# Patient Record
Sex: MALE | Race: WHITE | NOT HISPANIC OR LATINO | Employment: FULL TIME | URBAN - METROPOLITAN AREA
[De-identification: names, ages, dates, MRNs, and addresses within clinical notes are randomized per-mention and may not be internally consistent; named-entity substitution may affect disease eponyms.]

---

## 2021-06-16 PROBLEM — I35.0 NONRHEUMATIC AORTIC VALVE STENOSIS: Status: ACTIVE | Noted: 2019-05-24

## 2023-03-27 ENCOUNTER — APPOINTMENT (OUTPATIENT)
Dept: CARDIOLOGY | Facility: HOSPITAL | Age: 68
End: 2023-03-27
Attending: INTERNAL MEDICINE
Payer: COMMERCIAL

## 2023-03-27 ENCOUNTER — HOSPITAL ENCOUNTER (OUTPATIENT)
Facility: HOSPITAL | Age: 68
Setting detail: OBSERVATION
Discharge: HOME | End: 2023-03-28
Attending: EMERGENCY MEDICINE | Admitting: INTERNAL MEDICINE
Payer: COMMERCIAL

## 2023-03-27 ENCOUNTER — APPOINTMENT (OUTPATIENT)
Dept: RADIOLOGY | Facility: HOSPITAL | Age: 68
End: 2023-03-27
Attending: EMERGENCY MEDICINE
Payer: COMMERCIAL

## 2023-03-27 DIAGNOSIS — I20.0 UNSTABLE ANGINA (H): ICD-10-CM

## 2023-03-27 LAB
ABO/RH(D): NORMAL
ACT BLD: 246 SECONDS (ref 74–150)
ACT BLD: 309 SECONDS (ref 74–150)
ACT BLD: 331 SECONDS (ref 74–150)
ALBUMIN SERPL BCG-MCNC: 4.1 G/DL (ref 3.5–5.2)
ALP SERPL-CCNC: 70 U/L (ref 40–129)
ALT SERPL W P-5'-P-CCNC: 18 U/L (ref 10–50)
ANION GAP SERPL CALCULATED.3IONS-SCNC: 8 MMOL/L (ref 7–15)
ANTIBODY SCREEN: NEGATIVE
AST SERPL W P-5'-P-CCNC: 26 U/L (ref 10–50)
ATRIAL RATE - MUSE: 59 BPM
ATRIAL RATE - MUSE: 74 BPM
BASOPHILS # BLD AUTO: 0 10E3/UL (ref 0–0.2)
BASOPHILS NFR BLD AUTO: 0 %
BILIRUB SERPL-MCNC: 0.9 MG/DL
BUN SERPL-MCNC: 11.1 MG/DL (ref 8–23)
CALCIUM SERPL-MCNC: 9 MG/DL (ref 8.8–10.2)
CHLORIDE SERPL-SCNC: 104 MMOL/L (ref 98–107)
CHOLEST SERPL-MCNC: 177 MG/DL
CREAT SERPL-MCNC: 0.83 MG/DL (ref 0.67–1.17)
DEPRECATED HCO3 PLAS-SCNC: 26 MMOL/L (ref 22–29)
DIASTOLIC BLOOD PRESSURE - MUSE: NORMAL MMHG
DIASTOLIC BLOOD PRESSURE - MUSE: NORMAL MMHG
EOSINOPHIL # BLD AUTO: 0 10E3/UL (ref 0–0.7)
EOSINOPHIL NFR BLD AUTO: 0 %
ERYTHROCYTE [DISTWIDTH] IN BLOOD BY AUTOMATED COUNT: 13.2 % (ref 10–15)
GFR SERPL CREATININE-BSD FRML MDRD: >90 ML/MIN/1.73M2
GLUCOSE SERPL-MCNC: 105 MG/DL (ref 70–99)
HBA1C MFR BLD: 5.2 %
HCT VFR BLD AUTO: 39.1 % (ref 40–53)
HDLC SERPL-MCNC: 47 MG/DL
HGB BLD-MCNC: 13.6 G/DL (ref 13.3–17.7)
HOLD SPECIMEN: NORMAL
IMM GRANULOCYTES # BLD: 0.1 10E3/UL
IMM GRANULOCYTES NFR BLD: 1 %
INTERPRETATION ECG - MUSE: NORMAL
INTERPRETATION ECG - MUSE: NORMAL
LDLC SERPL CALC-MCNC: 114 MG/DL
LVEF ECHO: NORMAL
LYMPHOCYTES # BLD AUTO: 0.9 10E3/UL (ref 0.8–5.3)
LYMPHOCYTES NFR BLD AUTO: 9 %
MCH RBC QN AUTO: 31 PG (ref 26.5–33)
MCHC RBC AUTO-ENTMCNC: 34.8 G/DL (ref 31.5–36.5)
MCV RBC AUTO: 89 FL (ref 78–100)
MONOCYTES # BLD AUTO: 0.5 10E3/UL (ref 0–1.3)
MONOCYTES NFR BLD AUTO: 6 %
NEUTROPHILS # BLD AUTO: 8.4 10E3/UL (ref 1.6–8.3)
NEUTROPHILS NFR BLD AUTO: 84 %
NONHDLC SERPL-MCNC: 130 MG/DL
NRBC # BLD AUTO: 0 10E3/UL
NRBC BLD AUTO-RTO: 0 /100
P AXIS - MUSE: 55 DEGREES
P AXIS - MUSE: 68 DEGREES
PLATELET # BLD AUTO: 203 10E3/UL (ref 150–450)
POTASSIUM SERPL-SCNC: 4.1 MMOL/L (ref 3.4–5.3)
PR INTERVAL - MUSE: 182 MS
PR INTERVAL - MUSE: 198 MS
PROT SERPL-MCNC: 7.1 G/DL (ref 6.4–8.3)
QRS DURATION - MUSE: 94 MS
QRS DURATION - MUSE: 98 MS
QT - MUSE: 372 MS
QT - MUSE: 394 MS
QTC - MUSE: 390 MS
QTC - MUSE: 412 MS
R AXIS - MUSE: 40 DEGREES
R AXIS - MUSE: 47 DEGREES
RBC # BLD AUTO: 4.39 10E6/UL (ref 4.4–5.9)
SODIUM SERPL-SCNC: 138 MMOL/L (ref 136–145)
SPECIMEN EXPIRATION DATE: NORMAL
SYSTOLIC BLOOD PRESSURE - MUSE: NORMAL MMHG
SYSTOLIC BLOOD PRESSURE - MUSE: NORMAL MMHG
T AXIS - MUSE: 53 DEGREES
T AXIS - MUSE: 67 DEGREES
TRIGL SERPL-MCNC: 82 MG/DL
TROPONIN T SERPL HS-MCNC: 20 NG/L
UFH PPP CHRO-ACNC: <0.1 IU/ML
VENTRICULAR RATE- MUSE: 59 BPM
VENTRICULAR RATE- MUSE: 74 BPM
WBC # BLD AUTO: 9.9 10E3/UL (ref 4–11)

## 2023-03-27 PROCEDURE — 258N000003 HC RX IP 258 OP 636: Performed by: INTERNAL MEDICINE

## 2023-03-27 PROCEDURE — 250N000009 HC RX 250: Performed by: INTERNAL MEDICINE

## 2023-03-27 PROCEDURE — 250N000011 HC RX IP 250 OP 636: Performed by: INTERNAL MEDICINE

## 2023-03-27 PROCEDURE — 93010 ELECTROCARDIOGRAM REPORT: CPT | Performed by: INTERNAL MEDICINE

## 2023-03-27 PROCEDURE — 92921 HC PRQ TRLUML CORONARY ANGIOPLASTY ADDL BRANCH: CPT | Mod: LC | Performed by: INTERNAL MEDICINE

## 2023-03-27 PROCEDURE — 36415 COLL VENOUS BLD VENIPUNCTURE: CPT | Performed by: EMERGENCY MEDICINE

## 2023-03-27 PROCEDURE — 250N000013 HC RX MED GY IP 250 OP 250 PS 637: Performed by: INTERNAL MEDICINE

## 2023-03-27 PROCEDURE — 255N000002 HC RX 255 OP 636: Performed by: FAMILY MEDICINE

## 2023-03-27 PROCEDURE — 84484 ASSAY OF TROPONIN QUANT: CPT | Performed by: EMERGENCY MEDICINE

## 2023-03-27 PROCEDURE — 85520 HEPARIN ASSAY: CPT | Performed by: EMERGENCY MEDICINE

## 2023-03-27 PROCEDURE — 250N000013 HC RX MED GY IP 250 OP 250 PS 637: Performed by: EMERGENCY MEDICINE

## 2023-03-27 PROCEDURE — 0715T HC PRQ TRLUML CORONARY LITHOTRIPSY: CPT | Performed by: INTERNAL MEDICINE

## 2023-03-27 PROCEDURE — 92920 PRQ TRLUML C ANGIOP 1ART&/BR: CPT | Performed by: INTERNAL MEDICINE

## 2023-03-27 PROCEDURE — 92928 PRQ TCAT PLMT NTRAC ST 1 LES: CPT | Mod: LC | Performed by: INTERNAL MEDICINE

## 2023-03-27 PROCEDURE — 85025 COMPLETE CBC W/AUTO DIFF WBC: CPT | Performed by: EMERGENCY MEDICINE

## 2023-03-27 PROCEDURE — 86850 RBC ANTIBODY SCREEN: CPT | Performed by: INTERNAL MEDICINE

## 2023-03-27 PROCEDURE — 99152 MOD SED SAME PHYS/QHP 5/>YRS: CPT | Performed by: INTERNAL MEDICINE

## 2023-03-27 PROCEDURE — 71046 X-RAY EXAM CHEST 2 VIEWS: CPT

## 2023-03-27 PROCEDURE — C1874 STENT, COATED/COV W/DEL SYS: HCPCS | Performed by: INTERNAL MEDICINE

## 2023-03-27 PROCEDURE — 83036 HEMOGLOBIN GLYCOSYLATED A1C: CPT | Performed by: FAMILY MEDICINE

## 2023-03-27 PROCEDURE — 999N000054 HC STATISTIC EKG NON-CHARGEABLE

## 2023-03-27 PROCEDURE — 99153 MOD SED SAME PHYS/QHP EA: CPT | Performed by: INTERNAL MEDICINE

## 2023-03-27 PROCEDURE — 96366 THER/PROPH/DIAG IV INF ADDON: CPT | Mod: 59

## 2023-03-27 PROCEDURE — 99291 CRITICAL CARE FIRST HOUR: CPT | Mod: 25

## 2023-03-27 PROCEDURE — 96365 THER/PROPH/DIAG IV INF INIT: CPT

## 2023-03-27 PROCEDURE — 0715T PR PERCUTANEOUS TRANSLUMINAL CORONARY LITHOTRIPSY: CPT | Performed by: INTERNAL MEDICINE

## 2023-03-27 PROCEDURE — C1894 INTRO/SHEATH, NON-LASER: HCPCS | Performed by: INTERNAL MEDICINE

## 2023-03-27 PROCEDURE — 272N000001 HC OR GENERAL SUPPLY STERILE: Performed by: INTERNAL MEDICINE

## 2023-03-27 PROCEDURE — 93005 ELECTROCARDIOGRAM TRACING: CPT

## 2023-03-27 PROCEDURE — 250N000013 HC RX MED GY IP 250 OP 250 PS 637: Performed by: FAMILY MEDICINE

## 2023-03-27 PROCEDURE — C1887 CATHETER, GUIDING: HCPCS | Performed by: INTERNAL MEDICINE

## 2023-03-27 PROCEDURE — G0378 HOSPITAL OBSERVATION PER HR: HCPCS

## 2023-03-27 PROCEDURE — 85347 COAGULATION TIME ACTIVATED: CPT

## 2023-03-27 PROCEDURE — C1761 HC OR CATH, TRANS INTRA LITHO/CORONARY: HCPCS | Performed by: INTERNAL MEDICINE

## 2023-03-27 PROCEDURE — 93460 R&L HRT ART/VENTRICLE ANGIO: CPT | Performed by: INTERNAL MEDICINE

## 2023-03-27 PROCEDURE — 99222 1ST HOSP IP/OBS MODERATE 55: CPT | Performed by: FAMILY MEDICINE

## 2023-03-27 PROCEDURE — 96376 TX/PRO/DX INJ SAME DRUG ADON: CPT

## 2023-03-27 PROCEDURE — 99223 1ST HOSP IP/OBS HIGH 75: CPT | Mod: 25 | Performed by: INTERNAL MEDICINE

## 2023-03-27 PROCEDURE — 250N000011 HC RX IP 250 OP 636: Performed by: EMERGENCY MEDICINE

## 2023-03-27 PROCEDURE — 255N000002 HC RX 255 OP 636: Performed by: INTERNAL MEDICINE

## 2023-03-27 PROCEDURE — 93306 TTE W/DOPPLER COMPLETE: CPT | Mod: 26 | Performed by: INTERNAL MEDICINE

## 2023-03-27 PROCEDURE — 92921 PR PRQ TRLUML CORONARY ANGIOPLASTY ADDL BRANCH: CPT | Mod: LC | Performed by: INTERNAL MEDICINE

## 2023-03-27 PROCEDURE — 80053 COMPREHEN METABOLIC PANEL: CPT | Performed by: EMERGENCY MEDICINE

## 2023-03-27 PROCEDURE — 80061 LIPID PANEL: CPT | Performed by: FAMILY MEDICINE

## 2023-03-27 PROCEDURE — C9600 PERC DRUG-EL COR STENT SING: HCPCS | Performed by: INTERNAL MEDICINE

## 2023-03-27 PROCEDURE — C1769 GUIDE WIRE: HCPCS | Performed by: INTERNAL MEDICINE

## 2023-03-27 PROCEDURE — C1725 CATH, TRANSLUMIN NON-LASER: HCPCS | Performed by: INTERNAL MEDICINE

## 2023-03-27 PROCEDURE — 93458 L HRT ARTERY/VENTRICLE ANGIO: CPT | Mod: 26 | Performed by: INTERNAL MEDICINE

## 2023-03-27 PROCEDURE — 999N000054 ECG 12-LEAD WITH MUSE (LHE): Performed by: STUDENT IN AN ORGANIZED HEALTH CARE EDUCATION/TRAINING PROGRAM

## 2023-03-27 DEVICE — STENT CORONARY DES SYNERGY XD MR US 2.50X32MM H7493941832250: Type: IMPLANTABLE DEVICE | Status: FUNCTIONAL

## 2023-03-27 DEVICE — STENT CORONARY DES SYNERGY XD MR US 2.50X38MM H7493941838250: Type: IMPLANTABLE DEVICE | Status: FUNCTIONAL

## 2023-03-27 RX ORDER — SODIUM CHLORIDE 9 MG/ML
INJECTION, SOLUTION INTRAVENOUS CONTINUOUS
Status: DISCONTINUED | OUTPATIENT
Start: 2023-03-27 | End: 2023-03-27 | Stop reason: HOSPADM

## 2023-03-27 RX ORDER — FENTANYL CITRATE 50 UG/ML
INJECTION, SOLUTION INTRAMUSCULAR; INTRAVENOUS
Status: DISCONTINUED | OUTPATIENT
Start: 2023-03-27 | End: 2023-03-27 | Stop reason: HOSPADM

## 2023-03-27 RX ORDER — HYDRALAZINE HYDROCHLORIDE 20 MG/ML
10 INJECTION INTRAMUSCULAR; INTRAVENOUS EVERY 4 HOURS PRN
Status: DISCONTINUED | OUTPATIENT
Start: 2023-03-27 | End: 2023-03-28 | Stop reason: HOSPADM

## 2023-03-27 RX ORDER — NITROGLYCERIN 0.4 MG/1
0.4 TABLET SUBLINGUAL EVERY 5 MIN PRN
Status: DISCONTINUED | OUTPATIENT
Start: 2023-03-27 | End: 2023-03-28 | Stop reason: HOSPADM

## 2023-03-27 RX ORDER — OXYCODONE HYDROCHLORIDE 5 MG/1
10 TABLET ORAL EVERY 4 HOURS PRN
Status: DISCONTINUED | OUTPATIENT
Start: 2023-03-27 | End: 2023-03-28 | Stop reason: HOSPADM

## 2023-03-27 RX ORDER — HEPARIN SODIUM 10000 [USP'U]/100ML
0-5000 INJECTION, SOLUTION INTRAVENOUS CONTINUOUS
Status: DISCONTINUED | OUTPATIENT
Start: 2023-03-27 | End: 2023-03-27

## 2023-03-27 RX ORDER — ACETAMINOPHEN 325 MG/1
650 TABLET ORAL EVERY 4 HOURS PRN
Status: DISCONTINUED | OUTPATIENT
Start: 2023-03-27 | End: 2023-03-28 | Stop reason: HOSPADM

## 2023-03-27 RX ORDER — ACETAMINOPHEN 325 MG/1
650 TABLET ORAL EVERY 6 HOURS PRN
Status: DISCONTINUED | OUTPATIENT
Start: 2023-03-27 | End: 2023-03-27

## 2023-03-27 RX ORDER — ACETAMINOPHEN 650 MG/1
650 SUPPOSITORY RECTAL EVERY 6 HOURS PRN
Status: DISCONTINUED | OUTPATIENT
Start: 2023-03-27 | End: 2023-03-27

## 2023-03-27 RX ORDER — FENTANYL CITRATE 50 UG/ML
25 INJECTION, SOLUTION INTRAMUSCULAR; INTRAVENOUS
Status: DISCONTINUED | OUTPATIENT
Start: 2023-03-27 | End: 2023-03-27

## 2023-03-27 RX ORDER — FENTANYL CITRATE 50 UG/ML
25 INJECTION, SOLUTION INTRAMUSCULAR; INTRAVENOUS
Status: CANCELLED | OUTPATIENT
Start: 2023-03-27

## 2023-03-27 RX ORDER — IBUPROFEN 200 MG
200 TABLET ORAL EVERY 8 HOURS PRN
Status: ON HOLD | COMMUNITY
End: 2023-03-28

## 2023-03-27 RX ORDER — CLOPIDOGREL BISULFATE 75 MG/1
TABLET ORAL
Status: DISCONTINUED | OUTPATIENT
Start: 2023-03-27 | End: 2023-03-27 | Stop reason: HOSPADM

## 2023-03-27 RX ORDER — CLOPIDOGREL BISULFATE 75 MG/1
75 TABLET ORAL DAILY
Status: DISCONTINUED | OUTPATIENT
Start: 2023-03-28 | End: 2023-03-28 | Stop reason: HOSPADM

## 2023-03-27 RX ORDER — ASPIRIN 325 MG
325 TABLET ORAL ONCE
Status: COMPLETED | OUTPATIENT
Start: 2023-03-27 | End: 2023-03-27

## 2023-03-27 RX ORDER — NALOXONE HYDROCHLORIDE 0.4 MG/ML
0.4 INJECTION, SOLUTION INTRAMUSCULAR; INTRAVENOUS; SUBCUTANEOUS
Status: ACTIVE | OUTPATIENT
Start: 2023-03-27 | End: 2023-03-27

## 2023-03-27 RX ORDER — ATROPINE SULFATE 0.1 MG/ML
0.5 INJECTION INTRAVENOUS
Status: ACTIVE | OUTPATIENT
Start: 2023-03-27 | End: 2023-03-27

## 2023-03-27 RX ORDER — ONDANSETRON 4 MG/1
4 TABLET, ORALLY DISINTEGRATING ORAL EVERY 6 HOURS PRN
Status: DISCONTINUED | OUTPATIENT
Start: 2023-03-27 | End: 2023-03-27

## 2023-03-27 RX ORDER — ONDANSETRON 2 MG/ML
4 INJECTION INTRAMUSCULAR; INTRAVENOUS EVERY 6 HOURS PRN
Status: DISCONTINUED | OUTPATIENT
Start: 2023-03-27 | End: 2023-03-28 | Stop reason: HOSPADM

## 2023-03-27 RX ORDER — SIMETHICONE 80 MG
80 TABLET,CHEWABLE ORAL EVERY 6 HOURS PRN
Status: DISCONTINUED | OUTPATIENT
Start: 2023-03-27 | End: 2023-03-28 | Stop reason: HOSPADM

## 2023-03-27 RX ORDER — ONDANSETRON 2 MG/ML
4 INJECTION INTRAMUSCULAR; INTRAVENOUS EVERY 6 HOURS PRN
Status: DISCONTINUED | OUTPATIENT
Start: 2023-03-27 | End: 2023-03-27

## 2023-03-27 RX ORDER — METOPROLOL TARTRATE 25 MG/1
50 TABLET, FILM COATED ORAL 2 TIMES DAILY
Status: DISCONTINUED | OUTPATIENT
Start: 2023-03-27 | End: 2023-03-28 | Stop reason: HOSPADM

## 2023-03-27 RX ORDER — ASPIRIN 81 MG/1
81 TABLET ORAL DAILY
Status: DISCONTINUED | OUTPATIENT
Start: 2023-03-28 | End: 2023-03-28 | Stop reason: HOSPADM

## 2023-03-27 RX ORDER — NALOXONE HYDROCHLORIDE 0.4 MG/ML
0.2 INJECTION, SOLUTION INTRAMUSCULAR; INTRAVENOUS; SUBCUTANEOUS
Status: ACTIVE | OUTPATIENT
Start: 2023-03-27 | End: 2023-03-27

## 2023-03-27 RX ORDER — ONDANSETRON 4 MG/1
4 TABLET, ORALLY DISINTEGRATING ORAL EVERY 6 HOURS PRN
Status: DISCONTINUED | OUTPATIENT
Start: 2023-03-27 | End: 2023-03-28 | Stop reason: HOSPADM

## 2023-03-27 RX ORDER — OXYCODONE HYDROCHLORIDE 5 MG/1
5 TABLET ORAL EVERY 4 HOURS PRN
Status: DISCONTINUED | OUTPATIENT
Start: 2023-03-27 | End: 2023-03-28 | Stop reason: HOSPADM

## 2023-03-27 RX ORDER — NITROGLYCERIN 5 MG/ML
VIAL (ML) INTRAVENOUS
Status: DISCONTINUED | OUTPATIENT
Start: 2023-03-27 | End: 2023-03-27 | Stop reason: HOSPADM

## 2023-03-27 RX ORDER — SODIUM CHLORIDE 9 MG/ML
INJECTION, SOLUTION INTRAVENOUS CONTINUOUS
Status: ACTIVE | OUTPATIENT
Start: 2023-03-27 | End: 2023-03-27

## 2023-03-27 RX ORDER — HEPARIN SODIUM 1000 [USP'U]/ML
INJECTION, SOLUTION INTRAVENOUS; SUBCUTANEOUS
Status: DISCONTINUED | OUTPATIENT
Start: 2023-03-27 | End: 2023-03-27 | Stop reason: HOSPADM

## 2023-03-27 RX ORDER — NITROGLYCERIN 0.4 MG/1
0.4 TABLET SUBLINGUAL EVERY 5 MIN PRN
Status: DISCONTINUED | OUTPATIENT
Start: 2023-03-27 | End: 2023-03-27

## 2023-03-27 RX ORDER — ONDANSETRON 2 MG/ML
4 INJECTION INTRAMUSCULAR; INTRAVENOUS ONCE
Status: COMPLETED | OUTPATIENT
Start: 2023-03-27 | End: 2023-03-27

## 2023-03-27 RX ORDER — IODIXANOL 320 MG/ML
INJECTION, SOLUTION INTRAVASCULAR
Status: DISCONTINUED | OUTPATIENT
Start: 2023-03-27 | End: 2023-03-27 | Stop reason: HOSPADM

## 2023-03-27 RX ORDER — DOCUSATE SODIUM 100 MG/1
100 CAPSULE, LIQUID FILLED ORAL 2 TIMES DAILY
Status: DISCONTINUED | OUTPATIENT
Start: 2023-03-27 | End: 2023-03-28 | Stop reason: HOSPADM

## 2023-03-27 RX ORDER — DIAZEPAM 10 MG
10 TABLET ORAL
Status: COMPLETED | OUTPATIENT
Start: 2023-03-27 | End: 2023-03-27

## 2023-03-27 RX ORDER — CLOPIDOGREL BISULFATE 75 MG/1
75 TABLET ORAL DAILY
Qty: 90 TABLET | Refills: 3 | Status: SHIPPED | OUTPATIENT
Start: 2023-03-28 | End: 2023-08-18

## 2023-03-27 RX ORDER — FLUMAZENIL 0.1 MG/ML
0.2 INJECTION, SOLUTION INTRAVENOUS
Status: DISCONTINUED | OUTPATIENT
Start: 2023-03-27 | End: 2023-03-27

## 2023-03-27 RX ORDER — ASPIRIN 81 MG/1
81 TABLET, CHEWABLE ORAL ONCE
Status: DISCONTINUED | OUTPATIENT
Start: 2023-03-27 | End: 2023-03-27

## 2023-03-27 RX ORDER — HYDRALAZINE HYDROCHLORIDE 20 MG/ML
5 INJECTION INTRAMUSCULAR; INTRAVENOUS EVERY 6 HOURS PRN
Status: DISCONTINUED | OUTPATIENT
Start: 2023-03-27 | End: 2023-03-28 | Stop reason: HOSPADM

## 2023-03-27 RX ORDER — METOPROLOL TARTRATE 1 MG/ML
5 INJECTION, SOLUTION INTRAVENOUS
Status: DISCONTINUED | OUTPATIENT
Start: 2023-03-27 | End: 2023-03-28 | Stop reason: HOSPADM

## 2023-03-27 RX ORDER — ASPIRIN 81 MG/1
243 TABLET, CHEWABLE ORAL ONCE
Status: COMPLETED | OUTPATIENT
Start: 2023-03-27 | End: 2023-03-27

## 2023-03-27 RX ADMIN — DIAZEPAM 10 MG: 10 TABLET ORAL at 10:46

## 2023-03-27 RX ADMIN — DOCUSATE SODIUM 100 MG: 100 CAPSULE, LIQUID FILLED ORAL at 19:27

## 2023-03-27 RX ADMIN — ACETAMINOPHEN 650 MG: 325 TABLET ORAL at 19:26

## 2023-03-27 RX ADMIN — PERFLUTREN 2 ML: 6.52 INJECTION, SUSPENSION INTRAVENOUS at 16:01

## 2023-03-27 RX ADMIN — SIMETHICONE 80 MG: 80 TABLET, CHEWABLE ORAL at 19:45

## 2023-03-27 RX ADMIN — SODIUM CHLORIDE: 9 INJECTION, SOLUTION INTRAVENOUS at 09:47

## 2023-03-27 RX ADMIN — ASPIRIN 325 MG ORAL TABLET 325 MG: 325 PILL ORAL at 07:28

## 2023-03-27 RX ADMIN — SODIUM CHLORIDE: 9 INJECTION, SOLUTION INTRAVENOUS at 14:04

## 2023-03-27 RX ADMIN — METOPROLOL TARTRATE 50 MG: 25 TABLET, FILM COATED ORAL at 19:27

## 2023-03-27 RX ADMIN — HEPARIN SODIUM AND DEXTROSE 1050 UNITS/HR: 10000; 5 INJECTION INTRAVENOUS at 07:41

## 2023-03-27 ASSESSMENT — ACTIVITIES OF DAILY LIVING (ADL)
ADLS_ACUITY_SCORE: 35
ADLS_ACUITY_SCORE: 36
ADLS_ACUITY_SCORE: 35
ADLS_ACUITY_SCORE: 35
ADLS_ACUITY_SCORE: 36

## 2023-03-27 ASSESSMENT — HEART SCORE
AGE: 65+
HEART SCORE: 7
RISK FACTORS: 1-2 RISK FACTORS
ECG: SIGNIFICANT ST-DEPRESSION
TROPONIN: LESS THAN OR EQUAL TO NORMAL LIMIT
HISTORY: HIGHLY SUSPICIOUS

## 2023-03-27 ASSESSMENT — EJECTION FRACTION: EF_VALUE: .27

## 2023-03-27 NOTE — Clinical Note
Prepped with: ChloraPrep. The patient was draped. .Pre-procedure site marking:Insertion site not predetermined

## 2023-03-27 NOTE — Clinical Note
The first balloon was inserted into the circumflex and marginal circumflex.Max pressure = 10 lorena. Total duration = 27 seconds.

## 2023-03-27 NOTE — Clinical Note
Max pressure = 16 lorena. Total duration = 6 seconds.     Max pressure = 8 lorena. Total duration = 7 seconds.    Balloon reinflated a second time: Max pressure = 8 lorena. Total duration = 7 seconds.

## 2023-03-27 NOTE — PRE-PROCEDURE
GENERAL PRE-PROCEDURE:   Procedure:  Neal, possible PCI  Date/Time:  3/27/2023 11:02 AM    Written consent obtained?: Yes    Risks and benefits: Risks, benefits and alternatives were discussed    Consent given by:  Patient  Patient states understanding of procedure being performed: Yes    Patient's understanding of procedure matches consent: Yes    Procedure consent matches procedure scheduled: Yes    Expected level of sedation:  Moderate  Appropriately NPO:  Yes  ASA Class:  3  Mallampati  :  Grade 3- soft palate visible, posterior pharyngeal wall not visible  Lungs:  Lungs clear with good breath sounds bilaterally  Heart:  Normal heart sounds and rate  History & Physical reviewed:  History and physical reviewed and no updates needed  Statement of review:  I have reviewed the lab findings, diagnostic data, medications, and the plan for sedation

## 2023-03-27 NOTE — Clinical Note
The first balloon was inserted into the circumflex.Max pressure = 6 lorena. Total duration = 24 seconds.     Max pressure = 10 lorena. Total duration = 24 seconds.    Balloon reinflated a second time: Max pressure = 10 lorena. Total duration = 24 seconds.  Balloon reinflated a third time: Max pressure = 10 lorena. Total duration = 20 seconds.  Balloon reinflated a fourth time: Max pressure = 11 lorena. Total duration = 24 seconds.  Balloon reinflated  a fourth time: Max pressure = 12 lorena. Total duration = 27 seconds.

## 2023-03-27 NOTE — H&P
"[facility]    History and Physical - Hospitalist Service       Date of Admission:  3/27/2023    Assessment & Plan      Lopez Hogue is a 67 year old male     Chest Pain/USA  ---EKG with Twave inversion V3-V5 in the setting of exertional chest pain  ---troponin normal  ---given asa  --- Admit, keep n.p.o.  --- Continue heparin drip  --- Nitroglycerin as needed  --- Cardiology consulted and recommending angiogram today  --- Check fasting lipid panel, A1c    Aortic stenosis  --- Echo 2017 showing mild calcific aortic stenosis  --- Repeat echo today    Hypertension/elevated blood pressures  --- No diagnosis essential hypertension  --- cards started on metoprolol  --- Hydralazine as needed    Hidradenocarcinoma  --- 2015, left thigh    Squamous cell carcinoma  --- 2021, left buttock     Diet:  NPO  DVT Prophylaxis: Heparin gtt  Smith Catheter: Not present  Lines: None     Cardiac Monitoring: None  Code Status:   full code    Clinically Significant Risk Factors Present on Admission                       # Overweight: Estimated body mass index is 28.89 kg/m  as calculated from the following:    Height as of this encounter: 1.727 m (5' 8\").    Weight as of this encounter: 86.2 kg (190 lb).           Disposition Plan      Expected Discharge Date: 03/28/2023                  Caity Mcwilliams MD  Hospitalist Service  [facility]  Securely message with [employer] (more info)  Text page via [employer] Paging/Directory     ______________________________________________________________________    Chief Complaint   Chest pain    History is obtained from the patient    History of Present Illness   Lopez Hogue is a 67 year old male with no known coronary artery disease who came into the emergency room department for further work-up and evaluation of several minute history of chest pain.  History is taken from the patient in the presence of his wife.  Patient was at the gym and was on the " treadmill.  He began developing substernal chest pain.  It lasted at least 30 to 45 minutes.  It waxed and waned and was accompanied by a small amount of nausea.  Denies diaphoresis or shortness of breath.  Due to persistence of chest pain he came to the emergency room department.  The emergency room department he was found to have EKG changes although pain subsided spontaneously and quickly and he is being admitted    Admits that he was told he had aortic stenosis years ago.  No recent follow-up    Denies family history of early heart disease.  Quit smoking about 10 years ago.  No history of hypertension diabetes or hyperlipidemia.    Denies recent fever cough or infection.  No vomiting or diarrhea      Past Medical History    Past Medical History:   Diagnosis Date     EtOH dependence (H)     Quit drinking 10 years     Hypertension      Nonrheumatic aortic (valve) stenosis      Sweat gland carcinoma        Past Surgical History   Past Surgical History:   Procedure Laterality Date     OTHER SURGICAL HISTORY  2015    WIDE EXCISION OF LEFT GLUTEAL MASSTNM: [id number], stage: II hidradenocarcinoma Grade II, 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, sentinel lymph node biopsy negative        Prior to Admission Medications   Prior to Admission Medications   Prescriptions Last Dose Informant Patient Reported? Taking?   diazePAM (VALIUM) 5 MG tablet   No No   Sig: [DIAZEPAM (VALIUM) 5 MG TABLET] Take 1 tablet (5 mg total) by mouth every 12 (twelve) hours as needed for muscle spasms (pain).   traMADoL (ULTRAM) 50 mg tablet   No No   Sig: [TRAMADOL (ULTRAM) 50 MG TABLET] Take 1 tablet (50 mg total) by mouth every 6 (six) hours as needed for pain.      Facility-Administered Medications: None        Review of Systems    The 5 point Review of Systems is negative other than noted in the HPI      Physical Exam   Vital Signs: Temp: 98.1  F (36.7  C) Temp src: Oral BP: (!) 170/79 Pulse: 71   Resp: 16 SpO2: 99 % O2 Device: None (Room air)    Weight: 190 lbs 0  oz    General Appearance: Pleasant male in no apparent distress  Respiratory: Clear to auscultation bilaterally no presacral edema  Cardiovascular: Regular rate and rhythm with a 2 out of 6 systolic murmur radiating into the neck  GI: Soft and nontender without hepatosplenomegaly  Skin: No significant lower extremity edema  Other: Neurologically grossly intact without focal deficits appreciated.    Medical Decision Making             Data      Normal sinus rhythm.  ST depression V3 through V5.  QTc 412.    Recent Results (from the past 24 hour(s))   Heparin Unfractionated Anti Xa Level    Collection Time: 03/27/23  7:20 AM   Result Value Ref Range    Anti Xa Unfractionated Heparin <0.10 For Reference Range, See Comment IU/mL   Comprehensive metabolic panel    Collection Time: 03/27/23  7:27 AM   Result Value Ref Range    Sodium 138 136 - 145 mmol/L    Potassium 4.1 3.4 - 5.3 mmol/L    Chloride 104 98 - 107 mmol/L    Carbon Dioxide (CO2) 26 22 - 29 mmol/L    Anion Gap 8 7 - 15 mmol/L    Urea Nitrogen 11.1 8.0 - 23.0 mg/dL    Creatinine 0.83 0.67 - 1.17 mg/dL    Calcium 9.0 8.8 - 10.2 mg/dL    Glucose 105 (H) 70 - 99 mg/dL    Alkaline Phosphatase 70 40 - 129 U/L    AST 26 10 - 50 U/L    ALT 18 10 - 50 U/L    Protein Total 7.1 6.4 - 8.3 g/dL    Albumin 4.1 3.5 - 5.2 g/dL    Bilirubin Total 0.9 <=1.2 mg/dL    GFR Estimate >90 >60 mL/min/1.73m2   Troponin T, High Sensitivity    Collection Time: 03/27/23  7:27 AM   Result Value Ref Range    Troponin T, High Sensitivity 20 <=22 ng/L   CBC with platelets and differential    Collection Time: 03/27/23  7:27 AM   Result Value Ref Range    WBC Count 9.9 4.0 - 11.0 10e3/uL    RBC Count 4.39 (L) 4.40 - 5.90 10e6/uL    Hemoglobin 13.6 13.3 - 17.7 g/dL    Hematocrit 39.1 (L) 40.0 - 53.0 %    MCV 89 78 - 100 fL    MCH 31.0 26.5 - 33.0 pg    MCHC 34.8 31.5 - 36.5 g/dL    RDW 13.2 10.0 - 15.0 %    Platelet Count 203 150 - 450 10e3/uL    % Neutrophils 84 %    % Lymphocytes 9 %    %  Monocytes 6 %    % Eosinophils 0 %    % Basophils 0 %    % Immature Granulocytes 1 %    NRBCs per 100 WBC 0 <1 /100    Absolute Neutrophils 8.4 (H) 1.6 - 8.3 10e3/uL    Absolute Lymphocytes 0.9 0.8 - 5.3 10e3/uL    Absolute Monocytes 0.5 0.0 - 1.3 10e3/uL    Absolute Eosinophils 0.0 0.0 - 0.7 10e3/uL    Absolute Basophils 0.0 0.0 - 0.2 10e3/uL    Absolute Immature Granulocytes 0.1 <=0.4 10e3/uL    Absolute NRBCs 0.0 10e3/uL   Extra Red Top Tube    Collection Time: 03/27/23  7:27 AM   Result Value Ref Range    Hold Specimen JIC        Imaging results reviewed over the past 24 hrs:   Recent Results (from the past 24 hour(s))   XR Chest 2 Views    Narrative    EXAM: XR CHEST 2 VIEWS  LOCATION: [facility]  DATE/TIME: 3/27/2023 8:17 AM    INDICATION: chest pain  COMPARISON: CT chest 07/05/2021 multiple prior studies.      Impression    IMPRESSION: Lungs are clear. Heart and pulmonary vascularity are normal. No signs of acute disease.

## 2023-03-27 NOTE — ED NOTES
Pt is alert and oriented x 4. States he has no chest pain at this time. No shortness of breath or nausea. VSS.

## 2023-03-27 NOTE — DISCHARGE INSTRUCTIONS

## 2023-03-27 NOTE — PHARMACY-ADMISSION MEDICATION HISTORY
Pharmacy Note - Admission Medication History    Pertinent Provider Information:      ______________________________________________________________________    Prior To Admission (PTA) med list completed and updated in EMR.       PTA Med List   Medication Sig Last Dose     ibuprofen (ADVIL/MOTRIN) 200 MG tablet Take 200 mg by mouth every 8 hours as needed for pain (occasional use) More than a month       Information source(s): Patient  Method of interview communication: in-person    Summary of Changes to PTA Med List  New: ibuprofen  Discontinued: diazepam, tramadol  Changed: none    Patient was asked about OTC/herbal products specifically.  PTA med list reflects this.    In the past week, patient estimated taking medication this percent of the time:  greater than 90%.    Medication Affordability:       Allergies were reviewed, assessed, and updated with the patient.      Patient does not use any multi-dose medications prior to admission.    The information provided in this note is only as accurate as the sources available at the time of the update(s).    Thank you for the opportunity to participate in the care of this patient.    Harish Whitehead, PharmD  3/27/2023 10:05 AM

## 2023-03-27 NOTE — Clinical Note
Max pressure = 6 lorena. Total duration = 15 seconds.     Max pressure = 6 lorena. Total duration = 7 seconds.    Balloon reinflated a second time: Max pressure = 6 lorena. Total duration = 7 seconds.  Balloon reinflated a third time: Max pressure = 6 lorena. Total duration = 8 seconds.

## 2023-03-27 NOTE — PROGRESS NOTES
TR Band removed at 1810. No bleeding, no hematoma noted. Vitals stable. Pt c/o epigastric gas pain after eating dinner which he rated 2/10. Denied any chest pain and other symptoms. MD ordered simethicone for gas pain. Waiting for medication at this time.

## 2023-03-27 NOTE — ED TRIAGE NOTES
"midsternal cp started 30 min ago while on the treadmill .  Pt states he kept going, finished and continues.  Rates between 2-6.  No nausea or diaphoresis,no  sob     Triage Assessment     Row Name 03/27/23 0704       Triage Assessment (Adult)    Airway WDL WDL       Respiratory WDL    Respiratory WDL WDL       Skin Circulation/Temperature WDL    Skin Circulation/Temperature WDL WDL       Cardiac WDL    Cardiac WDL X;chest pain       Chest Pain Assessment    Chest Pain Location midsternal    Character pressure;squeezing;tightness    Precipitating Factors activity   on the treadmill \" and I kept going\"       Peripheral/Neurovascular WDL    Peripheral Neurovascular WDL WDL       Cognitive/Neuro/Behavioral WDL    Cognitive/Neuro/Behavioral WDL WDL              "

## 2023-03-27 NOTE — PROGRESS NOTES
Pt received into [facility], A/O, At baseline neuro status. PIV patent NS, Rt. Radial site intact w/o bleeding, bruising or swelling. VSS. Denies pain. Family at bedside. Dr. Etienne spoke with pt. and family re: procedure results. Report to Mary SANCHES on P3. Pt transferred with all belongings.

## 2023-03-27 NOTE — CONSULTS
"  Thank you, DrMariano No ref. provider found, for asking the [facility] Care team to see Lopez Hogue to evaluate chest pain.      Assessment/Recommendations   Assessment:    Chest pain with ischemic EKG changes, prolonged, typical for myocardial ischemia- consistent with unstable angina / non-ST segment elevation myocardial infarction  Aortic stenosis, probably moderate or severe  Elevated blood pressure without history of hypertension    Plan:  Aspirin heparin  Metoprolol p.o.  Echo stat to assess the severity of aortic valve disease and look for wall motion abnormalities  Coronary angiogram and possible intervention  Risks and benefits of procedure were discussed with the patient.  He agrees to proceed           History of Present Illness/Subjective    Mr. Lopez Hogue is a 67 year old male who came to the emergency with complaints of retrosternal chest discomfort.  Pain developed when he was exercising on a treadmill in the morning.  He felt mildly nauseous but there was no other associated symptoms.  Pain lasted about an hour before it subsided with IV heparin and nitro.  He reports that he has never had similar chest discomfort before.  He has no history of known coronary artery disease.  Number of years ago he was told to have a heart murmur.  He was recommended to have a follow-up echo but he never carried out.  He denies exertional dyspnea.  He has no weight gain, PND, orthopnea.  He denies allergies to IV contrast.  He has not had any GI bleeding or recent stomach issues.  He denies heart palpitations or syncope.    ECG: Personally reviewed.  Normal sinus rhythm with 2 to 3 mm downsloping ST segment depression in leads V2 through V4    ECHO (personnaly Reviewed): Pending    Chest X-Ray: Negative     Physical Examination Review of Systems   BP (!) 162/77   Pulse 64   Temp 98.1  F (36.7  C) (Oral)   Resp 16   Ht 1.727 m (5' 8\")   Wt 86.2 kg (190 lb)   SpO2 96%   BMI 28.89 kg/m    Body mass " index is 28.89 kg/m .  Wt Readings from Last 3 Encounters:   03/27/23 86.2 kg (190 lb)       Intake/Output Summary (Last 24 hours) at 3/27/2023 0844  Last data filed at 3/27/2023 0813  Gross per 24 hour   Intake --   Output 200 ml   Net -200 ml     General Appearance:   Alert, cooperative, no distress, appears stated age   Head/ENT: Normocephalic, without obvious abnormality. Membranes moist.      EYES:  No scleral icterus   Neck: Supple, symmetrical, trachea midline, no adenopathy, thyroid: not enlarged, symmetric, no carotid bruit or JVD   Chest/Lungs:   lungs are clear to auscultation, respirations unlabored. No tenderness or deformity   Cardiovascular:   Regular rhythm, S1, S2 normal, norub or gallop.  3/6 harsh systolic murmur at the aortic area   Abdomen:  Soft, non-tender, bowel sounds active all four quadrants,  no masses, no organomegaly   Extremities: no cyanosis or clubbing. No edema   Skin: Skin color, texture, turgor normal, no rashes or lesions.    Neurologic: Alert and oriented x 3, moving all four extremities.    Psychiatric: Normal affect.      10 system review of systems completed see history of present illness and inpatient H&P (reviewed) for further details.          Lab Results    Chemistry/lipid CBC Cardiac Enzymes/BNP/TSH/INR   No results for input(s): CHOL, HDL, LDL, TRIG, CHOLHDLRATIO in the last 87548 hours.  No results for input(s): LDL in the last 32448 hours.  Recent Labs   Lab Test 03/27/23  0727      POTASSIUM 4.1   CHLORIDE 104   CO2 26   *   BUN 11.1   CR 0.83   GFRESTIMATED >90   RAFAELA 9.0     Recent Labs   Lab Test 03/27/23  0727   CR 0.83     No results for input(s): A1C in the last 45249 hours.       Recent Labs   Lab Test 03/27/23  0727   WBC 9.9   HGB 13.6   HCT 39.1*   MCV 89        Recent Labs   Lab Test 03/27/23  0727   HGB 13.6    No results for input(s): TROPONINI in the last 04780 hours.  No results for input(s): BNP, NTBNPI, NTBNP in the last 83473  hours.  No results for input(s): TSH in the last 85690 hours.  No results for input(s): INR in the last 74145 hours.     Medical History  Surgical History Family History Social History   Past Medical History:   Diagnosis Date     EtOH dependence (H)      Hypertension      Nonrheumatic aortic (valve) stenosis      Sweat gland carcinoma      Past Surgical History:   Procedure Laterality Date     OTHER SURGICAL HISTORY  2015    WIDE EXCISION OF LEFT GLUTEAL MASSTNM: [id number], stage: II hidradenocarcinoma Grade II, 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, sentinel lymph node biopsy negative      No premature CAD, SCD,cardiomyopathy   Social History     Socioeconomic History     Marital status:      Spouse name: Not on file     Number of children: Not on file     Years of education: Not on file     Highest education level: Not on file   Occupational History     Not on file   Tobacco Use     Smoking status: Not on file     Smokeless tobacco: Not on file   Substance and Sexual Activity     Alcohol use: Not on file     Drug use: Not on file     Sexual activity: Not on file   Other Topics Concern     Not on file   Social History Narrative     Not on file     Social Determinants of Health     Financial Resource Strain: Not on file   Food Insecurity: Not on file   Transportation Needs: Not on file   Physical Activity: Not on file   Stress: Not on file   Social Connections: Not on file   Intimate Partner Violence: Not on file   Housing Stability: Not on file         Medications  Allergies   Current Facility-Administered Medications Ordered in Epic   Medication Dose Route Frequency Provider Last Rate Last Admin     diazepam (VALIUM) tablet 10 mg  10 mg Oral Once PRN Christopher Walters MD         heparin infusion 25,000 units in D5W 250 mL ANTICOAGULANT  0-5,000 Units/hr Intravenous Continuous Triny Miller MD 10.5 mL/hr at 03/27/23 0741 1,050 Units/hr at 03/27/23 0741     nitroGLYcerin (NITROSTAT) sublingual tablet 0.4 mg  0.4 mg Sublingual Q5 Min  Triny Storm MD         Current Outpatient Medications Ordered in Epic   Medication Sig Dispense Refill     diazePAM (VALIUM) 5 MG tablet [DIAZEPAM (VALIUM) 5 MG TABLET] Take 1 tablet (5 mg total) by mouth every 12 (twelve) hours as needed for muscle spasms (pain). 10 tablet 0     traMADoL (ULTRAM) 50 mg tablet [TRAMADOL (ULTRAM) 50 MG TABLET] Take 1 tablet (50 mg total) by mouth every 6 (six) hours as needed for pain. 8 tablet 0       Allergies   Allergen Reactions     Coconut Oil Beauty [Albolene] Anaphylaxis

## 2023-03-27 NOTE — Clinical Note
Max pressure = 16 lorena. Total duration = 9 seconds.     Max pressure = 16 lorena. Total duration = 7 seconds.

## 2023-03-27 NOTE — ED NOTES
Ambulated to bathroom. A/O x 4. Ecchymosis on right lower arm prior to the heparin infusion. Has remained pain-free while in ED. Last oral intake was 0530 today - protein shake.  Wallet sent with family member. Clothes and phone remain with patient.

## 2023-03-27 NOTE — Clinical Note
The first balloon was inserted into the circumflex and LPL.Max pressure = 10 lorena. Total duration = 30 seconds.     Max pressure = 10 lorena. Total duration = 50 seconds.    Balloon reinflated a second time: Max pressure = 10 lorena. Total duration = 50 seconds.

## 2023-03-27 NOTE — PLAN OF CARE
Problem: Plan of Care - These are the overarching goals to be used throughout the patient stay.    Goal: Plan of Care Review  Description: The Plan of Care Review/Shift note should be completed every shift.  The Outcome Evaluation is a brief statement about your assessment that the patient is improving, declining, or no change.  This information will be displayed automatically on your shift note.  3/27/2023 1454 by Mary Rosaels RN  Outcome: Progressing  3/27/2023 1453 by Mary Rosales RN  Outcome: Progressing     Problem: Chest Pain  Goal: Resolution of Chest Pain Symptoms  Outcome: Progressing     Problem: Cardiac Catheterization (Diagnostic/Interventional)  Goal: Stable Heart Rate and Rhythm  Outcome: Progressing     Problem: Plan of Care - These are the overarching goals to be used throughout the patient stay.    Goal: Absence of Hospital-Acquired Illness or Injury  Intervention: Identify and Manage Fall Risk  Recent Flowsheet Documentation  Taken 3/27/2023 1400 by Mary Rosales RN  Safety Promotion/Fall Prevention: activity supervised     Problem: Pain Acute  Goal: Optimal Pain Control and Function  Intervention: Prevent or Manage Pain  Recent Flowsheet Documentation  Taken 3/27/2023 1400 by Mary Rosales RN  Medication Review/Management: medications reviewed     Problem: Cardiac Catheterization (Diagnostic/Interventional)  Goal: Anesthesia/Sedation Recovery  Intervention: Optimize Anesthesia Recovery  Recent Flowsheet Documentation  Taken 3/27/2023 1400 by Mary Rosales RN  Safety Promotion/Fall Prevention: activity supervised  Goal: Absence of Vascular Access Complication  Intervention: Prevent Access Site Complications  Recent Flowsheet Documentation  Taken 3/27/2023 1400 by Mary Rosales RN  Activity Management: activity adjusted per tolerance   Goal Outcome Evaluation:         Patient is alert and able to let his needs be known. Arrived to the unit at 1400 from  [facility]. Angiogram in Right radial approach- band applied 1230, 12ml in it. Bedrest ended at 1430. VSS and SB on the monitor. CMS intact. No hematoma/swelling  noted. Ordered lunch upon arrival. Family at bedside and eating now. After finished eating, will start air removal on TR band. Will report to RICK SANCHES.

## 2023-03-27 NOTE — Clinical Note
The first balloon was inserted into the circumflex and marginal circumflex.Max pressure = 10 lorena. Total duration = 7 seconds.     Max pressure = 14 lorena. Total duration = 8 seconds.    Balloon reinflated a second time: Max pressure = 14 lorena. Total duration = 8 seconds. Max pressure = 16 lorena. Total duration = 8 seconds.

## 2023-03-27 NOTE — Clinical Note
The first balloon was inserted into the circumflex and LPL.Max pressure = 6 lorena. Total duration = 30 seconds.

## 2023-03-27 NOTE — ED PROVIDER NOTES
EMERGENCY DEPARTMENT ENCOUNTER      NAME: Lopez Hogue  AGE: 67 year old male  YOB: 1955  MRN: [id number]  EVALUATION DATE & TIME: No admission date for patient encounter.    PCP: Madi Ramos    ED PROVIDER: Triny Miller M.D.      Chief Complaint   Patient presents with     Chest Pain         FINAL IMPRESSION:  1. Unstable angina (H)          ED COURSE & MEDICAL DECISION MAKING:    ED Course as of 03/27/23 0836   Mon Mar 27, 2023   0720 Pt with concerning HEART score 7 chest pain with working out today with marked ST depression V2-v5 new compared to prior EKG 2013 with concern for unstable angina with lingering mild pain, given ASA/NTG, will begin heparin gtt with unstable angina present, cardiology paged stat, felt nausea but now resolved and given zofran, CXR and troponin ordered   0749 Patient reassuringly now pain free, not given NTG, heparin gtt underway. I spoke with cardiologist Dr Walters who reviewed EKG also and agrees re: diagnosis of unstable angina and will see patient this morning personally and does agree likely to need this morning cardiac catherization. Admitting physician paged to schedule admission.   0811 Pt endorsed to hospitalist Dr Mcwilliams who recommends admit cardiac tele obs   8:34 AM Dr Walters from cardiology at bedside, assessing patient in person now. He requests inpatient cardiology consultation, consultation placed.    Pertinent Labs & Imaging studies reviewed. (See chart for details)    N95 worn  A face shield was worn also  COVID PPE    Medical Decision Making    History:    Supplemental history from: Documented in chart, if applicable    External Record(s) reviewed: Documented in chart, if applicable.    Work Up:    Chart documentation includes differential considered and any EKGs or imaging independently interpreted by provider, where specified.    In additional to work up documented, I considered the following work up: Documented in chart, if  applicable.    External consultation:    Discussion of management with another provider: Documented in chart, if applicable    Complicating factors:    Care impacted by chronic illness: Hypertension    Care affected by social determinants of health: N/A    Disposition considerations: Admit.    7:09 AM I met with patient for initial encounter.  8:09 AM I spoke to the hospitalist Dr Mcwilliams regarding patient for admission.    At the conclusion of the encounter I discussed the results of all of the tests and the disposition. The questions were answered. The patient or family acknowledged understanding and was agreeable with the care plan.     Critical Care time was 45 minutes for this patient excluding procedures.      MEDICATIONS GIVEN IN THE EMERGENCY:  Medications   nitroGLYcerin (NITROSTAT) sublingual tablet 0.4 mg (0.4 mg Sublingual Not Given 3/27/23 0732)   heparin infusion 25,000 units in D5W 250 mL ANTICOAGULANT (1,050 Units/hr Intravenous $New Bag 3/27/23 0741)   aspirin (ASA) tablet 325 mg (325 mg Oral $Given 3/27/23 0728)   heparin loading dose for LOW INTENSITY TREATMENT * Give BEFORE starting heparin infusion (5,150 Units Intravenous $Given 3/27/23 0740)   ondansetron (ZOFRAN) injection 4 mg (4 mg Intravenous Not Given 3/27/23 0747)       NEW PRESCRIPTIONS STARTED AT TODAY'S ER VISIT  New Prescriptions    No medications on file          =================================================================    HPI      Lopez Hogue is a 67 year old male with PMHx of HTN, alcohol dependence, who presents to the ED today via walk-in with chest pain.    Patient endorses intermittent chest pain that began at 6:30 AM while he was working out at the gym. He states that the pain feels similar to past acid reflux but has not resolved with 1 episode of nausea, and states that the pain is currently 1/10 and non radiating. He has not taken any pain medications.    Patient denies any family history of heart disease. He is  "not currently taking any medications for HTN or HLD. He reports that he used to smoke tobacco, but has not smoked in 10 years.    Patient denies light headedness, tingling, fever, cough, and all other complaints at this time.      REVIEW OF SYSTEMS   All other systems reviewed and are negative except as noted above in HPI.    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 No pertinent family history.    SOCIAL HISTORY:   Social History     Socioeconomic History     Marital status:        VITALS:  Patient Vitals for the past 24 hrs:   BP Temp Temp src Pulse Resp SpO2 Height Weight   03/27/23 0805 (!) 162/77 -- -- 64 16 96 % -- --   03/27/23 0745 (!) 165/83 -- -- 71 16 96 % -- --   03/27/23 0730 (!) 164/88 98.1  F (36.7  C) Oral 74 16 98 % -- --   03/27/23 0702 (!) 193/85 -- -- 70 20 98 % 1.727 m (5' 8\") 86.2 kg (190 lb)       PHYSICAL EXAM    GENERAL: Awake, alert.  In no acute distress.   HEENT: Normocephalic, atraumatic.  Pupils equal, round and reactive.  Conjunctiva normal.  EOMI.  NECK: No stridor or apparent deformity.  PULMONARY: Symmetrical breath sounds without distress.  Lungs clear to auscultation bilaterally without wheezes, rhonchi or rales.  CARDIO: Regular rate and rhythm.  No significant murmur, rub or gallop.  Radial pulses strong and symmetrical.  ABDOMINAL: Abdomen soft, non-distended and " non-tender to palpation.  No CVAT, no palpable hepatosplenomegaly.  EXTREMITIES: No lower extremity swelling or edema.    NEURO: Alert and oriented to person, place and time.  Cranial nerves grossly intact.  No focal motor deficit.  PSYCH: Normal mood and affect  SKIN: No rashes      LAB:  All pertinent labs reviewed and interpreted.  Results for orders placed or performed during the hospital encounter of 03/27/23   XR Chest 2 Views    Impression    IMPRESSION: Lungs are clear. Heart and pulmonary vascularity are normal. No signs of acute disease.   Comprehensive metabolic panel   Result Value Ref Range    Sodium 138 136 - 145 mmol/L    Potassium 4.1 3.4 - 5.3 mmol/L    Chloride 104 98 - 107 mmol/L    Carbon Dioxide (CO2) 26 22 - 29 mmol/L    Anion Gap 8 7 - 15 mmol/L    Urea Nitrogen 11.1 8.0 - 23.0 mg/dL    Creatinine 0.83 0.67 - 1.17 mg/dL    Calcium 9.0 8.8 - 10.2 mg/dL    Glucose 105 (H) 70 - 99 mg/dL    Alkaline Phosphatase 70 40 - 129 U/L    AST 26 10 - 50 U/L    ALT 18 10 - 50 U/L    Protein Total 7.1 6.4 - 8.3 g/dL    Albumin 4.1 3.5 - 5.2 g/dL    Bilirubin Total 0.9 <=1.2 mg/dL    GFR Estimate >90 >60 mL/min/1.73m2   Result Value Ref Range    Troponin T, High Sensitivity 20 <=22 ng/L   CBC with platelets and differential   Result Value Ref Range    WBC Count 9.9 4.0 - 11.0 10e3/uL    RBC Count 4.39 (L) 4.40 - 5.90 10e6/uL    Hemoglobin 13.6 13.3 - 17.7 g/dL    Hematocrit 39.1 (L) 40.0 - 53.0 %    MCV 89 78 - 100 fL    MCH 31.0 26.5 - 33.0 pg    MCHC 34.8 31.5 - 36.5 g/dL    RDW 13.2 10.0 - 15.0 %    Platelet Count 203 150 - 450 10e3/uL    % Neutrophils 84 %    % Lymphocytes 9 %    % Monocytes 6 %    % Eosinophils 0 %    % Basophils 0 %    % Immature Granulocytes 1 %    NRBCs per 100 WBC 0 <1 /100    Absolute Neutrophils 8.4 (H) 1.6 - 8.3 10e3/uL    Absolute Lymphocytes 0.9 0.8 - 5.3 10e3/uL    Absolute Monocytes 0.5 0.0 - 1.3 10e3/uL    Absolute Eosinophils 0.0 0.0 - 0.7 10e3/uL    Absolute Basophils 0.0  0.0 - 0.2 10e3/uL    Absolute Immature Granulocytes 0.1 <=0.4 10e3/uL    Absolute NRBCs 0.0 10e3/uL       RADIOLOGY:  Reviewed all pertinent imaging. Please see official radiology report.  XR Chest 2 Views   Final Result   IMPRESSION: Lungs are clear. Heart and pulmonary vascularity are normal. No signs of acute disease.            EKG:    Reviewed and interpreted as:   Performed at 3/27/23 0656  Normal sinus rhythm, ST depression.  Rate: 74 bpm  V2-V5 without reciprocal ST elevation, new compared to prior from 6/6/2013.      I have independently reviewed and interpreted the EKG(s) documented above.        I, Davie Webb, am serving as a scribe to document services personally performed by Dr. Triny Miller based on my observation and the provider's statements to me. I, Triny Miller MD attest that Davie Webb is acting in a scribe capacity, has observed my performance of the services and has documented them in accordance with my direction.      Triny Miller MD  03/27/23 0811       Triny Miller MD  03/27/23 0836

## 2023-03-28 ENCOUNTER — APPOINTMENT (OUTPATIENT)
Dept: OCCUPATIONAL THERAPY | Facility: HOSPITAL | Age: 68
End: 2023-03-28
Attending: INTERNAL MEDICINE
Payer: COMMERCIAL

## 2023-03-28 VITALS
HEART RATE: 72 BPM | WEIGHT: 188.4 LBS | HEIGHT: 68 IN | OXYGEN SATURATION: 93 % | BODY MASS INDEX: 28.55 KG/M2 | TEMPERATURE: 97.8 F | SYSTOLIC BLOOD PRESSURE: 136 MMHG | RESPIRATION RATE: 20 BRPM | DIASTOLIC BLOOD PRESSURE: 72 MMHG

## 2023-03-28 LAB
ANION GAP SERPL CALCULATED.3IONS-SCNC: 8 MMOL/L (ref 7–15)
ATRIAL RATE - MUSE: 64 BPM
BUN SERPL-MCNC: 8 MG/DL (ref 8–23)
CALCIUM SERPL-MCNC: 9.2 MG/DL (ref 8.8–10.2)
CHLORIDE SERPL-SCNC: 106 MMOL/L (ref 98–107)
CREAT SERPL-MCNC: 0.74 MG/DL (ref 0.67–1.17)
DEPRECATED HCO3 PLAS-SCNC: 26 MMOL/L (ref 22–29)
DIASTOLIC BLOOD PRESSURE - MUSE: NORMAL MMHG
GFR SERPL CREATININE-BSD FRML MDRD: >90 ML/MIN/1.73M2
GLUCOSE SERPL-MCNC: 107 MG/DL (ref 70–99)
INTERPRETATION ECG - MUSE: NORMAL
P AXIS - MUSE: 32 DEGREES
POTASSIUM SERPL-SCNC: 4 MMOL/L (ref 3.4–5.3)
PR INTERVAL - MUSE: 192 MS
QRS DURATION - MUSE: 92 MS
QT - MUSE: 388 MS
QTC - MUSE: 400 MS
R AXIS - MUSE: 31 DEGREES
SODIUM SERPL-SCNC: 140 MMOL/L (ref 136–145)
SYSTOLIC BLOOD PRESSURE - MUSE: NORMAL MMHG
T AXIS - MUSE: 40 DEGREES
TROPONIN T SERPL HS-MCNC: 434 NG/L
VENTRICULAR RATE- MUSE: 64 BPM

## 2023-03-28 PROCEDURE — 250N000013 HC RX MED GY IP 250 OP 250 PS 637: Performed by: INTERNAL MEDICINE

## 2023-03-28 PROCEDURE — 82310 ASSAY OF CALCIUM: CPT | Performed by: INTERNAL MEDICINE

## 2023-03-28 PROCEDURE — G0378 HOSPITAL OBSERVATION PER HR: HCPCS

## 2023-03-28 PROCEDURE — 99233 SBSQ HOSP IP/OBS HIGH 50: CPT | Mod: 25 | Performed by: INTERNAL MEDICINE

## 2023-03-28 PROCEDURE — 97165 OT EVAL LOW COMPLEX 30 MIN: CPT | Mod: GO

## 2023-03-28 PROCEDURE — 93005 ELECTROCARDIOGRAM TRACING: CPT

## 2023-03-28 PROCEDURE — 93010 ELECTROCARDIOGRAM REPORT: CPT | Performed by: INTERNAL MEDICINE

## 2023-03-28 PROCEDURE — 97110 THERAPEUTIC EXERCISES: CPT | Mod: GO

## 2023-03-28 PROCEDURE — 99239 HOSP IP/OBS DSCHRG MGMT >30: CPT | Performed by: FAMILY MEDICINE

## 2023-03-28 PROCEDURE — 36415 COLL VENOUS BLD VENIPUNCTURE: CPT | Performed by: INTERNAL MEDICINE

## 2023-03-28 PROCEDURE — 84484 ASSAY OF TROPONIN QUANT: CPT | Performed by: INTERNAL MEDICINE

## 2023-03-28 RX ORDER — METOPROLOL TARTRATE 50 MG
50 TABLET ORAL 2 TIMES DAILY
Qty: 60 TABLET | Refills: 0 | Status: SHIPPED | OUTPATIENT
Start: 2023-03-28 | End: 2023-04-11

## 2023-03-28 RX ORDER — ROSUVASTATIN CALCIUM 40 MG/1
40 TABLET, COATED ORAL DAILY
Status: DISCONTINUED | OUTPATIENT
Start: 2023-03-28 | End: 2023-03-28 | Stop reason: HOSPADM

## 2023-03-28 RX ORDER — ROSUVASTATIN CALCIUM 40 MG/1
40 TABLET, COATED ORAL DAILY
Qty: 30 TABLET | Refills: 0 | Status: SHIPPED | OUTPATIENT
Start: 2023-03-29 | End: 2023-04-11

## 2023-03-28 RX ADMIN — ROSUVASTATIN CALCIUM 40 MG: 40 TABLET, COATED ORAL at 11:27

## 2023-03-28 RX ADMIN — ASPIRIN 81 MG: 81 TABLET, COATED ORAL at 08:31

## 2023-03-28 RX ADMIN — CLOPIDOGREL BISULFATE 75 MG: 75 TABLET ORAL at 08:31

## 2023-03-28 RX ADMIN — METOPROLOL TARTRATE 50 MG: 25 TABLET, FILM COATED ORAL at 08:30

## 2023-03-28 RX ADMIN — DOCUSATE SODIUM 100 MG: 100 CAPSULE, LIQUID FILLED ORAL at 08:31

## 2023-03-28 ASSESSMENT — ACTIVITIES OF DAILY LIVING (ADL)
ADLS_ACUITY_SCORE: 32
ADLS_ACUITY_SCORE: 32
ADLS_ACUITY_SCORE: 36
ADLS_ACUITY_SCORE: 32
ADLS_ACUITY_SCORE: 36

## 2023-03-28 ASSESSMENT — COLUMBIA-SUICIDE SEVERITY RATING SCALE - C-SSRS
6. HAVE YOU EVER DONE ANYTHING, STARTED TO DO ANYTHING, OR PREPARED TO DO ANYTHING TO END YOUR LIFE?: NO
1. IN THE PAST MONTH, HAVE YOU WISHED YOU WERE DEAD OR WISHED YOU COULD GO TO SLEEP AND NOT WAKE UP?: NO
5. HAVE YOU STARTED TO WORK OUT OR WORKED OUT THE DETAILS OF HOW TO KILL YOURSELF? DO YOU INTEND TO CARRY OUT THIS PLAN?: NO
2. HAVE YOU ACTUALLY HAD ANY THOUGHTS OF KILLING YOURSELF IN THE PAST MONTH?: NO
3. HAVE YOU BEEN THINKING ABOUT HOW YOU MIGHT KILL YOURSELF?: NO
4. HAVE YOU HAD THESE THOUGHTS AND HAD SOME INTENTION OF ACTING ON THEM?: NO

## 2023-03-28 NOTE — PLAN OF CARE
Cardiac Rehab Discharge Summary    Reason for therapy discharge:    Goals met; no further inpt CR needed.    Progress towards therapy goal(s). See goals on Care Plan in Epic electronic health record for goal details.  Goals met.    Therapy recommendation(s):    Recommend outpt CR (@ time of eval/tx no order-requested order)

## 2023-03-28 NOTE — DISCHARGE SUMMARY
[facility]  Hospitalist Discharge Summary      Date of Admission:  3/27/2023  Date of Discharge:  3/28/2023  Discharging Provider: Caity Mcwilliams MD  Discharge Service: Hospitalist Service    Discharge Diagnoses       Unstable angina    Non-ST elevation MI due to subtotal occlusion of a large obtuse marginal branch, status post PCI    Moderate aortic stenosis    Nonsustained VT    Hyperlipidemia    Follow-ups Needed After Discharge   Follow-up Appointments     Follow-up and recommended labs and tests       Follow up with primary care provider, Madi Ramos, within 7 days for   hospital follow- up.  You will need cholesterol and liver function   rechecked in 6 weeks on cholesterol medication  Follow up with cardiology (Dr. Walters) in 4-6 weeks  Follow up for cardiac rehab               Discharge Disposition   Discharged to home  Condition at discharge: Stable      Hospital Course     67-year-old male with no known coronary artery disease came into the emergency room department after experiencing several minutes of substernal exertional chest pain.  Found to have EKG changes including ST depression in the anterior leads.  Previously known aortic stenosis.  Was brought to the Cath Lab where found to have subtotal occlusion of large obtuse marginal branch.  Patient was status postplacement of 2 KAYDEN.  Remained stable after procedure.  Echocardiogram checked with normal EF and aortic stenosis found to be moderate.  Started on dual antiplatelet therapy as well as the beta-blocker and statin due to LDL of 114.  Started on cardiac rehab and stable for discharge to home.    Consultations This Hospital Stay   PHARMACY IP CONSULT  CARDIOLOGY IP CONSULT  CARDIOLOGY IP CONSULT  NUTRITION SERVICES ADULT IP CONSULT  CARDIAC REHAB IP CONSULT  PHARMACY IP CONSULT  SMOKING CESSATION PROGRAM IP CONSULT    Code Status   Full Code    Time Spent on this Encounter   I, Caity Mcwilliams MD, personally saw the patient today  and spent greater than 30 minutes discharging this patient.       Caity Mcwilliams MD  [facility]  [address] [place] [id number]  Phone: [phone]  Fax: [phone]  ______________________________________________________________________    Physical Exam   Vital Signs: Temp: 97.8  F (36.6  C) Temp src: Oral BP: 136/72 Pulse: 72   Resp: 20 SpO2: 93 % O2 Device: None (Room air)    Weight: 188 lbs 6.4 oz  General Appearance: Pleasant male no apparent distress  Respiratory: Clear to auscultation bilaterally  Cardiovascular: Regular rate and rhythm without murmurs rubs or gallop  GI: Soft and nontender without hepatosplenomegaly  Skin: No significant lower extremity edema  Other: Neurologically grossly intact without focal deficits appreciated       Primary Care Physician   Madi Ramos    Discharge Orders      Cardiac Rehab Referral      Reason Lipid Lowering Medications not prescribed from this order set     Reason for your hospital stay    Unstable angina     Follow-up and recommended labs and tests     Follow up with primary care provider, Madi Ramos, within 7 days for hospital follow- up.  You will need cholesterol and liver function rechecked in 6 weeks on cholesterol medication  Follow up with cardiology (Dr. Walters) in 4-6 weeks  Follow up for cardiac rehab     Activity    Your activity upon discharge: activity as tolerated     Diet    Follow this diet upon discharge: Orders Placed This Encounter      Low Saturated Fat Na <2400 mg       Significant Results and Procedures   Most Recent 3 CBC's:Recent Labs   Lab Test 03/27/23 0727   WBC 9.9   HGB 13.6   MCV 89        Most Recent 3 BMP's:Recent Labs   Lab Test 03/28/23  0502 03/27/23 0727    138   POTASSIUM 4.0 4.1   CHLORIDE 106 104   CO2 26 26   BUN 8.0 11.1   CR 0.74 0.83   ANIONGAP 8 8   RAFAELA 9.2 9.0   * 105*     Most Recent 2 LFT's:Recent Labs   Lab Test 03/27/23 0727   AST 26   ALT 18    ALKPHOS 70   BILITOTAL 0.9     Most Recent Cholesterol Panel:Recent Labs   Lab Test 23   CHOL 177   *   HDL 47   TRIG 82     Most Recent Hemoglobin A1c:Recent Labs   Lab Test 23   A1C 5.2   ,   Results for orders placed or performed during the hospital encounter of 23   XR Chest 2 Views    Narrative    EXAM: XR CHEST 2 VIEWS  LOCATION: [facility]  DATE/TIME: 3/27/2023 8:17 AM    INDICATION: chest pain  COMPARISON: CT chest 2021 multiple prior studies.      Impression    IMPRESSION: Lungs are clear. Heart and pulmonary vascularity are normal. No signs of acute disease.   Echocardiogram Complete     Value    LVEF  60-65%    Narrative    [id number]  [id number]  [id number]  [id number]^[email or web address]^TIFFANIE^[place] [address]     Name: RAMIRO ZIMMER  MRN: [id number]  : 1955  Study Date: 2023 03:22 PM  Age: 67 yrs  Gender: Male  Patient Location: [facility]  Reason For Study: Acute Myocardial Infarction, Aortic Valve Disorder  Ordering Physician: TIFFANIE MCGEE  Referring Physician: ZACK CASTRO  Performed By: MB     BSA: 2.0 m2  Height: 68 in  Weight: 190 lb  HR: 63  BP: 149/77 mmHg  ______________________________________________________________________________  Procedure  Complete Echo Adult. Definity (NDC #96060-221) given intravenously.  ______________________________________________________________________________  Interpretation Summary     Normal left-ventricular size. Mild to moderate left ventricular hypertrophy.  Left ventricular systolic function is estimated at 60 to 65%. No regional wall  motion abnormality noted.Diastolic Doppler findings (E/E' ratio and/or other  parameters) suggest left ventricular filling pressures are normal.  Normal right ventricular size and systolic function.  Moderate calcific aortic stenosis. Peak velocity of 3.4 m/s. Mean gradient 25  mmHg.  Borderline enlargement of  the proximal ascending aorta.  ______________________________________________________________________________  I      WMSI = 1.00     % Normal = 100     X - Cannot   0 -                      (2) - Mildly 2 -          Segments  Size  Interpret    Hyperkinetic 1 - Normal  Hypokinetic  Hypokinetic  1-2     small                                                     7 -          3-5      moderate  3 - Akinetic 4 -          5 -         6 - Akinetic Dyskinetic   6-14    large               Dyskinetic   Aneurysmal  w/scar       w/scar       15-16   diffuse     Left Ventricle  The left ventricle is normal in size. There is mild to moderate concentric  left ventricular hypertrophy. Diastolic Doppler findings (E/E' ratio and/or  other parameters) suggest left ventricular filling pressures are normal. The  visual ejection fraction is 60-65%. No regional wall motion abnormalities  noted.     Right Ventricle  Normal right ventricle size and systolic function. TAPSE is normal, which is  consistent with normal right ventricular systolic function.     Atria  The left atrium is mildly dilated. Right atrial size is normal.     Mitral Valve  There is moderate mitral annular calcification. The mitral valve leaflets  appear thickened, but open well. There is trace to mild mitral regurgitation.     Tricuspid Valve  The tricuspid valve is not well visualized, but is grossly normal. There is  trace tricuspid regurgitation.     Aortic Valve  Moderate aortic valve calcification is present. Moderate valvular aortic  stenosis. Peak velocity of 3.4m/s. Mean gradient 25 mmHg.     Pulmonic Valve  The pulmonic valve is not well visualized. There is trace pulmonic valvular  regurgitation.     Vessels  The aorta root is normal. The proximal ascending aorta measures borderline  enlarged at 3.7 cm. IVC diameter <2.1 cm collapsing >50% with sniff suggests a  normal RA pressure of 3 mmHg.     Pericardium  There is no pericardial effusion.      ______________________________________________________________________________  MMode/2D Measurements & Calculations  IVSd: 1.4 cm  LVIDd: 4.2 cm  LVIDs: 2.3 cm  LVPWd: 1.3 cm  FS: 45.4 %  LV mass(C)d: 208.6 grams  LV mass(C)dI: 104.3 grams/m2  Ao root diam: 3.1 cm  asc Aorta Diam: 3.7 cm  LVOT diam: 2.2 cm  LVOT area: 3.8 cm2     LA Volume Indexed (AL/bp): 33.3 ml/m2  RV Base: 2.6 cm  RWT: 0.60  TAPSE: 2.3 cm     Time Measurements  MM HR: 63.0 BPM     Doppler Measurements & Calculations  MV E max aman: 88.0 cm/sec  MV A max aman: 84.9 cm/sec  MV E/A: 1.0     MV max P.0 mmHg  MV mean P.0 mmHg  MV V2 VTI: 32.4 cm  MVA(VTI): 3.5 cm2  MV dec slope: 404.0 cm/sec2  MV dec time: 0.22 sec  Ao V2 max: 307.0 cm/sec  Ao max P.0 mmHg  Ao V2 mean: 232.0 cm/sec  Ao mean P.0 mmHg  Ao V2 VTI: 74.1 cm  DEVAN(I,D): 1.5 cm2  DEVAN(V,D): 1.4 cm2  LV V1 max P.5 mmHg  LV V1 max: 117.0 cm/sec  LV V1 VTI: 29.6 cm  SV(LVOT): 112.5 ml  SI(LVOT): 56.3 ml/m2  PA acc time: 0.14 sec  AV Aman Ratio (DI): 0.38  DEVAN Index (cm2/m2): 0.76  E/E': 10.4  E/E' av.7  Lateral E/e': 7.1  Medial E/e': 10.4  Peak E' Aman: 8.5 cm/sec  RV S Aman: 8.6 cm/sec     ______________________________________________________________________________  Report approved by: Bree Siddiqi 2023 04:15 PM         Cardiac Catheterization    Narrative    1.  Diffuse multivessel coronary disease with mild to moderate   irregularity of the LAD, severe circumflex OM1 disease which is a large   vessel, and severe stenoses leading into the third left posterolateral   branch which is very small branch, and diffuse mild irregularity of the   right coronary artery and right posterior descending.  2.  Successful PCI of OM1 vessel requiring Cutting Balloon PTCA, shockwave   lithotripsy, and drug-eluting stent implant x2  3.  Successful conventional PTCA of small posterolateral 3 branch of left   circumflex.       Discharge Medications   Current Discharge  Medication List      START taking these medications    Details   aspirin (ASA) 81 MG EC tablet Take 1 tablet (81 mg) by mouth daily Start tomorrow.  Qty: 30 tablet, Refills: 3    Associated Diagnoses: Unstable angina (H)      clopidogrel (PLAVIX) 75 MG tablet Take 1 tablet (75 mg) by mouth daily Dose to start tomorrow.  Qty: 90 tablet, Refills: 3    Associated Diagnoses: Unstable angina (H)      metoprolol tartrate (LOPRESSOR) 50 MG tablet Take 1 tablet (50 mg) by mouth 2 times daily for 30 days  Qty: 60 tablet, Refills: 0    Associated Diagnoses: Unstable angina (H)      rosuvastatin (CRESTOR) 40 MG tablet Take 1 tablet (40 mg) by mouth daily for 30 days  Qty: 30 tablet, Refills: 0    Associated Diagnoses: Unstable angina (H)         STOP taking these medications       ibuprofen (ADVIL/MOTRIN) 200 MG tablet Comments:   Reason for Stopping:             Allergies   Allergies   Allergen Reactions     Coconut Oil Beauty [Albolene] Anaphylaxis

## 2023-03-28 NOTE — PLAN OF CARE
Goal Outcome Evaluation:       Pt is alert and oriented x 4, denies any chest pain, nausea, or any discomfort. VSS, Angio sites is clean dry, and intact. No hematoma, no bruising. Pt is independent in the room. Sins Lokesh with BBB on tele. ADLs are back to baseline and pt is ready for discharge at this point. Will continue to monitor pt.

## 2023-03-28 NOTE — PLAN OF CARE
Problem: Chest Pain  Goal: Resolution of Chest Pain Symptoms  Outcome: Progressing     Problem: Cardiac Catheterization (Diagnostic/Interventional)  Goal: Absence of Bleeding  Outcome: Progressing     Problem: Cardiac Catheterization (Diagnostic/Interventional)  Goal: Stable Heart Rate and Rhythm  Outcome: Progressing   Goal Outcome Evaluation:      Plan of Care Reviewed With: patient, spouse      Pt alert and oriented times 4.  TR band was removed at 1810. Arm board was removed at 1910. Vitals done per protocol. Vitals stable. Pt denied any chest pain through out the shift. He did c/o some mid epigastric gas pain after eating dinner with onion. That epigastric gas pain was better after tylenol, simethicone and ambulation. Pt passed flatus but did not have any bowel movement yet. Last BM yesterday per pt. Pt denies pain at this time. Pt voided without any problem. Tele- NSR to sinus jeremiah at times.

## 2023-03-28 NOTE — PROGRESS NOTES
"PRIMARY DIAGNOSIS: \"GENERIC\" NURSING  OUTPATIENT/OBSERVATION GOALS TO BE MET BEFORE DISCHARGE:  1. ADLs back to baseline: Yes    2. Activity and level of assistance: Ambulating independently.    3. Pain status: Improved-controlled with oral pain medications.    4. Return to near baseline physical activity: Yes     Discharge Planner Nurse   Safe discharge environment identified: No  Barriers to discharge: Yes       Entered by: Jordyn Gore RN 03/27/2023 7:35 PM     Please review provider order for any additional goals.   Nurse to notify provider when observation goals have been met and patient is ready for discharge.  "

## 2023-03-28 NOTE — PLAN OF CARE
Goal Outcome Evaluation:               Pt discharging to home in stable condition.  Right radial wrist maintained hemostasis.  Pt denied chest pain.  Discussed home instruction in the presence of his spouse using AVS.  Pt reports understanding.  Pt to make follow-up with Primary Care Physician.

## 2023-03-28 NOTE — PROGRESS NOTES
"Occupational Therapy/Cardiac Rehab     03/28/23 1045   Appointment Info   Signing Clinician's Name / Credentials (OT) Santa Bong OTR/L   Quick Adds   Quick Adds Certification   Living Environment   People in Home spouse   Current Living Arrangements house   Home Accessibility stairs within home   Number of Stairs, Within Home, Primary greater than 10 stairs   Stair Railings, Within Home, Primary railing on right side (ascending)   Self-Care   Usual Activity Tolerance excellent   Current Activity Tolerance good   Regular Exercise Yes   Activity/Exercise Type walking  (treadmill; does cardio & wts.)   Equipment Currently Used at Home none   Activity/Exercise/Self-Care Comment @ baseline, pt independent with ADLs/IADLs; employed-driving & computer work.   General Information   Onset of Illness/Injury or Date of Surgery 03/27/23   Referring Physician Miki Etienne   Patient/Family Therapy Goal Statement (OT) none stated   Existing Precautions/Restrictions cardiac  (s/p angio)   General Observations and Info Per chart:  \"67 year old male with no known coronary artery disease who came into the emergency room department for further work-up and evaluation of several minute history of chest pain.  History is taken from the patient in the presence of his wife.  Patient was at the gym and was on the treadmill.  He began developing substernal chest pain.  It lasted at least 30 to 45 minutes.\" s/p angio & stents   Cognitive Status Examination   Orientation Status   (WNL)   Follows Commands WNL   Pain Assessment   Patient Currently in Pain No   Posture   Posture not impaired   Bed Mobility   Comment (Bed Mobility) mod I   Transfers   Transfer Comments independent   Balance   Balance Assessment no deficits were identified   Clinical Impression   Criteria for Skilled Therapeutic Interventions Met (OT) Yes, treatment indicated   OT Diagnosis decreased activity tolerance   OT Problem List-Impairments impacting ADL activity " tolerance impaired;post-surgical precautions   Assessment of Occupational Performance 1-3 Performance Deficits   Identified Performance Deficits decreased activity tolerance   Planned Therapy Interventions (OT) progressive activity/exercise;home program guidelines;transfer training;strengthening   Clinical Decision Making Complexity (OT) low complexity   Risk & Benefits of therapy have been explained evaluation/treatment results reviewed;care plan/treatment goals reviewed;risks/benefits reviewed;current/potential barriers reviewed;participants voiced agreement with care plan;patient;spouse/significant other   OT Total Evaluation Time   OT Eval, Low Complexity Minutes (11247) 10   Therapy Certification   Medical Diagnosis unstable angina   Start of Care Date 03/28/23   Certification date from 03/28/23   Certification date to 03/28/23   OT Goals   Therapy Frequency (OT) One time eval and treatment   OT Predicted Duration/Target Date for Goal Attainment 03/28/23   OT Goals Cardiac Phase 1   OT: Understanding of cardiac education to maximize quality of life, condition management, and health outcomes Patient;Caregiver;Verbalize;Demonstrate   OT: Perform aerobic activity with stable cardiovascular response continuous;10 minutes   OT: Functional/aerobic ambulation tolerance with stable cardiovascular response in order to return to home and community environment Supervision/SBA;Greater than 300 feet   OT: Navigation of stairs simulating home set up with stable cardiovascular response in order to return to home and community environment Supervision/SBA;Greater than 10 stairs   Interventions   Interventions Quick Adds Therapeutic Procedures/Exercise;Cardiac Rehab   Self-Care/Home Management   Self-Care/Home Mgmt/ADL, Compensatory, Meal Prep Minutes (40066) 8   Symptoms Noted During/After Treatment (Meal Preparation/Planning Training) none   Therapeutic Procedures/Exercise   Therapeutic Procedure: strength, endurance, ROM,  flexibillity minutes (04852) 12   Symptoms Noted During/After Treatment shortness of breath   Treatment Detail/Skilled Intervention see amb & stairs   Treatment Time Includes (CR Only) Monitoring of vital signs (see vital signs flowsheet for details)   Ambulation   Workload 540 ft   Symptoms Dyspnea   Exercise Details SBA/I amb-no unsteadiness; tolerated well.   Vital Signs Details HR 64, /72   Stairs   Workload 24   Symptoms Dyspnea   Exercise Details SBA-tolerated well.   Vital Signs Details HR 72, /74   OT Discharge Planning   OT Plan no further inpt CR needed   OT Discharge Recommendation (DC Rec) home with outpatient cardiac rehab   OT Rationale for DC Rec pt will need initial assist for heavier IADLs d/t post-angio prec; independent w/basic ADLs, trfs, mob & stairs.   OT Brief overview of current status mod I/I trfs & bedmob. mod I/SBA amb & stairs   Total Session Time   Timed Code Treatment Minutes 20   Total Session Time (sum of timed and untimed services) 30        [facility]  OUTPATIENT OCCUPATIONAL THERAPY  EVALUATION  PLAN OF TREATMENT FOR OUTPATIENT REHABILITATION  (COMPLETE FOR INITIAL CLAIMS ONLY)  Patient's Last Name, First Name, M.I.  YOB: 1955  Lopez Hogue                          Provider's Name  [facility] Medical Record No.  [id number]                             Onset Date:  03/27/23   Start of Care Date:  03/28/23   Type:     ___PT   _X_OT   ___SLP Medical Diagnosis:  unstable angina                    OT Diagnosis:  decreased activity tolerance Visits from SOC:  1     See note for plan of treatment, functional goals and certification details    I CERTIFY THE NEED FOR THESE SERVICES FURNISHED UNDER        THIS PLAN OF TREATMENT AND WHILE UNDER MY CARE     (Physician co-signature of this document indicates review and certification of the therapy plan).

## 2023-03-28 NOTE — PROGRESS NOTES
"    Cardiology Progress Note    Assessment:  Coronary artery disease/acute coronary syndrome- probably non-ST segment elevation myocardial infarction due to subtotal occlusion of large obtuse marginal branch-status post PCI, stable, no angina  Aortic stenosis, moderate  Nonsustained VT low risk of sustained arrhythmias with preserved LV systolic function      Plan:  Check troponin  Cardiac rehab  Continue aspirin, Plavix, metoprolol  Start rosuvastatin  Discharge home later today if stable    Subjective:   Underwent coronary angiogram yesterday.  Denies recurrence chest pain or shortness of breath    Objective:   /72 (BP Location: Left arm)   Pulse 72   Temp 97.8  F (36.6  C) (Oral)   Resp 20   Ht 1.727 m (5' 8\")   Wt 85.5 kg (188 lb 6.4 oz)   SpO2 93%   BMI 28.65 kg/m      Intake/Output Summary (Last 24 hours) at 3/28/2023 1032  Last data filed at 3/28/2023 0854  Gross per 24 hour   Intake 800 ml   Output 1800 ml   Net -1000 ml         Physical Exam:  GENERAL: no distress  NECK: No JVD  LUNGS: Clear to auscultation.  CARDIAC: regular  rhythm, S1 & S2 normal.  No heaves, thrills, gallops or murmurs.  ABDOMEN: flat, negative hepatosplenomegaly, soft and non-tender.  EXTREMITIES: No evidence of cyanosis, clubbing or edema.    Current Facility-Administered Medications Ordered in Epic   Medication Dose Route Frequency Provider Last Rate Last Admin     acetaminophen (TYLENOL) tablet 650 mg  650 mg Oral Q4H PRN Miki Etienne MD   650 mg at 03/27/23 1926     aspirin EC tablet 81 mg  81 mg Oral Daily Miki Etienne MD   81 mg at 03/28/23 0831     clopidogrel (PLAVIX) tablet 75 mg  75 mg Oral Daily Miki Etienne MD   75 mg at 03/28/23 0831     Continuing beta blocker from home medication list OR beta blocker order already placed during this visit   Does not apply DOES NOT GO TO Miki Wilkins MD         Continuing statin from home medication list OR statin order already placed during this visit "   Does not apply DOES NOT GO TO Miki Wilkins MD         docusate sodium (COLACE) capsule 100 mg  100 mg Oral BID Miki Etienne MD   100 mg at 03/28/23 0831     HOLD: Metformin and metformin containing medications on day of procedure and 48 hours after IV contrast given for patients with acute kidney injury or severe chronic kidney disease (stage IV or stage V; i.e., eGFR less than 30)   Does not apply HOLD Miki Etienne MD         hydrALAZINE (APRESOLINE) injection 10 mg  10 mg Intravenous Q4H PRN Miki Etienne MD         hydrALAZINE (APRESOLINE) injection 5 mg  5 mg Intravenous Q6H PRN Miki Etienne MD         melatonin tablet 1 mg  1 mg Oral At Bedtime PRN Miki Etienne MD         metoprolol (LOPRESSOR) injection 5 mg  5 mg Intravenous Q15 Min PRN Miki Etienne MD         metoprolol tartrate (LOPRESSOR) tablet 50 mg  50 mg Oral BID Miki Etienne MD   50 mg at 03/28/23 0830     nitroGLYcerin (NITROSTAT) sublingual tablet 0.4 mg  0.4 mg Sublingual Q5 Min PRN Miki Etienne MD         ondansetron (ZOFRAN ODT) ODT tab 4 mg  4 mg Oral Q6H PRN Miki Etienne MD        Or     ondansetron (ZOFRAN) injection 4 mg  4 mg Intravenous Q6H PRN Miki Etienne MD         oxyCODONE (ROXICODONE) tablet 5 mg  5 mg Oral Q4H PRN Miki Etienne MD        Or     oxyCODONE (ROXICODONE) tablet 10 mg  10 mg Oral Q4H PRN Miki Etienne MD         Percutaneous Coronary Intervention orders placed (this is information for BPA alerting)   Does not apply DOES NOT GO TO Miki Wilkins MD         rosuvastatin (CRESTOR) tablet 40 mg  40 mg Oral Daily Christopher Walters MD         simethicone (MYLICON) chewable tablet 80 mg  80 mg Oral Q6H PRN Caity Mcwilliams MD   80 mg at 03/27/23 1945     Current Outpatient Medications Ordered in Epic   Medication Sig Dispense Refill     aspirin (ASA) 81 MG EC tablet Take 1 tablet (81 mg) by mouth daily Start tomorrow. 30 tablet 3     clopidogrel (PLAVIX) 75 MG  tablet Take 1 tablet (75 mg) by mouth daily Dose to start tomorrow. 90 tablet 3       Cardiographics:    Telemetry: Normal sinus rhythm 1 run of 4 beats of ventricular tachycardia yesterday  ECG: Normal sinus rhythm ST segment depression in anterolateral leads  Echocardiogram: Moderate AS preserved LV function no segmental wall motion abnormalities    Coronary angio:  1.  Diffuse multivessel coronary disease with mild to moderate irregularity of the LAD, severe circumflex OM1 disease which is a large vessel, and severe stenoses leading into the third left posterolateral branch which is very small branch, and diffuse mild irregularity of the right coronary artery and right posterior descending.  2.  Successful PCI of OM1 vessel requiring Cutting Balloon PTCA, shockwave lithotripsy, and drug-eluting stent implant x2  3.  Successful conventional PTCA of small posterolateral 3 branch of left circumflex.     Lab Results    Chemistry/lipid CBC Cardiac Enzymes/BNP/TSH/INR   Recent Labs   Lab Test 03/27/23 0727   CHOL 177   HDL 47   *   TRIG 82     Recent Labs   Lab Test 03/27/23 0727   *     Recent Labs   Lab Test 03/28/23  0502      POTASSIUM 4.0   CHLORIDE 106   CO2 26   *   BUN 8.0   CR 0.74   GFRESTIMATED >90   RAFAELA 9.2     Recent Labs   Lab Test 03/28/23  0502 03/27/23 0727   CR 0.74 0.83     Recent Labs   Lab Test 03/27/23 0727   A1C 5.2          Recent Labs   Lab Test 03/27/23 0727   WBC 9.9   HGB 13.6   HCT 39.1*   MCV 89        Recent Labs   Lab Test 03/27/23 0727   HGB 13.6    No results for input(s): TROPONINI in the last 19690 hours.  No results for input(s): BNP, NTBNPI, NTBNP in the last 55099 hours.  No results for input(s): TSH in the last 30763 hours.  No results for input(s): INR in the last 00736 hours.

## 2023-04-03 ENCOUNTER — HOSPITAL ENCOUNTER (OUTPATIENT)
Dept: CARDIAC REHAB | Facility: HOSPITAL | Age: 68
Discharge: HOME | End: 2023-04-03
Attending: FAMILY MEDICINE
Payer: COMMERCIAL

## 2023-04-03 DIAGNOSIS — I20.0 UNSTABLE ANGINA (H): ICD-10-CM

## 2023-04-03 PROCEDURE — 93797 PHYS/QHP OP CAR RHAB WO ECG: CPT | Mod: 59

## 2023-04-03 PROCEDURE — 93798 PHYS/QHP OP CAR RHAB W/ECG: CPT

## 2023-04-05 ENCOUNTER — HOSPITAL ENCOUNTER (OUTPATIENT)
Dept: CARDIAC REHAB | Facility: HOSPITAL | Age: 68
Discharge: HOME | End: 2023-04-05
Attending: INTERNAL MEDICINE
Payer: COMMERCIAL

## 2023-04-05 PROCEDURE — 93798 PHYS/QHP OP CAR RHAB W/ECG: CPT

## 2023-04-07 ENCOUNTER — HOSPITAL ENCOUNTER (OUTPATIENT)
Dept: CARDIAC REHAB | Facility: HOSPITAL | Age: 68
Discharge: HOME | End: 2023-04-07
Attending: INTERNAL MEDICINE
Payer: COMMERCIAL

## 2023-04-07 PROCEDURE — 93798 PHYS/QHP OP CAR RHAB W/ECG: CPT

## 2023-04-10 ENCOUNTER — HOSPITAL ENCOUNTER (OUTPATIENT)
Dept: CARDIAC REHAB | Facility: HOSPITAL | Age: 68
Discharge: HOME | End: 2023-04-10
Attending: INTERNAL MEDICINE
Payer: COMMERCIAL

## 2023-04-10 PROBLEM — I25.110: Status: ACTIVE | Noted: 2023-04-10

## 2023-04-10 PROBLEM — E78.5 DYSLIPIDEMIA, GOAL LDL BELOW 70: Status: ACTIVE | Noted: 2023-04-10

## 2023-04-10 PROCEDURE — 93798 PHYS/QHP OP CAR RHAB W/ECG: CPT

## 2023-04-11 ENCOUNTER — OFFICE VISIT (OUTPATIENT)
Dept: CARDIOLOGY | Facility: CLINIC | Age: 68
End: 2023-04-11
Payer: COMMERCIAL

## 2023-04-11 VITALS
HEIGHT: 68 IN | RESPIRATION RATE: 16 BRPM | BODY MASS INDEX: 28.79 KG/M2 | WEIGHT: 190 LBS | HEART RATE: 64 BPM | SYSTOLIC BLOOD PRESSURE: 122 MMHG | DIASTOLIC BLOOD PRESSURE: 52 MMHG

## 2023-04-11 DIAGNOSIS — I20.0 UNSTABLE ANGINA (H): Primary | ICD-10-CM

## 2023-04-11 DIAGNOSIS — I35.0 NONRHEUMATIC AORTIC VALVE STENOSIS: ICD-10-CM

## 2023-04-11 DIAGNOSIS — I25.110 CORONARY ARTERY DISEASE INVOLVING NATIVE CORONARY ARTERY OF NATIVE HEART WITH UNSTABLE ANGINA PECTORIS (H): ICD-10-CM

## 2023-04-11 DIAGNOSIS — I10 PRIMARY HYPERTENSION: ICD-10-CM

## 2023-04-11 DIAGNOSIS — F10.20 UNCOMPLICATED ALCOHOL DEPENDENCE (H): ICD-10-CM

## 2023-04-11 DIAGNOSIS — E78.5 DYSLIPIDEMIA, GOAL LDL BELOW 70: ICD-10-CM

## 2023-04-11 PROCEDURE — 99214 OFFICE O/P EST MOD 30 MIN: CPT | Performed by: NURSE PRACTITIONER

## 2023-04-11 RX ORDER — ACETAMINOPHEN 500 MG
500-1000 TABLET ORAL EVERY 8 HOURS PRN
COMMUNITY

## 2023-04-11 RX ORDER — ROSUVASTATIN CALCIUM 40 MG/1
40 TABLET, COATED ORAL DAILY
Qty: 90 TABLET | Refills: 3 | Status: SHIPPED | OUTPATIENT
Start: 2023-04-11 | End: 2024-04-11

## 2023-04-11 RX ORDER — METOPROLOL TARTRATE 50 MG
50 TABLET ORAL 2 TIMES DAILY
Qty: 180 TABLET | Refills: 3 | Status: ON HOLD | OUTPATIENT
Start: 2023-04-11 | End: 2023-05-09

## 2023-04-11 NOTE — PATIENT INSTRUCTIONS
Lopez Hogue,    It was a pleasure to see you today at the [facility].     My recommendations after this visit include:    - No medications changes made today    - Please seek medical attention if you develop recurrent chest pain or shortness of breath or similar symptoms you experienced prior to recent cardiac event    - Please get your lipid level test and liver enzymes (AST/ALT) in 4 weeks (labs available for draw starting May 11th). Make sure to fast for 8-12 hours prior to lab work. Okay to have plain water, black coffee or tea without creamer and sugar    - Cardiac rehab as scheduled    - Follow up with Dr. Walters as scheduled in June     - Please call [phone], if you have any questions or concerns    Guerda Vu CNP    Medication     Take all your medications as prescribed  Do not stop any medications without talking with a healthcare provider    Exercise      Physical activity is important for overall health  Set a goal of 150 minutes of exercise each week  For example, 30 minutes of exercise 5 days each week.    These 30 minutes can be broken into shorter periods of 15 minutes twice daily or 10 minutes three times daily  Start any exercise program slowly and work towards the goal of 150 minutes each week  For example, you may start with 10 minutes and plan to add a few minutes each week as you get stronger   Examples of exercise include walking, swimming, or biking  Remember to stretch and stay hydrated with exercise    Diet     A heart healthy diet includes:  A variety of fruits and vegetables  Whole grains  Low-fat dairy (fat-free, 1% fat, and low-fat)  Lean meats and poultry without skin   Fish (eat fish 2 times each week)  Nuts  Limit saturated fat to about 13 grams each day (based on a 2000 calorie diet)  Limit red meat  Limit sugars (sweets and sugary beverages)  Limit your portion sizes  Do not add salt to your food when cooking or at the table  Limit alcohol  intake (no more than 1 drink each day for women or 2 drinks each day for men)    Weight Loss     Work on losing weight with diet and exercise  You BMI (body mass index) should be between 18.5-24.9  This is a calculation of your weight and height  Please ask your healthcare provider for your BMI    Manage Other Chronic Health Conditions     Control cholesterol  Eat a diet low in saturated fat  Exercise   Take a statin medication as prescribed  Manage blood pressure  Eat a diet low in sodium  Exercise  Reduce stress  Lose weight   Take blood pressure medications as prescribed  Control blood sugars if diabetic  Monitor sugars and carbohydrates in your diet  Lose weight   Take diabetes medications as prescribed  Follow-up with your primary care provider to make sure your blood sugars are well controlled    Stress Reduction     Find time each day to relax  Reading, listening to music, yoga, meditation, exercise, spending time with friends and family, volunteering   Get 6-8 hours of sleep each night    Smoking Cessation     Smoking causes numerous health problems including coronary artery disease  It is never too late to quit  Set realistic goals for quitting  Decrease the number of cigarettes used each week  Use nicotine gum or patches to help you quit    Information from the [employer].  Please visit their website at www.heart.org

## 2023-04-11 NOTE — LETTER
4/11/2023    Madi Ramos  [address]  [facility] [address]    RE: Lopez Stovallel       Dear Colleague,     I had the pleasure of seeing Lopez Hogue in the [facility].          Assessment/Recommendations   Assessment:    1.  Coronary artery disease: Patient was hospitalized from March 27 from March 28 with unstable angina.  Current angiogram showed 90% calcified focal stenosis in OM1 and 90% stenosis in first LPL branch.    Patient underwent successful PCI of OM1 with Cutting Balloon PYCA, shockwave lithotripsy, and drug-eluting stent x2.  Patient underwent successful conventional PTCA of small LPL branch.    On dual antiplatelet therapy with ASA 81 mg indefinitely and  Clopidogrel (Plavix) 75 mg daily for 12 months.  Significant improvement in chest pain since recent stent placement.  Reports occasional chest twinges.  In cardiac rehab doing well.    2.  Dyslipidemia with LDL goal <70/Obesity with a BMI of: Lopez Hogue is on high intensity rosuvastatin 40 mg daily statin therapy with . Most recent LDL is 114.     3.  Hypertension: His blood pressure is stable.  On Metroprolol tartrate.    4.  None sustained ventricular tachycardia: On Metroprolol tartrate.    5.  Moderate aortic stenosis per echo on 3/27/2023: Asymptomatic.    Plan:  - We discussed the importance of antiplatelet therapy and talking with his cardiologist prior to stopping these medications for any reason.  We discussed about utilization of as needed nitroglycerin.     - Encouraged to seek medical attention if recurrent chest pain or shortness of breath.    - Risk factor modification and lifestyle management topics were discussed including managing comorbidities, weight loss, heart healthy diet, exercise and stress reduction.      - Fasting lipid profile and AST and ALT check in 4 weeks. Order placed.  Patient will have it done at [facility] main lab.    - Cardiac rehab as scheduled    -Educated to avoid NSAIDs in  the setting of cardiovascular disease and bleeding risk being on dual antiplatelet therapy.    - We discussed a diet low in saturated fat, weight loss, and exercise along with medication for better control of cholesterol.  Highly encouraged to participate in nutrition class in cardiac rehab.    - Continue current hypertension regimen    Follow up with Dr. Walters as scheduled in June     History of Present Illness/Subjective    Mr. Lopez Hogue is a 67 year old male with a past medical history of hypertension, nonrheumatic aortic valve stenosis, EtOH dependence, dyslipidemia, unstable angina, and coronary disease with status post PCI/KAYDEN to OM1 and  PTCA small posterolateral 3 branch of left circumflex of on 3/27/2023 who is seen at [facility] for post coronary intervention follow up.     Most recent ECHO showed preserved LVEF of 60 to 65% without regional wall motion abnormalities.    Today, Lopez reports feeling significant improvement in chest pain since recent stent placement.  Occasionally he feels twinges in his chest which lasts very briefly and resolves without intervention.  He denies fatigue, shortness of breath, dyspnea on exertion, orthopnea, PND, palpitations, abdominal fullness/bloating and lower extremity edema.  Notes occasional lightheadedness with position change but that lasts very briefly.  His blood pressure and heart rates being monitored in cardiac rehab.    Coronary Angiogram from 3/27/23- reviewed:  Conclusion    1.  Diffuse multivessel coronary disease with mild to moderate irregularity of the LAD, severe circumflex OM1 disease which is a large vessel, and severe stenoses leading into the third left posterolateral branch which is very small branch, and diffuse mild irregularity of the right coronary artery and right posterior descending.  2.  Successful PCI of OM1 vessel requiring Cutting Balloon PTCA, shockwave lithotripsy, and drug-eluting  "stent implant x2  3.  Successful conventional PTCA of small posterolateral 3 branch of left circumflex.      Recommendations    General Recommendations:  - Patient given specific instructions regarding care of arteriotomy site, activity restrictions, signs and symptoms of cardiac or vascular complications and to seek immediate medical evaluation should they occur.       ECHO from 3/27/23-Reviewed:   Interpretation Summary     Normal left-ventricular size. Mild to moderate left ventricular hypertrophy.  Left ventricular systolic function is estimated at 60 to 65%. No regional wall  motion abnormality noted.Diastolic Doppler findings (E/E' ratio and/or other  parameters) suggest left ventricular filling pressures are normal.  Normal right ventricular size and systolic function.  Moderate calcific aortic stenosis. Peak velocity of 3.4 m/s. Mean gradient 25  mmHg.  Borderline enlargement of the proximal ascending aorta.  ____________________________________________________     Physical Examination Review of Systems   /52 (BP Location: Left arm, Patient Position: Sitting, Cuff Size: Adult Regular)   Pulse 64   Resp 16   Ht 1.727 m (5' 8\")   Wt 86.2 kg (190 lb)   BMI 28.89 kg/m    Body mass index is 28.89 kg/m .  Wt Readings from Last 3 Encounters:   04/11/23 86.2 kg (190 lb)   03/28/23 85.5 kg (188 lb 6.4 oz)     General Appearance:   no distress, normal body habitus   ENT/Mouth: membranes moist, no oral lesions or bleeding gums.      EYES:  no scleral icterus, normal conjunctivae   Neck: no carotid bruits or thyromegaly   Chest/Lungs:   lungs are clear to auscultation, no rales or wheezing, equal chest wall expansion    Cardiovascular:   Heart rate regular. Normal first and second heart sounds with no murmurs, rubs, or gallops; the carotid, radial and posterior tibial pulses are intact, no edema bilaterally    Abdomen:  no organomegaly, masses, bruits, or tenderness; bowel sounds are present "   Extremities  Puncture Site: no cyanosis or clubbing  Right radial site is soft with no bruising.  Radial pulses and Pedal pulses intact and symmetrical.CMS intact.   Skin: no xanthelasma, warm.    Neurologic: normal  bilateral, no tremors     Psychiatric: alert and oriented x3, calm                                                    Negative unless noted in HPI     Medical History  Surgical History Family History Social History   Past Medical History:   Diagnosis Date    EtOH dependence (H)     Quit drinking 10 years    Hypertension     Nonrheumatic aortic (valve) stenosis     Sweat gland carcinoma     Past Surgical History:   Procedure Laterality Date    CV CORONARY ANGIOGRAM N/A 3/27/2023    Procedure: Coronary Angiogram;  Surgeon: Miki Etienne MD;  Location: [facility] CATH [facility] CV    CV LEFT HEART CATH N/A 3/27/2023    Procedure: Left Heart Catheterization;  Surgeon: Miki Etienne MD;  Location: [facility] CV    CV PCI N/A 3/27/2023    Procedure: Percutaneous Coronary Intervention;  Surgeon: Miki Etienne MD;  Location: [facility] CATH LAB CV    OTHER SURGICAL HISTORY  2015    WIDE EXCISION OF LEFT GLUTEAL MASSTNM: [id number], stage: II hidradenocarcinoma Grade II, 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 Social History     Socioeconomic History    Marital status:      Spouse name: Not on file    Number of children: Not on file    Years of education: Not on file    Highest education level: Not on file   Occupational History    Not on file   Tobacco Use    Smoking status: Former     Types: Cigarettes    Smokeless tobacco: Not on file   Vaping Use    Vaping status: Not on file   Substance and Sexual Activity    Alcohol use: Not Currently    Drug use: Not on file    Sexual activity: Not on file   Other Topics Concern    Not on file   Social History Narrative    Patient works.  Lives with his wife.     Social Determinants of Health     Financial  Resource Strain: Not on file   Food Insecurity: Not on file   Transportation Needs: Not on file   Physical Activity: Not on file   Stress: Not on file   Social Connections: Not on file   Intimate Partner Violence: Not on file   Housing Stability: Not on file          Medications  Allergies   Current Outpatient Medications   Medication Sig Dispense Refill    acetaminophen (TYLENOL) 500 MG tablet Take 1,000 mg by mouth      aspirin (ASA) 81 MG EC tablet Take 1 tablet (81 mg) by mouth daily Start tomorrow. 30 tablet 3    clopidogrel (PLAVIX) 75 MG tablet Take 1 tablet (75 mg) by mouth daily Dose to start tomorrow. 90 tablet 3    metoprolol tartrate (LOPRESSOR) 50 MG tablet Take 1 tablet (50 mg) by mouth 2 times daily 180 tablet 3    rosuvastatin (CRESTOR) 40 MG tablet Take 1 tablet (40 mg) by mouth daily 90 tablet 3    Allergies   Allergen Reactions    Coconut Oil Beauty [Albolene] Anaphylaxis     Raw coconut         Lab Results    Chemistry/lipid CBC Cardiac Enzymes/BNP/TSH/INR   Lab Results   Component Value Date    CHOL 177 03/27/2023    HDL 47 03/27/2023    TRIG 82 03/27/2023    BUN 8.0 03/28/2023     03/28/2023    CO2 26 03/28/2023    Lab Results   Component Value Date    WBC 9.9 03/27/2023    HGB 13.6 03/27/2023    HCT 39.1 (L) 03/27/2023    MCV 89 03/27/2023     03/27/2023    No results found for: CKTOTAL, CKMB, TROPONINI, BNP, TSH, INR     34 minutes spent on the date of encounter doing chart review, review of test results, interpretation with above tests, patient visit and documentation.        This note has been dictated using voice recognition software. Any grammatical, typographical, or context distortions are unintentional and inherent to the software        Thank you for allowing me to participate in the care of your patient.      Sincerely,     YADIRA Pina [facility]  cc:   No referring provider defined for this  encounter.

## 2023-04-11 NOTE — PROGRESS NOTES
Assessment/Recommendations   Assessment:    1.  Coronary artery disease: Patient was hospitalized from March 27 from March 28 with unstable angina.  Current angiogram showed 90% calcified focal stenosis in OM1 and 90% stenosis in first LPL branch.    Patient underwent successful PCI of OM1 with Cutting Balloon PYCA, shockwave lithotripsy, and drug-eluting stent x2.  Patient underwent successful conventional PTCA of small LPL branch.    On dual antiplatelet therapy with ASA 81 mg indefinitely and  Clopidogrel (Plavix) 75 mg daily for 12 months.  Significant improvement in chest pain since recent stent placement.  Reports occasional chest twinges.  In cardiac rehab doing well.    2.  Dyslipidemia with LDL goal <70/Obesity with a BMI of: Lopez Hogue is on high intensity rosuvastatin 40 mg daily statin therapy with . Most recent LDL is 114.     3.  Hypertension: His blood pressure is stable.  On Metroprolol tartrate.    4.  None sustained ventricular tachycardia: On Metroprolol tartrate.    5.  Moderate aortic stenosis per echo on 3/27/2023: Asymptomatic.    Plan:  - We discussed the importance of antiplatelet therapy and talking with his cardiologist prior to stopping these medications for any reason.  We discussed about utilization of as needed nitroglycerin.     - Encouraged to seek medical attention if recurrent chest pain or shortness of breath.    - Risk factor modification and lifestyle management topics were discussed including managing comorbidities, weight loss, heart healthy diet, exercise and stress reduction.      - Fasting lipid profile and AST and ALT check in 4 weeks. Order placed.  Patient will have it done at [facility] main lab.    - Cardiac rehab as scheduled    -Educated to avoid NSAIDs in the setting of cardiovascular disease and bleeding risk being on dual antiplatelet therapy.    - We discussed a diet low in saturated fat, weight loss, and exercise along with medication for better  control of cholesterol.  Highly encouraged to participate in nutrition class in cardiac rehab.    - Continue current hypertension regimen    Follow up with Dr. Walters as scheduled in June     History of Present Illness/Subjective    Mr. Lopez Hogue is a 67 year old male with a past medical history of hypertension, nonrheumatic aortic valve stenosis, EtOH dependence, dyslipidemia, unstable angina, and coronary disease with status post PCI/KAYDEN to OM1 and  PTCA small posterolateral 3 branch of left circumflex of on 3/27/2023 who is seen at [facility] for post coronary intervention follow up.     Most recent ECHO showed preserved LVEF of 60 to 65% without regional wall motion abnormalities.    Today, Lopez reports feeling significant improvement in chest pain since recent stent placement.  Occasionally he feels twinges in his chest which lasts very briefly and resolves without intervention.  He denies fatigue, shortness of breath, dyspnea on exertion, orthopnea, PND, palpitations, abdominal fullness/bloating and lower extremity edema.  Notes occasional lightheadedness with position change but that lasts very briefly.  His blood pressure and heart rates being monitored in cardiac rehab.    Coronary Angiogram from 3/27/23- reviewed:  Conclusion    1.  Diffuse multivessel coronary disease with mild to moderate irregularity of the LAD, severe circumflex OM1 disease which is a large vessel, and severe stenoses leading into the third left posterolateral branch which is very small branch, and diffuse mild irregularity of the right coronary artery and right posterior descending.  2.  Successful PCI of OM1 vessel requiring Cutting Balloon PTCA, shockwave lithotripsy, and drug-eluting stent implant x2  3.  Successful conventional PTCA of small posterolateral 3 branch of left circumflex.      Recommendations    General Recommendations:  - Patient given specific instructions regarding  "care of arteriotomy site, activity restrictions, signs and symptoms of cardiac or vascular complications and to seek immediate medical evaluation should they occur.       ECHO from 3/27/23-Reviewed:   Interpretation Summary     Normal left-ventricular size. Mild to moderate left ventricular hypertrophy.  Left ventricular systolic function is estimated at 60 to 65%. No regional wall  motion abnormality noted.Diastolic Doppler findings (E/E' ratio and/or other  parameters) suggest left ventricular filling pressures are normal.  Normal right ventricular size and systolic function.  Moderate calcific aortic stenosis. Peak velocity of 3.4 m/s. Mean gradient 25  mmHg.  Borderline enlargement of the proximal ascending aorta.  ____________________________________________________     Physical Examination Review of Systems   /52 (BP Location: Left arm, Patient Position: Sitting, Cuff Size: Adult Regular)   Pulse 64   Resp 16   Ht 1.727 m (5' 8\")   Wt 86.2 kg (190 lb)   BMI 28.89 kg/m    Body mass index is 28.89 kg/m .  Wt Readings from Last 3 Encounters:   04/11/23 86.2 kg (190 lb)   03/28/23 85.5 kg (188 lb 6.4 oz)     General Appearance:   no distress, normal body habitus   ENT/Mouth: membranes moist, no oral lesions or bleeding gums.      EYES:  no scleral icterus, normal conjunctivae   Neck: no carotid bruits or thyromegaly   Chest/Lungs:   lungs are clear to auscultation, no rales or wheezing, equal chest wall expansion    Cardiovascular:   Heart rate regular. Normal first and second heart sounds with no murmurs, rubs, or gallops; the carotid, radial and posterior tibial pulses are intact, no edema bilaterally    Abdomen:  no organomegaly, masses, bruits, or tenderness; bowel sounds are present   Extremities  Puncture Site: no cyanosis or clubbing  Right radial site is soft with no bruising.  Radial pulses and Pedal pulses intact and symmetrical.CMS intact.   Skin: no xanthelasma, warm.    Neurologic: normal "  bilateral, no tremors     Psychiatric: alert and oriented x3, calm                                                    Negative unless noted in HPI     Medical History  Surgical History Family History Social History   Past Medical History:   Diagnosis Date     EtOH dependence (H)     Quit drinking 10 years     Hypertension      Nonrheumatic aortic (valve) stenosis      Sweat gland carcinoma     Past Surgical History:   Procedure Laterality Date     CV CORONARY ANGIOGRAM N/A 3/27/2023    Procedure: Coronary Angiogram;  Surgeon: Miki Etienne MD;  Location: [facility] LAB CV     CV LEFT HEART CATH N/A 3/27/2023    Procedure: Left Heart Catheterization;  Surgeon: Miki Etienne MD;  Location: [facility] LAB CV     CV PCI N/A 3/27/2023    Procedure: Percutaneous Coronary Intervention;  Surgeon: Miki Etienne MD;  Location: [facility] CV     OTHER SURGICAL HISTORY  2015    WIDE EXCISION OF LEFT GLUTEAL MASSTNM: [id number], stage: II hidradenocarcinoma Grade II, 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 Social History     Socioeconomic History     Marital status:      Spouse name: Not on file     Number of children: Not on file     Years of education: Not on file     Highest education level: Not on file   Occupational History     Not on file   Tobacco Use     Smoking status: Former     Types: Cigarettes     Smokeless tobacco: Not on file   Vaping Use     Vaping status: Not on file   Substance and Sexual Activity     Alcohol use: Not Currently     Drug use: Not on file     Sexual activity: Not on file   Other Topics Concern     Not on file   Social History Narrative    Patient works.  Lives with his wife.     Social Determinants of Health     Financial Resource Strain: Not on file   Food Insecurity: Not on file   Transportation Needs: Not on file   Physical Activity: Not on file   Stress: Not on file   Social Connections: Not on file   Intimate Partner  Violence: Not on file   Housing Stability: Not on file          Medications  Allergies   Current Outpatient Medications   Medication Sig Dispense Refill     acetaminophen (TYLENOL) 500 MG tablet Take 1,000 mg by mouth       aspirin (ASA) 81 MG EC tablet Take 1 tablet (81 mg) by mouth daily Start tomorrow. 30 tablet 3     clopidogrel (PLAVIX) 75 MG tablet Take 1 tablet (75 mg) by mouth daily Dose to start tomorrow. 90 tablet 3     metoprolol tartrate (LOPRESSOR) 50 MG tablet Take 1 tablet (50 mg) by mouth 2 times daily 180 tablet 3     rosuvastatin (CRESTOR) 40 MG tablet Take 1 tablet (40 mg) by mouth daily 90 tablet 3    Allergies   Allergen Reactions     Coconut Oil Beauty [Albolene] Anaphylaxis     Raw coconut         Lab Results    Chemistry/lipid CBC Cardiac Enzymes/BNP/TSH/INR   Lab Results   Component Value Date    CHOL 177 03/27/2023    HDL 47 03/27/2023    TRIG 82 03/27/2023    BUN 8.0 03/28/2023     03/28/2023    CO2 26 03/28/2023    Lab Results   Component Value Date    WBC 9.9 03/27/2023    HGB 13.6 03/27/2023    HCT 39.1 (L) 03/27/2023    MCV 89 03/27/2023     03/27/2023    No results found for: CKTOTAL, CKMB, TROPONINI, BNP, TSH, INR     34 minutes spent on the date of encounter doing chart review, review of test results, interpretation with above tests, patient visit and documentation.        This note has been dictated using voice recognition software. Any grammatical, typographical, or context distortions are unintentional and inherent to the software

## 2023-04-12 ENCOUNTER — HOSPITAL ENCOUNTER (OUTPATIENT)
Dept: CARDIAC REHAB | Facility: HOSPITAL | Age: 68
Discharge: HOME | End: 2023-04-12
Attending: INTERNAL MEDICINE
Payer: COMMERCIAL

## 2023-04-12 PROCEDURE — 93798 PHYS/QHP OP CAR RHAB W/ECG: CPT

## 2023-04-14 ENCOUNTER — HOSPITAL ENCOUNTER (OUTPATIENT)
Dept: CARDIAC REHAB | Facility: HOSPITAL | Age: 68
Discharge: HOME | End: 2023-04-14
Attending: INTERNAL MEDICINE
Payer: COMMERCIAL

## 2023-04-14 PROCEDURE — 93798 PHYS/QHP OP CAR RHAB W/ECG: CPT

## 2023-04-17 ENCOUNTER — HOSPITAL ENCOUNTER (OUTPATIENT)
Dept: CARDIAC REHAB | Facility: HOSPITAL | Age: 68
Discharge: HOME | End: 2023-04-17
Attending: INTERNAL MEDICINE
Payer: COMMERCIAL

## 2023-04-17 PROCEDURE — 93798 PHYS/QHP OP CAR RHAB W/ECG: CPT

## 2023-04-21 ENCOUNTER — HOSPITAL ENCOUNTER (OUTPATIENT)
Dept: CARDIAC REHAB | Facility: HOSPITAL | Age: 68
Discharge: HOME | End: 2023-04-21
Attending: INTERNAL MEDICINE
Payer: COMMERCIAL

## 2023-04-21 PROCEDURE — 93798 PHYS/QHP OP CAR RHAB W/ECG: CPT

## 2023-04-24 ENCOUNTER — HOSPITAL ENCOUNTER (OUTPATIENT)
Dept: CARDIAC REHAB | Facility: HOSPITAL | Age: 68
Discharge: HOME | End: 2023-04-24
Attending: INTERNAL MEDICINE
Payer: COMMERCIAL

## 2023-04-24 PROCEDURE — 93798 PHYS/QHP OP CAR RHAB W/ECG: CPT

## 2023-04-26 ENCOUNTER — HOSPITAL ENCOUNTER (OUTPATIENT)
Dept: CARDIAC REHAB | Facility: HOSPITAL | Age: 68
Discharge: HOME | End: 2023-04-26
Attending: INTERNAL MEDICINE
Payer: COMMERCIAL

## 2023-04-26 PROCEDURE — 93798 PHYS/QHP OP CAR RHAB W/ECG: CPT

## 2023-04-28 ENCOUNTER — HOSPITAL ENCOUNTER (OUTPATIENT)
Dept: CARDIAC REHAB | Facility: HOSPITAL | Age: 68
Discharge: HOME | End: 2023-04-28
Attending: INTERNAL MEDICINE
Payer: COMMERCIAL

## 2023-04-28 PROCEDURE — 93798 PHYS/QHP OP CAR RHAB W/ECG: CPT

## 2023-05-01 ENCOUNTER — HOSPITAL ENCOUNTER (OUTPATIENT)
Dept: CARDIAC REHAB | Facility: HOSPITAL | Age: 68
Discharge: HOME | End: 2023-05-01
Attending: INTERNAL MEDICINE
Payer: COMMERCIAL

## 2023-05-01 PROCEDURE — 93798 PHYS/QHP OP CAR RHAB W/ECG: CPT

## 2023-05-03 ENCOUNTER — HOSPITAL ENCOUNTER (OUTPATIENT)
Dept: CARDIAC REHAB | Facility: HOSPITAL | Age: 68
Discharge: HOME | End: 2023-05-03
Attending: INTERNAL MEDICINE
Payer: COMMERCIAL

## 2023-05-03 PROCEDURE — 93798 PHYS/QHP OP CAR RHAB W/ECG: CPT

## 2023-05-05 ENCOUNTER — HOSPITAL ENCOUNTER (OUTPATIENT)
Dept: CARDIAC REHAB | Facility: HOSPITAL | Age: 68
Discharge: HOME | End: 2023-05-05
Attending: INTERNAL MEDICINE
Payer: COMMERCIAL

## 2023-05-05 PROCEDURE — 93798 PHYS/QHP OP CAR RHAB W/ECG: CPT

## 2023-05-08 ENCOUNTER — APPOINTMENT (OUTPATIENT)
Dept: RADIOLOGY | Facility: HOSPITAL | Age: 68
End: 2023-05-08
Attending: EMERGENCY MEDICINE
Payer: COMMERCIAL

## 2023-05-08 ENCOUNTER — HOSPITAL ENCOUNTER (OUTPATIENT)
Facility: HOSPITAL | Age: 68
Setting detail: OBSERVATION
Discharge: HOME | End: 2023-05-09
Attending: EMERGENCY MEDICINE | Admitting: INTERNAL MEDICINE
Payer: COMMERCIAL

## 2023-05-08 ENCOUNTER — APPOINTMENT (OUTPATIENT)
Dept: CT IMAGING | Facility: HOSPITAL | Age: 68
End: 2023-05-08
Attending: INTERNAL MEDICINE
Payer: COMMERCIAL

## 2023-05-08 DIAGNOSIS — I10 PRIMARY HYPERTENSION: Primary | ICD-10-CM

## 2023-05-08 DIAGNOSIS — I25.110 CORONARY ARTERY DISEASE INVOLVING NATIVE CORONARY ARTERY OF NATIVE HEART WITH UNSTABLE ANGINA PECTORIS (H): ICD-10-CM

## 2023-05-08 DIAGNOSIS — I35.0 NONRHEUMATIC AORTIC VALVE STENOSIS: ICD-10-CM

## 2023-05-08 DIAGNOSIS — I25.119 CORONARY ARTERY DISEASE INVOLVING NATIVE HEART WITH ANGINA PECTORIS, UNSPECIFIED VESSEL OR LESION TYPE (H): ICD-10-CM

## 2023-05-08 DIAGNOSIS — R07.9 ACUTE CHEST PAIN: ICD-10-CM

## 2023-05-08 LAB
ANION GAP SERPL CALCULATED.3IONS-SCNC: 9 MMOL/L (ref 7–15)
ATRIAL RATE - MUSE: 54 BPM
BASOPHILS # BLD AUTO: 0 10E3/UL (ref 0–0.2)
BASOPHILS NFR BLD AUTO: 0 %
BSA FOR ECHO PROCEDURE: 0 M2
BUN SERPL-MCNC: 13.7 MG/DL (ref 8–23)
CALCIUM SERPL-MCNC: 9.4 MG/DL (ref 8.8–10.2)
CHLORIDE SERPL-SCNC: 107 MMOL/L (ref 98–107)
CREAT SERPL-MCNC: 0.87 MG/DL (ref 0.67–1.17)
DEPRECATED HCO3 PLAS-SCNC: 25 MMOL/L (ref 22–29)
DIASTOLIC BLOOD PRESSURE - MUSE: NORMAL MMHG
EOSINOPHIL # BLD AUTO: 0 10E3/UL (ref 0–0.7)
EOSINOPHIL NFR BLD AUTO: 0 %
ERYTHROCYTE [DISTWIDTH] IN BLOOD BY AUTOMATED COUNT: 13.4 % (ref 10–15)
GFR SERPL CREATININE-BSD FRML MDRD: >90 ML/MIN/1.73M2
GLUCOSE SERPL-MCNC: 105 MG/DL (ref 70–99)
HCT VFR BLD AUTO: 36.9 % (ref 40–53)
HGB BLD-MCNC: 12.2 G/DL (ref 13.3–17.7)
HOLD SPECIMEN: NORMAL
IMM GRANULOCYTES # BLD: 0 10E3/UL
IMM GRANULOCYTES NFR BLD: 0 %
INTERPRETATION ECG - MUSE: NORMAL
LYMPHOCYTES # BLD AUTO: 1.3 10E3/UL (ref 0.8–5.3)
LYMPHOCYTES NFR BLD AUTO: 21 %
MCH RBC QN AUTO: 30.4 PG (ref 26.5–33)
MCHC RBC AUTO-ENTMCNC: 33.1 G/DL (ref 31.5–36.5)
MCV RBC AUTO: 92 FL (ref 78–100)
MONOCYTES # BLD AUTO: 0.5 10E3/UL (ref 0–1.3)
MONOCYTES NFR BLD AUTO: 7 %
NEUTROPHILS # BLD AUTO: 4.7 10E3/UL (ref 1.6–8.3)
NEUTROPHILS NFR BLD AUTO: 72 %
NRBC # BLD AUTO: 0 10E3/UL
NRBC BLD AUTO-RTO: 0 /100
P AXIS - MUSE: 68 DEGREES
PLATELET # BLD AUTO: 175 10E3/UL (ref 150–450)
POTASSIUM SERPL-SCNC: 4.1 MMOL/L (ref 3.4–5.3)
PR INTERVAL - MUSE: 196 MS
QRS DURATION - MUSE: 96 MS
QT - MUSE: 420 MS
QTC - MUSE: 398 MS
R AXIS - MUSE: 52 DEGREES
RBC # BLD AUTO: 4.01 10E6/UL (ref 4.4–5.9)
SODIUM SERPL-SCNC: 141 MMOL/L (ref 136–145)
SYSTOLIC BLOOD PRESSURE - MUSE: NORMAL MMHG
T AXIS - MUSE: 59 DEGREES
TROPONIN T SERPL HS-MCNC: 6 NG/L
TROPONIN T SERPL HS-MCNC: 6 NG/L
TROPONIN T SERPL HS-MCNC: 7 NG/L
TROPONIN T SERPL HS-MCNC: 7 NG/L
VENTRICULAR RATE- MUSE: 54 BPM
WBC # BLD AUTO: 6.5 10E3/UL (ref 4–11)

## 2023-05-08 PROCEDURE — 85025 COMPLETE CBC W/AUTO DIFF WBC: CPT | Performed by: STUDENT IN AN ORGANIZED HEALTH CARE EDUCATION/TRAINING PROGRAM

## 2023-05-08 PROCEDURE — 84484 ASSAY OF TROPONIN QUANT: CPT | Performed by: EMERGENCY MEDICINE

## 2023-05-08 PROCEDURE — 80048 BASIC METABOLIC PNL TOTAL CA: CPT | Performed by: EMERGENCY MEDICINE

## 2023-05-08 PROCEDURE — 36415 COLL VENOUS BLD VENIPUNCTURE: CPT | Performed by: INTERNAL MEDICINE

## 2023-05-08 PROCEDURE — 250N000013 HC RX MED GY IP 250 OP 250 PS 637: Performed by: EMERGENCY MEDICINE

## 2023-05-08 PROCEDURE — 84484 ASSAY OF TROPONIN QUANT: CPT | Performed by: INTERNAL MEDICINE

## 2023-05-08 PROCEDURE — 36415 COLL VENOUS BLD VENIPUNCTURE: CPT | Performed by: EMERGENCY MEDICINE

## 2023-05-08 PROCEDURE — 71045 X-RAY EXAM CHEST 1 VIEW: CPT

## 2023-05-08 PROCEDURE — 36415 COLL VENOUS BLD VENIPUNCTURE: CPT | Performed by: STUDENT IN AN ORGANIZED HEALTH CARE EDUCATION/TRAINING PROGRAM

## 2023-05-08 PROCEDURE — 93005 ELECTROCARDIOGRAM TRACING: CPT | Performed by: EMERGENCY MEDICINE

## 2023-05-08 PROCEDURE — G0378 HOSPITAL OBSERVATION PER HR: HCPCS

## 2023-05-08 PROCEDURE — 84484 ASSAY OF TROPONIN QUANT: CPT | Performed by: STUDENT IN AN ORGANIZED HEALTH CARE EDUCATION/TRAINING PROGRAM

## 2023-05-08 PROCEDURE — 82310 ASSAY OF CALCIUM: CPT | Performed by: STUDENT IN AN ORGANIZED HEALTH CARE EDUCATION/TRAINING PROGRAM

## 2023-05-08 PROCEDURE — 99254 IP/OBS CNSLTJ NEW/EST MOD 60: CPT | Mod: 25 | Performed by: INTERNAL MEDICINE

## 2023-05-08 PROCEDURE — 75574 CT ANGIO HRT W/3D IMAGE: CPT | Mod: 26 | Performed by: INTERNAL MEDICINE

## 2023-05-08 PROCEDURE — 250N000011 HC RX IP 250 OP 636: Performed by: INTERNAL MEDICINE

## 2023-05-08 PROCEDURE — 99285 EMERGENCY DEPT VISIT HI MDM: CPT | Mod: 25

## 2023-05-08 PROCEDURE — 93005 ELECTROCARDIOGRAM TRACING: CPT | Performed by: STUDENT IN AN ORGANIZED HEALTH CARE EDUCATION/TRAINING PROGRAM

## 2023-05-08 PROCEDURE — 99222 1ST HOSP IP/OBS MODERATE 55: CPT | Performed by: INTERNAL MEDICINE

## 2023-05-08 PROCEDURE — 86850 RBC ANTIBODY SCREEN: CPT | Performed by: INTERNAL MEDICINE

## 2023-05-08 PROCEDURE — 75574 CT ANGIO HRT W/3D IMAGE: CPT

## 2023-05-08 PROCEDURE — 85025 COMPLETE CBC W/AUTO DIFF WBC: CPT | Performed by: EMERGENCY MEDICINE

## 2023-05-08 PROCEDURE — 250N000013 HC RX MED GY IP 250 OP 250 PS 637: Performed by: INTERNAL MEDICINE

## 2023-05-08 RX ORDER — ACETAMINOPHEN 500 MG
500-1000 TABLET ORAL EVERY 8 HOURS PRN
Status: DISCONTINUED | OUTPATIENT
Start: 2023-05-08 | End: 2023-05-09 | Stop reason: HOSPADM

## 2023-05-08 RX ORDER — ASPIRIN 81 MG/1
324 TABLET, CHEWABLE ORAL ONCE
Status: DISCONTINUED | OUTPATIENT
Start: 2023-05-08 | End: 2023-05-08

## 2023-05-08 RX ORDER — METOPROLOL TARTRATE 1 MG/ML
5 INJECTION, SOLUTION INTRAVENOUS
Status: DISCONTINUED | OUTPATIENT
Start: 2023-05-08 | End: 2023-05-09 | Stop reason: HOSPADM

## 2023-05-08 RX ORDER — CLOPIDOGREL BISULFATE 75 MG/1
75 TABLET ORAL DAILY
Status: CANCELLED | OUTPATIENT
Start: 2023-05-08

## 2023-05-08 RX ORDER — NITROGLYCERIN 0.4 MG/1
0.4 TABLET SUBLINGUAL EVERY 5 MIN PRN
Status: DISCONTINUED | OUTPATIENT
Start: 2023-05-08 | End: 2023-05-09 | Stop reason: HOSPADM

## 2023-05-08 RX ORDER — ROSUVASTATIN CALCIUM 40 MG/1
40 TABLET, COATED ORAL DAILY
Status: DISCONTINUED | OUTPATIENT
Start: 2023-05-09 | End: 2023-05-09 | Stop reason: HOSPADM

## 2023-05-08 RX ORDER — ASPIRIN 81 MG/1
324 TABLET, CHEWABLE ORAL ONCE
Status: COMPLETED | OUTPATIENT
Start: 2023-05-08 | End: 2023-05-08

## 2023-05-08 RX ORDER — DIAZEPAM 5 MG
5 TABLET ORAL
Status: DISCONTINUED | OUTPATIENT
Start: 2023-05-08 | End: 2023-05-09 | Stop reason: HOSPADM

## 2023-05-08 RX ORDER — CLOPIDOGREL BISULFATE 75 MG/1
75 TABLET ORAL DAILY
Status: DISCONTINUED | OUTPATIENT
Start: 2023-05-09 | End: 2023-05-09 | Stop reason: HOSPADM

## 2023-05-08 RX ORDER — ASPIRIN 81 MG/1
81 TABLET ORAL DAILY
Status: DISCONTINUED | OUTPATIENT
Start: 2023-05-09 | End: 2023-05-09 | Stop reason: HOSPADM

## 2023-05-08 RX ORDER — NITROGLYCERIN 0.4 MG/1
0.4 TABLET SUBLINGUAL ONCE
Status: COMPLETED | OUTPATIENT
Start: 2023-05-08 | End: 2023-05-08

## 2023-05-08 RX ORDER — MAGNESIUM HYDROXIDE/ALUMINUM HYDROXICE/SIMETHICONE 120; 1200; 1200 MG/30ML; MG/30ML; MG/30ML
30 SUSPENSION ORAL EVERY 4 HOURS PRN
Status: DISCONTINUED | OUTPATIENT
Start: 2023-05-08 | End: 2023-05-09 | Stop reason: HOSPADM

## 2023-05-08 RX ORDER — NITROGLYCERIN 0.4 MG/1
0.4 TABLET SUBLINGUAL EVERY 5 MIN PRN
Status: DISCONTINUED | OUTPATIENT
Start: 2023-05-08 | End: 2023-05-08

## 2023-05-08 RX ORDER — IOPAMIDOL 755 MG/ML
90 INJECTION, SOLUTION INTRAVASCULAR ONCE
Status: COMPLETED | OUTPATIENT
Start: 2023-05-08 | End: 2023-05-08

## 2023-05-08 RX ORDER — ASPIRIN 81 MG/1
162 TABLET, CHEWABLE ORAL ONCE
Status: COMPLETED | OUTPATIENT
Start: 2023-05-08 | End: 2023-05-08

## 2023-05-08 RX ORDER — ASPIRIN 81 MG/1
81 TABLET ORAL DAILY
Status: DISCONTINUED | OUTPATIENT
Start: 2023-05-09 | End: 2023-05-08

## 2023-05-08 RX ADMIN — NITROGLYCERIN 0.4 MG: 0.4 TABLET SUBLINGUAL at 14:15

## 2023-05-08 RX ADMIN — IOPAMIDOL 90 ML: 755 INJECTION, SOLUTION INTRAVENOUS at 14:30

## 2023-05-08 RX ADMIN — NITROGLYCERIN 0.4 MG: 0.4 TABLET, ORALLY DISINTEGRATING SUBLINGUAL at 09:10

## 2023-05-08 RX ADMIN — ASPIRIN 81 MG 243 MG: 81 TABLET ORAL at 06:58

## 2023-05-08 ASSESSMENT — ACTIVITIES OF DAILY LIVING (ADL)
ADLS_ACUITY_SCORE: 35
DEPENDENT_IADLS:: INDEPENDENT
ADLS_ACUITY_SCORE: 35

## 2023-05-08 NOTE — PROGRESS NOTES
Cardiology addendum: CT coronary angiogram suggest total occlusion of the stented site in the first obtuse marginal branch with no flow seen distally.  Patient with continued chest discomfort.  Discussed findings with patient and his wife.  We will repeat troponin level but recommended pursuing direct angiography tomorrow to see if his stent site is occluded as he has been compliant with his dual antiplatelet therapy with no chest discomfort prior to this morning.  They are both in agreement to proceed.

## 2023-05-08 NOTE — CONSULTS
Care Management Initial Consult    General Information  Assessment completed with: Patient, pt  Type of CM/SW Visit: Initial Assessment    Primary Care Provider verified and updated as needed: Yes   Readmission within the last 30 days:           Advance Care Planning:            Communication Assessment  Patient's communication style: spoken language (English or Bilingual)             Cognitive  Cognitive/Neuro/Behavioral: WDL                      Living Environment:   People in home: spouse     Current living Arrangements: house      Able to return to prior arrangements: yes       Family/Social Support:  Care provided by: self  Provides care for: no one  Marital Status:   Wife          Description of Support System: Supportive    Support Assessment: Adequate family and caregiver support    Current Resources:   Patient receiving home care services: No     Community Resources: None  Equipment currently used at home:    Supplies currently used at home: None    Employment/Financial:  Employment Status: employed full-time        Financial Concerns:             Does the patient's insurance plan have a 3 day qualifying hospital stay waiver?  No    Lifestyle & Psychosocial Needs:  Social Determinants of Health     Tobacco Use: Medium Risk (4/11/2023)    Patient History      Smoking Tobacco Use: Former      Smokeless Tobacco Use: Unknown      Passive Exposure: Not on file   Alcohol Use: Not on file   Financial Resource Strain: Not on file   Food Insecurity: Not on file   Transportation Needs: Not on file   Physical Activity: Not on file   Stress: Not on file   Social Connections: Not on file   Intimate Partner Violence: Not on file   Depression: Not on file   Housing Stability: Not on file       Functional Status:  Prior to admission patient needed assistance:   Dependent ADLs:: Independent  Dependent IADLs:: Independent       Mental Health Status:  Mental Health Status: No Current Concerns       Chemical Dependency  Status:  Chemical Dependency Status: No Current Concerns             Values/Beliefs:  Spiritual, Cultural Beliefs, Christianity Practices, Values that affect care:                 Additional Information:  Chart reviewed.  completed initial assessment with patient in the emergency room. Patient lives with his wife, he is independent at baseline. Works full time, [employer].   CM will follow for needs, cardiology consulted.    Shanna Rebolldeo LGSW

## 2023-05-08 NOTE — ED PROVIDER NOTES
EMERGENCY DEPARTMENT ENCOUNTER      NAME: Lopez Hogue  AGE: 67 year old male  YOB: 1955  MRN: [id number]  EVALUATION DATE & TIME: 2023  6:10 AM    PCP: Madi Ramos    ED PROVIDER: Guero Hall M.D.      Chief Complaint   Patient presents with     Chest Pain         FINAL IMPRESSION:  1.  Acute chest pain.  2.  Chronic CAD.    ED COURSE & MEDICAL DECISION MAKIN AM.  I met with the patient to gather history and to perform my initial exam. We discussed plans for the ED course, including diagnostic testing and treatment. PPE worn: cloth mask.  Patient admitted on  for unstable angina ended up with angioplasty and stent placement.  Patient notes since midnight similar pain developing.  He notes he rates it 2 or 3 out of 10.  No other associated symptoms.  He feels this is similar to the pain that brought him in 5 weeks ago.  9:31 AM I reevaluated and updated the patient.  Patient mostly resolved pain after nitroglycerin.  EKG with bradycardia probably secondary to Lopressor use.  CBC unremarkable and borderline hemoglobin 12.2.  Chemistries negative.  Initial troponin was 7 and repeat troponin 6.  Chest x-ray without acute findings.  Patient pain-free after aspirin and nitroglycerin.  Plan to admit patient to cardiac telemetry observation.  They are in agreement with the plan.  We will discuss this with the admitting service.  9:40 AM.  Hospitalist in agreement to cardiac telemetry observation admission.    Pertinent Labs & Imaging studies reviewed. (See chart for details)  67 year old male presents to the Emergency Department for evaluation of chest pain.    At the conclusion of the encounter I discussed the results of all of the tests and the disposition. The questions were answered. The patient or family acknowledged understanding and was agreeable with the care plan.       Medical Decision Making    History:    Supplemental history from: Documented in chart, if  applicable    External Record(s) reviewed: Both inpatient and outpatient computer records reviewed.    Work Up:    Chart documentation includes differential considered and any EKGs or imaging independently interpreted by provider, where specified.  Differential diagnosis includes angina, Dressler's pericarditis, etc.    In additional to work up documented, I considered the following work up: Documented in chart, if applicable.    External consultation:    Discussion of management with another provider: Documented in chart, if applicable    Complicating factors:    Care impacted by chronic illness: Heart Disease    Care affected by social determinants of health: N/A    Disposition considerations: Patient may well require admission given very similar presenting complaints from recent coronary artery disease admission.          MEDICATIONS GIVEN IN THE EMERGENCY:  Medications   nitroGLYcerin (NITROSTAT) sublingual tablet 0.4 mg (0.4 mg Sublingual $Given 5/8/23 0991)   aspirin (ASA) chewable tablet 324 mg (243 mg Oral $Given 5/8/23 0662)       NEW PRESCRIPTIONS STARTED AT TODAY'S ER VISIT  New Prescriptions    No medications on file          =================================================================    HPI    Patient information was obtained from: The patient.    Use of : N/A         Lopez Hogue is a 67 year old male with a pertinent history of coronary artery disease with recent stent placement who presents to this ED today for evaluation of chest pain.  Similar to his recent admission, chest pain developed at midnight.  He describes a 2 or 3 out of 10 pain in the central chest without radiation or any associated symptoms.  He has taken 1 baby aspirin.  We will give him 3 more.    He does not identify any waxing or waning symptoms otherwise, exacerbating or alleviating features,associated symptoms except as mentioned.  He otherwise denies any pain related complaints.    REVIEW OF SYSTEMS    Review of Systems patient notes chest pain, but no shortness of breath, abdominal pain, lightheadedness, etc.    PAST MEDICAL HISTORY:  Past Medical History:   Diagnosis Date     EtOH dependence (H)     Quit drinking 10 years     Hypertension      Nonrheumatic aortic (valve) stenosis      Sweat gland carcinoma        PAST SURGICAL HISTORY:  Past Surgical History:   Procedure Laterality Date     CV CORONARY ANGIOGRAM N/A 3/27/2023    Procedure: Coronary Angiogram;  Surgeon: Miki Etienne MD;  Location: 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    SOCIAL HISTORY:   Social History     Socioeconomic History     Marital status:    Tobacco Use     Smoking status: Former     Types: Cigarettes   Substance and Sexual Activity     Alcohol use: Not Currently   Social History Narrative    Patient works.  Lives with his wife.   Former smoker.,  Former alcohol use.  No current drugs, alcohol, or tobacco.    VITALS:  BP 91/54   Pulse 52   Temp 97.7  F (36.5  C) (Temporal)   Resp 17   SpO2 96%     PHYSICAL EXAM    Vital Signs:  BP 91/54   Pulse 52   Temp 97.7  F (36.5  C) (Temporal)   Resp 17   SpO2 96%    General:  On entering the room he is in no apparent distress.    Neck:  Neck supple with full range of motion and nontender.    Back:  Back and spine are nontender.  No costovertebral angle tenderness.    HEENT:  Oropharynx clear with moist mucous membranes.  HEENT unremarkable.    Pulmonary:  Chest clear to auscultation without rhonchi rales or wheezing.  No pain with palpation, inspiration, or movement.  Cardiovascular:  Cardiac regular rate and rhythm without murmurs rubs or gallops.    Abdomen:  Abdomen soft nontender.  There is no rebound or guarding.    Muskuloskeletal:  he moves all 4 without any difficulty and has normal neurovascular exams.  Extremities without clubbing, cyanosis, or edema.  Legs and calves are nontender.    Neuro:  he is alert and oriented ×3 and moves all extremities symmetrically.    Psych:  Normal affect.    Skin:  Unremarkable and warm and dry.       LAB:  All pertinent labs reviewed and interpreted.  Labs Ordered and Resulted from Time of ED Arrival to Time of ED Departure   BASIC METABOLIC PANEL - Abnormal       Result Value    Sodium 141      Potassium 4.1      Chloride 107      Carbon Dioxide (CO2) 25      Anion Gap 9      Urea Nitrogen 13.7      Creatinine 0.87      Calcium 9.4      Glucose 105 (*)     GFR Estimate >90     CBC WITH PLATELETS AND DIFFERENTIAL - Abnormal    WBC Count 6.5      RBC Count 4.01 (*)     Hemoglobin 12.2 (*)     Hematocrit 36.9 (*)     MCV 92      MCH 30.4      MCHC 33.1      RDW 13.4      Platelet Count 175      % Neutrophils 72      % Lymphocytes 21      % Monocytes 7      % Eosinophils 0      % Basophils 0      % Immature Granulocytes 0      NRBCs per 100 WBC 0      Absolute Neutrophils 4.7      Absolute Lymphocytes 1.3      Absolute Monocytes 0.5      Absolute Eosinophils 0.0      Absolute Basophils 0.0      Absolute Immature Granulocytes 0.0      Absolute NRBCs 0.0     TROPONIN T, HIGH SENSITIVITY - Normal    Troponin T, High Sensitivity 7      TROPONIN T, HIGH SENSITIVITY - Normal    Troponin T, High Sensitivity 6         RADIOLOGY:  Reviewed all pertinent imaging. Please see official radiology report.  XR Chest Port 1 View   Final Result   IMPRESSION: Normal heart size and pulmonary vascularity. Lungs clear. Aortic calcification. No pneumothorax. Minimal degenerative hypertrophic changes in the spine.                 EKG:    Sinus bradycardia 54, possible LVH pattern.  Otherwise negative EKG.  Very similar to previous EKG of March 28, 2023.    I have independently reviewed and interpreted the EKG(s) documented above.    PROCEDURES:         I, Juvenal Reyes, am serving as a scribe to document services personally performed by Dr. Hall based on my observation and the provider's statements to me. I, Guero Hall MD attest that Juvenal Reyes is acting in a scribe capacity, has observed my performance of the services and has documented them in accordance with my direction.    Guero Hall M.D.  Emergency Medicine  [facility] EMERGENCY DEPARTMENT  [address] [place] [id number]  [phone]  Dept: [phone]     Guero Hall MD  05/08/23 0934       Guero Hall MD  05/08/23 3707

## 2023-05-08 NOTE — PROGRESS NOTES
Coronary CTA:  Pt tolerated procedure well.  Received with HR:  55 BP:  143/64.  VSS Post procedure HR:  56  BP:  116/61.  Will notify CT reader, Dr. Grey of test to read.  Tx to ED 4 now.

## 2023-05-08 NOTE — H&P
"[facility]    History and Physical - Hospitalist Service       Date of Admission:  5/8/2023    Assessment & Plan      Lopez Hogue is a 67 year old male admitted on 5/8/2023. He presents with cp. H/o CAD, unstable angina, recent stents.    CP  CAD  -recent stents  -cp similar to the unstable angina 5 w ago  -check trops  -monitor on tele  -cardiology consultation  -NTG     Sinus bradycardia  -hold metoprolol  -defer to card    HTN  -PTA meds except metoprolol due to sinus bradycardia     Diet:  NPO for now, await card   DVT Prophylaxis: Pneumatic Compression Devices  Smith Catheter: Not present  Lines: None     Cardiac Monitoring: ACTIVE order. Indication: Chest pain/ ACS rule out (24 hours)  Code Status: Full Code      Clinically Significant Risk Factors Present on Admission                # Drug Induced Platelet Defect: home medication list includes an antiplatelet medication        # Overweight: Estimated body mass index is 28.89 kg/m  as calculated from the following:    Height as of 4/11/23: 1.727 m (5' 8\").    Weight as of 4/11/23: 86.2 kg (190 lb).           Disposition Plan      Expected Discharge Date: 05/09/2023                  Mil Mccollum MD  Hospitalist Service  [facility]  Securely message with [employer] (more info)  Text page via [employer] Paging/Directory     ______________________________________________________________________    Chief Complaint   Chest pain    History is obtained from the patient    History of Present Illness   Lopez Hogue is a 67 year old male with a pertinent history of coronary artery disease with recent stent placement who presents to this ED today for evaluation of chest pain.  Similar to his recent admission, chest pain developed at midnight.  He describes a 2 or 3 out of 10 pain in the central chest without radiation or any associated symptoms.  He has taken 1 baby aspirin.  We will give him 3 more.     He does not " identify any waxing or waning symptoms otherwise, exacerbating or alleviating features,associated symptoms except as mentioned.  He otherwise denies any pain related complaints.      Past Medical History    Past Medical History:   Diagnosis Date     EtOH dependence (H)     Quit drinking 10 years     Hypertension      Nonrheumatic aortic (valve) stenosis      Sweat gland carcinoma        Past Surgical History   Past Surgical History:   Procedure Laterality Date     CV CORONARY ANGIOGRAM N/A 3/27/2023    Procedure: Coronary Angiogram;  Surgeon: Miki Etienne MD;  Location: [facility]     CV LEFT HEART CATH N/A 3/27/2023    Procedure: Left Heart Catheterization;  Surgeon: Miki Etienne MD;  Location: [facility]     CV PCI N/A 3/27/2023    Procedure: Percutaneous Coronary Intervention;  Surgeon: Miki Etienne MD;  Location: [facility]     OTHER SURGICAL HISTORY  2015    WIDE EXCISION OF LEFT GLUTEAL MASSTNM: [id number], stage: II hidradenocarcinoma Grade II, 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, sentinel lymph node biopsy negative        Prior to Admission Medications   Prior to Admission Medications   Prescriptions Last Dose Informant Patient Reported? Taking?   acetaminophen (TYLENOL) 500 MG tablet Unknown at prn  Yes Yes   Sig: Take 500-1,000 mg by mouth every 8 hours as needed for fever or pain   aspirin (ASA) 81 MG EC tablet 5/8/2023 at am  No Yes   Sig: Take 1 tablet (81 mg) by mouth daily Start tomorrow.   clopidogrel (PLAVIX) 75 MG tablet 5/8/2023 at am  No Yes   Sig: Take 1 tablet (75 mg) by mouth daily Dose to start tomorrow.   metoprolol tartrate (LOPRESSOR) 50 MG tablet 5/8/2023 at am  No Yes   Sig: Take 1 tablet (50 mg) by mouth 2 times daily   rosuvastatin (CRESTOR) 40 MG tablet 5/8/2023 at am  No Yes   Sig: Take 1 tablet (40 mg) by mouth daily   sertraline (ZOLOFT) 50 MG tablet 5/8/2023 at am  Yes Yes   Sig: Take 50 mg by mouth daily      Facility-Administered Medications: None         Review of Systems    The 10 point Review of Systems is negative other than noted in the HPI or here.     Social History   I have reviewed this patient's social history and updated it with pertinent information if needed.  Social History     Tobacco Use     Smoking status: Former     Types: Cigarettes   Substance Use Topics     Alcohol use: Not Currently       Family History     Parents     Allergies   Allergies   Allergen Reactions     Coconut Oil Beauty [Albolene] Anaphylaxis     Raw coconut        Physical Exam   Vital Signs: Temp: 97.7  F (36.5  C) Temp src: Temporal BP: 123/71 Pulse: 51   Resp: 18 SpO2: 96 % O2 Device: None (Room air)    Weight: 0 lbs 0 oz    General.  Awake alert oriented not in acute distress.  HEENT.  Pupils equal round react to light, anicteric, EOM intact.  Neck supple no JVD.  CVS regular rhythm no murmur gallops.  Lungs.  Clear to auscultation bilateral no wheezing or rales.  Abdomen.  Soft nontender bowel sounds present.  Extremities.  No edema no calf tenderness.  Neurological.  Awake and alert. No focal deficit.  Skin no rash. No pallor.  Psych. Normal mood.     Medical Decision Making       55 MINUTES SPENT BY ME on the date of service doing chart review, history, exam, documentation & further activities per the note.      Data     I have personally reviewed the following data over the past 24 hrs:    6.5  \   12.2 (L)   / 175     141 107 13.7 /  105 (H)   4.1 25 0.87 \       Trop: 6 BNP: N/A       Imaging results reviewed over the past 24 hrs:   Recent Results (from the past 24 hour(s))   XR Chest Port 1 View    Narrative    EXAM: XR CHEST PORT 1 VIEW  LOCATION: [facility]  DATE/TIME: 2023 7:14 AM CDT    INDICATION: Chest pain.  COMPARISON: 2023      Impression    IMPRESSION: Normal heart size and pulmonary vascularity. Lungs clear. Aortic calcification. No pneumothorax. Minimal degenerative hypertrophic changes in the spine.

## 2023-05-08 NOTE — LETTER
[facility] HEART CARE  [address] [place] [id number]  Phone: [phone]  Fax: [phone]    May 9, 2023        Lopez Hogue  [address] [address]          To whom it may concern:    RE: Lopez Stovallel    Patient was seen and treated on 5/8/23 and 5/9/23 at our hospital.  Patient may return to work on 05/15/23.    Please contact me for questions or concerns.      Sincerely,        Mil Mccollum MD

## 2023-05-08 NOTE — ED NOTES
Bed: [facility]-20  Expected date:   Expected time:   Means of arrival:   Comments:  Room 4

## 2023-05-08 NOTE — CONSULTS
Cardiology Consult Note    Thank you, Dr. Mccollum, for asking the [facility] Heart Care team to see Lopez Hogue in consultation at [facility] ED to evaluate chest discomfort.      Assessment:   1.  Chest discomfort, etiology unclear as ECG without acute ST changes and troponin levels within normal limits x2.  He reports discomfort somewhat similar to what he experienced several weeks ago when he underwent stenting of the first obtuse marginal branch and PTCA of posterolateral branch, but not as severe.  Discomfort does not appear to be affected by breathing or movement.  At this point recommended further ischemic evaluation.  Unfortunately he took his metoprolol before he came into the ED this morning and thus we will pursue CT coronary angiography to get a reassessment of his anatomy.  2.  Coronary artery disease, status post stenting of the first obtuse marginal branch and PTCA of a small posterolateral branch in March 2023.  No recent symptoms of exertional chest discomfort.  Has been compliant with medications.  3.  Essential hypertension, controlled  4.  Bradycardia, likely secondary to beta-blocker therapy.  Will decrease dose and switch to once daily dosing with metoprolol succinate either 25 or 50 mg daily.  5.  Moderate aortic valve stenosis, documented on echocardiogram in March.  This would not be the cause of his current symptoms of chest discomfort but will need to be followed going forward.     Plan:   1.  Pursue CT coronary angiography today with further recommendations to follow  2.  Switch from metoprolol to tartrate to metoprolol succinate either 25 or 50 mg daily given bradycardia  3.  Continue all other medications as is    Primary cardiologist: Dr. Christopher Walters     Current History:   Mr. Lopez Hogue is a 67 year old male with history of coronary artery disease, status post stenting of the first obtuse marginal branch and PTCA of a distal posterior lateral branch in  March 2023, essential hypertension, hypercholesterolemia, moderate aortic valve stenosis who presented to the ED this morning following recurrent chest discomfort.  Patient states he awakened and noted discomfort again across the chest, reminiscent of the discomfort he had 6 weeks ago although much less severe.  States the discomfort was not affected by breathing or movement although he felt less discomfort when standing versus lying down.  No diaphoresis, nausea, shortness of breath.  Came to the ED for evaluation where ECG showed no acute ischemic changes and initial troponin was negative.  Because of persistent discomfort, was given sublingual nitroglycerin with brief relief only to have the discomfort recur.  Second troponin now normal and cardiac consult requested.    Past Medical History:     Past Medical History:   Diagnosis Date     EtOH dependence (H)     Quit drinking 10 years     Hypertension      Nonrheumatic aortic (valve) stenosis      Sweat gland carcinoma        Past Surgical History:     Past Surgical History:   Procedure Laterality Date     CV CORONARY ANGIOGRAM N/A 3/27/2023    Procedure: Coronary Angiogram;  Surgeon: Miki Etienne MD;  Location: [facility] CATH [facility] CV     CV LEFT HEART CATH N/A 3/27/2023    Procedure: Left Heart Catheterization;  Surgeon: Miki Etienne MD;  Location: [facility]     CV PCI N/A 3/27/2023    Procedure: Percutaneous Coronary Intervention;  Surgeon: Miki Etienne MD;  Location: [facility] CV     OTHER SURGICAL HISTORY  2015    WIDE EXCISION OF LEFT GLUTEAL MASSTNM: [id number], stage: II hidradenocarcinoma Grade II, 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, sentinel lymph node biopsy negative        Family History:   No family history of    Social History:    reports that he has quit smoking. His smoking use included cigarettes. He does not have any smokeless tobacco history on file. He reports that he does not currently use alcohol.    Meds:     Current  Facility-Administered Medications:      acetaminophen (TYLENOL) tablet 500-1,000 mg, 500-1,000 mg, Oral, Q8H PRN, Mil Mccollum MD     alum & mag hydroxide-simethicone (MAALOX) suspension 30 mL, 30 mL, Oral, Q4H PRN, Mil Mccollum MD     [START ON 5/9/2023] aspirin EC tablet 81 mg, 81 mg, Oral, Daily, Mil Mccollum MD     [START ON 5/9/2023] clopidogrel (PLAVIX) tablet 75 mg, 75 mg, Oral, Daily, Mil Mccollum MD     nitroGLYcerin (NITROSTAT) sublingual tablet 0.4 mg, 0.4 mg, Sublingual, Q5 Min PRN, Mil Mccollum MD     [START ON 5/9/2023] rosuvastatin (CRESTOR) tablet 40 mg, 40 mg, Oral, Daily, Mil Mccollum MD     [START ON 5/9/2023] sertraline (ZOLOFT) tablet 50 mg, 50 mg, Oral, Daily, Mil Mccollum MD    Current Outpatient Medications:      acetaminophen (TYLENOL) 500 MG tablet, Take 500-1,000 mg by mouth every 8 hours as needed for fever or pain, Disp: , Rfl:      aspirin (ASA) 81 MG EC tablet, Take 1 tablet (81 mg) by mouth daily Start tomorrow., Disp: 30 tablet, Rfl: 3     clopidogrel (PLAVIX) 75 MG tablet, Take 1 tablet (75 mg) by mouth daily Dose to start tomorrow., Disp: 90 tablet, Rfl: 3     metoprolol tartrate (LOPRESSOR) 50 MG tablet, Take 1 tablet (50 mg) by mouth 2 times daily, Disp: 180 tablet, Rfl: 3     rosuvastatin (CRESTOR) 40 MG tablet, Take 1 tablet (40 mg) by mouth daily, Disp: 90 tablet, Rfl: 3     sertraline (ZOLOFT) 50 MG tablet, Take 50 mg by mouth daily, Disp: , Rfl:     [START ON 5/9/2023] aspirin  81 mg Oral Daily     [START ON 5/9/2023] clopidogrel  75 mg Oral Daily     [START ON 5/9/2023] rosuvastatin  40 mg Oral Daily     [START ON 5/9/2023] sertraline  50 mg Oral Daily       Allergies:   Coconut oil beauty [albolene]    Review of Systems:   A 12 point comprehensive review of systems was negative except as noted in the current history.    Objective:      Physical Exam  There is no height or weight on file to calculate BMI.  /71   Pulse 51   Temp 97.7  F (36.5  C)  (Temporal)   Resp 18   SpO2 96%     General Appearance:    Well-developed, well-nourished middle-age male in no acute distress   HEENT:   Normocephalic, atraumatic.  Sclera nonicteric.  Mucous membranes moist   Neck:  No jugular venous distention or carotid bruits   Chest:  Symmetric with normal AP diameter   Lungs:    Clear to auscultation percussion bilaterally   Cardiovascular:    Regular rate and rhythm.  S1, S2 normal.  2/6 mid peaking crescendo decrescendo systolic murmur heard at the left sternal border radiating to the apex.  No diastolic murmur.   Abdomen:   Soft, nondistended, nontender.  No organomegaly or mass.   Extremities:  No peripheral edema, clubbing or cyanosis.  Distal pulses symmetric   Skin:  No rash or lesions    Neurologic:  Alert and oriented x3.  No gross focal deficits             Cardiographics (personally reviewed)  ECG demonstrates normal sinus bradycardia without acute ST elevation or depression.    Imaging (personally reviewed)  Chest x-ray demonstrates aortic calcification.  No acute pulmonary process.    Lab Review (personally reviewed all results)  Recent Labs   Lab Test 03/27/23 0727   CHOL 177   HDL 47   *   TRIG 82     Recent Labs   Lab Test 03/27/23 0727   *     Recent Labs   Lab Test 05/08/23  0556      POTASSIUM 4.1   CHLORIDE 107   CO2 25   *   BUN 13.7   CR 0.87   GFRESTIMATED >90   RAFAELA 9.4     Recent Labs   Lab Test 05/08/23  0556 03/28/23  0502 03/27/23 0727   CR 0.87 0.74 0.83     Recent Labs   Lab Test 03/27/23 0727   A1C 5.2          Recent Labs   Lab Test 05/08/23 0556   WBC 6.5   HGB 12.2*   HCT 36.9*   MCV 92        Recent Labs   Lab Test 05/08/23  0556 03/27/23  0727   HGB 12.2* 13.6    No results for input(s): TROPONINI in the last 85342 hours.  No results for input(s): BNP, NTBNPI, NTBNP in the last 22540 hours.  No results for input(s): TSH in the last 84493 hours.  No results for input(s): INR in the last 15239 hours.

## 2023-05-08 NOTE — ED TRIAGE NOTES
Cp started at 0000.  Pt had same pain 5 weeks ago, came in and had stents placed.  EKG being done at this time

## 2023-05-08 NOTE — PHARMACY-ADMISSION MEDICATION HISTORY
Pharmacist Admission Medication History    Admission medication history is complete. The information provided in this note is only as accurate as the sources available at the time of the update.    Medication reconciliation/reorder completed by provider prior to medication history? No    Information Source(s): Patient and CareEverywhere/SureScripts via in-person    Pertinent Information: none    Changes made to PTA medication list:    Added: Zoloft    Deleted: None    Changed: None    Medication Affordability:  Not including over the counter (OTC) medications, was there a time in the past 12 months when you did not take your medications as prescribed because of cost?: No    Allergies reviewed with patient and updates made in EHR: yes    Medication History Completed By: Maribel Allred [facility] 5/8/2023 10:21 AM    Prior to Admission medications    Medication Sig Last Dose Taking? Auth Provider Long Term End Date   acetaminophen (TYLENOL) 500 MG tablet Take 500-1,000 mg by mouth every 8 hours as needed for fever or pain Unknown at prn Yes Reported, Patient     aspirin (ASA) 81 MG EC tablet Take 1 tablet (81 mg) by mouth daily Start tomorrow. 5/8/2023 at am Yes Miki Etienne MD     clopidogrel (PLAVIX) 75 MG tablet Take 1 tablet (75 mg) by mouth daily Dose to start tomorrow. 5/8/2023 at am Yes Miki Etienne MD Yes    metoprolol tartrate (LOPRESSOR) 50 MG tablet Take 1 tablet (50 mg) by mouth 2 times daily 5/8/2023 at am Yes Guerda Vu APRN CNP Yes    rosuvastatin (CRESTOR) 40 MG tablet Take 1 tablet (40 mg) by mouth daily 5/8/2023 at am Yes Guerda Vu APRN CNP Yes    sertraline (ZOLOFT) 50 MG tablet Take 50 mg by mouth daily 5/8/2023 at am Yes Unknown, Entered By History Yes

## 2023-05-09 VITALS
HEIGHT: 68 IN | HEART RATE: 61 BPM | BODY MASS INDEX: 27.28 KG/M2 | RESPIRATION RATE: 19 BRPM | TEMPERATURE: 97.9 F | SYSTOLIC BLOOD PRESSURE: 131 MMHG | DIASTOLIC BLOOD PRESSURE: 66 MMHG | OXYGEN SATURATION: 96 % | WEIGHT: 180 LBS

## 2023-05-09 LAB
ABO/RH(D): NORMAL
ANTIBODY SCREEN: NEGATIVE
SPECIMEN EXPIRATION DATE: NORMAL

## 2023-05-09 PROCEDURE — C1769 GUIDE WIRE: HCPCS | Performed by: INTERNAL MEDICINE

## 2023-05-09 PROCEDURE — 250N000013 HC RX MED GY IP 250 OP 250 PS 637: Performed by: INTERNAL MEDICINE

## 2023-05-09 PROCEDURE — 250N000009 HC RX 250: Performed by: INTERNAL MEDICINE

## 2023-05-09 PROCEDURE — 93458 L HRT ARTERY/VENTRICLE ANGIO: CPT | Mod: 26 | Performed by: INTERNAL MEDICINE

## 2023-05-09 PROCEDURE — 272N000001 HC OR GENERAL SUPPLY STERILE: Performed by: INTERNAL MEDICINE

## 2023-05-09 PROCEDURE — 255N000002 HC RX 255 OP 636: Performed by: INTERNAL MEDICINE

## 2023-05-09 PROCEDURE — G0378 HOSPITAL OBSERVATION PER HR: HCPCS

## 2023-05-09 PROCEDURE — 99239 HOSP IP/OBS DSCHRG MGMT >30: CPT | Performed by: INTERNAL MEDICINE

## 2023-05-09 PROCEDURE — 99152 MOD SED SAME PHYS/QHP 5/>YRS: CPT | Mod: 59 | Performed by: INTERNAL MEDICINE

## 2023-05-09 PROCEDURE — 99152 MOD SED SAME PHYS/QHP 5/>YRS: CPT | Performed by: INTERNAL MEDICINE

## 2023-05-09 PROCEDURE — 93458 L HRT ARTERY/VENTRICLE ANGIO: CPT | Performed by: INTERNAL MEDICINE

## 2023-05-09 PROCEDURE — C1887 CATHETER, GUIDING: HCPCS | Performed by: INTERNAL MEDICINE

## 2023-05-09 PROCEDURE — 258N000002 HC RX IP 258 OP 250: Performed by: INTERNAL MEDICINE

## 2023-05-09 PROCEDURE — C1894 INTRO/SHEATH, NON-LASER: HCPCS | Performed by: INTERNAL MEDICINE

## 2023-05-09 PROCEDURE — 250N000011 HC RX IP 250 OP 636: Performed by: INTERNAL MEDICINE

## 2023-05-09 RX ORDER — NALOXONE HYDROCHLORIDE 0.4 MG/ML
0.2 INJECTION, SOLUTION INTRAMUSCULAR; INTRAVENOUS; SUBCUTANEOUS
Status: DISCONTINUED | OUTPATIENT
Start: 2023-05-09 | End: 2023-05-09 | Stop reason: HOSPADM

## 2023-05-09 RX ORDER — SODIUM CHLORIDE 9 MG/ML
INJECTION, SOLUTION INTRAVENOUS CONTINUOUS
Status: DISCONTINUED | OUTPATIENT
Start: 2023-05-09 | End: 2023-05-09 | Stop reason: HOSPADM

## 2023-05-09 RX ORDER — OXYCODONE HYDROCHLORIDE 5 MG/1
10 TABLET ORAL EVERY 4 HOURS PRN
Status: DISCONTINUED | OUTPATIENT
Start: 2023-05-09 | End: 2023-05-09 | Stop reason: HOSPADM

## 2023-05-09 RX ORDER — ASPIRIN 81 MG/1
243 TABLET, CHEWABLE ORAL ONCE
Status: COMPLETED | OUTPATIENT
Start: 2023-05-09 | End: 2023-05-09

## 2023-05-09 RX ORDER — SODIUM CHLORIDE 450 MG/100ML
INJECTION, SOLUTION INTRAVENOUS CONTINUOUS
Status: ACTIVE | OUTPATIENT
Start: 2023-05-09 | End: 2023-05-09

## 2023-05-09 RX ORDER — ASPIRIN 325 MG
325 TABLET ORAL ONCE
Status: COMPLETED | OUTPATIENT
Start: 2023-05-09 | End: 2023-05-09

## 2023-05-09 RX ORDER — ACETAMINOPHEN 325 MG/1
650 TABLET ORAL EVERY 4 HOURS PRN
Status: DISCONTINUED | OUTPATIENT
Start: 2023-05-09 | End: 2023-05-09 | Stop reason: HOSPADM

## 2023-05-09 RX ORDER — FENTANYL CITRATE 50 UG/ML
25 INJECTION, SOLUTION INTRAMUSCULAR; INTRAVENOUS
Status: DISCONTINUED | OUTPATIENT
Start: 2023-05-09 | End: 2023-05-09 | Stop reason: HOSPADM

## 2023-05-09 RX ORDER — FENTANYL CITRATE 50 UG/ML
INJECTION, SOLUTION INTRAMUSCULAR; INTRAVENOUS
Status: DISCONTINUED | OUTPATIENT
Start: 2023-05-09 | End: 2023-05-09 | Stop reason: HOSPADM

## 2023-05-09 RX ORDER — NITROGLYCERIN 0.4 MG/1
TABLET SUBLINGUAL
Qty: 15 TABLET | Refills: 0 | Status: SHIPPED | OUTPATIENT
Start: 2023-05-09 | End: 2023-06-21

## 2023-05-09 RX ORDER — NALOXONE HYDROCHLORIDE 0.4 MG/ML
0.4 INJECTION, SOLUTION INTRAMUSCULAR; INTRAVENOUS; SUBCUTANEOUS
Status: DISCONTINUED | OUTPATIENT
Start: 2023-05-09 | End: 2023-05-09 | Stop reason: HOSPADM

## 2023-05-09 RX ORDER — FLUMAZENIL 0.1 MG/ML
0.2 INJECTION, SOLUTION INTRAVENOUS
Status: DISCONTINUED | OUTPATIENT
Start: 2023-05-09 | End: 2023-05-09 | Stop reason: HOSPADM

## 2023-05-09 RX ORDER — METOPROLOL SUCCINATE 25 MG/1
25 TABLET, EXTENDED RELEASE ORAL DAILY
Qty: 30 TABLET | Refills: 0 | Status: SHIPPED | OUTPATIENT
Start: 2023-05-09 | End: 2023-06-21

## 2023-05-09 RX ORDER — HYDRALAZINE HYDROCHLORIDE 20 MG/ML
10 INJECTION INTRAMUSCULAR; INTRAVENOUS
Status: DISCONTINUED | OUTPATIENT
Start: 2023-05-09 | End: 2023-05-09 | Stop reason: HOSPADM

## 2023-05-09 RX ORDER — SODIUM CHLORIDE 9 MG/ML
75 INJECTION, SOLUTION INTRAVENOUS CONTINUOUS
Status: ACTIVE | OUTPATIENT
Start: 2023-05-09 | End: 2023-05-09

## 2023-05-09 RX ORDER — OXYCODONE HYDROCHLORIDE 5 MG/1
5 TABLET ORAL EVERY 4 HOURS PRN
Status: DISCONTINUED | OUTPATIENT
Start: 2023-05-09 | End: 2023-05-09 | Stop reason: HOSPADM

## 2023-05-09 RX ORDER — ATROPINE SULFATE 0.1 MG/ML
0.5 INJECTION INTRAVENOUS
Status: DISCONTINUED | OUTPATIENT
Start: 2023-05-09 | End: 2023-05-09 | Stop reason: HOSPADM

## 2023-05-09 RX ADMIN — SODIUM CHLORIDE: 4.5 INJECTION, SOLUTION INTRAVENOUS at 08:43

## 2023-05-09 RX ADMIN — CLOPIDOGREL BISULFATE 75 MG: 75 TABLET ORAL at 10:24

## 2023-05-09 RX ADMIN — SERTRALINE HYDROCHLORIDE 50 MG: 50 TABLET ORAL at 10:20

## 2023-05-09 RX ADMIN — ROSUVASTATIN CALCIUM 40 MG: 40 TABLET, COATED ORAL at 10:20

## 2023-05-09 RX ADMIN — ASPIRIN 325 MG: 325 TABLET ORAL at 10:21

## 2023-05-09 ASSESSMENT — ACTIVITIES OF DAILY LIVING (ADL)
ADLS_ACUITY_SCORE: 35

## 2023-05-09 ASSESSMENT — EJECTION FRACTION: EF_VALUE: .33

## 2023-05-09 NOTE — PRE-PROCEDURE
GENERAL PRE-PROCEDURE:   Procedure:  Coronary angiogram with possible PCI  Date/Time:  5/9/2023 10:03 AM    Written consent obtained?: Yes    Risks and benefits: Risks, benefits and alternatives were discussed    Consent given by:  Patient  Patient states understanding of procedure being performed: Yes    Patient's understanding of procedure matches consent: Yes    Procedure consent matches procedure scheduled: Yes    Expected level of sedation:  Moderate  Appropriately NPO:  Yes  ASA Class:  3 (CAD; s/p recent PCI to OM with KAYDEN and PTCA of PLB in 3/2023, bradycardia, moderate AS)  Mallampati  :  Grade 2- soft palate, base of uvula, tonsillar pillars, and portion of posterior pharyngeal wall visible  Lungs:  Lungs clear with good breath sounds bilaterally  Heart:  Systolic murmur  History & Physical reviewed:  History and physical reviewed and no updates needed  Statement of review:  I have reviewed the lab findings, diagnostic data, medications, and the plan for sedation

## 2023-05-09 NOTE — DISCHARGE INSTRUCTIONS
Procedural Sedation  Procedural sedation is medicine to ease discomfort, pain, and anxiety during a procedure. The medicine is often given through an IV (intravenous) line in your arm or hand. In some cases, the medicine may be taken by mouth or inhaled. While you are under sedation, you will likely be awake. But you may not remember anything afterward.   Why procedural sedation is used  Sedation is used for many types of procedures. The goal is to reduce pain, anxiety, and stressful memories of a procedure. It can help your healthcare provider treat you. For example, having a broken bone fixed may be easier if you feel relaxed.   This type of sedation is used only for short, basic procedures. It's not used for complex surgery. Some procedures that use this type of sedation include:   Dental surgery  Breast biopsy, to take a sample of breast tissue  Endoscopy, to look at gastrointestinal problems  Bronchoscopy, to check for lung problems  Bone or joint realignment, to fix a broken bone or dislocated joint  Minor foot or skin surgery  Electrical cardioversion, to restore a normal heart rhythm  Lumbar puncture, to assess neurological disease  Risks of procedural sedation  Risks and possible side effects include:   Headache  Nausea and vomiting  Unpleasant memory of the procedure  Lowered rate of breathing  Changes in heart rate and blood pressure (rare)  Inhalation of stomach contents into your lungs (rare)  Side effects will likely go away shortly after the procedure. Your healthcare team will watch your heart rate and breathing during and after your sedation. This is to help prevent problems.   Your own risks may vary. They can be based on your age and your overall health. They also depend on the type of sedation you are given. Talk with your healthcare provider about the risks that apply most to you.   Getting ready for procedural sedation   Talk with your healthcare provider about how to get ready for your  procedure. Tell them about all the medicines you take. This includes over-the-counter medicines, such as ibuprofen. It also includes vitamins, herbs, and other supplements. You may need to stop taking some medicines before the procedure, such as blood thinners and aspirin. If you smoke, you should stop. This is to lessen the chance of a lung problem. Talk with your healthcare provider if you need help to stop smoking.   Tell your healthcare provider if you:   Have had any problems in the past with sedation or anesthesia  Have had any recent changes in your health, such as an infection or fever  Are pregnant or think you could be  Also:  Ask a family member or friend to take you home after the procedure. You can t drive on the day you have sedation.  Follow any directions you are given for not eating or drinking before procedure.  Don't make any important decisions, such as financial or legal, on the day after you have sedation.  Follow all other instructions from your healthcare provider.  During your procedural sedation  You may have your procedure in a hospital or a medical clinic. Sedation is done by a trained healthcare provider. In general, you can expect the following:   You will be given medicine through an IV line in your arm or hand. Or you may receive a shot. The medicine may also be given by mouth. Or you may inhale it through a mask.  If you have medicine through an IV, you may feel the effects very quickly. You will start to feel relaxed and drowsy.  During the procedure, your heart rate, breathing, and blood pressure will be closely watched. Your breathing and blood pressure may decrease a little. But you will likely not need help with your breathing. You may receive a little extra oxygen. This is done through a mask or some soft plastic prongs under your nose.  You will probably be awake the entire time. If you do fall asleep, you should be easy to wake up, if needed. You should feel little or no  pain.  When your procedure is over, the sedative medicine will be stopped.  After your procedural sedation  You will begin to feel more awake and aware. But you will likely be drowsy for a while afterward. You will be closely watched as you become more alert. You may have a faint memory of the procedure. Or you may not remember it at all.   You should be able to return home within 1 to 2 hours after your procedure. Plan to have someone stay with you for a few hours. Side effects, such as headache and nausea, may go away quickly. Tell your healthcare provider if they continue.   Don t drive or make any important decisions for at least 24 hours. Be sure to follow all after-care directions.   When to call your healthcare provider  Have someone call your healthcare provider right away if any of the following occur:   Drowsiness that gets worse  Weakness or dizziness that gets worse  Repeated vomiting  Severe or ongoing pain from the procedure, not relieved by the pain medicine  Fever of 100.4  F (38 C) or higher, or as directed by your healthcare provider  New rash  Call 911  Have someone call 911 if any of the following occur:   Shortness of breath  Chest pain  Loss of consciousness or you can't be awakened    Alejandra last reviewed this educational content on 7/1/2022 2000-2022 [employer]. All rights reserved. This information is not intended as a substitute for professional medical care. Always follow your healthcare professional's instructions.      Interventional Cardiology  Coronary Angiogram/Angioplasty/Stent/Atherectomy Discharge  Instructions -   Radial (wrist) Approach     The instructions below are to help you understand how to take care of yourself. There is also information about when to call the doctor or emergency services.    **Do not stop your aspirin or platelet inhibitor unless directed by your Cardiologist.  These medications help to prevent platelets in your blood from sticking  together and forming a clot.   Examples of these medications are: Ticagrelor (Brilinta), Clopidogrel (Plavix), Prasugrel (Effient)    For 24 hours after procedure:  Do not subject hand/arm to any forceful movements for 24 hours, such as supporting weight when rising from a chair or bed.  Do not drive a car for 24 hours.  The dressing on the puncture site may be removed after 24 hours and left open to air. If minor oozing, you may apply a Band-Aid and remove after 12 hours.   You may shower on the day after your procedure. Do not take a tub bath or wash dishes (no soaking wrist) with the puncture site in water for 3 days after the procedure.    For 48 hours following the procedure:  Do not operate a lawnmower, motorcycle, chainsaw or all-terrain vehicle.  Do not lift anything heavier than 5-10 pounds with affected arm for 5 days.  Avoid excessive bending (flexion/extension) wrist movement.  Do not engage in vigorous exercise (i.e. tennis, golf) using the affected arm for 5 days after discharge.  You may return to work in 72 hours if no complications and no heavy lifting.    If bleeding should occur following discharge:  Sit down and apply firm pressure with your thumb against the puncture site and fingers against back of wrist for 10 minutes.  If the bleeding stops, continue to rest, keeping your wrist still for 2 hours. Notify your doctor as soon as possible.  If bleeding does not stop after 10 minutes or if there is a large amount of bleeding or spurting, call 911 immediately. Do not drive yourself to the hospital.           Contact the Heart Clinic at [phone] if you develop:  Fever over 100.4, that lasts more than one day  Redness, heat, or pus at the puncture site  Change in color or temperature of the hand or arm    Expect mild tingling of hand and tenderness at the puncture site for up to 3 days. You may take Tylenol or a pain medicine recommended by your doctor.                       Our Cardiac Rehab  staff may visit briefly with you while your in the hospital.   If they miss you, someone will contact you after you are home.  You are encouraged to enroll in an Outpatient Cardiac Rehab Program     No elective dental work for 6 weeks after having a stent    Your Procedural Physician was: Lowell  the phone number is: [phone]    [facility]:  [phone]  If you are calling after hours, please listen to the entire voicemail, a live  will answer at the end of the message

## 2023-05-09 NOTE — DISCHARGE SUMMARY
[facility]    Discharge Summary  Hospitalist    Date of Admission:  5/8/2023  Date of Discharge:  5/9/2023  Discharging Provider: Mil Mccollum MD  Date of Service (when I saw the patient): 05/09/23    Discharge Diagnoses   Chest pain  CAD  Sinus bradycardia    History of Present Illness   Lopez Hogue is an 67 year old male who presented with chest pain.    Hospital Course   Lopez Hogue is a 67 year old male admitted on 5/8/2023. He presents with cp. H/o CAD, unstable angina, recent stents.     CP  CAD  -recent stents  -cp similar to the unstable angina 5 w ago  -check trops  -monitor on tele  -cardiology consultation  -NTG   -Coronary CTA: Coronary atherosclerotic disease. First obtuse marginal branch of the circumflex, previously treated with long stented segment, appears totally occluded with no distal flow visualized.  -Angiogram on 5/9: see report.  -as per cardiologist: ok to discharge pt home. On ASA and Plavix.   -no cp now, vss, hr 55     Sinus bradycardia  -hold metoprolol  -defer to card: change metoprolol tartrate to metoprolol succinate either 25 or 50 mg daily given bradycardia.  -will discharge on metoprolol succinate 25mg daily     HTN  -PTA meds and change metoprolol as above       Mil Mccollum MD    Significant Results and Procedures   See below    Pending Results     Unresulted Labs Ordered in the Past 30 Days of this Admission     No orders found for last 31 day(s).          Code Status   Full Code       Primary Care Physician   Madi Leal.  Awake alert oriented not in acute distress.  HEENT.  Pupils equal round react to light, anicteric, EOM intact.  Neck supple no JVD.  CVS regular rhythm no murmur gallops.  Lungs.  Clear to auscultation bilateral no wheezing or rales.  Abdomen.  Soft nontender bowel sounds present.  Extremities.  No edema no calf tenderness.  Neurological.  Awake and alert. No focal deficit.  Skin no rash. No  pallor.  Psych. Normal mood.     Discharge Disposition   Discharged to home  Condition at discharge: Good    Consultations This Hospital Stay   CARDIOLOGY IP CONSULT  CARE MANAGEMENT / SOCIAL WORK IP CONSULT  PHARMACY IP CONSULT  PHARMACY IP CONSULT  SMOKING CESSATION PROGRAM IP CONSULT    Time Spent on this Encounter   I, Mil Mccollum MD, personally saw the patient today and spent greater than 30 minutes discharging this patient.    Discharge Orders      Reason for your hospital stay    Chest pain   CAD     Follow-up and recommended labs and tests     Follow up with primary care provider, Madi Ramos, within 7 days to evaluate medication change and for hospital follow- up.  No follow up labs or test are needed.    Follow up with cardiologist as instructed     Activity    Your activity upon discharge: activity as tolerated     When to contact your care team    Call your primary doctor if you have any of the following:  increased shortness of breath, increased pain, or any concerns.     Diet    Follow this diet upon discharge: Orders Placed This Encounter      Low salt low fat     Discharge Medications   Current Discharge Medication List      START taking these medications    Details   metoprolol succinate ER (TOPROL XL) 25 MG 24 hr tablet Take 1 tablet (25 mg) by mouth daily  Qty: 30 tablet, Refills: 0    Associated Diagnoses: Primary hypertension      nitroGLYcerin (NITROSTAT) 0.4 MG sublingual tablet For chest pain place 1 tablet under the tongue every 5 minutes for 3 doses. If symptoms persist 5 minutes after 1st dose call 911.  Qty: 15 tablet, Refills: 0    Associated Diagnoses: Coronary artery disease involving native coronary artery of native heart with unstable angina pectoris (H)         CONTINUE these medications which have NOT CHANGED    Details   acetaminophen (TYLENOL) 500 MG tablet Take 500-1,000 mg by mouth every 8 hours as needed for fever or pain      aspirin (ASA) 81 MG EC tablet Take 1 tablet (81  mg) by mouth daily Start tomorrow.  Qty: 30 tablet, Refills: 3    Associated Diagnoses: Unstable angina (H)      clopidogrel (PLAVIX) 75 MG tablet Take 1 tablet (75 mg) by mouth daily Dose to start tomorrow.  Qty: 90 tablet, Refills: 3    Associated Diagnoses: Unstable angina (H)      rosuvastatin (CRESTOR) 40 MG tablet Take 1 tablet (40 mg) by mouth daily  Qty: 90 tablet, Refills: 3    Associated Diagnoses: Unstable angina (H)      sertraline (ZOLOFT) 50 MG tablet Take 50 mg by mouth daily         STOP taking these medications       metoprolol tartrate (LOPRESSOR) 50 MG tablet Comments:   Reason for Stopping:             Allergies   Allergies   Allergen Reactions     Coconut Flavor Anaphylaxis     Raw coconut     Data   Most Recent 3 CBC's:Recent Labs   Lab Test 05/08/23  0556 03/27/23  0727   WBC 6.5 9.9   HGB 12.2* 13.6   MCV 92 89    203      Most Recent 3 BMP's:  Recent Labs   Lab Test 05/08/23  0556 03/28/23  0502 03/27/23  0727    140 138   POTASSIUM 4.1 4.0 4.1   CHLORIDE 107 106 104   CO2 25 26 26   BUN 13.7 8.0 11.1   CR 0.87 0.74 0.83   ANIONGAP 9 8 8   RAFAELA 9.4 9.2 9.0   * 107* 105*     Most Recent 2 LFT's:  Recent Labs   Lab Test 03/27/23  0727   AST 26   ALT 18   ALKPHOS 70   BILITOTAL 0.9     Most Recent INR's and Anticoagulation Dosing History:  Anticoagulation Dose History          View : No data to display.                    Most Recent 3 Troponin's:No lab results found.  Most Recent Cholesterol Panel:  Recent Labs   Lab Test 03/27/23  0727   CHOL 177   *   HDL 47   TRIG 82     Most Recent 6 Bacteria Isolates From Any Culture (See EPIC Reports for Culture Details):No lab results found.  Most Recent TSH, T4 and A1c Labs:  Recent Labs   Lab Test 03/27/23  0727   A1C 5.2     Results for orders placed or performed during the hospital encounter of 05/08/23   XR Chest Port 1 View    Narrative    EXAM: XR CHEST PORT 1 VIEW  LOCATION: [facility]  HOSPITAL  DATE/TIME: 5/8/2023 7:14 AM CDT    INDICATION: Chest pain.  COMPARISON: 03/27/2023      Impression    IMPRESSION: Normal heart size and pulmonary vascularity. Lungs clear. Aortic calcification. No pneumothorax. Minimal degenerative hypertrophic changes in the spine.   Radiologist Consult For Cardiology    Narrative    OVERREAD: DETAILED [facility] EXTRACARDIAC OVERREAD OF CARDIAC CT   LOCATION: [facility]  DATE/TIME: 5/8/2023 2:42 PM CDT    INDICATION: Chest pain.  TECHNIQUE: Dose reduction techniques were used.  COMPARISON: 05/08/2023 CXR.    FINDINGS:    LIMITED CHEST: Negative.  LIMITED MEDIASTINUM: Negative.  LIMITED UPPER ABDOMEN: Negative.      Impression    IMPRESSION:    1.  No significant incidental extracardiac findings.  2.  Please refer to cardiologist's dictation for the cardiac CT report.   Cardiac Catheterization    Narrative    1.  Previously placed stents in OM left circumflex are widely patent with   normal VENTURA-3 flow.  2.  PTCA sites in distal circumflex branch have  healed nicely with less   than 30% residual narrowing.  3.  LAD is moderately calcified with diffuse mild luminal irregularity but   no severe stenoses, unchanged from prior study.  4. RCA has mild calcification and luminal irregularity but no severe   stenoses.  There is a focal 70% narrowing in a small posterolateral branch   and diffuse 50 to 70% narrowing in the posterior descending branch.  Both   appear unchanged from prior study..     CTA Angiogram coronary artery     Value    BSA 0.00    Narrative    Coronary atherosclerotic disease  First obtuse marginal branch of the circumflex, previously treated with   long stented segment, appears totally occluded with no distal flow   visualized.  Mild diffuse plaque demonstrated in other vessels, with no severe stenosis   identified.

## 2023-05-09 NOTE — PLAN OF CARE
Problem: Plan of Care - These are the overarching goals to be used throughout the patient stay.    Goal: Optimal Comfort and Wellbeing  Outcome: Progressing   Goal Outcome Evaluation:       Vital signs stable. Pt denies pain. Pt had hair buzzed of on bilateral radial sites and groin in preparation for scheduled angiogram on 5/9.   Ravinder Du RN  5/8/2023  10:28 PM

## 2023-05-09 NOTE — PROGRESS NOTES
Patient was kept comfortable during post-procedure stay.VSS. Denies pain. Right radial access site remains dry & free from signs of bleeding. Appointments made & included in AVS. Dr. Etienne  was able to speak with patient post procedure. Post-op instructions given to patient & spouse. Able to ask questions. Verbalized no concerns. Belongings returned. Discharged in stable condition.

## 2023-05-10 ENCOUNTER — HOSPITAL ENCOUNTER (OUTPATIENT)
Dept: CARDIAC REHAB | Facility: HOSPITAL | Age: 68
Discharge: HOME | End: 2023-05-10
Attending: INTERNAL MEDICINE
Payer: COMMERCIAL

## 2023-05-10 ENCOUNTER — PATIENT OUTREACH (OUTPATIENT)
Dept: CARE COORDINATION | Facility: CLINIC | Age: 68
End: 2023-05-10
Payer: COMMERCIAL

## 2023-05-10 PROCEDURE — 93797 PHYS/QHP OP CAR RHAB WO ECG: CPT

## 2023-05-10 PROCEDURE — 93798 PHYS/QHP OP CAR RHAB W/ECG: CPT

## 2023-05-10 RX ORDER — IODIXANOL 320 MG/ML
INJECTION, SOLUTION INTRAVASCULAR
Status: DISCONTINUED | OUTPATIENT
Start: 2023-05-09 | End: 2024-04-21

## 2023-05-10 NOTE — PROGRESS NOTES
[facility]    Background: Transitional Care Management program identified per system criteria and reviewed by [facility] team for possible outreach.    Assessment: Upon chart review, [facility] Team member will not proceed with patient outreach related to this episode of Transitional Care Management program due to reason below:    Per review of CareEverywhere, patient has active communication with a nurse, provider or care team for reason of post-hospital follow up plan.  Outreach call by [facility] team not indicated to minimize duplicative efforts.     Plan: Transitional Care Management episode addressed appropriately per reason noted above.      Patito Cosme RN  [facility] Resource [place], [facility]    *Connected Care Resource Team does NOT follow patient ongoing. Referrals are identified based on internal discharge reports and the outreach is to ensure patient has an understanding of their discharge instructions.

## 2023-05-11 ENCOUNTER — LAB (OUTPATIENT)
Dept: LAB | Facility: HOSPITAL | Age: 68
End: 2023-05-11
Payer: COMMERCIAL

## 2023-05-11 DIAGNOSIS — E78.5 DYSLIPIDEMIA, GOAL LDL BELOW 70: ICD-10-CM

## 2023-05-11 LAB
ALT SERPL W P-5'-P-CCNC: 27 U/L (ref 10–50)
AST SERPL W P-5'-P-CCNC: 33 U/L (ref 10–50)
CHOLEST SERPL-MCNC: 95 MG/DL
HDLC SERPL-MCNC: 48 MG/DL
LDLC SERPL CALC-MCNC: 34 MG/DL
NONHDLC SERPL-MCNC: 47 MG/DL
TRIGL SERPL-MCNC: 67 MG/DL

## 2023-05-11 PROCEDURE — 36415 COLL VENOUS BLD VENIPUNCTURE: CPT

## 2023-05-11 PROCEDURE — 84460 ALANINE AMINO (ALT) (SGPT): CPT

## 2023-05-11 PROCEDURE — 84450 TRANSFERASE (AST) (SGOT): CPT

## 2023-05-11 PROCEDURE — 80061 LIPID PANEL: CPT

## 2023-05-12 ENCOUNTER — HOSPITAL ENCOUNTER (OUTPATIENT)
Dept: CARDIAC REHAB | Facility: HOSPITAL | Age: 68
Discharge: HOME | End: 2023-05-12
Attending: INTERNAL MEDICINE
Payer: COMMERCIAL

## 2023-05-12 PROCEDURE — 93798 PHYS/QHP OP CAR RHAB W/ECG: CPT

## 2023-05-14 ENCOUNTER — HEALTH MAINTENANCE LETTER (OUTPATIENT)
Age: 68
End: 2023-05-14

## 2023-05-17 ENCOUNTER — HOSPITAL ENCOUNTER (OUTPATIENT)
Dept: CARDIAC REHAB | Facility: HOSPITAL | Age: 68
Discharge: HOME | End: 2023-05-17
Attending: INTERNAL MEDICINE
Payer: COMMERCIAL

## 2023-05-17 PROCEDURE — 93798 PHYS/QHP OP CAR RHAB W/ECG: CPT

## 2023-05-19 ENCOUNTER — HOSPITAL ENCOUNTER (OUTPATIENT)
Dept: CARDIAC REHAB | Facility: HOSPITAL | Age: 68
Discharge: HOME | End: 2023-05-19
Attending: INTERNAL MEDICINE
Payer: COMMERCIAL

## 2023-05-19 PROCEDURE — 93798 PHYS/QHP OP CAR RHAB W/ECG: CPT

## 2023-05-22 ENCOUNTER — HOSPITAL ENCOUNTER (OUTPATIENT)
Dept: CARDIAC REHAB | Facility: HOSPITAL | Age: 68
Discharge: HOME | End: 2023-05-22
Attending: INTERNAL MEDICINE
Payer: COMMERCIAL

## 2023-05-22 PROCEDURE — 93798 PHYS/QHP OP CAR RHAB W/ECG: CPT

## 2023-05-24 ENCOUNTER — HOSPITAL ENCOUNTER (OUTPATIENT)
Dept: CARDIAC REHAB | Facility: HOSPITAL | Age: 68
Discharge: HOME | End: 2023-05-24
Attending: INTERNAL MEDICINE
Payer: COMMERCIAL

## 2023-05-24 PROCEDURE — 93797 PHYS/QHP OP CAR RHAB WO ECG: CPT | Mod: 59

## 2023-05-24 PROCEDURE — 93798 PHYS/QHP OP CAR RHAB W/ECG: CPT

## 2023-05-26 ENCOUNTER — HOSPITAL ENCOUNTER (OUTPATIENT)
Dept: CARDIAC REHAB | Facility: HOSPITAL | Age: 68
Discharge: HOME | End: 2023-05-26
Attending: INTERNAL MEDICINE
Payer: COMMERCIAL

## 2023-05-26 PROCEDURE — 93798 PHYS/QHP OP CAR RHAB W/ECG: CPT

## 2023-05-31 ENCOUNTER — HOSPITAL ENCOUNTER (OUTPATIENT)
Dept: CARDIAC REHAB | Facility: HOSPITAL | Age: 68
Discharge: HOME | End: 2023-05-31
Attending: INTERNAL MEDICINE
Payer: COMMERCIAL

## 2023-05-31 PROCEDURE — 93797 PHYS/QHP OP CAR RHAB WO ECG: CPT | Mod: 59

## 2023-05-31 PROCEDURE — 93798 PHYS/QHP OP CAR RHAB W/ECG: CPT

## 2023-06-02 ENCOUNTER — HOSPITAL ENCOUNTER (OUTPATIENT)
Dept: CARDIAC REHAB | Facility: HOSPITAL | Age: 68
Discharge: HOME | End: 2023-06-02
Attending: INTERNAL MEDICINE
Payer: COMMERCIAL

## 2023-06-02 PROCEDURE — 93798 PHYS/QHP OP CAR RHAB W/ECG: CPT

## 2023-06-05 ENCOUNTER — HOSPITAL ENCOUNTER (OUTPATIENT)
Dept: CARDIAC REHAB | Facility: HOSPITAL | Age: 68
Discharge: HOME | End: 2023-06-05
Attending: INTERNAL MEDICINE
Payer: COMMERCIAL

## 2023-06-05 PROCEDURE — 93798 PHYS/QHP OP CAR RHAB W/ECG: CPT

## 2023-06-07 ENCOUNTER — HOSPITAL ENCOUNTER (OUTPATIENT)
Dept: CARDIAC REHAB | Facility: HOSPITAL | Age: 68
Discharge: HOME | End: 2023-06-07
Attending: INTERNAL MEDICINE
Payer: COMMERCIAL

## 2023-06-07 PROCEDURE — 93798 PHYS/QHP OP CAR RHAB W/ECG: CPT

## 2023-06-09 ENCOUNTER — HOSPITAL ENCOUNTER (OUTPATIENT)
Dept: CARDIAC REHAB | Facility: HOSPITAL | Age: 68
Discharge: HOME | End: 2023-06-09
Attending: INTERNAL MEDICINE
Payer: COMMERCIAL

## 2023-06-09 PROCEDURE — 93798 PHYS/QHP OP CAR RHAB W/ECG: CPT

## 2023-06-14 ENCOUNTER — HOSPITAL ENCOUNTER (OUTPATIENT)
Dept: CARDIAC REHAB | Facility: HOSPITAL | Age: 68
Discharge: HOME | End: 2023-06-14
Attending: INTERNAL MEDICINE
Payer: COMMERCIAL

## 2023-06-14 PROCEDURE — 93798 PHYS/QHP OP CAR RHAB W/ECG: CPT

## 2023-06-14 PROCEDURE — 93797 PHYS/QHP OP CAR RHAB WO ECG: CPT

## 2023-06-16 ENCOUNTER — HOSPITAL ENCOUNTER (OUTPATIENT)
Dept: CARDIAC REHAB | Facility: HOSPITAL | Age: 68
Discharge: HOME | End: 2023-06-16
Attending: INTERNAL MEDICINE
Payer: COMMERCIAL

## 2023-06-16 PROCEDURE — 93798 PHYS/QHP OP CAR RHAB W/ECG: CPT

## 2023-06-19 ENCOUNTER — HOSPITAL ENCOUNTER (OUTPATIENT)
Dept: CARDIAC REHAB | Facility: HOSPITAL | Age: 68
Discharge: HOME | End: 2023-06-19
Attending: INTERNAL MEDICINE
Payer: COMMERCIAL

## 2023-06-19 PROCEDURE — 93798 PHYS/QHP OP CAR RHAB W/ECG: CPT

## 2023-06-21 ENCOUNTER — OFFICE VISIT (OUTPATIENT)
Dept: CARDIOLOGY | Facility: CLINIC | Age: 68
End: 2023-06-21
Payer: COMMERCIAL

## 2023-06-21 VITALS
WEIGHT: 174.2 LBS | HEIGHT: 68 IN | DIASTOLIC BLOOD PRESSURE: 62 MMHG | SYSTOLIC BLOOD PRESSURE: 122 MMHG | RESPIRATION RATE: 16 BRPM | BODY MASS INDEX: 26.4 KG/M2 | HEART RATE: 56 BPM

## 2023-06-21 DIAGNOSIS — I25.110 CORONARY ARTERY DISEASE INVOLVING NATIVE CORONARY ARTERY OF NATIVE HEART WITH UNSTABLE ANGINA PECTORIS (H): Primary | ICD-10-CM

## 2023-06-21 PROCEDURE — 99214 OFFICE O/P EST MOD 30 MIN: CPT | Performed by: INTERNAL MEDICINE

## 2023-06-21 RX ORDER — NITROGLYCERIN 0.4 MG/1
TABLET SUBLINGUAL
Qty: 15 TABLET | Refills: 0 | Status: SHIPPED | OUTPATIENT
Start: 2023-06-21 | End: 2023-07-18

## 2023-06-21 NOTE — PROGRESS NOTES
"    Cardiology Progress Note     Assessment:  Coronary artery disease, status post non-ST segment elevation myocardial infarction due to occlusion of obtuse marginal branch, status post stenting in March 2023,  no angina  Hypercholesterolemia, excellent control  Aortic stenosis, moderate   Sinus bradycardia, mild        Plan:  There is very little benefit of beta-blocker considering preserved LV systolic function.  He is concerned about bradycardia affecting his ability to exercise.  We will stop beta-blocker at this point.  We will get GXT.    We will follow-up on aortic stenosis with echo in about 3 months from now.  I will see him in follow-up after echo    Subjective:   This is 67 year old male who comes in today for follow-up visit from the hospital.  He I saw him when he came into [facility] in March of this year.  Hearing chest pains and elevated troponin.  He underwent stenting of the totally occluded obtuse marginal branch.  His hospital course was unremarkable.  He did redevelop chest discomfort and return to the hospital in May.  He underwent coronary angiogram and was found to have patent stents and no significant disease elsewhere.  He has been attending cardiac rehab program.  He is progressing well.  He has not had heart racing or syncope.  He denies weight gain, PND, orthopnea.    Review of Systems:   Negative other than history of present illness    Objective:   /62 (BP Location: Right arm, Patient Position: Sitting, Cuff Size: Adult Regular)   Pulse 56   Resp 16   Ht 1.727 m (5' 8\")   Wt 79 kg (174 lb 3.2 oz)   BMI 26.49 kg/m    Physical Exam:  GENERAL: no distress  NECK: No JVD  LUNGS: Clear to auscultation.  CARDIAC: regular rhythm, S1 & S2 normal.  No heaves, thrills, gallops 3/6 systolic ejection murmur at the aortic area.  ABDOMEN: flat, negative hepatosplenomegaly, soft and non-tender.  EXTREMITIES: No evidence of cyanosis, clubbing or edema.    Current Outpatient Medications "   Medication Sig Dispense Refill     acetaminophen (TYLENOL) 500 MG tablet Take 500-1,000 mg by mouth every 8 hours as needed for fever or pain       aspirin (ASA) 81 MG EC tablet Take 1 tablet (81 mg) by mouth daily Start tomorrow. 30 tablet 3     clopidogrel (PLAVIX) 75 MG tablet Take 1 tablet (75 mg) by mouth daily Dose to start tomorrow. 90 tablet 3     nitroGLYcerin (NITROSTAT) 0.4 MG sublingual tablet For chest pain place 1 tablet under the tongue every 5 minutes for 3 doses. If symptoms persist 5 minutes after 1st dose call 911. 15 tablet 0     rosuvastatin (CRESTOR) 40 MG tablet Take 1 tablet (40 mg) by mouth daily 90 tablet 3     sertraline (ZOLOFT) 50 MG tablet Take 50 mg by mouth daily         Cardiographics:      Echocardiogram: March 2023  Normal left-ventricular size. Mild to moderate left ventricular hypertrophy.  Left ventricular systolic function is estimated at 60 to 65%. No regional wall  motion abnormality noted.Diastolic Doppler findings (E/E' ratio and/or other  parameters) suggest left ventricular filling pressures are normal.  Normal right ventricular size and systolic function.  Moderate calcific aortic stenosis. Peak velocity of 3.4 m/s. Mean gradient 25  mmHg.  Borderline enlargement of the proximal ascending aorta.    Coronary angio: March 2023  1.  Diffuse multivessel coronary disease with mild to moderate irregularity of the LAD, severe circumflex OM1 disease which is a large vessel, and severe stenoses leading into the third left posterolateral branch which is very small branch, and diffuse mild irregularity of the right coronary artery and right posterior descending.  2.  Successful PCI of OM1 vessel requiring Cutting Balloon PTCA, shockwave lithotripsy, and drug-eluting stent implant x2  3.  Successful conventional PTCA of small posterolateral 3 branch of left circumflex.    May 2023  1.  Previously placed stents in OM left circumflex are widely patent with normal VENTURA-3 flow.  2.   PTCA sites in distal circumflex branch have  healed nicely with less than 30% residual narrowing.  3.  LAD is moderately calcified with diffuse mild luminal irregularity but no severe stenoses, unchanged from prior study.  4. RCA has mild calcification and luminal irregularity but no severe stenoses.  There is a focal 70% narrowing in a small posterolateral branch and diffuse 50 to 70% narrowing in the posterior descending branch.  Both appear unchanged from prior study..     Lab Results    Chemistry/lipid CBC Cardiac Enzymes/BNP/TSH/INR   Recent Labs   Lab Test 05/11/23  0838   CHOL 95   HDL 48   LDL 34   TRIG 67     Recent Labs   Lab Test 05/11/23  0838 03/27/23  0727   LDL 34 114*     Recent Labs   Lab Test 05/08/23  0556      POTASSIUM 4.1   CHLORIDE 107   CO2 25   *   BUN 13.7   CR 0.87   GFRESTIMATED >90   RAFAELA 9.4     Recent Labs   Lab Test 05/08/23  0556 03/28/23  0502 03/27/23  0727   CR 0.87 0.74 0.83     Recent Labs   Lab Test 03/27/23  0727   A1C 5.2          Recent Labs   Lab Test 05/08/23  0556   WBC 6.5   HGB 12.2*   HCT 36.9*   MCV 92        Recent Labs   Lab Test 05/08/23  0556 03/27/23  0727   HGB 12.2* 13.6    No results for input(s): TROPONINI in the last 73857 hours.  No results for input(s): BNP, NTBNPI, NTBNP in the last 71998 hours.  No results for input(s): TSH in the last 43220 hours.  No results for input(s): INR in the last 52791 hours.

## 2023-06-21 NOTE — LETTER
"6/21/2023    Madi Ramos  [address]  [facility] [address]    RE: Lopez Hogue       Dear Colleague,     I had the pleasure of seeing Lopez RONALD Hogue in the [facility].      Cardiology Progress Note     Assessment:  Coronary artery disease, status post non-ST segment elevation myocardial infarction due to occlusion of obtuse marginal branch, status post stenting in March 2023,  no angina  Hypercholesterolemia, excellent control  Aortic stenosis, moderate   Sinus bradycardia, mild        Plan:  There is very little benefit of beta-blocker considering preserved LV systolic function.  He is concerned about bradycardia affecting his ability to exercise.  We will stop beta-blocker at this point.  We will get GXT.    We will follow-up on aortic stenosis with echo in about 3 months from now.  I will see him in follow-up after echo    Subjective:   This is 67 year old male who comes in today for follow-up visit from the hospital.  He I saw him when he came into [facility] in March of this year.  Hearing chest pains and elevated troponin.  He underwent stenting of the totally occluded obtuse marginal branch.  His hospital course was unremarkable.  He did redevelop chest discomfort and return to the hospital in May.  He underwent coronary angiogram and was found to have patent stents and no significant disease elsewhere.  He has been attending cardiac rehab program.  He is progressing well.  He has not had heart racing or syncope.  He denies weight gain, PND, orthopnea.    Review of Systems:   Negative other than history of present illness    Objective:   /62 (BP Location: Right arm, Patient Position: Sitting, Cuff Size: Adult Regular)   Pulse 56   Resp 16   Ht 1.727 m (5' 8\")   Wt 79 kg (174 lb 3.2 oz)   BMI 26.49 kg/m    Physical Exam:  GENERAL: no distress  NECK: No JVD  LUNGS: Clear to auscultation.  CARDIAC: regular rhythm, S1 & S2 normal.  No heaves, thrills, gallops 3/6 systolic " ejection murmur at the aortic area.  ABDOMEN: flat, negative hepatosplenomegaly, soft and non-tender.  EXTREMITIES: No evidence of cyanosis, clubbing or edema.    Current Outpatient Medications   Medication Sig Dispense Refill    acetaminophen (TYLENOL) 500 MG tablet Take 500-1,000 mg by mouth every 8 hours as needed for fever or pain      aspirin (ASA) 81 MG EC tablet Take 1 tablet (81 mg) by mouth daily Start tomorrow. 30 tablet 3    clopidogrel (PLAVIX) 75 MG tablet Take 1 tablet (75 mg) by mouth daily Dose to start tomorrow. 90 tablet 3    nitroGLYcerin (NITROSTAT) 0.4 MG sublingual tablet For chest pain place 1 tablet under the tongue every 5 minutes for 3 doses. If symptoms persist 5 minutes after 1st dose call 911. 15 tablet 0    rosuvastatin (CRESTOR) 40 MG tablet Take 1 tablet (40 mg) by mouth daily 90 tablet 3    sertraline (ZOLOFT) 50 MG tablet Take 50 mg by mouth daily         Cardiographics:      Echocardiogram: March 2023  Normal left-ventricular size. Mild to moderate left ventricular hypertrophy.  Left ventricular systolic function is estimated at 60 to 65%. No regional wall  motion abnormality noted.Diastolic Doppler findings (E/E' ratio and/or other  parameters) suggest left ventricular filling pressures are normal.  Normal right ventricular size and systolic function.  Moderate calcific aortic stenosis. Peak velocity of 3.4 m/s. Mean gradient 25  mmHg.  Borderline enlargement of the proximal ascending aorta.    Coronary angio: March 2023  1.  Diffuse multivessel coronary disease with mild to moderate irregularity of the LAD, severe circumflex OM1 disease which is a large vessel, and severe stenoses leading into the third left posterolateral branch which is very small branch, and diffuse mild irregularity of the right coronary artery and right posterior descending.  2.  Successful PCI of OM1 vessel requiring Cutting Balloon PTCA, shockwave lithotripsy, and drug-eluting stent implant x2  3.   Successful conventional PTCA of small posterolateral 3 branch of left circumflex.    May 2023  1.  Previously placed stents in OM left circumflex are widely patent with normal VENTURA-3 flow.  2.  PTCA sites in distal circumflex branch have  healed nicely with less than 30% residual narrowing.  3.  LAD is moderately calcified with diffuse mild luminal irregularity but no severe stenoses, unchanged from prior study.  4. RCA has mild calcification and luminal irregularity but no severe stenoses.  There is a focal 70% narrowing in a small posterolateral branch and diffuse 50 to 70% narrowing in the posterior descending branch.  Both appear unchanged from prior study..     Lab Results    Chemistry/lipid CBC Cardiac Enzymes/BNP/TSH/INR   Recent Labs   Lab Test 05/11/23  0838   CHOL 95   HDL 48   LDL 34   TRIG 67     Recent Labs   Lab Test 05/11/23  0838 03/27/23  0727   LDL 34 114*     Recent Labs   Lab Test 05/08/23  0556      POTASSIUM 4.1   CHLORIDE 107   CO2 25   *   BUN 13.7   CR 0.87   GFRESTIMATED >90   RAFAELA 9.4     Recent Labs   Lab Test 05/08/23  0556 03/28/23  0502 03/27/23  0727   CR 0.87 0.74 0.83     Recent Labs   Lab Test 03/27/23  0727   A1C 5.2          Recent Labs   Lab Test 05/08/23  0556   WBC 6.5   HGB 12.2*   HCT 36.9*   MCV 92        Recent Labs   Lab Test 05/08/23  0556 03/27/23  0727   HGB 12.2* 13.6    No results for input(s): TROPONINI in the last 75828 hours.  No results for input(s): BNP, NTBNPI, NTBNP in the last 60326 hours.  No results for input(s): TSH in the last 57233 hours.  No results for input(s): INR in the last 41837 hours.                     Thank you for allowing me to participate in the care of your patient.      Sincerely,     Christopher Walters MD     [facility]  cc:   No referring provider defined for this encounter.

## 2023-06-21 NOTE — PATIENT INSTRUCTIONS
Lopez Hogue,    It was a pleasure to see you today at the [facility].     My recommendations after this visit include:    Stop metoprolol  Stress test  Echo in 3 months    MYESHA Walters MD, FACC, KALI

## 2023-06-28 ENCOUNTER — HOSPITAL ENCOUNTER (OUTPATIENT)
Dept: CARDIAC REHAB | Facility: HOSPITAL | Age: 68
Discharge: HOME | End: 2023-06-28
Attending: INTERNAL MEDICINE
Payer: COMMERCIAL

## 2023-06-28 PROCEDURE — 93798 PHYS/QHP OP CAR RHAB W/ECG: CPT

## 2023-06-28 PROCEDURE — 93797 PHYS/QHP OP CAR RHAB WO ECG: CPT | Mod: XU

## 2023-07-17 DIAGNOSIS — I25.110 CORONARY ARTERY DISEASE INVOLVING NATIVE CORONARY ARTERY OF NATIVE HEART WITH UNSTABLE ANGINA PECTORIS (H): ICD-10-CM

## 2023-07-18 ENCOUNTER — HOSPITAL ENCOUNTER (OUTPATIENT)
Dept: CARDIOLOGY | Facility: HOSPITAL | Age: 68
Discharge: HOME | End: 2023-07-18
Attending: INTERNAL MEDICINE | Admitting: INTERNAL MEDICINE
Payer: COMMERCIAL

## 2023-07-18 DIAGNOSIS — I25.110 CORONARY ARTERY DISEASE INVOLVING NATIVE CORONARY ARTERY OF NATIVE HEART WITH UNSTABLE ANGINA PECTORIS (H): ICD-10-CM

## 2023-07-18 LAB
CV STRESS CURRENT BP HE: NORMAL
CV STRESS CURRENT HR HE: 105
CV STRESS CURRENT HR HE: 110
CV STRESS CURRENT HR HE: 112
CV STRESS CURRENT HR HE: 112
CV STRESS CURRENT HR HE: 114
CV STRESS CURRENT HR HE: 124
CV STRESS CURRENT HR HE: 130
CV STRESS CURRENT HR HE: 130
CV STRESS CURRENT HR HE: 135
CV STRESS CURRENT HR HE: 137
CV STRESS CURRENT HR HE: 138
CV STRESS CURRENT HR HE: 140
CV STRESS CURRENT HR HE: 140
CV STRESS CURRENT HR HE: 64
CV STRESS CURRENT HR HE: 64
CV STRESS CURRENT HR HE: 65
CV STRESS CURRENT HR HE: 65
CV STRESS CURRENT HR HE: 67
CV STRESS CURRENT HR HE: 70
CV STRESS CURRENT HR HE: 72
CV STRESS CURRENT HR HE: 74
CV STRESS CURRENT HR HE: 74
CV STRESS CURRENT HR HE: 79
CV STRESS CURRENT HR HE: 86
CV STRESS CURRENT HR HE: 87
CV STRESS CURRENT HR HE: 90
CV STRESS CURRENT HR HE: 90
CV STRESS CURRENT HR HE: 91
CV STRESS DEVIATION TIME HE: NORMAL
CV STRESS ECHO PERCENT HR HE: NORMAL
CV STRESS EXERCISE STAGE HE: NORMAL
CV STRESS EXERCISE STAGE REACHED HE: NORMAL
CV STRESS FINAL RESTING BP HE: NORMAL
CV STRESS FINAL RESTING HR HE: 74
CV STRESS MAX HR HE: 141
CV STRESS MAX TREADMILL GRADE HE: 16
CV STRESS MAX TREADMILL SPEED HE: 4.2
CV STRESS PEAK DIA BP HE: NORMAL
CV STRESS PEAK SYS BP HE: NORMAL
CV STRESS PHASE HE: NORMAL
CV STRESS PROTOCOL HE: NORMAL
CV STRESS REASON STOPPED HE: NORMAL
CV STRESS RESTING PT POSITION HE: NORMAL
CV STRESS RESTING PT POSITION HE: NORMAL
CV STRESS ST DEVIATION AMOUNT HE: NORMAL
CV STRESS ST DEVIATION ELEVATION HE: NORMAL
CV STRESS ST EVELATION AMOUNT HE: NORMAL
CV STRESS SYMPTOMS HE: NORMAL
CV STRESS TEST TYPE HE: NORMAL
CV STRESS TOTAL STAGE TIME MIN 1 HE: NORMAL
STRESS ECHO BASELINE DIASTOLIC HE: 76
STRESS ECHO BASELINE HR: 68
STRESS ECHO BASELINE SYSTOLIC BP: 141
STRESS ECHO LAST STRESS DIASTOLIC BP: 52
STRESS ECHO LAST STRESS HR: 140
STRESS ECHO LAST STRESS SYSTOLIC BP: 164
STRESS ECHO POST ESTIMATED WORKLOAD: 10.5
STRESS ECHO POST EXERCISE DUR MIN: 9
STRESS ECHO POST EXERCISE DUR SEC: 0
STRESS ECHO TARGET HR: 129

## 2023-07-18 PROCEDURE — 93016 CV STRESS TEST SUPVJ ONLY: CPT | Performed by: INTERNAL MEDICINE

## 2023-07-18 PROCEDURE — 93017 CV STRESS TEST TRACING ONLY: CPT

## 2023-07-18 PROCEDURE — 93018 CV STRESS TEST I&R ONLY: CPT | Performed by: INTERNAL MEDICINE

## 2023-07-18 RX ORDER — NITROGLYCERIN 0.4 MG/1
TABLET SUBLINGUAL
Qty: 25 TABLET | Refills: 1 | Status: SHIPPED | OUTPATIENT
Start: 2023-07-18 | End: 2023-09-11

## 2023-08-18 ENCOUNTER — APPOINTMENT (OUTPATIENT)
Dept: CT IMAGING | Facility: HOSPITAL | Age: 68
End: 2023-08-18
Attending: FAMILY MEDICINE
Payer: COMMERCIAL

## 2023-08-18 ENCOUNTER — HOSPITAL ENCOUNTER (OUTPATIENT)
Facility: HOSPITAL | Age: 68
Setting detail: OBSERVATION
Discharge: HOME | End: 2023-08-18
Attending: FAMILY MEDICINE | Admitting: FAMILY MEDICINE
Payer: COMMERCIAL

## 2023-08-18 VITALS
DIASTOLIC BLOOD PRESSURE: 68 MMHG | HEART RATE: 66 BPM | WEIGHT: 168.5 LBS | HEIGHT: 68 IN | OXYGEN SATURATION: 97 % | TEMPERATURE: 97.8 F | BODY MASS INDEX: 25.54 KG/M2 | RESPIRATION RATE: 16 BRPM | SYSTOLIC BLOOD PRESSURE: 128 MMHG

## 2023-08-18 DIAGNOSIS — J39.2: ICD-10-CM

## 2023-08-18 DIAGNOSIS — I20.0 UNSTABLE ANGINA (H): ICD-10-CM

## 2023-08-18 DIAGNOSIS — J38.7 VALLECULAR MASS: ICD-10-CM

## 2023-08-18 LAB
ANION GAP SERPL CALCULATED.3IONS-SCNC: 10 MMOL/L (ref 7–15)
BASOPHILS # BLD AUTO: 0 10E3/UL (ref 0–0.2)
BASOPHILS NFR BLD AUTO: 1 %
BUN SERPL-MCNC: 19.6 MG/DL (ref 8–23)
CALCIUM SERPL-MCNC: 9.4 MG/DL (ref 8.8–10.2)
CHLORIDE SERPL-SCNC: 106 MMOL/L (ref 98–107)
CREAT SERPL-MCNC: 0.73 MG/DL (ref 0.67–1.17)
DEPRECATED HCO3 PLAS-SCNC: 25 MMOL/L (ref 22–29)
EOSINOPHIL # BLD AUTO: 0.1 10E3/UL (ref 0–0.7)
EOSINOPHIL NFR BLD AUTO: 1 %
ERYTHROCYTE [DISTWIDTH] IN BLOOD BY AUTOMATED COUNT: 13.6 % (ref 10–15)
GFR SERPL CREATININE-BSD FRML MDRD: >90 ML/MIN/1.73M2
GLUCOSE SERPL-MCNC: 94 MG/DL (ref 70–99)
HCT VFR BLD AUTO: 37.1 % (ref 40–53)
HGB BLD-MCNC: 11.8 G/DL (ref 13.3–17.7)
HGB BLD-MCNC: 12.4 G/DL (ref 13.3–17.7)
IMM GRANULOCYTES # BLD: 0 10E3/UL
IMM GRANULOCYTES NFR BLD: 0 %
LYMPHOCYTES # BLD AUTO: 1.3 10E3/UL (ref 0.8–5.3)
LYMPHOCYTES NFR BLD AUTO: 22 %
MAGNESIUM SERPL-MCNC: 1.8 MG/DL (ref 1.7–2.3)
MCH RBC QN AUTO: 30.3 PG (ref 26.5–33)
MCHC RBC AUTO-ENTMCNC: 33.4 G/DL (ref 31.5–36.5)
MCV RBC AUTO: 91 FL (ref 78–100)
MONOCYTES # BLD AUTO: 0.6 10E3/UL (ref 0–1.3)
MONOCYTES NFR BLD AUTO: 9 %
NEUTROPHILS # BLD AUTO: 4.1 10E3/UL (ref 1.6–8.3)
NEUTROPHILS NFR BLD AUTO: 67 %
NRBC # BLD AUTO: 0 10E3/UL
NRBC BLD AUTO-RTO: 0 /100
NT-PROBNP SERPL-MCNC: 72 PG/ML (ref 0–900)
PLATELET # BLD AUTO: 187 10E3/UL (ref 150–450)
POTASSIUM SERPL-SCNC: 4.2 MMOL/L (ref 3.4–5.3)
RBC # BLD AUTO: 4.09 10E6/UL (ref 4.4–5.9)
SODIUM SERPL-SCNC: 141 MMOL/L (ref 136–145)
TROPONIN T SERPL HS-MCNC: 7 NG/L
WBC # BLD AUTO: 6.1 10E3/UL (ref 4–11)

## 2023-08-18 PROCEDURE — 250N000011 HC RX IP 250 OP 636: Mod: JZ | Performed by: FAMILY MEDICINE

## 2023-08-18 PROCEDURE — 83880 ASSAY OF NATRIURETIC PEPTIDE: CPT | Performed by: FAMILY MEDICINE

## 2023-08-18 PROCEDURE — 99236 HOSP IP/OBS SAME DATE HI 85: CPT | Performed by: INTERNAL MEDICINE

## 2023-08-18 PROCEDURE — 85018 HEMOGLOBIN: CPT | Mod: 91 | Performed by: FAMILY MEDICINE

## 2023-08-18 PROCEDURE — 80048 BASIC METABOLIC PNL TOTAL CA: CPT | Performed by: FAMILY MEDICINE

## 2023-08-18 PROCEDURE — 36415 COLL VENOUS BLD VENIPUNCTURE: CPT | Performed by: FAMILY MEDICINE

## 2023-08-18 PROCEDURE — G0378 HOSPITAL OBSERVATION PER HR: HCPCS

## 2023-08-18 PROCEDURE — 85004 AUTOMATED DIFF WBC COUNT: CPT | Performed by: FAMILY MEDICINE

## 2023-08-18 PROCEDURE — 70491 CT SOFT TISSUE NECK W/DYE: CPT

## 2023-08-18 PROCEDURE — 250N000009 HC RX 250: Performed by: FAMILY MEDICINE

## 2023-08-18 PROCEDURE — 31525 DX LARYNGOSCOPY EXCL NB: CPT

## 2023-08-18 PROCEDURE — 83735 ASSAY OF MAGNESIUM: CPT | Performed by: FAMILY MEDICINE

## 2023-08-18 PROCEDURE — 99285 EMERGENCY DEPT VISIT HI MDM: CPT | Mod: 25

## 2023-08-18 PROCEDURE — 84484 ASSAY OF TROPONIN QUANT: CPT | Performed by: FAMILY MEDICINE

## 2023-08-18 PROCEDURE — 99207 PR NO BILLABLE SERVICE THIS VISIT: CPT | Performed by: FAMILY MEDICINE

## 2023-08-18 RX ORDER — AMOXICILLIN 500 MG
1200 CAPSULE ORAL EVERY EVENING
COMMUNITY
End: 2024-01-15

## 2023-08-18 RX ORDER — SERTRALINE HYDROCHLORIDE 100 MG/1
100 TABLET, FILM COATED ORAL EVERY MORNING
COMMUNITY
Start: 2023-08-17 | End: 2024-08-23

## 2023-08-18 RX ORDER — LANOLIN ALCOHOL/MO/W.PET/CERES
1000 CREAM (GRAM) TOPICAL EVERY EVENING
COMMUNITY
End: 2024-01-15

## 2023-08-18 RX ORDER — ONDANSETRON 2 MG/ML
4 INJECTION INTRAMUSCULAR; INTRAVENOUS EVERY 6 HOURS PRN
Status: CANCELLED | OUTPATIENT
Start: 2023-08-18

## 2023-08-18 RX ORDER — ACETAMINOPHEN 325 MG/1
650 TABLET ORAL EVERY 6 HOURS PRN
Status: CANCELLED | OUTPATIENT
Start: 2023-08-18

## 2023-08-18 RX ORDER — CLOPIDOGREL BISULFATE 75 MG/1
75 TABLET ORAL DAILY
Qty: 90 TABLET | Refills: 3 | Status: ON HOLD | OUTPATIENT
Start: 2023-08-18 | End: 2023-10-12

## 2023-08-18 RX ORDER — IOPAMIDOL 755 MG/ML
90 INJECTION, SOLUTION INTRAVASCULAR ONCE
Status: COMPLETED | OUTPATIENT
Start: 2023-08-18 | End: 2023-08-18

## 2023-08-18 RX ORDER — SODIUM CHLORIDE 9 MG/ML
INJECTION, SOLUTION INTRAVENOUS CONTINUOUS
Status: CANCELLED | OUTPATIENT
Start: 2023-08-18 | End: 2023-08-30

## 2023-08-18 RX ORDER — ONDANSETRON 4 MG/1
4 TABLET, ORALLY DISINTEGRATING ORAL EVERY 6 HOURS PRN
Status: CANCELLED | OUTPATIENT
Start: 2023-08-18

## 2023-08-18 RX ORDER — AMOXICILLIN 250 MG
2 CAPSULE ORAL 2 TIMES DAILY PRN
Status: CANCELLED | OUTPATIENT
Start: 2023-08-18

## 2023-08-18 RX ORDER — LIDOCAINE HYDROCHLORIDE 20 MG/ML
10 JELLY TOPICAL ONCE
Status: COMPLETED | OUTPATIENT
Start: 2023-08-18 | End: 2023-08-18

## 2023-08-18 RX ORDER — AMOXICILLIN 250 MG
1 CAPSULE ORAL 2 TIMES DAILY PRN
Status: CANCELLED | OUTPATIENT
Start: 2023-08-18

## 2023-08-18 RX ADMIN — IOPAMIDOL 90 ML: 755 INJECTION, SOLUTION INTRAVENOUS at 03:51

## 2023-08-18 RX ADMIN — LIDOCAINE HYDROCHLORIDE 10 ML: 20 JELLY TOPICAL at 03:16

## 2023-08-18 RX ADMIN — TOPICAL ANESTHETIC 1 ML: 200 SPRAY DENTAL; PERIODONTAL at 03:15

## 2023-08-18 ASSESSMENT — ACTIVITIES OF DAILY LIVING (ADL)
ADLS_ACUITY_SCORE: 33
ADLS_ACUITY_SCORE: 35

## 2023-08-18 ASSESSMENT — ENCOUNTER SYMPTOMS
COUGH: 1
VOMITING: 0

## 2023-08-18 NOTE — ED PROVIDER NOTES
EMERGENCY DEPARTMENT ENCOUNTER      NAME: Lopez Hogue  AGE: 67 year old male  YOB: 1955  MRN: [id number]  EVALUATION DATE & TIME: 8/18/2023  3:00 AM    PCP: Madi Ramos    ED PROVIDER: Shen Resendez M.D.    Chief Complaint   Patient presents with    Hemoptysis       FINAL IMPRESSION:  1. Vallecular mass    2. Hemorrhage of oropharynx        ED COURSE & MEDICAL DECISION MAKING:    Pertinent Labs & Imaging studies independently interpreted by me. (See chart for details)  3:06 AM  Patient seen and examined, reviewed prior cardiology visit from June 2023 with stable coronary artery disease, stenting in 2023 and on Plavix.  Patient presents today with coughing up blood.  No chest pain or shortness of breath and says he is more clearing his throat and getting blood up.  Feels like there is drainage down the back of the throat but not from the sinuses, feels like it is lower down.  On exam here, vitally stable, no hoarse voice, not actively coughing up blood at this time.  3:37 AM rhinolaryngoscopy performed.  Prominent vessels of the posterior nasopharynx but no active site of bleeding seen.  3:47 AM patient feels like he is having difficulty swallowing but is able to swallow secretions, explained that this is likely due to lidocaine and benzocaine sprays.  In order to get better views, fiberoptic laryngoscopy was performed with an oral reproach.  Patient had clear secretions with no blood and no source of bleeding or fresh bleed of the epiglottis or vocal folds.  4:35 AM CT scan of the neck independently interpreted by me demonstrates questionable soft tissue mass of the epiglottis.  Will discuss with ENT and plan to admit for further evaluation and treatment.  Patient rechecked, no further sensation of posterior drainage.  4:45 AM  Care discussed with Dr. Willett, call back after discussion with ENT.  5:09 AM  Care discussed with Dr. Cuadra, ENT.  He is agreeable to evaluate patient  in the morning and laryngoscopy if needed.    At the conclusion of the encounter I discussed the results of all of the tests and the disposition. The questions were answered. The patient or family acknowledged understanding and was agreeable with the care plan.     Medical Decision Making    History:  Supplemental history from: Documented in chart, if applicable  External Record(s) reviewed: Documented in chart, if applicable.    Work Up:  Chart documentation includes differential considered and any EKGs or imaging independently interpreted by provider, where specified.  In additional to work up documented, I considered the following work up: Documented in chart, if applicable.    External consultation:  Discussion of management with another provider: Documented in chart, if applicable    Complicating factors:  Care impacted by chronic illness: Heart Disease, Hypertension, and Other: s/p coronary angiogram  Care affected by social determinants of health: N/A    Disposition considerations: Admit.    PROCEDURES:   PROCEDURE: Fiberoptic rhinolaryngoscopy    INDICATIONS: Hemoptysis   PROCEDURE PROVIDER: Dr Shen Resendez   MEDICATIONS: Benzocaine 20% spray plus lidocaine 4% gel    FINDINGS: No active bleeding   COMPLICATIONS: Patient tolerated procedure well, without complication     PROCEDURE: Fiberoptic laryngoscopy-oral approach   INDICATIONS: Hemoptysis   PROCEDURE PROVIDER: Dr Shen Resendez   MEDICATIONS: Benzocaine 20% spray and lidocaine 4% gel   FINDINGS: Clear oral secretions, no bleeding, no fresh clot, anterior structures poorly visualized due to tongue base, vocal cords normal in appearance   COMPLICATIONS: Patient tolerated procedure well, without complication     MEDICATIONS GIVEN IN THE EMERGENCY:  Medications   lidocaine (XYLOCAINE) 2 % external gel 10 mL (10 mLs Topical $Given 8/18/23 0316)   benzocaine 20% (HURRICAINE/TOPEX) 20 % spray 1 mL (1 mL Mouth/Throat $Given 8/18/23 0315)   iopamidol  (ISOVUE-370) solution 90 mL (90 mLs Intravenous $Given 8/18/23 8022)       NEW PRESCRIPTIONS STARTED AT TODAY'S ER VISIT  New Prescriptions    No medications on file       =================================================================    HPI    Patient information was obtained from: patient      Lopez Hogue is a 67 year old male with a pertinent history of hypertension, s/p coronary stent placement, CAD who presents to this ED via personal vehicle with wife for evaluation of hemoptysis.     Patient reports he was laying in bed and felt a nasal drip in the back of his thought ~1 AM. He then got up ~2:15 AM to go to the bathroom and coughed up blood. He does not feel like it is coming from his lungs, rather somewhere in his throat. When he swallows, he states he can feel something in his throat. Patient is on blood thinners. Denies vomiting or additional symptoms or complaints at this time.       REVIEW OF SYSTEMS   Review of Systems   Respiratory:  Positive for cough (presence of blood).    Gastrointestinal:  Negative for vomiting.      All other systems reviewed and negative    PAST MEDICAL HISTORY:  Past Medical History:   Diagnosis Date    EtOH dependence (H)     Quit drinking 10 years    Hypertension     Nonrheumatic aortic (valve) stenosis     Sweat gland carcinoma        PAST SURGICAL HISTORY:  Past Surgical History:   Procedure Laterality Date    CV CORONARY ANGIOGRAM N/A 3/27/2023    Procedure: Coronary Angiogram;  Surgeon: Miki Etienne MD;  Location: [facility] CV    CV CORONARY ANGIOGRAM N/A 5/9/2023    Procedure: Coronary Angiogram;  Surgeon: Miki Etienne MD;  Location: [facility] CATH LAB CV    CV LEFT HEART CATH N/A 3/27/2023    Procedure: Left Heart Catheterization;  Surgeon: Miki Etienne MD;  Location: 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     Current Outpatient Medications   Medication    acetaminophen (TYLENOL) 500 MG tablet    aspirin (ASA) 81 MG EC tablet    clopidogrel (PLAVIX) 75 MG tablet    nitroGLYcerin (NITROSTAT) 0.4 MG sublingual tablet    rosuvastatin (CRESTOR) 40 MG tablet    sertraline (ZOLOFT) 50 MG tablet     Facility-Administered Medications Ordered in Other Encounters   Medication    iodixanol (VISIPAQUE 320) injection       ALLERGIES:  Allergies   Allergen Reactions    Coconut Flavor Anaphylaxis     Raw coconut       FAMILY HISTORY:  No family history on file.    SOCIAL HISTORY:   Social History     Socioeconomic History    Marital status:    Tobacco Use    Smoking status: Former     Types: Cigarettes   Vaping Use    Vaping Use: Never used   Substance and Sexual Activity    Alcohol use: Not Currently   Social History Narrative    Patient works.  Lives with his wife.       VITALS:  /70   Pulse 63   Temp 97.8  F (36.6  C) (Oral)   Resp 20   Ht 1.727 m (5' 8\")   Wt 76.4 kg (168 lb 8 oz)   SpO2 94%   BMI 25.62 kg/m      PHYSICAL EXAM:  Physical Exam  Vitals and nursing note reviewed.   Constitutional:       Appearance: Normal appearance.   HENT:      Head: Normocephalic and atraumatic.      Right Ear: External ear normal.      Left Ear: External ear normal.      Nose: Nose normal.      Mouth/Throat:      Mouth: Mucous membranes are moist.      Comments: Small amount of blood on right tonsillar pillar and uvula  Eyes:      Extraocular Movements: Extraocular movements intact.      Conjunctiva/sclera: Conjunctivae normal.      " Pupils: Pupils are equal, round, and reactive to light.   Cardiovascular:      Rate and Rhythm: Normal rate and regular rhythm.      Heart sounds: Murmur (3/6) heard.   Pulmonary:      Effort: Pulmonary effort is normal.      Breath sounds: Normal breath sounds. No wheezing or rales.   Abdominal:      General: Abdomen is flat. There is no distension.      Palpations: Abdomen is soft.      Tenderness: There is no abdominal tenderness. There is no guarding.   Musculoskeletal:         General: Normal range of motion.      Cervical back: Normal range of motion and neck supple.      Right lower leg: No edema.      Left lower leg: No edema.   Lymphadenopathy:      Cervical: No cervical adenopathy.   Skin:     General: Skin is warm and dry.   Neurological:      General: No focal deficit present.      Mental Status: He is alert and oriented to person, place, and time. Mental status is at baseline.      Comments: No gross focal neurologic deficits   Psychiatric:         Mood and Affect: Mood normal.         Behavior: Behavior normal.         Thought Content: Thought content normal.       LAB:  All pertinent labs reviewed and interpreted.  Results for orders placed or performed during the hospital encounter of 08/18/23   Soft tissue neck CT w contrast    Impression    IMPRESSION:   1.  Question soft tissue lesion within the right vallecula/ventral epiglottis. Direct inspection recommended.  2.  Emphysema at the lung apices.     Basic metabolic panel   Result Value Ref Range    Sodium 141 136 - 145 mmol/L    Potassium 4.2 3.4 - 5.3 mmol/L    Chloride 106 98 - 107 mmol/L    Carbon Dioxide (CO2) 25 22 - 29 mmol/L    Anion Gap 10 7 - 15 mmol/L    Urea Nitrogen 19.6 8.0 - 23.0 mg/dL    Creatinine 0.73 0.67 - 1.17 mg/dL    Calcium 9.4 8.8 - 10.2 mg/dL    Glucose 94 70 - 99 mg/dL    GFR Estimate >90 >60 mL/min/1.73m2   Result Value Ref Range    Magnesium 1.8 1.7 - 2.3 mg/dL   Result Value Ref Range    Troponin T, High Sensitivity 7  <=22 ng/L   Nt probnp inpatient (BNP)   Result Value Ref Range    N terminal Pro BNP Inpatient 72 0 - 900 pg/mL   CBC with platelets and differential   Result Value Ref Range    WBC Count 6.1 4.0 - 11.0 10e3/uL    RBC Count 4.09 (L) 4.40 - 5.90 10e6/uL    Hemoglobin 12.4 (L) 13.3 - 17.7 g/dL    Hematocrit 37.1 (L) 40.0 - 53.0 %    MCV 91 78 - 100 fL    MCH 30.3 26.5 - 33.0 pg    MCHC 33.4 31.5 - 36.5 g/dL    RDW 13.6 10.0 - 15.0 %    Platelet Count 187 150 - 450 10e3/uL    % Neutrophils 67 %    % Lymphocytes 22 %    % Monocytes 9 %    % Eosinophils 1 %    % Basophils 1 %    % Immature Granulocytes 0 %    NRBCs per 100 WBC 0 <1 /100    Absolute Neutrophils 4.1 1.6 - 8.3 10e3/uL    Absolute Lymphocytes 1.3 0.8 - 5.3 10e3/uL    Absolute Monocytes 0.6 0.0 - 1.3 10e3/uL    Absolute Eosinophils 0.1 0.0 - 0.7 10e3/uL    Absolute Basophils 0.0 0.0 - 0.2 10e3/uL    Absolute Immature Granulocytes 0.0 <=0.4 10e3/uL    Absolute NRBCs 0.0 10e3/uL       RADIOLOGY:  Reviewed all pertinent imaging. Please see official radiology report.  Soft tissue neck CT w contrast   Final Result   IMPRESSION:    1.  Question soft tissue lesion within the right vallecula/ventral epiglottis. Direct inspection recommended.   2.  Emphysema at the lung apices.             Shen Resendez M.D.  Emergency Medicine  [facility] EMERGENCY DEPARTMENT  [facility] [facility] [id number] [phone]  Dept: [phone]       Shen Resendez MD  08/18/23 0570

## 2023-08-18 NOTE — ED TRIAGE NOTES
Patient arrives to ER with spouse for coughing up blood that started this morning around 0200.  Denies shortness of breath, chest pain. Is on blood thinners since March.     Had 2 stents placed March 2023.   Works out every day. Denies trauma, any falls.      Triage Assessment       Row Name 08/18/23 0252       Triage Assessment (Adult)    Airway WDL WDL       Respiratory WDL    Respiratory WDL cough    Cough Type --  hemoptysis       Skin Circulation/Temperature WDL    Skin Circulation/Temperature WDL WDL       Peripheral/Neurovascular WDL    Peripheral Neurovascular WDL WDL       Cognitive/Neuro/Behavioral WDL    Cognitive/Neuro/Behavioral WDL WDL

## 2023-08-18 NOTE — H&P
"[facility]    History and Physical - Hospitalist Service       Date of Admission:  8/18/2023    Assessment & Plan      Lopez Hogue is a 67 year old male admitted on 8/18/2023. He is admitted secondary to coughing up blood/hemoptysis in the setting of left tissue mass within the vallecula      Assessment  Soft tissue mass within the vallecula  --Keep n.p.o.  --ENT service  was called from the ER Dr. uCadra and was okay with the patient being admitted here  --IV fluids    Hemoptysis/vallecula mass hemorrhage  --Dynamically stable at the time of interview.  -- Monitor blood pressure  -- Trend H&H  --Anticipating some black stool as he may have swallowed some of the blood    History hypertension  --Holding blood pressure meds for now  --Monitor blood pressure and resume home BP meds as needed    History CAD  --Holding Plavix and aspirin for now      History hidradenitis     Diet: NPO per Anesthesia Guidelines for Procedure/Surgery Except for: Meds    DVT Prophylaxis: Holding any DVT prophylaxis until evaluated by ENT.  Defer to daytime physician to initiate DVT prophylaxis  Smith Catheter: Not present  Lines: None     Cardiac Monitoring: None  Code Status:  Full code    Clinically Significant Risk Factors Present on Admission                # Drug Induced Platelet Defect: home medication list includes an antiplatelet medication   # Hypertension: Noted on problem list      # Overweight: Estimated body mass index is 25.62 kg/m  as calculated from the following:    Height as of this encounter: 1.727 m (5' 8\").    Weight as of this encounter: 76.4 kg (168 lb 8 oz).              Disposition Plan      Expected Discharge Date: 08/19/2023                  David Willett MD  Hospitalist Service  [facility]  Securely message with Virtual DBS (more info)  Text page via [employer] Paging/Directory     ______________________________________________________________________    Chief " Complaint   Coughing up blood    History is obtained from the patient    History of Present Illness   Lopez Hogue is a 67 year old male with comorbid medical conditions as listed below who presents to the emergency room with complaints of coughing up blood.  Patient endorses started at around 1 AM today hence presentation to the ER.  He denies any shortness of breath any difficulty swallowing any pain or swelling in his throat.  In the ER included a CT scan of his neck soft tissue that revealed a soft tissue mass within the ventral epiglottis/vallecula.  ENT service was consulted and they are in agreement with admitting the patient to this facility      Past Medical History    Past Medical History:   Diagnosis Date    EtOH dependence (H)     Quit drinking 10 years    Hypertension     Nonrheumatic aortic (valve) stenosis     Sweat gland carcinoma        Past Surgical History   Past Surgical History:   Procedure Laterality Date    CV CORONARY ANGIOGRAM N/A 3/27/2023    Procedure: Coronary Angiogram;  Surgeon: Miki Etienne MD;  Location: [facility] CV    CV CORONARY ANGIOGRAM N/A 5/9/2023    Procedure: Coronary Angiogram;  Surgeon: Miki Etienne MD;  Location: [facility] CATH LAB CV    CV LEFT HEART CATH N/A 3/27/2023    Procedure: Left Heart Catheterization;  Surgeon: iMki Etienne MD;  Location: [facility] CATH LAB CV    CV LEFT HEART CATH N/A 5/9/2023    Procedure: Left Heart Catheterization;  Surgeon: Miki Etienne MD;  Location: [facility] LAB CV    CV PCI N/A 3/27/2023    Procedure: Percutaneous Coronary Intervention;  Surgeon: Miki Etienne MD;  Location: [facility] CATH LAB CV    OTHER SURGICAL HISTORY  2015    WIDE EXCISION OF LEFT GLUTEAL MASSTNM: [id number], stage: II hidradenocarcinoma Grade II, 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, sentinel lymph node biopsy negative        Prior to Admission Medications   Prior to Admission Medications   Prescriptions Last Dose Informant Patient Reported? Taking?    acetaminophen (TYLENOL) 500 MG tablet   Yes No   Sig: Take 500-1,000 mg by mouth every 8 hours as needed for fever or pain   aspirin (ASA) 81 MG EC tablet   No No   Sig: Take 1 tablet (81 mg) by mouth daily Start tomorrow.   clopidogrel (PLAVIX) 75 MG tablet   No No   Sig: Take 1 tablet (75 mg) by mouth daily Dose to start tomorrow.   nitroGLYcerin (NITROSTAT) 0.4 MG sublingual tablet   No No   Sig: PLACE 1 TABLET UNDER THE TONGUE EVERY 5 MINUTES FOR CHEST PAIN FOR 3 DOSES. IF SYMPTOMS PERSIST 5 MINUTES AFTER 1ST DOSE CALL 911   rosuvastatin (CRESTOR) 40 MG tablet   No No   Sig: Take 1 tablet (40 mg) by mouth daily   sertraline (ZOLOFT) 100 MG tablet   Yes Yes   Sig: Take 100 mg by mouth daily      Facility-Administered Medications: None        Social History   I have reviewed this patient's social history and updated it with pertinent information if needed.  Social History     Tobacco Use    Smoking status: Former     Types: Cigarettes   Vaping Use    Vaping Use: Never used   Substance Use Topics    Alcohol use: Not Currently         Family History     No significant family history, including no history of: cancer      Allergies   Allergies   Allergen Reactions    Coconut Flavor Anaphylaxis     Raw coconut        Physical Exam   Vital Signs: Temp: 97.8  F (36.6  C) Temp src: Oral BP: 136/70 Pulse: 63   Resp: 20 SpO2: 97 % O2 Device: None (Room air)    Weight: 168 lbs 8 oz      General: Awake and alert,   Eyes: Pupils reactive to light  HENT: Atraumatic, oral mucosa moist  Neck: No masses, or swelling  Pulmonary: Good air entry, clear to auscultation  CVS: Heart sounds 1 and 2 present, regular rhythm, rate, no murmur  GI/ Abdomen: Soft , not tender , not distended, bowel sounds ++  : No reyes   JORGE A: Normal inspection, no muscle spasm  Skin: No rash, skin intact  Extremities: Edema none  Neuro: Alert and oriented, follows command, speech normal, no focal weakness  Psych: Normal mood and affect      Medical  Decision Making       75 MINUTES SPENT BY ME on the date of service doing chart review, history, exam, documentation & further activities per the note.      Data     I have personally reviewed the following data over the past 24 hrs:    6.1  \   12.4 (L)   / 187     141 106 19.6 /  94   4.2 25 0.73 \     Trop: 7 BNP: 72       Imaging results reviewed over the past 24 hrs:   Recent Results (from the past 24 hour(s))   Soft tissue neck CT w contrast    Narrative    EXAM: CT SOFT TISSUE NECK W CONTRAST  LOCATION: [facility]  DATE: 8/18/2023    INDICATION: hemoptysis  COMPARISON: 06/06/2013  CONTRAST: ISOVUE 370 90 ML  TECHNIQUE: Routine CT Soft Tissue Neck with IV contrast. Multiplanar reformats. Dose reduction techniques were used.    FINDINGS:     MUCOSAL SPACES/SOFT TISSUES: Question soft tissue lesion along the ventral margin of the epiglottis. Otherwise normal mucosal spaces of the oropharynx, hypopharynx, nasopharynx and subglottic region. Normal vocal cords and infraglottic trachea. Normal   parapharyngeal space and subcutaneous soft tissues. Normal oral cavity,  spaces, and floor of mouth structures.    LYMPH NODES: No pathologic lymph nodes by size or morphology criteria.     SALIVARY GLANDS: Normal parotid and submandibular glands.    THYROID: Normal.     VESSELS: Mild atherosclerotic plaque at the carotid bifurcations.    VISUALIZED INTRACRANIAL/ORBITS/SINUSES: No abnormality of the visualized intracranial compartment or orbits. Visualized paranasal sinuses and mastoid air cells are clear.    OTHER: No destructive osseous lesion. Emphysema at the lung apices.      Impression    IMPRESSION:   1.  Question soft tissue lesion within the right vallecula/ventral epiglottis. Direct inspection recommended.  2.  Emphysema at the lung apices.

## 2023-08-18 NOTE — CONSULTS
Chief Complaint   Patient presents with    Hemoptysis     History of Present Illness  Lopez Hogue is a 67 year old male admitted for hemoptysis. No prior history of hemoptysis. Per ED physician he coughed up 1/2 cup of fresh blood.   No further episodes since admission.      ED NOTE:    Expand All Collapse All    EMERGENCY DEPARTMENT ENCOUNTER       NAME: Lopez Hogue  AGE: 67 year old male  YOB: 1955  MRN: [id number]  EVALUATION DATE & TIME: 8/18/2023  3:00 AM     PCP: Madi Ramos     ED PROVIDER: Shen Resendez M.D.         Chief Complaint   Patient presents with    Hemoptysis         FINAL IMPRESSION:  1. Vallecular mass    2. Hemorrhage of oropharynx          ED COURSE & MEDICAL DECISION MAKING:    Pertinent Labs & Imaging studies independently interpreted by me. (See chart for details)  3:06 AM  Patient seen and examined, reviewed prior cardiology visit from June 2023 with stable coronary artery disease, stenting in 2023 and on Plavix.  Patient presents today with coughing up blood.  No chest pain or shortness of breath and says he is more clearing his throat and getting blood up.  Feels like there is drainage down the back of the throat but not from the sinuses, feels like it is lower down.  On exam here, vitally stable, no hoarse voice, not actively coughing up blood at this time.  3:37 AM rhinolaryngoscopy performed.  Prominent vessels of the posterior nasopharynx but no active site of bleeding seen.  3:47 AM patient feels like he is having difficulty swallowing but is able to swallow secretions, explained that this is likely due to lidocaine and benzocaine sprays.  In order to get better views, fiberoptic laryngoscopy was performed with an oral reproach.  Patient had clear secretions with no blood and no source of bleeding or fresh bleed of the epiglottis or vocal folds.  4:35 AM CT scan of the neck independently interpreted by me demonstrates questionable soft  tissue mass of the epiglottis.  Will discuss with ENT and plan to admit for further evaluation and treatment.  Patient rechecked, no further sensation of posterior drainage.  4:45 AM  Care discussed with Dr. Willett, call back after discussion with ENT.  5:09 AM  Care discussed with Dr. Cuadra, ENT.  He is agreeable to evaluate patient in the morning and laryngoscopy if needed.         Past Medical History  Patient Active Problem List   Diagnosis    HTN (hypertension)    EtOH dependence (H)    Nonrheumatic aortic valve stenosis    Sweat gland carcinoma    Unstable angina (H)    Coronary artery disease involving native heart with unstable angina pectoris (H)    Dyslipidemia, goal LDL below 70    Acute chest pain    Coronary 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, unspecified vessel or lesion type (H)    Hemorrhage of oropharynx    Vallecular mass     Current Medications  No current facility-administered medications for this encounter.    Current Outpatient Medications:     acetaminophen (TYLENOL) 500 MG tablet, Take 500-1,000 mg by mouth every 8 hours as needed for fever or pain, Disp: , Rfl:     aspirin (ASA) 81 MG EC tablet, Take 1 tablet (81 mg) by mouth daily Start tomorrow., Disp: 30 tablet, Rfl: 3    clopidogrel (PLAVIX) 75 MG tablet, Take 1 tablet (75 mg) by mouth daily Dose to start tomorrow., Disp: 90 tablet, Rfl: 3    cyanocobalamin (VITAMIN B-12) 1000 MCG tablet, Take 1,000 mcg by mouth daily, Disp: , Rfl:     nitroGLYcerin (NITROSTAT) 0.4 MG sublingual tablet, PLACE 1 TABLET UNDER THE TONGUE EVERY 5 MINUTES FOR CHEST PAIN FOR 3 DOSES. IF SYMPTOMS PERSIST 5 MINUTES AFTER 1ST DOSE CALL 911 (Patient taking differently: 0.4 mg every 5 minutes as needed for chest pain PLACE 1 TABLET UNDER THE TONGUE EVERY 5 MINUTES FOR CHEST PAIN FOR 3 DOSES. IF SYMPTOMS PERSIST 5 MINUTES AFTER 1ST DOSE CALL 911), Disp: 25 tablet, Rfl: 1    Omega-3 Fatty Acids (FISH OIL) 1200 MG capsule, Take 1,200 mg by  "mouth daily, Disp: , Rfl:     rosuvastatin (CRESTOR) 40 MG tablet, Take 1 tablet (40 mg) by mouth daily, Disp: 90 tablet, Rfl: 3    sertraline (ZOLOFT) 100 MG tablet, Take 100 mg by mouth daily, Disp: , Rfl:     Facility-Administered Medications Ordered in Other Encounters:     iodixanol (VISIPAQUE 320) injection, , , Once PRN, Miki Etienne MD, 73 mL at 05/09/23 1212    Allergies  Allergies   Allergen Reactions    Coconut Flavor Anaphylaxis     Raw coconut       Social History  Social History     Socioeconomic History    Marital status:      Spouse name: None    Number of children: None    Years of education: None    Highest education level: None   Tobacco Use    Smoking status: Former     Types: Cigarettes   Vaping Use    Vaping Use: Never used   Substance and Sexual Activity    Alcohol use: Not Currently   Social History Narrative    Patient works.  Lives with his wife.       Family History  History reviewed. No pertinent family history.    Review of Systems  As per HPI and PMHx, otherwise 10 system review including the head and neck, constitutional, eyes, respiratory, GI, skin, neurologic, lymphatic, endocrine, and allergy systems is negative.    Physical Exam  /70   Pulse 61   Temp 97.8  F (36.6  C) (Oral)   Resp 20   Ht 1.727 m (5' 8\")   Wt 76.4 kg (168 lb 8 oz)   SpO2 95%   BMI 25.62 kg/m    GENERAL: Patient is a pleasant, cooperative 67 year old male in no acute distress.  HEAD: Normocephalic, atraumatic.  Hair and scalp are normal.  EYES: Pupils are equal, round, reactive to light and accommodation.  Extraocular movements are intact.  The sclera nonicteric without injection.  The extraocular structures are normal.  EARS: Normal shape and symmetry.  No tenderness when palpating the mastoid or tragal areas bilaterally.   The bilateral tympanic membranes are round, intact without evidence of effusion, good landmarks.  NOSE: Nares are patent.  Nasal mucosa is intact  ORAL CAVITY: Mucous " membranes are moist Tongue is mobile, protrudes to the midline.  Palate elevates symmetrically. No erythema or exudate.  No oral cavity or oropharyngeal masses, lesions, ulcerations, leukoplakia.  NECK: Supple, trachea is midline.  There no palpable cervical lymphadenopathy or masses bilaterally.  Palpation of the bilateral parotid and submandibular areas reveal no masses.  No thyromegaly.    NEUROLOGIC: Cranial nerves II through XII are grossly intact.  Voice is strong.  Patient is House-Brackmann I/VI bilaterally.  CARDIOVASCULAR: Extremities are warm and well-perfused.  No significant peripheral edema.  RESPIRATORY: Patient has nonlabored breathing without cough, wheeze, stridor.  PSYCHIATRIC: Patient is alert and oriented.  Mood and affect appear normal.  SKIN: Warm and dry.  No scalp, face, or neck lesions noted.          FLEX LARYNGOSCOPY:    After obtaining consent, a flexible laryngoscope is used to examine both nasal cavities, the nasopharynx, pharnx, and larynx.      Nasal cavity: wnl  NP: wnl  Pharnx: wnl  Larynx: violaceous lesion right vallecula with small area of clot; exam otherwise normal      Assessment and Plan     ICD-10-CM    1. Vallecular mass  J38.7       2. Hemorrhage of oropharynx  J39.2           Discussed this case with Mercy Moreno MD,  Laryngologist at [facility].  She will see the patient as an outpatient.   I will message her directly.   Patient can advance diet and be discharged to home.   Would hold plavix if possible.    Tommie Cuadra MD  Department of Otolaryngology-Head and Neck Surgery  [facility]

## 2023-08-18 NOTE — DISCHARGE SUMMARY
"[facility]  Hospitalist Discharge Summary      Date of Admission:  8/18/2023  Date of Discharge:  8/18/2023  Discharging Provider: Caity Mcwilliams MD  Discharge Service: Hospitalist Service    Discharge Diagnoses     Bleeding vallecular mass, stable.  Needs outpatient follow-up  Coronary artery disease, stenting 3/2023  Moderate aortic stenosis  Hyperlipidemia    Clinically Significant Risk Factors     # Overweight: Estimated body mass index is 25.62 kg/m  as calculated from the following:    Height as of this encounter: 1.727 m (5' 8\").    Weight as of this encounter: 76.4 kg (168 lb 8 oz).       Follow-ups Needed After Discharge   Follow-up Appointments     Follow-up and recommended labs and tests       Follow up with primary care provider, Madi Ramos, within 14 days for   hospital follow- up.    Follow up with ENT(Dr. Moreno) as an outpatient.  Her office should be   calling you to arrange  Follow up with cardiology as previously scheduled        }   Follow up with Dr. Mercy Moreno MD, Laryngologist at [facility]         Discharge Disposition   Discharged to home  Condition at discharge: Stable    Hospital Course     67-year-old male with history of coronary artery disease with stenting 3/2023 on Plavix and aspirin came into the emergency room department due to a several history of bleeding/hemoptysis.  Described feelings of postnasal drip for an hour in bed.  Realized it was bleeding.  Came into the emergency room department where CT scan showed soft tissue lesion in the right vallecula/ventral epiglottis.  Patient was admitted to the hospital where serial hemoglobin were stable.  No further significant bleeding noted.  Was seen by ENT with flexible laryngoscopy done showing violaceous lesion in the right vallecula with small area of clot with exam otherwise normal.  ENT felt clot likely etiology of bleeding.  Felt patient stable to resume diet and had no further bleeding.  I discussed case with ENT " and discussed recent stenting.  Will hold Plavix for 2 days and then resume.  Planning outpatient coordination with ENT for follow-up on lesion will be done.  Discharged in good condition.        Consultations This Hospital Stay   None    Code Status   Prior    Time Spent on this Encounter   I, Caity Mcwilliams MD, personally saw the patient today and spent greater than 30 minutes discharging this patient.       Caity Mcwilliams MD  [facility] EMERGENCY DEPARTMENT  [address] [place] [id number]  Phone: [phone]  ______________________________________________________________________    Physical Exam   Vital Signs: Temp: 97.8  F (36.6  C) Temp src: Oral BP: 131/63 Pulse: 62   Resp: 20 SpO2: 95 % O2 Device: None (Room air)    Weight: 168 lbs 8 oz  General Appearance: Pleasant male in no apparent distress  Respiratory: Clear to auscultation  Cardiovascular: Regular rate and rhythm  GI: Soft nontender without hepatosplenomegaly  Skin: No significant lower extremity edema  Other: Neurologically grossly intact without focal deficits appreciated       Primary Care Physician   Madi Ramos    Discharge Orders      Reason for your hospital stay    bleeding     Follow-up and recommended labs and tests     Follow up with primary care provider, Madi Ramos, within 14 days for hospital follow- up.    Follow up with ENT(Dr. Moreno) as an outpatient.  Her office should be calling you to arrange  Follow up with cardiology as previously scheduled     Activity    Your activity upon discharge: activity as tolerated     Diet    Follow this diet upon discharge: Orders Placed This Encounter      Regular Diet Adult       Significant Results and Procedures   Most Recent 3 CBC's:  Recent Labs   Lab Test 08/18/23  1340 08/18/23  0309 05/08/23  0556 03/27/23  0727   WBC  --  6.1 6.5 9.9   HGB 11.8* 12.4* 12.2* 13.6   MCV  --  91 92 89   PLT  --  187 175 203   ,   Results for orders placed or performed during the  hospital encounter of 08/18/23   Soft tissue neck CT w contrast    Narrative    EXAM: CT SOFT TISSUE NECK W CONTRAST  LOCATION: [facility]  DATE: 8/18/2023    INDICATION: hemoptysis  COMPARISON: 06/06/2013  CONTRAST: ISOVUE 370 90 ML  TECHNIQUE: Routine CT Soft Tissue Neck with IV contrast. Multiplanar reformats. Dose reduction techniques were used.    FINDINGS:     MUCOSAL SPACES/SOFT TISSUES: Question soft tissue lesion along the ventral margin of the epiglottis. Otherwise normal mucosal spaces of the oropharynx, hypopharynx, nasopharynx and subglottic region. Normal vocal cords and infraglottic trachea. Normal   parapharyngeal space and subcutaneous soft tissues. Normal oral cavity,  spaces, and floor of mouth structures.    LYMPH NODES: No pathologic lymph nodes by size or morphology criteria.     SALIVARY GLANDS: Normal parotid and submandibular glands.    THYROID: Normal.     VESSELS: Mild atherosclerotic plaque at the carotid bifurcations.    VISUALIZED INTRACRANIAL/ORBITS/SINUSES: No abnormality of the visualized intracranial compartment or orbits. Visualized paranasal sinuses and mastoid air cells are clear.    OTHER: No destructive osseous lesion. Emphysema at the lung apices.      Impression    IMPRESSION:   1.  Question soft tissue lesion within the right vallecula/ventral epiglottis. Direct inspection recommended.  2.  Emphysema at the lung apices.         Discharge Medications   Current Discharge Medication List        CONTINUE these medications which have CHANGED    Details   clopidogrel (PLAVIX) 75 MG tablet Take 1 tablet (75 mg) by mouth daily Hold over weekend,.  Resume 8/21/2023  Qty: 90 tablet, Refills: 3    Associated Diagnoses: Unstable angina (H)           CONTINUE these medications which have NOT CHANGED    Details   acetaminophen (TYLENOL) 500 MG tablet Take 500-1,000 mg by mouth every 8 hours as needed for fever or pain      aspirin (ASA) 81 MG EC tablet  Take 1 tablet (81 mg) by mouth daily Start tomorrow.  Qty: 30 tablet, Refills: 3    Associated Diagnoses: Unstable angina (H)      cyanocobalamin (VITAMIN B-12) 1000 MCG tablet Take 1,000 mcg by mouth daily      nitroGLYcerin (NITROSTAT) 0.4 MG sublingual tablet PLACE 1 TABLET UNDER THE TONGUE EVERY 5 MINUTES FOR CHEST PAIN FOR 3 DOSES. IF SYMPTOMS PERSIST 5 MINUTES AFTER 1ST DOSE CALL 911  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)      Omega-3 Fatty Acids (FISH OIL) 1200 MG capsule Take 1,200 mg by mouth daily      rosuvastatin (CRESTOR) 40 MG tablet Take 1 tablet (40 mg) by mouth daily  Qty: 90 tablet, Refills: 3    Associated Diagnoses: Unstable angina (H)      sertraline (ZOLOFT) 100 MG tablet Take 100 mg by mouth daily           Allergies   Allergies   Allergen Reactions    Coconut Flavor Anaphylaxis     Raw coconut

## 2023-08-18 NOTE — MEDICATION SCRIBE - ADMISSION MEDICATION HISTORY
Medication Scribe Admission Medication History    Admission medication history is complete. The information provided in this note is only as accurate as the sources available at the time of the update.    Medication reconciliation/reorder completed by provider prior to medication history? No    Information Source(s): Patient via in-person    Pertinent Information:     Changes made to PTA medication list:  Added: Fish oil, Vitamin B 12 (per patient)  Deleted: None  Changed: Sertraline 50 mg to 100 mg tablet (per patient and Rx fill history)    Medication Affordability:       Allergies reviewed with patient and updates made in EHR: yes    Medication History Completed By: Lindsay Yan 8/18/2023 5:30 AM    Prior to Admission medications    Medication Sig Last Dose Taking? Auth Provider Long Term End Date   acetaminophen (TYLENOL) 500 MG tablet Take 500-1,000 mg by mouth every 8 hours as needed for fever or pain 8/16/2023 at am Yes Reported, Patient     aspirin (ASA) 81 MG EC tablet Take 1 tablet (81 mg) by mouth daily Start tomorrow. 8/17/2023 at am Yes Miki Etienne MD     clopidogrel (PLAVIX) 75 MG tablet Take 1 tablet (75 mg) by mouth daily Dose to start tomorrow. 8/17/2023 at am Yes Miki Etienne MD Yes    cyanocobalamin (VITAMIN B-12) 1000 MCG tablet Take 1,000 mcg by mouth daily 8/17/2023 at hs Yes Reported, Patient     nitroGLYcerin (NITROSTAT) 0.4 MG sublingual tablet PLACE 1 TABLET UNDER THE TONGUE EVERY 5 MINUTES FOR CHEST PAIN FOR 3 DOSES. IF SYMPTOMS PERSIST 5 MINUTES AFTER 1ST DOSE CALL 911  Patient taking differently: 0.4 mg every 5 minutes as needed for chest pain PLACE 1 TABLET UNDER THE TONGUE EVERY 5 MINUTES FOR CHEST PAIN FOR 3 DOSES. IF SYMPTOMS PERSIST 5 MINUTES AFTER 1ST DOSE CALL 911 8/16/2023 at unknown Yes Christopher Walters MD Yes    Omega-3 Fatty Acids (FISH OIL) 1200 MG capsule Take 1,200 mg by mouth daily 8/17/2023 at hs Yes Reported, Patient     rosuvastatin (CRESTOR) 40 MG  tablet Take 1 tablet (40 mg) by mouth daily 8/17/2023 at am Yes Guerda Vu, APRN CNP Yes    sertraline (ZOLOFT) 100 MG tablet Take 100 mg by mouth daily 8/17/2023 at am Yes Reported, Patient Yes

## 2023-08-18 NOTE — ED NOTES
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  Comment: ENT scoped  Plan: ENT to follow    (J39.2) Hemorrhage of oropharynx  Comment: Vascular cyst  Plan: Manage and possible surgery       PMH:    Past Medical History:   Diagnosis Date    EtOH dependence (H)     Quit drinking 10 years    Hypertension     Nonrheumatic aortic (valve) stenosis     Sweat gland carcinoma         Code Status:  Prior     Falls Risk: No Band: Not applicable    Current Living Situation/Residence: lives with a significant other     Elimination Status: Continent: Yes     Activity Level: Independent    Patients Preferred Language:  English      Needed: No    Vital Signs:  /63   Pulse 62   Temp 97.8  F (36.6  C) (Oral)   Resp 20   Ht 1.727 m (5' 8\")   Wt 76.4 kg (168 lb 8 oz)   SpO2 95%   BMI 25.62 kg/m       Cardiac Rhythm: NSR    Pain Score: 0/10    Is the Patient Confused:  No    Last Food or Drink: 08/18/23 at 1400     Focused Assessment:  Denies pain, N/V, no bleeding or struggling to breath, swallow or talk. No aphasia. Denies pain. Independent AO x4, lungs clear to auscultation, NSR, PERRLA intact, GCS: 15  ENT scoped with vascular mass noted. To follow, unsure of out/in patient surgical intervention/treatment    Tests Performed: Done: Labs and Imaging    Treatments Provided:      Family Dynamics/Concerns: No    Family Updated On Visitor Policy: Yes    Plan of Care Communicated to Family: Yes    Who Was Updated about Plan of Care: Sidra Jones Checklist Done and Signed by Patient: Yes    Medications sent with patient:     Covid: asymptomatic , not tested     Additional Information:     RN: Brent Li RN   8/18/2023 2:09 PM       "

## 2023-09-01 NOTE — TELEPHONE ENCOUNTER
FUTURE VISIT INFORMATION      FUTURE VISIT INFORMATION:  Date: 9/5/23  Time: 10  Location: [facility]  REFERRAL INFORMATION:  Referring provider:    Referring providers clinic:    Reason for visit/diagnosis  Follow up from the ED     RECORDS REQUESTED FROM:       Clinic name Comments Records Status Imaging Status   Pending sale to [facility] 8/18/23- ed Shen Cooper MD   Psychiatric     Fv imaging 8/18/23- ct neck  epic pacs

## 2023-09-04 NOTE — PROGRESS NOTES
HISTORY OF PRESENT ILLNESS: Lopez Hogue is a 67 year old male with a history of hemoptysis.  He has a history of stable coronary artery disease with placement of two drug eluding stents on 3/27/2023 following a non-ST segment elevation myocardial infarction.  He is on Plavix.    He coughed up blood was estimated to be 1/2 cup of blood early in the morning on 8/18/2023.  When seen in the ER he was not actively coughing up blood.  Prominent vessels in the posterior nasopharynx were seen on examination but no active bleeding present.  A CT scan of the neck demonstrated a questionable soft tissue mass of the epiglottis.  Flexible laryngoscopy was performed which showed a violaceous lesion in the right vallecula with a small clot of blood.  No other suspicious lesions were seen.  The case was discussed with Dr. Moreno and she recommended seeing him as an outpatient.    He comes in today with no further bleeding from the throat since his ER visit 2 weeks ago.  He notes that he does bleed when he brushes his teeth.  This is in part because of poor dentition but also because of his medication.  He also notes increased bruising in his hands and arms.  He has no swallowing difficulties ,no airway difficulties and no speaking difficulties.  He has no throat pain.  He also has no sensation of a lump in the throat.  He smoked for 30 years and quit 10 years ago.  He also has a history of alcohol use but also quit this approximately 10 years ago.      PAST MEDICAL HISTORY:   Past Medical History:   Diagnosis Date    EtOH dependence (H)     Quit drinking 10 years    Hypertension     Nonrheumatic aortic (valve) stenosis     Sweat gland carcinoma        PAST SURGICAL HISTORY:   Past Surgical History:   Procedure Laterality Date    CV CORONARY ANGIOGRAM N/A 3/27/2023    Procedure: Coronary Angiogram;  Surgeon: Miki Etienne MD;  Location: [facility] CATH LAB CV    CV CORONARY ANGIOGRAM N/A 5/9/2023    Procedure: Coronary  Angiogram;  Surgeon: Miki Etienne MD;  Location: [facility] LAB CV    CV LEFT HEART CATH N/A 3/27/2023    Procedure: Left Heart Catheterization;  Surgeon: Miki Etienne MD;  Location: [facility] CATH LAB CV    CV LEFT HEART CATH N/A 5/9/2023    Procedure: Left Heart Catheterization;  Surgeon: Miki Etienne MD;  Location: [facility] CV    CV PCI N/A 3/27/2023    Procedure: Percutaneous Coronary Intervention;  Surgeon: Miki Etienne MD;  Location: [facility] CV    OTHER SURGICAL HISTORY  2015    WIDE EXCISION OF LEFT GLUTEAL MASSTNM: [id number], stage: II hidradenocarcinoma Grade II, 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, sentinel lymph node biopsy negative        FAMILY HISTORY: No family history on file.    SOCIAL HISTORY:   Social History     Tobacco Use    Smoking status: Former     Types: Cigarettes    Smokeless tobacco: Not on file   Substance Use Topics    Alcohol use: Not Currently       REVIEW OF SYSTEMS: Ten point review of systems was performed and is negative except for:       9/2/2023     4:31 AM   [facility] ROS   Cardiopulmonary Cough   Hematologic Easy bruising   Endocrine Frequent urination        ALLERGIES: Coconut flavor    MEDICATIONS:   Current Outpatient Medications   Medication Sig Dispense Refill    acetaminophen (TYLENOL) 500 MG tablet Take 500-1,000 mg by mouth every 8 hours as needed for fever or pain      aspirin (ASA) 81 MG EC tablet Take 1 tablet (81 mg) by mouth daily Start tomorrow. 30 tablet 3    clopidogrel (PLAVIX) 75 MG tablet Take 1 tablet (75 mg) by mouth daily Hold over weekend,.  Resume 8/21/2023 90 tablet 3    cyanocobalamin (VITAMIN B-12) 1000 MCG tablet Take 1,000 mcg by mouth daily      nitroGLYcerin (NITROSTAT) 0.4 MG sublingual tablet PLACE 1 TABLET UNDER THE TONGUE EVERY 5 MINUTES FOR CHEST PAIN FOR 3 DOSES. IF SYMPTOMS PERSIST 5 MINUTES AFTER 1ST DOSE CALL 911 (Patient taking differently: 0.4 mg every 5 minutes as needed for chest pain PLACE 1 TABLET UNDER THE TONGUE  EVERY 5 MINUTES FOR CHEST PAIN FOR 3 DOSES. IF SYMPTOMS PERSIST 5 MINUTES AFTER 1ST DOSE CALL 911) 25 tablet 1    Omega-3 Fatty Acids (FISH OIL) 1200 MG capsule Take 1,200 mg by mouth daily      rosuvastatin (CRESTOR) 40 MG tablet Take 1 tablet (40 mg) by mouth daily 90 tablet 3    sertraline (ZOLOFT) 100 MG tablet Take 100 mg by mouth daily           PHYSICAL EXAMINATION:  He  is awake, alert and in no apparent distress.    His tympanic membranes are clear and intact bilaterally. External auditory canals are clear.  Nasal exam shows a mild septal deviation without obstruction.  Examination of the oral cavity shows no suspicious lesions.  There is symmetric movement of the tongue and soft palate.    The oropharynx is clear.  His neck is supple without significant adenopathy.  Pulse is regular.  Upper airway is clear.  Cranial nerves II-XII are grossly intact.       Following the endoscopy palpation of the pedicle lesion in the right vallecula was performed which was firm but not hard.  The lesion appeared to have a smooth surface.      PROCEDURE: A flexible laryngoscopy  was performed.  Informed consent was obtained and a time out was performed. 2% lidocaine and 0.025% oxymetazoline was sprayed into the nasal cavity and allowed 3 to 5 minutes for effect. The scope was passed through the both nostrils.  No abnormal vascular lesions or masses are seen.  There is a posterior right septal spur which is partially obstructive.  Examination showed the vocal folds to be mobile and meet in the midline.  No nodules, polyps or ulcerations are seen.  In the vallecula he has tissue an extension that appears to be tonsillar tissue on the base of the epiglottis.  Mucus collection is present.  This is cleared with a few sips of water.  On the right side of the vallecula there is a pedicled lesion with a smooth, intact mucosal covering.  moderate inflammation at the posterior commissure.   The vocal folds show mild inflammation.  With phonation there is moderate contraction of the supraglottic larynx.  The hypopharynx is otherwise clear as is the subglottis.       9/5/2023 Exam    Vallecula with extension of lingual tonsillar tissue on the base of the epiglottis           Hypopharynx       Vallecular lesion after clearing with sips of water              IMPRESSION/PLAN:  A smooth appearing vallecular lesion that may  be an extension of tonsillar tissue but a neoplasm cannot be ruled out.  Given his smoking history it is reasonable to do a direct laryngoscopy and biopsy.  I do want him to check with his cardiac cardiologist (Dr. Walters)  regarding a brief anesthetic and being temporarily off Plavix as he has had an MI  and placement of a drug eluding stent less than 6 months ago. This is unlikely to be the source of his bleeding with the more medial lesion  seen on 8/18/2023 being largely healed or obscured by residual saliva.    I suggested he follow up in one month where his exam could be reviewed for any changes as well as evaluating his cardiologist recommendations.  The patient is in agreement with this recommendation.  All questions were answered.  He is to contact us for any significant changes in is symptoms ( persistent throat pain, bleeding or persistent dysphagia).

## 2023-09-05 ENCOUNTER — PRE VISIT (OUTPATIENT)
Dept: OTOLARYNGOLOGY | Facility: CLINIC | Age: 68
End: 2023-09-05

## 2023-09-05 ENCOUNTER — OFFICE VISIT (OUTPATIENT)
Dept: OTOLARYNGOLOGY | Facility: CLINIC | Age: 68
End: 2023-09-05
Payer: COMMERCIAL

## 2023-09-05 VITALS
SYSTOLIC BLOOD PRESSURE: 121 MMHG | HEIGHT: 68 IN | BODY MASS INDEX: 25.16 KG/M2 | WEIGHT: 166 LBS | HEART RATE: 55 BPM | DIASTOLIC BLOOD PRESSURE: 66 MMHG | OXYGEN SATURATION: 97 %

## 2023-09-05 DIAGNOSIS — J39.2: Primary | ICD-10-CM

## 2023-09-05 DIAGNOSIS — J38.7 VALLECULAR MASS: ICD-10-CM

## 2023-09-05 PROCEDURE — 99204 OFFICE O/P NEW MOD 45 MIN: CPT | Mod: 25 | Performed by: OTOLARYNGOLOGY

## 2023-09-05 PROCEDURE — 31575 DIAGNOSTIC LARYNGOSCOPY: CPT | Performed by: OTOLARYNGOLOGY

## 2023-09-05 ASSESSMENT — PAIN SCALES - GENERAL: PAINLEVEL: NO PAIN (0)

## 2023-09-05 NOTE — PATIENT INSTRUCTIONS
1.  You were seen in the ENT Clinic today by . If you have any questions or concerns after your appointment, please call [phone]. Press option #1 for scheduling related needs. Press option #3 for Nurse advice.     2. Plan is to return to clinic in 1 month.        Kasey German LPN  [phone]  [facility] - Otolaryngology

## 2023-09-05 NOTE — LETTER
9/5/2023       RE: Lopez Hogue  [address] [facility] [address]     Dear Colleague,    Thank you for referring your patient, Lopez Hogue, to the [facility] EAR NOSE [facility]AT [facility]INIC [place] at [facility]. Please see a copy of my visit note below.    HISTORY OF PRESENT ILLNESS: Lopez Hogue is a 67 year old male with a history of hemoptysis.  He has a history of stable coronary artery disease with placement of two drug eluding stents on 3/27/2023 following a non-ST segment elevation myocardial infarction.  He is on Plavix.    He coughed up blood was estimated to be 1/2 cup of blood early in the morning on 8/18/2023.  When seen in the ER he was not actively coughing up blood.  Prominent vessels in the posterior nasopharynx were seen on examination but no active bleeding present.  A CT scan of the neck demonstrated a questionable soft tissue mass of the epiglottis.  Flexible laryngoscopy was performed which showed a violaceous lesion in the right vallecula with a small clot of blood.  No other suspicious lesions were seen.  The case was discussed with Dr. Moreno and she recommended seeing him as an outpatient.    He comes in today with no further bleeding from the throat since his ER visit 2 weeks ago.  He notes that he does bleed when he brushes his teeth.  This is in part because of poor dentition but also because of his medication.  He also notes increased bruising in his hands and arms.  He has no swallowing difficulties ,no airway difficulties and no speaking difficulties.  He has no throat pain.  He also has no sensation of a lump in the throat.  He smoked for 30 years and quit 10 years ago.  He also has a history of alcohol use but also quit this approximately 10 years ago.      PAST MEDICAL HISTORY:   Past Medical History:   Diagnosis Date    EtOH dependence (H)     Quit drinking 10 years    Hypertension     Nonrheumatic aortic (valve)  stenosis     Sweat gland carcinoma        PAST SURGICAL HISTORY:   Past Surgical History:   Procedure Laterality Date    CV CORONARY ANGIOGRAM N/A 3/27/2023    Procedure: Coronary Angiogram;  Surgeon: Miki Etienne MD;  Location: [facility] CV    CV CORONARY ANGIOGRAM N/A 5/9/2023    Procedure: Coronary Angiogram;  Surgeon: Miki Etienne MD;  Location: [facility] CATH LAB CV    CV LEFT HEART CATH N/A 3/27/2023    Procedure: Left Heart Catheterization;  Surgeon: Miki Etienne MD;  Location: [facility] LAB CV    CV LEFT HEART CATH N/A 5/9/2023    Procedure: Left Heart Catheterization;  Surgeon: Miki Etienne MD;  Location: [facility]    CV PCI N/A 3/27/2023    Procedure: Percutaneous Coronary Intervention;  Surgeon: Miki Etienne MD;  Location: [facility] CV    OTHER SURGICAL HISTORY  2015    WIDE EXCISION OF LEFT GLUTEAL MASSTNM: [id number], stage: II hidradenocarcinoma Grade II, 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, sentinel lymph node biopsy negative        FAMILY HISTORY: No family history on file.    SOCIAL HISTORY:   Social History     Tobacco Use    Smoking status: Former     Types: Cigarettes    Smokeless tobacco: Not on file   Substance Use Topics    Alcohol use: Not Currently       REVIEW OF SYSTEMS: Ten point review of systems was performed and is negative except for:       9/2/2023     4:31 AM   [facility] ROS   Cardiopulmonary Cough   Hematologic Easy bruising   Endocrine Frequent urination        ALLERGIES: Coconut flavor    MEDICATIONS:   Current Outpatient Medications   Medication Sig Dispense Refill    acetaminophen (TYLENOL) 500 MG tablet Take 500-1,000 mg by mouth every 8 hours as needed for fever or pain      aspirin (ASA) 81 MG EC tablet Take 1 tablet (81 mg) by mouth daily Start tomorrow. 30 tablet 3    clopidogrel (PLAVIX) 75 MG tablet Take 1 tablet (75 mg) by mouth daily Hold over weekend,.  Resume 8/21/2023 90 tablet 3    cyanocobalamin (VITAMIN B-12) 1000 MCG tablet Take 1,000  mcg by mouth daily      nitroGLYcerin (NITROSTAT) 0.4 MG sublingual tablet PLACE 1 TABLET UNDER THE TONGUE EVERY 5 MINUTES FOR CHEST PAIN FOR 3 DOSES. IF SYMPTOMS PERSIST 5 MINUTES AFTER 1ST DOSE CALL 911 (Patient taking differently: 0.4 mg every 5 minutes as needed for chest pain PLACE 1 TABLET UNDER THE TONGUE EVERY 5 MINUTES FOR CHEST PAIN FOR 3 DOSES. IF SYMPTOMS PERSIST 5 MINUTES AFTER 1ST DOSE CALL 911) 25 tablet 1    Omega-3 Fatty Acids (FISH OIL) 1200 MG capsule Take 1,200 mg by mouth daily      rosuvastatin (CRESTOR) 40 MG tablet Take 1 tablet (40 mg) by mouth daily 90 tablet 3    sertraline (ZOLOFT) 100 MG tablet Take 100 mg by mouth daily           PHYSICAL EXAMINATION:  He  is awake, alert and in no apparent distress.    His tympanic membranes are clear and intact bilaterally. External auditory canals are clear.  Nasal exam shows a mild septal deviation without obstruction.  Examination of the oral cavity shows no suspicious lesions.  There is symmetric movement of the tongue and soft palate.    The oropharynx is clear.  His neck is supple without significant adenopathy.  Pulse is regular.  Upper airway is clear.  Cranial nerves II-XII are grossly intact.       Following the endoscopy palpation of the pedicle lesion in the right vallecula was performed which was firm but not hard.  The lesion appeared to have a smooth surface.      PROCEDURE: A flexible laryngoscopy  was performed.  Informed consent was obtained and a time out was performed. 2% lidocaine and 0.025% oxymetazoline was sprayed into the nasal cavity and allowed 3 to 5 minutes for effect. The scope was passed through the both nostrils.  No abnormal vascular lesions or masses are seen.  There is a posterior right septal spur which is partially obstructive.  Examination showed the vocal folds to be mobile and meet in the midline.  No nodules, polyps or ulcerations are seen.  In the vallecula he has tissue an extension that appears to be  tonsillar tissue on the base of the epiglottis.  Mucus collection is present.  This is cleared with a few sips of water.  On the right side of the vallecula there is a pedicled lesion with a smooth, intact mucosal covering.  moderate inflammation at the posterior commissure.   The vocal folds show mild inflammation. With phonation there is moderate contraction of the supraglottic larynx.  The hypopharynx is otherwise clear as is the subglottis.       9/5/2023 Exam    Vallecula with extension of lingual tonsillar tissue on the base of the epiglottis           Hypopharynx       Vallecular lesion after clearing with sips of water              IMPRESSION/PLAN:  A smooth appearing vallecular lesion that may  be an extension of tonsillar tissue but a neoplasm cannot be ruled out.  Given his smoking history it is reasonable to do a direct laryngoscopy and biopsy.  I do want him to check with his cardiac cardiologist (Dr. Walters)  regarding a brief anesthetic and being temporarily off Plavix as he has had an MI  and placement of a drug eluding stent less than 6 months ago. This is unlikely to be the source of his bleeding with the more medial lesion  seen on 8/18/2023 being largely healed or obscured by residual saliva.    I suggested he follow up in one month where his exam could be reviewed for any changes as well as evaluating his cardiologist recommendations.  The patient is in agreement with this recommendation.  All questions were answered.  He is to contact us for any significant changes in is symptoms ( persistent throat pain, bleeding or persistent dysphagia).           Again, thank you for allowing me to participate in the care of your patient.      Sincerely,    Jose Maldonado MD

## 2023-09-08 ENCOUNTER — TELEPHONE (OUTPATIENT)
Dept: OTOLARYNGOLOGY | Facility: CLINIC | Age: 68
End: 2023-09-08
Payer: COMMERCIAL

## 2023-09-08 NOTE — TELEPHONE ENCOUNTER
Left vm message for patient in regards to question about a medication needing to be held. Information left on vm, but writers call back number also provided on vm in case of any further questions.

## 2023-09-11 DIAGNOSIS — I25.110 CORONARY ARTERY DISEASE INVOLVING NATIVE CORONARY ARTERY OF NATIVE HEART WITH UNSTABLE ANGINA PECTORIS (H): ICD-10-CM

## 2023-09-11 RX ORDER — NITROGLYCERIN 0.4 MG/1
TABLET SUBLINGUAL
Qty: 25 TABLET | Refills: 1 | Status: SHIPPED | OUTPATIENT
Start: 2023-09-11 | End: 2024-08-13

## 2023-09-20 ENCOUNTER — HOSPITAL ENCOUNTER (OUTPATIENT)
Dept: CARDIOLOGY | Facility: HOSPITAL | Age: 68
Discharge: HOME | End: 2023-09-20
Attending: INTERNAL MEDICINE | Admitting: INTERNAL MEDICINE
Payer: COMMERCIAL

## 2023-09-20 DIAGNOSIS — I25.110 CORONARY ARTERY DISEASE INVOLVING NATIVE CORONARY ARTERY OF NATIVE HEART WITH UNSTABLE ANGINA PECTORIS (H): ICD-10-CM

## 2023-09-20 PROCEDURE — 93306 TTE W/DOPPLER COMPLETE: CPT

## 2023-09-20 PROCEDURE — 93306 TTE W/DOPPLER COMPLETE: CPT | Mod: 26 | Performed by: INTERNAL MEDICINE

## 2023-09-22 NOTE — Clinical Note
Stent inserted over the wire.  Olumiant Pregnancy And Lactation Text: Based on animal studies, Olumiant may cause embryo-fetal harm when administered to pregnant women.  The medication should not be used in pregnancy.  Breastfeeding is not recommended during treatment.

## 2023-09-26 ENCOUNTER — PREP FOR PROCEDURE (OUTPATIENT)
Dept: OTOLARYNGOLOGY | Facility: CLINIC | Age: 68
End: 2023-09-26
Payer: COMMERCIAL

## 2023-09-26 ENCOUNTER — PATIENT OUTREACH (OUTPATIENT)
Dept: OTOLARYNGOLOGY | Facility: CLINIC | Age: 68
End: 2023-09-26
Payer: COMMERCIAL

## 2023-09-26 DIAGNOSIS — J38.7 VALLECULAR MASS: Primary | ICD-10-CM

## 2023-09-26 NOTE — PROGRESS NOTES
Called patient and let him know that Dr. Felix's surgery scheduling tem would be reaching to schedule patient. Patient allowed to hold plavix per cardiology MyChart on 9/25. Patient declined wanting to meet with Dr. Felix in clinic prior to procedure and will just see patient morning of surgery. Patient is agreeable of plan and will reach out if there are any questions or concerns. Nancy Snyder RN on 9/26/2023 at 10:59 AM

## 2023-09-27 ENCOUNTER — TELEPHONE (OUTPATIENT)
Dept: OTOLARYNGOLOGY | Facility: CLINIC | Age: 68
End: 2023-09-27

## 2023-09-27 ENCOUNTER — OFFICE VISIT (OUTPATIENT)
Dept: CARDIOLOGY | Facility: CLINIC | Age: 68
End: 2023-09-27
Payer: COMMERCIAL

## 2023-09-27 VITALS
SYSTOLIC BLOOD PRESSURE: 114 MMHG | WEIGHT: 168 LBS | BODY MASS INDEX: 25.54 KG/M2 | HEART RATE: 64 BPM | DIASTOLIC BLOOD PRESSURE: 54 MMHG | RESPIRATION RATE: 14 BRPM

## 2023-09-27 DIAGNOSIS — I25.110 CORONARY ARTERY DISEASE INVOLVING NATIVE CORONARY ARTERY OF NATIVE HEART WITH UNSTABLE ANGINA PECTORIS (H): ICD-10-CM

## 2023-09-27 PROCEDURE — 99214 OFFICE O/P EST MOD 30 MIN: CPT | Performed by: INTERNAL MEDICINE

## 2023-09-27 NOTE — PATIENT INSTRUCTIONS
Lopez Hogue,    It was a pleasure to see you today at the [facility].     My recommendations after this visit include:    You can stop aspirin and plavix 7 days before biopsy  Echo and follow up in 1 year    MYESHA Walters MD, FACC, KALI

## 2023-09-27 NOTE — PROGRESS NOTES
Cardiology Progress Note     Assessment:    Coronary artery disease, status post non-ST segment elevation myocardial infarction due to occlusion of obtuse marginal branch, status post stenting in March 2023,  no angina  Hypercholesterolemia, excellent control  Aortic stenosis, moderate, stable, asymptomatic  Sinus bradycardia, mild, resolved after discontinuation of metoprolol    Plan:  We discussed the results of recent echo and stress test.  He had abnormal stress EKG but normal exercise tolerance and no angina.  I do not think we need to pursue another coronary angiogram.    We discussed secondary prevention of cardiac events with close attention to proper diet exercise and medication compliance.    He he may need to undergo the biopsy of laryngeal mass.  He is 6 months past stenting.  Plavix and aspirin can be interrupted for a week before the biopsy and resumed afterward.  He is stable from cardiac standpoint to undergo anesthesia and biopsy.    Plavix should be continued for total 12 months after PCI.    I will plan to repeat echocardiogram in 1 year and have him come back for follow-up visit    Subjective:   This is 67 year old male who comes in today for visit.  He has done well over the summer months he denies chest pain or shortness of breath.  He exercises regularly for at least 30 minutes every day.  He is weight has been stable.  He has no PND and orthopnea.  He developed bleeding and was seen in the emergency room and then by ENT.  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  Biopsy was not performed because of dual antiplatelet therapy.      Review of Systems:   Negative other than history of present illness    Objective:   /54 (BP Location: Left arm, Patient Position: Sitting, Cuff Size: Adult Regular)   Pulse 64   Resp 14   Wt 76.2 kg (168 lb)   BMI 25.54 kg/m    Physical Exam:  GENERAL: no distress  NECK: No JVD  LUNGS: Clear to auscultation.  CARDIAC: regular  rhythm, S1 & S2 normal.  No heaves, thrills, gallops 3/6 systolic ejection murmur at the aortic area  ABDOMEN: flat, negative hepatosplenomegaly, soft and non-tender.  EXTREMITIES: No evidence of cyanosis, clubbing or edema.    Current Outpatient Medications   Medication Sig Dispense Refill    acetaminophen (TYLENOL) 500 MG tablet Take 500-1,000 mg by mouth every 8 hours as needed for fever or pain      aspirin (ASA) 81 MG EC tablet Take 1 tablet (81 mg) by mouth daily Start tomorrow. 30 tablet 3    clopidogrel (PLAVIX) 75 MG tablet Take 1 tablet (75 mg) by mouth daily Hold over weekend,.  Resume 8/21/2023 90 tablet 3    cyanocobalamin (VITAMIN B-12) 1000 MCG tablet Take 1,000 mcg by mouth daily      nitroGLYcerin (NITROSTAT) 0.4 MG sublingual tablet PLACE 1 TABLET UNDER THE TONGUE EVERY 5 MINUTES FOR CHEST PAIN FOR 3 DOSES. IF SYMPTOMS PERSIST 5 MINUTES AFTER 1ST DOSE CALL 911. 25 tablet 1    Omega-3 Fatty Acids (FISH OIL) 1200 MG capsule Take 1,200 mg by mouth daily      rosuvastatin (CRESTOR) 40 MG tablet Take 1 tablet (40 mg) by mouth daily 90 tablet 3    sertraline (ZOLOFT) 100 MG tablet Take 100 mg by mouth daily         Cardiographics:      Echocardiogram: March 2023  Normal left-ventricular size. Mild to moderate left ventricular hypertrophy.  Left ventricular systolic function is estimated at 60 to 65%. No regional wall  motion abnormality noted.Diastolic Doppler findings (E/E' ratio and/or other  parameters) suggest left ventricular filling pressures are normal.  Normal right ventricular size and systolic function.  Moderate calcific aortic stenosis. Peak velocity of 3.4 m/s. Mean gradient 25  mmHg.  Borderline enlargement of the proximal ascending aorta.     September 2023  1. Normal left ventricular size and systolic performance with a visually  estimated ejection fraction of 60-65%.  2. There is moderate aortic stenosis.  3. There is trace aortic insufficiency.  4. Normal right ventricular size and  systolic performance.  5. There is mild left atrial enlargement.    Coronary angio: March 2023  1.  Diffuse multivessel coronary disease with mild to moderate irregularity of the LAD, severe circumflex OM1 disease which is a large vessel, and severe stenoses leading into the third left posterolateral branch which is very small branch, and diffuse mild irregularity of the right coronary artery and right posterior descending.  2.  Successful PCI of OM1 vessel requiring Cutting Balloon PTCA, shockwave lithotripsy, and drug-eluting stent implant x2  3.  Successful conventional PTCA of small posterolateral 3 branch of left circumflex.     May 2023  1.  Previously placed stents in OM left circumflex are widely patent with normal VENTURA-3 flow.  2.  PTCA sites in distal circumflex branch have  healed nicely with less than 30% residual narrowing.  3.  LAD is moderately calcified with diffuse mild luminal irregularity but no severe stenoses, unchanged from prior study.  4. RCA has mild calcification and luminal irregularity but no severe stenoses.  There is a focal 70% narrowing in a small posterolateral branch and diffuse 50 to 70% narrowing in the posterior descending branch.  Both appear unchanged from prior study      GXT: July 2023  1. The patient achieved good exercise workload without inducible angina.  2. Exercise stress ECG is positive for inducible myocardial ischemia in  inferior and lateral leads. ST segment depression 3 mm at the peak of  exercise.  3. No exercise induced cardiac arrhytmia.     Lab Results    Chemistry/lipid CBC Cardiac Enzymes/BNP/TSH/INR   Recent Labs   Lab Test 05/11/23  0838   CHOL 95   HDL 48   LDL 34   TRIG 67     Recent Labs   Lab Test 05/11/23  0838 03/27/23  0727   LDL 34 114*     Recent Labs   Lab Test 08/18/23  0309      POTASSIUM 4.2   CHLORIDE 106   CO2 25   GLC 94   BUN 19.6   CR 0.73   GFRESTIMATED >90   RAFAELA 9.4     Recent Labs   Lab Test 08/18/23  0309 05/08/23  0556  03/28/23  0502   CR 0.73 0.87 0.74     Recent Labs   Lab Test 03/27/23  0727   A1C 5.2          Recent Labs   Lab Test 08/18/23  1340 08/18/23  0309   WBC  --  6.1   HGB 11.8* 12.4*   HCT  --  37.1*   MCV  --  91   PLT  --  187     Recent Labs   Lab Test 08/18/23  1340 08/18/23  0309 05/08/23  0556   HGB 11.8* 12.4* 12.2*    No results for input(s): TROPONINI in the last 94849 hours.  Recent Labs   Lab Test 08/18/23  0309   NTBNPI 72     No results for input(s): TSH in the last 73668 hours.  No results for input(s): INR in the last 50526 hours.

## 2023-09-27 NOTE — TELEPHONE ENCOUNTER
Called patient to schedule surgery with Dr. Felix. No answer, callback number [phone] left on  for patient.     Eden Garcias on 9/27/2023 at 4:22 PM

## 2023-09-27 NOTE — LETTER
9/27/2023    Madi Ramos  [address]  [facility] [address]    RE: Lopez Hogue       Dear Colleague,     I had the pleasure of seeing Lopez Hogue in the [facility].      Cardiology Progress Note     Assessment:    Coronary artery disease, status post non-ST segment elevation myocardial infarction due to occlusion of obtuse marginal branch, status post stenting in March 2023,  no angina  Hypercholesterolemia, excellent control  Aortic stenosis, moderate, stable, asymptomatic  Sinus bradycardia, mild, resolved after discontinuation of metoprolol    Plan:  We discussed the results of recent echo and stress test.  He had abnormal stress EKG but normal exercise tolerance and no angina.  I do not think we need to pursue another coronary angiogram.    We discussed secondary prevention of cardiac events with close attention to proper diet exercise and medication compliance.    He he may need to undergo the biopsy of laryngeal mass.  He is 6 months past stenting.  Plavix and aspirin can be interrupted for a week before the biopsy and resumed afterward.  He is stable from cardiac standpoint to undergo anesthesia and biopsy.    Plavix should be continued for total 12 months after PCI.    I will plan to repeat echocardiogram in 1 year and have him come back for follow-up visit    Subjective:   This is 67 year old male who comes in today for visit.  He has done well over the summer months he denies chest pain or shortness of breath.  He exercises regularly for at least 30 minutes every day.  He is weight has been stable.  He has no PND and orthopnea.  He developed bleeding and was seen in the emergency room and then by ENT.  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  Biopsy was not performed because of dual antiplatelet therapy.      Review of Systems:   Negative other than history of present illness    Objective:   /54 (BP Location: Left arm, Patient Position: Sitting,  Cuff Size: Adult Regular)   Pulse 64   Resp 14   Wt 76.2 kg (168 lb)   BMI 25.54 kg/m    Physical Exam:  GENERAL: no distress  NECK: No JVD  LUNGS: Clear to auscultation.  CARDIAC: regular rhythm, S1 & S2 normal.  No heaves, thrills, gallops 3/6 systolic ejection murmur at the aortic area  ABDOMEN: flat, negative hepatosplenomegaly, soft and non-tender.  EXTREMITIES: No evidence of cyanosis, clubbing or edema.    Current Outpatient Medications   Medication Sig Dispense Refill    acetaminophen (TYLENOL) 500 MG tablet Take 500-1,000 mg by mouth every 8 hours as needed for fever or pain      aspirin (ASA) 81 MG EC tablet Take 1 tablet (81 mg) by mouth daily Start tomorrow. 30 tablet 3    clopidogrel (PLAVIX) 75 MG tablet Take 1 tablet (75 mg) by mouth daily Hold over weekend,.  Resume 8/21/2023 90 tablet 3    cyanocobalamin (VITAMIN B-12) 1000 MCG tablet Take 1,000 mcg by mouth daily      nitroGLYcerin (NITROSTAT) 0.4 MG sublingual tablet PLACE 1 TABLET UNDER THE TONGUE EVERY 5 MINUTES FOR CHEST PAIN FOR 3 DOSES. IF SYMPTOMS PERSIST 5 MINUTES AFTER 1ST DOSE CALL 911. 25 tablet 1    Omega-3 Fatty Acids (FISH OIL) 1200 MG capsule Take 1,200 mg by mouth daily      rosuvastatin (CRESTOR) 40 MG tablet Take 1 tablet (40 mg) by mouth daily 90 tablet 3    sertraline (ZOLOFT) 100 MG tablet Take 100 mg by mouth daily         Cardiographics:      Echocardiogram: March 2023  Normal left-ventricular size. Mild to moderate left ventricular hypertrophy.  Left ventricular systolic function is estimated at 60 to 65%. No regional wall  motion abnormality noted.Diastolic Doppler findings (E/E' ratio and/or other  parameters) suggest left ventricular filling pressures are normal.  Normal right ventricular size and systolic function.  Moderate calcific aortic stenosis. Peak velocity of 3.4 m/s. Mean gradient 25  mmHg.  Borderline enlargement of the proximal ascending aorta.     September 2023  1. Normal left ventricular size and  systolic performance with a visually  estimated ejection fraction of 60-65%.  2. There is moderate aortic stenosis.  3. There is trace aortic insufficiency.  4. Normal right ventricular size and systolic performance.  5. There is mild left atrial enlargement.    Coronary angio: March 2023  1.  Diffuse multivessel coronary disease with mild to moderate irregularity of the LAD, severe circumflex OM1 disease which is a large vessel, and severe stenoses leading into the third left posterolateral branch which is very small branch, and diffuse mild irregularity of the right coronary artery and right posterior descending.  2.  Successful PCI of OM1 vessel requiring Cutting Balloon PTCA, shockwave lithotripsy, and drug-eluting stent implant x2  3.  Successful conventional PTCA of small posterolateral 3 branch of left circumflex.     May 2023  1.  Previously placed stents in OM left circumflex are widely patent with normal VENTURA-3 flow.  2.  PTCA sites in distal circumflex branch have  healed nicely with less than 30% residual narrowing.  3.  LAD is moderately calcified with diffuse mild luminal irregularity but no severe stenoses, unchanged from prior study.  4. RCA has mild calcification and luminal irregularity but no severe stenoses.  There is a focal 70% narrowing in a small posterolateral branch and diffuse 50 to 70% narrowing in the posterior descending branch.  Both appear unchanged from prior study      GXT: July 2023  1. The patient achieved good exercise workload without inducible angina.  2. Exercise stress ECG is positive for inducible myocardial ischemia in  inferior and lateral leads. ST segment depression 3 mm at the peak of  exercise.  3. No exercise induced cardiac arrhytmia.     Lab Results    Chemistry/lipid CBC Cardiac Enzymes/BNP/TSH/INR   Recent Labs   Lab Test 05/11/23  0838   CHOL 95   HDL 48   LDL 34   TRIG 67     Recent Labs   Lab Test 05/11/23  0838 03/27/23  0727   LDL 34 114*     Recent Labs    Lab Test 08/18/23  0309      POTASSIUM 4.2   CHLORIDE 106   CO2 25   GLC 94   BUN 19.6   CR 0.73   GFRESTIMATED >90   RAFAELA 9.4     Recent Labs   Lab Test 08/18/23  0309 05/08/23  0556 03/28/23  0502   CR 0.73 0.87 0.74     Recent Labs   Lab Test 03/27/23  0727   A1C 5.2          Recent Labs   Lab Test 08/18/23  1340 08/18/23  0309   WBC  --  6.1   HGB 11.8* 12.4*   HCT  --  37.1*   MCV  --  91   PLT  --  187     Recent Labs   Lab Test 08/18/23  1340 08/18/23  0309 05/08/23  0556   HGB 11.8* 12.4* 12.2*    No results for input(s): TROPONINI in the last 62080 hours.  Recent Labs   Lab Test 08/18/23  0309   NTBNPI 72     No results for input(s): TSH in the last 48077 hours.  No results for input(s): INR in the last 07142 hours.                      Thank you for allowing me to participate in the care of your patient.      Sincerely,     Christopher Walters MD     [facility]  cc:   Christopher Walters MD  [address] [facility]  [address]  [place] [address]

## 2023-09-28 NOTE — TELEPHONE ENCOUNTER
FUTURE VISIT INFORMATION      SURGERY INFORMATION:  Date: 10/12/23  Location: [facility] or  Surgeon:  Edi Felix MD   Anesthesia Type:  general  Procedure: LARYNGOSCOPY, WITH BIOPSY     RECORDS REQUESTED FROM:       Primary Care Provider: Madi Ramos MBBS - [place]    Pertinent Medical History: hypertension    Most recent EKG+ Tracin23    Most recent ECHO: 23    Most recent Cardiac Stress Test: 23    Most recent Coronary Angiogram: 23

## 2023-09-28 NOTE — TELEPHONE ENCOUNTER
Patient is scheduled for surgery with Dr. Felix.     Spoke with: Patient     Date of Surgery: 10/12/23    Location: [facility] OR     Pre op with Provider: IVANA     H&P: Per patients preference, scheduled for a virtual visit with PAC on 10/03/23 at 11:00.     Additional imaging/appointments: Patient is scheduled for a 1 week post op with Dr. Felix on 10/23 at 2.     Surgery packet: Will mail packet, confirmed with patient address in chart works best.      Additional comments: Writer informed patient he should be given arrival time for surgery at PAC appointment and if not, a pre op nurse will reach out a few days prior to surgery to go over further details/give arrival time.     Patient asked that I relay he is currently on plavix and daily aspirin but per his cardiologist, ok to stop taking both medication 7 days prior to surgery. Patient would also like a call to further discuss recovery/restrictions following surgery.         Eden Garcias on 9/28/2023 at 8:17 AM

## 2023-10-03 ENCOUNTER — VIRTUAL VISIT (OUTPATIENT)
Dept: SURGERY | Facility: CLINIC | Age: 68
End: 2023-10-03
Payer: COMMERCIAL

## 2023-10-03 ENCOUNTER — PRE VISIT (OUTPATIENT)
Dept: SURGERY | Facility: CLINIC | Age: 68
End: 2023-10-03

## 2023-10-03 ENCOUNTER — ANESTHESIA EVENT (OUTPATIENT)
Dept: SURGERY | Facility: CLINIC | Age: 68
End: 2023-10-03
Payer: COMMERCIAL

## 2023-10-03 DIAGNOSIS — J38.7 VALLECULAR MASS: ICD-10-CM

## 2023-10-03 DIAGNOSIS — Z01.818 PREOP EXAMINATION: Primary | ICD-10-CM

## 2023-10-03 PROCEDURE — 99203 OFFICE O/P NEW LOW 30 MIN: CPT | Mod: VID | Performed by: NURSE PRACTITIONER

## 2023-10-03 ASSESSMENT — ENCOUNTER SYMPTOMS: ORTHOPNEA: 0

## 2023-10-03 ASSESSMENT — LIFESTYLE VARIABLES: TOBACCO_USE: 1

## 2023-10-03 ASSESSMENT — PAIN SCALES - GENERAL: PAINLEVEL: NO PAIN (0)

## 2023-10-03 NOTE — PROGRESS NOTES
Kriss is a 67 year old who is being evaluated via a billable video visit.      How would you like to obtain your AVS? MyChart  If the video visit is dropped, the invitation should be resent by: Send to e-mail at: [email or web address]        HPI               Review of Systems         Physical Exam

## 2023-10-03 NOTE — H&P
Pre-Operative H & P     CC:  Preoperative exam to assess for increased cardiopulmonary risk while undergoing surgery and anesthesia.    Date of Encounter: 10/3/2023  Primary Care Physician:  Madi Ramos     Reason for visit:   Encounter Diagnoses   Name Primary?    Preop examination Yes    Vallecular mass        HPI  Lopez Hogue is a 67 year old male who presents for pre-operative H & P in preparation for  Procedure Information       Case: [id number] Date/Time: 10/12/23 0730    Procedure: LARYNGOSCOPY, WITH BIOPSY (Throat)    Anesthesia type: General    Diagnosis: Vallecular mass [J38.7]    Pre-op diagnosis: Vallecular mass [J38.7]    Location:  OR 09 /  OR    Providers: Edi Felix MD            Lopez Hogue is a 67 year old male with hyperlipidemia, hypertension, old MI, CAD, aortic valve stenosis, history of smoking, anemia, history of sweat gland carcinoma, alcohol dependence in remission and anxiety that has a vallecular mass.  He presented to the emergency room on 8/18/23 for hemoptysis. A thorough evaluation was done and resulted in the finding of a right vallecular lesion.  He has since consulted with Dr. Maldonado in outpatient ENT.  The above listed procedure has now been recommended for further evaluation.     History is obtained from the patient and chart review    Hx of abnormal bleeding or anti-platelet use: plavix and aspirin      Past Medical History  Past Medical History:   Diagnosis Date    EtOH dependence (H)     Quit drinking 10 years    Hypertension     Nonrheumatic aortic (valve) stenosis     Sweat gland carcinoma        Past Surgical History  Past Surgical History:   Procedure Laterality Date    CV CORONARY ANGIOGRAM N/A 3/27/2023    Procedure: Coronary Angiogram;  Surgeon: Miki Etienne MD;  Location: [facility] CV    CV CORONARY ANGIOGRAM N/A 5/9/2023    Procedure: Coronary Angiogram;  Surgeon: Miki Etienne MD;  Location: 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 (Patient taking differently: Take 81 mg by mouth every morning) 30 tablet 3    clopidogrel (PLAVIX) 75 MG tablet Take 1 tablet (75 mg) by mouth daily Hold over weekend,.  Resume 8/21/2023 (Patient taking differently: Take 75 mg by mouth every morning) 90 tablet 3    cyanocobalamin (VITAMIN B-12) 1000 MCG tablet Take 1,000 mcg by mouth every evening      nitroGLYcerin (NITROSTAT) 0.4 MG sublingual tablet PLACE 1 TABLET UNDER THE TONGUE EVERY 5 MINUTES FOR CHEST PAIN FOR 3 DOSES. IF SYMPTOMS PERSIST 5 MINUTES AFTER 1ST DOSE CALL 911. 25 tablet 1    Omega-3 Fatty Acids (FISH OIL) 1200 MG capsule Take 1,200 mg by mouth every evening      rosuvastatin (CRESTOR) 40 MG tablet Take 1 tablet (40 mg) by mouth daily (Patient taking differently: Take 40 mg by mouth every evening) 90 tablet 3    sertraline (ZOLOFT) 100 MG tablet Take 100 mg by mouth every morning         Allergies  Allergies   Allergen Reactions    Coconut Flavor Anaphylaxis     Raw coconut       Social History  Social History     Socioeconomic History    Marital status:       Spouse name: Not on file    Number of children: 2    Years of education: Not on file    Highest education level: Not on file   Occupational History    Occupation: non-profit manager   Tobacco Use    Smoking status: Former     Packs/day: 2.00     Years: 30.00     Pack years: 60.00     Types: Cigarettes     Quit date: 2013     Years since quitting: 10.7     Passive exposure: Never    Smokeless tobacco: Never    Tobacco comments:     Quit 10 years ago   Vaping Use    Vaping Use: Never used   Substance and Sexual Activity    Alcohol use: Not Currently     Comment: quit in 2013    Drug use: Never    Sexual activity: Not on file   Other Topics Concern    Not on file   Social History Narrative    Patient works.  Lives with his wife.     Social Determinants of Health     Financial Resource Strain: Not on file   Food Insecurity: Not on file   Transportation Needs: Not on file   Physical Activity: Not on file   Stress: Not on file   Social Connections: Not on file   Interpersonal Safety: Not on file   Housing Stability: Not on file       Family History  Family History   Problem Relation Age of Onset    Dementia Mother     Anesthesia Reaction No family hx of     Thrombocytopenia No family hx of        Review of Systems  The complete review of systems is negative other than noted in the HPI or here.   Anesthesia Evaluation   Pt has had prior anesthetic.     No history of anesthetic complications       ROS/MED HX  ENT/Pulmonary: Comment: Vallecular mass    (+)     JOSE risk factors,  hypertension,   observed stopped breathing,      tobacco use, Past use,                      Neurologic:  - neg neurologic ROS     Cardiovascular:     (+) Dyslipidemia hypertension- -  CAD - past MI - stent-3/2023. 2  Taking blood thinners Pt has received instructions:                       valvular problems/murmurs type: AS     Previous cardiac testing   Echo: Date: 9/2023 Results:  Interpretation Summary     1. Normal left ventricular size and  systolic performance with a visually  estimated ejection fraction of 60-65%.  2. There is moderate aortic stenosis.  3. There is trace aortic insufficiency.  4. Normal right ventricular size and systolic performance.  5. There is mild left atrial enlargement.     When compared to the prior real-time echocardiogram dated 27 March 2023, the  findings are felt to be fairly similar on both examinations.    Stress Test:  Date: 7/2023 Results:  Interpretation Summary  1. The patient achieved good exercise workload without inducible angina.  2. Exercise stress ECG is positive for inducible myocardial ischemia in  inferior and lateral leads. ST segment depression 3 mm at the peak of  exercise.  3. No exercise induced cardiac arrhytmia.    ECG Reviewed:  Date: 5/2023 Results:  Sinus bradycardia  Cath:  Date: 5/2023 Results:  Conclusion    1.  Previously placed stents in OM left circumflex are widely patent with normal VENTURA-3 flow.  2.  PTCA sites in distal circumflex branch have  healed nicely with less than 30% residual narrowing.  3.  LAD is moderately calcified with diffuse mild luminal irregularity but no severe stenoses, unchanged from prior study.  4. RCA has mild calcification and luminal irregularity but no severe stenoses.  There is a focal 70% narrowing in a small posterolateral branch and diffuse 50 to 70% narrowing in the posterior descending branch.  Both appear unchanged from prior study..   (-) SPENCER and orthopnea/PND   METS/Exercise Tolerance: >4 METS Comment: Is doing 30-60 minutes of cardio 6 days per week for exercise.    Denies any exertional dyspnea or angina.     Hematologic:     (+)      anemia,          Musculoskeletal:  - neg musculoskeletal ROS     GI/Hepatic:  - neg GI/hepatic ROS     Renal/Genitourinary:  - neg Renal ROS     Endo:  - neg endo ROS     Psychiatric/Substance Use:     (+) psychiatric history anxiety alcohol abuse      Infectious Disease:  - neg infectious disease ROS     Malignancy:    (+) Malignancy, History of Other and Skin.Skin CA Remission status post Surgery.  Other CA sweat gland carcinoma, left buttock Remission status post Surgery.    Other:  - neg other ROS          Virtual visit -  No vitals were obtained    Physical Exam  Constitutional: Awake, alert, cooperative, no apparent distress, and appears stated age.  Eyes: Pupils equal  HENT: Normocephalic  Respiratory: non labored breathing   Neurologic: Awake, alert, oriented to name, place and time.   Neuropsychiatric: Calm, cooperative. Normal affect.      Prior Labs/Diagnostic Studies   All labs and imaging personally reviewed     EKG/ stress test - if available please see in ROS above       Component      Latest Ref Rng 8/18/2023  3:09 AM 8/18/2023  1:40 PM   WBC      4.0 - 11.0 10e3/uL 6.1     RBC Count      4.40 - 5.90 10e6/uL 4.09 (L)     Hemoglobin      13.3 - 17.7 g/dL 12.4 (L)  11.8 (L)    Hematocrit      40.0 - 53.0 % 37.1 (L)     MCV      78 - 100 fL 91     MCH      26.5 - 33.0 pg 30.3     MCHC      31.5 - 36.5 g/dL 33.4     RDW      10.0 - 15.0 % 13.6     Platelet Count      150 - 450 10e3/uL 187     % Neutrophils      % 67     % Lymphocytes      % 22     % Monocytes      % 9     % Eosinophils      % 1     % Basophils      % 1     % Immature Granulocytes      % 0     NRBCs per 100 WBC      <1 /100 0     Absolute Neutrophils      1.6 - 8.3 10e3/uL 4.1     Absolute Lymphocytes      0.8 - 5.3 10e3/uL 1.3     Absolute Monocytes      0.0 - 1.3 10e3/uL 0.6     Absolute Eosinophils      0.0 - 0.7 10e3/uL 0.1     Absolute Basophils      0.0 - 0.2 10e3/uL 0.0     Absolute Immature Granulocytes      <=0.4 10e3/uL 0.0     Absolute NRBCs      10e3/uL 0.0     Sodium      136 - 145 mmol/L 141     Potassium      3.4 - 5.3 mmol/L 4.2     Chloride      98 - 107 mmol/L 106     Carbon Dioxide (CO2)      22 - 29 mmol/L 25     Anion Gap      7 - 15 mmol/L 10     Urea Nitrogen      8.0 - 23.0 mg/dL 19.6     Creatinine      0.67 - 1.17 mg/dL 0.73      Calcium      8.8 - 10.2 mg/dL 9.4     Glucose      70 - 99 mg/dL 94     GFR Estimate      >60 mL/min/1.73m2 >90        Legend:  (L) Low            The patient's records and results personally reviewed by this provider.     Outside records reviewed from: Care Everywhere      Assessment    Lopez Hogue is a 67 year old male seen as a PAC referral for risk assessment and optimization for anesthesia.    Plan/Recommendations  Pt will be optimized for the proposed procedure.  See below for details on the assessment, risk, and preoperative recommendations    NEUROLOGY  - No history of TIA, CVA or seizure    -Post Op delirium risk factors:  High co-morbid index    ENT  - No current airway concerns.  Will need to be reassessed day of surgery.  Mallampati: Unable to assess  TM: Unable to assess    -vallecular lesion, right.  Procedure planned as above.    Airway eval by Dr. Maldonado on 9/5/23:  PROCEDURE: A flexible laryngoscopy  was performed.  Informed consent was obtained and a time out was performed. 2% lidocaine and 0.025% oxymetazoline was sprayed into the nasal cavity and allowed 3 to 5 minutes for effect. The scope was passed through the both nostrils.  No abnormal vascular lesions or masses are seen.  There is a posterior right septal spur which is partially obstructive.  Examination showed the vocal folds to be mobile and meet in the midline.  No nodules, polyps or ulcerations are seen.  In the vallecula he has tissue an extension that appears to be tonsillar tissue on the base of the epiglottis.  Mucus collection is present.  This is cleared with a few sips of water.  On the right side of the vallecula there is a pedicled lesion with a smooth, intact mucosal covering.  moderate inflammation at the posterior commissure.   The vocal folds show mild inflammation. With phonation there is moderate contraction of the supraglottic larynx.  The hypopharynx is otherwise clear as is the subglottis.       CARDIAC  - No  "history of Afib  - following with Dr. Walters in cardiology s/p MI and coronary stenting x 2 on 3/21/23.  Approval given to hold aspirin and plavix 7 days prior to procedure and approval given to proceed with procedure as planned.  See OV note from 9/27/23 for full details.  - most recent cardiology testing as above.  - known moderate aortic stenosis.  Cardiology following.    - METS (Metabolic Equivalents)  Patient performs 4 or more METS exercise without symptoms            Total Score: 0      RCRI-Low risk: Class 2 0.9% complication rate            Total Score: 1    RCRI: CAD        PULMONARY    JOSE Medium Risk            Total Score: 4    JOSE: Observed stopped breathing    JOSE: Hypertension    JOSE: Over 50 ys old    JOSE: Male      - Denies asthma or inhaler use  - Tobacco History    History   Smoking Status    Former    Packs/day: 2.00    Years: 30.00    Types: Cigarettes    Quit date: 2013   Smokeless Tobacco    Never       GI  - denies GERD  PONV Low Risk  Total Score: 1           1 AN PONV: Patient is not a current smoker          ENDOCRINE    - BMI: Estimated body mass index is 25.54 kg/m  as calculated from the following:    Height as of 9/5/23: 1.727 m (5' 8\").    Weight as of 9/27/23: 76.2 kg (168 lb).  Overweight (BMI 25.0-29.9)  - No history of Diabetes Mellitus    HEME  VTE Low Risk 0.5%            Total Score: 3    VTE: Greater than 59 yrs old    VTE: Male      - aspirin and plavix as noted above.   - Chronic anemia  Recommend perioperative use of blood conservation techniques intraoperatively and close monitoring for postoperative bleeding.    PSYCH  - continue zoloft without interruption.  - alcohol dependence in remission since 2013.       Different anesthesia methods/types have been discussed with the patient, but they are aware that the final plan will be decided by the assigned anesthesia provider on the date of service.      The patient is optimized for their procedure. AVS with information on " surgery time/arrival time, meds and NPO status given by nursing staff. No further diagnostic testing indicated.    Please refer to the physical examination documented by the anesthesiologist in the anesthesia record on the day of surgery.    Video-Visit Details    Type of service:  Video Visit    Provider received verbal consent for a Video Visit from the patient? Yes     Originating Location (pt. Location): Home    Distant Location (provider location):  Off-site  Mode of Communication:  Video Conference via [employer]    On the day of service:     Prep time: 10 minutes  Visit time: 16 minutes  Documentation time: 17 minutes  ------------------------------------------  Total time: 43 minutes      YADIRA Wolf CNP  Preoperative Assessment Center  [facility]  Clinic and Surgery Center  Phone: [phone]  Fax: [phone]

## 2023-10-03 NOTE — PATIENT INSTRUCTIONS
Preparing for Your Surgery      Name:  Lopez Hogue   MRN:  [id number]   :  1955   Today's Date:  10/3/2023       Arriving for surgery:  Surgery date:  10/12/23  Arrival time:  05:30 am      Please come to:      [facility] [address]  [address] [place]  [address]      The [facility] Patient /Visitor Ramp is located at [address]. Patients and visitors who self-park will receive the reduced hospital parking rate. If the Patient /Visitor Ramp is full, please follow the signs to the [employer] parking located at the main hospital entrance.     parking is available ( 24 hours/ 7 days a week)    Discounted parking pass options are available for patients and visitors. They can be purchased at the [employer] desk at the [facility] hospital entrance.    -    Stop at the security desk and they will direct surgery patients to the 3rd floor Surgery Waiting Room. [phone]     -  If you are in need of directions, wheelchair or escort please stop at the Information/security desk in the lobby.       What can I eat or drink?  -  You may eat and drink normally up to 8 hours prior to arrival time.   -  You may have clear liquids until 2 hours prior to arrival time.     Examples of clear liquids:  Water  Clear broth  Juices (apple, white grape, white cranberry  and cider) without pulp  Noncarbonated, powder based beverages  (lemonade and Matthew-Aid)  Sodas (Sprite, 7-Up, ginger ale and seltzer)  Coffee or tea (without milk or cream)  Gatorade    -  No Alcohol or cannabis products for at least 24 hours before surgery.     Which medicines can I take?    Hold Aspirin for 7 days before surgery.   Hold Multivitamins for 7 days before surgery.  Hold Supplements (B 12 + fish oil) for 7 days before surgery.  Hold Ibuprofen (Advil, Motrin) for 1 day(s) before surgery--unless otherwise directed by surgeon.  Hold Naproxen (Aleve) for 4 days  before surgery.    HOLD PLAVIX X 7 DAYS BEFORE SURGERY.    -  PLEASE TAKE these medications the day of surgery:  Tylenol if needed; take other scheduled morning medications.    How do I prepare myself?  - Please take 2 showers (one the night prior to surgery and one the morning of surgery) using Scrubcare or Hibiclens soap.    Use this soap only from the neck to your toes.     Leave the soap on your skin for one minute--then rinse thoroughly.      You may use your own shampoo and conditioner. No other hair products.   - Please remove all jewelry and body piercings.  - No lotions, deodorants or fragrance.  - No makeup or fingernail polish.   - Bring your ID and insurance card.    -If you have a Deep Brain Stimulator, Spinal Cord Stimulator, or any Neuro Stimulator device---you must bring the remote control to the hospital.      ALL PATIENTS GOING HOME THE SAME DAY OF SURGERY ARE REQUIRED TO HAVE A RESPONSIBLE ADULT TO DRIVE AND BE IN ATTENDANCE WITH THEM FOR 24 HOURS FOLLOWING SURGERY.    Covid testing policy as of 12/06/2022  Your surgeon will notify and schedule you for a COVID test if one is needed before surgery--please direct any questions or COVID symptoms to your surgeon      Questions or Concerns:    - For any questions regarding the day of surgery or your hospital stay, please contact the Pre Admission Nursing Office at [phone].       - If you have health changes between today and your surgery, please call your surgeon.       - For questions after surgery, please call your surgeons office.           Current Visitor Guidelines    You may have 2 visitors in the pre op area.    Visiting hours: 8 a.m. to 8:30 p.m.    You may have four visitors during your inpatient hospital stay.    Patients confirmed or suspected to have symptoms of COVID 19 or flu:     No visitors allowed for adult patients.   Children (under age 18) can have 1 named visitor.     People who are sick or showing symptoms of COVID 19 or  flu:    Are not allowed to visit patients--we can only make exceptions in special situations.       Please follow these guidelines for your visit:          Please maintain social distance          Masking is optional--however at times you may be asked to wear a mask for the safety of yourself and others     Clean your hands with alcohol hand . Do this when you arrive at and leave the building and patient room,    And again after you touch your mask or anything in the room.     Go directly to and from the room you are visiting.     Stay in the patient s room during your visit. Limit going to other places in the hospital as much as possible     Leave bags and jackets at home or in the car.     For everyone s health, please don t come and go during your visit. That includes for smoking   during your visit.

## 2023-10-11 ASSESSMENT — LIFESTYLE VARIABLES: TOBACCO_USE: 1

## 2023-10-11 ASSESSMENT — ENCOUNTER SYMPTOMS: ORTHOPNEA: 0

## 2023-10-11 NOTE — ANESTHESIA PREPROCEDURE EVALUATION
Anesthesia Pre-Procedure Evaluation    Patient: Lopez Hogue   MRN: [id number] : 1955        Procedure : Procedure(s):  LARYNGOSCOPY, WITH BIOPSY          Past Medical History:   Diagnosis Date    EtOH dependence (H)     Quit drinking 10 years    Hypertension     Nonrheumatic aortic (valve) stenosis     Sweat gland carcinoma       Past Surgical History:   Procedure Laterality Date    CV CORONARY ANGIOGRAM N/A 3/27/2023    Procedure: Coronary Angiogram;  Surgeon: Miki Etienne MD;  Location: [facility] CATH LAB CV    CV CORONARY ANGIOGRAM N/A 2023    Procedure: Coronary Angiogram;  Surgeon: Miki Etienne MD;  Location: [facility] CATH LAB CV    CV LEFT HEART CATH N/A 3/27/2023    Procedure: Left Heart Catheterization;  Surgeon: Miki Etienne MD;  Location: [facility] CATH LAB CV    CV LEFT HEART CATH N/A 2023    Procedure: Left Heart Catheterization;  Surgeon: Miki Etienne MD;  Location: [facility] CATH LAB CV    CV PCI N/A 3/27/2023    Procedure: Percutaneous Coronary Intervention;  Surgeon: Miki Etienne MD;  Location: [facility] CATH LAB CV    OTHER SURGICAL HISTORY      WIDE EXCISION OF LEFT GLUTEAL MASSTNM: [id number], stage: II hidradenocarcinoma Grade II, 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, sentinel lymph node biopsy negative       Allergies   Allergen Reactions    Coconut Flavor Anaphylaxis     Raw coconut      Social History     Tobacco Use    Smoking status: Former     Packs/day: 2.00     Years: 30.00     Additional pack years: 0.00     Total pack years: 60.00     Types: Cigarettes     Quit date:      Years since quitting: 10.7     Passive exposure: Never    Smokeless tobacco: Never    Tobacco comments:     Quit 10 years ago   Substance Use Topics    Alcohol use: Not Currently     Comment: quit in       Wt Readings from Last 1 Encounters:   23 76.2 kg (168 lb)        Anesthesia Evaluation   Pt has had prior anesthetic.     No history of anesthetic complications       ROS/MED  HX  ENT/Pulmonary: Comment: Vallecular mass    (+)     JOSE risk factors,  hypertension,   observed stopped breathing,      tobacco use, Past use,                      Neurologic:  - neg neurologic ROS     Cardiovascular:     (+) Dyslipidemia hypertension- -  CAD - past MI - stent-3/2023. 2  Taking blood thinners Pt has received instructions:                       valvular problems/murmurs type: AS     Previous cardiac testing   Echo: Date: 9/2023 Results:  Interpretation Summary     1. Normal left ventricular size and systolic performance with a visually  estimated ejection fraction of 60-65%.  2. There is moderate aortic stenosis.  3. There is trace aortic insufficiency.  4. Normal right ventricular size and systolic performance.  5. There is mild left atrial enlargement.     When compared to the prior real-time echocardiogram dated 27 March 2023, the  findings are felt to be fairly similar on both examinations.    Stress Test:  Date: 7/2023 Results:  Interpretation Summary  1. The patient achieved good exercise workload without inducible angina.  2. Exercise stress ECG is positive for inducible myocardial ischemia in  inferior and lateral leads. ST segment depression 3 mm at the peak of  exercise.  3. No exercise induced cardiac arrhytmia.    ECG Reviewed:  Date: 5/2023 Results:  Sinus bradycardia  Cath:  Date: 5/2023 Results:  Conclusion    1.  Previously placed stents in OM left circumflex are widely patent with normal VENTURA-3 flow.  2.  PTCA sites in distal circumflex branch have  healed nicely with less than 30% residual narrowing.  3.  LAD is moderately calcified with diffuse mild luminal irregularity but no severe stenoses, unchanged from prior study.  4. RCA has mild calcification and luminal irregularity but no severe stenoses.  There is a focal 70% narrowing in a small posterolateral branch and diffuse 50 to 70% narrowing in the posterior descending branch.  Both appear unchanged from prior study..   (-)  "SPENCER and orthopnea/PND   METS/Exercise Tolerance: >4 METS Comment: Is doing 30-60 minutes of cardio 6 days per week for exercise.    Denies any exertional dyspnea or angina.     Hematologic:     (+)      anemia,          Musculoskeletal:  - neg musculoskeletal ROS     GI/Hepatic:  - neg GI/hepatic ROS     Renal/Genitourinary:  - neg Renal ROS     Endo:  - neg endo ROS     Psychiatric/Substance Use:     (+) psychiatric history anxiety alcohol abuse      Infectious Disease:  - neg infectious disease ROS     Malignancy:   (+) Malignancy, History of Other and Skin.Skin CA Remission status post Surgery.  Other CA sweat gland carcinoma, left buttock Remission status post Surgery.    Other:  - neg other ROS          Physical Exam    Airway        Mallampati: II   TM distance: > 3 FB   Neck ROM: full   Mouth opening: > 3 cm    Respiratory Devices and Support         Dental     Comment: On the top with minor-modest abnormalities on bottom    (+) Edentulous      Cardiovascular          Rhythm and rate: regular and bradycardia     Pulmonary           breath sounds clear to auscultation           OUTSIDE LABS:  CBC:   Lab Results   Component Value Date    WBC 6.1 08/18/2023    WBC 6.5 05/08/2023    HGB 11.8 (L) 08/18/2023    HGB 12.4 (L) 08/18/2023    HCT 37.1 (L) 08/18/2023    HCT 36.9 (L) 05/08/2023     08/18/2023     05/08/2023     BMP:   Lab Results   Component Value Date     08/18/2023     05/08/2023    POTASSIUM 4.2 08/18/2023    POTASSIUM 4.1 05/08/2023    CHLORIDE 106 08/18/2023    CHLORIDE 107 05/08/2023    CO2 25 08/18/2023    CO2 25 05/08/2023    BUN 19.6 08/18/2023    BUN 13.7 05/08/2023    CR 0.73 08/18/2023    CR 0.87 05/08/2023    GLC 94 08/18/2023     (H) 05/08/2023     COAGS: No results found for: \"PTT\", \"INR\", \"FIBR\"  POC: No results found for: \"BGM\", \"HCG\", \"HCGS\"  HEPATIC:   Lab Results   Component Value Date    ALBUMIN 4.1 03/27/2023    PROTTOTAL 7.1 03/27/2023    ALT 27 " 05/11/2023    AST 33 05/11/2023    ALKPHOS 70 03/27/2023    BILITOTAL 0.9 03/27/2023     OTHER:   Lab Results   Component Value Date    A1C 5.2 03/27/2023    RAFAELA 9.4 08/18/2023    MAG 1.8 08/18/2023       Anesthesia Plan    ASA Status:  3       Anesthesia Type: General.     - Airway: ETT   Induction: Intravenous, Propofol.   Maintenance: Balanced.   Techniques and Equipment:     - Airway: Video-Laryngoscope     - Lines/Monitors: BIS     Consents            Postoperative Care    Pain management: IV analgesics, Oral pain medications, Multi-modal analgesia.   PONV prophylaxis: Ondansetron (or other 5HT-3), Dexamethasone or Solumedrol     Comments:                Elvi Pryor MD

## 2023-10-12 ENCOUNTER — HOSPITAL ENCOUNTER (OUTPATIENT)
Facility: CLINIC | Age: 68
Discharge: HOME | End: 2023-10-12
Attending: STUDENT IN AN ORGANIZED HEALTH CARE EDUCATION/TRAINING PROGRAM | Admitting: STUDENT IN AN ORGANIZED HEALTH CARE EDUCATION/TRAINING PROGRAM
Payer: COMMERCIAL

## 2023-10-12 ENCOUNTER — ANESTHESIA (OUTPATIENT)
Dept: SURGERY | Facility: CLINIC | Age: 68
End: 2023-10-12
Payer: COMMERCIAL

## 2023-10-12 VITALS
OXYGEN SATURATION: 97 % | RESPIRATION RATE: 18 BRPM | TEMPERATURE: 97.8 F | WEIGHT: 168.43 LBS | BODY MASS INDEX: 25.53 KG/M2 | SYSTOLIC BLOOD PRESSURE: 147 MMHG | DIASTOLIC BLOOD PRESSURE: 72 MMHG | HEART RATE: 51 BPM | HEIGHT: 68 IN

## 2023-10-12 DIAGNOSIS — I20.0 UNSTABLE ANGINA (H): ICD-10-CM

## 2023-10-12 DIAGNOSIS — J38.7 VALLECULAR MASS: Primary | ICD-10-CM

## 2023-10-12 PROCEDURE — 88341 IMHCHEM/IMCYTCHM EA ADD ANTB: CPT | Mod: 26 | Performed by: STUDENT IN AN ORGANIZED HEALTH CARE EDUCATION/TRAINING PROGRAM

## 2023-10-12 PROCEDURE — 88305 TISSUE EXAM BY PATHOLOGIST: CPT | Mod: TC | Performed by: STUDENT IN AN ORGANIZED HEALTH CARE EDUCATION/TRAINING PROGRAM

## 2023-10-12 PROCEDURE — 258N000003 HC RX IP 258 OP 636

## 2023-10-12 PROCEDURE — 250N000009 HC RX 250

## 2023-10-12 PROCEDURE — 88365 INSITU HYBRIDIZATION (FISH): CPT | Mod: 26 | Performed by: STUDENT IN AN ORGANIZED HEALTH CARE EDUCATION/TRAINING PROGRAM

## 2023-10-12 PROCEDURE — 250N000011 HC RX IP 250 OP 636: Mod: JZ

## 2023-10-12 PROCEDURE — 250N000025 HC SEVOFLURANE, PER MIN: Performed by: STUDENT IN AN ORGANIZED HEALTH CARE EDUCATION/TRAINING PROGRAM

## 2023-10-12 PROCEDURE — 250N000013 HC RX MED GY IP 250 OP 250 PS 637

## 2023-10-12 PROCEDURE — 272N000001 HC OR GENERAL SUPPLY STERILE: Performed by: STUDENT IN AN ORGANIZED HEALTH CARE EDUCATION/TRAINING PROGRAM

## 2023-10-12 PROCEDURE — 88331 PATH CONSLTJ SURG 1 BLK 1SPC: CPT | Mod: 26 | Performed by: PATHOLOGY

## 2023-10-12 PROCEDURE — 31536 LARYNGOSCOPY W/BX & OP SCOPE: CPT | Mod: GC | Performed by: STUDENT IN AN ORGANIZED HEALTH CARE EDUCATION/TRAINING PROGRAM

## 2023-10-12 PROCEDURE — 88342 IMHCHEM/IMCYTCHM 1ST ANTB: CPT | Mod: 26 | Performed by: STUDENT IN AN ORGANIZED HEALTH CARE EDUCATION/TRAINING PROGRAM

## 2023-10-12 PROCEDURE — 88331 PATH CONSLTJ SURG 1 BLK 1SPC: CPT | Mod: TC | Performed by: STUDENT IN AN ORGANIZED HEALTH CARE EDUCATION/TRAINING PROGRAM

## 2023-10-12 PROCEDURE — 250N000009 HC RX 250: Performed by: STUDENT IN AN ORGANIZED HEALTH CARE EDUCATION/TRAINING PROGRAM

## 2023-10-12 PROCEDURE — 360N000076 HC SURGERY LEVEL 3, PER MIN: Performed by: STUDENT IN AN ORGANIZED HEALTH CARE EDUCATION/TRAINING PROGRAM

## 2023-10-12 PROCEDURE — 710N000012 HC RECOVERY PHASE 2, PER MINUTE: Performed by: STUDENT IN AN ORGANIZED HEALTH CARE EDUCATION/TRAINING PROGRAM

## 2023-10-12 PROCEDURE — 250N000011 HC RX IP 250 OP 636

## 2023-10-12 PROCEDURE — 710N000010 HC RECOVERY PHASE 1, LEVEL 2, PER MIN: Performed by: STUDENT IN AN ORGANIZED HEALTH CARE EDUCATION/TRAINING PROGRAM

## 2023-10-12 PROCEDURE — 999N000141 HC STATISTIC PRE-PROCEDURE NURSING ASSESSMENT: Performed by: STUDENT IN AN ORGANIZED HEALTH CARE EDUCATION/TRAINING PROGRAM

## 2023-10-12 PROCEDURE — 88305 TISSUE EXAM BY PATHOLOGIST: CPT | Mod: 26 | Performed by: STUDENT IN AN ORGANIZED HEALTH CARE EDUCATION/TRAINING PROGRAM

## 2023-10-12 PROCEDURE — 370N000017 HC ANESTHESIA TECHNICAL FEE, PER MIN: Performed by: STUDENT IN AN ORGANIZED HEALTH CARE EDUCATION/TRAINING PROGRAM

## 2023-10-12 PROCEDURE — 250N000013 HC RX MED GY IP 250 OP 250 PS 637: Performed by: ANESTHESIOLOGY

## 2023-10-12 PROCEDURE — 250N000011 HC RX IP 250 OP 636: Performed by: STUDENT IN AN ORGANIZED HEALTH CARE EDUCATION/TRAINING PROGRAM

## 2023-10-12 RX ORDER — ONDANSETRON 2 MG/ML
4 INJECTION INTRAMUSCULAR; INTRAVENOUS EVERY 30 MIN PRN
Status: DISCONTINUED | OUTPATIENT
Start: 2023-10-12 | End: 2023-10-12 | Stop reason: HOSPADM

## 2023-10-12 RX ORDER — SODIUM CHLORIDE, SODIUM LACTATE, POTASSIUM CHLORIDE, CALCIUM CHLORIDE 600; 310; 30; 20 MG/100ML; MG/100ML; MG/100ML; MG/100ML
INJECTION, SOLUTION INTRAVENOUS CONTINUOUS
Status: DISCONTINUED | OUTPATIENT
Start: 2023-10-12 | End: 2023-10-12 | Stop reason: HOSPADM

## 2023-10-12 RX ORDER — FENTANYL CITRATE 50 UG/ML
INJECTION, SOLUTION INTRAMUSCULAR; INTRAVENOUS PRN
Status: DISCONTINUED | OUTPATIENT
Start: 2023-10-12 | End: 2023-10-12

## 2023-10-12 RX ORDER — LIDOCAINE HYDROCHLORIDE 20 MG/ML
INJECTION, SOLUTION INFILTRATION; PERINEURAL PRN
Status: DISCONTINUED | OUTPATIENT
Start: 2023-10-12 | End: 2023-10-12

## 2023-10-12 RX ORDER — HYDROMORPHONE HCL IN WATER/PF 6 MG/30 ML
0.4 PATIENT CONTROLLED ANALGESIA SYRINGE INTRAVENOUS EVERY 5 MIN PRN
Status: DISCONTINUED | OUTPATIENT
Start: 2023-10-12 | End: 2023-10-12 | Stop reason: HOSPADM

## 2023-10-12 RX ORDER — HYDROMORPHONE HCL IN WATER/PF 6 MG/30 ML
0.2 PATIENT CONTROLLED ANALGESIA SYRINGE INTRAVENOUS EVERY 5 MIN PRN
Status: DISCONTINUED | OUTPATIENT
Start: 2023-10-12 | End: 2023-10-12 | Stop reason: HOSPADM

## 2023-10-12 RX ORDER — LIDOCAINE 40 MG/G
CREAM TOPICAL
Status: DISCONTINUED | OUTPATIENT
Start: 2023-10-12 | End: 2023-10-12 | Stop reason: HOSPADM

## 2023-10-12 RX ORDER — LABETALOL HYDROCHLORIDE 5 MG/ML
10 INJECTION, SOLUTION INTRAVENOUS
Status: DISCONTINUED | OUTPATIENT
Start: 2023-10-12 | End: 2023-10-12 | Stop reason: HOSPADM

## 2023-10-12 RX ORDER — ACETAMINOPHEN 325 MG/1
975 TABLET ORAL ONCE
Status: DISCONTINUED | OUTPATIENT
Start: 2023-10-12 | End: 2023-10-12 | Stop reason: HOSPADM

## 2023-10-12 RX ORDER — HALOPERIDOL 5 MG/ML
1 INJECTION INTRAMUSCULAR
Status: DISCONTINUED | OUTPATIENT
Start: 2023-10-12 | End: 2023-10-12 | Stop reason: HOSPADM

## 2023-10-12 RX ORDER — FENTANYL CITRATE 50 UG/ML
25 INJECTION, SOLUTION INTRAMUSCULAR; INTRAVENOUS EVERY 5 MIN PRN
Status: DISCONTINUED | OUTPATIENT
Start: 2023-10-12 | End: 2023-10-12 | Stop reason: HOSPADM

## 2023-10-12 RX ORDER — ONDANSETRON 4 MG/1
4 TABLET, ORALLY DISINTEGRATING ORAL EVERY 30 MIN PRN
Status: DISCONTINUED | OUTPATIENT
Start: 2023-10-12 | End: 2023-10-12 | Stop reason: HOSPADM

## 2023-10-12 RX ORDER — FENTANYL CITRATE 50 UG/ML
50 INJECTION, SOLUTION INTRAMUSCULAR; INTRAVENOUS EVERY 5 MIN PRN
Status: DISCONTINUED | OUTPATIENT
Start: 2023-10-12 | End: 2023-10-12 | Stop reason: HOSPADM

## 2023-10-12 RX ORDER — PROPOFOL 10 MG/ML
INJECTION, EMULSION INTRAVENOUS PRN
Status: DISCONTINUED | OUTPATIENT
Start: 2023-10-12 | End: 2023-10-12

## 2023-10-12 RX ORDER — LIDOCAINE HYDROCHLORIDE 40 MG/ML
SOLUTION TOPICAL PRN
Status: DISCONTINUED | OUTPATIENT
Start: 2023-10-12 | End: 2023-10-12 | Stop reason: HOSPADM

## 2023-10-12 RX ORDER — ACETAMINOPHEN 325 MG/1
975 TABLET ORAL ONCE
Status: COMPLETED | OUTPATIENT
Start: 2023-10-12 | End: 2023-10-12

## 2023-10-12 RX ORDER — SODIUM CHLORIDE, SODIUM LACTATE, POTASSIUM CHLORIDE, CALCIUM CHLORIDE 600; 310; 30; 20 MG/100ML; MG/100ML; MG/100ML; MG/100ML
INJECTION, SOLUTION INTRAVENOUS CONTINUOUS PRN
Status: DISCONTINUED | OUTPATIENT
Start: 2023-10-12 | End: 2023-10-12

## 2023-10-12 RX ORDER — CLOPIDOGREL BISULFATE 75 MG/1
75 TABLET ORAL DAILY
Start: 2023-10-14 | End: 2024-01-19

## 2023-10-12 RX ORDER — OXYCODONE HYDROCHLORIDE 5 MG/1
5 TABLET ORAL
Status: COMPLETED | OUTPATIENT
Start: 2023-10-12 | End: 2023-10-12

## 2023-10-12 RX ORDER — DEXAMETHASONE SODIUM PHOSPHATE 10 MG/ML
10 INJECTION, SOLUTION INTRAMUSCULAR; INTRAVENOUS ONCE
Status: DISCONTINUED | OUTPATIENT
Start: 2023-10-12 | End: 2023-10-12 | Stop reason: HOSPADM

## 2023-10-12 RX ORDER — ONDANSETRON 2 MG/ML
INJECTION INTRAMUSCULAR; INTRAVENOUS PRN
Status: DISCONTINUED | OUTPATIENT
Start: 2023-10-12 | End: 2023-10-12

## 2023-10-12 RX ORDER — OXYMETAZOLINE HYDROCHLORIDE 0.05 G/100ML
SPRAY NASAL PRN
Status: DISCONTINUED | OUTPATIENT
Start: 2023-10-12 | End: 2023-10-12 | Stop reason: HOSPADM

## 2023-10-12 RX ORDER — HYDROXYZINE HYDROCHLORIDE 10 MG/1
10 TABLET, FILM COATED ORAL EVERY 6 HOURS PRN
Status: DISCONTINUED | OUTPATIENT
Start: 2023-10-12 | End: 2023-10-12 | Stop reason: HOSPADM

## 2023-10-12 RX ORDER — DEXAMETHASONE SODIUM PHOSPHATE 4 MG/ML
INJECTION, SOLUTION INTRA-ARTICULAR; INTRALESIONAL; INTRAMUSCULAR; INTRAVENOUS; SOFT TISSUE PRN
Status: DISCONTINUED | OUTPATIENT
Start: 2023-10-12 | End: 2023-10-12

## 2023-10-12 RX ADMIN — FENTANYL CITRATE 25 MCG: 50 INJECTION INTRAMUSCULAR; INTRAVENOUS at 08:16

## 2023-10-12 RX ADMIN — PROPOFOL 50 MG: 10 INJECTION, EMULSION INTRAVENOUS at 07:34

## 2023-10-12 RX ADMIN — PHENYLEPHRINE HYDROCHLORIDE 100 MCG: 10 INJECTION INTRAVENOUS at 07:45

## 2023-10-12 RX ADMIN — SUGAMMADEX 200 MG: 100 INJECTION, SOLUTION INTRAVENOUS at 08:19

## 2023-10-12 RX ADMIN — FENTANYL CITRATE 25 MCG: 50 INJECTION INTRAMUSCULAR; INTRAVENOUS at 07:54

## 2023-10-12 RX ADMIN — FENTANYL CITRATE 25 MCG: 50 INJECTION INTRAMUSCULAR; INTRAVENOUS at 07:52

## 2023-10-12 RX ADMIN — FENTANYL CITRATE 75 MCG: 50 INJECTION INTRAMUSCULAR; INTRAVENOUS at 07:31

## 2023-10-12 RX ADMIN — DEXAMETHASONE SODIUM PHOSPHATE 10 MG: 4 INJECTION, SOLUTION INTRA-ARTICULAR; INTRALESIONAL; INTRAMUSCULAR; INTRAVENOUS; SOFT TISSUE at 07:45

## 2023-10-12 RX ADMIN — Medication 1 LOZENGE: at 09:05

## 2023-10-12 RX ADMIN — FENTANYL CITRATE 50 MCG: 50 INJECTION, SOLUTION INTRAMUSCULAR; INTRAVENOUS at 08:52

## 2023-10-12 RX ADMIN — LIDOCAINE HYDROCHLORIDE 100 MG: 20 INJECTION, SOLUTION INFILTRATION; PERINEURAL at 07:32

## 2023-10-12 RX ADMIN — OXYCODONE HYDROCHLORIDE 5 MG: 5 TABLET ORAL at 10:02

## 2023-10-12 RX ADMIN — Medication 35 MG: at 07:33

## 2023-10-12 RX ADMIN — ACETAMINOPHEN 975 MG: 325 TABLET, FILM COATED ORAL at 05:57

## 2023-10-12 RX ADMIN — PROPOFOL 120 MG: 10 INJECTION, EMULSION INTRAVENOUS at 07:32

## 2023-10-12 RX ADMIN — ONDANSETRON 4 MG: 2 INJECTION INTRAMUSCULAR; INTRAVENOUS at 07:55

## 2023-10-12 RX ADMIN — SODIUM CHLORIDE, POTASSIUM CHLORIDE, SODIUM LACTATE AND CALCIUM CHLORIDE: 600; 310; 30; 20 INJECTION, SOLUTION INTRAVENOUS at 07:28

## 2023-10-12 ASSESSMENT — ACTIVITIES OF DAILY LIVING (ADL)
ADLS_ACUITY_SCORE: 35
ADLS_ACUITY_SCORE: 35

## 2023-10-12 NOTE — ANESTHESIA POSTPROCEDURE EVALUATION
Patient: Lopez Hogue    Procedure: Procedure(s):  LARYNGOSCOPY, WITH BIOPSY       Anesthesia Type:  General    Note:  Disposition: Outpatient   Postop Pain Control: Uneventful            Sign Out: Well controlled pain   PONV: No   Neuro/Psych: Uneventful            Sign Out: Acceptable/Baseline neuro status   Airway/Respiratory: Uneventful            Sign Out: Acceptable/Baseline resp. status   CV/Hemodynamics: Uneventful            Sign Out: Acceptable CV status; No obvious hypovolemia; No obvious fluid overload   Other NRE: NONE   DID A NON-ROUTINE EVENT OCCUR? No    Event details/Postop Comments:  Uneventful PACU stay. HDS. Pain controlled. No N/V. At baseline. Has family to stay with them.            Last vitals:  Vitals Value Taken Time   /69 10/12/23 0838   Temp 36.4    Pulse 54 10/12/23 0906   Resp 15 10/12/23 0906   SpO2 99 % 10/12/23 0906   Vitals shown include unfiled device data.    Electronically Signed By: Ervin Winters MD  October 12, 2023  9:07 AM

## 2023-10-12 NOTE — ANESTHESIA CARE TRANSFER NOTE
Patient: Lopez Hogue    Procedure: Procedure(s):  LARYNGOSCOPY, WITH BIOPSY       Diagnosis: Vallecular mass [J38.7]  Diagnosis Additional Information: No value filed.    Anesthesia Type:   General     Note:    Oropharynx: oropharynx clear of all foreign objects and spontaneously breathing  Level of Consciousness: awake  Oxygen Supplementation: face mask  Level of Supplemental Oxygen (L/min / FiO2): 6  Independent Airway: airway patency satisfactory and stable  Dentition: dentition unchanged  Vital Signs Stable: post-procedure vital signs reviewed and stable  Report to RN Given: handoff report given  Patient transferred to: PACU    Handoff Report: Identifed the Patient, Identified the Reponsible Provider, Reviewed the pertinent medical history, Discussed the surgical course, Reviewed Intra-OP anesthesia mangement and issues during anesthesia, Set expectations for post-procedure period and Allowed opportunity for questions and acknowledgement of understanding      Vitals:  Vitals Value Taken Time   /69 10/12/23 0838   Temp     Pulse 53 10/12/23 0846   Resp 31 10/12/23 0846   SpO2 100 % 10/12/23 0846   Vitals shown include unfiled device data.    Electronically Signed By: Elvi Pryor MD  October 12, 2023  8:47 AM

## 2023-10-12 NOTE — OP NOTE
NAME: Lopez Hogue    MEDICAL RECORD NUMBER: [id number]    YOB: 1955    DATE OF SURGERY: October 12, 2023    SURGEON: Edi Felix MD    ASSISTANT SURGEON: Young Rosenberg MD    PREOPERATIVE DIAGNOSIS: Vallecular mass [J38.7]     POSTOPERATIVE DIAGNOSIS: Vallecular mass [J38.7]     PROCEDURE: Microdirect laryngoscopy with biopsy    INDICATIONS: Lopez Hogue is a 67 year old male with a history of hemoptysis.  He has a history of stable coronary artery disease with placement of two drug eluting stents on 3/27/2023 following a non-ST segment elevation myocardial infarction.  He is on Plavix.    He coughed up blood was estimated to be 1/2 cup of blood early in the morning on 8/18/2023.  When seen in the ER he was not actively coughing up blood.  Prominent vessels in the posterior nasopharynx were seen on examination but no active bleeding present.  A CT scan of the neck demonstrated a questionable soft tissue mass of the epiglottis.  Flexible laryngoscopy showed a violaceous lesion in the right vallecula with a small clot of blood.  No other suspicious lesions were seen.  The case was discussed with the laryngology team. He was then seen as an outpatient. He was otherwise asymptomatic. He smoked for 30 years and quit 10 years ago.  He also has a history of alcohol use but also quit this approximately 10 years ago. Flexible laryngoscopy demonstrated a concerning exophytic lesion of the right vallecula. After detailed discussion of the risks, benefits, and alternatives to surgery, patient elected to proceed with the aforementioned procedures. Plavix and ASA was held in advance of procedure.    ANESTHESIA: General; endotracheal intubation     FINDINGS:    Exophytic mass of right vallecula, friable  Frozen section path c/w SCC.    EBL: less than 5 cc    SPECIMENS:   ID Type Source Tests Collected by Time Destination   1 : Right Vallecula lesion Tissue Other SURGICAL PATHOLOGY EXAM Edi Felix  MD Josef 10/12/2023  7:51 AM     2 : Right Vallecula Lesion Tissue Other SURGICAL PATHOLOGY EXAM Edi Felix MD 10/12/2023  7:52 AM         COMPLICATIONS: None    DESCRIPTION OF PROCEDURE: The patient was taken the operating room and positioned supine on the operating table.  The patient was induced after general anesthesia and orotracheally intubated.  The bed was turned 90 degrees away from the anesthesia team.  The lower dentition was affected with a marker in the upper gingiva was protected with Ray-Silvia sponge.  The patient was draped in usual sterile fashion.  A full timeout is performed.  The Dedo laryngoscope was passed atraumatically and a full direct laryngoscopy was completed.  Consistent with preoperative examination and imaging, there was a lesion of the right vallecula.  The zero degree telescope was advanced through the laryngoscope to examine the oropharynx. This was about 2 cm in size abutted the oropharyngeal surface of the epiglottis.  The laryngoscope was secured in place with a Lewy suspension system.  Biopsies of the lesion were taken with cup forceps.  Specimen was sent for frozen section and permanent section pathology.  The frozen section pathology was consistent with invasive squamous cell carcinoma.  Hemostasis was ensured with an Afrin pledget was then removed.  The patient was taken out of suspension.  Prior to extubation the thoracoscope was reintroduced and bloody secretions were suctioned.  The area was hemostatic.  A laryngotracheal anesthesia was topically applied to the true vocal folds.  The scope was removed and the patient turned back towards the anesthesia team.  The drapes were taken down and the patient was awoken extubated without issue.  He was taken to PACU in good condition.    Attending surgeon was present for entire case and completed all critical portions.    DISPOSITION: Home    Young Rosenberg MD  ENT resident

## 2023-10-12 NOTE — ANESTHESIA PROCEDURE NOTES
Airway       Patient location during procedure: OR       Procedure Start/Stop Times: 10/12/2023 7:35 AM  Staff -        Anesthesiologist:  Suzan Arreola MD       Resident/Fellow: Elvi Pryor MD       Performed By: resident and anesthesiologist  Consent for Airway        Urgency: elective  Indications and Patient Condition       Indications for airway management: geneva-procedural       Induction type:intravenous       Mask difficulty assessment: 1 - vent by mask    Final Airway Details       Final airway type: endotracheal airway       Successful airway: ETT - single and Oral  Endotracheal Airway Details        ETT size (mm): 6.0       Cuffed: yes       Successful intubation technique: video laryngoscopy       VL Blade Size: MAC 4       Grade View of Cords: 1       Adjucts: stylet       Position: Left       Measured from: gums/teeth       Secured at (cm): 21       Bite block used: None    Post intubation assessment        Placement verified by: capnometry, equal breath sounds and chest rise        Number of attempts at approach: 1       Number of other approaches attempted: 0       Secured with: pink tape       Ease of procedure: easy       Dentition: Intact and Unchanged    Medication(s) Administered   Medication Administration Time: 10/12/2023 7:35 AM

## 2023-10-12 NOTE — BRIEF OP NOTE
[facility]    Brief Operative Note    Pre-operative diagnosis: Vallecular mass [J38.7]  Post-operative diagnosis Same as pre-operative diagnosis    Procedure: LARYNGOSCOPY, WITH BIOPSY, N/A - Throat    Surgeon: Surgeon(s) and Role:     * Edi Felix MD - Primary     * Young Rosenberg MD - Resident - Assisting  Anesthesia: General   Estimated Blood Loss: Minimal    Drains: None  Specimens:   ID Type Source Tests Collected by Time Destination   1 : Right Vallecula lesion Tissue Other SURGICAL PATHOLOGY EXAM Edi Felix MD 10/12/2023  7:51 AM    2 : Right Vallecula Lesion Tissue Other SURGICAL PATHOLOGY EXAM Edi Felix MD 10/12/2023  7:52 AM      Findings:   Exophytic mass of right vallecula, friable  Frozen section path c/w SCC.  Complications: None.  Implants: * No implants in log *

## 2023-10-12 NOTE — DISCHARGE INSTRUCTIONS
[facility]  Same-Day Surgery   Adult Discharge Orders & Instructions     For 24 hours after surgery    Get plenty of rest.  A responsible adult must stay with you for at least 24 hours after you leave the hospital.   Do not drive or use heavy equipment.  If you have weakness or tingling, don't drive or use heavy equipment until this feeling goes away.  Do not drink alcohol.  Avoid strenuous or risky activities.  Ask for help when climbing stairs.   You may feel lightheaded.  IF so, sit for a few minutes before standing.  Have someone help you get up.   If you have nausea (feel sick to your stomach): Drink only clear liquids such as apple juice, ginger ale, broth or 7-Up.  Rest may also help.  Be sure to drink enough fluids.  Move to a regular diet as you feel able.  You may have a slight fever. Call the doctor if your fever is over 100 F (37.7 C) (taken under the tongue) or lasts longer than 24 hours.  You may have a dry mouth, a sore throat, muscle aches or trouble sleeping.  These should go away after 24 hours.  Do not make important or legal decisions.   Call your doctor for any of the followin.  Signs of infection (fever, growing tenderness at the surgery site, a large amount of drainage or bleeding, severe pain, foul-smelling drainage, redness, swelling).    2. It has been over 8 to 10 hours since surgery and you are still not able to urinate (pass water).    3.  Headache for over 24 hours.    4.  Numbness, tingling or weakness the day after surgery (if you had spinal anesthesia).  To contact a doctor, call [facility] at [phone] or:    '   [phone] and ask for the resident on call for   Otolaryngology (ENT) (answered 24 hours a day)  '   Emergency Department:    [facility]: [phone]       (TTY for hearing impaired: [phone])    [facility]: [phone]       (TTY for hearing impaired: [phone])

## 2023-10-13 ENCOUNTER — HOSPITAL ENCOUNTER (OUTPATIENT)
Dept: PET IMAGING | Facility: HOSPITAL | Age: 68
Discharge: HOME | End: 2023-10-13
Attending: STUDENT IN AN ORGANIZED HEALTH CARE EDUCATION/TRAINING PROGRAM
Payer: COMMERCIAL

## 2023-10-13 DIAGNOSIS — J38.7 VALLECULAR MASS: ICD-10-CM

## 2023-10-13 LAB — GLUCOSE BLDC GLUCOMTR-MCNC: 85 MG/DL (ref 70–99)

## 2023-10-13 PROCEDURE — 250N000011 HC RX IP 250 OP 636: Performed by: STUDENT IN AN ORGANIZED HEALTH CARE EDUCATION/TRAINING PROGRAM

## 2023-10-13 PROCEDURE — 343N000001 HC RX 343: Performed by: STUDENT IN AN ORGANIZED HEALTH CARE EDUCATION/TRAINING PROGRAM

## 2023-10-13 PROCEDURE — 78816 PET IMAGE W/CT FULL BODY: CPT | Mod: PI

## 2023-10-13 PROCEDURE — A9552 F18 FDG: HCPCS | Performed by: STUDENT IN AN ORGANIZED HEALTH CARE EDUCATION/TRAINING PROGRAM

## 2023-10-13 PROCEDURE — 74177 CT ABD & PELVIS W/CONTRAST: CPT

## 2023-10-13 PROCEDURE — 70491 CT SOFT TISSUE NECK W/DYE: CPT

## 2023-10-13 PROCEDURE — 82962 GLUCOSE BLOOD TEST: CPT

## 2023-10-13 RX ORDER — IOPAMIDOL 755 MG/ML
50-100 INJECTION, SOLUTION INTRAVASCULAR ONCE
Status: COMPLETED | OUTPATIENT
Start: 2023-10-13 | End: 2023-10-13

## 2023-10-13 RX ADMIN — IOPAMIDOL 82 ML: 755 INJECTION, SOLUTION INTRAVENOUS at 09:41

## 2023-10-13 RX ADMIN — FLUDEOXYGLUCOSE F-18 10.22 MILLICURIE: 500 INJECTION, SOLUTION INTRAVENOUS at 08:38

## 2023-10-17 ENCOUNTER — PATIENT OUTREACH (OUTPATIENT)
Dept: OTOLARYNGOLOGY | Facility: CLINIC | Age: 68
End: 2023-10-17
Payer: COMMERCIAL

## 2023-10-17 DIAGNOSIS — J38.7 VALLECULAR MASS: Primary | ICD-10-CM

## 2023-10-17 NOTE — PROGRESS NOTES
Called patient in regards to the following CT Chest/Abd/Pelvis:    IMPRESSION:  1. 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  2. FDG avid thickening of the mid to distal esophagus, suspicious for a synchronous primary esophageal neoplasm. Recommend correlation with endoscopy.    GI referral has been placed for follow up and potential biopsy of this esophagus finding. Patient is aware that he will be getting call in the next few days to schedule with GI.     Patient is on tumor board for this Friday (10/20/23) and I let him know we would give him a call Friday afternoon to discuss the rest of his treatment plan and any other referrals that will be placed. He had no further questions or concerns at this time.     Hollie Biswas, RN, BSN  RN Care Coordinator, ENT Clinic  [facility]  Direct: [phone]

## 2023-10-20 ENCOUNTER — PATIENT OUTREACH (OUTPATIENT)
Dept: OTOLARYNGOLOGY | Facility: CLINIC | Age: 68
End: 2023-10-20

## 2023-10-20 ENCOUNTER — TUMOR CONFERENCE (OUTPATIENT)
Dept: ONCOLOGY | Facility: CLINIC | Age: 68
End: 2023-10-20
Payer: COMMERCIAL

## 2023-10-20 DIAGNOSIS — C10.0 SQUAMOUS CELL CARCINOMA OF VALLECULA (H): Primary | ICD-10-CM

## 2023-10-20 NOTE — TUMOR CONFERENCE
Head & Neck Tumor Conference Note   Status: New   Staff: Dr. 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 He presented with a history of hemoptysis.  He has a history of stable coronary artery disease with placement of two drug eluting stents on 3/27/2023 following a non-ST segment elevation myocardial infarction.  He is on Plavix.    He coughed up blood was estimated to be 1/2 cup of blood early in the morning on 8/18/2023.  When seen in the ER he was not actively coughing up blood.  Prominent vessels in the posterior nasopharynx were seen on examination but no active bleeding present.  A CT scan of the neck demonstrated a questionable soft tissue mass of the epiglottis.  Flexible laryngoscopy showed a violaceous lesion in the right vallecula with a small clot of blood.  No other suspicious lesions were seen.  The case was discussed with the laryngology team. He was then seen as an outpatient. He was otherwise asymptomatic. He smoked for 30 years and quit 10 years ago.  He also has a history of alcohol use but also quit this approximately 10 years ago. Flexible laryngoscopy demonstrated a concerning exophytic lesion of the right vallecula. Thus he underwent a DL with biopsy of 10/12/23. He has also gotten a PET-CT. We will discuss path, imaging, and plan of care.   Pertinent PMH:   Past Medical History:   Diagnosis Date     EtOH dependence (H)     Quit drinking 10 years     Hypertension      Nonrheumatic aortic (valve) stenosis      Sweat gland carcinoma       Smoking Hx:   Social History     Tobacco Use     Smoking status: Former     Packs/day: 2.00     Years: 30.00     Additional pack years: 0.00     Total pack years: 60.00     Types: Cigarettes     Quit  date: 2013     Years since quitting: 10.8     Passive exposure: Never     Smokeless tobacco: Never     Tobacco comments:     Quit 10 years ago   Vaping Use     Vaping Use: Never used   Substance Use Topics     Alcohol use: Not Currently     Comment: quit in 2013     Drug use: Never       Imaging:   PET-CT 10/13/23 -     CT FINDINGS: Mild mucosal thickening in the paranasal sinuses. Mild carotid artery bifurcation calcification. Severe coronary artery calcification. Aortic valve calcification. Right middle lobe calcified granuloma. Few tiny pulmonary vessels are too small to characterize by PET/CT. Scattered colonic diverticuli. Pelvic phleboliths. Multilevel degenerative changes of the spine. Lower extremities are remarkable.                                                                      IMPRESSION:     1. 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    IMPRESSION:  1. 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  2. FDG avid thickening of the mid to distal esophagus, suspicious for a synchronous primary esophageal neoplasm. Recommend correlation with endoscopy.  Pathology:   A&B. RIGHT VALLECULA LESION, BIOPSY:  - NON-KERATINIZING POORLY DIFFERENTIATED SQUAMOUS CELL CARCINOMA  - p16 is negative     Tumor Board Recommendation:   Discussion: On review of PET-CT there appears to be a vallecular tumor and right-sided lymphadenopathy at levels 2 and 4. Additionally there is circumferential uptake in the esophagus, and thus endoscopy should be performed. We will put in a GI referral. We will plan for chemoradiation for head and neck disease.   Plan:   - Chemoradiation   - Consult rad onc, medical onc, SLP, nutrition, dental    Trino Orozco MD  Otolaryngology Head and Neck Surgery Resident, PGY-3    Documentation / Disclaimer Cancer Tumor Board Note:  Cancer tumor board recommendations do not override what is determined to be reasonable care and treatment, which is dependent on the circumstances of a patient's case; the patient's medical, social, and personal concerns; and the clinical judgment of the oncologist [physician].

## 2023-10-20 NOTE — PROGRESS NOTES
Called and spoke to patient regarding Radiation and Medical Oncology referrals that have been placed today. A dental referral with [facility] for chemoradiation clearance and SLP video swallow for baseline prior to radiation has also been ordered. Follow up appointment with Dr. Felix on Monday 10/23/23 is being canceled as it is not needed at this time. Patient spoke to Dr. Felix earlier today about tumor board recommendation and is aware of these plans. He had no further questions or concerns at this time and has direct call back number if needed.     Hollie Biswas, RN, BSN  RN Care Coordinator, ENT Clinic  [facility]  Direct: [phone]

## 2023-10-23 ENCOUNTER — PATIENT OUTREACH (OUTPATIENT)
Dept: ONCOLOGY | Facility: CLINIC | Age: 68
End: 2023-10-23

## 2023-10-23 NOTE — PROGRESS NOTES
I called and spoke to Fabricio a couple of times.  I explained my role and purpose of my call.  He is requesting to be scheduled at [facility].  We worked out those appointments.    He also tells me he has a dental appointment this afternoon.  He has scheduled the SLP appointment on 11/1.  NO word yet from [facility] and I have sent an IB message to ENT regarding this.    He has my direct contact information for any further questions/concerns.

## 2023-10-23 NOTE — PROGRESS NOTES
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 These results have been appended to a previously preliminary verified report.     Electronically signed by Annalisa Nieto MD on 10/19/2023 at  9:02 AM    Clinical Assessment / Barriers to Care (Per Nurse):  mid to distal esophagus thickening seen on PET suspicious for a synchronous primary esophageal neoplasm (GI referral placed)     Records Location (Care Everywhere, Media, etc.): [facility]     Records Needed: none     Additional testing needed prior to consult: none

## 2023-10-24 ENCOUNTER — TELEPHONE (OUTPATIENT)
Dept: GASTROENTEROLOGY | Facility: CLINIC | Age: 68
End: 2023-10-24
Payer: COMMERCIAL

## 2023-10-24 NOTE — TELEPHONE ENCOUNTER
"Advanced Endoscopy     Referring provider: Edi Felix MD      Referred to: Advanced Endoscopy Provider Group     Provider Requested: none specified     Referral Received:  10/17/23     Records received: EPIC    Laryngoscopy with biopsy 10/12/23  Findings:                     Exophytic mass of right vallecula, friable  Frozen section path c/w SCC  Final Diagnosis   A&B. 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  2. FDG avid thickening of the mid to distal esophagus, suspicious for a synchronous primary esophageal neoplasm. Recommend correlation with endoscopy.     Images received: PACs    Insurance Coverage: [employer]    Evaluation for: Vallecular mass  \"Newly diagnosed laryngeal cancer, on staging PET was found to have FDG avid thickening of the mid to distal esophagus, suspicious for a synchronous primary esophageal neoplasm      Clinical History (per RN review):     Notes from tumor conference 10/20/23    Tumor Site: Oropharynx (vallecula)  Tumor Pathology: p16 negative squamous cell carcinoma  Tumor Stage: [id number]  Tumor Treatment:   - DLB 10/12/23  Reason for Review: Review imaging, path, and POC  Brief History:  Lopez Hogue is a 67 year old male with biopsy-proven oropharyngeal cancer. He presented with a history of hemoptysis.  He has a history of stable coronary artery disease with placement of two drug eluting stents on 3/27/2023 following a non-ST segment elevation myocardial infarction.  He is on Plavix.    He coughed up blood was estimated to be 1/2 cup of blood early in the morning on 8/18/2023.  When seen in the ER he was not actively coughing up blood.  Prominent vessels in the posterior nasopharynx were seen on examination but no active bleeding " present.  A CT scan of the neck demonstrated a questionable soft tissue mass of the epiglottis.  Flexible laryngoscopy showed a violaceous lesion in the right vallecula with a small clot of blood.  No other suspicious lesions were seen.  The case was discussed with the laryngology team. He was then seen as an outpatient. He was otherwise asymptomatic. He smoked for 30 years and quit 10 years ago.  He also has a history of alcohol use but also quit this approximately 10 years ago. Flexible laryngoscopy demonstrated a concerning exophytic lesion of the right vallecula. Thus he underwent a DL with biopsy of 10/12/23. He has also gotten a PET-CT. We will discuss path, imaging, and plan of care    Radiation (10/27/23)  and Medical Oncology (11/6/23) referrals that have been placed today. A dental referral with [facility] for chemoradiation clearance and SLP video swallow for baseline prior to radiation has also been ordered     MD review date: 10/24/23  MD Decision for clinic consultation/Orders:       Reviewed with Dr Giraldo, expeditious EGD with luminal GI should be arranged.      Referral updates/Patient contacted:  Message routed to Gen GI referral's RNCC to arrange EGD.

## 2023-10-24 NOTE — TELEPHONE ENCOUNTER
October 24, 2023 1:34 PM JOSE   Internal Referral, Per call to PT, confirmed no previous radiation or outside records needed

## 2023-10-26 ENCOUNTER — TELEPHONE (OUTPATIENT)
Dept: GASTROENTEROLOGY | Facility: CLINIC | Age: 68
End: 2023-10-26

## 2023-10-26 ENCOUNTER — OFFICE VISIT (OUTPATIENT)
Dept: GASTROENTEROLOGY | Facility: CLINIC | Age: 68
End: 2023-10-26
Attending: STUDENT IN AN ORGANIZED HEALTH CARE EDUCATION/TRAINING PROGRAM
Payer: COMMERCIAL

## 2023-10-26 VITALS
HEART RATE: 71 BPM | SYSTOLIC BLOOD PRESSURE: 135 MMHG | DIASTOLIC BLOOD PRESSURE: 69 MMHG | WEIGHT: 166.8 LBS | BODY MASS INDEX: 25.36 KG/M2 | OXYGEN SATURATION: 97 %

## 2023-10-26 DIAGNOSIS — L98.9 SKIN LESION: ICD-10-CM

## 2023-10-26 DIAGNOSIS — D12.5 ADENOMATOUS POLYP OF SIGMOID COLON: ICD-10-CM

## 2023-10-26 DIAGNOSIS — R94.8 ABNORMAL GASTROINTESTINAL PET SCAN: Primary | ICD-10-CM

## 2023-10-26 DIAGNOSIS — J38.7 VALLECULAR MASS: ICD-10-CM

## 2023-10-26 LAB
PATH REPORT.ADDENDUM SPEC: ABNORMAL
PATH REPORT.COMMENTS IMP SPEC: ABNORMAL
PATH REPORT.COMMENTS IMP SPEC: YES
PATH REPORT.FINAL DX SPEC: ABNORMAL
PATH REPORT.GROSS SPEC: ABNORMAL
PATH REPORT.INTRAOP OBS SPEC DOC: ABNORMAL
PATH REPORT.MICROSCOPIC SPEC OTHER STN: ABNORMAL
PATH REPORT.RELEVANT HX SPEC: ABNORMAL
PHOTO IMAGE: ABNORMAL

## 2023-10-26 PROCEDURE — 99204 OFFICE O/P NEW MOD 45 MIN: CPT | Performed by: PHYSICIAN ASSISTANT

## 2023-10-26 NOTE — PATIENT INSTRUCTIONS
It was a pleasure taking care of you today.  I've included a brief summary of our discussion and care plan from today's visit below.  Please review this information with your primary care provider.  _______________________________________________________________________     My recommendations are summarized as follows:     - our first priority is to schedule an upper endoscopy  - you will also need a colonoscopy at some point given history of advanced adenomatous (pre-cancerous) polyp.   - referral to dermatology for new skin lesion    ______________________________________________________________________     How do I schedule labs, imaging studies, or procedures that were ordered in clinic today?      Labs: To schedule lab appointment you can contact your local [facility] or call [phone] to schedule at any convenient [facility] location.     Procedures: If a colonoscopy, upper endoscopy, breath test, esophageal manometry, or pH impedence was ordered today, our endoscopy team will call you to schedule this. If you have not heard from our endoscopy team within a week, please call [phone] to schedule.      Imaging Studies: If you were scheduled for a CT scan, X-ray, MRI, ultrasound, HIDA scan or other imaging study, please call [phone] to have this scheduled.      Referral: If a referral to another specialty was ordered, expect a phone call or follow instructions above. If you have not heard from anyone regarding your referral in a week, please call our clinic to check the status.      Who do I call with any questions after my visit?  Please be in touch if there are any further questions that arise following today's visit.  There are multiple ways to contact your gastroenterology care team.       During business hours, you may reach a Gastroenterology nurse at [phone]     To schedule or reschedule an appointment, please call [phone].      You can always send a secure message  through [employer].  [employer] messages are answered by your nurse or doctor typically within 24 hours.  Please allow extra time on weekends and holidays.       For urgent/emergent questions after business hours, you may reach the on-call GI Fellow by contacting the [facility]  at [phone].     How will I get the results of any tests ordered?    You will receive all of your results.  If you have signed up for [employer], any tests ordered at your visit will be available to you after your physician reviews them.  Typically this takes 1-2 weeks.  If there are urgent results that require a change in your care plan, your physician or nurse will call you to discuss the next steps.       What is [employer]?  [employer] is a secure way for you to access all of your healthcare records from the [facility].  It is a web based computer program, so you can sign on to it from any location.  It also allows you to send secure messages to your care team.  I recommend signing up for [employer] access if you have not already done so and are comfortable with using a computer.       How to I schedule a follow-up visit?  If you did not schedule a follow-up visit today, please call [phone] to schedule a follow-up office visit.      David Hidalgo PA-C  Division of Gastroenterology, Hepatology and Nutrition   [facility]

## 2023-10-26 NOTE — LETTER
10/26/2023         RE: Lopez Hogue  [address] [facility] [address]        Dear Colleague,    Thank you for referring your patient, Lopez Hogue, to the [facility]. Please see a copy of my visit note below.        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  PET scan by oncology noted FDG avid thickening of the mid to distal esophagus suspicious for primary esophageal cancer.  He denies having any esophageal dysphagia.  May have some oropharyngeal dysphagia which she relates to developing after biopsies were taken of lymph nodes.  He is scheduled for video swallow study for further evaluation.  No nausea or vomiting.  He has a very good appetite.  He did have some desired and intentional weight loss earlier in the year as he was focusing on being healthier after his heart attack in March 2023. His weight has remained stable since then.  Bowel movements are fairly regular.  He occasionally has constipation with straining but not very often.  There is no blood in the stool.  He denies NSAID use.    He is overdue for colorectal cancer screening.  His last colonoscopy was completed in 2015.  He was found to have an advanced adenomatous polyp in the sigmoid colon.  He was advised to have a repeat in 3 years, due in 2018.    He does have a new skin lesion to left upper back.     He does have hx of smoking and hx of heavy alcohol use. He may occ have reflux, but never had a hx of uncontrolled or  prolonged reflux symptoms.     There is no pertinent family history.      Lopez  has a past medical history of EtOH dependence (H), Hypertension, Nonrheumatic aortic (valve) stenosis, and Sweat gland carcinoma.    He  has a past surgical history that includes other surgical history (2015); Coronary Angiogram (N/A, 3/27/2023); Percutaneous Coronary Intervention (N/A, 3/27/2023); Left Heart Catheterization (N/A, 3/27/2023); Coronary Angiogram (N/A, 5/9/2023); Left Heart Catheterization (N/A, 5/9/2023); and Laryngoscopy with biopsy(ies) (N/A, 10/12/2023).    He  reports that he quit smoking about 10 years ago. His smoking use included cigarettes. He has a 60.00 pack-year smoking history. He has never been exposed to tobacco smoke. He has never used smokeless tobacco. He reports that he does not currently use alcohol. He reports that he does not use drugs.    His family history includes Dementia in his mother.    ALLERGIES:     Allergies   Allergen Reactions     Coconut Flavor Anaphylaxis     Raw coconut       PERTINENT MEDICATIONS:    Current Outpatient Medications:      acetaminophen (TYLENOL) 500 MG tablet, Take 500-1,000 mg by mouth every 8 hours as needed for fever or pain, Disp: , Rfl:      aspirin (ASA) 81 MG EC tablet, Take 1 tablet (81 mg) by mouth daily Start tomorrow., Disp: 30 tablet, Rfl: 3     clopidogrel (PLAVIX) 75 MG tablet, Take 1 tablet (75 mg) by mouth daily Resume home plavix 10/14, Disp: , Rfl:      cyanocobalamin (VITAMIN B-12) 1000 MCG tablet, Take 1,000 mcg by mouth every evening, Disp: , Rfl:      nitroGLYcerin (NITROSTAT) 0.4 MG sublingual tablet, PLACE 1 TABLET UNDER THE TONGUE EVERY 5 MINUTES FOR CHEST PAIN FOR 3 DOSES. IF SYMPTOMS PERSIST 5 MINUTES AFTER 1ST DOSE CALL 911., Disp: 25 tablet, Rfl: 1     Omega-3 Fatty Acids (FISH OIL) 1200 MG capsule, Take 1,200 mg by mouth every evening, Disp: , Rfl:      rosuvastatin (CRESTOR) 40 MG tablet, Take 1 tablet (40 mg) by mouth daily (Patient  taking differently: Take 40 mg by mouth every evening), Disp: 90 tablet, Rfl: 3     sertraline (ZOLOFT) 100 MG tablet, Take 100 mg by mouth every morning, Disp: , Rfl:   No current facility-administered medications for this visit.    Facility-Administered Medications Ordered in Other Visits:      iodixanol (VISIPAQUE 320) injection, , , Once PRN, Miki Etienne MD, 73 mL at 05/09/23 1212      PHYSICAL EXAMINATION:  Constitutional: aaox3, cooperative, pleasant, not dyspneic/diaphoretic, no acute distress  Vitals reviewed: /69 (BP Location: Left arm, Patient Position: Sitting, Cuff Size: Adult Regular)   Pulse 71   Wt 75.7 kg (166 lb 12.8 oz)   SpO2 97%   BMI 25.36 kg/m     Wt:   Wt Readings from Last 2 Encounters:   10/26/23 75.7 kg (166 lb 12.8 oz)   10/12/23 76.4 kg (168 lb 6.9 oz)          Eyes: Sclera anicteric/injected  CV: systolic murmur, RRR   Respiratory: Unlabored breathing, CTAB  Abd: Non-distended, no masses, +bs, no hepatosplenomegaly, nontender, no peritoneal signs  Skin: warm, perfused, no jaundice, new skin lesion to left upper back, appears slightly larger than 1 cm, has irregular borders, scaly, pink appearance.     Psych: Normal affect  MSK: Normal gait      PERTINENT STUDIES: (I personally reviewed these laboratory studies today)  Most recent CBC:   Recent Labs   Lab Test 08/18/23  1340 08/18/23  0309 05/08/23  0556   WBC  --  6.1 6.5   HGB 11.8* 12.4* 12.2*   HCT  --  37.1* 36.9*   PLT  --  187 175     Most recent hepatic panel:  Recent Labs   Lab Test 05/11/23  0838 03/27/23  0727   ALT 27 18   AST 33 26     Most recent creatinine:  Recent Labs   Lab Test 08/18/23  0309 05/08/23  0556   CR 0.73 0.87       RADIOLOGY:   EXAM: CT CHEST/ABDOMEN/PELVIS W CONTRAST, PET ONCOLOGY WHOLE BODY, CT SOFT TISSUE NECK W CONTRAST  LOCATION: [facility]  DATE/TIME: 10/13/2023 11:05 AM CDT     INDICATION: Initial treatment strategy and staging for squamous cell carcinoma of  the right vallecula. Also history of hidradenocarcinoma.  COMPARISON: CT neck 08/18/2023. CT chest abdomen pelvis 08/08/2023. FDG PET/CT 08/20/2015.  CONTRAST: 82 mL Isovue-370, IV.  TECHNIQUE: Serum glucose level 85 mg/dL. One hour post intravenous administration of 10.22 mCi F-18 FDG, PET imaging was performed from the skull vertex to feet, utilizing attenuation correction with concurrent axial CT and PET/CT image fusion. Separate   diagnostic CT of the neck, chest, abdomen, and pelvis was performed. Dose reduction techniques were used.     FINDINGS:     PET/CT FINDINGS: FDG avid lesion in the right vallecula measuring 1.4 x 1.4 cm (SUV max 13.4), suspicious for biopsy-proven squamous cell carcinoma. Associated FDG avid lymph nodes including right level IIa measuring 1.2 x 1.2 cm (SUV max 7.1) and right   level IV (SUV max 8.7), suspicious for metastases.     FDG avid thickening of the mid to distal esophagus spanning a length of 4.7 cm (SUV max 13.2), suspicious for synchronous primary esophageal neoplasm.     CT FINDINGS: Mild mucosal thickening in the paranasal sinuses. Mild carotid artery bifurcation calcification. Severe coronary artery calcification. Aortic valve calcification. Right middle lobe calcified granuloma. Few tiny pulmonary vessels are too   small to characterize by PET/CT. Scattered colonic diverticuli. Pelvic phleboliths. Multilevel degenerative changes of the spine. Lower extremities are remarkable.                                                                      IMPRESSION:     1. 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     2. FDG avid thickening of the mid to distal esophagus, suspicious for a synchronous primary esophageal neoplasm. Recommend correlation with endoscopy.         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    Found to have concerns with esophageal cancer on PET scan.  We will further evaluate this area of concern with an upper endoscopy.  Ordered as high-priority.     Of note he does have a history of an advanced adenomatous polyp found on colonoscopy in 2015 (reviewed records through Care Everywhere).  He was to have a repeat colonoscopy in 3 years, in 2018, however he did not.  We reviewed concerns with advanced adenomas.  Recommended that he undergo a repeat colonoscopy in the near future, orders placed.  As above EGD will take priority at this time.    He did mention a new skin lesion to his left upper back.  I took a look at this today for him. It appears slightly larger than 1 cm, has irregular borders, scaly, pink appearance. Will refer to dermatology to address, especially given hx of squamous cell carcinoma of skin.         Thank you for this consultation.  It was a pleasure to participate in the care of this patient; please contact us with any further questions.        I spent a total of 50 minutes on the day of the visit.   Time spent by me doing chart review, history and exam, documentation and further activities per the note    This note was created with voice recognition software, and while reviewed for accuracy, typos may remain.     David Hidalgo PA-C  Division of Gastroenterology, Hepatology and Nutrition   [facility]       Again, thank you for allowing me to participate in the care of your patient.        Sincerely,        David Hidalgo PA-C

## 2023-10-26 NOTE — TELEPHONE ENCOUNTER
"Endoscopy Scheduling Screen    Have you had a positive Covid test in the last 14 days?  No    Are you active on MyChart?   Yes    What insurance is in the chart?  Other:  [employer]    Ordering/Referring Provider:     David Hidalgo PA-C in  GI      (If ordering provider performs procedure, schedule with ordering provider unless otherwise instructed. )    BMI: Estimated body mass index is 25.36 kg/m  as calculated from the following:    Height as of 10/12/23: 1.727 m (5' 8\").    Weight as of an earlier encounter on 10/26/23: 75.7 kg (166 lb 12.8 oz).     Sedation Ordered  moderate sedation.   If patient BMI > 50 do not schedule in [facility].    If patient BMI > 45 do not schedule at [facility].    Are you taking methadone or Suboxone?  No    Are you taking any prescription medications for pain 3 or more times per week?   No    Do you have a history of malignant hyperthermia or adverse reaction to anesthesia?  No    (Females) Are you currently pregnant?   No     Have you been diagnosed or told you have pulmonary hypertension?   No    Do you have an LVAD?  No    Have you been told you have moderate to severe sleep apnea?  No    Have you been told you have COPD, asthma, or any other lung disease?  No    Do you have any heart conditions?  Yes     In the past 6 months, have you had any hospitalizations for heart related issues including cardiomyopathy, heart attack, or stent placement?  Yes (RN Review required for scheduling.) -2 stents placed on March of this past year     Do you have any implantable devices in your body (pacemaker, ICD)?  No    Do you take nitroglycerine?  No-     Have you ever had an organ transplant?   No    Have you ever had or are you awaiting a heart or lung transplant?   No    Have you had a stroke or transient ischemic attack (TIA aka \"mini stroke\" in the last 6 months?   No    Have you been diagnosed with or been told you have cirrhosis of the liver?   No    Are you currently on dialysis?   No    Do you need " "assistance transferring?   No    BMI: Estimated body mass index is 25.36 kg/m  as calculated from the following:    Height as of 10/12/23: 1.727 m (5' 8\").    Weight as of an earlier encounter on 10/26/23: 75.7 kg (166 lb 12.8 oz).     Is patients BMI > 40 and scheduling location [facility]?  No    Do you take an injectable medication for weight loss or diabetes (excluding insulin)?  No    Do you take the medication Naltrexone?  No    Do you take blood thinners?  Yes     Are you taking Effient/Prasugrel?  No, you must contact your prescribing provider for direction on holding or bridging with a different medication.       Prep   Are you currently on dialysis or do you have chronic kidney disease?  No    Do you have a diagnosis of diabetes?  No    Do you have a diagnosis of cystic fibrosis (CF)?  No    On a regular basis do you go 3 -5 days between bowel movements?  No    BMI > 40?  No    Preferred Pharmacy:    [id number] [address]  [address]  [place] [address]  Phone: [phone] Fax: [phone]      Final Scheduling Details   Colonoscopy prep sent?      Procedure scheduled  Upper endoscopy (EGD)    Surgeon:   Kristal    Date of procedure:   11/08/2023    Pre-OP / PAC:   No - Not required for this site.    Location  [facility] - Per RN assessment.    Sedation   Moderate Sedation - Per order.      Patient Reminders:   You will receive a call from a Nurse to review instructions and health history.  This assessment must be completed prior to your procedure.  Failure to complete the Nurse assessment may result in the procedure being cancelled.      On the day of your procedure, please designate an adult(s) who can drive you home stay with you for the next 24 hours. The medicines used in the exam will make you sleepy. You will not be able to drive.      You cannot take public transportation, ride share services, or non-medical taxi service " without a responsible caregiver.  Medical transport services are allowed with the requirement that a responsible caregiver will receive you at your destination.  We require that drivers and caregivers are confirmed prior to your procedure.

## 2023-10-26 NOTE — TELEPHONE ENCOUNTER
Pre Assessment RN Review    Focused Assessments    Cardiac stents placed March 2023. On dual therapy antiplatelets (aspirin & Plavix).  Priority referral for the EGD.     Scheduling Status & Recommendations    Sedation: Moderate Sedation - Per order.  Location Type: Hospital - Per exclusion criteria. [facility] d/t cardiac stents within past year  Prep: Standard MiraLAX - Per exclusion criteria.    Patient will need to consult with prescribing provider/cardiology to see if he can hold the Plavix.

## 2023-10-26 NOTE — PROGRESS NOTES
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  PET scan by oncology noted FDG avid thickening of the mid to distal esophagus suspicious for primary esophageal cancer.  He denies having any esophageal dysphagia.  May have some oropharyngeal dysphagia which she relates to developing after biopsies were taken of lymph nodes.  He is scheduled for video swallow study for further evaluation.  No nausea or vomiting.  He has a very good appetite.  He did have some desired and intentional weight loss earlier in the year as he was focusing on being healthier after his heart attack in March 2023. His weight has remained stable since then.  Bowel movements are fairly regular.  He occasionally has constipation with straining but not very often.  There is no blood in the stool.  He denies NSAID use.    He is overdue for colorectal cancer screening.  His last colonoscopy was completed in 2015.  He was found to have an advanced adenomatous polyp in the sigmoid colon.  He was advised to have a repeat in 3 years, due in 2018.    He does have a new skin lesion to left upper back.     He does have hx of smoking and hx of heavy alcohol use. He may occ have reflux, but never had a hx of uncontrolled or prolonged reflux symptoms.     There is no pertinent family history.      Lopez  has a past medical history of EtOH dependence (H), Hypertension, Nonrheumatic aortic (valve) stenosis, and Sweat gland carcinoma.    He  has a past surgical history that  includes other surgical history (2015); Coronary Angiogram (N/A, 3/27/2023); Percutaneous Coronary Intervention (N/A, 3/27/2023); Left Heart Catheterization (N/A, 3/27/2023); Coronary Angiogram (N/A, 5/9/2023); Left Heart Catheterization (N/A, 5/9/2023); and Laryngoscopy with biopsy(ies) (N/A, 10/12/2023).    He  reports that he quit smoking about 10 years ago. His smoking use included cigarettes. He has a 60.00 pack-year smoking history. He has never been exposed to tobacco smoke. He has never used smokeless tobacco. He reports that he does not currently use alcohol. He reports that he does not use drugs.    His family history includes Dementia in his mother.    ALLERGIES:     Allergies   Allergen Reactions    Coconut Flavor Anaphylaxis     Raw coconut       PERTINENT MEDICATIONS:    Current Outpatient Medications:     acetaminophen (TYLENOL) 500 MG tablet, Take 500-1,000 mg by mouth every 8 hours as needed for fever or pain, Disp: , Rfl:     aspirin (ASA) 81 MG EC tablet, Take 1 tablet (81 mg) by mouth daily Start tomorrow., Disp: 30 tablet, Rfl: 3    clopidogrel (PLAVIX) 75 MG tablet, Take 1 tablet (75 mg) by mouth daily Resume home plavix 10/14, Disp: , Rfl:     cyanocobalamin (VITAMIN B-12) 1000 MCG tablet, Take 1,000 mcg by mouth every evening, Disp: , Rfl:     nitroGLYcerin (NITROSTAT) 0.4 MG sublingual tablet, PLACE 1 TABLET UNDER THE TONGUE EVERY 5 MINUTES FOR CHEST PAIN FOR 3 DOSES. IF SYMPTOMS PERSIST 5 MINUTES AFTER 1ST DOSE CALL 911., Disp: 25 tablet, Rfl: 1    Omega-3 Fatty Acids (FISH OIL) 1200 MG capsule, Take 1,200 mg by mouth every evening, Disp: , Rfl:     rosuvastatin (CRESTOR) 40 MG tablet, Take 1 tablet (40 mg) by mouth daily (Patient taking differently: Take 40 mg by mouth every evening), Disp: 90 tablet, Rfl: 3    sertraline (ZOLOFT) 100 MG tablet, Take 100 mg by mouth every morning, Disp: , Rfl:   No current facility-administered medications for this visit.    Facility-Administered  Medications Ordered in Other Visits:     iodixanol (VISIPAQUE 320) injection, , , Once PRN, Miki Etienne MD, 73 mL at 05/09/23 1212      PHYSICAL EXAMINATION:  Constitutional: aaox3, cooperative, pleasant, not dyspneic/diaphoretic, no acute distress  Vitals reviewed: /69 (BP Location: Left arm, Patient Position: Sitting, Cuff Size: Adult Regular)   Pulse 71   Wt 75.7 kg (166 lb 12.8 oz)   SpO2 97%   BMI 25.36 kg/m     Wt:   Wt Readings from Last 2 Encounters:   10/26/23 75.7 kg (166 lb 12.8 oz)   10/12/23 76.4 kg (168 lb 6.9 oz)          Eyes: Sclera anicteric/injected  CV: systolic murmur, RRR   Respiratory: Unlabored breathing, CTAB  Abd: Non-distended, no masses, +bs, no hepatosplenomegaly, nontender, no peritoneal signs  Skin: warm, perfused, no jaundice, new skin lesion to left upper back, appears slightly larger than 1 cm, has irregular borders, scaly, pink appearance.     Psych: Normal affect  MSK: Normal gait      PERTINENT STUDIES: (I personally reviewed these laboratory studies today)  Most recent CBC:   Recent Labs   Lab Test 08/18/23  1340 08/18/23  0309 05/08/23  0556   WBC  --  6.1 6.5   HGB 11.8* 12.4* 12.2*   HCT  --  37.1* 36.9*   PLT  --  187 175     Most recent hepatic panel:  Recent Labs   Lab Test 05/11/23  0838 03/27/23  0727   ALT 27 18   AST 33 26     Most recent creatinine:  Recent Labs   Lab Test 08/18/23  0309 05/08/23  0556   CR 0.73 0.87       RADIOLOGY:   EXAM: CT CHEST/ABDOMEN/PELVIS W CONTRAST, PET ONCOLOGY WHOLE BODY, CT SOFT TISSUE NECK W CONTRAST  LOCATION: [facility]  DATE/TIME: 10/13/2023 11:05 AM CDT     INDICATION: Initial treatment strategy and staging for squamous cell carcinoma of the right vallecula. Also history of hidradenocarcinoma.  COMPARISON: CT neck 08/18/2023. CT chest abdomen pelvis 08/08/2023. FDG PET/CT 08/20/2015.  CONTRAST: 82 mL Isovue-370, IV.  TECHNIQUE: Serum glucose level 85 mg/dL. One hour post intravenous  administration of 10.22 mCi F-18 FDG, PET imaging was performed from the skull vertex to feet, utilizing attenuation correction with concurrent axial CT and PET/CT image fusion. Separate   diagnostic CT of the neck, chest, abdomen, and pelvis was performed. Dose reduction techniques were used.     FINDINGS:     PET/CT FINDINGS: FDG avid lesion in the right vallecula measuring 1.4 x 1.4 cm (SUV max 13.4), suspicious for biopsy-proven squamous cell carcinoma. Associated FDG avid lymph nodes including right level IIa measuring 1.2 x 1.2 cm (SUV max 7.1) and right   level IV (SUV max 8.7), suspicious for metastases.     FDG avid thickening of the mid to distal esophagus spanning a length of 4.7 cm (SUV max 13.2), suspicious for synchronous primary esophageal neoplasm.     CT FINDINGS: Mild mucosal thickening in the paranasal sinuses. Mild carotid artery bifurcation calcification. Severe coronary artery calcification. Aortic valve calcification. Right middle lobe calcified granuloma. Few tiny pulmonary vessels are too   small to characterize by PET/CT. Scattered colonic diverticuli. Pelvic phleboliths. Multilevel degenerative changes of the spine. Lower extremities are remarkable.                                                                      IMPRESSION:     1. 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     2. FDG avid thickening of the mid to distal esophagus, suspicious for a synchronous primary esophageal neoplasm. Recommend correlation with endoscopy.         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    Found to have concerns with  esophageal cancer on PET scan.  We will further evaluate this area of concern with an upper endoscopy.  Ordered as high-priority.     Of note he does have a history of an advanced adenomatous polyp found on colonoscopy in 2015 (reviewed records through [facility]).  He was to have a repeat colonoscopy in 3 years, in 2018, however he did not.  We reviewed concerns with advanced adenomas.  Recommended that he undergo a repeat colonoscopy in the near future, orders placed.  As above EGD will take priority at this time.    He did mention a new skin lesion to his left upper back.  I took a look at this today for him. It appears slightly larger than 1 cm, has irregular borders, scaly, pink appearance. Will refer to dermatology to address, especially given hx of squamous cell carcinoma of skin.         Thank you for this consultation.  It was a pleasure to participate in the care of this patient; please contact us with any further questions.        I spent a total of 50 minutes on the day of the visit.   Time spent by me doing chart review, history and exam, documentation and further activities per the note    This note was created with voice recognition software, and while reviewed for accuracy, typos may remain.     David Hidalgo PA-C  Division of Gastroenterology, Hepatology and Nutrition   [facility]

## 2023-10-27 ENCOUNTER — TELEPHONE (OUTPATIENT)
Dept: GASTROENTEROLOGY | Facility: CLINIC | Age: 68
End: 2023-10-27
Payer: COMMERCIAL

## 2023-10-27 ENCOUNTER — PRE VISIT (OUTPATIENT)
Dept: RADIATION ONCOLOGY | Facility: HOSPITAL | Age: 68
End: 2023-10-27
Payer: COMMERCIAL

## 2023-10-27 ENCOUNTER — MYC MEDICAL ADVICE (OUTPATIENT)
Dept: CARDIOLOGY | Facility: CLINIC | Age: 68
End: 2023-10-27

## 2023-10-27 ENCOUNTER — OFFICE VISIT (OUTPATIENT)
Dept: RADIATION ONCOLOGY | Facility: HOSPITAL | Age: 68
End: 2023-10-27
Attending: STUDENT IN AN ORGANIZED HEALTH CARE EDUCATION/TRAINING PROGRAM
Payer: COMMERCIAL

## 2023-10-27 VITALS
WEIGHT: 166.8 LBS | SYSTOLIC BLOOD PRESSURE: 160 MMHG | TEMPERATURE: 97.7 F | DIASTOLIC BLOOD PRESSURE: 70 MMHG | OXYGEN SATURATION: 98 % | BODY MASS INDEX: 25.36 KG/M2 | RESPIRATION RATE: 16 BRPM | HEART RATE: 63 BPM

## 2023-10-27 DIAGNOSIS — C10.0 SQUAMOUS CELL CARCINOMA OF VALLECULA (H): ICD-10-CM

## 2023-10-27 PROCEDURE — 99205 OFFICE O/P NEW HI 60 MIN: CPT | Performed by: RADIOLOGY

## 2023-10-27 PROCEDURE — 99214 OFFICE O/P EST MOD 30 MIN: CPT | Performed by: RADIOLOGY

## 2023-10-27 ASSESSMENT — PAIN SCALES - GENERAL: PAINLEVEL: NO PAIN (0)

## 2023-10-27 NOTE — TELEPHONE ENCOUNTER
Attempted to contact patient in order to complete pre assessment questions.     No answer. Left message to return call to [phone] option 4      Procedure details:    Patient scheduled for Upper endoscopy (EGD) on 11.8.23.     Arrival time: 0830. Procedure time 0930    Pre op exam needed? N/A    Facility location: [facility]; [address], [place] [address]    Sedation type: Conscious sedation     Indication for procedure:   Abnormal gastrointestinal PET scan            Chart review:     Electronic implanted devices? No    Recent diagnosis of diverticulitis within the last 6 weeks? N/A    Diabetic? No      Medication review:    Anticoagulants? Yes Clopidogrel (Plavix): Recommended HOLD 5 days before procedure.  Consult with your managing provider. ASA 81 mg    NSAIDS? No    Other medication HOLDING recommendations:  N/A      Prep for procedure:     Prep instructions sent via [employer].     Silvia Aguiar RN  Endoscopy Procedure Pre Assessment RN

## 2023-10-27 NOTE — PROGRESS NOTES
[facility] Radiation Oncology Consult Note     Patient: Lopez Hogue  MRN: [id number]  Date of Service: 10/27/2023          Edi Felix MD  [address] [place] [id number]       Dear Dr. Felix:    Thank you very much for referring this patient for consideration of radiotherapy. As you know Mr. 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  There is a FDG avid abnormalities in the distal esophagus suspicious for primary esophageal cancer pending on upper GI endoscopy/biopsy.  The patient is referred to radiation oncology for evaluation and consideration of possible concurrent chemoradiation therapy.    HISTORY OF PRESENT ILLNESS:   Mr. Hogue is a 67 year old male who was a former smoker for over 30 years and quit smoking since 2013. He presented with a history of hemoptysis.  He has a history of stable coronary artery disease with placement of two drug eluting stents on 3/27/2023 following a non-ST segment elevation myocardial infarction.  He is on Plavix. He coughed up blood was estimated to be 1/2 cup of blood early in the morning on 8/18/2023.  When seen in the ER he was not actively coughing up blood.  Prominent vessels in the posterior nasopharynx were seen on examination but no active bleeding present.  A CT scan of the neck demonstrated a questionable soft tissue mass of the epiglottis.  Flexible laryngoscopy showed a violaceous lesion in the right vallecula with a small clot of blood.  No other suspicious lesions were seen. Flexible laryngoscopy on 10/12/2023 demonstrated a 2 cm in size lesion abutted the oropharyngeal surface of the epiglottis.  Biopsy was taken and pathology confirmed none keratinizing poorly differentiated squamous cell carcinoma, p16 negative.  Staging PET scan on  10/13/2023 showed FDG avid lesion in the right vallecula squamous cell carcinoma with right level IIa and right level IV lymph node metastases.  There is also a FDG avid thickening of the mid to distal esophagus, suspicious for a synchronous primary esophageal neoplasm.  Patient is scheduled to have upper GI endoscopy/biopsy 11/8/2023.  His case has been reviewed at ENT tumor conference of [facility] and the consensus recommendation is to consider concurrent chemoradiation therapy for his oropharyngeal cancer.  The patient is here for evaluation and discussion of radiation therapy option.  He is also scheduled to see Dr. Edwards, medical oncology on 11/6/2023 to discuss chemotherapy options.  Patient is currently able to eat and swallowing with no new symptoms.  Hemoptysis has been stopped.    CHEMOTHERAPY HISTORY: Concurrent Chemotherapy: Yes    RADIATION THERAPY HISTORY: Prior Radiation: No    IMPLANTED CARDIAC DEVICE: none     Current Outpatient Medications   Medication Sig Dispense Refill    acetaminophen (TYLENOL) 500 MG tablet Take 500-1,000 mg by mouth every 8 hours as needed for fever or pain      aspirin (ASA) 81 MG EC tablet Take 1 tablet (81 mg) by mouth daily Start tomorrow. 30 tablet 3    clopidogrel (PLAVIX) 75 MG tablet Take 1 tablet (75 mg) by mouth daily Resume home plavix 10/14      cyanocobalamin (VITAMIN B-12) 1000 MCG tablet Take 1,000 mcg by mouth every evening      nitroGLYcerin (NITROSTAT) 0.4 MG sublingual tablet PLACE 1 TABLET UNDER THE TONGUE EVERY 5 MINUTES FOR CHEST PAIN FOR 3 DOSES. IF SYMPTOMS PERSIST 5 MINUTES AFTER 1ST DOSE CALL 911. 25 tablet 1    Omega-3 Fatty Acids (FISH OIL) 1200 MG capsule Take 1,200 mg by mouth every evening      rosuvastatin (CRESTOR) 40 MG tablet Take 1 tablet (40 mg) by mouth daily (Patient taking differently: Take 40 mg by mouth every evening) 90 tablet 3    sertraline (ZOLOFT) 100 MG tablet Take 100 mg by mouth every morning       Past Medical  History:   Diagnosis Date    EtOH dependence (H)     Quit drinking 10 years    Hypertension     Nonrheumatic aortic (valve) stenosis     Sweat gland carcinoma      Past Surgical History:   Procedure Laterality Date    CV CORONARY ANGIOGRAM N/A 3/27/2023    Procedure: Coronary Angiogram;  Surgeon: Miki Etienne MD;  Location: [facility] CATH [facility] CV    CV CORONARY ANGIOGRAM N/A 5/9/2023    Procedure: Coronary Angiogram;  Surgeon: Miki Etienne MD;  Location: [facility] CATH LAB CV    CV LEFT HEART CATH N/A 3/27/2023    Procedure: Left Heart Catheterization;  Surgeon: Miki Etienne MD;  Location: [facility] CATH LAB CV    CV LEFT HEART CATH N/A 5/9/2023    Procedure: Left Heart Catheterization;  Surgeon: Miki Etienne MD;  Location: [facility] CATH LAB CV    CV PCI N/A 3/27/2023    Procedure: Percutaneous Coronary Intervention;  Surgeon: Miki Etienne MD;  Location: [facility] CV    LARYNGOSCOPY WITH BIOPSY(IES) N/A 10/12/2023    Procedure: LARYNGOSCOPY, WITH BIOPSY;  Surgeon: Edi Felix MD;  Location: [facility] OR    OTHER SURGICAL HISTORY  2015    WIDE EXCISION OF LEFT GLUTEAL MASSTNM: [id number], stage: II hidradenocarcinoma Grade II, 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, sentinel lymph node biopsy negative      Allergies   Allergen Reactions    Coconut Flavor Anaphylaxis     Raw coconut     Family History   Problem Relation Age of Onset    Dementia Mother     Anesthesia Reaction No family hx of     Thrombocytopenia No family hx of      Social History     Socioeconomic History    Marital status:      Spouse name: Not on file    Number of children: 2    Years of education: Not on file    Highest education level: Not on file   Occupational History    Occupation: non-profit manager   Tobacco Use    Smoking status: Former     Packs/day: 2.00     Years: 30.00     Additional pack years: 0.00     Total pack years: 60.00     Types: Cigarettes     Quit date: 2013     Years since quitting: 10.8     Passive exposure:  Never    Smokeless tobacco: Never    Tobacco comments:     Quit 10 years ago   Vaping Use    Vaping Use: Never used   Substance and Sexual Activity    Alcohol use: Not Currently     Comment: quit in 2013    Drug use: Never    Sexual activity: Not on file   Other Topics Concern    Not on file   Social History Narrative    Patient works.  Lives with his wife.     Social Determinants of Health     Financial Resource Strain: Not on file   Food Insecurity: Not on file   Transportation Needs: Not on file   Physical Activity: Not on file   Stress: Not on file   Social Connections: Not on file   Interpersonal Safety: Not on file   Housing Stability: Not on file        REVIEW OF SYMPTOMS:  A full 14-point review of systems was performed. Pertinent findings are noted in the HPI.    General  Constitutional  Constitutional (WDL): All constitutional elements are within defined limits  EENT  Eye Disorders  Eye Disorder (WDL): All eye disorder elements are within defined limits  Ear Disorders  Ear Disorder (WDL): All ear disorder elements are within defined limits  Respiratory  Respiratory  Respiratory (WDL): All respiratory elements are within defined limits  Cardiovascular  Cardiovascular  Cardiovascular (WDL): All cardiovascular elements are within defined limits (has two stents in place, but no pacemaker)  Gastrointestinal  Gastrointestinal  Gastrointestinal (WDL): All gastrointestinal elements are within defined limits  Musculoskeletal  Musculoskeletal and Connective Tissue Disorders  Musculoskeletal & Connective (WDL): All musculoskeletal & connective elements are within defined limits  Integumentary  Integumentary  Integumentary (WDL): All integumentary elements are within defined limits  Neurological  Neurosensory  Neurosensory (WDL): All neurosensory elements are within defined limits  Genitourinary/Reproductive  Genitourinary  Genitourinary (WDL): All genitourinary elements are within defined limits (13 teeth to be  extracted 11/2/2023)  Lymphatic  Lymph System Disorders  Lymph (WDL): All lymph elements are within defined limits  Pain  Pain Score: No Pain (0)  AUA Assessment                                                              Accompanied by  Accompanied By: spouse    ECOG Status: 0 - Independent    Imaging: Reviewed    Pathology: Reviewed    Objective:      PHYSICAL EXAMINATION:    BP (!) 160/70   Pulse 63   Temp 97.7  F (36.5  C)   Resp 16   Wt 75.7 kg (166 lb 12.8 oz)   SpO2 98%   BMI 25.36 kg/m      Gen: Alert, in NAD  Eyes: PERRL, EOMI, sclera anicteric  HENT     Head: NC/AT     Ears: No external auricular lesions     Nose/sinus: No rhinorrhea or epistaxis     Oropharynx: MMM, no visible oral lesions  Neck: Supple, full ROM, no LAD  Pulm: No wheezing, stridor or respiratory distress  CV: Well-perfused, no cyanosis, no pedal edema  Abdominal: BS+, soft, nontender, nondistended, no hepatomegaly  Back: No step-offs or pain to palpation along the thoracolumbar spine  Rectal: Deferred  : Deferred  Musculoskeletal: Normal muscle bulk and tone  Skin: Normal color and turgor  Neurologic: A/Ox3, CN II-XII intact, normal gait and station  Psychiatric: Appropriate mood and affect    Intent of Therapy: Curative  Side effects that may occur during or within weeks after Radiation Therapy    Fatigue and general weakness  Loss of hair on the face and neck  Pain in the irradiated limb  Darkening, irritation, itchiness, redness, dryness,and peeling, scabbing and ulceration of the skin on the neck and face  Mouth and throat dryness, irritation and swallowing difficulties and infection  Thickening of the saliva  Change in or loss of taste sensation  Decrease in appetite  Hoarseness and change in voice    Side effects that may occur months or years after Radiation Therapy    Development of another tumor or cancer  Thickening, telangiectasias (development of spider like blood vessels in the skin) and ulceration of the skin of  the face and neck  Fibrosis (scar tissue), decreased flexibility and swelling of the neck  Brain inflammation or necrosis that may cause various neurologic symptoms  Decrease in memory and thinking abilities  Poor healing after a trauma or surgery in the irradiated area  Facial numbness, pain and weakness  Nerve damage resulting in loss of strength and sensation  Tooth and jaw decay and poor healing after dental procedures    The risks, benefits and alternatives to radiation therapy were outlined with the patient. All questions were answered and a consent was signed.     Impression     Poorly differentiated squama cell carcinoma of the oropharynx, p16 negative, clinical stage [id number], staging work-up including PET CT scan showed no evidence of systemic metastasis. There is a FDG avid abnormalities in the distal esophagus suspicious for primary esophageal cancer pending on upper GI endoscopy/biopsy.    Assessment & Plan:     I have personally reviewed his upcoming medical record today.  I have also reviewed his recent radiology study including CT scan and PET scan.  This is a 67-year-old gentleman with a recent diagnosis of squamous cell carcinoma of the right oropharynx with right level IIa and IV lymph node metastasis.  The patient otherwise has been healthy without any ongoing major health issues.  The possible treatment options for the head and neck cancer have been discussed with the patient including surgery, chemotherapy, and radiation therapy.  The possible risks and the side effects of radiation therapy have also been explained to the patient in detail and at a great length. I agree, the patient is a good candidate for definitive chemotherapy and radiation therapy for his head and neck cancer.  I think the patient can be potentially benefited by the combined chemoradiation therapy for local control and possibly a better survival.  I have explained to the patient that combined chemoradiation therapy is a  quite intensive protocol for the head and neck cancer.  The patient often will require extensive support during and after the planned therapy.  The patient may also require short-term hospital stay toward the end of her therapy.  Majority of our patients will have a difficulty of swallowing during the course of the radiation therapy.  I have recommended the patient to consider to have a PEG tube placement for the nutritional support during and after the planned therapy.      The radiation simulation, treatment planning, image guidance, and treatment delivery processes from my handout were reviewed. The acute effects of radiation were reviewed including fatigue, skin burn, soreness of the throat that may be severe enough to require narcotics for pain control, hair loss that may be permanent, loss of taste, dry mouth that may be permanent, dehydration or weight loss that may require intravenous fluids, hospitalization, or feeding tube, plugging of the ear due to fluid buildup in the middle ear that may require tympanostomy tube placement. Possible late effects of radiation were discussed including long-term difficulty swallowing, swelling or scarring of the neck tissues that may cause stiffness of the neck or jaw, long-term dry mouth that may accelerate tooth decay, osteoradionecrosis if he had a dental extraction in a radiated site, low thyroid function that may require thyroid replacement therapy. Rare complications of radiation were reviewed including damage to spinal cord, damage to nerves in the neck that could result in pain, weakness, or numbness of the arm, increased risk of stroke, damage to the brain, decreased hearing permanently due to fluid buildup or nerve damage, and inducing other cancers from radiation several years down the road. Prior to radiation I would recommend a dental evaluation and formation of dental carriers.  He saw his dentist earlier and plans to have dental procedure on 11/2/2023.  We  discussed options of a pre radiation audiology evaluation, swallowing study, and physical medicine and rehabilitation consultation. He was instructed on some gentle stretching exercises.     The pros and the cons of different treatment options have been discussed with the patient in detail and at a great length.  Questions are answered to patient's satisfaction.  After a long discussion, the patient elected to proceed with concurrent chemo/radiation therapy as his treatment of choice being aware of potential risks and the side effects involved. Patient is scheduled to have upper GI endoscopy/biopsy 11/8/2023. He is also scheduled to see Dr. Edwards, medical oncology on 11/6/2023 to discuss chemotherapy options.        His simulation is tentatively scheduled on 11/13/2023. I plan to give radiation therapy to a total dose of 7000/6300/5400 cGy targeted to the primary tumor/lymph nodes. I will consider to use IMRT/IGRT technique to help us to better locate the target and to protect normal tissues.  I have also pointed out to the patient that if the combined chemoradiation therapy is not successful, the patient will require a surgery as a salvage at the end.  He understands it well and wished to proceed.    Again, thank you very much for the referral and allowing me to participate in the care of this patient.  If you have any questions or concerns about this consultation, please do not hesitate to call.  I spent approximately 60 minutes today with the patient and 80% time was used for counseling.      Sincerely,      Lluvia Park MD  Department of Radiation Oncology   [facility] Radiation Oncology  Tel: [phone]  Page: [phone]    [facility]  [address] [place] [address]     [place] [id number] [place] [address]    CC:  Patient Care Team:  Madi Ramos as PCP - General (House Physician)  Christopher Walters MD as Assigned Heart and Vascular Provider  Jose Maldonado  MD Mell as Assigned Surgical Provider  Shine Edwards MD as MD (Hematology)  Lluvia Park MD as MD (Radiation Oncology)  David Hidalgo PA-C as Physician Assistant (Gastroenterology)  Edi Felix MD as MD (Otolaryngology)

## 2023-10-27 NOTE — PROGRESS NOTES
"Oncology Rooming Note    October 27, 2023 11:32 AM   Lopez Hogue is a 67 year old male who presents for:    Chief Complaint   Patient presents with    Oncology Clinic Visit    Head And Neck Cancer     Initial Vitals: BP (!) 160/70   Pulse 63   Temp 97.7  F (36.5  C)   Resp 16   Wt 75.7 kg (166 lb 12.8 oz)   SpO2 98%   BMI 25.36 kg/m   Estimated body mass index is 25.36 kg/m  as calculated from the following:    Height as of 10/12/23: 1.727 m (5' 8\").    Weight as of this encounter: 75.7 kg (166 lb 12.8 oz). Body surface area is 1.91 meters squared.  No Pain (0) Comment: Data Unavailable   No LMP for male patient.  Allergies reviewed: Yes  Medications reviewed: Yes    Medications: Medication refills not needed today.  Pharmacy name entered into [facility]: [id number] [address]  AT [facility] [address]    Clinical concerns: Here for consult for H&N cancer, possible 2nd primary of esophageal cancer still being worked up.   Dr. Park was notified.    Radiation Therapy Patient Education    Person involved with teaching: Patient and Wife    Patient educational needs for self management of treatment-related side effects assessment completed.  [facility] Patient Ed tab contains Patient Learning Assessment    Education Materials Given  Radiation Treatment For Cancer, Radiation Therapy to the Head & Neck Guidelines, Nutrition for the Person with Cancer During Treatment, Oncology Supportive Care Services , Welcome Letter, Insurance PA Information, Coping with Mouth and Throat Sores, and Coping with Dry Mouth and Thick Saliva    Educational Topics Discussed  Side effects expected, Skin care, Activity, and Nutrition and weight loss    Response To Teaching  Verbalizes understanding    GYN Only  Vaginal Dilator-given and educated: N/A    Referrals sent: Nutrition    Chemotherapy?  Yes: MO consult not until 11/6    No ICD  No prior RT  Prefers tx at [facility]    10/23 Dental " consult  10/26 GI consult for esoph thickening on PET  10/27 Rad Onc consult  11/1 Swallow study  11/2 Dental extractions (13 teeth)  11/6 Med Onc consult  11/8 esoph biopsy      Carolee Batista RN

## 2023-10-27 NOTE — LETTER
10/27/2023         RE: Lopez Hogue  [address] [facility] [address]        Dear Colleague,    Thank you for referring your patient, Lopez Hogue, to the [facility] RADIATION ONCOLOGY [place]. Please see a copy of my visit note below.    [facility] Radiation Oncology Consult Note     Patient: Lopez Hogue  MRN: [id number]  Date of Service: 10/27/2023          Edi Felix MD  [address] [place] [id number]       Dear Dr. Felix:    Thank you very much for referring this patient for consideration of radiotherapy. As you know Mr. 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  There is a FDG avid abnormalities in the distal esophagus suspicious for primary esophageal cancer pending on upper GI endoscopy/biopsy.  The patient is referred to radiation oncology for evaluation and consideration of possible concurrent chemoradiation therapy.    HISTORY OF PRESENT ILLNESS:   Mr. Hogue is a 67 year old male who was a former smoker for over 30 years and quit smoking since 2013. He presented with a history of hemoptysis.  He has a history of stable coronary artery disease with placement of two drug eluting stents on 3/27/2023 following a non-ST segment elevation myocardial infarction.  He is on Plavix. He coughed up blood was estimated to be 1/2 cup of blood early in the morning on 8/18/2023.  When seen in the ER he was not actively coughing up blood.  Prominent vessels in the posterior nasopharynx were seen on examination but no active bleeding present.  A CT scan of the neck demonstrated a questionable soft tissue mass of the epiglottis.  Flexible laryngoscopy showed a violaceous lesion in the right vallecula with a small clot of blood.  No other suspicious lesions were seen. Flexible  laryngoscopy on 10/12/2023 demonstrated a 2 cm in size lesion abutted the oropharyngeal surface of the epiglottis.  Biopsy was taken and pathology confirmed none keratinizing poorly differentiated squamous cell carcinoma, p16 negative.  Staging PET scan on 10/13/2023 showed FDG avid lesion in the right vallecula squamous cell carcinoma with right level IIa and right level IV lymph node metastases.  There is also a FDG avid thickening of the mid to distal esophagus, suspicious for a synchronous primary esophageal neoplasm.  Patient is scheduled to have upper GI endoscopy/biopsy 11/8/2023.  His case has been reviewed at ENT tumor conference of [facility] and the consensus recommendation is to consider concurrent chemoradiation therapy for his oropharyngeal cancer.  The patient is here for evaluation and discussion of radiation therapy option.  He is also scheduled to see Dr. Edwards, medical oncology on 11/6/2023 to discuss chemotherapy options.  Patient is currently able to eat and swallowing with no new symptoms.  Hemoptysis has been stopped.    CHEMOTHERAPY HISTORY: Concurrent Chemotherapy: Yes    RADIATION THERAPY HISTORY: Prior Radiation: No    IMPLANTED CARDIAC DEVICE: none     Current Outpatient Medications   Medication Sig Dispense Refill     acetaminophen (TYLENOL) 500 MG tablet Take 500-1,000 mg by mouth every 8 hours as needed for fever or pain       aspirin (ASA) 81 MG EC tablet Take 1 tablet (81 mg) by mouth daily Start tomorrow. 30 tablet 3     clopidogrel (PLAVIX) 75 MG tablet Take 1 tablet (75 mg) by mouth daily Resume home plavix 10/14       cyanocobalamin (VITAMIN B-12) 1000 MCG tablet Take 1,000 mcg by mouth every evening       nitroGLYcerin (NITROSTAT) 0.4 MG sublingual tablet PLACE 1 TABLET UNDER THE TONGUE EVERY 5 MINUTES FOR CHEST PAIN FOR 3 DOSES. IF SYMPTOMS PERSIST 5 MINUTES AFTER 1ST DOSE CALL 911. 25 tablet 1     Omega-3 Fatty Acids (FISH OIL) 1200 MG capsule Take 1,200 mg by  mouth every evening       rosuvastatin (CRESTOR) 40 MG tablet Take 1 tablet (40 mg) by mouth daily (Patient taking differently: Take 40 mg by mouth every evening) 90 tablet 3     sertraline (ZOLOFT) 100 MG tablet Take 100 mg by mouth every morning       Past Medical History:   Diagnosis Date     EtOH dependence (H)     Quit drinking 10 years     Hypertension      Nonrheumatic aortic (valve) stenosis      Sweat gland carcinoma      Past Surgical History:   Procedure Laterality Date     CV CORONARY ANGIOGRAM N/A 3/27/2023    Procedure: Coronary Angiogram;  Surgeon: Miki Etienne MD;  Location: [facility] LAB CV     CV CORONARY ANGIOGRAM N/A 5/9/2023    Procedure: Coronary Angiogram;  Surgeon: Miki Etienne MD;  Location: [facility] CATH LAB CV     CV LEFT HEART CATH N/A 3/27/2023    Procedure: Left Heart Catheterization;  Surgeon: Miki Etienne MD;  Location: [facility] CATH LAB CV     CV LEFT HEART CATH N/A 5/9/2023    Procedure: Left Heart Catheterization;  Surgeon: Miki Etienne MD;  Location: [facility] LAB CV     CV PCI N/A 3/27/2023    Procedure: Percutaneous Coronary Intervention;  Surgeon: Miki Etienne MD;  Location: [facility] LAB CV     LARYNGOSCOPY WITH BIOPSY(IES) N/A 10/12/2023    Procedure: LARYNGOSCOPY, WITH BIOPSY;  Surgeon: Edi Felix MD;  Location:  OR     OTHER SURGICAL HISTORY  2015    WIDE EXCISION OF LEFT GLUTEAL MASSTNM: [id number], stage: II hidradenocarcinoma Grade II, 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, sentinel lymph node biopsy negative      Allergies   Allergen Reactions     Coconut Flavor Anaphylaxis     Raw coconut     Family History   Problem Relation Age of Onset     Dementia Mother      Anesthesia Reaction No family hx of      Thrombocytopenia No family hx of      Social History     Socioeconomic History     Marital status:      Spouse name: Not on file     Number of children: 2     Years of education: Not on file     Highest education level: Not on file    Occupational History     Occupation: non-profit manager   Tobacco Use     Smoking status: Former     Packs/day: 2.00     Years: 30.00     Additional pack years: 0.00     Total pack years: 60.00     Types: Cigarettes     Quit date: 2013     Years since quitting: 10.8     Passive exposure: Never     Smokeless tobacco: Never     Tobacco comments:     Quit 10 years ago   Vaping Use     Vaping Use: Never used   Substance and Sexual Activity     Alcohol use: Not Currently     Comment: quit in 2013     Drug use: Never     Sexual activity: Not on file   Other Topics Concern     Not on file   Social History Narrative    Patient works.  Lives with his wife.     Social Determinants of Health     Financial Resource Strain: Not on file   Food Insecurity: Not on file   Transportation Needs: Not on file   Physical Activity: Not on file   Stress: Not on file   Social Connections: Not on file   Interpersonal Safety: Not on file   Housing Stability: Not on file        REVIEW OF SYMPTOMS:  A full 14-point review of systems was performed. Pertinent findings are noted in the HPI.    General  Constitutional  Constitutional (WDL): All constitutional elements are within defined limits  EENT  Eye Disorders  Eye Disorder (WDL): All eye disorder elements are within defined limits  Ear Disorders  Ear Disorder (WDL): All ear disorder elements are within defined limits  Respiratory  Respiratory  Respiratory (WDL): All respiratory elements are within defined limits  Cardiovascular  Cardiovascular  Cardiovascular (WDL): All cardiovascular elements are within defined limits (has two stents in place, but no pacemaker)  Gastrointestinal  Gastrointestinal  Gastrointestinal (WDL): All gastrointestinal elements are within defined limits  Musculoskeletal  Musculoskeletal and Connective Tissue Disorders  Musculoskeletal & Connective (WDL): All musculoskeletal & connective elements are within defined limits  Integumentary  Integumentary  Integumentary  (WDL): All integumentary elements are within defined limits  Neurological  Neurosensory  Neurosensory (WDL): All neurosensory elements are within defined limits  Genitourinary/Reproductive  Genitourinary  Genitourinary (WDL): All genitourinary elements are within defined limits (13 teeth to be extracted 11/2/2023)  Lymphatic  Lymph System Disorders  Lymph (WDL): All lymph elements are within defined limits  Pain  Pain Score: No Pain (0)  AUA Assessment                                                              Accompanied by  Accompanied By: spouse    ECOG Status: 0 - Independent    Imaging: Reviewed    Pathology: Reviewed    Objective:      PHYSICAL EXAMINATION:    BP (!) 160/70   Pulse 63   Temp 97.7  F (36.5  C)   Resp 16   Wt 75.7 kg (166 lb 12.8 oz)   SpO2 98%   BMI 25.36 kg/m      Gen: Alert, in NAD  Eyes: PERRL, EOMI, sclera anicteric  HENT     Head: NC/AT     Ears: No external auricular lesions     Nose/sinus: No rhinorrhea or epistaxis     Oropharynx: MMM, no visible oral lesions  Neck: Supple, full ROM, no LAD  Pulm: No wheezing, stridor or respiratory distress  CV: Well-perfused, no cyanosis, no pedal edema  Abdominal: BS+, soft, nontender, nondistended, no hepatomegaly  Back: No step-offs or pain to palpation along the thoracolumbar spine  Rectal: Deferred  : Deferred  Musculoskeletal: Normal muscle bulk and tone  Skin: Normal color and turgor  Neurologic: A/Ox3, CN II-XII intact, normal gait and station  Psychiatric: Appropriate mood and affect    Intent of Therapy: Curative  Side effects that may occur during or within weeks after Radiation Therapy    Fatigue and general weakness  Loss of hair on the face and neck  Pain in the irradiated limb  Darkening, irritation, itchiness, redness, dryness,and peeling, scabbing and ulceration of the skin on the neck and face  Mouth and throat dryness, irritation and swallowing difficulties and infection  Thickening of the saliva  Change in or loss of  taste sensation  Decrease in appetite  Hoarseness and change in voice    Side effects that may occur months or years after Radiation Therapy    Development of another tumor or cancer  Thickening, telangiectasias (development of spider like blood vessels in the skin) and ulceration of the skin of the face and neck  Fibrosis (scar tissue), decreased flexibility and swelling of the neck  Brain inflammation or necrosis that may cause various neurologic symptoms  Decrease in memory and thinking abilities  Poor healing after a trauma or surgery in the irradiated area  Facial numbness, pain and weakness  Nerve damage resulting in loss of strength and sensation  Tooth and jaw decay and poor healing after dental procedures    The risks, benefits and alternatives to radiation therapy were outlined with the patient. All questions were answered and a consent was signed.     Impression     Poorly differentiated squama cell carcinoma of the oropharynx, p16 negative, clinical stage [id number], staging work-up including PET CT scan showed no evidence of systemic metastasis. There is a FDG avid abnormalities in the distal esophagus suspicious for primary esophageal cancer pending on upper GI endoscopy/biopsy.    Assessment & Plan:     I have personally reviewed his upcoming medical record today.  I have also reviewed his recent radiology study including CT scan and PET scan.  This is a 67-year-old gentleman with a recent diagnosis of squamous cell carcinoma of the right oropharynx with right level IIa and IV lymph node metastasis.  The patient otherwise has been healthy without any ongoing major health issues.  The possible treatment options for the head and neck cancer have been discussed with the patient including surgery, chemotherapy, and radiation therapy.  The possible risks and the side effects of radiation therapy have also been explained to the patient in detail and at a great length. I agree, the patient is a good candidate  for definitive chemotherapy and radiation therapy for his head and neck cancer.  I think the patient can be potentially benefited by the combined chemoradiation therapy for local control and possibly a better survival.  I have explained to the patient that combined chemoradiation therapy is a quite intensive protocol for the head and neck cancer.  The patient often will require extensive support during and after the planned therapy.  The patient may also require short-term hospital stay toward the end of her therapy.  Majority of our patients will have a difficulty of swallowing during the course of the radiation therapy.  I have recommended the patient to consider to have a PEG tube placement for the nutritional support during and after the planned therapy.      The radiation simulation, treatment planning, image guidance, and treatment delivery processes from my handout were reviewed. The acute effects of radiation were reviewed including fatigue, skin burn, soreness of the throat that may be severe enough to require narcotics for pain control, hair loss that may be permanent, loss of taste, dry mouth that may be permanent, dehydration or weight loss that may require intravenous fluids, hospitalization, or feeding tube, plugging of the ear due to fluid buildup in the middle ear that may require tympanostomy tube placement. Possible late effects of radiation were discussed including long-term difficulty swallowing, swelling or scarring of the neck tissues that may cause stiffness of the neck or jaw, long-term dry mouth that may accelerate tooth decay, osteoradionecrosis if he had a dental extraction in a radiated site, low thyroid function that may require thyroid replacement therapy. Rare complications of radiation were reviewed including damage to spinal cord, damage to nerves in the neck that could result in pain, weakness, or numbness of the arm, increased risk of stroke, damage to the brain, decreased hearing  permanently due to fluid buildup or nerve damage, and inducing other cancers from radiation several years down the road. Prior to radiation I would recommend a dental evaluation and formation of dental carriers.  He saw his dentist earlier and plans to have dental procedure on 11/2/2023.  We discussed options of a pre radiation audiology evaluation, swallowing study, and physical medicine and rehabilitation consultation. He was instructed on some gentle stretching exercises.     The pros and the cons of different treatment options have been discussed with the patient in detail and at a great length.  Questions are answered to patient's satisfaction.  After a long discussion, the patient elected to proceed with concurrent chemo/radiation therapy as his treatment of choice being aware of potential risks and the side effects involved. Patient is scheduled to have upper GI endoscopy/biopsy 11/8/2023. He is also scheduled to see Dr. Edwards, medical oncology on 11/6/2023 to discuss chemotherapy options.        His simulation is tentatively scheduled on 11/13/2023. I plan to give radiation therapy to a total dose of 7000/6300/5400 cGy targeted to the primary tumor/lymph nodes. I will consider to use IMRT/IGRT technique to help us to better locate the target and to protect normal tissues.  I have also pointed out to the patient that if the combined chemoradiation therapy is not successful, the patient will require a surgery as a salvage at the end.  He understands it well and wished to proceed.    Again, thank you very much for the referral and allowing me to participate in the care of this patient.  If you have any questions or concerns about this consultation, please do not hesitate to call.  I spent approximately 60 minutes today with the patient and 80% time was used for counseling.      Sincerely,      Lluvia Park MD  Department of Radiation Oncology   [facility] Radiation Oncology  Tel: [phone]  Page:  "[phone]    [facility]  [address]   [place] [address]     [facility]   [address] [facility] [place] [address]    CC:  Patient Care Team:  Madi Ramos as PCP - General (House Physician)  Christopher Walters MD as Assigned Heart and Vascular Provider  Jose Maldonado MD as Assigned Surgical Provider  Shine Edwards MD as MD (Hematology)  Lluvia Park MD as MD (Radiation Oncology)  aDvid Hidalgo PA-C as Physician Assistant (Gastroenterology)  Edi Felix MD as MD (Otolaryngology)          Oncology Rooming Note    October 27, 2023 11:32 AM   Lopez Hogue is a 67 year old male who presents for:    Chief Complaint   Patient presents with     Oncology Clinic Visit     Head And Neck Cancer     Initial Vitals: BP (!) 160/70   Pulse 63   Temp 97.7  F (36.5  C)   Resp 16   Wt 75.7 kg (166 lb 12.8 oz)   SpO2 98%   BMI 25.36 kg/m   Estimated body mass index is 25.36 kg/m  as calculated from the following:    Height as of 10/12/23: 1.727 m (5' 8\").    Weight as of this encounter: 75.7 kg (166 lb 12.8 oz). Body surface area is 1.91 meters squared.  No Pain (0) Comment: Data Unavailable   No LMP for male patient.  Allergies reviewed: Yes  Medications reviewed: Yes    Medications: Medication refills not needed today.  Pharmacy name entered into [facility]: [id number] - [address] AT [facility] [address]    Clinical concerns: Here for consult for H&N cancer, possible 2nd primary of esophageal cancer still being worked up.   Dr. Park was notified.    Radiation Therapy Patient Education    Person involved with teaching: Patient and Wife    Patient educational needs for self management of treatment-related side effects assessment completed.  [facility] Patient Ed tab contains Patient Learning Assessment    Education Materials Given  Radiation Treatment For Cancer, Radiation Therapy to the Head & Neck Guidelines, Nutrition for the " Person with Cancer During Treatment, Oncology Supportive Care Services , Welcome Letter, Insurance PA Information, Coping with Mouth and Throat Sores, and Coping with Dry Mouth and Thick Saliva    Educational Topics Discussed  Side effects expected, Skin care, Activity, and Nutrition and weight loss    Response To Teaching  Verbalizes understanding    GYN Only  Vaginal Dilator-given and educated: N/A    Referrals sent: Nutrition    Chemotherapy?  Yes: MO consult not until 11/6    No ICD  No prior RT  Prefers tx at [facility]    10/23 Dental consult  10/26 GI consult for esoph thickening on PET  10/27 Rad Onc consult  11/1 Swallow study  11/2 Dental extractions (13 teeth)  11/6 Med Onc consult  11/8 esoph biopsy      Carolee Batista, RN                Again, thank you for allowing me to participate in the care of your patient.        Sincerely,        Lluvia Park MD

## 2023-10-27 NOTE — TELEPHONE ENCOUNTER
Pre assessment completed for upcoming procedure.   (Please see previous telephone encounter notes for complete details)    Patient  returned call.       Procedure details:    Arrival time and facility location reviewed.    Pre op exam needed? N/A    Designated  policy reviewed. Instructed to have someone stay 6 hours post procedure.     COVID policy reviewed.      Medication review:    Blood thinner/Anti-platelet medication(s):  Clopidogrel (Plavix): Recommended HOLD 5 days before procedure.  Consult with your managing provider.      Prep for procedure:     Procedure prep instructions reviewed.        Additional information needed?  N/A      Patient  verbalized understanding and had no questions or concerns at this time.      Dee Massey RN  Endoscopy Procedure Pre Assessment RN  [phone] option 4

## 2023-10-30 ENCOUNTER — PATIENT OUTREACH (OUTPATIENT)
Dept: OTOLARYNGOLOGY | Facility: CLINIC | Age: 68
End: 2023-10-30
Payer: COMMERCIAL

## 2023-10-30 DIAGNOSIS — C10.0 SQUAMOUS CELL CARCINOMA OF VALLECULA (H): Primary | ICD-10-CM

## 2023-10-30 NOTE — PROGRESS NOTES
Called and spoke to patient about referral to thoracic surgery with Dr. Hill. This appointment will be scheduled following the EGD procedure that is currently being scheduled. Patient verbalized understanding that he will be called to schedule with Dr. Hill. Patient also instructed that when he gets a feeding tube placed this will need to be a J-tube instead a G-tube due to the possible esophageal neoplasm being worked up. Patient verbalized understanding and had no further questions or concerns at this time.     Hollie Biswas, RN, BSN  RN Care Coordinator, ENT Clinic  [facility]  Direct: [phone]

## 2023-10-31 ENCOUNTER — TELEPHONE (OUTPATIENT)
Dept: CARDIOLOGY | Facility: CLINIC | Age: 68
End: 2023-10-31
Payer: COMMERCIAL

## 2023-10-31 ENCOUNTER — PATIENT OUTREACH (OUTPATIENT)
Dept: SURGERY | Facility: CLINIC | Age: 68
End: 2023-10-31

## 2023-10-31 DIAGNOSIS — C10.0: ICD-10-CM

## 2023-10-31 DIAGNOSIS — K22.89 ESOPHAGEAL MASS: Primary | ICD-10-CM

## 2023-10-31 DIAGNOSIS — C10.0 SQUAMOUS CELL CARCINOMA OF VALLECULA (H): Primary | ICD-10-CM

## 2023-10-31 DIAGNOSIS — J38.7 VALLECULAR MASS: ICD-10-CM

## 2023-10-31 NOTE — TELEPHONE ENCOUNTER
Patient called back and is wondering if he could get an answer on the questions below today by chance?. Please mychart the message back or contact the patient back with this answer.

## 2023-10-31 NOTE — TELEPHONE ENCOUNTER
Called Fabricio and updated on message below. He verbalized understanding and will call back with any questions or concerns. OK to hold Plavix/ASA as recommended by oncology team for biopsy. -[facility]      -----------------------------------------------------------  Christopher Walters MD Caswell, Mallory J, RN  Caller: Unspecified (Today, 10:21 AM)  yes

## 2023-10-31 NOTE — TELEPHONE ENCOUNTER
Per 9/27/23 OV with wtz:    Plan:  We discussed the results of recent echo and stress test.  He had abnormal stress EKG but normal exercise tolerance and no angina.  I do not think we need to pursue another coronary angiogram.     We discussed secondary prevention of cardiac events with close attention to proper diet exercise and medication compliance.     He he may need to undergo the biopsy of laryngeal mass.  He is 6 months past stenting.  Plavix and aspirin can be interrupted for a week before the biopsy and resumed afterward.  He is stable from cardiac standpoint to undergo anesthesia and biopsy.     Plavix should be continued for total 12 months after PCI.     I will plan to repeat echocardiogram in 1 year and have him come back for follow-up visit               See telephone encounter dated 10/31/23. -[facility]

## 2023-10-31 NOTE — TELEPHONE ENCOUNTER
See [employer] message dated 10/27/23 + Office visit note 9/27.     Pt called in because he had his biopsy on his laryngeal mass and had permission to hold Plavix at that time. Now, unfortunately, he has a mass further down his esophagus and just needs permission again to hold Plavix 7 days prior. Will route and clarify this. -[facility]        Dr. Walters,  You had previously granted permission for 7 day hold of plavix & asa for biopsy of Laryngeal mass. He needs another biopsy of a mass further down his esophagus. OK to repeat hold?  Thanks!  Mal

## 2023-10-31 NOTE — PROGRESS NOTES
New Patient Oncology Nurse Navigator Note     Referring provider: Dr Felix, ENT    Referring Clinic/Organization: [facility]     Referred to: Thoracic Surgery    Requested provider (if applicable): Dr. Hill    Referral Received: 10/31/23       Evaluation for : PET concerning for synchronous esophageal malignancy with known history of primary vallecular squamous cell carcinoma     Clinical History (per Nurse review of records provided):      * 10/12/2023 laryngoscopy by Dr Felix ([facility])  Addendum   Immunohistochemical stain performed with adequate control for NUT is negative. EBV by in situ hybridization is negative.   Addendum electronically signed by Annalisa Nieto MD on 10/26/2023 at  3:37 PM   Final Diagnosis   A&B. RIGHT VALLECULA LESION, BIOPSY:  - NON-KERATINIZING POORLY DIFFERENTIATED SQUAMOUS CELL CARCINOMA  - p16 is negative            Comments:   This is an appended report. These results have been appended to a previously preliminary verified report.      Electronically signed by Annalisa Nieto MD on 10/19/2023 at  9:02 AM         * 10/13/2023 PET ([facility])  IMPRESSION:     1. 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     2. FDG avid thickening of the mid to distal esophagus, suspicious for a synchronous primary esophageal neoplasm. Recommend correlation with endoscopy.       * 10/27/2023 Pt met w/ Rad Onc Dr Park to discuss concurrent RT for vallecular SCC, but will hold actual tx until pt meets w/ Med Onc Dr Edwards 11/6/23 and completion of esophageal work up.    * 11/6 Pt to meet with Med Onc Dr Edwards for the head/neck cancer _____      * 11/8 EGD planned by Dr Giraldo _____ for esophageal finding; concerning for synchronous cancer.      Clinical Assessment / Barriers to Care (Per Nurse):    ENT would like pt to meet w/ Thoracic Surgery Dr Hill after 11/8 EGD. Dr Hill's  first available opening is 11/14/23 [facility] 5pm. Will offer appt to pt with pre consult PFTs per protocol. Or schedule per pt preference.    If esophageal cancer is confirmed on EGD, pt will already have Med Onc Dr Edwards & Rad Onc Dr Park to help manage both cancers.         Records Location: [facility]     Records Needed:     N/A    Additional testing needed prior to consult:     PFT        Barry Sherman, RN, BSN, OCN  Oncology New Patient Nurse Navigator   [facility]  [phone]

## 2023-11-01 ENCOUNTER — HOSPITAL ENCOUNTER (OUTPATIENT)
Dept: RADIOLOGY | Facility: HOSPITAL | Age: 68
Discharge: HOME | End: 2023-11-01
Attending: STUDENT IN AN ORGANIZED HEALTH CARE EDUCATION/TRAINING PROGRAM
Payer: COMMERCIAL

## 2023-11-01 ENCOUNTER — HOSPITAL ENCOUNTER (OUTPATIENT)
Dept: SPEECH THERAPY | Facility: HOSPITAL | Age: 68
Discharge: HOME | End: 2023-11-01
Attending: STUDENT IN AN ORGANIZED HEALTH CARE EDUCATION/TRAINING PROGRAM
Payer: COMMERCIAL

## 2023-11-01 DIAGNOSIS — C10.0 SQUAMOUS CELL CARCINOMA OF VALLECULA (H): ICD-10-CM

## 2023-11-01 PROCEDURE — 74230 X-RAY XM SWLNG FUNCJ C+: CPT

## 2023-11-01 PROCEDURE — 92610 EVALUATE SWALLOWING FUNCTION: CPT | Mod: GN

## 2023-11-01 PROCEDURE — 92611 MOTION FLUOROSCOPY/SWALLOW: CPT | Mod: GN

## 2023-11-01 RX ORDER — BARIUM SULFATE 400 MG/ML
SUSPENSION ORAL ONCE
Status: COMPLETED | OUTPATIENT
Start: 2023-11-01 | End: 2023-11-01

## 2023-11-01 RX ADMIN — BARIUM SULFATE 60 ML: 400 SUSPENSION ORAL at 09:40

## 2023-11-01 NOTE — PROGRESS NOTES
SPEECH LANGUAGE PATHOLOGY EVALUATION    See electronic medical record for Abuse and Falls Screening details.    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 Patient denies any overt s/s aspiration at this time. He does report occasional cough with intake but not consistent and occasional sensation of needing increased effort with larger pills.  He reports medical plan includes: EGD, Jtube placement and radiation. He has not undergone any radiation or surgeries yet at this time. EGD is reported to be next week. No esophagram has been completed per patient and records. He denies history of pneumonia.    Objective     SWALLOW EVALUATION  Current Diet/Method of Nutritional Intake: thin liquids (level 0), regular diet      CLINICAL SWALLOW EVALUATION  Clinical swallow evaluation completed prior to VFSS via records review, clinical interview and oral motor examination.   Oral Motor Function: generally intact  Dentition: natural dentition, adequate dentition  Oral labial function: WFL  Lingual function: WFL  Buccal function: intact     Level of assist required for feeding: no assistance needed     VIDEOFLUOROSCOPIC SWALLOW STUDY  Radiologist: Dr. Lundberg  Views Taken: left lateral, A/P   Physical location of procedure: [employer] [facility]  Patient sitting upright on chair/stool     VFSS textures trialed:   VFSS Eval: Thin Liquids  Mode of Presentation: cup, straw, self-fed   Preparatory Phase: WFL  Oral Phase: WFL, residue in oral cavity  Bolus Location When Swallow Initiated: valleculae, posterior laryngeal surface of epiglottis  Pharyngeal Phase: WFL, impaired hyolaryngel excursion, residue in valleculae, residue in pyriform sinus, residue is trace to mild and clears  Rosenbeck's  Penetration Aspiration Scale: 2 - contrast enters airway, remains above the vocal cords, no residue remains (penetration)    VFSS Eval: Purees  Mode of Presentation: spoon, self-fed   Preparatory Phase: WFL  Oral Phase: WFL, residue in oral cavity  Bolus Location When Swallow Initiated: valleculae  Pharyngeal Phase: WFL, impaired hyolaryngel excursion, impaired tongue base retraction, residue in valleculae, mild to moderate valleculae stasis, clears with double swallow or liquid wash  Rosenbeck s Penetration Aspiration Scale: 1 - no aspiration, contrast does not enter airway    VFSS Eval: Solids  Mode of Presentation: self-fed   Preparatory Phase: WFL  Oral Phase: WFL, residue in oral cavity  Bolus Location When Swallow Initiated: valleculae  Pharyngeal Phase: WFL, impaired hyolaryngel excursion, impaired tongue base retraction, residue in valleculae   Rosenbeck s Penetration Aspiration Scale: 1 - no aspiration, contrast does not enter airway    ESOPHAGEAL PHASE OF SWALLOW  No obvious obstruction concerns with very brief esophageal sweep ; an esophagram would be necessary to assess    SWALLOW ASSESSMENT CLINICAL IMPRESSIONS AND RATIONALE  Diet Consistency Recommendations: thin liquids (level 0), regular diet    Recommended Feeding/Eating Techniques: small bolus size, alternate food and liquid intake, double swallow, effortful (hard) swallow, maintain upright sitting position for eating   Medication Administration Recommendations: as tolerated, consider crushed as needed    Assessment & Plan   CLINICAL IMPRESSIONS   Medical Diagnosis: Dysphagia    Treatment Diagnosis: Dysphagia   Impression/Assessment:   Videofluoroscopic Swallow Study completed. Patient had no aspiration with any trials.  Transient, shallow penetration with thin liquids. Oral phase is WFL. Tongue base retraction was reduced. Swallow response was timely to mildly delayed. Epiglottic inversion was complete.  Moderate stasis occurred with puree and  solids in the valleculae, and mild diffuse stasis with other trials, cleared with subsequent swallows. Hyolaryngeal elevation was WFL and hyolaryngeal excursion was mildly reduced.  Mastication was safe and complete. Cricopharyngeus was mildly prominent with luminal narrowing noted but contrast material readily passed PES and cervical esophagus without stasis. AP view: Minimal asymmetry noted but largely functional despite known right vallecular lesion. Patient would benefit from strategies of double swallow and liquid wash with solids and small bites and sips. Recommend close monitoring for any further dysphagia concerns given valleculae involvement and anticipate treatment course and potential impact on swallow function.     PLAN OF CARE    Recommended Referrals to Other Professionals: Consider esophagram       Education Assessment:   Learner/Method: Patient;Family;Listening;No Barriers to Learning;Demonstration  Education Comments: VFSS protocol, results and recommendations    Patient was given CTAR exercise and information on tongue base strengthening to consider given stasis. Full OP ST not warranted at this time given swallow is safe and functional, and potential for change given disease process and treatment course.     Risks and benefits of evaluation/treatment have been explained.   Patient/Family/caregiver agrees with Plan of Care.     Evaluation Time:    SLP Eval: oral/pharyngeal swallow function, clinical minutes (49969): 8  SLP Eval: VideoFluoroscopic Swallow function Minutes (06479): 15      Signing Clinician: BEULAH [facility]      Initial Assessment  See Epic Evaluation-

## 2023-11-03 ENCOUNTER — TELEPHONE (OUTPATIENT)
Dept: SPEECH THERAPY | Facility: CLINIC | Age: 68
End: 2023-11-03
Payer: COMMERCIAL

## 2023-11-03 NOTE — TELEPHONE ENCOUNTER
Called and spoke with patient. Discussed SLP role and POC during chemoXRT. Pt is completing treatment in the [facility], but is willing to have video visits. Writer informed patient they will call to set up weekly video visits once his radiation and chemo appointments are scheduled as not to conflict with them. Pt agreeable and appreciative.    WAGNER Ybarra MA (music), CCC-SLP   Speech Language Pathologist  [facility] Trained Vocologist   [facility]  Dept. of Otolaryngology  Department of Rehabilitation Services  [address] [place] [id number]  Email: [email or web address]   Phone: [phone]  Pronouns: she/her/hers

## 2023-11-06 ENCOUNTER — PRE VISIT (OUTPATIENT)
Dept: ONCOLOGY | Facility: HOSPITAL | Age: 68
End: 2023-11-06
Payer: COMMERCIAL

## 2023-11-06 ENCOUNTER — ONCOLOGY VISIT (OUTPATIENT)
Dept: ONCOLOGY | Facility: HOSPITAL | Age: 68
End: 2023-11-06
Attending: STUDENT IN AN ORGANIZED HEALTH CARE EDUCATION/TRAINING PROGRAM
Payer: COMMERCIAL

## 2023-11-06 VITALS
HEART RATE: 62 BPM | WEIGHT: 170.3 LBS | SYSTOLIC BLOOD PRESSURE: 131 MMHG | TEMPERATURE: 97.8 F | OXYGEN SATURATION: 98 % | HEIGHT: 67 IN | BODY MASS INDEX: 26.73 KG/M2 | RESPIRATION RATE: 20 BRPM | DIASTOLIC BLOOD PRESSURE: 63 MMHG

## 2023-11-06 DIAGNOSIS — C10.0 SQUAMOUS CELL CARCINOMA OF VALLECULA (H): Primary | ICD-10-CM

## 2023-11-06 PROCEDURE — 99205 OFFICE O/P NEW HI 60 MIN: CPT | Performed by: INTERNAL MEDICINE

## 2023-11-06 PROCEDURE — 99213 OFFICE O/P EST LOW 20 MIN: CPT | Performed by: INTERNAL MEDICINE

## 2023-11-06 ASSESSMENT — PAIN SCALES - GENERAL: PAINLEVEL: NO PAIN (0)

## 2023-11-06 NOTE — PROGRESS NOTES
[facility] Hematology and Oncology Consult Note    Patient: Lopez Hogue  MRN: [id number]  Date of Service: 11/06/2023      Reason for Visit    Chief Complaint   Patient presents with    Oncology Clinic Visit     New Patient -  Squamous cell carcinoma of vallecula        Assessment/Plan    [id number], stage Beckie squamous cell carcinoma of the right vallecula diagnosed October 2023  Mid to distal esophageal thickening suspicious for synchronous primary esophageal neoplasm  Previous history of smoking and alcohol use    Patient presenting with hemoptysis and further work-up with laryngoscopy revealed a mass on the right vallecula.  Biopsy seems poorly differentiated squamous cell cancer.  PET scan shows uptake in the mass and in 2 separate lymph node areas on the right neck and also in the esophagus.    Patient has met with radiation oncology.    Discussed that he has locally advanced head and neck cancer.  Typical standard treatment would be weekly cisplatin concurrent with radiation therapy.  Discussed how this is typically administered and reviewed potential side effects including but not limited to alopecia, nausea vomiting, fatigue, myelosuppression with risk for fever and infection and need for transfusions, ototoxicity, nephrotoxicity, electrolyte imbalance, neuropathy with cisplatin.  Patients could also develop severe pharyngitis requiring IV hydration and use of the feeding tube to support nutritional status.    Will need to wait for upper endoscopy results and biopsy results to determine the final plan for treatment.    Questions answered and gave him information about cisplatin.    Plan: Await results of upper endoscopy and biopsy  We will need to see patient back after that to make final plan for treatment        ECOG Performance    0 - Independent    Problem List    Patient Active Problem List   Diagnosis    HTN (hypertension)    EtOH dependence (H)    Nonrheumatic aortic valve stenosis    Sweat  gland carcinoma    Unstable angina (H)    Coronary artery disease involving native heart with unstable angina pectoris (H)    Dyslipidemia, goal LDL below 70    Acute chest pain    Coronary 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, unspecified vessel or lesion type (H24)    Hemorrhage of oropharynx    Vallecular mass    Squamous cell carcinoma of vallecula (H)     ______________________________________________________________________________    Staging History    Cancer Staging   No matching staging information was found for the patient.      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    He presented with hemoptysis and then had fiberoptic laryngoscopy showing a right vallecular mass with biopsy showing poorly differentiated squamous cell cancer.  He had PET scan showing 2 separate lymph nodes in the right neck.  Met with radiation oncology and was recommended concurrent chemotherapy and radiation and is now here to discuss chemotherapy with us.    Able to eat and drink fine.  No headaches or dizziness.  No shortness of breath or cough.  No new bone or abdominal pain.    Past medical and surgical history as above.  No other hospitalizations.    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  Long smoking and alcohol history but quit several years ago.    No family history of cancer.    Past History    Past Medical History:   Diagnosis Date    EtOH dependence (H)     Quit drinking 10 years    Hypertension     Nonrheumatic aortic (valve) stenosis     Sweat gland carcinoma     Family History   Problem Relation Age of Onset    Dementia Mother     Anesthesia Reaction No family hx of      Thrombocytopenia No family hx of       Past Surgical History:   Procedure Laterality Date    CV CORONARY ANGIOGRAM N/A 3/27/2023    Procedure: Coronary Angiogram;  Surgeon: Miki Etienne MD;  Location: [facility] CATH LAB CV    CV CORONARY ANGIOGRAM N/A 5/9/2023    Procedure: Coronary Angiogram;  Surgeon: Miki Etienne MD;  Location: [facility] CATH LAB CV    CV LEFT HEART CATH N/A 3/27/2023    Procedure: Left Heart Catheterization;  Surgeon: Miki Etienne MD;  Location: [facility] CATH LAB CV    CV LEFT HEART CATH N/A 5/9/2023    Procedure: Left Heart Catheterization;  Surgeon: Miki Etienne MD;  Location: [facility] LAB CV    CV PCI N/A 3/27/2023    Procedure: Percutaneous Coronary Intervention;  Surgeon: Miki Etienne MD;  Location: [facility] LAB CV    LARYNGOSCOPY WITH BIOPSY(IES) N/A 10/12/2023    Procedure: LARYNGOSCOPY, WITH BIOPSY;  Surgeon: Edi Felix MD;  Location: [facility] OR    OTHER SURGICAL HISTORY  2015    WIDE EXCISION OF LEFT GLUTEAL MASSTNM: [id number], stage: II hidradenocarcinoma Grade II, 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, sentinel lymph node biopsy negative     Social History     Socioeconomic History    Marital status:      Spouse name: Not on file    Number of children: 2    Years of education: Not on file    Highest education level: Not on file   Occupational History    Occupation: non-profit manager   Tobacco Use    Smoking status: Former     Packs/day: 2.00     Years: 30.00     Additional pack years: 0.00     Total pack years: 60.00     Types: Cigarettes     Quit date: 2013     Years since quitting: 10.8     Passive exposure: Never    Smokeless tobacco: Never    Tobacco comments:     Quit 10 years ago   Vaping Use    Vaping Use: Never used   Substance and Sexual Activity    Alcohol use: Not Currently     Comment: quit in 2013    Drug use: Never    Sexual activity: Not on file   Other Topics Concern    Not on file   Social History Narrative    Patient works.  Lives with his  wife.     Social Determinants of Health     Financial Resource Strain: Not on file   Food Insecurity: Not on file   Transportation Needs: Not on file   Physical Activity: Not on file   Stress: Not on file   Social Connections: Not on file   Interpersonal Safety: Not on file   Housing Stability: Not on file        Allergies    Allergies   Allergen Reactions    Coconut Flavor Anaphylaxis     Raw coconut       Review of Systems      Physical Exam        11/6/2023     2:58 PM   Oncology Vitals   Height 170 cm   Weight 77.248 kg   BSA (m2) 1.91 m2   /63   Pulse 62   Temp 97.8  F (36.6  C)   Temp src Oral   SpO2 98 %   Pain Score 0 (None)         HEENT:   Lymphatics: No abnormally enlarged lymph nodes.  Chest:   Abdomen: abdomen is soft without significant tenderness, masses, organomegaly or guarding  Extremities:   Skin: normal  CNS:     Lab Results    No results found for this or any previous visit (from the past 168 hour(s)).    Imaging Results    XR Video Swallow with SLP or OT - Order with Speech Therapy Referral    Result Date: 11/1/2023  EXAM: XR VIDEO SWALLOW WITH SLP OR OT LOCATION: [facility] DATE: 11/1/2023 INDICATION: Difficulty swallowing, recently diagnosed right vallecular squamous cell carcinoma and possible synchronous distal esophageal lesion. COMPARISON: PET CT 10/13/2023 TECHNIQUE: Routine swallow study with speech pathology using multiple barium thicknesses. FINDINGS: FLUOROSCOPIC TIME: 1.5 Swallow study with Speech Pathology using multiple barium thicknesses. There is penetration with thin liquids, however no aspiration is visualized. Piriform sinus and vallecular stasis is noted. Relative symmetry in the AP view (within liquids) without correlate to the known right vallecular lesion.     IMPRESSION: Penetration with thin liquids, however no aspiration visualized. Please see speech pathology report for further details.    PET Oncology Whole Body    Result Date:  10/13/2023  EXAM: CT CHEST/ABDOMEN/PELVIS W CONTRAST, PET ONCOLOGY WHOLE BODY, CT SOFT TISSUE NECK W CONTRAST LOCATION: [facility] DATE/TIME: 10/13/2023 11:05 AM CDT INDICATION: Initial treatment strategy and staging for squamous cell carcinoma of the right vallecula. Also history of hidradenocarcinoma. COMPARISON: CT neck 08/18/2023. CT chest abdomen pelvis 08/08/2023. FDG PET/CT 08/20/2015. CONTRAST: 82 mL Isovue-370, IV. TECHNIQUE: Serum glucose level 85 mg/dL. One hour post intravenous administration of 10.22 mCi F-18 FDG, PET imaging was performed from the skull vertex to feet, utilizing attenuation correction with concurrent axial CT and PET/CT image fusion. Separate diagnostic CT of the neck, chest, abdomen, and pelvis was performed. Dose reduction techniques were used. FINDINGS: PET/CT FINDINGS: FDG avid lesion in the right vallecula measuring 1.4 x 1.4 cm (SUV max 13.4), suspicious for biopsy-proven squamous cell carcinoma. Associated FDG avid lymph nodes including right level IIa measuring 1.2 x 1.2 cm (SUV max 7.1) and right level IV (SUV max 8.7), suspicious for metastases. FDG avid thickening of the mid to distal esophagus spanning a length of 4.7 cm (SUV max 13.2), suspicious for synchronous primary esophageal neoplasm. CT FINDINGS: Mild mucosal thickening in the paranasal sinuses. Mild carotid artery bifurcation calcification. Severe coronary artery calcification. Aortic valve calcification. Right middle lobe calcified granuloma. Few tiny pulmonary vessels are too small to characterize by PET/CT. Scattered colonic diverticuli. Pelvic phleboliths. Multilevel degenerative changes of the spine. Lower extremities are remarkable.     IMPRESSION: 1. 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 2. FDG avid thickening of the mid to distal esophagus, suspicious for a synchronous primary esophageal  neoplasm. Recommend correlation with endoscopy.     CT Soft Tissue Neck w Contrast    Result Date: 10/13/2023  EXAM: CT CHEST/ABDOMEN/PELVIS W CONTRAST, PET ONCOLOGY WHOLE BODY, CT SOFT TISSUE NECK W CONTRAST LOCATION: [facility] DATE/TIME: 10/13/2023 11:05 AM CDT INDICATION: Initial treatment strategy and staging for squamous cell carcinoma of the right vallecula. Also history of hidradenocarcinoma. COMPARISON: CT neck 08/18/2023. CT chest abdomen pelvis 08/08/2023. FDG PET/CT 08/20/2015. CONTRAST: 82 mL Isovue-370, IV. TECHNIQUE: Serum glucose level 85 mg/dL. One hour post intravenous administration of 10.22 mCi F-18 FDG, PET imaging was performed from the skull vertex to feet, utilizing attenuation correction with concurrent axial CT and PET/CT image fusion. Separate diagnostic CT of the neck, chest, abdomen, and pelvis was performed. Dose reduction techniques were used. FINDINGS: PET/CT FINDINGS: FDG avid lesion in the right vallecula measuring 1.4 x 1.4 cm (SUV max 13.4), suspicious for biopsy-proven squamous cell carcinoma. Associated FDG avid lymph nodes including right level IIa measuring 1.2 x 1.2 cm (SUV max 7.1) and right level IV (SUV max 8.7), suspicious for metastases. FDG avid thickening of the mid to distal esophagus spanning a length of 4.7 cm (SUV max 13.2), suspicious for synchronous primary esophageal neoplasm. CT FINDINGS: Mild mucosal thickening in the paranasal sinuses. Mild carotid artery bifurcation calcification. Severe coronary artery calcification. Aortic valve calcification. Right middle lobe calcified granuloma. Few tiny pulmonary vessels are too small to characterize by PET/CT. Scattered colonic diverticuli. Pelvic phleboliths. Multilevel degenerative changes of the spine. Lower extremities are remarkable.     IMPRESSION: 1. 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 2. FDG avid  thickening of the mid to distal esophagus, suspicious for a synchronous primary esophageal neoplasm. Recommend correlation with endoscopy.     CT Chest/Abdomen/Pelvis w Contrast    Result Date: 10/13/2023  EXAM: CT CHEST/ABDOMEN/PELVIS W CONTRAST, PET ONCOLOGY WHOLE BODY, CT SOFT TISSUE NECK W CONTRAST LOCATION: [facility] DATE/TIME: 10/13/2023 11:05 AM CDT INDICATION: Initial treatment strategy and staging for squamous cell carcinoma of the right vallecula. Also history of hidradenocarcinoma. COMPARISON: CT neck 08/18/2023. CT chest abdomen pelvis 08/08/2023. FDG PET/CT 08/20/2015. CONTRAST: 82 mL Isovue-370, IV. TECHNIQUE: Serum glucose level 85 mg/dL. One hour post intravenous administration of 10.22 mCi F-18 FDG, PET imaging was performed from the skull vertex to feet, utilizing attenuation correction with concurrent axial CT and PET/CT image fusion. Separate diagnostic CT of the neck, chest, abdomen, and pelvis was performed. Dose reduction techniques were used. FINDINGS: PET/CT FINDINGS: FDG avid lesion in the right vallecula measuring 1.4 x 1.4 cm (SUV max 13.4), suspicious for biopsy-proven squamous cell carcinoma. Associated FDG avid lymph nodes including right level IIa measuring 1.2 x 1.2 cm (SUV max 7.1) and right level IV (SUV max 8.7), suspicious for metastases. FDG avid thickening of the mid to distal esophagus spanning a length of 4.7 cm (SUV max 13.2), suspicious for synchronous primary esophageal neoplasm. CT FINDINGS: Mild mucosal thickening in the paranasal sinuses. Mild carotid artery bifurcation calcification. Severe coronary artery calcification. Aortic valve calcification. Right middle lobe calcified granuloma. Few tiny pulmonary vessels are too small to characterize by PET/CT. Scattered colonic diverticuli. Pelvic phleboliths. Multilevel degenerative changes of the spine. Lower extremities are remarkable.     IMPRESSION: 1. Findings suspicious for right vallecula  squamous cell carcinoma with right level IIa and right level IV lymph node metastases. 2. FDG avid thickening of the mid to distal esophagus, suspicious for a synchronous primary esophageal neoplasm. Recommend correlation with endoscopy.        Signed by: Shine Edwards MD

## 2023-11-06 NOTE — TELEPHONE ENCOUNTER
Incoming call from patient.     Patient could not remember if they completed pre assessment for upcoming procedure. Informed patient that they have. Questioned if they have been holding Clopidogrel (Plavix). Per patient they were cleared by cardiology to hold 5 days.     Patient had no further questions at this time.     Shu Rodriguez RN  Endoscopy Procedure Pre Assessment RN  [phone] option 4

## 2023-11-06 NOTE — LETTER
"    11/6/2023         RE: Lopez Houge  [address] [place] [address]        Dear Colleague,    Thank you for referring your patient, Lopez Hogue, to the [facility] [place]. Please see a copy of my visit note below.    Oncology Rooming Note    November 6, 2023 3:03 PM   Lopez Hogue is a 68 year old male who presents for:    Chief Complaint   Patient presents with     Oncology Clinic Visit     New Patient -  Squamous cell carcinoma of vallecula      Initial Vitals: /63 (BP Location: Left arm, Patient Position: Sitting, Cuff Size: Adult Regular)   Pulse 62   Temp 97.8  F (36.6  C) (Oral)   Resp 20   Ht 1.702 m (5' 7\")   Wt 77.2 kg (170 lb 4.8 oz)   SpO2 98%   BMI 26.67 kg/m   Estimated body mass index is 26.67 kg/m  as calculated from the following:    Height as of this encounter: 1.702 m (5' 7\").    Weight as of this encounter: 77.2 kg (170 lb 4.8 oz). Body surface area is 1.91 meters squared.  No Pain (0) Comment: Data Unavailable   No LMP for male patient.  Allergies reviewed: Yes  Medications reviewed: Yes    Medications: Medication refills not needed today.  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  Biopsy seems poorly differentiated squamous cell cancer.  PET scan shows uptake in the mass and in 2 separate lymph node areas on the right neck and also in the esophagus.    Patient has met with radiation oncology.    Discussed that he has locally advanced head and neck cancer.  Typical standard treatment would be weekly cisplatin concurrent with radiation therapy.  Discussed how this is typically administered and reviewed potential side effects including but not limited to alopecia, nausea vomiting, fatigue, myelosuppression with risk for fever and infection and need for transfusions, ototoxicity, nephrotoxicity, electrolyte imbalance, neuropathy with cisplatin.  Patients could also develop severe pharyngitis requiring IV hydration and use of the feeding tube to support nutritional status.    Will need to wait for upper endoscopy results and biopsy results to determine the final plan for treatment.    Questions answered and gave him information about cisplatin.    Plan: Await results of upper endoscopy and biopsy  We will need to see patient back after that to make final plan for treatment        ECOG Performance    0 - Independent    Problem List    Patient Active Problem List   Diagnosis     HTN (hypertension)     EtOH dependence (H)     Nonrheumatic aortic valve stenosis     Sweat gland carcinoma     Unstable angina (H)     Coronary artery disease involving native heart with unstable angina pectoris (H)     Dyslipidemia, goal LDL below 70     Acute chest pain     Coronary 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, unspecified vessel or lesion type (H24)     Hemorrhage of oropharynx     Vallecular mass     Squamous cell carcinoma of vallecula (H)     ______________________________________________________________________________    Staging History    Cancer Staging   No matching staging information was found for the  patient.      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    He presented with hemoptysis and then had fiberoptic laryngoscopy showing a right vallecular mass with biopsy showing poorly differentiated squamous cell cancer.  He had PET scan showing 2 separate lymph nodes in the right neck.  Met with radiation oncology and was recommended concurrent chemotherapy and radiation and is now here to discuss chemotherapy with us.    Able to eat and drink fine.  No headaches or dizziness.  No shortness of breath or cough.  No new bone or abdominal pain.    Past medical and surgical history as above.  No other hospitalizations.    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  Long smoking and alcohol history but quit several years ago.    No family history of cancer.    Past History    Past Medical History:   Diagnosis Date     EtOH dependence (H)     Quit drinking 10 years     Hypertension      Nonrheumatic aortic (valve) stenosis      Sweat gland carcinoma     Family History   Problem Relation Age of Onset     Dementia Mother      Anesthesia Reaction No family hx of      Thrombocytopenia No family hx of       Past Surgical History:   Procedure Laterality Date     CV CORONARY ANGIOGRAM N/A 3/27/2023    Procedure: Coronary Angiogram;  Surgeon: Miki Etienne MD;  Location: [facility] CV     CV CORONARY ANGIOGRAM N/A 5/9/2023    Procedure: Coronary Angiogram;  Surgeon: Miki Etienne MD;  Location: [facility] CV     CV LEFT HEART CATH N/A 3/27/2023    Procedure: Left Heart Catheterization;  Surgeon: Miki Etienne MD;  Location: [facility] CV     CV LEFT HEART CATH N/A  5/9/2023    Procedure: Left Heart Catheterization;  Surgeon: Miki Etienne MD;  Location: [facility] CV     CV PCI N/A 3/27/2023    Procedure: Percutaneous Coronary Intervention;  Surgeon: Miki Etienne MD;  Location: [facility] CV     LARYNGOSCOPY WITH BIOPSY(IES) N/A 10/12/2023    Procedure: LARYNGOSCOPY, WITH BIOPSY;  Surgeon: Edi Felix MD;  [facility]     OTHER SURGICAL HISTORY  2015    WIDE EXCISION OF LEFT GLUTEAL MASSTNM: [id number], stage: II hidradenocarcinoma Grade II, 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, sentinel lymph node biopsy negative     Social History     Socioeconomic History     Marital status:      Spouse name: Not on file     Number of children: 2     Years of education: Not on file     Highest education level: Not on file   Occupational History     Occupation: non-profit manager   Tobacco Use     Smoking status: Former     Packs/day: 2.00     Years: 30.00     Additional pack years: 0.00     Total pack years: 60.00     Types: Cigarettes     Quit date: 2013     Years since quitting: 10.8     Passive exposure: Never     Smokeless tobacco: Never     Tobacco comments:     Quit 10 years ago   Vaping Use     Vaping Use: Never used   Substance and Sexual Activity     Alcohol use: Not Currently     Comment: quit in 2013     Drug use: Never     Sexual activity: Not on file   Other Topics Concern     Not on file   Social History Narrative    Patient works.  Lives with his wife.     Social Determinants of Health     Financial Resource Strain: Not on file   Food Insecurity: Not on file   Transportation Needs: Not on file   Physical Activity: Not on file   Stress: Not on file   Social Connections: Not on file   Interpersonal Safety: Not on file   Housing Stability: Not on file        Allergies    Allergies   Allergen Reactions     Coconut Flavor Anaphylaxis     Raw coconut       Review of Systems      Physical Exam        11/6/2023     2:58 PM   Oncology Vitals   Height 170 cm   Weight  77.248 kg   BSA (m2) 1.91 m2   /63   Pulse 62   Temp 97.8  F (36.6  C)   Temp src Oral   SpO2 98 %   Pain Score 0 (None)         HEENT:   Lymphatics: No abnormally enlarged lymph nodes.  Chest:   Abdomen: abdomen is soft without significant tenderness, masses, organomegaly or guarding  Extremities:   Skin: normal  CNS:     Lab Results    No results found for this or any previous visit (from the past 168 hour(s)).    Imaging Results    XR Video Swallow with SLP or OT - Order with Speech Therapy Referral    Result Date: 11/1/2023  EXAM: XR VIDEO SWALLOW WITH SLP OR OT LOCATION: [facility] DATE: 11/1/2023 INDICATION: Difficulty swallowing, recently diagnosed right vallecular squamous cell carcinoma and possible synchronous distal esophageal lesion. COMPARISON: PET CT 10/13/2023 TECHNIQUE: Routine swallow study with speech pathology using multiple barium thicknesses. FINDINGS: FLUOROSCOPIC TIME: 1.5 Swallow study with Speech Pathology using multiple barium thicknesses. There is penetration with thin liquids, however no aspiration is visualized. Piriform sinus and vallecular stasis is noted. Relative symmetry in the AP view (within liquids) without correlate to the known right vallecular lesion.     IMPRESSION: Penetration with thin liquids, however no aspiration visualized. Please see speech pathology report for further details.    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 Also history of hidradenocarcinoma. COMPARISON: CT neck 08/18/2023. CT chest abdomen pelvis 08/08/2023. FDG PET/CT 08/20/2015. CONTRAST: 82 mL Isovue-370, IV. TECHNIQUE: Serum glucose level 85 mg/dL. One hour post intravenous  administration of 10.22 mCi F-18 FDG, PET imaging was performed from the skull vertex to feet, utilizing attenuation correction with concurrent axial CT and PET/CT image fusion. Separate diagnostic CT of the neck, chest, abdomen, and pelvis was performed. Dose reduction techniques were used. FINDINGS: PET/CT FINDINGS: FDG avid lesion in the right vallecula measuring 1.4 x 1.4 cm (SUV max 13.4), suspicious for biopsy-proven squamous cell carcinoma. Associated FDG avid lymph nodes including right level IIa measuring 1.2 x 1.2 cm (SUV max 7.1) and right level IV (SUV max 8.7), suspicious for metastases. FDG avid thickening of the mid to distal esophagus spanning a length of 4.7 cm (SUV max 13.2), suspicious for synchronous primary esophageal neoplasm. CT FINDINGS: Mild mucosal thickening in the paranasal sinuses. Mild carotid artery bifurcation calcification. Severe coronary artery calcification. Aortic valve calcification. Right middle lobe calcified granuloma. Few tiny pulmonary vessels are too small to characterize by PET/CT. Scattered colonic diverticuli. Pelvic phleboliths. Multilevel degenerative changes of the spine. Lower extremities are remarkable.     IMPRESSION: 1. 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 2. FDG avid thickening of the mid to distal esophagus, suspicious for a synchronous primary esophageal neoplasm. Recommend correlation with endoscopy.     CT Soft Tissue Neck w Contrast    Result Date: 10/13/2023  EXAM: CT CHEST/ABDOMEN/PELVIS W CONTRAST, PET ONCOLOGY WHOLE BODY, CT SOFT TISSUE NECK W CONTRAST LOCATION: 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 Also history of hidradenocarcinoma. COMPARISON: CT neck 08/18/2023. CT chest abdomen pelvis 08/08/2023. FDG PET/CT 08/20/2015.  CONTRAST: 82 mL Isovue-370, IV. TECHNIQUE: Serum glucose level 85 mg/dL. One hour post intravenous administration of 10.22 mCi F-18 FDG, PET imaging was performed from the skull vertex to feet, utilizing attenuation correction with concurrent axial CT and PET/CT image fusion. Separate diagnostic CT of the neck, chest, abdomen, and pelvis was performed. Dose reduction techniques were used. FINDINGS: PET/CT FINDINGS: FDG avid lesion in the right vallecula measuring 1.4 x 1.4 cm (SUV max 13.4), suspicious for biopsy-proven squamous cell carcinoma. Associated FDG avid lymph nodes including right level IIa measuring 1.2 x 1.2 cm (SUV max 7.1) and right level IV (SUV max 8.7), suspicious for metastases. FDG avid thickening of the mid to distal esophagus spanning a length of 4.7 cm (SUV max 13.2), suspicious for synchronous primary esophageal neoplasm. CT FINDINGS: Mild mucosal thickening in the paranasal sinuses. Mild carotid artery bifurcation calcification. Severe coronary artery calcification. Aortic valve calcification. Right middle lobe calcified granuloma. Few tiny pulmonary vessels are too small to characterize by PET/CT. Scattered colonic diverticuli. Pelvic phleboliths. Multilevel degenerative changes of the spine. Lower extremities are remarkable.     IMPRESSION: 1. 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 2. FDG avid thickening of the mid to distal esophagus, suspicious for a synchronous primary esophageal neoplasm. Recommend correlation with endoscopy.     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 Also history of  hidradenocarcinoma. COMPARISON: CT neck 08/18/2023. CT chest abdomen pelvis 08/08/2023. FDG PET/CT 08/20/2015. CONTRAST: 82 mL Isovue-370, IV. TECHNIQUE: Serum glucose level 85 mg/dL. One hour post intravenous administration of 10.22 mCi F-18 FDG, PET imaging was performed from the skull vertex to feet, utilizing attenuation correction with concurrent axial CT and PET/CT image fusion. Separate diagnostic CT of the neck, chest, abdomen, and pelvis was performed. Dose reduction techniques were used. FINDINGS: PET/CT FINDINGS: FDG avid lesion in the right vallecula measuring 1.4 x 1.4 cm (SUV max 13.4), suspicious for biopsy-proven squamous cell carcinoma. Associated FDG avid lymph nodes including right level IIa measuring 1.2 x 1.2 cm (SUV max 7.1) and right level IV (SUV max 8.7), suspicious for metastases. FDG avid thickening of the mid to distal esophagus spanning a length of 4.7 cm (SUV max 13.2), suspicious for synchronous primary esophageal neoplasm. CT FINDINGS: Mild mucosal thickening in the paranasal sinuses. Mild carotid artery bifurcation calcification. Severe coronary artery calcification. Aortic valve calcification. Right middle lobe calcified granuloma. Few tiny pulmonary vessels are too small to characterize by PET/CT. Scattered colonic diverticuli. Pelvic phleboliths. Multilevel degenerative changes of the spine. Lower extremities are remarkable.     IMPRESSION: 1. 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 2. FDG avid thickening of the mid to distal esophagus, suspicious for a synchronous primary esophageal neoplasm. Recommend correlation with endoscopy.        Signed by: Shine Edwards MD      Again, thank you for allowing me to participate in the care of your patient.        Sincerely,        Shine Edwards MD

## 2023-11-06 NOTE — PROGRESS NOTES
"Oncology Rooming Note    November 6, 2023 3:03 PM   Lopez Hogue is a 68 year old male who presents for:    Chief Complaint   Patient presents with    Oncology Clinic Visit     New Patient -  Squamous cell carcinoma of vallecula      Initial Vitals: /63 (BP Location: Left arm, Patient Position: Sitting, Cuff Size: Adult Regular)   Pulse 62   Temp 97.8  F (36.6  C) (Oral)   Resp 20   Ht 1.702 m (5' 7\")   Wt 77.2 kg (170 lb 4.8 oz)   SpO2 98%   BMI 26.67 kg/m   Estimated body mass index is 26.67 kg/m  as calculated from the following:    Height as of this encounter: 1.702 m (5' 7\").    Weight as of this encounter: 77.2 kg (170 lb 4.8 oz). Body surface area is 1.91 meters squared.  No Pain (0) Comment: Data Unavailable   No LMP for male patient.  Allergies reviewed: Yes  Medications reviewed: Yes    Medications: Medication refills not needed today.  Pharmacy name entered into [employer]: [facility] #[id number] - [address]  AT [facility] [address]    Clinical concerns:  New Patient -  Squamous cell carcinoma of vallecula       Tessy Goldberg CMA            "

## 2023-11-07 ENCOUNTER — TELEPHONE (OUTPATIENT)
Dept: ONCOLOGY | Facility: HOSPITAL | Age: 68
End: 2023-11-07
Payer: COMMERCIAL

## 2023-11-07 NOTE — TELEPHONE ENCOUNTER
Oncology Distress Screen    Called Lopez in regards to his positive oncology nutrition distress screen:    1. How concerned are you about your ability to eat? :  8  And  9. If you want to be contacted by one of our professionals, I can send a message to them right now. : ONCOLOGY DIETITIAN    Called and left voicemail explaining role and reason for call. Call back information provided.      Joanna Zazueta, MS, RD, LD

## 2023-11-08 ENCOUNTER — HOSPITAL ENCOUNTER (OUTPATIENT)
Facility: CLINIC | Age: 68
Discharge: HOME | End: 2023-11-08
Attending: INTERNAL MEDICINE | Admitting: INTERNAL MEDICINE
Payer: COMMERCIAL

## 2023-11-08 ENCOUNTER — TELEPHONE (OUTPATIENT)
Dept: CARDIOLOGY | Facility: CLINIC | Age: 68
End: 2023-11-08
Payer: COMMERCIAL

## 2023-11-08 VITALS
SYSTOLIC BLOOD PRESSURE: 121 MMHG | RESPIRATION RATE: 16 BRPM | OXYGEN SATURATION: 94 % | DIASTOLIC BLOOD PRESSURE: 67 MMHG | HEART RATE: 62 BPM

## 2023-11-08 LAB — UPPER GI ENDOSCOPY: NORMAL

## 2023-11-08 PROCEDURE — 88305 TISSUE EXAM BY PATHOLOGIST: CPT | Mod: TC | Performed by: INTERNAL MEDICINE

## 2023-11-08 PROCEDURE — G0500 MOD SEDAT ENDO SERVICE >5YRS: HCPCS | Performed by: INTERNAL MEDICINE

## 2023-11-08 PROCEDURE — 250N000011 HC RX IP 250 OP 636: Performed by: INTERNAL MEDICINE

## 2023-11-08 PROCEDURE — 88305 TISSUE EXAM BY PATHOLOGIST: CPT | Mod: 26 | Performed by: PATHOLOGY

## 2023-11-08 PROCEDURE — 43239 EGD BIOPSY SINGLE/MULTIPLE: CPT | Performed by: INTERNAL MEDICINE

## 2023-11-08 PROCEDURE — 88360 TUMOR IMMUNOHISTOCHEM/MANUAL: CPT | Mod: 26 | Performed by: PATHOLOGY

## 2023-11-08 PROCEDURE — 88342 IMHCHEM/IMCYTCHM 1ST ANTB: CPT | Mod: 26 | Performed by: PATHOLOGY

## 2023-11-08 RX ORDER — LIDOCAINE 40 MG/G
CREAM TOPICAL
Status: DISCONTINUED | OUTPATIENT
Start: 2023-11-08 | End: 2023-11-08 | Stop reason: HOSPADM

## 2023-11-08 RX ORDER — ONDANSETRON 2 MG/ML
4 INJECTION INTRAMUSCULAR; INTRAVENOUS
Status: DISCONTINUED | OUTPATIENT
Start: 2023-11-08 | End: 2023-11-08 | Stop reason: HOSPADM

## 2023-11-08 RX ORDER — NALOXONE HYDROCHLORIDE 0.4 MG/ML
0.2 INJECTION, SOLUTION INTRAMUSCULAR; INTRAVENOUS; SUBCUTANEOUS
Status: CANCELLED | OUTPATIENT
Start: 2023-11-08

## 2023-11-08 RX ORDER — NALOXONE HYDROCHLORIDE 0.4 MG/ML
0.4 INJECTION, SOLUTION INTRAMUSCULAR; INTRAVENOUS; SUBCUTANEOUS
Status: CANCELLED | OUTPATIENT
Start: 2023-11-08

## 2023-11-08 RX ORDER — PROCHLORPERAZINE MALEATE 5 MG
5 TABLET ORAL EVERY 6 HOURS PRN
Status: CANCELLED | OUTPATIENT
Start: 2023-11-08

## 2023-11-08 RX ORDER — FLUMAZENIL 0.1 MG/ML
0.2 INJECTION, SOLUTION INTRAVENOUS
Status: CANCELLED | OUTPATIENT
Start: 2023-11-08 | End: 2023-11-08

## 2023-11-08 RX ORDER — ONDANSETRON 4 MG/1
4 TABLET, ORALLY DISINTEGRATING ORAL EVERY 6 HOURS PRN
Status: CANCELLED | OUTPATIENT
Start: 2023-11-08

## 2023-11-08 RX ORDER — ONDANSETRON 2 MG/ML
4 INJECTION INTRAMUSCULAR; INTRAVENOUS EVERY 6 HOURS PRN
Status: CANCELLED | OUTPATIENT
Start: 2023-11-08

## 2023-11-08 RX ORDER — FENTANYL CITRATE 50 UG/ML
INJECTION, SOLUTION INTRAMUSCULAR; INTRAVENOUS PRN
Status: DISCONTINUED | OUTPATIENT
Start: 2023-11-08 | End: 2023-11-08 | Stop reason: HOSPADM

## 2023-11-08 ASSESSMENT — ACTIVITIES OF DAILY LIVING (ADL): ADLS_ACUITY_SCORE: 35

## 2023-11-08 NOTE — H&P
Gastroenterology Pre-op History and Physical    Lopez Hogue MRN# [id number]   Age: 68 year old YOB: 1955      Date of Surgery: 11/08/23  [facility]      Date of Exam 11/8/2023 Facility Same Day       Primary care provider: Madi Ramos         Chief Complaint and/or Reason for Procedure:   69 yo male with recent diagnosis of SCCa of vellucula.    PET scan with activity in mid esophagus.  No esophageal symptoms.    MI with stents March 2023. No symptoms since. Now holding plavix per protocol.           Past Medical and Surgical History:     Past Medical History:   Diagnosis Date    EtOH dependence (H)     Quit drinking 10 years    Heart attack (H)     Hypertension     Nonrheumatic aortic (valve) stenosis     Sweat gland carcinoma      Past Surgical History:   Procedure Laterality Date    CV CORONARY ANGIOGRAM N/A 3/27/2023    Procedure: Coronary Angiogram;  Surgeon: Miki Etienne MD;  Location: [facility]    CV CORONARY ANGIOGRAM N/A 5/9/2023    Procedure: Coronary Angiogram;  Surgeon: Miki Etienne MD;  Location: [facility] CV    CV LEFT HEART CATH N/A 3/27/2023    Procedure: Left Heart Catheterization;  Surgeon: Miki Etienne MD;  Location: [facility] CV    CV LEFT HEART CATH N/A 5/9/2023    Procedure: Left Heart Catheterization;  Surgeon: Miki Etienne MD;  Location: [facility]    CV PCI N/A 3/27/2023    Procedure: Percutaneous Coronary Intervention;  Surgeon: Miki Etienne MD;  Location: [facility] CV    LARYNGOSCOPY WITH BIOPSY(IES) N/A 10/12/2023    Procedure: LARYNGOSCOPY, WITH BIOPSY;  Surgeon: Edi Felix MD;  Location:  OR    OTHER SURGICAL HISTORY  2015    WIDE EXCISION OF LEFT GLUTEAL MASSTNM: [id number], stage: II hidradenocarcinoma Grade II, 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, sentinel lymph node biopsy negative             Medications (include herbals and vitamins):        Medications  Prior to Admission   Medication Sig Dispense Refill Last Dose    acetaminophen (TYLENOL) 500 MG tablet Take 500-1,000 mg by mouth every 8 hours as needed for fever or pain   11/8/2023    rosuvastatin (CRESTOR) 40 MG tablet Take 1 tablet (40 mg) by mouth daily (Patient taking differently: Take 40 mg by mouth every evening) 90 tablet 3 11/8/2023    sertraline (ZOLOFT) 100 MG tablet Take 100 mg by mouth every morning   11/8/2023    aspirin (ASA) 81 MG EC tablet Take 1 tablet (81 mg) by mouth daily Start tomorrow. (Patient not taking: Reported on 11/6/2023) 30 tablet 3     clopidogrel (PLAVIX) 75 MG tablet Take 1 tablet (75 mg) by mouth daily Resume home plavix 10/14 (Patient not taking: Reported on 11/6/2023)       cyanocobalamin (VITAMIN B-12) 1000 MCG tablet Take 1,000 mcg by mouth every evening       nitroGLYcerin (NITROSTAT) 0.4 MG sublingual tablet PLACE 1 TABLET UNDER THE TONGUE EVERY 5 MINUTES FOR CHEST PAIN FOR 3 DOSES. IF SYMPTOMS PERSIST 5 MINUTES AFTER 1ST DOSE CALL 911. 25 tablet 1     Omega-3 Fatty Acids (FISH OIL) 1200 MG capsule Take 1,200 mg by mouth every evening                Allergies:      Allergies   Allergen Reactions    Coconut Flavor Anaphylaxis     Raw coconut               Physical Exam:   All vitals have been reviewed  Patient Vitals for the past 8 hrs:   BP Pulse Resp SpO2   11/08/23 0825 (!) 136/101 58 16 97 %     No intake/output data recorded.  Airway assessment:   Patient is able to open mouth wide  Patient is able to stick out tongue  Mallampatti classification: Class I (visualization of the soft palate, fauces, uvula, anterior and posterior pillars)}      Lungs:   No increased work of breathing, good air exchange, clear to auscultation bilaterally, no crackles or wheezing     Cardiovascular:   regular rate and rhythm and normal S1 and S2                 Anesthetic risk and/or ASA classification:   ASA 3    Shahriar Giraldo MD

## 2023-11-10 ENCOUNTER — TUMOR CONFERENCE (OUTPATIENT)
Dept: ONCOLOGY | Facility: CLINIC | Age: 68
End: 2023-11-10
Payer: COMMERCIAL

## 2023-11-10 DIAGNOSIS — C10.0 SQUAMOUS CELL CARCINOMA OF VALLECULA (H): Primary | ICD-10-CM

## 2023-11-10 NOTE — TUMOR CONFERENCE
Dr. Felix spoke to patient and confirmed he would like to have radiation and chemotherapy treatment at [facility]. Referrals have already been placed for medical oncology and radiation oncology and both teams are aware.     Hollie Biswas, RN, BSN  RN Care Coordinator, ENT Clinic  [facility]  Direct: [phone]

## 2023-11-10 NOTE — TUMOR CONFERENCE
Head & Neck Tumor Conference Note   Status: New   Staff: Dr. Felix      Tumor Site: Oropharynx (vallecula)  Tumor Pathology: p16 negative squamous cell carcinoma  Tumor Stage: [id number]  Tumor Treatment:   - DLB 10/12/23  Reason for Review: Review imaging, path, and POC  Brief History:  Lopez Hogue is a 67 year old male with biopsy-proven oropharyngeal cancer. He presented with a history of hemoptysis.  He has a history of stable coronary artery disease with placement of two drug eluting stents on 3/27/2023 following a non-ST segment elevation myocardial infarction.  He is on Plavix.    He coughed up blood was estimated to be 1/2 cup of blood early in the morning on 8/18/2023.  When seen in the ER he was not actively coughing up blood.  Prominent vessels in the posterior nasopharynx were seen on examination but no active bleeding present.  A CT scan of the neck demonstrated a questionable soft tissue mass of the epiglottis.  Flexible laryngoscopy showed a violaceous lesion in the right vallecula with a small clot of blood.  No other suspicious lesions were seen.  The case was discussed with the laryngology team. He was then seen as an outpatient. He was otherwise asymptomatic. He smoked for 30 years and quit 10 years ago.  He also has a history of alcohol use but also quit this approximately 10 years ago. Flexible laryngoscopy demonstrated a concerning exophytic lesion of the right vallecula. Thus he underwent a DL with biopsy of 10/12/23. We last discussed him on 10/20/23, where we determined the patient should be managed with chemoradiation as well as a GI referral for esophageal uptake on PET. The patient had some question as to where he will receive his care, thus we will discuss his case again today.   Pertinent PMH:   Past Medical History:   Diagnosis Date     EtOH dependence (H)     Quit drinking 10 years     Heart attack (H)      Hypertension      Nonrheumatic aortic (valve) stenosis      Sweat gland  carcinoma       Smoking Hx:   Social History     Tobacco Use     Smoking status: Former     Packs/day: 2.00     Years: 30.00     Additional pack years: 0.00     Total pack years: 60.00     Types: Cigarettes     Quit date: 2013     Years since quitting: 10.8     Passive exposure: Never     Smokeless tobacco: Never     Tobacco comments:     Quit 10 years ago   Vaping Use     Vaping Use: Never used   Substance Use Topics     Alcohol use: Not Currently     Comment: quit in 2013     Drug use: Never     Tumor Board Recommendation:   Discussion:   It would be best to have the patient receive his chemoradiation therapy at the [facility] where we can follow his care.   Plan:   - Dr. Felix to discuss patient receiving chemoradiation therapy at [facility].   Trino Orozco MD  Otolaryngology Head and Neck Surgery Resident, PGY-3    Documentation / Disclaimer Cancer Tumor Board Note: Cancer tumor board recommendations do not override what is determined to be reasonable care and treatment, which is dependent on the circumstances of a patient's case; the patient's medical, social, and personal concerns; and the clinical judgment of the oncologist [physician].

## 2023-11-13 ENCOUNTER — PATIENT OUTREACH (OUTPATIENT)
Dept: GASTROENTEROLOGY | Facility: CLINIC | Age: 68
End: 2023-11-13
Payer: COMMERCIAL

## 2023-11-13 ENCOUNTER — TELEPHONE (OUTPATIENT)
Dept: GASTROENTEROLOGY | Facility: CLINIC | Age: 68
End: 2023-11-13
Payer: COMMERCIAL

## 2023-11-13 DIAGNOSIS — C15.9 SQUAMOUS CELL ESOPHAGEAL CANCER (H): Primary | ICD-10-CM

## 2023-11-13 DIAGNOSIS — C15.4 MALIGNANT NEOPLASM OF MIDDLE THIRD OF ESOPHAGUS (H): ICD-10-CM

## 2023-11-13 DIAGNOSIS — C10.0 SQUAMOUS CELL CARCINOMA OF VALLECULA (H): Primary | ICD-10-CM

## 2023-11-13 LAB
PATH REPORT.ADDENDUM SPEC: ABNORMAL
PATH REPORT.COMMENTS IMP SPEC: ABNORMAL
PATH REPORT.COMMENTS IMP SPEC: YES
PATH REPORT.FINAL DX SPEC: ABNORMAL
PATH REPORT.GROSS SPEC: ABNORMAL
PATH REPORT.MICROSCOPIC SPEC OTHER STN: ABNORMAL
PATH REPORT.RELEVANT HX SPEC: ABNORMAL
PHOTO IMAGE: ABNORMAL

## 2023-11-13 NOTE — TELEPHONE ENCOUNTER
"Screening questions completed on 10/26. No changes per pt.     Endoscopy Scheduling Screen    Have you had a positive Covid test in the last 14 days?  No    Are you active on [employer]?   Yes    What insurance is in the chart?  Other:  [employer]    Ordering/Referring Provider: KEVIN STRONG    (If ordering provider performs procedure, schedule with ordering provider unless otherwise instructed. )    BMI: Estimated body mass index is 26.67 kg/m  as calculated from the following:    Height as of 11/6/23: 1.702 m (5' 7\").    Weight as of 11/6/23: 77.2 kg (170 lb 4.8 oz).     Sedation Ordered  MAC/deep sedation.   BMI<= 45 45 < BMI <= 48 48 < BMI < = 50  BMI > 50   No Restrictions No [facility]  No [facility]  [facility] with exceptions Hospital Only OR Only       Final Scheduling Details   Colonoscopy prep sent?  N/A    Procedure scheduled  Endoscopic ultrasound (EUS)    Surgeon:  LINA     Date of procedure:  11/28/2023     Pre-OP / PAC:   No - Not required for this site.    Location  [facility] - Per order.    Sedation   MAC/Deep Sedation - Per order.      Patient Reminders:   You will receive a call from a Nurse to review instructions and health history.  This assessment must be completed prior to your procedure.  Failure to complete the Nurse assessment may result in the procedure being cancelled.      On the day of your procedure, please designate an adult(s) who can drive you home stay with you for the next 24 hours. The medicines used in the exam will make you sleepy. You will not be able to drive.      You cannot take public transportation, ride share services, or non-medical taxi service without a responsible caregiver.  Medical transport services are allowed with the requirement that a responsible caregiver will receive you at your destination.  We require that drivers and caregivers are confirmed prior to your procedure.  "

## 2023-11-13 NOTE — TELEPHONE ENCOUNTER
"  Called to discuss request from Dr Felix's team for pt to have EUS for CRT planning of known esophageal CA. Per Dr Giraldo's review \"EUS ASAP with any provider. Can be with MAC. Could be at [facility], or also [facility] with [facility]\"     Pt in agreement, though did want Dr Sun and Dr Alva included in the communication since he said he has moved all of his care [facility].     Next available date of 11/28 with Dr Giraldo discussed, this date is the earliest among the [facility] providers. Will send message to endo schedulers to see if Dr Tatum has earlier availability.     Explained they can expect a call from  for date of procedure, OR will call 1-2 days prior to procedure date with arrival time, will need a , someone to stay with them for 24 hours and should stay in town for 24 hours (within 45 min of Hospital) post procedure    Does patient have any history of gastric bypass/gastric surgery/altered panc/bili anatomy? none    Does patient have [employer] insurance?: [employer]    Med Review    Blood thinner -  Plavix, needs to be held for 5 days prior to procedure - discussed hold start date of Thurs 11/23  ASA - 81mg  Diabetic - none  Any meds by injection or mouth for weight loss or diabetes- none    Patient Education r/t procedure: mychart    A pre-op nurse will call 1-2 days prior to the procedure.    Verbalized understanding of all instructions. All questions answered.     Procedure order placed, message routed to Endo     Bronwyn Colby, RN, BSN,   [facility]  Care coordinator             "

## 2023-11-14 ENCOUNTER — TELEPHONE (OUTPATIENT)
Dept: GASTROENTEROLOGY | Facility: CLINIC | Age: 68
End: 2023-11-14
Payer: COMMERCIAL

## 2023-11-14 NOTE — TELEPHONE ENCOUNTER
Pre assessment completed for upcoming procedure.      Procedure details:    Patient scheduled for Endoscopic ultrasound (EUS) on 11.28.23.     Arrival time: 0930. Procedure time 1100    Pre op exam needed? N/A    Facility location: [facility]; [address], [place] [address]    Sedation type: MAC    Indication for procedure: esophageal lesion biopsy     COVID policy reviewed.    Designated  policy reviewed. Instructed to have someone stay 24 hours post procedure.       Chart review:     Electronic implanted devices? No    Recent diagnosis of diverticulitis within the last 6 weeks?  N/A    Diabetic? No    Medication review:    Anticoagulants? Yes Clopidogrel (Plavix): Recommended HOLD 5 days before procedure.  Consult with your managing provider.    NSAIDS? No    Other medication HOLDING recommendations:  N/A      Prep for procedure:     Prep instructions sent via [employer]     Reviewed procedure prep instructions.     Patient verbalized understanding and had no questions or concerns at this time.        Silvia Aguiar RN  Endoscopy Procedure Pre Assessment RN  [phone] option 4

## 2023-11-15 ENCOUNTER — OFFICE VISIT (OUTPATIENT)
Dept: RADIATION ONCOLOGY | Facility: CLINIC | Age: 68
End: 2023-11-15
Attending: RADIOLOGY
Payer: COMMERCIAL

## 2023-11-15 ENCOUNTER — OFFICE VISIT (OUTPATIENT)
Dept: PULMONOLOGY | Facility: CLINIC | Age: 68
End: 2023-11-15
Attending: CLINICAL NURSE SPECIALIST
Payer: COMMERCIAL

## 2023-11-15 ENCOUNTER — PRE VISIT (OUTPATIENT)
Dept: RADIATION ONCOLOGY | Facility: CLINIC | Age: 68
End: 2023-11-15

## 2023-11-15 VITALS
OXYGEN SATURATION: 97 % | SYSTOLIC BLOOD PRESSURE: 132 MMHG | RESPIRATION RATE: 16 BRPM | DIASTOLIC BLOOD PRESSURE: 68 MMHG | HEART RATE: 65 BPM | WEIGHT: 178 LBS | BODY MASS INDEX: 27.88 KG/M2

## 2023-11-15 DIAGNOSIS — C10.0 SQUAMOUS CELL CARCINOMA OF VALLECULA (H): Primary | ICD-10-CM

## 2023-11-15 DIAGNOSIS — C10.0: ICD-10-CM

## 2023-11-15 DIAGNOSIS — K22.89 ESOPHAGEAL MASS: ICD-10-CM

## 2023-11-15 DIAGNOSIS — C15.4 MALIGNANT NEOPLASM OF MIDDLE THIRD OF ESOPHAGUS (H): ICD-10-CM

## 2023-11-15 LAB
DLCOCOR-%PRED-PRE: 122 %
DLCOCOR-PRE: 29.67 ML/MIN/MMHG
DLCOUNC-%PRED-PRE: 111 %
DLCOUNC-PRE: 26.93 ML/MIN/MMHG
DLCOUNC-PRED: 24.2 ML/MIN/MMHG
ERV-%PRED-PRE: 169 %
ERV-PRE: 2.27 L
ERV-PRED: 1.34 L
EXPTIME-PRE: 13.67 SEC
FEF2575-%PRED-POST: 60 %
FEF2575-%PRED-PRE: 46 %
FEF2575-POST: 1.34 L/SEC
FEF2575-PRE: 1.03 L/SEC
FEF2575-PRED: 2.23 L/SEC
FEFMAX-%PRED-PRE: 88 %
FEFMAX-PRE: 7 L/SEC
FEFMAX-PRED: 7.9 L/SEC
FEV1-%PRED-PRE: 86 %
FEV1-PRE: 2.41 L
FEV1FEV6-PRE: 56 %
FEV1FEV6-PRED: 78 %
FEV1FVC-PRE: 50 %
FEV1FVC-PRED: 77 %
FEV1SVC-PRE: 49 %
FEV1SVC-PRED: 71 %
FIFMAX-PRE: 7.43 L/SEC
FRCPLETH-%PRED-PRE: 151 %
FRCPLETH-PRE: 5.38 L
FRCPLETH-PRED: 3.54 L
FVC-%PRED-PRE: 133 %
FVC-PRE: 4.82 L
FVC-PRED: 3.62 L
HGB BLD-MCNC: 11.7 G/DL
IC-%PRED-PRE: 97 %
IC-PRE: 2.6 L
IC-PRED: 2.67 L
RVPLETH-%PRED-PRE: 122 %
RVPLETH-PRE: 3.11 L
RVPLETH-PRED: 2.54 L
TLCPLETH-%PRED-PRE: 120 %
TLCPLETH-PRE: 7.98 L
TLCPLETH-PRED: 6.62 L
VA-%PRED-PRE: 106 %
VA-PRE: 6.3 L
VC-%PRED-PRE: 123 %
VC-PRE: 4.88 L
VC-PRED: 3.96 L

## 2023-11-15 PROCEDURE — 94060 EVALUATION OF WHEEZING: CPT | Performed by: INTERNAL MEDICINE

## 2023-11-15 PROCEDURE — 94729 DIFFUSING CAPACITY: CPT | Performed by: INTERNAL MEDICINE

## 2023-11-15 PROCEDURE — 94726 PLETHYSMOGRAPHY LUNG VOLUMES: CPT | Performed by: INTERNAL MEDICINE

## 2023-11-15 PROCEDURE — 99417 PROLNG OP E/M EACH 15 MIN: CPT | Performed by: RADIOLOGY

## 2023-11-15 PROCEDURE — 99213 OFFICE O/P EST LOW 20 MIN: CPT | Performed by: RADIOLOGY

## 2023-11-15 PROCEDURE — 85018 HEMOGLOBIN: CPT | Mod: QW | Performed by: INTERNAL MEDICINE

## 2023-11-15 PROCEDURE — 99205 OFFICE O/P NEW HI 60 MIN: CPT | Mod: GC | Performed by: RADIOLOGY

## 2023-11-15 RX ORDER — GABAPENTIN 300 MG/1
900 CAPSULE ORAL 3 TIMES DAILY
Qty: 270 CAPSULE | Refills: 4 | Status: SHIPPED | OUTPATIENT
Start: 2023-11-15 | End: 2024-02-11

## 2023-11-15 ASSESSMENT — PAIN SCALES - GENERAL: PAINLEVEL: NO PAIN (0)

## 2023-11-15 NOTE — PROGRESS NOTES
INITIAL PATIENT ASSESSMENT    Diagnosis: SCC    Prior radiation therapy: None    Prior chemotherapy: None    Prior hormonal therapy:No    Pain Eval:  Denies    Psychosocial  Living arrangements: lives at home with wife hospice nurse   Fall Risk: independent   referral needs: Not needed    Advanced Directive: No  Implantable Cardiac Device? No    LMP: No LMP for male patient.    ROS  Dysphagia-no changes to diet   Nausea in AM, resolves on own

## 2023-11-15 NOTE — LETTER
11/15/2023         RE: Lopez Hogue  [address] [facility] [address]        Dear Colleague,    Thank you for referring your patient, Lopez Hogue, to the [facility] RADIATION ONCOLOGY. Please see a copy of my visit note below.    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 Lluvia Park of Radiation Oncology ([facility]).    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     He initially presented with hemopytsis, coughing up a half cup of blood on 23, and presented to the Emergency Department for evaluation. CT scan of the neck demonstrated a possible soft tissue mass in the epiglottis. Flexible laryngoscopy on 23 by Dr. Tommie Cuadra while in the ED demonstrated a violaceous lesion in the right vallecula with a small area of clot; the remainder of the exam was otherwise  normal.     He was referred to Dr. Felix of ENT for laryngoscopy with biopsy. Flexible laryngoscopy on 10/12/2023 demonstrated a 2 cm in size lesion abutted the oropharyngeal surface of the epiglottis.    Staging PET scan on 10/13/2023 showed FDG avid lesion in the right vallecula squamous cell carcinoma with right level IIa and right level IV lymph node metastases.  There is also a FDG avid thickening of the mid to distal esophagus, suspicious for a synchronous primary esophageal neoplasm.  Patient is scheduled to have upper GI endoscopy/biopsy 11/8/2023.  His case has been reviewed at ENT tumor conference of [facility] and the consensus recommendation is to consider concurrent chemoradiation therapy for his oropharyngeal cancer.    Pathology on 10/18/23 [id number] demonstrated [id number], non-keratinizing poorly differentiated squamous cell carcinoma. PD-L1 (low expressor) 5%, combined positive score 15.     He underwent upper endoscopy with Shahriar Giraldo. Pathology on 11/8/23 [id number] demonstrated nonkeratinizing poorly differentiated squamous cell carcinoma.    He has been scheduled for endoscopic ultrasound to occur later this month.     The patient is here for evaluation and discussion of radiation therapy options. On interview today, he denies significant pain, notes some mild dysphagia but it is not impacting what he eats. He does not have hoarseness, ear pain, or difficulty with breathing. He denies loss of appetite or weight loss.     PMH:   Past Medical History:   Diagnosis Date    EtOH dependence (H)     Quit drinking 10 years    Heart attack (H)     Hypertension     Nonrheumatic aortic (valve) stenosis     Sweat gland carcinoma        PSH:  Past Surgical History:   Procedure Laterality Date    CV CORONARY ANGIOGRAM N/A 3/27/2023    Procedure: Coronary Angiogram;  Surgeon: iMki Etienen MD;  Location: [facility] CATH LAB CV    CV CORONARY ANGIOGRAM N/A 5/9/2023    Procedure: Coronary  Angiogram;  Surgeon: Miki Etienne MD;  Location: [facility] CV    CV LEFT HEART CATH N/A 3/27/2023    Procedure: Left Heart Catheterization;  Surgeon: Miki Etienne MD;  Location: [facility] LAB CV    CV LEFT HEART CATH N/A 5/9/2023    Procedure: Left Heart Catheterization;  Surgeon: Miki Etienne MD;  Location: [facility] CV    CV PCI N/A 3/27/2023    Procedure: Percutaneous Coronary Intervention;  Surgeon: Miki Etienne MD;  Location: [facility] CV    ESOPHAGOSCOPY, GASTROSCOPY, DUODENOSCOPY (EGD), COMBINED N/A 11/8/2023    Procedure: ESOPHAGOGASTRODUODENOSCOPY, WITH BIOPSY;  Surgeon: Shahriar Giraldo MD;  Location:  GI    LARYNGOSCOPY WITH BIOPSY(IES) N/A 10/12/2023    Procedure: LARYNGOSCOPY, WITH BIOPSY;  Surgeon: Edi Felix MD;  Location:  OR    OTHER SURGICAL HISTORY  2015    WIDE EXCISION OF LEFT GLUTEAL MASSTNM: [id number], stage: II hidradenocarcinoma Grade II, 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, sentinel lymph node biopsy negative        MEDICATIONS:  Current Outpatient Medications   Medication Sig Dispense Refill    acetaminophen (TYLENOL) 500 MG tablet Take 500-1,000 mg by mouth every 8 hours as needed for fever or pain      aspirin (ASA) 81 MG EC tablet Take 1 tablet (81 mg) by mouth daily Start tomorrow. (Patient not taking: Reported on 11/6/2023) 30 tablet 3    clopidogrel (PLAVIX) 75 MG tablet Take 1 tablet (75 mg) by mouth daily Resume home plavix 10/14      cyanocobalamin (VITAMIN B-12) 1000 MCG tablet Take 1,000 mcg by mouth every evening      nitroGLYcerin (NITROSTAT) 0.4 MG sublingual tablet PLACE 1 TABLET UNDER THE TONGUE EVERY 5 MINUTES FOR CHEST PAIN FOR 3 DOSES. IF SYMPTOMS PERSIST 5 MINUTES AFTER 1ST DOSE CALL 911. 25 tablet 1    Omega-3 Fatty Acids (FISH OIL) 1200 MG capsule Take 1,200 mg by mouth every evening      rosuvastatin (CRESTOR) 40 MG tablet Take 1 tablet (40 mg) by mouth daily (Patient taking differently: Take 40 mg by mouth every evening)  90 tablet 3    sertraline (ZOLOFT) 100 MG tablet Take 100 mg by mouth every morning         ALLERGY:  Allergies   Allergen Reactions    Coconut Flavor Anaphylaxis     Raw coconut       FAMILY HISTORY:  Family History   Problem Relation Age of Onset    Dementia Mother     Anesthesia Reaction No family hx of     Thrombocytopenia No family hx of        SOCIAL HISTORY  Social History     Tobacco Use    Smoking status: Former     Packs/day: 2.00     Years: 30.00     Additional pack years: 0.00     Total pack years: 60.00     Types: Cigarettes     Quit date: 2013     Years since quitting: 10.8     Passive exposure: Never    Smokeless tobacco: Never    Tobacco comments:     Quit 10 years ago   Substance Use Topics    Alcohol use: Not Currently     Comment: quit in 2013       ECOG PERFORMANCE STATUS: 0    HISTORY OF RADIATION: No  IMPLANTED CARDIAC DEVICE: No  PREGNANCY RISK: Not applicable    REVIEW OF SYMPTOMS:  A full 10-point review of systems was performed as per nursing note.  Pertinent negatives and positives reviewed.    PHYSICAL EXAMINATION:    Gen: Alert, in NAD  Eyes: EOMI, sclera anicteric  HENT      Head: NC/AT     Ears: No external auricular lesions     Nose/sinus: No rhinorrhea or epistaxis     Oral Cavity/Oropharynx: MMM, no thrush noted  Pulm: Breathing comfortably on room air, no audible wheezes or ronchi  CV: Well-perfused, no cyanosis  Abdominal: Soft, nondistended  Skin: Normal color and turgor  Neurologic/MSK: motor grossly intact, normal gait  Lymph: Palpable ~2 cm right level II lymph node that is mobile and non-tender. Bilateral enlarged submandibular glands.   Psych: Appropriate mood and affect      LABS:    PATHOLOGY;  Case: [id number]                                   Authorizing Provider:  Shahriar Giraldo MD   Collected:           11/08/2023 09:48 AM           Ordering Location:     [facility]          Received:            11/08/2023 10:06 AM                                   Endoscopy                                                                     Pathologist:           Jas Purcell MD                                                                 Specimen:    Esophagus, Mid, mass biopsies                                                              Addendum   RESULT FOR IMMUNOHISTOCHEMICAL [place] CLONE  PD-L1 ASSAY     - TUMOR PROPORTION SCORE (TPS): 5%  - INTERPRETATION: LOW EXPRESSOR PD-L1 EXPRESSION (TPS 1%-49%)  - COMBINED POSITIVE SCORE (CPS): 15     COMMENT:  This [place]  PD-L1 immunohistochemistry antibody assay is a laboratory developed test to be used for patients with non-small cell lung carcinoma (NSCLC) who are being considered for treatment with Keytruda (Pembrolizumab), an anti-PD-1 immune checkpoint inhibitor.  The [place]  PD-L1 assay has been validated by the [facility] against the FDA approved clinical trial-validated PharmDx 22C3 PD-L1 assay.  Evidence suggests that the level of PD-L1 expression in the tumor cell population by immunohistochemistry is a major predictor of response to checkpoint inhibitor therapy.   Previous studies demonstrate a high correlation between PD-L1 immunohistochemistry expression data obtained with PD-L1 clones Dako 22C3 and [place]  in NSCLC.   (References:  Lancet 387:1540-50, 2016; [facility] 375:1823-33, 2016; J Clin Oncol 34:4102-9. 2016; J Thorac Oncol 12:1654-63, 2017; [facility] PD-L1 2018 assessment)  Scoring system:   The tumor proportion score(TPS) is determined by enumeration of the percentage of PD-L1 tumor cells with any amount of membrane positivity expressed as a whole number relative to all viable tumor cells in the specimen.  The scoring system for PD-L1 expression is divided into three groups:  a) High expressor, for tumors with TPS >/= 50%; b) Low  "expressor, for tumors with TPS of >/=1%-49%; and c) Negative , for tumors with TPS <1%.  Assay conditions:  - Fixation and processing:  10% neutral buffered formalin, paraffin embedded.  - Staining method:  [place] predilute monoclonal PD-L1 antibody clone , standard heat induced epitope retrieval in cell conditioning 1 (CC1 -  EDTA, alkaline pH) , primary antibody incubation 16 minutes, [place] Optiview detection kit, and [place] BenchMark Ultra automated instrument.  - Minimum tumor cell requirement:   >/= 100 viable tumor cells present in the specimen.  - Positive and negative controls react appropriately.     Addendum electronically signed by Jas Purcell MD on 11/13/2023 at  8:51 AM   Final Diagnosis   A.  MID ESOPHAGEAL MASS, BIOPSIES:  -Nonkeratinizing poorly differentiated squamous cell carcinoma  -Negative for intestinal metaplasia/Alfredo's type mucosa   Electronically signed by Jas Purcell MD on 11/9/2023 at  3:57 PM   Comment  [facility]   Squamous differentiation is confirmed with p40 immunohistochemical stain (appropriate control obtained).  Intradepartmental consultation obtained.  PD-L1 analysis has been ordered and will be reported separately.   Clinical Information  [facility]   Abnormal gastrointestinal PET scan      Gross Description  UULAB   A(1). Esophagus, Mid, mass biopsies:  The specimen is received in formalin with proper patient identification, labeled \"mid esophagus mass biopsies\".  The specimen consists of a 1.0 x 1.0 x 0.1 cm aggregate of red-brown soft tissue, which is filtered, wrapped, and entirely submitted in cassette A1.       RADIOLOGY/IMAGING:  PET/CT 10/13/23  Narrative & Impression   EXAM: CT CHEST/ABDOMEN/PELVIS W CONTRAST, PET ONCOLOGY WHOLE BODY, CT SOFT TISSUE NECK W CONTRAST  LOCATION: [facility]  DATE/TIME: 10/13/2023 11:05 AM CDT     INDICATION: Initial treatment strategy and staging for squamous cell carcinoma of the " right vallecula. Also history of hidradenocarcinoma.  COMPARISON: CT neck 08/18/2023. CT chest abdomen pelvis 08/08/2023. FDG PET/CT 08/20/2015.  CONTRAST: 82 mL Isovue-370, IV.  TECHNIQUE: Serum glucose level 85 mg/dL. One hour post intravenous administration of 10.22 mCi F-18 FDG, PET imaging was performed from the skull vertex to feet, utilizing attenuation correction with concurrent axial CT and PET/CT image fusion. Separate   diagnostic CT of the neck, chest, abdomen, and pelvis was performed. Dose reduction techniques were used.     FINDINGS:     PET/CT FINDINGS: FDG avid lesion in the right vallecula measuring 1.4 x 1.4 cm (SUV max 13.4), suspicious for biopsy-proven squamous cell carcinoma. Associated FDG avid lymph nodes including right level IIa measuring 1.2 x 1.2 cm (SUV max 7.1) and right   level IV (SUV max 8.7), suspicious for metastases.     FDG avid thickening of the mid to distal esophagus spanning a length of 4.7 cm (SUV max 13.2), suspicious for synchronous primary esophageal neoplasm.     CT FINDINGS: Mild mucosal thickening in the paranasal sinuses. Mild carotid artery bifurcation calcification. Severe coronary artery calcification. Aortic valve calcification. Right middle lobe calcified granuloma. Few tiny pulmonary vessels are too   small to characterize by PET/CT. Scattered colonic diverticuli. Pelvic phleboliths. Multilevel degenerative changes of the spine. Lower extremities are remarkable.                                                                      IMPRESSION:     1. 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     2. FDG avid thickening of the mid to distal esophagus, suspicious for a synchronous primary esophageal neoplasm. Recommend correlation with endoscopy.        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 Also history of hidradenocarcinoma.  COMPARISON: CT neck 08/18/2023. CT chest abdomen pelvis 08/08/2023. FDG PET/CT 08/20/2015.  CONTRAST: 82 mL Isovue-370, IV.  TECHNIQUE: Serum glucose level 85 mg/dL. One hour post intravenous administration of 10.22 mCi F-18 FDG, PET imaging was performed from the skull vertex to feet, utilizing attenuation correction with concurrent axial CT and PET/CT image fusion. Separate   diagnostic CT of the neck, chest, abdomen, and pelvis was performed. Dose reduction techniques were used.     FINDINGS:     PET/CT FINDINGS: FDG avid lesion in the right vallecula measuring 1.4 x 1.4 cm (SUV max 13.4), suspicious for biopsy-proven squamous cell carcinoma. Associated FDG avid lymph nodes including right level IIa measuring 1.2 x 1.2 cm (SUV max 7.1) and right   level IV (SUV max 8.7), suspicious for metastases.     FDG avid thickening of the mid to distal esophagus spanning a length of 4.7 cm (SUV max 13.2), suspicious for synchronous primary esophageal neoplasm.     CT FINDINGS: Mild mucosal thickening in the paranasal sinuses. Mild carotid artery bifurcation calcification. Severe coronary artery calcification. Aortic valve calcification. Right middle lobe calcified granuloma. Few tiny pulmonary vessels are too   small to characterize by PET/CT. Scattered colonic diverticuli. Pelvic phleboliths. Multilevel degenerative changes of the spine. Lower extremities are remarkable.                                                                      IMPRESSION:     1. 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     2. FDG avid thickening of the mid to distal esophagus, suspicious for a synchronous primary esophageal  neoplasm. Recommend correlation with endoscopy.              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 ECOG 0.     We recommend concurrent chemoradiation for both malignancies. We discussed that we will plan on 70 Gy in 35 fractions to the head and neck to take place synchronously with treatment of the esophagus, which will comprise 5 weeks of therapy. We discussed logistics of CT simulation.     The patient has already seen the dentist. We discussed the possibility of feeding tube given pain with swallowing associated from both treatments.     The patient has consented to proceed with treatment.     Thank you for allowing us to participate in this patient's care.  Please feel free to call with any questions or concerns.    The patient was seen and assessed with staff, Dr. Sun, who agrees with the above assessment and plan.       Estelle Crisostomo MD PGY5  Department of Radiation Oncology  [phone] Clinic  [phone] Pager    Mr. Hogue was seen and examined by me. Note above by Dr. Crisostomo was reviewed and edited by me and reflects our mutual findings and plan of care.   Patient has a squamous cell carcinoma of the vallecula p16 negative, clinical stage [id number] (stage JUAN MIGUEL) N/A poorly differentiated squamous cell carcinoma of the middle third of the esophagus with final staging pending esophageal ultrasound.    Concurrent chemoradiation with weekly CarboTaxol can be considered definitive therapy for the head and neck carcinoma.  Is also considered neoadjuvant therapy for the mid esophageal carcinoma although a fair percentage of patients may achieve a complete clinical response with chemoradiation alone.    We discussed the rationale for recommending  definitive chemoradiation for the head and neck cancer and neoadjuvant chemoradiation for the esophageal cancer with patient and his wife.  We also discussed the anticipated course of therapy, expected acute toxicities, potential long-term risks and expected outcome from treatment.  Informed consent was obtained.    We appreciate the opportunity to participate in Mr. Hogue's care.  Please call with questions.    105 minutes spent by me on the date of the encounter doing chart review, history and exam, documentation and further activities per the note.    Akiko Sun MD  Department of Radiation Oncology  [facility]    CC  Patient Care Team:  Madi Ramos as PCP - General (House Physician)    This note was dictated with voice recognition software and then edited. Please excuse any unintentional errors.

## 2023-11-15 NOTE — PROGRESS NOTES
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 Lluvia Park of Radiation Oncology ([facility]).    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     He initially presented with hemopytsis, coughing up a half cup of blood on 23, and presented to the Emergency Department for evaluation. CT scan of the neck demonstrated a possible soft tissue mass in the epiglottis. Flexible laryngoscopy on 23 by Dr. Tommie Cuadra while in the ED demonstrated a violaceous lesion in the right vallecula with a small area of clot; the remainder of the exam was otherwise normal.     He was referred to Dr. Felix of ENT for laryngoscopy with biopsy. Flexible laryngoscopy on 10/12/2023 demonstrated a 2 cm in size lesion abutted the oropharyngeal surface of the epiglottis.    Staging PET scan on 10/13/2023 showed FDG avid lesion  in the right vallecula squamous cell carcinoma with right level IIa and right level IV lymph node metastases.  There is also a FDG avid thickening of the mid to distal esophagus, suspicious for a synchronous primary esophageal neoplasm.  Patient is scheduled to have upper GI endoscopy/biopsy 11/8/2023.  His case has been reviewed at ENT tumor conference of [facility] and the consensus recommendation is to consider concurrent chemoradiation therapy for his oropharyngeal cancer.    Pathology on 10/18/23 [id number] demonstrated i80-dhyoldop, non-keratinizing poorly differentiated squamous cell carcinoma. PD-L1 (low expressor) 5%, combined positive score 15.     He underwent upper endoscopy with Shahriar Giraldo. Pathology on 11/8/23 [id number] demonstrated nonkeratinizing poorly differentiated squamous cell carcinoma.    He has been scheduled for endoscopic ultrasound to occur later this month.     The patient is here for evaluation and discussion of radiation therapy options. On interview today, he denies significant pain, notes some mild dysphagia but it is not impacting what he eats. He does not have hoarseness, ear pain, or difficulty with breathing. He denies loss of appetite or weight loss.     PMH:   Past Medical History:   Diagnosis Date    EtOH dependence (H)     Quit drinking 10 years    Heart attack (H)     Hypertension     Nonrheumatic aortic (valve) stenosis     Sweat gland carcinoma        PSH:  Past Surgical History:   Procedure Laterality Date    CV CORONARY ANGIOGRAM N/A 3/27/2023    Procedure: Coronary Angiogram;  Surgeon: Miki Etienne MD;  Location: [facility] CV    CV CORONARY ANGIOGRAM N/A 5/9/2023    Procedure: Coronary Angiogram;  Surgeon: Miki Etienne MD;  Location: [facility] CV    CV LEFT HEART CATH N/A 3/27/2023    Procedure: Left Heart Catheterization;  Surgeon: Miki Etienne MD;  Location: [facility] CATH [facility] CV    CV LEFT HEART CATH N/A 5/9/2023     Procedure: Left Heart Catheterization;  Surgeon: Miki Etienne MD;  Location: [facility] CV    CV PCI N/A 3/27/2023    Procedure: Percutaneous Coronary Intervention;  Surgeon: Miki Etienne MD;  Location: [facility] CV    ESOPHAGOSCOPY, GASTROSCOPY, DUODENOSCOPY (EGD), COMBINED N/A 11/8/2023    Procedure: ESOPHAGOGASTRODUODENOSCOPY, WITH BIOPSY;  Surgeon: Shahriar Giraldo MD;  Location:  GI    LARYNGOSCOPY WITH BIOPSY(IES) N/A 10/12/2023    Procedure: LARYNGOSCOPY, WITH BIOPSY;  Surgeon: Edi Felix MD;  Location:  OR    OTHER SURGICAL HISTORY  2015    WIDE EXCISION OF LEFT GLUTEAL MASSTNM: [id number], stage: II hidradenocarcinoma Grade II, 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, sentinel lymph node biopsy negative        MEDICATIONS:  Current Outpatient Medications   Medication Sig Dispense Refill    acetaminophen (TYLENOL) 500 MG tablet Take 500-1,000 mg by mouth every 8 hours as needed for fever or pain      aspirin (ASA) 81 MG EC tablet Take 1 tablet (81 mg) by mouth daily Start tomorrow. (Patient not taking: Reported on 11/6/2023) 30 tablet 3    clopidogrel (PLAVIX) 75 MG tablet Take 1 tablet (75 mg) by mouth daily Resume home plavix 10/14      cyanocobalamin (VITAMIN B-12) 1000 MCG tablet Take 1,000 mcg by mouth every evening      nitroGLYcerin (NITROSTAT) 0.4 MG sublingual tablet PLACE 1 TABLET UNDER THE TONGUE EVERY 5 MINUTES FOR CHEST PAIN FOR 3 DOSES. IF SYMPTOMS PERSIST 5 MINUTES AFTER 1ST DOSE CALL 911. 25 tablet 1    Omega-3 Fatty Acids (FISH OIL) 1200 MG capsule Take 1,200 mg by mouth every evening      rosuvastatin (CRESTOR) 40 MG tablet Take 1 tablet (40 mg) by mouth daily (Patient taking differently: Take 40 mg by mouth every evening) 90 tablet 3    sertraline (ZOLOFT) 100 MG tablet Take 100 mg by mouth every morning         ALLERGY:  Allergies   Allergen Reactions    Coconut Flavor Anaphylaxis     Raw coconut       FAMILY HISTORY:  Family History   Problem Relation Age of Onset     Dementia Mother     Anesthesia Reaction No family hx of     Thrombocytopenia No family hx of        SOCIAL HISTORY  Social History     Tobacco Use    Smoking status: Former     Packs/day: 2.00     Years: 30.00     Additional pack years: 0.00     Total pack years: 60.00     Types: Cigarettes     Quit date: 2013     Years since quitting: 10.8     Passive exposure: Never    Smokeless tobacco: Never    Tobacco comments:     Quit 10 years ago   Substance Use Topics    Alcohol use: Not Currently     Comment: quit in 2013       ECOG PERFORMANCE STATUS: 0    HISTORY OF RADIATION: No  IMPLANTED CARDIAC DEVICE: No  PREGNANCY RISK: Not applicable    REVIEW OF SYMPTOMS:  A full 10-point review of systems was performed as per nursing note.  Pertinent negatives and positives reviewed.    PHYSICAL EXAMINATION:    Gen: Alert, in NAD  Eyes: EOMI, sclera anicteric  HENT      Head: NC/AT     Ears: No external auricular lesions     Nose/sinus: No rhinorrhea or epistaxis     Oral Cavity/Oropharynx: MMM, no thrush noted  Pulm: Breathing comfortably on room air, no audible wheezes or ronchi  CV: Well-perfused, no cyanosis  Abdominal: Soft, nondistended  Skin: Normal color and turgor  Neurologic/MSK: motor grossly intact, normal gait  Lymph: Palpable ~2 cm right level II lymph node that is mobile and non-tender. Bilateral enlarged submandibular glands.   Psych: Appropriate mood and affect      LABS:    PATHOLOGY;  Case: [id number]                                   Authorizing Provider:  Shahriar Giraldo MD   Collected:           11/08/2023 09:48 AM           Ordering Location:     [facility]          Received:            11/08/2023 10:06 AM                                  Endoscopy                                                                     Pathologist:           Jas Purcell MD                                                                  Specimen:    Esophagus, Mid, mass biopsies                                                              Addendum   RESULT FOR IMMUNOHISTOCHEMICAL [place] CLONE  PD-L1 ASSAY     - TUMOR PROPORTION SCORE (TPS): 5%  - INTERPRETATION: LOW EXPRESSOR PD-L1 EXPRESSION (TPS 1%-49%)  - COMBINED POSITIVE SCORE (CPS): 15     COMMENT:  This [place]  PD-L1 immunohistochemistry antibody assay is a laboratory developed test to be used for patients with non-small cell lung carcinoma (NSCLC) who are being considered for treatment with Keytruda (Pembrolizumab), an anti-PD-1 immune checkpoint inhibitor.  The [place]  PD-L1 assay has been validated by the [facility] against the FDA approved clinical trial-validated PharmDx 22C3 PD-L1 assay.  Evidence suggests that the level of PD-L1 expression in the tumor cell population by immunohistochemistry is a major predictor of response to checkpoint inhibitor therapy.   Previous studies demonstrate a high correlation between PD-L1 immunohistochemistry expression data obtained with PD-L1 clones Dako 22C3 and [place]  in NSCLC.   (References:  Lancet 387:1540-50, 2016; :1823-33, 2016; J Clin Oncol 34:4102-9. 2016; J Thorac Oncol 12:1654-63, 2017; [facility] PD-L1 2018 assessment)  Scoring system:   The tumor proportion score(TPS) is determined by enumeration of the percentage of PD-L1 tumor cells with any amount of membrane positivity expressed as a whole number relative to all viable tumor cells in the specimen.  The scoring system for PD-L1 expression is divided into three groups:  a) High expressor, for tumors with TPS >/= 50%; b) Low expressor, for tumors with TPS of >/=1%-49%; and c) Negative , for tumors with TPS <1%.  Assay conditions:  - Fixation and processing:  10% neutral buffered formalin, paraffin embedded.  - Staining method:  [place] predilute monoclonal PD-L1 antibody clone  ", standard heat induced epitope retrieval in cell conditioning 1 (CC1 -  EDTA, alkaline pH) , primary antibody incubation 16 minutes, [place] Optiview detection kit, and [place] BenchMark Ultra automated instrument.  - Minimum tumor cell requirement:   >/= 100 viable tumor cells present in the specimen.  - Positive and negative controls react appropriately.     Addendum electronically signed by Jas Purcell MD on 11/13/2023 at  8:51 AM   Final Diagnosis   A.  MID ESOPHAGEAL MASS, BIOPSIES:  -Nonkeratinizing poorly differentiated squamous cell carcinoma  -Negative for intestinal metaplasia/Alfredo's type mucosa   Electronically signed by Jas Purcell MD on 11/9/2023 at  3:57 PM   Comment  [facility]   Squamous differentiation is confirmed with p40 immunohistochemical stain (appropriate control obtained).  Intradepartmental consultation obtained.  PD-L1 analysis has been ordered and will be reported separately.   Clinical Information  [facility]   Abnormal gastrointestinal PET scan      Gross Description  UJOSE CARLOSB   HUMBERTO(1). Esophagus, Mid, mass biopsies:  The specimen is received in formalin with proper patient identification, labeled \"mid esophagus mass biopsies\".  The specimen consists of a 1.0 x 1.0 x 0.1 cm aggregate of red-brown soft tissue, which is filtered, wrapped, and entirely submitted in cassette A1.       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 Also history of hidradenocarcinoma.  COMPARISON: CT neck 08/18/2023. CT chest abdomen pelvis 08/08/2023. FDG PET/CT 08/20/2015.  CONTRAST: 82 mL Isovue-370, IV.  TECHNIQUE: Serum glucose level 85 mg/dL. One hour post intravenous " administration of 10.22 mCi F-18 FDG, PET imaging was performed from the skull vertex to feet, utilizing attenuation correction with concurrent axial CT and PET/CT image fusion. Separate   diagnostic CT of the neck, chest, abdomen, and pelvis was performed. Dose reduction techniques were used.     FINDINGS:     PET/CT FINDINGS: FDG avid lesion in the right vallecula measuring 1.4 x 1.4 cm (SUV max 13.4), suspicious for biopsy-proven squamous cell carcinoma. Associated FDG avid lymph nodes including right level IIa measuring 1.2 x 1.2 cm (SUV max 7.1) and right   level IV (SUV max 8.7), suspicious for metastases.     FDG avid thickening of the mid to distal esophagus spanning a length of 4.7 cm (SUV max 13.2), suspicious for synchronous primary esophageal neoplasm.     CT FINDINGS: Mild mucosal thickening in the paranasal sinuses. Mild carotid artery bifurcation calcification. Severe coronary artery calcification. Aortic valve calcification. Right middle lobe calcified granuloma. Few tiny pulmonary vessels are too   small to characterize by PET/CT. Scattered colonic diverticuli. Pelvic phleboliths. Multilevel degenerative changes of the spine. Lower extremities are remarkable.                                                                      IMPRESSION:     1. 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     2. FDG avid thickening of the mid to distal esophagus, suspicious for a synchronous primary esophageal neoplasm. Recommend correlation with endoscopy.        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 Also history of hidradenocarcinoma.  COMPARISON: CT neck  08/18/2023. CT chest abdomen pelvis 08/08/2023. FDG PET/CT 08/20/2015.  CONTRAST: 82 mL Isovue-370, IV.  TECHNIQUE: Serum glucose level 85 mg/dL. One hour post intravenous administration of 10.22 mCi F-18 FDG, PET imaging was performed from the skull vertex to feet, utilizing attenuation correction with concurrent axial CT and PET/CT image fusion. Separate   diagnostic CT of the neck, chest, abdomen, and pelvis was performed. Dose reduction techniques were used.     FINDINGS:     PET/CT FINDINGS: FDG avid lesion in the right vallecula measuring 1.4 x 1.4 cm (SUV max 13.4), suspicious for biopsy-proven squamous cell carcinoma. Associated FDG avid lymph nodes including right level IIa measuring 1.2 x 1.2 cm (SUV max 7.1) and right   level IV (SUV max 8.7), suspicious for metastases.     FDG avid thickening of the mid to distal esophagus spanning a length of 4.7 cm (SUV max 13.2), suspicious for synchronous primary esophageal neoplasm.     CT FINDINGS: Mild mucosal thickening in the paranasal sinuses. Mild carotid artery bifurcation calcification. Severe coronary artery calcification. Aortic valve calcification. Right middle lobe calcified granuloma. Few tiny pulmonary vessels are too   small to characterize by PET/CT. Scattered colonic diverticuli. Pelvic phleboliths. Multilevel degenerative changes of the spine. Lower extremities are remarkable.                                                                      IMPRESSION:     1. 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     2. FDG avid thickening of the mid to distal esophagus, suspicious for a synchronous primary esophageal neoplasm. Recommend correlation with endoscopy.              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 ECOG 0.     We recommend concurrent chemoradiation for both malignancies. We discussed that we will plan on 70 Gy in 35 fractions to the head and neck to take place synchronously with treatment of the esophagus, which will comprise 5 weeks of therapy. We discussed logistics of CT simulation.     The patient has already seen the dentist. We discussed the possibility of feeding tube given pain with swallowing associated from both treatments.     The patient has consented to proceed with treatment.     Thank you for allowing us to participate in this patient's care.  Please feel free to call with any questions or concerns.    The patient was seen and assessed with staff, Dr. Sun, who agrees with the above assessment and plan.       Estelle Crisostomo MD PGY5  Department of Radiation Oncology  [phone] Clinic  [phone] Pager    Mr. Hogue was seen and examined by me. Note above by Dr. Crisostomo was reviewed and edited by me and reflects our mutual findings and plan of care.   Patient has a squamous cell carcinoma of the vallecula p16 negative, clinical stage [id number] (stage JUAN MIGUEL) N/A poorly differentiated squamous cell carcinoma of the middle third of the esophagus with final staging pending esophageal ultrasound.    Concurrent chemoradiation with weekly CarboTaxol can be considered definitive therapy for the head and neck carcinoma.  Is also considered neoadjuvant therapy for the mid esophageal carcinoma although a fair percentage of patients may achieve a complete clinical response with chemoradiation alone.    We discussed the rationale for recommending definitive chemoradiation for the head and neck cancer and neoadjuvant chemoradiation for the esophageal cancer with patient and his wife.  We also discussed the anticipated course of therapy, expected acute toxicities, potential long-term risks and expected outcome  from treatment.  Informed consent was obtained.    We appreciate the opportunity to participate in Mr. Hogue's care.  Please call with questions.    105 minutes spent by me on the date of the encounter doing chart review, history and exam, documentation and further activities per the note.    Akiko Sun MD  Department of Radiation Oncology  [facility]    CC  Patient Care Team:  Madi Ramos as PCP - General (House Physician)  Jason Walters MD as Assigned Heart and Vascular Provider  Jose Maldonado MD as Assigned Surgical Provider  Shine Edwards MD as MD (Hematology)  Lluvia Park MD as MD (Radiation Oncology)  David Hidalgo PA-C as Physician Assistant (Gastroenterology)  Edi Felix MD as MD (Otolaryngology)  Nisha Pride APRN CNP as Nurse Practitioner (Dermatology)  Devin Hill MD as MD (Cardiovascular & Thoracic Surgery)  Shahriar Giraldo MD as MD (Gastroenterology)  Caryn Olvera, VERÓNICA as Specialty Care Coordinator (Hematology & Oncology)  Akiko Sun MD as Assigned Cancer Care Provider     This note was dictated with voice recognition software and then edited. Please excuse any unintentional errors.

## 2023-11-17 ENCOUNTER — ONCOLOGY VISIT (OUTPATIENT)
Dept: ONCOLOGY | Facility: CLINIC | Age: 68
End: 2023-11-17
Attending: STUDENT IN AN ORGANIZED HEALTH CARE EDUCATION/TRAINING PROGRAM
Payer: COMMERCIAL

## 2023-11-17 ENCOUNTER — OFFICE VISIT (OUTPATIENT)
Dept: RADIATION ONCOLOGY | Facility: CLINIC | Age: 68
End: 2023-11-17
Attending: RADIOLOGY
Payer: COMMERCIAL

## 2023-11-17 ENCOUNTER — APPOINTMENT (OUTPATIENT)
Dept: LAB | Facility: CLINIC | Age: 68
End: 2023-11-17
Attending: STUDENT IN AN ORGANIZED HEALTH CARE EDUCATION/TRAINING PROGRAM
Payer: COMMERCIAL

## 2023-11-17 ENCOUNTER — PATIENT OUTREACH (OUTPATIENT)
Dept: ONCOLOGY | Facility: CLINIC | Age: 68
End: 2023-11-17

## 2023-11-17 ENCOUNTER — TELEPHONE (OUTPATIENT)
Dept: RADIATION ONCOLOGY | Facility: CLINIC | Age: 68
End: 2023-11-17

## 2023-11-17 ENCOUNTER — HOSPITAL ENCOUNTER (OUTPATIENT)
Dept: CT IMAGING | Facility: CLINIC | Age: 68
Discharge: HOME | End: 2023-11-17
Attending: RADIOLOGY
Payer: COMMERCIAL

## 2023-11-17 VITALS
TEMPERATURE: 98.1 F | DIASTOLIC BLOOD PRESSURE: 74 MMHG | SYSTOLIC BLOOD PRESSURE: 149 MMHG | WEIGHT: 176.7 LBS | HEART RATE: 58 BPM | OXYGEN SATURATION: 97 % | RESPIRATION RATE: 16 BRPM | BODY MASS INDEX: 27.68 KG/M2

## 2023-11-17 DIAGNOSIS — C10.0 SQUAMOUS CELL CARCINOMA OF VALLECULA (H): ICD-10-CM

## 2023-11-17 DIAGNOSIS — C15.9 PRIMARY ESOPHAGEAL SQUAMOUS CELL CARCINOMA (H): Primary | ICD-10-CM

## 2023-11-17 DIAGNOSIS — R13.10 DYSPHAGIA: Primary | ICD-10-CM

## 2023-11-17 DIAGNOSIS — C15.4 MALIGNANT NEOPLASM OF MIDDLE THIRD OF ESOPHAGUS (H): ICD-10-CM

## 2023-11-17 LAB
ALBUMIN SERPL BCG-MCNC: 4.2 G/DL (ref 3.5–5.2)
ALP SERPL-CCNC: 81 U/L (ref 40–150)
ALT SERPL W P-5'-P-CCNC: 17 U/L (ref 0–70)
ANION GAP SERPL CALCULATED.3IONS-SCNC: 7 MMOL/L (ref 7–15)
AST SERPL W P-5'-P-CCNC: 27 U/L (ref 0–45)
BASOPHILS # BLD AUTO: 0 10E3/UL (ref 0–0.2)
BASOPHILS NFR BLD AUTO: 1 %
BILIRUB SERPL-MCNC: 0.9 MG/DL
BUN SERPL-MCNC: 13.8 MG/DL (ref 8–23)
CALCIUM SERPL-MCNC: 9.4 MG/DL (ref 8.8–10.2)
CHLORIDE SERPL-SCNC: 103 MMOL/L (ref 98–107)
CREAT SERPL-MCNC: 0.77 MG/DL (ref 0.67–1.17)
DEPRECATED HCO3 PLAS-SCNC: 28 MMOL/L (ref 22–29)
EGFRCR SERPLBLD CKD-EPI 2021: >90 ML/MIN/1.73M2
EOSINOPHIL # BLD AUTO: 0 10E3/UL (ref 0–0.7)
EOSINOPHIL NFR BLD AUTO: 0 %
ERYTHROCYTE [DISTWIDTH] IN BLOOD BY AUTOMATED COUNT: 13.5 % (ref 10–15)
GLUCOSE SERPL-MCNC: 90 MG/DL (ref 70–99)
HCT VFR BLD AUTO: 35.4 % (ref 40–53)
HGB BLD-MCNC: 12.1 G/DL (ref 13.3–17.7)
IMM GRANULOCYTES # BLD: 0 10E3/UL
IMM GRANULOCYTES NFR BLD: 0 %
LYMPHOCYTES # BLD AUTO: 1.7 10E3/UL (ref 0.8–5.3)
LYMPHOCYTES NFR BLD AUTO: 25 %
MCH RBC QN AUTO: 31.3 PG (ref 26.5–33)
MCHC RBC AUTO-ENTMCNC: 34.2 G/DL (ref 31.5–36.5)
MCV RBC AUTO: 92 FL (ref 78–100)
MONOCYTES # BLD AUTO: 0.6 10E3/UL (ref 0–1.3)
MONOCYTES NFR BLD AUTO: 9 %
NEUTROPHILS # BLD AUTO: 4.3 10E3/UL (ref 1.6–8.3)
NEUTROPHILS NFR BLD AUTO: 65 %
NRBC # BLD AUTO: 0 10E3/UL
NRBC BLD AUTO-RTO: 0 /100
PLATELET # BLD AUTO: 172 10E3/UL (ref 150–450)
POTASSIUM SERPL-SCNC: 4.2 MMOL/L (ref 3.4–5.3)
PROT SERPL-MCNC: 6.9 G/DL (ref 6.4–8.3)
RBC # BLD AUTO: 3.87 10E6/UL (ref 4.4–5.9)
SODIUM SERPL-SCNC: 138 MMOL/L (ref 135–145)
WBC # BLD AUTO: 6.7 10E3/UL (ref 4–11)

## 2023-11-17 PROCEDURE — 77470 SPECIAL RADIATION TREATMENT: CPT | Mod: 26 | Performed by: RADIOLOGY

## 2023-11-17 PROCEDURE — 36415 COLL VENOUS BLD VENIPUNCTURE: CPT | Performed by: STUDENT IN AN ORGANIZED HEALTH CARE EDUCATION/TRAINING PROGRAM

## 2023-11-17 PROCEDURE — 77014 CT THERAPY CORRELATE: CPT | Mod: TC

## 2023-11-17 PROCEDURE — 80053 COMPREHEN METABOLIC PANEL: CPT | Performed by: STUDENT IN AN ORGANIZED HEALTH CARE EDUCATION/TRAINING PROGRAM

## 2023-11-17 PROCEDURE — 99417 PROLNG OP E/M EACH 15 MIN: CPT | Performed by: STUDENT IN AN ORGANIZED HEALTH CARE EDUCATION/TRAINING PROGRAM

## 2023-11-17 PROCEDURE — 77470 SPECIAL RADIATION TREATMENT: CPT | Performed by: RADIOLOGY

## 2023-11-17 PROCEDURE — 77334 RADIATION TREATMENT AID(S): CPT | Mod: 26 | Performed by: RADIOLOGY

## 2023-11-17 PROCEDURE — 99213 OFFICE O/P EST LOW 20 MIN: CPT | Performed by: STUDENT IN AN ORGANIZED HEALTH CARE EDUCATION/TRAINING PROGRAM

## 2023-11-17 PROCEDURE — 85025 COMPLETE CBC W/AUTO DIFF WBC: CPT | Performed by: STUDENT IN AN ORGANIZED HEALTH CARE EDUCATION/TRAINING PROGRAM

## 2023-11-17 PROCEDURE — 99205 OFFICE O/P NEW HI 60 MIN: CPT | Performed by: STUDENT IN AN ORGANIZED HEALTH CARE EDUCATION/TRAINING PROGRAM

## 2023-11-17 PROCEDURE — 250N000011 HC RX IP 250 OP 636: Performed by: RADIOLOGY

## 2023-11-17 PROCEDURE — 77334 RADIATION TREATMENT AID(S): CPT | Performed by: RADIOLOGY

## 2023-11-17 RX ORDER — IOPAMIDOL 755 MG/ML
90 INJECTION, SOLUTION INTRAVASCULAR ONCE
Status: COMPLETED | OUTPATIENT
Start: 2023-11-17 | End: 2023-11-17

## 2023-11-17 RX ADMIN — IOPAMIDOL 90 ML: 755 INJECTION, SOLUTION INTRAVENOUS at 11:26

## 2023-11-17 ASSESSMENT — PAIN SCALES - GENERAL: PAINLEVEL: NO PAIN (0)

## 2023-11-17 NOTE — LETTER
11/17/2023         RE: Lopez Hogue  [address] [facility] [address]        Dear Colleague,    Thank you for referring your patient, Lopez Hogue, to the [facility]. Please see a copy of my visit note below.    [facility] [place]   New Patient Visit    Name: Lopez Hogue  MRN: [id number]  Date of visit: Nov 17, 2023    Diagnosis: Oropharyngeal cancer, esophageal SCC  Stage:    Cancer Staging   No matching staging information was found for the patient.    Molecular: p16 negative  Performance status: ECOG 0      Oncology History   Squamous cell carcinoma of vallecula (H)   8/18/2023 Imaging    CT neck:  Question soft tissue lesion within the right vallecula/ventral epiglottis.      10/12/2023 Pathology    R vallecula biopsy:  - NON-KERATINIZING POORLY DIFFERENTIATED SQUAMOUS CELL CARCINOMA  - p16 is negative     10/13/2023 Imaging    PET/CT:  1. 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     2. FDG avid thickening of the mid to distal esophagus, suspicious for a synchronous primary esophageal neoplasm. Recommend correlation with endoscopy.     11/6/2023 Initial Diagnosis    Squamous cell carcinoma of vallecula (H)     11/8/2023 Pathology    EGD with mid esophageal mass biopsy:  A.  MID ESOPHAGEAL MASS, BIOPSIES:  -Nonkeratinizing poorly differentiated squamous cell carcinoma  -Negative for intestinal metaplasia/Alfredo's type mucosa    PD-L1 CPS 15%             12/4/2023 -  Chemotherapy    OP ONC Esophageal Cancer - PACLitaxel / CARBOplatin WEEKLY + Radiation  Plan Provider: Yannick Alva MD  Treatment goal: Curative  Line of treatment: First Line     Primary esophageal squamous cell carcinoma (H)   11/17/2023 Initial Diagnosis    Primary esophageal squamous cell carcinoma (H)     12/4/2023 -  Chemotherapy    OP ONC Esophageal Cancer - PACLitaxel /  CARBOplatin WEEKLY + Radiation  Plan Provider: Yannick Alva MD  Treatment goal: Curative  Line of treatment: First Line           HPI:   Lopez Hogue is a 68 year old male with a past medical history that includes HTN, CAD, MI (March of 2023 s/p PCI x2, on DAPT), and prior sweat gland cancer s/p surgical resection, now with recently diagnosed oropharyngeal cancer found to have a 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     Recent diagnosis was made after he coughed up some blood on 8/18/23. He presented to an ED where ENT evaluated and was felt to have a lesion of the vallecula. Full workup of this mass is detailed in the oncology history above. Biopsy on 10/12 confirmed an invasive SCC. PET/CT revealed a hypermetabolic lesion within the mid to distal esophagus. EGD was performed on 11/8 and also showed a squamous cell carcinoma. Given the EGD findings, this was felt to be a second primary. Complete staging with endoscopic ultrasound has not yet been performed.     Overall, patient feels well. Continues to work. 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 Former heavy smoker (2 ppd) and drinker himself. Quit both in 2013. Other than his recent MI, he has been in generally good health. He has no history of autoimmune disease. He follows regularly with dermatology given his skin cancer history.       Exam:   BP (!) 149/74   Pulse 58   Temp 98.1  F (36.7  C) (Oral)   Resp 16   Wt 80.2 kg (176 lb 11.2 oz)   SpO2 97%   BMI 27.68 kg/m      Gen: Comfortable, NAD  HEENT: Pupils equal, non-icteric  Neck/Lymph: Firm and mobile ~1-2 cm, non-tender R cervical LN just posterior and inferior to the angle of the mandible  Resp: Comfortable on room air  CV: Systolic murmur present, regular rate  Abd: Soft  Ext: No swelling  Neuro: Normal gait      Labs:   Labs reviewed, normal metabolic panel, mild normocytic anemia but otherwise unremarkable    Imaging:   All pertinent  imaging studies personally reviewed. Key findings summarized in oncology history above    Pathology:     10/12/23 R vallecular lesion biopsy:  Final Diagnosis   A&B. RIGHT VALLECULA LESION, BIOPSY:  - NON-KERATINIZING POORLY DIFFERENTIATED SQUAMOUS CELL CARCINOMA  - p16 is negative     11/8/23 Esophageal biopsy:  Final Diagnosis   A.  MID ESOPHAGEAL MASS, BIOPSIES:  -Nonkeratinizing poorly differentiated squamous cell carcinoma  -Negative for intestinal metaplasia/Alfredo's type mucosa     Addendum   RESULT FOR IMMUNOHISTOCHEMICAL VENTANA CLONE  PD-L1 ASSAY     - TUMOR PROPORTION SCORE (TPS): 5%  - INTERPRETATION: LOW EXPRESSOR PD-L1 EXPRESSION (TPS 1%-49%)  - COMBINED POSITIVE SCORE (CPS): 15         Assessment and Plan:   Lopez Hogue is a 68 year old male with HTN, CAD, prior sweat gland cancer s/p resection, and recently diagnosed head and neck squamous cell carcinoma of the vallecula, with staging revealing a synchronous esophageal squamous cell carcinoma. Presenting today to establish care with medical oncology.       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 as well  -seen by Rad Onc 11/17  -scheduled for EGD/EUS with GI on 11/28 for formal staging of the esophageal primary  -to meet with thoracic surgery 11/21  -discussed diagnosis and stage for both his HNSCC as well as his esophageal cancer at length today and the general management approach for both  -discussed recommendation for both definitive management of the HNSCC as well as pre-perative management of his esophageal cancer using concurrent chemothereapy + radiation    Plan:  --plan for concurrent chemoRT with weekly carboplatin (AUC 2) + paclitaxel (50 mg/m2)  --will touch base with Thoracic Surgery and GI following respective appointments and upcoming formal endoscopic staging  --Defer  J tube discussion to thoracic surgery  --will confirm this plan with Radiation Oncology and formally consent prior to start of treatment      Yannick Alva MD PhD   of Medicine  Division of Hematology, Oncology and Transplantation    ---  90 minutes were spent on the date of the encounter performing chart review, history and exam, documentation, and further activities as noted above.

## 2023-11-17 NOTE — NURSING NOTE
"Oncology Rooming Note    November 17, 2023 1:37 PM   Lopez Hogue is a 68 year old male who presents for:    Chief Complaint   Patient presents with    Oncology Clinic Visit     Squamous cell carcinoma of vallecula      Initial Vitals: BP (!) 149/74   Pulse 58   Temp 98.1  F (36.7  C) (Oral)   Resp 16   Wt 80.2 kg (176 lb 11.2 oz)   SpO2 97%   BMI 27.68 kg/m   Estimated body mass index is 27.68 kg/m  as calculated from the following:    Height as of 11/6/23: 1.702 m (5' 7\").    Weight as of this encounter: 80.2 kg (176 lb 11.2 oz). Body surface area is 1.95 meters squared.  No Pain (0) Comment: Data Unavailable   No LMP for male patient.  Allergies reviewed: Yes  Medications reviewed: Yes    Medications: Medication refills not needed today.  Pharmacy name entered into [employer] Funding: [facility] #[id number] - [address]  AT [facility] [address]    Clinical concerns:        Mary Barrett              "

## 2023-11-17 NOTE — PROGRESS NOTES
[facility] [place]   New Patient Visit    Name: Lopez Hogue  MRN: [id number]  Date of visit: Nov 17, 2023    Diagnosis: Oropharyngeal cancer, esophageal SCC  Stage:    Cancer Staging   No matching staging information was found for the patient.    Molecular: p16 negative  Performance status: ECOG 0      Oncology History   Squamous cell carcinoma of vallecula (H)   8/18/2023 Imaging    CT neck:  Question soft tissue lesion within the right vallecula/ventral epiglottis.      10/12/2023 Pathology    R vallecula biopsy:  - NON-KERATINIZING POORLY DIFFERENTIATED SQUAMOUS CELL CARCINOMA  - p16 is negative     10/13/2023 Imaging    PET/CT:  1. 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     2. FDG avid thickening of the mid to distal esophagus, suspicious for a synchronous primary esophageal neoplasm. Recommend correlation with endoscopy.     11/6/2023 Initial Diagnosis    Squamous cell carcinoma of vallecula (H)     11/8/2023 Pathology    EGD with mid esophageal mass biopsy:  A.  MID ESOPHAGEAL MASS, BIOPSIES:  -Nonkeratinizing poorly differentiated squamous cell carcinoma  -Negative for intestinal metaplasia/Alfredo's type mucosa    PD-L1 CPS 15%             12/4/2023 -  Chemotherapy    OP ONC Esophageal Cancer - PACLitaxel / CARBOplatin WEEKLY + Radiation  Plan Provider: Yannick Alva MD  Treatment goal: Curative  Line of treatment: First Line     Primary esophageal squamous cell carcinoma (H)   11/17/2023 Initial Diagnosis    Primary esophageal squamous cell carcinoma (H)     12/4/2023 -  Chemotherapy    OP ONC Esophageal Cancer - PACLitaxel / CARBOplatin WEEKLY + Radiation  Plan Provider: Yannick Alva MD  Treatment goal: Curative  Line of treatment: First Line           HPI:   Lopez Hogue is a 68 year old male with a past medical history that includes HTN, CAD, MI (March of 2023 s/p PCI x2,  on DAPT), and prior sweat gland cancer s/p surgical resection, now with recently diagnosed oropharyngeal cancer found to have a 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     Recent diagnosis was made after he coughed up some blood on 8/18/23. He presented to an ED where ENT evaluated and was felt to have a lesion of the vallecula. Full workup of this mass is detailed in the oncology history above. Biopsy on 10/12 confirmed an invasive SCC. PET/CT revealed a hypermetabolic lesion within the mid to distal esophagus. EGD was performed on 11/8 and also showed a squamous cell carcinoma. Given the EGD findings, this was felt to be a second primary. Complete staging with endoscopic ultrasound has not yet been performed.     Overall, patient feels well. Continues to work. 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 Former heavy smoker (2 ppd) and drinker himself. Quit both in 2013. Other than his recent MI, he has been in generally good health. He has no history of autoimmune disease. He follows regularly with dermatology given his skin cancer history.       Exam:   BP (!) 149/74   Pulse 58   Temp 98.1  F (36.7  C) (Oral)   Resp 16   Wt 80.2 kg (176 lb 11.2 oz)   SpO2 97%   BMI 27.68 kg/m      Gen: Comfortable, NAD  HEENT: Pupils equal, non-icteric  Neck/Lymph: Firm and mobile ~1-2 cm, non-tender R cervical LN just posterior and inferior to the angle of the mandible  Resp: Comfortable on room air  CV: Systolic murmur present, regular rate  Abd: Soft  Ext: No swelling  Neuro: Normal gait      Labs:   Labs reviewed, normal metabolic panel, mild normocytic anemia but otherwise unremarkable    Imaging:   All pertinent imaging studies personally reviewed. Key findings summarized in oncology history above    Pathology:     10/12/23 R vallecular lesion biopsy:  Final Diagnosis   A&B. RIGHT VALLECULA LESION, BIOPSY:  - NON-KERATINIZING POORLY DIFFERENTIATED SQUAMOUS CELL  CARCINOMA  - p16 is negative     11/8/23 Esophageal biopsy:  Final Diagnosis   A.  MID ESOPHAGEAL MASS, BIOPSIES:  -Nonkeratinizing poorly differentiated squamous cell carcinoma  -Negative for intestinal metaplasia/Alfredo's type mucosa     Addendum   RESULT FOR IMMUNOHISTOCHEMICAL VENTANA CLONE  PD-L1 ASSAY     - TUMOR PROPORTION SCORE (TPS): 5%  - INTERPRETATION: LOW EXPRESSOR PD-L1 EXPRESSION (TPS 1%-49%)  - COMBINED POSITIVE SCORE (CPS): 15         Assessment and Plan:   Lopez Hogue is a 68 year old male with HTN, CAD, prior sweat gland cancer s/p resection, and recently diagnosed head and neck squamous cell carcinoma of the vallecula, with staging revealing a synchronous esophageal squamous cell carcinoma. Presenting today to establish care with medical oncology.       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 as well  -seen by Rad Onc 11/17  -scheduled for EGD/EUS with GI on 11/28 for formal staging of the esophageal primary  -to meet with thoracic surgery 11/21  -discussed diagnosis and stage for both his HNSCC as well as his esophageal cancer at length today and the general management approach for both  -discussed recommendation for both definitive management of the HNSCC as well as pre-perative management of his esophageal cancer using concurrent chemothereapy + radiation    Plan:  --plan for concurrent chemoRT with weekly carboplatin (AUC 2) + paclitaxel (50 mg/m2)  --will touch base with Thoracic Surgery and GI following respective appointments and upcoming formal endoscopic staging  --Defer J tube discussion to thoracic surgery  --will confirm this plan with Radiation Oncology and formally consent prior to start of treatment      Yannick Alva MD PhD   of Medicine  Division of Hematology, Oncology and  Transplantation    ---  90 minutes were spent on the date of the encounter performing chart review, history and exam, documentation, and further activities as noted above.

## 2023-11-17 NOTE — NURSING NOTE
Chief Complaint   Patient presents with    Oncology Clinic Visit     Squamous cell carcinoma of vallecula     Blood Draw     Labs collected from venipuncture by RN. Vitals taken. Checked in for appointment(s).      Kassandra MA RN PHN BSN  BMT/Oncology Lab

## 2023-11-17 NOTE — LETTER
11/17/2023         RE: Lopez Hogue  [address] [facility] [address]        Dear Colleague,    Thank you for referring your patient, Lopez Hogue, to the [facility] RADIATION ONCOLOGY. Please see a copy of my visit note below.    A radiation therapy treatment planning simulation was performed.  Please see the [facility] record for documentation.    Akiko Sun MD  Radiation Oncology     Again, thank you for allowing me to participate in the care of your patient.        Sincerely,        Akiko Sun MD

## 2023-11-17 NOTE — PROGRESS NOTES
A radiation therapy treatment planning simulation was performed.  Please see the [facility] record for documentation.    Akiko Sun MD  Radiation Oncology

## 2023-11-17 NOTE — TELEPHONE ENCOUNTER
Prior Authorization Retail Medication Request    Medication/Dose: Gabapentin 300mg  Diagnosis and ICD code (if different than what is on RX):    New/renewal/insurance change PA/secondary ins. PA:  Previously Tried and Failed:    Rationale:      Insurance   Primary: Paid/[employer]  Insurance ID:  [id number]    Secondary (if applicable):  Insurance ID:      Pharmacy Information (if different than what is on RX)  Name:  [facility]  Phone:  [phone]  Fax:[phone]

## 2023-11-19 NOTE — PROGRESS NOTES
"[facility]: Cancer Care Initial Note                                    Discussion with Patient:                                                      Met with Lopez to discuss chemotherapy and resources available at the [facility]. Provided Lopez with \"My Cancer Guidebook\" and [facility] printout(s) on carboplatin/paclitaxel. Reviewed administration, side effects and ongoing symptom management by APPs in clinic.  Highlighted steps to expect when getting treatment (check in, labs, pre-medications, infusion). Reviewed chemotherapy safety guidelines.    Reviewed when to contact triage team and provided info sheet on \"When to Call For Help\", as well as triage contact information. Request Lopez not send symptomatic MyChart messages.     Fabricio signed Authorization to Discuss paperwork.     Plan for weekly carboplatin/paclitaxel + RT starting 12/4.     Answered all Lopez's questions. Encouraged him to reach out with any follow up questions or concerns.       Goals          General     Other (pt-stated)      Notes - Note created  11/19/2023 11:24 AM by Caryn Olvera RN     Goal Statement: I will use my clinic and care team resources as directed.  Date Goal set: 11/19/2023  Barriers: disease burden  Strengths: coping, motivation, and involvement with care team  Date to Achieve By: ongoing  Patient expressed understanding of goal: Yes  Action steps to achieve this goal:  I will contact triage with new, worsening or uncontrolled symptoms.   I will contact triage with temperature over 100.4  I will contact scheduling to arrange or make changes in my appointments.          Assessment:                                                      Initial  Current living arrangement:: I live in a private home  Type of residence:: Private home - stairs  Informal Support system:: Family  Equipment Currently Used at Home: none  Bed or wheelchair confined:: No  Mobility Status: Independent  Transportation means:: " Regular car  Medication adherence problem (GOAL):: No  Knowledgeable about how to use meds:: Yes  Medication side effects suspected:: No  Referrals Placed: None  Advanced Care Plans/Directives on file:: No  Pain Score: No Pain (0)    No assessment indicated    Intervention/Education provided during outreach:                                                     Plan:  Weekly carbo/taxol + RT starting 12/4  Patient to follow up as scheduled at next appt  Patient to call/MyChart message with updates  Confirmed patient has clinic and triage numbers    Caryn Olvera, RN, BSN  RN Care Coordinator  [facility]

## 2023-11-20 ENCOUNTER — TELEPHONE (OUTPATIENT)
Dept: SPEECH THERAPY | Facility: CLINIC | Age: 68
End: 2023-11-20
Payer: COMMERCIAL

## 2023-11-20 NOTE — TELEPHONE ENCOUNTER
Central Prior Authorization Team   Phone: [phone]    PA Initiation    Medication: GABAPENTIN 300 MG PO CAPS  Insurance Company: [employer] Non-Specialty PA's - Phone [phone] Fax [phone]  Pharmacy Filling the Rx: [facility] - [place], [place] - [address] [facility]  Filling Pharmacy Phone: [phone]  Filling Pharmacy Fax:    Start Date: 11/20/2023

## 2023-11-20 NOTE — PROGRESS NOTES
THORACIC SURGERY - NEW PATIENT OFFICE VISIT      Dear Dr. Ramos,    I saw Lopez Hogue at Dr. Felix s request in consultation for the evaluation and treatment of a mid esophageal squamous cell cancer.     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 He initially presented with hemoptysis and was found to have the head and neck cancer. The  esophageal cancer was seen on PET. He has intentionally gained 10 pounds over the past 5 weeks. He has good energy levels and has little to no trouble with swallowing. He mainly has occasional pill dysphagia. He is eating a regular diet. He is very active and goes to the gym several times per week. He had a heart attack earlier this year and has two coronary stents. He has been exercising since then and feels great.    Previsit Tests   PET/CT (10/13/2023): Right vallecula squamous cell carcinoma (1.4 x 1.4 cm, SUV 13.4) with FDG-avid right level IIa and right level IV lymph node metastases. FDG thickening of the mid to distal esophagus, suspicious for a synchronous primary esophageal neoplasm (4.7 cm, SUV 13.2).     PFT (11/15/2023): FEV1 - 2.41L (86%), DLCO - 122%  Upper Endoscopy (11/8/2023): fungating and ulcerating mass in the middle 1/3rd of the esophagus, 30 - 36 cm from the incisors. Biopsied.     11/8/2023: Esophageal biopsy - Non-keratinizing poorly differentiated squamous cell carcinoma, negative for intestinal metaplasia/Alfredo's type mucosa.   Right vallecula lesion biopsy (10/12/2023): Non-keratinizing poorly differentiated squamous cell carcinoma.   EUS: Pending 11/28/2023    PMH  Reviewed, as below    Past Medical History:   Diagnosis Date    EtOH dependence (H)     Quit drinking 10 years    Heart attack (H)     Hypertension     Nonrheumatic aortic (valve) stenosis     Sweat gland carcinoma         PSH  Reviewed, as  "below    Past Surgical History:   Procedure Laterality Date    CV CORONARY ANGIOGRAM N/A 3/27/2023    Procedure: Coronary Angiogram;  Surgeon: Miki Etienne MD;  Location: [facility] CV    CV CORONARY ANGIOGRAM N/A 5/9/2023    Procedure: Coronary Angiogram;  Surgeon: Miki Etienne MD;  Location: [facility] LAB CV    CV LEFT HEART CATH N/A 3/27/2023    Procedure: Left Heart Catheterization;  Surgeon: Miki Etienne MD;  Location: [facility] CATH LAB CV    CV LEFT HEART CATH N/A 5/9/2023    Procedure: Left Heart Catheterization;  Surgeon: Miki Etienne MD;  Location: [facility] CV    CV PCI N/A 3/27/2023    Procedure: Percutaneous Coronary Intervention;  Surgeon: Miki Etienne MD;  Location: [facility] LAB CV    ESOPHAGOSCOPY, GASTROSCOPY, DUODENOSCOPY (EGD), COMBINED N/A 11/8/2023    Procedure: ESOPHAGOGASTRODUODENOSCOPY, WITH BIOPSY;  Surgeon: Shahriar Giraldo MD;  Location:  GI    LARYNGOSCOPY WITH BIOPSY(IES) N/A 10/12/2023    Procedure: LARYNGOSCOPY, WITH BIOPSY;  Surgeon: Edi Felix MD;  Location:  OR    OTHER SURGICAL HISTORY  2015    WIDE EXCISION OF LEFT GLUTEAL MASSTNM: [id number], stage: II hidradenocarcinoma Grade II, 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, sentinel lymph node biopsy negative         ETOH:  Stopped 10 years ago  TOB: ages   22 - 58; Quit 2013 2 packs per day - 72 pack years   Other drugs: No    Physical examination  /75   Pulse 55   Temp 97.5  F (36.4  C) (Oral)   Resp 16   Ht 1.72 m (5' 7.72\")   Wt 78.5 kg (173 lb 1.6 oz)   SpO2 98%   BMI 26.54 kg/m     Physical Exam  Constitutional:       Appearance: Normal appearance. He is normal weight.   Eyes:      Conjunctiva/sclera: Conjunctivae normal.   Cardiovascular:      Rate and Rhythm: Normal rate.   Pulmonary:      Effort: Pulmonary effort is normal.   Abdominal:      General: Abdomen is flat.   Musculoskeletal:         General: Normal range of motion.   Skin:     General: Skin is warm and dry. "   Neurological:      Mental Status: He is alert and oriented to person, place, and time.   Psychiatric:         Mood and Affect: Mood normal.         Behavior: Behavior normal.         Thought Content: Thought content normal.         Judgment: Judgment normal.          From a personal perspective, he lives with his wife, who is a nurse. 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 They have two sons and one grandchild.    IMPRESSION (C15.9) Primary esophageal squamous cell carcinoma (H)  (primary encounter diagnosis)    (C10.0) Squamous cell carcinoma of vallecula (H)     This person is a 68 year old male with synchronous esophageal squamous cell carcinoma and vallecular squamous cell carcinoma. He si a good surgical candidate.    PLAN  I spent 30 min on the date of the encounter in chart review, patient visit, review of tests, documentation and/or discussion with other providers about the issues documented above. I reviewed the plan as follows:    I agree with neoadjuvant chemoradiation as an initial therapy. I will plan on esophagectomy after pending a PET showing improvement.    Procedure planned: First, laparoscopic staging and jejunostomy.    Six weeks later, minimally invasive Feliberto-Elbert esophagectomy.    I discussed with the patient the conduct of the operation of an Ruffin-Elbert transthoracic esophagectomy. The hospital stay would likely be 5-10 days if there are no complications. I explained that approximately 50% of patients have some complication, ranging from minor complications to the very serious ones. The recovery takes up to 8 weeks.  I explained to the patient the risks and benefits of the procedure. The benefit of the procedure is to remove the esophageal cancer. The risks include anastomotic leak, gastric necrosis, pneumonia, DVT and pulmonary embolism, arrhythmia, jejunostomy tube complications including intestinal obstruction, urinary tract  infection and death.      Necessary Preop Tests & Appointments: Preoperative assessment clinic, Pulmonary function testing, and PET scan    Regional Anesthesia Plan: Intraoperative intercostal nerve block    Anticoagulation Plan: Prophylactic Lovenox    I appreciate the opportunity to participate in the care of your patient and will keep you updated.      Sincerely,    Devin Hill MD

## 2023-11-21 ENCOUNTER — ONCOLOGY VISIT (OUTPATIENT)
Dept: SURGERY | Facility: CLINIC | Age: 68
End: 2023-11-21
Attending: STUDENT IN AN ORGANIZED HEALTH CARE EDUCATION/TRAINING PROGRAM
Payer: COMMERCIAL

## 2023-11-21 VITALS
BODY MASS INDEX: 26.24 KG/M2 | TEMPERATURE: 97.5 F | DIASTOLIC BLOOD PRESSURE: 75 MMHG | WEIGHT: 173.1 LBS | HEIGHT: 68 IN | HEART RATE: 55 BPM | OXYGEN SATURATION: 98 % | RESPIRATION RATE: 16 BRPM | SYSTOLIC BLOOD PRESSURE: 136 MMHG

## 2023-11-21 DIAGNOSIS — C10.0 SQUAMOUS CELL CARCINOMA OF VALLECULA (H): ICD-10-CM

## 2023-11-21 DIAGNOSIS — C15.9 PRIMARY ESOPHAGEAL SQUAMOUS CELL CARCINOMA (H): Primary | ICD-10-CM

## 2023-11-21 PROCEDURE — 99213 OFFICE O/P EST LOW 20 MIN: CPT | Performed by: THORACIC SURGERY (CARDIOTHORACIC VASCULAR SURGERY)

## 2023-11-21 PROCEDURE — 99203 OFFICE O/P NEW LOW 30 MIN: CPT | Performed by: THORACIC SURGERY (CARDIOTHORACIC VASCULAR SURGERY)

## 2023-11-21 ASSESSMENT — PAIN SCALES - GENERAL: PAINLEVEL: NO PAIN (0)

## 2023-11-21 NOTE — TELEPHONE ENCOUNTER
PRIOR AUTHORIZATION DENIED    Medication: GABAPENTIN 300 MG PO CAPS  Insurance Company: [employer]-Specialty [employer]'s - Phone [phone] Fax [phone]  Denial Date: 11/20/2023  Denial Rational: Denied in error. They did not consider the taper up to 2700.       Appeal Information:   EPA appeal available.

## 2023-11-21 NOTE — LETTER
11/21/2023         RE: Lopez Hogue  [address] [facility] [address]        Dear Colleague,    Thank you for referring your patient, Lopez Hogue, to the [facility]. Please see a copy of my visit note below.    THORACIC SURGERY - NEW PATIENT OFFICE VISIT      Dear Dr. Ramos,    I saw Lopez Hogue at Dr. Felix s request in consultation for the evaluation and treatment of a mid esophageal squamous cell cancer.     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 He initially presented with hemoptysis and was found to have the head and neck cancer. The  esophageal cancer was seen on PET. He has intentionally gained 10 pounds over the past 5 weeks. He has good energy levels and has little to no trouble with swallowing. He mainly has occasional pill dysphagia. He is eating a regular diet. He is very active and goes to the gym several times per week. He had a heart attack earlier this year and has two coronary stents. He has been exercising since then and feels great.    Previsit Tests   PET/CT (10/13/2023): Right vallecula squamous cell carcinoma (1.4 x 1.4 cm, SUV 13.4) with FDG-avid right level IIa and right level IV lymph node metastases. FDG thickening of the mid to distal esophagus, suspicious for a synchronous primary esophageal neoplasm (4.7 cm, SUV 13.2).     PFT (11/15/2023): FEV1 - 2.41L (86%), DLCO - 122%  Upper Endoscopy (11/8/2023): fungating and ulcerating mass in the middle 1/3rd of the esophagus, 30 - 36 cm from the incisors. Biopsied.     11/8/2023: Esophageal biopsy - Non-keratinizing poorly differentiated squamous cell carcinoma, negative for intestinal metaplasia/Alfredo's type mucosa.   Right vallecula lesion biopsy (10/12/2023): Non-keratinizing poorly differentiated squamous cell carcinoma.   EUS: Pending  "11/28/2023    PMH  Reviewed, as below    Past Medical History:   Diagnosis Date    EtOH dependence (H)     Quit drinking 10 years    Heart attack (H)     Hypertension     Nonrheumatic aortic (valve) stenosis     Sweat gland carcinoma         PSH  Reviewed, as below    Past Surgical History:   Procedure Laterality Date    CV CORONARY ANGIOGRAM N/A 3/27/2023    Procedure: Coronary Angiogram;  Surgeon: Miki Etienne MD;  Location: [facility] LAB CV    CV CORONARY ANGIOGRAM N/A 5/9/2023    Procedure: Coronary Angiogram;  Surgeon: Miki Etienne MD;  Location: [facility] CATH LAB CV    CV LEFT HEART CATH N/A 3/27/2023    Procedure: Left Heart Catheterization;  Surgeon: Miki Etienne MD;  Location: [facility] CATH LAB CV    CV LEFT HEART CATH N/A 5/9/2023    Procedure: Left Heart Catheterization;  Surgeon: Miki Etienne MD;  Location: [facility] LAB CV    CV PCI N/A 3/27/2023    Procedure: Percutaneous Coronary Intervention;  Surgeon: Miki Etienne MD;  Location: [facility] CATH LAB CV    ESOPHAGOSCOPY, GASTROSCOPY, DUODENOSCOPY (EGD), COMBINED N/A 11/8/2023    Procedure: ESOPHAGOGASTRODUODENOSCOPY, WITH BIOPSY;  Surgeon: Shahriar Giraldo MD;  Location:  GI    LARYNGOSCOPY WITH BIOPSY(IES) N/A 10/12/2023    Procedure: LARYNGOSCOPY, WITH BIOPSY;  Surgeon: Edi Felix MD;  Location:  OR    OTHER SURGICAL HISTORY  2015    WIDE EXCISION OF LEFT GLUTEAL MASSTNM: [id number], stage: II hidradenocarcinoma Grade II, 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, sentinel lymph node biopsy negative         ETOH:  Stopped 10 years ago  TOB: ages   22 - 58; Quit 2013 2 packs per day - 72 pack years   Other drugs: No    Physical examination  /75   Pulse 55   Temp 97.5  F (36.4  C) (Oral)   Resp 16   Ht 1.72 m (5' 7.72\")   Wt 78.5 kg (173 lb 1.6 oz)   SpO2 98%   BMI 26.54 kg/m     Physical Exam  Constitutional:       Appearance: Normal appearance. He is normal weight.   Eyes:      Conjunctiva/sclera: Conjunctivae " normal.   Cardiovascular:      Rate and Rhythm: Normal rate.   Pulmonary:      Effort: Pulmonary effort is normal.   Abdominal:      General: Abdomen is flat.   Musculoskeletal:         General: Normal range of motion.   Skin:     General: Skin is warm and dry.   Neurological:      Mental Status: He is alert and oriented to person, place, and time.   Psychiatric:         Mood and Affect: Mood normal.         Behavior: Behavior normal.         Thought Content: Thought content normal.         Judgment: Judgment normal.          From a personal perspective, he lives with his wife, who is a nurse. 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 They have two sons and one grandchild.    IMPRESSION (C15.9) Primary esophageal squamous cell carcinoma (H)  (primary encounter diagnosis)    (C10.0) Squamous cell carcinoma of vallecula (H)     This person is a 68 year old male with synchronous esophageal squamous cell carcinoma and vallecular squamous cell carcinoma. He si a good surgical candidate.    PLAN  I spent 30 min on the date of the encounter in chart review, patient visit, review of tests, documentation and/or discussion with other providers about the issues documented above. I reviewed the plan as follows:    I agree with neoadjuvant chemoradiation as an initial therapy. I will plan on esophagectomy after pending a PET showing improvement.    Procedure planned: First, laparoscopic staging and jejunostomy.    Six weeks later, minimally invasive Templeton-Elbert esophagectomy.    I discussed with the patient the conduct of the operation of an Feliberto-Elbert transthoracic esophagectomy. The hospital stay would likely be 5-10 days if there are no complications. I explained that approximately 50% of patients have some complication, ranging from minor complications to the very serious ones. The recovery takes up to 8 weeks.  I explained to the patient the risks and benefits of the  procedure. The benefit of the procedure is to remove the esophageal cancer. The risks include anastomotic leak, gastric necrosis, pneumonia, DVT and pulmonary embolism, arrhythmia, jejunostomy tube complications including intestinal obstruction, urinary tract infection and death.      Necessary Preop Tests & Appointments: Preoperative assessment clinic, Pulmonary function testing, and PET scan    Regional Anesthesia Plan: Intraoperative intercostal nerve block    Anticoagulation Plan: Prophylactic Lovenox    I appreciate the opportunity to participate in the care of your patient and will keep you updated.      Sincerely,    Devin Hill MD

## 2023-11-21 NOTE — NURSING NOTE
"Oncology Rooming Note    November 21, 2023 2:08 PM   Lopez Hogue is a 68 year old male who presents for:    Chief Complaint   Patient presents with    Oncology Clinic Visit     New Eval for Squamous Cell Carcinoma of Vallecula     Initial Vitals: /75   Pulse 55   Temp 97.5  F (36.4  C) (Oral)   Resp 16   Ht 1.72 m (5' 7.72\")   Wt 78.5 kg (173 lb 1.6 oz)   SpO2 98%   BMI 26.54 kg/m   Estimated body mass index is 26.54 kg/m  as calculated from the following:    Height as of this encounter: 1.72 m (5' 7.72\").    Weight as of this encounter: 78.5 kg (173 lb 1.6 oz). Body surface area is 1.94 meters squared.  No Pain (0) Comment: Data Unavailable   No LMP for male patient.  Allergies reviewed: Yes  Medications reviewed: Yes    Medications: Medication refills not needed today.  Pharmacy name entered into [facility]: [id number] [address]  AT [facility] [address]    Clinical concerns: none       Margoth Galarza MA             "

## 2023-11-21 NOTE — TELEPHONE ENCOUNTER
Central Prior Authorization Team   Phone: [phone]    Medication Appeal Initiation-Sent via CMM    Medication: GABAPENTIN 300 MG PO CAPS  Appeal Start Date:  11/21/2023  Insurance Company: [employer]  Insurance Phone:   Insurance Fax:   Comments:

## 2023-11-22 NOTE — TELEPHONE ENCOUNTER
MEDICATION APPEAL APPROVED    Medication: GABAPENTIN 300 MG PO CAPS  Authorization Effective Date: 10/22/2023  Authorization Expiration Date: 11/21/2024  Approved Dose/Quantity:   Reference #:     Appeal Insurance Company: [employer]  Expected CoPay: $       CoPay Card Available:    Financial Assistance Needed:   Filling Pharmacy: [facility] [place], [place] - [address] [place] [address]  Patient Notified: No. Pharmacy has paid claim for 1st fill  Comments:

## 2023-11-27 ENCOUNTER — ANESTHESIA EVENT (OUTPATIENT)
Dept: GASTROENTEROLOGY | Facility: CLINIC | Age: 68
End: 2023-11-27
Payer: COMMERCIAL

## 2023-11-27 RX ORDER — ONDANSETRON 4 MG/1
4 TABLET, ORALLY DISINTEGRATING ORAL EVERY 30 MIN PRN
Status: CANCELLED | OUTPATIENT
Start: 2023-11-27

## 2023-11-27 RX ORDER — OXYCODONE HYDROCHLORIDE 10 MG/1
10 TABLET ORAL
Status: CANCELLED | OUTPATIENT
Start: 2023-11-27

## 2023-11-27 RX ORDER — ONDANSETRON 2 MG/ML
4 INJECTION INTRAMUSCULAR; INTRAVENOUS EVERY 30 MIN PRN
Status: CANCELLED | OUTPATIENT
Start: 2023-11-27

## 2023-11-27 RX ORDER — OXYCODONE HYDROCHLORIDE 5 MG/1
5 TABLET ORAL
Status: CANCELLED | OUTPATIENT
Start: 2023-11-27

## 2023-11-27 RX ORDER — LIDOCAINE 40 MG/G
CREAM TOPICAL
Status: CANCELLED | OUTPATIENT
Start: 2023-11-27

## 2023-11-27 NOTE — ANESTHESIA PREPROCEDURE EVALUATION
Anesthesia Pre-Procedure Evaluation    Patient: Lopez Hogue   MRN: [id number] : 1955        Procedure : Procedure(s):  Endoscopic ultrasound upper gastrointestinal tract (GI)          Past Medical History:   Diagnosis Date    EtOH dependence (H)     Quit drinking 10 years    Heart attack (H)     Hypertension     Nonrheumatic aortic (valve) stenosis     Sweat gland carcinoma       Past Surgical History:   Procedure Laterality Date    CV CORONARY ANGIOGRAM N/A 3/27/2023    Procedure: Coronary Angiogram;  Surgeon: Miki Etienne MD;  Location: [facility] CV    CV CORONARY ANGIOGRAM N/A 2023    Procedure: Coronary Angiogram;  Surgeon: Miki Etienne MD;  Location: [facility] CATH LAB CV    CV LEFT HEART CATH N/A 3/27/2023    Procedure: Left Heart Catheterization;  Surgeon: Miki Etienne MD;  Location: [facility] LAB CV    CV LEFT HEART CATH N/A 2023    Procedure: Left Heart Catheterization;  Surgeon: Miki Etienne MD;  Location: [facility] CV    CV PCI N/A 3/27/2023    Procedure: Percutaneous Coronary Intervention;  Surgeon: Miki Etienne MD;  Location: [facility] LAB CV    ESOPHAGOSCOPY, GASTROSCOPY, DUODENOSCOPY (EGD), COMBINED N/A 2023    Procedure: ESOPHAGOGASTRODUODENOSCOPY, WITH BIOPSY;  Surgeon: Shahriar Giraldo MD;  Location:  GI    LARYNGOSCOPY WITH BIOPSY(IES) N/A 10/12/2023    Procedure: LARYNGOSCOPY, WITH BIOPSY;  Surgeon: Edi Felix MD;  Location:  OR    OTHER SURGICAL HISTORY      WIDE EXCISION OF LEFT GLUTEAL MASSTNM: [id number], stage: II hidradenocarcinoma Grade II, 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, sentinel lymph node biopsy negative       Allergies   Allergen Reactions    Coconut Flavor Anaphylaxis     Raw coconut      Social History     Tobacco Use    Smoking status: Former     Packs/day: 2.00     Years: 30.00     Additional pack years: 0.00     Total pack years: 60.00     Types: Cigarettes     Quit date: 2013     Years since  quitting: 10.9     Passive exposure: Never    Smokeless tobacco: Never    Tobacco comments:     Quit 10 years ago   Substance Use Topics    Alcohol use: Not Currently     Comment: quit in 2013      Wt Readings from Last 1 Encounters:   11/21/23 78.5 kg (173 lb 1.6 oz)        Anesthesia Evaluation            ROS/MED HX  ENT/Pulmonary:  - neg pulmonary ROS     Neurologic:  - neg neurologic ROS     Cardiovascular: Comment: Echo: Date: 9/2023 Results:  Interpretation Summary     1. Normal left ventricular size and systolic performance with a visually  estimated ejection fraction of 60-65%.  2. There is moderate aortic stenosis.  3. There is trace aortic insufficiency.  4. Normal right ventricular size and systolic performance.  5. There is mild left atrial enlargement.     When compared to the prior real-time echocardiogram dated 27 March 2023, the  findings are felt to be fairly similar on both examinations.      (+)  hypertension- -  CAD -  - -                           valvular problems/murmurs type: AS          METS/Exercise Tolerance:     Hematologic:       Musculoskeletal:       GI/Hepatic: Comment: Esoph SCC      Renal/Genitourinary:       Endo:       Psychiatric/Substance Use:       Infectious Disease:       Malignancy:   (+) Malignancy, History of GI.    Other:            Physical Exam    Airway        Mallampati: I   TM distance: > 3 FB   Neck ROM: full   Mouth opening: > 3 cm    Respiratory Devices and Support         Dental     Comment: Upper dentures    (+) Edentulous      Cardiovascular          Rhythm and rate: regular and normal     Pulmonary   pulmonary exam normal        breath sounds clear to auscultation           OUTSIDE LABS:  CBC:   Lab Results   Component Value Date    WBC 6.7 11/17/2023    WBC 6.1 08/18/2023    HGB 12.1 (L) 11/17/2023    HGB 11.7 11/15/2023    HCT 35.4 (L) 11/17/2023    HCT 37.1 (L) 08/18/2023     11/17/2023     08/18/2023     BMP:   Lab Results   Component Value  "Date     11/17/2023     08/18/2023    POTASSIUM 4.2 11/17/2023    POTASSIUM 4.2 08/18/2023    CHLORIDE 103 11/17/2023    CHLORIDE 106 08/18/2023    CO2 28 11/17/2023    CO2 25 08/18/2023    BUN 13.8 11/17/2023    BUN 19.6 08/18/2023    CR 0.77 11/17/2023    CR 0.73 08/18/2023    GLC 90 11/17/2023    GLC 85 10/13/2023     COAGS: No results found for: \"PTT\", \"INR\", \"FIBR\"  POC: No results found for: \"BGM\", \"HCG\", \"HCGS\"  HEPATIC:   Lab Results   Component Value Date    ALBUMIN 4.2 11/17/2023    PROTTOTAL 6.9 11/17/2023    ALT 17 11/17/2023    AST 27 11/17/2023    ALKPHOS 81 11/17/2023    BILITOTAL 0.9 11/17/2023     OTHER:   Lab Results   Component Value Date    A1C 5.2 03/27/2023    RAFAELA 9.4 11/17/2023    MAG 1.8 08/18/2023       Anesthesia Plan    ASA Status:  3    NPO Status:  NPO Appropriate    Anesthesia Type: MAC.     - Reason for MAC: immobility needed, straight local not clinically adequate   Induction: Intravenous.   Maintenance: TIVA.        Consents    Anesthesia Plan(s) and associated risks, benefits, and realistic alternatives discussed. Questions answered and patient/representative(s) expressed understanding.     - Discussed: Risks, Benefits and Alternatives for the PROCEDURE were discussed     - Discussed with:  Patient            Postoperative Care    Pain management: IV analgesics.   PONV prophylaxis: Ondansetron (or other 5HT-3), Promethazine or metoclopramide     Comments:               Capo Montes MD    I have reviewed the pertinent notes and labs in the chart from the past 30 days and (re)examined the patient.  Any updates or changes from those notes are reflected in this note.              # Overweight: Estimated body mass index is 26.54 kg/m  as calculated from the following:    Height as of 11/21/23: 1.72 m (5' 7.72\").    Weight as of 11/21/23: 78.5 kg (173 lb 1.6 oz).      "

## 2023-11-28 ENCOUNTER — HOSPITAL ENCOUNTER (OUTPATIENT)
Facility: CLINIC | Age: 68
Discharge: HOME | End: 2023-11-28
Attending: INTERNAL MEDICINE | Admitting: INTERNAL MEDICINE
Payer: COMMERCIAL

## 2023-11-28 ENCOUNTER — ANESTHESIA (OUTPATIENT)
Dept: GASTROENTEROLOGY | Facility: CLINIC | Age: 68
End: 2023-11-28
Payer: COMMERCIAL

## 2023-11-28 VITALS
SYSTOLIC BLOOD PRESSURE: 118 MMHG | TEMPERATURE: 98.1 F | DIASTOLIC BLOOD PRESSURE: 82 MMHG | OXYGEN SATURATION: 96 % | RESPIRATION RATE: 16 BRPM | HEART RATE: 59 BPM

## 2023-11-28 PROCEDURE — 370N000017 HC ANESTHESIA TECHNICAL FEE, PER MIN: Performed by: INTERNAL MEDICINE

## 2023-11-28 PROCEDURE — 258N000003 HC RX IP 258 OP 636: Performed by: NURSE ANESTHETIST, CERTIFIED REGISTERED

## 2023-11-28 PROCEDURE — 250N000009 HC RX 250: Performed by: NURSE ANESTHETIST, CERTIFIED REGISTERED

## 2023-11-28 PROCEDURE — 250N000011 HC RX IP 250 OP 636: Performed by: NURSE ANESTHETIST, CERTIFIED REGISTERED

## 2023-11-28 PROCEDURE — 43237 ENDOSCOPIC US EXAM ESOPH: CPT | Performed by: INTERNAL MEDICINE

## 2023-11-28 RX ORDER — GLYCOPYRROLATE 0.2 MG/ML
INJECTION, SOLUTION INTRAMUSCULAR; INTRAVENOUS PRN
Status: DISCONTINUED | OUTPATIENT
Start: 2023-11-28 | End: 2023-11-28

## 2023-11-28 RX ORDER — PROPOFOL 10 MG/ML
INJECTION, EMULSION INTRAVENOUS CONTINUOUS PRN
Status: DISCONTINUED | OUTPATIENT
Start: 2023-11-28 | End: 2023-11-28

## 2023-11-28 RX ORDER — SODIUM CHLORIDE, SODIUM LACTATE, POTASSIUM CHLORIDE, CALCIUM CHLORIDE 600; 310; 30; 20 MG/100ML; MG/100ML; MG/100ML; MG/100ML
INJECTION, SOLUTION INTRAVENOUS CONTINUOUS PRN
Status: DISCONTINUED | OUTPATIENT
Start: 2023-11-28 | End: 2023-11-28

## 2023-11-28 RX ORDER — PROPOFOL 10 MG/ML
INJECTION, EMULSION INTRAVENOUS PRN
Status: DISCONTINUED | OUTPATIENT
Start: 2023-11-28 | End: 2023-11-28

## 2023-11-28 RX ORDER — LIDOCAINE HYDROCHLORIDE 20 MG/ML
INJECTION, SOLUTION INFILTRATION; PERINEURAL PRN
Status: DISCONTINUED | OUTPATIENT
Start: 2023-11-28 | End: 2023-11-28

## 2023-11-28 RX ADMIN — PROPOFOL 80 MG: 10 INJECTION, EMULSION INTRAVENOUS at 10:56

## 2023-11-28 RX ADMIN — PROPOFOL 50 MG: 10 INJECTION, EMULSION INTRAVENOUS at 11:12

## 2023-11-28 RX ADMIN — PROPOFOL 40 MG: 10 INJECTION, EMULSION INTRAVENOUS at 11:21

## 2023-11-28 RX ADMIN — PROPOFOL 20 MG: 10 INJECTION, EMULSION INTRAVENOUS at 11:23

## 2023-11-28 RX ADMIN — PROPOFOL 50 MG: 10 INJECTION, EMULSION INTRAVENOUS at 11:08

## 2023-11-28 RX ADMIN — LIDOCAINE HYDROCHLORIDE 100 MG: 20 INJECTION, SOLUTION INFILTRATION; PERINEURAL at 10:51

## 2023-11-28 RX ADMIN — PROPOFOL 20 MG: 10 INJECTION, EMULSION INTRAVENOUS at 10:59

## 2023-11-28 RX ADMIN — PROPOFOL 20 MG: 10 INJECTION, EMULSION INTRAVENOUS at 11:27

## 2023-11-28 RX ADMIN — PROPOFOL 20 MG: 10 INJECTION, EMULSION INTRAVENOUS at 11:15

## 2023-11-28 RX ADMIN — GLYCOPYRROLATE 0.1 MG: 0.2 INJECTION, SOLUTION INTRAMUSCULAR; INTRAVENOUS at 11:12

## 2023-11-28 RX ADMIN — SODIUM CHLORIDE, POTASSIUM CHLORIDE, SODIUM LACTATE AND CALCIUM CHLORIDE: 600; 310; 30; 20 INJECTION, SOLUTION INTRAVENOUS at 10:51

## 2023-11-28 RX ADMIN — PROPOFOL 60 MG: 10 INJECTION, EMULSION INTRAVENOUS at 11:43

## 2023-11-28 RX ADMIN — PROPOFOL 20 MG: 10 INJECTION, EMULSION INTRAVENOUS at 11:36

## 2023-11-28 RX ADMIN — BENZOCAINE 11 SPRAY: 220 SPRAY, METERED PERIODONTAL at 10:49

## 2023-11-28 RX ADMIN — PROPOFOL 150 MCG/KG/MIN: 10 INJECTION, EMULSION INTRAVENOUS at 10:51

## 2023-11-28 ASSESSMENT — ACTIVITIES OF DAILY LIVING (ADL)
ADLS_ACUITY_SCORE: 35
ADLS_ACUITY_SCORE: 33

## 2023-11-28 NOTE — ANESTHESIA CARE TRANSFER NOTE
Patient: Lopez Hogue    Procedure: Procedure(s):  Endoscopic ultrasound upper gastrointestinal tract (GI)       Diagnosis: Squamous cell esophageal cancer (H) [C15.9]  Diagnosis Additional Information: No value filed.    Anesthesia Type:   MAC     Note:    Oropharynx: oropharynx clear of all foreign objects  Level of Consciousness: awake  Oxygen Supplementation: nasal cannula  Level of Supplemental Oxygen (L/min / FiO2): 2  Independent Airway: airway patency satisfactory and stable  Dentition: dentition unchanged  Vital Signs Stable: post-procedure vital signs reviewed and stable  Report to RN Given: handoff report given  Patient transferred to: Phase II    Handoff Report: Identifed the Patient, Identified the Reponsible Provider, Reviewed the pertinent medical history, Discussed the surgical course, Reviewed Intra-OP anesthesia mangement and issues during anesthesia, Set expectations for post-procedure period and Allowed opportunity for questions and acknowledgement of understanding    Vitals:  Vitals Value Taken Time   /64 11/28/23 1202   Temp     Pulse 66 11/28/23 1202   Resp 16 11/28/23 1202   SpO2 97 % 11/28/23 1202   Vitals shown include unfiled device data.    Electronically Signed By: YADIRA Winters CRNA  November 28, 2023  12:03 PM

## 2023-11-28 NOTE — H&P
Gastroenterology Pre-op History and Physical    Lopez Hogue MRN# [id number]   Age: 68 year old YOB: 1955      Date of Surgery: 11/28/23  [facility]      Date of Exam 11/28/2023 Facility Same Day       Primary care provider: Madi Ramos         Chief Complaint and/or Reason for Procedure:   Lopez Hogue is a 68 year old male with a past medical history that includes HTN, CAD, MI (March of 2023 s/p PCI x2, on DAPT), and prior sweat gland cancer s/p surgical resection, now with recently diagnosed oropharyngeal cancer found to have a 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 For EUS for staging.  Stopped clopidogrel 7 days ago.           Past Medical and Surgical History:     Past Medical History:   Diagnosis Date    EtOH dependence (H)     Quit drinking 10 years    Heart attack (H)     Hypertension     Nonrheumatic aortic (valve) stenosis     Sweat gland carcinoma      Past Surgical History:   Procedure Laterality Date    CV CORONARY ANGIOGRAM N/A 3/27/2023    Procedure: Coronary Angiogram;  Surgeon: Miki Etienne MD;  Location: [facility]    CV CORONARY ANGIOGRAM N/A 5/9/2023    Procedure: Coronary Angiogram;  Surgeon: Miki Etienne MD;  Location: [facility]    CV LEFT HEART CATH N/A 3/27/2023    Procedure: Left Heart Catheterization;  Surgeon: Miki Etienne MD;  Location: [facility]    CV LEFT HEART CATH N/A 5/9/2023    Procedure: Left Heart Catheterization;  Surgeon: Miki Etienne MD;  Location: [facility]    CV PCI N/A 3/27/2023    Procedure: Percutaneous Coronary Intervention;  Surgeon: Miki Etienne MD;  Location: [facility]    ESOPHAGOSCOPY, GASTROSCOPY, DUODENOSCOPY (EGD), COMBINED N/A 11/8/2023    Procedure: ESOPHAGOGASTRODUODENOSCOPY, WITH BIOPSY;  Surgeon: Shahriar Giraldo MD;  Location:  GI    LARYNGOSCOPY WITH BIOPSY(IES) N/A 10/12/2023     Procedure: LARYNGOSCOPY, WITH BIOPSY;  Surgeon: Edi Felix MD;  Location:  OR    OTHER SURGICAL HISTORY  2015    WIDE EXCISION OF LEFT GLUTEAL MASSTNM: [id number], stage: II hidradenocarcinoma Grade II, 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, sentinel lymph node biopsy negative             Medications (include herbals and vitamins):        Medications Prior to Admission   Medication Sig Dispense Refill Last Dose    acetaminophen (TYLENOL) 500 MG tablet Take 500-1,000 mg by mouth every 8 hours as needed for fever or pain   Past Week    aspirin (ASA) 81 MG EC tablet Take 1 tablet (81 mg) by mouth daily Start tomorrow. 30 tablet 3 Past Month    clopidogrel (PLAVIX) 75 MG tablet Take 1 tablet (75 mg) by mouth daily Resume home plavix 10/14   Past Month    cyanocobalamin (VITAMIN B-12) 1000 MCG tablet Take 1,000 mcg by mouth every evening   11/27/2023    gabapentin (NEURONTIN) 300 MG capsule Take 3 capsules (900 mg) by mouth 3 times daily Follow instructions given in clinic to taper dose up to 900 mg tid 270 capsule 4 Unknown    nitroGLYcerin (NITROSTAT) 0.4 MG sublingual tablet PLACE 1 TABLET UNDER THE TONGUE EVERY 5 MINUTES FOR CHEST PAIN FOR 3 DOSES. IF SYMPTOMS PERSIST 5 MINUTES AFTER 1ST DOSE CALL 911. 25 tablet 1 Unknown    Omega-3 Fatty Acids (FISH OIL) 1200 MG capsule Take 1,200 mg by mouth every evening   11/27/2023    rosuvastatin (CRESTOR) 40 MG tablet Take 1 tablet (40 mg) by mouth daily (Patient taking differently: Take 40 mg by mouth every evening) 90 tablet 3 11/28/2023    sertraline (ZOLOFT) 100 MG tablet Take 100 mg by mouth every morning   11/28/2023             Allergies:      Allergies   Allergen Reactions    Coconut Flavor Anaphylaxis     Raw coconut               Physical Exam:   All vitals have been reviewed  Patient Vitals for the past 8 hrs:   BP Temp Temp src Resp SpO2   11/28/23 1012 (!) 143/76 98.1  F (36.7  C) Oral 16 97 %     No intake/output data recorded.  Airway assessment:   Patient is able to open  mouth wide  Patient is able to stick out tongue  Mallampatti classification: Class II (visualization of the soft palate, fauces, and uvula)}      Lungs:   No increased work of breathing, good air exchange, clear to auscultation bilaterally, no crackles or wheezing     Cardiovascular:   regular rate and rhythm, systolic ejection murmur best heard at RUSB                 Anesthetic risk and/or ASA classification: 3   Lopez Hogue is a 68 year old male with a past medical history that includes HTN, CAD, MI (March of 2023 s/p PCI x2, on DAPT), and prior sweat gland cancer s/p surgical resection, now with recently diagnosed oropharyngeal cancer found to have a 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 For EUS for staging.    Nichole Meeks MD

## 2023-11-28 NOTE — OR NURSING
EGD/EUS with no interventions performed under MAC, patient tolerated well. Transferred to  and report given to recovery RN.

## 2023-11-28 NOTE — ANESTHESIA POSTPROCEDURE EVALUATION
Patient: Lopez Hogue    Procedure: Procedure(s):  Endoscopic ultrasound upper gastrointestinal tract (GI)       Anesthesia Type:  MAC    Note:  Disposition: Outpatient   Postop Pain Control: Uneventful            Sign Out: Well controlled pain   PONV: No   Neuro/Psych: Uneventful            Sign Out: Acceptable/Baseline neuro status   Airway/Respiratory: Uneventful            Sign Out: Acceptable/Baseline resp. status   CV/Hemodynamics: Uneventful            Sign Out: Acceptable CV status; No obvious hypovolemia; No obvious fluid overload   Other NRE: NONE   DID A NON-ROUTINE EVENT OCCUR? No           Last vitals:  Vitals Value Taken Time   /64 11/28/23 1202   Temp     Pulse 66 11/28/23 1202   Resp 16 11/28/23 1202   SpO2 97 % 11/28/23 1202   Vitals shown include unfiled device data.    Electronically Signed By: Capo Montes MD  November 28, 2023  12:03 PM

## 2023-11-28 NOTE — DISCHARGE INSTRUCTIONS
Discharge Instructions after Endoscopic Ultrasound    Activity  You were given medicine for pain. You may be dizzy or sleepy.    For 24 hours:  Do not drive or use heavy equipment.  Do not make important decisions.  Do not drink any alcohol.    Diet  Wait one hour before eating or drinking. Start with sips of water. When your gag reflex has returned you  may go back to your usual diet, medicines and light exercise.    Discomfort  Some bloating is normal. You may have large burps or pass air.  You may have a sore throat for 2 to 3 days. It may help to:  Avoid hot liquids for 24 hours.  Use sore throat lozenges.  Gargle as needed with salt water up to 4 times a day. Mix 1 cup of warm water with 1 teaspoon of salt. Do not swallow.  You may take Tylenol (acetaminophen) for pain unless your doctor has told you not to.    Do not take aspirin or ibuprofen (Advil, Motrin, or other anti-inflammatory  drugs) for _____ days.    Follow-up  ___ We took small tissue or fluid samples to study. We will call you with the results in about 10 working days.    When to call    Call right away if you have:  Severe throat pain or trouble swallowing  Black stools (tar-like looking bowel movement)  Fever above 100.6 F (37.5 C)  Unusual pain in belly or chest not relieved by belching or passing air.    If you vomit blood or have severe pain, go to an emergency room.    If you have questions, call    Monday to Friday, 8 a.m. to 4:30 p.m.:   Central Scheduling Department: [phone]  After hours: Hospital: [phone] (Ask for the GI fellow on call)

## 2023-11-29 LAB — UPPER EUS: NORMAL

## 2023-11-30 ENCOUNTER — HOSPITAL ENCOUNTER (EMERGENCY)
Facility: HOSPITAL | Age: 68
Discharge: HOME | End: 2023-11-30
Attending: EMERGENCY MEDICINE | Admitting: EMERGENCY MEDICINE
Payer: COMMERCIAL

## 2023-11-30 ENCOUNTER — APPOINTMENT (OUTPATIENT)
Dept: RADIOLOGY | Facility: HOSPITAL | Age: 68
End: 2023-11-30
Attending: EMERGENCY MEDICINE
Payer: COMMERCIAL

## 2023-11-30 VITALS
HEART RATE: 58 BPM | HEIGHT: 68 IN | RESPIRATION RATE: 20 BRPM | OXYGEN SATURATION: 91 % | TEMPERATURE: 98.9 F | WEIGHT: 172 LBS | SYSTOLIC BLOOD PRESSURE: 131 MMHG | DIASTOLIC BLOOD PRESSURE: 69 MMHG | BODY MASS INDEX: 26.07 KG/M2

## 2023-11-30 DIAGNOSIS — K21.9 GASTROESOPHAGEAL REFLUX DISEASE WITHOUT ESOPHAGITIS: ICD-10-CM

## 2023-11-30 DIAGNOSIS — R07.89 ATYPICAL CHEST PAIN: ICD-10-CM

## 2023-11-30 LAB
ANION GAP SERPL CALCULATED.3IONS-SCNC: 7 MMOL/L (ref 7–15)
BUN SERPL-MCNC: 17.1 MG/DL (ref 8–23)
CALCIUM SERPL-MCNC: 9.4 MG/DL (ref 8.8–10.2)
CHLORIDE SERPL-SCNC: 103 MMOL/L (ref 98–107)
CREAT SERPL-MCNC: 0.84 MG/DL (ref 0.67–1.17)
DEPRECATED HCO3 PLAS-SCNC: 27 MMOL/L (ref 22–29)
EGFRCR SERPLBLD CKD-EPI 2021: >90 ML/MIN/1.73M2
ERYTHROCYTE [DISTWIDTH] IN BLOOD BY AUTOMATED COUNT: 13.6 % (ref 10–15)
GLUCOSE SERPL-MCNC: 99 MG/DL (ref 70–99)
HCT VFR BLD AUTO: 37.2 % (ref 40–53)
HGB BLD-MCNC: 12.5 G/DL (ref 13.3–17.7)
INR PPP: 0.97 (ref 0.85–1.15)
MAGNESIUM SERPL-MCNC: 1.7 MG/DL (ref 1.7–2.3)
MCH RBC QN AUTO: 30.9 PG (ref 26.5–33)
MCHC RBC AUTO-ENTMCNC: 33.6 G/DL (ref 31.5–36.5)
MCV RBC AUTO: 92 FL (ref 78–100)
PLATELET # BLD AUTO: 161 10E3/UL (ref 150–450)
POTASSIUM SERPL-SCNC: 4.3 MMOL/L (ref 3.4–5.3)
RBC # BLD AUTO: 4.05 10E6/UL (ref 4.4–5.9)
SODIUM SERPL-SCNC: 137 MMOL/L (ref 135–145)
TROPONIN T SERPL HS-MCNC: 13 NG/L
TROPONIN T SERPL HS-MCNC: 13 NG/L
WBC # BLD AUTO: 5.3 10E3/UL (ref 4–11)

## 2023-11-30 PROCEDURE — 250N000013 HC RX MED GY IP 250 OP 250 PS 637: Performed by: EMERGENCY MEDICINE

## 2023-11-30 PROCEDURE — 80048 BASIC METABOLIC PNL TOTAL CA: CPT | Performed by: EMERGENCY MEDICINE

## 2023-11-30 PROCEDURE — 93005 ELECTROCARDIOGRAM TRACING: CPT | Performed by: STUDENT IN AN ORGANIZED HEALTH CARE EDUCATION/TRAINING PROGRAM

## 2023-11-30 PROCEDURE — 96374 THER/PROPH/DIAG INJ IV PUSH: CPT

## 2023-11-30 PROCEDURE — 99285 EMERGENCY DEPT VISIT HI MDM: CPT | Mod: 25

## 2023-11-30 PROCEDURE — 36415 COLL VENOUS BLD VENIPUNCTURE: CPT | Performed by: EMERGENCY MEDICINE

## 2023-11-30 PROCEDURE — 84484 ASSAY OF TROPONIN QUANT: CPT | Performed by: EMERGENCY MEDICINE

## 2023-11-30 PROCEDURE — 71045 X-RAY EXAM CHEST 1 VIEW: CPT

## 2023-11-30 PROCEDURE — 85027 COMPLETE CBC AUTOMATED: CPT | Performed by: EMERGENCY MEDICINE

## 2023-11-30 PROCEDURE — 83735 ASSAY OF MAGNESIUM: CPT | Performed by: EMERGENCY MEDICINE

## 2023-11-30 PROCEDURE — 85610 PROTHROMBIN TIME: CPT | Performed by: EMERGENCY MEDICINE

## 2023-11-30 PROCEDURE — 250N000011 HC RX IP 250 OP 636: Mod: JZ | Performed by: EMERGENCY MEDICINE

## 2023-11-30 RX ORDER — FAMOTIDINE 20 MG/1
20 TABLET, FILM COATED ORAL 2 TIMES DAILY
Qty: 30 TABLET | Refills: 0 | Status: SHIPPED | OUTPATIENT
Start: 2023-11-30 | End: 2023-12-11

## 2023-11-30 RX ADMIN — NITROGLYCERIN 15 MG: 20 OINTMENT TOPICAL at 05:11

## 2023-11-30 RX ADMIN — FAMOTIDINE 20 MG: 10 INJECTION, SOLUTION INTRAVENOUS at 05:02

## 2023-11-30 ASSESSMENT — ACTIVITIES OF DAILY LIVING (ADL)
ADLS_ACUITY_SCORE: 35
ADLS_ACUITY_SCORE: 35

## 2023-11-30 NOTE — ED TRIAGE NOTES
Patient arrives from home with wife.   C/o acute onset midsternal sharp CP that started approximately thirty minutes ago.     Hx MI in March of this year with stent placement.   Endoscopy performed 11/28. Patient does have CA of the esophagus.     EKG performed in triage.

## 2023-11-30 NOTE — ED PROVIDER NOTES
EMERGENCY DEPARTMENT ENCOUNTER      NAME: Lopez Hogue  AGE: 68 year old male  YOB: 1955  MRN: [id number]  EVALUATION DATE & TIME: No admission date for patient encounter.    PCP: Madi Ramos    ED PROVIDER: Soren Mcfarland M.D.      No chief complaint on file.        FINAL IMPRESSION:  Atypical chest pain  GERD    ED COURSE & MEDICAL DECISION MAKING:    Pertinent Labs & Imaging studies reviewed. (See chart for details)  68 year old male presents to the Emergency Department for evaluation of pressure.  Symptoms started proximately 30 minutes prior to arrival.  Patient was already up and was drinking some coffee when symptoms started.  Took 2 nitroglycerin without improvement.  First episode of chest discomfort since catheterization and 2 stent placements in March of this year.  Patient had endoscopy and biopsy earlier this week, Tuesday, for suspected esophageal carcinoma.  Patient had been eating routinely the last few days without symptoms.  Exam is unremarkable.  Elderly male in no distress.  Heart and lungs significant for 2/6 soft systolic ejection murmur.  Remainder exam unremarkable.  Initial EKG is unremarkable.  No acute ST segment abnormality to suggest acute ischemic event.  However given patient's cardiac risk factors and recent stenting we will proceed with baseline blood work including troponin.  Chest x-ray will also be obtained given patient's recent endoscopy and biopsy.. Patient appears non toxic with stable vitals signs. Overall exam is benign.    4:45 AM I met with the patient for the initial interview and physical examination. Discussed plan for treatment and workup in the ED.    6 AM.  Initial laboratory evaluation unremarkable.  Basic metabolic is normal.  Troponin normal at 13.  Magnesium normal 1.7.  CBC unremarkable other than minimal anemia with hemoglobin 12.5.  Delta troponin will be obtained.  6:51 AM.  Chest x-ray without acute infiltrates, pneumothorax,  pneumomediastinum.  No acute findings.  Delta troponin to be obtained at 7 AM.  Medical Decision Making    History:  Supplemental history from: Documented in chart, if applicable  External Record(s) reviewed: Documented in chart, if applicable.    Work Up:  Chart documentation includes differential considered and any EKGs or imaging independently interpreted by provider, where specified.  In additional to work up documented, I considered the following work up: Documented in chart, if applicable.    External consultation:  Discussion of management with another provider:     Complicating factors:  Care impacted by chronic illness: Cancer/Chemotherapy, Heart Disease, and Hypertension  Care affected by social determinants of health: Access to Medical Care    Disposition considerations: Discharge. I prescribed additional prescription strength medication(s) as charted. See documentation for any additional details.    At the conclusion of the encounter I discussed the results of all of the tests and the disposition. The questions were answered and return precautions provided. The patient or family acknowledged understanding and was agreeable with the care plan.      MEDICATIONS GIVEN IN THE EMERGENCY:  Medications - No data to display    NEW PRESCRIPTIONS STARTED AT TODAY'S ER VISIT  New Prescriptions    No medications on file        =================================================================    HPI    Patient information was obtained from: Patient    Use of Intrepreter: N/A     Lopez Hogue is a 68 year old male with a pertinent medical history of HTN, CAD, MI (March of 2023 s/p PCI x2, on DAPT), and prior sweat gland cancer s/p surgical resection  who presents to the ED for evaluation of chest pain.    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     REVIEW OF SYSTEMS   See HPI    PAST  MEDICAL HISTORY:  Past Medical History:   Diagnosis Date    EtOH dependence (H)     Quit drinking 10 years    Heart attack (H)     Hypertension     Nonrheumatic aortic (valve) stenosis     Sweat gland carcinoma        PAST SURGICAL HISTORY:  Past Surgical History:   Procedure Laterality Date    CV CORONARY ANGIOGRAM N/A 3/27/2023    Procedure: Coronary Angiogram;  Surgeon: Miki Etienne MD;  Location: [facility] CV    CV CORONARY ANGIOGRAM N/A 5/9/2023    Procedure: Coronary Angiogram;  Surgeon: Miki Etienne MD;  Location: [facility] CV    CV LEFT HEART CATH N/A 3/27/2023    Procedure: Left Heart Catheterization;  Surgeon: Miki Etienne MD;  Location: [facility] CV    CV LEFT HEART CATH N/A 5/9/2023    Procedure: Left Heart Catheterization;  Surgeon: Miki Etienne MD;  Location: [facility] CV    CV PCI N/A 3/27/2023    Procedure: Percutaneous Coronary Intervention;  Surgeon: Miki Etienne MD;  Location: [facility] CV    ENDOSCOPIC ULTRASOUND UPPER GASTROINTESTINAL TRACT (GI) N/A 11/28/2023    Procedure: Endoscopic ultrasound upper gastrointestinal tract (GI);  Surgeon: Shahriar Giraldo MD;  Location:  GI    ESOPHAGOSCOPY, GASTROSCOPY, DUODENOSCOPY (EGD), COMBINED N/A 11/8/2023    Procedure: ESOPHAGOGASTRODUODENOSCOPY, WITH BIOPSY;  Surgeon: Shahriar Giraldo MD;  Location:  GI    LARYNGOSCOPY WITH BIOPSY(IES) N/A 10/12/2023    Procedure: LARYNGOSCOPY, WITH BIOPSY;  Surgeon: Edi Felix MD;  Location:  OR    OTHER SURGICAL HISTORY  2015    WIDE EXCISION OF LEFT GLUTEAL MASSTNM: [id number], stage: II hidradenocarcinoma Grade II, 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    Current Outpatient Medications:     acetaminophen (TYLENOL) 500 MG tablet, Take 500-1,000 mg by mouth every 8 hours as needed for fever or pain, Disp: , Rfl:      aspirin (ASA) 81 MG EC tablet, Take 1 tablet (81 mg) by mouth daily Start tomorrow., Disp: 30 tablet, Rfl: 3    clopidogrel (PLAVIX) 75 MG tablet, Take 1 tablet (75 mg) by mouth daily Resume home plavix 10/14, Disp: , Rfl:     cyanocobalamin (VITAMIN B-12) 1000 MCG tablet, Take 1,000 mcg by mouth every evening, Disp: , Rfl:     gabapentin (NEURONTIN) 300 MG capsule, Take 3 capsules (900 mg) by mouth 3 times daily Follow instructions given in clinic to taper dose up to 900 mg tid, Disp: 270 capsule, Rfl: 4    nitroGLYcerin (NITROSTAT) 0.4 MG sublingual tablet, PLACE 1 TABLET UNDER THE TONGUE EVERY 5 MINUTES FOR CHEST PAIN FOR 3 DOSES. IF SYMPTOMS PERSIST 5 MINUTES AFTER 1ST DOSE CALL 911., Disp: 25 tablet, Rfl: 1    Omega-3 Fatty Acids (FISH OIL) 1200 MG capsule, Take 1,200 mg by mouth every evening, Disp: , Rfl:     rosuvastatin (CRESTOR) 40 MG tablet, Take 1 tablet (40 mg) by mouth daily (Patient taking differently: Take 40 mg by mouth every evening), Disp: 90 tablet, Rfl: 3    sertraline (ZOLOFT) 100 MG tablet, Take 100 mg by mouth every morning, Disp: , Rfl:     Facility-Administered Medications Ordered in Other Encounters:     iodixanol (VISIPAQUE 320) injection, , , Once PRN, Miki Etienne MD, 73 mL at 05/09/23 1212    ALLERGIES:  Allergies   Allergen Reactions    Coconut Flavor Anaphylaxis     Raw coconut       FAMILY HISTORY:  Family History   Problem Relation Age of Onset    Dementia Mother     Anesthesia Reaction No family hx of     Thrombocytopenia No family hx of     Cancer No family hx of        SOCIAL HISTORY:   Social History     Socioeconomic History    Marital status:      Spouse name: None    Number of children: 2    Years of education: None    Highest education level: None   Occupational History    Occupation: non-profit manager   Tobacco Use    Smoking status: Former     Packs/day: 2.00     Years: 30.00     Additional pack years: 0.00     Total pack years: 60.00     Types: Cigarettes     " Quit date: 2013     Years since quitting: 10.9     Passive exposure: Never    Smokeless tobacco: Never    Tobacco comments:     Quit 10 years ago   Vaping Use    Vaping Use: Never used   Substance and Sexual Activity    Alcohol use: Not Currently     Comment: quit in 2013    Drug use: Never   Social History Narrative    Patient works.  Lives with his wife.       VITALS:  Patient Vitals for the past 24 hrs:   BP Temp Temp src Pulse Resp SpO2 Height Weight   11/30/23 0436 (!) 157/73 98.9  F (37.2  C) Temporal 50 16 99 % 1.727 m (5' 8\") 78 kg (172 lb)        PHYSICAL EXAM    Constitutional:  Awake, alert, in no apparent distress  HENT:  Normocephalic, Atraumatic. Bilateral external ears normal. Oropharynx moist. Nose normal. Neck- Normal range of motion with no guarding, No midline cervical tenderness, Supple, No stridor.   Eyes:  PERRL, EOMI with no signs of entrapment, Conjunctiva normal, No discharge.   Respiratory:  Normal breath sounds, No respiratory distress, No wheezing.    Cardiovascular:  Normal heart rate, Normal rhythm, No appreciable rubs or gallops.   GI:  Soft, No tenderness, No distension, No palpable masses  Musculoskeletal:  Intact distal pulses, No edema. Good range of motion in all major joints. No tenderness to palpation or major deformities noted.  Integument:  Warm, Dry, No erythema, No rash.   Neurologic:  Alert & oriented, Normal motor function, Normal sensory function, No focal deficits noted.   Psychiatric:  Affect normal, Judgment normal, Mood normal.     LAB:  All pertinent labs reviewed and interpreted.     Results for orders placed or performed during the hospital encounter of 11/30/23   XR Chest Port 1 View     Status: None    Narrative    EXAM: XR CHEST PORT 1 VIEW  LOCATION: [facility]  DATE: 11/30/2023    INDICATION: Chest pain, recent endoscopy.  COMPARISON: CT chest, abdomen and pelvis on 10/13/2023.      Impression    IMPRESSION: AP views of the chest were " obtained. Cardiomediastinal silhouette is within normal limits. Atherosclerotic vascular calcification of the aortic knob. No suspicious focal pulmonary opacities. No significant pleural effusion or pneumothorax.   Old posterior right rib fracture.                 INR     Status: Normal   Result Value Ref Range    INR 0.97 0.85 - 1.15   CBC (+ platelets, no diff)     Status: Abnormal   Result Value Ref Range    WBC Count 5.3 4.0 - 11.0 10e3/uL    RBC Count 4.05 (L) 4.40 - 5.90 10e6/uL    Hemoglobin 12.5 (L) 13.3 - 17.7 g/dL    Hematocrit 37.2 (L) 40.0 - 53.0 %    MCV 92 78 - 100 fL    MCH 30.9 26.5 - 33.0 pg    MCHC 33.6 31.5 - 36.5 g/dL    RDW 13.6 10.0 - 15.0 %    Platelet Count 161 150 - 450 10e3/uL   Troponin T, High Sensitivity     Status: Normal   Result Value Ref Range    Troponin T, High Sensitivity 13 <=22 ng/L   Magnesium     Status: Normal   Result Value Ref Range    Magnesium 1.7 1.7 - 2.3 mg/dL   Basic metabolic panel     Status: Normal   Result Value Ref Range    Sodium 137 135 - 145 mmol/L    Potassium 4.3 3.4 - 5.3 mmol/L    Chloride 103 98 - 107 mmol/L    Carbon Dioxide (CO2) 27 22 - 29 mmol/L    Anion Gap 7 7 - 15 mmol/L    Urea Nitrogen 17.1 8.0 - 23.0 mg/dL    Creatinine 0.84 0.67 - 1.17 mg/dL    GFR Estimate >90 >60 mL/min/1.73m2    Calcium 9.4 8.8 - 10.2 mg/dL    Glucose 99 70 - 99 mg/dL      RADIOLOGY:  Reviewed all pertinent imaging. Please see official radiology report.  No orders to display     EKG:    Sinus bradycardia.  Rate of 52.  Normal QRS.  Normal ST segments.  Unchanged compared to May 8, 2023  I have independently reviewed and interpreted the EKG(s) documented above.    I, Mykel Bartholomew, am serving as a scribe to document services personally performed by Soren Mcfarland MD, based on my observation and the provider's statements to me. I, Soren Mcfarland MD attest that Mykel Bartholomew is acting in a scribe capacity, has observed my performance of the services and has documented them in  accordance with my direction.    Soren Mcfarland M.D.  Emergency Medicine  [facility] EMERGENCY DEPARTMENT     Soren Mcfarland MD  11/30/23 0777

## 2023-12-04 ENCOUNTER — APPOINTMENT (OUTPATIENT)
Dept: RADIATION ONCOLOGY | Facility: CLINIC | Age: 68
End: 2023-12-04
Attending: RADIOLOGY
Payer: COMMERCIAL

## 2023-12-04 PROCEDURE — 77014 PR CT GUIDE FOR PLACEMENT RADIATION THERAPY FIELDS: CPT | Mod: 26 | Performed by: RADIOLOGY

## 2023-12-04 PROCEDURE — 77386 HC IMRT TREATMENT DELIVERY, COMPLEX: CPT | Performed by: RADIOLOGY

## 2023-12-04 PROCEDURE — 77333 RADIATION TREATMENT AID(S): CPT | Mod: 26 | Performed by: RADIOLOGY

## 2023-12-04 PROCEDURE — 77333 RADIATION TREATMENT AID(S): CPT | Performed by: RADIOLOGY

## 2023-12-05 ENCOUNTER — INFUSION THERAPY VISIT (OUTPATIENT)
Dept: ONCOLOGY | Facility: CLINIC | Age: 68
End: 2023-12-05
Attending: STUDENT IN AN ORGANIZED HEALTH CARE EDUCATION/TRAINING PROGRAM
Payer: COMMERCIAL

## 2023-12-05 ENCOUNTER — APPOINTMENT (OUTPATIENT)
Dept: LAB | Facility: CLINIC | Age: 68
End: 2023-12-05
Attending: STUDENT IN AN ORGANIZED HEALTH CARE EDUCATION/TRAINING PROGRAM
Payer: COMMERCIAL

## 2023-12-05 ENCOUNTER — ONCOLOGY VISIT (OUTPATIENT)
Dept: ONCOLOGY | Facility: CLINIC | Age: 68
End: 2023-12-05
Payer: COMMERCIAL

## 2023-12-05 ENCOUNTER — THERAPY VISIT (OUTPATIENT)
Dept: SPEECH THERAPY | Facility: CLINIC | Age: 68
End: 2023-12-05
Payer: COMMERCIAL

## 2023-12-05 ENCOUNTER — APPOINTMENT (OUTPATIENT)
Dept: RADIATION ONCOLOGY | Facility: CLINIC | Age: 68
End: 2023-12-05
Attending: RADIOLOGY
Payer: COMMERCIAL

## 2023-12-05 ENCOUNTER — DOCUMENTATION ONLY (OUTPATIENT)
Dept: ONCOLOGY | Facility: CLINIC | Age: 68
End: 2023-12-05

## 2023-12-05 VITALS
SYSTOLIC BLOOD PRESSURE: 123 MMHG | HEIGHT: 68 IN | OXYGEN SATURATION: 98 % | WEIGHT: 176.1 LBS | RESPIRATION RATE: 16 BRPM | DIASTOLIC BLOOD PRESSURE: 72 MMHG | BODY MASS INDEX: 26.69 KG/M2 | TEMPERATURE: 97.6 F | HEART RATE: 66 BPM

## 2023-12-05 VITALS — DIASTOLIC BLOOD PRESSURE: 79 MMHG | SYSTOLIC BLOOD PRESSURE: 159 MMHG | OXYGEN SATURATION: 99 % | HEART RATE: 58 BPM

## 2023-12-05 DIAGNOSIS — C15.9 PRIMARY ESOPHAGEAL SQUAMOUS CELL CARCINOMA (H): Primary | ICD-10-CM

## 2023-12-05 DIAGNOSIS — C15.9 PRIMARY ESOPHAGEAL SQUAMOUS CELL CARCINOMA (H): ICD-10-CM

## 2023-12-05 DIAGNOSIS — C10.0 SQUAMOUS CELL CARCINOMA OF VALLECULA (H): ICD-10-CM

## 2023-12-05 DIAGNOSIS — C10.0 SQUAMOUS CELL CARCINOMA OF VALLECULA (H): Primary | ICD-10-CM

## 2023-12-05 DIAGNOSIS — R13.12 OROPHARYNGEAL DYSPHAGIA: ICD-10-CM

## 2023-12-05 LAB
ALBUMIN SERPL BCG-MCNC: 4.2 G/DL (ref 3.5–5.2)
ALP SERPL-CCNC: 80 U/L (ref 40–150)
ALT SERPL W P-5'-P-CCNC: 12 U/L (ref 0–70)
ANION GAP SERPL CALCULATED.3IONS-SCNC: 11 MMOL/L (ref 7–15)
AST SERPL W P-5'-P-CCNC: 28 U/L (ref 0–45)
BASOPHILS # BLD AUTO: 0 10E3/UL (ref 0–0.2)
BASOPHILS NFR BLD AUTO: 1 %
BILIRUB SERPL-MCNC: 0.6 MG/DL
BUN SERPL-MCNC: 15.8 MG/DL (ref 8–23)
CALCIUM SERPL-MCNC: 9.2 MG/DL (ref 8.8–10.2)
CHLORIDE SERPL-SCNC: 106 MMOL/L (ref 98–107)
CREAT SERPL-MCNC: 0.72 MG/DL (ref 0.67–1.17)
CREAT SERPL-MCNC: 0.72 MG/DL (ref 0.67–1.17)
DEPRECATED HCO3 PLAS-SCNC: 22 MMOL/L (ref 22–29)
EGFRCR SERPLBLD CKD-EPI 2021: >90 ML/MIN/1.73M2
EGFRCR SERPLBLD CKD-EPI 2021: >90 ML/MIN/1.73M2
EOSINOPHIL # BLD AUTO: 0 10E3/UL (ref 0–0.7)
EOSINOPHIL NFR BLD AUTO: 0 %
ERYTHROCYTE [DISTWIDTH] IN BLOOD BY AUTOMATED COUNT: 13.4 % (ref 10–15)
GLUCOSE SERPL-MCNC: 81 MG/DL (ref 70–99)
HCT VFR BLD AUTO: 35.9 % (ref 40–53)
HGB BLD-MCNC: 12.2 G/DL (ref 13.3–17.7)
IMM GRANULOCYTES # BLD: 0 10E3/UL
IMM GRANULOCYTES NFR BLD: 0 %
LYMPHOCYTES # BLD AUTO: 1.3 10E3/UL (ref 0.8–5.3)
LYMPHOCYTES NFR BLD AUTO: 19 %
MCH RBC QN AUTO: 30.7 PG (ref 26.5–33)
MCHC RBC AUTO-ENTMCNC: 34 G/DL (ref 31.5–36.5)
MCV RBC AUTO: 90 FL (ref 78–100)
MONOCYTES # BLD AUTO: 0.5 10E3/UL (ref 0–1.3)
MONOCYTES NFR BLD AUTO: 8 %
NEUTROPHILS # BLD AUTO: 4.9 10E3/UL (ref 1.6–8.3)
NEUTROPHILS NFR BLD AUTO: 72 %
NRBC # BLD AUTO: 0 10E3/UL
NRBC BLD AUTO-RTO: 0 /100
PLATELET # BLD AUTO: 163 10E3/UL (ref 150–450)
POTASSIUM SERPL-SCNC: 4.3 MMOL/L (ref 3.4–5.3)
PROT SERPL-MCNC: 7.2 G/DL (ref 6.4–8.3)
RBC # BLD AUTO: 3.98 10E6/UL (ref 4.4–5.9)
SODIUM SERPL-SCNC: 139 MMOL/L (ref 135–145)
TROPONIN T SERPL HS-MCNC: 22 NG/L
WBC # BLD AUTO: 6.7 10E3/UL (ref 4–11)

## 2023-12-05 PROCEDURE — 99215 OFFICE O/P EST HI 40 MIN: CPT

## 2023-12-05 PROCEDURE — 250N000011 HC RX IP 250 OP 636: Mod: JZ | Performed by: STUDENT IN AN ORGANIZED HEALTH CARE EDUCATION/TRAINING PROGRAM

## 2023-12-05 PROCEDURE — 258N000003 HC RX IP 258 OP 636: Performed by: STUDENT IN AN ORGANIZED HEALTH CARE EDUCATION/TRAINING PROGRAM

## 2023-12-05 PROCEDURE — 99213 OFFICE O/P EST LOW 20 MIN: CPT | Mod: 25

## 2023-12-05 PROCEDURE — 96376 TX/PRO/DX INJ SAME DRUG ADON: CPT

## 2023-12-05 PROCEDURE — 82565 ASSAY OF CREATININE: CPT

## 2023-12-05 PROCEDURE — 250N000013 HC RX MED GY IP 250 OP 250 PS 637: Performed by: STUDENT IN AN ORGANIZED HEALTH CARE EDUCATION/TRAINING PROGRAM

## 2023-12-05 PROCEDURE — 36415 COLL VENOUS BLD VENIPUNCTURE: CPT

## 2023-12-05 PROCEDURE — 84484 ASSAY OF TROPONIN QUANT: CPT

## 2023-12-05 PROCEDURE — 96417 CHEMO IV INFUS EACH ADDL SEQ: CPT

## 2023-12-05 PROCEDURE — 96375 TX/PRO/DX INJ NEW DRUG ADDON: CPT

## 2023-12-05 PROCEDURE — 96415 CHEMO IV INFUSION ADDL HR: CPT

## 2023-12-05 PROCEDURE — 92610 EVALUATE SWALLOWING FUNCTION: CPT | Mod: GN | Performed by: SPEECH-LANGUAGE PATHOLOGIST

## 2023-12-05 PROCEDURE — 96372 THER/PROPH/DIAG INJ SC/IM: CPT | Mod: 59 | Performed by: STUDENT IN AN ORGANIZED HEALTH CARE EDUCATION/TRAINING PROGRAM

## 2023-12-05 PROCEDURE — 77386 HC IMRT TREATMENT DELIVERY, COMPLEX: CPT | Performed by: RADIOLOGY

## 2023-12-05 PROCEDURE — 92526 ORAL FUNCTION THERAPY: CPT | Mod: GN | Performed by: SPEECH-LANGUAGE PATHOLOGIST

## 2023-12-05 PROCEDURE — 80053 COMPREHEN METABOLIC PANEL: CPT

## 2023-12-05 PROCEDURE — 85014 HEMATOCRIT: CPT

## 2023-12-05 PROCEDURE — 93005 ELECTROCARDIOGRAM TRACING: CPT | Mod: RTG

## 2023-12-05 PROCEDURE — 96413 CHEMO IV INFUSION 1 HR: CPT

## 2023-12-05 RX ORDER — HEPARIN SODIUM (PORCINE) LOCK FLUSH IV SOLN 100 UNIT/ML 100 UNIT/ML
5 SOLUTION INTRAVENOUS
Status: CANCELLED | OUTPATIENT
Start: 2024-01-02

## 2023-12-05 RX ORDER — EPINEPHRINE 1 MG/ML
0.3 INJECTION, SOLUTION INTRAMUSCULAR; SUBCUTANEOUS EVERY 5 MIN PRN
Status: CANCELLED | OUTPATIENT
Start: 2023-12-05

## 2023-12-05 RX ORDER — METHYLPREDNISOLONE SODIUM SUCCINATE 125 MG/2ML
125 INJECTION, POWDER, LYOPHILIZED, FOR SOLUTION INTRAMUSCULAR; INTRAVENOUS
Status: CANCELLED
Start: 2023-12-05

## 2023-12-05 RX ORDER — EPINEPHRINE 1 MG/ML
0.3 INJECTION, SOLUTION, CONCENTRATE INTRAVENOUS EVERY 5 MIN PRN
Status: DISCONTINUED | OUTPATIENT
Start: 2023-12-05 | End: 2023-12-05 | Stop reason: HOSPADM

## 2023-12-05 RX ORDER — DIPHENHYDRAMINE HCL 25 MG
50 CAPSULE ORAL ONCE
Status: CANCELLED
Start: 2023-12-12

## 2023-12-05 RX ORDER — HEPARIN SODIUM (PORCINE) LOCK FLUSH IV SOLN 100 UNIT/ML 100 UNIT/ML
5 SOLUTION INTRAVENOUS
Status: CANCELLED | OUTPATIENT
Start: 2023-12-19

## 2023-12-05 RX ORDER — DIPHENHYDRAMINE HCL 25 MG
50 CAPSULE ORAL ONCE
Status: CANCELLED
Start: 2023-12-19

## 2023-12-05 RX ORDER — PROCHLORPERAZINE MALEATE 10 MG
10 TABLET ORAL EVERY 6 HOURS PRN
Qty: 30 TABLET | Refills: 2 | Status: SHIPPED | OUTPATIENT
Start: 2023-12-05 | End: 2024-01-02

## 2023-12-05 RX ORDER — LORAZEPAM 2 MG/ML
0.5 INJECTION INTRAMUSCULAR EVERY 4 HOURS PRN
Status: CANCELLED | OUTPATIENT
Start: 2023-12-26

## 2023-12-05 RX ORDER — METHYLPREDNISOLONE SODIUM SUCCINATE 125 MG/2ML
125 INJECTION, POWDER, LYOPHILIZED, FOR SOLUTION INTRAMUSCULAR; INTRAVENOUS
Status: CANCELLED
Start: 2023-12-19

## 2023-12-05 RX ORDER — MEPERIDINE HYDROCHLORIDE 25 MG/ML
25 INJECTION INTRAMUSCULAR; INTRAVENOUS; SUBCUTANEOUS EVERY 30 MIN PRN
Status: CANCELLED | OUTPATIENT
Start: 2023-12-05

## 2023-12-05 RX ORDER — ALBUTEROL SULFATE 0.83 MG/ML
2.5 SOLUTION RESPIRATORY (INHALATION)
Status: CANCELLED | OUTPATIENT
Start: 2023-12-19

## 2023-12-05 RX ORDER — ALBUTEROL SULFATE 0.83 MG/ML
2.5 SOLUTION RESPIRATORY (INHALATION)
Status: CANCELLED | OUTPATIENT
Start: 2023-12-05

## 2023-12-05 RX ORDER — MEPERIDINE HYDROCHLORIDE 25 MG/ML
25 INJECTION INTRAMUSCULAR; INTRAVENOUS; SUBCUTANEOUS EVERY 30 MIN PRN
Status: CANCELLED | OUTPATIENT
Start: 2023-12-19

## 2023-12-05 RX ORDER — LORAZEPAM 2 MG/ML
0.5 INJECTION INTRAMUSCULAR EVERY 4 HOURS PRN
Status: CANCELLED | OUTPATIENT
Start: 2024-01-02

## 2023-12-05 RX ORDER — HEPARIN SODIUM (PORCINE) LOCK FLUSH IV SOLN 100 UNIT/ML 100 UNIT/ML
5 SOLUTION INTRAVENOUS
Status: CANCELLED | OUTPATIENT
Start: 2023-12-26

## 2023-12-05 RX ORDER — HEPARIN SODIUM,PORCINE 10 UNIT/ML
5-20 VIAL (ML) INTRAVENOUS DAILY PRN
Status: CANCELLED | OUTPATIENT
Start: 2024-01-02

## 2023-12-05 RX ORDER — DIPHENHYDRAMINE HCL 25 MG
50 CAPSULE ORAL ONCE
Status: CANCELLED
Start: 2024-01-02

## 2023-12-05 RX ORDER — ALBUTEROL SULFATE 0.83 MG/ML
2.5 SOLUTION RESPIRATORY (INHALATION)
Status: CANCELLED | OUTPATIENT
Start: 2023-12-26

## 2023-12-05 RX ORDER — METHYLPREDNISOLONE SODIUM SUCCINATE 125 MG/2ML
125 INJECTION, POWDER, LYOPHILIZED, FOR SOLUTION INTRAMUSCULAR; INTRAVENOUS
Status: CANCELLED
Start: 2023-12-26

## 2023-12-05 RX ORDER — MEPERIDINE HYDROCHLORIDE 25 MG/ML
25 INJECTION INTRAMUSCULAR; INTRAVENOUS; SUBCUTANEOUS EVERY 30 MIN PRN
Status: CANCELLED | OUTPATIENT
Start: 2023-12-26

## 2023-12-05 RX ORDER — EPINEPHRINE 1 MG/ML
0.3 INJECTION, SOLUTION INTRAMUSCULAR; SUBCUTANEOUS EVERY 5 MIN PRN
Status: CANCELLED | OUTPATIENT
Start: 2023-12-26

## 2023-12-05 RX ORDER — METHYLPREDNISOLONE SODIUM SUCCINATE 125 MG/2ML
125 INJECTION, POWDER, LYOPHILIZED, FOR SOLUTION INTRAMUSCULAR; INTRAVENOUS
Status: DISCONTINUED | OUTPATIENT
Start: 2023-12-05 | End: 2023-12-05 | Stop reason: HOSPADM

## 2023-12-05 RX ORDER — ALBUTEROL SULFATE 90 UG/1
1-2 AEROSOL, METERED RESPIRATORY (INHALATION)
Status: CANCELLED
Start: 2023-12-05

## 2023-12-05 RX ORDER — DIPHENHYDRAMINE HYDROCHLORIDE 50 MG/ML
50 INJECTION INTRAMUSCULAR; INTRAVENOUS
Status: CANCELLED
Start: 2024-01-02

## 2023-12-05 RX ORDER — DIPHENHYDRAMINE HCL 25 MG
50 CAPSULE ORAL ONCE
Status: CANCELLED
Start: 2023-12-05

## 2023-12-05 RX ORDER — HEPARIN SODIUM (PORCINE) LOCK FLUSH IV SOLN 100 UNIT/ML 100 UNIT/ML
5 SOLUTION INTRAVENOUS
Status: CANCELLED | OUTPATIENT
Start: 2023-12-12

## 2023-12-05 RX ORDER — HEPARIN SODIUM,PORCINE 10 UNIT/ML
5-20 VIAL (ML) INTRAVENOUS DAILY PRN
Status: CANCELLED | OUTPATIENT
Start: 2023-12-19

## 2023-12-05 RX ORDER — DIPHENHYDRAMINE HCL 25 MG
50 CAPSULE ORAL ONCE
Status: COMPLETED | OUTPATIENT
Start: 2023-12-05 | End: 2023-12-05

## 2023-12-05 RX ORDER — ALBUTEROL SULFATE 90 UG/1
1-2 AEROSOL, METERED RESPIRATORY (INHALATION)
Status: CANCELLED
Start: 2023-12-26

## 2023-12-05 RX ORDER — DIPHENHYDRAMINE HYDROCHLORIDE 50 MG/ML
50 INJECTION INTRAMUSCULAR; INTRAVENOUS
Status: COMPLETED | OUTPATIENT
Start: 2023-12-05 | End: 2023-12-05

## 2023-12-05 RX ORDER — LORAZEPAM 2 MG/ML
0.5 INJECTION INTRAMUSCULAR EVERY 4 HOURS PRN
Status: CANCELLED | OUTPATIENT
Start: 2023-12-12

## 2023-12-05 RX ORDER — DIPHENHYDRAMINE HYDROCHLORIDE 50 MG/ML
50 INJECTION INTRAMUSCULAR; INTRAVENOUS
Status: CANCELLED
Start: 2023-12-05

## 2023-12-05 RX ORDER — ALBUTEROL SULFATE 90 UG/1
1-2 AEROSOL, METERED RESPIRATORY (INHALATION)
Status: CANCELLED
Start: 2023-12-19

## 2023-12-05 RX ORDER — HEPARIN SODIUM,PORCINE 10 UNIT/ML
5-20 VIAL (ML) INTRAVENOUS DAILY PRN
Status: CANCELLED | OUTPATIENT
Start: 2023-12-05

## 2023-12-05 RX ORDER — ALBUTEROL SULFATE 0.83 MG/ML
2.5 SOLUTION RESPIRATORY (INHALATION)
Status: CANCELLED | OUTPATIENT
Start: 2024-01-02

## 2023-12-05 RX ORDER — HEPARIN SODIUM,PORCINE 10 UNIT/ML
5-20 VIAL (ML) INTRAVENOUS DAILY PRN
Status: CANCELLED | OUTPATIENT
Start: 2023-12-26

## 2023-12-05 RX ORDER — ALBUTEROL SULFATE 90 UG/1
1-2 AEROSOL, METERED RESPIRATORY (INHALATION)
Status: CANCELLED
Start: 2023-12-12

## 2023-12-05 RX ORDER — METHYLPREDNISOLONE SODIUM SUCCINATE 125 MG/2ML
125 INJECTION, POWDER, LYOPHILIZED, FOR SOLUTION INTRAMUSCULAR; INTRAVENOUS
Status: CANCELLED
Start: 2023-12-12

## 2023-12-05 RX ORDER — HEPARIN SODIUM,PORCINE 10 UNIT/ML
5-20 VIAL (ML) INTRAVENOUS DAILY PRN
Status: CANCELLED | OUTPATIENT
Start: 2023-12-12

## 2023-12-05 RX ORDER — DIPHENHYDRAMINE HCL 25 MG
50 CAPSULE ORAL ONCE
Status: CANCELLED
Start: 2023-12-26

## 2023-12-05 RX ORDER — MEPERIDINE HYDROCHLORIDE 25 MG/ML
25 INJECTION INTRAMUSCULAR; INTRAVENOUS; SUBCUTANEOUS EVERY 30 MIN PRN
Status: CANCELLED | OUTPATIENT
Start: 2023-12-12

## 2023-12-05 RX ORDER — DIPHENHYDRAMINE HYDROCHLORIDE 50 MG/ML
50 INJECTION INTRAMUSCULAR; INTRAVENOUS
Status: CANCELLED
Start: 2023-12-12

## 2023-12-05 RX ORDER — MEPERIDINE HYDROCHLORIDE 25 MG/ML
25 INJECTION INTRAMUSCULAR; INTRAVENOUS; SUBCUTANEOUS EVERY 30 MIN PRN
Status: CANCELLED | OUTPATIENT
Start: 2024-01-02

## 2023-12-05 RX ORDER — LORAZEPAM 2 MG/ML
0.5 INJECTION INTRAMUSCULAR EVERY 4 HOURS PRN
Status: CANCELLED | OUTPATIENT
Start: 2023-12-19

## 2023-12-05 RX ORDER — EPINEPHRINE 1 MG/ML
0.3 INJECTION, SOLUTION INTRAMUSCULAR; SUBCUTANEOUS EVERY 5 MIN PRN
Status: CANCELLED | OUTPATIENT
Start: 2024-01-02

## 2023-12-05 RX ORDER — METHYLPREDNISOLONE SODIUM SUCCINATE 125 MG/2ML
125 INJECTION, POWDER, LYOPHILIZED, FOR SOLUTION INTRAMUSCULAR; INTRAVENOUS
Status: CANCELLED
Start: 2024-01-02

## 2023-12-05 RX ORDER — ALBUTEROL SULFATE 0.83 MG/ML
2.5 SOLUTION RESPIRATORY (INHALATION)
Status: CANCELLED | OUTPATIENT
Start: 2023-12-12

## 2023-12-05 RX ORDER — EPINEPHRINE 1 MG/ML
0.3 INJECTION, SOLUTION INTRAMUSCULAR; SUBCUTANEOUS EVERY 5 MIN PRN
Status: CANCELLED | OUTPATIENT
Start: 2023-12-19

## 2023-12-05 RX ORDER — DIPHENHYDRAMINE HYDROCHLORIDE 50 MG/ML
50 INJECTION INTRAMUSCULAR; INTRAVENOUS
Status: CANCELLED
Start: 2023-12-19

## 2023-12-05 RX ORDER — HEPARIN SODIUM (PORCINE) LOCK FLUSH IV SOLN 100 UNIT/ML 100 UNIT/ML
5 SOLUTION INTRAVENOUS
Status: CANCELLED | OUTPATIENT
Start: 2023-12-05

## 2023-12-05 RX ORDER — DIPHENHYDRAMINE HYDROCHLORIDE 50 MG/ML
50 INJECTION INTRAMUSCULAR; INTRAVENOUS
Status: CANCELLED
Start: 2023-12-26

## 2023-12-05 RX ORDER — EPINEPHRINE 1 MG/ML
0.3 INJECTION, SOLUTION INTRAMUSCULAR; SUBCUTANEOUS EVERY 5 MIN PRN
Status: CANCELLED | OUTPATIENT
Start: 2023-12-12

## 2023-12-05 RX ORDER — LORAZEPAM 2 MG/ML
0.5 INJECTION INTRAMUSCULAR EVERY 4 HOURS PRN
Status: CANCELLED | OUTPATIENT
Start: 2023-12-05

## 2023-12-05 RX ORDER — ALBUTEROL SULFATE 90 UG/1
1-2 AEROSOL, METERED RESPIRATORY (INHALATION)
Status: CANCELLED
Start: 2024-01-02

## 2023-12-05 RX ADMIN — DIPHENHYDRAMINE HYDROCHLORIDE 50 MG: 50 INJECTION INTRAMUSCULAR; INTRAVENOUS at 10:39

## 2023-12-05 RX ADMIN — METHYLPREDNISOLONE SODIUM SUCCINATE 125 MG: 125 INJECTION, POWDER, FOR SOLUTION INTRAMUSCULAR; INTRAVENOUS at 10:40

## 2023-12-05 RX ADMIN — DEXAMETHASONE SODIUM PHOSPHATE: 10 INJECTION, SOLUTION INTRAMUSCULAR; INTRAVENOUS at 09:33

## 2023-12-05 RX ADMIN — FAMOTIDINE 20 MG: 10 INJECTION, SOLUTION INTRAVENOUS at 09:30

## 2023-12-05 RX ADMIN — PACLITAXEL 98 MG: 6 INJECTION, SOLUTION INTRAVENOUS at 10:30

## 2023-12-05 RX ADMIN — DIPHENHYDRAMINE HYDROCHLORIDE 50 MG: 25 CAPSULE ORAL at 09:28

## 2023-12-05 RX ADMIN — FAMOTIDINE 20 MG: 10 INJECTION, SOLUTION INTRAVENOUS at 10:42

## 2023-12-05 RX ADMIN — EPINEPHRINE 0.3 MG: 1 INJECTION INTRAMUSCULAR; INTRAVENOUS; SUBCUTANEOUS at 10:41

## 2023-12-05 RX ADMIN — CARBOPLATIN 245 MG: 10 INJECTION, SOLUTION INTRAVENOUS at 14:41

## 2023-12-05 RX ADMIN — SODIUM CHLORIDE 250 ML: 9 INJECTION, SOLUTION INTRAVENOUS at 09:23

## 2023-12-05 ASSESSMENT — PAIN SCALES - GENERAL: PAINLEVEL: NO PAIN (0)

## 2023-12-05 NOTE — NURSING NOTE
Chief Complaint   Patient presents with    Blood Draw     Labs drawn via PIV by RN in lab. VS taken.      Labs drawn from PIV placed by RN. Line flushed with saline. Vitals taken. Pt checked in for appointment(s).     Claire Boswell RN

## 2023-12-05 NOTE — PROGRESS NOTES
Infusion Nursing Note:  Lopez Hogue presents today for Cycle 1 Day 1 Taxol, Carboplatin *NEW PT.    Patient spoke with provider today: Yes: Joanna Ro    Treatment Conditions:  Lab Results   Component Value Date    HGB 12.2 (L) 12/05/2023    WBC 6.7 12/05/2023    ANEUTAUTO 4.9 12/05/2023     12/05/2023        Lab Results   Component Value Date     12/05/2023    POTASSIUM 4.3 12/05/2023    MAG 1.7 11/30/2023    CR 0.72 12/05/2023    CR 0.72 12/05/2023    RAFAELA 9.2 12/05/2023    BILITOTAL 0.6 12/05/2023    ALBUMIN 4.2 12/05/2023    ALT 12 12/05/2023    AST 28 12/05/2023       Results reviewed, labs MET treatment parameters, ok to proceed with treatment.      Note:   Patient new to oncology infusion room and is receiving chemotherapy for the first time. Pt oriented to infusion room and call light.   Chemotherapy teaching done previously by RNCC.  Reinforced chemotherapy teaching/side effects and schedule during infusion visit.     Taxol started at 1030.   5-6 minutes into infusion, pt reported feeling flushed with mid chest/upper epigastric pain starting 1 minute later, rating pain at 9/10.  See MAR: medicated with Benadryl, Pepcid, Solumedrol.  Pt continued to report chest/epigastric pain, now rating it at 9.5/10. Also reporting SOB and nausea.  Epi given x 1. Paramedics and Joanna Ro called for additional assessment. Chest/Epigastric pain decreased to 4/10 but then returned to 9/10 a few moments later.  Pt has a history of cardiac stent placement in March 2023.   EKG done and reviewed by provider and paramedic. Troponin level drawn due to cardiac history and above symptoms.   After above interventions, symptoms of pain, flushing and nausea resolved as of 1100.    Troponin level: 20 ng/L  (was 13 ng/L on 11/30/23)    12/5/23 at 1240 SILVESTRE Ro / Amber Alcocer RN:  OK to re-challenge Taxol today at 1/2 the original rate. Plan to infuse Taxol over 2 hours today.    Taxol re-challenge  infusion rates:  25 ml/hr x 5 min  50 ml/hr x 5 min  75 ml/hr x 5 min  100 ml/hr x 5 min  146 ml/hr for remainder of infusion   Tolerated Taxol re-challenge without incident.     Copy of AVS reviewed with patient. Pt instructed to call care coordinator, triage (or MD on call if after hours/weekends) with chills/temp >=100.4, questions/concerns. Pt stated understanding of plan.       Intravenous Access:  Peripheral IV placed.    Post Infusion Assessment:  Blood return noted pre and post infusion.  Site patent and intact, free from redness, edema or discomfort.  No evidence of extravasations.  Access discontinued per protocol.    Discharge Plan:   Prescriptions filled today: compazine (reviewed by pharmacist)  Discharge instructions reviewed with: Patient and Family.  Patient and/or family verbalized understanding of discharge instructions and all questions answered.  Copy of AVS reviewed with patient and/or family.  Patient will return 12/11/23 for next appointment.  Patient discharged in stable condition accompanied by: wife.  Departure Mode: Ambulatory.    Amber Alcocer RN

## 2023-12-05 NOTE — NURSING NOTE
Rapid Response Epic Documentation     Situation:   RRT requested to 4B.      Objective:    RRT arrived and found 69 y/o male pt sitting in infusion chair with RN, who provided following HPI.  Pt was receiving Carboplatin infusion when he reported epigastric pn with nausea and SOB.   Pt started on anaphylaxis protocol and RRT requested.   RN reported PMH of MI with 2 x stents in March 2023.     Assessment:        At pt side.   Pt reported same HPI.  Pt continued to c/o epigastric pn 5-9 without radiations.  Pt reported resolution of  SOB and nausea as previously reported.      Primary assessment: airway-open; breathing-normal/non-labored; circulation-strong/regular; skin-warm/pink/dry; PERRLA; A&O x 4; CMS x 4.       BP:  158/56    Pulse: 66  Respiration: 16  SPO2: 36 %  Glucose: NA  Mental Status: Alert  CMS: Intact  Stroke Scale: Negative  EKG: Performed, NSR.  1 mm elevation noted in V2, without reciprocal changes.   No other elevation noted.       Treatment:    Monitoring only.  [facility] paramedics arrived and were given report by RRT and RN, while pt's care team discussed continued tx vs transport.     Disposition:      Infusion team denied need for 911 transport.  Pt remained in 4B and continued with appointment.     Protocol Used:     Chest Pain

## 2023-12-05 NOTE — LETTER
12/5/2023         RE: Lopez Hogue  [address] [facility] [address]        Dear Colleague,    Thank you for referring your patient, Lopez Hogue, to the [facility]. Please see a copy of my visit note below.    [facility]   Return Patient Visit  Dec 5, 2023        Diagnosis: Oropharyngeal cancer, esophageal SCC  Stage:    Cancer Staging   Squamous cell carcinoma of vallecula (H)  Staging form: Pharynx - Oropharynx, AJCC 8th Edition  - Clinical stage from 10/12/2023: Stage JUAN MIGUEL (cT1, cN2b, cM0, p16-) - Signed by Yannick Alva MD on 11/21/2023    Molecular: p16 negative  Performance status: ECOG 0      Oncology History   Squamous cell carcinoma of vallecula (H)   8/18/2023 Imaging    CT neck:  Question soft tissue lesion within the right vallecula/ventral epiglottis.      10/12/2023 Pathology    R vallecula biopsy:  - NON-KERATINIZING POORLY DIFFERENTIATED SQUAMOUS CELL CARCINOMA  - p16 is negative     10/12/2023 -  Cancer Staged    Staging form: Pharynx - Oropharynx, AJCC 8th Edition  - Clinical stage from 10/12/2023: Stage JUAN MIGUEL (cT1, cN2b, cM0, p16-)     10/13/2023 Imaging    PET/CT:  1. 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     2. FDG avid thickening of the mid to distal esophagus, suspicious for a synchronous primary esophageal neoplasm. Recommend correlation with endoscopy.     11/6/2023 Initial Diagnosis    Squamous cell carcinoma of vallecula (H)     11/8/2023 Pathology    EGD with mid esophageal mass biopsy:  A.  MID ESOPHAGEAL MASS, BIOPSIES:  -Nonkeratinizing poorly differentiated squamous cell carcinoma  -Negative for intestinal metaplasia/Alfredo's type mucosa    PD-L1 CPS 15%             12/5/2023 -  Chemotherapy    OP ONC Esophageal Cancer - PACLitaxel / CARBOplatin WEEKLY + Radiation  Plan Provider: Yannick Alva MD  Treatment  goal: Curative  Line of treatment: First Line     Primary esophageal squamous cell carcinoma (H)   11/17/2023 Initial Diagnosis    Primary esophageal squamous cell carcinoma (H)     12/5/2023 -  Chemotherapy    OP ONC Esophageal Cancer - PACLitaxel / CARBOplatin WEEKLY + Radiation  Plan Provider: Yannick lAva MD  Treatment goal: Curative  Line of treatment: First Line           Oncology History:   Lopez Hogue is a 68 year old male with a past medical history that includes HTN, CAD, MI (March of 2023 s/p PCI x2, on DAPT), and prior sweat gland cancer s/p surgical resection, now with recently diagnosed oropharyngeal cancer found to have a 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     Recent diagnosis was made after he coughed up some blood on 8/18/23. He presented to an ED where ENT evaluated and was felt to have a lesion of the vallecula. Full workup of this mass is detailed in the oncology history above. Biopsy on 10/12 confirmed an invasive SCC. PET/CT revealed a hypermetabolic lesion within the mid to distal esophagus. EGD was performed on 11/8 and also showed a squamous cell carcinoma. Given the EGD findings, this was felt to be a second primary.     11/21/23 - consult with Dr. Hill, thoracic surgery. Plan for neoadjuvant chemoradiation followed by laparoscopic staging and jejunostomy, followed by minimally invasive [place] esophagectomy.     Had EUS on 11/28 to completed the staging process. Partially obstructing and partially circumferential fungating mass in the middle third of the esophagus. Esophageal squamous cell carcinoma was staged T2 N1 (suspicious paraesophageal lymph node)       Presents today accompanied by his wife for initiation of chemoradiation.     Interval History:     Exercises frequently. Goes to the gym multiple days per week and does 30-45 minutes on the treadmill. Denies shortness of breath or chest pain.     Swallowing is not as easy as it used to be. No  "sharp pain but there is a discomfort. Feels as if there is always something in throat. Recently presented to the ED with chest pain, cardiac workup was negative. Diagnosed with esophageal reflux and was prescribed famotidine 20mg BID, which has been helpful.        Overall, he feels well. Continues to work.     Review of Systems:  Patient denies any of the following except if noted above: fevers, chills, difficulty with energy, vision or hearing changes, chest pain, dyspnea, abdominal pain, nausea, vomiting, diarrhea, constipation, urinary concerns, headaches, numbness, tingling, issues with sleep or mood. Also denies lumps, bumps, rashes or skin lesions, bleeding or bruising issues.        Social History:  Also actively involved in a program for those in recovery from substance use. Former heavy smoker (2 ppd) and drinker himself. Quit both in 2013. Other than his recent MI, he has been in generally good health. He has no history of autoimmune disease. He follows regularly with dermatology given his skin cancer history.       Physical Exam:  /72 (BP Location: Right arm)   Pulse 66   Temp 97.6  F (36.4  C) (Oral)   Resp 16   Ht 1.725 m (5' 7.91\")   Wt 79.9 kg (176 lb 1.6 oz)   SpO2 98%   BMI 26.84 kg/m    Wt Readings from Last 10 Encounters:   12/06/23 79.8 kg (176 lb)   12/05/23 79.9 kg (176 lb 1.6 oz)   11/30/23 78 kg (172 lb)   11/21/23 78.5 kg (173 lb 1.6 oz)   11/17/23 80.2 kg (176 lb 11.2 oz)   11/15/23 80.7 kg (178 lb)   11/06/23 77.2 kg (170 lb 4.8 oz)   10/27/23 75.7 kg (166 lb 12.8 oz)   10/26/23 75.7 kg (166 lb 12.8 oz)   10/12/23 76.4 kg (168 lb 6.9 oz)   General: The patient is a pleasant male in no acute distress.  HEENT: EOMI. Sclerae are anicteric.   Lymph: Neck is supple with no lymphadenopathy in the cervical or supraclavicular areas.   Heart: Systolic murmur present, regular rate  Lungs: Clear to auscultation bilaterally.   Abdomen: Bowel sounds present, soft, nontender with no " palpable hepatosplenomegaly or masses.   Extremities: No lower extremity edema noted bilaterally.   Neuro: Cranial nerves II through XII are grossly intact.  Skin: No rashes, petechiae, or bruising noted on exposed skin.        Labs:   Most Recent 3 CBC's:  Recent Labs   Lab Test 12/05/23  0710 11/30/23  0457 11/17/23  1330 08/18/23  1340 08/18/23  0309   WBC 6.7 5.3 6.7  --  6.1   HGB 12.2* 12.5* 12.1*   < > 12.4*   MCV 90 92 92  --  91    161 172  --  187   ANEUTAUTO 4.9  --  4.3  --  4.1    < > = values in this interval not displayed.     Most Recent 3 BMP's:  Recent Labs   Lab Test 12/05/23  0710 11/30/23  0457 11/17/23  1330 03/28/23  0502 03/27/23  0727    137 138   < > 138   POTASSIUM 4.3 4.3 4.2   < > 4.1   CHLORIDE 106 103 103   < > 104   CO2 22 27 28   < > 26   BUN 15.8 17.1 13.8   < > 11.1   CR 0.72  0.72 0.84 0.77   < > 0.83   ANIONGAP 11 7 7   < > 8   RAFAELA 9.2 9.4 9.4   < > 9.0   GLC 81 99 90   < > 105*   PROTTOTAL 7.2  --  6.9  --  7.1   ALBUMIN 4.2  --  4.2  --  4.1    < > = values in this interval not displayed.    Most Recent 3 LFT's:  Recent Labs   Lab Test 12/05/23  0710 11/17/23  1330 05/11/23  0838 03/27/23  0727   AST 28 27 33 26   ALT 12 17 27 18   ALKPHOS 80 81  --  70   BILITOTAL 0.6 0.9  --  0.9    Most Recent 2 TSH and T4:No lab results found.  I reviewed the above labs today.      Imaging:    All pertinent imaging studies personally reviewed. Key findings summarized in oncology history above    Pathology:     10/12/23 R vallecular lesion biopsy:  Final Diagnosis   A&B. RIGHT VALLECULA LESION, BIOPSY:  - NON-KERATINIZING POORLY DIFFERENTIATED SQUAMOUS CELL CARCINOMA  - p16 is negative     11/8/23 Esophageal biopsy:  Final Diagnosis   A.  MID ESOPHAGEAL MASS, BIOPSIES:  -Nonkeratinizing poorly differentiated squamous cell carcinoma  -Negative for intestinal metaplasia/Alfredo's type mucosa     Addendum   RESULT FOR IMMUNOHISTOCHEMICAL VENTANA CLONE  PD-L1 ASSAY     - TUMOR  PROPORTION SCORE (TPS): 5%  - INTERPRETATION: LOW EXPRESSOR PD-L1 EXPRESSION (TPS 1%-49%)  - COMBINED POSITIVE SCORE (CPS): 15         Assessment and Plan:   Lopez Hogue is a 68 year old male with HTN, CAD, prior sweat gland cancer s/p resection, and recently diagnosed head and neck squamous cell carcinoma of the vallecula, with staging revealing a synchronous esophageal squamous cell carcinoma.    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, cT1cN2 disease with multiple ipsilateral nodes on PET/CT  # esophageal squamous cell carcinoma, T2 N1 (suspicious paraesophageal lymph node)   --plan for concurrent chemoRT with weekly carboplatin (AUC 2) + paclitaxel (50 mg/m2), discussed chemotherapies today including administration, treatment schedule, side effects and management.   -Today's labs reviewed with no concerns. Ok to proceed with cycle 1 day 1 today.   -Return to clinic weekly with labs + FRAN visit prior to chemo    Addendum: Notified by infusion RN that patient experienced chest pressure within the first few minutes of Taxol infusion. Emergency medications had been administered, vitals stable. Assessed patient in infusion, symptoms resolved. Ok to rechallenge at half the previous infusion rate. If tolerated, will have patient complete a dexamethasone prep prior to next dose.       60 minutes spent on the date of the encounter doing chart review, review of test results, interpretation of tests, patient visit, and documentation     YADIRA Lazcano CNP

## 2023-12-05 NOTE — PATIENT INSTRUCTIONS
[facility] Triage and after hours / weekends / holidays:  [phone]    Please call the triage or after hours line if you experience a temperature greater than or equal to 100.4, shaking chills, have uncontrolled nausea, vomiting and/or diarrhea, dizziness, shortness of breath, chest pain, bleeding, unexplained bruising, or if you have any other new/concerning symptoms, questions or concerns.      If you are having any concerning symptoms or wish to speak to a provider before your next infusion visit, please call triage to notify them so we can adequately serve you.     If you need a refill on a narcotic prescription or other medication, please call before your infusion appointment.

## 2023-12-05 NOTE — PROGRESS NOTES
[facility]   Return Patient Visit  Dec 5, 2023        Diagnosis: Oropharyngeal cancer, esophageal SCC  Stage:    Cancer Staging   Squamous cell carcinoma of vallecula (H)  Staging form: Pharynx - Oropharynx, AJCC 8th Edition  - Clinical stage from 10/12/2023: Stage JUAN MIGUEL (cT1, cN2b, cM0, p16-) - Signed by Yannick Alva MD on 11/21/2023    Molecular: p16 negative  Performance status: ECOG 0      Oncology History   Squamous cell carcinoma of vallecula (H)   8/18/2023 Imaging    CT neck:  Question soft tissue lesion within the right vallecula/ventral epiglottis.      10/12/2023 Pathology    R vallecula biopsy:  - NON-KERATINIZING POORLY DIFFERENTIATED SQUAMOUS CELL CARCINOMA  - p16 is negative     10/12/2023 -  Cancer Staged    Staging form: Pharynx - Oropharynx, AJCC 8th Edition  - Clinical stage from 10/12/2023: Stage JUAN MIGUEL (cT1, cN2b, cM0, p16-)     10/13/2023 Imaging    PET/CT:  1. 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     2. FDG avid thickening of the mid to distal esophagus, suspicious for a synchronous primary esophageal neoplasm. Recommend correlation with endoscopy.     11/6/2023 Initial Diagnosis    Squamous cell carcinoma of vallecula (H)     11/8/2023 Pathology    EGD with mid esophageal mass biopsy:  A.  MID ESOPHAGEAL MASS, BIOPSIES:  -Nonkeratinizing poorly differentiated squamous cell carcinoma  -Negative for intestinal metaplasia/Alfredo's type mucosa    PD-L1 CPS 15%             12/5/2023 -  Chemotherapy    OP ONC Esophageal Cancer - PACLitaxel / CARBOplatin WEEKLY + Radiation  Plan Provider: Yannick Alva MD  Treatment goal: Curative  Line of treatment: First Line     Primary esophageal squamous cell carcinoma (H)   11/17/2023 Initial Diagnosis    Primary esophageal squamous cell carcinoma (H)     12/5/2023 -  Chemotherapy    OP ONC Esophageal Cancer - PACLitaxel /  CARBOplatin WEEKLY + Radiation  Plan Provider: Yannick Alva MD  Treatment goal: Curative  Line of treatment: First Line           Oncology History:   Lopez Hogue is a 68 year old male with a past medical history that includes HTN, CAD, MI (March of 2023 s/p PCI x2, on DAPT), and prior sweat gland cancer s/p surgical resection, now with recently diagnosed oropharyngeal cancer found to have a 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     Recent diagnosis was made after he coughed up some blood on 8/18/23. He presented to an ED where ENT evaluated and was felt to have a lesion of the vallecula. Full workup of this mass is detailed in the oncology history above. Biopsy on 10/12 confirmed an invasive SCC. PET/CT revealed a hypermetabolic lesion within the mid to distal esophagus. EGD was performed on 11/8 and also showed a squamous cell carcinoma. Given the EGD findings, this was felt to be a second primary.     11/21/23 - consult with Dr. Hill, thoracic surgery. Plan for neoadjuvant chemoradiation followed by laparoscopic staging and jejunostomy, followed by minimally invasive [place]-Elbert esophagectomy.     Had EUS on 11/28 to completed the staging process. Partially obstructing and partially circumferential fungating mass in the middle third of the esophagus. Esophageal squamous cell carcinoma was staged T2 N1 (suspicious paraesophageal lymph node)       Presents today accompanied by his wife for initiation of chemoradiation.     Interval History:     Exercises frequently. Goes to the gym multiple days per week and does 30-45 minutes on the treadmill. Denies shortness of breath or chest pain.     Swallowing is not as easy as it used to be. No sharp pain but there is a discomfort. Feels as if there is always something in throat. Recently presented to the ED with chest pain, cardiac workup was negative. Diagnosed with esophageal reflux and was prescribed famotidine 20mg BID, which has been  "helpful.        Overall, he feels well. Continues to work.     Review of Systems:  Patient denies any of the following except if noted above: fevers, chills, difficulty with energy, vision or hearing changes, chest pain, dyspnea, abdominal pain, nausea, vomiting, diarrhea, constipation, urinary concerns, headaches, numbness, tingling, issues with sleep or mood. Also denies lumps, bumps, rashes or skin lesions, bleeding or bruising issues.        Social History:  Also actively involved in a program for those in recovery from substance use. Former heavy smoker (2 ppd) and drinker himself. Quit both in 2013. Other than his recent MI, he has been in generally good health. He has no history of autoimmune disease. He follows regularly with dermatology given his skin cancer history.       Physical Exam:  /72 (BP Location: Right arm)   Pulse 66   Temp 97.6  F (36.4  C) (Oral)   Resp 16   Ht 1.725 m (5' 7.91\")   Wt 79.9 kg (176 lb 1.6 oz)   SpO2 98%   BMI 26.84 kg/m    Wt Readings from Last 10 Encounters:   12/06/23 79.8 kg (176 lb)   12/05/23 79.9 kg (176 lb 1.6 oz)   11/30/23 78 kg (172 lb)   11/21/23 78.5 kg (173 lb 1.6 oz)   11/17/23 80.2 kg (176 lb 11.2 oz)   11/15/23 80.7 kg (178 lb)   11/06/23 77.2 kg (170 lb 4.8 oz)   10/27/23 75.7 kg (166 lb 12.8 oz)   10/26/23 75.7 kg (166 lb 12.8 oz)   10/12/23 76.4 kg (168 lb 6.9 oz)   General: The patient is a pleasant male in no acute distress.  HEENT: EOMI. Sclerae are anicteric.   Lymph: Neck is supple with no lymphadenopathy in the cervical or supraclavicular areas.   Heart: Systolic murmur present, regular rate  Lungs: Clear to auscultation bilaterally.   Abdomen: Bowel sounds present, soft, nontender with no palpable hepatosplenomegaly or masses.   Extremities: No lower extremity edema noted bilaterally.   Neuro: Cranial nerves II through XII are grossly intact.  Skin: No rashes, petechiae, or bruising noted on exposed skin.        Labs:   Most Recent 3 " CBC's:  Recent Labs   Lab Test 12/05/23  0710 11/30/23  0457 11/17/23  1330 08/18/23  1340 08/18/23  0309   WBC 6.7 5.3 6.7  --  6.1   HGB 12.2* 12.5* 12.1*   < > 12.4*   MCV 90 92 92  --  91    161 172  --  187   ANEUTAUTO 4.9  --  4.3  --  4.1    < > = values in this interval not displayed.     Most Recent 3 BMP's:  Recent Labs   Lab Test 12/05/23  0710 11/30/23  0457 11/17/23  1330 03/28/23  0502 03/27/23  0727    137 138   < > 138   POTASSIUM 4.3 4.3 4.2   < > 4.1   CHLORIDE 106 103 103   < > 104   CO2 22 27 28   < > 26   BUN 15.8 17.1 13.8   < > 11.1   CR 0.72  0.72 0.84 0.77   < > 0.83   ANIONGAP 11 7 7   < > 8   RAFAELA 9.2 9.4 9.4   < > 9.0   GLC 81 99 90   < > 105*   PROTTOTAL 7.2  --  6.9  --  7.1   ALBUMIN 4.2  --  4.2  --  4.1    < > = values in this interval not displayed.    Most Recent 3 LFT's:  Recent Labs   Lab Test 12/05/23  0710 11/17/23  1330 05/11/23  0838 03/27/23  0727   AST 28 27 33 26   ALT 12 17 27 18   ALKPHOS 80 81  --  70   BILITOTAL 0.6 0.9  --  0.9    Most Recent 2 TSH and T4:No lab results found.  I reviewed the above labs today.      Imaging:    All pertinent imaging studies personally reviewed. Key findings summarized in oncology history above    Pathology:     10/12/23 R vallecular lesion biopsy:  Final Diagnosis   A&B. RIGHT VALLECULA LESION, BIOPSY:  - NON-KERATINIZING POORLY DIFFERENTIATED SQUAMOUS CELL CARCINOMA  - p16 is negative     11/8/23 Esophageal biopsy:  Final Diagnosis   A.  MID ESOPHAGEAL MASS, BIOPSIES:  -Nonkeratinizing poorly differentiated squamous cell carcinoma  -Negative for intestinal metaplasia/Alfredo's type mucosa     Addendum   RESULT FOR IMMUNOHISTOCHEMICAL VENTANA CLONE  PD-L1 ASSAY     - TUMOR PROPORTION SCORE (TPS): 5%  - INTERPRETATION: LOW EXPRESSOR PD-L1 EXPRESSION (TPS 1%-49%)  - COMBINED POSITIVE SCORE (CPS): 15         Assessment and Plan:   Lopez Hogue is a 68 year old male with HTN, CAD, prior sweat gland cancer s/p  resection, and recently diagnosed head and neck squamous cell carcinoma of the vallecula, with staging revealing a synchronous esophageal squamous cell carcinoma.    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, cT1cN2 disease with multiple ipsilateral nodes on PET/CT  # esophageal squamous cell carcinoma, T2 N1 (suspicious paraesophageal lymph node)   --plan for concurrent chemoRT with weekly carboplatin (AUC 2) + paclitaxel (50 mg/m2), discussed chemotherapies today including administration, treatment schedule, side effects and management.   -Today's labs reviewed with no concerns. Ok to proceed with cycle 1 day 1 today.   -Return to clinic weekly with labs + FRAN visit prior to chemo    Addendum: Notified by infusion RN that patient experienced chest pressure within the first few minutes of Taxol infusion. Emergency medications had been administered, vitals stable. Assessed patient in infusion, symptoms resolved. Ok to rechallenge at half the previous infusion rate. If tolerated, will have patient complete a dexamethasone prep prior to next dose.       60 minutes spent on the date of the encounter doing chart review, review of test results, interpretation of tests, patient visit, and documentation     YADIRA Lazcano CNP

## 2023-12-05 NOTE — PROGRESS NOTES
SPEECH LANGUAGE PATHOLOGY EVALUATION    See electronic medical record for Abuse and Falls Screening details.    Subjective      Presenting condition or subjective complaint:  Patient denies trouble swallowing.  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 Patient had no aspiration with any trials.  Transient, shallow penetration with thin liquids. Oral phase is WFL. Tongue base retraction was reduced. Swallow response was timely to mildly delayed. Epiglottic inversion was complete.  Moderate stasis occurred with puree and solids in the valleculae, and mild diffuse stasis with other trials, cleared with subsequent swallows. Hyolaryngeal elevation was WFL and hyolaryngeal excursion was mildly reduced.  Mastication was safe and complete. Cricopharyngeus was mildly prominent with luminal narrowing noted but contrast material readily passed PES and cervical esophagus without stasis. AP view: Minimal asymmetry noted but largely functional despite known right vallecular lesion. Patient would benefit from strategies of double swallow and liquid wash with solids and small bites and sips. Recommend close monitoring for any further dysphagia concerns given valleculae involvement and anticipate treatment course and potential impact on swallow function.    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  He is eating and drinking and hydrating foods and liquids.    Current Diet/Method of Nutritional Intake: thin liquids (level 0), regular diet        CLINICAL SWALLOW EVALUATION  Oral Motor Function: Dentition: upper dentures, adequate lower dentition  Secretion management: WFL  Mucosal quality: good  Mandibular function: intact  Oral labial function: WFL  Lingual function: WFL  Velar function: intact   Buccal function: intact  Laryngeal function: cough, throat clear, volitional swallow, voicing WFL     Level of assist required for feeding: no assistance needed   Textures Trialed:   Clinical Swallow Eval: Thin Liquids  Mode of presentation: cup, self-fed   Volume presented: 5 oz water  Preparatory Phase: WFL  Oral Phase: WFL  Pharyngeal phase of swallow: intact   Strategies trialed during procedure: none   Diagnostic statement: No overt clinical s/sx of aspiration/penetration noted on thin liquid trials.     Clinical Swallow Eval: Purees  Mode of presentation: spoon, self-fed   Volume presented: multiple trials applesauce   Preparatory Phase: WFL  Oral Phase: WFL  Pharyngeal phase of swallow: impaired, repeated swallows   Strategies trialed during procedure: LESLIE SLP CLINICAL EVAL STRATEGIES: alternating bites/sips    Diagnostic statement: No overt clinical s/sx of aspiration/penetration noted on puree consistency trials. Pt known to have mild residue following video swallow study.    Clinical Swallow Eval: Solids  Mode of presentation: self-fed   Volume presented: 2 Shanthi Doone cookies  Preparatory Phase: WFL  Oral Phase: WFL  Pharyngeal phase of swallow: impaired   Strategies trialed during procedure: none   Diagnostic statement: No overt clinical s/sx of aspiration/penetration noted. Pt found to have residual in the valleculae  after completed swallow in recent video swallow study. He wasa able to clear much of the residue with a sip of liquid.          ESOPHAGEAL PHASE OF SWALLOW  no observed or reported concerns related to esophageal function     SWALLOW ASSESSMENT CLINICAL IMPRESSIONS AND RATIONALE  Diet Consistency Recommendations: thin liquids (level 0), regular diet, modification as pt progresses through chemoradiation  Recommended Feeding/Eating Techniques: slow rate of intake, maintain upright posture during/after eating for 30 minutes   Medication Administration Recommendations: Whole with liquid  Instrumental Assessment Recommendations: none at this time.     Assessment & Plan   CLINICAL IMPRESSIONS   Medical Diagnosis: squamous cell carcinoma of the oropharynx    Treatment Diagnosis: mild oropharyngeal dysphagia   Impression/Assessment: Pt is a 68 year old male with dysphagia complaints. The following significant findings have been identified: impaired swallowing, characterized by residue found in the pharynx on the video swallow study completed previously. Identified deficits interfere with their ability to consume an oral diet and maintain nutrition as compared to previous level of function. Pt is at high risk for developing severe dysphagia during chemoradiation therapy. He is also at high risk for long term impact of radiation fibrosis on the swallow musculature and would benefit from dysphagia therapy during and after treatment to minimize this impact.     PLAN OF CARE  Treatment Interventions: Swallowing dysfunction and/or oral function for feeding    Prognosis to achieve stated therapy goals is good   Rehab potential is impacted by: comorbidities, pending medical intervention    Long Term Goals   SLP Goal 1  Goal Identifier: PO  Goal Description: Pt will tolerate least restricitive diet while adhering to safe swallow precautions and without pulmonary compromise across 6 months time.  Rationale: To maximize safety, ease  and/or independence of oral intake  Target Date: 03/03/24  SLP Goal 2  Goal Identifier: Exercise  Goal Description: Pt will improve and/or maintain ROM and strength of BOT, jaw and pharynx by completing 10 repetitions of 5/5 exercises 3 times daily with minimal written or verbal cues.  Rationale: To maximize safety, ease and/or independence of oral intake  Target Date: 03/03/24      Frequency of Treatment: 2-4x/month  Duration of Treatment: 3 months     Recommended Referrals to Other Professionals: none  Education Assessment:   Learner/Method: Patient    Risks and benefits of evaluation/treatment have been explained.   Patient/Family/caregiver agrees with Plan of Care.     Evaluation Time:    SLP Eval: oral/pharyngeal swallow function, clinical minutes (95935): 10    Signing Clinician: BEULAH Bush

## 2023-12-05 NOTE — NURSING NOTE
"Oncology Rooming Note    December 5, 2023 7:43 AM   Lopez Hogue is a 68 year old male who presents for:    Chief Complaint   Patient presents with    Blood Draw     Labs drawn via PIV by RN in lab. VS taken.     Oncology Clinic Visit     Esophageal CA     Initial Vitals: /72 (BP Location: Right arm)   Pulse 66   Temp 97.6  F (36.4  C) (Oral)   Resp 16   Ht 1.725 m (5' 7.91\")   Wt 79.9 kg (176 lb 1.6 oz)   SpO2 98%   BMI 26.84 kg/m   Estimated body mass index is 26.84 kg/m  as calculated from the following:    Height as of this encounter: 1.725 m (5' 7.91\").    Weight as of this encounter: 79.9 kg (176 lb 1.6 oz). Body surface area is 1.96 meters squared.  No Pain (0) Comment: Data Unavailable   No LMP for male patient.  Allergies reviewed: Yes  Medications reviewed: Yes    Medications: Medication refills not needed today.  Pharmacy name entered into [employer]: [id number] - [address]  AT [facility] [address]    Clinical concerns:        Mary Barrett              "

## 2023-12-06 ENCOUNTER — OFFICE VISIT (OUTPATIENT)
Dept: RADIATION ONCOLOGY | Facility: CLINIC | Age: 68
End: 2023-12-06
Attending: RADIOLOGY
Payer: COMMERCIAL

## 2023-12-06 VITALS
WEIGHT: 176 LBS | BODY MASS INDEX: 26.83 KG/M2 | DIASTOLIC BLOOD PRESSURE: 75 MMHG | SYSTOLIC BLOOD PRESSURE: 146 MMHG | HEART RATE: 78 BPM

## 2023-12-06 DIAGNOSIS — C10.0 SQUAMOUS CELL CARCINOMA OF VALLECULA (H): Primary | ICD-10-CM

## 2023-12-06 DIAGNOSIS — C15.4 MALIGNANT NEOPLASM OF MIDDLE THIRD OF ESOPHAGUS (H): ICD-10-CM

## 2023-12-06 LAB
ATRIAL RATE - MUSE: 59 BPM
DIASTOLIC BLOOD PRESSURE - MUSE: NORMAL MMHG
INTERPRETATION ECG - MUSE: NORMAL
P AXIS - MUSE: 75 DEGREES
PR INTERVAL - MUSE: 200 MS
QRS DURATION - MUSE: 94 MS
QT - MUSE: 418 MS
QTC - MUSE: 413 MS
R AXIS - MUSE: 68 DEGREES
SYSTOLIC BLOOD PRESSURE - MUSE: NORMAL MMHG
T AXIS - MUSE: 73 DEGREES
VENTRICULAR RATE- MUSE: 59 BPM

## 2023-12-06 PROCEDURE — 77386 HC IMRT TREATMENT DELIVERY, COMPLEX: CPT | Performed by: RADIOLOGY

## 2023-12-06 NOTE — PROGRESS NOTES
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 Hogue presents to clinic today for his weekly on-treatment visit.  He had a reaction to his Taxol infusion yesterday but feels much better today.  He has no concerns.    Nursing ROS:   Skin  Skin Reaction: 0 - No changes  Skin Progress: Aquaphor     ENT and Mouth Exam  Mucositis - Current: 0 - None      Gastrointestinal  Nausea: 0 - None     Psychosocial  Mood - Anxiety: 0 - Normal  Pain Assessment  0-10 Pain Scale: 0    PEG Tube: No, if needed will be a jejunostomy tube  Electronic Cardiac Implant: No    Objective:   BP (!) 146/75   Pulse 78   Wt 79.8 kg (176 lb)   BMI 26.83 kg/m    Gen: Appears well, NAD  HEENT: No mucositis  Skin: No erythema    Laboratory:  Lab Results   Component Value Date    WBC 6.7 2023    HGB 12.2 (L) 2023    HCT 35.9 (L) 2023    MCV 90 2023     2023     Lab Results   Component Value Date     2023    POTASSIUM 4.3 2023    CHLORIDE 106 2023    CO2 22 2023    GLC 81 2023     Lab Results   Component Value Date    AST 28 2023    ALT 12 2023     ALKPHOS 80 12/05/2023    BILITOTAL 0.6 12/05/2023     Magnesium   Date Value Ref Range Status   11/30/2023 1.7 1.7 - 2.3 mg/dL Final       Treatment-related toxicities (CTCAE v5.0):  Anorexia: Grade 0: No toxicity  Fatigue: Grade 0: No toxicity  Nausea: Grade 0: No toxicity  Pain: Grade 0: No toxicity  Dry mouth: Grade 0: No toxicity  Dysphagia: Grade 0: No toxicity  Mucositis: Grade 0: No toxicity  Dyspnea: Grade 0: No toxicity  Esophagitis: Grade 0: No toxicity  Dermatitis: Grade 0: No toxicity      ED visits/Hospitalizations: None    Missed Treatments: None    Mosaiq chart and setup information reviewed  IGRT images reviewed    Medication Review  Med list reviewed with patient?: Yes  Med list printed and given: Offered and declined    Assessment:    Mr. 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    He has tolerated the start of chemoradiation well except for a Taxol reaction yesterday.    Plan:   1.  Continue treatment as planned  2.  Discussed recommended self cares and tapering up of gabapentin dose.      Akiko Sun  Department of Radiation Oncology  [facility]

## 2023-12-06 NOTE — LETTER
2023         RE: Lopez Hogue  [address] [facility] [address]        Dear Colleague,    Thank you for referring your patient, Lopez Hogue, to the [facility] RADIATION ONCOLOGY. Please see a copy of my visit note below.    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 Hogue presents to clinic today for his weekly on-treatment visit.  He had a reaction to his Taxol infusion yesterday but feels much better today.  He has no concerns.    Nursing ROS:   Skin  Skin Reaction: 0 - No changes  Skin Progress: Aquaphor     ENT and Mouth Exam  Mucositis - Current: 0 - None      Gastrointestinal  Nausea: 0 - None     Psychosocial  Mood - Anxiety: 0 - Normal  Pain Assessment  0-10 Pain Scale: 0    PEG Tube: No, if needed will be a jejunostomy tube  Electronic Cardiac Implant: No    Objective:   BP (!) 146/75   Pulse 78   Wt 79.8 kg (176 lb)   BMI 26.83 kg/m    Gen: Appears well, NAD  HEENT: No mucositis  Skin: No erythema    Laboratory:  Lab Results   Component Value Date    WBC 6.7 2023    HGB 12.2 (L) 2023    HCT 35.9 (L) 2023    MCV 90 2023      12/05/2023     Lab Results   Component Value Date     12/05/2023    POTASSIUM 4.3 12/05/2023    CHLORIDE 106 12/05/2023    CO2 22 12/05/2023    GLC 81 12/05/2023     Lab Results   Component Value Date    AST 28 12/05/2023    ALT 12 12/05/2023    ALKPHOS 80 12/05/2023    BILITOTAL 0.6 12/05/2023     Magnesium   Date Value Ref Range Status   11/30/2023 1.7 1.7 - 2.3 mg/dL Final       Treatment-related toxicities (CTCAE v5.0):  Anorexia: Grade 0: No toxicity  Fatigue: Grade 0: No toxicity  Nausea: Grade 0: No toxicity  Pain: Grade 0: No toxicity  Dry mouth: Grade 0: No toxicity  Dysphagia: Grade 0: No toxicity  Mucositis: Grade 0: No toxicity  Dyspnea: Grade 0: No toxicity  Esophagitis: Grade 0: No toxicity  Dermatitis: Grade 0: No toxicity      ED visits/Hospitalizations: None    Missed Treatments: None    Mosaiq chart and setup information reviewed  IGRT images reviewed    Medication Review  Med list reviewed with patient?: Yes  Med list printed and given: Offered and declined    Assessment:    Mr. 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    He has tolerated the start of chemoradiation well except for a Taxol reaction yesterday.    Plan:   1.  Continue treatment as planned  2.  Discussed recommended self cares and tapering up of gabapentin dose.      Akiko Sun  Department of Radiation Oncology  [facility]

## 2023-12-07 ENCOUNTER — APPOINTMENT (OUTPATIENT)
Dept: RADIATION ONCOLOGY | Facility: CLINIC | Age: 68
End: 2023-12-07
Attending: RADIOLOGY
Payer: COMMERCIAL

## 2023-12-07 ENCOUNTER — PATIENT OUTREACH (OUTPATIENT)
Dept: ONCOLOGY | Facility: CLINIC | Age: 68
End: 2023-12-07
Payer: COMMERCIAL

## 2023-12-07 DIAGNOSIS — C15.9 PRIMARY ESOPHAGEAL SQUAMOUS CELL CARCINOMA (H): Primary | ICD-10-CM

## 2023-12-07 DIAGNOSIS — C10.0 SQUAMOUS CELL CARCINOMA OF VALLECULA (H): ICD-10-CM

## 2023-12-07 DIAGNOSIS — Z13.220 SCREENING FOR CHOLESTEROL LEVEL: ICD-10-CM

## 2023-12-07 PROCEDURE — 77386 HC IMRT TREATMENT DELIVERY, COMPLEX: CPT | Performed by: RADIOLOGY

## 2023-12-07 PROCEDURE — 77014 PR CT GUIDE FOR PLACEMENT RADIATION THERAPY FIELDS: CPT | Mod: 26 | Performed by: STUDENT IN AN ORGANIZED HEALTH CARE EDUCATION/TRAINING PROGRAM

## 2023-12-07 NOTE — Clinical Note
Fabricio is doing well after his first infusion! He was wondering if we can order a lipid panel given his cardiac hx. Ok if I order under one of you? Or should I refer him to PCP?

## 2023-12-07 NOTE — PROGRESS NOTES
"[facility]: Cancer Care Follow-Up Note                                    Discussion with Patient:                                                      Reached out to Fabricio to check on how he is doing after his first carboplatin/paclitaxel infusion on Thursday.    Fabricio reports he is feeling well and aside from the \"slight hiccup\" during infusion (reacted to taxol then was re-challenged without incident), things have been going well. Denies any symptoms or side effects. We reviewed use of prochlorperazine for nausea.    Fabricio was wondering if we can order a cholesterol panel to be run with his next set of labs. Will inquire with providers.      Dates of Treatment:                                                      Infusion given in last 28 days       Administered MAR Action Medication Dose Rate Visit    12/05/2023 10:30 New Bag PACLitaxel (TAXOL) 98 mg in sodium chloride 0.9% in non-PVC container 291.33 mL infusion 98 mg 291.3 mL/hr Infusion Therapy Visit on 12/05/2023 in [facility]    12/05/2023 12:46 Restarted PACLitaxel (TAXOL) 98 mg in sodium chloride 0.9% in non-PVC container 291.33 mL infusion   291.3 mL/hr Infusion Therapy Visit on 12/05/2023 in [facility]    12/05/2023 14:41 New Bag CARBOplatin 245 mg in sodium chloride 0.9 % 299.5 mL infusion 245 mg 599 mL/hr Infusion Therapy Visit on 12/05/2023 in [facility]            Assessment:                                                      RNCC Short Symptom Review:     Assessment completed with:: Patient    General/Short Assessment  Does the patient have all their medications?: Yes  Is the patient experiencing any new or worsening symptoms?: No  Discussion with patient: Answered patient's questions and addressed concerns;Reviewed how and when to contact clinic;Reviewed patient's future appointments    Pain  Patient Reported Pain?: No  Pain Score: No Pain (0)  RASS ([place] " Agitation-Sedation Scale): 0-->alert and calm    Patient Coping  Accepting    Clinic Utilization  Patient Aware of Next Appointment?: Yes    Other Patient Concerns  Other Patient Reported Concerns: No    Intervention/Education provided during outreach:                                                       Patient to follow up as scheduled at next appt  Patient to call/[employer] message with updates  Confirmed patient has clinic and triage numbers    Caryn Olvera, RN, BSN  RN Care Coordinator  [facility]

## 2023-12-08 ENCOUNTER — PATIENT OUTREACH (OUTPATIENT)
Dept: ONCOLOGY | Facility: CLINIC | Age: 68
End: 2023-12-08
Payer: COMMERCIAL

## 2023-12-08 ENCOUNTER — APPOINTMENT (OUTPATIENT)
Dept: RADIATION ONCOLOGY | Facility: CLINIC | Age: 68
End: 2023-12-08
Attending: RADIOLOGY
Payer: COMMERCIAL

## 2023-12-08 PROCEDURE — 77014 PR CT GUIDE FOR PLACEMENT RADIATION THERAPY FIELDS: CPT | Mod: 26 | Performed by: RADIOLOGY

## 2023-12-08 PROCEDURE — 77427 RADIATION TX MANAGEMENT X5: CPT | Performed by: RADIOLOGY

## 2023-12-08 PROCEDURE — 77336 RADIATION PHYSICS CONSULT: CPT | Performed by: RADIOLOGY

## 2023-12-08 PROCEDURE — 77386 HC IMRT TREATMENT DELIVERY, COMPLEX: CPT | Performed by: RADIOLOGY

## 2023-12-08 RX ORDER — DEXAMETHASONE 4 MG/1
TABLET ORAL
Qty: 4 TABLET | Refills: 1 | Status: SHIPPED | OUTPATIENT
Start: 2023-12-08 | End: 2024-01-15

## 2023-12-08 NOTE — PROGRESS NOTES
[facility]: Cancer Care Plan of Care Education Note                                    Discussion with Patient:                                                      Reached out to Fabricio to relay that Joanna Smithbenja NP has prescribed him a dexamethasone prep for chemotherapy next week.     Instructed Fabricio to take 8 mg dexamethasone 12 hours prior to chemo and 8 mg dexamethasone the morning of before he comes in for appointments.     Fabricio verbalized an understanding of these instructions.     Assessment:                                                      Assessment completed with:: Patient    Plan of Care Education   Yearly learning assessment completed?: Yes (see Education tab)  Diagnosis:: esophageal cancer  Does patient understand diagnosis?: Yes  Additional education provided for: : Change in medication/medication education    Evaluation of Learning  Patient Education Provided: Yes  Readiness:: Acceptance  Method:: Explanation  Response:: Verbalizes understanding    No assessment indicated    Intervention/Education provided during outreach:                                                       Patient to follow up as scheduled at next appt  Patient to call/[employer] message with updates  Medication Change: Discussed medication change with patient  Confirmed patient has clinic and triage numbers    Caryn Olvera, RN, BSN  RN Care Coordinator  [facility]

## 2023-12-11 ENCOUNTER — ONCOLOGY VISIT (OUTPATIENT)
Dept: ONCOLOGY | Facility: CLINIC | Age: 68
End: 2023-12-11
Attending: STUDENT IN AN ORGANIZED HEALTH CARE EDUCATION/TRAINING PROGRAM
Payer: COMMERCIAL

## 2023-12-11 ENCOUNTER — APPOINTMENT (OUTPATIENT)
Dept: LAB | Facility: CLINIC | Age: 68
End: 2023-12-11
Attending: STUDENT IN AN ORGANIZED HEALTH CARE EDUCATION/TRAINING PROGRAM
Payer: COMMERCIAL

## 2023-12-11 ENCOUNTER — THERAPY VISIT (OUTPATIENT)
Dept: SPEECH THERAPY | Facility: CLINIC | Age: 68
End: 2023-12-11
Payer: COMMERCIAL

## 2023-12-11 ENCOUNTER — APPOINTMENT (OUTPATIENT)
Dept: RADIATION ONCOLOGY | Facility: CLINIC | Age: 68
End: 2023-12-11
Attending: RADIOLOGY
Payer: COMMERCIAL

## 2023-12-11 VITALS
WEIGHT: 176.2 LBS | BODY MASS INDEX: 26.86 KG/M2 | DIASTOLIC BLOOD PRESSURE: 75 MMHG | RESPIRATION RATE: 16 BRPM | TEMPERATURE: 97.8 F | HEART RATE: 94 BPM | OXYGEN SATURATION: 98 % | SYSTOLIC BLOOD PRESSURE: 150 MMHG

## 2023-12-11 VITALS — DIASTOLIC BLOOD PRESSURE: 67 MMHG | SYSTOLIC BLOOD PRESSURE: 137 MMHG

## 2023-12-11 DIAGNOSIS — C15.9 PRIMARY ESOPHAGEAL SQUAMOUS CELL CARCINOMA (H): Primary | ICD-10-CM

## 2023-12-11 DIAGNOSIS — C10.0 SQUAMOUS CELL CARCINOMA OF VALLECULA (H): ICD-10-CM

## 2023-12-11 DIAGNOSIS — C10.0 SQUAMOUS CELL CARCINOMA OF VALLECULA (H): Primary | ICD-10-CM

## 2023-12-11 DIAGNOSIS — R13.12 OROPHARYNGEAL DYSPHAGIA: ICD-10-CM

## 2023-12-11 DIAGNOSIS — K21.9 GASTROESOPHAGEAL REFLUX DISEASE, UNSPECIFIED WHETHER ESOPHAGITIS PRESENT: ICD-10-CM

## 2023-12-11 LAB
ALBUMIN SERPL BCG-MCNC: 4.4 G/DL (ref 3.5–5.2)
ALP SERPL-CCNC: 82 U/L (ref 40–150)
ALT SERPL W P-5'-P-CCNC: 36 U/L (ref 0–70)
ANION GAP SERPL CALCULATED.3IONS-SCNC: 12 MMOL/L (ref 7–15)
AST SERPL W P-5'-P-CCNC: 36 U/L (ref 0–45)
BASOPHILS # BLD AUTO: 0 10E3/UL (ref 0–0.2)
BASOPHILS NFR BLD AUTO: 0 %
BILIRUB SERPL-MCNC: 1.2 MG/DL
BUN SERPL-MCNC: 18.3 MG/DL (ref 8–23)
CALCIUM SERPL-MCNC: 9.6 MG/DL (ref 8.8–10.2)
CHLORIDE SERPL-SCNC: 104 MMOL/L (ref 98–107)
CREAT SERPL-MCNC: 0.64 MG/DL (ref 0.67–1.17)
CREAT SERPL-MCNC: 0.65 MG/DL (ref 0.67–1.17)
DEPRECATED HCO3 PLAS-SCNC: 22 MMOL/L (ref 22–29)
EGFRCR SERPLBLD CKD-EPI 2021: >90 ML/MIN/1.73M2
EGFRCR SERPLBLD CKD-EPI 2021: >90 ML/MIN/1.73M2
EOSINOPHIL # BLD AUTO: 0 10E3/UL (ref 0–0.7)
EOSINOPHIL NFR BLD AUTO: 0 %
ERYTHROCYTE [DISTWIDTH] IN BLOOD BY AUTOMATED COUNT: 12.8 % (ref 10–15)
GLUCOSE SERPL-MCNC: 195 MG/DL (ref 70–99)
HCT VFR BLD AUTO: 37.7 % (ref 40–53)
HGB BLD-MCNC: 12.9 G/DL (ref 13.3–17.7)
IMM GRANULOCYTES # BLD: 0 10E3/UL
IMM GRANULOCYTES NFR BLD: 1 %
LYMPHOCYTES # BLD AUTO: 0.2 10E3/UL (ref 0.8–5.3)
LYMPHOCYTES NFR BLD AUTO: 5 %
MCH RBC QN AUTO: 30.6 PG (ref 26.5–33)
MCHC RBC AUTO-ENTMCNC: 34.2 G/DL (ref 31.5–36.5)
MCV RBC AUTO: 89 FL (ref 78–100)
MONOCYTES # BLD AUTO: 0 10E3/UL (ref 0–1.3)
MONOCYTES NFR BLD AUTO: 0 %
NEUTROPHILS # BLD AUTO: 3.2 10E3/UL (ref 1.6–8.3)
NEUTROPHILS NFR BLD AUTO: 94 %
NRBC # BLD AUTO: 0 10E3/UL
NRBC BLD AUTO-RTO: 0 /100
PLATELET # BLD AUTO: 169 10E3/UL (ref 150–450)
POTASSIUM SERPL-SCNC: 4.3 MMOL/L (ref 3.4–5.3)
PROT SERPL-MCNC: 7.4 G/DL (ref 6.4–8.3)
RBC # BLD AUTO: 4.22 10E6/UL (ref 4.4–5.9)
SODIUM SERPL-SCNC: 138 MMOL/L (ref 135–145)
WBC # BLD AUTO: 3.4 10E3/UL (ref 4–11)

## 2023-12-11 PROCEDURE — 77386 HC IMRT TREATMENT DELIVERY, COMPLEX: CPT | Performed by: RADIOLOGY

## 2023-12-11 PROCEDURE — 36415 COLL VENOUS BLD VENIPUNCTURE: CPT | Performed by: STUDENT IN AN ORGANIZED HEALTH CARE EDUCATION/TRAINING PROGRAM

## 2023-12-11 PROCEDURE — 92526 ORAL FUNCTION THERAPY: CPT | Mod: GN | Performed by: SPEECH-LANGUAGE PATHOLOGIST

## 2023-12-11 PROCEDURE — 82565 ASSAY OF CREATININE: CPT | Performed by: STUDENT IN AN ORGANIZED HEALTH CARE EDUCATION/TRAINING PROGRAM

## 2023-12-11 PROCEDURE — 80053 COMPREHEN METABOLIC PANEL: CPT

## 2023-12-11 PROCEDURE — 96413 CHEMO IV INFUSION 1 HR: CPT

## 2023-12-11 PROCEDURE — 96415 CHEMO IV INFUSION ADDL HR: CPT

## 2023-12-11 PROCEDURE — 96375 TX/PRO/DX INJ NEW DRUG ADDON: CPT

## 2023-12-11 PROCEDURE — 96367 TX/PROPH/DG ADDL SEQ IV INF: CPT

## 2023-12-11 PROCEDURE — 250N000011 HC RX IP 250 OP 636: Mod: JZ | Performed by: STUDENT IN AN ORGANIZED HEALTH CARE EDUCATION/TRAINING PROGRAM

## 2023-12-11 PROCEDURE — 96417 CHEMO IV INFUS EACH ADDL SEQ: CPT

## 2023-12-11 PROCEDURE — 85025 COMPLETE CBC W/AUTO DIFF WBC: CPT | Performed by: STUDENT IN AN ORGANIZED HEALTH CARE EDUCATION/TRAINING PROGRAM

## 2023-12-11 PROCEDURE — 99215 OFFICE O/P EST HI 40 MIN: CPT

## 2023-12-11 PROCEDURE — 258N000003 HC RX IP 258 OP 636: Performed by: STUDENT IN AN ORGANIZED HEALTH CARE EDUCATION/TRAINING PROGRAM

## 2023-12-11 PROCEDURE — 99213 OFFICE O/P EST LOW 20 MIN: CPT | Mod: 25

## 2023-12-11 RX ORDER — FAMOTIDINE 20 MG/1
20 TABLET, FILM COATED ORAL 2 TIMES DAILY
Qty: 60 TABLET | Refills: 3 | Status: SHIPPED | OUTPATIENT
Start: 2023-12-11 | End: 2024-01-02

## 2023-12-11 RX ORDER — SUCRALFATE ORAL 1 G/10ML
1 SUSPENSION ORAL 4 TIMES DAILY
Qty: 414 ML | Refills: 1 | Status: SHIPPED | OUTPATIENT
Start: 2023-12-11 | End: 2024-01-15

## 2023-12-11 RX ADMIN — DIPHENHYDRAMINE HYDROCHLORIDE 50 MG: 50 INJECTION, SOLUTION INTRAMUSCULAR; INTRAVENOUS at 08:32

## 2023-12-11 RX ADMIN — PACLITAXEL 98 MG: 6 INJECTION, SOLUTION INTRAVENOUS at 09:16

## 2023-12-11 RX ADMIN — DEXAMETHASONE SODIUM PHOSPHATE: 10 INJECTION, SOLUTION INTRAMUSCULAR; INTRAVENOUS at 08:11

## 2023-12-11 RX ADMIN — FAMOTIDINE 20 MG: 10 INJECTION, SOLUTION INTRAVENOUS at 08:11

## 2023-12-11 RX ADMIN — SODIUM CHLORIDE 250 ML: 9 INJECTION, SOLUTION INTRAVENOUS at 08:11

## 2023-12-11 RX ADMIN — CARBOPLATIN 290 MG: 600 INJECTION, SOLUTION INTRAVENOUS at 11:16

## 2023-12-11 ASSESSMENT — PAIN SCALES - GENERAL: PAINLEVEL: NO PAIN (0)

## 2023-12-11 NOTE — PROGRESS NOTES
[facility]   Return Patient Visit  Dec 11, 2023        Diagnosis: Oropharyngeal cancer, esophageal SCC  Stage:    Cancer Staging   Squamous cell carcinoma of vallecula (H)  Staging form: Pharynx - Oropharynx, AJCC 8th Edition  - Clinical stage from 10/12/2023: Stage JUAN MIGUEL (cT1, cN2b, cM0, p16-) - Signed by Yannick Alva MD on 11/21/2023    Molecular: p16 negative  Performance status: ECOG 0      Oncology History   Squamous cell carcinoma of vallecula (H)   8/18/2023 Imaging    CT neck:  Question soft tissue lesion within the right vallecula/ventral epiglottis.      10/12/2023 Pathology    R vallecula biopsy:  - NON-KERATINIZING POORLY DIFFERENTIATED SQUAMOUS CELL CARCINOMA  - p16 is negative     10/12/2023 -  Cancer Staged    Staging form: Pharynx - Oropharynx, AJCC 8th Edition  - Clinical stage from 10/12/2023: Stage JUAN MIGUEL (cT1, cN2b, cM0, p16-)     10/13/2023 Imaging    PET/CT:  1. 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     2. FDG avid thickening of the mid to distal esophagus, suspicious for a synchronous primary esophageal neoplasm. Recommend correlation with endoscopy.     11/6/2023 Initial Diagnosis    Squamous cell carcinoma of vallecula (H)     11/8/2023 Pathology    EGD with mid esophageal mass biopsy:  A.  MID ESOPHAGEAL MASS, BIOPSIES:  -Nonkeratinizing poorly differentiated squamous cell carcinoma  -Negative for intestinal metaplasia/Alfredo's type mucosa    PD-L1 CPS 15%             12/5/2023 -  Chemotherapy    OP ONC Esophageal Cancer - PACLitaxel / CARBOplatin WEEKLY + Radiation  Plan Provider: Yannick Alva MD  Treatment goal: Curative  Line of treatment: First Line     Primary esophageal squamous cell carcinoma (H)   11/17/2023 Initial Diagnosis    Primary esophageal squamous cell carcinoma (H)     12/5/2023 -  Chemotherapy    OP ONC Esophageal Cancer - PACLitaxel /  CARBOplatin WEEKLY + Radiation  Plan Provider: Yannick Alva MD  Treatment goal: Curative  Line of treatment: First Line           Oncology History:   Lopez Hogue is a 68 year old male with a past medical history that includes HTN, CAD, MI (March of 2023 s/p PCI x2, on DAPT), and prior sweat gland cancer s/p surgical resection, now with recently diagnosed oropharyngeal cancer found to have a 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     Recent diagnosis was made after he coughed up some blood on 8/18/23. He presented to an ED where ENT evaluated and was felt to have a lesion of the vallecula. Full workup of this mass is detailed in the oncology history above. Biopsy on 10/12 confirmed an invasive SCC. PET/CT revealed a hypermetabolic lesion within the mid to distal esophagus. EGD was performed on 11/8 and also showed a squamous cell carcinoma. Given the EGD findings, this was felt to be a second primary.     11/21/23 - consult with Dr. Hill, thoracic surgery. Plan for neoadjuvant chemoradiation followed by laparoscopic staging and jejunostomy, followed by minimally invasive [place] esophagectomy.     Had EUS on 11/28 to completed the staging process. Partially obstructing and partially circumferential fungating mass in the middle third of the esophagus. Esophageal squamous cell carcinoma was staged T2 N1 (suspicious paraesophageal lymph node)     12/5/23 - Cycle 1 day 1 Taxol, carboplatin.     Presents today for follow up accompanied by his wife.      Interval History:   Appetite is good. Denies any difficulty swallowing. Had minimal nausea following chemo, resolved with PRN antiemetic. No bowel concerns, continues Senna PRN. Heartburn was slightly worse over the last week. At times it feels similar to the chest pain he experienced with his heart attack, took nitroglycerin once this week and pain didn't change.     Denies shortness of breath or cough. Continues to exercise regularly at  the gym. Denies fatigue.     No mouth sores, has a sensitive spot on left lower cheek. Using salt/soda rinses. Denies neuropathy.       Review of Systems:  Patient denies any of the following except if noted above: fevers, chills, difficulty with energy, vision or hearing changes, chest pain, dyspnea, abdominal pain, nausea, vomiting, diarrhea, constipation, urinary concerns, headaches, numbness, tingling, issues with sleep or mood. Also denies lumps, bumps, rashes or skin lesions, bleeding or bruising issues.        Social History:  Also actively involved in a program for those in recovery from substance use. Former heavy smoker (2 ppd) and drinker himself. Quit both in 2013. Other than his recent MI, he has been in generally good health. He has no history of autoimmune disease. He follows regularly with dermatology given his skin cancer history.       Physical Exam:  BP (!) 150/75 (BP Location: Right arm, Patient Position: Right side)   Pulse 94   Temp 97.8  F (36.6  C) (Oral)   Resp 16   Wt 79.9 kg (176 lb 3.2 oz)   SpO2 98%   BMI 26.86 kg/m    Wt Readings from Last 10 Encounters:   12/11/23 79.9 kg (176 lb 3.2 oz)   12/06/23 79.8 kg (176 lb)   12/05/23 79.9 kg (176 lb 1.6 oz)   11/30/23 78 kg (172 lb)   11/21/23 78.5 kg (173 lb 1.6 oz)   11/17/23 80.2 kg (176 lb 11.2 oz)   11/15/23 80.7 kg (178 lb)   11/06/23 77.2 kg (170 lb 4.8 oz)   10/27/23 75.7 kg (166 lb 12.8 oz)   10/26/23 75.7 kg (166 lb 12.8 oz)   General: The patient is a pleasant male in no acute distress.  HEENT: EOMI. Sclerae are anicteric. Mucous membranes moist without lesions or thrush.   Lymph: Neck is supple with no lymphadenopathy in the cervical or supraclavicular areas.   Heart: Systolic murmur present, regular rate  Lungs: Clear to auscultation bilaterally.   Abdomen: Bowel sounds present, soft, nontender with no palpable hepatosplenomegaly or masses.   Extremities: No lower extremity edema noted bilaterally.   Neuro: Cranial nerves II  through XII are grossly intact.  Skin: No rashes, petechiae, or bruising noted on exposed skin.        Labs:   Most Recent 3 CBC's:  Recent Labs   Lab Test 12/11/23  0641 12/05/23  0710 11/30/23  0457 11/17/23  1330   WBC 3.4* 6.7 5.3 6.7   HGB 12.9* 12.2* 12.5* 12.1*   MCV 89 90 92 92    163 161 172   ANEUTAUTO 3.2 4.9  --  4.3     Most Recent 3 BMP's:  Recent Labs   Lab Test 12/11/23  0641 12/05/23  0710 11/30/23  0457 11/17/23  1330    139 137 138   POTASSIUM 4.3 4.3 4.3 4.2   CHLORIDE 104 106 103 103   CO2 22 22 27 28   BUN 18.3 15.8 17.1 13.8   CR 0.64*  0.65* 0.72  0.72 0.84 0.77   ANIONGAP 12 11 7 7   RAFAELA 9.6 9.2 9.4 9.4   * 81 99 90   PROTTOTAL 7.4 7.2  --  6.9   ALBUMIN 4.4 4.2  --  4.2    Most Recent 3 LFT's:  Recent Labs   Lab Test 12/11/23  0641 12/05/23  0710 11/17/23  1330   AST 36 28 27   ALT 36 12 17   ALKPHOS 82 80 81   BILITOTAL 1.2 0.6 0.9    Most Recent 2 TSH and T4:No lab results found.  I reviewed the above labs today.      Imaging:    All pertinent imaging studies personally reviewed. Key findings summarized in oncology history above    Pathology:     10/12/23 R vallecular lesion biopsy:  Final Diagnosis   A&B. RIGHT VALLECULA LESION, BIOPSY:  - NON-KERATINIZING POORLY DIFFERENTIATED SQUAMOUS CELL CARCINOMA  - p16 is negative     11/8/23 Esophageal biopsy:  Final Diagnosis   A.  MID ESOPHAGEAL MASS, BIOPSIES:  -Nonkeratinizing poorly differentiated squamous cell carcinoma  -Negative for intestinal metaplasia/Alfredo's type mucosa     Addendum   RESULT FOR IMMUNOHISTOCHEMICAL VENTANA CLONE  PD-L1 ASSAY     - TUMOR PROPORTION SCORE (TPS): 5%  - INTERPRETATION: LOW EXPRESSOR PD-L1 EXPRESSION (TPS 1%-49%)  - COMBINED POSITIVE SCORE (CPS): 15         Assessment and Plan:   Lopez Hogue is a 68 year old male with HTN, CAD, prior sweat gland cancer s/p resection, and recently diagnosed head and neck squamous cell carcinoma of the vallecula, with staging revealing a  synchronous esophageal squamous cell carcinoma.    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 Had an infusion reaction with first dose of Taxol, including chest pressure and flushing. Tolerated re challenge without incident. No concerns over the last week. Minimal nausea controlled with PRN antiemetics.   -Today's labs reviewed with no concerns. Ok to proceed with day 8 today. He completed a dexamethasone prep last night and this morning. Will proceed with Taxol infusion at half the original rate today. If tolerated, will proceed without the dexamethasone prep and continue at original rate with dose given over 1 hour.   -Return to clinic weekly with labs + FRAN visit prior to chemo    Heartburn.   Currently taking famotidine 20mg BID. Heartburn was slightly worse over the last week. Can try Carafate, 1g 4x/day. Prescription sent to local pharmacy. Continue famotidine 20mg BID.        40 minutes spent on the date of the encounter doing chart review, review of test results, interpretation of tests, patient visit, and documentation       YADIRA Lazcano CNP

## 2023-12-11 NOTE — LETTER
12/11/2023         RE: Lopez Hogue  [address] [facility] [address]        Dear Colleague,    Thank you for referring your patient, Lopez Hogue, to the [facility]. Please see a copy of my visit note below.    [facility]   Return Patient Visit  Dec 11, 2023        Diagnosis: Oropharyngeal cancer, esophageal SCC  Stage:    Cancer Staging   Squamous cell carcinoma of vallecula (H)  Staging form: Pharynx - Oropharynx, AJCC 8th Edition  - Clinical stage from 10/12/2023: Stage JUAN MIGUEL (cT1, cN2b, cM0, p16-) - Signed by Yannick Alva MD on 11/21/2023    Molecular: p16 negative  Performance status: ECOG 0      Oncology History   Squamous cell carcinoma of vallecula (H)   8/18/2023 Imaging    CT neck:  Question soft tissue lesion within the right vallecula/ventral epiglottis.      10/12/2023 Pathology    R vallecula biopsy:  - NON-KERATINIZING POORLY DIFFERENTIATED SQUAMOUS CELL CARCINOMA  - p16 is negative     10/12/2023 -  Cancer Staged    Staging form: Pharynx - Oropharynx, AJCC 8th Edition  - Clinical stage from 10/12/2023: Stage JUAN MIGUEL (cT1, cN2b, cM0, p16-)     10/13/2023 Imaging    PET/CT:  1. 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     2. FDG avid thickening of the mid to distal esophagus, suspicious for a synchronous primary esophageal neoplasm. Recommend correlation with endoscopy.     11/6/2023 Initial Diagnosis    Squamous cell carcinoma of vallecula (H)     11/8/2023 Pathology    EGD with mid esophageal mass biopsy:  A.  MID ESOPHAGEAL MASS, BIOPSIES:  -Nonkeratinizing poorly differentiated squamous cell carcinoma  -Negative for intestinal metaplasia/Alfredo's type mucosa    PD-L1 CPS 15%             12/5/2023 -  Chemotherapy    OP ONC Esophageal Cancer - PACLitaxel / CARBOplatin WEEKLY + Radiation  Plan Provider: Yannick Alva MD  Treatment  goal: Curative  Line of treatment: First Line     Primary esophageal squamous cell carcinoma (H)   11/17/2023 Initial Diagnosis    Primary esophageal squamous cell carcinoma (H)     12/5/2023 -  Chemotherapy    OP ONC Esophageal Cancer - PACLitaxel / CARBOplatin WEEKLY + Radiation  Plan Provider: Yannick Alva MD  Treatment goal: Curative  Line of treatment: First Line           Oncology History:   Lopez Hogue is a 68 year old male with a past medical history that includes HTN, CAD, MI (March of 2023 s/p PCI x2, on DAPT), and prior sweat gland cancer s/p surgical resection, now with recently diagnosed oropharyngeal cancer found to have a 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     Recent diagnosis was made after he coughed up some blood on 8/18/23. He presented to an ED where ENT evaluated and was felt to have a lesion of the vallecula. Full workup of this mass is detailed in the oncology history above. Biopsy on 10/12 confirmed an invasive SCC. PET/CT revealed a hypermetabolic lesion within the mid to distal esophagus. EGD was performed on 11/8 and also showed a squamous cell carcinoma. Given the EGD findings, this was felt to be a second primary.     11/21/23 - consult with Dr. Hill, thoracic surgery. Plan for neoadjuvant chemoradiation followed by laparoscopic staging and jejunostomy, followed by minimally invasive [place] esophagectomy.     Had EUS on 11/28 to completed the staging process. Partially obstructing and partially circumferential fungating mass in the middle third of the esophagus. Esophageal squamous cell carcinoma was staged T2 N1 (suspicious paraesophageal lymph node)     12/5/23 - Cycle 1 day 1 Taxol, carboplatin.     Presents today for follow up accompanied by his wife.      Interval History:   Appetite is good. Denies any difficulty swallowing. Had minimal nausea following chemo, resolved with PRN antiemetic. No bowel concerns, continues Senna PRN. Heartburn was  slightly worse over the last week. At times it feels similar to the chest pain he experienced with his heart attack, took nitroglycerin once this week and pain didn't change.     Denies shortness of breath or cough. Continues to exercise regularly at the gym. Denies fatigue.     No mouth sores, has a sensitive spot on left lower cheek. Using salt/soda rinses. Denies neuropathy.       Review of Systems:  Patient denies any of the following except if noted above: fevers, chills, difficulty with energy, vision or hearing changes, chest pain, dyspnea, abdominal pain, nausea, vomiting, diarrhea, constipation, urinary concerns, headaches, numbness, tingling, issues with sleep or mood. Also denies lumps, bumps, rashes or skin lesions, bleeding or bruising issues.        Social History:  Also actively involved in a program for those in recovery from substance use. Former heavy smoker (2 ppd) and drinker himself. Quit both in 2013. Other than his recent MI, he has been in generally good health. He has no history of autoimmune disease. He follows regularly with dermatology given his skin cancer history.       Physical Exam:  BP (!) 150/75 (BP Location: Right arm, Patient Position: Right side)   Pulse 94   Temp 97.8  F (36.6  C) (Oral)   Resp 16   Wt 79.9 kg (176 lb 3.2 oz)   SpO2 98%   BMI 26.86 kg/m    Wt Readings from Last 10 Encounters:   12/11/23 79.9 kg (176 lb 3.2 oz)   12/06/23 79.8 kg (176 lb)   12/05/23 79.9 kg (176 lb 1.6 oz)   11/30/23 78 kg (172 lb)   11/21/23 78.5 kg (173 lb 1.6 oz)   11/17/23 80.2 kg (176 lb 11.2 oz)   11/15/23 80.7 kg (178 lb)   11/06/23 77.2 kg (170 lb 4.8 oz)   10/27/23 75.7 kg (166 lb 12.8 oz)   10/26/23 75.7 kg (166 lb 12.8 oz)   General: The patient is a pleasant male in no acute distress.  HEENT: EOMI. Sclerae are anicteric. Mucous membranes moist without lesions or thrush.   Lymph: Neck is supple with no lymphadenopathy in the cervical or supraclavicular areas.   Heart: Systolic  murmur present, regular rate  Lungs: Clear to auscultation bilaterally.   Abdomen: Bowel sounds present, soft, nontender with no palpable hepatosplenomegaly or masses.   Extremities: No lower extremity edema noted bilaterally.   Neuro: Cranial nerves II through XII are grossly intact.  Skin: No rashes, petechiae, or bruising noted on exposed skin.        Labs:   Most Recent 3 CBC's:  Recent Labs   Lab Test 12/11/23  0641 12/05/23  0710 11/30/23  0457 11/17/23  1330   WBC 3.4* 6.7 5.3 6.7   HGB 12.9* 12.2* 12.5* 12.1*   MCV 89 90 92 92    163 161 172   ANEUTAUTO 3.2 4.9  --  4.3     Most Recent 3 BMP's:  Recent Labs   Lab Test 12/11/23  0641 12/05/23  0710 11/30/23  0457 11/17/23  1330    139 137 138   POTASSIUM 4.3 4.3 4.3 4.2   CHLORIDE 104 106 103 103   CO2 22 22 27 28   BUN 18.3 15.8 17.1 13.8   CR 0.64*  0.65* 0.72  0.72 0.84 0.77   ANIONGAP 12 11 7 7   RAFAELA 9.6 9.2 9.4 9.4   * 81 99 90   PROTTOTAL 7.4 7.2  --  6.9   ALBUMIN 4.4 4.2  --  4.2    Most Recent 3 LFT's:  Recent Labs   Lab Test 12/11/23  0641 12/05/23  0710 11/17/23  1330   AST 36 28 27   ALT 36 12 17   ALKPHOS 82 80 81   BILITOTAL 1.2 0.6 0.9    Most Recent 2 TSH and T4:No lab results found.  I reviewed the above labs today.      Imaging:    All pertinent imaging studies personally reviewed. Key findings summarized in oncology history above    Pathology:     10/12/23 R vallecular lesion biopsy:  Final Diagnosis   A&B. RIGHT VALLECULA LESION, BIOPSY:  - NON-KERATINIZING POORLY DIFFERENTIATED SQUAMOUS CELL CARCINOMA  - p16 is negative     11/8/23 Esophageal biopsy:  Final Diagnosis   A.  MID ESOPHAGEAL MASS, BIOPSIES:  -Nonkeratinizing poorly differentiated squamous cell carcinoma  -Negative for intestinal metaplasia/Alfredo's type mucosa     Addendum   RESULT FOR IMMUNOHISTOCHEMICAL VENTANA CLONE  PD-L1 ASSAY     - TUMOR PROPORTION SCORE (TPS): 5%  - INTERPRETATION: LOW EXPRESSOR PD-L1 EXPRESSION (TPS 1%-49%)  - COMBINED  POSITIVE SCORE (CPS): 15         Assessment and Plan:   Lopez Hogue is a 68 year old male with HTN, CAD, prior sweat gland cancer s/p resection, and recently diagnosed head and neck squamous cell carcinoma of the vallecula, with staging revealing a synchronous esophageal squamous cell carcinoma.    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 Had an infusion reaction with first dose of Taxol, including chest pressure and flushing. Tolerated re challenge without incident. No concerns over the last week. Minimal nausea controlled with PRN antiemetics.   -Today's labs reviewed with no concerns. Ok to proceed with day 8 today. He completed a dexamethasone prep last night and this morning. Will proceed with Taxol infusion at half the original rate today. If tolerated, will proceed without the dexamethasone prep and continue at original rate with dose given over 1 hour.   -Return to clinic weekly with labs + FRAN visit prior to chemo    Heartburn.   Currently taking famotidine 20mg BID. Heartburn was slightly worse over the last week. Can try Carafate, 1g 4x/day. Prescription sent to local pharmacy. Continue famotidine 20mg BID.        40 minutes spent on the date of the encounter doing chart review, review of test results, interpretation of tests, patient visit, and documentation       YADIRA Lazcano CNP

## 2023-12-11 NOTE — NURSING NOTE
Chief Complaint   Patient presents with    Blood Draw     Labs drawn via piv start by lab RN. VS taken     Labs drawn from PIV placed by RN. Line flushed with saline. Vitals taken. Pt checked in for appointment(s).     Claire Boswell RN

## 2023-12-11 NOTE — PROGRESS NOTES
Infusion Nursing Note:  Lopez Hogue presents today for Cycle 1 Day 8 Paclitaxel and Carboplatin..    Patient seen by provider today: Yes: Joanna Ro NP   present during visit today: Not Applicable.    Note: Patient reports taking his dex prep starting last evening.    Patient tolerated his infusion today with a dex prep and infusing Taxol over 2 hours.  Per Joanna Ro, will try without the dex prep next week.  Relayed this information to patient who verbalized understanding.    Intravenous Access:  Peripheral IV placed in lab today.    Treatment Conditions:  Lab Results   Component Value Date    HGB 12.9 (L) 12/11/2023    WBC 3.4 (L) 12/11/2023    ANEUTAUTO 3.2 12/11/2023     12/11/2023        Lab Results   Component Value Date     12/11/2023    POTASSIUM 4.3 12/11/2023    MAG 1.7 11/30/2023    CR 0.65 (L) 12/11/2023    CR 0.64 (L) 12/11/2023    RAFAELA 9.6 12/11/2023    BILITOTAL 1.2 12/11/2023    ALBUMIN 4.4 12/11/2023    ALT 36 12/11/2023    AST 36 12/11/2023       Results reviewed, labs MET treatment parameters, ok to proceed with treatment.      Post Infusion Assessment:  Patient tolerated infusion without incident.  Blood return noted pre and post infusion.  Site patent and intact, free from redness, edema or discomfort.  No evidence of extravasations.  Access discontinued per protocol.     Discharge Plan:   Prescription refills given for Carafate and Pepcid.  Discharge instructions reviewed with: Patient.  Patient and/or family verbalized understanding of discharge instructions and all questions answered.  AVS to patient via [employer].  Patient will return 12/18/23 for next appointment.   Patient discharged in stable condition accompanied by: wife.  Departure Mode: Ambulatory.      FANY LIZARRAGA RN

## 2023-12-11 NOTE — NURSING NOTE
"2Oncology Rooming Note    December 11, 2023 6:57 AM   Lopez Hogue is a 68 year old male who presents for:    Chief Complaint   Patient presents with    Blood Draw     Labs drawn via piv start by lab RN. VS taken    Oncology Clinic Visit     Esophageal Cancer     Initial Vitals: BP (!) 150/75 (BP Location: Right arm, Patient Position: Right side)   Pulse 94   Temp 97.8  F (36.6  C) (Oral)   Resp 16   Wt 79.9 kg (176 lb 3.2 oz)   SpO2 98%   BMI 26.86 kg/m   Estimated body mass index is 26.86 kg/m  as calculated from the following:    Height as of 12/5/23: 1.725 m (5' 7.91\").    Weight as of this encounter: 79.9 kg (176 lb 3.2 oz). Body surface area is 1.96 meters squared.  No Pain (0) Comment: Data Unavailable   No LMP for male patient.  Allergies reviewed: Yes  Medications reviewed: Yes    Medications: Medication refills not needed today.  Pharmacy name entered into [employer]: [id number] - [place], [place] - [address] [place] [address]  AT [facility] [address]    Clinical concerns: none      Alice Barrientos, EMT  12/11/2023            "

## 2023-12-12 ENCOUNTER — APPOINTMENT (OUTPATIENT)
Dept: RADIATION ONCOLOGY | Facility: CLINIC | Age: 68
End: 2023-12-12
Attending: RADIOLOGY
Payer: COMMERCIAL

## 2023-12-12 DIAGNOSIS — C10.0 SQUAMOUS CELL CARCINOMA OF VALLECULA (H): Primary | ICD-10-CM

## 2023-12-12 DIAGNOSIS — C10.0 SQUAMOUS CELL CARCINOMA OF VALLECULA (H): ICD-10-CM

## 2023-12-12 DIAGNOSIS — C15.4 MALIGNANT NEOPLASM OF MIDDLE THIRD OF ESOPHAGUS (H): ICD-10-CM

## 2023-12-12 DIAGNOSIS — R13.10 DYSPHAGIA: ICD-10-CM

## 2023-12-12 PROCEDURE — 77386 HC IMRT TREATMENT DELIVERY, COMPLEX: CPT | Performed by: RADIOLOGY

## 2023-12-12 PROCEDURE — 97802 MEDICAL NUTRITION INDIV IN: CPT | Performed by: DIETITIAN, REGISTERED

## 2023-12-12 NOTE — LETTER
2023         RE: Lopez Hogue  [address] [facility] [address]        Dear Colleague,    Thank you for referring your patient, Lopez Hogue, to the [facility] RADIATION ONCOLOGY. Please see a copy of my visit note below.    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 Hogue presents to clinic today for his weekly on-treatment visit.  Overall, he feels well and has no concerns today.  He continues to eat a regular diet.    Nursing ROS:   Skin  Skin Reaction: 0 - No changes  Skin Progress: Aquaphor     ENT and Mouth Exam  Mucositis - Current: 0 - None      Gastrointestinal  Nausea: 0 - None     Psychosocial  Mood - Anxiety: 0 - Normal  Pain Assessment  0-10 Pain Scale: 0  Pain Treatment: Gabapentin    PEG Tube: No, if needed will be a jejunostomy tube  Electronic Cardiac Implant: No    Objective:   There were no vitals taken for this visit.  Gen: Appears well, NAD  HEENT: No mucositis  Skin: No erythema    Laboratory:  Lab Results   Component Value Date    WBC 3.4 (L) 2023    HGB 12.9 (L) 2023    HCT 37.7 (L) 2023    MCV 89 2023      12/11/2023     Lab Results   Component Value Date     12/11/2023    POTASSIUM 4.3 12/11/2023    CHLORIDE 104 12/11/2023    CO2 22 12/11/2023     (H) 12/11/2023     Lab Results   Component Value Date    AST 36 12/11/2023    ALT 36 12/11/2023    ALKPHOS 82 12/11/2023    BILITOTAL 1.2 12/11/2023     Magnesium   Date Value Ref Range Status   11/30/2023 1.7 1.7 - 2.3 mg/dL Final       Treatment-related toxicities (CTCAE v5.0):  Anorexia: Grade 0: No toxicity  Fatigue: Grade 0: No toxicity  Nausea: Grade 0: No toxicity  Pain: Grade 0: No toxicity  Dry mouth: Grade 0: No toxicity  Dysphagia: Grade 0: No toxicity  Mucositis: Grade 0: No toxicity  Dyspnea: Grade 0: No toxicity  Esophagitis: Grade 0: No toxicity  Dermatitis: Grade 0: No toxicity    ED visits/Hospitalizations: None    Missed Treatments: None    Mosaiq chart and setup information reviewed  IGRT images reviewed    Medication Review  Med list reviewed with patient?: Yes  Med list printed and given: Offered and declined    Assessment:    Mr. 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    He is tolerating chemoradiation well.    Plan:   1.  Continue treatment as planned  2.  Discussed recommended self cares and tapering up of gabapentin dose.      Akiko Sun  Department of Radiation Oncology  [facility]                 Again, thank you for allowing me to participate in the care of your patient.        Sincerely,        Akiko Sun MD

## 2023-12-12 NOTE — PROGRESS NOTES
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 Hogue presents to clinic today for his weekly on-treatment visit.  Overall, he feels well and has no concerns today.  He continues to eat a regular diet.    Nursing ROS:   Skin  Skin Reaction: 0 - No changes  Skin Progress: Aquaphor     ENT and Mouth Exam  Mucositis - Current: 0 - None      Gastrointestinal  Nausea: 0 - None     Psychosocial  Mood - Anxiety: 0 - Normal  Pain Assessment  0-10 Pain Scale: 0  Pain Treatment: Gabapentin    PEG Tube: No, if needed will be a jejunostomy tube  Electronic Cardiac Implant: No    Objective:   There were no vitals taken for this visit.  Gen: Appears well, NAD  HEENT: No mucositis  Skin: No erythema    Laboratory:  Lab Results   Component Value Date    WBC 3.4 (L) 2023    HGB 12.9 (L) 2023    HCT 37.7 (L) 2023    MCV 89 2023     2023     Lab Results   Component Value Date     2023    POTASSIUM 4.3 2023    CHLORIDE 104 2023    CO2 22 2023     (H) 2023     Lab Results   Component Value Date    AST 36 2023    ALT 36  12/11/2023    ALKPHOS 82 12/11/2023    BILITOTAL 1.2 12/11/2023     Magnesium   Date Value Ref Range Status   11/30/2023 1.7 1.7 - 2.3 mg/dL Final       Treatment-related toxicities (CTCAE v5.0):  Anorexia: Grade 0: No toxicity  Fatigue: Grade 0: No toxicity  Nausea: Grade 0: No toxicity  Pain: Grade 0: No toxicity  Dry mouth: Grade 0: No toxicity  Dysphagia: Grade 0: No toxicity  Mucositis: Grade 0: No toxicity  Dyspnea: Grade 0: No toxicity  Esophagitis: Grade 0: No toxicity  Dermatitis: Grade 0: No toxicity    ED visits/Hospitalizations: None    Missed Treatments: None    Mosaiq chart and setup information reviewed  IGRT images reviewed    Medication Review  Med list reviewed with patient?: Yes  Med list printed and given: Offered and declined    Assessment:    Mr. 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    He is tolerating chemoradiation well.    Plan:   1.  Continue treatment as planned  2.  Discussed recommended self cares and tapering up of gabapentin dose.      Akiko Sun  Department of Radiation Oncology  [facility]

## 2023-12-12 NOTE — PROGRESS NOTES
CLINICAL NUTRITION SERVICES - ASSESSMENT NOTE    Lopez Hogue 68 year old referred for MNT related to esophageal cancer     Time Spent: 30 minutes  Visit Type: initial  Pt accompanied by: self  Referring Physician: Dino 11/17  R13.10 (ICD-10-CM) - Dysphagia   C10.0 (ICD-10-CM) - Squamous cell carcinoma of vallecula (H)       NUTRITION HISTORY  Factors affecting nutrition intake include:none at this time  Current diet/appetite: general diet/good appetite and intake  Chemotherapy: started 12/5 paclitaxol, carboplatin  Radiation: started 12/5  Surgery history:   Past Surgical History:   Procedure Laterality Date    ESOPHAGOSCOPY, GASTROSCOPY, DUODENOSCOPY (EGD), COMBINED N/A 11/8/2023    Procedure: ESOPHAGOGASTRODUODENOSCOPY, WITH BIOPSY;  Surgeon: Shahriar Giraldo MD;  Location:  GI    LARYNGOSCOPY WITH BIOPSY(IES) N/A 10/12/2023    Procedure: LARYNGOSCOPY, WITH BIOPSY;  Surgeon: Edi Felix MD;  Location:  OR    OTHER SURGICAL HISTORY  2015    WIDE EXCISION OF LEFT GLUTEAL MASSTNM: [id number], stage: II hidradenocarcinoma Grade II, 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  Patient receptive to FT placement if ordered by team. He is familiar with J tube feeds.     Fabricio denies barriers to eating at this time.    He has been doing his preventative soda/salt rinses at least 10 times a day.   He has been drinking at least 24 oz electrolytes and >50 oz water/day.   He is eating at least 3 meals/day and including snacks.    He admits to working on gaining weight prior to treatment.  He has been able to gain ~13 lbs.     Patient Medical History  Past Medical History:   Diagnosis Date    EtOH dependence (H)     Quit drinking 10 years    Heart attack (H)     Hypertension     Nonrheumatic aortic (valve) stenosis     Sweat gland carcinoma        Current Medications    Current Outpatient Medications:     acetaminophen (TYLENOL) 500 MG tablet,  Take 500-1,000 mg by mouth every 8 hours as needed for fever or pain, Disp: , Rfl:     aspirin (ASA) 81 MG EC tablet, Take 1 tablet (81 mg) by mouth daily Start tomorrow., Disp: 30 tablet, Rfl: 3    clopidogrel (PLAVIX) 75 MG tablet, Take 1 tablet (75 mg) by mouth daily Resume home plavix 10/14, Disp: , Rfl:     cyanocobalamin (VITAMIN B-12) 1000 MCG tablet, Take 1,000 mcg by mouth every evening, Disp: , Rfl:     dexAMETHasone (DECADRON) 4 MG tablet, Take 2 tablets the night before next chemo infusion, take 2 tablets the morning of prior to coming to chemo., Disp: 4 tablet, Rfl: 1    famotidine (PEPCID) 20 MG tablet, Take 1 tablet (20 mg) by mouth 2 times daily, Disp: 60 tablet, Rfl: 3    gabapentin (NEURONTIN) 300 MG capsule, Take 3 capsules (900 mg) by mouth 3 times daily Follow instructions given in clinic to taper dose up to 900 mg tid, Disp: 270 capsule, Rfl: 4    nitroGLYcerin (NITROSTAT) 0.4 MG sublingual tablet, PLACE 1 TABLET UNDER THE TONGUE EVERY 5 MINUTES FOR CHEST PAIN FOR 3 DOSES. IF SYMPTOMS PERSIST 5 MINUTES AFTER 1ST DOSE CALL 911. (Patient not taking: Reported on 12/5/2023), Disp: 25 tablet, Rfl: 1    Omega-3 Fatty Acids (FISH OIL) 1200 MG capsule, Take 1,200 mg by mouth every evening, Disp: , Rfl:     prochlorperazine (COMPAZINE) 10 MG tablet, Take 1 tablet (10 mg) by mouth every 6 hours as needed for nausea or vomiting (Patient not taking: Reported on 12/5/2023), Disp: 30 tablet, Rfl: 2    rosuvastatin (CRESTOR) 40 MG tablet, Take 1 tablet (40 mg) by mouth daily (Patient taking differently: Take 40 mg by mouth every evening), Disp: 90 tablet, Rfl: 3    sertraline (ZOLOFT) 100 MG tablet, Take 100 mg by mouth every morning, Disp: , Rfl:     sucralfate (CARAFATE) 1 GM/10ML suspension, Take 10 mLs (1 g) by mouth 4 times daily, Disp: 414 mL, Rfl: 1  No current facility-administered medications for this visit.    Facility-Administered Medications Ordered in Other Visits:     iodixanol (VISIPAQUE 320)  injection, , , Once PRN, Mkii Etienne MD, 73 mL at 05/09/23 1212    Medications have been reviewed.    Pertinent lab results:    Urea Nitrogen   Date Value Ref Range Status   12/11/2023 18.3 8.0 - 23.0 mg/dL Final     Potassium   Date Value Ref Range Status   12/11/2023 4.3 3.4 - 5.3 mmol/L Final     Treatment Plan:  Oncology History   Squamous cell carcinoma of vallecula (H)   8/18/2023 Imaging    CT neck:  Question soft tissue lesion within the right vallecula/ventral epiglottis.      10/12/2023 Pathology    R vallecula biopsy:  - NON-KERATINIZING POORLY DIFFERENTIATED SQUAMOUS CELL CARCINOMA  - p16 is negative     10/12/2023 -  Cancer Staged    Staging form: Pharynx - Oropharynx, AJCC 8th Edition  - Clinical stage from 10/12/2023: Stage JUAN MIGUEL (cT1, cN2b, cM0, p16-)     10/13/2023 Imaging    PET/CT:  1. 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     2. FDG avid thickening of the mid to distal esophagus, suspicious for a synchronous primary esophageal neoplasm. Recommend correlation with endoscopy.     11/6/2023 Initial Diagnosis    Squamous cell carcinoma of vallecula (H)     11/8/2023 Pathology    EGD with mid esophageal mass biopsy:  A.  MID ESOPHAGEAL MASS, BIOPSIES:  -Nonkeratinizing poorly differentiated squamous cell carcinoma  -Negative for intestinal metaplasia/Alfredo's type mucosa    PD-L1 CPS 15%             12/5/2023 -  Chemotherapy    OP ONC Esophageal Cancer - PACLitaxel / CARBOplatin WEEKLY + Radiation  Plan Provider: Yannick Alva MD  Treatment goal: Curative  Line of treatment: First Line     Primary esophageal squamous cell carcinoma (H)   11/17/2023 Initial Diagnosis    Primary esophageal squamous cell carcinoma (H)     12/5/2023 -  Chemotherapy    OP ONC Esophageal Cancer - PACLitaxel / CARBOplatin WEEKLY + Radiation  Plan Provider: Yannick Alva MD  Treatment goal: Curative  Line of treatment: First  "Line       Treatment plan has been reviewed.    ANTHROPOMETRICS  Height: 67\"  Weight: 176 lbs/79kg  BMI: 26  Weight Status:  Overweight BMI 25-29.9  IBW: 148 lbs (118%)  Weight History: stable   Wt Readings from Last 10 Encounters:   12/11/23 79.9 kg (176 lb 3.2 oz)   12/06/23 79.8 kg (176 lb)   12/05/23 79.9 kg (176 lb 1.6 oz)   11/30/23 78 kg (172 lb)   11/21/23 78.5 kg (173 lb 1.6 oz)   11/17/23 80.2 kg (176 lb 11.2 oz)   11/15/23 80.7 kg (178 lb)   11/06/23 77.2 kg (170 lb 4.8 oz)   10/27/23 75.7 kg (166 lb 12.8 oz)   10/26/23 75.7 kg (166 lb 12.8 oz)     Dosing Weight: 79kg    Medications/vitamins/minerals/herbals:   Reviewed    Labs:   Labs reviewed    NUTRITION FOCUSED PHYSICAL ASSESSMENT FOR DIAGNOSING MALNUTRITION:  Consult for education only      ASSESSED NUTRITION NEEDS:  Estimated Energy Needs: 3947-6400 kcals (30-35 Kcal/Kg)  Justification: maintenance and increased needs with CRT  Estimated Protein Needs:  grams protein (1.2-1.5 g pro/Kg)  Justification: increased needs with CRT  Estimated Fluid Needs: 2500  mL   Justification: increased needs with CRT    NUTRITION DIAGNOSIS:  No nutrition diagnosis identified at this time     INTERVENTIONS  Provided written & verbal education:     -Discussed the role of nutrition during radiation treatment.  Discussed principles of weight maintenance and oral intake recommendations for patients undergoing cancer treatment.  Reviewed the importance of maintaining current weight (within 5%, not more than 2 lb weight loss each week) during course of treatment and the recommendation of of tube feeding if unable to maintain weight with oral intake.  Patient receptive to prophylactic J tube.   - Reviewed nutrition and hydration needs. Encouraged pt to aim for at least 2400kcal and 100g protein, 10 cups non-caffeine containing beverages (water/electrolytes) daily.    - Discussed strategies to help fortify meals and snacks. Encouraged to focus on small, frequent meals.  "   - Reviewed sources of protein. Encouraged to have a protein source with each meal and snack.    - Reviewed common barriers to eating with cancer treatment.  -Reviewed Oral Nutrition shake options:   Hipster Milk: regular whole milk 150 jayson, 13g protein (red jug)  Hipster Nutrition Plan shakes: 150 jayson, 30g protein  Boost Very High Calorie (VHC): 8 oz 530 calories, 22g protein  Boost Plus:  8 oz 350 calories, 15g protein  Ensure Plus: 8 oz 350 calories, 15g protein  Ensure Complete: 11 oz 350 calories, 30g protein  Encouraged utilizing these ONS in home made shakes/smoothies to prevent flavor fatigue.        Provided pt with corresponding education materials/handouts on:  [employer] High jayson/High protein recipes, Tips to increase calories in your diet, Sources of Protein, Nutrition Considerations for patients with Head/neck cancer treatment, Soft/Moist sources of protein, Electrolyte hydration options    Pt verbalize understanding of materials provided during consult.   Patient Understanding: good  Expected patient engagement: good     Goals  1.  Aim for 5-6 small frequent meals  2.  Aim for 2000-2400kcal and 95-120g protein/day  3. Weight maintenance     Follow-Up Plans: Pt has RD contact information for questions.  Two week follow up, 12/26    MONITORING AND EVALUATION:  -Food/beverage intake  -Weight trends    Patito Smyth RD, , LD  [facility]  [phone]

## 2023-12-13 ENCOUNTER — APPOINTMENT (OUTPATIENT)
Dept: RADIATION ONCOLOGY | Facility: CLINIC | Age: 68
End: 2023-12-13
Attending: RADIOLOGY
Payer: COMMERCIAL

## 2023-12-13 PROCEDURE — 77386 HC IMRT TREATMENT DELIVERY, COMPLEX: CPT | Performed by: RADIOLOGY

## 2023-12-13 PROCEDURE — 77014 PR CT GUIDE FOR PLACEMENT RADIATION THERAPY FIELDS: CPT | Mod: 26 | Performed by: RADIOLOGY

## 2023-12-14 ENCOUNTER — APPOINTMENT (OUTPATIENT)
Dept: RADIATION ONCOLOGY | Facility: CLINIC | Age: 68
End: 2023-12-14
Attending: RADIOLOGY
Payer: COMMERCIAL

## 2023-12-14 PROCEDURE — 77014 PR CT GUIDE FOR PLACEMENT RADIATION THERAPY FIELDS: CPT | Mod: 26 | Performed by: RADIOLOGY

## 2023-12-14 PROCEDURE — 77386 HC IMRT TREATMENT DELIVERY, COMPLEX: CPT | Performed by: RADIOLOGY

## 2023-12-15 ENCOUNTER — APPOINTMENT (OUTPATIENT)
Dept: RADIATION ONCOLOGY | Facility: CLINIC | Age: 68
End: 2023-12-15
Attending: RADIOLOGY
Payer: COMMERCIAL

## 2023-12-15 PROCEDURE — 77014 PR CT GUIDE FOR PLACEMENT RADIATION THERAPY FIELDS: CPT | Mod: 26 | Performed by: RADIOLOGY

## 2023-12-15 PROCEDURE — 77427 RADIATION TX MANAGEMENT X5: CPT | Performed by: RADIOLOGY

## 2023-12-15 PROCEDURE — 77386 HC IMRT TREATMENT DELIVERY, COMPLEX: CPT | Performed by: RADIOLOGY

## 2023-12-15 PROCEDURE — 77336 RADIATION PHYSICS CONSULT: CPT | Performed by: RADIOLOGY

## 2023-12-18 ENCOUNTER — APPOINTMENT (OUTPATIENT)
Dept: RADIATION ONCOLOGY | Facility: CLINIC | Age: 68
End: 2023-12-18
Attending: RADIOLOGY
Payer: COMMERCIAL

## 2023-12-18 PROCEDURE — 77014 PR CT GUIDE FOR PLACEMENT RADIATION THERAPY FIELDS: CPT | Mod: 26 | Performed by: RADIOLOGY

## 2023-12-18 PROCEDURE — 77386 HC IMRT TREATMENT DELIVERY, COMPLEX: CPT | Performed by: RADIOLOGY

## 2023-12-19 ENCOUNTER — INFUSION THERAPY VISIT (OUTPATIENT)
Dept: ONCOLOGY | Facility: CLINIC | Age: 68
End: 2023-12-19
Attending: STUDENT IN AN ORGANIZED HEALTH CARE EDUCATION/TRAINING PROGRAM
Payer: COMMERCIAL

## 2023-12-19 ENCOUNTER — ONCOLOGY VISIT (OUTPATIENT)
Dept: ONCOLOGY | Facility: CLINIC | Age: 68
End: 2023-12-19
Attending: NURSE PRACTITIONER
Payer: COMMERCIAL

## 2023-12-19 ENCOUNTER — APPOINTMENT (OUTPATIENT)
Dept: LAB | Facility: CLINIC | Age: 68
End: 2023-12-19
Attending: STUDENT IN AN ORGANIZED HEALTH CARE EDUCATION/TRAINING PROGRAM
Payer: COMMERCIAL

## 2023-12-19 ENCOUNTER — APPOINTMENT (OUTPATIENT)
Dept: RADIATION ONCOLOGY | Facility: CLINIC | Age: 68
End: 2023-12-19
Attending: RADIOLOGY
Payer: COMMERCIAL

## 2023-12-19 ENCOUNTER — THERAPY VISIT (OUTPATIENT)
Dept: SPEECH THERAPY | Facility: CLINIC | Age: 68
End: 2023-12-19
Payer: COMMERCIAL

## 2023-12-19 VITALS
RESPIRATION RATE: 18 BRPM | BODY MASS INDEX: 27.99 KG/M2 | DIASTOLIC BLOOD PRESSURE: 69 MMHG | WEIGHT: 183.6 LBS | TEMPERATURE: 97.5 F | HEART RATE: 77 BPM | OXYGEN SATURATION: 99 % | SYSTOLIC BLOOD PRESSURE: 144 MMHG

## 2023-12-19 VITALS
BODY MASS INDEX: 27.9 KG/M2 | WEIGHT: 183 LBS | DIASTOLIC BLOOD PRESSURE: 74 MMHG | SYSTOLIC BLOOD PRESSURE: 140 MMHG | HEART RATE: 78 BPM

## 2023-12-19 DIAGNOSIS — Z13.220 SCREENING FOR CHOLESTEROL LEVEL: ICD-10-CM

## 2023-12-19 DIAGNOSIS — K12.30 MUCOSITIS: Primary | ICD-10-CM

## 2023-12-19 DIAGNOSIS — R13.12 OROPHARYNGEAL DYSPHAGIA: ICD-10-CM

## 2023-12-19 DIAGNOSIS — C10.0 SQUAMOUS CELL CARCINOMA OF VALLECULA (H): Primary | ICD-10-CM

## 2023-12-19 DIAGNOSIS — C15.4 MALIGNANT NEOPLASM OF MIDDLE THIRD OF ESOPHAGUS (H): ICD-10-CM

## 2023-12-19 DIAGNOSIS — C10.0 SQUAMOUS CELL CARCINOMA OF VALLECULA (H): ICD-10-CM

## 2023-12-19 DIAGNOSIS — C15.9 PRIMARY ESOPHAGEAL SQUAMOUS CELL CARCINOMA (H): Primary | ICD-10-CM

## 2023-12-19 DIAGNOSIS — C15.9 PRIMARY ESOPHAGEAL SQUAMOUS CELL CARCINOMA (H): ICD-10-CM

## 2023-12-19 LAB
ALBUMIN SERPL BCG-MCNC: 4 G/DL (ref 3.5–5.2)
ALP SERPL-CCNC: 82 U/L (ref 40–150)
ALT SERPL W P-5'-P-CCNC: 31 U/L (ref 0–70)
ANION GAP SERPL CALCULATED.3IONS-SCNC: 7 MMOL/L (ref 7–15)
AST SERPL W P-5'-P-CCNC: 32 U/L (ref 0–45)
BASOPHILS # BLD AUTO: 0 10E3/UL (ref 0–0.2)
BASOPHILS NFR BLD AUTO: 0 %
BILIRUB SERPL-MCNC: 0.6 MG/DL
BUN SERPL-MCNC: 17.9 MG/DL (ref 8–23)
CALCIUM SERPL-MCNC: 9.1 MG/DL (ref 8.8–10.2)
CHLORIDE SERPL-SCNC: 107 MMOL/L (ref 98–107)
CREAT SERPL-MCNC: 0.7 MG/DL (ref 0.67–1.17)
DEPRECATED HCO3 PLAS-SCNC: 27 MMOL/L (ref 22–29)
EGFRCR SERPLBLD CKD-EPI 2021: >90 ML/MIN/1.73M2
EOSINOPHIL # BLD AUTO: 0 10E3/UL (ref 0–0.7)
EOSINOPHIL NFR BLD AUTO: 0 %
ERYTHROCYTE [DISTWIDTH] IN BLOOD BY AUTOMATED COUNT: 12.9 % (ref 10–15)
GLUCOSE SERPL-MCNC: 90 MG/DL (ref 70–99)
HCT VFR BLD AUTO: 31.4 % (ref 40–53)
HGB BLD-MCNC: 10.8 G/DL (ref 13.3–17.7)
IMM GRANULOCYTES # BLD: 0 10E3/UL
IMM GRANULOCYTES NFR BLD: 1 %
LYMPHOCYTES # BLD AUTO: 0.4 10E3/UL (ref 0.8–5.3)
LYMPHOCYTES NFR BLD AUTO: 19 %
MCH RBC QN AUTO: 31.5 PG (ref 26.5–33)
MCHC RBC AUTO-ENTMCNC: 34.4 G/DL (ref 31.5–36.5)
MCV RBC AUTO: 92 FL (ref 78–100)
MONOCYTES # BLD AUTO: 0.3 10E3/UL (ref 0–1.3)
MONOCYTES NFR BLD AUTO: 11 %
NEUTROPHILS # BLD AUTO: 1.7 10E3/UL (ref 1.6–8.3)
NEUTROPHILS NFR BLD AUTO: 69 %
NRBC # BLD AUTO: 0 10E3/UL
NRBC BLD AUTO-RTO: 1 /100
PLATELET # BLD AUTO: 141 10E3/UL (ref 150–450)
POTASSIUM SERPL-SCNC: 3.9 MMOL/L (ref 3.4–5.3)
PROT SERPL-MCNC: 6.7 G/DL (ref 6.4–8.3)
RBC # BLD AUTO: 3.43 10E6/UL (ref 4.4–5.9)
SODIUM SERPL-SCNC: 141 MMOL/L (ref 135–145)
WBC # BLD AUTO: 2.4 10E3/UL (ref 4–11)

## 2023-12-19 PROCEDURE — 96413 CHEMO IV INFUSION 1 HR: CPT

## 2023-12-19 PROCEDURE — 258N000003 HC RX IP 258 OP 636: Performed by: STUDENT IN AN ORGANIZED HEALTH CARE EDUCATION/TRAINING PROGRAM

## 2023-12-19 PROCEDURE — 77386 HC IMRT TREATMENT DELIVERY, COMPLEX: CPT | Performed by: RADIOLOGY

## 2023-12-19 PROCEDURE — 96417 CHEMO IV INFUS EACH ADDL SEQ: CPT

## 2023-12-19 PROCEDURE — 250N000011 HC RX IP 250 OP 636: Performed by: STUDENT IN AN ORGANIZED HEALTH CARE EDUCATION/TRAINING PROGRAM

## 2023-12-19 PROCEDURE — 92526 ORAL FUNCTION THERAPY: CPT | Mod: GN | Performed by: SPEECH-LANGUAGE PATHOLOGIST

## 2023-12-19 PROCEDURE — 85025 COMPLETE CBC W/AUTO DIFF WBC: CPT | Performed by: STUDENT IN AN ORGANIZED HEALTH CARE EDUCATION/TRAINING PROGRAM

## 2023-12-19 PROCEDURE — 80053 COMPREHEN METABOLIC PANEL: CPT

## 2023-12-19 PROCEDURE — 99213 OFFICE O/P EST LOW 20 MIN: CPT | Mod: 25 | Performed by: NURSE PRACTITIONER

## 2023-12-19 PROCEDURE — 96375 TX/PRO/DX INJ NEW DRUG ADDON: CPT

## 2023-12-19 PROCEDURE — 36415 COLL VENOUS BLD VENIPUNCTURE: CPT | Performed by: STUDENT IN AN ORGANIZED HEALTH CARE EDUCATION/TRAINING PROGRAM

## 2023-12-19 PROCEDURE — 99214 OFFICE O/P EST MOD 30 MIN: CPT | Performed by: NURSE PRACTITIONER

## 2023-12-19 PROCEDURE — 250N000013 HC RX MED GY IP 250 OP 250 PS 637: Performed by: STUDENT IN AN ORGANIZED HEALTH CARE EDUCATION/TRAINING PROGRAM

## 2023-12-19 RX ORDER — HEPARIN SODIUM (PORCINE) LOCK FLUSH IV SOLN 100 UNIT/ML 100 UNIT/ML
5 SOLUTION INTRAVENOUS
Status: DISCONTINUED | OUTPATIENT
Start: 2023-12-19 | End: 2023-12-19 | Stop reason: HOSPADM

## 2023-12-19 RX ORDER — HEPARIN SODIUM,PORCINE 10 UNIT/ML
5-20 VIAL (ML) INTRAVENOUS DAILY PRN
Status: DISCONTINUED | OUTPATIENT
Start: 2023-12-19 | End: 2023-12-19 | Stop reason: HOSPADM

## 2023-12-19 RX ORDER — DIPHENHYDRAMINE HCL 25 MG
50 CAPSULE ORAL ONCE
Status: COMPLETED | OUTPATIENT
Start: 2023-12-19 | End: 2023-12-19

## 2023-12-19 RX ORDER — CHLORHEXIDINE GLUCONATE ORAL RINSE 1.2 MG/ML
SOLUTION DENTAL
COMMUNITY
Start: 2023-11-30 | End: 2024-04-17

## 2023-12-19 RX ORDER — SODIUM FLUORIDE 5 MG/G
GEL, DENTIFRICE DENTAL AT BEDTIME
COMMUNITY
Start: 2023-11-30

## 2023-12-19 RX ADMIN — CARBOPLATIN 250 MG: 600 INJECTION, SOLUTION INTRAVENOUS at 11:10

## 2023-12-19 RX ADMIN — PACLITAXEL 98 MG: 6 INJECTION, SOLUTION INTRAVENOUS at 10:08

## 2023-12-19 RX ADMIN — DEXAMETHASONE SODIUM PHOSPHATE: 10 INJECTION, SOLUTION INTRAMUSCULAR; INTRAVENOUS at 09:23

## 2023-12-19 RX ADMIN — DIPHENHYDRAMINE HYDROCHLORIDE 50 MG: 25 CAPSULE ORAL at 09:11

## 2023-12-19 RX ADMIN — FAMOTIDINE 20 MG: 10 INJECTION, SOLUTION INTRAVENOUS at 09:20

## 2023-12-19 RX ADMIN — SODIUM CHLORIDE 250 ML: 9 INJECTION, SOLUTION INTRAVENOUS at 09:20

## 2023-12-19 ASSESSMENT — PAIN SCALES - GENERAL: PAINLEVEL: NO PAIN (0)

## 2023-12-19 NOTE — NURSING NOTE
"Oncology Rooming Note    December 19, 2023 8:18 AM   Lopez Hogue is a 68 year old male who presents for:    Chief Complaint   Patient presents with    Blood Draw     Labs drawn via PIV placed by RN. VS taken.    Oncology Clinic Visit     Eosphageal Cancer     Initial Vitals: BP (!) 144/69 (BP Location: Right arm, Patient Position: Sitting, Cuff Size: Adult Large)   Pulse 77   Temp 97.5  F (36.4  C) (Oral)   Resp 18   Wt 83.3 kg (183 lb 9.6 oz)   SpO2 99%   BMI 27.99 kg/m   Estimated body mass index is 27.99 kg/m  as calculated from the following:    Height as of 12/5/23: 1.725 m (5' 7.91\").    Weight as of this encounter: 83.3 kg (183 lb 9.6 oz). Body surface area is 2 meters squared.  No Pain (0) Comment: intermittent when swallowing   No LMP for male patient.  Allergies reviewed: Yes  Medications reviewed: Yes    Medications: Medication refills not needed today.  Pharmacy name entered into myde[facility]: [id number] - [address]  AT [facility] [address]    Clinical concerns: None       Donna Suh LPN  12/19/2023                "

## 2023-12-19 NOTE — NURSING NOTE
Chief Complaint   Patient presents with    Blood Draw     Labs drawn via PIV placed by RN. VS taken.     Labs drawn from PIV placed by RN. Line flushed with saline. Vitals taken. Pt checked in for appointment(s).     Did not collect Lipid fasting panel due to patient not fasting; FYI message sent to provider appt.    Grace Rios RN

## 2023-12-19 NOTE — PROGRESS NOTES
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      Recent diagnosis was made after he coughed up some blood on 8/18/23. He presented to an ED where ENT evaluated and was felt to have a lesion of the vallecula. Full workup of this mass is detailed in the oncology history above. Biopsy on 10/12 confirmed an invasive SCC. PET/CT revealed a hypermetabolic lesion within the mid to distal esophagus. EGD was performed on 11/8 and also showed a squamous cell carcinoma. Given the EGD findings, this was felt to be a second primary.      11/21/23 - consult with Dr. Hill, thoracic surgery. Plan for neoadjuvant chemoradiation followed by laparoscopic staging and jejunostomy, followed by minimally invasive Feliberto-Elbert esophagectomy.      Had EUS on 11/28 to completed the staging process. Partially obstructing and partially circumferential fungating mass in the middle third of the esophagus. Esophageal squamous cell carcinoma was staged T2 N1 (suspicious paraesophageal lymph node)     12/5/23: Start of chemoradiation with Carbo/Taxol. Infusion reaction with Taxol w/ dose 1. Tolerated rechallenge    Interval history:   Fabricio is here with his son today. His throat is starting to get sore. Having more pain with swallowing to the point where he is sometimes avoiding swallowing saliva.  Is able to swallow pills and food ok. Taking gabapentin, has not started taking any other analgesics  -heartburn is well controlled with carafate and famotidine  -no recurrent episodes of chest pain.  -no fevers or infection concerns  -no shortness of breath  -has rare nausea, very mild, uses an  antiemetic about once a week with good effect  -bowels are more regular now with the use of senna  -no rashes, skin or nail changes.    Current Outpatient Medications   Medication Sig Dispense Refill    chlorhexidine (PERIDEX) 0.12 % solution SWISH AND SPIT 15 ML BY MOUTH TWICE DAILY AFTER BRUSHING. NOTHING BY MOUTH FOR 30 MINUTES AFTERWARDS      sodium fluoride dental gel (PREVIDENT) 1.1 % GEL topical gel AFTER BRUSHING AT NIGHT APPLY A THIN LAYER ON THE FLUORIDE TRAY AND WEAR FOR 15 MINUTES EVERY NIGHT. DO NOT EAT OR DRINK FOR 30 MINUTES AFTER      acetaminophen (TYLENOL) 500 MG tablet Take 500-1,000 mg by mouth every 8 hours as needed for fever or pain      aspirin (ASA) 81 MG EC tablet Take 1 tablet (81 mg) by mouth daily Start tomorrow. 30 tablet 3    clopidogrel (PLAVIX) 75 MG tablet Take 1 tablet (75 mg) by mouth daily Resume home plavix 10/14      cyanocobalamin (VITAMIN B-12) 1000 MCG tablet Take 1,000 mcg by mouth every evening      dexAMETHasone (DECADRON) 4 MG tablet Take 2 tablets the night before next chemo infusion, take 2 tablets the morning of prior to coming to chemo. 4 tablet 1    famotidine (PEPCID) 20 MG tablet Take 1 tablet (20 mg) by mouth 2 times daily 60 tablet 3    gabapentin (NEURONTIN) 300 MG capsule Take 3 capsules (900 mg) by mouth 3 times daily Follow instructions given in clinic to taper dose up to 900 mg tid 270 capsule 4    nitroGLYcerin (NITROSTAT) 0.4 MG sublingual tablet PLACE 1 TABLET UNDER THE TONGUE EVERY 5 MINUTES FOR CHEST PAIN FOR 3 DOSES. IF SYMPTOMS PERSIST 5 MINUTES AFTER 1ST DOSE CALL 911. 25 tablet 1    Omega-3 Fatty Acids (FISH OIL) 1200 MG capsule Take 1,200 mg by mouth every evening      prochlorperazine (COMPAZINE) 10 MG tablet Take 1 tablet (10 mg) by mouth every 6 hours as needed for nausea or vomiting 30 tablet 2    rosuvastatin (CRESTOR) 40 MG tablet Take 1 tablet (40 mg) by mouth daily (Patient taking differently: Take 40 mg by mouth every evening) 90 tablet 3     sertraline (ZOLOFT) 100 MG tablet Take 100 mg by mouth every morning      sucralfate (CARAFATE) 1 GM/10ML suspension Take 10 mLs (1 g) by mouth 4 times daily 414 mL 1          Allergies   Allergen Reactions    Coconut Flavor Anaphylaxis     Raw coconut         Exam: alert,appears well. Blood pressure (!) 144/69, pulse 77, temperature 97.5  F (36.4  C), temperature source Oral, resp. rate 18, weight 83.3 kg (183 lb 9.6 oz), SpO2 99%.  Wt Readings from Last 4 Encounters:   12/19/23 83.3 kg (183 lb 9.6 oz)   12/11/23 79.9 kg (176 lb 3.2 oz)   12/06/23 79.8 kg (176 lb)   12/05/23 79.9 kg (176 lb 1.6 oz)     Oropharynx is moist, erythema noted through the posterior pharynx. Neck supple and without adenopathy. Lungs:CTA. Heart: RRR, II/IV soft systolic murmur.. Abdomen: soft, nontender, BS active, no masses or organomegaly.  Extremities: warm, no edema. Speech is clear. CN wnl. Gait/station wnl.        Labs:    Latest Reference Range & Units 12/19/23 06:56   Sodium 135 - 145 mmol/L 141   Potassium 3.4 - 5.3 mmol/L 3.9   Chloride 98 - 107 mmol/L 107   Carbon Dioxide (CO2) 22 - 29 mmol/L 27   Urea Nitrogen 8.0 - 23.0 mg/dL 17.9   Creatinine 0.67 - 1.17 mg/dL 0.70   GFR Estimate >60 mL/min/1.73m2 >90   Calcium 8.8 - 10.2 mg/dL 9.1   Anion Gap 7 - 15 mmol/L 7   Albumin 3.5 - 5.2 g/dL 4.0   Protein Total 6.4 - 8.3 g/dL 6.7   Alkaline Phosphatase 40 - 150 U/L 82   ALT 0 - 70 U/L 31   AST 0 - 45 U/L 32   Bilirubin Total <=1.2 mg/dL 0.6   Glucose 70 - 99 mg/dL 90   WBC 4.0 - 11.0 10e3/uL 2.4 (L)   Hemoglobin 13.3 - 17.7 g/dL 10.8 (L)   Hematocrit 40.0 - 53.0 % 31.4 (L)   Platelet Count 150 - 450 10e3/uL 141 (L)   RBC Count 4.40 - 5.90 10e6/uL 3.43 (L)   MCV 78 - 100 fL 92   MCH 26.5 - 33.0 pg 31.5   MCHC 31.5 - 36.5 g/dL 34.4   RDW 10.0 - 15.0 % 12.9   % Neutrophils % 69   % Lymphocytes % 19   % Monocytes % 11   % Eosinophils % 0   % Basophils % 0   Absolute Basophils 0.0 - 0.2 10e3/uL 0.0   Absolute Eosinophils 0.0 - 0.7  10e3/uL 0.0   Absolute Immature Granulocytes <=0.4 10e3/uL 0.0   Absolute Lymphocytes 0.8 - 5.3 10e3/uL 0.4 (L)   Absolute Monocytes 0.0 - 1.3 10e3/uL 0.3   % Immature Granulocytes % 1   Absolute Neutrophils 1.6 - 8.3 10e3/uL 1.7   Absolute NRBCs 10e3/uL 0.0   NRBCs per 100 WBC <1 /100 1 (H)   (L): Data is abnormally low  (H): Data is abnormally high        Impression/plan:     SCC oropharynx, 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 Will continue weekly FRAN follow-up through treatment, post treatment response assessment about 12 weeks post chemoradiation    Mucositis: reviewed anticipated progression. Continue mouth rinses. Add magic mouthwash, tylenol prn  -continue gabapentin

## 2023-12-19 NOTE — LETTER
2023         RE: Lopez Hogue  [address] [facility] [address]        Dear Colleague,    Thank you for referring your patient, Lopez Hogue, to the formerly [facility] RADIATION ONCOLOGY. Please see a copy of my visit note below.    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, clinical stage [id number] (stage JUAN MIGUEL)  Treatment Site: Head and bilateral necks  Current Dose/Planned Total Dose: [2400] cGy / [7000] cGy    Disease and Stage: Squamous cell carcinoma of the middle third of esophagus, poorly differentiated, clinical stage T2 N1 (suspicious paraesophageal lymph node level 8L) M0 (stage II)  Treatment Site: Mid esophagus  Current Dose/Planned Total Dose: [2160] cGy / [4500] cGy with dose painting to tumor [1400] cGy / [5000] cGy    Concurrent Chemotherapy: Yes  Drug and Frequency: Weekly CarboTaxol    Medical Oncologist: Yannick Alva MD  ENT Surgeon: Edi Felix MD  Thoracic surgeon: Devin Hill MD    Subjective: Mr. Hogue presents to clinic today for his weekly on-treatment visit.  Overall, he feels well and continues to eat a regular diet.  He is having some pain in his throat with swallowing.  Medical Oncology has prescribed Magic mouthwash.    Nursing ROS:   Nutrition Alteration  Diet Type: Patient's Preference  Skin  Skin Reaction: 0 - No changes  Skin Progress: Aquaphor     ENT and Mouth Exam  Mucositis - Current: 0 - None   ENT/Mouth Note: Mouth pain/S&S 15     Gastrointestinal  Nausea: 0 - None     Psychosocial  Mood - Anxiety: 0 - Normal  Pain Assessment  0-10 Pain Scale: 0  Pain Treatment: Gabapentin 900 mg TID    PEG Tube: No, if needed will be a jejunostomy tube  Electronic Cardiac Implant: No    Objective:   BP (!) 140/74   Pulse 78   Wt 83 kg (183 lb)   BMI 27.90 kg/m    Gen: Appears  well, NAD  HEENT: Diffuse, moderate erythema involving the oropharynx and posterior soft palate.  Skin: No erythema    Laboratory:  Lab Results   Component Value Date    WBC 2.4 (L) 12/19/2023    HGB 10.8 (L) 12/19/2023    HCT 31.4 (L) 12/19/2023    MCV 92 12/19/2023     (L) 12/19/2023     Lab Results   Component Value Date     12/19/2023    POTASSIUM 3.9 12/19/2023    CHLORIDE 107 12/19/2023    CO2 27 12/19/2023    GLC 90 12/19/2023     Lab Results   Component Value Date    AST 32 12/19/2023    ALT 31 12/19/2023    ALKPHOS 82 12/19/2023    BILITOTAL 0.6 12/19/2023     Magnesium   Date Value Ref Range Status   11/30/2023 1.7 1.7 - 2.3 mg/dL Final       Treatment-related toxicities (CTCAE v5.0):  Anorexia: Grade 1: Loss of appetite without alteration in eating habits  Fatigue: Grade 1: Fatigue relieved by rest  Nausea: Grade 0: No toxicity  Pain: Grade 1: Mild pain  Dry mouth: Grade 1: Symptomatic without significant dietary alteration; unstimulated saliva flow >0.2 mL/min  Dysphagia: Grade 1: Symptomatic, able to eat regular diet  Mucositis: Grade 1: Asymptomatic or mild symptoms; intervention not indicated  Dyspnea: Grade 0: No toxicity  Esophagitis: Grade 0: No toxicity  Dermatitis: Grade 0: No toxicity    ED visits/Hospitalizations: None    Missed Treatments: None    Mosaiq chart and setup information reviewed  IGRT images reviewed    Medication Review  Med list reviewed with patient?: Yes  Med list printed and given: Offered and declined    Assessment:    Mr. 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    He is tolerating chemoradiation well.    Plan:   1.  Continue treatment as planned  2.  Discussed recommended self cares  and tapering up of gabapentin dose.  3.  Discussed managing pain with Magic mouthwash and acetaminophen.    Akiko Sun  Department of Radiation Oncology  [facility]                 Again, thank you for allowing me to participate in the care of your patient.        Sincerely,        Akiko Sun MD

## 2023-12-19 NOTE — PROGRESS NOTES
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, clinical stage E6C0LL1 (stage JUAN MIGUEL)  Treatment Site: Head and bilateral necks  Current Dose/Planned Total Dose: [2400] cGy / [7000] cGy    Disease and Stage: Squamous cell carcinoma of the middle third of esophagus, poorly differentiated, clinical stage T2 N1 (suspicious paraesophageal lymph node level 8L) M0 (stage II)  Treatment Site: Mid esophagus  Current Dose/Planned Total Dose: [2160] cGy / [4500] cGy with dose painting to tumor [1400] cGy / [5000] cGy    Concurrent Chemotherapy: Yes  Drug and Frequency: Weekly CarboTaxol    Medical Oncologist: Yannick Alva MD  ENT Surgeon: Edi Felix MD  Thoracic surgeon: Devin Hill MD    Subjective: Mr. Hogue presents to clinic today for his weekly on-treatment visit.  Overall, he feels well and continues to eat a regular diet.  He is having some pain in his throat with swallowing.  Medical Oncology has prescribed Magic mouthwash.    Nursing ROS:   Nutrition Alteration  Diet Type: Patient's Preference  Skin  Skin Reaction: 0 - No changes  Skin Progress: Aquaphor     ENT and Mouth Exam  Mucositis - Current: 0 - None   ENT/Mouth Note: Mouth pain/S&S 15     Gastrointestinal  Nausea: 0 - None     Psychosocial  Mood - Anxiety: 0 - Normal  Pain Assessment  0-10 Pain Scale: 0  Pain Treatment: Gabapentin 900 mg TID    PEG Tube: No, if needed will be a jejunostomy tube  Electronic Cardiac Implant: No    Objective:   BP (!) 140/74   Pulse 78   Wt 83 kg (183 lb)   BMI 27.90 kg/m    Gen: Appears well, NAD  HEENT: Diffuse, moderate erythema involving the oropharynx and posterior soft palate.  Skin: No erythema    Laboratory:  Lab Results   Component Value Date    WBC 2.4 (L) 2023    HGB 10.8 (L) 2023    HCT 31.4 (L) 2023    MCV 92  12/19/2023     (L) 12/19/2023     Lab Results   Component Value Date     12/19/2023    POTASSIUM 3.9 12/19/2023    CHLORIDE 107 12/19/2023    CO2 27 12/19/2023    GLC 90 12/19/2023     Lab Results   Component Value Date    AST 32 12/19/2023    ALT 31 12/19/2023    ALKPHOS 82 12/19/2023    BILITOTAL 0.6 12/19/2023     Magnesium   Date Value Ref Range Status   11/30/2023 1.7 1.7 - 2.3 mg/dL Final       Treatment-related toxicities (CTCAE v5.0):  Anorexia: Grade 1: Loss of appetite without alteration in eating habits  Fatigue: Grade 1: Fatigue relieved by rest  Nausea: Grade 0: No toxicity  Pain: Grade 1: Mild pain  Dry mouth: Grade 1: Symptomatic without significant dietary alteration; unstimulated saliva flow >0.2 mL/min  Dysphagia: Grade 1: Symptomatic, able to eat regular diet  Mucositis: Grade 1: Asymptomatic or mild symptoms; intervention not indicated  Dyspnea: Grade 0: No toxicity  Esophagitis: Grade 0: No toxicity  Dermatitis: Grade 0: No toxicity    ED visits/Hospitalizations: None    Missed Treatments: None    Mosaiq chart and setup information reviewed  IGRT images reviewed    Medication Review  Med list reviewed with patient?: Yes  Med list printed and given: Offered and declined    Assessment:    Mr. 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    He is tolerating chemoradiation well.    Plan:   1.  Continue treatment as planned  2.  Discussed recommended self cares and tapering up of gabapentin dose.  3.  Discussed managing pain with Magic mouthwash and acetaminophen.    Akiko Sun  Department of Radiation Oncology  [facility]

## 2023-12-19 NOTE — LETTER
12/19/2023         RE: Lopez Hogue  [address] [facility] [address]        Dear Colleague,    Thank you for referring your patient, Lopez Hogue, to the [facility]. Please see a copy of my visit note below.    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      Recent diagnosis was made after he coughed up some blood on 8/18/23. He presented to an ED where ENT evaluated and was felt to have a lesion of the vallecula. Full workup of this mass is detailed in the oncology history above. Biopsy on 10/12 confirmed an invasive SCC. PET/CT revealed a hypermetabolic lesion within the mid to distal esophagus. EGD was performed on 11/8 and also showed a squamous cell carcinoma. Given the EGD findings, this was felt to be a second primary.      11/21/23 - consult with Dr. Hill, thoracic surgery. Plan for neoadjuvant chemoradiation followed by laparoscopic staging and jejunostomy, followed by minimally invasive Feliberto-Elbetr esophagectomy.      Had EUS on 11/28 to completed the staging process. Partially obstructing and partially circumferential fungating mass in the middle third of the esophagus. Esophageal squamous cell carcinoma was staged T2 N1 (suspicious paraesophageal lymph node)     12/5/23: Start of chemoradiation with Carbo/Taxol. Infusion reaction with Taxol w/ dose 1. Tolerated rechallenge    Interval history:   Fabricio is here with his son today. His throat is starting to get sore. Having more pain with swallowing to the point where he is sometimes avoiding swallowing saliva.  Is able to swallow pills and food ok.  Taking gabapentin, has not started taking any other analgesics  -heartburn is well controlled with carafate and famotidine  -no recurrent episodes of chest pain.  -no fevers or infection concerns  -no shortness of breath  -has rare nausea, very mild, uses an antiemetic about once a week with good effect  -bowels are more regular now with the use of senna  -no rashes, skin or nail changes.    Current Outpatient Medications   Medication Sig Dispense Refill    chlorhexidine (PERIDEX) 0.12 % solution SWISH AND SPIT 15 ML BY MOUTH TWICE DAILY AFTER BRUSHING. NOTHING BY MOUTH FOR 30 MINUTES AFTERWARDS      sodium fluoride dental gel (PREVIDENT) 1.1 % GEL topical gel AFTER BRUSHING AT NIGHT APPLY A THIN LAYER ON THE FLUORIDE TRAY AND WEAR FOR 15 MINUTES EVERY NIGHT. DO NOT EAT OR DRINK FOR 30 MINUTES AFTER      acetaminophen (TYLENOL) 500 MG tablet Take 500-1,000 mg by mouth every 8 hours as needed for fever or pain      aspirin (ASA) 81 MG EC tablet Take 1 tablet (81 mg) by mouth daily Start tomorrow. 30 tablet 3    clopidogrel (PLAVIX) 75 MG tablet Take 1 tablet (75 mg) by mouth daily Resume home plavix 10/14      cyanocobalamin (VITAMIN B-12) 1000 MCG tablet Take 1,000 mcg by mouth every evening      dexAMETHasone (DECADRON) 4 MG tablet Take 2 tablets the night before next chemo infusion, take 2 tablets the morning of prior to coming to chemo. 4 tablet 1    famotidine (PEPCID) 20 MG tablet Take 1 tablet (20 mg) by mouth 2 times daily 60 tablet 3    gabapentin (NEURONTIN) 300 MG capsule Take 3 capsules (900 mg) by mouth 3 times daily Follow instructions given in clinic to taper dose up to 900 mg tid 270 capsule 4    nitroGLYcerin (NITROSTAT) 0.4 MG sublingual tablet PLACE 1 TABLET UNDER THE TONGUE EVERY 5 MINUTES FOR CHEST PAIN FOR 3 DOSES. IF SYMPTOMS PERSIST 5 MINUTES AFTER 1ST DOSE CALL 911. 25 tablet 1    Omega-3 Fatty Acids (FISH OIL) 1200 MG capsule Take 1,200 mg by mouth every evening      prochlorperazine  (COMPAZINE) 10 MG tablet Take 1 tablet (10 mg) by mouth every 6 hours as needed for nausea or vomiting 30 tablet 2    rosuvastatin (CRESTOR) 40 MG tablet Take 1 tablet (40 mg) by mouth daily (Patient taking differently: Take 40 mg by mouth every evening) 90 tablet 3    sertraline (ZOLOFT) 100 MG tablet Take 100 mg by mouth every morning      sucralfate (CARAFATE) 1 GM/10ML suspension Take 10 mLs (1 g) by mouth 4 times daily 414 mL 1          Allergies   Allergen Reactions    Coconut Flavor Anaphylaxis     Raw coconut         Exam: alert,appears well. Blood pressure (!) 144/69, pulse 77, temperature 97.5  F (36.4  C), temperature source Oral, resp. rate 18, weight 83.3 kg (183 lb 9.6 oz), SpO2 99%.  Wt Readings from Last 4 Encounters:   12/19/23 83.3 kg (183 lb 9.6 oz)   12/11/23 79.9 kg (176 lb 3.2 oz)   12/06/23 79.8 kg (176 lb)   12/05/23 79.9 kg (176 lb 1.6 oz)     Oropharynx is moist, erythema noted through the posterior pharynx. Neck supple and without adenopathy. Lungs:CTA. Heart: RRR, no murmur or rub. Abdomen: soft, nontender, BS active, no masses or organomegaly.  Extremities: warm, no edema. Speech is clear. CN wnl. Gait/station wnl.        Labs:    Latest Reference Range & Units 12/19/23 06:56   Sodium 135 - 145 mmol/L 141   Potassium 3.4 - 5.3 mmol/L 3.9   Chloride 98 - 107 mmol/L 107   Carbon Dioxide (CO2) 22 - 29 mmol/L 27   Urea Nitrogen 8.0 - 23.0 mg/dL 17.9   Creatinine 0.67 - 1.17 mg/dL 0.70   GFR Estimate >60 mL/min/1.73m2 >90   Calcium 8.8 - 10.2 mg/dL 9.1   Anion Gap 7 - 15 mmol/L 7   Albumin 3.5 - 5.2 g/dL 4.0   Protein Total 6.4 - 8.3 g/dL 6.7   Alkaline Phosphatase 40 - 150 U/L 82   ALT 0 - 70 U/L 31   AST 0 - 45 U/L 32   Bilirubin Total <=1.2 mg/dL 0.6   Glucose 70 - 99 mg/dL 90   WBC 4.0 - 11.0 10e3/uL 2.4 (L)   Hemoglobin 13.3 - 17.7 g/dL 10.8 (L)   Hematocrit 40.0 - 53.0 % 31.4 (L)   Platelet Count 150 - 450 10e3/uL 141 (L)   RBC Count 4.40 - 5.90 10e6/uL 3.43 (L)   MCV 78 - 100 fL 92    MCH 26.5 - 33.0 pg 31.5   MCHC 31.5 - 36.5 g/dL 34.4   RDW 10.0 - 15.0 % 12.9   % Neutrophils % 69   % Lymphocytes % 19   % Monocytes % 11   % Eosinophils % 0   % Basophils % 0   Absolute Basophils 0.0 - 0.2 10e3/uL 0.0   Absolute Eosinophils 0.0 - 0.7 10e3/uL 0.0   Absolute Immature Granulocytes <=0.4 10e3/uL 0.0   Absolute Lymphocytes 0.8 - 5.3 10e3/uL 0.4 (L)   Absolute Monocytes 0.0 - 1.3 10e3/uL 0.3   % Immature Granulocytes % 1   Absolute Neutrophils 1.6 - 8.3 10e3/uL 1.7   Absolute NRBCs 10e3/uL 0.0   NRBCs per 100 WBC <1 /100 1 (H)   (L): Data is abnormally low  (H): Data is abnormally high        Impression/plan:     SCC oropharynx, 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 Will continue weekly FRAN follow-up through treatment, post treatment response assessment about 12 weeks post chemoradiation    Mucositis: reviewed anticipated progression. Continue mouth rinses. Add magic mouthwash, tylenol prn  -continue gabapentin          Hanane Velázquez, YADIRA CNP

## 2023-12-19 NOTE — PROGRESS NOTES
Infusion Nursing Note:  Lopez Hogue presents today for C1D15 Taxol (1 hour)-Carboplatin.    Patient seen by provider today: Yes: Hanane Velázquez DNP   present during visit today: Not Applicable.    Note: Pt saw provider prior to infusion, ok for treatment.    +first infusion that Taxol will be over an hour  +trying Taxol infusion without DEX prep today    Intravenous Access:  Peripheral IV placed.    Treatment Conditions:   Latest Reference Range & Units 12/19/23 06:56   Sodium 135 - 145 mmol/L 141   Potassium 3.4 - 5.3 mmol/L 3.9   Chloride 98 - 107 mmol/L 107   Carbon Dioxide (CO2) 22 - 29 mmol/L 27   Urea Nitrogen 8.0 - 23.0 mg/dL 17.9   Creatinine 0.67 - 1.17 mg/dL 0.70   GFR Estimate >60 mL/min/1.73m2 >90   Calcium 8.8 - 10.2 mg/dL 9.1   Anion Gap 7 - 15 mmol/L 7   Albumin 3.5 - 5.2 g/dL 4.0   Protein Total 6.4 - 8.3 g/dL 6.7   Alkaline Phosphatase 40 - 150 U/L 82   ALT 0 - 70 U/L 31   AST 0 - 45 U/L 32   Bilirubin Total <=1.2 mg/dL 0.6   Glucose 70 - 99 mg/dL 90   WBC 4.0 - 11.0 10e3/uL 2.4 (L)   Hemoglobin 13.3 - 17.7 g/dL 10.8 (L)   Hematocrit 40.0 - 53.0 % 31.4 (L)   Platelet Count 150 - 450 10e3/uL 141 (L)   RBC Count 4.40 - 5.90 10e6/uL 3.43 (L)   MCV 78 - 100 fL 92   MCH 26.5 - 33.0 pg 31.5   MCHC 31.5 - 36.5 g/dL 34.4   RDW 10.0 - 15.0 % 12.9   % Neutrophils % 69   % Lymphocytes % 19   % Monocytes % 11   % Eosinophils % 0   % Basophils % 0   Absolute Basophils 0.0 - 0.2 10e3/uL 0.0   Absolute Eosinophils 0.0 - 0.7 10e3/uL 0.0   Absolute Immature Granulocytes <=0.4 10e3/uL 0.0   Absolute Lymphocytes 0.8 - 5.3 10e3/uL 0.4 (L)   Absolute Monocytes 0.0 - 1.3 10e3/uL 0.3   % Immature Granulocytes % 1   Absolute Neutrophils 1.6 - 8.3 10e3/uL 1.7   Absolute NRBCs 10e3/uL 0.0   NRBCs per 100 WBC <1 /100 1 (H)     Post Infusion Assessment:  Patient tolerated infusion without incident.  Blood return noted pre and post infusion.  Site patent and intact, free from redness, edema or discomfort.  No  evidence of extravasations.  Access discontinued per protocol.       Discharge Plan:   Prescription refills given for MMW, to [address].  Discharge instructions reviewed with: Patient and Family.  Patient and/or family verbalized understanding of discharge instructions and all questions answered.  AVS to patient via [facility].  Patient will return 12/27 for next appointment.   Patient discharged in stable condition accompanied by: self and son.  Departure Mode: Ambulatory.    Arabella Iniguez RN

## 2023-12-20 ENCOUNTER — APPOINTMENT (OUTPATIENT)
Dept: RADIATION ONCOLOGY | Facility: CLINIC | Age: 68
End: 2023-12-20
Attending: RADIOLOGY
Payer: COMMERCIAL

## 2023-12-20 PROCEDURE — 77386 HC IMRT TREATMENT DELIVERY, COMPLEX: CPT | Performed by: RADIOLOGY

## 2023-12-20 PROCEDURE — 77014 PR CT GUIDE FOR PLACEMENT RADIATION THERAPY FIELDS: CPT | Mod: 26 | Performed by: RADIOLOGY

## 2023-12-21 ENCOUNTER — MYC REFILL (OUTPATIENT)
Dept: ONCOLOGY | Facility: CLINIC | Age: 68
End: 2023-12-21

## 2023-12-21 ENCOUNTER — APPOINTMENT (OUTPATIENT)
Dept: RADIATION ONCOLOGY | Facility: CLINIC | Age: 68
End: 2023-12-21
Attending: RADIOLOGY
Payer: COMMERCIAL

## 2023-12-21 DIAGNOSIS — C15.9 PRIMARY ESOPHAGEAL SQUAMOUS CELL CARCINOMA (H): ICD-10-CM

## 2023-12-21 DIAGNOSIS — K12.30 MUCOSITIS: ICD-10-CM

## 2023-12-21 DIAGNOSIS — C10.0 SQUAMOUS CELL CARCINOMA OF VALLECULA (H): ICD-10-CM

## 2023-12-21 PROCEDURE — 77014 PR CT GUIDE FOR PLACEMENT RADIATION THERAPY FIELDS: CPT | Mod: 26 | Performed by: RADIOLOGY

## 2023-12-21 PROCEDURE — 77386 HC IMRT TREATMENT DELIVERY, COMPLEX: CPT | Performed by: RADIOLOGY

## 2023-12-21 NOTE — TELEPHONE ENCOUNTER
"Medication:Magic Mouthwash  Last prescribing provider:Hanane MAY  Last clinic visit date: 12/19/23 Hanane Velázquez  Recommendations for requested medication:was originally ordered on 12/19/2023 however in EPIC was ordered as \"Local Print\" vs e-prescribed.   Any other pertinent information: routing to Hanane Velázquez      "

## 2023-12-22 ENCOUNTER — APPOINTMENT (OUTPATIENT)
Dept: RADIATION ONCOLOGY | Facility: CLINIC | Age: 68
End: 2023-12-22
Attending: RADIOLOGY
Payer: COMMERCIAL

## 2023-12-22 PROCEDURE — 77014 PR CT GUIDE FOR PLACEMENT RADIATION THERAPY FIELDS: CPT | Mod: 26 | Performed by: RADIOLOGY

## 2023-12-22 PROCEDURE — 77336 RADIATION PHYSICS CONSULT: CPT | Performed by: RADIOLOGY

## 2023-12-22 PROCEDURE — 77386 HC IMRT TREATMENT DELIVERY, COMPLEX: CPT | Performed by: RADIOLOGY

## 2023-12-22 PROCEDURE — 77427 RADIATION TX MANAGEMENT X5: CPT | Performed by: RADIOLOGY

## 2023-12-26 ENCOUNTER — APPOINTMENT (OUTPATIENT)
Dept: RADIATION ONCOLOGY | Facility: CLINIC | Age: 68
End: 2023-12-26
Attending: RADIOLOGY
Payer: COMMERCIAL

## 2023-12-26 VITALS
HEART RATE: 82 BPM | BODY MASS INDEX: 26.68 KG/M2 | DIASTOLIC BLOOD PRESSURE: 71 MMHG | SYSTOLIC BLOOD PRESSURE: 148 MMHG | WEIGHT: 175 LBS

## 2023-12-26 VITALS — BODY MASS INDEX: 26.68 KG/M2 | WEIGHT: 175 LBS

## 2023-12-26 DIAGNOSIS — C10.0 SQUAMOUS CELL CARCINOMA OF VALLECULA (H): Primary | ICD-10-CM

## 2023-12-26 DIAGNOSIS — C15.4 MALIGNANT NEOPLASM OF MIDDLE THIRD OF ESOPHAGUS (H): ICD-10-CM

## 2023-12-26 PROCEDURE — 97803 MED NUTRITION INDIV SUBSEQ: CPT | Performed by: DIETITIAN, REGISTERED

## 2023-12-26 PROCEDURE — 77386 HC IMRT TREATMENT DELIVERY, COMPLEX: CPT | Performed by: RADIOLOGY

## 2023-12-26 NOTE — PROGRESS NOTES
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  The pain has been more bothersome that taste as he can force himself to eat with poor taste.   He has been drinking 2 Pro Performance Bulk 1340 powder from [employer] per day = 2600+ jayson and 100+g protein daily.  He is mixing them with whole milk.    He's drinking at least 8 cups water/Gatorade and other electrolytes daily. He's pleased with his fluid intake.   He also complain of constipation and has been taking Senna S and Miralax BID and prn.  -Weight trends - down ~8 lb x past 1 week; he's at the weight he was at start of treatment but still up ~10 lb from intentional weight gain prior to treatment. Baseline wt = 165 lbs per pt report  Wt Readings from Last 10 Encounters:   12/26/23 79.4 kg (175 lb)   12/19/23 83 kg (183 lb)   12/19/23 83.3 kg (183 lb 9.6 oz)   12/11/23 79.9 kg (176 lb 3.2 oz)   12/06/23 79.8 kg (176 lb)   12/05/23 79.9 kg (176 lb 1.6 oz)   11/30/23 78 kg (172 lb)   11/21/23 78.5 kg (173 lb 1.6 oz)   11/17/23 80.2 kg (176 lb 11.2 oz)   11/15/23 80.7 kg (178 lb)     Previous Goals:   1.  Aim for 5-6 small frequent meals  2.  Aim for 2000-2400kcal and 95-120g protein/day  3. Weight maintenance   Evaluation: Not met   Previous Nutrition Diagnosis:   No nutrition diagnosis identified at this time    Evaluation: Declining   NEW FINDINGS:   Weight loss, odynophagia, constipation   CURRENT NUTRITION DIAGNOSIS   Inadequate oral intake related to  odynophagia and dysgeusia as evidenced by 8lb wt loss x past 1 week, ~75% of baseline intake of solid foods   INTERVENTIONS   Nutrition-related Medication Management, Composition of Meals and Snacks, and Medical Food Supplement - encouraged to continue taking ONS (Pro Performance Bulk 1340 powder from [facility] as tolerated). Encouraged to try pear or prune juice to help cope with constipation. Reviewed nutrition and hydration goals with CRT.  He admits that he is still receptive to a FT if needed but would like to avoid it.  Reviewed importance of weight maintenance w/I 2 lb each week.     Goals  1.  Aim for 2000-2400kcal and 95-120g protein, 10+ cups water/electrolytes per day  2. Weight maintenance /no more than 1-2 lb wt loss each week      Follow-Up Plans: Pt has RD contact information for questions.  One week follow up, 1/2     MONITORING AND EVALUATION:  -Food/beverage intake  -Weight trends     Patito Smyth RD, , LD  [facility]  [phone]

## 2023-12-26 NOTE — PROGRESS NOTES
[facility]   Return Patient Visit  Dec 27, 2023        Diagnosis: Oropharyngeal cancer, esophageal SCC  Stage:    Cancer Staging   Squamous cell carcinoma of vallecula (H)  Staging form: Pharynx - Oropharynx, AJCC 8th Edition  - Clinical stage from 10/12/2023: Stage JUAN MIGUEL (cT1, cN2b, cM0, p16-) - Signed by Yannick Alva MD on 11/21/2023    Molecular: p16 negative  Performance status: ECOG 0      Oncology History   Squamous cell carcinoma of vallecula (H)   8/18/2023 Imaging    CT neck:  Question soft tissue lesion within the right vallecula/ventral epiglottis.      10/12/2023 Pathology    R vallecula biopsy:  - NON-KERATINIZING POORLY DIFFERENTIATED SQUAMOUS CELL CARCINOMA  - p16 is negative     10/12/2023 -  Cancer Staged    Staging form: Pharynx - Oropharynx, AJCC 8th Edition  - Clinical stage from 10/12/2023: Stage JUAN MIGUEL (cT1, cN2b, cM0, p16-)     10/13/2023 Imaging    PET/CT:  1. 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     2. FDG avid thickening of the mid to distal esophagus, suspicious for a synchronous primary esophageal neoplasm. Recommend correlation with endoscopy.     11/6/2023 Initial Diagnosis    Squamous cell carcinoma of vallecula (H)     11/8/2023 Pathology    EGD with mid esophageal mass biopsy:  A.  MID ESOPHAGEAL MASS, BIOPSIES:  -Nonkeratinizing poorly differentiated squamous cell carcinoma  -Negative for intestinal metaplasia/Alfredo's type mucosa    PD-L1 CPS 15%             12/5/2023 -  Chemotherapy    OP ONC Esophageal Cancer - PACLitaxel / CARBOplatin WEEKLY + Radiation  Plan Provider: Yannick Alva MD  Treatment goal: Curative  Line of treatment: First Line     Primary esophageal squamous cell carcinoma (H)   11/17/2023 Initial Diagnosis    Primary esophageal squamous cell carcinoma (H)     12/5/2023 -  Chemotherapy    OP ONC Esophageal Cancer - PACLitaxel /  CARBOplatin WEEKLY + Radiation  Plan Provider: Yannick Alva MD  Treatment goal: Curative  Line of treatment: First Line           Oncology History:   Lopez Hogue is a 68 year old male with a past medical history that includes HTN, CAD, MI (March of 2023 s/p PCI x2, on DAPT), and prior sweat gland cancer s/p surgical resection, now with recently diagnosed oropharyngeal cancer found to have a 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     Recent diagnosis was made after he coughed up some blood on 8/18/23. He presented to an ED where ENT evaluated and was felt to have a lesion of the vallecula. Full workup of this mass is detailed in the oncology history above. Biopsy on 10/12 confirmed an invasive SCC. PET/CT revealed a hypermetabolic lesion within the mid to distal esophagus. EGD was performed on 11/8 and also showed a squamous cell carcinoma. Given the EGD findings, this was felt to be a second primary.     11/21/23 - consult with Dr. Hill, thoracic surgery. Plan for neoadjuvant chemoradiation followed by laparoscopic staging and jejunostomy, followed by minimally invasive Gideon-Elbert esophagectomy.     Had EUS on 11/28 to completed the staging process. Partially obstructing and partially circumferential fungating mass in the middle third of the esophagus. Esophageal squamous cell carcinoma was staged T2 N1 (suspicious paraesophageal lymph node)     12/5/23: Start of chemoradiation with Carbo/Taxol. Infusion reaction with Taxol w/ dose 1. Tolerated rechallenge       Interval History:   Pain has increased in his throat/esophagus especially with swallowing. Not able to swallow soft foods, eating a liquid diet. Started CBD per direction of Dr. Sun yesterday. He slept better last night but is unsure if it is helping with pain. Continues on magic mouthwash and Tylenol, unsure if magic mouthwash is helpful either. Reports lidocaine is not helpful when used at the dentist and wonders if  there is an alternative ingredient for numbing available. Has a few mouth sores, doing salt/soda rinses multiple times daily. Nausea and heartburn are well controlled with Compazine and Carafate. Bowels are moving slower, using Senna with some relief.     Denies neuropathy. Blood pressure is lower today. Checks blood pressure at home and readings are consistent with blood pressures in clinic. Denies feeling dizzy or lightheaded. No chest pain, shortness of breath or swelling. Energy level has been good. Not going to the gym in an effort to conserve calories. Doing some light weight exercises at home.     Redness to skin on neck. Denies any pain or open lesions on skin.   No fevers, chills or concerns for infection.     Review of Systems:  Patient denies any of the following except if noted above: fevers, chills, difficulty with energy, vision or hearing changes, chest pain, dyspnea, abdominal pain, nausea, vomiting, diarrhea, constipation, urinary concerns, headaches, numbness, tingling, issues with sleep or mood. Also denies lumps, bumps, rashes or skin lesions, bleeding or bruising issues.        Social History:  Also actively involved in a program for those in recovery from substance use. Former heavy smoker (2 ppd) and drinker himself. Quit both in 2013. Other than his recent MI, he has been in generally good health. He has no history of autoimmune disease. He follows regularly with dermatology given his skin cancer history.       Physical Exam:  /56 (BP Location: Right arm, Patient Position: Sitting, Cuff Size: Adult Regular)   Pulse 87   Temp 98.2  F (36.8  C) (Oral)   Resp 18   Wt 78.2 kg (172 lb 6.4 oz)   SpO2 95%   BMI 26.28 kg/m    Wt Readings from Last 10 Encounters:   12/27/23 78.2 kg (172 lb 6.4 oz)   12/26/23 79.4 kg (175 lb)   12/26/23 79.4 kg (175 lb)   12/19/23 83 kg (183 lb)   12/19/23 83.3 kg (183 lb 9.6 oz)   12/11/23 79.9 kg (176 lb 3.2 oz)   12/06/23 79.8 kg (176 lb)   12/05/23 79.9  kg (176 lb 1.6 oz)   11/30/23 78 kg (172 lb)   11/21/23 78.5 kg (173 lb 1.6 oz)   General: The patient is a pleasant male in no acute distress.  HEENT: EOMI. Sclerae are anicteric. Oropharynx is moist, saliva is thick. Erythema and multiple lesions noted on posterior pharynx.   Lymph: Neck is supple with no lymphadenopathy in the cervical or supraclavicular areas.   Heart: Systolic murmur present, regular rate  Lungs: Clear to auscultation bilaterally.   Abdomen: Bowel sounds present, soft, nontender with no palpable hepatosplenomegaly or masses.   Extremities: No lower extremity edema noted bilaterally.   Neuro: Cranial nerves II through XII are grossly intact.  Skin: Erythema to neck, no open areas. No rashes, petechiae, or bruising noted on exposed skin.        Labs:   Most Recent 3 CBC's:  Recent Labs   Lab Test 12/27/23  0751 12/19/23  0656 12/11/23  0641   WBC 2.0* 2.4* 3.4*   HGB 10.0* 10.8* 12.9*   MCV 89 92 89   * 141* 169   ANEUTAUTO 1.5* 1.7 3.2     Most Recent 3 BMP's:  Recent Labs   Lab Test 12/27/23  0751 12/19/23  0656 12/11/23  0641    141 138   POTASSIUM 4.0 3.9 4.3   CHLORIDE 104 107 104   CO2 25 27 22   BUN 16.6 17.9 18.3   CR 0.76 0.70 0.64*  0.65*   ANIONGAP 8 7 12   RAFAELA 8.9 9.1 9.6   * 90 195*   PROTTOTAL 6.7 6.7 7.4   ALBUMIN 3.8 4.0 4.4    Most Recent 3 LFT's:  Recent Labs   Lab Test 12/27/23  0751 12/19/23  0656 12/11/23  0641   AST 24 32 36   ALT 16 31 36   ALKPHOS 69 82 82   BILITOTAL 1.0 0.6 1.2    Most Recent 2 TSH and T4:No lab results found.  I reviewed the above labs today.      Imaging:    All pertinent imaging studies personally reviewed. Key findings summarized in oncology history above    Pathology:     10/12/23 R vallecular lesion biopsy:  Final Diagnosis   A&B. RIGHT VALLECULA LESION, BIOPSY:  - NON-KERATINIZING POORLY DIFFERENTIATED SQUAMOUS CELL CARCINOMA  - p16 is negative     11/8/23 Esophageal biopsy:  Final Diagnosis   A.  MID ESOPHAGEAL MASS,  BIOPSIES:  -Nonkeratinizing poorly differentiated squamous cell carcinoma  -Negative for intestinal metaplasia/Alfredo's type mucosa     Addendum   RESULT FOR IMMUNOHISTOCHEMICAL VENTANA CLONE  PD-L1 ASSAY     - TUMOR PROPORTION SCORE (TPS): 5%  - INTERPRETATION: LOW EXPRESSOR PD-L1 EXPRESSION (TPS 1%-49%)  - COMBINED POSITIVE SCORE (CPS): 15         Assessment and Plan:   Lopez Hogue is a 68 year old male with HTN, CAD, prior sweat gland cancer s/p resection, and recently diagnosed head and neck squamous cell carcinoma of the vallecula, with staging revealing a synchronous esophageal squamous cell carcinoma.    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 Discussed neutropenic precautions including notifying triage of a fever >100.4.    -Will continue weekly FRAN follow-up through treatment, post treatment response assessment about 12 weeks post chemoradiation    Mucositis.   -Discussed with pharmacy, no other numbing agents available to substitute in magic mouthwash.   -Continue Magic mouthwash, tylenol, gabapentin, salt/soda rinses. Fabricio will notify triage or radiation team if Tylenol is no longer adequate for pain control.     Nutrition, nausea.  -Difficulty with soft foods, consuming almost entirely liquid diet. Working with dietician to increase calories/protein supplements. Weight loss ~10lb over the last couple of weeks but stable since starting treatment.   -Nausea well controlled with Compazine.      Heartburn.  Well controlled with famotidine 20mg BID and Carafate 1g 4x/daily.        30 minutes spent on the date of the encounter doing chart review, review of test results, interpretation of tests, patient visit, and documentation       YADIRA Lazcano CNP

## 2023-12-26 NOTE — PROGRESS NOTES
RADIATION ONCOLOGY WEEKLY ON TREATMENT VISIT   Encounter Date: Dec 26, 2023    Patient Name: Lopez Hogue  MRN: [id number]  : 1955     Disease and Stage: Squamous cell carcinoma of the vallecula p16 negative, clinical stage [id number] (stage JUAN MIGUEL)  Treatment Site: Head and bilateral necks  Current Dose/Planned Total Dose: [3200] cGy / [7000] cGy    Disease and Stage: Squamous cell carcinoma of the middle third of esophagus, poorly differentiated, clinical stage T2 N1 (suspicious paraesophageal lymph node level 8L) M0 (stage II)  Treatment Site: Mid esophagus  Current Dose/Planned Total Dose: [2880] cGy / [4500] cGy with dose painting to tumor [3200] cGy / [5000] cGy    Concurrent Chemotherapy: Yes  Drug and Frequency: Weekly CarboTaxol    Medical Oncologist: Yannick Alva MD  ENT Surgeon: Edi Felix MD  Thoracic surgeon: Devin Hill MD    Subjective: Mr. Hogue presents to clinic today for his weekly on-treatment visit.  He has started to have more pain with eating and points to the throat at the level of the sternal notch.  With swallowing, he rates the pain as 5/10 but has minimal pain when not eating.  He has switched his diet to protein shakes and softer foods.  He also notes that he has started to lose weight and has decreased his level of exercise to maintain calories.  He is using Magic mouthwash.    Nursing ROS:   Nutrition Alteration  Diet Type: Liquid Diet  Nutrition Note: protein shakes  Skin  Skin Reaction: 1 - Faint erythema or dry desquamation  Skin Progress: aquaphor     ENT and Mouth Exam  Mucositis - Current: 1 - Generalized erythema  ENT/Mouth Note: magic mouthwash and adding CBD oil     Gastrointestinal  Nausea: 0 - None     Psychosocial  Mood - Anxiety: 0 - Normal  Pain Assessment  0-10 Pain Scale: 3  Pain Treatment: gabapentin, Tylenol     PEG Tube: No, if needed will be a jejunostomy tube  Electronic Cardiac Implant: No    Objective:   BP (!) 148/71   Pulse 82   Wt 79.4  kg (175 lb)   BMI 26.68 kg/m    Gen: Appears well, NAD  HEENT: Diffuse, moderate erythema involving the oropharynx and posterior soft palate.  Skin: No erythema    Laboratory:  Lab Results   Component Value Date    WBC 2.4 (L) 12/19/2023    HGB 10.8 (L) 12/19/2023    HCT 31.4 (L) 12/19/2023    MCV 92 12/19/2023     (L) 12/19/2023     Lab Results   Component Value Date     12/19/2023    POTASSIUM 3.9 12/19/2023    CHLORIDE 107 12/19/2023    CO2 27 12/19/2023    GLC 90 12/19/2023     Lab Results   Component Value Date    AST 32 12/19/2023    ALT 31 12/19/2023    ALKPHOS 82 12/19/2023    BILITOTAL 0.6 12/19/2023     Magnesium   Date Value Ref Range Status   11/30/2023 1.7 1.7 - 2.3 mg/dL Final       Treatment-related toxicities (CTCAE v5.0):  Anorexia: Grade 2: Oral intake altered without significant weight loss or malnutrition; oral nutritional supplements indicated  Fatigue: Grade 1: Fatigue relieved by rest  Nausea: Grade 0: No toxicity  Pain: Grade 1: Mild pain  Dry mouth: Grade 1: Symptomatic without significant dietary alteration; unstimulated saliva flow >0.2 mL/min  Dysphagia: Grade 2: Symptomatic and altered eating/swallowing  Mucositis: Grade 1: Asymptomatic or mild symptoms; intervention not indicated  Dyspnea: Grade 0: No toxicity  Esophagitis: Grade 2: Symptomatic; altered eating/swallowing; oral supplements indicated  Dermatitis: Grade 0: No toxicity    ED visits/Hospitalizations: None    Missed Treatments: None    Mosaiq chart and setup information reviewed  IGRT images reviewed    Medication Review  Med list reviewed with patient?: Yes  Med list printed and given: Offered and declined    Assessment:    Mr. Hogue is a 68 year old male with synchronous primaries of a squamous cell carcinoma of the vallecula p16 negative, clinical stage A1W5NI3 (stage JUAN MIGUEL) and a squamous cell carcinoma of the middle third of esophagus, poorly differentiated, clinical stage T2 N1 (suspicious paraesophageal  lymph node level 8L) M0 (stage II).    He is tolerating chemoradiation as expected    Plan:   1.  Continue treatment as planned  2.  Discussed recommended self cares and tapering up of gabapentin dose.  3.  Discussed managing pain with Magic mouthwash and acetaminophen.  4.  Will discuss nutritional needs with dietitian today.    Akiko Sun  Department of Radiation Oncology  [facility]

## 2023-12-26 NOTE — LETTER
2023         RE: Lopez Hogue  [address] [facility] [address]        Dear Colleague,    Thank you for referring your patient, Lopez Hogue, to the [facility] ED RADIATION ONCOLOGY. Please see a copy of my visit note below.    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 Houge presents to clinic today for his weekly on-treatment visit.  He has started to have more pain with eating and points to the throat at the level of the sternal notch.  With swallowing, he rates the pain as 5/10 but has minimal pain when not eating.  He has switched his diet to protein shakes and softer foods.  He also notes that he has started to lose weight and has decreased his level of exercise to maintain calories.  He is using Magic mouthwash.    Nursing ROS:   Nutrition Alteration  Diet Type: Liquid Diet  Nutrition Note: protein shakes  Skin  Skin Reaction: 1 - Faint erythema or dry desquamation  Skin Progress: aquaphor     ENT and Mouth Exam  Mucositis - Current: 1 - Generalized erythema  ENT/Mouth Note: magic mouthwash and adding CBD oil     Gastrointestinal  Nausea: 0 -  None     Psychosocial  Mood - Anxiety: 0 - Normal  Pain Assessment  0-10 Pain Scale: 3  Pain Treatment: gabapentin, Tylenol     PEG Tube: No, if needed will be a jejunostomy tube  Electronic Cardiac Implant: No    Objective:   BP (!) 148/71   Pulse 82   Wt 79.4 kg (175 lb)   BMI 26.68 kg/m    Gen: Appears well, NAD  HEENT: Diffuse, moderate erythema involving the oropharynx and posterior soft palate.  Skin: No erythema    Laboratory:  Lab Results   Component Value Date    WBC 2.4 (L) 12/19/2023    HGB 10.8 (L) 12/19/2023    HCT 31.4 (L) 12/19/2023    MCV 92 12/19/2023     (L) 12/19/2023     Lab Results   Component Value Date     12/19/2023    POTASSIUM 3.9 12/19/2023    CHLORIDE 107 12/19/2023    CO2 27 12/19/2023    GLC 90 12/19/2023     Lab Results   Component Value Date    AST 32 12/19/2023    ALT 31 12/19/2023    ALKPHOS 82 12/19/2023    BILITOTAL 0.6 12/19/2023     Magnesium   Date Value Ref Range Status   11/30/2023 1.7 1.7 - 2.3 mg/dL Final       Treatment-related toxicities (CTCAE v5.0):  Anorexia: Grade 2: Oral intake altered without significant weight loss or malnutrition; oral nutritional supplements indicated  Fatigue: Grade 1: Fatigue relieved by rest  Nausea: Grade 0: No toxicity  Pain: Grade 1: Mild pain  Dry mouth: Grade 1: Symptomatic without significant dietary alteration; unstimulated saliva flow >0.2 mL/min  Dysphagia: Grade 2: Symptomatic and altered eating/swallowing  Mucositis: Grade 1: Asymptomatic or mild symptoms; intervention not indicated  Dyspnea: Grade 0: No toxicity  Esophagitis: Grade 2: Symptomatic; altered eating/swallowing; oral supplements indicated  Dermatitis: Grade 0: No toxicity    ED visits/Hospitalizations: None    Missed Treatments: None    Mosaiq chart and setup information reviewed  IGRT images reviewed    Medication Review  Med list reviewed with patient?: Yes  Med list printed and given: Offered and declined    Assessment:    Mr. 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    He is tolerating chemoradiation as expected    Plan:   1.  Continue treatment as planned  2.  Discussed recommended self cares and tapering up of gabapentin dose.  3.  Discussed managing pain with Magic mouthwash and acetaminophen.  4.  Will discuss nutritional needs with dietitian today.    Akiko Sun  Department of Radiation Oncology  [facility]                 Again, thank you for allowing me to participate in the care of your patient.        Sincerely,        Akiko Sun MD

## 2023-12-27 ENCOUNTER — ONCOLOGY VISIT (OUTPATIENT)
Dept: ONCOLOGY | Facility: CLINIC | Age: 68
End: 2023-12-27
Attending: STUDENT IN AN ORGANIZED HEALTH CARE EDUCATION/TRAINING PROGRAM
Payer: COMMERCIAL

## 2023-12-27 ENCOUNTER — APPOINTMENT (OUTPATIENT)
Dept: LAB | Facility: CLINIC | Age: 68
End: 2023-12-27
Attending: STUDENT IN AN ORGANIZED HEALTH CARE EDUCATION/TRAINING PROGRAM
Payer: COMMERCIAL

## 2023-12-27 ENCOUNTER — APPOINTMENT (OUTPATIENT)
Dept: RADIATION ONCOLOGY | Facility: CLINIC | Age: 68
End: 2023-12-27
Attending: RADIOLOGY
Payer: COMMERCIAL

## 2023-12-27 ENCOUNTER — THERAPY VISIT (OUTPATIENT)
Dept: SPEECH THERAPY | Facility: CLINIC | Age: 68
End: 2023-12-27
Payer: COMMERCIAL

## 2023-12-27 VITALS
OXYGEN SATURATION: 95 % | SYSTOLIC BLOOD PRESSURE: 115 MMHG | DIASTOLIC BLOOD PRESSURE: 56 MMHG | HEART RATE: 87 BPM | RESPIRATION RATE: 18 BRPM | WEIGHT: 172.4 LBS | TEMPERATURE: 98.2 F | BODY MASS INDEX: 26.28 KG/M2

## 2023-12-27 DIAGNOSIS — C10.0 SQUAMOUS CELL CARCINOMA OF VALLECULA (H): Primary | ICD-10-CM

## 2023-12-27 DIAGNOSIS — C10.0 SQUAMOUS CELL CARCINOMA OF VALLECULA (H): ICD-10-CM

## 2023-12-27 DIAGNOSIS — C15.9 PRIMARY ESOPHAGEAL SQUAMOUS CELL CARCINOMA (H): ICD-10-CM

## 2023-12-27 DIAGNOSIS — R13.12 OROPHARYNGEAL DYSPHAGIA: ICD-10-CM

## 2023-12-27 DIAGNOSIS — C15.9 PRIMARY ESOPHAGEAL SQUAMOUS CELL CARCINOMA (H): Primary | ICD-10-CM

## 2023-12-27 DIAGNOSIS — K12.30 MUCOSITIS: ICD-10-CM

## 2023-12-27 LAB
ALBUMIN SERPL BCG-MCNC: 3.8 G/DL (ref 3.5–5.2)
ALP SERPL-CCNC: 69 U/L (ref 40–150)
ALT SERPL W P-5'-P-CCNC: 16 U/L (ref 0–70)
ANION GAP SERPL CALCULATED.3IONS-SCNC: 8 MMOL/L (ref 7–15)
AST SERPL W P-5'-P-CCNC: 24 U/L (ref 0–45)
BASOPHILS # BLD AUTO: 0 10E3/UL (ref 0–0.2)
BASOPHILS NFR BLD AUTO: 1 %
BILIRUB SERPL-MCNC: 1 MG/DL
BUN SERPL-MCNC: 16.6 MG/DL (ref 8–23)
CALCIUM SERPL-MCNC: 8.9 MG/DL (ref 8.8–10.2)
CHLORIDE SERPL-SCNC: 104 MMOL/L (ref 98–107)
CHOLEST SERPL-MCNC: 100 MG/DL
CREAT SERPL-MCNC: 0.76 MG/DL (ref 0.67–1.17)
DEPRECATED HCO3 PLAS-SCNC: 25 MMOL/L (ref 22–29)
EGFRCR SERPLBLD CKD-EPI 2021: >90 ML/MIN/1.73M2
EOSINOPHIL # BLD AUTO: 0 10E3/UL (ref 0–0.7)
EOSINOPHIL NFR BLD AUTO: 0 %
ERYTHROCYTE [DISTWIDTH] IN BLOOD BY AUTOMATED COUNT: 13.5 % (ref 10–15)
FASTING STATUS PATIENT QL REPORTED: YES
GLUCOSE SERPL-MCNC: 113 MG/DL (ref 70–99)
HCT VFR BLD AUTO: 28.3 % (ref 40–53)
HDLC SERPL-MCNC: 56 MG/DL
HGB BLD-MCNC: 10 G/DL (ref 13.3–17.7)
IMM GRANULOCYTES # BLD: 0 10E3/UL
IMM GRANULOCYTES NFR BLD: 1 %
LDLC SERPL CALC-MCNC: 31 MG/DL
LYMPHOCYTES # BLD AUTO: 0.2 10E3/UL (ref 0.8–5.3)
LYMPHOCYTES NFR BLD AUTO: 8 %
MCH RBC QN AUTO: 31.4 PG (ref 26.5–33)
MCHC RBC AUTO-ENTMCNC: 35.3 G/DL (ref 31.5–36.5)
MCV RBC AUTO: 89 FL (ref 78–100)
MONOCYTES # BLD AUTO: 0.3 10E3/UL (ref 0–1.3)
MONOCYTES NFR BLD AUTO: 15 %
NEUTROPHILS # BLD AUTO: 1.5 10E3/UL (ref 1.6–8.3)
NEUTROPHILS NFR BLD AUTO: 75 %
NONHDLC SERPL-MCNC: 44 MG/DL
NRBC # BLD AUTO: 0 10E3/UL
NRBC BLD AUTO-RTO: 0 /100
PLATELET # BLD AUTO: 149 10E3/UL (ref 150–450)
POTASSIUM SERPL-SCNC: 4 MMOL/L (ref 3.4–5.3)
PROT SERPL-MCNC: 6.7 G/DL (ref 6.4–8.3)
RBC # BLD AUTO: 3.18 10E6/UL (ref 4.4–5.9)
SODIUM SERPL-SCNC: 137 MMOL/L (ref 135–145)
TRIGL SERPL-MCNC: 67 MG/DL
WBC # BLD AUTO: 2 10E3/UL (ref 4–11)

## 2023-12-27 PROCEDURE — 250N000013 HC RX MED GY IP 250 OP 250 PS 637: Performed by: STUDENT IN AN ORGANIZED HEALTH CARE EDUCATION/TRAINING PROGRAM

## 2023-12-27 PROCEDURE — 92526 ORAL FUNCTION THERAPY: CPT | Mod: GN | Performed by: SPEECH-LANGUAGE PATHOLOGIST

## 2023-12-27 PROCEDURE — 99214 OFFICE O/P EST MOD 30 MIN: CPT

## 2023-12-27 PROCEDURE — 77386 HC IMRT TREATMENT DELIVERY, COMPLEX: CPT | Performed by: RADIOLOGY

## 2023-12-27 PROCEDURE — 258N000003 HC RX IP 258 OP 636: Performed by: STUDENT IN AN ORGANIZED HEALTH CARE EDUCATION/TRAINING PROGRAM

## 2023-12-27 PROCEDURE — 258N000003 HC RX IP 258 OP 636

## 2023-12-27 PROCEDURE — 99213 OFFICE O/P EST LOW 20 MIN: CPT | Mod: 25

## 2023-12-27 PROCEDURE — 250N000011 HC RX IP 250 OP 636: Performed by: STUDENT IN AN ORGANIZED HEALTH CARE EDUCATION/TRAINING PROGRAM

## 2023-12-27 PROCEDURE — 96375 TX/PRO/DX INJ NEW DRUG ADDON: CPT

## 2023-12-27 PROCEDURE — 80061 LIPID PANEL: CPT | Performed by: NURSE PRACTITIONER

## 2023-12-27 PROCEDURE — 96413 CHEMO IV INFUSION 1 HR: CPT

## 2023-12-27 PROCEDURE — 85025 COMPLETE CBC W/AUTO DIFF WBC: CPT | Performed by: STUDENT IN AN ORGANIZED HEALTH CARE EDUCATION/TRAINING PROGRAM

## 2023-12-27 PROCEDURE — 36415 COLL VENOUS BLD VENIPUNCTURE: CPT

## 2023-12-27 PROCEDURE — 80053 COMPREHEN METABOLIC PANEL: CPT

## 2023-12-27 PROCEDURE — 96417 CHEMO IV INFUS EACH ADDL SEQ: CPT

## 2023-12-27 PROCEDURE — 96367 TX/PROPH/DG ADDL SEQ IV INF: CPT

## 2023-12-27 RX ORDER — DIPHENHYDRAMINE HCL 25 MG
50 CAPSULE ORAL ONCE
Status: COMPLETED | OUTPATIENT
Start: 2023-12-27 | End: 2023-12-27

## 2023-12-27 RX ADMIN — FAMOTIDINE 20 MG: 10 INJECTION, SOLUTION INTRAVENOUS at 09:02

## 2023-12-27 RX ADMIN — PACLITAXEL 98 MG: 6 INJECTION, SOLUTION INTRAVENOUS at 09:35

## 2023-12-27 RX ADMIN — DEXAMETHASONE SODIUM PHOSPHATE: 10 INJECTION, SOLUTION INTRAMUSCULAR; INTRAVENOUS at 09:05

## 2023-12-27 RX ADMIN — CARBOPLATIN 250 MG: 600 INJECTION, SOLUTION INTRAVENOUS at 10:42

## 2023-12-27 RX ADMIN — SODIUM CHLORIDE 1000 ML: 9 INJECTION, SOLUTION INTRAVENOUS at 08:50

## 2023-12-27 RX ADMIN — DIPHENHYDRAMINE HYDROCHLORIDE 50 MG: 25 CAPSULE ORAL at 09:02

## 2023-12-27 ASSESSMENT — PAIN SCALES - GENERAL: PAINLEVEL: NO PAIN (0)

## 2023-12-27 NOTE — PROGRESS NOTES
Infusion Nursing Note:  Lopez Hogue presents today for Cycle 1, Day 22 Taxol, Carboplatin.    Patient seen by provider today: Yes: Joanna Ro CNP   present during visit today: Not Applicable.    Note: N/A.    Intravenous Access:  Peripheral IV placed.    Treatment Conditions:  Lab Results   Component Value Date    HGB 10.0 (L) 12/27/2023    WBC 2.0 (L) 12/27/2023    ANEUTAUTO 1.5 (L) 12/27/2023     (L) 12/27/2023        Lab Results   Component Value Date     12/27/2023    POTASSIUM 4.0 12/27/2023    MAG 1.7 11/30/2023    CR 0.76 12/27/2023    RAFAELA 8.9 12/27/2023    BILITOTAL 1.0 12/27/2023    ALBUMIN 3.8 12/27/2023    ALT 16 12/27/2023    AST 24 12/27/2023     Results reviewed, labs MET treatment parameters, ok to proceed with treatment.    Post Infusion Assessment:  Patient tolerated infusion without incident.  Blood return noted pre and post infusion.  Site patent and intact, free from redness, edema or discomfort.  No evidence of extravasations.  Access discontinued per protocol.     Discharge Plan:   Patient declined prescription refills.  AVS to patient via [employer].  Patient will return 1/3/24 for next appointment.   Patient discharged in stable condition accompanied by: self.  Departure Mode: Ambulatory.      Shu Arshad RN

## 2023-12-27 NOTE — NURSING NOTE
Chief Complaint   Patient presents with    Blood Draw     Labs drawn with PIV start by RN. Pt tolerated well. Vitals taken. Patient checked into next appointment.       Mary Ruiz RN

## 2023-12-27 NOTE — NURSING NOTE
"Oncology Rooming Note    December 27, 2023 8:04 AM   Lopez Hogue is a 68 year old male who presents for:    Chief Complaint   Patient presents with    Blood Draw     Labs drawn with PIV start by RN. Pt tolerated well. Vitals taken. Patient checked into next appointment.      Oncology Clinic Visit     Esophageal Cancer      Initial Vitals: /56 (BP Location: Right arm, Patient Position: Sitting, Cuff Size: Adult Regular)   Pulse 87   Temp 98.2  F (36.8  C) (Oral)   Resp 18   Wt 78.2 kg (172 lb 6.4 oz)   SpO2 95%   BMI 26.28 kg/m   Estimated body mass index is 26.28 kg/m  as calculated from the following:    Height as of 12/5/23: 1.725 m (5' 7.91\").    Weight as of this encounter: 78.2 kg (172 lb 6.4 oz). Body surface area is 1.94 meters squared.  No Pain (0) Comment: Data Unavailable   No LMP for male patient.  Allergies reviewed: Yes  Medications reviewed: Yes    Medications: MEDICATION REFILLS NEEDED TODAY. Provider was notified.  Pharmacy name entered into [employer] [facility]: [id number] - [address]  AT [facility] [address]    Frailty Screening:   Is the patient here for a new oncology consult visit in cancer care? 2. No      Clinical concerns: None       Donna Suh LPN  12/27/2023              "

## 2023-12-27 NOTE — Clinical Note
12/27/2023         RE: Lopez Hogue  [address] [facility] [address]        Dear Colleague,    Thank you for referring your patient, Lopez Hogue, to the [facility] CAN[facility]. Please see a copy of my visit note below.    [facility]   Return Patient Visit  Dec 27, 2023        Diagnosis: Oropharyngeal cancer, esophageal SCC  Stage:    Cancer Staging   Squamous cell carcinoma of vallecula (H)  Staging form: Pharynx - Oropharynx, AJCC 8th Edition  - Clinical stage from 10/12/2023: Stage JUAN MIGUEL (cT1, cN2b, cM0, p16-) - Signed by Yannick Alva MD on 11/21/2023    Molecular: p16 negative  Performance status: ECOG 0      Oncology History   Squamous cell carcinoma of vallecula (H)   8/18/2023 Imaging    CT neck:  Question soft tissue lesion within the right vallecula/ventral epiglottis.      10/12/2023 Pathology    R vallecula biopsy:  - NON-KERATINIZING POORLY DIFFERENTIATED SQUAMOUS CELL CARCINOMA  - p16 is negative     10/12/2023 -  Cancer Staged    Staging form: Pharynx - Oropharynx, AJCC 8th Edition  - Clinical stage from 10/12/2023: Stage JUAN MIGUEL (cT1, cN2b, cM0, p16-)     10/13/2023 Imaging    PET/CT:  1. 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     2. FDG avid thickening of the mid to distal esophagus, suspicious for a synchronous primary esophageal neoplasm. Recommend correlation with endoscopy.     11/6/2023 Initial Diagnosis    Squamous cell carcinoma of vallecula (H)     11/8/2023 Pathology    EGD with mid esophageal mass biopsy:  A.  MID ESOPHAGEAL MASS, BIOPSIES:  -Nonkeratinizing poorly differentiated squamous cell carcinoma  -Negative for intestinal metaplasia/Alfredo's type mucosa    PD-L1 CPS 15%             12/5/2023 -  Chemotherapy    OP ONC Esophageal Cancer - PACLitaxel / CARBOplatin WEEKLY + Radiation  Plan Provider: Yannick Alva MD  Treatment  goal: Curative  Line of treatment: First Line     Primary esophageal squamous cell carcinoma (H)   11/17/2023 Initial Diagnosis    Primary esophageal squamous cell carcinoma (H)     12/5/2023 -  Chemotherapy    OP ONC Esophageal Cancer - PACLitaxel / CARBOplatin WEEKLY + Radiation  Plan Provider: Yannick Alva MD  Treatment goal: Curative  Line of treatment: First Line           Oncology History:   Lopez Hogue is a 68 year old male with a past medical history that includes HTN, CAD, MI (March of 2023 s/p PCI x2, on DAPT), and prior sweat gland cancer s/p surgical resection, now with recently diagnosed oropharyngeal cancer found to have a 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     Recent diagnosis was made after he coughed up some blood on 8/18/23. He presented to an ED where ENT evaluated and was felt to have a lesion of the vallecula. Full workup of this mass is detailed in the oncology history above. Biopsy on 10/12 confirmed an invasive SCC. PET/CT revealed a hypermetabolic lesion within the mid to distal esophagus. EGD was performed on 11/8 and also showed a squamous cell carcinoma. Given the EGD findings, this was felt to be a second primary.     11/21/23 - consult with Dr. Hill, thoracic surgery. Plan for neoadjuvant chemoradiation followed by laparoscopic staging and jejunostomy, followed by minimally invasive [place] esophagectomy.     Had EUS on 11/28 to completed the staging process. Partially obstructing and partially circumferential fungating mass in the middle third of the esophagus. Esophageal squamous cell carcinoma was staged T2 N1 (suspicious paraesophageal lymph node)     12/5/23: Start of chemoradiation with Carbo/Taxol. Infusion reaction with Taxol w/ dose 1. Tolerated rechallenge       Interval History:   Pain has increased in his throat/esophagus especially with swallowing. Not able to swallow soft foods, eating a liquid diet. Started CBD per direction of   Dino yesterday. He slept better last night but is unsure if it is helping with pain. Continues on magic mouthwash and Tylenol, unsure if magic mouthwash is helpful either. Reports lidocaine is not helpful when used at the dentist and wonders if there is an alternative ingredient for numbing available. Has a few mouth sores, doing salt/soda rinses multiple times daily.       Nutrition/weight - working with dietician.   Nausea and heartburn are well controlled with Compazine and Carafate.   Neuropathy - none  Blood pressure - lower today. Has been checking at home, has been elevated as in clinic and lower today. Denies feeling dizzy/lightheaded. No chest pain or pressure. No swelling.   Energy level - Has been good, not going to the gym to conserve calories, doing light weight exercises at home.       Review of Systems:  Patient denies any of the following except if noted above: fevers, chills, difficulty with energy, vision or hearing changes, chest pain, dyspnea, abdominal pain, nausea, vomiting, diarrhea, constipation, urinary concerns, headaches, numbness, tingling, issues with sleep or mood. Also denies lumps, bumps, rashes or skin lesions, bleeding or bruising issues.        Social History:  Also actively involved in a program for those in recovery from substance use. Former heavy smoker (2 ppd) and drinker himself. Quit both in 2013. Other than his recent MI, he has been in generally good health. He has no history of autoimmune disease. He follows regularly with dermatology given his skin cancer history.       Physical Exam:  /56 (BP Location: Right arm, Patient Position: Sitting, Cuff Size: Adult Regular)   Pulse 87   Temp 98.2  F (36.8  C) (Oral)   Resp 18   Wt 78.2 kg (172 lb 6.4 oz)   SpO2 95%   BMI 26.28 kg/m    Wt Readings from Last 10 Encounters:   12/27/23 78.2 kg (172 lb 6.4 oz)   12/26/23 79.4 kg (175 lb)   12/26/23 79.4 kg (175 lb)   12/19/23 83 kg (183 lb)   12/19/23 83.3 kg (183 lb  9.6 oz)   12/11/23 79.9 kg (176 lb 3.2 oz)   12/06/23 79.8 kg (176 lb)   12/05/23 79.9 kg (176 lb 1.6 oz)   11/30/23 78 kg (172 lb)   11/21/23 78.5 kg (173 lb 1.6 oz)   General: The patient is a pleasant male in no acute distress.  HEENT: EOMI. Sclerae are anicteric. Mucous membranes moist without lesions or thrush.   Lymph: Neck is supple with no lymphadenopathy in the cervical or supraclavicular areas.   Heart: Systolic murmur present, regular rate  Lungs: Clear to auscultation bilaterally.   Abdomen: Bowel sounds present, soft, nontender with no palpable hepatosplenomegaly or masses.   Extremities: No lower extremity edema noted bilaterally.   Neuro: Cranial nerves II through XII are grossly intact.  Skin: No rashes, petechiae, or bruising noted on exposed skin.        Labs:   Most Recent 3 CBC's:  Recent Labs   Lab Test 12/27/23  0751 12/19/23  0656 12/11/23  0641   WBC 2.0* 2.4* 3.4*   HGB 10.0* 10.8* 12.9*   MCV 89 92 89   * 141* 169   ANEUTAUTO 1.5* 1.7 3.2     Most Recent 3 BMP's:  Recent Labs   Lab Test 12/19/23  0656 12/11/23  0641 12/05/23  0710    138 139   POTASSIUM 3.9 4.3 4.3   CHLORIDE 107 104 106   CO2 27 22 22   BUN 17.9 18.3 15.8   CR 0.70 0.64*  0.65* 0.72  0.72   ANIONGAP 7 12 11   RAFAELA 9.1 9.6 9.2   GLC 90 195* 81   PROTTOTAL 6.7 7.4 7.2   ALBUMIN 4.0 4.4 4.2    Most Recent 3 LFT's:  Recent Labs   Lab Test 12/19/23  0656 12/11/23  0641 12/05/23  0710   AST 32 36 28   ALT 31 36 12   ALKPHOS 82 82 80   BILITOTAL 0.6 1.2 0.6    Most Recent 2 TSH and T4:No lab results found.  I reviewed the above labs today.      Imaging:    All pertinent imaging studies personally reviewed. Key findings summarized in oncology history above    Pathology:     10/12/23 R vallecular lesion biopsy:  Final Diagnosis   A&B. RIGHT VALLECULA LESION, BIOPSY:  - NON-KERATINIZING POORLY DIFFERENTIATED SQUAMOUS CELL CARCINOMA  - p16 is negative     11/8/23 Esophageal biopsy:  Final Diagnosis   A.  MID ESOPHAGEAL  MASS, BIOPSIES:  -Nonkeratinizing poorly differentiated squamous cell carcinoma  -Negative for intestinal metaplasia/Alfredo's type mucosa     Addendum   RESULT FOR IMMUNOHISTOCHEMICAL VENTANA CLONE  PD-L1 ASSAY     - TUMOR PROPORTION SCORE (TPS): 5%  - INTERPRETATION: LOW EXPRESSOR PD-L1 EXPRESSION (TPS 1%-49%)  - COMBINED POSITIVE SCORE (CPS): 15         Assessment and Plan:   Lopez Hogue is a 68 year old male with HTN, CAD, prior sweat gland cancer s/p resection, and recently diagnosed head and neck squamous cell carcinoma of the vallecula, with staging revealing a synchronous esophageal squamous cell carcinoma.    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, cT1cN2 disease with multiple ipsilateral nodes on PET/CT  # esophageal squamous cell carcinoma, T2 N1 (suspicious paraesophageal lymph node)   -initiated concurrent chemoradiation for treatment of both malignancies on 12/5/23 w/ weekly Carbo/Taxol  -tolerating well, had an infusion reaction with dose 1 taxol and has since been successfully rechallenged  -developing progressive mucositis as expected ***  -minimal nausea ***   -labs reviewed, ok to proceed as planned ***   -*** Will continue weekly FRAN follow-up through treatment, post treatment response assessment about 12 weeks post chemoradiation    Mucositis.   -Magic mouthwash, tylenol, gabapentin, salt/soda rinses    Heartburn.   Currently taking famotidine 20mg BID. Heartburn was slightly worse over the last week. Can try Carafate, 1g 4x/day. Prescription sent to local pharmacy. Continue famotidine 20mg BID.        *** minutes spent on the date of the encounter doing {2021 E&M time in:[id number]}       YADIRA Lazcano CNP        Again, thank you for allowing me to participate in the care of your patient.        Sincerely,        YADIRA Lazcano CNP

## 2023-12-28 ENCOUNTER — APPOINTMENT (OUTPATIENT)
Dept: RADIATION ONCOLOGY | Facility: CLINIC | Age: 68
End: 2023-12-28
Attending: RADIOLOGY
Payer: COMMERCIAL

## 2023-12-28 PROCEDURE — 77386 HC IMRT TREATMENT DELIVERY, COMPLEX: CPT | Performed by: RADIOLOGY

## 2023-12-28 PROCEDURE — 77014 PR CT GUIDE FOR PLACEMENT RADIATION THERAPY FIELDS: CPT | Mod: 26 | Performed by: RADIOLOGY

## 2023-12-29 ENCOUNTER — APPOINTMENT (OUTPATIENT)
Dept: RADIATION ONCOLOGY | Facility: CLINIC | Age: 68
End: 2023-12-29
Attending: RADIOLOGY
Payer: COMMERCIAL

## 2023-12-29 PROCEDURE — 77014 PR CT GUIDE FOR PLACEMENT RADIATION THERAPY FIELDS: CPT | Mod: 26 | Performed by: RADIOLOGY

## 2023-12-29 PROCEDURE — 77427 RADIATION TX MANAGEMENT X5: CPT | Performed by: RADIOLOGY

## 2023-12-29 PROCEDURE — 77386 HC IMRT TREATMENT DELIVERY, COMPLEX: CPT | Performed by: RADIOLOGY

## 2024-01-02 ENCOUNTER — OFFICE VISIT (OUTPATIENT)
Dept: RADIATION ONCOLOGY | Facility: CLINIC | Age: 69
End: 2024-01-02
Attending: RADIOLOGY
Payer: COMMERCIAL

## 2024-01-02 ENCOUNTER — ONCOLOGY VISIT (OUTPATIENT)
Dept: ONCOLOGY | Facility: CLINIC | Age: 69
End: 2024-01-02
Attending: NURSE PRACTITIONER
Payer: COMMERCIAL

## 2024-01-02 ENCOUNTER — INFUSION THERAPY VISIT (OUTPATIENT)
Dept: ONCOLOGY | Facility: CLINIC | Age: 69
End: 2024-01-02
Attending: STUDENT IN AN ORGANIZED HEALTH CARE EDUCATION/TRAINING PROGRAM
Payer: COMMERCIAL

## 2024-01-02 VITALS
SYSTOLIC BLOOD PRESSURE: 124 MMHG | BODY MASS INDEX: 25.26 KG/M2 | TEMPERATURE: 98.3 F | OXYGEN SATURATION: 95 % | WEIGHT: 165.7 LBS | HEART RATE: 67 BPM | DIASTOLIC BLOOD PRESSURE: 61 MMHG | RESPIRATION RATE: 16 BRPM

## 2024-01-02 VITALS
SYSTOLIC BLOOD PRESSURE: 111 MMHG | DIASTOLIC BLOOD PRESSURE: 68 MMHG | WEIGHT: 165 LBS | BODY MASS INDEX: 25.15 KG/M2 | HEART RATE: 82 BPM

## 2024-01-02 DIAGNOSIS — C10.0 SQUAMOUS CELL CARCINOMA OF VALLECULA (H): ICD-10-CM

## 2024-01-02 DIAGNOSIS — K21.9 GASTROESOPHAGEAL REFLUX DISEASE, UNSPECIFIED WHETHER ESOPHAGITIS PRESENT: ICD-10-CM

## 2024-01-02 DIAGNOSIS — R63.4 WEIGHT LOSS: ICD-10-CM

## 2024-01-02 DIAGNOSIS — C15.9 PRIMARY ESOPHAGEAL SQUAMOUS CELL CARCINOMA (H): Primary | ICD-10-CM

## 2024-01-02 DIAGNOSIS — C15.9 PRIMARY ESOPHAGEAL SQUAMOUS CELL CARCINOMA (H): ICD-10-CM

## 2024-01-02 DIAGNOSIS — R13.10 ODYNOPHAGIA: ICD-10-CM

## 2024-01-02 DIAGNOSIS — K12.30 MUCOSITIS: ICD-10-CM

## 2024-01-02 DIAGNOSIS — C10.0 SQUAMOUS CELL CARCINOMA OF VALLECULA (H): Primary | ICD-10-CM

## 2024-01-02 PROCEDURE — 258N000003 HC RX IP 258 OP 636

## 2024-01-02 PROCEDURE — 99215 OFFICE O/P EST HI 40 MIN: CPT

## 2024-01-02 PROCEDURE — 99213 OFFICE O/P EST LOW 20 MIN: CPT | Mod: 25

## 2024-01-02 PROCEDURE — 77336 RADIATION PHYSICS CONSULT: CPT | Performed by: RADIOLOGY

## 2024-01-02 PROCEDURE — 96360 HYDRATION IV INFUSION INIT: CPT

## 2024-01-02 PROCEDURE — 77386 HC IMRT TREATMENT DELIVERY, COMPLEX: CPT | Performed by: RADIOLOGY

## 2024-01-02 RX ORDER — HEPARIN SODIUM (PORCINE) LOCK FLUSH IV SOLN 100 UNIT/ML 100 UNIT/ML
5 SOLUTION INTRAVENOUS
Status: CANCELLED | OUTPATIENT
Start: 2024-01-02

## 2024-01-02 RX ORDER — HEPARIN SODIUM,PORCINE 10 UNIT/ML
5-20 VIAL (ML) INTRAVENOUS DAILY PRN
Status: CANCELLED | OUTPATIENT
Start: 2024-01-02

## 2024-01-02 RX ORDER — ACETAMINOPHEN 160 MG/5ML
320-640 LIQUID ORAL EVERY 6 HOURS PRN
Qty: 473 ML | Refills: 3 | Status: SHIPPED | OUTPATIENT
Start: 2024-01-02 | End: 2024-01-15

## 2024-01-02 RX ORDER — PROCHLORPERAZINE MALEATE 10 MG
10 TABLET ORAL EVERY 6 HOURS PRN
Qty: 30 TABLET | Refills: 2 | Status: SHIPPED | OUTPATIENT
Start: 2024-01-02 | End: 2024-03-04

## 2024-01-02 RX ORDER — FAMOTIDINE 20 MG/1
20 TABLET, FILM COATED ORAL 2 TIMES DAILY
Qty: 60 TABLET | Refills: 3 | Status: SHIPPED | OUTPATIENT
Start: 2024-01-02 | End: 2024-03-04

## 2024-01-02 RX ORDER — OXYCODONE HCL 5 MG/5 ML
5 SOLUTION, ORAL ORAL EVERY 4 HOURS PRN
Qty: 200 ML | Refills: 0 | Status: SHIPPED | OUTPATIENT
Start: 2024-01-02 | End: 2024-01-08

## 2024-01-02 RX ORDER — SENNOSIDES 8.8 MG/5ML
10-15 LIQUID ORAL DAILY PRN
Qty: 236 ML | Refills: 1 | Status: SHIPPED | OUTPATIENT
Start: 2024-01-02 | End: 2024-03-26

## 2024-01-02 RX ADMIN — SODIUM CHLORIDE 1000 ML: 9 INJECTION, SOLUTION INTRAVENOUS at 10:19

## 2024-01-02 ASSESSMENT — PAIN SCALES - GENERAL: PAINLEVEL: WORST PAIN (10)

## 2024-01-02 NOTE — LETTER
2024         RE: Lopez Hogue  [address] [facility] [address]        Dear Colleague,    Thank you for referring your patient, Lopez Hogue, to the [facility] RADIATION ONCOLOGY. Please see a copy of my visit note below.    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 Hogue presents to clinic today for his weekly on-treatment visit.  He is having more difficulty with swallowing and has lost weight.  Basically most anything he tries to swallow causes significant pain.  He was prescribed oxycodone today.  He continues with gabapentin.  He has switched his diet to protein shakes and softer foods.  He also notes that he has started to lose weight and has decreased his level of exercise to maintain calories.  He is using Magic mouthwash.    Nursing ROS:   Nutrition Alteration  Diet Type: Patient's Preference  Skin  Skin Reaction: 0 - No changes  Skin Progress: Aquaphor     ENT and Mouth Exam  Mucositis - Current: 0 - None   ENT/Mouth Note: Mouth pain/S&S 15     Gastrointestinal  Nausea: 0 - None     Psychosocial  Mood - Anxiety: 0 - Normal  Pain  Assessment  0-10 Pain Scale: 6  Pain Treatment: Gabapentin 900 mg TID  Pain Note: Oxycodone     PEG Tube: No, if needed will be a jejunostomy tube  Electronic Cardiac Implant: No    Objective:   /68   Pulse 82   Wt 74.8 kg (165 lb)   BMI 25.15 kg/m  , initial weight Wt 79.8 kg (176 lb)   Gen: Appears well, fatigued  HEENT: Diffuse, moderate erythema involving the oropharynx and posterior soft palate, no thrush  Skin: Moderate erythema    Laboratory:  Lab Results   Component Value Date    WBC 2.0 (L) 12/27/2023    HGB 10.0 (L) 12/27/2023    HCT 28.3 (L) 12/27/2023    MCV 89 12/27/2023     (L) 12/27/2023     Lab Results   Component Value Date     12/27/2023    POTASSIUM 4.0 12/27/2023    CHLORIDE 104 12/27/2023    CO2 25 12/27/2023     (H) 12/27/2023     Lab Results   Component Value Date    AST 24 12/27/2023    ALT 16 12/27/2023    ALKPHOS 69 12/27/2023    BILITOTAL 1.0 12/27/2023     Magnesium   Date Value Ref Range Status   11/30/2023 1.7 1.7 - 2.3 mg/dL Final       Treatment-related toxicities (CTCAE v5.0):  Anorexia: Grade 3: Associated with significant weight loss or malnutrition; tube feeding or TPN indicated  Fatigue: Grade 2: Fatigue not relieved by rest; limiting instrumental ADL  Nausea: Grade 0: No toxicity  Pain: Grade 2: Moderate pain; limiting instrumental ADL  Dry mouth: Grade 1: Symptomatic without significant dietary alteration; unstimulated saliva flow >0.2 mL/min  Dysphagia: Grade 2: Symptomatic and altered eating/swallowing  Mucositis: Grade 2: Moderate pain; not interfering with oral intake; modified diet indicated  Dyspnea: Grade 0: No toxicity  Esophagitis: Grade 2: Symptomatic; altered eating/swallowing; oral supplements indicated  Dermatitis: Grade 1: Faint erythema or dry desquamation    ED visits/Hospitalizations: None    Missed Treatments: None    Mosaiq chart and setup information reviewed  IGRT images reviewed    Medication Review  Med list reviewed with  patient?: Yes  Med list printed and given: Offered and declined    Assessment:    Mr. 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    He is tolerating chemoradiation but has limited oral intake now requiring feeding tube placement.    Plan:   1.  Continue treatment as planned  2.  Discussed recommended self cares and tapering up of gabapentin dose.  3.  Discussed managing pain with Magic mouthwash and acetaminophen.  4.  Will discuss nutritional needs with dietitian today.  5.  Jejunostomy tube indicated.  If it takes too long to for Interventional Radiology to place a tube, then we will place a nasal jejunostomy tube.  6.  Palliative care referral for management of pain    Akiko Sun  Department of Radiation Oncology  [facility]

## 2024-01-02 NOTE — Clinical Note
1/2/2024         RE: Lopez Hogue  [address] [facility] [address]        Dear Colleague,    Thank you for referring your patient, Lopez Hogue, to the [facility]. Please see a copy of my visit note below.    [facility]   Return Patient Visit  Jan 2, 2024        Diagnosis: Oropharyngeal cancer, esophageal SCC  Stage:    Cancer Staging   Squamous cell carcinoma of vallecula (H)  Staging form: Pharynx - Oropharynx, AJCC 8th Edition  - Clinical stage from 10/12/2023: Stage JUAN MIGUEL (cT1, cN2b, cM0, p16-) - Signed by Yannick Alva MD on 11/21/2023    Molecular: p16 negative  Performance status: ECOG 0      Oncology History   Squamous cell carcinoma of vallecula (H)   8/18/2023 Imaging    CT neck:  Question soft tissue lesion within the right vallecula/ventral epiglottis.      10/12/2023 Pathology    R vallecula biopsy:  - NON-KERATINIZING POORLY DIFFERENTIATED SQUAMOUS CELL CARCINOMA  - p16 is negative     10/12/2023 -  Cancer Staged    Staging form: Pharynx - Oropharynx, AJCC 8th Edition  - Clinical stage from 10/12/2023: Stage JUAN MIGUEL (cT1, cN2b, cM0, p16-)     10/13/2023 Imaging    PET/CT:  1. 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     2. FDG avid thickening of the mid to distal esophagus, suspicious for a synchronous primary esophageal neoplasm. Recommend correlation with endoscopy.     11/6/2023 Initial Diagnosis    Squamous cell carcinoma of vallecula (H)     11/8/2023 Pathology    EGD with mid esophageal mass biopsy:  A.  MID ESOPHAGEAL MASS, BIOPSIES:  -Nonkeratinizing poorly differentiated squamous cell carcinoma  -Negative for intestinal metaplasia/Alfredo's type mucosa    PD-L1 CPS 15%             12/5/2023 -  Chemotherapy    OP ONC Esophageal Cancer - PACLitaxel / CARBOplatin WEEKLY + Radiation  Plan Provider: Yannick Alva MD  Treatment  goal: Curative  Line of treatment: First Line     Primary esophageal squamous cell carcinoma (H)   11/17/2023 Initial Diagnosis    Primary esophageal squamous cell carcinoma (H)     12/5/2023 -  Chemotherapy    OP ONC Esophageal Cancer - PACLitaxel / CARBOplatin WEEKLY + Radiation  Plan Provider: Yannick Alva MD  Treatment goal: Curative  Line of treatment: First Line           Oncology History:   Lopez Hogue is a 68 year old male with a past medical history that includes HTN, CAD, MI (March of 2023 s/p PCI x2, on DAPT), and prior sweat gland cancer s/p surgical resection, now with recently diagnosed oropharyngeal cancer found to have a 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     Recent diagnosis was made after he coughed up some blood on 8/18/23. He presented to an ED where ENT evaluated and was felt to have a lesion of the vallecula. Full workup of this mass is detailed in the oncology history above. Biopsy on 10/12 confirmed an invasive SCC. PET/CT revealed a hypermetabolic lesion within the mid to distal esophagus. EGD was performed on 11/8 and also showed a squamous cell carcinoma. Given the EGD findings, this was felt to be a second primary.     11/21/23 - consult with Dr. Hill, thoracic surgery. Plan for neoadjuvant chemoradiation followed by laparoscopic staging and jejunostomy, followed by minimally invasive Bethesda-Elbert esophagectomy.     Had EUS on 11/28 to completed the staging process. Partially obstructing and partially circumferential fungating mass in the middle third of the esophagus. Esophageal squamous cell carcinoma was staged T2 N1 (suspicious paraesophageal lymph node)     12/5/23: Start of chemoradiation with Carbo/Taxol. Infusion reaction with Taxol w/ dose 1. Tolerated rechallenge       Interval History:     Pain - 10/10 with swallowing. Decreased oral intake. Secretions/saliva is thick causing coughing. No shortness of breath, no chest pain.     Day of  chemo/radiation - next 1-2 days are roughest with fatigue. Fatigued overall. 2 hours of activity between naps.     Swallowing  Neuropathy -   Nutrition - *** gatorade /water, sipping. 24 ounces. Still trying     Skin - red on neck, no open, no pain.     Lots of gas, no bowel movement in 2 days, minimal intake. No abdominal pain    Few flesh colored lesions in posterior pharynx, erythema . Contining salt/soda, magic     Fever 99 yesterday. Occassionaly dizzy.    ***    Pain has increased in his throat/esophagus especially with swallowing. Not able to swallow soft foods, eating a liquid diet. Started CBD per direction of Dr. Sun yesterday. He slept better last night but is unsure if it is helping with pain. Continues on magic mouthwash and Tylenol, unsure if magic mouthwash is helpful either. Reports lidocaine is not helpful when used at the dentist and wonders if there is an alternative ingredient for numbing available. Has a few mouth sores, doing salt/soda rinses multiple times daily. Nausea and heartburn are well controlled with Compazine and Carafate. Bowels are moving slower, using Senna with some relief.     Denies neuropathy. Blood pressure is lower today. Checks blood pressure at home and readings are consistent with blood pressures in clinic. Denies feeling dizzy or lightheaded. No chest pain, shortness of breath or swelling. Energy level has been good. Not going to the gym in an effort to conserve calories. Doing some light weight exercises at home.     Redness to skin on neck. Denies any pain or open lesions on skin.   No fevers, chills or concerns for infection.     Review of Systems:  Patient denies any of the following except if noted above: fevers, chills, difficulty with energy, vision or hearing changes, chest pain, dyspnea, abdominal pain, nausea, vomiting, diarrhea, constipation, urinary concerns, headaches, numbness, tingling, issues with sleep or mood. Also denies lumps, bumps, rashes or  skin lesions, bleeding or bruising issues.        Social History:  Also actively involved in a program for those in recovery from substance use. Former heavy smoker (2 ppd) and drinker himself. Quit both in 2013. Other than his recent MI, he has been in generally good health. He has no history of autoimmune disease. He follows regularly with dermatology given his skin cancer history.       Physical Exam:  There were no vitals taken for this visit.  Wt Readings from Last 10 Encounters:   12/27/23 78.2 kg (172 lb 6.4 oz)   12/26/23 79.4 kg (175 lb)   12/26/23 79.4 kg (175 lb)   12/19/23 83 kg (183 lb)   12/19/23 83.3 kg (183 lb 9.6 oz)   12/11/23 79.9 kg (176 lb 3.2 oz)   12/06/23 79.8 kg (176 lb)   12/05/23 79.9 kg (176 lb 1.6 oz)   11/30/23 78 kg (172 lb)   11/21/23 78.5 kg (173 lb 1.6 oz)   General: The patient is a pleasant male in no acute distress.  HEENT: EOMI. Sclerae are anicteric. Oropharynx is moist, saliva is thick. Erythema and multiple lesions noted on posterior pharynx.   Lymph: Neck is supple with no lymphadenopathy in the cervical or supraclavicular areas.   Heart: Systolic murmur present, regular rate  Lungs: Clear to auscultation bilaterally.   Abdomen: Bowel sounds present, soft, nontender with no palpable hepatosplenomegaly or masses.   Extremities: No lower extremity edema noted bilaterally.   Neuro: Cranial nerves II through XII are grossly intact.  Skin: Erythema to neck, no open areas. No rashes, petechiae, or bruising noted on exposed skin.        Labs:   Most Recent 3 CBC's:  Recent Labs   Lab Test 12/27/23  0751 12/19/23  0656 12/11/23  0641   WBC 2.0* 2.4* 3.4*   HGB 10.0* 10.8* 12.9*   MCV 89 92 89   * 141* 169   ANEUTAUTO 1.5* 1.7 3.2     Most Recent 3 BMP's:  Recent Labs   Lab Test 12/27/23  0751 12/19/23  0656 12/11/23  0641    141 138   POTASSIUM 4.0 3.9 4.3   CHLORIDE 104 107 104   CO2 25 27 22   BUN 16.6 17.9 18.3   CR 0.76 0.70 0.64*  0.65*   ANIONGAP 8 7 12   RAFAELA 8.9  9.1 9.6   * 90 195*   PROTTOTAL 6.7 6.7 7.4   ALBUMIN 3.8 4.0 4.4    Most Recent 3 LFT's:  Recent Labs   Lab Test 12/27/23  0751 12/19/23  0656 12/11/23  0641   AST 24 32 36   ALT 16 31 36   ALKPHOS 69 82 82   BILITOTAL 1.0 0.6 1.2    Most Recent 2 TSH and T4:No lab results found.  I reviewed the above labs today.      Imaging:    All pertinent imaging studies personally reviewed. Key findings summarized in oncology history above    Pathology:     10/12/23 R vallecular lesion biopsy:  Final Diagnosis   A&B. RIGHT VALLECULA LESION, BIOPSY:  - NON-KERATINIZING POORLY DIFFERENTIATED SQUAMOUS CELL CARCINOMA  - p16 is negative     11/8/23 Esophageal biopsy:  Final Diagnosis   A.  MID ESOPHAGEAL MASS, BIOPSIES:  -Nonkeratinizing poorly differentiated squamous cell carcinoma  -Negative for intestinal metaplasia/Alfredo's type mucosa     Addendum   RESULT FOR IMMUNOHISTOCHEMICAL VENTANA CLONE  PD-L1 ASSAY     - TUMOR PROPORTION SCORE (TPS): 5%  - INTERPRETATION: LOW EXPRESSOR PD-L1 EXPRESSION (TPS 1%-49%)  - COMBINED POSITIVE SCORE (CPS): 15         Assessment and Plan:   Lopez Hogue is a 68 year old male with HTN, CAD, prior sweat gland cancer s/p resection, and recently diagnosed head and neck squamous cell carcinoma of the vallecula, with staging revealing a synchronous esophageal squamous cell carcinoma.    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 Discussed neutropenic precautions including notifying triage of a fever >100.4.    -Will continue weekly FRAN follow-up through treatment,  post treatment response assessment about 12 weeks post chemoradiation    Mucositis.   -Discussed with pharmacy, no other numbing agents available to substitute in magic mouthwash.   -Continue Magic mouthwash, tylenol, gabapentin, salt/soda rinses. Fabricio will notify triage or radiation team if Tylenol is no longer adequate for pain control.     Nutrition, nausea.  -Difficulty with soft foods, consuming almost entirely liquid diet. Working with dietician to increase calories/protein supplements. Weight loss ~10lb over the last couple of weeks but stable since starting treatment.   -Nausea well controlled with Compazine.      Heartburn.  Well controlled with famotidine 20mg BID and Carafate 1g 4x/daily.       *** minutes spent on the date of the encounter doing {2021 E&M time in:[id number]}     YADIRA Lazcano CNP        Again, thank you for allowing me to participate in the care of your patient.        Sincerely,        YADIRA Lazcano CNP

## 2024-01-02 NOTE — PROGRESS NOTES
CLINICAL NUTRITION SERVICES - REASSESSMENT NOTE   EVALUATION OF PREVIOUS PLAN OF CARE:   Time Spent: 15 minutes  Visit Type: return  Pt accompanied by: self  Referring Physician: Dino 11/17 for esophageal cancer  R13.10 (ICD-10-CM) - Dysphagia   C10.0 (ICD-10-CM) - Squamous cell carcinoma of vallecula (H)      Chemotherapy: started 12/5 paclitaxol, carboplatin  Radiation: started 12/5  Previous RD visit: 12/12  Monitoring from previous assessment:   -Food/Fluid intake - due to extreme pain with swallowing, Fabricio has been taking only sips of water and no nutrition over the past couple of days. Prior to that, he had been taking only sips of Pro Performance Bulk 1340 powder throughout the day.   -Weight trends - down 11 lb (7%) x past 3 week  Wt Readings from Last 10 Encounters:   01/02/24 75.2 kg (165 lb 11.2 oz)   12/27/23 78.2 kg (172 lb 6.4 oz)   12/26/23 79.4 kg (175 lb)   12/26/23 79.4 kg (175 lb)   12/19/23 83 kg (183 lb)   12/19/23 83.3 kg (183 lb 9.6 oz)   12/11/23 79.9 kg (176 lb 3.2 oz)   12/06/23 79.8 kg (176 lb)   12/05/23 79.9 kg (176 lb 1.6 oz)   11/30/23 78 kg (172 lb)     Previous Goals:   1.  Aim for 2000-2400kcal and 95-120g protein, 10+ cups water/electrolytes per day  2. Weight maintenance /no more than 1-2 lb wt loss each week   Evaluation: Not met   Previous Nutrition Diagnosis:   Inadequate oral intake related to odynophagia and dysgeusia as evidenced by 8lb wt loss x past 1 week, ~75% of baseline intake of solid foods    Evaluation: Declining with decreased PO and further weight loss  NEW FINDINGS:   Odynophagia, dysphagia   Labs:  Electrolytes  Potassium (mmol/L)   Date Value   12/27/2023 4.0   12/19/2023 3.9   12/11/2023 4.3    Blood Glucose  Glucose (mg/dL)   Date Value   12/27/2023 113 (H)   12/19/2023 90   12/11/2023 195 (H)   12/05/2023 81   11/30/2023 99     GLUCOSE BY METER POCT (mg/dL)   Date Value   10/13/2023 85     Hemoglobin A1C (%)   Date Value   03/27/2023 5.2    Inflammatory  Markers  WBC Count (10e3/uL)   Date Value   12/27/2023 2.0 (L)   12/19/2023 2.4 (L)   12/11/2023 3.4 (L)     Albumin (g/dL)   Date Value   12/27/2023 3.8   12/19/2023 4.0   12/11/2023 4.4      Magnesium (mg/dL)   Date Value   11/30/2023 1.7   08/18/2023 1.8     Sodium (mmol/L)   Date Value   12/27/2023 137   12/19/2023 141   12/11/2023 138    Renal  Urea Nitrogen (mg/dL)   Date Value   12/27/2023 16.6   12/19/2023 17.9   12/11/2023 18.3     Creatinine (mg/dL)   Date Value   12/27/2023 0.76   12/19/2023 0.70   12/11/2023 0.65 (L)   12/11/2023 0.64 (L)     Additional  Triglycerides (mg/dL)   Date Value   12/27/2023 67   05/11/2023 67   03/27/2023 82     ANC: 1.5 12/27/23       CURRENT NUTRITION DIAGNOSIS   Inadequate oral intake related to odynophagia and dysgeusia as evidenced by 11lb (7%) wt loss x past 2 weeks, patient consuming <25% estimated nutrition needs.   INTERVENTIONS   Nutrition-related Medication Management, Composition of Meals and Snacks, and Medical Food Supplement - encouraged to continue taking ONS (Pro Performance Bulk 1340 powder from [facility] as tolerated). E Reviewed nutrition and hydration goals with CRT.  He admits that he is still receptive to a FT if needed but would like to avoid it.   2.  RECOMMENDATIONS FOR MD/PROVIDER OR RD TO ORDER PRN:   Enteral Nutrition - as sole source of nutrition due to little to NO PO intake  Formula: Osmolite 1.5 jayson  Volume: 6 cartons/day (1422 mL, 1086 ml free water)  Feeding tube: NJ or PEJ tube  Method of administration: Pump feedings 60mL/hr x 24 hours; cycled feedings ~120mL/hr x 12 hours; suggested initiating feedings at 20mL/hr x 12 hours, increase by 10 mL every 12 hours until goal rate of 60mL/hr is met. Recommend checking K+, Phos, Mg for risk of refeeding syndrome.   Provisions:  2130kcal (27kcal/kg), 90 g protein (1.1g/kg), 288g CHO, 0g fiber  Dosing wt: 79kg (actual wt from initial visit on 12/12)  Suspected risk for refeeding syndrome at present time.       Implementation  Composition of Meals and Snacks, Medical Food Supplement - reviewed ONS (jonah powders and clear shakes). Encouraged to take sips of shakes with pain meds and magic mouth wash management.   EN Composition, EN Schedule, Feeding Tube Flush - reviewed EN via PEJ tube, formula options, volume goals, role of RD and home infusion company with facilitating supplies and formula.    Collaboration and Referral of Nutrition care - message sent to \[facility]\"" for benefit check.   Goals  1.  EN to provide 100% estimated nutrition needs via J tube.   2. Weight maintenance /no more than 1-2 lb wt loss each week      Follow-Up Plans: Pt has RD contact information for questions.  One week follow up one week   MONITORING AND EVALUATION: one week follow up, 1/9  -Food/beverage intake  -EN access   -Weight trends    Patito Smyth RD, , LD  [facility]  [phone]

## 2024-01-02 NOTE — PROGRESS NOTES
RADIATION ONCOLOGY WEEKLY ON TREATMENT VISIT   Encounter Date: 2024    Patient Name: Lopez Hogue  MRN: [id number]  : 1955     Disease and Stage: Squamous cell carcinoma of the vallecula p16 negative, clinical stage U1X6PM4 (stage JUAN MIGUEL)  Treatment Site: Head and bilateral necks  Current Dose/Planned Total Dose: [4000] cGy / [7000] cGy    Disease and Stage: Squamous cell carcinoma of the middle third of esophagus, poorly differentiated, clinical stage T2 N1 (suspicious paraesophageal lymph node level 8L) M0 (stage II)  Treatment Site: Mid esophagus  Current Dose/Planned Total Dose: [3600] cGy / [4500] cGy with dose painting to tumor [4000] cGy / [5000] cGy    Concurrent Chemotherapy: Yes  Drug and Frequency: Weekly CarboTaxol    Medical Oncologist: Yannick Alva MD  ENT Surgeon: Edi Felix MD  Thoracic surgeon: Devin Hill MD    Subjective: Mr. Hogue presents to clinic today for his weekly on-treatment visit.  He is having more difficulty with swallowing and has lost weight.  Basically most anything he tries to swallow causes significant pain.  He was prescribed oxycodone today.  He continues with gabapentin.  He has switched his diet to protein shakes and softer foods.  He also notes that he has started to lose weight and has decreased his level of exercise to maintain calories.  He is using Magic mouthwash.    Nursing ROS:   Nutrition Alteration  Diet Type: Patient's Preference  Skin  Skin Reaction: 0 - No changes  Skin Progress: Aquaphor     ENT and Mouth Exam  Mucositis - Current: 0 - None   ENT/Mouth Note: Mouth pain/S&S 15     Gastrointestinal  Nausea: 0 - None     Psychosocial  Mood - Anxiety: 0 - Normal  Pain Assessment  0-10 Pain Scale: 6  Pain Treatment: Gabapentin 900 mg TID  Pain Note: Oxycodone     PEG Tube: No, if needed will be a jejunostomy tube  Electronic Cardiac Implant: No    Objective:   /68   Pulse 82   Wt 74.8 kg (165 lb)   BMI 25.15 kg/m  , initial  weight Wt 79.8 kg (176 lb)   Gen: Appears well, fatigued  HEENT: Diffuse, moderate erythema involving the oropharynx and posterior soft palate, no thrush  Skin: Moderate erythema    Laboratory:  Lab Results   Component Value Date    WBC 2.0 (L) 12/27/2023    HGB 10.0 (L) 12/27/2023    HCT 28.3 (L) 12/27/2023    MCV 89 12/27/2023     (L) 12/27/2023     Lab Results   Component Value Date     12/27/2023    POTASSIUM 4.0 12/27/2023    CHLORIDE 104 12/27/2023    CO2 25 12/27/2023     (H) 12/27/2023     Lab Results   Component Value Date    AST 24 12/27/2023    ALT 16 12/27/2023    ALKPHOS 69 12/27/2023    BILITOTAL 1.0 12/27/2023     Magnesium   Date Value Ref Range Status   11/30/2023 1.7 1.7 - 2.3 mg/dL Final       Treatment-related toxicities (CTCAE v5.0):  Anorexia: Grade 3: Associated with significant weight loss or malnutrition; tube feeding or TPN indicated  Fatigue: Grade 2: Fatigue not relieved by rest; limiting instrumental ADL  Nausea: Grade 0: No toxicity  Pain: Grade 2: Moderate pain; limiting instrumental ADL  Dry mouth: Grade 1: Symptomatic without significant dietary alteration; unstimulated saliva flow >0.2 mL/min  Dysphagia: Grade 2: Symptomatic and altered eating/swallowing  Mucositis: Grade 2: Moderate pain; not interfering with oral intake; modified diet indicated  Dyspnea: Grade 0: No toxicity  Esophagitis: Grade 2: Symptomatic; altered eating/swallowing; oral supplements indicated  Dermatitis: Grade 1: Faint erythema or dry desquamation    ED visits/Hospitalizations: None    Missed Treatments: None    Mosaiq chart and setup information reviewed  IGRT images reviewed    Medication Review  Med list reviewed with patient?: Yes  Med list printed and given: Offered and declined    Assessment:    Mr. Hogue is a 68 year old male with synchronous primaries of a squamous cell carcinoma of the vallecula p16 negative, clinical stage D1G5YV5 (stage JUAN MIGUEL) and a squamous cell carcinoma of  the middle third of esophagus, poorly differentiated, clinical stage T2 N1 (suspicious paraesophageal lymph node level 8L) M0 (stage II).    He is tolerating chemoradiation but has limited oral intake now requiring feeding tube placement.    Plan:   1.  Continue treatment as planned  2.  Discussed recommended self cares and tapering up of gabapentin dose.  3.  Discussed managing pain with Magic mouthwash and acetaminophen.  4.  Will discuss nutritional needs with dietitian today.  5.  Jejunostomy tube indicated.  If it takes too long to for Interventional Radiology to place a tube, then we will place a nasal jejunostomy tube.  6.  Palliative care referral for management of pain    Akiko Sun  Department of Radiation Oncology  [facility]

## 2024-01-02 NOTE — PROGRESS NOTES
[facility]   Return Patient Visit  Jan 2, 2024        Diagnosis: Oropharyngeal cancer, esophageal SCC  Stage:    Cancer Staging   Squamous cell carcinoma of vallecula (H)  Staging form: Pharynx - Oropharynx, AJCC 8th Edition  - Clinical stage from 10/12/2023: Stage JUAN MIGUEL (cT1, cN2b, cM0, p16-) - Signed by Yannick Alva MD on 11/21/2023    Molecular: p16 negative  Performance status: ECOG 0      Oncology History   Squamous cell carcinoma of vallecula (H)   8/18/2023 Imaging    CT neck:  Question soft tissue lesion within the right vallecula/ventral epiglottis.      10/12/2023 Pathology    R vallecula biopsy:  - NON-KERATINIZING POORLY DIFFERENTIATED SQUAMOUS CELL CARCINOMA  - p16 is negative     10/12/2023 -  Cancer Staged    Staging form: Pharynx - Oropharynx, AJCC 8th Edition  - Clinical stage from 10/12/2023: Stage JUAN MIGUEL (cT1, cN2b, cM0, p16-)     10/13/2023 Imaging    PET/CT:  1. 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     2. FDG avid thickening of the mid to distal esophagus, suspicious for a synchronous primary esophageal neoplasm. Recommend correlation with endoscopy.     11/6/2023 Initial Diagnosis    Squamous cell carcinoma of vallecula (H)     11/8/2023 Pathology    EGD with mid esophageal mass biopsy:  A.  MID ESOPHAGEAL MASS, BIOPSIES:  -Nonkeratinizing poorly differentiated squamous cell carcinoma  -Negative for intestinal metaplasia/Alfredo's type mucosa    PD-L1 CPS 15%             12/5/2023 -  Chemotherapy    OP ONC Esophageal Cancer - PACLitaxel / CARBOplatin WEEKLY + Radiation  Plan Provider: Yannick Alva MD  Treatment goal: Curative  Line of treatment: First Line     Primary esophageal squamous cell carcinoma (H)   11/17/2023 Initial Diagnosis    Primary esophageal squamous cell carcinoma (H)     12/5/2023 -  Chemotherapy    OP ONC Esophageal Cancer - PACLitaxel /  CARBOplatin WEEKLY + Radiation  Plan Provider: Yannick Alva MD  Treatment goal: Curative  Line of treatment: First Line           Oncology History:   Lopez Hogue is a 68 year old male with a past medical history that includes HTN, CAD, MI (March of 2023 s/p PCI x2, on DAPT), and prior sweat gland cancer s/p surgical resection, now with recently diagnosed oropharyngeal cancer found to have a 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     Recent diagnosis was made after he coughed up some blood on 8/18/23. He presented to an ED where ENT evaluated and was felt to have a lesion of the vallecula. Full workup of this mass is detailed in the oncology history above. Biopsy on 10/12 confirmed an invasive SCC. PET/CT revealed a hypermetabolic lesion within the mid to distal esophagus. EGD was performed on 11/8 and also showed a squamous cell carcinoma. Given the EGD findings, this was felt to be a second primary.     11/21/23 - consult with Dr. Hill, thoracic surgery. Plan for neoadjuvant chemoradiation followed by laparoscopic staging and jejunostomy, followed by minimally invasive Allen-Elbert esophagectomy.     Had EUS on 11/28 to completed the staging process. Partially obstructing and partially circumferential fungating mass in the middle third of the esophagus. Esophageal squamous cell carcinoma was staged T2 N1 (suspicious paraesophageal lymph node)     12/5/23: Start of chemoradiation with Carbo/Taxol. Infusion reaction with Taxol w/ dose 1. Tolerated rechallenge       Interval History:   Throat pain is consistenly 5/10 and increases to 10/10 with swallowing, coughing, hiccups or burping. Saliva is thick and has more secretions in his throat causing him to cough. No shortness of breath or chest pain. Reflux is well controlled with famotidine and Carafate. Continues with salt/soda rinses and Magic mouthwash. Oral intake has significantly decreased over the last week due to pain with  swallowing. Drank a total of 24oz of water/Gatorade yesterday and was able to get a couple bites of mashed potatoes down. Pills have also been harder to swallow. Continues on gabapentin, CBD, and Tylenol for pain. Hasn't had a bowel movement in two days, but attributes this to decreased intake. No abdominal pain, bloating or cramping. Passing a lot of gas.     Energy level is decreased overall, worse the two days following chemo. Continues activity as tolerated, typically can tolerate 2 hours of activity during the day between naps. Denies neuropathy. Mild erythema and dry skin to bilateral neck and upper chest. No open areas or drainage. Applying Aquaphor.     Had a temperature of 99 yesterday. Do other fevers, chills or concerns for infection.       Review of Systems:  Patient denies any of the following except if noted above: fevers, chills, difficulty with energy, vision or hearing changes, chest pain, dyspnea, abdominal pain, nausea, vomiting, diarrhea, constipation, urinary concerns, headaches, numbness, tingling, issues with sleep or mood. Also denies lumps, bumps, rashes or skin lesions, bleeding or bruising issues.        Social History:  Also actively involved in a program for those in recovery from substance use. Former heavy smoker (2 ppd) and drinker himself. Quit both in 2013. Other than his recent MI, he has been in generally good health. He has no history of autoimmune disease. He follows regularly with dermatology given his skin cancer history.       Physical Exam:  /61   Pulse 67   Temp 98.3  F (36.8  C) (Oral)   Resp 16   Wt 75.2 kg (165 lb 11.2 oz)   SpO2 95%   BMI 25.26 kg/m    Wt Readings from Last 10 Encounters:   01/02/24 75.2 kg (165 lb 11.2 oz)   12/27/23 78.2 kg (172 lb 6.4 oz)   12/26/23 79.4 kg (175 lb)   12/26/23 79.4 kg (175 lb)   12/19/23 83 kg (183 lb)   12/19/23 83.3 kg (183 lb 9.6 oz)   12/11/23 79.9 kg (176 lb 3.2 oz)   12/06/23 79.8 kg (176 lb)   12/05/23 79.9 kg (176  lb 1.6 oz)   11/30/23 78 kg (172 lb)   General: The patient is a pleasant male in no acute distress.  HEENT: EOMI. Sclerae are anicteric. Oropharynx is moist, saliva is thick. Erythema and multiple lesions noted on posterior pharynx.   Lymph: Neck is supple with no lymphadenopathy in the cervical or supraclavicular areas.   Heart: Systolic murmur present, regular rate  Lungs: Clear to auscultation bilaterally.   Abdomen: Bowel sounds present, soft, nontender with no palpable hepatosplenomegaly or masses.   Extremities: No lower extremity edema noted bilaterally.   Neuro: Cranial nerves II through XII are grossly intact.  Skin: Erythema to neck and upper chest, no open areas or drainage. No rashes, petechiae, or bruising noted on exposed skin.        Labs:   Most Recent 3 CBC's:  Recent Labs   Lab Test 12/27/23  0751 12/19/23  0656 12/11/23  0641   WBC 2.0* 2.4* 3.4*   HGB 10.0* 10.8* 12.9*   MCV 89 92 89   * 141* 169   ANEUTAUTO 1.5* 1.7 3.2     Most Recent 3 BMP's:  Recent Labs   Lab Test 12/27/23  0751 12/19/23  0656 12/11/23  0641    141 138   POTASSIUM 4.0 3.9 4.3   CHLORIDE 104 107 104   CO2 25 27 22   BUN 16.6 17.9 18.3   CR 0.76 0.70 0.64*  0.65*   ANIONGAP 8 7 12   RAFAELA 8.9 9.1 9.6   * 90 195*   PROTTOTAL 6.7 6.7 7.4   ALBUMIN 3.8 4.0 4.4    Most Recent 3 LFT's:  Recent Labs   Lab Test 12/27/23  0751 12/19/23  0656 12/11/23  0641   AST 24 32 36   ALT 16 31 36   ALKPHOS 69 82 82   BILITOTAL 1.0 0.6 1.2    Most Recent 2 TSH and T4:No lab results found.    I reviewed the above labs today, new labs will be drawn prior to chemotherapy tomorrow.         Assessment and Plan:   Lopez Hogue is a 68 year old male with HTN, CAD, prior sweat gland cancer s/p resection, and recently diagnosed head and neck squamous cell carcinoma of the vallecula, with staging revealing a synchronous esophageal squamous cell carcinoma.    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 If within parameters, ok to proceed with day 29.   -Will continue weekly FRAN follow-up through treatment, post treatment response assessment about 12 weeks post chemoradiation    Mucositis.   -Throat pain 10/10 with swallowing, 5/10 consistently. Continues gabapentin and CBD but no longer controlling the pain.   -Will add oxycodone 5mg Q4H as needed. Prescription for liquid oxycodone and Tylenol sent to [facility].  Recommend Miralax as needed for constipation with the addition of pain medication, also prescribed liquid Senna as needed.   -Continue Magic mouthwash, Tylenol, gabapentin, salt/soda rinses.       Nutrition, nausea.  -Significant weight loss over the last week. Significant decrease in oral intake due to pain with swallowing. Patient is hopeful he can maintain adequate oral intake if pain is better controlled. Will give 1L NS bolus today. Labs scheduled prior to chemo tomorrow, will plan to give additional IVF tomorrow with chemo. Follow up with radiation and dietician scheduled for today.   -Continue Compazine as needed for nausea.      Heartburn.  Well controlled with famotidine 20mg BID and Carafate 1g 4x/daily.       YADIRA Lazcano CNP

## 2024-01-02 NOTE — NURSING NOTE
"Oncology Rooming Note    January 2, 2024 8:47 AM   Lopez Hogue is a 68 year old male who presents for:    Chief Complaint   Patient presents with    Oncology Clinic Visit     Oropharyngeal cancer, esophageal SCC     Initial Vitals: /61   Pulse 67   Temp 98.3  F (36.8  C) (Oral)   Resp 16   Wt 75.2 kg (165 lb 11.2 oz)   SpO2 95%   BMI 25.26 kg/m   Estimated body mass index is 25.26 kg/m  as calculated from the following:    Height as of 12/5/23: 1.725 m (5' 7.91\").    Weight as of this encounter: 75.2 kg (165 lb 11.2 oz). Body surface area is 1.9 meters squared.  Worst Pain (10) Comment: Throat pain   No LMP for male patient.  Allergies reviewed: Yes  Medications reviewed: Yes    Medications: MEDICATION REFILLS NEEDED TODAY. Provider was notified. Requesting refills on compazine and pepcid  Pharmacy name entered into [employer]: [id number][id number] [address]  AT [facility] [address]    Frailty Screening:   Is the patient here for a new oncology consult visit in cancer care? 2. No      Clinical concerns: None       Mouna Lipscomb CMA            "

## 2024-01-02 NOTE — PROGRESS NOTES
Infusion Nursing Note:  Lopez Hogue presents today for IVF         Note:   Pt seen as add on visit in infusion for IV hydration following his visit with TYESHA Ro CNP.   Oxycodone and liquid acetaminophen prescribed and received for pain control. Pt able to take a dose while in infusion and stool softeners also received and taken.   NS hydration increased with tomorrow's chemo.    Encouraged pt to set a timer to re-assess pain in four hours (oxy written every 4-6 hours) and to keep a journal of when he takes oxy and his pain before and after. Informed he may need to schedule pain medications next day or so to get his pain under control. Pt also alternate with tylenol. Advised him to update team tomorrow with pain report.      Intravenous Access:  Peripheral IV placed.      Post Infusion Assessment:  Patient tolerated infusion without incident.  Blood return noted pre and post infusion.  No evidence of extravasations.  Access discontinued per protocol.       Discharge Plan:   Discharge instructions reviewed with: Patient.  Patient will return tomorrow for next appointment.  Patient discharged in stable condition accompanied by: wife.      Sole Hatfield RN

## 2024-01-03 ENCOUNTER — APPOINTMENT (OUTPATIENT)
Dept: LAB | Facility: CLINIC | Age: 69
End: 2024-01-03
Payer: COMMERCIAL

## 2024-01-03 ENCOUNTER — THERAPY VISIT (OUTPATIENT)
Dept: SPEECH THERAPY | Facility: CLINIC | Age: 69
End: 2024-01-03
Payer: COMMERCIAL

## 2024-01-03 ENCOUNTER — APPOINTMENT (OUTPATIENT)
Dept: RADIATION ONCOLOGY | Facility: CLINIC | Age: 69
End: 2024-01-03
Attending: RADIOLOGY
Payer: COMMERCIAL

## 2024-01-03 ENCOUNTER — INFUSION THERAPY VISIT (OUTPATIENT)
Dept: ONCOLOGY | Facility: CLINIC | Age: 69
End: 2024-01-03
Attending: STUDENT IN AN ORGANIZED HEALTH CARE EDUCATION/TRAINING PROGRAM
Payer: COMMERCIAL

## 2024-01-03 VITALS
OXYGEN SATURATION: 98 % | BODY MASS INDEX: 25.72 KG/M2 | WEIGHT: 168.7 LBS | DIASTOLIC BLOOD PRESSURE: 62 MMHG | RESPIRATION RATE: 16 BRPM | TEMPERATURE: 97.8 F | HEART RATE: 91 BPM | SYSTOLIC BLOOD PRESSURE: 108 MMHG

## 2024-01-03 DIAGNOSIS — C10.0 SQUAMOUS CELL CARCINOMA OF VALLECULA (H): ICD-10-CM

## 2024-01-03 DIAGNOSIS — C15.9 PRIMARY ESOPHAGEAL SQUAMOUS CELL CARCINOMA (H): ICD-10-CM

## 2024-01-03 DIAGNOSIS — C10.0 SQUAMOUS CELL CARCINOMA OF VALLECULA (H): Primary | ICD-10-CM

## 2024-01-03 DIAGNOSIS — R13.10 DYSPHAGIA: ICD-10-CM

## 2024-01-03 DIAGNOSIS — C15.9 PRIMARY ESOPHAGEAL SQUAMOUS CELL CARCINOMA (H): Primary | ICD-10-CM

## 2024-01-03 DIAGNOSIS — R13.12 OROPHARYNGEAL DYSPHAGIA: Primary | ICD-10-CM

## 2024-01-03 LAB
ALBUMIN SERPL BCG-MCNC: 3.9 G/DL (ref 3.5–5.2)
ALP SERPL-CCNC: 78 U/L (ref 40–150)
ALT SERPL W P-5'-P-CCNC: 19 U/L (ref 0–70)
ANION GAP SERPL CALCULATED.3IONS-SCNC: 9 MMOL/L (ref 7–15)
AST SERPL W P-5'-P-CCNC: 22 U/L (ref 0–45)
BASOPHILS # BLD AUTO: 0 10E3/UL (ref 0–0.2)
BASOPHILS NFR BLD AUTO: 1 %
BILIRUB SERPL-MCNC: 1.1 MG/DL
BUN SERPL-MCNC: 14.5 MG/DL (ref 8–23)
CALCIUM SERPL-MCNC: 9 MG/DL (ref 8.8–10.2)
CHLORIDE SERPL-SCNC: 106 MMOL/L (ref 98–107)
CREAT SERPL-MCNC: 0.69 MG/DL (ref 0.67–1.17)
DEPRECATED HCO3 PLAS-SCNC: 24 MMOL/L (ref 22–29)
EGFRCR SERPLBLD CKD-EPI 2021: >90 ML/MIN/1.73M2
EOSINOPHIL # BLD AUTO: 0 10E3/UL (ref 0–0.7)
EOSINOPHIL NFR BLD AUTO: 0 %
ERYTHROCYTE [DISTWIDTH] IN BLOOD BY AUTOMATED COUNT: 14.1 % (ref 10–15)
GLUCOSE SERPL-MCNC: 105 MG/DL (ref 70–99)
HCT VFR BLD AUTO: 27.7 % (ref 40–53)
HGB BLD-MCNC: 9.5 G/DL (ref 13.3–17.7)
IMM GRANULOCYTES # BLD: 0 10E3/UL
IMM GRANULOCYTES NFR BLD: 1 %
LYMPHOCYTES # BLD AUTO: 0.1 10E3/UL (ref 0.8–5.3)
LYMPHOCYTES NFR BLD AUTO: 6 %
MCH RBC QN AUTO: 31.3 PG (ref 26.5–33)
MCHC RBC AUTO-ENTMCNC: 34.3 G/DL (ref 31.5–36.5)
MCV RBC AUTO: 91 FL (ref 78–100)
MONOCYTES # BLD AUTO: 0.3 10E3/UL (ref 0–1.3)
MONOCYTES NFR BLD AUTO: 15 %
NEUTROPHILS # BLD AUTO: 1.5 10E3/UL (ref 1.6–8.3)
NEUTROPHILS NFR BLD AUTO: 77 %
NRBC # BLD AUTO: 0 10E3/UL
NRBC BLD AUTO-RTO: 0 /100
PLATELET # BLD AUTO: 151 10E3/UL (ref 150–450)
POTASSIUM SERPL-SCNC: 4.2 MMOL/L (ref 3.4–5.3)
PROT SERPL-MCNC: 6.9 G/DL (ref 6.4–8.3)
RBC # BLD AUTO: 3.04 10E6/UL (ref 4.4–5.9)
SODIUM SERPL-SCNC: 139 MMOL/L (ref 135–145)
WBC # BLD AUTO: 2 10E3/UL (ref 4–11)

## 2024-01-03 PROCEDURE — 96417 CHEMO IV INFUS EACH ADDL SEQ: CPT

## 2024-01-03 PROCEDURE — 77386 HC IMRT TREATMENT DELIVERY, COMPLEX: CPT | Performed by: RADIOLOGY

## 2024-01-03 PROCEDURE — 96413 CHEMO IV INFUSION 1 HR: CPT

## 2024-01-03 PROCEDURE — 36415 COLL VENOUS BLD VENIPUNCTURE: CPT

## 2024-01-03 PROCEDURE — 258N000003 HC RX IP 258 OP 636: Performed by: STUDENT IN AN ORGANIZED HEALTH CARE EDUCATION/TRAINING PROGRAM

## 2024-01-03 PROCEDURE — 250N000011 HC RX IP 250 OP 636: Performed by: STUDENT IN AN ORGANIZED HEALTH CARE EDUCATION/TRAINING PROGRAM

## 2024-01-03 PROCEDURE — 258N000003 HC RX IP 258 OP 636

## 2024-01-03 PROCEDURE — 85025 COMPLETE CBC W/AUTO DIFF WBC: CPT | Performed by: STUDENT IN AN ORGANIZED HEALTH CARE EDUCATION/TRAINING PROGRAM

## 2024-01-03 PROCEDURE — 92526 ORAL FUNCTION THERAPY: CPT | Mod: GN | Performed by: SPEECH-LANGUAGE PATHOLOGIST

## 2024-01-03 PROCEDURE — 96375 TX/PRO/DX INJ NEW DRUG ADDON: CPT

## 2024-01-03 PROCEDURE — 80053 COMPREHEN METABOLIC PANEL: CPT

## 2024-01-03 RX ORDER — DIPHENHYDRAMINE HCL 25 MG
50 CAPSULE ORAL ONCE
Status: DISCONTINUED | OUTPATIENT
Start: 2024-01-03 | End: 2024-01-03 | Stop reason: HOSPADM

## 2024-01-03 RX ADMIN — SODIUM CHLORIDE 1000 ML: 9 INJECTION, SOLUTION INTRAVENOUS at 08:09

## 2024-01-03 RX ADMIN — FAMOTIDINE 20 MG: 10 INJECTION, SOLUTION INTRAVENOUS at 08:26

## 2024-01-03 RX ADMIN — DIPHENHYDRAMINE HYDROCHLORIDE 50 MG: 50 INJECTION, SOLUTION INTRAMUSCULAR; INTRAVENOUS at 08:48

## 2024-01-03 RX ADMIN — DEXAMETHASONE SODIUM PHOSPHATE: 10 INJECTION, SOLUTION INTRAMUSCULAR; INTRAVENOUS at 08:28

## 2024-01-03 RX ADMIN — PACLITAXEL 98 MG: 6 INJECTION, SOLUTION INTRAVENOUS at 09:29

## 2024-01-03 RX ADMIN — CARBOPLATIN 250 MG: 600 INJECTION, SOLUTION INTRAVENOUS at 10:41

## 2024-01-03 ASSESSMENT — PAIN SCALES - GENERAL: PAINLEVEL: NO PAIN (0)

## 2024-01-03 NOTE — PROGRESS NOTES
Infusion Nursing Note:  Lopez Hogue presents today for Cycle 1 Day 29 Paclitaxel and Carboplatin.    Patient seen by provider today: Joanna Perez, HUSAM 1/2/24   present during visit today: Not Applicable.    Note: Pt presents to infusion today feeling well. Pt offers no new concerns following visit with provider.      Intravenous Access:  Peripheral IV placed.    Treatment Conditions:  Lab Results   Component Value Date    HGB 9.5 (L) 01/03/2024    WBC 2.0 (L) 01/03/2024    ANEUTAUTO 1.5 (L) 01/03/2024     01/03/2024        Lab Results   Component Value Date     01/03/2024    POTASSIUM 4.2 01/03/2024    MAG 1.7 11/30/2023    CR 0.69 01/03/2024    RAFAELA 9.0 01/03/2024    BILITOTAL 1.1 01/03/2024    ALBUMIN 3.9 01/03/2024    ALT 19 01/03/2024    AST 22 01/03/2024       Results reviewed, labs MET treatment parameters, ok to proceed with treatment.      Post Infusion Assessment:  Patient tolerated infusion without incident.  Blood return noted pre and post infusion.  Site patent and intact, free from redness, edema or discomfort.  No evidence of extravasations.  Access discontinued per protocol.       Discharge Plan:   Patient declined prescription refills.  Discharge instructions reviewed with: Patient and Family.  Patient and/or family verbalized understanding of discharge instructions and all questions answered.  AVS to patient via [employer].  Patient will return 1/10/24 for next appointment.   Patient discharged in stable condition accompanied by: self and wife.  Departure Mode: Ambulatory.      Jena Oliveira RN

## 2024-01-03 NOTE — PATIENT INSTRUCTIONS
[facility] Triage and after hours / weekends / holidays:  [phone]    Please call the triage or after hours line if you experience a temperature greater than or equal to 100.4, shaking chills, have uncontrolled nausea, vomiting and/or diarrhea, dizziness, shortness of breath, chest pain, bleeding, unexplained bruising, or if you have any other new/concerning symptoms, questions or concerns.      If you are having any concerning symptoms or wish to speak to a provider before your next infusion visit, please call triage to notify them so we can adequately serve you.     If you need a refill on a narcotic prescription or other medication, please call before your infusion appointment.                January 2024 Sunday Monday Tuesday Wednesday Thursday Friday Saturday        1     2    RETURN ACTIVE TREATMENT   8:45 AM   (45 min.)   Joanna Ro APRN CNP   [facility]    ONC INFUSION 2 HR (120 MIN)   9:30 AM   (120 min.)    ONC INFUSION NURSE   [facility]    TREATMENT   9:30 AM   (45 min.)   [facility] RAD ONC UMESH   [facility]    OTV  10:00 AM   (15 min.)   Akiok Sun MD   [facility]    [facility] NUTRITION VISIT  10:15 AM   (30 min.)   Patito Hendrickson RD   [facility] 3    LAB PERIPHERAL   7:30 AM   (15 min.)   [facility] DRAW   [facility]    ONC INFUSION 2 HR (120 MIN)   8:00 AM   (120 min.)    ONC INFUSION NURSE   [facility]    SWALLOW TREATMENT   9:00 AM   (45 min.)   Deepali Linares, SLP   [facility]    TREATMENT   9:30 AM   (45 min.)   [facility] RAD ONC UMESH   [facility] 4    TREATMENT   9:30 AM   (45 min.)   [facility] RAD ONC UMESH   [facility] 5    TREATMENT   9:30 AM   (45  min.)   [facility] RAD ONC UMESH   [facility] 6       7     8    TREATMENT   9:30 AM   (45 min.)   [facility] ONC UMESH   [facility] 9    TREATMENT   9:30 AM   (45 min.)   [facility] ONC UMESH   [facility]    [facility] NUTRITION VISIT   9:45 AM   (30 min.)   Patito Hendrickson RD   [facility] 10    LAB PERIPHERAL   7:15 AM   (15 min.)   [facility] D[facility]   [facility]    RETURN ACTIVE TREATMENT   7:45 AM   (45 min.)   Joanna Ro APRN CNP   [facility]    ONC INFUSION 3 HR (180 MIN)   9:00 AM   (180 min.)   [facility] ONC INFUSION NURSE   [facility]    TREATMENT   9:30 AM   (45 min.)   [facility] RAD ONC UMESH   [facility]    SWALLOW TREATMENT   9:45 AM   (45 min.)   Deepali Linares SLP   [facility] 11    TREATMENT   9:30 AM   (45 min.)   [facility] RAD ONC UMESH   [facility] 12    TREATMENT   9:30 AM   (45 min.)   [facility] RAD ONC UMESH   [facility] 13       14     15    TREATMENT   9:30 AM   (45 min.)   [facility] RAD ONC UMESH   [facility] 16    TREATMENT   9:30 AM   (45 min.)   [facility] ONC UMESH   [facility] 17    TREATMENT   9:30 AM   (45 min.)   [facility] ONC UMESH   [facility]    LAB PERIPHERAL  11:45 AM   (15 min.)   [facility] DRAW   [facility]    RETURN ACTIVE TREATMENT  12:15 PM   (45 min.)   Joanna Ro APRN CNP   [facility]    ONC INFUSION 2 HR (120 MIN)   2:00 PM   (120 min.)   [facility] ONC INFUSION NURSE   [facility] 18    TREATMENT   9:30 AM   (45 min.)   [facility] RAD ONC UMESH   [facility]  19    TREATMENT   9:30 AM   (45 min.)   [facility] 20       21     22    TREATMENT   9:30 AM   (45 min.)   [facility] 23    TREATMENT   9:30 AM   (45 min.)   [facility] RAD ONC [facility]    OTV  10:00 AM   (15 min.)   Akiko Sun MD   [facility] 24     25     26    RETURN CCSL  10:15 AM   (60 min.)   Yannick Alva MD   [facility] 27       28     29     30     31 February 2024 Sunday Monday Tuesday Wednesday Thursday Friday Saturday                       1     2     3       4     5     6     7     8     9     10       11     12     13     14     15     16     17       18     19     20     21     22     23     24       25     26     27     28     29                              Recent Results (from the past 24 hour(s))   Comprehensive metabolic panel    Collection Time: 01/03/24  7:50 AM   Result Value Ref Range    Sodium 139 135 - 145 mmol/L    Potassium 4.2 3.4 - 5.3 mmol/L    Carbon Dioxide (CO2) 24 22 - 29 mmol/L    Anion Gap 9 7 - 15 mmol/L    Urea Nitrogen 14.5 8.0 - 23.0 mg/dL    Creatinine 0.69 0.67 - 1.17 mg/dL    GFR Estimate >90 >60 mL/min/1.73m2    Calcium 9.0 8.8 - 10.2 mg/dL    Chloride 106 98 - 107 mmol/L    Glucose 105 (H) 70 - 99 mg/dL    Alkaline Phosphatase 78 40 - 150 U/L    AST 22 0 - 45 U/L    ALT 19 0 - 70 U/L    Protein Total 6.9 6.4 - 8.3 g/dL    Albumin 3.9 3.5 - 5.2 g/dL    Bilirubin Total 1.1 <=1.2 mg/dL   CBC with platelets and differential    Collection Time: 01/03/24  7:50 AM   Result Value Ref Range    WBC Count 2.0 (L) 4.0 - 11.0 10e3/uL    RBC Count 3.04 (L) 4.40 - 5.90 10e6/uL    Hemoglobin 9.5 (L) 13.3 - 17.7 g/dL    Hematocrit 27.7 (L) 40.0 - 53.0 %    MCV 91 78 - 100 fL    MCH 31.3 26.5 - 33.0 pg    MCHC 34.3 31.5 - 36.5 g/dL    RDW 14.1 10.0  - 15.0 %    Platelet Count 151 150 - 450 10e3/uL    % Neutrophils 77 %    % Lymphocytes 6 %    % Monocytes 15 %    % Eosinophils 0 %    % Basophils 1 %    % Immature Granulocytes 1 %    NRBCs per 100 WBC 0 <1 /100    Absolute Neutrophils 1.5 (L) 1.6 - 8.3 10e3/uL    Absolute Lymphocytes 0.1 (L) 0.8 - 5.3 10e3/uL    Absolute Monocytes 0.3 0.0 - 1.3 10e3/uL    Absolute Eosinophils 0.0 0.0 - 0.7 10e3/uL    Absolute Basophils 0.0 0.0 - 0.2 10e3/uL    Absolute Immature Granulocytes 0.0 <=0.4 10e3/uL    Absolute NRBCs 0.0 10e3/uL

## 2024-01-03 NOTE — NURSING NOTE
Chief Complaint   Patient presents with    Chemotherapy     Cycle 1 Day 29 Paclitaxel and Carboplatin.      Blood Draw     Labs drawn via PIV placed by VAT. VS taken.     Labs drawn from PIV placed by VAT. Line flushed with saline. Vitals taken. Pt checked in for appointment(s).     Grace Rios RN

## 2024-01-04 ENCOUNTER — HOME INFUSION (PRE-WILLOW HOME INFUSION) (OUTPATIENT)
Dept: PHARMACY | Facility: CLINIC | Age: 69
End: 2024-01-04
Payer: COMMERCIAL

## 2024-01-04 ENCOUNTER — APPOINTMENT (OUTPATIENT)
Dept: RADIATION ONCOLOGY | Facility: CLINIC | Age: 69
End: 2024-01-04
Attending: RADIOLOGY
Payer: COMMERCIAL

## 2024-01-04 PROCEDURE — 77014 PR CT GUIDE FOR PLACEMENT RADIATION THERAPY FIELDS: CPT | Mod: 26 | Performed by: STUDENT IN AN ORGANIZED HEALTH CARE EDUCATION/TRAINING PROGRAM

## 2024-01-04 PROCEDURE — 77386 HC IMRT TREATMENT DELIVERY, COMPLEX: CPT | Performed by: RADIOLOGY

## 2024-01-04 NOTE — PROGRESS NOTES
Therapy: Enteral Nutrition, IVF  Insurance: [employer]   Ded: $1000.00  Met: $0.00    Co-Insurance: 80/20  Max Out of Pocket: $3000.00  Met: $126.97    This patient has coverage for IVF through their [employer] plan, patient has a deductible of $1000.00 met $0.00, once pt meets the deductible they are covered at 80%. Patient has an out of pocket of $3000.00 met $126.97. Once the out of pocket was met pt is covered at 100%.     Enteral nutrient must be the patients sole source of nutrition and via tube for [employer] to cover.  Currently, the patient does not meet criteria for coverage.    In reference to referral from Patito BRIGGS at the [facility] 01/02/2024, and Epic inbasket from Rufina Villegas 01/04/2024.    Please contact Intake with any questions, [phone] or In Basket pool, [facility] ([phone]).

## 2024-01-05 ENCOUNTER — OFFICE VISIT (OUTPATIENT)
Dept: PALLIATIVE CARE | Facility: CLINIC | Age: 69
End: 2024-01-05
Attending: INTERNAL MEDICINE
Payer: COMMERCIAL

## 2024-01-05 ENCOUNTER — TELEPHONE (OUTPATIENT)
Dept: PALLIATIVE CARE | Facility: CLINIC | Age: 69
End: 2024-01-05

## 2024-01-05 ENCOUNTER — APPOINTMENT (OUTPATIENT)
Dept: RADIATION ONCOLOGY | Facility: CLINIC | Age: 69
End: 2024-01-05
Attending: RADIOLOGY
Payer: COMMERCIAL

## 2024-01-05 VITALS
TEMPERATURE: 98.6 F | WEIGHT: 164.6 LBS | RESPIRATION RATE: 12 BRPM | OXYGEN SATURATION: 97 % | BODY MASS INDEX: 25.83 KG/M2 | HEIGHT: 67 IN | SYSTOLIC BLOOD PRESSURE: 116 MMHG | DIASTOLIC BLOOD PRESSURE: 62 MMHG | HEART RATE: 52 BPM

## 2024-01-05 DIAGNOSIS — G89.3 NEOPLASM RELATED PAIN: ICD-10-CM

## 2024-01-05 DIAGNOSIS — C10.0 SQUAMOUS CELL CARCINOMA OF VALLECULA (H): Primary | ICD-10-CM

## 2024-01-05 DIAGNOSIS — K12.30 MUCOSITIS: ICD-10-CM

## 2024-01-05 DIAGNOSIS — C15.9 PRIMARY ESOPHAGEAL SQUAMOUS CELL CARCINOMA (H): Primary | ICD-10-CM

## 2024-01-05 DIAGNOSIS — K59.03 DRUG-INDUCED CONSTIPATION: ICD-10-CM

## 2024-01-05 DIAGNOSIS — C10.0 SQUAMOUS CELL CARCINOMA OF VALLECULA (H): ICD-10-CM

## 2024-01-05 PROCEDURE — 77386 HC IMRT TREATMENT DELIVERY, COMPLEX: CPT | Performed by: RADIOLOGY

## 2024-01-05 PROCEDURE — 99213 OFFICE O/P EST LOW 20 MIN: CPT | Performed by: INTERNAL MEDICINE

## 2024-01-05 PROCEDURE — 99205 OFFICE O/P NEW HI 60 MIN: CPT | Performed by: INTERNAL MEDICINE

## 2024-01-05 RX ORDER — SENNOSIDES 8.8 MG/5ML
8.8-17.6 LIQUID ORAL 2 TIMES DAILY PRN
Qty: 236 ML | Refills: 11 | Status: SHIPPED | OUTPATIENT
Start: 2024-01-05 | End: 2024-01-15

## 2024-01-05 RX ORDER — BUPRENORPHINE 10 UG/H
1 PATCH TRANSDERMAL
Qty: 4 PATCH | Refills: 0 | Status: SHIPPED | OUTPATIENT
Start: 2024-01-05 | End: 2024-03-06

## 2024-01-05 RX ORDER — DOXEPIN HYDROCHLORIDE 10 MG/ML
20 SOLUTION ORAL EVERY 4 HOURS PRN
Qty: 118 ML | Refills: 0 | Status: SHIPPED | OUTPATIENT
Start: 2024-01-05 | End: 2024-01-19

## 2024-01-05 ASSESSMENT — PAIN SCALES - GENERAL: PAINLEVEL: MODERATE PAIN (5)

## 2024-01-05 NOTE — LETTER
1/5/2024       RE: Lopez Hogue  [address] [facility] [address]       Dear Colleague,    Thank you for referring your patient, Lopez Hogue, [facility]. Please see a copy of my visit note below.    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   12/2023 started curative-intent chemoradiation with carbo/paclitaxel.    Social - Lives with wife; 2 adult sons in town. 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   Hx tobacco smoking.   Hx of AUD; in recovery x ~11y    Care Planning -       History:  History gathered today from: patient, medical chart  I reviewed the patient's medical history in the chart and confirmed key details today with them; summarized above.  I reviewed with them the various roles of our palliative care program; qol support, sx management, mood/coping/counseling, and care planning.  The patient was referred with a focus on pain.     He was referred to us this week with worsening pain. Dr Sun told him he needed a feeding tube asap also. His cell counts are low and it's unclear if he'll be able to get one immediately. He is getting IVF. He is struggling to drink 16oz of water a day--progressive severe mid-throat pain and odynophagia. Drinking water is like glass raking his throat.    Can't take ibuprofen 2/2 CAD.  On APAP  Has S&S swishes but pain not in mouth.  On gabap 900 mg tid--doing what it's doing, tough to say how much  Oxycodone 5 mg this week: took 5 mg 4-5x a day the last 2 days with ZERO effect.  He observes he historically doesn't respond well to many analgesics.  Oxycodone did nothing for him previously too after a surgery.  Apparently lidocaine/bupivicaine like drugs don't numb him up during dental  procedures, etc.  A long time ago he got an IM injection of demerol which he remembered helped a lot    He's in recovery from AUD x 11 y; feels his recovery is quite solid, but has some worries about opioids for pain.    Mood doing ok; much family support; drives but could be driven if needed    PE: There were no vitals taken for this visit.   Wt Readings from Last 3 Encounters:   01/03/24 76.5 kg (168 lb 11.2 oz)   01/02/24 74.8 kg (165 lb)   01/02/24 75.2 kg (165 lb 11.2 oz)     Alert NAD  Red dermatitis over lower jaw line to upper chest      Data reviewed:  I reviewed recent labs and imaging, my comments:  Na 139 Cr 0.69  WBC 2  Hgb 9.5  Plt 151    PET Oct  IMPRESSION:   1. 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   2. FDG avid thickening of the mid to distal esophagus, suspicious for a synchronous primary esophageal neoplasm. Recommend correlation with endoscopy.       [facility] database reviewed: y      Impression & Recommendations:  67 yo with synchronous pharyngeal and esophageal SCC undergoing definitive chemoradiotherapy    Unfortunately markedly worsening pain and odynophagia causing very morbid nutritional impairments.  Agree with pursuing feeding tube asap.  He historically has not experienced analgesia from oxycodone and codeine or membrane stabilizers. This happens sometimes and it's probably genetic.  Usually in these situations we try different chemical families of drugs eg synthetic opioids and I think we should try 10 mcg/hr TD buprenorphine which we discussed at length today.  He should update us ~Wed nest week; I may increase his dose then.  Further options could include methadone, fentanyl patches, hydromorphone.  Continue apap, gabap.    Will also add doxepin solution--it probably works best as a swish and spit for oral pain where there is a dwell time of the doxepin which leads to a sort of soothing numbing  analgesia. I'm not sure how well this will work for deeper throat pain with him swallowing it but he's in bad shape so we'll try. Cautioned him it will make him tired an he can't drive on it.    Senna liquid for OIC    Mood, spirits, coping all seem to be going fine for him.    Follow-up 2 weeks        Over 60 minutes spent on the date of the encounter doing chart review, history and exam, patient education & counseling, documentation and other activities as noted above.    Thank you for involving us in the patient's care.   Nuno Cazares MD / Palliative Medicine / Text me via [employer]  This note may have been composed with voice recognition software and there may be mistranscriptions.      Again, thank you for allowing me to participate in the care of your patient.      Sincerely,    Nuno Cazares MD

## 2024-01-05 NOTE — TELEPHONE ENCOUNTER
[facility] Prior Authorization Team Request    Medication:    Dosing:   NDC (required for Medicaid members): [address]-14  Insurance Company: PAID/[employer]  BIN: [id number]  PCN:   Grp: [id number]  ID: [id number]  CoverMyMeds Key (if applicable):   Additional documentation:   Pharmacy Filling the Rx:  [facility]  Filling Pharmacy Phone:  [phone]  Contact:  PHARM [facility] PA ([id number]) please send all responses to this pool.  Pharmacy NPI (required for Medicaid members):

## 2024-01-05 NOTE — PROGRESS NOTES
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   12/2023 started curative-intent chemoradiation with carbo/paclitaxel.    Social - Lives with wife; 2 adult sons in town. 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   Hx tobacco smoking.   Hx of AUD; in recovery x ~11y    Care Planning -       History:  History gathered today from: patient, medical chart  I reviewed the patient's medical history in the chart and confirmed key details today with them; summarized above.  I reviewed with them the various roles of our palliative care program; qol support, sx management, mood/coping/counseling, and care planning.  The patient was referred with a focus on pain.     He was referred to us this week with worsening pain. Dr Sun told him he needed a feeding tube asap also. His cell counts are low and it's unclear if he'll be able to get one immediately. He is getting IVF. He is struggling to drink 16oz of water a day--progressive severe mid-throat pain and odynophagia. Drinking water is like glass raking his throat.    Can't take ibuprofen 2/2 CAD.  On APAP  Has S&S swishes but pain not in mouth.  On gabap 900 mg tid--doing what it's doing, tough to say how much  Oxycodone 5 mg this week: took 5 mg 4-5x a day the last 2 days with ZERO effect.  He observes he historically doesn't respond well to many analgesics.  Oxycodone did nothing for him previously too after a surgery.  Apparently lidocaine/bupivicaine like drugs don't numb him up during dental procedures, etc.  A long time ago he got an IM injection of demerol which he remembered helped a lot    He's in recovery from AUD x 11 y; feels his recovery is quite solid, but has some worries about opioids for pain.    Mood doing ok; much family support; drives but could be driven if needed    PE: There  were no vitals taken for this visit.   Wt Readings from Last 3 Encounters:   01/03/24 76.5 kg (168 lb 11.2 oz)   01/02/24 74.8 kg (165 lb)   01/02/24 75.2 kg (165 lb 11.2 oz)     Alert NAD  Red dermatitis over lower jaw line to upper chest      Data reviewed:  I reviewed recent labs and imaging, my comments:  Na 139 Cr 0.69  WBC 2  Hgb 9.5  Plt 151    PET Oct  IMPRESSION:   1. 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   2. FDG avid thickening of the mid to distal esophagus, suspicious for a synchronous primary esophageal neoplasm. Recommend correlation with endoscopy.        database reviewed: y      Impression & Recommendations:  67 yo with synchronous pharyngeal and esophageal SCC undergoing definitive chemoradiotherapy    Unfortunately markedly worsening pain and odynophagia causing very morbid nutritional impairments.  Agree with pursuing feeding tube asap.  He historically has not experienced analgesia from oxycodone and codeine or membrane stabilizers. This happens sometimes and it's probably genetic.  Usually in these situations we try different chemical families of drugs eg synthetic opioids and I think we should try 10 mcg/hr TD buprenorphine which we discussed at length today.  He should update us ~Wed nest week; I may increase his dose then.  Further options could include methadone, fentanyl patches, hydromorphone.  Continue apap, gabap.    Will also add doxepin solution--it probably works best as a swish and spit for oral pain where there is a dwell time of the doxepin which leads to a sort of soothing numbing analgesia. I'm not sure how well this will work for deeper throat pain with him swallowing it but he's in bad shape so we'll try. Cautioned him it will make him tired an he can't drive on it.    Senna liquid for OIC    Mood, spirits, coping all seem to be going fine for him.    Follow-up 2 weeks        Over 60  minutes spent on the date of the encounter doing chart review, history and exam, patient education & counseling, documentation and other activities as noted above.    Thank you for involving us in the patient's care.   Nuno Cazares MD / Palliative Medicine / Text me via Client24  This note may have been composed with voice recognition software and there may be mistranscriptions.

## 2024-01-05 NOTE — TELEPHONE ENCOUNTER
Central Prior Authorization Team   Phone: [phone]      PA Initiation    Medication: BUPRENORPHINE 10 MCG/HR TD PTWK  Insurance Company: [employer]   Pharmacy Filling the Rx: [place], [place] - [address] [place] [address] 2-130  Filling Pharmacy Phone: [phone]  Filling Pharmacy Fax:    Start Date: 1/5/2024

## 2024-01-05 NOTE — PATIENT INSTRUCTIONS
Thank you for meeting with us in the [facility] Palliative Care Clinic.    How to get a hold of us:  MyChart is good for concerns that are not urgent (questions that can take a couple days to answer).    For more urgent matters, or if you prefer not to use MyChart, call our clinic nurse Triny Vega RN at [phone].     We have an on-call number for evenings and weekends. Please call this only if you are having uncontrolled symptoms or serious side effects from your medicines: [phone].     For scheduling, call [phone]    For refills, please give us a week (5 working days) notice. We don't always have providers available everyday to do refills. If you call the day you run out of your medicine, we may not be able to refill it in time, so call 5 days in advance!

## 2024-01-05 NOTE — NURSING NOTE
"Oncology Rooming Note    January 5, 2024 12:51 PM   Lopez Hogue is a 68 year old male who presents for:    Chief Complaint   Patient presents with    Oncology Clinic Visit     Primary esophageal squamous cell carcinoma; squamous cell carcinoma of vallecula     Initial Vitals: /62 (BP Location: Right arm, Patient Position: Sitting, Cuff Size: Adult Regular)   Pulse 52   Temp 98.6  F (37  C) (Oral)   Resp 12   Ht 1.705 m (5' 7.13\")   Wt 74.7 kg (164 lb 9.6 oz)   SpO2 97%   BMI 25.68 kg/m   Estimated body mass index is 25.68 kg/m  as calculated from the following:    Height as of this encounter: 1.705 m (5' 7.13\").    Weight as of this encounter: 74.7 kg (164 lb 9.6 oz). Body surface area is 1.88 meters squared.  Moderate Pain (5) Comment: becomes a 10 when swallowing   No LMP for male patient.  Allergies reviewed: Yes  Medications reviewed: Yes    Medications: Medication refills not needed today.  Pharmacy name entered into WorldAPP: [facility][id number] [address]  AT [facility] [address]    Frailty Screening:   Is the patient here for a new oncology consult visit in cancer care? 1. Yes. Over the past month, have you experienced difficulty or required a caregiver to assist with:   1. Balance, walking or general mobility (including any falls)? NO  2. Completion of self-care tasks such as bathing, dressing, toileting, grooming/hygiene?  NO  3. Concentration or memory that affects your daily life?  NO       Clinical concerns: None       Sofia Paulson"

## 2024-01-07 ENCOUNTER — INFUSION THERAPY VISIT (OUTPATIENT)
Dept: ONCOLOGY | Facility: CLINIC | Age: 69
End: 2024-01-07
Attending: THORACIC SURGERY (CARDIOTHORACIC VASCULAR SURGERY)
Payer: COMMERCIAL

## 2024-01-07 VITALS
SYSTOLIC BLOOD PRESSURE: 131 MMHG | DIASTOLIC BLOOD PRESSURE: 71 MMHG | TEMPERATURE: 98.1 F | HEART RATE: 93 BPM | RESPIRATION RATE: 16 BRPM | OXYGEN SATURATION: 96 %

## 2024-01-07 DIAGNOSIS — C15.9 PRIMARY ESOPHAGEAL SQUAMOUS CELL CARCINOMA (H): Primary | ICD-10-CM

## 2024-01-07 PROCEDURE — 96360 HYDRATION IV INFUSION INIT: CPT

## 2024-01-07 PROCEDURE — 258N000003 HC RX IP 258 OP 636

## 2024-01-07 RX ORDER — HEPARIN SODIUM,PORCINE 10 UNIT/ML
5-20 VIAL (ML) INTRAVENOUS DAILY PRN
Status: CANCELLED | OUTPATIENT
Start: 2024-01-07

## 2024-01-07 RX ORDER — HEPARIN SODIUM (PORCINE) LOCK FLUSH IV SOLN 100 UNIT/ML 100 UNIT/ML
5 SOLUTION INTRAVENOUS
Status: CANCELLED | OUTPATIENT
Start: 2024-01-07

## 2024-01-07 RX ADMIN — SODIUM CHLORIDE 1000 ML: 9 INJECTION, SOLUTION INTRAVENOUS at 07:14

## 2024-01-07 ASSESSMENT — PAIN SCALES - GENERAL: PAINLEVEL: SEVERE PAIN (6)

## 2024-01-07 NOTE — PATIENT INSTRUCTIONS
[facility] Main Line: [phone]    Call triage nurse with chills and/or temperature greater than or equal to 100.4, uncontrolled nausea/vomiting, diarrhea, constipation, dizziness, shortness of breath, chest pain, bleeding, unexplained bruising, or any new/concerning symptoms, questions/concerns.   If you are having any concerning symptoms or wish to speak to a provider before your next infusion visit, please call your care coordinator or triage to notify them so we can adequately serve you.   Triage Nurse Line: [phone]    If after hours, weekends, or holidays [phone]

## 2024-01-07 NOTE — PROGRESS NOTES
Infusion Nursing Note:  Lopez Hogue presents today for 1 liter IV Normal Saline.    Patient seen by provider today: No   present during visit today: Not Applicable.    Note: Has pain at a 6 in throat when swallowing. Has 2 weeks left of radiation.  See doc flow sheet for remainder of assessment.      Intravenous Access:  Peripheral IV placed.    Treatment Conditions:  Not Applicable.      Post Infusion Assessment:  Patient tolerated infusion without incident.  Blood return noted pre and post infusion.  Site patent and intact, free from redness, edema or discomfort.  No evidence of extravasations.  Access discontinued per protocol.       Discharge Plan:   AVS to patient via MYCHART.  Patient will return 1/10/24 for next appointment.   Patient discharged in stable condition accompanied by: self.  Departure Mode: Ambulatory.      Raven Chowdhury RN

## 2024-01-08 ENCOUNTER — APPOINTMENT (OUTPATIENT)
Dept: RADIATION ONCOLOGY | Facility: CLINIC | Age: 69
End: 2024-01-08
Attending: RADIOLOGY
Payer: COMMERCIAL

## 2024-01-08 DIAGNOSIS — C10.0 SQUAMOUS CELL CARCINOMA OF VALLECULA (H): Primary | ICD-10-CM

## 2024-01-08 DIAGNOSIS — K12.30 MUCOSITIS: ICD-10-CM

## 2024-01-08 DIAGNOSIS — C15.9 PRIMARY ESOPHAGEAL SQUAMOUS CELL CARCINOMA (H): ICD-10-CM

## 2024-01-08 DIAGNOSIS — C15.9 PRIMARY ESOPHAGEAL SQUAMOUS CELL CARCINOMA (H): Primary | ICD-10-CM

## 2024-01-08 DIAGNOSIS — R13.10 ODYNOPHAGIA: ICD-10-CM

## 2024-01-08 DIAGNOSIS — C10.0 SQUAMOUS CELL CARCINOMA OF VALLECULA (H): ICD-10-CM

## 2024-01-08 PROCEDURE — 77386 HC IMRT TREATMENT DELIVERY, COMPLEX: CPT | Performed by: RADIOLOGY

## 2024-01-08 PROCEDURE — 77014 PR CT GUIDE FOR PLACEMENT RADIATION THERAPY FIELDS: CPT | Mod: 26 | Performed by: RADIOLOGY

## 2024-01-08 RX ORDER — ALBUTEROL SULFATE 0.83 MG/ML
2.5 SOLUTION RESPIRATORY (INHALATION)
Status: CANCELLED | OUTPATIENT
Start: 2024-01-09

## 2024-01-08 RX ORDER — ALBUTEROL SULFATE 90 UG/1
1-2 AEROSOL, METERED RESPIRATORY (INHALATION)
Status: CANCELLED
Start: 2024-01-09

## 2024-01-08 RX ORDER — HEPARIN SODIUM,PORCINE 10 UNIT/ML
5-20 VIAL (ML) INTRAVENOUS DAILY PRN
Status: CANCELLED | OUTPATIENT
Start: 2024-01-09

## 2024-01-08 RX ORDER — MEPERIDINE HYDROCHLORIDE 25 MG/ML
25 INJECTION INTRAMUSCULAR; INTRAVENOUS; SUBCUTANEOUS EVERY 30 MIN PRN
Status: CANCELLED | OUTPATIENT
Start: 2024-01-09

## 2024-01-08 RX ORDER — EPINEPHRINE 1 MG/ML
0.3 INJECTION, SOLUTION, CONCENTRATE INTRAVENOUS EVERY 5 MIN PRN
Status: CANCELLED | OUTPATIENT
Start: 2024-01-09

## 2024-01-08 RX ORDER — HEPARIN SODIUM (PORCINE) LOCK FLUSH IV SOLN 100 UNIT/ML 100 UNIT/ML
5 SOLUTION INTRAVENOUS
Status: CANCELLED | OUTPATIENT
Start: 2024-01-09

## 2024-01-08 RX ORDER — DIPHENHYDRAMINE HCL 50 MG
50 CAPSULE ORAL ONCE
Status: CANCELLED
Start: 2024-01-09

## 2024-01-08 RX ORDER — LORAZEPAM 2 MG/ML
0.5 INJECTION INTRAMUSCULAR EVERY 4 HOURS PRN
Status: CANCELLED | OUTPATIENT
Start: 2024-01-09

## 2024-01-08 RX ORDER — OXYCODONE HCL 5 MG/5 ML
5 SOLUTION, ORAL ORAL EVERY 4 HOURS PRN
Qty: 200 ML | Refills: 0 | Status: SHIPPED | OUTPATIENT
Start: 2024-01-08 | End: 2024-01-15

## 2024-01-08 RX ORDER — DIPHENHYDRAMINE HYDROCHLORIDE 50 MG/ML
50 INJECTION INTRAMUSCULAR; INTRAVENOUS
Status: CANCELLED
Start: 2024-01-09

## 2024-01-08 NOTE — TELEPHONE ENCOUNTER
Received telephone call from patient requesting refill of oxycodone liquid.  Patient reports [facility] pharmacy will not have Butrans and doxepin in stock until Wednesday.  Patient has enough oxycodone to get him through today and tomorrow.  Patient is requesting a refill of oxycodone to get him through until he is able to Butrans and doxepin.      Last refill: 1/2/2024  Last office visit: 1/5/2024  Scheduled for follow up pending per check out request     Will route request to MD for review.     Reviewed [place]  Report.

## 2024-01-09 ENCOUNTER — OFFICE VISIT (OUTPATIENT)
Dept: RADIATION ONCOLOGY | Facility: CLINIC | Age: 69
End: 2024-01-09
Attending: RADIOLOGY
Payer: COMMERCIAL

## 2024-01-09 ENCOUNTER — ALLIED HEALTH/NURSE VISIT (OUTPATIENT)
Dept: RADIATION ONCOLOGY | Facility: CLINIC | Age: 69
End: 2024-01-09
Attending: DIETITIAN, REGISTERED
Payer: COMMERCIAL

## 2024-01-09 VITALS
DIASTOLIC BLOOD PRESSURE: 73 MMHG | SYSTOLIC BLOOD PRESSURE: 121 MMHG | WEIGHT: 169 LBS | HEART RATE: 88 BPM | BODY MASS INDEX: 26.37 KG/M2

## 2024-01-09 DIAGNOSIS — C10.0 SQUAMOUS CELL CARCINOMA OF VALLECULA (H): Primary | ICD-10-CM

## 2024-01-09 DIAGNOSIS — C15.9 PRIMARY ESOPHAGEAL SQUAMOUS CELL CARCINOMA (H): ICD-10-CM

## 2024-01-09 PROCEDURE — 77336 RADIATION PHYSICS CONSULT: CPT | Performed by: RADIOLOGY

## 2024-01-09 PROCEDURE — 77427 RADIATION TX MANAGEMENT X5: CPT | Performed by: RADIOLOGY

## 2024-01-09 PROCEDURE — 77386 HC IMRT TREATMENT DELIVERY, COMPLEX: CPT | Performed by: RADIOLOGY

## 2024-01-09 PROCEDURE — 97803 MED NUTRITION INDIV SUBSEQ: CPT | Performed by: DIETITIAN, REGISTERED

## 2024-01-09 NOTE — LETTER
2024         RE: Lopez Hogue  [address] [facility] [address]        Dear Colleague,    Thank you for referring your patient, Lopez Hogue, to the [facility] RADIATION ONCOLOGY. Please see a copy of my visit note below.    RADIATION ONCOLOGY WEEKLY ON TREATMENT VISIT   Encounter Date: 2024    Patient Name: Lopez Hogue  MRN: [id number]  : 1955     Disease and Stage: Squamous cell carcinoma of the vallecula p16 negative, clinical stage [id number] (stage JUAN MIGUEL)  Treatment Site: Head and bilateral necks  Current Dose/Planned Total Dose: [5000] cGy / [7000] cGy    Disease and Stage: Squamous cell carcinoma of the middle third of esophagus, poorly differentiated, clinical stage T2 N1 (suspicious paraesophageal lymph node level 8L) M0 (stage II)  Treatment Site: Mid esophagus  Current Dose/Planned Total Dose: [4500] cGy / [4500] cGy with dose painting to tumor [5000] cGy / [5000] cGy    Concurrent Chemotherapy: Yes  Drug and Frequency: Weekly CarboTaxol    Medical Oncologist: Yannick Alva MD  ENT Surgeon: Edi Felix MD  Thoracic surgeon: Devin Hill MD    Subjective: Mr. Houge presents to clinic today for his weekly on-treatment visit.  He is having more difficulty with swallowing and has lost weight.  Basically most anything he tries to swallow causes significant pain.  He was prescribed oxycodone today.  He continues with gabapentin.  He has switched his diet to protein shakes and softer foods.  He also notes that he has started to lose weight and has decreased his level of exercise to maintain calories.  He is using Magic mouthwash.    Nursing ROS:   Nutrition Alteration  Diet Type: Patient's Preference  Skin  Skin Reaction: 2   Skin Progress: Aquaphor     ENT and Mouth Exam  Mucositis - Current: 2  ENT/Mouth Note: Mouth pain/S&S 15     Gastrointestinal  Nausea: 0 - None     Psychosocial  Mood - Anxiety: 0 - Normal  Pain Assessment  0-10 Pain Scale: 6/10  (unchanged)  Pain Treatment: Gabapentin 900 mg TID  Pain Note: Oxycodone     PEG Tube: No, if needed will be a jejunostomy tube  Electronic Cardiac Implant: No    Objective:   There were no vitals taken for this visit., initial weight Wt 79.8 kg (176 lb)   Gen: Appears well, fatigued  HEENT: Diffuse, moderate erythema involving the oropharynx and posterior soft palate, no thrush  Skin: Moderate erythema    CBC RESULTS:   Recent Labs   Lab Test 01/03/24  0750   WBC 2.0*   RBC 3.04*   HGB 9.5*   HCT 27.7*   MCV 91   MCH 31.3   MCHC 34.3   RDW 14.1           Lab Results   Component Value Date     01/03/2024    POTASSIUM 4.2 01/03/2024    CHLORIDE 106 01/03/2024    CO2 24 01/03/2024     (H) 01/03/2024     Lab Results   Component Value Date    AST 22 01/03/2024    ALT 19 01/03/2024    ALKPHOS 78 01/03/2024    BILITOTAL 1.1 01/03/2024     Magnesium   Date Value Ref Range Status   11/30/2023 1.7 1.7 - 2.3 mg/dL Final       Treatment-related toxicities (CTCAE v5.0):  Anorexia: Grade 3: Associated with significant weight loss or malnutrition; tube feeding or TPN indicated  Fatigue: Grade 2: Fatigue not relieved by rest; limiting instrumental ADL  Nausea: Grade 0: No toxicity  Pain: Grade 2: Moderate pain; limiting instrumental ADL  Dry mouth: Grade 1: Symptomatic without significant dietary alteration; unstimulated saliva flow >0.2 mL/min  Dysphagia: Grade 2: Symptomatic and altered eating/swallowing  Mucositis: Grade 2: Moderate pain; not interfering with oral intake; modified diet indicated  Dyspnea: Grade 0: No toxicity  Esophagitis: Grade 2: Symptomatic; altered eating/swallowing; oral supplements indicated  Dermatitis: Grade 1: Faint erythema or dry desquamation    ED visits/Hospitalizations: None    Missed Treatments: None    Mosaiq chart and setup information reviewed  IGRT images reviewed    Medication Review  Med list reviewed with patient?: Yes  Med list printed and given: Offered and  declined    Assessment:    Mr. Hogue is a 68 year old male with synchronous primaries of a squamous cell carcinoma of the vallecula p16 negative, clinical stage [id number] (stage JUAN MIGUEL) and a squamous cell carcinoma of the middle third of esophagus, poorly differentiated, clinical stage T2 N1 (suspicious paraesophageal lymph node level 8L) M0 (stage II).    He is tolerating chemoradiation but has limited oral intake now requiring feeding tube placement.    Plan:   1.  Continue treatment as planned  2.  Will discuss nutritional needs with dietitian today.  3  Palliative care referral for management of pain    MONA Potts M.D.  Department of Radiation Oncology  [facility]                  Again, thank you for allowing me to participate in the care of your patient.        Sincerely,        Efrain Potts MD

## 2024-01-09 NOTE — PROGRESS NOTES
After talking with patient during today's visit, patient has decided not to get a J-tube as he only has 10  treatments left and by the time it is placed he will have less then 5.  Cancelled all procedures and notified staff,

## 2024-01-09 NOTE — PROGRESS NOTES
RADIATION ONCOLOGY WEEKLY ON TREATMENT VISIT   Encounter Date: 2024    Patient Name: Lopez Hogue  MRN: [id number]  : 1955     Disease and Stage: Squamous cell carcinoma of the vallecula p16 negative, clinical stage [id number] (stage JUAN MIGUEL)  Treatment Site: Head and bilateral necks  Current Dose/Planned Total Dose: [5000] cGy / [7000] cGy    Disease and Stage: Squamous cell carcinoma of the middle third of esophagus, poorly differentiated, clinical stage T2 N1 (suspicious paraesophageal lymph node level 8L) M0 (stage II)  Treatment Site: Mid esophagus  Current Dose/Planned Total Dose: [4500] cGy / [4500] cGy with dose painting to tumor [5000] cGy / [5000] cGy    Concurrent Chemotherapy: Yes  Drug and Frequency: Weekly CarboTaxol    Medical Oncologist: Yannick Alva MD  ENT Surgeon: Edi Felix MD  Thoracic surgeon: Devin Hill MD    Subjective: Mr. Hogue presents to clinic today for his weekly on-treatment visit.  He is having more difficulty with swallowing and has lost weight.  Basically most anything he tries to swallow causes significant pain.  He was prescribed oxycodone today.  He continues with gabapentin.  He has switched his diet to protein shakes and softer foods.  He also notes that he has started to lose weight and has decreased his level of exercise to maintain calories.  He is using Magic mouthwash.    Nursing ROS:   Nutrition Alteration  Diet Type: Patient's Preference  Skin  Skin Reaction: 2   Skin Progress: Aquaphor     ENT and Mouth Exam  Mucositis - Current: 2  ENT/Mouth Note: Mouth pain/S&S 15     Gastrointestinal  Nausea: 0 - None     Psychosocial  Mood - Anxiety: 0 - Normal  Pain Assessment  0-10 Pain Scale: 6/10 (unchanged)  Pain Treatment: Gabapentin 900 mg TID  Pain Note: Oxycodone     PEG Tube: No, if needed will be a jejunostomy tube  Electronic Cardiac Implant: No    Objective:   There were no vitals taken for this visit., initial weight Wt 79.8 kg (176 lb)    Gen: Appears well, fatigued  HEENT: Diffuse, moderate erythema involving the oropharynx and posterior soft palate, no thrush  Skin: Moderate erythema    CBC RESULTS:   Recent Labs   Lab Test 01/03/24  0750   WBC 2.0*   RBC 3.04*   HGB 9.5*   HCT 27.7*   MCV 91   MCH 31.3   MCHC 34.3   RDW 14.1           Lab Results   Component Value Date     01/03/2024    POTASSIUM 4.2 01/03/2024    CHLORIDE 106 01/03/2024    CO2 24 01/03/2024     (H) 01/03/2024     Lab Results   Component Value Date    AST 22 01/03/2024    ALT 19 01/03/2024    ALKPHOS 78 01/03/2024    BILITOTAL 1.1 01/03/2024     Magnesium   Date Value Ref Range Status   11/30/2023 1.7 1.7 - 2.3 mg/dL Final       Treatment-related toxicities (CTCAE v5.0):  Anorexia: Grade 3: Associated with significant weight loss or malnutrition; tube feeding or TPN indicated  Fatigue: Grade 2: Fatigue not relieved by rest; limiting instrumental ADL  Nausea: Grade 0: No toxicity  Pain: Grade 2: Moderate pain; limiting instrumental ADL  Dry mouth: Grade 1: Symptomatic without significant dietary alteration; unstimulated saliva flow >0.2 mL/min  Dysphagia: Grade 2: Symptomatic and altered eating/swallowing  Mucositis: Grade 2: Moderate pain; not interfering with oral intake; modified diet indicated  Dyspnea: Grade 0: No toxicity  Esophagitis: Grade 2: Symptomatic; altered eating/swallowing; oral supplements indicated  Dermatitis: Grade 1: Faint erythema or dry desquamation    ED visits/Hospitalizations: None    Missed Treatments: None    Mosaiq chart and setup information reviewed  IGRT images reviewed    Medication Review  Med list reviewed with patient?: Yes  Med list printed and given: Offered and declined    Assessment:    Mr. Hogue is a 68 year old male with synchronous primaries of a squamous cell carcinoma of the vallecula p16 negative, clinical stage [id number] (stage JUAN MIGUEL) and a squamous cell carcinoma of the middle third of esophagus, poorly  differentiated, clinical stage T2 N1 (suspicious paraesophageal lymph node level 8L) M0 (stage II).    He is tolerating chemoradiation but has limited oral intake now requiring feeding tube placement.    Plan:   1.  Continue treatment as planned  2.  Will discuss nutritional needs with dietitian today.  3  Palliative care referral for management of pain    MONA Potts M.D.  Department of Radiation Oncology  [facility]

## 2024-01-09 NOTE — PROGRESS NOTES
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  He is consuming ~16 oz water and 10 oz Gatorade.  He is sipping on broth and plans to try chicken noodle soup and his son's mashed potatoes today.  He has not tried Boost Breeze or Ensure Clear shakes as suggested last week but plans to get them today.   He has found that he gets a 10 min window ~1-2 times a day where he can sip these fluids without a gag reflex and vomiting.   He expresses that he would still like a feeding tube but understands that it may not get placed for another 1-2 weeks and at that point, he'll be done with treatment.   -Liquid meal replacement or supplement - No longer taking due to pain with swallowing and gag reflex.    -Weight trends - down 1 lb x past one week   Wt Readings from Last 10 Encounters:   01/05/24 74.7 kg (164 lb 9.6 oz)   01/03/24 76.5 kg (168 lb 11.2 oz)   01/02/24 74.8 kg (165 lb)   01/02/24 75.2 kg (165 lb 11.2 oz)   12/27/23 78.2 kg (172 lb 6.4 oz)   12/26/23 79.4 kg (175 lb)   12/26/23 79.4 kg (175 lb)   12/19/23 83 kg (183 lb)   12/19/23 83.3 kg (183 lb 9.6 oz)   12/11/23 79.9 kg (176 lb 3.2 oz)     Previous Goals:   1.  EN to provide 100% estimated nutrition needs via J tube.   2. Weight maintenance /no more than 1-2 lb wt loss each week   Evaluation: Not met   Previous Nutrition Diagnosis:   Inadequate oral intake related to odynophagia and dysgeusia as  evidenced by 11lb (7%) wt loss x past 2 weeks, patient consuming <25% estimated nutrition needs.    Evaluation: No change   NEW FINDINGS:   No new findings   CURRENT NUTRITION DIAGNOSIS   Inadequate oral intake related to odynophagia and dysgeusia as evidenced by 11lb (7%) wt loss x past 2 weeks, patient consuming <25% estimated nutrition needs.   INTERVENTIONS   Recommendations / Nutrition Prescription   Medical Food Supplement - encouraged to try Boost Breeze/soothe and Ensure Clear (provided samples). Reviewed nutrition and hydration goals with CRT.    2.  RECOMMENDATIONS FOR MD/PROVIDER OR RD TO ORDER IF POC:   Enteral Nutrition - as sole source of nutrition due to little to NO PO intake  Formula: Osmolite 1.5 jayson  Volume: 6 cartons/day (1422 mL, 1086 ml free water)  Feeding tube: NJ or PEJ tube  Method of administration: Pump feedings 60mL/hr x 24 hours; cycled feedings ~120mL/hr x 12 hours; suggested initiating feedings at 20mL/hr x 12 hours, increase by 10 mL every 12 hours until goal rate of 60mL/hr is met. Recommend checking K+, Phos, Mg for risk of refeeding syndrome.   Provisions:  2130kcal (27kcal/kg), 90 g protein (1.1g/kg), 288g CHO, 0g fiber  Dosing wt: 79kg (actual wt from initial visit on 12/12)  Suspected risk for refeeding syndrome at present time.   Implementation  Medical Food Supplement - suggested trying clear nutrition shakes. Reviewed clear shakes, bone broth, pureed foods to try etc. Provided samples of Boost Soothe (kiwi s/b) and Ensure Clear (apple).  EN Composition, EN Schedule, Feeding Tube Flush   Goals  1.  Strive for PO intake of calorie/protein containing clear fluids   2. Strive for at least 2000 jayson, 75g protein, 8 cups fluids as able   3. Weight maintenance /no more than 1-2 lb wt loss each week      Follow-Up Plans: Pt has RD contact information for questions.  One week follow up one week   MONITORING AND EVALUATION: one week follow up, 1/16  -Food/beverage intake  -EN access    -Weight trends    Patito Smyth RD, , LD  [facility]  [phone]

## 2024-01-10 ENCOUNTER — ONCOLOGY VISIT (OUTPATIENT)
Dept: ONCOLOGY | Facility: CLINIC | Age: 69
End: 2024-01-10
Payer: COMMERCIAL

## 2024-01-10 ENCOUNTER — APPOINTMENT (OUTPATIENT)
Dept: RADIATION ONCOLOGY | Facility: CLINIC | Age: 69
End: 2024-01-10
Attending: RADIOLOGY
Payer: COMMERCIAL

## 2024-01-10 ENCOUNTER — THERAPY VISIT (OUTPATIENT)
Dept: SPEECH THERAPY | Facility: CLINIC | Age: 69
End: 2024-01-10
Payer: COMMERCIAL

## 2024-01-10 ENCOUNTER — INFUSION THERAPY VISIT (OUTPATIENT)
Dept: ONCOLOGY | Facility: CLINIC | Age: 69
End: 2024-01-10
Attending: STUDENT IN AN ORGANIZED HEALTH CARE EDUCATION/TRAINING PROGRAM
Payer: COMMERCIAL

## 2024-01-10 ENCOUNTER — TELEPHONE (OUTPATIENT)
Dept: PALLIATIVE CARE | Facility: CLINIC | Age: 69
End: 2024-01-10

## 2024-01-10 ENCOUNTER — APPOINTMENT (OUTPATIENT)
Dept: LAB | Facility: CLINIC | Age: 69
End: 2024-01-10
Attending: STUDENT IN AN ORGANIZED HEALTH CARE EDUCATION/TRAINING PROGRAM
Payer: COMMERCIAL

## 2024-01-10 VITALS
OXYGEN SATURATION: 95 % | SYSTOLIC BLOOD PRESSURE: 118 MMHG | TEMPERATURE: 99.2 F | WEIGHT: 162.3 LBS | RESPIRATION RATE: 18 BRPM | HEART RATE: 71 BPM | BODY MASS INDEX: 25.32 KG/M2 | DIASTOLIC BLOOD PRESSURE: 57 MMHG

## 2024-01-10 DIAGNOSIS — R13.10 ODYNOPHAGIA: ICD-10-CM

## 2024-01-10 DIAGNOSIS — C15.9 PRIMARY ESOPHAGEAL SQUAMOUS CELL CARCINOMA (H): ICD-10-CM

## 2024-01-10 DIAGNOSIS — C10.0 SQUAMOUS CELL CARCINOMA OF VALLECULA (H): Primary | ICD-10-CM

## 2024-01-10 DIAGNOSIS — K12.30 MUCOSITIS: ICD-10-CM

## 2024-01-10 DIAGNOSIS — C10.0 SQUAMOUS CELL CARCINOMA OF VALLECULA (H): ICD-10-CM

## 2024-01-10 DIAGNOSIS — C15.9 PRIMARY ESOPHAGEAL SQUAMOUS CELL CARCINOMA (H): Primary | ICD-10-CM

## 2024-01-10 DIAGNOSIS — R13.12 OROPHARYNGEAL DYSPHAGIA: ICD-10-CM

## 2024-01-10 LAB
ALBUMIN SERPL BCG-MCNC: 3.6 G/DL (ref 3.5–5.2)
ALP SERPL-CCNC: 66 U/L (ref 40–150)
ALT SERPL W P-5'-P-CCNC: 11 U/L (ref 0–70)
ANION GAP SERPL CALCULATED.3IONS-SCNC: 12 MMOL/L (ref 7–15)
AST SERPL W P-5'-P-CCNC: 20 U/L (ref 0–45)
BASOPHILS # BLD AUTO: 0 10E3/UL (ref 0–0.2)
BASOPHILS NFR BLD AUTO: 0 %
BILIRUB SERPL-MCNC: 0.8 MG/DL
BUN SERPL-MCNC: 12 MG/DL (ref 8–23)
CALCIUM SERPL-MCNC: 8.9 MG/DL (ref 8.8–10.2)
CHLORIDE SERPL-SCNC: 101 MMOL/L (ref 98–107)
CREAT SERPL-MCNC: 0.72 MG/DL (ref 0.67–1.17)
DEPRECATED HCO3 PLAS-SCNC: 25 MMOL/L (ref 22–29)
EGFRCR SERPLBLD CKD-EPI 2021: >90 ML/MIN/1.73M2
EOSINOPHIL # BLD AUTO: 0 10E3/UL (ref 0–0.7)
EOSINOPHIL NFR BLD AUTO: 0 %
ERYTHROCYTE [DISTWIDTH] IN BLOOD BY AUTOMATED COUNT: 15 % (ref 10–15)
GLUCOSE SERPL-MCNC: 97 MG/DL (ref 70–99)
HCT VFR BLD AUTO: 24.3 % (ref 40–53)
HGB BLD-MCNC: 8.5 G/DL (ref 13.3–17.7)
IMM GRANULOCYTES # BLD: 0 10E3/UL
IMM GRANULOCYTES NFR BLD: 1 %
LYMPHOCYTES # BLD AUTO: 0.1 10E3/UL (ref 0.8–5.3)
LYMPHOCYTES NFR BLD AUTO: 6 %
MCH RBC QN AUTO: 31.5 PG (ref 26.5–33)
MCHC RBC AUTO-ENTMCNC: 35 G/DL (ref 31.5–36.5)
MCV RBC AUTO: 90 FL (ref 78–100)
MONOCYTES # BLD AUTO: 0.2 10E3/UL (ref 0–1.3)
MONOCYTES NFR BLD AUTO: 14 %
NEUTROPHILS # BLD AUTO: 1.2 10E3/UL (ref 1.6–8.3)
NEUTROPHILS NFR BLD AUTO: 79 %
NRBC # BLD AUTO: 0 10E3/UL
NRBC BLD AUTO-RTO: 0 /100
PLATELET # BLD AUTO: 138 10E3/UL (ref 150–450)
POTASSIUM SERPL-SCNC: 3.7 MMOL/L (ref 3.4–5.3)
PROT SERPL-MCNC: 6.5 G/DL (ref 6.4–8.3)
RBC # BLD AUTO: 2.7 10E6/UL (ref 4.4–5.9)
SODIUM SERPL-SCNC: 138 MMOL/L (ref 135–145)
WBC # BLD AUTO: 1.5 10E3/UL (ref 4–11)

## 2024-01-10 PROCEDURE — 99215 OFFICE O/P EST HI 40 MIN: CPT

## 2024-01-10 PROCEDURE — 36415 COLL VENOUS BLD VENIPUNCTURE: CPT | Performed by: STUDENT IN AN ORGANIZED HEALTH CARE EDUCATION/TRAINING PROGRAM

## 2024-01-10 PROCEDURE — 99211 OFF/OP EST MAY X REQ PHY/QHP: CPT

## 2024-01-10 PROCEDURE — 85025 COMPLETE CBC W/AUTO DIFF WBC: CPT | Performed by: STUDENT IN AN ORGANIZED HEALTH CARE EDUCATION/TRAINING PROGRAM

## 2024-01-10 PROCEDURE — 258N000003 HC RX IP 258 OP 636: Performed by: STUDENT IN AN ORGANIZED HEALTH CARE EDUCATION/TRAINING PROGRAM

## 2024-01-10 PROCEDURE — 250N000011 HC RX IP 250 OP 636: Performed by: STUDENT IN AN ORGANIZED HEALTH CARE EDUCATION/TRAINING PROGRAM

## 2024-01-10 PROCEDURE — 80053 COMPREHEN METABOLIC PANEL: CPT | Performed by: STUDENT IN AN ORGANIZED HEALTH CARE EDUCATION/TRAINING PROGRAM

## 2024-01-10 PROCEDURE — 96375 TX/PRO/DX INJ NEW DRUG ADDON: CPT

## 2024-01-10 PROCEDURE — 77386 HC IMRT TREATMENT DELIVERY, COMPLEX: CPT | Performed by: RADIOLOGY

## 2024-01-10 PROCEDURE — 96413 CHEMO IV INFUSION 1 HR: CPT

## 2024-01-10 PROCEDURE — 92526 ORAL FUNCTION THERAPY: CPT | Mod: GN | Performed by: SPEECH-LANGUAGE PATHOLOGIST

## 2024-01-10 PROCEDURE — 96417 CHEMO IV INFUS EACH ADDL SEQ: CPT

## 2024-01-10 RX ADMIN — DEXAMETHASONE SODIUM PHOSPHATE: 10 INJECTION, SOLUTION INTRAMUSCULAR; INTRAVENOUS at 09:21

## 2024-01-10 RX ADMIN — DIPHENHYDRAMINE HYDROCHLORIDE 50 MG: 50 INJECTION, SOLUTION INTRAMUSCULAR; INTRAVENOUS at 09:07

## 2024-01-10 RX ADMIN — PACLITAXEL 98 MG: 6 INJECTION, SOLUTION INTRAVENOUS at 10:11

## 2024-01-10 RX ADMIN — FAMOTIDINE 20 MG: 10 INJECTION, SOLUTION INTRAVENOUS at 09:07

## 2024-01-10 RX ADMIN — CARBOPLATIN 250 MG: 10 INJECTION INTRAVENOUS at 11:20

## 2024-01-10 RX ADMIN — SODIUM CHLORIDE 1000 ML: 9 INJECTION, SOLUTION INTRAVENOUS at 09:07

## 2024-01-10 ASSESSMENT — PAIN SCALES - GENERAL: PAINLEVEL: MODERATE PAIN (4)

## 2024-01-10 NOTE — PATIENT INSTRUCTIONS
[facility] Triage and after hours / weekends / holidays:  [phone]    Please call the triage or after hours line if you experience a temperature greater than or equal to 100.4, shaking chills, have uncontrolled nausea, vomiting and/or diarrhea, dizziness, shortness of breath, chest pain, bleeding, unexplained bruising, or if you have any other new/concerning symptoms, questions or concerns.      If you are having any concerning symptoms or wish to speak to a provider before your next infusion visit, please call triage to notify them so we can adequately serve you.     If you need a refill on a narcotic prescription or other medication, please call before your infusion appointment.            Latest Reference Range & Units 01/10/24 07:32   Sodium 135 - 145 mmol/L 138   Potassium 3.4 - 5.3 mmol/L 3.7   Chloride 98 - 107 mmol/L 101   Carbon Dioxide (CO2) 22 - 29 mmol/L 25   Urea Nitrogen 8.0 - 23.0 mg/dL 12.0   Creatinine 0.67 - 1.17 mg/dL 0.72   GFR Estimate >60 mL/min/1.73m2 >90   Calcium 8.8 - 10.2 mg/dL 8.9   Anion Gap 7 - 15 mmol/L 12   Albumin 3.5 - 5.2 g/dL 3.6   Protein Total 6.4 - 8.3 g/dL 6.5   Alkaline Phosphatase 40 - 150 U/L 66   ALT 0 - 70 U/L 11   AST 0 - 45 U/L 20   Bilirubin Total <=1.2 mg/dL 0.8   Glucose 70 - 99 mg/dL 97   WBC 4.0 - 11.0 10e3/uL 1.5 (L)   Hemoglobin 13.3 - 17.7 g/dL 8.5 (L)   Hematocrit 40.0 - 53.0 % 24.3 (L)   Platelet Count 150 - 450 10e3/uL 138 (L)   RBC Count 4.40 - 5.90 10e6/uL 2.70 (L)   MCV 78 - 100 fL 90   MCH 26.5 - 33.0 pg 31.5   MCHC 31.5 - 36.5 g/dL 35.0   RDW 10.0 - 15.0 % 15.0   % Neutrophils % 79   % Lymphocytes % 6   % Monocytes % 14   % Eosinophils % 0   % Basophils % 0   Absolute Basophils 0.0 - 0.2 10e3/uL 0.0   Absolute Eosinophils 0.0 - 0.7 10e3/uL 0.0   Absolute Immature Granulocytes <=0.4 10e3/uL 0.0   Absolute Lymphocytes 0.8 - 5.3 10e3/uL 0.1 (L)   Absolute Monocytes 0.0 - 1.3 10e3/uL 0.2   % Immature Granulocytes % 1   Absolute Neutrophils 1.6 - 8.3  10e3/uL 1.2 (L)   Absolute NRBCs 10e3/uL 0.0   NRBCs per 100 WBC <1 /100 0

## 2024-01-10 NOTE — PROGRESS NOTES
Infusion Nursing Note:  Lopez Hogue presents today for Cycle 1 Day 36 Taxol and Carboplatin #6.  Patient seen by provider today: Yes: Joanna Ro CNP   present during visit today: Not Applicable.    Note: Fabricio comes into the clinic today doing well overall and has no concerns to offer following his provider visit.  He has pain in his throat which he rates 5/10 consistently and 8/10 when he swallow. He denies s/s of infection.    Per written order in treatment plan from Joanna Ro CNP: OK to go ahead with treatment today though ANC is below parameter at 1.2 today.    Intravenous Access:  Peripheral IV placed.    Treatment Conditions:  Lab Results   Component Value Date    HGB 8.5 (L) 01/10/2024    WBC 1.5 (L) 01/10/2024    ANEUTAUTO 1.2 (L) 01/10/2024     (L) 01/10/2024        Lab Results   Component Value Date     01/10/2024    POTASSIUM 3.7 01/10/2024    MAG 1.7 11/30/2023    CR 0.72 01/10/2024    RAFAELA 8.9 01/10/2024    BILITOTAL 0.8 01/10/2024    ALBUMIN 3.6 01/10/2024    ALT 11 01/10/2024    AST 20 01/10/2024     Results reviewed, labs MET treatment parameters, ok to proceed with treatment.      Post Infusion Assessment:  Patient tolerated infusion without incident.  Blood return noted pre and post infusion.  Site patent and intact, free from redness, edema or discomfort.  No evidence of extravasations.  Access discontinued per protocol.       Discharge Plan:   Patient declined prescription refills.  Discharge instructions reviewed with: Patient and spouse.  Patient and/or family verbalized understanding of discharge instructions and all questions answered.  AVS to patient via [employer].  Patient will return 1/17 for next appointment.   Patient discharged in stable condition accompanied by: self and wife.  Departure Mode: Ambulatory.      Shanna Foster RN

## 2024-01-10 NOTE — TELEPHONE ENCOUNTER
Received call from patient requesting clarification on doxepin instructions and how long to wear Butrans patch before replacing.    Writer reviewed doxepin instructions and Butrans patch should be changed every 7 days and that it can take up to 5 days to feel full effect of Butrans patch.      Patient verbalized understanding and had no further questions.    Triny Vega RN  Palliative Care Nurse Clinician    [phone] (Direct)  [phone] (Main)  [phone] (Appointment Scheduling)  Triny Vega RN  Palliative Care Nurse Clinician    [phone] (Direct)  [phone] (Main)  [phone] (Appointment Scheduling)

## 2024-01-10 NOTE — PROGRESS NOTES
[facility]   Return Patient Visit  Jr 10, 2024        Diagnosis: Oropharyngeal cancer, esophageal SCC  Stage:    Cancer Staging   Squamous cell carcinoma of vallecula (H)  Staging form: Pharynx - Oropharynx, AJCC 8th Edition  - Clinical stage from 10/12/2023: Stage JUAN MIGUEL (cT1, cN2b, cM0, p16-) - Signed by Yannick Alva MD on 11/21/2023    Molecular: p16 negative  Performance status: ECOG 0      Oncology History   Squamous cell carcinoma of vallecula (H)   8/18/2023 Imaging    CT neck:  Question soft tissue lesion within the right vallecula/ventral epiglottis.      10/12/2023 Pathology    R vallecula biopsy:  - NON-KERATINIZING POORLY DIFFERENTIATED SQUAMOUS CELL CARCINOMA  - p16 is negative     10/12/2023 -  Cancer Staged    Staging form: Pharynx - Oropharynx, AJCC 8th Edition  - Clinical stage from 10/12/2023: Stage JUAN MIGUEL (cT1, cN2b, cM0, p16-)     10/13/2023 Imaging    PET/CT:  1. 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     2. FDG avid thickening of the mid to distal esophagus, suspicious for a synchronous primary esophageal neoplasm. Recommend correlation with endoscopy.     11/6/2023 Initial Diagnosis    Squamous cell carcinoma of vallecula (H)     11/8/2023 Pathology    EGD with mid esophageal mass biopsy:  A.  MID ESOPHAGEAL MASS, BIOPSIES:  -Nonkeratinizing poorly differentiated squamous cell carcinoma  -Negative for intestinal metaplasia/Alfredo's type mucosa    PD-L1 CPS 15%             12/5/2023 -  Chemotherapy    OP ONC Esophageal Cancer - PACLitaxel / CARBOplatin WEEKLY + Radiation  Plan Provider: Yannick Alva MD  Treatment goal: Curative  Line of treatment: First Line     Primary esophageal squamous cell carcinoma (H)   11/17/2023 Initial Diagnosis    Primary esophageal squamous cell carcinoma (H)     12/5/2023 -  Chemotherapy    OP ONC Esophageal Cancer - PACLitaxel /  CARBOplatin WEEKLY + Radiation  Plan Provider: Yannick Alva MD  Treatment goal: Curative  Line of treatment: First Line           Oncology History:   Lopez Hogue is a 68 year old male with a past medical history that includes HTN, CAD, MI (March of 2023 s/p PCI x2, on DAPT), and prior sweat gland cancer s/p surgical resection, now with recently diagnosed oropharyngeal cancer found to have a 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     Recent diagnosis was made after he coughed up some blood on 8/18/23. He presented to an ED where ENT evaluated and was felt to have a lesion of the vallecula. Full workup of this mass is detailed in the oncology history above. Biopsy on 10/12 confirmed an invasive SCC. PET/CT revealed a hypermetabolic lesion within the mid to distal esophagus. EGD was performed on 11/8 and also showed a squamous cell carcinoma. Given the EGD findings, this was felt to be a second primary.     11/21/23 - consult with Dr. Hill, thoracic surgery. Plan for neoadjuvant chemoradiation followed by laparoscopic staging and jejunostomy, followed by minimally invasive Woodruff-Elbert esophagectomy.     Had EUS on 11/28 to completed the staging process. Partially obstructing and partially circumferential fungating mass in the middle third of the esophagus. Esophageal squamous cell carcinoma was staged T2 N1 (suspicious paraesophageal lymph node)     12/5/23: Start of chemoradiation with Carbo/Taxol. Infusion reaction with Taxol w/ dose 1. Tolerated rechallenge       Interval History:     Pain has slightly improved after starting Butrans patch and liquid doxepin. Doxepin provides him 10-15 minute intervals of increased relief where he is able  to tolerate drinking fluids and eating soup/liquid foods. Continues with salt/soda rinses and Magic mouthwash.Cough is occurring more often, saliva and secretions are thicker. Drinking causing more throat aggravation leading to coughing. He is unsure  if he is aspirating. Bowel movements are liquid, occurring twice per day. No abdominal pain or cramping. No urinary concerns.     Energy level is stable, reports it is 50-75% of normal. Gets fatigued when climbing stairs, otherwise it is not limiting his activities. Sleeping well at night and taking a few naps during the day. Denies feeling dizzy or lightheaded. No shortness of breath at rest, chest pain or swelling. Denies neuropathy.     Skin to bilateral neck and upper chest is more irritated and inflamed. No open areas or drainage but he is concerned a few areas are going to open soon. Continues to apply Aquaphor frequently.     Had one temperature of 99.8 two nights ago but denies any other fever, chills or signs/symptoms of infection.      Review of Systems:  Patient denies any of the following except if noted above: fevers, chills, difficulty with energy, vision or hearing changes, chest pain, dyspnea, abdominal pain, nausea, vomiting, diarrhea, constipation, urinary concerns, headaches, numbness, tingling, issues with sleep or mood. Also denies lumps, bumps, rashes or skin lesions, bleeding or bruising issues.        Social History:  Also actively involved in a program for those in recovery from substance use. Former heavy smoker (2 ppd) and drinker himself. Quit both in 2013. Other than his recent MI, he has been in generally good health. He has no history of autoimmune disease. He follows regularly with dermatology given his skin cancer history.       Physical Exam:  /57 (BP Location: Left arm, Patient Position: Sitting, Cuff Size: Adult Regular)   Pulse 71   Temp 99.2  F (37.3  C) (Oral)   Resp 18   Wt 73.6 kg (162 lb 4.8 oz)   SpO2 95%   BMI 25.32 kg/m    Wt Readings from Last 10 Encounters:   01/10/24 73.6 kg (162 lb 4.8 oz)   01/09/24 76.7 kg (169 lb)   01/05/24 74.7 kg (164 lb 9.6 oz)   01/03/24 76.5 kg (168 lb 11.2 oz)   01/02/24 74.8 kg (165 lb)   01/02/24 75.2 kg (165 lb 11.2 oz)    12/27/23 78.2 kg (172 lb 6.4 oz)   12/26/23 79.4 kg (175 lb)   12/26/23 79.4 kg (175 lb)   12/19/23 83 kg (183 lb)   General: The patient is a pleasant male in no acute distress.  HEENT: EOMI. Sclerae are anicteric. Oropharynx is moist, saliva is thick. Erythema and multiple pale flesh-colored lesions noted on posterior pharynx.   Lymph: Neck is supple with no lymphadenopathy in the cervical or supraclavicular areas.   Heart: Systolic murmur present, regular rate.  Lungs: Clear to auscultation bilaterally.   Abdomen: Bowel sounds present, soft, nontender with no palpable hepatosplenomegaly or masses.   Extremities: No lower extremity edema noted bilaterally.   Neuro: Cranial nerves II through XII are grossly intact.  Skin: Erythema to neck and upper chest, no open areas or drainage. No rashes, petechiae, or bruising noted on exposed skin.        Labs:   Most Recent 3 CBC's:  Recent Labs   Lab Test 01/10/24  0732 01/03/24  0750 12/27/23  0751   WBC 1.5* 2.0* 2.0*   HGB 8.5* 9.5* 10.0*   MCV 90 91 89   * 151 149*   ANEUTAUTO 1.2* 1.5* 1.5*     Most Recent 3 BMP's:  Recent Labs   Lab Test 01/10/24  0732 01/03/24  0750 12/27/23  0751    139 137   POTASSIUM 3.7 4.2 4.0   CHLORIDE 101 106 104   CO2 25 24 25   BUN 12.0 14.5 16.6   CR 0.72 0.69 0.76   ANIONGAP 12 9 8   RAFAELA 8.9 9.0 8.9   GLC 97 105* 113*   PROTTOTAL 6.5 6.9 6.7   ALBUMIN 3.6 3.9 3.8    Most Recent 3 LFT's:  Recent Labs   Lab Test 01/10/24  0732 01/03/24  0750 12/27/23  0751   AST 20 22 24   ALT 11 19 16   ALKPHOS 66 78 69   BILITOTAL 0.8 1.1 1.0    Most Recent 2 TSH and T4:No lab results found.    I reviewed the above labs today, new labs will be drawn prior to chemotherapy tomorrow.         Assessment and Plan:   Lopez Hogue is a 68 year old male with HTN, CAD, prior sweat gland cancer s/p resection, and recently diagnosed head and neck squamous cell carcinoma of the vallecula, with staging revealing a synchronous esophageal squamous  cell carcinoma.    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 Ok to proceed with treatment today. Discussed neutropenic precautions and calling triage with a fever >100.4 or with any symptoms/concerns.   -Return to clinic in one week for follow up with me, will determine if one additional dose of chemotherapy is appropriate at that time. Last day of radiation is 1/23/24.    Mucositis.   -Pain has improved with addition of Butrans patch and doxepin liquid per palliative care team. Oxycodone was not helpful. Continues gabapentin and CBD.   -Continue Magic mouthwash, Tylenol, gabapentin, salt/soda rinses.       Nutrition, nausea.   -Weight continues to trend down. Oral intake slightly increased over the last week due to improved pain control, but intake is still significantly decreased. Will give 1L NS today with chemo, will request IVF on 1/12 and 1/15. Follow up with radiation and dietician as scheduled.  -Continue Compazine as needed for nausea.    -Continue protein shakes and soft foods as tolerated.     Heartburn.  Well controlled with famotidine 20mg BID and Carafate 1g 4x/daily.     42 minutes spent on the date of the encounter doing chart review, review of test results, interpretation of tests, patient visit, and documentation       YADIRA Lazcano CNP

## 2024-01-10 NOTE — NURSING NOTE
"Oncology Rooming Note    January 10, 2024 7:56 AM   Lopez Hogue is a 68 year old male who presents for:    Chief Complaint   Patient presents with    Blood Draw     Labs drawn with PIV start by RN. Pt tolerated well. Vitals taken. Patient checked into next appointment.      Oncology Clinic Visit     Primary esophageal squamous cell carcinoma      Initial Vitals: /57 (BP Location: Left arm, Patient Position: Sitting, Cuff Size: Adult Regular)   Pulse 71   Temp 99.2  F (37.3  C) (Oral)   Resp 18   Wt 73.6 kg (162 lb 4.8 oz)   SpO2 95%   BMI 25.32 kg/m   Estimated body mass index is 25.32 kg/m  as calculated from the following:    Height as of 1/5/24: 1.705 m (5' 7.13\").    Weight as of this encounter: 73.6 kg (162 lb 4.8 oz). Body surface area is 1.87 meters squared.  Moderate Pain (4) Comment: 9 - swallow   No LMP for male patient.  Allergies reviewed: No Pt denied to review allergies today.   Medications reviewed: No Pt denies to review medications today.     Medications: MEDICATION REFILLS NEEDED TODAY. Provider was NOT notified.  Pharmacy name entered into [employer]: [facility][id number] - [address]  AT [facility] [address]    Frailty Screening:   Is the patient here for a new oncology consult visit in cancer care? 2. No      Clinical concerns: Magic mouthwash BLM refill.       Ravinder Duran              "

## 2024-01-10 NOTE — Clinical Note
1/10/2024         RE: Lopez Hogue  [address] [facility] [address]        Dear Colleague,    Thank you for referring your patient, Lopez Hogue, to the [facility]. Please see a copy of my visit note below.    [facility]   Return Patient Visit  Jr 10, 2024        Diagnosis: Oropharyngeal cancer, esophageal SCC  Stage:    Cancer Staging   Squamous cell carcinoma of vallecula (H)  Staging form: Pharynx - Oropharynx, AJCC 8th Edition  - Clinical stage from 10/12/2023: Stage JUAN MIGUEL (cT1, cN2b, cM0, p16-) - Signed by Yannick Alva MD on 11/21/2023    Molecular: p16 negative  Performance status: ECOG 0      Oncology History   Squamous cell carcinoma of vallecula (H)   8/18/2023 Imaging    CT neck:  Question soft tissue lesion within the right vallecula/ventral epiglottis.      10/12/2023 Pathology    R vallecula biopsy:  - NON-KERATINIZING POORLY DIFFERENTIATED SQUAMOUS CELL CARCINOMA  - p16 is negative     10/12/2023 -  Cancer Staged    Staging form: Pharynx - Oropharynx, AJCC 8th Edition  - Clinical stage from 10/12/2023: Stage JUAN MIGUEL (cT1, cN2b, cM0, p16-)     10/13/2023 Imaging    PET/CT:  1. 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     2. FDG avid thickening of the mid to distal esophagus, suspicious for a synchronous primary esophageal neoplasm. Recommend correlation with endoscopy.     11/6/2023 Initial Diagnosis    Squamous cell carcinoma of vallecula (H)     11/8/2023 Pathology    EGD with mid esophageal mass biopsy:  A.  MID ESOPHAGEAL MASS, BIOPSIES:  -Nonkeratinizing poorly differentiated squamous cell carcinoma  -Negative for intestinal metaplasia/Alfredo's type mucosa    PD-L1 CPS 15%             12/5/2023 -  Chemotherapy    OP ONC Esophageal Cancer - PACLitaxel / CARBOplatin WEEKLY + Radiation  Plan Provider: Yannick Alva MD  Treatment  goal: Curative  Line of treatment: First Line     Primary esophageal squamous cell carcinoma (H)   11/17/2023 Initial Diagnosis    Primary esophageal squamous cell carcinoma (H)     12/5/2023 -  Chemotherapy    OP ONC Esophageal Cancer - PACLitaxel / CARBOplatin WEEKLY + Radiation  Plan Provider: Yannick Alva MD  Treatment goal: Curative  Line of treatment: First Line           Oncology History:   Lopez Hogue is a 68 year old male with a past medical history that includes HTN, CAD, MI (March of 2023 s/p PCI x2, on DAPT), and prior sweat gland cancer s/p surgical resection, now with recently diagnosed oropharyngeal cancer found to have a 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     Recent diagnosis was made after he coughed up some blood on 8/18/23. He presented to an ED where ENT evaluated and was felt to have a lesion of the vallecula. Full workup of this mass is detailed in the oncology history above. Biopsy on 10/12 confirmed an invasive SCC. PET/CT revealed a hypermetabolic lesion within the mid to distal esophagus. EGD was performed on 11/8 and also showed a squamous cell carcinoma. Given the EGD findings, this was felt to be a second primary.     11/21/23 - consult with Dr. Hill, thoracic surgery. Plan for neoadjuvant chemoradiation followed by laparoscopic staging and jejunostomy, followed by minimally invasive [place] esophagectomy.     Had EUS on 11/28 to completed the staging process. Partially obstructing and partially circumferential fungating mass in the middle third of the esophagus. Esophageal squamous cell carcinoma was staged T2 N1 (suspicious paraesophageal lymph node)     12/5/23: Start of chemoradiation with Carbo/Taxol. Infusion reaction with Taxol w/ dose 1. Tolerated rechallenge       Interval History:     Cough is occurring more when throat is aggravated. Coughing when drinking, unsure if aspirating   Liquid stools, twice per day  Pain meds have been helping    Gets fatigued when climbing stairs. Fatigue is stable. Sleeping well at night and naps during the day. Energy is 50-75%, not keeping him from doing activities    ***    Throat pain is consistenly 5/10 and increases to 10/10 with swallowing, coughing, hiccups or burping. Saliva is thick and has more secretions in his throat causing him to cough. No shortness of breath or chest pain. Reflux is well controlled with famotidine and Carafate. Continues with salt/soda rinses and Magic mouthwash. Oral intake has significantly decreased over the last week due to pain with swallowing. Drank a total of 24oz of water/Gatorade yesterday and was able to get a couple bites of mashed potatoes down. Pills have also been harder to swallow. Continues on gabapentin, CBD, and Tylenol for pain. Hasn't had a bowel movement in two days, but attributes this to decreased intake. No abdominal pain, bloating or cramping. Passing a lot of gas.     Energy level is decreased overall, worse the two days following chemo. Continues activity as tolerated, typically can tolerate 2 hours of activity during the day between naps. Denies neuropathy. Mild erythema and dry skin to bilateral neck and upper chest. No open areas or drainage. Applying Aquaphor.     Had a temperature of 99 yesterday. Do other fevers, chills or concerns for infection.       Review of Systems:  Patient denies any of the following except if noted above: fevers, chills, difficulty with energy, vision or hearing changes, chest pain, dyspnea, abdominal pain, nausea, vomiting, diarrhea, constipation, urinary concerns, headaches, numbness, tingling, issues with sleep or mood. Also denies lumps, bumps, rashes or skin lesions, bleeding or bruising issues.        Social History:  Also actively involved in a program for those in recovery from substance use. Former heavy smoker (2 ppd) and drinker himself. Quit both in 2013. Other than his recent MI, he has been in generally good health.  He has no history of autoimmune disease. He follows regularly with dermatology given his skin cancer history.       Physical Exam:  There were no vitals taken for this visit.  Wt Readings from Last 10 Encounters:   01/09/24 76.7 kg (169 lb)   01/05/24 74.7 kg (164 lb 9.6 oz)   01/03/24 76.5 kg (168 lb 11.2 oz)   01/02/24 74.8 kg (165 lb)   01/02/24 75.2 kg (165 lb 11.2 oz)   12/27/23 78.2 kg (172 lb 6.4 oz)   12/26/23 79.4 kg (175 lb)   12/26/23 79.4 kg (175 lb)   12/19/23 83 kg (183 lb)   12/19/23 83.3 kg (183 lb 9.6 oz)   General: The patient is a pleasant male in no acute distress.  HEENT: EOMI. Sclerae are anicteric. Oropharynx is moist, saliva is thick. Erythema and multiple lesions noted on posterior pharynx.   Lymph: Neck is supple with no lymphadenopathy in the cervical or supraclavicular areas.   Heart: Systolic murmur present, regular rate  Lungs: Clear to auscultation bilaterally.   Abdomen: Bowel sounds present, soft, nontender with no palpable hepatosplenomegaly or masses.   Extremities: No lower extremity edema noted bilaterally.   Neuro: Cranial nerves II through XII are grossly intact.  Skin: Erythema to neck and upper chest, no open areas or drainage. No rashes, petechiae, or bruising noted on exposed skin.        Labs:   Most Recent 3 CBC's:  Recent Labs   Lab Test 01/03/24  0750 12/27/23  0751 12/19/23  0656   WBC 2.0* 2.0* 2.4*   HGB 9.5* 10.0* 10.8*   MCV 91 89 92    149* 141*   ANEUTAUTO 1.5* 1.5* 1.7     Most Recent 3 BMP's:  Recent Labs   Lab Test 01/03/24  0750 12/27/23  0751 12/19/23  0656    137 141   POTASSIUM 4.2 4.0 3.9   CHLORIDE 106 104 107   CO2 24 25 27   BUN 14.5 16.6 17.9   CR 0.69 0.76 0.70   ANIONGAP 9 8 7   RAFAELA 9.0 8.9 9.1   * 113* 90   PROTTOTAL 6.9 6.7 6.7   ALBUMIN 3.9 3.8 4.0    Most Recent 3 LFT's:  Recent Labs   Lab Test 01/03/24  0750 12/27/23  0751 12/19/23  0656   AST 22 24 32   ALT 19 16 31   ALKPHOS 78 69 82   BILITOTAL 1.1 1.0 0.6    Most Recent  2 TSH and T4:No lab results found.    I reviewed the above labs today, new labs will be drawn prior to chemotherapy tomorrow.         Assessment and Plan:   Lopez Hogue is a 68 year old male with HTN, CAD, prior sweat gland cancer s/p resection, and recently diagnosed head and neck squamous cell carcinoma of the vallecula, with staging revealing a synchronous esophageal squamous cell carcinoma.    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 If within parameters, ok to proceed with day 29.   -Will continue weekly FRAN follow-up through treatment, post treatment response assessment about 12 weeks post chemoradiation    Mucositis.   -Throat pain 10/10 with swallowing, 5/10 consistently. Continues gabapentin and CBD but no longer controlling the pain.   -Will add oxycodone 5mg Q4H as needed. Prescription for liquid oxycodone and Tylenol [place] [facility] [place]  Recommend Miralax as needed for constipation with the addition of pain medication, also prescribed liquid Senna as needed.   -Continue Magic mouthwash, Tylenol, gabapentin, salt/soda rinses.       Nutrition, nausea.  -Significant weight loss over the last week. Significant decrease in oral intake due to pain with swallowing. Patient is hopeful he can maintain adequate oral intake if pain is better controlled. Will give 1L NS bolus today. Labs scheduled prior to chemo tomorrow, will plan to give additional IVF tomorrow with chemo. Follow up with radiation and dietician scheduled for today.   -Continue Compazine as needed for nausea.      Heartburn.  Well controlled with famotidine 20mg  BID and Carafate 1g 4x/daily.       YADIRA Lazcano CNP        Again, thank you for allowing me to participate in the care of your patient.        Sincerely,        YADIRA Lazcano CNP

## 2024-01-11 ENCOUNTER — APPOINTMENT (OUTPATIENT)
Dept: RADIATION ONCOLOGY | Facility: CLINIC | Age: 69
End: 2024-01-11
Attending: RADIOLOGY
Payer: COMMERCIAL

## 2024-01-11 PROCEDURE — 77014 PR CT GUIDE FOR PLACEMENT RADIATION THERAPY FIELDS: CPT | Mod: 26 | Performed by: STUDENT IN AN ORGANIZED HEALTH CARE EDUCATION/TRAINING PROGRAM

## 2024-01-11 PROCEDURE — 77386 HC IMRT TREATMENT DELIVERY, COMPLEX: CPT | Performed by: RADIOLOGY

## 2024-01-12 ENCOUNTER — APPOINTMENT (OUTPATIENT)
Dept: RADIATION ONCOLOGY | Facility: CLINIC | Age: 69
End: 2024-01-12
Attending: RADIOLOGY
Payer: COMMERCIAL

## 2024-01-12 ENCOUNTER — INFUSION THERAPY VISIT (OUTPATIENT)
Dept: INFUSION THERAPY | Facility: CLINIC | Age: 69
End: 2024-01-12
Attending: STUDENT IN AN ORGANIZED HEALTH CARE EDUCATION/TRAINING PROGRAM
Payer: COMMERCIAL

## 2024-01-12 VITALS
DIASTOLIC BLOOD PRESSURE: 62 MMHG | OXYGEN SATURATION: 98 % | SYSTOLIC BLOOD PRESSURE: 103 MMHG | TEMPERATURE: 98 F | RESPIRATION RATE: 16 BRPM | HEART RATE: 79 BPM

## 2024-01-12 DIAGNOSIS — C15.9 PRIMARY ESOPHAGEAL SQUAMOUS CELL CARCINOMA (H): Primary | ICD-10-CM

## 2024-01-12 PROCEDURE — 258N000003 HC RX IP 258 OP 636

## 2024-01-12 PROCEDURE — 77386 HC IMRT TREATMENT DELIVERY, COMPLEX: CPT | Performed by: RADIOLOGY

## 2024-01-12 PROCEDURE — 96360 HYDRATION IV INFUSION INIT: CPT

## 2024-01-12 PROCEDURE — 77014 PR CT GUIDE FOR PLACEMENT RADIATION THERAPY FIELDS: CPT | Mod: 26 | Performed by: RADIOLOGY

## 2024-01-12 RX ORDER — HEPARIN SODIUM,PORCINE 10 UNIT/ML
5-20 VIAL (ML) INTRAVENOUS DAILY PRN
Status: CANCELLED | OUTPATIENT
Start: 2024-01-12

## 2024-01-12 RX ORDER — HEPARIN SODIUM (PORCINE) LOCK FLUSH IV SOLN 100 UNIT/ML 100 UNIT/ML
5 SOLUTION INTRAVENOUS
Status: CANCELLED | OUTPATIENT
Start: 2024-01-12

## 2024-01-12 RX ADMIN — SODIUM CHLORIDE 1000 ML: 9 INJECTION, SOLUTION INTRAVENOUS at 10:46

## 2024-01-12 NOTE — TELEPHONE ENCOUNTER
Prior Authorization Approval    Medication: BUPRENORPHINE 10 MCG/HR TD PTWK  Authorization Effective Date: 12/6/2023  Authorization Expiration Date: 1/4/2025  Approved Dose/Quantity:   Reference #: [id number]   Insurance Company: [employer] - Phone [phone] Fax [phone]  Expected CoPay: $    CoPay Card Available:      Financial Assistance Needed:   Which Pharmacy is filling the prescription: [facility] [place], [place] - [address] [place] [address] [phone]  Pharmacy Notified: Yes  Patient Notified:

## 2024-01-15 ENCOUNTER — HOSPITAL ENCOUNTER (INPATIENT)
Facility: CLINIC | Age: 69
LOS: 2 days | Discharge: HOME | DRG: 606 | End: 2024-01-17
Attending: STUDENT IN AN ORGANIZED HEALTH CARE EDUCATION/TRAINING PROGRAM | Admitting: PEDIATRICS
Payer: COMMERCIAL

## 2024-01-15 ENCOUNTER — DOCUMENTATION ONLY (OUTPATIENT)
Dept: RADIATION ONCOLOGY | Facility: CLINIC | Age: 69
End: 2024-01-15

## 2024-01-15 ENCOUNTER — NURSE TRIAGE (OUTPATIENT)
Dept: NURSING | Facility: CLINIC | Age: 69
End: 2024-01-15
Payer: COMMERCIAL

## 2024-01-15 ENCOUNTER — APPOINTMENT (OUTPATIENT)
Dept: RADIATION ONCOLOGY | Facility: CLINIC | Age: 69
End: 2024-01-15
Attending: RADIOLOGY
Payer: COMMERCIAL

## 2024-01-15 DIAGNOSIS — C10.0 SQUAMOUS CELL CARCINOMA OF VALLECULA (H): ICD-10-CM

## 2024-01-15 DIAGNOSIS — R50.81 NEUTROPENIC FEVER (H): ICD-10-CM

## 2024-01-15 DIAGNOSIS — D61.818 PANCYTOPENIA (H): ICD-10-CM

## 2024-01-15 DIAGNOSIS — L03.221 CELLULITIS OF NECK: ICD-10-CM

## 2024-01-15 DIAGNOSIS — R13.10 ODYNOPHAGIA: ICD-10-CM

## 2024-01-15 DIAGNOSIS — C15.9 PRIMARY ESOPHAGEAL SQUAMOUS CELL CARCINOMA (H): ICD-10-CM

## 2024-01-15 DIAGNOSIS — C15.9 PRIMARY ESOPHAGEAL SQUAMOUS CELL CARCINOMA (H): Primary | ICD-10-CM

## 2024-01-15 DIAGNOSIS — D70.9 NEUTROPENIC FEVER (H): ICD-10-CM

## 2024-01-15 DIAGNOSIS — K12.30 MUCOSITIS: ICD-10-CM

## 2024-01-15 PROBLEM — L03.90 CELLULITIS: Status: ACTIVE | Noted: 2024-01-15

## 2024-01-15 LAB
ALBUMIN SERPL BCG-MCNC: 3.2 G/DL (ref 3.5–5.2)
ALBUMIN UR-MCNC: NEGATIVE MG/DL
ALP SERPL-CCNC: 61 U/L (ref 40–150)
ALT SERPL W P-5'-P-CCNC: 13 U/L (ref 0–70)
ANION GAP SERPL CALCULATED.3IONS-SCNC: 9 MMOL/L (ref 7–15)
APPEARANCE UR: CLEAR
AST SERPL W P-5'-P-CCNC: 20 U/L (ref 0–45)
BASOPHILS # BLD AUTO: 0 10E3/UL (ref 0–0.2)
BASOPHILS NFR BLD AUTO: 0 %
BILIRUB SERPL-MCNC: 0.9 MG/DL
BILIRUB UR QL STRIP: NEGATIVE
BUN SERPL-MCNC: 10.5 MG/DL (ref 8–23)
C PNEUM DNA SPEC QL NAA+PROBE: NOT DETECTED
CALCIUM SERPL-MCNC: 8.3 MG/DL (ref 8.8–10.2)
CHLORIDE SERPL-SCNC: 100 MMOL/L (ref 98–107)
COLOR UR AUTO: YELLOW
CREAT SERPL-MCNC: 0.8 MG/DL (ref 0.67–1.17)
DEPRECATED HCO3 PLAS-SCNC: 25 MMOL/L (ref 22–29)
EGFRCR SERPLBLD CKD-EPI 2021: >90 ML/MIN/1.73M2
EOSINOPHIL # BLD AUTO: 0 10E3/UL (ref 0–0.7)
EOSINOPHIL NFR BLD AUTO: 0 %
ERYTHROCYTE [DISTWIDTH] IN BLOOD BY AUTOMATED COUNT: 16.6 % (ref 10–15)
FLUAV H1 2009 PAND RNA SPEC QL NAA+PROBE: NOT DETECTED
FLUAV H1 RNA SPEC QL NAA+PROBE: NOT DETECTED
FLUAV H3 RNA SPEC QL NAA+PROBE: NOT DETECTED
FLUAV RNA SPEC QL NAA+PROBE: NOT DETECTED
FLUBV RNA SPEC QL NAA+PROBE: NOT DETECTED
GLUCOSE BLDC GLUCOMTR-MCNC: 92 MG/DL (ref 70–99)
GLUCOSE SERPL-MCNC: 130 MG/DL (ref 70–99)
GLUCOSE UR STRIP-MCNC: NEGATIVE MG/DL
HADV DNA SPEC QL NAA+PROBE: NOT DETECTED
HCOV PNL SPEC NAA+PROBE: NOT DETECTED
HCT VFR BLD AUTO: 20.8 % (ref 40–53)
HGB BLD-MCNC: 7.1 G/DL (ref 13.3–17.7)
HGB UR QL STRIP: NEGATIVE
HMPV RNA SPEC QL NAA+PROBE: NOT DETECTED
HOLD SPECIMEN: NORMAL
HPIV1 RNA SPEC QL NAA+PROBE: NOT DETECTED
HPIV2 RNA SPEC QL NAA+PROBE: NOT DETECTED
HPIV3 RNA SPEC QL NAA+PROBE: NOT DETECTED
HPIV4 RNA SPEC QL NAA+PROBE: NOT DETECTED
HYALINE CASTS: 2 /LPF
IMM GRANULOCYTES # BLD: 0 10E3/UL
IMM GRANULOCYTES NFR BLD: 1 %
KETONES UR STRIP-MCNC: NEGATIVE MG/DL
LACTATE SERPL-SCNC: 0.7 MMOL/L (ref 0.7–2)
LEUKOCYTE ESTERASE UR QL STRIP: NEGATIVE
LYMPHOCYTES # BLD AUTO: 0.1 10E3/UL (ref 0.8–5.3)
LYMPHOCYTES NFR BLD AUTO: 6 %
M PNEUMO DNA SPEC QL NAA+PROBE: NOT DETECTED
MCH RBC QN AUTO: 31.1 PG (ref 26.5–33)
MCHC RBC AUTO-ENTMCNC: 34.1 G/DL (ref 31.5–36.5)
MCV RBC AUTO: 91 FL (ref 78–100)
MONOCYTES # BLD AUTO: 0.2 10E3/UL (ref 0–1.3)
MONOCYTES NFR BLD AUTO: 17 %
MUCOUS THREADS #/AREA URNS LPF: PRESENT /LPF
NEUTROPHILS # BLD AUTO: 0.8 10E3/UL (ref 1.6–8.3)
NEUTROPHILS NFR BLD AUTO: 76 %
NITRATE UR QL: NEGATIVE
NRBC # BLD AUTO: 0 10E3/UL
NRBC BLD AUTO-RTO: 0 /100
PH UR STRIP: 7 [PH] (ref 5–7)
PLATELET # BLD AUTO: 130 10E3/UL (ref 150–450)
POTASSIUM SERPL-SCNC: 3.8 MMOL/L (ref 3.4–5.3)
PROT SERPL-MCNC: 5.6 G/DL (ref 6.4–8.3)
RBC # BLD AUTO: 2.28 10E6/UL (ref 4.4–5.9)
RBC URINE: <1 /HPF
RSV RNA SPEC QL NAA+PROBE: NOT DETECTED
RSV RNA SPEC QL NAA+PROBE: NOT DETECTED
RV+EV RNA SPEC QL NAA+PROBE: NOT DETECTED
SARS-COV-2 RNA RESP QL NAA+PROBE: NEGATIVE
SODIUM SERPL-SCNC: 134 MMOL/L (ref 135–145)
SP GR UR STRIP: 1.01 (ref 1–1.03)
UROBILINOGEN UR STRIP-MCNC: NORMAL MG/DL
WBC # BLD AUTO: 1.1 10E3/UL (ref 4–11)
WBC URINE: 3 /HPF

## 2024-01-15 PROCEDURE — 120N000002 HC R&B MED SURG/OB UMMC

## 2024-01-15 PROCEDURE — 82962 GLUCOSE BLOOD TEST: CPT

## 2024-01-15 PROCEDURE — 36415 COLL VENOUS BLD VENIPUNCTURE: CPT | Performed by: EMERGENCY MEDICINE

## 2024-01-15 PROCEDURE — 82040 ASSAY OF SERUM ALBUMIN: CPT | Performed by: STUDENT IN AN ORGANIZED HEALTH CARE EDUCATION/TRAINING PROGRAM

## 2024-01-15 PROCEDURE — 81001 URINALYSIS AUTO W/SCOPE: CPT | Performed by: STUDENT IN AN ORGANIZED HEALTH CARE EDUCATION/TRAINING PROGRAM

## 2024-01-15 PROCEDURE — 83605 ASSAY OF LACTIC ACID: CPT | Performed by: EMERGENCY MEDICINE

## 2024-01-15 PROCEDURE — 85048 AUTOMATED LEUKOCYTE COUNT: CPT | Performed by: EMERGENCY MEDICINE

## 2024-01-15 PROCEDURE — 250N000011 HC RX IP 250 OP 636: Performed by: STUDENT IN AN ORGANIZED HEALTH CARE EDUCATION/TRAINING PROGRAM

## 2024-01-15 PROCEDURE — 99285 EMERGENCY DEPT VISIT HI MDM: CPT | Performed by: STUDENT IN AN ORGANIZED HEALTH CARE EDUCATION/TRAINING PROGRAM

## 2024-01-15 PROCEDURE — 99285 EMERGENCY DEPT VISIT HI MDM: CPT | Mod: 25 | Performed by: STUDENT IN AN ORGANIZED HEALTH CARE EDUCATION/TRAINING PROGRAM

## 2024-01-15 PROCEDURE — 36415 COLL VENOUS BLD VENIPUNCTURE: CPT | Performed by: STUDENT IN AN ORGANIZED HEALTH CARE EDUCATION/TRAINING PROGRAM

## 2024-01-15 PROCEDURE — 87581 M.PNEUMON DNA AMP PROBE: CPT | Performed by: STUDENT IN AN ORGANIZED HEALTH CARE EDUCATION/TRAINING PROGRAM

## 2024-01-15 PROCEDURE — 77386 HC IMRT TREATMENT DELIVERY, COMPLEX: CPT | Performed by: RADIOLOGY

## 2024-01-15 PROCEDURE — 99222 1ST HOSP IP/OBS MODERATE 55: CPT | Mod: GC | Performed by: PEDIATRICS

## 2024-01-15 PROCEDURE — 87635 SARS-COV-2 COVID-19 AMP PRB: CPT | Performed by: STUDENT IN AN ORGANIZED HEALTH CARE EDUCATION/TRAINING PROGRAM

## 2024-01-15 PROCEDURE — 85025 COMPLETE CBC W/AUTO DIFF WBC: CPT | Performed by: STUDENT IN AN ORGANIZED HEALTH CARE EDUCATION/TRAINING PROGRAM

## 2024-01-15 PROCEDURE — 87633 RESP VIRUS 12-25 TARGETS: CPT | Performed by: STUDENT IN AN ORGANIZED HEALTH CARE EDUCATION/TRAINING PROGRAM

## 2024-01-15 PROCEDURE — 258N000003 HC RX IP 258 OP 636: Performed by: STUDENT IN AN ORGANIZED HEALTH CARE EDUCATION/TRAINING PROGRAM

## 2024-01-15 PROCEDURE — 250N000013 HC RX MED GY IP 250 OP 250 PS 637

## 2024-01-15 PROCEDURE — 258N000003 HC RX IP 258 OP 636

## 2024-01-15 PROCEDURE — 83605 ASSAY OF LACTIC ACID: CPT | Performed by: STUDENT IN AN ORGANIZED HEALTH CARE EDUCATION/TRAINING PROGRAM

## 2024-01-15 PROCEDURE — 87040 BLOOD CULTURE FOR BACTERIA: CPT | Mod: XS | Performed by: STUDENT IN AN ORGANIZED HEALTH CARE EDUCATION/TRAINING PROGRAM

## 2024-01-15 PROCEDURE — 99255 IP/OBS CONSLTJ NEW/EST HI 80: CPT | Mod: GC | Performed by: STUDENT IN AN ORGANIZED HEALTH CARE EDUCATION/TRAINING PROGRAM

## 2024-01-15 PROCEDURE — 250N000011 HC RX IP 250 OP 636

## 2024-01-15 RX ORDER — NALOXONE HYDROCHLORIDE 0.4 MG/ML
0.2 INJECTION, SOLUTION INTRAMUSCULAR; INTRAVENOUS; SUBCUTANEOUS
Status: DISCONTINUED | OUTPATIENT
Start: 2024-01-15 | End: 2024-01-17 | Stop reason: HOSPADM

## 2024-01-15 RX ORDER — LIDOCAINE 40 MG/G
CREAM TOPICAL
Status: DISCONTINUED | OUTPATIENT
Start: 2024-01-15 | End: 2024-01-17 | Stop reason: HOSPADM

## 2024-01-15 RX ORDER — NALOXONE HYDROCHLORIDE 0.4 MG/ML
0.4 INJECTION, SOLUTION INTRAMUSCULAR; INTRAVENOUS; SUBCUTANEOUS
Status: DISCONTINUED | OUTPATIENT
Start: 2024-01-15 | End: 2024-01-17 | Stop reason: HOSPADM

## 2024-01-15 RX ORDER — PIPERACILLIN SODIUM, TAZOBACTAM SODIUM 4; .5 G/20ML; G/20ML
4.5 INJECTION, POWDER, LYOPHILIZED, FOR SOLUTION INTRAVENOUS ONCE
Qty: 20 ML | Refills: 0 | Status: COMPLETED | OUTPATIENT
Start: 2024-01-15 | End: 2024-01-15

## 2024-01-15 RX ORDER — ACETAMINOPHEN 650 MG/1
650 SUPPOSITORY RECTAL EVERY 4 HOURS PRN
Status: DISCONTINUED | OUTPATIENT
Start: 2024-01-15 | End: 2024-01-17 | Stop reason: HOSPADM

## 2024-01-15 RX ORDER — POLYETHYLENE GLYCOL 3350 17 G/17G
17 POWDER, FOR SOLUTION ORAL DAILY
Status: DISCONTINUED | OUTPATIENT
Start: 2024-01-15 | End: 2024-01-17 | Stop reason: HOSPADM

## 2024-01-15 RX ORDER — CHLORHEXIDINE GLUCONATE ORAL RINSE 1.2 MG/ML
15 SOLUTION DENTAL 2 TIMES DAILY
Status: DISCONTINUED | OUTPATIENT
Start: 2024-01-15 | End: 2024-01-17 | Stop reason: HOSPADM

## 2024-01-15 RX ORDER — AMOXICILLIN 250 MG
1 CAPSULE ORAL 2 TIMES DAILY PRN
Status: DISCONTINUED | OUTPATIENT
Start: 2024-01-15 | End: 2024-01-17 | Stop reason: HOSPADM

## 2024-01-15 RX ORDER — DIPHENHYDRAMINE HYDROCHLORIDE AND LIDOCAINE HYDROCHLORIDE AND ALUMINUM HYDROXIDE AND MAGNESIUM HYDRO
15 KIT EVERY 4 HOURS PRN
COMMUNITY
Start: 2024-01-10 | End: 2024-01-23

## 2024-01-15 RX ORDER — HEPARIN SODIUM (PORCINE) LOCK FLUSH IV SOLN 100 UNIT/ML 100 UNIT/ML
5 SOLUTION INTRAVENOUS
OUTPATIENT
Start: 2024-01-16

## 2024-01-15 RX ORDER — MINERAL OIL/HYDROPHIL PETROLAT
OINTMENT (GRAM) TOPICAL EVERY 4 HOURS PRN
Status: DISCONTINUED | OUTPATIENT
Start: 2024-01-15 | End: 2024-01-17 | Stop reason: HOSPADM

## 2024-01-15 RX ORDER — UREA 10 %
1000 LOTION (ML) TOPICAL EVERY EVENING
Status: DISCONTINUED | OUTPATIENT
Start: 2024-01-15 | End: 2024-01-17 | Stop reason: HOSPADM

## 2024-01-15 RX ORDER — EPINEPHRINE 1 MG/ML
0.3 INJECTION, SOLUTION INTRAMUSCULAR; SUBCUTANEOUS EVERY 5 MIN PRN
OUTPATIENT
Start: 2024-01-16

## 2024-01-15 RX ORDER — LORAZEPAM 2 MG/ML
0.5 INJECTION INTRAMUSCULAR EVERY 4 HOURS PRN
OUTPATIENT
Start: 2024-01-16

## 2024-01-15 RX ORDER — ALBUTEROL SULFATE 0.83 MG/ML
2.5 SOLUTION RESPIRATORY (INHALATION)
OUTPATIENT
Start: 2024-01-16

## 2024-01-15 RX ORDER — METHYLPREDNISOLONE SODIUM SUCCINATE 125 MG/2ML
125 INJECTION, POWDER, LYOPHILIZED, FOR SOLUTION INTRAMUSCULAR; INTRAVENOUS
Start: 2024-01-16

## 2024-01-15 RX ORDER — ROSUVASTATIN CALCIUM 10 MG/1
40 TABLET, COATED ORAL DAILY
Status: DISCONTINUED | OUTPATIENT
Start: 2024-01-16 | End: 2024-01-17 | Stop reason: HOSPADM

## 2024-01-15 RX ORDER — SUCRALFATE ORAL 1 G/10ML
1 SUSPENSION ORAL 2 TIMES DAILY
COMMUNITY
End: 2024-03-29

## 2024-01-15 RX ORDER — FAMOTIDINE 20 MG/1
20 TABLET, FILM COATED ORAL 2 TIMES DAILY
Status: DISCONTINUED | OUTPATIENT
Start: 2024-01-15 | End: 2024-01-17 | Stop reason: HOSPADM

## 2024-01-15 RX ORDER — HEPARIN SODIUM,PORCINE 10 UNIT/ML
5-20 VIAL (ML) INTRAVENOUS DAILY PRN
OUTPATIENT
Start: 2024-01-16

## 2024-01-15 RX ORDER — AMOXICILLIN 250 MG
2 CAPSULE ORAL 2 TIMES DAILY PRN
Status: DISCONTINUED | OUTPATIENT
Start: 2024-01-15 | End: 2024-01-17 | Stop reason: HOSPADM

## 2024-01-15 RX ORDER — CALCIUM CARBONATE 500 MG/1
1000 TABLET, CHEWABLE ORAL 4 TIMES DAILY PRN
Status: DISCONTINUED | OUTPATIENT
Start: 2024-01-15 | End: 2024-01-17 | Stop reason: HOSPADM

## 2024-01-15 RX ORDER — OXYCODONE HCL 5 MG/5 ML
5 SOLUTION, ORAL ORAL EVERY 4 HOURS PRN
Status: DISCONTINUED | OUTPATIENT
Start: 2024-01-15 | End: 2024-01-17 | Stop reason: HOSPADM

## 2024-01-15 RX ORDER — ENOXAPARIN SODIUM 100 MG/ML
40 INJECTION SUBCUTANEOUS EVERY 24 HOURS
Status: DISCONTINUED | OUTPATIENT
Start: 2024-01-15 | End: 2024-01-17 | Stop reason: HOSPADM

## 2024-01-15 RX ORDER — MEPERIDINE HYDROCHLORIDE 25 MG/ML
25 INJECTION INTRAMUSCULAR; INTRAVENOUS; SUBCUTANEOUS EVERY 30 MIN PRN
OUTPATIENT
Start: 2024-01-16

## 2024-01-15 RX ORDER — ASPIRIN 81 MG/1
81 TABLET ORAL DAILY
Status: DISCONTINUED | OUTPATIENT
Start: 2024-01-16 | End: 2024-01-17 | Stop reason: HOSPADM

## 2024-01-15 RX ORDER — DIPHENHYDRAMINE HYDROCHLORIDE 50 MG/ML
50 INJECTION INTRAMUSCULAR; INTRAVENOUS
Start: 2024-01-16

## 2024-01-15 RX ORDER — BUPRENORPHINE 10 UG/H
1 PATCH TRANSDERMAL WEEKLY
Status: DISCONTINUED | OUTPATIENT
Start: 2024-01-17 | End: 2024-01-17 | Stop reason: HOSPADM

## 2024-01-15 RX ORDER — GABAPENTIN 300 MG/1
900 CAPSULE ORAL 3 TIMES DAILY
Status: DISCONTINUED | OUTPATIENT
Start: 2024-01-15 | End: 2024-01-17 | Stop reason: HOSPADM

## 2024-01-15 RX ORDER — CLOPIDOGREL BISULFATE 75 MG/1
75 TABLET ORAL DAILY
Status: DISCONTINUED | OUTPATIENT
Start: 2024-01-15 | End: 2024-01-15

## 2024-01-15 RX ORDER — SODIUM CHLORIDE, SODIUM LACTATE, POTASSIUM CHLORIDE, CALCIUM CHLORIDE 600; 310; 30; 20 MG/100ML; MG/100ML; MG/100ML; MG/100ML
INJECTION, SOLUTION INTRAVENOUS CONTINUOUS
Status: DISCONTINUED | OUTPATIENT
Start: 2024-01-15 | End: 2024-01-17 | Stop reason: HOSPADM

## 2024-01-15 RX ORDER — ROSUVASTATIN CALCIUM 40 MG/1
40 TABLET, COATED ORAL DAILY
Status: DISCONTINUED | OUTPATIENT
Start: 2024-01-15 | End: 2024-01-15

## 2024-01-15 RX ORDER — DIPHENHYDRAMINE HCL 25 MG
50 CAPSULE ORAL ONCE
Start: 2024-01-16

## 2024-01-15 RX ORDER — DIPHENHYDRAMINE HYDROCHLORIDE AND LIDOCAINE HYDROCHLORIDE AND ALUMINUM HYDROXIDE AND MAGNESIUM HYDRO
15 KIT EVERY 4 HOURS PRN
Status: DISCONTINUED | OUTPATIENT
Start: 2024-01-15 | End: 2024-01-16

## 2024-01-15 RX ORDER — PIPERACILLIN SODIUM, TAZOBACTAM SODIUM 4; .5 G/20ML; G/20ML
4.5 INJECTION, POWDER, LYOPHILIZED, FOR SOLUTION INTRAVENOUS EVERY 6 HOURS
Status: DISCONTINUED | OUTPATIENT
Start: 2024-01-15 | End: 2024-01-17

## 2024-01-15 RX ORDER — DOXEPIN HYDROCHLORIDE 10 MG/ML
20 SOLUTION ORAL EVERY 4 HOURS PRN
Status: DISCONTINUED | OUTPATIENT
Start: 2024-01-15 | End: 2024-01-16

## 2024-01-15 RX ORDER — ACETAMINOPHEN 325 MG/1
650 TABLET ORAL EVERY 4 HOURS PRN
Status: DISCONTINUED | OUTPATIENT
Start: 2024-01-15 | End: 2024-01-17 | Stop reason: HOSPADM

## 2024-01-15 RX ORDER — CLOPIDOGREL BISULFATE 75 MG/1
75 TABLET ORAL DAILY
Status: DISCONTINUED | OUTPATIENT
Start: 2024-01-16 | End: 2024-01-17 | Stop reason: HOSPADM

## 2024-01-15 RX ORDER — ALBUTEROL SULFATE 90 UG/1
1-2 AEROSOL, METERED RESPIRATORY (INHALATION)
Start: 2024-01-16

## 2024-01-15 RX ORDER — PROCHLORPERAZINE MALEATE 10 MG
10 TABLET ORAL EVERY 6 HOURS PRN
Status: DISCONTINUED | OUTPATIENT
Start: 2024-01-15 | End: 2024-01-17 | Stop reason: HOSPADM

## 2024-01-15 RX ADMIN — FAMOTIDINE 20 MG: 20 TABLET ORAL at 20:15

## 2024-01-15 RX ADMIN — DIPHENHYDRAMINE HYDROCHLORIDE AND LIDOCAINE HYDROCHLORIDE AND ALUMINUM HYDROXIDE AND MAGNESIUM HYDRO 15 ML: KIT at 14:12

## 2024-01-15 RX ADMIN — PIPERACILLIN SODIUM AND TAZOBACTAM SODIUM 4.5 G: 4; .5 INJECTION, POWDER, LYOPHILIZED, FOR SOLUTION INTRAVENOUS at 10:21

## 2024-01-15 RX ADMIN — ENOXAPARIN SODIUM 40 MG: 40 INJECTION SUBCUTANEOUS at 14:00

## 2024-01-15 RX ADMIN — SODIUM CHLORIDE, POTASSIUM CHLORIDE, SODIUM LACTATE AND CALCIUM CHLORIDE: 600; 310; 30; 20 INJECTION, SOLUTION INTRAVENOUS at 14:06

## 2024-01-15 RX ADMIN — ACETAMINOPHEN 650 MG: 325 TABLET, FILM COATED ORAL at 14:11

## 2024-01-15 RX ADMIN — GABAPENTIN 900 MG: 300 CAPSULE ORAL at 14:10

## 2024-01-15 RX ADMIN — VANCOMYCIN HYDROCHLORIDE 1250 MG: 10 INJECTION, POWDER, LYOPHILIZED, FOR SOLUTION INTRAVENOUS at 11:52

## 2024-01-15 RX ADMIN — SODIUM CHLORIDE, POTASSIUM CHLORIDE, SODIUM LACTATE AND CALCIUM CHLORIDE 1000 ML: 600; 310; 30; 20 INJECTION, SOLUTION INTRAVENOUS at 08:36

## 2024-01-15 RX ADMIN — DOXEPIN HYDROCHLORIDE 20 MG: 10 SOLUTION ORAL at 20:15

## 2024-01-15 RX ADMIN — GABAPENTIN 900 MG: 300 CAPSULE ORAL at 20:15

## 2024-01-15 RX ADMIN — DIPHENHYDRAMINE HYDROCHLORIDE AND LIDOCAINE HYDROCHLORIDE AND ALUMINUM HYDROXIDE AND MAGNESIUM HYDRO 15 ML: KIT at 20:15

## 2024-01-15 RX ADMIN — CYANOCOBALAMIN TAB 500 MCG 1000 MCG: 500 TAB at 20:15

## 2024-01-15 RX ADMIN — CHLORHEXIDINE GLUCONATE 15 ML: 1.2 SOLUTION ORAL at 20:16

## 2024-01-15 RX ADMIN — PIPERACILLIN SODIUM AND TAZOBACTAM SODIUM 4.5 G: 4; .5 INJECTION, POWDER, LYOPHILIZED, FOR SOLUTION INTRAVENOUS at 17:11

## 2024-01-15 RX ADMIN — DOXEPIN HYDROCHLORIDE 20 MG: 10 SOLUTION ORAL at 14:12

## 2024-01-15 ASSESSMENT — ACTIVITIES OF DAILY LIVING (ADL)
ADLS_ACUITY_SCORE: 35
ADLS_ACUITY_SCORE: 33
ADLS_ACUITY_SCORE: 35
ADLS_ACUITY_SCORE: 35

## 2024-01-15 NOTE — PROGRESS NOTES
Radiation oncology is aware that the patient was admitted today from the ED for a self-reported fever of 100.6  F due to a suspected superficial cellulitis on the neck/treatment field.     In brief, the patient is a 68 year old male with synchronous primaries of a squamous cell carcinoma of the vallecula p16 negative, clinical stage T9H1VG2 (stage JUAN MIGUEL) and a squamous cell carcinoma of the middle third of esophagus, poorly differentiated, clinical stage T2 N1 (suspicious paraesophageal lymph node level 8L) M0 (stage II). The patient is currently receiving radiotherapy.    After speaking to the radiation therapist team and the inpatient medicine team, we will plan for the patient to continue radiotherapy while the patient is admitted.    The patient was discussed with my on-call attending physician, Dr. Chad Nazario MD, MS PGY-3  Radiation Oncology  Department of Radiation Oncology  [facility]  Phone: [phone]

## 2024-01-15 NOTE — TELEPHONE ENCOUNTER
Nurse Triage SBAR    Consent: Not needed    Situation: Patient calling with fever.     Background: Patient reports that following his radiation, he has a lot of redness and cracked skin on his neck and the rash appears to be getting significantly worse. There are areas that are oozing blood in small amounts that he has been putting aquafore on.  He reports a fever of 101.6 and is unable to keep his medications down this morning (coughs them back up). He reports being scheduled for radiation again this am at 9:30 and is scheduled to receive a saline infusion at 2:30. He reports cancer diagnosed in his neck.     Assessment: fever of 101.6, cancer diagnosis, unable to take essential medications    Protocol Recommended Disposition: Go to ED Now    Recommendation: Advised patient to go to ED now. Reviewed concerning symptoms and when to call back.     2LT or FYI: No    Follow-Up: None    ADS: No     Joanna Chan RN [facility] 1/15/2024 5:53 AM    Reason for Disposition   [1] Neutropenia known or suspected (e.g., recent cancer chemotherapy) AND [2] fever > 100.4 F (38.0 C)    Additional Information   Negative: Shock suspected (e.g., cold/pale/clammy skin, too weak to stand, low BP, rapid pulse)   Negative: Difficult to awaken or acting confused (e.g., disoriented, slurred speech)   Negative: Bluish (or gray) lips or face now   Negative: New-onset rash with many purple (or blood-colored) spots or dots   Negative: Sounds like a life-threatening emergency to the triager   Negative: Fever > 103 F (39.4 C)    Protocols used: Cancer - Fever-A-

## 2024-01-15 NOTE — CONSULTS
"  Oncology  Consult Note   Date of Service: 01/15/2024    Patient: Lopez Hogue  MRN: [id number]  Admission Date: 1/15/2024  Hospital Day # 0  Cancer Diagnosis: SCC vallecula and esophagus  Primary Outpatient Oncologist: Dr. 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 He is being treated for neutropenic fever with a possible source of infection being his neck (mild cellulitis).     Regarding his malignancies, he's currently receiving neoadjuvant therapy via Carbo/Taxol (s/p 6 doses), and RT He is next due for Carbo/Taxol on 1/16/24. He is receiving RT while admitted.        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   - At this time, transfuse PRN to keep Hgb >7 (unless Cardiology desires Hgb of 8 due to ischemic history), and Plt>10 (if bleeding is present, then needs higher goal)  - Should his clinical status worsen, we may consider G-CSF        Thank you for this consult and the opportunity to treat this pleasant patient. We will continue to follow this patient. Please do not hesitate to page with any questions or concerns.    Patient was seen and plan of care was discussed with attending physician Dr. Olson. Attestation to follow. 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 He presented to [facility] due to chills and fevers (Tmax 100.6) that started this AM. He mentions that he's also noted weeping from the radiation area of his neck/esophagus that started about 1 week ago. He also has odynophagia/dysphagia that has been ongoing for him. He denies cough, dyspnea, chest pain, abdominal pain, n/v/d, or other symptoms. He is s/p 6 doses of Carbo/Taxol and received RT today. He denies any recent antibiotic use.      Oncologic History:  Oncology History   Squamous cell carcinoma of vallecula (H)   8/18/2023 Imaging     CT neck:  Question soft tissue lesion within the right vallecula/ventral epiglottis.       10/12/2023 Pathology     R vallecula biopsy:  - NON-KERATINIZING POORLY DIFFERENTIATED SQUAMOUS CELL CARCINOMA  - p16 is negative      10/12/2023 -  Cancer Staged     Staging form: Pharynx - Oropharynx, AJCC 8th Edition  - Clinical stage from 10/12/2023: Stage JUAN MIGUEL (cT1, cN2b, cM0, p16-)      10/13/2023 Imaging     PET/CT:  1. 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     2. FDG avid thickening of the mid to distal esophagus, suspicious for a synchronous primary esophageal neoplasm. Recommend correlation with endoscopy.      11/6/2023 Initial Diagnosis     Squamous cell carcinoma of vallecula (H)      11/8/2023 Pathology     EGD with mid esophageal mass biopsy:  A.  MID ESOPHAGEAL MASS, BIOPSIES:  -Nonkeratinizing " poorly differentiated squamous cell carcinoma  -Negative for intestinal metaplasia/Alfredo's type mucosa     PD-L1 CPS 15%                  12/5/2023 -  Chemotherapy     OP ONC Esophageal Cancer - PACLitaxel / CARBOplatin WEEKLY + Radiation  Plan Provider: Yannick Alva MD  Treatment goal: Curative  Line of treatment: First Line      Primary esophageal squamous cell carcinoma (H)   11/17/2023 Initial Diagnosis     Primary esophageal squamous cell carcinoma (H)      12/5/2023 -  Chemotherapy     OP ONC Esophageal Cancer - PACLitaxel / CARBOplatin WEEKLY + Radiation  Plan Provider: Yannick Alva MD  Treatment goal: Curative  Line of treatment: First Line         Recent diagnosis was made after he coughed up some blood on 8/18/23. He presented to an ED where ENT evaluated and was felt to have a lesion of the vallecula. Full workup of this mass is detailed in the oncology history above. Biopsy on 10/12 confirmed an invasive SCC. PET/CT revealed a hypermetabolic lesion within the mid to distal esophagus. EGD was performed on 11/8 and also showed a squamous cell carcinoma. Given the EGD findings, this was felt to be a second primary.      11/21/23 - consult with Dr. Hill, thoracic surgery. Plan for neoadjuvant chemoradiation followed by laparoscopic staging and jejunostomy, followed by minimally invasive [place] esophagectomy.      Had EUS on 11/28 to completed the staging process. Partially obstructing and partially circumferential fungating mass in the middle third of the esophagus. Esophageal squamous cell carcinoma was staged T2 N1 (suspicious paraesophageal lymph node)      12/5/23: Start of chemoradiation with Carbo/Taxol. Infusion reaction with Taxol w/ dose 1. Tolerated rechallenge     Family History: No relevant cancer history     Review of Systems:  A comprehensive ROS was performed and found to be negative or non-contributory with the exception of that noted in the HPI above.    Past Medical  History:  Past Medical History:   Diagnosis Date    EtOH dependence (H)     Quit drinking 10 years    Heart attack (H)     Hypertension     Nonrheumatic aortic (valve) stenosis     Sweat gland carcinoma        Past Surgical History:  Past Surgical History:   Procedure Laterality Date    CV CORONARY ANGIOGRAM N/A 3/27/2023    Procedure: Coronary Angiogram;  Surgeon: Miki Etienne MD;  Location: [facility] CV    CV CORONARY ANGIOGRAM N/A 5/9/2023    Procedure: Coronary Angiogram;  Surgeon: Miki Etienne MD;  Location: [facility] CATH [facility] CV    CV LEFT HEART CATH N/A 3/27/2023    Procedure: Left Heart Catheterization;  Surgeon: Miki Etienne MD;  Location: [facility]ATH [facility] CV    CV LEFT HEART CATH N/A 5/9/2023    Procedure: Left Heart Catheterization;  Surgeon: Miki Etienne MD;  Location: [facility] LAB CV    CV PCI N/A 3/27/2023    Procedure: Percutaneous Coronary Intervention;  Surgeon: Miki Etienne MD;  Location: [facility] CATH LAB CV    ENDOSCOPIC ULTRASOUND UPPER GASTROINTESTINAL TRACT (GI) N/A 11/28/2023    Procedure: Endoscopic ultrasound upper gastrointestinal tract (GI);  Surgeon: Shahriar Giraldo MD;  Location:  GI    ESOPHAGOSCOPY, GASTROSCOPY, DUODENOSCOPY (EGD), COMBINED N/A 11/8/2023    Procedure: ESOPHAGOGASTRODUODENOSCOPY, WITH BIOPSY;  Surgeon: Shahriar Giraldo MD;  Location:  GI    LARYNGOSCOPY WITH BIOPSY(IES) N/A 10/12/2023    Procedure: LARYNGOSCOPY, WITH BIOPSY;  Surgeon: Edi Felix MD;  Location:  OR    OTHER SURGICAL HISTORY  2015    WIDE EXCISION OF LEFT GLUTEAL MASSTNM: [id number], stage: II hidradenocarcinoma Grade II, 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, sentinel lymph node biopsy negative        Social History:  Social History     Socioeconomic History    Marital status:     Number of children: 2   Occupational History    Occupation: non-profit manager   Tobacco Use    Smoking status: Former     Packs/day: 2.00     Years: 30.00     Additional pack  years: 0.00     Total pack years: 60.00     Types: Cigarettes     Quit date:      Years since quittin.0     Passive exposure: Never    Smokeless tobacco: Never    Tobacco comments:     Quit 10 years ago   Vaping Use    Vaping Use: Never used   Substance and Sexual Activity    Alcohol use: Not Currently     Comment: quit in     Drug use: Never   Social History Narrative    Patient works.  Lives with his wife.        Family History  Family History   Problem Relation Age of Onset    Dementia Mother     Anesthesia Reaction No family hx of     Thrombocytopenia No family hx of     Cancer No family hx of        Outpatient Medications:  iodixanol (VISIPAQUE 320) injection    famotidine (PEPCID) 20 MG tablet, Take 1 tablet (20 mg) by mouth 2 times daily  acetaminophen (TYLENOL) 160 MG/5ML liquid, Take 10-20 mLs (320-640 mg) by mouth every 6 hours as needed for mild pain or fever  acetaminophen (TYLENOL) 500 MG tablet, Take 500-1,000 mg by mouth every 8 hours as needed for fever or pain  aspirin (ASA) 81 MG EC tablet, Take 1 tablet (81 mg) by mouth daily Start tomorrow.  buprenorphine (BUTRANS) 10 MCG/HR WK patch, Place 1 patch onto the skin every 7 days  chlorhexidine (PERIDEX) 0.12 % solution, SWISH AND SPIT 15 ML BY MOUTH TWICE DAILY AFTER BRUSHING. NOTHING BY MOUTH FOR 30 MINUTES AFTERWARDS  clopidogrel (PLAVIX) 75 MG tablet, Take 1 tablet (75 mg) by mouth daily Resume home plavix 10/14  cyanocobalamin (VITAMIN B-12) 1000 MCG tablet, Take 1,000 mcg by mouth every evening  dexAMETHasone (DECADRON) 4 MG tablet, Take 2 tablets the night before next chemo infusion, take 2 tablets the morning of prior to coming to chemo. (Patient not taking: Reported on 2024)  doxepin (SINEQUAN) 10 MG/ML (HIGH CONC) solution, Take 2 mLs (20 mg) by mouth every 4 hours as needed (. Mix 1:1 with water and swish and swalow. Do not drive for 6 hours afterwards.)  gabapentin (NEURONTIN) 300 MG capsule, Take 3 capsules (900 mg) by  "mouth 3 times daily Follow instructions given in clinic to taper dose up to 900 mg tid  magic mouthwash (ENTER INGREDIENTS IN COMMENTS) suspension, Swish and swallow 15 ml every 4 hours prn  nitroGLYcerin (NITROSTAT) 0.4 MG sublingual tablet, PLACE 1 TABLET UNDER THE TONGUE EVERY 5 MINUTES FOR CHEST PAIN FOR 3 DOSES. IF SYMPTOMS PERSIST 5 MINUTES AFTER 1ST DOSE CALL 911. (Patient not taking: Reported on 1/2/2024)  Omega-3 Fatty Acids (FISH OIL) 1200 MG capsule, Take 1,200 mg by mouth every evening  oxyCODONE (ROXICODONE) 5 MG/5ML solution, Take 5 mLs (5 mg) by mouth every 4 hours as needed for severe pain  prochlorperazine (COMPAZINE) 10 MG tablet, Take 1 tablet (10 mg) by mouth every 6 hours as needed for nausea or vomiting  rosuvastatin (CRESTOR) 40 MG tablet, Take 1 tablet (40 mg) by mouth daily (Patient taking differently: Take 40 mg by mouth every evening)  Sennosides (SENNA) 8.8 MG/5ML SYRP, Take 5-10 mLs (8.8-17.6 mg) by mouth 2 times daily as needed (constipation)  Sennosides (SENNA) 8.8 MG/5ML SYRP, Take 10-15 mLs (17.6-26.4 mg) by mouth daily as needed  sertraline (ZOLOFT) 100 MG tablet, Take 100 mg by mouth every morning  sodium fluoride dental gel (PREVIDENT) 1.1 % GEL topical gel, AFTER BRUSHING AT NIGHT APPLY A THIN LAYER ON THE FLUORIDE TRAY AND WEAR FOR 15 MINUTES EVERY NIGHT. DO NOT EAT OR DRINK FOR 30 MINUTES AFTER (Patient not taking: Reported on 1/2/2024)  sucralfate (CARAFATE) 1 GM/10ML suspension, Take 10 mLs (1 g) by mouth 4 times daily (Patient not taking: Reported on 1/2/2024)         Physical Exam:    Blood pressure 105/63, pulse 86, temperature 98.3  F (36.8  C), temperature source Oral, resp. rate 16, height 1.727 m (5' 8\"), weight 69.4 kg (153 lb), SpO2 97%.  General: alert and cooperative, lying in bed, no acute distress  HEENT: sclera anicteric, EOMI, MMM  Neck: supple, normal ROM  CV: RRR, no murmurs  Resp: CTAB, normal respiratory effort on ambient air  GI: soft, non-tender, " non-distended, bowel sounds present and normoactive  MSK: warm and well-perfused, normal tone  Skin:  erythema around neck, TTP, see pictures below        Neuro: Alert and interactive, moves all extremities equally, no focal deficits    Labs & Studies: I personally reviewed the following studies:  ROUTINE LABS (Last four results):  CMP  Recent Labs   Lab 01/15/24  0734 01/10/24  0732   * 138   POTASSIUM 3.8 3.7   CHLORIDE 100 101   CO2 25 25   ANIONGAP 9 12   * 97   BUN 10.5 12.0   CR 0.80 0.72   GFRESTIMATED >90 >90   RAFAELA 8.3* 8.9   PROTTOTAL 5.6* 6.5   ALBUMIN 3.2* 3.6   BILITOTAL 0.9 0.8   ALKPHOS 61 66   AST 20 20   ALT 13 11     CBC  Recent Labs   Lab 01/15/24  0734 01/10/24  0732   WBC 1.1* 1.5*   RBC 2.28* 2.70*   HGB 7.1* 8.5*   HCT 20.8* 24.3*   MCV 91 90   MCH 31.1 31.5   MCHC 34.1 35.0   RDW 16.6* 15.0   * 138*     INRNo lab results found in last 7 days.

## 2024-01-15 NOTE — PHARMACY-VANCOMYCIN DOSING SERVICE
"Pharmacy Vancomycin Initial Note  Date of Service January 15, 2024  Patient's  1955  68 year old, male    Indication: Skin and Soft Tissue Infection    Current estimated CrCl = Estimated Creatinine Clearance: 85.5 mL/min (based on SCr of 0.8 mg/dL).    Creatinine for last 3 days  1/15/2024:  7:34 AM Creatinine 0.80 mg/dL    Recent Vancomycin Level(s) for last 3 days  No results found for requested labs within last 3 days.      Vancomycin IV Administrations (past 72 hours)        No vancomycin orders with administrations in past 72 hours.                    Nephrotoxins and other renal medications (From now, onward)      Start     Dose/Rate Route Frequency Ordered Stop    01/15/24 1200  vancomycin (VANCOCIN) 1,250 mg in 0.9% NaCl 250 mL intermittent infusion         1,250 mg  over 90 Minutes Intravenous EVERY 18 HOURS 01/15/24 1030      01/15/24 0915  piperacillin-tazobactam (ZOSYN) 4.5 g vial to attach to  mL bag        Note to Pharmacy: For [facility], [facility] and [facility]: For Zosyn-naive patients, use the \"Zosyn initial dose + extended infusion\" order panel.    4.5 g  over 30 Minutes Intravenous ONCE 01/15/24 0845              Contrast Orders - past 72 hours (72h ago, onward)      None            [employer] Prediction of Planned Initial Vancomycin Regimen  Loading dose: N/A  Regimen: 1250 mg IV every 18 hours.  Start time: 10:30 on 01/15/2024  Exposure target: AUC24 (range)400-600 mg/L.hr   AUC24,ss: 463 mg/L.hr  Probability of AUC24 > 400: 67 %  Ctrough,ss: 13.1 mg/L  Probability of Ctrough,ss > 20: 16 %  Probability of nephrotoxicity (Lodise PRISCILLA ): 8 %    Plan:  Start vancomycin 1250 mg IV q18h.   Vancomycin monitoring method: AUC  Vancomycin therapeutic monitoring goal: 400-600 mg*h/L  Pharmacy will check vancomycin levels as appropriate in 1-3 Days.    Serum creatinine levels will be ordered daily for the first week of therapy and at least twice weekly for subsequent weeks.      Aidan Chawla [facility]   "

## 2024-01-15 NOTE — H&P
[facility]    History and Physical - Medicine Service, MILY TEAM 5       Date of Admission:  1/15/2024    Assessment & Plan     Lopez Hogue is a 68 year old male admitted on 1/15/2024. He has a history of head/neck SCC, esophageal cancer, CAD (MI in 03/2023 w/ KAYDEN x2), currently receiving radiation daily M-F and is admitted for febrile neutropenia.     Febrile neutropenia  Squamous cell carcinoma of the vallecula  Poorly differentiated squamous cell carcinoma of the esophagus  Odynophagia  Febrile to 100.6 at home prior to admission, ANC 0.8. No localizing infectious symptoms and R-side of neck does not appear super-infected--seems more consistent with radiation-induced skin changes. Will plan to treat with broad spectrum antibiotics while awaiting results of infectious work up.  - s/p Vanc + Zosyn in ED; continue Zosyn 4.5g q6hr, pharmacy to dose Vancomycin  - Blood cultures x2 pending  - UA non-infectious  - RVP negative  - s/p 1L LR bolus in ED; continue mIVF on admission  - Oncology consult; appreciate input  - Plan to continue daily radiation while admitted   - Pain control: Gabapentin 900mg TID, oxycodone 5mg q6hr PRN, doxepin q4hr PRN, magic mouth wash, benzocaine throat spray    Panctyopenia  Likely in setting of chemoradiation. On admission, Hbg 7.1, Plts 130, WBC 1.1.  - CBC w/ diff in AM  - Discuss transfusions with Oncology    CAD s/p PCI w/ KAYDEN x2 03/2023  - continue pta ASA, plavix  - continue pta rosuvastatin     GERD  - continue pta pepcid          Diet:  Regular diet  DVT Prophylaxis: Enoxaparin (Lovenox) SQ  Smith Catheter: Not present  Fluids: LR @ 50 mL/hr  Lines: None     Cardiac Monitoring: None  Code Status: Full Code    Clinically Significant Risk Factors Present on Admission          # Hypocalcemia: Lowest Ca = 8.3 mg/dL in last 2 days, will monitor and replace as appropriate     # Hypoalbuminemia: Lowest albumin = 3.2 g/dL at  1/15/2024  7:34 AM, will monitor as appropriate   # Drug Induced Platelet Defect: home medication list includes an antiplatelet medication   # Hypertension: Noted on problem list                 Disposition Plan      Expected Discharge Date: 01/17/2024                The patient's care was discussed with the Attending Physician, Dr. Dorman, Patient, and Patient's Family.      Leny Capps MD  Medicine Service, [facility] TEAM [address] [place] [address]  Securely message with Vocera (more info)  Text page via [facility] Paging/Directory   See signed in provider for up to date coverage information  ______________________________________________________________________    Chief Complaint   Skin infection    History is obtained from the patient    History of Present Illness   Lopez Hogue is a 68 year old male who has a history of head/neck SCC, esophageal cancer, CAD (MI in 03/2023 w/ KAYDEN x2) and is admitted for fever.    Started radiation on December 4, 2023. Has been feeling OK since then until about 2 days ago when his throat pain became so severe that he was unable to even swallow water. Has been taking in mostly liquids but has taken almost nothing in the past 2 days. Developed chills yesterday and took his temp this morning and it was elevated to 100.6. Called Oncology clinic and he was told to come to the ED. No congestion, vision changes, hearing changes, cough, difficulty breathing, chest pain, abdominal pain, nausea, vomiting. Has intermittent diarrhea but this is not new. Did have one episode of dysuria last evening but not since then. No melena, hematemesis, hematochezia.     R side of neck has gotten more red over the past week. Did have one spot of bleeding yesterday which has not happened previously.     Denies current or recent alcohol, tobacco, drug use.       Past Medical History    Past Medical History:   Diagnosis Date    EtOH dependence (H)     Quit drinking 10 years     Heart attack (H)     Hypertension     Nonrheumatic aortic (valve) stenosis     Sweat gland carcinoma        Past Surgical History   Past Surgical History:   Procedure Laterality Date    CV CORONARY ANGIOGRAM N/A 3/27/2023    Procedure: Coronary Angiogram;  Surgeon: Miki Etienne MD;  Location: [facility] CV    CV CORONARY ANGIOGRAM N/A 5/9/2023    Procedure: Coronary Angiogram;  Surgeon: Miki Etienne MD;  Location: [facility] CATH LAB CV    CV LEFT HEART CATH N/A 3/27/2023    Procedure: Left Heart Catheterization;  Surgeon: Miki Etienne MD;  Location: [facility] CATH LAB CV    CV LEFT HEART CATH N/A 5/9/2023    Procedure: Left Heart Catheterization;  Surgeon: Miki Etienne MD;  Location: [facility] LAB CV    CV PCI N/A 3/27/2023    Procedure: Percutaneous Coronary Intervention;  Surgeon: Miki Etienne MD;  Location: [facility] CV    ENDOSCOPIC ULTRASOUND UPPER GASTROINTESTINAL TRACT (GI) N/A 11/28/2023    Procedure: Endoscopic ultrasound upper gastrointestinal tract (GI);  Surgeon: Shahriar Giraldo MD;  Location:  GI    ESOPHAGOSCOPY, GASTROSCOPY, DUODENOSCOPY (EGD), COMBINED N/A 11/8/2023    Procedure: ESOPHAGOGASTRODUODENOSCOPY, WITH BIOPSY;  Surgeon: Shahriar Giraldo MD;  Location:  GI    LARYNGOSCOPY WITH BIOPSY(IES) N/A 10/12/2023    Procedure: LARYNGOSCOPY, WITH BIOPSY;  Surgeon: Edi Felix MD;  Location:  OR    OTHER SURGICAL HISTORY  2015    WIDE EXCISION OF LEFT GLUTEAL MASSTNM: [id number], stage: II hidradenocarcinoma Grade II, 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, sentinel lymph node biopsy negative        Prior to Admission Medications   Prior to Admission Medications   Prescriptions Last Dose Informant Patient Reported? Taking?   Omega-3 Fatty Acids (FISH OIL) 1200 MG capsule  Self Yes No   Sig: Take 1,200 mg by mouth every evening   Sennosides (SENNA) 8.8 MG/5ML SYRP   No No   Sig: Take 10-15 mLs (17.6-26.4 mg) by mouth daily as needed   Sennosides (SENNA) 8.8  MG/5ML SYRP   No No   Sig: Take 5-10 mLs (8.8-17.6 mg) by mouth 2 times daily as needed (constipation)   acetaminophen (TYLENOL) 160 MG/5ML liquid   No No   Sig: Take 10-20 mLs (320-640 mg) by mouth every 6 hours as needed for mild pain or fever   acetaminophen (TYLENOL) 500 MG tablet  Self Yes No   Sig: Take 500-1,000 mg by mouth every 8 hours as needed for fever or pain   aspirin (ASA) 81 MG EC tablet  Self No No   Sig: Take 1 tablet (81 mg) by mouth daily Start tomorrow.   buprenorphine (BUTRANS) 10 MCG/HR WK patch   No No   Sig: Place 1 patch onto the skin every 7 days   chlorhexidine (PERIDEX) 0.12 % solution   Yes No   Sig: SWISH AND SPIT 15 ML BY MOUTH TWICE DAILY AFTER BRUSHING. NOTHING BY MOUTH FOR 30 MINUTES AFTERWARDS   clopidogrel (PLAVIX) 75 MG tablet   No No   Sig: Take 1 tablet (75 mg) by mouth daily Resume home plavix 10/14   cyanocobalamin (VITAMIN B-12) 1000 MCG tablet  Self Yes No   Sig: Take 1,000 mcg by mouth every evening   dexAMETHasone (DECADRON) 4 MG tablet   No No   Sig: Take 2 tablets the night before next chemo infusion, take 2 tablets the morning of prior to coming to chemo.   Patient not taking: Reported on 1/2/2024   doxepin (SINEQUAN) 10 MG/ML (HIGH CONC) solution   No No   Sig: Take 2 mLs (20 mg) by mouth every 4 hours as needed (. Mix 1:1 with water and swish and swalow. Do not drive for 6 hours afterwards.)   famotidine (PEPCID) 20 MG tablet 1/15/2024 at 0400  No Yes   Sig: Take 1 tablet (20 mg) by mouth 2 times daily   gabapentin (NEURONTIN) 300 MG capsule   No No   Sig: Take 3 capsules (900 mg) by mouth 3 times daily Follow instructions given in clinic to taper dose up to 900 mg tid   magic mouthwash (ENTER INGREDIENTS IN COMMENTS) suspension   No No   Sig: Swish and swallow 15 ml every 4 hours prn   nitroGLYcerin (NITROSTAT) 0.4 MG sublingual tablet   No No   Sig: PLACE 1 TABLET UNDER THE TONGUE EVERY 5 MINUTES FOR CHEST PAIN FOR 3 DOSES. IF SYMPTOMS PERSIST 5 MINUTES AFTER 1ST  DOSE CALL 911.   Patient not taking: Reported on 1/2/2024   oxyCODONE (ROXICODONE) 5 MG/5ML solution   No No   Sig: Take 5 mLs (5 mg) by mouth every 4 hours as needed for severe pain   prochlorperazine (COMPAZINE) 10 MG tablet   No No   Sig: Take 1 tablet (10 mg) by mouth every 6 hours as needed for nausea or vomiting   rosuvastatin (CRESTOR) 40 MG tablet  Self No No   Sig: Take 1 tablet (40 mg) by mouth daily   Patient taking differently: Take 40 mg by mouth every evening   sertraline (ZOLOFT) 100 MG tablet  Self Yes No   Sig: Take 100 mg by mouth every morning   sodium fluoride dental gel (PREVIDENT) 1.1 % GEL topical gel   Yes No   Sig: AFTER BRUSHING AT NIGHT APPLY A THIN LAYER ON THE FLUORIDE TRAY AND WEAR FOR 15 MINUTES EVERY NIGHT. DO NOT EAT OR DRINK FOR 30 MINUTES AFTER   Patient not taking: Reported on 1/2/2024   sucralfate (CARAFATE) 1 GM/10ML suspension   No No   Sig: Take 10 mLs (1 g) by mouth 4 times daily   Patient not taking: Reported on 1/2/2024      Facility-Administered Medications: None         Physical Exam   Vital Signs: Temp: 98.3  F (36.8  C) Temp src: Oral BP: 105/63 Pulse: 86   Resp: 16 SpO2: 97 % O2 Device: None (Room air)    Weight: 153 lbs 0 oz      Medical Decision Making       Please see A&P for additional details of medical decision making.      Data     I have personally reviewed the following data over the past 24 hrs:    1.1 (L)  \   7.1 (L)   / 130 (L)     134 (L) 100 10.5 /  130 (H)   3.8 25 0.80 \     ALT: 13 AST: 20 AP: 61 TBILI: 0.9   ALB: 3.2 (L) TOT PROTEIN: 5.6 (L) LIPASE: N/A     Procal: N/A CRP: N/A Lactic Acid: 0.7         Imaging results reviewed over the past 24 hrs:   No results found for this or any previous visit (from the past 24 hour(s)).

## 2024-01-15 NOTE — ED PROVIDER NOTES
[place] EMERGENCY DEPARTMENT ([facility])    1/15/24       ED PROVIDER NOTE   History     Chief Complaint   Patient presents with    Neck Problem    Infection     The history is provided by the patient and medical records. No  was used.     Lopez Hogue is a 68 year old male with a past medical history of HTN, CAD, MI (March of 2023 s/p PCI x2, on DAPT), and prior sweat gland cancer s/p surgical resection, and esophageal squamous cell carcinoma who presents to the ED for evaluation of a possible infection. Patient reports he felt fine last night and had a tmax of 100.6F this morning along with neck redness and cracking. Patient took tylenol for his fever and is also experiencing chills and diarrhea. Denies nausea or vomiting. Patient reports he has ENT cancer and is currently undergoing chemotherapy and radiation weekly. Patient states he has one week of treatment left. Patient's last infusion of Taxol was on 1/10/23. Denies history of fevers with treatment.     Past Medical History  Past Medical History:   Diagnosis Date    EtOH dependence (H)     Quit drinking 10 years    Heart attack (H)     Hypertension     Nonrheumatic aortic (valve) stenosis     Sweat gland carcinoma      Past Surgical History:   Procedure Laterality Date    CV CORONARY ANGIOGRAM N/A 3/27/2023    Procedure: Coronary Angiogram;  Surgeon: Miki Etienne MD;  Location: [facility] CV    CV CORONARY ANGIOGRAM N/A 5/9/2023    Procedure: Coronary Angiogram;  Surgeon: Miki Etienne MD;  Location: [facility] CATH LAB CV    CV LEFT HEART CATH N/A 3/27/2023    Procedure: Left Heart Catheterization;  Surgeon: Miki Etienne MD;  Location: [facility] CATH LAB CV    CV LEFT HEART CATH N/A 5/9/2023    Procedure: Left Heart Catheterization;  Surgeon: Miki Etienne MD;  Location: [facility] LAB CV    CV PCI N/A 3/27/2023    Procedure: Percutaneous Coronary Intervention;  Surgeon: Miki Etienne MD;   Location: [facility] LAB CV    ENDOSCOPIC ULTRASOUND UPPER GASTROINTESTINAL TRACT (GI) N/A 11/28/2023    Procedure: Endoscopic ultrasound upper gastrointestinal tract (GI);  Surgeon: Shahriar Giraldo MD;  [facility]    ESOPHAGOSCOPY, GASTROSCOPY, DUODENOSCOPY (EGD), COMBINED N/A 11/8/2023    Procedure: ESOPHAGOGASTRODUODENOSCOPY, WITH BIOPSY;  Surgeon: Shahriar Giraldo MD;  [facility] GI    LARYNGOSCOPY WITH BIOPSY(IES) N/A 10/12/2023    Procedure: LARYNGOSCOPY, WITH BIOPSY;  Surgeon: Edi Felix MD;  [facility] OR    OTHER SURGICAL HISTORY  2015    WIDE EXCISION OF LEFT GLUTEAL MASSTNM: [id number], stage: II hidradenocarcinoma Grade II, 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, sentinel lymph node biopsy negative      acetaminophen (TYLENOL) 160 MG/5ML liquid  acetaminophen (TYLENOL) 500 MG tablet  aspirin (ASA) 81 MG EC tablet  buprenorphine (BUTRANS) 10 MCG/HR WK patch  chlorhexidine (PERIDEX) 0.12 % solution  clopidogrel (PLAVIX) 75 MG tablet  cyanocobalamin (VITAMIN B-12) 1000 MCG tablet  dexAMETHasone (DECADRON) 4 MG tablet  doxepin (SINEQUAN) 10 MG/ML (HIGH CONC) solution  famotidine (PEPCID) 20 MG tablet  gabapentin (NEURONTIN) 300 MG capsule  magic mouthwash (ENTER INGREDIENTS IN COMMENTS) suspension  nitroGLYcerin (NITROSTAT) 0.4 MG sublingual tablet  Omega-3 Fatty Acids (FISH OIL) 1200 MG capsule  oxyCODONE (ROXICODONE) 5 MG/5ML solution  prochlorperazine (COMPAZINE) 10 MG tablet  rosuvastatin (CRESTOR) 40 MG tablet  Sennosides (SENNA) 8.8 MG/5ML SYRP  Sennosides (SENNA) 8.8 MG/5ML SYRP  sertraline (ZOLOFT) 100 MG tablet  sodium fluoride dental gel (PREVIDENT) 1.1 % GEL topical gel  sucralfate (CARAFATE) 1 GM/10ML suspension      Allergies   Allergen Reactions    Coconut Flavor Anaphylaxis     Raw coconut     Family History  Family History   Problem Relation Age of Onset    Dementia Mother     Anesthesia Reaction No family hx of     Thrombocytopenia No family hx of     Cancer No family hx of   "    Social History   Social History     Tobacco Use    Smoking status: Former     Packs/day: 2.00     Years: 30.00     Additional pack years: 0.00     Total pack years: 60.00     Types: Cigarettes     Quit date:      Years since quittin.0     Passive exposure: Never    Smokeless tobacco: Never    Tobacco comments:     Quit 10 years ago   Vaping Use    Vaping Use: Never used   Substance Use Topics    Alcohol use: Not Currently     Comment: quit in     Drug use: Never         A medically appropriate review of systems was performed with pertinent positives and negatives noted in the HPI, and all other systems negative.    Physical Exam   BP: 90/55  Pulse: 110  Temp: 98.4  F (36.9  C)  Resp: 18  Height: 172.7 cm (5' 8\")  Weight: 69.4 kg (153 lb)  SpO2: 93 %  Physical Exam  Vital Signs Reviewed  Gen: Well nourished, well developed, resting comfortably, no acute distress  HEENT: NC/AT, PERRL, EOMI, MMM  Neck: Supple, FROM  CV: Regular Rate  Lungs/Chest: Normal Effort  Abd: Non-distended  MSK/Back: FROM, no visible deformity  Neuro: A&Ox3, GCS 15, CN II-XII unremarkable. Strength and sensation globally intact.  Skin: Warm, Dry, Intact            ED Course, Procedures, & Data     ED Course as of 01/15/24 1119   Mon Jr 15, 2024   0826 Radiation oncology paged.   0827 Oncology paged   0849 Discussed with radiation oncology who evaluated the patient in person.  They believe he is stable to undergo radiation therapy today.  I inclined to agree with him based on patient's clinical appearance.   0820 I discussed with oncology, given the context and suspected infection they would prefer to admit the patient for management of suspected soft tissue infection.  Recommend admission to general medicine service with oncology consult.     Procedures             Results for orders placed or performed during the hospital encounter of 01/15/24   [place] Draw     Status: None (In process)    Narrative    The following orders " were created for panel order [place] Draw.  Procedure                               Abnormality         Status                     ---------                               -----------         ------                     Extra Blue Top Tube[[id number]]                              Final result               Extra Red Top Tube[[id number]]                               Final result               Extra Green Top (Lithium...[[id number]]                                                 Extra Purple Top Tube[[id number]]                            Final result                 Please view results for these tests on the individual orders.   UA with Microscopic reflex to Culture     Status: Abnormal    Specimen: Urine, Midstream   Result Value Ref Range    Color Urine Yellow Colorless, Straw, Light Yellow, Yellow    Appearance Urine Clear Clear    Glucose Urine Negative Negative mg/dL    Bilirubin Urine Negative Negative    Ketones Urine Negative Negative mg/dL    Specific Gravity Urine 1.014 1.003 - 1.035    Blood Urine Negative Negative    pH Urine 7.0 5.0 - 7.0    Protein Albumin Urine Negative Negative mg/dL    Urobilinogen Urine Normal Normal, 2.0 mg/dL    Nitrite Urine Negative Negative    Leukocyte Esterase Urine Negative Negative    Mucus Urine Present (A) None Seen /LPF    RBC Urine <1 <=2 /HPF    WBC Urine 3 <=5 /HPF    Hyaline Casts Urine 2 <=2 /LPF    Narrative    Urine Culture not indicated   Comprehensive metabolic panel     Status: Abnormal   Result Value Ref Range    Sodium 134 (L) 135 - 145 mmol/L    Potassium 3.8 3.4 - 5.3 mmol/L    Carbon Dioxide (CO2) 25 22 - 29 mmol/L    Anion Gap 9 7 - 15 mmol/L    Urea Nitrogen 10.5 8.0 - 23.0 mg/dL    Creatinine 0.80 0.67 - 1.17 mg/dL    GFR Estimate >90 >60 mL/min/1.73m2    Calcium 8.3 (L) 8.8 - 10.2 mg/dL    Chloride 100 98 - 107 mmol/L    Glucose 130 (H) 70 - 99 mg/dL    Alkaline Phosphatase 61 40 - 150 U/L    AST 20 0 - 45 U/L    ALT 13 0 - 70 U/L    Protein Total 5.6 (L)  6.4 - 8.3 g/dL    Albumin 3.2 (L) 3.5 - 5.2 g/dL    Bilirubin Total 0.9 <=1.2 mg/dL   Lactic acid whole blood     Status: Normal   Result Value Ref Range    Lactic Acid 0.7 0.7 - 2.0 mmol/L   Extra Blue Top Tube     Status: None   Result Value Ref Range    Hold Specimen JIC    Extra Red Top Tube     Status: None   Result Value Ref Range    Hold Specimen JIC    Extra Purple Top Tube     Status: None   Result Value Ref Range    Hold Specimen JIC    CBC with platelets and differential     Status: Abnormal   Result Value Ref Range    WBC Count 1.1 (L) 4.0 - 11.0 10e3/uL    RBC Count 2.28 (L) 4.40 - 5.90 10e6/uL    Hemoglobin 7.1 (L) 13.3 - 17.7 g/dL    Hematocrit 20.8 (L) 40.0 - 53.0 %    MCV 91 78 - 100 fL    MCH 31.1 26.5 - 33.0 pg    MCHC 34.1 31.5 - 36.5 g/dL    RDW 16.6 (H) 10.0 - 15.0 %    Platelet Count 130 (L) 150 - 450 10e3/uL    % Neutrophils 76 %    % Lymphocytes 6 %    % Monocytes 17 %    % Eosinophils 0 %    % Basophils 0 %    % Immature Granulocytes 1 %    NRBCs per 100 WBC 0 <1 /100    Absolute Neutrophils 0.8 (L) 1.6 - 8.3 10e3/uL    Absolute Lymphocytes 0.1 (L) 0.8 - 5.3 10e3/uL    Absolute Monocytes 0.2 0.0 - 1.3 10e3/uL    Absolute Eosinophils 0.0 0.0 - 0.7 10e3/uL    Absolute Basophils 0.0 0.0 - 0.2 10e3/uL    Absolute Immature Granulocytes 0.0 <=0.4 10e3/uL    Absolute NRBCs 0.0 10e3/uL   Symptomatic COVID-19 Virus (Coronavirus) by PCR Nasopharyngeal     Status: Normal    Specimen: Nasopharyngeal; Swab   Result Value Ref Range    SARS CoV2 PCR Negative Negative    Narrative    Testing was performed using the Xpert Xpress SARS-CoV-2 Assay on the Cepheid Gene-Xpert Instrument Systems. Additional information about this Emergency Use Authorization (EUA) assay can be found via the Lab Guide. This test should be ordered for the detection of SARS-CoV-2 in individuals who meet SARS-CoV-2 clinical and/or epidemiological criteria as well as from individuals without symptoms or other reasons to suspect  COVID-19. Test performance for asymptomatic patients has only been established in anterior nasal swab specimens. This test is for in vitro diagnostic use under the FDA EUA for laboratories certified under CLIA to perform high complexity testing. This test has not been FDA cleared or approved. A negative result does not rule out the presence of PCR inhibitors in the specimen or target RNA concentration below the limit of detection for the assay. The possibility of a false negative should be considered if the patient's recent exposure or clinical presentation suggests COVID-19. This test was validated by [facility] [employer]. These Laboratories are certified under the Clinical Laboratory Improvement Amendments (CLIA) as qualified to perform high complexity testing.     Respiratory Panel PCR     Status: Normal    Specimen: Nasopharyngeal; Swab   Result Value Ref Range    Adenovirus Not Detected Not Detected    Coronavirus Not Detected Not Detected    Human Metapneumovirus Not Detected Not Detected    Human Rhin/Enterovirus Not Detected Not Detected    Influenza A Not Detected Not Detected    Influenza A, H1 Not Detected Not Detected    Influenza A 2009 H1N1 Not Detected Not Detected    Influenza A, H3 Not Detected Not Detected    Influenza B Not Detected Not Detected    Parainfluenza Virus 1 Not Detected Not Detected    Parainfluenza Virus 2 Not Detected Not Detected    Parainfluenza Virus 3 Not Detected Not Detected    Parainfluenza Virus 4 Not Detected Not Detected    Respiratory Syncytial Virus A Not Detected Not Detected    Respiratory Syncytial Virus B Not Detected Not Detected    Chlamydia Pneumoniae Not Detected Not Detected    Mycoplasma Pneumoniae Not Detected Not Detected    Narrative    The e[employer] Respiratory Panel is a qualitative nucleic acid, multiplex, in vitro diagnostic test for the simultaneous detection and identification of multiple respiratory viral and bacterial nucleic acids in  nasopharyngeal swabs collected in viral transport media from individual exhibiting signs and symptoms of respiratory infection. The assay has received FDA approval for the testing of nasopharyngeal (NP) swabs only. The Infectious Diseases Diagnostic Laboratory at [facility] has validated the performance characteristics for bronchial alveolar lavage specimens. This test is used for clinical purposes and should not be regarded as investigational or for research. This laboratory is certified under the Clinical Laboratory Improvement Amendments of 1988 (CLIA-88) as qualified to perform high complexity clinical laboratory testing.    CBC with platelets differential     Status: Abnormal    Narrative    The following orders were created for panel order CBC with platelets differential.  Procedure                               Abnormality         Status                     ---------                               -----------         ------                     CBC with platelets and d...[[id number]]  Abnormal            Final result                 Please view results for these tests on the individual orders.     Medications   aspirin EC tablet 81 mg (has no administration in time range)   buprenorphine (BUTRANS) 10 MCG/HR WK patch 1 patch (has no administration in time range)     And   buprenorphine (BUTRANS) Patch in Place (has no administration in time range)   chlorhexidine (PERIDEX) 0.12 % solution 15 mL (has no administration in time range)   clopidogrel (PLAVIX) tablet 75 mg (has no administration in time range)   cyanocobalamin (VITAMIN B-12) tablet 1,000 mcg (has no administration in time range)   doxepin (SINEquan) 10 MG/ML (HIGH CONC) solution 20 mg (has no administration in time range)   famotidine (PEPCID) tablet 20 mg (has no administration in time range)   gabapentin (NEURONTIN) capsule 900 mg (has no administration in time range)   magic mouthwash (ENTER INGREDIENTS IN COMMENTS) 15 mL (has no  administration in time range)   oxyCODONE (ROXICODONE) solution 5 mg (has no administration in time range)   rosuvastatin (CRESTOR) tablet 40 mg (has no administration in time range)   prochlorperazine (COMPAZINE) tablet 10 mg (has no administration in time range)   lidocaine 1 % 0.1-1 mL (has no administration in time range)   lidocaine (LMX4) cream (has no administration in time range)   sodium chloride (PF) 0.9% PF flush 3 mL (has no administration in time range)   sodium chloride (PF) 0.9% PF flush 3 mL (has no administration in time range)   acetaminophen (TYLENOL) tablet 650 mg (has no administration in time range)     Or   acetaminophen (TYLENOL) Suppository 650 mg (has no administration in time range)   polyethylene glycol (MIRALAX) Packet 17 g (has no administration in time range)   senna-docusate (SENOKOT-S/PERICOLACE) 8.6-50 MG per tablet 1 tablet (has no administration in time range)     Or   senna-docusate (SENOKOT-S/PERICOLACE) 8.6-50 MG per tablet 2 tablet (has no administration in time range)   calcium carbonate (TUMS) chewable tablet 1,000 mg (has no administration in time range)   enoxaparin ANTICOAGULANT (LOVENOX) injection 40 mg (has no administration in time range)   vancomycin (VANCOCIN) 1,250 mg in 0.9% NaCl 250 mL intermittent infusion (has no administration in time range)   piperacillin-tazobactam (ZOSYN) 4.5 g vial to attach to  mL bag (has no administration in time range)   benzocaine 20% (HURRICAINE/TOPEX) 20 % spray 0.5-1 mL (has no administration in time range)   lactated ringers BOLUS 1,000 mL (0 mLs Intravenous Stopped 1/15/24 1100)   piperacillin-tazobactam (ZOSYN) 4.5 g vial to attach to  mL bag (4.5 g Intravenous $New Bag 1/15/24 1021)     Labs Ordered and Resulted from Time of ED Arrival to Time of ED Departure   ROUTINE UA WITH MICROSCOPIC REFLEX TO CULTURE - Abnormal       Result Value    Color Urine Yellow      Appearance Urine Clear      Glucose Urine Negative       Bilirubin Urine Negative      Ketones Urine Negative      Specific Gravity Urine 1.014      Blood Urine Negative      pH Urine 7.0      Protein Albumin Urine Negative      Urobilinogen Urine Normal      Nitrite Urine Negative      Leukocyte Esterase Urine Negative      Mucus Urine Present (*)     RBC Urine <1      WBC Urine 3      Hyaline Casts Urine 2     COMPREHENSIVE METABOLIC PANEL - Abnormal    Sodium 134 (*)     Potassium 3.8      Carbon Dioxide (CO2) 25      Anion Gap 9      Urea Nitrogen 10.5      Creatinine 0.80      GFR Estimate >90      Calcium 8.3 (*)     Chloride 100      Glucose 130 (*)     Alkaline Phosphatase 61      AST 20      ALT 13      Protein Total 5.6 (*)     Albumin 3.2 (*)     Bilirubin Total 0.9     CBC WITH PLATELETS AND DIFFERENTIAL - Abnormal    WBC Count 1.1 (*)     RBC Count 2.28 (*)     Hemoglobin 7.1 (*)     Hematocrit 20.8 (*)     MCV 91      MCH 31.1      MCHC 34.1      RDW 16.6 (*)     Platelet Count 130 (*)     % Neutrophils 76      % Lymphocytes 6      % Monocytes 17      % Eosinophils 0      % Basophils 0      % Immature Granulocytes 1      NRBCs per 100 WBC 0      Absolute Neutrophils 0.8 (*)     Absolute Lymphocytes 0.1 (*)     Absolute Monocytes 0.2      Absolute Eosinophils 0.0      Absolute Basophils 0.0      Absolute Immature Granulocytes 0.0      Absolute NRBCs 0.0     LACTIC ACID WHOLE BLOOD - Normal    Lactic Acid 0.7     COVID-19 VIRUS (CORONAVIRUS) BY PCR - Normal    SARS CoV2 PCR Negative     RESPIRATORY PANEL PCR - Normal    Adenovirus Not Detected      Coronavirus Not Detected      Human Metapneumovirus Not Detected      Human Rhin/Enterovirus Not Detected      Influenza A Not Detected      Influenza A, H1 Not Detected      Influenza A 2009 H1N1 Not Detected      Influenza A, H3 Not Detected      Influenza B Not Detected      Parainfluenza Virus 1 Not Detected      Parainfluenza Virus 2 Not Detected      Parainfluenza Virus 3 Not Detected      Parainfluenza Virus  4 Not Detected      Respiratory Syncytial Virus A Not Detected      Respiratory Syncytial Virus B Not Detected      Chlamydia Pneumoniae Not Detected      Mycoplasma Pneumoniae Not Detected     GLUCOSE MONITOR NURSING POCT   CREATININE   BLOOD CULTURE   BLOOD CULTURE   BLOOD CULTURE     No orders to display          Critical care was not performed.     Medical Decision Making  The patient's presentation was of high complexity (an acute health issue posing potential threat to life or bodily function).    The patient's evaluation involved:  ordering and/or review of 3+ test(s) in this encounter (see separate area of note for details)  discussion of management or test interpretation with another health professional (Oncology, Hematology, Hospitalist)    The patient's management necessitated high risk (a decision regarding hospitalization).    Assessment & Plan    Lopez Hogue is a 68 year old male with a past medical history of HTN, CAD, MI (March of 2023 s/p PCI x2, on DAPT), and prior sweat gland cancer s/p surgical resection, and esophageal squamous cell carcinoma who presents to the ED for evaluation of a possible infection.  On arrival in the emergency department he had low normal blood pressures and mild tachycardia which improved with fluid administration.  He was afebrile.  He was breathing easily on room air.  Lactic acid was not elevated.  Discussed the patient's presentation and management with both oncology and radiation oncology.  He was deemed stable to go for his scheduled radiation appointment.  There is concern for a concurrent soft tissue skin infection on top of skin changes and breakdown from the radiation therapy.  Patient's blood counts show an increasing pancytopenia over the last few weeks.  He does have neutropenia although it is not critical.  Discussed with oncology who favored treating more aggressively.  Started with initial dosing of IV antibiotics and will admit to medicine for  further management of this acute infection with appropriate subspecialist consults as necessary.    New Prescriptions    No medications on file       Final diagnoses:   Cellulitis of neck   Neutropenic fever  (H24)   Pancytopenia (H)   ISalma, am serving as a trained medical scribe to document services personally performed by Ravinder Angelo MD, based on the provider's statements to me.     IRavinder MD, was physically present and have reviewed and verified the accuracy of this note documented by Salma Delacruz.     Ravinder Angelo Jr., MD   [facility] EMERGENCY DEPARTMENT  1/15/2024     Ravinder Angelo MD  01/15/24 1121

## 2024-01-15 NOTE — LETTER
Transition Communication Hand-off for Care Transitions to Next Level of Care Provider    Name: Lopez Hogue  : 1955  MRN #: [id number]  Primary Care Provider: Madi Ramos     Primary Clinic: [facility] [address]     Reason for Hospitalization:  Cellulitis of neck [L03.221]  Cellulitis [L03.90]  Neutropenic fever  (H24) [D70.9, R50.81]  Pancytopenia (H) [D61.818]  Admit Date/Time: 1/15/2024  7:00 AM  Discharge Date: 24  Payor Source: Payor: MEDICA / Plan: MEDICA CHOICE / Product Type: Indemnity /     Readmission Assessment Measure (HIRA) Risk Score/category: 23%  Reason for Communication Hand-off Referral: Other n/a  Discharge Plan: Home with family  Concern for non-adherence with plan of care:   Y/N no  Discharge Needs Assessment:  Needs      Flowsheet Row Most Recent Value   Equipment Currently Used at Home none          Already enrolled in Tele-monitoring program and name of program:  no  Follow-up specialty is recommended: Yes    Follow-up plan:    Future Appointments   Date Time Provider Department Center   2024  9:30 AM [facility]   2024  9:30 AM [facility]   2024  9:30 AM [facility]   2024 10:00 AM Akiko Sun MD [facility]   2024  9:30 AM [facility]   2024  3:25 PM Nuno Cazares MD [facility]   2024 10:30 AM Yannick Alva MD [facility]   2024 12:30 PM Nisha Pride APRN CNP [facility]       Any outstanding tests or procedures:              Key Recommendations:      Edis Cortes RN    AVS/Discharge Summary is the source of truth; this is a helpful guide for improved communication of patient story

## 2024-01-15 NOTE — ED TRIAGE NOTES
Pt reports undergoing chemo and radiation for neck cancer. Pt's neck is red, cracking, with purulent drainage. Pt reports that this morning when he took his temp 100.6F and he took tylenol for it.

## 2024-01-16 ENCOUNTER — OFFICE VISIT (OUTPATIENT)
Dept: RADIATION ONCOLOGY | Facility: CLINIC | Age: 69
End: 2024-01-16
Attending: RADIOLOGY
Payer: COMMERCIAL

## 2024-01-16 VITALS — BODY MASS INDEX: 23.87 KG/M2 | WEIGHT: 157 LBS

## 2024-01-16 DIAGNOSIS — C15.9 PRIMARY ESOPHAGEAL SQUAMOUS CELL CARCINOMA (H): ICD-10-CM

## 2024-01-16 DIAGNOSIS — C10.0 SQUAMOUS CELL CARCINOMA OF VALLECULA (H): Primary | ICD-10-CM

## 2024-01-16 LAB
ANION GAP SERPL CALCULATED.3IONS-SCNC: 9 MMOL/L (ref 7–15)
BASOPHILS # BLD AUTO: 0 10E3/UL (ref 0–0.2)
BASOPHILS NFR BLD AUTO: 1 %
BUN SERPL-MCNC: 7.1 MG/DL (ref 8–23)
CALCIUM SERPL-MCNC: 8.6 MG/DL (ref 8.8–10.2)
CHLORIDE SERPL-SCNC: 105 MMOL/L (ref 98–107)
CREAT SERPL-MCNC: 0.73 MG/DL (ref 0.67–1.17)
DEPRECATED HCO3 PLAS-SCNC: 24 MMOL/L (ref 22–29)
EGFRCR SERPLBLD CKD-EPI 2021: >90 ML/MIN/1.73M2
EOSINOPHIL # BLD AUTO: 0 10E3/UL (ref 0–0.7)
EOSINOPHIL NFR BLD AUTO: 0 %
ERYTHROCYTE [DISTWIDTH] IN BLOOD BY AUTOMATED COUNT: 16.9 % (ref 10–15)
GLUCOSE BLDC GLUCOMTR-MCNC: 86 MG/DL (ref 70–99)
GLUCOSE SERPL-MCNC: 96 MG/DL (ref 70–99)
HCT VFR BLD AUTO: 22 % (ref 40–53)
HGB BLD-MCNC: 7.5 G/DL (ref 13.3–17.7)
IMM GRANULOCYTES # BLD: 0 10E3/UL
IMM GRANULOCYTES NFR BLD: 1 %
LYMPHOCYTES # BLD AUTO: 0.2 10E3/UL (ref 0.8–5.3)
LYMPHOCYTES NFR BLD AUTO: 15 %
MCH RBC QN AUTO: 31.4 PG (ref 26.5–33)
MCHC RBC AUTO-ENTMCNC: 34.1 G/DL (ref 31.5–36.5)
MCV RBC AUTO: 92 FL (ref 78–100)
MONOCYTES # BLD AUTO: 0.2 10E3/UL (ref 0–1.3)
MONOCYTES NFR BLD AUTO: 17 %
MRSA DNA SPEC QL NAA+PROBE: NEGATIVE
NEUTROPHILS # BLD AUTO: 0.9 10E3/UL (ref 1.6–8.3)
NEUTROPHILS NFR BLD AUTO: 66 %
NRBC # BLD AUTO: 0 10E3/UL
NRBC BLD AUTO-RTO: 0 /100
PLATELET # BLD AUTO: 139 10E3/UL (ref 150–450)
POTASSIUM SERPL-SCNC: 4 MMOL/L (ref 3.4–5.3)
RBC # BLD AUTO: 2.39 10E6/UL (ref 4.4–5.9)
SA TARGET DNA: NEGATIVE
SODIUM SERPL-SCNC: 138 MMOL/L (ref 135–145)
WBC # BLD AUTO: 1.3 10E3/UL (ref 4–11)

## 2024-01-16 PROCEDURE — 87640 STAPH A DNA AMP PROBE: CPT

## 2024-01-16 PROCEDURE — 250N000011 HC RX IP 250 OP 636: Performed by: STUDENT IN AN ORGANIZED HEALTH CARE EDUCATION/TRAINING PROGRAM

## 2024-01-16 PROCEDURE — 120N000002 HC R&B MED SURG/OB UMMC

## 2024-01-16 PROCEDURE — 87641 MR-STAPH DNA AMP PROBE: CPT

## 2024-01-16 PROCEDURE — 250N000009 HC RX 250

## 2024-01-16 PROCEDURE — 250N000013 HC RX MED GY IP 250 OP 250 PS 637: Performed by: PEDIATRICS

## 2024-01-16 PROCEDURE — 250N000013 HC RX MED GY IP 250 OP 250 PS 637: Performed by: STUDENT IN AN ORGANIZED HEALTH CARE EDUCATION/TRAINING PROGRAM

## 2024-01-16 PROCEDURE — 36415 COLL VENOUS BLD VENIPUNCTURE: CPT

## 2024-01-16 PROCEDURE — 99233 SBSQ HOSP IP/OBS HIGH 50: CPT | Mod: GC | Performed by: INTERNAL MEDICINE

## 2024-01-16 PROCEDURE — 99233 SBSQ HOSP IP/OBS HIGH 50: CPT

## 2024-01-16 PROCEDURE — 77427 RADIATION TX MANAGEMENT X5: CPT | Performed by: RADIOLOGY

## 2024-01-16 PROCEDURE — 77336 RADIATION PHYSICS CONSULT: CPT | Performed by: RADIOLOGY

## 2024-01-16 PROCEDURE — 80048 BASIC METABOLIC PNL TOTAL CA: CPT

## 2024-01-16 PROCEDURE — 85025 COMPLETE CBC W/AUTO DIFF WBC: CPT

## 2024-01-16 PROCEDURE — 250N000011 HC RX IP 250 OP 636

## 2024-01-16 PROCEDURE — 77386 HC IMRT TREATMENT DELIVERY, COMPLEX: CPT | Performed by: RADIOLOGY

## 2024-01-16 PROCEDURE — 258N000003 HC RX IP 258 OP 636: Performed by: STUDENT IN AN ORGANIZED HEALTH CARE EDUCATION/TRAINING PROGRAM

## 2024-01-16 PROCEDURE — 250N000013 HC RX MED GY IP 250 OP 250 PS 637

## 2024-01-16 RX ORDER — DIPHENHYDRAMINE HYDROCHLORIDE AND LIDOCAINE HYDROCHLORIDE AND ALUMINUM HYDROXIDE AND MAGNESIUM HYDRO
15 KIT EVERY 4 HOURS
Status: DISCONTINUED | OUTPATIENT
Start: 2024-01-16 | End: 2024-01-17 | Stop reason: HOSPADM

## 2024-01-16 RX ORDER — SILVER SULFADIAZINE 10 MG/G
CREAM TOPICAL DAILY
Status: DISCONTINUED | OUTPATIENT
Start: 2024-01-16 | End: 2024-01-17 | Stop reason: HOSPADM

## 2024-01-16 RX ORDER — DOXEPIN HYDROCHLORIDE 10 MG/ML
20 SOLUTION ORAL EVERY 4 HOURS
Status: DISCONTINUED | OUTPATIENT
Start: 2024-01-16 | End: 2024-01-17 | Stop reason: HOSPADM

## 2024-01-16 RX ADMIN — DOXEPIN HYDROCHLORIDE 20 MG: 10 SOLUTION ORAL at 12:23

## 2024-01-16 RX ADMIN — PIPERACILLIN SODIUM AND TAZOBACTAM SODIUM 4.5 G: 4; .5 INJECTION, POWDER, LYOPHILIZED, FOR SOLUTION INTRAVENOUS at 00:26

## 2024-01-16 RX ADMIN — ACETAMINOPHEN 650 MG: 325 TABLET, FILM COATED ORAL at 13:16

## 2024-01-16 RX ADMIN — DIPHENHYDRAMINE HYDROCHLORIDE AND LIDOCAINE HYDROCHLORIDE AND ALUMINUM HYDROXIDE AND MAGNESIUM HYDRO 15 ML: KIT at 15:57

## 2024-01-16 RX ADMIN — DOXEPIN HYDROCHLORIDE 20 MG: 10 SOLUTION ORAL at 20:48

## 2024-01-16 RX ADMIN — DOXEPIN HYDROCHLORIDE 20 MG: 10 SOLUTION ORAL at 15:57

## 2024-01-16 RX ADMIN — DIPHENHYDRAMINE HYDROCHLORIDE AND LIDOCAINE HYDROCHLORIDE AND ALUMINUM HYDROXIDE AND MAGNESIUM HYDRO 15 ML: KIT at 12:22

## 2024-01-16 RX ADMIN — FAMOTIDINE 20 MG: 20 TABLET ORAL at 05:20

## 2024-01-16 RX ADMIN — ASPIRIN 81 MG: 81 TABLET ORAL at 09:06

## 2024-01-16 RX ADMIN — GABAPENTIN 900 MG: 300 CAPSULE ORAL at 05:20

## 2024-01-16 RX ADMIN — ENOXAPARIN SODIUM 40 MG: 40 INJECTION SUBCUTANEOUS at 11:48

## 2024-01-16 RX ADMIN — GABAPENTIN 900 MG: 300 CAPSULE ORAL at 18:01

## 2024-01-16 RX ADMIN — DOXEPIN HYDROCHLORIDE 20 MG: 10 SOLUTION ORAL at 05:20

## 2024-01-16 RX ADMIN — CYANOCOBALAMIN TAB 500 MCG 1000 MCG: 500 TAB at 18:01

## 2024-01-16 RX ADMIN — ROSUVASTATIN CALCIUM 40 MG: 10 TABLET, FILM COATED ORAL at 09:06

## 2024-01-16 RX ADMIN — PIPERACILLIN SODIUM AND TAZOBACTAM SODIUM 4.5 G: 4; .5 INJECTION, POWDER, LYOPHILIZED, FOR SOLUTION INTRAVENOUS at 11:43

## 2024-01-16 RX ADMIN — DIPHENHYDRAMINE HYDROCHLORIDE AND LIDOCAINE HYDROCHLORIDE AND ALUMINUM HYDROXIDE AND MAGNESIUM HYDRO 15 ML: KIT at 05:20

## 2024-01-16 RX ADMIN — VANCOMYCIN HYDROCHLORIDE 1250 MG: 10 INJECTION, POWDER, LYOPHILIZED, FOR SOLUTION INTRAVENOUS at 05:26

## 2024-01-16 RX ADMIN — PIPERACILLIN SODIUM AND TAZOBACTAM SODIUM 4.5 G: 4; .5 INJECTION, POWDER, LYOPHILIZED, FOR SOLUTION INTRAVENOUS at 18:03

## 2024-01-16 RX ADMIN — CHLORHEXIDINE GLUCONATE 15 ML: 1.2 SOLUTION ORAL at 05:20

## 2024-01-16 RX ADMIN — ACETAMINOPHEN 650 MG: 325 TABLET, FILM COATED ORAL at 18:01

## 2024-01-16 RX ADMIN — SILVER SULFADIAZINE: 10 CREAM TOPICAL at 20:48

## 2024-01-16 RX ADMIN — CLOPIDOGREL BISULFATE 75 MG: 75 TABLET ORAL at 09:06

## 2024-01-16 RX ADMIN — CHLORHEXIDINE GLUCONATE 15 ML: 1.2 SOLUTION ORAL at 18:09

## 2024-01-16 RX ADMIN — PIPERACILLIN SODIUM AND TAZOBACTAM SODIUM 4.5 G: 4; .5 INJECTION, POWDER, LYOPHILIZED, FOR SOLUTION INTRAVENOUS at 05:20

## 2024-01-16 RX ADMIN — WHITE PETROLATUM: 1.75 OINTMENT TOPICAL at 13:12

## 2024-01-16 RX ADMIN — DIPHENHYDRAMINE HYDROCHLORIDE AND LIDOCAINE HYDROCHLORIDE AND ALUMINUM HYDROXIDE AND MAGNESIUM HYDRO 15 ML: KIT at 20:49

## 2024-01-16 RX ADMIN — FAMOTIDINE 20 MG: 20 TABLET ORAL at 18:01

## 2024-01-16 RX ADMIN — ACETAMINOPHEN 650 MG: 325 TABLET, FILM COATED ORAL at 05:29

## 2024-01-16 RX ADMIN — GABAPENTIN 900 MG: 300 CAPSULE ORAL at 13:09

## 2024-01-16 ASSESSMENT — ACTIVITIES OF DAILY LIVING (ADL)
CONCENTRATING,_REMEMBERING_OR_MAKING_DECISIONS_DIFFICULTY: NO
ADLS_ACUITY_SCORE: 24
ADLS_ACUITY_SCORE: 35
ADLS_ACUITY_SCORE: 35
ADLS_ACUITY_SCORE: 24
ADLS_ACUITY_SCORE: 24
HEARING_DIFFICULTY_OR_DEAF: NO
ADLS_ACUITY_SCORE: 24
VISION_MANAGEMENT: GLASSES
ADLS_ACUITY_SCORE: 35
WEAR_GLASSES_OR_BLIND: YES
ADLS_ACUITY_SCORE: 35
DOING_ERRANDS_INDEPENDENTLY_DIFFICULTY: NO
DIFFICULTY_EATING/SWALLOWING: YES
DIFFICULTY_COMMUNICATING: NO
ADLS_ACUITY_SCORE: 24
TOILETING_ISSUES: NO
ADLS_ACUITY_SCORE: 35
CHANGE_IN_FUNCTIONAL_STATUS_SINCE_ONSET_OF_CURRENT_ILLNESS/INJURY: YES
WALKING_OR_CLIMBING_STAIRS_DIFFICULTY: NO
FALL_HISTORY_WITHIN_LAST_SIX_MONTHS: NO
ADLS_ACUITY_SCORE: 35
ADLS_ACUITY_SCORE: 24
EATING/SWALLOWING: SWALLOWING LIQUIDS;SWALLOWING SOLID FOOD
DRESSING/BATHING_DIFFICULTY: NO

## 2024-01-16 NOTE — PROGRESS NOTES
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 Hogue presents to clinic today for his weekly on-treatment visit. He is presently admitted for suspected infection in the setting of neutropenic fever. He has punctate areas of purulent drainage at the neck. He is receiving antibiotics. He continues with pain with swallowing.     Nursing ROS:   Nutrition Alteration  Diet Type: Patient's Preference  Skin  Skin Reaction: 3 - Moist desquamation in areas other than skin folds and creases, bleeding induced by minor trauma or abrasion  Skin Progress: Aquaphor     ENT and Mouth Exam  Mucositis - Current: 1 - Generalized erythema  ENT/Mouth Note: Mouth pain/S&S 15     Gastrointestinal  Nausea: 0 - None     Psychosocial  Mood - Anxiety: 0 - Normal  Pain Assessment  0-10 Pain Scale: 2  Pain Treatment: Gabapentin 900 mg TID  Pain Note: Butrans       PEG Tube: No, if needed will be a jejunostomy tube  Electronic Cardiac Implant: No    Objective:   There were no vitals taken for this visit., initial weight Wt 79.8 kg (176 lb)   Gen: Appears well, fatigued  HEENT: Diffuse, moderate erythema involving the  oropharynx and posterior soft palate, no thrush  Skin: Brisk erythema moist desquamation and punctate purulent areas on neck    CBC RESULTS:   Recent Labs   Lab Test 01/03/24  0750   WBC 2.0*   RBC 3.04*   HGB 9.5*   HCT 27.7*   MCV 91   MCH 31.3   MCHC 34.3   RDW 14.1           Lab Results   Component Value Date     01/16/2024    POTASSIUM 4.0 01/16/2024    CHLORIDE 105 01/16/2024    CO2 24 01/16/2024    GLC 96 01/16/2024     Lab Results   Component Value Date    AST 20 01/15/2024    ALT 13 01/15/2024    ALKPHOS 61 01/15/2024    BILITOTAL 0.9 01/15/2024     Magnesium   Date Value Ref Range Status   11/30/2023 1.7 1.7 - 2.3 mg/dL Final       Treatment-related toxicities (CTCAE v5.0):  Anorexia: Grade 3: Associated with significant weight loss or malnutrition; tube feeding or TPN indicated  Fatigue: Grade 2: Fatigue not relieved by rest; limiting instrumental ADL  Nausea: Grade 0: No toxicity  Pain: Grade 2: Moderate pain; limiting instrumental ADL  Dry mouth: Grade 1: Symptomatic without significant dietary alteration; unstimulated saliva flow >0.2 mL/min  Dysphagia: Grade 2: Symptomatic and altered eating/swallowing  Mucositis: Grade 2: Moderate pain; not interfering with oral intake; modified diet indicated  Dyspnea: Grade 0: No toxicity  Esophagitis: Grade 2: Symptomatic; altered eating/swallowing; oral supplements indicated  Dermatitis: Grade 3    ED visits/Hospitalizations: presently admitted    Missed Treatments: None    Mosaiq chart and setup information reviewed  IGRT images reviewed    Medication Review  Med list reviewed with patient?: Yes  Med list printed and given: Offered and declined    Assessment:    Mr. 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 He is admitted and feeling better on antibiotics. We have recommended silvadene to the neck; this was conveyed via our nursing team yesterday.    Plan:   1.  Continue treatment as planned  2.  Continued discussion of nutritional needs with Nutrition re PEG tube  3  Palliative care referral for management of pain  4.  Followup to be arranged by Dr. Sun' team after completion of therapy    Di Mcghee M.D., covering for Akiko Sun M.D.  Department of Radiation Oncology  [facility]

## 2024-01-16 NOTE — PLAN OF CARE
Admitted/transferred from: [facility]  2 RN full   skin assessment completed by Pooja Valdes, RN and Lorri GONZALEZ (WOC RN).  Skin assessment finding: issues found R neck burn    Interventions/actions: WOC consult ordered     Will continue to monitor.

## 2024-01-16 NOTE — PROGRESS NOTES
"        Solid Tumor Oncology Consult Service  Progress Note   Date of Service: 01/16/2024  Patient: Lopez Hogue  MRN: [id number]  Admission Date: 1/15/2024  Hospital Day # 1  Cancer Diagnosis: SCC vallecula and esophagus   Primary Outpatient Oncologist: Dr. Alva  Current Treatment Plan: Carbo/Taxol/RT     Summary & Recommendations:   - Agree with current treatment plan for neutropenic fever and superficial cellulitis.   - Hold off on G-CSF at this time.   - Will not receive next scheduled chemotherapy (1/17) due to ANC/WBC counts.   - Upcoming appointments with RT and Dr. Alva scheduled for next week.   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 Presented to ED for home temperature of 100.6. Admitted for neutropenic fever with a possible source of infection being his neck (mild cellulitis).     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 Biopsy confirmed p16 negative squamous cell carcinoma of R vallecula. PET/CT revealed a hypermetabolic lesion within the mid to distal esophagus. EGD was performed on 11/8 and also showed a squamous cell carcinoma. Seen by Dr. Hill with thoracic surgery. Neoadjuvant chemoradiation followed by laparoscopic staging and jejunostomy, followed by " minimally invasive [place] esophagectomy. EUS on 11/28 to completed the staging process. Partially obstructing and partially circumferential fungating mass in the middle third of the esophagus. Esophageal squamous cell carcinoma was staged T2 N1 (suspicious paraesophageal lymph node). He started chemoradiation with Carbo/Taxol and RT on 12/5/2023. He had an infusion reaction with dose 1 but tolerated rechallenege and other doses well since. Presented to ED 1/15 for home temperature of 100.6 with concern for infection source of mild cellulitis of neck over RT area. Skin break down and mild bleeding noted by medicine team yesterday. Infectious workup negative so far. He is currently on IV vanc/zosyn. Palliative following for pain; appreciate assistance. Continuing daily radiation while inpatient.   - Negative infectious workup thus far. BC, NGTD  - Hold off on G-CSF at this time.   - Will not receive next scheduled chemotherapy (1/17) due to ANC/WBC counts.     --- Please see primary team note for comprehensive A&P ---    Oncologic History:  Per outpatient notes:   Squamous cell carcinoma of vallecula (H)   8/18/2023 Imaging     CT neck:  Question soft tissue lesion within the right vallecula/ventral epiglottis.       10/12/2023 Pathology     R vallecula biopsy:  - NON-KERATINIZING POORLY DIFFERENTIATED SQUAMOUS CELL CARCINOMA  - p16 is negative      10/12/2023 -  Cancer Staged     Staging form: Pharynx - Oropharynx, AJCC 8th Edition  - Clinical stage from 10/12/2023: Stage JUAN MIGUEL (cT1, cN2b, cM0, p16-)      10/13/2023 Imaging     PET/CT:  1. 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     2. FDG avid thickening of the mid to distal esophagus, suspicious for a synchronous primary esophageal neoplasm. Recommend correlation with endoscopy.      11/6/2023 Initial Diagnosis     Squamous cell carcinoma of vallecula (H)      11/8/2023  Pathology     EGD with mid esophageal mass biopsy:  A.  MID ESOPHAGEAL MASS, BIOPSIES:  -Nonkeratinizing poorly differentiated squamous cell carcinoma  -Negative for intestinal metaplasia/Alfredo's type mucosa     PD-L1 CPS 15%                  12/5/2023 -  Chemotherapy     OP ONC Esophageal Cancer - PACLitaxel / CARBOplatin WEEKLY + Radiation  Plan Provider: Yannick Alva MD  Treatment goal: Curative  Line of treatment: First Line      Primary esophageal squamous cell carcinoma (H)   11/17/2023 Initial Diagnosis     Primary esophageal squamous cell carcinoma (H)      12/5/2023 -  Chemotherapy     OP ONC Esophageal Cancer - PACLitaxel / CARBOplatin WEEKLY + Radiation  Plan Provider: Yannick Alva MD  Treatment goal: Curative  Line of treatment: First Line         Recent diagnosis was made after he coughed up some blood on 8/18/23. He presented to an ED where ENT evaluated and was felt to have a lesion of the vallecula. Full workup of this mass is detailed in the oncology history above. Biopsy on 10/12 confirmed an invasive SCC. PET/CT revealed a hypermetabolic lesion within the mid to distal esophagus. EGD was performed on 11/8 and also showed a squamous cell carcinoma. Given the EGD findings, this was felt to be a second primary.      11/21/23 - consult with Dr. Hill, thoracic surgery. Plan for neoadjuvant chemoradiation followed by laparoscopic staging and jejunostomy, followed by minimally invasive [place] esophagectomy.      Had EUS on 11/28 to completed the staging process. Partially obstructing and partially circumferential fungating mass in the middle third of the esophagus. Esophageal squamous cell carcinoma was staged T2 N1 (suspicious paraesophageal lymph node)      12/5/23: Start of chemoradiation with Carbo/Taxol. Infusion reaction with Taxol w/ dose 1. Tolerated rechallenge      Family History: No relevant cancer history       Patient was seen and plan of care was discussed with  "attending physician Dr. Olson.    Thank you for the opportunity to partake in this patient's plan of care. Please do not hesitate to page with questions. We will continue to follow.     I spent >50 minutes face-to-face or coordinating care of Lopez Hogue. Over 50% of our time on the unit was spent counseling the patient and/or coordinating care.    Ana Saleem PA-C  Hematology/Oncology  Pager #[phone]  ___________________________________________________________________    Subjective & Interval History:    No acute events noted overnight. He is sitting comfortably in his bed. He says he is feeling well and eager to discharge. Discussed due to counts will not get next scheduled chemotherapy dose tomorrow. Asks insightful questions.  Planning to continue with RT. Patient expresses agreeance and understanding of current plan. All questions and concerns addressed at this time.     Physical Exam:    Blood pressure (!) 148/63, pulse 76, temperature 98.1  F (36.7  C), temperature source Oral, resp. rate 14, height 1.727 m (5' 8\"), weight 71.4 kg (157 lb 8 oz), SpO2 96%.    General: sitting in bed, no acute distress  CV: RRR, normal S1/S2, no m/r/g  Resp: CTAB, no wheezing/crackles, normal respiratory effort on ambient air  Skin: erythema to R neck and chest. Please see media tab for pictures.   Neuro: Alert and interactive, moves all extremities equally, no focal deficits    Labs & Studies: I personally reviewed the following studies:  ROUTINE LABS (Last four results):  CMP  Recent Labs   Lab 01/16/24  0636 01/15/24  1532 01/15/24  0734 01/10/24  0732     --  134* 138   POTASSIUM 4.0  --  3.8 3.7   CHLORIDE 105  --  100 101   CO2 24  --  25 25   ANIONGAP 9  --  9 12   GLC 96 92 130* 97   BUN 7.1*  --  10.5 12.0   CR 0.73  --  0.80 0.72   GFRESTIMATED >90  --  >90 >90   RAFAELA 8.6*  --  8.3* 8.9   PROTTOTAL  --   --  5.6* 6.5   ALBUMIN  --   --  3.2* 3.6   BILITOTAL  --   --  0.9 0.8   ALKPHOS  --   --  " 61 66   AST  --   --  20 20   ALT  --   --  13 11     CBC  Recent Labs   Lab 01/16/24  0636 01/15/24  0734 01/10/24  0732   WBC 1.3* 1.1* 1.5*   RBC 2.39* 2.28* 2.70*   HGB 7.5* 7.1* 8.5*   HCT 22.0* 20.8* 24.3*   MCV 92 91 90   MCH 31.4 31.1 31.5   MCHC 34.1 34.1 35.0   RDW 16.9* 16.6* 15.0   * 130* 138*     INRNo lab results found in last 7 days.  Medications list for reference:  Current Facility-Administered Medications   Medication    acetaminophen (TYLENOL) tablet 650 mg    Or    acetaminophen (TYLENOL) Suppository 650 mg    aspirin EC tablet 81 mg    benzocaine 20% (HURRICAINE/TOPEX) 20 % spray 0.5-1 mL    [START ON 1/17/2024] buprenorphine (BUTRANS) 10 MCG/HR WK patch 1 patch    And    buprenorphine (BUTRANS) Patch in Place    calcium carbonate (TUMS) chewable tablet 1,000 mg    chlorhexidine (PERIDEX) 0.12 % solution 15 mL    clopidogrel (PLAVIX) tablet 75 mg    cyanocobalamin (VITAMIN B-12) tablet 1,000 mcg    doxepin (SINEquan) 10 MG/ML (HIGH CONC) solution 20 mg    enoxaparin ANTICOAGULANT (LOVENOX) injection 40 mg    famotidine (PEPCID) tablet 20 mg    gabapentin (NEURONTIN) capsule 900 mg    lactated ringers infusion    lidocaine (LMX4) cream    lidocaine 1 % 0.1-1 mL    magic mouthwash suspension (diphenhydramine, lidocaine, aluminum-magnesium & simethicone)    mineral oil-hydrophilic petrolatum (AQUAPHOR)    naloxone (NARCAN) injection 0.2 mg    Or    naloxone (NARCAN) injection 0.4 mg    Or    naloxone (NARCAN) injection 0.2 mg    Or    naloxone (NARCAN) injection 0.4 mg    oxyCODONE (ROXICODONE) solution 5 mg    piperacillin-tazobactam (ZOSYN) 4.5 g vial to attach to  mL bag    polyethylene glycol (MIRALAX) Packet 17 g    prochlorperazine (COMPAZINE) tablet 10 mg    rosuvastatin (CRESTOR) tablet 40 mg    senna-docusate (SENOKOT-S/PERICOLACE) 8.6-50 MG per tablet 1 tablet    Or    senna-docusate (SENOKOT-S/PERICOLACE) 8.6-50 MG per tablet 2 tablet    sodium chloride (PF) 0.9% PF flush 3  mL    sodium chloride (PF) 0.9% PF flush 3 mL     Facility-Administered Medications Ordered in Other Encounters   Medication    iodixanol (VISIPAQUE 320) injection

## 2024-01-16 NOTE — CONSULTS
[facility] Nurse Inpatient Assessment     Consulted for: neck from radiation dermatitis     Per Patient, MD is to order Silvadenbrent taylor, RN aware 1/16/24.  Patient History (according to provider note(s):      Lopez Hogue is a 68 year old male admitted on 1/15/2024. He has a history of head/neck SCC, esophageal cancer, CAD (MI in 03/2023 w/ KAYDEN x2), currently receiving radiation daily M-F and is admitted for febrile neutropenia.      Febrile neutropenia  Squamous cell carcinoma of the vallecula  Poorly differentiated squamous cell carcinoma of the esophagus  Odynophagia  Febrile to 100.6 at home prior to admission, ANC 0.8. No localizing infectious symptoms and R-side of neck does not appear super-infected--seems more consistent with radiation-induced skin changes. Will plan to treat with broad spectrum antibiotics while awaiting results of infectious work up.  - s/p Vanc + Zosyn in ED; continue Zosyn 4.5g q6hr, pharmacy to dose Vancomycin  - Blood cultures x2 pending  - UA non-infectious  - RVP negative  - s/p 1L LR bolus in ED; continue mIVF on admission  - Oncology consult; appreciate input  - Plan to continue daily radiation while admitted   - Pain control: Gabapentin 900mg TID, oxycodone 5mg q6hr PRN, doxepin q4hr PRN, magic mouth wash, benzocaine throat spray     Panctyopenia  Likely in setting of chemoradiation. On admission, Hbg 7.1, Plts 130, WBC 1.1.  - Discuss transfusions with Oncology  CAD s/p PCI w/ KAYDEN x2 03/2023  GERD      Assessment:      Areas visualized during today's visit: Focused: and Neck    Skin Injury Location: neck, almost circumferential      Right lateral    Left lateral   back    Last photo: 1/16/24  Skin injury due to: Burn and radiation burn  Skin history and plan of care:   Skin is reddened almost circumferentially to the neck area and to the upper back with patches of yellow crusting. Patient repots the crusted areas become dry  "overnight and harden slightly. At time of assessment,they are moist.  Per patient report he began Radiation treatments to neck on Dec 4 and will finish approx Jan 23. The rash and redness began appearing approximately 2 weeks after treatment began. He has been applying aquaphor twice a day and as needed and this is providing relief. During radiation treatment 1/16/24, patient reports MD will add Sulf Silvadene cream to EMAR. Discussed use of cream with patient and if unable to tolerate cream then patient may begin reapplying Aquaphor.   Affected area:      Skin assessment: Denudement, Erythema, and Serous crusting     Measurements (length x width x depth, in cm) see media     Color: red     Temperature  warm     Drainage: scant     Color: serous      Odor: none  Pain: mild, tender, burning, and stinging  Pain interventions prior to dressing change: patient tolerated well, no significant pain present , and slow and gentle cares   Treatment goal: Infection control/prevention, Maintain (prevention of deterioration), Protection, and Simplify plan of care  STATUS: initial assessment  Supplies ordered: at bedside, discussed with RN, and discussed with patient    Treatment Plan:     Neck: See MD orders in EMAR for Silvadene cream  Gently cleanse skin to neck area and upper chest with water and a soft cloth, do not scrub or wipe vigorously, dab gently if needed for patient comfort.   Allow area to dry.  Apply per instructions in a thin layer.  Avoid neck wraps or high collared garments/equipment as necessary to avoid additional irritation to the area.    Pressure Injury Prevention (PIP) Plan:  If patient is declining pressure injury prevention interventions: Explore reason why and address patient's concerns, Educate on pressure injury risk and prevention intervention(s), If patient is still declining, document \"informed refusal\" , and Ensure Care team is aware ( provider, charge nurse, etc)  Mattress: Follow bed algorithm, " reassess daily and order specialty mattress, if indicated.  HOB: Maintain at or below 30 degrees, unless contraindicated  Repositioning in bed: Every 1-2 hours , Left/right positioning; avoid supine, Raise foot of bed prior to raising head of bed, to reduce patient sliding down (shear), and Frequent microturns using TAPS wedges, as patient tolerates  Heels: Keep elevated off mattress, Pillows under calves, and Heel lift boots  Protective Dressing: Sacral Mepilex for prevention (#003785),  especially for the agitated patient   Positioning Equipment: None  Chair positioning: Repositions self: patient to shift weight every 15 minutes, Assist patient to reposition hourly, and Do NOT use a donut for sitting (this increases pressure to smaller area and creates a higher potential for injury)    If patient has a buttock pressure injury, or high risk for PI use chair cushion or SPS.  Moisture Management: Perineal cleansing /protection: Follow Incontinence Protocol, Avoid brief in bed, Clean and dry skin folds with bathing , Consider InterDry (#[id number]) between folds, and Moisturize dry skin  Under Devices: Inspect skin under all medical devices during skin inspection , Ensure tubes are stabilized without tension, and Ensure patient is not lying on medical devices or equipment when repositioned  Ask provider to discontinue device when no longer needed.      Orders: Written    RECOMMEND PRIMARY TEAM ORDER: None, at this time  Education provided: importance of repositioning, plan of care, wound progress, Infection prevention , Moisture management, Hygiene, and Off-loading pressure  Discussed plan of care with: Patient  WOC nurse follow-up plan: weekly  Notify WOC if wound(s) deteriorate.  Nursing to notify the Provider(s) and re-consult the WOC Nurse if new skin concern.    DATA:     Current support surface: Standard  Standard gel/foam mattress (IsoFlex, Atmos air, etc)  Containment of urine/stool: Continent of bladder,  Continent of bowel, Incontinence Protocol, and Incontinent pad in bed  BMI: Body mass index is 23.97 kg/m .   Active diet order: Orders Placed This Encounter      Regular Diet Adult     Output: I/O last 3 completed shifts:  In: 1600 [P.O.:600; I.V.:1000]  Out: -      Labs:   Recent Labs   Lab 01/16/24  0636 01/15/24  0734   ALBUMIN  --  3.2*   HGB 7.5* 7.1*   WBC 1.3* 1.1*     Pressure injury risk assessment:   Sensory Perception: 4-->no impairment  Moisture: 4-->rarely moist  Activity: 4-->walks frequently  Mobility: 4-->no limitation  Nutrition: 2-->probably inadequate  Friction and Shear: 3-->no apparent problem  Ricardo Score: 21    Lorri Walker, RN, BSN, CWON    Pager no longer is use, please contact through [employer] group: [facility] Nurse [place]  Dept. Office Number: [phone]

## 2024-01-16 NOTE — MEDICATION SCRIBE - ADMISSION MEDICATION HISTORY
Medication Scribe Admission Medication History    Admission medication history is complete. The information provided in this note is only as accurate as the sources available at the time of the update.    Information Source(s): Patient and CareEverywhere/SureScripts via in-person    Pertinent Information:   -Pt staes he takes his Magic mouthwash when he takes his doxepin    Changes made to PTA medication list:  Added: None  Deleted: Sennosides (5-10 ml), Oxycodone 5 mg/5ml, Fish oil, Dexamethasone 4 mg, Vitamin B-12, Tylenol 160 mg/5 ml  Changed: Sucralfate 1 gm/10 ml (4 times daily) --> Sucralfate 1 gm/10 ml (BID)    Medication Affordability:       Allergies reviewed with patient and updates made in EHR: yes    Medication History Completed By: Melissa Davison 1/15/2024 6:28 PM    Prior to Admission medications    Medication Sig Last Dose Taking? Auth Provider Long Term End Date   acetaminophen (TYLENOL) 500 MG tablet Take 500-1,000 mg by mouth every 8 hours as needed for fever or pain 1/15/2024 at unknown Yes Reported, Patient     aspirin (ASA) 81 MG EC tablet Take 1 tablet (81 mg) by mouth daily Start tomorrow. 1/15/2024 at am Yes Miki Etienne MD     buprenorphine (BUTRANS) 10 MCG/HR WK patch Place 1 patch onto the skin every 7 days 1/10/2024 at unknown Yes Nuno Cazares MD     chlorhexidine (PERIDEX) 0.12 % solution SWISH AND SPIT 15 ML BY MOUTH TWICE DAILY AFTER BRUSHING. NOTHING BY MOUTH FOR 30 MINUTES AFTERWARDS 1/15/2024 at am Yes Reported, Patient     clopidogrel (PLAVIX) 75 MG tablet Take 1 tablet (75 mg) by mouth daily Resume home plavix 10/14 1/15/2024 at am Yes Young Rosenberg MD Yes    doxepin (SINEQUAN) 10 MG/ML (HIGH CONC) solution Take 2 mLs (20 mg) by mouth every 4 hours as needed (. Mix 1:1 with water and swish and swalow. Do not drive for 6 hours afterwards.) 1/15/2024 at unknown Yes Nuno Cazares MD Yes    famotidine (PEPCID) 20 MG tablet Take 1 tablet (20 mg) by  mouth 2 times daily 1/15/2024 at 0400 Yes Joanna Ro APRN CNP     gabapentin (NEURONTIN) 300 MG capsule Take 3 capsules (900 mg) by mouth 3 times daily Follow instructions given in clinic to taper dose up to 900 mg tid 1/15/2024 at 1400 Yes Akiko Sun MD Yes    magic mouthwash suspension, diphenhydrAMINE, lidocaine, aluminum-magnesium & simethicone, (FIRST-MOUTHWASH BLM) compounding kit Take 15 mLs by mouth every 4 hours as needed 1/15/2024 at unknown Yes Reported, Patient     nitroGLYcerin (NITROSTAT) 0.4 MG sublingual tablet PLACE 1 TABLET UNDER THE TONGUE EVERY 5 MINUTES FOR CHEST PAIN FOR 3 DOSES. IF SYMPTOMS PERSIST 5 MINUTES AFTER 1ST DOSE CALL 911. Unknown at unknown Yes Jason Walters MD Yes    prochlorperazine (COMPAZINE) 10 MG tablet Take 1 tablet (10 mg) by mouth every 6 hours as needed for nausea or vomiting 1/15/2024 at am Yes Joanna Ro APRN CNP     rosuvastatin (CRESTOR) 40 MG tablet Take 1 tablet (40 mg) by mouth daily 1/15/2024 at am Yes Guerda Vu APRN CNP Yes    Sennosides (SENNA) 8.8 MG/5ML SYRP Take 10-15 mLs (17.6-26.4 mg) by mouth daily as needed Past Week at unknown Yes Joanna Ro APRN CNP     sertraline (ZOLOFT) 100 MG tablet Take 100 mg by mouth every morning 1/15/2024 at am Yes Reported, Patient Yes    sodium fluoride dental gel (PREVIDENT) 1.1 % GEL topical gel  1/13/2024 at pm Yes Reported, Patient     sucralfate (CARAFATE) 1 GM/10ML suspension Take 1 g by mouth 2 times daily 1/15/2024 at am Yes Reported, Patient

## 2024-01-16 NOTE — PROGRESS NOTES
[facility]    Progress Note - Medicine Service, MILY TEAM 5       Date of Admission:  1/15/2024    Assessment & Plan   Lopez Hogue is a 68 year old male admitted on 1/15/2024. He has a history of head/neck SCC, esophageal cancer, CAD (MI in 03/2023 w/ KAYDEN x2), currently receiving radiation daily M-F and is admitted for febrile neutropenia. Cultures thus far negative and he remains hemodynamically stable.      Today's assessment: Afebrile, no new sx. Continues to have difficulty and pain with swallowing. Infectious work up thus far negative aside from concern for R neck cellulitis.   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 No localizing infectious symptoms and R-side of neck does not appear super-infected--seems more consistent with radiation-induced skin changes. Will plan to treat with broad spectrum antibiotics while awaiting results of infectious work up. Blood cultures NGTD. UA non-infectious. RVP negative.   - s/p Vanc + Zosyn in ED; continue Zosyn 4.5g q6hr, pharmacy to dose Vancomycin  - MRSA nares; will stop Vanco if negative   - s/p 1L LR bolus in ED; continue mIVF on admission  - Oncology consult; appreciate input  - Plan to continue daily radiation while admitted   - Pain control: Gabapentin 900mg TID, oxycodone 5mg q6hr PRN, doxepin q4hr + magic mouth wash, benzocaine throat spray     Panctyopenia  Likely in setting of chemoradiation. On admission, Hbg 7.1, Plts 130, WBC 1.1.  - CBC w/ diff in AM  - Discuss transfusions with Oncology     CAD  s/p PCI w/ KAYDEN x2 03/2023  - continue pta ASA, plavix  - continue pta rosuvastatin      GERD  - continue pta pepcid            Diet:  Regular diet  DVT Prophylaxis: Enoxaparin (Lovenox) SQ  Smith Catheter: Not present  Fluids: LR @ 50mL/hr  Lines: None     Cardiac Monitoring: None  Code Status: Full Code      Clinically Significant Risk Factors Present on Admission          # Hypocalcemia: Lowest Ca = 8.3 mg/dL in last 2 days, will monitor and replace as appropriate     # Hypoalbuminemia: Lowest albumin = 3.2 g/dL at 1/15/2024  7:34 AM, will monitor as appropriate   # Drug Induced Platelet Defect: home medication list includes an antiplatelet medication   # Hypertension: Noted on problem list                 Disposition Plan     Expected Discharge Date: 01/17/2024                The patient's care was discussed with the Attending Physician, Dr. Colunga, Bedside Nurse, Patient, and Patient's Family.    Leny Capps MD  Medicine Service, [address]  Securely message with Vocera (more info)  Text page via [facility] Paging/Directory   See signed in provider for up to date coverage information  ______________________________________________________________________    Interval History   NAOE. No new sx. Continues to have pain with swallowing. No chest pain, SOB, no new sx.     Physical Exam   Vital Signs: Temp: 98.4  F (36.9  C) Temp src: Oral BP: 129/70 Pulse: 86   Resp: 16 SpO2: 93 % O2 Device: None (Room air)    Weight: 153 lbs 0 oz    General Appearance: Well appearing, NAD, sitting comfortably in bed  Respiratory: No respiratory distress, lungs CTAB  Cardiovascular: Warm, well perfused.   GI: Soft, NT, ND  Skin: Please see images in media tab of R neck  Extremities: No LE edema.  Neuro: Moving all extremities.     Medical Decision Making       Please see A&P for additional details of medical decision making.      Data     I have personally reviewed the  following data over the past 24 hrs:    1.3 (L)  \   7.5 (L)   / 139 (L)     138 105 7.1 (L) /  96   4.0 24 0.73 \     ALT: 13 AST: 20 AP: 61 TBILI: 0.9   ALB: 3.2 (L) TOT PROTEIN: 5.6 (L) LIPASE: N/A     Procal: N/A CRP: N/A Lactic Acid: 0.7         Imaging results reviewed over the past 24 hrs:   No results found for this or any previous visit (from the past 24 hour(s)).

## 2024-01-16 NOTE — LETTER
2024         RE: Lopez Hogue  [address] [facility] [address]        Dear Colleague,    Thank you for referring your patient, Lopez Hogue, to the [facility] RADIATION ONCOLOGY. Please see a copy of my visit note below.    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 Hogue presents to clinic today for his weekly on-treatment visit. He is presently admitted for suspected infection in the setting of neutropenic fever. He has punctate areas of purulent drainage at the neck. He is receiving antibiotics. He continues with pain with swallowing.     Nursing ROS:   Nutrition Alteration  Diet Type: Patient's Preference  Skin  Skin Reaction: 3 - Moist desquamation in areas other than skin folds and creases, bleeding induced by minor trauma or abrasion  Skin Progress: Aquaphor     ENT and Mouth Exam  Mucositis - Current: 1 - Generalized erythema  ENT/Mouth Note: Mouth pain/S&S 15     Gastrointestinal  Nausea: 0 - None     Psychosocial  Mood - Anxiety: 0 - Normal  Pain Assessment  0-10 Pain Scale: 2  Pain Treatment: Gabapentin 900 mg TID  Pain Note: Butrans        PEG Tube: No, if needed will be a jejunostomy tube  Electronic Cardiac Implant: No    Objective:   There were no vitals taken for this visit., initial weight Wt 79.8 kg (176 lb)   Gen: Appears well, fatigued  HEENT: Diffuse, moderate erythema involving the oropharynx and posterior soft palate, no thrush  Skin: Brisk erythema moist desquamation and punctate purulent areas on neck    CBC RESULTS:   Recent Labs   Lab Test 01/03/24  0750   WBC 2.0*   RBC 3.04*   HGB 9.5*   HCT 27.7*   MCV 91   MCH 31.3   MCHC 34.3   RDW 14.1           Lab Results   Component Value Date     01/16/2024    POTASSIUM 4.0 01/16/2024    CHLORIDE 105 01/16/2024    CO2 24 01/16/2024    GLC 96 01/16/2024     Lab Results   Component Value Date    AST 20 01/15/2024    ALT 13 01/15/2024    ALKPHOS 61 01/15/2024    BILITOTAL 0.9 01/15/2024     Magnesium   Date Value Ref Range Status   11/30/2023 1.7 1.7 - 2.3 mg/dL Final       Treatment-related toxicities (CTCAE v5.0):  Anorexia: Grade 3: Associated with significant weight loss or malnutrition; tube feeding or TPN indicated  Fatigue: Grade 2: Fatigue not relieved by rest; limiting instrumental ADL  Nausea: Grade 0: No toxicity  Pain: Grade 2: Moderate pain; limiting instrumental ADL  Dry mouth: Grade 1: Symptomatic without significant dietary alteration; unstimulated saliva flow >0.2 mL/min  Dysphagia: Grade 2: Symptomatic and altered eating/swallowing  Mucositis: Grade 2: Moderate pain; not interfering with oral intake; modified diet indicated  Dyspnea: Grade 0: No toxicity  Esophagitis: Grade 2: Symptomatic; altered eating/swallowing; oral supplements indicated  Dermatitis: Grade 3    ED visits/Hospitalizations: presently admitted    Missed Treatments: None    Mosaiq chart and setup information reviewed  IGRT images reviewed    Medication Review  Med list reviewed with patient?: Yes  Med list printed and given: Offered and declined    Assessment:    Mr. Hogue is a 68 year old male  with synchronous primaries of a squamous cell carcinoma of the vallecula p16 negative, clinical stage Q2D4TB6 (stage JUAN MIGUEL) and a squamous cell carcinoma of the middle third of esophagus, poorly differentiated, clinical stage T2 N1 (suspicious paraesophageal lymph node level 8L) M0 (stage II). He is admitted and feeling better on antibiotics. We have recommended silvadene to the neck; this was conveyed via our nursing team yesterday.    Plan:   1.  Continue treatment as planned  2.  Continued discussion of nutritional needs with Nutrition re PEG tube  3  Palliative care referral for management of pain  4.  Followup to be arranged by Dr. Sun' team after completion of therapy    Di Mcghee M.D., covering for Akiko Sun M.D.  Department of Radiation Oncology  [facility]                  Again, thank you for allowing me to participate in the care of your patient.        Sincerely,        Di Mcghee
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
## 2024-01-16 NOTE — PROGRESS NOTES
"Vital signs:  Temp: 97.3  F (36.3  C) Temp src: Axillary BP: (!) 148/77 Pulse: 97   Resp: 16 SpO2: 97 % O2 Device: None (Room air)   Height: 172.7 cm (5' 8\") Weight: 69.4 kg (153 lb)  Estimated body mass index is 23.26 kg/m  as calculated from the following:    Height as of this encounter: 1.727 m (5' 8\").    Weight as of this encounter: 69.4 kg (153 lb).     Patient AOx4 and independent for cares and bathroom.   Walks to bathroom and does not use walker or cane.  Patient had radiation treatment today after admission to ED and sepsis workup.  L PIV running LR at 50ml/hr and last BG 92.  Patient to receive treatments and IV antibiotics while here as previously planned.    Due to sore throat patient likes to take magic mouth wash and doxepin prior to oral medications.    "

## 2024-01-16 NOTE — PLAN OF CARE
Admit from [facility] this morning related to cellulitis of neck. Blood cultures pending, continues on IV abx.   WOC assessed patient, pending order for Silvadene cream. Pt applying Aquaphor PRN to keep site comfortable.  Using Magic mouthwash scheduled q4h to manage pain in swallowing and promote better nutrition adherence. Buprenorphine patch to L arm for pain control, pending exchange tomorrow. Tylenol more effective than oxycodone, per pt.   Nutrition consulted, provided multiple supplement drinks to patient to try. Calorie counts in place, pt educated on appropriate documentation and assisting with this as he consumes drinks.  Showered. Up independently in room and halls.

## 2024-01-17 ENCOUNTER — APPOINTMENT (OUTPATIENT)
Dept: RADIATION ONCOLOGY | Facility: CLINIC | Age: 69
End: 2024-01-17
Attending: RADIOLOGY
Payer: COMMERCIAL

## 2024-01-17 VITALS
SYSTOLIC BLOOD PRESSURE: 103 MMHG | HEART RATE: 94 BPM | DIASTOLIC BLOOD PRESSURE: 68 MMHG | RESPIRATION RATE: 16 BRPM | HEIGHT: 68 IN | WEIGHT: 157.5 LBS | TEMPERATURE: 99.4 F | BODY MASS INDEX: 23.87 KG/M2 | OXYGEN SATURATION: 93 %

## 2024-01-17 LAB
ABO/RH(D): NORMAL
ACANTHOCYTES BLD QL SMEAR: NORMAL
ANION GAP SERPL CALCULATED.3IONS-SCNC: 6 MMOL/L (ref 7–15)
ANTIBODY SCREEN: NEGATIVE
AUER BODIES BLD QL SMEAR: NORMAL
BASO STIPL BLD QL SMEAR: NORMAL
BASOPHILS # BLD AUTO: 0 10E3/UL (ref 0–0.2)
BASOPHILS NFR BLD AUTO: 0 %
BITE CELLS BLD QL SMEAR: NORMAL
BLD PROD TYP BPU: NORMAL
BLISTER CELLS BLD QL SMEAR: NORMAL
BLOOD COMPONENT TYPE: NORMAL
BUN SERPL-MCNC: 7.8 MG/DL (ref 8–23)
BURR CELLS BLD QL SMEAR: NORMAL
CALCIUM SERPL-MCNC: 8.5 MG/DL (ref 8.8–10.2)
CHLORIDE SERPL-SCNC: 107 MMOL/L (ref 98–107)
CODING SYSTEM: NORMAL
CREAT SERPL-MCNC: 0.8 MG/DL (ref 0.67–1.17)
CROSSMATCH: NORMAL
DACRYOCYTES BLD QL SMEAR: NORMAL
DEPRECATED HCO3 PLAS-SCNC: 27 MMOL/L (ref 22–29)
EGFRCR SERPLBLD CKD-EPI 2021: >90 ML/MIN/1.73M2
ELLIPTOCYTES BLD QL SMEAR: NORMAL
EOSINOPHIL # BLD AUTO: 0 10E3/UL (ref 0–0.7)
EOSINOPHIL NFR BLD AUTO: 0 %
ERYTHROCYTE [DISTWIDTH] IN BLOOD BY AUTOMATED COUNT: 17.1 % (ref 10–15)
FRAGMENTS BLD QL SMEAR: NORMAL
GLUCOSE SERPL-MCNC: 100 MG/DL (ref 70–99)
HCT VFR BLD AUTO: 18.8 % (ref 40–53)
HGB BLD-MCNC: 6.2 G/DL (ref 13.3–17.7)
HGB BLD-MCNC: 8.3 G/DL (ref 13.3–17.7)
HGB C CRYSTALS: NORMAL
HOWELL-JOLLY BOD BLD QL SMEAR: NORMAL
IMM GRANULOCYTES # BLD: 0 10E3/UL
IMM GRANULOCYTES NFR BLD: 2 %
ISSUE DATE AND TIME: NORMAL
LYMPHOCYTES # BLD AUTO: 0.2 10E3/UL (ref 0.8–5.3)
LYMPHOCYTES NFR BLD AUTO: 13 %
MCH RBC QN AUTO: 31 PG (ref 26.5–33)
MCHC RBC AUTO-ENTMCNC: 33 G/DL (ref 31.5–36.5)
MCV RBC AUTO: 94 FL (ref 78–100)
MONOCYTES # BLD AUTO: 0.3 10E3/UL (ref 0–1.3)
MONOCYTES NFR BLD AUTO: 21 %
NEUTROPHILS # BLD AUTO: 0.8 10E3/UL (ref 1.6–8.3)
NEUTROPHILS NFR BLD AUTO: 64 %
NEUTS HYPERSEG BLD QL SMEAR: NORMAL
NRBC # BLD AUTO: 0 10E3/UL
NRBC BLD AUTO-RTO: 0 /100
PLAT MORPH BLD: NORMAL
PLATELET # BLD AUTO: 132 10E3/UL (ref 150–450)
POLYCHROMASIA BLD QL SMEAR: NORMAL
POTASSIUM SERPL-SCNC: 3.7 MMOL/L (ref 3.4–5.3)
RBC # BLD AUTO: 2 10E6/UL (ref 4.4–5.9)
RBC AGGLUT BLD QL: NORMAL
RBC MORPH BLD: NORMAL
ROULEAUX BLD QL SMEAR: NORMAL
SICKLE CELLS BLD QL SMEAR: NORMAL
SMUDGE CELLS BLD QL SMEAR: NORMAL
SODIUM SERPL-SCNC: 140 MMOL/L (ref 135–145)
SPECIMEN EXPIRATION DATE: NORMAL
SPHEROCYTES BLD QL SMEAR: NORMAL
STOMATOCYTES BLD QL SMEAR: NORMAL
TARGETS BLD QL SMEAR: NORMAL
TOXIC GRANULES BLD QL SMEAR: NORMAL
UNIT ABO/RH: NORMAL
UNIT NUMBER: NORMAL
UNIT STATUS: NORMAL
UNIT TYPE ISBT: 5100
VARIANT LYMPHS BLD QL SMEAR: NORMAL
WBC # BLD AUTO: 1.2 10E3/UL (ref 4–11)

## 2024-01-17 PROCEDURE — 80048 BASIC METABOLIC PNL TOTAL CA: CPT

## 2024-01-17 PROCEDURE — 99239 HOSP IP/OBS DSCHRG MGMT >30: CPT | Mod: GC | Performed by: INTERNAL MEDICINE

## 2024-01-17 PROCEDURE — 86923 COMPATIBILITY TEST ELECTRIC: CPT

## 2024-01-17 PROCEDURE — 85018 HEMOGLOBIN: CPT | Performed by: INTERNAL MEDICINE

## 2024-01-17 PROCEDURE — 250N000011 HC RX IP 250 OP 636

## 2024-01-17 PROCEDURE — 77386 HC IMRT TREATMENT DELIVERY, COMPLEX: CPT | Performed by: RADIOLOGY

## 2024-01-17 PROCEDURE — 250N000013 HC RX MED GY IP 250 OP 250 PS 637

## 2024-01-17 PROCEDURE — 36415 COLL VENOUS BLD VENIPUNCTURE: CPT

## 2024-01-17 PROCEDURE — 250N000013 HC RX MED GY IP 250 OP 250 PS 637: Performed by: PEDIATRICS

## 2024-01-17 PROCEDURE — 86900 BLOOD TYPING SEROLOGIC ABO: CPT

## 2024-01-17 PROCEDURE — 85025 COMPLETE CBC W/AUTO DIFF WBC: CPT

## 2024-01-17 PROCEDURE — P9016 RBC LEUKOCYTES REDUCED: HCPCS

## 2024-01-17 PROCEDURE — 36415 COLL VENOUS BLD VENIPUNCTURE: CPT | Performed by: INTERNAL MEDICINE

## 2024-01-17 RX ORDER — OXYCODONE HCL 5 MG/5 ML
5 SOLUTION, ORAL ORAL EVERY 4 HOURS PRN
COMMUNITY
Start: 2024-01-17 | End: 2024-03-06

## 2024-01-17 RX ORDER — LEVOFLOXACIN 750 MG/1
750 TABLET, FILM COATED ORAL DAILY
Qty: 3 TABLET | Refills: 0 | Status: SHIPPED | OUTPATIENT
Start: 2024-01-18 | End: 2024-01-23

## 2024-01-17 RX ORDER — LANOLIN ALCOHOL/MO/W.PET/CERES
1000 CREAM (GRAM) TOPICAL EVERY EVENING
Qty: 30 TABLET | Refills: 0 | Status: ON HOLD | OUTPATIENT
Start: 2024-01-17 | End: 2024-04-20

## 2024-01-17 RX ORDER — LEVOFLOXACIN 750 MG/1
750 TABLET, FILM COATED ORAL DAILY
Status: DISCONTINUED | OUTPATIENT
Start: 2024-01-17 | End: 2024-01-17 | Stop reason: HOSPADM

## 2024-01-17 RX ADMIN — GABAPENTIN 900 MG: 300 CAPSULE ORAL at 06:31

## 2024-01-17 RX ADMIN — ROSUVASTATIN CALCIUM 40 MG: 10 TABLET, FILM COATED ORAL at 08:25

## 2024-01-17 RX ADMIN — DOXEPIN HYDROCHLORIDE 20 MG: 10 SOLUTION ORAL at 08:25

## 2024-01-17 RX ADMIN — PIPERACILLIN SODIUM AND TAZOBACTAM SODIUM 4.5 G: 4; .5 INJECTION, POWDER, LYOPHILIZED, FOR SOLUTION INTRAVENOUS at 06:31

## 2024-01-17 RX ADMIN — DIPHENHYDRAMINE HYDROCHLORIDE AND LIDOCAINE HYDROCHLORIDE AND ALUMINUM HYDROXIDE AND MAGNESIUM HYDRO 15 ML: KIT at 13:21

## 2024-01-17 RX ADMIN — SILVER SULFADIAZINE: 10 CREAM TOPICAL at 08:27

## 2024-01-17 RX ADMIN — DIPHENHYDRAMINE HYDROCHLORIDE AND LIDOCAINE HYDROCHLORIDE AND ALUMINUM HYDROXIDE AND MAGNESIUM HYDRO 15 ML: KIT at 04:15

## 2024-01-17 RX ADMIN — DOXEPIN HYDROCHLORIDE 20 MG: 10 SOLUTION ORAL at 01:18

## 2024-01-17 RX ADMIN — BUPRENORPHINE 1 PATCH: 10 PATCH, EXTENDED RELEASE TRANSDERMAL at 08:23

## 2024-01-17 RX ADMIN — CLOPIDOGREL BISULFATE 75 MG: 75 TABLET ORAL at 08:25

## 2024-01-17 RX ADMIN — ACETAMINOPHEN 650 MG: 325 TABLET, FILM COATED ORAL at 00:28

## 2024-01-17 RX ADMIN — ENOXAPARIN SODIUM 40 MG: 40 INJECTION SUBCUTANEOUS at 13:20

## 2024-01-17 RX ADMIN — CHLORHEXIDINE GLUCONATE 15 ML: 1.2 SOLUTION ORAL at 06:32

## 2024-01-17 RX ADMIN — DOXEPIN HYDROCHLORIDE 20 MG: 10 SOLUTION ORAL at 13:20

## 2024-01-17 RX ADMIN — DIPHENHYDRAMINE HYDROCHLORIDE AND LIDOCAINE HYDROCHLORIDE AND ALUMINUM HYDROXIDE AND MAGNESIUM HYDRO 15 ML: KIT at 08:25

## 2024-01-17 RX ADMIN — LEVOFLOXACIN 750 MG: 750 TABLET, FILM COATED ORAL at 10:41

## 2024-01-17 RX ADMIN — PIPERACILLIN SODIUM AND TAZOBACTAM SODIUM 4.5 G: 4; .5 INJECTION, POWDER, LYOPHILIZED, FOR SOLUTION INTRAVENOUS at 01:15

## 2024-01-17 RX ADMIN — FAMOTIDINE 20 MG: 20 TABLET ORAL at 06:31

## 2024-01-17 RX ADMIN — DOXEPIN HYDROCHLORIDE 20 MG: 10 SOLUTION ORAL at 04:15

## 2024-01-17 RX ADMIN — ASPIRIN 81 MG: 81 TABLET ORAL at 08:25

## 2024-01-17 RX ADMIN — DIPHENHYDRAMINE HYDROCHLORIDE AND LIDOCAINE HYDROCHLORIDE AND ALUMINUM HYDROXIDE AND MAGNESIUM HYDRO 15 ML: KIT at 01:25

## 2024-01-17 RX ADMIN — GABAPENTIN 900 MG: 300 CAPSULE ORAL at 13:20

## 2024-01-17 RX ADMIN — WHITE PETROLATUM: 1.75 OINTMENT TOPICAL at 04:14

## 2024-01-17 ASSESSMENT — ACTIVITIES OF DAILY LIVING (ADL)
ADLS_ACUITY_SCORE: 25
DEPENDENT_IADLS:: INDEPENDENT
ADLS_ACUITY_SCORE: 25

## 2024-01-17 NOTE — PLAN OF CARE
"Goal Outcome Evaluation:           Overall Patient Progress: no changeOverall Patient Progress: no change    Outcome Evaluation: Assumed cares 5149-2375.  Pt sleeping most of shift.  Silver sulfadiazine cream applied to affected area on neck.   Magic mouthwash given for throat pain. Not interested in drinking any of the supplemental shakes this evening, stated \"he just wanted to sleep\".   L PIV tko.        "

## 2024-01-17 NOTE — PLAN OF CARE
Problem: Adult Inpatient Plan of Care  Goal: Plan of Care Review  Description: The Plan of Care Review/Shift note should be completed every shift.  The Outcome Evaluation is a brief statement about your assessment that the patient is improving, declining, or no change.  This information will be displayed automatically on your shift  note.  Flowsheets (Taken 1/17/2024 2119)  Outcome Evaluation:   Pt meets GOC   medically ready for d/c to home   no new needs   family available for transportation     Plan of Care Reviewed With: patient  Overall Patient Progress: improving

## 2024-01-17 NOTE — PLAN OF CARE
Goal Outcome Evaluation:      Plan of Care Reviewed With: patient, spouse    Overall Patient Progress: no changeOverall Patient Progress: no change    Outcome Evaluation: Goal for PO intake is 5 cartons of ensure plus daily to avoid FT placement

## 2024-01-17 NOTE — PROGRESS NOTES
Calorie Count  Intake recorded for: 1/16  Total Kcals: 317 Total Protein: 4g  Kcals from Hospital Food: 317  Protein: 4g  Kcals from Outside Food (average):0 Protein: 0g  # Meals Ordered from Kitchen: 1 meal   # Meals Recorded: 1 meal (First - 100% apple juice, 50% mashed potatoes w/ extra gravy, sherbet)  # Supplements Recorded: 25% 1 Magic Cup

## 2024-01-17 NOTE — DISCHARGE SUMMARY
"[facility]  Discharge Summary - Medicine & Pediatrics       Date of Admission:  1/15/2024  Date of Discharge:  1/17/2024  Discharging Provider: Dr. 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  No follow up labs or test are needed.    Please keep all previously scheduled follow up with your specialty teams,   including your Oncology team.      Appointments on [place] and/or [facility] (with [facility] or [facility]   provider or service). Call [phone] if you haven't heard regarding   these appointments within 7 days of discharge.            Unresulted Labs Ordered in the Past 30 Days of this Admission       Date and Time Order Name Status Description    1/15/2024 10:57 AM Blood Culture Hand, Left Preliminary     1/15/2024  7:04 AM Blood Culture Hand, Left Preliminary     1/15/2024  7:04 AM Blood Culture Arm, Left Preliminary           Discharge Disposition   Discharged to home  Condition at discharge: Stable    Hospital Course   Per HPI 1/15/2024:  \"Lopez Hogue is a 68 year old male who has a history of head/neck SCC, esophageal cancer, CAD (MI in 03/2023 w/ KAYDEN x2) and is admitted for fever. Started radiation on December 4, 2023. Has been feeling OK since then until about 2 days ago when his throat pain became so severe that he was unable to even swallow water. Has been taking in mostly liquids but has taken almost nothing in " "the past 2 days. Developed chills yesterday and took his temp this morning and it was elevated to 100.6. Called Oncology clinic and he was told to come to the ED. No congestion, vision changes, hearing changes, cough, difficulty breathing, chest pain, abdominal pain, nausea, vomiting. Has intermittent diarrhea but this is not new. Did have one episode of dysuria last evening but not since then. No melena, hematemesis, hematochezia. R side of neck has gotten more red over the past week. Did have one spot of bleeding yesterday which has not happened previously. 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 Blood cultures with no growth. UA non-infectious. RVP negative. R-side of neck with possible cellulitis vs radiation induced skin changes. Was started on Vancomycin and Zosyn on admission. After 48hr rule-out, transitioned to levofloxacin to provide broad coverage for possible infection. Oncology was also consulted during hospitalization and agreed with management.   - Continue Levofloxacin 750mg daily through 1/20/2024  - Pain control: Gabapentin 900mg TID, oxycodone 5mg q6hr PRN, doxepin q4hr + magic mouth wash, benzocaine throat spray      Panctyopenia  Likely in setting of chemoradiation. On admission, Hbg 7.1, Plts 130, WBC 1.1. Hemoglobin did drop to 6.2 onn 1/17, for which he received a transfusion of 1U pRBCs. Hbg improved to 8.3 after transfusion.     CAD s/p PCI w/ KAYDEN x2 03/2023  - continue pta ASA, plavix  - continue pta rosuvastatin      GERD  - continue pta pepcid    Consultations This Hospital Stay   ONCOLOGY ADULT IP CONSULT  PHARMACY TO DOSE VANCO  RADIATION ONCOLOGY IP CONSULT  NUTRITION SERVICES ADULT IP CONSULT  WOUND OSTOMY " CONTINENCE NURSE  IP CONSULT  CARE MANAGEMENT / SOCIAL WORK IP CONSULT    Code Status   Prior       The patient was discussed with Dr. Colunga.    Leny Capps MD  [facility] 7C MED SURG  [address]  [place] [address]  Phone: [phone]  ______________________________________________________________________    Physical Exam   Vital Signs:                    Weight: 157 lbs 8 oz  General Appearance:  Well appearing, NAD, sitting comfortably in bed  Respiratory: No respiratory distress, lungs CTAB  Cardiovascular: Warm, well perfused.   GI: Soft, NT, ND  Skin: Erythema of R neck was largely stable throughout admission. No areas of swelling or drainage. Some dried aquaphor over top.   Extremities: No LE edema.  Neuro: Alert and oriented. Moving all extremities.       Primary Care Physician   Madi Ramos    Discharge Orders      Reason for your hospital stay    You were hospitalized for a fever and an infectious evaluation. The infection most likely causing your fever was a skin infection of your neck.     Activity    Your activity upon discharge: activity as tolerated     Adult [facility] Follow-up and recommended labs and tests    Follow up with primary care provider, Madi Ramos, within 7 days for hospital follow- up.  No follow up labs or test are needed.    Please keep all previously scheduled follow up with your specialty teams, including your Oncology team.      Appointments on [place] and/or [facility] (with [facility] or [facility] provider or service). Call [phone] if you haven't heard regarding these appointments within 7 days of discharge.     Discharge Instructions    Dear Lopez Hogue,    Your were hospitalized at [facility] with fever and treated with antibiotics. You received IV antibiotics and were transitioned to oral antibiotics prior to discharge.  Over your hospitalization your condition improved and today you are ready to be  discharged 1/17/2024.  You should continue to improve but if you develop fever, shortness of breath, chest pain, new masses or swelling of your neck, significant neck pain with movement, headaches or disorientation or difficulty with balance, please seek medical attention.    We are suggesting the following medication changes:  - Continue to take the antibiotic levofloxacin 750mg daily for three more days (last day will be 1/20/2024)    It was a pleasure meeting with you today. Thank you for allowing me and my team the privilege of caring for you during your hospitalization. You are the reason we are here, and I truly hope we provided you with the excellent service you deserve. Please let us know if there is anything else we can do for you so that we can be sure you are leaving completely satisfied with your care experience.    Sincerely,    Leny Capps MD  Internal Medicine/Pediatrics  [facility]     Diet    Follow this diet upon discharge: Orders Placed This Encounter      Snacks/Supplements Adult: Magic Cup; With Meals      Calorie Counts      Regular Diet Adult       Significant Results and Procedures   Results for orders placed or performed during the hospital encounter of 11/30/23   XR Chest Port 1 View    Narrative    EXAM: XR CHEST PORT 1 VIEW  LOCATION: [facility]  DATE: 11/30/2023    INDICATION: Chest pain, recent endoscopy.  COMPARISON: CT chest, abdomen and pelvis on 10/13/2023.      Impression    IMPRESSION: AP views of the chest were obtained. Cardiomediastinal silhouette is within normal limits. Atherosclerotic vascular calcification of the aortic knob. No suspicious focal pulmonary opacities. No significant pleural effusion or pneumothorax.   Old posterior right rib fracture.                     Discharge Medications   Discharge Medication List as of 1/17/2024  2:35 PM        START taking these medications    Details   levofloxacin (LEVAQUIN) 750 MG tablet  Take 1 tablet (750 mg) by mouth daily for 3 days, Disp-3 tablet, R-0, E-Prescribe           CONTINUE these medications which have CHANGED    Details   cyanocobalamin (VITAMIN B-12) 1000 MCG tablet Take 1 tablet (1,000 mcg) by mouth every evening, Disp-30 tablet, R-0, E-Prescribe      magic mouthwash (ENTER INGREDIENTS IN COMMENTS) suspension Swish and swallow 15 ml every 4 hours prn, Disp-500 mL, R-3, Local Print      oxyCODONE (ROXICODONE) 5 MG/5ML solution Take 5 mLs (5 mg) by mouth every 4 hours as needed for severe pain, Historical           CONTINUE these medications which have NOT CHANGED    Details   acetaminophen (TYLENOL) 500 MG tablet Take 500-1,000 mg by mouth every 8 hours as needed for fever or pain, Historical      aspirin (ASA) 81 MG EC tablet Take 1 tablet (81 mg) by mouth daily Start tomorrow., Disp-30 tablet, R-3, E-Prescribe      buprenorphine (BUTRANS) 10 MCG/HR WK patch Place 1 patch onto the skin every 7 days, Disp-4 patch, R-0, E-Prescribe      chlorhexidine (PERIDEX) 0.12 % solution SWISH AND SPIT 15 ML BY MOUTH TWICE DAILY AFTER BRUSHING. NOTHING BY MOUTH FOR 30 MINUTES AFTERWARDS, Historical      clopidogrel (PLAVIX) 75 MG tablet Take 1 tablet (75 mg) by mouth daily Resume home plavix 10/14, No Print Out      doxepin (SINEQUAN) 10 MG/ML (HIGH CONC) solution Take 2 mLs (20 mg) by mouth every 4 hours as needed (. Mix 1:1 with water and swish and swalow. Do not drive for 6 hours afterwards.), Disp-118 mL, R-0, E-Prescribe      famotidine (PEPCID) 20 MG tablet Take 1 tablet (20 mg) by mouth 2 times daily, Disp-60 tablet, R-3, E-Prescribe      gabapentin (NEURONTIN) 300 MG capsule Take 3 capsules (900 mg) by mouth 3 times daily Follow instructions given in clinic to taper dose up to 900 mg tid, Disp-270 capsule, R-4, E-Prescribe      magic mouthwash suspension, diphenhydrAMINE, lidocaine, aluminum-magnesium & simethicone, (FIRST-MOUTHWASH BLM) compounding kit Take 15 mLs by mouth every 4 hours  as needed, Historical      nitroGLYcerin (NITROSTAT) 0.4 MG sublingual tablet PLACE 1 TABLET UNDER THE TONGUE EVERY 5 MINUTES FOR CHEST PAIN FOR 3 DOSES. IF SYMPTOMS PERSIST 5 MINUTES AFTER 1ST DOSE CALL 911., Disp-25 tablet, R-1, E-Prescribe      prochlorperazine (COMPAZINE) 10 MG tablet Take 1 tablet (10 mg) by mouth every 6 hours as needed for nausea or vomiting, Disp-30 tablet, R-2, E-Prescribe      rosuvastatin (CRESTOR) 40 MG tablet Take 1 tablet (40 mg) by mouth daily, Disp-90 tablet, R-3, E-Prescribe      Sennosides (SENNA) 8.8 MG/5ML SYRP Take 10-15 mLs (17.6-26.4 mg) by mouth daily as needed, Disp-236 mL, R-1, E-Prescribe      sertraline (ZOLOFT) 100 MG tablet Take 100 mg by mouth every morning, Historical      sodium fluoride dental gel (PREVIDENT) 1.1 % GEL topical gel Historical      sucralfate (CARAFATE) 1 GM/10ML suspension Take 1 g by mouth 2 times daily, Historical           Allergies   Allergies   Allergen Reactions    Coconut Flavor Anaphylaxis     Raw coconut

## 2024-01-17 NOTE — PLAN OF CARE
Goal Outcome Evaluation: Ongoing, progressing    Plan of Care Reviewed With: patient    Overall Patient Progress: no change    Outcome Evaluation: ([id number]) Critical lab: Hgb 6.2, MD notified at 0720. Aquaphor applied to neck. Pt is alert and oriented and able to make needs known. Slept well between cares. Continue to monitor.

## 2024-01-17 NOTE — CONSULTS
Care Management Initial Consult    General Information  Assessment completed with: Patient,    Type of CM/SW Visit: Initial Assessment  Primary Care Provider verified and updated as needed: Yes   Readmission within the last 30 days: no previous admission in last 30 days   Advance Care Planning, Reviewed: no concerns identified        Communication Assessment  Patient's communication style: spoken language (English or Bilingual)    Hearing Difficulty or Deaf: no   Wear Glasses or Blind: yes    Cognitive  Cognitive/Neuro/Behavioral: WDL                      Living Environment:   People in home: spouse     Current living Arrangements: house      Able to return to prior arrangements: yes     Family/Social Support:  Care provided by: self  Provides care for: no one  Marital Status:   Support System:Wife, Children          Description of Support System: Supportive, Involved       Current Resources:   Patient receiving home care services: No  Community Resources: None  Equipment currently used at home: none  Supplies currently used at home: None    Employment/Financial:  Employment Status: employed full-time     Financial Concerns: none   Referral to Financial Worker: No  Does the patient's insurance plan have a 3 day qualifying hospital stay waiver?  No    Lifestyle & Psychosocial Needs:  Social Determinants of Health     Food Insecurity: Not on file   Depression: Not at risk (9/5/2023)    PHQ-2     PHQ-2 Score: 0   Housing Stability: Not on file   Tobacco Use: Medium Risk (1/5/2024)    Patient History     Smoking Tobacco Use: Former     Smokeless Tobacco Use: Never     Passive Exposure: Never   Financial Resource Strain: Not on file   Alcohol Use: Not on file   Transportation Needs: Not on file   Physical Activity: Not on file   Interpersonal Safety: Not on file   Stress: Not on file   Social Connections: Not on file       Functional Status:  Prior to admission patient needed assistance:   Dependent ADLs::  Independent  Dependent IADLs:: Independent     Mental Health Status:  Mental Health Status: No Current Concerns       Chemical Dependency Status:  Chemical Dependency Status: No Current Concerns           Values/Beliefs:  Spiritual, Cultural Beliefs, Latter day Practices, Values that affect care: no               Care Management Discharge Note    Discharge Date: 01/17/2024  Discharge Disposition: Home  Discharge Services: None  Discharge DME: None  Discharge Transportation:  Family  Private pay costs discussed: Not applicable  Does the patient's insurance plan have a 3 day qualifying hospital stay waiver?  No  PAS Confirmation Code:  n/a  Patient/family educated on Medicare website which has current facility and service quality ratings:  n/a  Education Provided on the Discharge Plan:  yes  Persons Notified of Discharge Plans: Pt; Charge RN; Bedside RN; Medicine team  Patient/Family in Agreement with the Plan:  yes    Handoff Referral Completed: Yes EHO    Additional Information:  CMA completed at bedside as indicated by an elevated readmission risk score. Pt confirmed listed address, PCP, and payer source. Pt  is IND at baseline for ADLs; continues to work full time. Denies need for support r/t Gnosticist/spiritual, financial, or BH/DEIDRA concerns. Family available for discharge transportation. No new needs noted at this time. RNCC to conclude following upon safe departure from floor and facility.       Edis Cortes RN BSN  7C RNCC  Phone: [phone]  Pager: [phone]    For Weekend & Holiday on call RN Care Coordinator:  (Tasks: Home care, home infusion, medical equipment, transportation, IMM & AGUDELO forms, etc.)  [place] (0800 - 1630) Saturday and Sunday    Units: 5A, 5B, & 5C: [phone]    Units: 6B, 6C, 6D: [phone]    Units: 7A, 7B, 7C, 7D: [phone]    Units: 6A/ICU : [phone]    [facility] ([id number]) Saturday and Sunday    Units: 6 Med/Surg, 8A, 10 ICU, & Pediatric Units:  [phone]    Units: 5 Ortho, 5Med/Surg & WB ED: [phone]

## 2024-01-17 NOTE — PLAN OF CARE
Goal Outcome Evaluation:      Plan of Care Reviewed With: patient    Overall Patient Progress: improvingOverall Patient Progress: improving    Outcome Evaluation: Pt admitted for cellulitis. Administered PO abx. Pt denied pain. Applied silver cream to neck rash. Hgb 6.2. Administered 1U RBC. Hgb recheck 8.3. Pt discharged home with wife, picking up meds in discharge pharmacy. Removed PIV.

## 2024-01-18 ENCOUNTER — APPOINTMENT (OUTPATIENT)
Dept: RADIATION ONCOLOGY | Facility: CLINIC | Age: 69
End: 2024-01-18
Attending: RADIOLOGY
Payer: COMMERCIAL

## 2024-01-18 PROCEDURE — 77014 PR CT GUIDE FOR PLACEMENT RADIATION THERAPY FIELDS: CPT | Mod: 26 | Performed by: STUDENT IN AN ORGANIZED HEALTH CARE EDUCATION/TRAINING PROGRAM

## 2024-01-18 PROCEDURE — 77386 HC IMRT TREATMENT DELIVERY, COMPLEX: CPT | Performed by: RADIOLOGY

## 2024-01-19 ENCOUNTER — PATIENT OUTREACH (OUTPATIENT)
Dept: CARE COORDINATION | Facility: CLINIC | Age: 69
End: 2024-01-19
Payer: COMMERCIAL

## 2024-01-19 ENCOUNTER — APPOINTMENT (OUTPATIENT)
Dept: RADIATION ONCOLOGY | Facility: CLINIC | Age: 69
End: 2024-01-19
Attending: RADIOLOGY
Payer: COMMERCIAL

## 2024-01-19 ENCOUNTER — MYC MEDICAL ADVICE (OUTPATIENT)
Dept: CARDIOLOGY | Facility: CLINIC | Age: 69
End: 2024-01-19

## 2024-01-19 DIAGNOSIS — I20.0 UNSTABLE ANGINA (H): ICD-10-CM

## 2024-01-19 DIAGNOSIS — C10.0 SQUAMOUS CELL CARCINOMA OF VALLECULA (H): ICD-10-CM

## 2024-01-19 DIAGNOSIS — K12.30 MUCOSITIS: ICD-10-CM

## 2024-01-19 DIAGNOSIS — C15.9 PRIMARY ESOPHAGEAL SQUAMOUS CELL CARCINOMA (H): ICD-10-CM

## 2024-01-19 DIAGNOSIS — G89.3 NEOPLASM RELATED PAIN: ICD-10-CM

## 2024-01-19 PROCEDURE — 77014 PR CT GUIDE FOR PLACEMENT RADIATION THERAPY FIELDS: CPT | Mod: 26 | Performed by: RADIOLOGY

## 2024-01-19 PROCEDURE — 77386 HC IMRT TREATMENT DELIVERY, COMPLEX: CPT | Performed by: RADIOLOGY

## 2024-01-19 RX ORDER — DOXEPIN HYDROCHLORIDE 10 MG/ML
SOLUTION ORAL
Qty: 120 ML | Refills: 0 | Status: ON HOLD | OUTPATIENT
Start: 2024-01-19 | End: 2024-01-25

## 2024-01-19 RX ORDER — CLOPIDOGREL BISULFATE 75 MG/1
75 TABLET ORAL DAILY
Qty: 90 TABLET | Refills: 0 | Status: SHIPPED | OUTPATIENT
Start: 2024-01-19 | End: 2024-01-22

## 2024-01-19 NOTE — TELEPHONE ENCOUNTER
Refill sent; chart reviewed and message to Dr. Walters to clarify if this should be stopped in March 2024. -[facility]

## 2024-01-19 NOTE — TELEPHONE ENCOUNTER
Received GEOC[employer]tm[employer] message from pharmacy requesting refill of doxepin.     Last refill: 1/5/2024  Last office visit: 1/5/2024  Scheduled for follow up 1/24/2024    Will route request to MD for review.

## 2024-01-20 LAB
BACTERIA BLD CULT: NO GROWTH

## 2024-01-22 ENCOUNTER — NURSE TRIAGE (OUTPATIENT)
Dept: NURSING | Facility: CLINIC | Age: 69
End: 2024-01-22
Payer: COMMERCIAL

## 2024-01-22 ENCOUNTER — APPOINTMENT (OUTPATIENT)
Dept: GENERAL RADIOLOGY | Facility: CLINIC | Age: 69
End: 2024-01-22
Attending: EMERGENCY MEDICINE
Payer: COMMERCIAL

## 2024-01-22 ENCOUNTER — HOSPITAL ENCOUNTER (OUTPATIENT)
Facility: CLINIC | Age: 69
Setting detail: OBSERVATION
Discharge: HOME | End: 2024-01-25
Attending: EMERGENCY MEDICINE | Admitting: INTERNAL MEDICINE
Payer: COMMERCIAL

## 2024-01-22 DIAGNOSIS — D61.818 OTHER PANCYTOPENIA (H): ICD-10-CM

## 2024-01-22 DIAGNOSIS — C15.9 PRIMARY ESOPHAGEAL SQUAMOUS CELL CARCINOMA (H): Primary | ICD-10-CM

## 2024-01-22 DIAGNOSIS — R50.9 FEVER, UNSPECIFIED FEVER CAUSE: ICD-10-CM

## 2024-01-22 LAB
ALBUMIN SERPL BCG-MCNC: 3.4 G/DL (ref 3.5–5.2)
ALP SERPL-CCNC: 91 U/L (ref 40–150)
ALT SERPL W P-5'-P-CCNC: 17 U/L (ref 0–70)
ANION GAP SERPL CALCULATED.3IONS-SCNC: 9 MMOL/L (ref 7–15)
AST SERPL W P-5'-P-CCNC: 41 U/L (ref 0–45)
BASE EXCESS BLDV CALC-SCNC: 1.7 MMOL/L (ref -3–3)
BILIRUB SERPL-MCNC: 0.9 MG/DL
BUN SERPL-MCNC: 8.9 MG/DL (ref 8–23)
CALCIUM SERPL-MCNC: 8.8 MG/DL (ref 8.8–10.2)
CHLORIDE SERPL-SCNC: 98 MMOL/L (ref 98–107)
CREAT SERPL-MCNC: 0.78 MG/DL (ref 0.67–1.17)
DEPRECATED HCO3 PLAS-SCNC: 26 MMOL/L (ref 22–29)
EGFRCR SERPLBLD CKD-EPI 2021: >90 ML/MIN/1.73M2
GLUCOSE SERPL-MCNC: 107 MG/DL (ref 70–99)
HCO3 BLDV-SCNC: 28 MMOL/L (ref 21–28)
O2/TOTAL GAS SETTING VFR VENT: 21 %
OXYHGB MFR BLDV: 48 % (ref 70–75)
PCO2 BLDV: 49 MM HG (ref 40–50)
PH BLDV: 7.36 [PH] (ref 7.32–7.43)
PO2 BLDV: 30 MM HG (ref 25–47)
POTASSIUM SERPL-SCNC: 3.9 MMOL/L (ref 3.4–5.3)
PROT SERPL-MCNC: 6.4 G/DL (ref 6.4–8.3)
SAO2 % BLDV: 49.4 % (ref 70–75)
SODIUM SERPL-SCNC: 133 MMOL/L (ref 135–145)

## 2024-01-22 PROCEDURE — 99285 EMERGENCY DEPT VISIT HI MDM: CPT | Mod: 25 | Performed by: EMERGENCY MEDICINE

## 2024-01-22 PROCEDURE — 36415 COLL VENOUS BLD VENIPUNCTURE: CPT | Performed by: EMERGENCY MEDICINE

## 2024-01-22 PROCEDURE — 71046 X-RAY EXAM CHEST 2 VIEWS: CPT | Mod: 26 | Performed by: RADIOLOGY

## 2024-01-22 PROCEDURE — 84484 ASSAY OF TROPONIN QUANT: CPT | Performed by: EMERGENCY MEDICINE

## 2024-01-22 PROCEDURE — 99285 EMERGENCY DEPT VISIT HI MDM: CPT | Performed by: EMERGENCY MEDICINE

## 2024-01-22 PROCEDURE — 87040 BLOOD CULTURE FOR BACTERIA: CPT | Performed by: EMERGENCY MEDICINE

## 2024-01-22 PROCEDURE — 71046 X-RAY EXAM CHEST 2 VIEWS: CPT

## 2024-01-22 PROCEDURE — 82805 BLOOD GASES W/O2 SATURATION: CPT | Performed by: EMERGENCY MEDICINE

## 2024-01-22 PROCEDURE — 80053 COMPREHEN METABOLIC PANEL: CPT | Performed by: EMERGENCY MEDICINE

## 2024-01-22 PROCEDURE — 93005 ELECTROCARDIOGRAM TRACING: CPT | Performed by: EMERGENCY MEDICINE

## 2024-01-22 PROCEDURE — 93010 ELECTROCARDIOGRAM REPORT: CPT | Performed by: EMERGENCY MEDICINE

## 2024-01-22 PROCEDURE — 85025 COMPLETE CBC W/AUTO DIFF WBC: CPT | Performed by: EMERGENCY MEDICINE

## 2024-01-22 RX ORDER — CLOPIDOGREL BISULFATE 75 MG/1
75 TABLET ORAL DAILY
Qty: 90 TABLET | Refills: 2 | Status: ON HOLD | OUTPATIENT
Start: 2024-01-22 | End: 2024-05-17

## 2024-01-22 ASSESSMENT — ACTIVITIES OF DAILY LIVING (ADL): ADLS_ACUITY_SCORE: 33

## 2024-01-22 NOTE — TELEPHONE ENCOUNTER
More Plavix fills sent.  Pt updated via Load [employer] message.  -ral    ----- Message -----  From: Jason Walters MD  Sent: 1/19/2024   3:35 PM CST  To: Estella Hawkins RN  Subject: FW: Reorder Clopidogrel please                   Will continue Plavix long-term and stop aspirin on March 27  ----- Message -----  From: Estella Hawkins RN  Sent: 1/19/2024   2:40 PM CST  To: Jason Walters MD  Subject: Reorder Clopidogrel please                       I refilled 90 day supply. Pt will be at 1 year from stenting March 27th. Plan for DAPT after that?

## 2024-01-23 PROBLEM — R50.9 FEVER, UNSPECIFIED FEVER CAUSE: Status: ACTIVE | Noted: 2024-01-23

## 2024-01-23 PROBLEM — D61.818 OTHER PANCYTOPENIA (H): Status: ACTIVE | Noted: 2024-01-23

## 2024-01-23 LAB
ALBUMIN UR-MCNC: NEGATIVE MG/DL
ANION GAP SERPL CALCULATED.3IONS-SCNC: 9 MMOL/L (ref 7–15)
APPEARANCE UR: CLEAR
ATRIAL RATE - MUSE: 98 BPM
BASOPHILS # BLD AUTO: 0 10E3/UL (ref 0–0.2)
BASOPHILS # BLD AUTO: 0 10E3/UL (ref 0–0.2)
BASOPHILS NFR BLD AUTO: 0 %
BASOPHILS NFR BLD AUTO: 0 %
BILIRUB UR QL STRIP: NEGATIVE
BUN SERPL-MCNC: 7.3 MG/DL (ref 8–23)
CALCIUM SERPL-MCNC: 8.4 MG/DL (ref 8.8–10.2)
CHLORIDE SERPL-SCNC: 101 MMOL/L (ref 98–107)
COLOR UR AUTO: ABNORMAL
CREAT SERPL-MCNC: 0.77 MG/DL (ref 0.67–1.17)
DEPRECATED HCO3 PLAS-SCNC: 23 MMOL/L (ref 22–29)
DIASTOLIC BLOOD PRESSURE - MUSE: NORMAL MMHG
EGFRCR SERPLBLD CKD-EPI 2021: >90 ML/MIN/1.73M2
EOSINOPHIL # BLD AUTO: 0 10E3/UL (ref 0–0.7)
EOSINOPHIL # BLD AUTO: 0 10E3/UL (ref 0–0.7)
EOSINOPHIL NFR BLD AUTO: 0 %
EOSINOPHIL NFR BLD AUTO: 0 %
ERYTHROCYTE [DISTWIDTH] IN BLOOD BY AUTOMATED COUNT: 19.3 % (ref 10–15)
ERYTHROCYTE [DISTWIDTH] IN BLOOD BY AUTOMATED COUNT: 19.6 % (ref 10–15)
FLUAV RNA SPEC QL NAA+PROBE: NEGATIVE
FLUBV RNA RESP QL NAA+PROBE: NEGATIVE
GLUCOSE SERPL-MCNC: 99 MG/DL (ref 70–99)
GLUCOSE UR STRIP-MCNC: NEGATIVE MG/DL
GROUP A STREP BY PCR: NOT DETECTED
HCT VFR BLD AUTO: 23.4 % (ref 40–53)
HCT VFR BLD AUTO: 25.1 % (ref 40–53)
HGB BLD-MCNC: 7.8 G/DL (ref 13.3–17.7)
HGB BLD-MCNC: 8.3 G/DL (ref 13.3–17.7)
HGB UR QL STRIP: NEGATIVE
IMM GRANULOCYTES # BLD: 0 10E3/UL
IMM GRANULOCYTES # BLD: 0 10E3/UL
IMM GRANULOCYTES NFR BLD: 0 %
IMM GRANULOCYTES NFR BLD: 1 %
INTERPRETATION ECG - MUSE: NORMAL
KETONES UR STRIP-MCNC: ABNORMAL MG/DL
LEUKOCYTE ESTERASE UR QL STRIP: NEGATIVE
LYMPHOCYTES # BLD AUTO: 0.3 10E3/UL (ref 0.8–5.3)
LYMPHOCYTES # BLD AUTO: 0.4 10E3/UL (ref 0.8–5.3)
LYMPHOCYTES NFR BLD AUTO: 12 %
LYMPHOCYTES NFR BLD AUTO: 9 %
MCH RBC QN AUTO: 31.2 PG (ref 26.5–33)
MCH RBC QN AUTO: 31.6 PG (ref 26.5–33)
MCHC RBC AUTO-ENTMCNC: 33.1 G/DL (ref 31.5–36.5)
MCHC RBC AUTO-ENTMCNC: 33.3 G/DL (ref 31.5–36.5)
MCV RBC AUTO: 94 FL (ref 78–100)
MCV RBC AUTO: 95 FL (ref 78–100)
MONOCYTES # BLD AUTO: 0.5 10E3/UL (ref 0–1.3)
MONOCYTES # BLD AUTO: 0.6 10E3/UL (ref 0–1.3)
MONOCYTES NFR BLD AUTO: 15 %
MONOCYTES NFR BLD AUTO: 19 %
MUCOUS THREADS #/AREA URNS LPF: PRESENT /LPF
NEUTROPHILS # BLD AUTO: 2.1 10E3/UL (ref 1.6–8.3)
NEUTROPHILS # BLD AUTO: 2.2 10E3/UL (ref 1.6–8.3)
NEUTROPHILS NFR BLD AUTO: 68 %
NEUTROPHILS NFR BLD AUTO: 76 %
NITRATE UR QL: NEGATIVE
NRBC # BLD AUTO: 0 10E3/UL
NRBC # BLD AUTO: 0 10E3/UL
NRBC BLD AUTO-RTO: 0 /100
NRBC BLD AUTO-RTO: 0 /100
P AXIS - MUSE: 58 DEGREES
PH UR STRIP: 6.5 [PH] (ref 5–7)
PLATELET # BLD AUTO: 120 10E3/UL (ref 150–450)
PLATELET # BLD AUTO: 141 10E3/UL (ref 150–450)
POTASSIUM SERPL-SCNC: 3.4 MMOL/L (ref 3.4–5.3)
PR INTERVAL - MUSE: 160 MS
QRS DURATION - MUSE: 102 MS
QT - MUSE: 352 MS
QTC - MUSE: 449 MS
R AXIS - MUSE: 19 DEGREES
RBC # BLD AUTO: 2.47 10E6/UL (ref 4.4–5.9)
RBC # BLD AUTO: 2.66 10E6/UL (ref 4.4–5.9)
RBC URINE: 0 /HPF
RSV RNA SPEC NAA+PROBE: NEGATIVE
SARS-COV-2 RNA RESP QL NAA+PROBE: NEGATIVE
SODIUM SERPL-SCNC: 133 MMOL/L (ref 135–145)
SP GR UR STRIP: 1.01 (ref 1–1.03)
SYSTOLIC BLOOD PRESSURE - MUSE: NORMAL MMHG
T AXIS - MUSE: 50 DEGREES
TROPONIN T SERPL HS-MCNC: 22 NG/L
UROBILINOGEN UR STRIP-MCNC: NORMAL MG/DL
VENTRICULAR RATE- MUSE: 98 BPM
WBC # BLD AUTO: 3 10E3/UL (ref 4–11)
WBC # BLD AUTO: 3.1 10E3/UL (ref 4–11)
WBC URINE: 0 /HPF

## 2024-01-23 PROCEDURE — 258N000003 HC RX IP 258 OP 636

## 2024-01-23 PROCEDURE — 250N000013 HC RX MED GY IP 250 OP 250 PS 637: Performed by: STUDENT IN AN ORGANIZED HEALTH CARE EDUCATION/TRAINING PROGRAM

## 2024-01-23 PROCEDURE — 250N000011 HC RX IP 250 OP 636: Performed by: EMERGENCY MEDICINE

## 2024-01-23 PROCEDURE — 250N000013 HC RX MED GY IP 250 OP 250 PS 637

## 2024-01-23 PROCEDURE — 999N000197 HC STATISTIC WOC PT EDUCATION, 0-15 MIN

## 2024-01-23 PROCEDURE — 96361 HYDRATE IV INFUSION ADD-ON: CPT

## 2024-01-23 PROCEDURE — 36415 COLL VENOUS BLD VENIPUNCTURE: CPT | Performed by: STUDENT IN AN ORGANIZED HEALTH CARE EDUCATION/TRAINING PROGRAM

## 2024-01-23 PROCEDURE — 85025 COMPLETE CBC W/AUTO DIFF WBC: CPT | Performed by: STUDENT IN AN ORGANIZED HEALTH CARE EDUCATION/TRAINING PROGRAM

## 2024-01-23 PROCEDURE — 87651 STREP A DNA AMP PROBE: CPT | Performed by: EMERGENCY MEDICINE

## 2024-01-23 PROCEDURE — 87637 SARSCOV2&INF A&B&RSV AMP PRB: CPT | Performed by: EMERGENCY MEDICINE

## 2024-01-23 PROCEDURE — 81001 URINALYSIS AUTO W/SCOPE: CPT | Performed by: EMERGENCY MEDICINE

## 2024-01-23 PROCEDURE — 999N000147 HC STATISTIC PT IP EVAL DEFER

## 2024-01-23 PROCEDURE — G0378 HOSPITAL OBSERVATION PER HR: HCPCS

## 2024-01-23 PROCEDURE — 99222 1ST HOSP IP/OBS MODERATE 55: CPT | Mod: GC | Performed by: INTERNAL MEDICINE

## 2024-01-23 PROCEDURE — 80048 BASIC METABOLIC PNL TOTAL CA: CPT | Performed by: STUDENT IN AN ORGANIZED HEALTH CARE EDUCATION/TRAINING PROGRAM

## 2024-01-23 PROCEDURE — 96365 THER/PROPH/DIAG IV INF INIT: CPT

## 2024-01-23 RX ORDER — CHLORHEXIDINE GLUCONATE ORAL RINSE 1.2 MG/ML
15 SOLUTION DENTAL 2 TIMES DAILY
Status: DISCONTINUED | OUTPATIENT
Start: 2024-01-23 | End: 2024-01-25 | Stop reason: HOSPADM

## 2024-01-23 RX ORDER — DIPHENHYDRAMINE HYDROCHLORIDE AND LIDOCAINE HYDROCHLORIDE AND ALUMINUM HYDROXIDE AND MAGNESIUM HYDRO
15 KIT EVERY 4 HOURS
Status: DISCONTINUED | OUTPATIENT
Start: 2024-01-23 | End: 2024-01-25 | Stop reason: HOSPADM

## 2024-01-23 RX ORDER — DOXEPIN HYDROCHLORIDE 10 MG/ML
20 SOLUTION ORAL EVERY 4 HOURS PRN
Status: DISCONTINUED | OUTPATIENT
Start: 2024-01-23 | End: 2024-01-23

## 2024-01-23 RX ORDER — SILVER SULFADIAZINE 10 MG/G
CREAM TOPICAL DAILY PRN
Status: DISCONTINUED | OUTPATIENT
Start: 2024-01-23 | End: 2024-01-25 | Stop reason: HOSPADM

## 2024-01-23 RX ORDER — LIDOCAINE 40 MG/G
CREAM TOPICAL
Status: DISCONTINUED | OUTPATIENT
Start: 2024-01-23 | End: 2024-01-25 | Stop reason: HOSPADM

## 2024-01-23 RX ORDER — POTASSIUM CHLORIDE 1.5 G/1.58G
40 POWDER, FOR SOLUTION ORAL ONCE
Status: COMPLETED | OUTPATIENT
Start: 2024-01-23 | End: 2024-01-23

## 2024-01-23 RX ORDER — ACETAMINOPHEN 650 MG/1
650 SUPPOSITORY RECTAL EVERY 4 HOURS PRN
Status: DISCONTINUED | OUTPATIENT
Start: 2024-01-23 | End: 2024-01-25 | Stop reason: HOSPADM

## 2024-01-23 RX ORDER — AMOXICILLIN 250 MG
1 CAPSULE ORAL 2 TIMES DAILY PRN
Status: DISCONTINUED | OUTPATIENT
Start: 2024-01-23 | End: 2024-01-25 | Stop reason: HOSPADM

## 2024-01-23 RX ORDER — DIPHENHYDRAMINE HYDROCHLORIDE AND LIDOCAINE HYDROCHLORIDE AND ALUMINUM HYDROXIDE AND MAGNESIUM HYDRO
15 KIT EVERY 4 HOURS PRN
Status: DISCONTINUED | OUTPATIENT
Start: 2024-01-23 | End: 2024-01-23

## 2024-01-23 RX ORDER — ONDANSETRON 2 MG/ML
4 INJECTION INTRAMUSCULAR; INTRAVENOUS EVERY 6 HOURS PRN
Status: DISCONTINUED | OUTPATIENT
Start: 2024-01-23 | End: 2024-01-25 | Stop reason: HOSPADM

## 2024-01-23 RX ORDER — ACETAMINOPHEN 325 MG/1
650 TABLET ORAL EVERY 4 HOURS PRN
Status: DISCONTINUED | OUTPATIENT
Start: 2024-01-23 | End: 2024-01-25 | Stop reason: HOSPADM

## 2024-01-23 RX ORDER — GABAPENTIN 300 MG/1
900 CAPSULE ORAL 3 TIMES DAILY
Status: DISCONTINUED | OUTPATIENT
Start: 2024-01-23 | End: 2024-01-25 | Stop reason: HOSPADM

## 2024-01-23 RX ORDER — FAMOTIDINE 20 MG/1
20 TABLET, FILM COATED ORAL 2 TIMES DAILY
Status: DISCONTINUED | OUTPATIENT
Start: 2024-01-23 | End: 2024-01-25 | Stop reason: HOSPADM

## 2024-01-23 RX ORDER — CLOPIDOGREL BISULFATE 75 MG/1
75 TABLET ORAL DAILY
Status: DISCONTINUED | OUTPATIENT
Start: 2024-01-23 | End: 2024-01-25 | Stop reason: HOSPADM

## 2024-01-23 RX ORDER — MINERAL OIL/HYDROPHIL PETROLAT
OINTMENT (GRAM) TOPICAL
Status: DISCONTINUED | OUTPATIENT
Start: 2024-01-23 | End: 2024-01-25 | Stop reason: HOSPADM

## 2024-01-23 RX ORDER — AMOXICILLIN 250 MG
2 CAPSULE ORAL 2 TIMES DAILY PRN
Status: DISCONTINUED | OUTPATIENT
Start: 2024-01-23 | End: 2024-01-25 | Stop reason: HOSPADM

## 2024-01-23 RX ORDER — BUPRENORPHINE 10 UG/H
1 PATCH TRANSDERMAL WEEKLY
Status: DISCONTINUED | OUTPATIENT
Start: 2024-01-24 | End: 2024-01-25 | Stop reason: HOSPADM

## 2024-01-23 RX ORDER — ROSUVASTATIN CALCIUM 40 MG/1
40 TABLET, COATED ORAL DAILY
Status: DISCONTINUED | OUTPATIENT
Start: 2024-01-23 | End: 2024-01-25 | Stop reason: HOSPADM

## 2024-01-23 RX ORDER — LANOLIN ALCOHOL/MO/W.PET/CERES
1000 CREAM (GRAM) TOPICAL EVERY EVENING
Status: DISCONTINUED | OUTPATIENT
Start: 2024-01-23 | End: 2024-01-25 | Stop reason: HOSPADM

## 2024-01-23 RX ORDER — DOXEPIN HYDROCHLORIDE 10 MG/ML
20 SOLUTION ORAL EVERY 4 HOURS
Status: DISCONTINUED | OUTPATIENT
Start: 2024-01-23 | End: 2024-01-25 | Stop reason: HOSPADM

## 2024-01-23 RX ORDER — CALCIUM CARBONATE 500 MG/1
1000 TABLET, CHEWABLE ORAL 4 TIMES DAILY PRN
Status: DISCONTINUED | OUTPATIENT
Start: 2024-01-23 | End: 2024-01-25 | Stop reason: HOSPADM

## 2024-01-23 RX ORDER — CEFEPIME HYDROCHLORIDE 1 G/1
1 INJECTION, POWDER, FOR SOLUTION INTRAMUSCULAR; INTRAVENOUS ONCE
Qty: 10 ML | Refills: 0 | Status: COMPLETED | OUTPATIENT
Start: 2024-01-23 | End: 2024-01-23

## 2024-01-23 RX ORDER — ONDANSETRON 4 MG/1
4 TABLET, ORALLY DISINTEGRATING ORAL EVERY 6 HOURS PRN
Status: DISCONTINUED | OUTPATIENT
Start: 2024-01-23 | End: 2024-01-25 | Stop reason: HOSPADM

## 2024-01-23 RX ORDER — ASPIRIN 81 MG/1
81 TABLET ORAL DAILY
Status: DISCONTINUED | OUTPATIENT
Start: 2024-01-23 | End: 2024-01-25 | Stop reason: HOSPADM

## 2024-01-23 RX ORDER — SODIUM CHLORIDE, SODIUM LACTATE, POTASSIUM CHLORIDE, CALCIUM CHLORIDE 600; 310; 30; 20 MG/100ML; MG/100ML; MG/100ML; MG/100ML
INJECTION, SOLUTION INTRAVENOUS CONTINUOUS
Status: DISCONTINUED | OUTPATIENT
Start: 2024-01-23 | End: 2024-01-25 | Stop reason: HOSPADM

## 2024-01-23 RX ADMIN — DIPHENHYDRAMINE HYDROCHLORIDE AND LIDOCAINE HYDROCHLORIDE AND ALUMINUM HYDROXIDE AND MAGNESIUM HYDRO 15 ML: KIT at 16:16

## 2024-01-23 RX ADMIN — DOXEPIN HYDROCHLORIDE 20 MG: 10 SOLUTION ORAL at 10:00

## 2024-01-23 RX ADMIN — FAMOTIDINE 20 MG: 20 TABLET ORAL at 09:05

## 2024-01-23 RX ADMIN — DIPHENHYDRAMINE HYDROCHLORIDE AND LIDOCAINE HYDROCHLORIDE AND ALUMINUM HYDROXIDE AND MAGNESIUM HYDRO 15 ML: KIT at 09:58

## 2024-01-23 RX ADMIN — GABAPENTIN 900 MG: 300 CAPSULE ORAL at 09:05

## 2024-01-23 RX ADMIN — GABAPENTIN 900 MG: 300 CAPSULE ORAL at 20:00

## 2024-01-23 RX ADMIN — CEFEPIME HYDROCHLORIDE 1 G: 1 INJECTION, POWDER, FOR SOLUTION INTRAMUSCULAR; INTRAVENOUS at 03:14

## 2024-01-23 RX ADMIN — CLOPIDOGREL BISULFATE 75 MG: 75 TABLET ORAL at 09:04

## 2024-01-23 RX ADMIN — DIPHENHYDRAMINE HYDROCHLORIDE AND LIDOCAINE HYDROCHLORIDE AND ALUMINUM HYDROXIDE AND MAGNESIUM HYDRO 15 ML: KIT at 20:09

## 2024-01-23 RX ADMIN — CYANOCOBALAMIN TAB 1000 MCG 1000 MCG: 1000 TAB at 20:01

## 2024-01-23 RX ADMIN — ASPIRIN 81 MG: 81 TABLET ORAL at 09:04

## 2024-01-23 RX ADMIN — DOXEPIN HYDROCHLORIDE 20 MG: 10 SOLUTION ORAL at 16:17

## 2024-01-23 RX ADMIN — SODIUM CHLORIDE, POTASSIUM CHLORIDE, SODIUM LACTATE AND CALCIUM CHLORIDE: 600; 310; 30; 20 INJECTION, SOLUTION INTRAVENOUS at 17:19

## 2024-01-23 RX ADMIN — POTASSIUM CHLORIDE 40 MEQ: 1.5 POWDER, FOR SOLUTION ORAL at 10:42

## 2024-01-23 RX ADMIN — SODIUM CHLORIDE, POTASSIUM CHLORIDE, SODIUM LACTATE AND CALCIUM CHLORIDE 1000 ML: 600; 310; 30; 20 INJECTION, SOLUTION INTRAVENOUS at 10:01

## 2024-01-23 RX ADMIN — FAMOTIDINE 20 MG: 20 TABLET ORAL at 20:01

## 2024-01-23 RX ADMIN — DOXEPIN HYDROCHLORIDE 20 MG: 10 SOLUTION ORAL at 20:08

## 2024-01-23 RX ADMIN — CHLORHEXIDINE GLUCONATE 0.12% ORAL RINSE 15 ML: 1.2 LIQUID ORAL at 09:56

## 2024-01-23 RX ADMIN — GABAPENTIN 900 MG: 300 CAPSULE ORAL at 14:00

## 2024-01-23 RX ADMIN — SERTRALINE HYDROCHLORIDE 100 MG: 50 TABLET ORAL at 09:07

## 2024-01-23 RX ADMIN — ROSUVASTATIN CALCIUM 40 MG: 40 TABLET, COATED ORAL at 09:06

## 2024-01-23 ASSESSMENT — ACTIVITIES OF DAILY LIVING (ADL)
ADLS_ACUITY_SCORE: 37
ADLS_ACUITY_SCORE: 35

## 2024-01-23 NOTE — UTILIZATION REVIEW
"    Admission Status; Secondary Review Determination     Under the authority of the Utilization Management Committee, the utilization review process indicated a secondary review on the above patient. The review outcome is based on review of the medical records, discussions with staff, and applying clinical experience noted on the date of the review.     (x) Observation Status Appropriate - This patient does not meet hospital inpatient criteria and is placed in observation status. If this patient's primary payer is Medicare and was admitted as an inpatient, Condition Code 44 should be used and patient status changed to \"observation\".     RATIONALE FOR DETERMINATION:  Per provider note: \" 68 year old male admitted on 1/15/2024. He has a history of head/neck SCC, esophageal cancer, CAD (MI in 03/2023 w/ KAYDEN x2), currently receiving radiation daily M-F and is admitted for fever and imbalance.\"    Vital signs notable for maximum temperature 99.5 degrees since admission    Labs notable for white blood cell count of 3.1 with absolute neutrophil count of 2.1.  Blood cultures obtained and pending.    Imaging included a chest x-ray showed no acute findings    The patient is no longer receiving any antimicrobial medication    The severity of illness, intensity of service provided, expected LOS and risk for adverse outcome make the care appropriate for further observation; however, doesn't meet criteria for hospital inpatient admission. Dr Capps notified of this determination via [employer] text message today.   This document was produced using voice recognition software.   The information on this document is developed by the utilization review team in order for the business office to ensure compliance. This only denotes the appropriateness of proper admission status and does not reflect the quality of care rendered.   The definitions of Inpatient Status and Observation Status used in making the determination above are those " provided in the CMS Coverage Manual, Chapter 1 and Chapter 6, section 70.4.     Sincerely,     Nasim Gan MD  Utilization Review   Physician Advisor   [facility]

## 2024-01-23 NOTE — MEDICATION SCRIBE - ADMISSION MEDICATION HISTORY
Medication Scribe Admission Medication History    Admission medication history is complete. The information provided in this note is only as accurate as the sources available at the time of the update.    Information Source(s): Patient via in-person    Pertinent Information:   Patient prescribed buprenorphine (Butrans 10 MCG/HR WK patch last time applied 1/17/2024 is to be changed on 1/24/2024. Patient had been prescribed levofloxacin 750 mg, medication was taken for only 3 days and ended on 1/21/2024. Patient had a duplicate prescription for magic mouthwash suspension swish and swallow 15 mLs by mouth every 4 hours as needed, ingredients: diphenhydrAMINE, lidocaine, aluminum-magnesium & simethicone.   Changes made to PTA medication list:  Added: None  Deleted: Magic mouthwash suspension  Changed: None    Medication Affordability:       Allergies reviewed with patient and updates made in EHR: yes    Medication History Completed By: Bella Chinchilla 1/23/2024 4:00 PM    PTA Med List   Medication Sig Last Dose    acetaminophen (TYLENOL) 500 MG tablet Take 500-1,000 mg by mouth every 8 hours as needed for fever or pain Unknown at as needed    aspirin (ASA) 81 MG EC tablet Take 1 tablet (81 mg) by mouth daily Start tomorrow. 1/22/2024 at am    buprenorphine (BUTRANS) 10 MCG/HR WK patch Place 1 patch onto the skin every 7 days 1/22/2024 at unknown    chlorhexidine (PERIDEX) 0.12 % solution SWISH AND SPIT 15 ML BY MOUTH TWICE DAILY AFTER BRUSHING. NOTHING BY MOUTH FOR 30 MINUTES AFTERWARDS 1/22/2024 at am    clopidogrel (PLAVIX) 75 MG tablet Take 1 tablet (75 mg) by mouth daily 1/22/2024 at am    cyanocobalamin (VITAMIN B-12) 1000 MCG tablet Take 1 tablet (1,000 mcg) by mouth every evening 1/22/2024 at 8 PM    doxepin (SINEQUAN) 10 MG/ML (HIGH CONC) solution SWISH AND SWALLOW 2 MLS (20 MG) BY MOUTH EVERY 4 HOURS AS NEEDED (. MIX 1:1 WITH WATER. DO NOT DRIVE FOR 6 HOURS AFTERWARDS.) Unknown at as needed    famotidine  (PEPCID) 20 MG tablet Take 1 tablet (20 mg) by mouth 2 times daily 1/22/2024 at am/pm    gabapentin (NEURONTIN) 300 MG capsule Take 3 capsules (900 mg) by mouth 3 times daily Follow instructions given in clinic to taper dose up to 900 mg tid 1/22/2024 at am/noon/Ev    magic mouthwash (ENTER INGREDIENTS IN COMMENTS) suspension Swish and swallow 15 ml every 4 hours prn 1/22/2024 at unknown    nitroGLYcerin (NITROSTAT) 0.4 MG sublingual tablet PLACE 1 TABLET UNDER THE TONGUE EVERY 5 MINUTES FOR CHEST PAIN FOR 3 DOSES. IF SYMPTOMS PERSIST 5 MINUTES AFTER 1ST DOSE CALL 911. Unknown at as needed    oxyCODONE (ROXICODONE) 5 MG/5ML solution Take 5 mg by mouth every 4 hours as needed for severe pain Unknown at as needed    prochlorperazine (COMPAZINE) 10 MG tablet Take 1 tablet (10 mg) by mouth every 6 hours as needed for nausea or vomiting Unknown at as needed    rosuvastatin (CRESTOR) 40 MG tablet Take 1 tablet (40 mg) by mouth daily 1/22/2024 at am    Sennosides (SENNA) 8.8 MG/5ML SYRP Take 10-15 mLs (17.6-26.4 mg) by mouth daily as needed Unknown at as needed    sertraline (ZOLOFT) 100 MG tablet Take 100 mg by mouth every morning 1/22/2024 at am    sodium fluoride dental gel (PREVIDENT) 1.1 % GEL topical gel  1/22/2024 at am    sucralfate (CARAFATE) 1 GM/10ML suspension Take 1 g by mouth 2 times daily 1/22/2024 at Am/Pm

## 2024-01-23 NOTE — ED PROVIDER NOTES
[place] EMERGENCY DEPARTMENT ([facility])    1/22/24       ED PROVIDER NOTE   History     Chief Complaint   Patient presents with    Fever    Chills    Dizziness     HPI  Lopez Hogue is a 68 year old male with a past medical history of  head/neck SCC, esophageal cancer, and CAD (MI in 03/2023 w/ KAYDEN x2) who presents to the ED for evaluation of fever, chills, and dizziness. Patient reports he is experiencing fever, chills, lightheadedness, occasional nausea and vomiting, and difficulty swallowing due to sore throat that worsened today. Patient also states he is experiencing shortness of breath and dizziness when standing up. Patient reports he was here last week for similar symptoms and was admitted. He states he got a transfusion while he was admitted due to a low hemoglobin. Patient states he started to feel better after taking the antibiotics he was prescribed, but is now experiencing similar symptoms. Denies chest pain or abdominal pain. Denies urinary symptoms. Patient reports he took all his daily medications. Patient reports he has 2 stents. Patient is receiving both chemotherapy and radiation weekly.       Wt Readings from Last 10 Encounters:   01/22/24 71.2 kg (157 lb)   01/16/24 71.4 kg (157 lb 8 oz)   01/16/24 71.2 kg (157 lb)   01/10/24 73.6 kg (162 lb 4.8 oz)   01/09/24 76.7 kg (169 lb)   01/05/24 74.7 kg (164 lb 9.6 oz)   01/03/24 76.5 kg (168 lb 11.2 oz)   01/02/24 74.8 kg (165 lb)   01/02/24 75.2 kg (165 lb 11.2 oz)   12/27/23 78.2 kg (172 lb 6.4 oz)            Past Medical History  Past Medical History:   Diagnosis Date    EtOH dependence (H)     Quit drinking 10 years    Heart attack (H)     Hypertension     Nonrheumatic aortic (valve) stenosis     Sweat gland carcinoma      Past Surgical History:   Procedure Laterality Date    CV CORONARY ANGIOGRAM N/A 3/27/2023    Procedure: Coronary Angiogram;  Surgeon: Miki Etienne MD;  Location: [facility] CV    CV CORONARY  ANGIOGRAM N/A 5/9/2023    Procedure: Coronary Angiogram;  Surgeon: Miki Etienne MD;  Location: [facility] LAB CV    CV LEFT HEART CATH N/A 3/27/2023    Procedure: Left Heart Catheterization;  Surgeon: Miki Etienne MD;  Location: [facility] LAB CV    CV LEFT HEART CATH N/A 5/9/2023    Procedure: Left Heart Catheterization;  Surgeon: Miki Etienne MD;  Location: [facility] CV    CV PCI N/A 3/27/2023    Procedure: Percutaneous Coronary Intervention;  Surgeon: Miki Etienne MD;  Location: [facility] CV    ENDOSCOPIC ULTRASOUND UPPER GASTROINTESTINAL TRACT (GI) N/A 11/28/2023    Procedure: Endoscopic ultrasound upper gastrointestinal tract (GI);  Surgeon: Shahriar Giraldo MD;  Location:  GI    ESOPHAGOSCOPY, GASTROSCOPY, DUODENOSCOPY (EGD), COMBINED N/A 11/8/2023    Procedure: ESOPHAGOGASTRODUODENOSCOPY, WITH BIOPSY;  Surgeon: Shahriar Giraldo MD;  Location:  GI    LARYNGOSCOPY WITH BIOPSY(IES) N/A 10/12/2023    Procedure: LARYNGOSCOPY, WITH BIOPSY;  Surgeon: Edi Felix MD;  Location:  OR    OTHER SURGICAL HISTORY  2015    WIDE EXCISION OF LEFT GLUTEAL MASSTNM: [id number], stage: II hidradenocarcinoma Grade II, 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, sentinel lymph node biopsy negative      acetaminophen (TYLENOL) 500 MG tablet  aspirin (ASA) 81 MG EC tablet  buprenorphine (BUTRANS) 10 MCG/HR WK patch  chlorhexidine (PERIDEX) 0.12 % solution  clopidogrel (PLAVIX) 75 MG tablet  cyanocobalamin (VITAMIN B-12) 1000 MCG tablet  doxepin (SINEQUAN) 10 MG/ML (HIGH CONC) solution  famotidine (PEPCID) 20 MG tablet  gabapentin (NEURONTIN) 300 MG capsule  magic mouthwash (ENTER INGREDIENTS IN COMMENTS) suspension  magic mouthwash suspension, diphenhydrAMINE, lidocaine, aluminum-magnesium & simethicone, (FIRST-MOUTHWASH BLM) compounding kit  nitroGLYcerin (NITROSTAT) 0.4 MG sublingual tablet  oxyCODONE (ROXICODONE) 5 MG/5ML solution  prochlorperazine (COMPAZINE) 10 MG tablet  rosuvastatin  "(CRESTOR) 40 MG tablet  Sennosides (SENNA) 8.8 MG/5ML SYRP  sertraline (ZOLOFT) 100 MG tablet  sodium fluoride dental gel (PREVIDENT) 1.1 % GEL topical gel  sucralfate (CARAFATE) 1 GM/10ML suspension      Allergies   Allergen Reactions    Coconut Flavor Anaphylaxis     Raw coconut     Family History  Family History   Problem Relation Age of Onset    Dementia Mother     Anesthesia Reaction No family hx of     Thrombocytopenia No family hx of     Cancer No family hx of      Social History   Social History     Tobacco Use    Smoking status: Former     Packs/day: 2.00     Years: 30.00     Additional pack years: 0.00     Total pack years: 60.00     Types: Cigarettes     Quit date:      Years since quittin.0     Passive exposure: Never    Smokeless tobacco: Never    Tobacco comments:     Quit 10 years ago   Vaping Use    Vaping Use: Never used   Substance Use Topics    Alcohol use: Not Currently     Comment: quit in     Drug use: Never      Past medical history, past surgical history, medications, allergies, family history, and social history were reviewed with the patient. No additional pertinent items.      A medically appropriate review of systems was performed with pertinent positives and negatives noted in the HPI, and all other systems negative.    Physical Exam   BP: 103/62  Pulse: 112  Temp: 99.5  F (37.5  C)  Resp: 18  Height: 172.7 cm (5' 8\")  Weight: 71.2 kg (157 lb)  SpO2: 95 %  Physical Exam  Constitutional:       General: He is not in acute distress.     Appearance: Normal appearance. He is not toxic-appearing.   HENT:      Head: Atraumatic.   Eyes:      General: No scleral icterus.     Extraocular Movements: Extraocular movements intact.      Conjunctiva/sclera: Conjunctivae normal.   Cardiovascular:      Rate and Rhythm: Normal rate and regular rhythm.      Heart sounds: Normal heart sounds. No murmur heard.     No friction rub. No gallop.   Pulmonary:      Effort: Pulmonary effort is normal. " No respiratory distress.      Breath sounds: Normal breath sounds. No wheezing, rhonchi or rales.   Abdominal:      Palpations: Abdomen is soft.      Tenderness: There is no abdominal tenderness. There is no guarding or rebound.   Musculoskeletal:         General: No deformity.      Cervical back: Neck supple.      Right lower leg: No edema.      Left lower leg: No edema.   Skin:     General: Skin is warm.      Findings: No erythema or rash.   Neurological:      General: No focal deficit present.      Mental Status: He is alert and oriented to person, place, and time.      Sensory: No sensory deficit.      Motor: No weakness.   Psychiatric:         Mood and Affect: Mood normal.         Behavior: Behavior normal.       ED Course, Procedures, & Data      Procedures            EKG Interpretation:      Interpreted by ALMA ROSA RAZA MD  Symptoms at time of EKG: SEPNCER   Rhythm: normal sinus   Rate: Normal  Axis: Normal  Ectopy: none  Conduction: normal  ST Segments/ T Waves: No ST-T wave changes  Q Waves: none  Comparison to prior: No old EKG available    Clinical Impression: no acute changes          Results for orders placed or performed during the hospital encounter of 01/22/24   XR Chest 2 Views     Status: None    Narrative    EXAM: XR CHEST 2 VIEWS  LOCATION: [facility]  DATE: 1/22/2024    INDICATION: fevers, r o CAP  COMPARISON: 11/30/2023.    FINDINGS: The heart size is normal. The thoracic aorta is calcified. The lungs are clear. There is no pneumothorax or pleural effusion. Old right rib fracture. Degenerative disease in the spine.      Impression    IMPRESSION: No acute abnormality.   Comprehensive metabolic panel     Status: Abnormal   Result Value Ref Range    Sodium 133 (L) 135 - 145 mmol/L    Potassium 3.9 3.4 - 5.3 mmol/L    Carbon Dioxide (CO2) 26 22 - 29 mmol/L    Anion Gap 9 7 - 15 mmol/L    Urea Nitrogen 8.9 8.0 - 23.0 mg/dL    Creatinine 0.78 0.67 - 1.17  mg/dL    GFR Estimate >90 >60 mL/min/1.73m2    Calcium 8.8 8.8 - 10.2 mg/dL    Chloride 98 98 - 107 mmol/L    Glucose 107 (H) 70 - 99 mg/dL    Alkaline Phosphatase 91 40 - 150 U/L    AST 41 0 - 45 U/L    ALT 17 0 - 70 U/L    Protein Total 6.4 6.4 - 8.3 g/dL    Albumin 3.4 (L) 3.5 - 5.2 g/dL    Bilirubin Total 0.9 <=1.2 mg/dL   Blood gas venous     Status: Abnormal   Result Value Ref Range    pH Venous 7.36 7.32 - 7.43    pCO2 Venous 49 40 - 50 mm Hg    pO2 Venous 30 25 - 47 mm Hg    Bicarbonate Venous 28 21 - 28 mmol/L    Base Excess/Deficit Venous 1.7 -3.0 - 3.0 mmol/L    FIO2 21     Oxyhemoglobin Venous 48 (L) 70 - 75 %    O2 Sat, Venous 49.4 (L) 70.0 - 75.0 %    Narrative    In healthy individuals, oxyhemoglobin (O2Hb) and oxygen saturation (SO2) are approximately equal. In the presence of dyshemoglobins, oxyhemoglobin can be considerably lower than oxygen saturation.   CBC with platelets and differential     Status: Abnormal   Result Value Ref Range    WBC Count 3.0 (L) 4.0 - 11.0 10e3/uL    RBC Count 2.66 (L) 4.40 - 5.90 10e6/uL    Hemoglobin 8.3 (L) 13.3 - 17.7 g/dL    Hematocrit 25.1 (L) 40.0 - 53.0 %    MCV 94 78 - 100 fL    MCH 31.2 26.5 - 33.0 pg    MCHC 33.1 31.5 - 36.5 g/dL    RDW 19.3 (H) 10.0 - 15.0 %    Platelet Count 141 (L) 150 - 450 10e3/uL    % Neutrophils 76 %    % Lymphocytes 9 %    % Monocytes 15 %    % Eosinophils 0 %    % Basophils 0 %    % Immature Granulocytes 0 %    NRBCs per 100 WBC 0 <1 /100    Absolute Neutrophils 2.2 1.6 - 8.3 10e3/uL    Absolute Lymphocytes 0.3 (L) 0.8 - 5.3 10e3/uL    Absolute Monocytes 0.5 0.0 - 1.3 10e3/uL    Absolute Eosinophils 0.0 0.0 - 0.7 10e3/uL    Absolute Basophils 0.0 0.0 - 0.2 10e3/uL    Absolute Immature Granulocytes 0.0 <=0.4 10e3/uL    Absolute NRBCs 0.0 10e3/uL   Symptomatic Influenza A/B, RSV, & SARS-CoV2 PCR (COVID-19) Nasopharyngeal     Status: Normal    Specimen: Nasopharyngeal; Swab   Result Value Ref Range    Influenza A PCR Negative Negative     Influenza B PCR Negative Negative    RSV PCR Negative Negative    SARS CoV2 PCR Negative Negative    Narrative    Testing was performed using the Xpert Xpress CoV2/Flu/RSV Assay on the [employer] Instrument. This test should be ordered for the detection of SARS-CoV-2, influenza, and RSV viruses in individuals who meet clinical and/or epidemiological criteria. Test performance is unknown in asymptomatic patients. This test is for in vitro diagnostic use under the FDA EUA for laboratories certified under CLIA to perform high or moderate complexity testing. This test has not been FDA cleared or approved. A negative result does not rule out the presence of PCR inhibitors in the specimen or target RNA in concentration below the limit of detection for the assay. If only one viral target is positive but coinfection with multiple targets is suspected, the sample should be re-tested with another FDA cleared, approved, or authorized test, if coinfection would change clinical management. This test was validated by the [facility]. These laboratories are certified under the Clinical Laboratory Improvement Amendments of 1988 (CLIA-88) as qualified to perform high complexity laboratory testing.   EKG 12-lead, tracing only     Status: None (Preliminary result)   Result Value Ref Range    Systolic Blood Pressure  mmHg    Diastolic Blood Pressure  mmHg    Ventricular Rate 98 BPM    Atrial Rate 98 BPM    IN Interval 160 ms    QRS Duration 102 ms     ms    QTc 449 ms    P Axis 58 degrees    R AXIS 19 degrees    T Axis 50 degrees    Interpretation ECG       Sinus rhythm  Possible Left atrial enlargement  Left ventricular hypertrophy with repolarization abnormality  Abnormal ECG     Group A Streptococcus PCR Throat Swab     Status: Normal    Specimen: Throat; Swab   Result Value Ref Range    Group A strep by PCR Not Detected Not Detected    Narrative    The Xpert Xpress Strep A test, performed on the  [employer]  Instrument Systems, is a rapid, qualitative in vitro diagnostic test for the detection of Streptococcus pyogenes (Group A ß-hemolytic Streptococcus, Strep A) in throat swab specimens from patients with signs and symptoms of pharyngitis. The Xpert Xpress Strep A test can be used as an aid in the diagnosis of Group A Streptococcal pharyngitis. The assay is not intended to monitor treatment for Group A Streptococcus infections. The Xpert Xpress Strep A test utilizes an automated real-time polymerase chain reaction (PCR) to detect Streptococcus pyogenes DNA.   CBC with platelets differential     Status: Abnormal    Narrative    The following orders were created for panel order CBC with platelets differential.  Procedure                               Abnormality         Status                     ---------                               -----------         ------                     CBC with platelets and d...[[id number]]  Abnormal            Final result                 Please view results for these tests on the individual orders.     Medications   sodium chloride (PF) 0.9% PF flush 3 mL (has no administration in time range)   sodium chloride (PF) 0.9% PF flush 3 mL (has no administration in time range)     Labs Ordered and Resulted from Time of ED Arrival to Time of ED Departure   COMPREHENSIVE METABOLIC PANEL - Abnormal       Result Value    Sodium 133 (*)     Potassium 3.9      Carbon Dioxide (CO2) 26      Anion Gap 9      Urea Nitrogen 8.9      Creatinine 0.78      GFR Estimate >90      Calcium 8.8      Chloride 98      Glucose 107 (*)     Alkaline Phosphatase 91      AST 41      ALT 17      Protein Total 6.4      Albumin 3.4 (*)     Bilirubin Total 0.9     BLOOD GAS VENOUS - Abnormal    pH Venous 7.36      pCO2 Venous 49      pO2 Venous 30      Bicarbonate Venous 28      Base Excess/Deficit Venous 1.7      FIO2 21      Oxyhemoglobin Venous 48 (*)     O2 Sat, Venous 49.4 (*)    CBC WITH PLATELETS AND  DIFFERENTIAL - Abnormal    WBC Count 3.0 (*)     RBC Count 2.66 (*)     Hemoglobin 8.3 (*)     Hematocrit 25.1 (*)     MCV 94      MCH 31.2      MCHC 33.1      RDW 19.3 (*)     Platelet Count 141 (*)     % Neutrophils 76      % Lymphocytes 9      % Monocytes 15      % Eosinophils 0      % Basophils 0      % Immature Granulocytes 0      NRBCs per 100 WBC 0      Absolute Neutrophils 2.2      Absolute Lymphocytes 0.3 (*)     Absolute Monocytes 0.5      Absolute Eosinophils 0.0      Absolute Basophils 0.0      Absolute Immature Granulocytes 0.0      Absolute NRBCs 0.0     INFLUENZA A/B, RSV, & SARS-COV2 PCR - Normal    Influenza A PCR Negative      Influenza B PCR Negative      RSV PCR Negative      SARS CoV2 PCR Negative     GROUP A STREPTOCOCCUS PCR THROAT SWAB - Normal    Group A strep by PCR Not Detected     TROPONIN T, HIGH SENSITIVITY   ROUTINE UA WITH MICROSCOPIC REFLEX TO CULTURE   BLOOD CULTURE   BLOOD CULTURE     XR Chest 2 Views   Final Result   IMPRESSION: No acute abnormality.             Critical care was not performed.     Medical Decision Making  The patient's presentation was of high complexity (a chronic illness severe exacerbation, progression, or side effect of treatment).    The patient's evaluation involved:  ordering and/or review of 3+ test(s) in this encounter (see separate area of note for details)    The patient's management necessitated high risk (a decision regarding hospitalization).    Assessment & Plan    69 yo male here with SPENCER and dizziness x2 days as well as fevers, chills, and sore throat. Sepsis workup ordered.    Labs show pancytopenia, WBC 3.0, Hgb 8.3, platelets 141. EKG does not have acute STTW changes and he denies chest pain. He is negative for strep and the viral panel. I will start broad spectrum antibiotics and he will be admitted to medicine.    I have reviewed the nursing notes. I have reviewed the findings, diagnosis, plan and need for follow up with the patient.    New  Prescriptions    No medications on file       Final diagnoses:   Fever, unspecified fever cause   Other pancytopenia (H)   I, Salma Delacruz, am serving as a trained medical scribe to document services personally performed by Santy Valdez MD, based on the provider's statements to me.     Santy MENDOZA MD, was physically present and have reviewed and verified the accuracy of this note documented by Salma Delacruz.     Santy Valdez MD  [facility] EMERGENCY DEPARTMENT  1/22/2024     Santy Valdez MD  01/23/24 [id number]

## 2024-01-23 NOTE — H&P
[facility]    History and Physical - Medicine Service, [facility] TEAM        Date of Admission:  1/22/2024    Assessment & Plan      Lopez Hogue is a 68 year old male admitted on 1/15/2024. He has a history of head/neck SCC, esophageal cancer, CAD (MI in 03/2023 w/ KAYDEN x2), currently receiving radiation daily M-F and is admitted for fever and imbalance.     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  He is also undergoing radiation, S2 appointments remaining. Recently hospitalized for febrile neutropenia, was empirically treated for cellulitis and completed a course of levofloxacin on 1/20.  Patient states that he had been doing better, until 1/22 when he awoke in the night with fever and chills, febrile to 101.8 at home, decided to present to the ED the following day for evaluation.  Patient has no focal findings concerning for infection, he actually has improved ability to swallow and odynophagia. Strep negative. Chest x-ray unremarkable, UA negative, blood cultures pending.  Exam not concerning for cellulitis.  He was initiated on cefepime in the ED, holding at the time of admission given hemodynamic stability and lack of source  - Follow-up blood culture, urine culture  - Pain control: Gabapentin 900mg TID, oxycodone 5mg q6hr PRN, doxepin q4hr + magic mouth wash, benzocaine throat spray     # Lightheadedness, imbalance  Patient states for the past day he has had worsening balance and some orthostatic lightheadedness that he feels is somewhat worse than normal.  He says that when he gets up quickly, he sometimes does get a bit dizzy, but this has persisted and he is finding himself to be  quite weak/off balance when walking. Given lack of focality, do not feel like intracranial imaging would be helpful at this time, though could consider if other etiologies not discovered.  Certainly possible that some of his imbalance may be due to neuropathy, though suspect process would have been more indolent.  - Fall precautions  - PT/OT consulted  - Orthostatics      # Pancytopenia  Patient with pancytopenia, thought to be due to his chemotherapy.  Hemoglobin at baseline 8-9, stable thrombocytopenia, ANC actually improved from last admission at 2.2.     # CAD s/p PCI w/ KAYDEN x2 03/2023  - continue pta ASA, plavix  - continue pta rosuvastatin      # GERD - continue pta pepcid  # DIONICIO/MDD - continue pta sertraline        Diet: Combination Diet Regular Diet Adult  DVT Prophylaxis: Low Risk/Ambulatory with no VTE prophylaxis indicated  Smith Catheter: Not present  Fluids: None  Lines: None     Cardiac Monitoring: None  Code Status: Full Code    Clinically Significant Risk Factors Present on Admission              # Hypoalbuminemia: Lowest albumin = 3.4 g/dL at 1/22/2024 11:07 PM, will monitor as appropriate   # Drug Induced Platelet Defect: home medication list includes an antiplatelet medication   # Hypertension: Noted on problem list            # Financial/Environmental Concerns:           Disposition Plan      Expected Discharge Date: 01/25/2024                The patient's care will be formally staffed in the morning.     Belkys Jarvis MD  Med/Peds, PGY-4  Medicine Service, [facility]  Securely message with Skype (more info)  Text page via [employer] Paging/Directory   See signed in provider for up to date coverage information  ______________________________________________________________________    Chief Complaint   Fever    History is obtained from the patient    History of Present Illness   Lopez Hogue is a 68 year old male admitted on  1/15/2024. He has a history of head/neck SCC, esophageal cancer, CAD (MI in 03/2023 w/ KAYDEN x2), currently receiving radiation daily M-F and is admitted for fever and imbalance.     Patient was recently hospitalized for neutropenic fever thought to be due to a neck cellulitis, he completed a course of Levaquin ending on 1/20.  He been feeling fairly well in the outpatient setting and had underwent additional radiation therapy is until the night preceding admission.  States that he woke up middle the night on 1/22 with fever and chills.  He states that his highest temperature was 101.8  F.  He felt generally unwell, but denies any URI symptoms, worsening sore throat, trouble swallowing, ear pain, shortness of breath, nausea, vomiting, abdominal pain, diarrhea, constipation, areas concerning for skin infection.  Nobody around him has had similar fevers.  Patient denies dysuria.    Interestingly, he does note that he has felt more lightheaded when he goes from sitting to standing over the past day or so, additionally feels off balance when walking.  Does not have the sensation like the room is spinning around him, rather he is just generally off balance.  States that this is occurred for the past day or 2, this has not happened to him before.  He is unsure if this is neuropathy or something else.  Has had no trauma.    ED Course:  - Cefepime  - Blood, urine cultures   - Strep negative       Past Medical History    Past Medical History:   Diagnosis Date    EtOH dependence (H)     Quit drinking 10 years    Heart attack (H)     Hypertension     Nonrheumatic aortic (valve) stenosis     Sweat gland carcinoma        Past Surgical History   Past Surgical History:   Procedure Laterality Date    CV CORONARY ANGIOGRAM N/A 3/27/2023    Procedure: Coronary Angiogram;  Surgeon: Miki Etienne MD;  Location: [facility] CATH LAB CV    CV CORONARY ANGIOGRAM N/A 5/9/2023    Procedure: Coronary Angiogram;  Surgeon: Miki Etienne MD;   Location: [facility] LAB CV    CV LEFT HEART CATH N/A 3/27/2023    Procedure: Left Heart Catheterization;  Surgeon: Miki Etienne MD;  Location: [facility] LAB CV    CV LEFT HEART CATH N/A 5/9/2023    Procedure: Left Heart Catheterization;  Surgeon: Miki Etienne MD;  Location: [facility] CV    CV PCI N/A 3/27/2023    Procedure: Percutaneous Coronary Intervention;  Surgeon: Miki Etienne MD;  Location: [facility] CV    ENDOSCOPIC ULTRASOUND UPPER GASTROINTESTINAL TRACT (GI) N/A 11/28/2023    Procedure: Endoscopic ultrasound upper gastrointestinal tract (GI);  Surgeon: Shahriar Giraldo MD;  Location:  GI    ESOPHAGOSCOPY, GASTROSCOPY, DUODENOSCOPY (EGD), COMBINED N/A 11/8/2023    Procedure: ESOPHAGOGASTRODUODENOSCOPY, WITH BIOPSY;  Surgeon: Shahriar Giraldo MD;  Location:  GI    LARYNGOSCOPY WITH BIOPSY(IES) N/A 10/12/2023    Procedure: LARYNGOSCOPY, WITH BIOPSY;  Surgeon: Edi Felix MD;  Location:  OR    OTHER SURGICAL HISTORY  2015    WIDE EXCISION OF LEFT GLUTEAL MASSTNM: [id number], stage: II hidradenocarcinoma Grade II, 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, sentinel lymph node biopsy negative        Prior to Admission Medications   Prior to Admission Medications   Prescriptions Last Dose Informant Patient Reported? Taking?   Sennosides (SENNA) 8.8 MG/5ML SYRP   No No   Sig: Take 10-15 mLs (17.6-26.4 mg) by mouth daily as needed   acetaminophen (TYLENOL) 500 MG tablet  Self Yes No   Sig: Take 500-1,000 mg by mouth every 8 hours as needed for fever or pain   aspirin (ASA) 81 MG EC tablet  Self No No   Sig: Take 1 tablet (81 mg) by mouth daily Start tomorrow.   buprenorphine (BUTRANS) 10 MCG/HR WK patch   No No   Sig: Place 1 patch onto the skin every 7 days   chlorhexidine (PERIDEX) 0.12 % solution   Yes No   Sig: SWISH AND SPIT 15 ML BY MOUTH TWICE DAILY AFTER BRUSHING. NOTHING BY MOUTH FOR 30 MINUTES AFTERWARDS   clopidogrel (PLAVIX) 75 MG tablet   No No   Sig: Take 1 tablet  (75 mg) by mouth daily   cyanocobalamin (VITAMIN B-12) 1000 MCG tablet   No No   Sig: Take 1 tablet (1,000 mcg) by mouth every evening   doxepin (SINEQUAN) 10 MG/ML (HIGH CONC) solution   No No   Sig: SWISH AND SWALLOW 2 MLS (20 MG) BY MOUTH EVERY 4 HOURS AS NEEDED (. MIX 1:1 WITH WATER. DO NOT DRIVE FOR 6 HOURS AFTERWARDS.)   famotidine (PEPCID) 20 MG tablet   No No   Sig: Take 1 tablet (20 mg) by mouth 2 times daily   gabapentin (NEURONTIN) 300 MG capsule   No No   Sig: Take 3 capsules (900 mg) by mouth 3 times daily Follow instructions given in clinic to taper dose up to 900 mg tid   levofloxacin (LEVAQUIN) 750 MG tablet   No No   Sig: Take 1 tablet (750 mg) by mouth daily for 3 days   magic mouthwash (ENTER INGREDIENTS IN COMMENTS) suspension   No No   Sig: Swish and swallow 15 ml every 4 hours prn   magic mouthwash suspension, diphenhydrAMINE, lidocaine, aluminum-magnesium & simethicone, (FIRST-MOUTHWASH BLM) compounding kit   Yes No   Sig: Take 15 mLs by mouth every 4 hours as needed   nitroGLYcerin (NITROSTAT) 0.4 MG sublingual tablet   No No   Sig: PLACE 1 TABLET UNDER THE TONGUE EVERY 5 MINUTES FOR CHEST PAIN FOR 3 DOSES. IF SYMPTOMS PERSIST 5 MINUTES AFTER 1ST DOSE CALL 911.   oxyCODONE (ROXICODONE) 5 MG/5ML solution   Yes No   Sig: Take 5 mLs (5 mg) by mouth every 4 hours as needed for severe pain   prochlorperazine (COMPAZINE) 10 MG tablet   No No   Sig: Take 1 tablet (10 mg) by mouth every 6 hours as needed for nausea or vomiting   rosuvastatin (CRESTOR) 40 MG tablet  Self No No   Sig: Take 1 tablet (40 mg) by mouth daily   sertraline (ZOLOFT) 100 MG tablet  Self Yes No   Sig: Take 100 mg by mouth every morning   sodium fluoride dental gel (PREVIDENT) 1.1 % GEL topical gel   Yes No   sucralfate (CARAFATE) 1 GM/10ML suspension   Yes No   Sig: Take 1 g by mouth 2 times daily      Facility-Administered Medications: None        Review of Systems    The 10 point Review of Systems is negative other than noted  in the HPI or here.      Physical Exam   Vital Signs: Temp: 99.4  F (37.4  C) Temp src: Oral BP: 108/64 Pulse: 90   Resp: 20 SpO2: 96 % O2 Device: None (Room air)    Weight: 157 lbs 0 oz    General: Laying in bed, no acute distress, fairly well-appearing  HEENT: Normal sclera, normal nares, moist oral mucosa, white exudate in the posterior oropharynx without ulcerations or erythema  CV: Regular rate, systolic flow murmur noted, well-perfused, no lower extremity edema  Respiratory: Breathing comfortably on room air, CTAB  Abdomen: Soft, nontender, nondistended  Skin: Did not visualize any rashes or lesions on visualized skin    Medical Decision Making       Please see A&P for additional details of medical decision making.      Data     I have personally reviewed the following data over the past 24 hrs:    3.0 (L)  \   8.3 (L)   / 141 (L)     133 (L) 98 8.9 /  107 (H)   3.9 26 0.78 \     ALT: 17 AST: 41 AP: 91 TBILI: 0.9   ALB: 3.4 (L) TOT PROTEIN: 6.4 LIPASE: N/A     Trop: 22 BNP: N/A

## 2024-01-23 NOTE — CONSULTS
[facility]  WO Nurse Inpatient Assessment     Consulted for: Location of Wound(s): neck    Summary: Pt has plan of care in place for neck wound. Continue following MD orders for site care and application of Silvadene    Patient History (according to provider note(s):      Lopez Hogue is a 68 year old male admitted on 1/15/2024. He has a history of head/neck SCC, esophageal cancer, CAD (MI in 03/2023 w/ KAYDEN x2), currently receiving radiation daily M-F and is admitted for fever and imbalance     Treatment Plan:     Continue home treatment     WOC nurse follow-up plan: signing off  Notify WOC if wound(s) deteriorate.  Nursing to notify the Provider(s) and re-consult the WOC Nurse if new skin concern.    Danya Chao RN CWOCN  Please contact through [employer] group: [facility] Nurse  Dept. Office Number: [phone]

## 2024-01-23 NOTE — TELEPHONE ENCOUNTER
Lopez has a Fever. He is currently undergoing Cancer treatment    - Temp = 101.6 highest and 101.2 lowest  - Chills & Shakes  - Lightheaded & Weak when walking - legs feel wobbly; Able to climb stairs but then exhausted  - Grayish color to face (per his spouse who is a Nurse)    EMS advised    Last week when he had a fever, he was advised to go to the ER. He wants to know why EMS is advised today. Explained Triage Protocol & reason for disposition was due to Grayish color in his face.  Notified pt that the ultimate decision is his. Advised that if he does decide to go to ER without EMS that he should not try to drive himself.  He is appreciative of the explanation. Unsure if pt will call EMS or go to ER w/ spouse.    Britney Balderas RN  [facility] Nurse [facility]visors      Reason for Disposition   Bluish (or gray) lips or face now    Additional Information   Negative: Shock suspected (e.g., cold/pale/clammy skin, too weak to stand, low BP, rapid pulse)   Negative: Difficult to awaken or acting confused (e.g., disoriented, slurred speech)    Protocols used: Cancer - Fever-A-

## 2024-01-23 NOTE — ED TRIAGE NOTES
Patient presents to the ED for evaluation of Fever, chills, and dizziness. Pt reports this started today. Pt reports they are currently being treated for cancer and were seen here last week for similar issue.      Triage Assessment (Adult)       Row Name 01/22/24 [id number]          Triage Assessment    Airway WDL WDL        Respiratory WDL    Respiratory WDL WDL        Skin Circulation/Temperature WDL    Skin Circulation/Temperature WDL WDL        Cardiac WDL    Cardiac WDL WDL        Peripheral/Neurovascular WDL    Peripheral Neurovascular WDL WDL        Cognitive/Neuro/Behavioral WDL    Cognitive/Neuro/Behavioral WDL WDL

## 2024-01-23 NOTE — PLAN OF CARE
ED PT Deferral: Per discussion with patient he feels that he has returned to baseline and has been up ambulating IND. Will defer orders.

## 2024-01-24 LAB
ANION GAP SERPL CALCULATED.3IONS-SCNC: 10 MMOL/L (ref 7–15)
BASOPHILS # BLD AUTO: 0 10E3/UL (ref 0–0.2)
BASOPHILS NFR BLD AUTO: 0 %
BUN SERPL-MCNC: 6.5 MG/DL (ref 8–23)
CALCIUM SERPL-MCNC: 9 MG/DL (ref 8.8–10.2)
CHLORIDE SERPL-SCNC: 102 MMOL/L (ref 98–107)
CREAT SERPL-MCNC: 0.8 MG/DL (ref 0.67–1.17)
DEPRECATED HCO3 PLAS-SCNC: 25 MMOL/L (ref 22–29)
EGFRCR SERPLBLD CKD-EPI 2021: >90 ML/MIN/1.73M2
EOSINOPHIL # BLD AUTO: 0 10E3/UL (ref 0–0.7)
EOSINOPHIL NFR BLD AUTO: 0 %
ERYTHROCYTE [DISTWIDTH] IN BLOOD BY AUTOMATED COUNT: 20.1 % (ref 10–15)
GLUCOSE SERPL-MCNC: 98 MG/DL (ref 70–99)
HCT VFR BLD AUTO: 26.4 % (ref 40–53)
HGB BLD-MCNC: 8.7 G/DL (ref 13.3–17.7)
IMM GRANULOCYTES # BLD: 0 10E3/UL
IMM GRANULOCYTES NFR BLD: 1 %
LYMPHOCYTES # BLD AUTO: 0.3 10E3/UL (ref 0.8–5.3)
LYMPHOCYTES NFR BLD AUTO: 12 %
MCH RBC QN AUTO: 32.1 PG (ref 26.5–33)
MCHC RBC AUTO-ENTMCNC: 33 G/DL (ref 31.5–36.5)
MCV RBC AUTO: 97 FL (ref 78–100)
MONOCYTES # BLD AUTO: 0.4 10E3/UL (ref 0–1.3)
MONOCYTES NFR BLD AUTO: 17 %
NEUTROPHILS # BLD AUTO: 1.8 10E3/UL (ref 1.6–8.3)
NEUTROPHILS NFR BLD AUTO: 70 %
NRBC # BLD AUTO: 0 10E3/UL
NRBC BLD AUTO-RTO: 0 /100
PLATELET # BLD AUTO: 147 10E3/UL (ref 150–450)
POTASSIUM SERPL-SCNC: 4.2 MMOL/L (ref 3.4–5.3)
RBC # BLD AUTO: 2.71 10E6/UL (ref 4.4–5.9)
SODIUM SERPL-SCNC: 137 MMOL/L (ref 135–145)
WBC # BLD AUTO: 2.6 10E3/UL (ref 4–11)

## 2024-01-24 PROCEDURE — 250N000013 HC RX MED GY IP 250 OP 250 PS 637: Performed by: STUDENT IN AN ORGANIZED HEALTH CARE EDUCATION/TRAINING PROGRAM

## 2024-01-24 PROCEDURE — G0378 HOSPITAL OBSERVATION PER HR: HCPCS

## 2024-01-24 PROCEDURE — 250N000013 HC RX MED GY IP 250 OP 250 PS 637

## 2024-01-24 PROCEDURE — 85025 COMPLETE CBC W/AUTO DIFF WBC: CPT

## 2024-01-24 PROCEDURE — 80048 BASIC METABOLIC PNL TOTAL CA: CPT

## 2024-01-24 PROCEDURE — 36415 COLL VENOUS BLD VENIPUNCTURE: CPT

## 2024-01-24 PROCEDURE — 99232 SBSQ HOSP IP/OBS MODERATE 35: CPT | Mod: GC | Performed by: INTERNAL MEDICINE

## 2024-01-24 PROCEDURE — 258N000003 HC RX IP 258 OP 636

## 2024-01-24 RX ORDER — NALOXONE HYDROCHLORIDE 0.4 MG/ML
0.2 INJECTION, SOLUTION INTRAMUSCULAR; INTRAVENOUS; SUBCUTANEOUS
Status: DISCONTINUED | OUTPATIENT
Start: 2024-01-24 | End: 2024-01-25 | Stop reason: HOSPADM

## 2024-01-24 RX ORDER — NALOXONE HYDROCHLORIDE 0.4 MG/ML
0.4 INJECTION, SOLUTION INTRAMUSCULAR; INTRAVENOUS; SUBCUTANEOUS
Status: DISCONTINUED | OUTPATIENT
Start: 2024-01-24 | End: 2024-01-25 | Stop reason: HOSPADM

## 2024-01-24 RX ADMIN — GABAPENTIN 900 MG: 300 CAPSULE ORAL at 21:20

## 2024-01-24 RX ADMIN — DIPHENHYDRAMINE HYDROCHLORIDE AND LIDOCAINE HYDROCHLORIDE AND ALUMINUM HYDROXIDE AND MAGNESIUM HYDRO 15 ML: KIT at 05:06

## 2024-01-24 RX ADMIN — DIPHENHYDRAMINE HYDROCHLORIDE AND LIDOCAINE HYDROCHLORIDE AND ALUMINUM HYDROXIDE AND MAGNESIUM HYDRO 15 ML: KIT at 14:50

## 2024-01-24 RX ADMIN — DOXEPIN HYDROCHLORIDE 20 MG: 10 SOLUTION ORAL at 00:22

## 2024-01-24 RX ADMIN — FAMOTIDINE 20 MG: 20 TABLET ORAL at 21:22

## 2024-01-24 RX ADMIN — DOXEPIN HYDROCHLORIDE 20 MG: 10 SOLUTION ORAL at 16:49

## 2024-01-24 RX ADMIN — CHLORHEXIDINE GLUCONATE 0.12% ORAL RINSE 15 ML: 1.2 LIQUID ORAL at 21:19

## 2024-01-24 RX ADMIN — DIPHENHYDRAMINE HYDROCHLORIDE AND LIDOCAINE HYDROCHLORIDE AND ALUMINUM HYDROXIDE AND MAGNESIUM HYDRO 15 ML: KIT at 01:04

## 2024-01-24 RX ADMIN — CLOPIDOGREL BISULFATE 75 MG: 75 TABLET ORAL at 08:43

## 2024-01-24 RX ADMIN — SERTRALINE HYDROCHLORIDE 100 MG: 50 TABLET ORAL at 08:43

## 2024-01-24 RX ADMIN — DOXEPIN HYDROCHLORIDE 20 MG: 10 SOLUTION ORAL at 14:50

## 2024-01-24 RX ADMIN — DOXEPIN HYDROCHLORIDE 20 MG: 10 SOLUTION ORAL at 05:06

## 2024-01-24 RX ADMIN — ROSUVASTATIN CALCIUM 40 MG: 40 TABLET, COATED ORAL at 08:42

## 2024-01-24 RX ADMIN — DOXEPIN HYDROCHLORIDE 20 MG: 10 SOLUTION ORAL at 21:22

## 2024-01-24 RX ADMIN — DIPHENHYDRAMINE HYDROCHLORIDE AND LIDOCAINE HYDROCHLORIDE AND ALUMINUM HYDROXIDE AND MAGNESIUM HYDRO 15 ML: KIT at 08:52

## 2024-01-24 RX ADMIN — SODIUM CHLORIDE, POTASSIUM CHLORIDE, SODIUM LACTATE AND CALCIUM CHLORIDE: 600; 310; 30; 20 INJECTION, SOLUTION INTRAVENOUS at 17:18

## 2024-01-24 RX ADMIN — ASPIRIN 81 MG: 81 TABLET ORAL at 08:43

## 2024-01-24 RX ADMIN — DIPHENHYDRAMINE HYDROCHLORIDE AND LIDOCAINE HYDROCHLORIDE AND ALUMINUM HYDROXIDE AND MAGNESIUM HYDRO 15 ML: KIT at 22:04

## 2024-01-24 RX ADMIN — CHLORHEXIDINE GLUCONATE 0.12% ORAL RINSE 15 ML: 1.2 LIQUID ORAL at 08:43

## 2024-01-24 RX ADMIN — DIPHENHYDRAMINE HYDROCHLORIDE AND LIDOCAINE HYDROCHLORIDE AND ALUMINUM HYDROXIDE AND MAGNESIUM HYDRO 15 ML: KIT at 16:50

## 2024-01-24 RX ADMIN — GABAPENTIN 900 MG: 300 CAPSULE ORAL at 14:49

## 2024-01-24 RX ADMIN — FAMOTIDINE 20 MG: 20 TABLET ORAL at 08:43

## 2024-01-24 RX ADMIN — DOXEPIN HYDROCHLORIDE 20 MG: 10 SOLUTION ORAL at 08:43

## 2024-01-24 RX ADMIN — GABAPENTIN 900 MG: 300 CAPSULE ORAL at 08:42

## 2024-01-24 RX ADMIN — BUPRENORPHINE 1 PATCH: 10 PATCH, EXTENDED RELEASE TRANSDERMAL at 08:43

## 2024-01-24 RX ADMIN — CYANOCOBALAMIN TAB 1000 MCG 1000 MCG: 1000 TAB at 21:21

## 2024-01-24 ASSESSMENT — ACTIVITIES OF DAILY LIVING (ADL)
ADLS_ACUITY_SCORE: 37
ADLS_ACUITY_SCORE: 33
ADLS_ACUITY_SCORE: 33
ADLS_ACUITY_SCORE: 37
ADLS_ACUITY_SCORE: 33
ADLS_ACUITY_SCORE: 37
ADLS_ACUITY_SCORE: 33

## 2024-01-24 NOTE — PROGRESS NOTES
[facility]    Progress Note - Medicine Service, [facility] TEAM 5       Date of Admission:  1/22/2024    Assessment & Plan   Lopez Hogue is a 68 year old male admitted on 1/15/2024. He has a history of head/neck SCC, esophageal cancer, CAD (MI in 03/2023 w/ KAYDEN x2), currently receiving radiation daily M-F and is admitted for fever and imbalance.      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  He is also undergoing radiation, S2 appointments remaining. Recently hospitalized for febrile neutropenia, was empirically treated for cellulitis and completed a course of levofloxacin on 1/20.  Patient states that he had been doing better, until 1/22 when he awoke in the night with fever and chills, febrile to 101.8 at home, decided to present to the ED the following day for evaluation.  Patient has no focal findings concerning for infection, he actually has improved ability to swallow and odynophagia. Strep negative. Chest x-ray unremarkable, UA negative, blood cultures NGTD.  Exam not concerning for cellulitis.  He was initiated on cefepime in the ED, holding at the time of admission given hemodynamic stability and lack of source  - Blood cultures with NGTD  - continue to hold off on abx pending 48hr infection rule-ou  - Pain control: Gabapentin 900mg TID, oxycodone 5mg q6hr PRN, doxepin q4hr + magic mouth wash, benzocaine throat spray      # Lightheadedness, imbalance--improved  Patient states for the past day he has had worsening balance and some orthostatic lightheadedness that he feels is somewhat worse than normal.  He says that when he gets up quickly, he sometimes does get a bit dizzy, but this  has persisted and he is finding himself to be quite weak/off balance when walking. Given lack of focality, do not feel like intracranial imaging would be helpful at this time, though could consider if other etiologies not discovered.  Certainly possible that some of his imbalance may be due to neuropathy, though suspect process would have been more indolent. Lightheadedness improved after receiving IV fluids, so suspect sx mostly related to dehydration and orthostatic hypotension.   - Fall precautions  - PT/OT consulted     # Pancytopenia  Patient with pancytopenia, thought to be due to his chemotherapy.  Hemoglobin at baseline 8-9, stable thrombocytopenia, ANC actually improved from last admission at 2.2.     # CAD s/p PCI w/ KAYDEN x2 03/2023  - continue pta ASA, plavix  - continue pta rosuvastatin      # GERD - continue pta pepcid  # DIONICIO/MDD - continue pta sertraline         Diet: Combination Diet Regular Diet Adult    DVT Prophylaxis: Low Risk/Ambulatory with no VTE prophylaxis indicated  Smith Catheter: Not present  Fluids: LR @ 75mL/hr  Lines: None     Cardiac Monitoring: None  Code Status: Full Code      Clinically Significant Risk Factors Present on Admission          # Hypocalcemia: Lowest Ca = 8.4 mg/dL in last 2 days, will monitor and replace as appropriate     # Hypoalbuminemia: Lowest albumin = 3.4 g/dL at 1/22/2024 11:07 PM, will monitor as appropriate   # Drug Induced Platelet Defect: home medication list includes an antiplatelet medication   # Hypertension: Noted on problem list            # Financial/Environmental Concerns:           Disposition Plan      Expected Discharge Date: 01/25/2024        Discharge Comments: doing 48 hour infectious rule-out        The patient's care was discussed with the Attending Physician, Dr. Wilhelm and Patient.    Leny Capps MD  Medicine Service, [facility] TEAM [address] [place] [address]  Securely message with [employer] (more info)  Text  page via [facility] Paging/Directory   See signed in provider for up to date coverage information  ______________________________________________________________________    Interval History   Feeling well this morning. Throat pain under control. Denies any further lightheadedness since receiving IV fluids.     Physical Exam   Vital Signs: Temp: 97.9  F (36.6  C) Temp src: Oral BP: 104/62 Pulse: 92   Resp: 16 SpO2: 93 % O2 Device: None (Room air)    Weight: 157 lbs 0 oz    General: Sitting on edge of bed, NAD  HEENT: Normal sclera, normal nares, moist oral mucosa  CV: Regular rate, systolic flow murmur noted, well-perfused, no lower extremity edema  Respiratory: Breathing comfortably on room air, CTAB  Abdomen: Soft, nontender, nondistended  Skin: Erythema over right area of radiation    Medical Decision Making       Please see A&P for additional details of medical decision making.      Data     I have personally reviewed the following data over the past 24 hrs:    2.6 (L)  \   8.7 (L)   / 147 (L)     137 102 6.5 (L) /  98   4.2 25 0.80 \       Imaging results reviewed over the past 24 hrs:   No results found for this or any previous visit (from the past 24 hour(s)).

## 2024-01-24 NOTE — PLAN OF CARE
Goal Outcome Evaluation:  A &O x4. Orthostatic BP done. BP lying 144/64, HR 92, BP sitting 141/69, BP standing 105/63. Pt asymptomatic. MD notified. Neck reddened from radiation treatment. Butrans patch on left deltoid. Tolerating diet. No c/o nausea. Left arm PIV infusing LR at 75ml/hr. Up ambulating in halls independently. Voiding spontaneous. No BM

## 2024-01-25 ENCOUNTER — APPOINTMENT (OUTPATIENT)
Dept: RADIATION ONCOLOGY | Facility: CLINIC | Age: 69
End: 2024-01-25
Attending: RADIOLOGY
Payer: COMMERCIAL

## 2024-01-25 VITALS
HEART RATE: 94 BPM | DIASTOLIC BLOOD PRESSURE: 71 MMHG | TEMPERATURE: 98 F | SYSTOLIC BLOOD PRESSURE: 145 MMHG | BODY MASS INDEX: 23.79 KG/M2 | WEIGHT: 157 LBS | OXYGEN SATURATION: 98 % | RESPIRATION RATE: 16 BRPM | HEIGHT: 68 IN

## 2024-01-25 LAB
ANION GAP SERPL CALCULATED.3IONS-SCNC: 14 MMOL/L (ref 7–15)
BASOPHILS # BLD AUTO: 0 10E3/UL (ref 0–0.2)
BASOPHILS NFR BLD AUTO: 0 %
BUN SERPL-MCNC: 5 MG/DL (ref 8–23)
CALCIUM SERPL-MCNC: 9 MG/DL (ref 8.8–10.2)
CHLORIDE SERPL-SCNC: 102 MMOL/L (ref 98–107)
CREAT SERPL-MCNC: 0.68 MG/DL (ref 0.67–1.17)
DEPRECATED HCO3 PLAS-SCNC: 22 MMOL/L (ref 22–29)
EGFRCR SERPLBLD CKD-EPI 2021: >90 ML/MIN/1.73M2
EOSINOPHIL # BLD AUTO: 0 10E3/UL (ref 0–0.7)
EOSINOPHIL NFR BLD AUTO: 0 %
ERYTHROCYTE [DISTWIDTH] IN BLOOD BY AUTOMATED COUNT: 19.9 % (ref 10–15)
GLUCOSE SERPL-MCNC: 106 MG/DL (ref 70–99)
HCT VFR BLD AUTO: 26.9 % (ref 40–53)
HGB BLD-MCNC: 8.7 G/DL (ref 13.3–17.7)
IMM GRANULOCYTES # BLD: 0 10E3/UL
IMM GRANULOCYTES NFR BLD: 1 %
LYMPHOCYTES # BLD AUTO: 0.4 10E3/UL (ref 0.8–5.3)
LYMPHOCYTES NFR BLD AUTO: 14 %
MCH RBC QN AUTO: 30.9 PG (ref 26.5–33)
MCHC RBC AUTO-ENTMCNC: 32.3 G/DL (ref 31.5–36.5)
MCV RBC AUTO: 95 FL (ref 78–100)
MONOCYTES # BLD AUTO: 0.4 10E3/UL (ref 0–1.3)
MONOCYTES NFR BLD AUTO: 16 %
NEUTROPHILS # BLD AUTO: 1.9 10E3/UL (ref 1.6–8.3)
NEUTROPHILS NFR BLD AUTO: 69 %
NRBC # BLD AUTO: 0 10E3/UL
NRBC BLD AUTO-RTO: 0 /100
PLATELET # BLD AUTO: 164 10E3/UL (ref 150–450)
POTASSIUM SERPL-SCNC: 3.4 MMOL/L (ref 3.4–5.3)
RBC # BLD AUTO: 2.82 10E6/UL (ref 4.4–5.9)
SODIUM SERPL-SCNC: 138 MMOL/L (ref 135–145)
WBC # BLD AUTO: 2.7 10E3/UL (ref 4–11)

## 2024-01-25 PROCEDURE — G0378 HOSPITAL OBSERVATION PER HR: HCPCS

## 2024-01-25 PROCEDURE — 250N000013 HC RX MED GY IP 250 OP 250 PS 637

## 2024-01-25 PROCEDURE — 36415 COLL VENOUS BLD VENIPUNCTURE: CPT

## 2024-01-25 PROCEDURE — 250N000013 HC RX MED GY IP 250 OP 250 PS 637: Performed by: STUDENT IN AN ORGANIZED HEALTH CARE EDUCATION/TRAINING PROGRAM

## 2024-01-25 PROCEDURE — 85025 COMPLETE CBC W/AUTO DIFF WBC: CPT

## 2024-01-25 PROCEDURE — 82374 ASSAY BLOOD CARBON DIOXIDE: CPT

## 2024-01-25 PROCEDURE — 77386 HC IMRT TREATMENT DELIVERY, COMPLEX: CPT | Performed by: RADIOLOGY

## 2024-01-25 PROCEDURE — 99238 HOSP IP/OBS DSCHRG MGMT 30/<: CPT | Mod: GC | Performed by: INTERNAL MEDICINE

## 2024-01-25 PROCEDURE — 258N000003 HC RX IP 258 OP 636

## 2024-01-25 RX ORDER — DIPHENHYDRAMINE HYDROCHLORIDE AND LIDOCAINE HYDROCHLORIDE AND ALUMINUM HYDROXIDE AND MAGNESIUM HYDRO
15 KIT EVERY 4 HOURS
Qty: 237 ML | Refills: 0 | Status: SHIPPED | OUTPATIENT
Start: 2024-01-25 | End: 2024-02-16

## 2024-01-25 RX ORDER — DOXEPIN HYDROCHLORIDE 10 MG/ML
20 SOLUTION ORAL EVERY 4 HOURS
Qty: 118 ML | Refills: 0 | Status: SHIPPED | OUTPATIENT
Start: 2024-01-25 | End: 2024-02-11

## 2024-01-25 RX ADMIN — DIPHENHYDRAMINE HYDROCHLORIDE AND LIDOCAINE HYDROCHLORIDE AND ALUMINUM HYDROXIDE AND MAGNESIUM HYDRO 15 ML: KIT at 05:22

## 2024-01-25 RX ADMIN — ROSUVASTATIN CALCIUM 40 MG: 40 TABLET, COATED ORAL at 08:10

## 2024-01-25 RX ADMIN — DIPHENHYDRAMINE HYDROCHLORIDE AND LIDOCAINE HYDROCHLORIDE AND ALUMINUM HYDROXIDE AND MAGNESIUM HYDRO 15 ML: KIT at 01:35

## 2024-01-25 RX ADMIN — DOXEPIN HYDROCHLORIDE 20 MG: 10 SOLUTION ORAL at 01:35

## 2024-01-25 RX ADMIN — FAMOTIDINE 20 MG: 20 TABLET ORAL at 08:09

## 2024-01-25 RX ADMIN — CHLORHEXIDINE GLUCONATE 0.12% ORAL RINSE 15 ML: 1.2 LIQUID ORAL at 08:10

## 2024-01-25 RX ADMIN — ASPIRIN 81 MG: 81 TABLET ORAL at 08:09

## 2024-01-25 RX ADMIN — SODIUM CHLORIDE, POTASSIUM CHLORIDE, SODIUM LACTATE AND CALCIUM CHLORIDE: 600; 310; 30; 20 INJECTION, SOLUTION INTRAVENOUS at 08:10

## 2024-01-25 RX ADMIN — CLOPIDOGREL BISULFATE 75 MG: 75 TABLET ORAL at 08:09

## 2024-01-25 RX ADMIN — GABAPENTIN 900 MG: 300 CAPSULE ORAL at 08:08

## 2024-01-25 RX ADMIN — DOXEPIN HYDROCHLORIDE 20 MG: 10 SOLUTION ORAL at 05:22

## 2024-01-25 RX ADMIN — SERTRALINE HYDROCHLORIDE 100 MG: 50 TABLET ORAL at 08:09

## 2024-01-25 ASSESSMENT — ACTIVITIES OF DAILY LIVING (ADL)
ADLS_ACUITY_SCORE: 33

## 2024-01-25 NOTE — PLAN OF CARE
Patient discharged home this afternoon. Wife was present to provide ride. Patient and wife were instructed to  new prescriptions from discharge pharmacy. PIV removed. AVS was gone through w/ wife and patient at bedside. Education provided and all questions answered at this time.

## 2024-01-25 NOTE — DISCHARGE SUMMARY
"[facility]  Discharge Summary - Medicine & Pediatrics       Date of Admission:  1/22/2024  Date of Discharge:  1/25/2024  Discharging Provider: Dr. 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  No follow up labs or test are needed.      Appointments on [place] and/or [facility] (with [facility] or [facility]   provider or service). Call [phone] if you haven't heard regarding   these appointments within 7 days of discharge.            Unresulted Labs Ordered in the Past 30 Days of this Admission       Date and Time Order Name Status Description    1/22/2024 10:41 PM Blood Culture Peripheral Blood Preliminary     1/22/2024 10:41 PM Blood Culture Peripheral Blood Preliminary         These results will be followed up by Oncology.    Discharge Disposition   Discharged to home  Condition at discharge: Stable    Hospital Course   Per HPI 1/23/2024:  \"Lopez Hogue is a 68 year old male admitted on 1/15/2024. He has a history of head/neck SCC, esophageal cancer, CAD (MI in 03/2023 w/ KAYDEN x2), currently receiving radiation daily M-F and is admitted for fever and imbalance. Patient was recently hospitalized for neutropenic fever thought to be due to a neck cellulitis, he completed a course of Levaquin ending on 1/20.  He been feeling fairly well in the outpatient setting and had underwent additional radiation therapy is until the night preceding admission.  States that he woke up middle " "the night on 1/22 with fever and chills.  He states that his highest temperature was 101.8  F.  He felt generally unwell, but denies any URI symptoms, worsening sore throat, trouble swallowing, ear pain, shortness of breath, nausea, vomiting, abdominal pain, diarrhea, constipation, areas concerning for skin infection.  Nobody around him has had similar fevers.  Patient denies dysuria. Interestingly, he does note that he has felt more lightheaded when he goes from sitting to standing over the past day or so, additionally feels off balance when walking.  Does not have the sensation like the room is spinning around him, rather he is just generally off balance.  States that this is occurred for the past day or 2, this has not happened to him before.  He is unsure if this is neuropathy or something else.  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 He is also undergoing radiation. Recently hospitalized for febrile neutropenia, was empirically treated for cellulitis and completed a course of levofloxacin on 1/20.  Patient states that he had been doing better, until 1/22 when he awoke in the night with fever and chills, febrile to 101.8 at home, decided to present to the ED the following day for evaluation.  Patient had no focal findings concerning for infection, he actually had improved ability to swallow and odynophagia. Strep negative. Chest x-ray unremarkable, UA negative, blood cultures NGTD. Exam not concerning for cellulitis. He received 1 dose of cefepime in the ED. Did not initiate any subsequent antibiotics as infectious workup remained negative. At 48 " hours of negative blood cultures, felt that it was reasonable to patient to discharge to home.   - Pain control: Gabapentin 900mg TID, oxycodone 5mg q6hr PRN, doxepin q4hr + magic mouth wash, benzocaine throat spray   - Discharged with 10 day supply of doxepin and magic mouth wash; will defer to outpatient prescribers for further refills of these medications     # Lightheadedness, imbalance--improved  # Orthostatic hypotension   Prior to admission had worsening balance and some orthostatic lightheadedness that he felt was somewhat worse than normal. Neuro exam, including cerebellar testing, was unremarkable. Given lack of focality, did not feel like intracranial imaging was necessary. Symptoms also improved after receiving IV fluids which was reassuring against intracranial pathology. Encouraged continued fluid intake after discharge.      # Pancytopenia  Patient with pancytopenia, thought to be due to his chemotherapy.  Hemoglobin at baseline 8-9, stable thrombocytopenia, ANC actually improved from last admission at 2.2.     # CAD s/p PCI w/ KAYDEN x2 03/2023  - continue pta ASA, plavix  - continue pta rosuvastatin      # GERD - continue pta pepcid  # DIONICIO/MDD - continue pta sertraline        Consultations This Hospital Stay   PHYSICAL THERAPY ADULT IP CONSULT  WOUND OSTOMY CONTINENCE NURSE  IP CONSULT    Code Status   Full Code       The patient was discussed with Dr. Wilhelm.    Leny Capps MD  [address] [address]  Phone: [phone]  Fax: [phone]  ______________________________________________________________________    Physical Exam   Vital Signs: Temp: 98  F (36.7  C) Temp src: Oral BP: (!) 145/71 Pulse: 94   Resp: 16 SpO2: 98 % O2 Device: None (Room air)    Weight: 157 lbs 0 oz    General: Sitting on edge of bed, NAD, eating breakfast  HEENT: Normal sclera, normal nares, moist oral mucosa  CV: Regular rate,  systolic flow murmur noted, well-perfused, no lower extremity edema  Respiratory: Breathing comfortably on room air, CTAB, no wheezes or crackles  Abdomen: Soft, nontender, nondistended  Skin: Erythema over right neck area of radiation      Primary Care Physician   Madi Ramos    Discharge Orders      Reason for your hospital stay    You were hospitalized for work up of a fever, no infectious cause was identified.     Adult [facility] Follow-up and recommended labs and tests    Follow up with primary care provider, Madi Ramos, within 7 days for hospital follow- up.  No follow up labs or test are needed.      Appointments on [place] and/or [facility] (with [facility] or [facility] provider or service). Call [phone] if you haven't heard regarding these appointments within 7 days of discharge.     Activity    Your activity upon discharge: activity as tolerated     Discharge Instructions    Dear Lopez Hogue,    Your were hospitalized at [facility] with a fever. You blood cultures did not grow any bacteria, your urinalysis was not concerning for infection, and your chest x-ray did not show pneumonia. It is possible you had a mild viral infection that caused the fever. You were monitored closely without antibiotics and did not develop any concerning symptoms or vital sign changes.  Over your hospitalization your condition improved and today you are ready to be discharged 1/25/2024.  You should continue to improve but if you develop fever, shortness of breath, chest pain, confusion or disorientation please seek medical attention.    We are suggesting the following medication changes:  - continue to take doxepin and magic mouth wash every 4 hours to help with mouth and throat pain    Please set up an appointment with:  - Your PCP for hospital follow up within 1 week of discharge  - You did miss your appointment with Palliative Care initially scheduled for 1/24, so we would recommend reaching  out to them to reschedule    You have the following appointments already scheduled:  - 1/26/2024 with Dr. Alva from Oncology    It was a pleasure meeting with you today. Thank you for allowing me and my team the privilege of caring for you during your hospitalization. You are the reason we are here, and I truly hope we provided you with the excellent service you deserve. Please let us know if there is anything else we can do for you so that we can be sure you are leaving completely satisfied with your care experience.    Sincerely,    Leny Capps MD  Internal Medicine/Pediatrics  [facility]     Diet    Follow this diet upon discharge: Orders Placed This Encounter      Combination Diet Regular Diet Adult       Significant Results and Procedures   Results for orders placed or performed during the hospital encounter of 01/22/24   XR Chest 2 Views    Narrative    EXAM: XR CHEST 2 VIEWS  LOCATION: [facility]  DATE: 1/22/2024    INDICATION: fevers, r o CAP  COMPARISON: 11/30/2023.    FINDINGS: The heart size is normal. The thoracic aorta is calcified. The lungs are clear. There is no pneumothorax or pleural effusion. Old right rib fracture. Degenerative disease in the spine.      Impression    IMPRESSION: No acute abnormality.       Discharge Medications   Current Discharge Medication List        START taking these medications    Details   magic mouthwash suspension, diphenhydrAMINE, lidocaine, aluminum-magnesium & simethicone, (FIRST-MOUTHWASH BLM) compounding kit Swish and swallow 15 mLs in mouth every 4 hours Take with doxepin solution.  Qty: 237 mL, Refills: 0    Associated Diagnoses: Primary esophageal squamous cell carcinoma (H)           CONTINUE these medications which have CHANGED    Details   doxepin (SINEQUAN) 10 MG/ML (HIGH CONC) solution Take 2 mLs (20 mg) by mouth every 4 hours  Qty: 118 mL, Refills: 0    Associated Diagnoses: Primary  esophageal squamous cell carcinoma (H)           CONTINUE these medications which have NOT CHANGED    Details   acetaminophen (TYLENOL) 500 MG tablet Take 500-1,000 mg by mouth every 8 hours as needed for fever or pain      aspirin (ASA) 81 MG EC tablet Take 1 tablet (81 mg) by mouth daily Start tomorrow.  Qty: 30 tablet, Refills: 3    Associated Diagnoses: Unstable angina (H)      buprenorphine (BUTRANS) 10 MCG/HR WK patch Place 1 patch onto the skin every 7 days  Qty: 4 patch, Refills: 0    Associated Diagnoses: Primary esophageal squamous cell carcinoma (H); Squamous cell carcinoma of vallecula (H); Mucositis; Neoplasm related pain      chlorhexidine (PERIDEX) 0.12 % solution SWISH AND SPIT 15 ML BY MOUTH TWICE DAILY AFTER BRUSHING. NOTHING BY MOUTH FOR 30 MINUTES AFTERWARDS      clopidogrel (PLAVIX) 75 MG tablet Take 1 tablet (75 mg) by mouth daily  Qty: 90 tablet, Refills: 2    Associated Diagnoses: Unstable angina (H)      cyanocobalamin (VITAMIN B-12) 1000 MCG tablet Take 1 tablet (1,000 mcg) by mouth every evening  Qty: 30 tablet, Refills: 0    Associated Diagnoses: Squamous cell carcinoma of vallecula (H)      famotidine (PEPCID) 20 MG tablet Take 1 tablet (20 mg) by mouth 2 times daily  Qty: 60 tablet, Refills: 3    Associated Diagnoses: Gastroesophageal reflux disease, unspecified whether esophagitis present      gabapentin (NEURONTIN) 300 MG capsule Take 3 capsules (900 mg) by mouth 3 times daily Follow instructions given in clinic to taper dose up to 900 mg tid  Qty: 270 capsule, Refills: 4    Associated Diagnoses: Squamous cell carcinoma of vallecula (H); 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 IF SYMPTOMS PERSIST 5 MINUTES AFTER 1ST DOSE CALL 911.  Qty: 25 tablet, Refills: 1    Associated Diagnoses: Coronary artery disease involving native coronary artery of  native heart with unstable angina pectoris (H)      oxyCODONE (ROXICODONE) 5 MG/5ML solution Take 5 mg by mouth every 4 hours as needed for severe pain    Associated Diagnoses: Primary esophageal squamous cell carcinoma (H); Mucositis; Odynophagia      prochlorperazine (COMPAZINE) 10 MG tablet Take 1 tablet (10 mg) by mouth every 6 hours as needed for nausea or vomiting  Qty: 30 tablet, Refills: 2    Associated Diagnoses: Primary esophageal squamous cell carcinoma (H); Squamous cell carcinoma of vallecula (H)      rosuvastatin (CRESTOR) 40 MG tablet Take 1 tablet (40 mg) by mouth daily  Qty: 90 tablet, Refills: 3    Associated Diagnoses: Unstable angina (H)      Sennosides (SENNA) 8.8 MG/5ML SYRP Take 10-15 mLs (17.6-26.4 mg) by mouth daily as needed  Qty: 236 mL, Refills: 1    Associated Diagnoses: Primary esophageal squamous cell carcinoma (H)      sertraline (ZOLOFT) 100 MG tablet Take 100 mg by mouth every morning      sodium fluoride dental gel (PREVIDENT) 1.1 % GEL topical gel       sucralfate (CARAFATE) 1 GM/10ML suspension Take 1 g by mouth 2 times daily           STOP taking these medications       magic mouthwash (ENTER INGREDIENTS IN COMMENTS) suspension Comments:   Reason for Stopping:             Allergies   Allergies   Allergen Reactions    Coconut Flavor Anaphylaxis     Raw coconut

## 2024-01-25 NOTE — PROGRESS NOTES
"Shift: [id number]     VS:BP (!) 140/68 (BP Location: Right arm, Patient Position: Sitting)   Pulse 97   Temp 98.1  F (36.7  C) (Oral)   Resp 18   Ht 1.727 m (5' 8\")   Wt 71.2 kg (157 lb)   SpO2 95%   BMI 23.87 kg/m    Pain:Denies pain  Neuro:A&Ox4  Cardiac:WDL  Respiratory:WDL  Diet/Appetite:Regular diet  GI/:WDL  LDAs:PIV L Arm LR infusing at 75 ml/hr  Skin:WDL  Activity: Independent     "

## 2024-01-26 ENCOUNTER — ONCOLOGY VISIT (OUTPATIENT)
Dept: ONCOLOGY | Facility: CLINIC | Age: 69
End: 2024-01-26
Attending: NURSE PRACTITIONER
Payer: COMMERCIAL

## 2024-01-26 ENCOUNTER — APPOINTMENT (OUTPATIENT)
Dept: RADIATION ONCOLOGY | Facility: CLINIC | Age: 69
End: 2024-01-26
Attending: RADIOLOGY
Payer: COMMERCIAL

## 2024-01-26 VITALS
DIASTOLIC BLOOD PRESSURE: 53 MMHG | BODY MASS INDEX: 23.87 KG/M2 | WEIGHT: 157 LBS | HEART RATE: 74 BPM | SYSTOLIC BLOOD PRESSURE: 94 MMHG

## 2024-01-26 DIAGNOSIS — C15.9 PRIMARY ESOPHAGEAL SQUAMOUS CELL CARCINOMA (H): ICD-10-CM

## 2024-01-26 DIAGNOSIS — C10.0 SQUAMOUS CELL CARCINOMA OF VALLECULA (H): Primary | ICD-10-CM

## 2024-01-26 DIAGNOSIS — C15.4 MALIGNANT NEOPLASM OF MIDDLE THIRD OF ESOPHAGUS (H): ICD-10-CM

## 2024-01-26 PROCEDURE — 77336 RADIATION PHYSICS CONSULT: CPT | Performed by: RADIOLOGY

## 2024-01-26 PROCEDURE — 77386 HC IMRT TREATMENT DELIVERY, COMPLEX: CPT | Performed by: RADIOLOGY

## 2024-01-26 PROCEDURE — 77427 RADIATION TX MANAGEMENT X5: CPT | Performed by: RADIOLOGY

## 2024-01-26 PROCEDURE — 99213 OFFICE O/P EST LOW 20 MIN: CPT | Performed by: STUDENT IN AN ORGANIZED HEALTH CARE EDUCATION/TRAINING PROGRAM

## 2024-01-26 PROCEDURE — 99214 OFFICE O/P EST MOD 30 MIN: CPT | Performed by: STUDENT IN AN ORGANIZED HEALTH CARE EDUCATION/TRAINING PROGRAM

## 2024-01-26 NOTE — PROGRESS NOTES
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 Hogue presents to clinic today for his weekly on-treatment visit.  Since his last on treatment visit, he has been discharged from the hospital and is feeling much improved.  He is eating semisolid foods without difficulty and has minimal pain.    Nursing ROS:   Nutrition Alteration  Diet Type: Patient's Preference  Skin  Skin Reaction: 3 - Moist desquamation in areas other than skin folds and creases, bleeding induced by minor trauma or abrasion  Skin Progress: Aquaphor, Silvadene     ENT and Mouth Exam  Mucositis - Current: 1 - Generalized erythema  ENT/Mouth Note: Mouth pain/S&S 15     Gastrointestinal  Nausea: 0 - None     Psychosocial  Mood - Anxiety: 0 - Normal  Pain Assessment  0-10 Pain Scale: 2  Pain Treatment: Gabapentin 900 mg TID  Pain Note: Butrans      PEG Tube: No, if needed will be a jejunostomy tube  Electronic Cardiac Implant: No    Objective:   BP 94/53   Pulse 74   Wt 71.2 kg (157 lb)   BMI 23.87 kg/m  , initial weight Wt 79.8 kg (176 lb)   Gen: Appears well, fatigued  HEENT: Diffuse, moderate erythema involving the  oropharynx and posterior soft palate, no thrush  Skin: Brisk erythema moist desquamation and punctate purulent areas on neck which have improved since last week    LABORATORY    Lab Results   Component Value Date     01/25/2024    POTASSIUM 3.4 01/25/2024    CHLORIDE 102 01/25/2024    CO2 22 01/25/2024     (H) 01/25/2024     Lab Results   Component Value Date    AST 41 01/22/2024    ALT 17 01/22/2024    ALKPHOS 91 01/22/2024    BILITOTAL 0.9 01/22/2024     Magnesium   Date Value Ref Range Status   11/30/2023 1.7 1.7 - 2.3 mg/dL Final     Lab Results   Component Value Date    WBC 2.7 (L) 01/25/2024    HGB 8.7 (L) 01/25/2024    HCT 26.9 (L) 01/25/2024    MCV 95 01/25/2024     01/25/2024      Treatment-related toxicities (CTCAE v5.0):  Anorexia: Grade 3: Associated with significant weight loss or malnutrition; tube feeding or TPN indicated  Fatigue: Grade 2: Fatigue not relieved by rest; limiting instrumental ADL  Nausea: Grade 0: No toxicity  Pain: Grade 2: Moderate pain; limiting instrumental ADL  Dry mouth: Grade 1: Symptomatic without significant dietary alteration; unstimulated saliva flow >0.2 mL/min  Dysphagia: Grade 2: Symptomatic and altered eating/swallowing  Mucositis: Grade 2: Moderate pain; not interfering with oral intake; modified diet indicated  Dyspnea: Grade 0: No toxicity  Esophagitis: Grade 2: Symptomatic; altered eating/swallowing; oral supplements indicated  Dermatitis: Grade 3    ED visits/Hospitalizations:   1/22/2024 - 1/25/2024: Fever, hypotension and pancytopenia    Missed Treatments:   1/22/2024 through 1/24/2024: Machine downtime    Mosaiq chart and setup information reviewed  IGRT images reviewed    Medication Review  Med list reviewed with patient?: Yes  Med list printed and given: Offered and declined    Assessment:    Mr. Hogue is a 68 year old male with synchronous primaries of a squamous cell carcinoma of the vallecula p16 negative, clinical stage [id number] (stage  JUAN MIGUEL) and a squamous cell carcinoma of the middle third of esophagus, poorly differentiated, clinical stage T2 N1 (suspicious paraesophageal lymph node level 8L) M0 (stage II).  He was recently admitted and discharged yesterday.  He is feeling better.    Plan:   1. Completes treatment today  2. Continued discussion of nutritional needs with Nutrition re PEG tube  3  Palliative care referral for management of pain continue pain management per Palliative Care  4. Followup with Dr. Sun in 6 weeks  5.  Follow-up with ENT as scheduled  6.  Follow-up with Medical Oncology as scheduled  7.  Anticipate imaging 12 weeks post completion of treatment    Akiko Sun M.D.  Department of Radiation Oncology  [facility]

## 2024-01-26 NOTE — LETTER
2024         RE: Lopez Hogue  [address] [facility] [address]        Dear Colleague,    Thank you for referring your patient, Lopez Hogue, to the [facility] RADIATION ONCOLOGY. Please see a copy of my visit note below.    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 Hogue presents to clinic today for his weekly on-treatment visit.  Since his last on treatment visit, he has been discharged from the hospital and is feeling much improved.  He is eating semisolid foods without difficulty and has minimal pain.    Nursing ROS:   Nutrition Alteration  Diet Type: Patient's Preference  Skin  Skin Reaction: 3 - Moist desquamation in areas other than skin folds and creases, bleeding induced by minor trauma or abrasion  Skin Progress: Aquaphor, Silvadene     ENT and Mouth Exam  Mucositis - Current: 1 - Generalized erythema  ENT/Mouth Note: Mouth pain/S&S 15     Gastrointestinal  Nausea: 0 - None     Psychosocial  Mood - Anxiety: 0 - Normal  Pain Assessment  0-10 Pain Scale: 2  Pain Treatment: Gabapentin 900 mg TID  Pain Note: Butrans      PEG Tube: No, if needed  will be a jejunostomy tube  Electronic Cardiac Implant: No    Objective:   BP 94/53   Pulse 74   Wt 71.2 kg (157 lb)   BMI 23.87 kg/m  , initial weight Wt 79.8 kg (176 lb)   Gen: Appears well, fatigued  HEENT: Diffuse, moderate erythema involving the oropharynx and posterior soft palate, no thrush  Skin: Brisk erythema moist desquamation and punctate purulent areas on neck which have improved since last week    LABORATORY    Lab Results   Component Value Date     01/25/2024    POTASSIUM 3.4 01/25/2024    CHLORIDE 102 01/25/2024    CO2 22 01/25/2024     (H) 01/25/2024     Lab Results   Component Value Date    AST 41 01/22/2024    ALT 17 01/22/2024    ALKPHOS 91 01/22/2024    BILITOTAL 0.9 01/22/2024     Magnesium   Date Value Ref Range Status   11/30/2023 1.7 1.7 - 2.3 mg/dL Final     Lab Results   Component Value Date    WBC 2.7 (L) 01/25/2024    HGB 8.7 (L) 01/25/2024    HCT 26.9 (L) 01/25/2024    MCV 95 01/25/2024     01/25/2024      Treatment-related toxicities (CTCAE v5.0):  Anorexia: Grade 3: Associated with significant weight loss or malnutrition; tube feeding or TPN indicated  Fatigue: Grade 2: Fatigue not relieved by rest; limiting instrumental ADL  Nausea: Grade 0: No toxicity  Pain: Grade 2: Moderate pain; limiting instrumental ADL  Dry mouth: Grade 1: Symptomatic without significant dietary alteration; unstimulated saliva flow >0.2 mL/min  Dysphagia: Grade 2: Symptomatic and altered eating/swallowing  Mucositis: Grade 2: Moderate pain; not interfering with oral intake; modified diet indicated  Dyspnea: Grade 0: No toxicity  Esophagitis: Grade 2: Symptomatic; altered eating/swallowing; oral supplements indicated  Dermatitis: Grade 3    ED visits/Hospitalizations:   1/22/2024 - 1/25/2024: Fever, hypotension and pancytopenia    Missed Treatments:   1/22/2024 through 1/24/2024: Machine downtime    Mosaiq chart and setup information reviewed  IGRT images reviewed    Medication  Review  Med list reviewed with patient?: Yes  Med list printed and given: Offered and declined    Assessment:    Mr. 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  He was recently admitted and discharged yesterday.  He is feeling better.    Plan:   1. Completes treatment today  2. Continued discussion of nutritional needs with Nutrition re PEG tube  3  Palliative care referral for management of pain continue pain management per Palliative Care  4. Followup with Dr. Sun in 6 weeks  5.  Follow-up with ENT as scheduled  6.  Follow-up with Medical Oncology as scheduled  7.  Anticipate imaging 12 weeks post completion of treatment    Akiko Sun M.D.  Department of Radiation Oncology  [facility]         Again, thank you for allowing me to participate in the care of your patient.        Sincerely,        Akiko Sun MD

## 2024-01-26 NOTE — PROGRESS NOTES
[facility]   Return Patient Visit    Name: Lopez Hogue  MRN: [id number]  Date of visit: Jan 26, 2024    Diagnosis: Oropharyngeal cancer, esophageal SCC  Stage:    Cancer Staging   Squamous cell carcinoma of vallecula (H)  Staging form: Pharynx - Oropharynx, AJCC 8th Edition  - Clinical stage from 10/12/2023: Stage JUAN MIGUEL (cT1, cN2b, cM0, p16-) - Signed by Yannick Alva MD on 11/21/2023    Molecular: p16 negative  Performance status: ECOG 0      Oncology History   Squamous cell carcinoma of vallecula (H)   8/18/2023 Imaging    CT neck:  Question soft tissue lesion within the right vallecula/ventral epiglottis.      10/12/2023 Pathology    R vallecula biopsy:  - NON-KERATINIZING POORLY DIFFERENTIATED SQUAMOUS CELL CARCINOMA  - p16 is negative     10/12/2023 -  Cancer Staged    Staging form: Pharynx - Oropharynx, AJCC 8th Edition  - Clinical stage from 10/12/2023: Stage JUAN MIGUEL (cT1, cN2b, cM0, p16-)     10/13/2023 Imaging    PET/CT:  1. 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     2. FDG avid thickening of the mid to distal esophagus, suspicious for a synchronous primary esophageal neoplasm. Recommend correlation with endoscopy.     11/6/2023 Initial Diagnosis    Squamous cell carcinoma of vallecula (H)     11/8/2023 Pathology    EGD with mid esophageal mass biopsy:  A.  MID ESOPHAGEAL MASS, BIOPSIES:  -Nonkeratinizing poorly differentiated squamous cell carcinoma  -Negative for intestinal metaplasia/Alfredo's type mucosa    PD-L1 CPS 15%             12/5/2023 -  Chemotherapy    OP ONC Esophageal Cancer - PACLitaxel / CARBOplatin WEEKLY + Radiation  Plan Provider: Yannick Alva MD  Treatment goal: Curative  Line of treatment: First Line     Primary esophageal squamous cell carcinoma (H)   11/17/2023 Initial Diagnosis    Primary esophageal squamous cell carcinoma (H)     12/5/2023 -   Chemotherapy    OP ONC Esophageal Cancer - PACLitaxel / CARBOplatin WEEKLY + Radiation  Plan Provider: Yannick Alva MD  Treatment goal: Curative  Line of treatment: First Line           HPI:   Lopez Hogue is a 68 year old male with a past medical history that includes HTN, CAD, MI (March of 2023 s/p PCI x2, on DAPT), and prior sweat gland cancer s/p surgical resection, now with recently diagnosed oropharyngeal cancer found to have a 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     Diagnosis was made after he coughed up some blood on 8/18/23. He presented to an ED where ENT evaluated and was felt to have a lesion of the vallecula. Full workup of this mass is detailed in the oncology history above. Biopsy on 10/12 confirmed an invasive SCC. PET/CT revealed a hypermetabolic lesion within the mid to distal esophagus. EGD was performed on 11/8 and also showed a squamous cell carcinoma. Given the EGD findings, this was felt to be a second primary. Complete staging with endoscopic ultrasound has not yet been performed.     Overall, patient feels well. Continues to work. 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 Former heavy smoker (2 ppd) and drinker himself. Quit both in 2013. Other than his recent MI, he has been in generally good health. He has no history of autoimmune disease. He follows regularly with dermatology given his skin cancer history.     Interval History:    11/21/23 - consult with Dr. Hill, thoracic surgery. Plan for neoadjuvant chemoradiation followed by laparoscopic staging and jejunostomy, followed by minimally invasive Winchester-Elbert esophagectomy.      Had EUS on 11/28 to completed the staging process. Partially obstructing and partially circumferential fungating mass in the middle third of the esophagus. Esophageal squamous cell carcinoma was staged T2 N1 (suspicious paraesophageal lymph node)      12/5/23: Start of chemoradiation with  Carbo/Taxol. Infusion reaction with Taxol w/ dose 1. Tolerated rechallenge     1/15/24 admission for neutropenia, source felt possibly secondary to cellulitis (vs radiation change), discharged with course of levofloxacin (to complete 1/20 1/22/24 admission for recurrent fever, two days after completing levofloxacin. No neutropenia. Also found to have orthostatic hypotension which improved with IVF    Swallowing and eating ok, hydrating ok but occasionally gets lightheaded with standing  Doesn't think this is poor hydration (drinking a lot of fluids)  In the ED received 3L IVF and felt better  Urinating frequently  Lightheadedness occurring intermittently with standing, no falls or blacking out  No chest pain or palpitations with the episodes  Recovers quickly with sitting down  No LE swelling    Fatigued but up and active, continuing to work    Low grade temp to 100.2 this morning that resolved prior to taking any tylenol (taking for pain)  No cough  No new skin changes of the neck including increased redness or pain, overall improving  No abdominal pain, looser stools but unchanged  No dysuria  Off antibiotics     Exam:   There were no vitals taken for this visit.    Gen: Comfortable, NAD  HEENT: Pupils equal, non-icteric  Neck/Lymph: Radiation skin changes present over R>L neck, diffusely erythematous but no areas of purulence or fluctuance  Resp: Comfortable on room air, clear bilaterally  CV: Systolic murmur present, regular rate  Abd: Soft  Ext: No swelling  Neuro: Normal gait      Labs:   Labs reviewed, normal metabolic panel, mild normocytic anemia but otherwise unremarkable    Imaging:   All pertinent imaging studies personally reviewed. Key findings summarized in oncology history above    Pathology:     10/12/23 R vallecular lesion biopsy:  Final Diagnosis   A&B. RIGHT VALLECULA LESION, BIOPSY:  - NON-KERATINIZING POORLY DIFFERENTIATED SQUAMOUS CELL CARCINOMA  - p16 is negative     11/8/23 Esophageal  biopsy:  Final Diagnosis   A.  MID ESOPHAGEAL MASS, BIOPSIES:  -Nonkeratinizing poorly differentiated squamous cell carcinoma  -Negative for intestinal metaplasia/Alfredo's type mucosa     Addendum   RESULT FOR IMMUNOHISTOCHEMICAL VENTANA CLONE  PD-L1 ASSAY     - TUMOR PROPORTION SCORE (TPS): 5%  - INTERPRETATION: LOW EXPRESSOR PD-L1 EXPRESSION (TPS 1%-49%)  - COMBINED POSITIVE SCORE (CPS): 15         Assessment and Plan:   Lopez Hogue is a 68 year old male with HTN, CAD, prior sweat gland cancer s/p resection, and recently diagnosed head and neck squamous cell carcinoma of the vallecula, with staging revealing a synchronous esophageal squamous cell carcinoma. Presenting today to establish care with medical oncology.       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 Santacruz  --will coordinate potential pre-operative imaging regarding esophageal cancer surgical planning  --regarding his oropharyngeal cancer, will need a post treatment PET/CT at 12 weeks, this will be ordered by ENT  --plan to follow up with me at the 12 week tacho, or sooner if new concerns arise      Yannick Alva MD PhD   of Medicine  Division of Hematology, Oncology and Transplantation    ---  35 minutes were spent on the date of the encounter performing chart review, history and exam, documentation, and further activities as noted above.

## 2024-01-26 NOTE — NURSING NOTE
"Oncology Rooming Note    January 26, 2024 10:40 AM   Lopez Hogue is a 68 year old male who presents for:    Chief Complaint   Patient presents with    Oncology Clinic Visit     RTN for Squamous Cell Carcinoma     Initial Vitals: There were no vitals taken for this visit. Estimated body mass index is 23.87 kg/m  as calculated from the following:    Height as of 1/22/24: 1.727 m (5' 8\").    Weight as of an earlier encounter on 1/26/24: 71.2 kg (157 lb). There is no height or weight on file to calculate BSA.  Data Unavailable Comment: Data Unavailable   No LMP for male patient.  Allergies reviewed: Yes  Medications reviewed: Yes    Medications: Medication refills not needed today.  Pharmacy name entered into [employer]:    [id number] - [place], [place] - [address] [place] [address]  AT [facility] [address] [address] [place] [address] PHARMACY [facility] - [place], [place] - [address] [place] [address]    Frailty Screening:   Is the patient here for a new oncology consult visit in cancer care? 2. No      Clinical concerns: none       Margoth Galarza MA             "

## 2024-01-26 NOTE — LETTER
1/26/2024         RE: Lopez Hogue  [address] [facility] [address]        Dear Colleague,    Thank you for referring your patient, Lopez Hogue, to the [facility]. Please see a copy of my visit note below.    [facility]   Return Patient Visit    Name: Lopez Hogue  MRN: [id number]  Date of visit: Jan 26, 2024    Diagnosis: Oropharyngeal cancer, esophageal SCC  Stage:    Cancer Staging   Squamous cell carcinoma of vallecula (H)  Staging form: Pharynx - Oropharynx, AJCC 8th Edition  - Clinical stage from 10/12/2023: Stage JUAN MIGUEL (cT1, cN2b, cM0, p16-) - Signed by Yannick Alva MD on 11/21/2023    Molecular: p16 negative  Performance status: ECOG 0      Oncology History   Squamous cell carcinoma of vallecula (H)   8/18/2023 Imaging    CT neck:  Question soft tissue lesion within the right vallecula/ventral epiglottis.      10/12/2023 Pathology    R vallecula biopsy:  - NON-KERATINIZING POORLY DIFFERENTIATED SQUAMOUS CELL CARCINOMA  - p16 is negative     10/12/2023 -  Cancer Staged    Staging form: Pharynx - Oropharynx, AJCC 8th Edition  - Clinical stage from 10/12/2023: Stage JUAN MIGUEL (cT1, cN2b, cM0, p16-)     10/13/2023 Imaging    PET/CT:  1. 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     2. FDG avid thickening of the mid to distal esophagus, suspicious for a synchronous primary esophageal neoplasm. Recommend correlation with endoscopy.     11/6/2023 Initial Diagnosis    Squamous cell carcinoma of vallecula (H)     11/8/2023 Pathology    EGD with mid esophageal mass biopsy:  A.  MID ESOPHAGEAL MASS, BIOPSIES:  -Nonkeratinizing poorly differentiated squamous cell carcinoma  -Negative for intestinal metaplasia/Alfredo's type mucosa    PD-L1 CPS 15%             12/5/2023 -  Chemotherapy    OP ONC Esophageal Cancer - PACLitaxel / CARBOplatin WEEKLY +  Radiation  Plan Provider: Yannick Alva MD  Treatment goal: Curative  Line of treatment: First Line     Primary esophageal squamous cell carcinoma (H)   11/17/2023 Initial Diagnosis    Primary esophageal squamous cell carcinoma (H)     12/5/2023 -  Chemotherapy    OP ONC Esophageal Cancer - PACLitaxel / CARBOplatin WEEKLY + Radiation  Plan Provider: Yannick Alva MD  Treatment goal: Curative  Line of treatment: First Line           HPI:   Lopez Hogue is a 68 year old male with a past medical history that includes HTN, CAD, MI (March of 2023 s/p PCI x2, on DAPT), and prior sweat gland cancer s/p surgical resection, now with recently diagnosed oropharyngeal cancer found to have a 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     Diagnosis was made after he coughed up some blood on 8/18/23. He presented to an ED where ENT evaluated and was felt to have a lesion of the vallecula. Full workup of this mass is detailed in the oncology history above. Biopsy on 10/12 confirmed an invasive SCC. PET/CT revealed a hypermetabolic lesion within the mid to distal esophagus. EGD was performed on 11/8 and also showed a squamous cell carcinoma. Given the EGD findings, this was felt to be a second primary. Complete staging with endoscopic ultrasound has not yet been performed.     Overall, patient feels well. Continues to work. 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 Former heavy smoker (2 ppd) and drinker himself. Quit both in 2013. Other than his recent MI, he has been in generally good health. He has no history of autoimmune disease. He follows regularly with dermatology given his skin cancer history.     Interval History:    11/21/23 - consult with Dr. Hill, thoracic surgery. Plan for neoadjuvant chemoradiation followed by laparoscopic staging and jejunostomy, followed by minimally invasive Feliberto-Elbert esophagectomy.      Had EUS on 11/28 to completed the staging  process. Partially obstructing and partially circumferential fungating mass in the middle third of the esophagus. Esophageal squamous cell carcinoma was staged T2 N1 (suspicious paraesophageal lymph node)      12/5/23: Start of chemoradiation with Carbo/Taxol. Infusion reaction with Taxol w/ dose 1. Tolerated rechallenge     1/15/24 admission for neutropenia, source felt possibly secondary to cellulitis (vs radiation change), discharged with course of levofloxacin (to complete 1/20 1/22/24 admission for recurrent fever, two days after completing levofloxacin. No neutropenia. Also found to have orthostatic hypotension which improved with IVF    Swallowing and eating ok, hydrating ok but occasionally gets lightheaded with standing  Doesn't think this is poor hydration (drinking a lot of fluids)  In the ED received 3L IVF and felt better  Urinating frequently  Lightheadedness occurring intermittently with standing, no falls or blacking out  No chest pain or palpitations with the episodes  Recovers quickly with sitting down  No LE swelling    Fatigued but up and active, continuing to work    Low grade temp to 100.2 this morning that resolved prior to taking any tylenol (taking for pain)  No cough  No new skin changes of the neck including increased redness or pain, overall improving  No abdominal pain, looser stools but unchanged  No dysuria  Off antibiotics     Exam:   There were no vitals taken for this visit.    Gen: Comfortable, NAD  HEENT: Pupils equal, non-icteric  Neck/Lymph: Radiation skin changes present over R>L neck, diffusely erythematous but no areas of purulence or fluctuance  Resp: Comfortable on room air, clear bilaterally  CV: Systolic murmur present, regular rate  Abd: Soft  Ext: No swelling  Neuro: Normal gait      Labs:   Labs reviewed, normal metabolic panel, mild normocytic anemia but otherwise unremarkable    Imaging:   All pertinent imaging studies personally reviewed. Key findings summarized  in oncology history above    Pathology:     10/12/23 R vallecular lesion biopsy:  Final Diagnosis   A&B. RIGHT VALLECULA LESION, BIOPSY:  - NON-KERATINIZING POORLY DIFFERENTIATED SQUAMOUS CELL CARCINOMA  - p16 is negative     11/8/23 Esophageal biopsy:  Final Diagnosis   A.  MID ESOPHAGEAL MASS, BIOPSIES:  -Nonkeratinizing poorly differentiated squamous cell carcinoma  -Negative for intestinal metaplasia/Alfredo's type mucosa     Addendum   RESULT FOR IMMUNOHISTOCHEMICAL VENTANA CLONE  PD-L1 ASSAY     - TUMOR PROPORTION SCORE (TPS): 5%  - INTERPRETATION: LOW EXPRESSOR PD-L1 EXPRESSION (TPS 1%-49%)  - COMBINED POSITIVE SCORE (CPS): 15         Assessment and Plan:   Lopez Hogue is a 68 year old male with HTN, CAD, prior sweat gland cancer s/p resection, and recently diagnosed head and neck squamous cell carcinoma of the vallecula, with staging revealing a synchronous esophageal squamous cell carcinoma. Presenting today to establish care with medical oncology.       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 Santacruz  --will coordinate potential pre-operative imaging regarding esophageal cancer surgical planning  --regarding his oropharyngeal cancer, will need a post treatment PET/CT at 12 weeks, this will be ordered by ENT  --plan to follow up with me at the 12 week tacho, or sooner if new concerns arise      Yannick Alva MD PhD   of Medicine  Division of Hematology, Oncology and Transplantation    ---  35 minutes were spent on the date of the encounter performing chart review, history and exam, documentation, and further activities as noted above.

## 2024-01-27 LAB
BACTERIA BLD CULT: NO GROWTH
BACTERIA BLD CULT: NO GROWTH

## 2024-01-31 ENCOUNTER — ONCOLOGY VISIT (OUTPATIENT)
Dept: PALLIATIVE CARE | Facility: CLINIC | Age: 69
End: 2024-01-31
Attending: INTERNAL MEDICINE
Payer: COMMERCIAL

## 2024-01-31 VITALS
OXYGEN SATURATION: 96 % | BODY MASS INDEX: 24.04 KG/M2 | DIASTOLIC BLOOD PRESSURE: 60 MMHG | TEMPERATURE: 97.4 F | SYSTOLIC BLOOD PRESSURE: 99 MMHG | WEIGHT: 158.1 LBS | HEART RATE: 88 BPM | RESPIRATION RATE: 16 BRPM

## 2024-01-31 DIAGNOSIS — C10.0 SQUAMOUS CELL CARCINOMA OF VALLECULA (H): ICD-10-CM

## 2024-01-31 DIAGNOSIS — C15.9 PRIMARY ESOPHAGEAL SQUAMOUS CELL CARCINOMA (H): Primary | ICD-10-CM

## 2024-01-31 DIAGNOSIS — K12.30 MUCOSITIS: ICD-10-CM

## 2024-01-31 DIAGNOSIS — G89.3 NEOPLASM RELATED PAIN: ICD-10-CM

## 2024-01-31 PROCEDURE — 99215 OFFICE O/P EST HI 40 MIN: CPT | Performed by: INTERNAL MEDICINE

## 2024-01-31 PROCEDURE — 99213 OFFICE O/P EST LOW 20 MIN: CPT | Performed by: INTERNAL MEDICINE

## 2024-01-31 ASSESSMENT — PAIN SCALES - GENERAL: PAINLEVEL: MODERATE PAIN (4)

## 2024-01-31 NOTE — PROGRESS NOTES
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   12/2023 started curative-intent chemoradiation with carbo/paclitaxel. Plan eventually for surgery.  1/2024 Hospitalized twice; neutropenic fevers/neck cellulitis     Social - Lives with wife; 2 adult sons in town. 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   Hx tobacco smoking.   Hx of AUD; in recovery x ~11y     Care Planning -       History:  History gathered today from: patient, medical chart    I met him a month ago  Started Butrans 10 mcg/hr and doxepin oral rinse.  He is now done with chemoRT. Hospitalized twice for fevers, neck cellulitis.  Overall doing ok  Never ended up with a FT, neutropenic etc.  Liquid diet the last month--but improving and ate eggs today! Follows with SLP.    Pain has been well managed  Doxepin helps a lot, but he needs to swallow it due to his pain being too low; works well but makes him tired.  Butrans 10  Not needing oxycodone.    Spirits good  Scans coming up; surg follow-up coming up    PE: There were no vitals taken for this visit.   Wt Readings from Last 3 Encounters:   01/26/24 71.2 kg (157 lb)   01/22/24 71.2 kg (157 lb)   01/16/24 71.4 kg (157 lb 8 oz)     Alert NAD  Clear sensorium  Voice strong  Neck with some inflammation and desquamation; overall looks ok/nothing concerning      Data reviewed:  I reviewed recent labs and imaging, my comments:  Na 138  Cr 0.68  Plt 168  Hgb 8.7  Blood cx all neg     database reviewed: y      Impression & Recommendations:  69 yo with pharyngeal cancer and esophageal SCC s/p definitive chemoRT    Pain  Doing well overall  Already feeling better which is an encouraging sign!  No changes rec'd today but dw him soon we'll want to taper him off Butrans and other meds  He can on his own back off on doxepin when he's  ready  When he puts his last Butrans on he should let me know how he's doing; we can continue it for some more weeks, vs drop to 5 mcg/hr, vs if he's doing great stop entirely  D/w him time frame of recovery often 1-3 months; but I think he'll be on the shorter side with how he's doing    He's working on advancing his diet. He sees SLP.  Mood and spirits good  Bowels loose on his liquid diet    Follow-up 4-5 wk        Over 40 minutes spent on the date of the encounter doing chart review, history and exam, patient education & counseling, documentation and other activities as noted above.    Thank you for involving us in the patient's care.   Nuno Cazares MD / Palliative Medicine / Text me via [employer]  This note may have been composed with voice recognition software and there may be mistranscriptions.

## 2024-01-31 NOTE — NURSING NOTE
"Oncology Rooming Note    January 31, 2024 3:58 PM   Lopez Hogue is a 68 year old male who presents for:    Chief Complaint   Patient presents with    Oncology Clinic Visit     Esophageal squamous cell carcinoma     Initial Vitals: BP 99/60   Pulse 88   Temp 97.4  F (36.3  C) (Oral)   Resp 16   Wt 71.7 kg (158 lb 1.6 oz)   SpO2 96%   BMI 24.04 kg/m   Estimated body mass index is 24.04 kg/m  as calculated from the following:    Height as of 1/22/24: 1.727 m (5' 8\").    Weight as of this encounter: 71.7 kg (158 lb 1.6 oz). Body surface area is 1.85 meters squared.  Moderate Pain (4) Comment: Data Unavailable   No LMP for male patient.  Allergies reviewed: Yes  Medications reviewed: Yes    Medications: Medication refills not needed today.  Pharmacy name entered into [employer]:    [email or web address][id number] - [place], [place] - [address] [place] [address]  AT [facility] OF [facility] [address] [place] [address] PHARMACY [facility] - [place], [place] - [address] [facility]    Frailty Screening:   Is the patient here for a new oncology consult visit in cancer care? 2. No      Clinical concerns: None       Mouna Lipscomb CMA            "

## 2024-01-31 NOTE — LETTER
1/31/2024       RE: Lopez Hogue  [address] [facility] [place] [address]       Dear Colleague,    Thank you for referring your patient, Lopez Hogue[facility]. Please see a copy of my visit note below.    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   12/2023 started curative-intent chemoradiation with carbo/paclitaxel. Plan eventually for surgery.  1/2024 Hospitalized twice; neutropenic fevers/neck cellulitis     Social - Lives with wife; 2 adult sons in town. 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   Hx tobacco smoking.   Hx of AUD; in recovery x ~11y     Care Planning -       History:  History gathered today from: patient, medical chart    I met him a month ago  Started Butrans 10 mcg/hr and doxepin oral rinse.  He is now done with chemoRT. Hospitalized twice for fevers, neck cellulitis.  Overall doing ok  Never ended up with a FT, neutropenic etc.  Liquid diet the last month--but improving and ate eggs today! Follows with SLP.    Pain has been well managed  Doxepin helps a lot, but he needs to swallow it due to his pain being too low; works well but makes him tired.  Butrans 10  Not needing oxycodone.    Spirits good  Scans coming up; surg follow-up coming up    PE: There were no vitals taken for this visit.   Wt Readings from Last 3 Encounters:   01/26/24 71.2 kg (157 lb)   01/22/24 71.2 kg (157 lb)   01/16/24 71.4 kg (157 lb 8 oz)     Alert NAD  Clear sensorium  Voice strong  Neck with some inflammation and desquamation; overall looks ok/nothing concerning      Data reviewed:  I reviewed recent labs and imaging, my comments:  Na 138  Cr 0.68  Plt 168  Hgb 8.7  Blood cx all neg     database reviewed: y      Impression &  Recommendations:  69 yo with pharyngeal cancer and esophageal SCC s/p definitive chemoRT    Pain  Doing well overall  Already feeling better which is an encouraging sign!  No changes rec'd today but dw him soon we'll want to taper him off Butrans and other meds  He can on his own back off on doxepin when he's ready  When he puts his last Butrans on he should let me know how he's doing; we can continue it for some more weeks, vs drop to 5 mcg/hr, vs if he's doing great stop entirely  D/w him time frame of recovery often 1-3 months; but I think he'll be on the shorter side with how he's doing    He's working on advancing his diet. He sees SLP.  Mood and spirits good  Bowels loose on his liquid diet    Follow-up 4-5 wk        Over 40 minutes spent on the date of the encounter doing chart review, history and exam, patient education & counseling, documentation and other activities as noted above.    Thank you for involving us in the patient's care.   Nuno Cazares MD / Palliative Medicine / Text me via [employer]  This note may have been composed with voice recognition software and there may be mistranscriptions.      Again, thank you for allowing me to participate in the care of your patient.      Sincerely,    Nuno Cazares MD

## 2024-02-02 ENCOUNTER — LAB (OUTPATIENT)
Dept: LAB | Facility: CLINIC | Age: 69
End: 2024-02-02
Attending: STUDENT IN AN ORGANIZED HEALTH CARE EDUCATION/TRAINING PROGRAM
Payer: COMMERCIAL

## 2024-02-02 DIAGNOSIS — C15.9 PRIMARY ESOPHAGEAL SQUAMOUS CELL CARCINOMA (H): ICD-10-CM

## 2024-02-02 DIAGNOSIS — C10.0 SQUAMOUS CELL CARCINOMA OF VALLECULA (H): ICD-10-CM

## 2024-02-02 LAB
ALBUMIN SERPL BCG-MCNC: 3.2 G/DL (ref 3.5–5.2)
ALP SERPL-CCNC: 110 U/L (ref 40–150)
ALT SERPL W P-5'-P-CCNC: 23 U/L (ref 0–70)
ANION GAP SERPL CALCULATED.3IONS-SCNC: 6 MMOL/L (ref 7–15)
AST SERPL W P-5'-P-CCNC: 35 U/L (ref 0–45)
BASOPHILS # BLD AUTO: 0 10E3/UL (ref 0–0.2)
BASOPHILS NFR BLD AUTO: 0 %
BILIRUB SERPL-MCNC: 0.4 MG/DL
BUN SERPL-MCNC: 12 MG/DL (ref 8–23)
CALCIUM SERPL-MCNC: 9 MG/DL (ref 8.8–10.2)
CHLORIDE SERPL-SCNC: 104 MMOL/L (ref 98–107)
CREAT SERPL-MCNC: 0.77 MG/DL (ref 0.67–1.17)
DEPRECATED HCO3 PLAS-SCNC: 28 MMOL/L (ref 22–29)
EGFRCR SERPLBLD CKD-EPI 2021: >90 ML/MIN/1.73M2
EOSINOPHIL # BLD AUTO: 0 10E3/UL (ref 0–0.7)
EOSINOPHIL NFR BLD AUTO: 0 %
ERYTHROCYTE [DISTWIDTH] IN BLOOD BY AUTOMATED COUNT: 19.8 % (ref 10–15)
GLUCOSE SERPL-MCNC: 97 MG/DL (ref 70–99)
HCT VFR BLD AUTO: 23.8 % (ref 40–53)
HGB BLD-MCNC: 7.7 G/DL (ref 13.3–17.7)
IMM GRANULOCYTES # BLD: 0 10E3/UL
IMM GRANULOCYTES NFR BLD: 1 %
LYMPHOCYTES # BLD AUTO: 0.5 10E3/UL (ref 0.8–5.3)
LYMPHOCYTES NFR BLD AUTO: 19 %
MCH RBC QN AUTO: 31.4 PG (ref 26.5–33)
MCHC RBC AUTO-ENTMCNC: 32.4 G/DL (ref 31.5–36.5)
MCV RBC AUTO: 97 FL (ref 78–100)
MONOCYTES # BLD AUTO: 0.4 10E3/UL (ref 0–1.3)
MONOCYTES NFR BLD AUTO: 13 %
NEUTROPHILS # BLD AUTO: 2 10E3/UL (ref 1.6–8.3)
NEUTROPHILS NFR BLD AUTO: 67 %
NRBC # BLD AUTO: 0 10E3/UL
NRBC BLD AUTO-RTO: 0 /100
PLATELET # BLD AUTO: 165 10E3/UL (ref 150–450)
POTASSIUM SERPL-SCNC: 4.1 MMOL/L (ref 3.4–5.3)
PROT SERPL-MCNC: 6.2 G/DL (ref 6.4–8.3)
RBC # BLD AUTO: 2.45 10E6/UL (ref 4.4–5.9)
SODIUM SERPL-SCNC: 138 MMOL/L (ref 135–145)
WBC # BLD AUTO: 2.9 10E3/UL (ref 4–11)

## 2024-02-02 PROCEDURE — 85004 AUTOMATED DIFF WBC COUNT: CPT

## 2024-02-02 PROCEDURE — 82040 ASSAY OF SERUM ALBUMIN: CPT

## 2024-02-02 PROCEDURE — 36415 COLL VENOUS BLD VENIPUNCTURE: CPT

## 2024-02-02 NOTE — NURSING NOTE
Chief Complaint   Patient presents with    Labs Only    Blood Draw     Labs drawn via  by RN.     Labs collected from venipuncture by RN.     Grace Rios RN

## 2024-02-11 ENCOUNTER — APPOINTMENT (OUTPATIENT)
Dept: CT IMAGING | Facility: CLINIC | Age: 69
DRG: 312 | End: 2024-02-11
Attending: EMERGENCY MEDICINE
Payer: COMMERCIAL

## 2024-02-11 ENCOUNTER — APPOINTMENT (OUTPATIENT)
Dept: CARDIOLOGY | Facility: CLINIC | Age: 69
DRG: 312 | End: 2024-02-11
Payer: COMMERCIAL

## 2024-02-11 ENCOUNTER — HOSPITAL ENCOUNTER (EMERGENCY)
Facility: CLINIC | Age: 69
Discharge: HOME | DRG: 312 | End: 2024-02-11
Attending: EMERGENCY MEDICINE | Admitting: INTERNAL MEDICINE
Payer: COMMERCIAL

## 2024-02-11 ENCOUNTER — APPOINTMENT (OUTPATIENT)
Dept: MRI IMAGING | Facility: CLINIC | Age: 69
DRG: 312 | End: 2024-02-11
Attending: EMERGENCY MEDICINE
Payer: COMMERCIAL

## 2024-02-11 VITALS
BODY MASS INDEX: 21.37 KG/M2 | RESPIRATION RATE: 18 BRPM | SYSTOLIC BLOOD PRESSURE: 130 MMHG | HEIGHT: 68 IN | OXYGEN SATURATION: 96 % | HEART RATE: 78 BPM | TEMPERATURE: 97.8 F | DIASTOLIC BLOOD PRESSURE: 65 MMHG | WEIGHT: 141 LBS

## 2024-02-11 DIAGNOSIS — C15.9 PRIMARY ESOPHAGEAL SQUAMOUS CELL CARCINOMA (H): Primary | ICD-10-CM

## 2024-02-11 DIAGNOSIS — R55 SYNCOPE AND COLLAPSE: ICD-10-CM

## 2024-02-11 DIAGNOSIS — D50.8 IRON DEFICIENCY ANEMIA SECONDARY TO INADEQUATE DIETARY IRON INTAKE: ICD-10-CM

## 2024-02-11 DIAGNOSIS — C15.9 MALIGNANT NEOPLASM OF ESOPHAGUS, UNSPECIFIED LOCATION (H): ICD-10-CM

## 2024-02-11 DIAGNOSIS — T45.1X5A ANTINEOPLASTIC CHEMOTHERAPY INDUCED PANCYTOPENIA (H): ICD-10-CM

## 2024-02-11 DIAGNOSIS — D61.810 ANTINEOPLASTIC CHEMOTHERAPY INDUCED PANCYTOPENIA (H): ICD-10-CM

## 2024-02-11 LAB
ABO/RH(D): NORMAL
ALBUMIN SERPL BCG-MCNC: 3.5 G/DL (ref 3.5–5.2)
ALBUMIN UR-MCNC: NEGATIVE MG/DL
ALP SERPL-CCNC: 99 U/L (ref 40–150)
ALT SERPL W P-5'-P-CCNC: 12 U/L (ref 0–70)
ANION GAP SERPL CALCULATED.3IONS-SCNC: 7 MMOL/L (ref 7–15)
ANTIBODY SCREEN: NEGATIVE
APPEARANCE UR: CLEAR
AST SERPL W P-5'-P-CCNC: 25 U/L (ref 0–45)
BASOPHILS # BLD AUTO: 0 10E3/UL (ref 0–0.2)
BASOPHILS # BLD AUTO: 0 10E3/UL (ref 0–0.2)
BASOPHILS NFR BLD AUTO: 0 %
BASOPHILS NFR BLD AUTO: 0 %
BILIRUB SERPL-MCNC: 0.4 MG/DL
BILIRUB UR QL STRIP: NEGATIVE
BLD PROD TYP BPU: NORMAL
BLOOD COMPONENT TYPE: NORMAL
BUN SERPL-MCNC: 12.7 MG/DL (ref 8–23)
CALCIUM SERPL-MCNC: 9.1 MG/DL (ref 8.8–10.2)
CHLORIDE SERPL-SCNC: 107 MMOL/L (ref 98–107)
CODING SYSTEM: NORMAL
COLOR UR AUTO: ABNORMAL
CREAT SERPL-MCNC: 0.75 MG/DL (ref 0.67–1.17)
CROSSMATCH: NORMAL
DEPRECATED HCO3 PLAS-SCNC: 26 MMOL/L (ref 22–29)
EGFRCR SERPLBLD CKD-EPI 2021: >90 ML/MIN/1.73M2
EOSINOPHIL # BLD AUTO: 0 10E3/UL (ref 0–0.7)
EOSINOPHIL # BLD AUTO: 0 10E3/UL (ref 0–0.7)
EOSINOPHIL NFR BLD AUTO: 0 %
EOSINOPHIL NFR BLD AUTO: 0 %
ERYTHROCYTE [DISTWIDTH] IN BLOOD BY AUTOMATED COUNT: 19.2 % (ref 10–15)
ERYTHROCYTE [DISTWIDTH] IN BLOOD BY AUTOMATED COUNT: 19.8 % (ref 10–15)
FERRITIN SERPL-MCNC: 365 NG/ML (ref 31–409)
FOLATE SERPL-MCNC: 10.5 NG/ML (ref 4.6–34.8)
GLUCOSE SERPL-MCNC: 103 MG/DL (ref 70–99)
GLUCOSE UR STRIP-MCNC: NEGATIVE MG/DL
HCT VFR BLD AUTO: 20.3 % (ref 40–53)
HCT VFR BLD AUTO: 27.3 % (ref 40–53)
HGB BLD-MCNC: 6.4 G/DL (ref 13.3–17.7)
HGB BLD-MCNC: 8.9 G/DL (ref 13.3–17.7)
HGB UR QL STRIP: NEGATIVE
HOLD SPECIMEN: NORMAL
IMM GRANULOCYTES # BLD: 0 10E3/UL
IMM GRANULOCYTES # BLD: 0 10E3/UL
IMM GRANULOCYTES NFR BLD: 0 %
IMM GRANULOCYTES NFR BLD: 1 %
IRON BINDING CAPACITY (ROCHE): 174 UG/DL (ref 240–430)
IRON SATN MFR SERPL: 19 % (ref 15–46)
IRON SERPL-MCNC: 33 UG/DL (ref 61–157)
ISSUE DATE AND TIME: NORMAL
KETONES UR STRIP-MCNC: NEGATIVE MG/DL
LEUKOCYTE ESTERASE UR QL STRIP: NEGATIVE
LVEF ECHO: NORMAL
LYMPHOCYTES # BLD AUTO: 0.8 10E3/UL (ref 0.8–5.3)
LYMPHOCYTES # BLD AUTO: 0.8 10E3/UL (ref 0.8–5.3)
LYMPHOCYTES NFR BLD AUTO: 13 %
LYMPHOCYTES NFR BLD AUTO: 18 %
MAGNESIUM SERPL-MCNC: 2 MG/DL (ref 1.7–2.3)
MCH RBC QN AUTO: 31.4 PG (ref 26.5–33)
MCH RBC QN AUTO: 31.6 PG (ref 26.5–33)
MCHC RBC AUTO-ENTMCNC: 31.5 G/DL (ref 31.5–36.5)
MCHC RBC AUTO-ENTMCNC: 32.6 G/DL (ref 31.5–36.5)
MCV RBC AUTO: 100 FL (ref 78–100)
MCV RBC AUTO: 97 FL (ref 78–100)
MONOCYTES # BLD AUTO: 0.4 10E3/UL (ref 0–1.3)
MONOCYTES # BLD AUTO: 0.4 10E3/UL (ref 0–1.3)
MONOCYTES NFR BLD AUTO: 6 %
MONOCYTES NFR BLD AUTO: 8 %
MUCOUS THREADS #/AREA URNS LPF: PRESENT /LPF
NEUTROPHILS # BLD AUTO: 3.4 10E3/UL (ref 1.6–8.3)
NEUTROPHILS # BLD AUTO: 4.9 10E3/UL (ref 1.6–8.3)
NEUTROPHILS NFR BLD AUTO: 74 %
NEUTROPHILS NFR BLD AUTO: 80 %
NITRATE UR QL: NEGATIVE
NRBC # BLD AUTO: 0 10E3/UL
NRBC # BLD AUTO: 0 10E3/UL
NRBC BLD AUTO-RTO: 0 /100
NRBC BLD AUTO-RTO: 0 /100
PH UR STRIP: 7 [PH] (ref 5–7)
PHOSPHATE SERPL-MCNC: 3.4 MG/DL (ref 2.5–4.5)
PLATELET # BLD AUTO: 171 10E3/UL (ref 150–450)
PLATELET # BLD AUTO: 177 10E3/UL (ref 150–450)
POTASSIUM SERPL-SCNC: 4.2 MMOL/L (ref 3.4–5.3)
PROT SERPL-MCNC: 6.6 G/DL (ref 6.4–8.3)
RBC # BLD AUTO: 2.04 10E6/UL (ref 4.4–5.9)
RBC # BLD AUTO: 2.82 10E6/UL (ref 4.4–5.9)
RBC URINE: 0 /HPF
RETICS # AUTO: 0.05 10E6/UL (ref 0.03–0.1)
RETICS/RBC NFR AUTO: 1.8 % (ref 0.5–2)
SARS-COV-2 RNA RESP QL NAA+PROBE: NEGATIVE
SODIUM SERPL-SCNC: 140 MMOL/L (ref 135–145)
SP GR UR STRIP: 1.01 (ref 1–1.03)
SPECIMEN EXPIRATION DATE: NORMAL
UNIT ABO/RH: NORMAL
UNIT NUMBER: NORMAL
UNIT STATUS: NORMAL
UNIT TYPE ISBT: 5100
UROBILINOGEN UR STRIP-MCNC: 3 MG/DL
VIT B12 SERPL-MCNC: 606 PG/ML (ref 232–1245)
WBC # BLD AUTO: 4.5 10E3/UL (ref 4–11)
WBC # BLD AUTO: 6.1 10E3/UL (ref 4–11)
WBC URINE: 0 /HPF

## 2024-02-11 PROCEDURE — 999N000147 HC STATISTIC PT IP EVAL DEFER

## 2024-02-11 PROCEDURE — 93306 TTE W/DOPPLER COMPLETE: CPT

## 2024-02-11 PROCEDURE — 96361 HYDRATE IV INFUSION ADD-ON: CPT | Performed by: EMERGENCY MEDICINE

## 2024-02-11 PROCEDURE — 36415 COLL VENOUS BLD VENIPUNCTURE: CPT | Performed by: EMERGENCY MEDICINE

## 2024-02-11 PROCEDURE — 85025 COMPLETE CBC W/AUTO DIFF WBC: CPT | Performed by: EMERGENCY MEDICINE

## 2024-02-11 PROCEDURE — 99207 PR NO BILLABLE SERVICE THIS VISIT: CPT | Mod: FS | Performed by: INTERNAL MEDICINE

## 2024-02-11 PROCEDURE — 85045 AUTOMATED RETICULOCYTE COUNT: CPT | Performed by: PHYSICIAN ASSISTANT

## 2024-02-11 PROCEDURE — 96360 HYDRATION IV INFUSION INIT: CPT | Mod: 59 | Performed by: EMERGENCY MEDICINE

## 2024-02-11 PROCEDURE — 84100 ASSAY OF PHOSPHORUS: CPT | Performed by: EMERGENCY MEDICINE

## 2024-02-11 PROCEDURE — 36415 COLL VENOUS BLD VENIPUNCTURE: CPT | Performed by: PHYSICIAN ASSISTANT

## 2024-02-11 PROCEDURE — 258N000003 HC RX IP 258 OP 636: Performed by: EMERGENCY MEDICINE

## 2024-02-11 PROCEDURE — 80053 COMPREHEN METABOLIC PANEL: CPT | Performed by: EMERGENCY MEDICINE

## 2024-02-11 PROCEDURE — 70551 MRI BRAIN STEM W/O DYE: CPT | Mod: 26 | Performed by: STUDENT IN AN ORGANIZED HEALTH CARE EDUCATION/TRAINING PROGRAM

## 2024-02-11 PROCEDURE — 82728 ASSAY OF FERRITIN: CPT | Performed by: PHYSICIAN ASSISTANT

## 2024-02-11 PROCEDURE — 82607 VITAMIN B-12: CPT | Performed by: PHYSICIAN ASSISTANT

## 2024-02-11 PROCEDURE — 99285 EMERGENCY DEPT VISIT HI MDM: CPT | Mod: 25 | Performed by: EMERGENCY MEDICINE

## 2024-02-11 PROCEDURE — 99207 PR APP CREDIT; MD BILLING SHARED VISIT: CPT | Performed by: PHYSICIAN ASSISTANT

## 2024-02-11 PROCEDURE — 85060 BLOOD SMEAR INTERPRETATION: CPT | Performed by: PATHOLOGY

## 2024-02-11 PROCEDURE — 87635 SARS-COV-2 COVID-19 AMP PRB: CPT | Performed by: EMERGENCY MEDICINE

## 2024-02-11 PROCEDURE — 99222 1ST HOSP IP/OBS MODERATE 55: CPT | Mod: GC | Performed by: INTERNAL MEDICINE

## 2024-02-11 PROCEDURE — 85025 COMPLETE CBC W/AUTO DIFF WBC: CPT | Performed by: PHYSICIAN ASSISTANT

## 2024-02-11 PROCEDURE — 120N000002 HC R&B MED SURG/OB UMMC

## 2024-02-11 PROCEDURE — P9016 RBC LEUKOCYTES REDUCED: HCPCS | Performed by: EMERGENCY MEDICINE

## 2024-02-11 PROCEDURE — 70450 CT HEAD/BRAIN W/O DYE: CPT | Mod: 26 | Performed by: RADIOLOGY

## 2024-02-11 PROCEDURE — 70450 CT HEAD/BRAIN W/O DYE: CPT

## 2024-02-11 PROCEDURE — 250N000011 HC RX IP 250 OP 636: Performed by: PHYSICIAN ASSISTANT

## 2024-02-11 PROCEDURE — 81001 URINALYSIS AUTO W/SCOPE: CPT | Performed by: EMERGENCY MEDICINE

## 2024-02-11 PROCEDURE — 70551 MRI BRAIN STEM W/O DYE: CPT

## 2024-02-11 PROCEDURE — 86900 BLOOD TYPING SEROLOGIC ABO: CPT | Performed by: EMERGENCY MEDICINE

## 2024-02-11 PROCEDURE — 93010 ELECTROCARDIOGRAM REPORT: CPT | Performed by: EMERGENCY MEDICINE

## 2024-02-11 PROCEDURE — 83735 ASSAY OF MAGNESIUM: CPT | Performed by: EMERGENCY MEDICINE

## 2024-02-11 PROCEDURE — 82746 ASSAY OF FOLIC ACID SERUM: CPT | Performed by: PHYSICIAN ASSISTANT

## 2024-02-11 PROCEDURE — 36430 TRANSFUSION BLD/BLD COMPNT: CPT

## 2024-02-11 PROCEDURE — 93005 ELECTROCARDIOGRAM TRACING: CPT | Performed by: EMERGENCY MEDICINE

## 2024-02-11 PROCEDURE — 99253 IP/OBS CNSLTJ NEW/EST LOW 45: CPT | Mod: GC | Performed by: STUDENT IN AN ORGANIZED HEALTH CARE EDUCATION/TRAINING PROGRAM

## 2024-02-11 PROCEDURE — 86923 COMPATIBILITY TEST ELECTRIC: CPT | Performed by: EMERGENCY MEDICINE

## 2024-02-11 PROCEDURE — 258N000003 HC RX IP 258 OP 636: Performed by: PHYSICIAN ASSISTANT

## 2024-02-11 PROCEDURE — 250N000013 HC RX MED GY IP 250 OP 250 PS 637

## 2024-02-11 PROCEDURE — 93306 TTE W/DOPPLER COMPLETE: CPT | Mod: 26 | Performed by: INTERNAL MEDICINE

## 2024-02-11 PROCEDURE — 83550 IRON BINDING TEST: CPT | Performed by: PHYSICIAN ASSISTANT

## 2024-02-11 RX ORDER — OXYCODONE HCL 5 MG/5 ML
5 SOLUTION, ORAL ORAL EVERY 4 HOURS PRN
Status: DISCONTINUED | OUTPATIENT
Start: 2024-02-11 | End: 2024-02-11 | Stop reason: HOSPADM

## 2024-02-11 RX ORDER — DOXEPIN HYDROCHLORIDE 10 MG/ML
20 SOLUTION ORAL EVERY 4 HOURS
Status: DISCONTINUED | OUTPATIENT
Start: 2024-02-11 | End: 2024-02-11 | Stop reason: HOSPADM

## 2024-02-11 RX ORDER — SUCRALFATE ORAL 1 G/10ML
1 SUSPENSION ORAL 2 TIMES DAILY
Status: DISCONTINUED | OUTPATIENT
Start: 2024-02-11 | End: 2024-02-11 | Stop reason: HOSPADM

## 2024-02-11 RX ORDER — DOCUSATE SODIUM 100 MG/1
100 CAPSULE, LIQUID FILLED ORAL 2 TIMES DAILY PRN
Status: DISCONTINUED | OUTPATIENT
Start: 2024-02-11 | End: 2024-02-11 | Stop reason: HOSPADM

## 2024-02-11 RX ORDER — ACETAMINOPHEN 500 MG
500-1000 TABLET ORAL EVERY 8 HOURS PRN
Status: DISCONTINUED | OUTPATIENT
Start: 2024-02-11 | End: 2024-02-11 | Stop reason: HOSPADM

## 2024-02-11 RX ORDER — SODIUM CHLORIDE, SODIUM LACTATE, POTASSIUM CHLORIDE, CALCIUM CHLORIDE 600; 310; 30; 20 MG/100ML; MG/100ML; MG/100ML; MG/100ML
1000 INJECTION, SOLUTION INTRAVENOUS CONTINUOUS
Status: DISCONTINUED | OUTPATIENT
Start: 2024-02-11 | End: 2024-02-11 | Stop reason: HOSPADM

## 2024-02-11 RX ORDER — ALBUTEROL SULFATE 90 UG/1
1-2 AEROSOL, METERED RESPIRATORY (INHALATION)
Status: CANCELLED
Start: 2024-02-12

## 2024-02-11 RX ORDER — CLOPIDOGREL BISULFATE 75 MG/1
75 TABLET ORAL DAILY
Status: DISCONTINUED | OUTPATIENT
Start: 2024-02-11 | End: 2024-02-11 | Stop reason: HOSPADM

## 2024-02-11 RX ORDER — LIDOCAINE 40 MG/G
CREAM TOPICAL
Status: DISCONTINUED | OUTPATIENT
Start: 2024-02-11 | End: 2024-02-11 | Stop reason: HOSPADM

## 2024-02-11 RX ORDER — DIPHENHYDRAMINE HYDROCHLORIDE 50 MG/ML
50 INJECTION INTRAMUSCULAR; INTRAVENOUS
Status: DISCONTINUED | OUTPATIENT
Start: 2024-02-11 | End: 2024-02-11 | Stop reason: HOSPADM

## 2024-02-11 RX ORDER — CALCIUM CARBONATE 500 MG/1
1000 TABLET, CHEWABLE ORAL 4 TIMES DAILY PRN
Status: DISCONTINUED | OUTPATIENT
Start: 2024-02-11 | End: 2024-02-11 | Stop reason: HOSPADM

## 2024-02-11 RX ORDER — AMOXICILLIN 250 MG
2 CAPSULE ORAL 2 TIMES DAILY PRN
Status: DISCONTINUED | OUTPATIENT
Start: 2024-02-11 | End: 2024-02-11

## 2024-02-11 RX ORDER — BUPRENORPHINE 10 UG/H
1 PATCH TRANSDERMAL WEEKLY
Status: DISCONTINUED | OUTPATIENT
Start: 2024-02-11 | End: 2024-02-11 | Stop reason: HOSPADM

## 2024-02-11 RX ORDER — ALBUTEROL SULFATE 0.83 MG/ML
2.5 SOLUTION RESPIRATORY (INHALATION)
Status: CANCELLED | OUTPATIENT
Start: 2024-02-12

## 2024-02-11 RX ORDER — HEPARIN SODIUM (PORCINE) LOCK FLUSH IV SOLN 100 UNIT/ML 100 UNIT/ML
5 SOLUTION INTRAVENOUS
Status: CANCELLED | OUTPATIENT
Start: 2024-02-12

## 2024-02-11 RX ORDER — GABAPENTIN 300 MG/1
900 CAPSULE ORAL 3 TIMES DAILY
Status: DISCONTINUED | OUTPATIENT
Start: 2024-02-11 | End: 2024-02-11

## 2024-02-11 RX ORDER — DIPHENHYDRAMINE HYDROCHLORIDE 50 MG/ML
50 INJECTION INTRAMUSCULAR; INTRAVENOUS
Status: CANCELLED
Start: 2024-02-12

## 2024-02-11 RX ORDER — METHYLPREDNISOLONE SODIUM SUCCINATE 125 MG/2ML
125 INJECTION, POWDER, LYOPHILIZED, FOR SOLUTION INTRAMUSCULAR; INTRAVENOUS
Status: DISCONTINUED | OUTPATIENT
Start: 2024-02-11 | End: 2024-02-11 | Stop reason: HOSPADM

## 2024-02-11 RX ORDER — DOXEPIN HYDROCHLORIDE 10 MG/ML
20 SOLUTION ORAL EVERY 4 HOURS
Qty: 118 ML | Refills: 0 | Status: SHIPPED | OUTPATIENT
Start: 2024-02-11 | End: 2024-02-16

## 2024-02-11 RX ORDER — EPINEPHRINE 1 MG/ML
0.3 INJECTION, SOLUTION, CONCENTRATE INTRAVENOUS EVERY 5 MIN PRN
Status: CANCELLED | OUTPATIENT
Start: 2024-02-12

## 2024-02-11 RX ORDER — MEPERIDINE HYDROCHLORIDE 25 MG/ML
25 INJECTION INTRAMUSCULAR; INTRAVENOUS; SUBCUTANEOUS EVERY 30 MIN PRN
Status: CANCELLED | OUTPATIENT
Start: 2024-02-12

## 2024-02-11 RX ORDER — AMOXICILLIN 250 MG
1 CAPSULE ORAL 2 TIMES DAILY PRN
Status: DISCONTINUED | OUTPATIENT
Start: 2024-02-11 | End: 2024-02-11

## 2024-02-11 RX ORDER — FAMOTIDINE 20 MG/1
20 TABLET, FILM COATED ORAL 2 TIMES DAILY
Status: DISCONTINUED | OUTPATIENT
Start: 2024-02-11 | End: 2024-02-11 | Stop reason: HOSPADM

## 2024-02-11 RX ORDER — POLYETHYLENE GLYCOL 3350 17 G/17G
17 POWDER, FOR SOLUTION ORAL DAILY
Status: DISCONTINUED | OUTPATIENT
Start: 2024-02-11 | End: 2024-02-11 | Stop reason: HOSPADM

## 2024-02-11 RX ORDER — METHYLPREDNISOLONE SODIUM SUCCINATE 125 MG/2ML
125 INJECTION, POWDER, LYOPHILIZED, FOR SOLUTION INTRAMUSCULAR; INTRAVENOUS
Status: CANCELLED
Start: 2024-02-12

## 2024-02-11 RX ORDER — DIPHENHYDRAMINE HYDROCHLORIDE AND LIDOCAINE HYDROCHLORIDE AND ALUMINUM HYDROXIDE AND MAGNESIUM HYDRO
15 KIT EVERY 4 HOURS
Status: DISCONTINUED | OUTPATIENT
Start: 2024-02-11 | End: 2024-02-11 | Stop reason: HOSPADM

## 2024-02-11 RX ORDER — HEPARIN SODIUM,PORCINE 10 UNIT/ML
5-20 VIAL (ML) INTRAVENOUS DAILY PRN
Status: CANCELLED | OUTPATIENT
Start: 2024-02-12

## 2024-02-11 RX ORDER — ROSUVASTATIN CALCIUM 40 MG/1
40 TABLET, COATED ORAL DAILY
Status: DISCONTINUED | OUTPATIENT
Start: 2024-02-11 | End: 2024-02-11 | Stop reason: HOSPADM

## 2024-02-11 RX ORDER — ASPIRIN 81 MG/1
81 TABLET ORAL DAILY
Status: DISCONTINUED | OUTPATIENT
Start: 2024-02-11 | End: 2024-02-11 | Stop reason: HOSPADM

## 2024-02-11 RX ORDER — DIPHENHYDRAMINE HYDROCHLORIDE AND LIDOCAINE HYDROCHLORIDE AND ALUMINUM HYDROXIDE AND MAGNESIUM HYDRO
10 KIT EVERY 6 HOURS PRN
Status: DISCONTINUED | OUTPATIENT
Start: 2024-02-11 | End: 2024-02-11

## 2024-02-11 RX ADMIN — SODIUM CHLORIDE, POTASSIUM CHLORIDE, SODIUM LACTATE AND CALCIUM CHLORIDE 1000 ML: 600; 310; 30; 20 INJECTION, SOLUTION INTRAVENOUS at 04:06

## 2024-02-11 RX ADMIN — DOXEPIN HYDROCHLORIDE 20 MG: 10 SOLUTION ORAL at 11:26

## 2024-02-11 RX ADMIN — SODIUM CHLORIDE, POTASSIUM CHLORIDE, SODIUM LACTATE AND CALCIUM CHLORIDE 1000 ML: 600; 310; 30; 20 INJECTION, SOLUTION INTRAVENOUS at 02:57

## 2024-02-11 RX ADMIN — ASPIRIN 81 MG: 81 TABLET ORAL at 08:57

## 2024-02-11 RX ADMIN — BUPRENORPHINE 1 PATCH: 10 PATCH, EXTENDED RELEASE TRANSDERMAL at 08:58

## 2024-02-11 RX ADMIN — POLYETHYLENE GLYCOL 3350 17 G: 17 POWDER, FOR SOLUTION ORAL at 08:58

## 2024-02-11 RX ADMIN — IRON SUCROSE 200 MG: 20 INJECTION, SOLUTION INTRAVENOUS at 11:51

## 2024-02-11 RX ADMIN — CLOPIDOGREL BISULFATE 75 MG: 75 TABLET ORAL at 08:57

## 2024-02-11 RX ADMIN — SERTRALINE HYDROCHLORIDE 100 MG: 50 TABLET ORAL at 08:57

## 2024-02-11 RX ADMIN — FAMOTIDINE 20 MG: 20 TABLET ORAL at 08:57

## 2024-02-11 RX ADMIN — DIPHENHYDRAMINE HYDROCHLORIDE AND LIDOCAINE HYDROCHLORIDE AND ALUMINUM HYDROXIDE AND MAGNESIUM HYDRO 15 ML: KIT at 11:26

## 2024-02-11 RX ADMIN — SUCRALFATE 1 G: 1 SUSPENSION ORAL at 08:58

## 2024-02-11 RX ADMIN — ROSUVASTATIN CALCIUM 40 MG: 40 TABLET, COATED ORAL at 08:58

## 2024-02-11 ASSESSMENT — ACTIVITIES OF DAILY LIVING (ADL)
ADLS_ACUITY_SCORE: 35

## 2024-02-11 NOTE — DISCHARGE SUMMARY
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      Unresulted Labs Ordered in the Past 30 Days of this Admission       Date and Time Order Name Status Description    2/11/2024  9:13 AM Bld morphology pathology review In process         These results will be followed up by Oncology    Discharge Disposition   Discharged to home  Condition at discharge: Stable    Hospital Course   Lopez Hogue is a 68 year old man with a history of head/neck SCC, esophageal cancer s/p XRT and chemotherapy, CAD s/p PCI (3/2023), who was admitted to Medicine Observation with syncope and severe anemia.     Syncope  Hx of orthostatic hypotension  Presents after three recent syncopal events at home (occurred over the course of ~1 week). Notes he was likely dehydrated at home. Syncopal episodes likely triggered by worsening orthostatic hypotension in setting of dehydration and acute on chronic anemia. Felt better with IVF and blood transfusion, was ambulating without issue. EKG and echocardiogram stable compared to prior. Low suspicion for acute cardiopulmonary process. Encouraged continued oral fluid intake.     Acute on chronic anemia: Hgb 6.4 on presentation. Folate, B12 wnl. Iron studies c/w iron deficiency. Received 1 unit PRBC and 1 dose of IV Venofer. Anemia secondary to  chemotherapy (now complete), cancer, and iron deficiency. No evidence of acute blood loss.    - Continue IV iron sucrose as outpatient to complete a total of 5 infusions, OP therapy plan ordered   - Repeat CBC in 2-3 days    Left posterior fossa lesion: Head CT obtained for syncope work-up, initial read was concerning for left mid sushila infarct.  Brain MRI negative for stroke, did find a 2.2 cm CSF signal lesion in the left posterior fossa representing arachnoid cyst. Stroke Neurology consulted, nothing additional to do.    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 Odynophagia improving overall, no swallowing issues. Continued PTA pain regimen.       Moderate aortic stenosis: Stable on echocardiogram today. Will need continued outpatient surveillance.     CAD s/p PCI w/ KAYDEN x2 03/2023: Continue PTA ASA, Plavix, statin.     GERD: continue pta pepcid    DIONICIO/MDD: continue pta sertraline           Consultations This Hospital Stay   NEUROLOGY STROKE ADULT IP CONSULT  PHYSICAL THERAPY ADULT IP CONSULT  OCCUPATIONAL THERAPY ADULT IP CONSULT  NURSING TO CONSULT FOR VASCULAR ACCESS CARE IP CONSULT  NURSING TO CONSULT FOR VASCULAR ACCESS CARE IP CONSULT    Code Status   Full Code    Time Spent on this Encounter   I, Deb You PA-C, personally saw the patient today and spent greater than 30 minutes discharging this patient.       Deb You PA-C  [facility] EMERGENCY DEPARTMENT  [address] [address]  Phone: [phone]  ______________________________________________________________________    Physical Exam   Vital Signs: Temp: 97.8  F (36.6  C) Temp src: Oral BP: 130/65 Pulse: 78   Resp: 18 SpO2: 96 % O2 Device: None (Room air)    Weight: 141 lbs 0 oz  Constitutional: Awake and alert, in no apparent distress.   Eyes: Sclera clear, anicteric   Respiratory:  Breathing comfortably on room air. CTAB  Cardiovascular:  RRR, normal S1/S2. 3+ systolic murmur.   GI: Soft, non-tender, non-distended. Normoactive bowel sounds.   Skin:  Good color. No jaundice. No visible rashes, lesions, or bruising of concern.   Neurologic: Alert and oriented.         Primary Care Physician   Madi Ramos    Discharge Orders      CBC with platelets     Reason for your hospital stay    Dear Lopez Hogue,    You were hospitalized at [facility] with syncope (fainting episodes) and low hemoglobin. You received a blood transfusion and were started on IV iron for low iron levels. You should continue to improve but if you develop fever, shortness of breath, light headedness, chest pain or otherwise worsen please seek medical attention.      Take care!    Deb You PA-C   Hospitalist Service     Activity    Your activity upon discharge: activity as tolerated     Adult [facility] Follow-up and recommended labs and tests    I ordered a series of 4 IV iron infusions for you. These can be scheduled at the infusion center of your choice. I recommend having 2-3 infusions per week until complete.     Have your blood counts rechecked in 2-3 days, I ordered this lab for you (CBC) and the results will be forwarded to Dr. Alva. Have the lab drawn at a [place] lab of your choice.     Follow-up with your Oncology and Palliative Care teams as previously planned.     Appointments on [place] and/or [facility] (with [facility] or [facility] provider or service). Call [phone] if you haven't heard regarding these appointments within 7 days of discharge.     Diet    Follow this diet upon discharge: regular diet       Significant Results and Procedures   Results for orders placed or performed during the hospital encounter of 02/11/24   Head CT w/o contrast    Narrative    EXAM: CT HEAD W/O CONTRAST  LOCATION: [facility]  DATE:  2/11/2024    INDICATION: Syncope.  COMPARISON: MRI brain dated 09/29/2015.  TECHNIQUE: Routine CT Head without IV contrast. Multiplanar reformats. Dose reduction techniques were used.    FINDINGS:  INTRACRANIAL CONTENTS: No acute hemorrhage, abnormal extra-axial fluid collection or midline shift identified. Small patchy hypodensity involving the left mid sushila (image 10 series 2). No definite acute large territory infarction. Mild presumed chronic   small vessel ischemic changes. Mild generalized volume loss. No hydrocephalus.     VISUALIZED ORBITS/SINUSES/MASTOIDS: No intraorbital abnormality. No paranasal sinus mucosal disease. No middle ear or mastoid effusion.    BONES/SOFT TISSUES: No acute abnormality.      Impression    IMPRESSION:  1.  Small patchy hypodensity involving the left mid sushila, nonspecific, but possibly reflecting a small age-indeterminate lacunar type infarction. This finding was not seen on brain MRI dated 09/29/2015. Consider MRI for further assessment.  2.  No acute hemorrhage, abnormal extra-axial fluid collection or midline shift.  3.  Brain atrophy and presumed chronic microvascular ischemic changes as above.   MR Brain w/o Contrast    Narrative    MR BRAIN W/O CONTRAST 2/11/2024 10:24 AM    Provided History: CT with possible stroke    Comparison:  CT earlier same day.     Technique: Sagittal T1-weighted, coronal T2-weighted, axial T2 FLAIR,  axial susceptibility weighted, and axial diffusion-weighted with ADC  map images of the brain were obtained without intravenous contrast.    Findings: No intracranial mass lesion, mass effect, or midline shift.  The ventricles and sulci are normal for age. 2.2 x 1.7 cm CSF signal  cystic lesion located in the left posterior fossa. Mild nonspecific  periventricular and subcortical white matter hypoattenuation. No  abnormality of reduced diffusion.  Normal intravascular flow voids.      Impression    Impression:   1. No acute intracranial pathology.  2.  Mild leukoaraiosis.  3. Within the left posterior fossa is a 2.2 cm CSF signal lesion  presumably representing an arachnoid cyst.    I have personally reviewed the examination and initial interpretation  and I agree with the findings.    BLOSSOM BROWNING MD         SYSTEM ID:  [id number]   Echo Complete     Value    LVEF  55-60%    Narrative    [id number]  [id number]  [id number]  [id number]^ERIC^MEÑO     [facility],[place]  Echocardiography Laboratory  [address] [place] [address]     Name: RAMIRO ZIMMER  MRN: [id number]  : 1955  Study Date: 2024 09:41 AM  Age: 68 yrs  Gender: Male  Patient Location: [facility]  Reason For Study: Syncope and Collapse  Ordering Physician: MEÑO REYES  Performed By: Shanna Mayes RDCS     BSA: 1.8 m2  Height: 68 in  Weight: 141 lb  HR: 73  BP: 128/72 mmHg  ______________________________________________________________________________  Procedure  Complete Portable Echo Adult. Echocardiogram with two-dimensional, color and  spectral Doppler performed.  ______________________________________________________________________________  Interpretation Summary  Global and regional left ventricular function is normal with an EF of 55-60%.  Global right ventricular function is normal.  Moderate aortic stenosis is present. The peak aortic velocity is 3.8 m/sec,  mean 29 mmHg, valve area 1.1 cm2. DVI = 0.31, stroke volume is normal  Estimated mean right atrial pressure is normal.  No pericardial effusion is present.  ______________________________________________________________________________  Left Ventricle  Left ventricular size is normal. Left ventricular wall thickness is normal.  Global and regional left ventricular function is normal with an EF of 55-60%.     Right Ventricle  The right ventricle is normal size. Global right ventricular function is  normal.     Atria  Both atria appear normal.     Mitral Valve  The mitral valve is normal.      Aortic Valve  The aortic valve is tricuspid. Moderate aortic stenosis is present. The peak  aortic velocity is 3.8 m/sec. The mean gradient across the aortic valve is 29  mmHg. The calculated aortic valve are is 1.1 cm^2. DVI = 0.31, stroke volume  is normal.     Tricuspid Valve  Trace to mild tricuspid insufficiency is present. The peak velocity of the  tricuspid regurgitant jet is not obtainable. Pulmonary artery systolic  pressure cannot be assessed.     Pulmonic Valve  The pulmonic valve is normal.     Vessels  The aorta root is normal. The inferior vena cava is normal. Estimated mean  right atrial pressure is normal.     Pericardium  No pericardial effusion is present.     Compared to Previous Study  This study was compared with the study from  . Aortic valve gradients  are higher, AS is moderate.  ______________________________________________________________________________  MMode/2D Measurements & Calculations     LVOT diam: 2.3 cm  LVOT area: 4.0 cm2     Doppler Measurements & Calculations  MV E max aman: 99.0 cm/sec  MV A max aman: 113.2 cm/sec  MV E/A: 0.87  MV dec slope: 334.4 cm/sec2  MV dec time: 0.30 sec  Ao V2 max: 382.4 cm/sec  Ao max P.5 mmHg  Ao V2 mean: 250.9 cm/sec  Ao mean P.0 mmHg  Ao V2 VTI: 84.6 cm  DEVAN(I,D): 1.1 cm2  DEVAN(V,D): 1.2 cm2  LV V1 max P.5 mmHg  LV V1 max: 117.4 cm/sec  LV V1 VTI: 23.2 cm  SV(LVOT): 93.4 ml  SI(LVOT): 53.0 ml/m2  AV Aman Ratio (DI): 0.31  DEVAN Index (cm2/m2): 0.63  E/E' av.8  Lateral E/e': 8.6  Medial E/e': 9.1     ______________________________________________________________________________  Report approved by: Bree Jarrett 2024 12:15 PM             Discharge Medications   Current Discharge Medication List        CONTINUE these medications which have CHANGED    Details   doxepin (SINEQUAN) 10 MG/ML (HIGH CONC) solution Take 2 mLs (20 mg) by mouth every 4 hours  Qty: 118 mL, Refills: 0    Associated Diagnoses: Primary esophageal  squamous cell carcinoma (H)           CONTINUE these medications which have NOT CHANGED    Details   acetaminophen (TYLENOL) 500 MG tablet Take 500-1,000 mg by mouth every 8 hours as needed for fever or pain      aspirin (ASA) 81 MG EC tablet Take 1 tablet (81 mg) by mouth daily Start tomorrow.  Qty: 30 tablet, Refills: 3    Associated Diagnoses: Unstable angina (H)      buprenorphine (BUTRANS) 10 MCG/HR WK patch Place 1 patch onto the skin every 7 days  Qty: 4 patch, Refills: 0    Associated Diagnoses: Primary esophageal squamous cell carcinoma (H); Squamous cell carcinoma of vallecula (H); Mucositis; Neoplasm related pain      chlorhexidine (PERIDEX) 0.12 % solution SWISH AND SPIT 15 ML BY MOUTH TWICE DAILY AFTER BRUSHING. NOTHING BY MOUTH FOR 30 MINUTES AFTERWARDS      clopidogrel (PLAVIX) 75 MG tablet Take 1 tablet (75 mg) by mouth daily  Qty: 90 tablet, Refills: 2    Associated Diagnoses: Unstable angina (H)      cyanocobalamin (VITAMIN B-12) 1000 MCG tablet Take 1 tablet (1,000 mcg) by mouth every evening  Qty: 30 tablet, Refills: 0    Associated Diagnoses: Squamous cell carcinoma of vallecula (H)      famotidine (PEPCID) 20 MG tablet Take 1 tablet (20 mg) by mouth 2 times daily  Qty: 60 tablet, Refills: 3    Associated Diagnoses: Gastroesophageal reflux disease, unspecified whether esophagitis present      magic mouthwash suspension, diphenhydrAMINE, lidocaine, aluminum-magnesium & simethicone, (FIRST-MOUTHWASH BLM) compounding kit Swish and swallow 15 mLs in mouth every 4 hours Take with doxepin solution.  Qty: 237 mL, Refills: 0    Associated Diagnoses: Primary esophageal squamous cell carcinoma (H)      nitroGLYcerin (NITROSTAT) 0.4 MG sublingual tablet PLACE 1 TABLET UNDER THE TONGUE EVERY 5 MINUTES FOR CHEST PAIN FOR 3 DOSES. IF SYMPTOMS PERSIST 5 MINUTES AFTER 1ST DOSE CALL 911.  Qty: 25 tablet, Refills: 1    Associated Diagnoses: Coronary artery disease involving native coronary artery of native heart  with unstable angina pectoris (H)      oxyCODONE (ROXICODONE) 5 MG/5ML solution Take 5 mg by mouth every 4 hours as needed for severe pain    Associated Diagnoses: Primary esophageal squamous cell carcinoma (H); Mucositis; Odynophagia      prochlorperazine (COMPAZINE) 10 MG tablet Take 1 tablet (10 mg) by mouth every 6 hours as needed for nausea or vomiting  Qty: 30 tablet, Refills: 2    Associated Diagnoses: Primary esophageal squamous cell carcinoma (H); Squamous cell carcinoma of vallecula (H)      rosuvastatin (CRESTOR) 40 MG tablet Take 1 tablet (40 mg) by mouth daily  Qty: 90 tablet, Refills: 3    Associated Diagnoses: Unstable angina (H)      Sennosides (SENNA) 8.8 MG/5ML SYRP Take 10-15 mLs (17.6-26.4 mg) by mouth daily as needed  Qty: 236 mL, Refills: 1    Associated Diagnoses: Primary esophageal squamous cell carcinoma (H)      sertraline (ZOLOFT) 100 MG tablet Take 100 mg by mouth every morning      sodium fluoride dental gel (PREVIDENT) 1.1 % GEL topical gel       sucralfate (CARAFATE) 1 GM/10ML suspension Take 1 g by mouth 2 times daily           Allergies   Allergies   Allergen Reactions    Coconut Flavor Anaphylaxis     Raw coconut

## 2024-02-11 NOTE — PLAN OF CARE
Physical Therapy defer - Orders received, chart reviewed, discussed with care team. No IP PT needs indicated. Per chart pt back to functional baseline, discharging today. Will complete consult and defer evaluation, please reconsult as appropriate if patient has decline in functional mobility requiring further skilled inpatient PT needs.

## 2024-02-11 NOTE — PROGRESS NOTES
This writer took over pt's care since 1000. Pt has been A&Ox4, VSS, denied any pain. Hgb was up to 8.9 after one unit of PRBC. ECHO and brain MRI completed. Pt reported syncopy symptoms improved significantly. Able to ambulate to the BR independently, denied any dz/lightheadedness. Currently transfusing Iron Sucrose.

## 2024-02-11 NOTE — H&P
[facility]    History and Physical - Medicine Service, [facility] TEAM        Date of Admission:  2/11/2024    Assessment & Plan      Lopez Hogue is a 68 year old male admitted on 2/11/2024. He has a history of head/neck SCC, esophageal cancer, CAD (MI in 03/2023 w/ KAYDEN x2), currently receiving radiation daily M-F and weekly CarboTaxol chemotherapy and is admitted for work up of syncope x3 at home, found to have acute anemia of 6.6 on admission.    # Syncope  # Hx of Orthostatic hypotension   Patient discharged on 1/25/24, during hospitalization which he had orthostatic hypotension. Symptoms resolved by discharge after fluids. On day of admission, patient syncopized x3 at home. Was found to have Hgb of 6.6 in ED, was transfused. Pt denies bleeding symptoms, unclear where anemia has come from (?due to chemotherapy - is on weekly carbotaxol) but anemia could be etiology of syncope. Head CT performed in ED, results pending. Pt also endorses poor PO intake and lower BP over the past week.  - recheck Hgb s/p 1 unit pRBC  - continue ASA and plavix for now given now obvious signs of bleeding and transfuse as below  - telemetry  - TTE to eval for structural causes  - MRI vs CTA if head CT is positive for new lesion      # Acute on chronic anemia, unclear etiology but suspect blood loss vs poor production  # Pancytopenia  Patient with pancytopenia, thought to be due to his chemotherapy.  Hemoglobin at baseline 8-9. On admission found to have Hgb of 6.6, given 1 unit in ED. Has had recent chemo and radiation treatment for esophageal mass and has increased pain in throat - ? If esophageal mass friability/inflammation would be contributing to anemia vs chemotherapy effect. Patient denies any signs of new bleeding, no melena or hematochezia.  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  # Odynophagia  - Pain control per plan at last discharge (pt states this was working really well for him): Gabapentin 900mg TID, oxycodone 5mg q6hr PRN, doxepin q4hr + magic mouth wash       # CAD s/p PCI w/ KAYDEN x2 03/2023  - continue pta ASA, plavix  - continue pta rosuvastatin        # GERD - continue pta pepcid  # DIONICIO/MDD - continue pta sertraline             Diet:  Regular  DVT Prophylaxis: Pneumatic Compression Devices  Smith Catheter: Not present  Fluids: None  Lines: None     Cardiac Monitoring: None  Code Status:  FULL CODE    Clinically Significant Risk Factors Present on Admission   3             # Drug Induced Platelet Defect: home medication list includes an antiplatelet medication   # Hypertension: Noted on problem list          # Financial/Environmental Concerns:           Disposition Plan      Expected Discharge Date: 02/13/2024                The patient's care was discussed with the Patient. To be formally staffed in AM.      Maine Anaya MD  Medicine Service, [facility]  Securely message with [employer] (more info)  Text page via [facility] Paging/Directory   See signed in provider for up to date coverage information  ______________________________________________________________________    Chief Complaint   Syncope    History is obtained from the patient    History of Present Illness   Lopez Hogue is a 68 year old male admitted on 2/11/2024. He has a history of head/neck SCC, esophageal cancer, CAD (MI in 03/2023 w/ KAYDEN x2), currently receiving radiation daily M-F and weekly CarboTaxol chemotherapy and is admitted for work up of syncope x3 at home, found to have  "acute anemia of 6.6 on admission.    Patient was at home and syncopized x3. Confirmed hx with patient per ED note: \"Presents with syncope x 3.  He has a history of esophageal cancer and just completed chemo and radiation in the last couple weeks.  He had a usual day today and felt well until this evening.  He got up in the night to use the bathroom and felt like he was lightheaded and that his legs were giving out on him.  He went down onto the ground and had to crawl to call for help for his family members.  They were able to get him up back into bed.  He rested for a few minutes and then upon standing again to try to go to the bathroom he again felt like his legs gave out and his wife said he briefly lost consciousness.  They do not think that he hit his head.  He came back to alertness quickly.  He had a third episode of similar symptoms upon standing.  He still continues to feel tired and slightly off.  No history of seizure disorder.  He is not anticoagulated.  He is on dual antiplatelet therapy with aspirin and Plavix.\"    States he lost consciousness after all falls. He did not recall the last 2 falls, but was told by his family that he did pass out. Only thinks he was feeling more lightheaded prior to falling.     Blood pressure has been lower at home over the past week. Also notes poor PO intake due to pain from esophageal mass for which he recently completed chemo and radiation (on 1/26). He has been trying to taper off of his pain meds that he was on prior to discharge at last admission through 1/25/24, but thinks he went too quickly and has not been able to drink or eat much over the past week.    No fevers, chills, nausea, vomiting. No headaches or vision changes. NO melena or hematochezia. Bruises easily on plavix, no change. No hematemesis.     Past Medical History    Past Medical History:   Diagnosis Date    EtOH dependence (H)     Quit drinking 10 years    Heart attack (H)     Hypertension     " Nonrheumatic aortic (valve) stenosis     Sweat gland carcinoma        Past Surgical History   Past Surgical History:   Procedure Laterality Date    CV CORONARY ANGIOGRAM N/A 3/27/2023    Procedure: Coronary Angiogram;  Surgeon: Miki Etienne MD;  Location: [facility] CV    CV CORONARY ANGIOGRAM N/A 5/9/2023    Procedure: Coronary Angiogram;  Surgeon: Miki Etienne MD;  Location: [facility] CATH LAB CV    CV LEFT HEART CATH N/A 3/27/2023    Procedure: Left Heart Catheterization;  Surgeon: Miki Etienne MD;  Location: [facility] CATH LAB CV    CV LEFT HEART CATH N/A 5/9/2023    Procedure: Left Heart Catheterization;  Surgeon: Miki Etienne MD;  Location: [facility] LAB CV    CV PCI N/A 3/27/2023    Procedure: Percutaneous Coronary Intervention;  Surgeon: Miki Etienne MD;  Location: [facility] CV    ENDOSCOPIC ULTRASOUND UPPER GASTROINTESTINAL TRACT (GI) N/A 11/28/2023    Procedure: Endoscopic ultrasound upper gastrointestinal tract (GI);  Surgeon: Shahriar Giraldo MD;  Location:  GI    ESOPHAGOSCOPY, GASTROSCOPY, DUODENOSCOPY (EGD), COMBINED N/A 11/8/2023    Procedure: ESOPHAGOGASTRODUODENOSCOPY, WITH BIOPSY;  Surgeon: Shahriar Giraldo MD;  Location:  GI    LARYNGOSCOPY WITH BIOPSY(IES) N/A 10/12/2023    Procedure: LARYNGOSCOPY, WITH BIOPSY;  Surgeon: Edi Felix MD;  Location:  OR    OTHER SURGICAL HISTORY  2015    WIDE EXCISION OF LEFT GLUTEAL MASSTNM: [id number], stage: II hidradenocarcinoma Grade II, 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, sentinel lymph node biopsy negative        Prior to Admission Medications   Prior to Admission Medications   Prescriptions Last Dose Informant Patient Reported? Taking?   Sennosides (SENNA) 8.8 MG/5ML SYRP  Self No No   Sig: Take 10-15 mLs (17.6-26.4 mg) by mouth daily as needed   acetaminophen (TYLENOL) 500 MG tablet  Self Yes No   Sig: Take 500-1,000 mg by mouth every 8 hours as needed for fever or pain   aspirin (ASA) 81 MG EC tablet  Self No  No   Sig: Take 1 tablet (81 mg) by mouth daily Start tomorrow.   buprenorphine (BUTRANS) 10 MCG/HR WK patch  Self No No   Sig: Place 1 patch onto the skin every 7 days   chlorhexidine (PERIDEX) 0.12 % solution  Self Yes No   Sig: SWISH AND SPIT 15 ML BY MOUTH TWICE DAILY AFTER BRUSHING. NOTHING BY MOUTH FOR 30 MINUTES AFTERWARDS   clopidogrel (PLAVIX) 75 MG tablet  Self No No   Sig: Take 1 tablet (75 mg) by mouth daily   cyanocobalamin (VITAMIN B-12) 1000 MCG tablet  Self No No   Sig: Take 1 tablet (1,000 mcg) by mouth every evening   doxepin (SINEQUAN) 10 MG/ML (HIGH CONC) solution   No No   Sig: Take 2 mLs (20 mg) by mouth every 4 hours   famotidine (PEPCID) 20 MG tablet  Self No No   Sig: Take 1 tablet (20 mg) by mouth 2 times daily   gabapentin (NEURONTIN) 300 MG capsule  Self No No   Sig: Take 3 capsules (900 mg) by mouth 3 times daily Follow instructions given in clinic to taper dose up to 900 mg tid   magic mouthwash suspension, diphenhydrAMINE, lidocaine, aluminum-magnesium & simethicone, (FIRST-MOUTHWASH BLM) compounding kit   No No   Sig: Swish and swallow 15 mLs in mouth every 4 hours Take with doxepin solution.   nitroGLYcerin (NITROSTAT) 0.4 MG sublingual tablet  Self No No   Sig: PLACE 1 TABLET UNDER THE TONGUE EVERY 5 MINUTES FOR CHEST PAIN FOR 3 DOSES. IF SYMPTOMS PERSIST 5 MINUTES AFTER 1ST DOSE CALL 911.   oxyCODONE (ROXICODONE) 5 MG/5ML solution  Self Yes No   Sig: Take 5 mg by mouth every 4 hours as needed for severe pain   prochlorperazine (COMPAZINE) 10 MG tablet  Self No No   Sig: Take 1 tablet (10 mg) by mouth every 6 hours as needed for nausea or vomiting   rosuvastatin (CRESTOR) 40 MG tablet  Self No No   Sig: Take 1 tablet (40 mg) by mouth daily   sertraline (ZOLOFT) 100 MG tablet  Self Yes No   Sig: Take 100 mg by mouth every morning   sodium fluoride dental gel (PREVIDENT) 1.1 % GEL topical gel  Self Yes No   sucralfate (CARAFATE) 1 GM/10ML suspension  Self Yes No   Sig: Take 1 g by  mouth 2 times daily      Facility-Administered Medications: None        Review of Systems    The 10 point Review of Systems is negative other than noted in the HPI.    Social History   I have reviewed this patient's social history and updated it with pertinent information if needed.  Social History     Tobacco Use    Smoking status: Former     Packs/day: 2.00     Years: 30.00     Additional pack years: 0.00     Total pack years: 60.00     Types: Cigarettes     Quit date:      Years since quittin.1     Passive exposure: Never    Smokeless tobacco: Never    Tobacco comments:     Quit 10 years ago   Vaping Use    Vaping Use: Never used   Substance Use Topics    Alcohol use: Not Currently     Comment: quit in     Drug use: Never        Physical Exam   Vital Signs:   Temp src: Oral BP: 130/65 Pulse: 73   Resp: 14 SpO2: 97 %      Weight: 141 lbs 0 oz    General Appearance: Lying in bed. Appears comfortable. Not in acute distress.   HEENT: Atraumatic. Normocephalic. EOMI. MMM. Neck supple.   Respiratory: Normal work of breathing on room air. No coughs, wheezes, or rhonchi on exam.   Cardiovascular: RRR, systolic murmur, No rubs or gallops.   GI: Soft, non-tender, non-distended. BS+.  Skin: No rashes or wounds. Small ecchymoses.  MSK: No joint edema or erythema. Moving extremities without acute abnormalities.   Neuro: Alert and oriented x3. Moving all extremities equally. Answering questions appropriately. CN II-XII intact. No obvious focal deficits.      Medical Decision Making       Please see A&P for additional details of medical decision making.      Data     I have personally reviewed the following data over the past 24 hrs:    6.1  \   6.4 (LL)   / 171     140 107 12.7 /  103 (H)   4.2 26 0.75 \     ALT: 12 AST: 25 AP: 99 TBILI: 0.4   ALB: 3.5 TOT PROTEIN: 6.6 LIPASE: N/A       Imaging results reviewed over the past 24 hrs:   Recent Results (from the past 24 hour(s))   Head CT w/o contrast    Narrative     EXAM: CT HEAD W/O CONTRAST  LOCATION: [facility]  DATE: 2/11/2024    INDICATION: Syncope.  COMPARISON: MRI brain dated 09/29/2015.  TECHNIQUE: Routine CT Head without IV contrast. Multiplanar reformats. Dose reduction techniques were used.    FINDINGS:  INTRACRANIAL CONTENTS: No acute hemorrhage, abnormal extra-axial fluid collection or midline shift identified. Small patchy hypodensity involving the left mid sushila (image 10 series 2). No definite acute large territory infarction. Mild presumed chronic   small vessel ischemic changes. Mild generalized volume loss. No hydrocephalus.     VISUALIZED ORBITS/SINUSES/MASTOIDS: No intraorbital abnormality. No paranasal sinus mucosal disease. No middle ear or mastoid effusion.    BONES/SOFT TISSUES: No acute abnormality.      Impression    IMPRESSION:  1.  Small patchy hypodensity involving the left mid sushila, nonspecific, but possibly reflecting a small age-indeterminate lacunar type infarction. This finding was not seen on brain MRI dated 09/29/2015. Consider MRI for further assessment.  2.  No acute hemorrhage, abnormal extra-axial fluid collection or midline shift.  3.  Brain atrophy and presumed chronic microvascular ischemic changes as above.

## 2024-02-11 NOTE — DISCHARGE INSTRUCTIONS
SUMMARY OF YOUR HOSPITAL STAY    You were admitted with episodes of fainting (syncope) and were found to have a low hemoglobin (anemia). A head CT was initially concerning for a stroke but your brain MRI was clear with no evidence of stroke. The Neurologist is reassured that you did not have a stroke.     Your low hemoglobin is caused by your bone marrow being slow to make new blood cells due to your cancer and also iron deficiency. Your iron stores are low. We started you on IV iron and you will need to have 4 more infusions in an infusion center.     Your fainting episodes were likely caused by dehydration and worsening anemia making normal positional blood pressure changes a lot worse. Please go slow when moving from sitting to standing.     You do have moderate stenosis (hardening) of your aortic heart valve. More severe aortic stenosis can also cause fainting episodes, your valve does not look severe on your echo today but you will need surveillance echocardiograms to monitor this.

## 2024-02-11 NOTE — MEDICATION SCRIBE - ADMISSION MEDICATION HISTORY
Medication Scribe Admission Medication History    Admission medication history is complete. The information provided in this note is only as accurate as the sources available at the time of the update.    Information Source(s): Patient and CareEverywhere/SureScripts via in-person    Pertinent Information: None    Changes made to PTA medication list:  Added: None  Deleted: Gabapentin 300 mg (not taking)  Changed: None    Allergies reviewed with patient and updates made in EHR: yes    Medication History Completed By: Suad Barber 2/11/2024 8:09 AM    Prior to Admission medications    Medication Sig Last Dose Taking? Auth Provider Long Term End Date   acetaminophen (TYLENOL) 500 MG tablet Take 500-1,000 mg by mouth every 8 hours as needed for fever or pain 2/10/2024 at AM Yes Reported, Patient     aspirin (ASA) 81 MG EC tablet Take 1 tablet (81 mg) by mouth daily Start tomorrow. 2/10/2024 at AM Yes Miki Etienne MD     buprenorphine (BUTRANS) 10 MCG/HR WK patch Place 1 patch onto the skin every 7 days 1/31/2024 at AM Yes Nuno Cazares MD     chlorhexidine (PERIDEX) 0.12 % solution SWISH AND SPIT 15 ML BY MOUTH TWICE DAILY AFTER BRUSHING. NOTHING BY MOUTH FOR 30 MINUTES AFTERWARDS Past Month at Unknown Yes Reported, Patient     clopidogrel (PLAVIX) 75 MG tablet Take 1 tablet (75 mg) by mouth daily 2/10/2024 at AM Yes Jason Walters MD Yes    cyanocobalamin (VITAMIN B-12) 1000 MCG tablet Take 1 tablet (1,000 mcg) by mouth every evening 2/10/2024 at PM Yes Leny Capps MD     doxepin (SINEQUAN) 10 MG/ML (HIGH CONC) solution Take 2 mLs (20 mg) by mouth every 4 hours 2/8/2024 at PM Yes Leny aCpps MD Yes    famotidine (PEPCID) 20 MG tablet Take 1 tablet (20 mg) by mouth 2 times daily 2/10/2024 at PM Yes Joanna Ro APRN CNP     magic mouthwash suspension, diphenhydrAMINE, lidocaine, aluminum-magnesium & simethicone, (FIRST-MOUTHWASH BLM) compounding kit Swish and swallow 15 mLs in mouth  every 4 hours Take with doxepin solution. 2/10/2024 at AM Yes Leny Capps MD     nitroGLYcerin (NITROSTAT) 0.4 MG sublingual tablet PLACE 1 TABLET UNDER THE TONGUE EVERY 5 MINUTES FOR CHEST PAIN FOR 3 DOSES. IF SYMPTOMS PERSIST 5 MINUTES AFTER 1ST DOSE CALL 911. More than a month at Unknown Yes Jason Walters MD Yes    oxyCODONE (ROXICODONE) 5 MG/5ML solution Take 5 mg by mouth every 4 hours as needed for severe pain Past Month at Unknown Yes Leny Capps MD     prochlorperazine (COMPAZINE) 10 MG tablet Take 1 tablet (10 mg) by mouth every 6 hours as needed for nausea or vomiting 2/10/2024 at AM Yes Joanna Ro APRN CNP     rosuvastatin (CRESTOR) 40 MG tablet Take 1 tablet (40 mg) by mouth daily 2/10/2024 at PM Yes Guerda Vu APRN CNP Yes    Sennosides (SENNA) 8.8 MG/5ML SYRP Take 10-15 mLs (17.6-26.4 mg) by mouth daily as needed Past Week at Unknown Yes Joanna Ro APRN CNP     sertraline (ZOLOFT) 100 MG tablet Take 100 mg by mouth every morning 2/10/2024 at AM Yes Reported, Patient Yes    sodium fluoride dental gel (PREVIDENT) 1.1 % GEL topical gel  2/8/2024 at PM Yes Reported, Patient     sucralfate (CARAFATE) 1 GM/10ML suspension Take 1 g by mouth 2 times daily 2/10/2024 at PM Yes Reported, Patient

## 2024-02-11 NOTE — CONSULTS
"[facility]    Stroke Consult Note    Reason for Consult:  Pontine hypodensity incidentally found on head CT    Chief Complaint: Syncope       HPI  Lopez Hogue is a 68 year old male with history of head and neck squamous cell carcinoma, esophageal cancer, CAD, orthostatic hypotension who presented to the ED after multiple episodes of dizziness when standing and likely some confusion in the setting of poor oral intake due to pain esophageal cancer.  Patient states that he did not have any true \"falls\" per se, but that he would stand up out of bed and become very dizzy and ended up on his hands and knees on the ground in order to find his wife to tell her something is wrong.  He never struck his head, though CT head without contrast was obtained in abundance of caution given multiple syncopal events.  This CT appeared to show a small patchy hypodensity involving the left mid sushila.  We were consulted after the read of this scan due to concern for subacute or chronic brainstem stroke.     Patient reports many days of poor oral intake due to pain with swallowing from his cancer.  He states that at best he would be able to eat one half of a sandwich before the pain became too much.  He had this severe pain even when drinking water which made hydration very difficult.  Since the worsening of this poor intake he has noticed progressive worsening of his orthostatic symptoms, but no focal weakness or any other deficits were noticed.  While in the ED he was also found to be pancytopenic with hemoglobin of 6.4 requiring blood transfusion.  MRI brain was obtained which did not show evidence of stroke.     Imaging  CT Head  IMPRESSION:  1.  Small patchy hypodensity involving the left mid sushila, nonspecific, but possibly reflecting a small age-indeterminate lacunar type infarction. This finding was not seen on brain MRI dated 09/29/2015. Consider MRI for further " assessment.  2.  No acute hemorrhage, abnormal extra-axial fluid collection or midline shift.  3.  Brain atrophy and presumed chronic microvascular ischemic changes as above.    MRI Brain w/o contrast  Impression:   1. No acute intracranial pathology.  2. Mild leukoaraiosis.  3. Within the left posterior fossa is a 2.2 cm CSF signal lesion  presumably representing an arachnoid cyst.    Impression  # Syncope likely 2/2 hypovolemia, anemia, orthostatic hypotension  # Incidental CT finding of left pontine hypodensity representing artifact  Lopez Hogue is a 68 year old male with history of head and neck squamous cell carcinoma, esophageal cancer, CAD, orthostatic hypotension who was admitted to the hospital after multiple syncopal like episodes.  Stroke neurology was consulted after an incidental CT finding of left pontine hypodensity was reported.  The patient reported no strokelike symptoms and his examination was entirely nonfocal NIHSS 0.  MRI brain was obtained which did not show evidence of acute or chronic stroke.  Taking these MRI results into consideration, the initial hypodensity seen on CT was almost certainly artifact.  No further stroke workup is necessary at this time.    Recommendations   -No further stroke workup necessary  -Treatment of orthostatic hypotension, hypovolemia, anemia per primary team to reduce likelihood of further falls    Patient Follow-up    - No neurology follow up is necessary    Thank you for this consult. No further stroke evaluation is recommended, so we will sign off. Please contact us with any additional questions.    The patient was discussed with the Stroke Staff Dr. Guy.    PATRICK PHAN MD  Neurology Resident  Stroke Ascom: *[id number]  _____________________________________________________    Clinically Significant Risk Factors Present on Admission                # Drug Induced Platelet Defect: home medication list includes an antiplatelet medication   # Hypertension:  Noted on problem list          # Financial/Environmental Concerns:              Past Medical History    Past Medical History:   Diagnosis Date    EtOH dependence (H)     Quit drinking 10 years    Heart attack (H)     Hypertension     Nonrheumatic aortic (valve) stenosis     Sweat gland carcinoma      Medications   Home Meds  Prior to Admission medications    Medication Sig Start Date End Date Taking? Authorizing Provider   acetaminophen (TYLENOL) 500 MG tablet Take 500-1,000 mg by mouth every 8 hours as needed for fever or pain   Yes Reported, Patient   aspirin (ASA) 81 MG EC tablet Take 1 tablet (81 mg) by mouth daily Start tomorrow. 3/28/23  Yes Miki Etienne MD   buprenorphine (BUTRANS) 10 MCG/HR WK patch Place 1 patch onto the skin every 7 days 1/5/24  Yes Nuno Cazares MD   chlorhexidine (PERIDEX) 0.12 % solution SWISH AND SPIT 15 ML BY MOUTH TWICE DAILY AFTER BRUSHING. NOTHING BY MOUTH FOR 30 MINUTES AFTERWARDS 11/30/23  Yes Reported, Patient   clopidogrel (PLAVIX) 75 MG tablet Take 1 tablet (75 mg) by mouth daily 1/22/24  Yes Jason Walters MD   cyanocobalamin (VITAMIN B-12) 1000 MCG tablet Take 1 tablet (1,000 mcg) by mouth every evening 1/17/24  Yes Leny Capps MD   doxepin (SINEQUAN) 10 MG/ML (HIGH CONC) solution Take 2 mLs (20 mg) by mouth every 4 hours 2/11/24  Yes Deb You PA-C   famotidine (PEPCID) 20 MG tablet Take 1 tablet (20 mg) by mouth 2 times daily 1/2/24  Yes Joanna Ro APRN CNP   magic mouthwash suspension, diphenhydrAMINE, lidocaine, aluminum-magnesium & simethicone, (FIRST-MOUTHWASH BLM) compounding kit Swish and swallow 15 mLs in mouth every 4 hours Take with doxepin solution. 1/25/24  Yes Leny Capps MD   nitroGLYcerin (NITROSTAT) 0.4 MG sublingual tablet PLACE 1 TABLET UNDER THE TONGUE EVERY 5 MINUTES FOR CHEST PAIN FOR 3 DOSES. IF SYMPTOMS PERSIST 5 MINUTES AFTER 1ST DOSE CALL 911. 9/11/23  Yes Jason Walters MD   oxyCODONE  (ROXICODONE) 5 MG/5ML solution Take 5 mg by mouth every 4 hours as needed for severe pain 1/17/24  Yes Leny Capps MD   prochlorperazine (COMPAZINE) 10 MG tablet Take 1 tablet (10 mg) by mouth every 6 hours as needed for nausea or vomiting 1/2/24  Yes Joanna Ro APRN CNP   rosuvastatin (CRESTOR) 40 MG tablet Take 1 tablet (40 mg) by mouth daily 4/11/23  Yes Guerda Vu APRN CNP   Sennosides (SENNA) 8.8 MG/5ML SYRP Take 10-15 mLs (17.6-26.4 mg) by mouth daily as needed 1/2/24  Yes Joanna Ro APRN CNP   sertraline (ZOLOFT) 100 MG tablet Take 100 mg by mouth every morning 8/17/23  Yes Reported, Patient   sodium fluoride dental gel (PREVIDENT) 1.1 % GEL topical gel  11/30/23  Yes Reported, Patient   sucralfate (CARAFATE) 1 GM/10ML suspension Take 1 g by mouth 2 times daily   Yes Reported, Patient       Scheduled Meds   aspirin  81 mg Oral Daily    buprenorphine  1 patch Transdermal Weekly    And    buprenorphine   Transdermal Q8H VICTOR MANUEL    clopidogrel  75 mg Oral Daily    doxepin  20 mg Oral Q4H    famotidine  20 mg Oral BID    iron sucrose  200 mg Intravenous Q24H    magic mouthwash suspension (diphenhydrAMINE, lidocaine, aluminum-magnesium & simethicone)  15 mL Swish & Swallow Q4H    polyethylene glycol  17 g Oral Daily    rosuvastatin  40 mg Oral Daily    sertraline  100 mg Oral QAM    sodium chloride (PF)  3 mL Intracatheter Q8H    sucralfate  1 g Oral BID       Infusion Meds   lactated ringers Stopped (02/11/24 0753)       Allergies   Allergies   Allergen Reactions    Coconut Flavor Anaphylaxis     Raw coconut          PHYSICAL EXAMINATION   Temp:  [97.7  F (36.5  C)-98.3  F (36.8  C)] 97.8  F (36.6  C)  Pulse:  [] 78  Resp:  [10-19] 18  BP: (118-135)/(59-72) 130/65  MAP:  [89 mmHg-94 mmHg] 94 mmHg  SpO2:  [94 %-100 %] 96 %    General Exam  General:  patient lying in bed without any acute distress    HEENT:  normocephalic/atraumatic  Pulmonary:  no respiratory distress  Abdomen:   non-tender  Extremities:  no edema  Skin:  intact, warm/dry     Neuro Exam  Mental Status:  alert, oriented x 3, follows commands, speech clear and fluent, naming and repetition normal  Cranial Nerves:  visual fields intact, PERRL, EOMI with normal smooth pursuit, facial sensation intact and symmetric, facial movements symmetric, hearing not formally tested but intact to conversation, palate elevation symmetric and uvula midline, no dysarthria, shoulder shrug strong bilaterally, tongue protrusion midline  Motor:  normal muscle tone and bulk, no abnormal movements, able to move all limbs spontaneously, strength 5/5 throughout upper and lower extremities, no pronator drift  Reflexes:  toes down-going  Sensory:  light touch sensation intact and symmetric throughout upper and lower extremities, no extinction on double simultaneous stimulation   Coordination:  normal finger-to-nose and heel-to-shin bilaterally without dysmetria, rapid alternating movements symmetric  Station/Gait:  deferred    Stroke Scales    NIHSS  1a. Level of Consciousness 0-->Alert, keenly responsive   1b. LOC Questions 0-->Answers both questions correctly   1c. LOC Commands 0-->Performs both tasks correctly   2.   Best Gaze 0-->Normal   3.   Visual 0-->No visual loss   4.   Facial Palsy 0-->Normal symmetrical movements   5a. Motor Arm, Left 0-->No drift, limb holds 90 (or 45) degrees for full 10 secs   5b. Motor Arm, Right 0-->No drift, limb holds 90 (or 45) degrees for full 10 secs   6a. Motor Leg, Left 0-->No drift, leg holds 30 degree position for full 5 secs   6b. Motor Leg, right 0-->No drift, leg holds 30 degree position for full 5 secs   7.   Limb Ataxia 0-->Absent   8.   Sensory 0-->Normal, no sensory loss   9.   Best Language 0-->No aphasia, normal   10. Dysarthria 0-->Normal   11. Extinction and Inattention  0-->No abnormality   Total 0 (02/11/24 0830)       Imaging  I personally reviewed all imaging; relevant findings per HPI.    Labs  Data   CBC  Recent Labs   Lab 02/11/24  0913 02/11/24  0500   WBC 4.5 6.1   RBC 2.82* 2.04*   HGB 8.9* 6.4*   HCT 27.3* 20.3*    171     Basic Metabolic Panel   Recent Labs   Lab 02/11/24  0206      POTASSIUM 4.2   CHLORIDE 107   CO2 26   BUN 12.7   CR 0.75   *   RAFAELA 9.1     Liver Panel  Recent Labs   Lab 02/11/24  0206   PROTTOTAL 6.6   ALBUMIN 3.5   BILITOTAL 0.4   ALKPHOS 99   AST 25   ALT 12     INR    Recent Labs   Lab Test 11/30/23  0457   INR 0.97           Stroke Consult Data Data   This was a non-emergent, non-telestroke consult.

## 2024-02-11 NOTE — ED PROVIDER NOTES
ED Provider Note  [facility]      History     Chief Complaint   Patient presents with    Syncope     HPI  Lopez Hogue is a 68 year old male who presents with syncope x 3.  He has a history of esophageal cancer and just completed chemo and radiation in the last couple weeks.  He had a usual day today and felt well until this evening.  He got up in the night to use the bathroom and felt like he was lightheaded and that his legs were giving out on him.  He went down onto the ground and had to crawl to call for help for his family members.  They were able to get him up back into bed.  He rested for a few minutes and then upon standing again to try to go to the bathroom he again felt like his legs gave out and his wife said he briefly lost consciousness.  They do not think that he hit his head.  He came back to alertness quickly.  He had a third episode of similar symptoms upon standing.  He still continues to feel tired and slightly off.  No history of seizure disorder.  He is not anticoagulated.  He is on dual antiplatelet therapy with aspirin and Plavix.    Past Medical History  Past Medical History:   Diagnosis Date    EtOH dependence (H)     Quit drinking 10 years    Heart attack (H)     Hypertension     Nonrheumatic aortic (valve) stenosis     Sweat gland carcinoma      Past Surgical History:   Procedure Laterality Date    CV CORONARY ANGIOGRAM N/A 3/27/2023    Procedure: Coronary Angiogram;  Surgeon: Miki Etienne MD;  Location: [facility] CV    CV CORONARY ANGIOGRAM N/A 5/9/2023    Procedure: Coronary Angiogram;  Surgeon: Miki Etienne MD;  Location: [facility] LAB CV    CV LEFT HEART CATH N/A 3/27/2023    Procedure: Left Heart Catheterization;  Surgeon: Miki Etienne MD;  Location: [facility] LAB CV    CV LEFT HEART CATH N/A 5/9/2023    Procedure: Left Heart Catheterization;  Surgeon: Miki Etienne MD;  Location: [facility] LAB CV    CV PCI N/A  3/27/2023    Procedure: Percutaneous Coronary Intervention;  Surgeon: Miki Etienne MD;  Location: [facility] CV    ENDOSCOPIC ULTRASOUND UPPER GASTROINTESTINAL TRACT (GI) N/A 11/28/2023    Procedure: Endoscopic ultrasound upper gastrointestinal tract (GI);  Surgeon: Shahriar Giraldo MD;  Location:  GI    ESOPHAGOSCOPY, GASTROSCOPY, DUODENOSCOPY (EGD), COMBINED N/A 11/8/2023    Procedure: ESOPHAGOGASTRODUODENOSCOPY, WITH BIOPSY;  Surgeon: Shahriar Giraldo MD;  Location:  GI    LARYNGOSCOPY WITH BIOPSY(IES) N/A 10/12/2023    Procedure: LARYNGOSCOPY, WITH BIOPSY;  Surgeon: Edi Felix MD;  Location:  OR    OTHER SURGICAL HISTORY  2015    WIDE EXCISION OF LEFT GLUTEAL MASSTNM: [id number], stage: II hidradenocarcinoma Grade II, 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, sentinel lymph node biopsy negative      acetaminophen (TYLENOL) 500 MG tablet  aspirin (ASA) 81 MG EC tablet  buprenorphine (BUTRANS) 10 MCG/HR WK patch  chlorhexidine (PERIDEX) 0.12 % solution  clopidogrel (PLAVIX) 75 MG tablet  cyanocobalamin (VITAMIN B-12) 1000 MCG tablet  doxepin (SINEQUAN) 10 MG/ML (HIGH CONC) solution  famotidine (PEPCID) 20 MG tablet  magic mouthwash suspension, diphenhydrAMINE, lidocaine, aluminum-magnesium & simethicone, (FIRST-MOUTHWASH BLM) compounding kit  nitroGLYcerin (NITROSTAT) 0.4 MG sublingual tablet  oxyCODONE (ROXICODONE) 5 MG/5ML solution  prochlorperazine (COMPAZINE) 10 MG tablet  rosuvastatin (CRESTOR) 40 MG tablet  Sennosides (SENNA) 8.8 MG/5ML SYRP  sertraline (ZOLOFT) 100 MG tablet  sodium fluoride dental gel (PREVIDENT) 1.1 % GEL topical gel  sucralfate (CARAFATE) 1 GM/10ML suspension      Allergies   Allergen Reactions    Coconut Flavor Anaphylaxis     Raw coconut     Family History  Family History   Problem Relation Age of Onset    Dementia Mother     Anesthesia Reaction No family hx of     Thrombocytopenia No family hx of     Cancer No family hx of      Social History   Social History     Tobacco Use  "   Smoking status: Former     Packs/day: 2.00     Years: 30.00     Additional pack years: 0.00     Total pack years: 60.00     Types: Cigarettes     Quit date:      Years since quittin.1     Passive exposure: Never    Smokeless tobacco: Never    Tobacco comments:     Quit 10 years ago   Vaping Use    Vaping Use: Never used   Substance Use Topics    Alcohol use: Not Currently     Comment: quit in     Drug use: Never         A medically appropriate review of systems was performed with pertinent positives and negatives noted in the HPI, and all other systems negative.    Physical Exam   BP: 131/66  Pulse: 67  Temp: 98.1  F (36.7  C)  Resp: 10  Height: 172.7 cm (5' 8\")  Weight: 64 kg (141 lb)  SpO2: 95 %  Physical Exam  Gen:A&Ox3, no acute distress  HEENT:PERRL, no facial tenderness or wounds, head atraumatic, oropharynx clear, mucous membranes moist, TMs clear bilaterally  Neck:no bony tenderness or step offs, no JVD, trachea midline  Back: no CVA tenderness, no midline bony tenderness  CV:RRR without murmurs  PULM:Clear to auscultation bilaterally  Abd:soft, nontender, nondistended. Bowel sounds present and normal  UE:No traumatic injuries, skin normal  LE:no traumatic injuries, skin normal  Neuro:CN II-XII intact, strength 5/5 of flexion and extension of the toes, ankles, knees, hips, hands, wrists, elbows and shoulders.  Sensation intact to touch throughout. Coordination normal on finger to nose testing. Reflexes 2/4 and symmetric. Gait not assessed    Skin: no rashes or ecchymoses    ED Course, Procedures, & Data      Procedures            EKG Interpretation:      Interpreted by Rupali Boykin MD  Time reviewed: 1:57AM  Symptoms at time of EKG: syncope   Rhythm: normal sinus   Rate: 71  Axis: normal  Ectopy: none  Conduction: normal  ST Segments/ T Waves: No ST-T wave changes  Q Waves: none  Comparison to prior: No old EKG available    Clinical Impression: normal EKG                 Results for " orders placed or performed during the hospital encounter of 02/11/24   Head CT w/o contrast     Status: None    Narrative    EXAM: CT HEAD W/O CONTRAST  LOCATION: [facility]  DATE: 2/11/2024    INDICATION: Syncope.  COMPARISON: MRI brain dated 09/29/2015.  TECHNIQUE: Routine CT Head without IV contrast. Multiplanar reformats. Dose reduction techniques were used.    FINDINGS:  INTRACRANIAL CONTENTS: No acute hemorrhage, abnormal extra-axial fluid collection or midline shift identified. Small patchy hypodensity involving the left mid sushila (image 10 series 2). No definite acute large territory infarction. Mild presumed chronic   small vessel ischemic changes. Mild generalized volume loss. No hydrocephalus.     VISUALIZED ORBITS/SINUSES/MASTOIDS: No intraorbital abnormality. No paranasal sinus mucosal disease. No middle ear or mastoid effusion.    BONES/SOFT TISSUES: No acute abnormality.      Impression    IMPRESSION:  1.  Small patchy hypodensity involving the left mid sushila, nonspecific, but possibly reflecting a small age-indeterminate lacunar type infarction. This finding was not seen on brain MRI dated 09/29/2015. Consider MRI for further assessment.  2.  No acute hemorrhage, abnormal extra-axial fluid collection or midline shift.  3.  Brain atrophy and presumed chronic microvascular ischemic changes as above.   MR Brain w/o Contrast     Status: None    Narrative    MR BRAIN W/O CONTRAST 2/11/2024 10:24 AM    Provided History: CT with possible stroke    Comparison:  CT earlier same day.     Technique: Sagittal T1-weighted, coronal T2-weighted, axial T2 FLAIR,  axial susceptibility weighted, and axial diffusion-weighted with ADC  map images of the brain were obtained without intravenous contrast.    Findings: No intracranial mass lesion, mass effect, or midline shift.  The ventricles and sulci are normal for age. 2.2 x 1.7 cm CSF signal  cystic lesion located in the left  posterior fossa. Mild nonspecific  periventricular and subcortical white matter hypoattenuation. No  abnormality of reduced diffusion.  Normal intravascular flow voids.      Impression    Impression:   1. No acute intracranial pathology.  2. Mild leukoaraiosis.  3. Within the left posterior fossa is a 2.2 cm CSF signal lesion  presumably representing an arachnoid cyst.    I have personally reviewed the examination and initial interpretation  and I agree with the findings.    BLOSSOM BROWNING MD         SYSTEM ID:  [id number]   [place] Draw     Status: None    Narrative    The following orders were created for panel order [place] Draw.  Procedure                               Abnormality         Status                     ---------                               -----------         ------                     Extra Blue Top Tube[[id number]]                              Final result               Extra Red Top Tube[[id number]]                               Final result               Extra Green Top (Lithium...[[id number]]                      Final result               Extra Purple Top Tube[[id number]]                            Final result                 Please view results for these tests on the individual orders.   Comprehensive metabolic panel     Status: Abnormal   Result Value Ref Range    Sodium 140 135 - 145 mmol/L    Potassium 4.2 3.4 - 5.3 mmol/L    Carbon Dioxide (CO2) 26 22 - 29 mmol/L    Anion Gap 7 7 - 15 mmol/L    Urea Nitrogen 12.7 8.0 - 23.0 mg/dL    Creatinine 0.75 0.67 - 1.17 mg/dL    GFR Estimate >90 >60 mL/min/1.73m2    Calcium 9.1 8.8 - 10.2 mg/dL    Chloride 107 98 - 107 mmol/L    Glucose 103 (H) 70 - 99 mg/dL    Alkaline Phosphatase 99 40 - 150 U/L    AST 25 0 - 45 U/L    ALT 12 0 - 70 U/L    Protein Total 6.6 6.4 - 8.3 g/dL    Albumin 3.5 3.5 - 5.2 g/dL    Bilirubin Total 0.4 <=1.2 mg/dL   Magnesium     Status: Normal   Result Value Ref Range    Magnesium 2.0 1.7 - 2.3 mg/dL   Phosphorus     Status:  Normal   Result Value Ref Range    Phosphorus 3.4 2.5 - 4.5 mg/dL   Extra Blue Top Tube     Status: None   Result Value Ref Range    Hold Specimen JIC    Extra Red Top Tube     Status: None   Result Value Ref Range    Hold Specimen JIC    Extra Green Top (Lithium Heparin) Tube     Status: None   Result Value Ref Range    Hold Specimen JIC    Extra Purple Top Tube     Status: None   Result Value Ref Range    Hold Specimen JIC    UA with Microscopic reflex to Culture     Status: Abnormal    Specimen: Urine, Midstream   Result Value Ref Range    Color Urine Light Yellow Colorless, Straw, Light Yellow, Yellow    Appearance Urine Clear Clear    Glucose Urine Negative Negative mg/dL    Bilirubin Urine Negative Negative    Ketones Urine Negative Negative mg/dL    Specific Gravity Urine 1.014 1.003 - 1.035    Blood Urine Negative Negative    pH Urine 7.0 5.0 - 7.0    Protein Albumin Urine Negative Negative mg/dL    Urobilinogen Urine 3.0 (A) Normal, 2.0 mg/dL    Nitrite Urine Negative Negative    Leukocyte Esterase Urine Negative Negative    Mucus Urine Present (A) None Seen /LPF    RBC Urine 0 <=2 /HPF    WBC Urine 0 <=5 /HPF    Narrative    Urine Culture not indicated   Asymptomatic COVID-19 Virus (Coronavirus) by PCR Nasopharyngeal     Status: Normal    Specimen: Nasopharyngeal; Swab   Result Value Ref Range    SARS CoV2 PCR Negative Negative    Narrative    Testing was performed using the Xpert Xpress SARS-CoV-2 Assay on the Cepheid Gene-Xpert Instrument Systems. Additional information about this Emergency Use Authorization (EUA) assay can be found via the Lab Guide. This test should be ordered for the detection of SARS-CoV-2 in individuals who meet SARS-CoV-2 clinical and/or epidemiological criteria as well as from individuals without symptoms or other reasons to suspect COVID-19. Test performance for asymptomatic patients has only been established in anterior nasal swab specimens. This test is for in vitro diagnostic  use under the FDA EUA for laboratories certified under CLIA to perform high complexity testing. This test has not been FDA cleared or approved. A negative result does not rule out the presence of PCR inhibitors in the specimen or target RNA concentration below the limit of detection for the assay. The possibility of a false negative should be considered if the patient's recent exposure or clinical presentation suggests COVID-19. This test was validated by [facility]. These Laboratories are certified under the Clinical Laboratory Improvement Amendments (CLIA) as qualified to perform high complexity testing.     CBC with platelets and differential     Status: Abnormal   Result Value Ref Range    WBC Count 6.1 4.0 - 11.0 10e3/uL    RBC Count 2.04 (L) 4.40 - 5.90 10e6/uL    Hemoglobin 6.4 (LL) 13.3 - 17.7 g/dL    Hematocrit 20.3 (L) 40.0 - 53.0 %     78 - 100 fL    MCH 31.4 26.5 - 33.0 pg    MCHC 31.5 31.5 - 36.5 g/dL    RDW 19.8 (H) 10.0 - 15.0 %    Platelet Count 171 150 - 450 10e3/uL    % Neutrophils 80 %    % Lymphocytes 13 %    % Monocytes 6 %    % Eosinophils 0 %    % Basophils 0 %    % Immature Granulocytes 1 %    NRBCs per 100 WBC 0 <1 /100    Absolute Neutrophils 4.9 1.6 - 8.3 10e3/uL    Absolute Lymphocytes 0.8 0.8 - 5.3 10e3/uL    Absolute Monocytes 0.4 0.0 - 1.3 10e3/uL    Absolute Eosinophils 0.0 0.0 - 0.7 10e3/uL    Absolute Basophils 0.0 0.0 - 0.2 10e3/uL    Absolute Immature Granulocytes 0.0 <=0.4 10e3/uL    Absolute NRBCs 0.0 10e3/uL   Extra Tube     Status: None    Narrative    The following orders were created for panel order Extra Tube.  Procedure                               Abnormality         Status                     ---------                               -----------         ------                     Extra Green Top (Lithium...[[id number]]                      Final result                 Please view results for these tests on the individual orders.   Extra Green Top  (Lithium Heparin) Tube     Status: None   Result Value Ref Range    Hold Specimen JI    Ferritin     Status: Normal   Result Value Ref Range    Ferritin 365 31 - 409 ng/mL   Iron and iron binding capacity     Status: Abnormal   Result Value Ref Range    Iron 33 (L) 61 - 157 ug/dL    Iron Binding Capacity 174 (L) 240 - 430 ug/dL    Iron Sat Index 19 15 - 46 %   Vitamin B12     Status: Normal   Result Value Ref Range    Vitamin B12 606 232 - 1,245 pg/mL   Folate     Status: Normal   Result Value Ref Range    Folic Acid 10.5 4.6 - 34.8 ng/mL   CBC with platelets and differential     Status: Abnormal   Result Value Ref Range    WBC Count 4.5 4.0 - 11.0 10e3/uL    RBC Count 2.82 (L) 4.40 - 5.90 10e6/uL    Hemoglobin 8.9 (L) 13.3 - 17.7 g/dL    Hematocrit 27.3 (L) 40.0 - 53.0 %    MCV 97 78 - 100 fL    MCH 31.6 26.5 - 33.0 pg    MCHC 32.6 31.5 - 36.5 g/dL    RDW 19.2 (H) 10.0 - 15.0 %    Platelet Count 177 150 - 450 10e3/uL    % Neutrophils 74 %    % Lymphocytes 18 %    % Monocytes 8 %    % Eosinophils 0 %    % Basophils 0 %    % Immature Granulocytes 0 %    NRBCs per 100 WBC 0 <1 /100    Absolute Neutrophils 3.4 1.6 - 8.3 10e3/uL    Absolute Lymphocytes 0.8 0.8 - 5.3 10e3/uL    Absolute Monocytes 0.4 0.0 - 1.3 10e3/uL    Absolute Eosinophils 0.0 0.0 - 0.7 10e3/uL    Absolute Basophils 0.0 0.0 - 0.2 10e3/uL    Absolute Immature Granulocytes 0.0 <=0.4 10e3/uL    Absolute NRBCs 0.0 10e3/uL   Reticulocyte count     Status: Normal   Result Value Ref Range    % Reticulocyte 1.8 0.5 - 2.0 %    Absolute Reticulocyte 0.051 0.025 - 0.095 10e6/uL   EKG 12-lead, tracing only     Status: None   Result Value Ref Range    Systolic Blood Pressure  mmHg    Diastolic Blood Pressure  mmHg    Ventricular Rate 71 BPM    Atrial Rate 71 BPM    IN Interval 186 ms    QRS Duration 92 ms     ms    QTc 445 ms    P Axis 66 degrees    R AXIS 62 degrees    T Axis 67 degrees    Interpretation ECG       Sinus rhythm  Normal ECG  Unconfirmed  report - interpretation of this ECG is computer generated - see medical record for final interpretation  Confirmed by - EMERGENCY ROOM, PHYSICIAN ([id number]),  PAUL WHITAKER ([id number]) on 2024 2:59:33 PM     Echo Complete     Status: None   Result Value Ref Range    LVEF  55-60%     Narrative    [id number]  [id number]  [id number]  [id number]^ERIC^MEÑO     [facility]  Echocardiography Laboratory  [address] [place], [place] [address]     Name: RAMIRO ZIMMER  MRN: [id number]  : 1955  Study Date: 2024 09:41 AM  Age: 68 yrs  Gender: Male  Patient [facility]: [facility]  Reason For Study: Syncope and Collapse  Ordering Physician: MEÑO REYES  Performed By: Shanna Mayes RDCS     BSA: 1.8 m2  Height: 68 in  Weight: 141 lb  HR: 73  BP: 128/72 mmHg  ______________________________________________________________________________  Procedure  Complete Portable Echo Adult. Echocardiogram with two-dimensional, color and  spectral Doppler performed.  ______________________________________________________________________________  Interpretation Summary  Global and regional left ventricular function is normal with an EF of 55-60%.  Global right ventricular function is normal.  Moderate aortic stenosis is present. The peak aortic velocity is 3.8 m/sec,  mean 29 mmHg, valve area 1.1 cm2. DVI = 0.31, stroke volume is normal  Estimated mean right atrial pressure is normal.  No pericardial effusion is present.  ______________________________________________________________________________  Left Ventricle  Left ventricular size is normal. Left ventricular wall thickness is normal.  Global and regional left ventricular function is normal with an EF of 55-60%.     Right Ventricle  The right ventricle is normal size. Global right ventricular function is  normal.     Atria  Both atria appear normal.     Mitral Valve  The mitral valve is normal.     Aortic Valve  The aortic valve is  tricuspid. Moderate aortic stenosis is present. The peak  aortic velocity is 3.8 m/sec. The mean gradient across the aortic valve is 29  mmHg. The calculated aortic valve are is 1.1 cm^2. DVI = 0.31, stroke volume  is normal.     Tricuspid Valve  Trace to mild tricuspid insufficiency is present. The peak velocity of the  tricuspid regurgitant jet is not obtainable. Pulmonary artery systolic  pressure cannot be assessed.     Pulmonic Valve  The pulmonic valve is normal.     Vessels  The aorta root is normal. The inferior vena cava is normal. Estimated mean  right atrial pressure is normal.     Pericardium  No pericardial effusion is present.     Compared to Previous Study  This study was compared with the study from  . Aortic valve gradients  are higher, AS is moderate.  ______________________________________________________________________________  MMode/2D Measurements & Calculations     LVOT diam: 2.3 cm  LVOT area: 4.0 cm2     Doppler Measurements & Calculations  MV E max aman: 99.0 cm/sec  MV A max aman: 113.2 cm/sec  MV E/A: 0.87  MV dec slope: 334.4 cm/sec2  MV dec time: 0.30 sec  Ao V2 max: 382.4 cm/sec  Ao max P.5 mmHg  Ao V2 mean: 250.9 cm/sec  Ao mean P.0 mmHg  Ao V2 VTI: 84.6 cm  DEVAN(I,D): 1.1 cm2  DEVAN(V,D): 1.2 cm2  LV V1 max P.5 mmHg  LV V1 max: 117.4 cm/sec  LV V1 VTI: 23.2 cm  SV(LVOT): 93.4 ml  SI(LVOT): 53.0 ml/m2  AV Aman Ratio (DI): 0.31  DEVAN Index (cm2/m2): 0.63  E/E' av.8  Lateral E/e': 8.6  Medial E/e': 9.1     ______________________________________________________________________________  Report approved by: Bree Jarrett 2024 12:15 PM         Adult Type and Screen     Status: None   Result Value Ref Range    ABO/RH(D) O POS     Antibody Screen Negative Negative    SPECIMEN EXPIRATION DATE [id number]    Prepare red blood cells (unit)     Status: None   Result Value Ref Range    Blood Component Type Red Blood Cells     Product Code U4581J25     Unit Status  Transfused     Unit Number [id number]     CROSSMATCH Compatible     CODING SYSTEM [id number]     ISSUE DATE AND TIME [id number]     UNIT ABO/RH O+     UNIT TYPE ISBT 5100    Bld morphology pathology review     Status: None   Result Value Ref Range    Final Diagnosis       Peripheral Blood Smear:  -Marked normochromic, normocytic anemia; no increase in erythrocyte regeneration (see comment)      Comment       The etiology of this patient's anemia is not apparent from this peripheral blood smear.  There is no morphologic evidence of hemolysis, however if hemolysis is a clinical concern, then trending hemolysis labs including LDH, haptoglobin, bilirubin, and reticulocyte count could be considered. Correlation with clinical context and all other ancillary testing is recommended.     The red blood cell morphology may not be representative for this patient due to recent red blood cell transfusion on 2/11/2024 prior to this blood smear.      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        Peripheral Smear       The red blood cells appear normochromic.  Poikilocytosis includes rare dacryocytes and rare elliptocytes.  Polychromasia is not increased.  Rouleaux formation is not increased.   The morphology of the platelets is normal.  Lymphocytes are polymorphous. Neutrophils display predominantly normal cytoplasmic granulation and unremarkable nuclear morphology.      Peripheral Hematologic Data       CBC WITH DIFFERENTIAL(02/11/2024 09:30 AM CST):     RESULT VALUE REF. RANGE UNITS   WBC Count   Hemoglobin    Hematocrit   Platelet Count   RBC Count   MCV  MCH  MCHC   RDW  4.5 (NORMAL)     8.9  ( L )      27.3  ( L )  177 (NORMAL)   2.82  ( L )        97 (NORMAL)     31.6 (NORMAL)     32.6 (NORMAL)      19.2  ( H ) 4.0-11.0  13.3-17.7  40.0-53.0  150-450  4.40-5.90    26.5-33.0  31.5-36.5  10.0-15.0 10e3/uL  g/dL  %  10e3/uL  10e6/uL  fL  pg  g/dL  %   % Neutrophils  % Lymphocytes  % Monocytes  % Eosinophils  % Basophils  % Immature Granulocytes  Absolute Neutrophils  Absolute Lymphocytes  Absolute Monocytes  Absolute Eosinophils  Absolute Basophils  Absolute Immature Granulocytes  NRBCs per 100 WBC  Absolute NRBCs 74  18  8  0  0  0  3.4 (NORMAL)  0.8 (NORMAL)  0.4 (NORMAL)  0.0 (NORMAL)  0.0 (NORMAL)  0.0 (NORMAL)  0 (NORMAL)  0.0 () N/A  N/A  N/A  N/A  N/A  N/A  1.6-8.3  0.8-5.3  0.0-1.3  0.0-0.7  0.0-0.2  <=0.4  <1  <=0.0 %  %  %  %  %  %  10e3/uL  10e3/uL  10e3/uL  10e3/uL  10e3/uL  10e3/uL  /100  10e3/uL   RETICULOCYTE COUNT (02/11/2024 09:30 AM CST):  RESULT VALUE REF. RANGE UNITS   % Reticulocyte  Absolute Reticulocyte 1.8 (NORMAL)  0.051 (NORMAL) 0.5-2.0  0.025-0.095 %  10e6/uL         Performing Labs       The technical component of this testing was completed at [facility] and [facility]     CBC with platelets differential     Status: Abnormal    Narrative    The following orders were created for panel order CBC with platelets differential.  Procedure                               Abnormality         Status                     ---------                               -----------         ------                     CBC with platelets and d...[[id number]]  Abnormal            Final result                 Please view results for these tests on the individual orders.   ABO/Rh type and screen     Status: None    Narrative    The following orders were created for panel order ABO/Rh type and screen.  Procedure                               Abnormality         Status                     ---------                               -----------         ------                     Adult Type and  Screen[[id number]]                            Final result                 Please view results for these tests on the individual orders.   Lab Blood Morphology Pathologist Review     Status: Abnormal    Narrative    The following orders were created for panel order Lab Blood Morphology Pathologist Review.  Procedure                               Abnormality         Status                     ---------                               -----------         ------                     Bld morphology pathology...[[id number]]                      Final result               CBC with platelets and d...[[id number]]  Abnormal            Final result               Reticulocyte count[[id number]]           Normal              Final result               Morphology Tracking[[id number]]                              Final result                 Please view results for these tests on the individual orders.     Medications   lactated ringers BOLUS 1,000 mL (0 mLs Intravenous Stopped 2/11/24 0736)     Labs Ordered and Resulted from Time of ED Arrival to Time of ED Departure   COMPREHENSIVE METABOLIC PANEL - Abnormal       Result Value    Sodium 140      Potassium 4.2      Carbon Dioxide (CO2) 26      Anion Gap 7      Urea Nitrogen 12.7      Creatinine 0.75      GFR Estimate >90      Calcium 9.1      Chloride 107      Glucose 103 (*)     Alkaline Phosphatase 99      AST 25      ALT 12      Protein Total 6.6      Albumin 3.5      Bilirubin Total 0.4     ROUTINE UA WITH MICROSCOPIC REFLEX TO CULTURE - Abnormal    Color Urine Light Yellow      Appearance Urine Clear      Glucose Urine Negative      Bilirubin Urine Negative      Ketones Urine Negative      Specific Gravity Urine 1.014      Blood Urine Negative      pH Urine 7.0      Protein Albumin Urine Negative      Urobilinogen Urine 3.0 (*)     Nitrite Urine Negative      Leukocyte Esterase Urine Negative      Mucus Urine Present (*)     RBC Urine 0      WBC Urine 0     CBC WITH PLATELETS AND  DIFFERENTIAL - Abnormal    WBC Count 6.1      RBC Count 2.04 (*)     Hemoglobin 6.4 (*)     Hematocrit 20.3 (*)           MCH 31.4      MCHC 31.5      RDW 19.8 (*)     Platelet Count 171      % Neutrophils 80      % Lymphocytes 13      % Monocytes 6      % Eosinophils 0      % Basophils 0      % Immature Granulocytes 1      NRBCs per 100 WBC 0      Absolute Neutrophils 4.9      Absolute Lymphocytes 0.8      Absolute Monocytes 0.4      Absolute Eosinophils 0.0      Absolute Basophils 0.0      Absolute Immature Granulocytes 0.0      Absolute NRBCs 0.0     IRON AND IRON BINDING CAPACITY - Abnormal    Iron 33 (*)     Iron Binding Capacity 174 (*)     Iron Sat Index 19     CBC WITH PLATELETS AND DIFFERENTIAL - Abnormal    WBC Count 4.5      RBC Count 2.82 (*)     Hemoglobin 8.9 (*)     Hematocrit 27.3 (*)     MCV 97      MCH 31.6      MCHC 32.6      RDW 19.2 (*)     Platelet Count 177      % Neutrophils 74      % Lymphocytes 18      % Monocytes 8      % Eosinophils 0      % Basophils 0      % Immature Granulocytes 0      NRBCs per 100 WBC 0      Absolute Neutrophils 3.4      Absolute Lymphocytes 0.8      Absolute Monocytes 0.4      Absolute Eosinophils 0.0      Absolute Basophils 0.0      Absolute Immature Granulocytes 0.0      Absolute NRBCs 0.0     MAGNESIUM - Normal    Magnesium 2.0     PHOSPHORUS - Normal    Phosphorus 3.4     COVID-19 VIRUS (CORONAVIRUS) BY PCR - Normal    SARS CoV2 PCR Negative     FERRITIN - Normal    Ferritin 365     VITAMIN B12 - Normal    Vitamin B12 606     FOLATE - Normal    Folic Acid 10.5     RETICULOCYTE COUNT - Normal    % Reticulocyte 1.8      Absolute Reticulocyte 0.051     MORPHOLOGY TRACKING   TYPE AND SCREEN, ADULT    ABO/RH(D) O POS      Antibody Screen Negative      SPECIMEN EXPIRATION DATE [id number]     PREPARE RED BLOOD CELLS (UNIT)    Blood Component Type Red Blood Cells      Product Code [id number]      Unit Status Transfused      Unit Number [id number]       CROSSMATCH Compatible      CODING SYSTEM [id number]      ISSUE DATE AND TIME [id number]      UNIT ABO/RH O+      UNIT TYPE ISBT 5100     TRANSFUSE RED BLOOD CELLS (UNIT)   ABO/RH TYPE AND SCREEN     MR Brain w/o Contrast   Final Result   Impression:    1. No acute intracranial pathology.   2. Mild leukoaraiosis.   3. Within the left posterior fossa is a 2.2 cm CSF signal lesion   presumably representing an arachnoid cyst.      I have personally reviewed the examination and initial interpretation   and I agree with the findings.      BLOSSOM BROWNING MD            SYSTEM ID:  [id number]      Echo Complete   Final Result      Head CT w/o contrast   Final Result   IMPRESSION:   1.  Small patchy hypodensity involving the left mid sushila, nonspecific, but possibly reflecting a small age-indeterminate lacunar type infarction. This finding was not seen on brain MRI dated 09/29/2015. Consider MRI for further assessment.   2.  No acute hemorrhage, abnormal extra-axial fluid collection or midline shift.   3.  Brain atrophy and presumed chronic microvascular ischemic changes as above.             Critical care was not performed.     Medical Decision Making  The patient's presentation was of high complexity (an acute health issue posing potential threat to life or bodily function).    The patient's evaluation involved:  review of 1 test result(s) ordered prior to this encounter (baseline CBC)  discussion of management or test interpretation with another health professional (Neurology consult, IM triage hospitalist)    The patient's management necessitated high risk (drug therapy requiring intensive monitoring (blood transfusion)) and high risk (a decision regarding hospitalization).    Assessment & Plan    67 yo M with a hx of esophageal cancer presenting with syncope.   Concerning for hypovolemia. Without prodrome suggestive of acute arrhythmia.  EKG unremarkable. Monitored on telemetry and no arrhythmias were noted.   Vital are  stable.   Treated with IV fluid bolus.   IV access obtained and lab testing done included CBC, CMP, type and screen, UA. Notable for low hgb of 6.4, with plts 171 and WBC of 6.1. Presumed anemia 2/2 to recent chemo, though without other cytopenias.   Consented for transfusion of 1 unit pRBCs.   CT head done due to LOC while on dual anti-plt therapy with asa and plavix. Negative for traumatic injuries or acute hemorrhage. Did show an area of possible hypodensity in the sushila with ddx of stroke, metastasis, or artifact. Neurology consulted and MRI brain recommended.   Discussed with IM triage hospitalist and admitted to the IM service.       I have reviewed the nursing notes. I have reviewed the findings, diagnosis, plan and need for follow up with the patient.    Discharge Medication List as of 2/11/2024  1:45 PM          Final diagnoses:   Malignant neoplasm of esophagus, unspecified location (H)   Antineoplastic chemotherapy induced pancytopenia  (H24)   Syncope and collapse       Rupali Boykin MD   [facility] EMERGENCY DEPARTMENT  2/11/2024     Rupali Boykin MD  02/14/24 1027

## 2024-02-11 NOTE — PROGRESS NOTES
Iron Sucrose completed. VSS. Primary team put in order to discontinue home. PIV removed. Discharge instruction given. Pt was accompanied home by wife.

## 2024-02-11 NOTE — ED TRIAGE NOTES
Biba from home pt had 3 syncopal episodes that happened minutes apart and only lasting a few seconds   Pt denies hitting head    Pt on Plavix, aspirin

## 2024-02-12 ENCOUNTER — PATIENT OUTREACH (OUTPATIENT)
Dept: CARE COORDINATION | Facility: CLINIC | Age: 69
End: 2024-02-12
Payer: COMMERCIAL

## 2024-02-12 LAB
ATRIAL RATE - MUSE: 71 BPM
DIASTOLIC BLOOD PRESSURE - MUSE: NORMAL MMHG
INTERPRETATION ECG - MUSE: NORMAL
P AXIS - MUSE: 66 DEGREES
PATH REPORT.COMMENTS IMP SPEC: NORMAL
PATH REPORT.COMMENTS IMP SPEC: NORMAL
PATH REPORT.FINAL DX SPEC: NORMAL
PATH REPORT.MICROSCOPIC SPEC OTHER STN: NORMAL
PATH REPORT.MICROSCOPIC SPEC OTHER STN: NORMAL
PATH REPORT.RELEVANT HX SPEC: NORMAL
PR INTERVAL - MUSE: 186 MS
QRS DURATION - MUSE: 92 MS
QT - MUSE: 410 MS
QTC - MUSE: 445 MS
R AXIS - MUSE: 62 DEGREES
SYSTOLIC BLOOD PRESSURE - MUSE: NORMAL MMHG
T AXIS - MUSE: 67 DEGREES
VENTRICULAR RATE- MUSE: 71 BPM

## 2024-02-12 NOTE — PROGRESS NOTES
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 He has a history of head/neck SCC, esophageal cancer, CAD (MI in 03/2023 w/ KAYDEN x2), currently receiving radiation daily M-F and weekly CarboTaxol chemotherapy and is admitted for work up of syncope x3 at home, found to have acute anemia of 6.6 on admission.     Patient was at home and syncopized x3. Confirmed hx with patient per ED note: \"Presents with syncope x 3.  He has a history of esophageal cancer and just completed chemo and radiation in the last couple weeks.  He had a usual day today and felt well until this evening.  He got up in the night to use the bathroom and felt like he was lightheaded and that his legs were giving out on him.  He went down onto the ground and had to crawl to call for help for his family members.  They were able to get him up back into bed.  He rested for a few minutes and then upon standing again to try to go to the bathroom he again felt like his legs gave out and his wife said he briefly lost consciousness.  They do not think that he hit his head.  He came back to alertness quickly.  He had a third episode of similar symptoms upon standing.  He still continues to feel tired and slightly off.  No history of seizure disorder.  He is not anticoagulated.  He is on dual antiplatelet therapy with aspirin " "and Plavix.\"     States he lost consciousness after all falls. He did not recall the last 2 falls, but was told by his family that he did pass out. Only thinks he was feeling more lightheaded prior to falling.      Blood pressure has been lower at home over the past week. Also notes poor PO intake due to pain from esophageal mass for which he recently completed chemo and radiation (on 1/26). He has been trying to taper off of his pain meds that he was on prior to discharge at last admission through 1/25/24, but thinks he went too quickly and has not been able to drink or eat much over the past week.     No fevers, chills, nausea, vomiting. No headaches or vision changes. NO melena or hematochezia. Bruises easily on plavix, no change. No hematemesis.     ASSESSMENT           Discharge Assessment  How are your symptoms? (Red Flag symptoms escalate to triage hotline per guidelines): Improved  Do you feel your condition is stable enough to be safe at home until your provider visit?: Yes  Does the patient have their discharge instructions? : Yes  Does the patient have questions regarding their discharge instructions? : No  Does the patient have all of their medications?: Yes  Do you have questions regarding any of your medications? : No  Do you have all of your needed medical supplies or equipment (DME)?  (i.e. oxygen tank, CPAP, cane, etc.): Yes  Discharge follow-up appointment scheduled within 14 calendar days? : Yes        PLAN                                                      Outpatient Plan:    Continue IV iron sucrose infusions as outpatient, 4 infusions remaining     Repeat CBC in 2-3 days     Follow-up with Oncology and Palliative Care teams as previously   planned.     Future Appointments   Date Time Provider Department Center   2/14/2024  2:30 PM  SIP INFUSION NURSE [facility]   2/16/2024  2:15 PM  [facility] LAB DRAW ONL [facility]   2/16/2024  2:30 PM  SIP INFUSION NURSE [facility]   2/19/2024 " 11:00 AM  SIP INFUSION NURSE [facility]   2/21/2024  7:00 AM  SIP INFUSION NURSE [facility]   2/27/2024  4:30 PM Devin Hill MD [facility]   3/6/2024  9:40 AM Nuno Cazares MD [facility]   3/8/2024  9:30 AM Akiko Sun MD [facility]   3/11/2024  9:20 AM Edi Felix MD [facility]   6/6/2024 12:30 PM Nisha Pride, YADIRA CNP [facility]         For any urgent concerns, please contact our 24 hour nurse triage line: [phone] ([phone])         ERIC Andino (she/her/hers)  Social Work Clinic Care Coordinator   [facility]  [email or web address]  [phone]

## 2024-02-14 ENCOUNTER — INFUSION THERAPY VISIT (OUTPATIENT)
Dept: INFUSION THERAPY | Facility: CLINIC | Age: 69
End: 2024-02-14
Attending: PHYSICIAN ASSISTANT
Payer: COMMERCIAL

## 2024-02-14 VITALS
HEART RATE: 63 BPM | OXYGEN SATURATION: 97 % | SYSTOLIC BLOOD PRESSURE: 135 MMHG | TEMPERATURE: 97.6 F | RESPIRATION RATE: 18 BRPM | DIASTOLIC BLOOD PRESSURE: 68 MMHG

## 2024-02-14 DIAGNOSIS — C10.0 SQUAMOUS CELL CARCINOMA OF VALLECULA (H): Primary | ICD-10-CM

## 2024-02-14 DIAGNOSIS — D50.8 IRON DEFICIENCY ANEMIA SECONDARY TO INADEQUATE DIETARY IRON INTAKE: Primary | ICD-10-CM

## 2024-02-14 LAB
ERYTHROCYTE [DISTWIDTH] IN BLOOD BY AUTOMATED COUNT: 19.2 % (ref 10–15)
HCT VFR BLD AUTO: 28.8 % (ref 40–53)
HGB BLD-MCNC: 9.5 G/DL (ref 13.3–17.7)
MCH RBC QN AUTO: 31.9 PG (ref 26.5–33)
MCHC RBC AUTO-ENTMCNC: 33 G/DL (ref 31.5–36.5)
MCV RBC AUTO: 97 FL (ref 78–100)
PLATELET # BLD AUTO: 168 10E3/UL (ref 150–450)
RBC # BLD AUTO: 2.98 10E6/UL (ref 4.4–5.9)
WBC # BLD AUTO: 2.8 10E3/UL (ref 4–11)

## 2024-02-14 PROCEDURE — 96374 THER/PROPH/DIAG INJ IV PUSH: CPT

## 2024-02-14 PROCEDURE — 36415 COLL VENOUS BLD VENIPUNCTURE: CPT

## 2024-02-14 PROCEDURE — 250N000011 HC RX IP 250 OP 636: Performed by: PHYSICIAN ASSISTANT

## 2024-02-14 PROCEDURE — 85014 HEMATOCRIT: CPT

## 2024-02-14 RX ORDER — DIPHENHYDRAMINE HYDROCHLORIDE 50 MG/ML
50 INJECTION INTRAMUSCULAR; INTRAVENOUS
Status: CANCELLED
Start: 2024-02-16

## 2024-02-14 RX ORDER — ALBUTEROL SULFATE 90 UG/1
1-2 AEROSOL, METERED RESPIRATORY (INHALATION)
Status: CANCELLED
Start: 2024-02-16

## 2024-02-14 RX ORDER — ALBUTEROL SULFATE 0.83 MG/ML
2.5 SOLUTION RESPIRATORY (INHALATION)
Status: CANCELLED | OUTPATIENT
Start: 2024-02-16

## 2024-02-14 RX ORDER — EPINEPHRINE 1 MG/ML
0.3 INJECTION, SOLUTION INTRAMUSCULAR; SUBCUTANEOUS EVERY 5 MIN PRN
Status: CANCELLED | OUTPATIENT
Start: 2024-02-16

## 2024-02-14 RX ORDER — METHYLPREDNISOLONE SODIUM SUCCINATE 125 MG/2ML
125 INJECTION, POWDER, LYOPHILIZED, FOR SOLUTION INTRAMUSCULAR; INTRAVENOUS
Status: CANCELLED
Start: 2024-02-16

## 2024-02-14 RX ORDER — HEPARIN SODIUM (PORCINE) LOCK FLUSH IV SOLN 100 UNIT/ML 100 UNIT/ML
5 SOLUTION INTRAVENOUS
Status: CANCELLED | OUTPATIENT
Start: 2024-02-16

## 2024-02-14 RX ORDER — MEPERIDINE HYDROCHLORIDE 25 MG/ML
25 INJECTION INTRAMUSCULAR; INTRAVENOUS; SUBCUTANEOUS EVERY 30 MIN PRN
Status: CANCELLED | OUTPATIENT
Start: 2024-02-16

## 2024-02-14 RX ORDER — HEPARIN SODIUM,PORCINE 10 UNIT/ML
5-20 VIAL (ML) INTRAVENOUS DAILY PRN
Status: CANCELLED | OUTPATIENT
Start: 2024-02-16

## 2024-02-14 RX ADMIN — IRON SUCROSE 200 MG: 20 INJECTION, SOLUTION INTRAVENOUS at 14:27

## 2024-02-14 NOTE — PROGRESS NOTES
Nursing Note  Lopez Hogue [facility] for:   Chief Complaint   Patient presents with    Infusion     Venofer       During today's [facility] appointment, orders from Deb You PA-C were completed.  Frequency: received 1st dose inpatient, so today is his 2/5 doses.    Progress note:  Patient identification verified by name and date of birth.  Assessment completed.  Vitals recorded in Doc Flowsheets.  Patient was provided with education regarding medication/procedure and possible side effects.  Patient verbalized understanding.     present during visit today: Not Applicable.    Treatment Conditions: Non-applicable.    Premedications: were not ordered.    Drug Waste Record: No    Infusion length and rate:  infusion given over approximately  3 minutes + 15 minute observation period.    Labs: were drawn per orders.     Vascular access: peripheral IV was placed by vascular access nurse.    Is the next appt scheduled? yes    Post Infusion Assessment:  Patient tolerated infusion without incident.     Discharge Plan:   Follow up plan of care with: ongoing infusions at [facility]. and ordering provider as scheduled.  Discharge instructions were reviewed with patient.  Patient/representative verbalized understanding of discharge instructions and all questions answered.  Patient discharged from [facility] in stable condition.    Massiel Morocho RN    Administrations This Visit       iron sucrose (VENOFER) injection 200 mg       Admin Date  02/14/2024 Action  $Given Dose  200 mg Route  Intravenous Documented By  Massiel Morocho RN                    /68   Pulse 63   Temp 97.6  F (36.4  C) (Oral)   Resp 18   SpO2 97%

## 2024-02-15 LAB
ABO/RH(D): NORMAL
ANTIBODY SCREEN: NEGATIVE
SPECIMEN EXPIRATION DATE: NORMAL

## 2024-02-16 ENCOUNTER — APPOINTMENT (OUTPATIENT)
Dept: LAB | Facility: CLINIC | Age: 69
End: 2024-02-16
Attending: PHYSICIAN ASSISTANT
Payer: COMMERCIAL

## 2024-02-16 ENCOUNTER — THERAPY VISIT (OUTPATIENT)
Dept: SPEECH THERAPY | Facility: CLINIC | Age: 69
End: 2024-02-16
Payer: COMMERCIAL

## 2024-02-16 ENCOUNTER — INFUSION THERAPY VISIT (OUTPATIENT)
Dept: INFUSION THERAPY | Facility: CLINIC | Age: 69
End: 2024-02-16
Attending: PHYSICIAN ASSISTANT
Payer: COMMERCIAL

## 2024-02-16 ENCOUNTER — ONCOLOGY VISIT (OUTPATIENT)
Dept: ONCOLOGY | Facility: CLINIC | Age: 69
End: 2024-02-16
Payer: COMMERCIAL

## 2024-02-16 VITALS
WEIGHT: 149.4 LBS | RESPIRATION RATE: 16 BRPM | DIASTOLIC BLOOD PRESSURE: 58 MMHG | TEMPERATURE: 97.6 F | BODY MASS INDEX: 22.72 KG/M2 | OXYGEN SATURATION: 95 % | HEART RATE: 83 BPM | SYSTOLIC BLOOD PRESSURE: 108 MMHG

## 2024-02-16 VITALS — SYSTOLIC BLOOD PRESSURE: 130 MMHG | DIASTOLIC BLOOD PRESSURE: 61 MMHG | HEART RATE: 68 BPM

## 2024-02-16 DIAGNOSIS — C15.9 PRIMARY ESOPHAGEAL SQUAMOUS CELL CARCINOMA (H): ICD-10-CM

## 2024-02-16 DIAGNOSIS — D50.8 IRON DEFICIENCY ANEMIA SECONDARY TO INADEQUATE DIETARY IRON INTAKE: Primary | ICD-10-CM

## 2024-02-16 DIAGNOSIS — R13.10 ODYNOPHAGIA: ICD-10-CM

## 2024-02-16 DIAGNOSIS — C10.0 SQUAMOUS CELL CARCINOMA OF VALLECULA (H): Primary | ICD-10-CM

## 2024-02-16 DIAGNOSIS — R13.12 OROPHARYNGEAL DYSPHAGIA: ICD-10-CM

## 2024-02-16 DIAGNOSIS — R63.4 WEIGHT LOSS: ICD-10-CM

## 2024-02-16 LAB
ALBUMIN SERPL BCG-MCNC: 3.4 G/DL (ref 3.5–5.2)
ALP SERPL-CCNC: 83 U/L (ref 40–150)
ALT SERPL W P-5'-P-CCNC: 5 U/L (ref 0–70)
ANION GAP SERPL CALCULATED.3IONS-SCNC: 6 MMOL/L (ref 7–15)
AST SERPL W P-5'-P-CCNC: 19 U/L (ref 0–45)
BASOPHILS # BLD AUTO: 0 10E3/UL (ref 0–0.2)
BASOPHILS NFR BLD AUTO: 1 %
BILIRUB SERPL-MCNC: 0.4 MG/DL
BUN SERPL-MCNC: 8.6 MG/DL (ref 8–23)
CALCIUM SERPL-MCNC: 9 MG/DL (ref 8.8–10.2)
CHLORIDE SERPL-SCNC: 106 MMOL/L (ref 98–107)
CREAT SERPL-MCNC: 0.79 MG/DL (ref 0.67–1.17)
DEPRECATED HCO3 PLAS-SCNC: 28 MMOL/L (ref 22–29)
EGFRCR SERPLBLD CKD-EPI 2021: >90 ML/MIN/1.73M2
EOSINOPHIL # BLD AUTO: 0 10E3/UL (ref 0–0.7)
EOSINOPHIL NFR BLD AUTO: 0 %
ERYTHROCYTE [DISTWIDTH] IN BLOOD BY AUTOMATED COUNT: 18.8 % (ref 10–15)
GLUCOSE SERPL-MCNC: 99 MG/DL (ref 70–99)
HCT VFR BLD AUTO: 30.4 % (ref 40–53)
HGB BLD-MCNC: 9.9 G/DL (ref 13.3–17.7)
IMM GRANULOCYTES # BLD: 0 10E3/UL
IMM GRANULOCYTES NFR BLD: 0 %
LYMPHOCYTES # BLD AUTO: 0.8 10E3/UL (ref 0.8–5.3)
LYMPHOCYTES NFR BLD AUTO: 28 %
MCH RBC QN AUTO: 31.7 PG (ref 26.5–33)
MCHC RBC AUTO-ENTMCNC: 32.6 G/DL (ref 31.5–36.5)
MCV RBC AUTO: 97 FL (ref 78–100)
MONOCYTES # BLD AUTO: 0.4 10E3/UL (ref 0–1.3)
MONOCYTES NFR BLD AUTO: 13 %
NEUTROPHILS # BLD AUTO: 1.6 10E3/UL (ref 1.6–8.3)
NEUTROPHILS NFR BLD AUTO: 58 %
NRBC # BLD AUTO: 0 10E3/UL
NRBC BLD AUTO-RTO: 0 /100
PLATELET # BLD AUTO: 153 10E3/UL (ref 150–450)
POTASSIUM SERPL-SCNC: 3.8 MMOL/L (ref 3.4–5.3)
PROT SERPL-MCNC: 6.4 G/DL (ref 6.4–8.3)
RBC # BLD AUTO: 3.12 10E6/UL (ref 4.4–5.9)
SODIUM SERPL-SCNC: 140 MMOL/L (ref 135–145)
WBC # BLD AUTO: 2.7 10E3/UL (ref 4–11)

## 2024-02-16 PROCEDURE — 250N000011 HC RX IP 250 OP 636: Performed by: PHYSICIAN ASSISTANT

## 2024-02-16 PROCEDURE — 258N000003 HC RX IP 258 OP 636

## 2024-02-16 PROCEDURE — 36415 COLL VENOUS BLD VENIPUNCTURE: CPT

## 2024-02-16 PROCEDURE — 96374 THER/PROPH/DIAG INJ IV PUSH: CPT

## 2024-02-16 PROCEDURE — 80053 COMPREHEN METABOLIC PANEL: CPT

## 2024-02-16 PROCEDURE — 99213 OFFICE O/P EST LOW 20 MIN: CPT | Mod: 25

## 2024-02-16 PROCEDURE — 96361 HYDRATE IV INFUSION ADD-ON: CPT

## 2024-02-16 PROCEDURE — 99214 OFFICE O/P EST MOD 30 MIN: CPT

## 2024-02-16 PROCEDURE — 85025 COMPLETE CBC W/AUTO DIFF WBC: CPT

## 2024-02-16 PROCEDURE — 92526 ORAL FUNCTION THERAPY: CPT | Mod: GN | Performed by: SPEECH-LANGUAGE PATHOLOGIST

## 2024-02-16 PROCEDURE — 86900 BLOOD TYPING SEROLOGIC ABO: CPT

## 2024-02-16 RX ORDER — DIPHENHYDRAMINE HYDROCHLORIDE 50 MG/ML
50 INJECTION INTRAMUSCULAR; INTRAVENOUS
Status: CANCELLED
Start: 2024-02-18

## 2024-02-16 RX ORDER — HEPARIN SODIUM,PORCINE 10 UNIT/ML
5-20 VIAL (ML) INTRAVENOUS DAILY PRN
Status: CANCELLED | OUTPATIENT
Start: 2024-02-16

## 2024-02-16 RX ORDER — MEPERIDINE HYDROCHLORIDE 25 MG/ML
25 INJECTION INTRAMUSCULAR; INTRAVENOUS; SUBCUTANEOUS EVERY 30 MIN PRN
Status: CANCELLED | OUTPATIENT
Start: 2024-02-18

## 2024-02-16 RX ORDER — ALBUTEROL SULFATE 90 UG/1
1-2 AEROSOL, METERED RESPIRATORY (INHALATION)
Status: CANCELLED
Start: 2024-02-18

## 2024-02-16 RX ORDER — HEPARIN SODIUM,PORCINE 10 UNIT/ML
5-20 VIAL (ML) INTRAVENOUS DAILY PRN
Status: CANCELLED | OUTPATIENT
Start: 2024-02-18

## 2024-02-16 RX ORDER — DIPHENHYDRAMINE HYDROCHLORIDE AND LIDOCAINE HYDROCHLORIDE AND ALUMINUM HYDROXIDE AND MAGNESIUM HYDRO
15 KIT EVERY 4 HOURS
Qty: 237 ML | Refills: 1 | Status: SHIPPED | OUTPATIENT
Start: 2024-02-16 | End: 2024-03-04

## 2024-02-16 RX ORDER — DOXEPIN HYDROCHLORIDE 10 MG/ML
20 SOLUTION ORAL EVERY 4 HOURS
Qty: 118 ML | Refills: 0 | Status: SHIPPED | OUTPATIENT
Start: 2024-02-16 | End: 2024-03-06

## 2024-02-16 RX ORDER — ALBUTEROL SULFATE 0.83 MG/ML
2.5 SOLUTION RESPIRATORY (INHALATION)
Status: CANCELLED | OUTPATIENT
Start: 2024-02-18

## 2024-02-16 RX ORDER — HEPARIN SODIUM (PORCINE) LOCK FLUSH IV SOLN 100 UNIT/ML 100 UNIT/ML
5 SOLUTION INTRAVENOUS
Status: CANCELLED | OUTPATIENT
Start: 2024-02-18

## 2024-02-16 RX ORDER — EPINEPHRINE 1 MG/ML
0.3 INJECTION, SOLUTION INTRAMUSCULAR; SUBCUTANEOUS EVERY 5 MIN PRN
Status: CANCELLED | OUTPATIENT
Start: 2024-02-18

## 2024-02-16 RX ORDER — HEPARIN SODIUM (PORCINE) LOCK FLUSH IV SOLN 100 UNIT/ML 100 UNIT/ML
5 SOLUTION INTRAVENOUS
Status: CANCELLED | OUTPATIENT
Start: 2024-02-16

## 2024-02-16 RX ORDER — METHYLPREDNISOLONE SODIUM SUCCINATE 125 MG/2ML
125 INJECTION, POWDER, LYOPHILIZED, FOR SOLUTION INTRAMUSCULAR; INTRAVENOUS
Status: CANCELLED
Start: 2024-02-18

## 2024-02-16 RX ADMIN — IRON SUCROSE 200 MG: 20 INJECTION, SOLUTION INTRAVENOUS at 13:53

## 2024-02-16 RX ADMIN — SODIUM CHLORIDE 1000 ML: 9 INJECTION, SOLUTION INTRAVENOUS at 13:49

## 2024-02-16 NOTE — PROGRESS NOTES
Nursing Note  Lopez Hogue presents [facility] for:   Chief Complaint   Patient presents with    Infusion     Venofer + 1 liter NS     During today's [facility] appointment, orders from Deb You PA-C were completed.    Progress note:  Patient identification verified by name and date of birth.  Assessment completed.  Vitals recorded in Doc Flowsheets.  Patient was provided with education regarding medication/procedure and possible side effects.  Patient verbalized understanding.     present during visit today: Not Applicable.    Treatment Conditions: Non-applicable.    Premedications: were not ordered.    Drug Waste Record: No    Infusion length and rate:  Venofer given over 5 minutes + 15 minute observation period. 1 liter NS given over 1 hour.    Labs: were not ordered for this appointment.    Vascular access: peripheral IV was placed prior to appointment at [facility] lab.    Is the next appt scheduled? yes    Post Infusion Assessment:  Patient tolerated infusion without incident.     Discharge Plan:   Follow up plan of care with: ongoing infusions at [facility]. and ordering provider as scheduled.  Discharge instructions were reviewed with patient.  Patient/representative verbalized understanding of discharge instructions and all questions answered.  Patient discharged from [facility] in stable condition.    Massiel Morocho RN    Administrations This Visit       iron sucrose (VENOFER) injection 200 mg       Admin Date  02/16/2024 Action  $Given Dose  200 mg Route  Intravenous Documented By  Massiel Morocho, RN              sodium chloride 0.9% BOLUS 1,000 mL       Admin Date  02/16/2024 Action  $New Bag Dose  1,000 mL Route  Intravenous Documented By  Massiel Morocho, RN                    /61   Pulse 68

## 2024-02-16 NOTE — NURSING NOTE
Chief Complaint   Patient presents with    Blood Draw     Labs drawn with piv start.  VS taken.     Labs drawn with PIV start.  Pt tolerated well.  VS taken and pt checked in for next appt.    Madeline Loaiza RN

## 2024-02-16 NOTE — NURSING NOTE
"Oncology Rooming Note    February 16, 2024 12:13 PM   Lopez Hogue is a 68 year old male who presents for:    Chief Complaint   Patient presents with    Blood Draw     Labs drawn with piv start.  VS taken.    Oncology Clinic Visit     Squamous cell carcinoma of vallecula     Initial Vitals: /58 (BP Location: Right arm, Patient Position: Sitting, Cuff Size: Adult Regular)   Pulse 83   Temp 97.6  F (36.4  C) (Oral)   Resp 16   Wt 67.8 kg (149 lb 6.4 oz)   SpO2 95%   BMI 22.72 kg/m   Estimated body mass index is 22.72 kg/m  as calculated from the following:    Height as of 2/11/24: 1.727 m (5' 8\").    Weight as of this encounter: 67.8 kg (149 lb 6.4 oz). Body surface area is 1.8 meters squared.  Data Unavailable Comment: Data Unavailable   No LMP for male patient.  Allergies reviewed: Yes  Medications reviewed: Yes    Medications: MEDICATION REFILLS NEEDED TODAY. Provider was notified.  Pharmacy name entered into KVK TEAM:    [id number] - [place], [place] - [address]  [facility] [address] [place] [address] PHARMACY [facility] - [place], [place] - [address]    Frailty Screening:   Is the patient here for a new oncology consult visit in cancer care? 2. No      Clinical concerns: needs refill for magic mouth wash and doxepin       Oliver Pablo              "

## 2024-02-16 NOTE — PROGRESS NOTES
[facility]   Return Patient Visit  Feb 16, 2024        Diagnosis: Oropharyngeal cancer, esophageal SCC  Stage:    Cancer Staging   Squamous cell carcinoma of vallecula (H)  Staging form: Pharynx - Oropharynx, AJCC 8th Edition  - Clinical stage from 10/12/2023: Stage JUAN MIGUEL (cT1, cN2b, cM0, p16-) - Signed by Yannick Alva MD on 11/21/2023    Molecular: p16 negative  Performance status: ECOG 0      Oncology History   Squamous cell carcinoma of vallecula (H)   8/18/2023 Imaging    CT neck:  Question soft tissue lesion within the right vallecula/ventral epiglottis.      10/12/2023 Pathology    R vallecula biopsy:  - NON-KERATINIZING POORLY DIFFERENTIATED SQUAMOUS CELL CARCINOMA  - p16 is negative     10/12/2023 -  Cancer Staged    Staging form: Pharynx - Oropharynx, AJCC 8th Edition  - Clinical stage from 10/12/2023: Stage JUAN MIGUEL (cT1, cN2b, cM0, p16-)     10/13/2023 Imaging    PET/CT:  1. 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     2. FDG avid thickening of the mid to distal esophagus, suspicious for a synchronous primary esophageal neoplasm. Recommend correlation with endoscopy.     11/6/2023 Initial Diagnosis    Squamous cell carcinoma of vallecula (H)     11/8/2023 Pathology    EGD with mid esophageal mass biopsy:  A.  MID ESOPHAGEAL MASS, BIOPSIES:  -Nonkeratinizing poorly differentiated squamous cell carcinoma  -Negative for intestinal metaplasia/Alfredo's type mucosa    PD-L1 CPS 15%             12/5/2023 -  Chemotherapy    OP ONC Esophageal Cancer - PACLitaxel / CARBOplatin WEEKLY + Radiation  Plan Provider: Yannick Alva MD  Treatment goal: Curative  Line of treatment: First Line     Primary esophageal squamous cell carcinoma (H)   11/17/2023 Initial Diagnosis    Primary esophageal squamous cell carcinoma (H)     12/5/2023 -  Chemotherapy    OP ONC Esophageal Cancer - PACLitaxel /  CARBOplatin WEEKLY + Radiation  Plan Provider: Yannick Alva MD  Treatment goal: Curative  Line of treatment: First Line           Oncology History:   Lopez Hogue is a 68 year old male with a past medical history that includes HTN, CAD, MI (March of 2023 s/p PCI x2, on DAPT), and prior sweat gland cancer s/p surgical resection, now with recently diagnosed oropharyngeal cancer found to have a 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     Recent diagnosis was made after he coughed up some blood on 8/18/23. He presented to an ED where ENT evaluated and was felt to have a lesion of the vallecula. Full workup of this mass is detailed in the oncology history above. Biopsy on 10/12 confirmed an invasive SCC. PET/CT revealed a hypermetabolic lesion within the mid to distal esophagus. EGD was performed on 11/8 and also showed a squamous cell carcinoma. Given the EGD findings, this was felt to be a second primary.     11/21/23 - consult with Dr. Hill, thoracic surgery. Plan for neoadjuvant chemoradiation followed by laparoscopic staging and jejunostomy, followed by minimally invasive [place] esophagectomy.     Had EUS on 11/28 to completed the staging process. Partially obstructing and partially circumferential fungating mass in the middle third of the esophagus. Esophageal squamous cell carcinoma was staged T2 N1 (suspicious paraesophageal lymph node)     12/5/23: Start of chemoradiation with Carbo/Taxol. Infusion reaction with Taxol w/ dose 1. Tolerated rechallenge     1/15/24 admission for neutropenia, source felt possibly secondary to cellulitis (vs radiation change), discharged with course of levofloxacin (to complete 1/20 1/22/24 admission for recurrent fever, two days after completing levofloxacin. No neutropenia. Also found to have orthostatic hypotension which improved with IVF    1/26/24 completed radiation    2/11/24 ED for syncope, received 1st IV iron infusion        Interval  History:   -Presents today feeling a lot better than earlier this week.   -Swallowing still hurts, affecting his intake, not drinking enough fluids. Pain regimen is helpful, has about 1.5 hours of good relief where he can eat and drink easier without pain.   -Dizziness/feeling lightheaded only occurring if he moves too quickly, especially changing positions. His dad had a chronic history of orthostatic hypotension, Fabricio is not surprised by his recent issues with it either.   -Taste is still off, a lot of foods still taste bad, others are ok like eggs  -Cough is improving. Mostly in the mornings now and lessens throughout the day. No shortness of breath, chest pain or fevers.         Review of Systems:  Patient denies any of the following except if noted above: fevers, chills, difficulty with energy, vision or hearing changes, chest pain, dyspnea, abdominal pain, nausea, vomiting, diarrhea, constipation, urinary concerns, headaches, numbness, tingling, issues with sleep or mood. Also denies lumps, bumps, rashes or skin lesions, bleeding or bruising issues.        Social History:  Also actively involved in a program for those in recovery from substance use. Former heavy smoker (2 ppd) and drinker himself. Quit both in 2013. Other than his recent MI, he has been in generally good health. He has no history of autoimmune disease. He follows regularly with dermatology given his skin cancer history.       Physical Exam:  /58 (BP Location: Right arm, Patient Position: Sitting, Cuff Size: Adult Regular)   Pulse 83   Temp 97.6  F (36.4  C) (Oral)   Resp 16   Wt 67.8 kg (149 lb 6.4 oz)   SpO2 95%   BMI 22.72 kg/m    Wt Readings from Last 10 Encounters:   02/16/24 67.8 kg (149 lb 6.4 oz)   02/11/24 64 kg (141 lb)   01/31/24 71.7 kg (158 lb 1.6 oz)   01/26/24 71.2 kg (157 lb)   01/22/24 71.2 kg (157 lb)   01/16/24 71.4 kg (157 lb 8 oz)   01/16/24 71.2 kg (157 lb)   01/10/24 73.6 kg (162 lb 4.8 oz)   01/09/24 76.7 kg  (169 lb)   General: The patient is a pleasant male in no acute distress.  HEENT: EOMI. Sclerae are anicteric. Oropharynx is moist, saliva is thick. No lesions or thrush.   Lymph: Neck is supple with no lymphadenopathy in the cervical or supraclavicular areas.   Heart: Systolic murmur present, regular rate.  Lungs: Clear to auscultation bilaterally.   Abdomen: Bowel sounds present, soft, nontender with no palpable hepatosplenomegaly or masses.   Extremities: No lower extremity edema noted bilaterally.   Neuro: Cranial nerves II through XII are grossly intact.  Skin: No rashes, petechiae, or bruising noted on exposed skin.        Labs:   Most Recent 3 CBC's:  Recent Labs   Lab Test 02/16/24  1204 02/14/24  1431 02/11/24  0913   WBC 2.7* 2.8* 4.5   HGB 9.9* 9.5* 8.9*   MCV 97 97 97    168 177   ANEUTAUTO 1.6  --  3.4     Most Recent 3 BMP's:  Recent Labs   Lab Test 02/16/24  1204 02/11/24  0206 02/02/24  1123    140 138   POTASSIUM 3.8 4.2 4.1   CHLORIDE 106 107 104   CO2 28 26 28   BUN 8.6 12.7 12.0   CR 0.79 0.75 0.77   ANIONGAP 6* 7 6*   RAFAELA 9.0 9.1 9.0   GLC 99 103* 97   PROTTOTAL 6.4 6.6 6.2*   ALBUMIN 3.4* 3.5 3.2*    Most Recent 3 LFT's:  Recent Labs   Lab Test 02/16/24  1204 02/11/24  0206 02/02/24  1123   AST 19 25 35   ALT 5 12 23   ALKPHOS 83 99 110   BILITOTAL 0.4 0.4 0.4    Most Recent 2 TSH and T4:No lab results found.    I reviewed the above labs today.      Assessment and Plan:   Lopez Hogue is a 68 year old male with HTN, CAD, prior sweat gland cancer s/p resection, and recently diagnosed head and neck squamous cell carcinoma of the vallecula, with staging revealing a synchronous esophageal squamous cell carcinoma.    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  -Completed chemoradiation 1/26/24.   -WBC count low today at 2.7, expected following chemoradiation. Will recheck CBC next week to monitor trend. Patient aware of infection precaution measures. Afebrile with no concerns of infection today.   -Has follow up with Dr. Santacruz scheduled 2/27/24.   -Follow up with ENT and RadOnc as scheduled.   -Follow up with Dr. Alva approximately 12 weeks following completion of therapy, mid April 2024.     Dizziness, syncope.  -Low oral fluid intake. Will give IV fluid bolus today and twice next week to help hydration. Continue to push oral hydration.   -Dizziness improved this week. Using caution when changing positions.   -Received 1st dose of IV iron ED, will receive second dose today and 3rd scheduled for 2/19.     Mucositis.   -Pain has improved with addition of Butrans patch and doxepin liquid per palliative care team. Oxycodone was not helpful. Continues gabapentin and CBD.   -Continue Magic mouthwash, Tylenol, gabapentin, salt/soda rinses.       Nutrition, nausea.   -Weight continues to trend down. Intake improving but still affected by pain. Will give IVF today and  twice next week.   -Continue Compazine as needed for nausea.    -Continue protein shakes and soft foods as tolerated.     Heartburn.  Well controlled with famotidine 20mg BID and Carafate 1g 4x/daily.       32 minutes spent on the date of the encounter doing chart review, review of test results, interpretation of tests, patient visit, and documentation       YADIRA Lazcano CNP

## 2024-02-16 NOTE — Clinical Note
2/16/2024         RE: Lopez Hogue  [address] [facility] [address]        Dear Colleague,    Thank you for referring your patient, Lopez Hogue, to the [facility]. Please see a copy of my visit note below.    [facility]   Return Patient Visit  Feb 16, 2024        Diagnosis: Oropharyngeal cancer, esophageal SCC  Stage:    Cancer Staging   Squamous cell carcinoma of vallecula (H)  Staging form: Pharynx - Oropharynx, AJCC 8th Edition  - Clinical stage from 10/12/2023: Stage JUAN MIGUEL (cT1, cN2b, cM0, p16-) - Signed by Yannick Alva MD on 11/21/2023    Molecular: p16 negative  Performance status: ECOG 0      Oncology History   Squamous cell carcinoma of vallecula (H)   8/18/2023 Imaging    CT neck:  Question soft tissue lesion within the right vallecula/ventral epiglottis.      10/12/2023 Pathology    R vallecula biopsy:  - NON-KERATINIZING POORLY DIFFERENTIATED SQUAMOUS CELL CARCINOMA  - p16 is negative     10/12/2023 -  Cancer Staged    Staging form: Pharynx - Oropharynx, AJCC 8th Edition  - Clinical stage from 10/12/2023: Stage JUAN MIGUEL (cT1, cN2b, cM0, p16-)     10/13/2023 Imaging    PET/CT:  1. 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     2. FDG avid thickening of the mid to distal esophagus, suspicious for a synchronous primary esophageal neoplasm. Recommend correlation with endoscopy.     11/6/2023 Initial Diagnosis    Squamous cell carcinoma of vallecula (H)     11/8/2023 Pathology    EGD with mid esophageal mass biopsy:  A.  MID ESOPHAGEAL MASS, BIOPSIES:  -Nonkeratinizing poorly differentiated squamous cell carcinoma  -Negative for intestinal metaplasia/Alfredo's type mucosa    PD-L1 CPS 15%             12/5/2023 -  Chemotherapy    OP ONC Esophageal Cancer - PACLitaxel / CARBOplatin WEEKLY + Radiation  Plan Provider: Yannick Alva MD  Treatment  goal: Curative  Line of treatment: First Line     Primary esophageal squamous cell carcinoma (H)   11/17/2023 Initial Diagnosis    Primary esophageal squamous cell carcinoma (H)     12/5/2023 -  Chemotherapy    OP ONC Esophageal Cancer - PACLitaxel / CARBOplatin WEEKLY + Radiation  Plan Provider: Yannick Alva MD  Treatment goal: Curative  Line of treatment: First Line           Oncology History:   Lopez Hogue is a 68 year old male with a past medical history that includes HTN, CAD, MI (March of 2023 s/p PCI x2, on DAPT), and prior sweat gland cancer s/p surgical resection, now with recently diagnosed oropharyngeal cancer found to have a 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     Recent diagnosis was made after he coughed up some blood on 8/18/23. He presented to an ED where ENT evaluated and was felt to have a lesion of the vallecula. Full workup of this mass is detailed in the oncology history above. Biopsy on 10/12 confirmed an invasive SCC. PET/CT revealed a hypermetabolic lesion within the mid to distal esophagus. EGD was performed on 11/8 and also showed a squamous cell carcinoma. Given the EGD findings, this was felt to be a second primary.     11/21/23 - consult with Dr. Hill, thoracic surgery. Plan for neoadjuvant chemoradiation followed by laparoscopic staging and jejunostomy, followed by minimally invasive [place] esophagectomy.     Had EUS on 11/28 to completed the staging process. Partially obstructing and partially circumferential fungating mass in the middle third of the esophagus. Esophageal squamous cell carcinoma was staged T2 N1 (suspicious paraesophageal lymph node)     12/5/23: Start of chemoradiation with Carbo/Taxol. Infusion reaction with Taxol w/ dose 1. Tolerated rechallenge     1/15/24 admission for neutropenia, source felt possibly secondary to cellulitis (vs radiation change), discharged with course of levofloxacin (to complete 1/20 1/22/24 admission  for recurrent fever, two days after completing levofloxacin. No neutropenia. Also found to have orthostatic hypotension which improved with IVF    1/26/24 completed radiation    2/11/24 ED for ***   *** Iron infusions      Interval History:     His dad has issues with ortho hypotension  Morning cough cleared throughotut ehd ay, now gone all together  Food tastes bad, some foods are tasting ok like eggs.  Not drinking enough fluids, swallowing anything hurts.   Pain meds help - have a 1.5 hour window     Dizzy/lightheaded is rare, occurs if moving too fast.   ***    Review of Systems:  Patient denies any of the following except if noted above: fevers, chills, difficulty with energy, vision or hearing changes, chest pain, dyspnea, abdominal pain, nausea, vomiting, diarrhea, constipation, urinary concerns, headaches, numbness, tingling, issues with sleep or mood. Also denies lumps, bumps, rashes or skin lesions, bleeding or bruising issues.        Social History:  Also actively involved in a program for those in recovery from substance use. Former heavy smoker (2 ppd) and drinker himself. Quit both in 2013. Other than his recent MI, he has been in generally good health. He has no history of autoimmune disease. He follows regularly with dermatology given his skin cancer history.       Physical Exam:  There were no vitals taken for this visit.  Wt Readings from Last 10 Encounters:   02/11/24 64 kg (141 lb)   01/31/24 71.7 kg (158 lb 1.6 oz)   01/26/24 71.2 kg (157 lb)   01/22/24 71.2 kg (157 lb)   01/16/24 71.4 kg (157 lb 8 oz)   01/16/24 71.2 kg (157 lb)   01/10/24 73.6 kg (162 lb 4.8 oz)   01/09/24 76.7 kg (169 lb)   01/05/24 74.7 kg (164 lb 9.6 oz)   01/03/24 76.5 kg (168 lb 11.2 oz)   General: The patient is a pleasant male in no acute distress.  HEENT: EOMI. Sclerae are anicteric. Oropharynx is moist, saliva is thick. Erythema and multiple pale flesh-colored lesions noted on posterior pharynx.   Lymph: Neck is  supple with no lymphadenopathy in the cervical or supraclavicular areas.   Heart: Systolic murmur present, regular rate.  Lungs: Clear to auscultation bilaterally.   Abdomen: Bowel sounds present, soft, nontender with no palpable hepatosplenomegaly or masses.   Extremities: No lower extremity edema noted bilaterally.   Neuro: Cranial nerves II through XII are grossly intact.  Skin: Erythema to neck and upper chest, no open areas or drainage. No rashes, petechiae, or bruising noted on exposed skin.        Labs:   Most Recent 3 CBC's:  Recent Labs   Lab Test 02/14/24  1431 02/11/24  0913 02/11/24  0500 02/02/24  1123   WBC 2.8* 4.5 6.1 2.9*   HGB 9.5* 8.9* 6.4* 7.7*   MCV 97 97 100 97    177 171 165   ANEUTAUTO  --  3.4 4.9 2.0     Most Recent 3 BMP's:  Recent Labs   Lab Test 02/11/24  0206 02/02/24  1123 01/25/24  0611 01/23/24  0656 01/22/24  2307    138 138   < > 133*   POTASSIUM 4.2 4.1 3.4   < > 3.9   CHLORIDE 107 104 102   < > 98   CO2 26 28 22   < > 26   BUN 12.7 12.0 5.0*   < > 8.9   CR 0.75 0.77 0.68   < > 0.78   ANIONGAP 7 6* 14   < > 9   RAFAELA 9.1 9.0 9.0   < > 8.8   * 97 106*   < > 107*   PROTTOTAL 6.6 6.2*  --   --  6.4   ALBUMIN 3.5 3.2*  --   --  3.4*    < > = values in this interval not displayed.    Most Recent 3 LFT's:  Recent Labs   Lab Test 02/11/24  0206 02/02/24  1123 01/22/24  2307   AST 25 35 41   ALT 12 23 17   ALKPHOS 99 110 91   BILITOTAL 0.4 0.4 0.9    Most Recent 2 TSH and T4:No lab results found.    I reviewed the above labs today, new labs will be drawn prior to chemotherapy tomorrow.         Assessment and Plan:   Lopez Hogue is a 68 year old male with HTN, CAD, prior sweat gland cancer s/p resection, and recently diagnosed head and neck squamous cell carcinoma of the vallecula, with staging revealing a synchronous esophageal squamous cell carcinoma.    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 Ok to proceed with treatment today. Discussed neutropenic precautions and calling triage with a fever >100.4 or with any symptoms/concerns.   -Return to clinic in one week for follow up with me, will determine if one additional dose of chemotherapy is appropriate at that time. Last day of radiation is 1/23/24.      -Follow up with Dr. Santacruz 2/27/24     Plan:  --has scheduled follow up with Rad Onc and ENT  --needs follow up with Dr. Santacruz ***   --will coordinate potential pre-operative imaging regarding esophageal cancer surgical planning  --regarding his oropharyngeal cancer, will need a post treatment PET/CT at 12 weeks, this will be ordered by ENT *** follow up with figueroa at 12 week tacho as well       Mucositis.   -Pain has improved with addition of Butrans patch and doxepin liquid per palliative care team. Oxycodone was not helpful. Continues gabapentin and CBD.   -Continue Magic mouthwash, Tylenol, gabapentin, salt/soda rinses.       Nutrition, nausea.   -Weight continues to trend down. Oral intake slightly increased over the last week due to improved pain control, but intake is still significantly decreased. Will give 1L NS today with chemo, will request IVF on 1/12 and 1/15. Follow up with radiation and dietician as scheduled.  -Continue Compazine as needed for nausea.    -Continue protein shakes and soft foods as tolerated.     Heartburn.  Well controlled with famotidine 20mg BID and Carafate 1g 4x/daily.       *** minutes spent on the date of the encounter doing {2021 E&M time in:634327}       YADIRA Lazcano CNP        Again, thank you for allowing me to participate in the care of your patient.         Sincerely,        YADIRA Lazcano CNP

## 2024-02-19 ENCOUNTER — INFUSION THERAPY VISIT (OUTPATIENT)
Dept: INFUSION THERAPY | Facility: CLINIC | Age: 69
End: 2024-02-19
Attending: PHYSICIAN ASSISTANT
Payer: COMMERCIAL

## 2024-02-19 VITALS
SYSTOLIC BLOOD PRESSURE: 101 MMHG | TEMPERATURE: 98.2 F | RESPIRATION RATE: 16 BRPM | OXYGEN SATURATION: 95 % | HEART RATE: 86 BPM | DIASTOLIC BLOOD PRESSURE: 66 MMHG

## 2024-02-19 DIAGNOSIS — C15.9 PRIMARY ESOPHAGEAL SQUAMOUS CELL CARCINOMA (H): ICD-10-CM

## 2024-02-19 DIAGNOSIS — D50.8 IRON DEFICIENCY ANEMIA SECONDARY TO INADEQUATE DIETARY IRON INTAKE: Primary | ICD-10-CM

## 2024-02-19 PROCEDURE — 250N000011 HC RX IP 250 OP 636: Performed by: PHYSICIAN ASSISTANT

## 2024-02-19 PROCEDURE — 96374 THER/PROPH/DIAG INJ IV PUSH: CPT

## 2024-02-19 RX ORDER — MEPERIDINE HYDROCHLORIDE 25 MG/ML
25 INJECTION INTRAMUSCULAR; INTRAVENOUS; SUBCUTANEOUS EVERY 30 MIN PRN
Status: CANCELLED | OUTPATIENT
Start: 2024-02-20

## 2024-02-19 RX ORDER — METHYLPREDNISOLONE SODIUM SUCCINATE 125 MG/2ML
125 INJECTION, POWDER, LYOPHILIZED, FOR SOLUTION INTRAMUSCULAR; INTRAVENOUS
Status: CANCELLED
Start: 2024-02-20

## 2024-02-19 RX ORDER — EPINEPHRINE 1 MG/ML
0.3 INJECTION, SOLUTION INTRAMUSCULAR; SUBCUTANEOUS EVERY 5 MIN PRN
Status: CANCELLED | OUTPATIENT
Start: 2024-02-20

## 2024-02-19 RX ORDER — HEPARIN SODIUM (PORCINE) LOCK FLUSH IV SOLN 100 UNIT/ML 100 UNIT/ML
5 SOLUTION INTRAVENOUS
Status: CANCELLED | OUTPATIENT
Start: 2024-02-20

## 2024-02-19 RX ORDER — HEPARIN SODIUM (PORCINE) LOCK FLUSH IV SOLN 100 UNIT/ML 100 UNIT/ML
5 SOLUTION INTRAVENOUS
Status: CANCELLED | OUTPATIENT
Start: 2024-02-19

## 2024-02-19 RX ORDER — HEPARIN SODIUM,PORCINE 10 UNIT/ML
5-20 VIAL (ML) INTRAVENOUS DAILY PRN
Status: CANCELLED | OUTPATIENT
Start: 2024-02-19

## 2024-02-19 RX ORDER — HEPARIN SODIUM,PORCINE 10 UNIT/ML
5-20 VIAL (ML) INTRAVENOUS DAILY PRN
Status: CANCELLED | OUTPATIENT
Start: 2024-02-20

## 2024-02-19 RX ORDER — DIPHENHYDRAMINE HYDROCHLORIDE 50 MG/ML
50 INJECTION INTRAMUSCULAR; INTRAVENOUS
Status: CANCELLED
Start: 2024-02-20

## 2024-02-19 RX ORDER — ALBUTEROL SULFATE 0.83 MG/ML
2.5 SOLUTION RESPIRATORY (INHALATION)
Status: CANCELLED | OUTPATIENT
Start: 2024-02-20

## 2024-02-19 RX ORDER — ALBUTEROL SULFATE 90 UG/1
1-2 AEROSOL, METERED RESPIRATORY (INHALATION)
Status: CANCELLED
Start: 2024-02-20

## 2024-02-19 RX ADMIN — IRON SUCROSE 200 MG: 20 INJECTION, SOLUTION INTRAVENOUS at 11:27

## 2024-02-19 ASSESSMENT — PAIN SCALES - GENERAL: PAINLEVEL: NO PAIN (0)

## 2024-02-19 NOTE — PATIENT INSTRUCTIONS
Dear Lopez Hogue    Thank you for choosing [facility] ([facility]) for your infusion.  The following information is a summary of our appointment as well as important reminders.        If you are a transplant patient and require transplant education, please click on this link: [email or web address].    We look forward in seeing you on your next appointment here at [facility] ([facility]).  Please don t hesitate to call us at [phone] to reschedule any of your appointments or to speak with one of the [facility] registered nurses.  It was a pleasure taking care of you today.    Sincerely,    [facility]  [address] [place] [address]  [id number]  Phone:  [phone]

## 2024-02-19 NOTE — PROGRESS NOTES
Nursing Note  Lopez Hogue presents [facility] for:   No chief complaint on file.    During today's [facility] appointment, orders from Deb You PA-C were completed.    Progress note:  Patient identification verified by name and date of birth.  Assessment completed.  Vitals recorded in Doc Flowsheets.  Patient was provided with education regarding medication/procedure and possible side effects.  Patient verbalized understanding.     present during visit today: Not Applicable.    Treatment Conditions: Non-applicable.    Premedications: were not ordered.    Drug Waste Record: No    Infusion length and rate:  Venofer given over 5 minutes + 15 minute observation period. 1 liter NS given over 1 hour.    Labs: were not ordered for this appointment.    Vascular access: peripheral IV was placed prior to appointment at [facility] lab.    Is the next appt scheduled? yes    Post Infusion Assessment:  Patient tolerated infusion without incident.     Discharge Plan:   Follow up plan of care with: ongoing infusions at [facility]. and ordering provider as scheduled.  Discharge instructions were reviewed with patient.  Patient/representative verbalized understanding of discharge instructions and all questions answered.  Patient discharged from [facility] in stable condition.    Larisa Bright RN    Administrations This Visit       iron sucrose (VENOFER) injection 200 mg       Admin Date  02/19/2024 Action  $Given Dose  200 mg Route  Intravenous Documented By  Larisa Bright RN                    /66 (BP Location: Right arm, Cuff Size: Adult Regular)   Pulse 86   Temp 98.2  F (36.8  C)   Resp 16   SpO2 95%     Larisa Bright RN on 2/19/2024 at 11:42 AM

## 2024-02-21 ENCOUNTER — INFUSION THERAPY VISIT (OUTPATIENT)
Dept: INFUSION THERAPY | Facility: CLINIC | Age: 69
End: 2024-02-21
Attending: PHYSICIAN ASSISTANT
Payer: COMMERCIAL

## 2024-02-21 VITALS
HEART RATE: 77 BPM | OXYGEN SATURATION: 98 % | DIASTOLIC BLOOD PRESSURE: 66 MMHG | SYSTOLIC BLOOD PRESSURE: 106 MMHG | RESPIRATION RATE: 18 BRPM | TEMPERATURE: 98 F

## 2024-02-21 DIAGNOSIS — D50.8 IRON DEFICIENCY ANEMIA SECONDARY TO INADEQUATE DIETARY IRON INTAKE: ICD-10-CM

## 2024-02-21 DIAGNOSIS — C15.9 PRIMARY ESOPHAGEAL SQUAMOUS CELL CARCINOMA (H): Primary | ICD-10-CM

## 2024-02-21 LAB
BASOPHILS # BLD AUTO: 0 10E3/UL (ref 0–0.2)
BASOPHILS NFR BLD AUTO: 1 %
EOSINOPHIL # BLD AUTO: 0 10E3/UL (ref 0–0.7)
EOSINOPHIL NFR BLD AUTO: 0 %
ERYTHROCYTE [DISTWIDTH] IN BLOOD BY AUTOMATED COUNT: 18.2 % (ref 10–15)
HCT VFR BLD AUTO: 27.7 % (ref 40–53)
HGB BLD-MCNC: 9.2 G/DL (ref 13.3–17.7)
IMM GRANULOCYTES # BLD: 0 10E3/UL
IMM GRANULOCYTES NFR BLD: 0 %
LYMPHOCYTES # BLD AUTO: 0.5 10E3/UL (ref 0.8–5.3)
LYMPHOCYTES NFR BLD AUTO: 14 %
MCH RBC QN AUTO: 31.9 PG (ref 26.5–33)
MCHC RBC AUTO-ENTMCNC: 33.2 G/DL (ref 31.5–36.5)
MCV RBC AUTO: 96 FL (ref 78–100)
MONOCYTES # BLD AUTO: 0.5 10E3/UL (ref 0–1.3)
MONOCYTES NFR BLD AUTO: 13 %
NEUTROPHILS # BLD AUTO: 2.6 10E3/UL (ref 1.6–8.3)
NEUTROPHILS NFR BLD AUTO: 72 %
NRBC # BLD AUTO: 0 10E3/UL
NRBC BLD AUTO-RTO: 0 /100
PLATELET # BLD AUTO: 120 10E3/UL (ref 150–450)
RBC # BLD AUTO: 2.88 10E6/UL (ref 4.4–5.9)
WBC # BLD AUTO: 3.7 10E3/UL (ref 4–11)

## 2024-02-21 PROCEDURE — 258N000003 HC RX IP 258 OP 636

## 2024-02-21 PROCEDURE — 250N000011 HC RX IP 250 OP 636: Performed by: PHYSICIAN ASSISTANT

## 2024-02-21 PROCEDURE — 96374 THER/PROPH/DIAG INJ IV PUSH: CPT

## 2024-02-21 PROCEDURE — 85025 COMPLETE CBC W/AUTO DIFF WBC: CPT

## 2024-02-21 PROCEDURE — 36415 COLL VENOUS BLD VENIPUNCTURE: CPT

## 2024-02-21 RX ORDER — HEPARIN SODIUM (PORCINE) LOCK FLUSH IV SOLN 100 UNIT/ML 100 UNIT/ML
5 SOLUTION INTRAVENOUS
Status: CANCELLED | OUTPATIENT
Start: 2024-02-23

## 2024-02-21 RX ORDER — DIPHENHYDRAMINE HYDROCHLORIDE 50 MG/ML
50 INJECTION INTRAMUSCULAR; INTRAVENOUS
Status: CANCELLED
Start: 2024-02-23

## 2024-02-21 RX ORDER — MEPERIDINE HYDROCHLORIDE 25 MG/ML
25 INJECTION INTRAMUSCULAR; INTRAVENOUS; SUBCUTANEOUS EVERY 30 MIN PRN
Status: CANCELLED | OUTPATIENT
Start: 2024-02-23

## 2024-02-21 RX ORDER — HEPARIN SODIUM,PORCINE 10 UNIT/ML
5-20 VIAL (ML) INTRAVENOUS DAILY PRN
Status: CANCELLED | OUTPATIENT
Start: 2024-02-23

## 2024-02-21 RX ORDER — HEPARIN SODIUM,PORCINE 10 UNIT/ML
5-20 VIAL (ML) INTRAVENOUS DAILY PRN
OUTPATIENT
Start: 2024-02-21

## 2024-02-21 RX ORDER — HEPARIN SODIUM (PORCINE) LOCK FLUSH IV SOLN 100 UNIT/ML 100 UNIT/ML
5 SOLUTION INTRAVENOUS
OUTPATIENT
Start: 2024-02-21

## 2024-02-21 RX ORDER — METHYLPREDNISOLONE SODIUM SUCCINATE 125 MG/2ML
125 INJECTION, POWDER, LYOPHILIZED, FOR SOLUTION INTRAMUSCULAR; INTRAVENOUS
Status: CANCELLED
Start: 2024-02-23

## 2024-02-21 RX ORDER — ALBUTEROL SULFATE 0.83 MG/ML
2.5 SOLUTION RESPIRATORY (INHALATION)
Status: CANCELLED | OUTPATIENT
Start: 2024-02-23

## 2024-02-21 RX ORDER — ALBUTEROL SULFATE 90 UG/1
1-2 AEROSOL, METERED RESPIRATORY (INHALATION)
Status: CANCELLED
Start: 2024-02-23

## 2024-02-21 RX ORDER — EPINEPHRINE 1 MG/ML
0.3 INJECTION, SOLUTION INTRAMUSCULAR; SUBCUTANEOUS EVERY 5 MIN PRN
Status: CANCELLED | OUTPATIENT
Start: 2024-02-23

## 2024-02-21 RX ADMIN — SODIUM CHLORIDE 1000 ML: 9 INJECTION, SOLUTION INTRAVENOUS at 07:18

## 2024-02-21 RX ADMIN — IRON SUCROSE 200 MG: 20 INJECTION, SOLUTION INTRAVENOUS at 07:20

## 2024-02-21 NOTE — PROGRESS NOTES
Nursing Note  Lopez oHgue presents [facility] for:   Chief Complaint   Patient presents with    Infusion     IVF/venofer     During today's [facility] appointment, orders from Joanna Ro were completed.  Frequency: as needed for IVF  Venofer is dose 4/4    Progress note:  Patient identification verified by name and date of birth.  Assessment completed.  Vitals recorded in Doc Flowsheets.  Patient was provided with education regarding medication/procedure and possible side effects.  Patient verbalized understanding.     present during visit today: Not Applicable.    Treatment Conditions: Non-applicable.    Infusion length and rate:  infusion given over approximately  1 hours  Venofer given IVP over 5 minutes    Labs: were drawn per orders.     Vascular access: peripheral IV placed today.    Is the next appt scheduled? None planned with infusion. Pt will follow up with provider to decide plan    Post Infusion Assessment:  Patient tolerated infusion without incident.     Discharge Plan:   Follow up plan of care with: ordering provider  Discharge instructions were reviewed with patient.  Patient/representative verbalized understanding of discharge instructions and all questions answered.  Patient discharged from [facility] in stable condition.    Carolee Chavez RN    Administrations This Visit       iron sucrose (VENOFER) injection 200 mg       Admin Date  02/21/2024 Action  $Given Dose  200 mg Route  Intravenous Documented By  Carolee Chavez RN              sodium chloride 0.9% BOLUS 1,000 mL       Admin Date  02/21/2024 Action  $New Bag Dose  1,000 mL Route  Intravenous Documented By  Carolee Chavez RN

## 2024-02-27 ENCOUNTER — ONCOLOGY VISIT (OUTPATIENT)
Dept: SURGERY | Facility: CLINIC | Age: 69
End: 2024-02-27
Attending: THORACIC SURGERY (CARDIOTHORACIC VASCULAR SURGERY)
Payer: COMMERCIAL

## 2024-02-27 ENCOUNTER — PREP FOR PROCEDURE (OUTPATIENT)
Dept: SURGERY | Facility: CLINIC | Age: 69
End: 2024-02-27

## 2024-02-27 VITALS
TEMPERATURE: 98.2 F | DIASTOLIC BLOOD PRESSURE: 71 MMHG | RESPIRATION RATE: 16 BRPM | HEART RATE: 93 BPM | SYSTOLIC BLOOD PRESSURE: 118 MMHG | OXYGEN SATURATION: 97 % | BODY MASS INDEX: 23.2 KG/M2 | WEIGHT: 152.6 LBS

## 2024-02-27 DIAGNOSIS — C15.4 MALIGNANT NEOPLASM OF MIDDLE THIRD OF ESOPHAGUS (H): Primary | ICD-10-CM

## 2024-02-27 DIAGNOSIS — C15.9 PRIMARY ESOPHAGEAL SQUAMOUS CELL CARCINOMA (H): Primary | ICD-10-CM

## 2024-02-27 PROCEDURE — 99213 OFFICE O/P EST LOW 20 MIN: CPT | Performed by: THORACIC SURGERY (CARDIOTHORACIC VASCULAR SURGERY)

## 2024-02-27 PROCEDURE — 99214 OFFICE O/P EST MOD 30 MIN: CPT | Performed by: THORACIC SURGERY (CARDIOTHORACIC VASCULAR SURGERY)

## 2024-02-27 RX ORDER — ENOXAPARIN SODIUM 100 MG/ML
40 INJECTION SUBCUTANEOUS
Status: CANCELLED | OUTPATIENT
Start: 2024-02-27

## 2024-02-27 ASSESSMENT — PAIN SCALES - GENERAL: PAINLEVEL: NO PAIN (0)

## 2024-02-27 NOTE — PROGRESS NOTES
THORACIC SURGERY - OFFICE VISIT       Dear Dr. Ramos,     I saw Lopez Hogue at Dr. Felix s request in consultation for the evaluation and treatment of a mid esophageal squamous cell cancer.      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 He initially presented with hemoptysis and was found to have the head and neck cancer. The  esophageal cancer was seen on PET. He has intentionally gained 10 pounds over the past 5 weeks. He has good energy levels and has little to no trouble with swallowing. He mainly has occasional pill dysphagia. He is eating a regular diet. He is very active and goes to the gym several times per week. He had a heart attack earlier this year and has two coronary stents. He has been exercising since then and feels great.    Today, 2/27/2024:  He has undergone chemoradiation with carboplatin and taxol, starting 12/5/2023. He completed radiation 1/26/2024. He had neutropenia 1/15/2024.  He says his weight is slowly increasing.  He was having difficulty eating during treatment due to difficulty with pain in his throat.  Otherwise now he is able to eat all food, just does not taste anything.  He has been to the gym 2 times in the past 10 days and is staying active.      Of note, he developed orthostatic hypotension after treatment. He had one episode of syncope requiring evaluation at the emergency department.  He continues to have some issues with dizziness but is able to identify it and sit down.  He underwent iron infusions for his anemia.       Previsit Tests   PET/CT (10/13/2023): Right vallecula squamous cell carcinoma (1.4 x 1.4 cm, SUV 13.4) with FDG-avid right level IIa and right level IV lymph node metastases. FDG thickening of the mid to distal esophagus, suspicious for a synchronous primary esophageal neoplasm (4.7 cm, SUV 13.2).        PFT (11/15/2023): FEV1 - 2.41L (86%), DLCO - 122%    Upper Endoscopy (11/8/2023): fungating and ulcerating mass in the middle 1/3rd of the esophagus, 30 - 36 cm from the incisors. Biopsied.       11/8/2023: Esophageal biopsy - Non-keratinizing poorly differentiated squamous cell carcinoma, negative for intestinal metaplasia/Alfredo's type mucosa.     Right vallecula lesion biopsy (10/12/2023): Non-keratinizing poorly differentiated squamous cell carcinoma.     EUS 11/28/2023: T2N1 squamous cell carcinoma     PMH  Reviewed, as below     Past Medical History        Past Medical History:   Diagnosis Date    EtOH dependence (H)       Quit drinking 10 years    Heart attack (H)      Hypertension      Nonrheumatic aortic (valve) stenosis      Sweat gland carcinoma              PSH  Reviewed, as below     Past Surgical History         Past Surgical History:   Procedure Laterality Date    CV CORONARY ANGIOGRAM N/A 3/27/2023     Procedure: Coronary Angiogram;  Surgeon: Miki Etienne MD;  Location: [facility]    CV CORONARY ANGIOGRAM N/A 5/9/2023     Procedure: Coronary Angiogram;  Surgeon: Miki Etienne MD;  Location: [facility]    CV LEFT HEART CATH N/A 3/27/2023     Procedure: Left Heart Catheterization;  Surgeon: Miki Etienne MD;  Location: [facility]    CV LEFT HEART CATH N/A 5/9/2023     Procedure: Left Heart Catheterization;  Surgeon: Miki Etienne MD;  Location: [facility]    CV PCI N/A 3/27/2023     Procedure: Percutaneous Coronary Intervention;  Surgeon: Miki Etienne MD;  Location: [facility]    ESOPHAGOSCOPY, GASTROSCOPY, DUODENOSCOPY (EGD), COMBINED N/A 11/8/2023     Procedure: ESOPHAGOGASTRODUODENOSCOPY, WITH BIOPSY;  Surgeon: Shahriar Giralod MD;  Location:  GI    LARYNGOSCOPY WITH BIOPSY(IES) N/A 10/12/2023     Procedure: LARYNGOSCOPY, WITH BIOPSY;  Surgeon: Edi Felix MD;  Location: [facility]    OTHER SURGICAL HISTORY    2015     WIDE EXCISION OF LEFT GLUTEAL MASSTNM: [id number], stage: II hidradenocarcinoma Grade II, 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, sentinel lymph node biopsy negative             ETOH:  Stopped 10 years ago  TOB: ages   22 - 58; Quit 2013 2 packs per day - 72 pack years   Other drugs: No     Physical examination  /71 (BP Location: Right arm, Patient Position: Sitting, Cuff Size: Adult Regular)   Pulse 93   Temp 98.2  F (36.8  C) (Oral)   Resp 16   Wt 69.2 kg (152 lb 9.6 oz)   SpO2 97%   BMI 23.20 kg/m      Physical Exam  Constitutional:       Appearance: Normal appearance. He is normal weight.   Eyes:      Conjunctiva/sclera: Conjunctivae normal.   Cardiovascular:      Rate and Rhythm: Normal rate.   Pulmonary:      Effort: Pulmonary effort is normal.   Abdominal:      General: Abdomen is flat.   Musculoskeletal:         General: Normal range of motion.   Skin:     General: Skin is warm and dry.   Neurological:      Mental Status: He is alert and oriented to person, place, and time.   Psychiatric:         Mood and Affect: Mood normal.         Behavior: Behavior normal.         Thought Content: Thought content normal.         Judgment: Judgment normal.      From a personal perspective, he lives with his wife, who is a nurse. 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 They have two sons and one grandchild.     IMPRESSION (C15.9) Primary esophageal squamous cell carcinoma (H)  (primary encounter diagnosis)     (C10.0) Squamous cell carcinoma of vallecula (H)     This person is a 68 year old male with synchronous esophageal squamous cell carcinoma and vallecular squamous cell carcinoma. He si a good surgical candidate.     PLAN  I spent 30 min on the date of the encounter in chart review, patient visit, review of tests, documentation and/or discussion with other providers about the issues documented above. I reviewed the plan as follows:     I agree with neoadjuvant  chemoradiation as an initial therapy. I will plan on esophagectomy after pending a PET showing improvement.     Procedure planned: First, laparoscopic staging and jejunostomy.     Six weeks later, minimally invasive Feliberto-Elbert esophagectomy.     I discussed with the patient the conduct of the operation of an Feliberto-Elbert transthoracic esophagectomy. The hospital stay would likely be 5-10 days if there are no complications. I explained that approximately 50% of patients have some complication, ranging from minor complications to the very serious ones. The recovery takes up to 8 weeks.  I explained to the patient the risks and benefits of the procedure. The benefit of the procedure is to remove the esophageal cancer. The risks include anastomotic leak, gastric necrosis, pneumonia, DVT and pulmonary embolism, arrhythmia, jejunostomy tube complications including intestinal obstruction, urinary tract infection and death.       Necessary Preop Tests & Appointments: Preoperative assessment clinic, PET scan     Regional Anesthesia Plan: Intraoperative intercostal nerve block     Anticoagulation Plan: Prophylactic Lovenox     I appreciate the opportunity to participate in the care of your patient and will keep you updated.    Sincerely,    Devin Hill MD

## 2024-02-27 NOTE — LETTER
2/27/2024         RE: Lopez Hogue  [address] [facility] [address]        Dear Colleague,    Thank you for referring your patient, Lopez Hogue, to the [facility]. Please see a copy of my visit note below.    THORACIC SURGERY - OFFICE VISIT       Dear Dr. Ramos,     I saw Lopez Hogue at Dr. Felix s request in consultation for the evaluation and treatment of a mid esophageal squamous cell cancer.      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 He initially presented with hemoptysis and was found to have the head and neck cancer. The  esophageal cancer was seen on PET. He has intentionally gained 10 pounds over the past 5 weeks. He has good energy levels and has little to no trouble with swallowing. He mainly has occasional pill dysphagia. He is eating a regular diet. He is very active and goes to the gym several times per week. He had a heart attack earlier this year and has two coronary stents. He has been exercising since then and feels great.    Today, 2/27/2024:  He has undergone chemoradiation with carboplatin and taxol, starting 12/5/2023. He completed radiation 1/26/2024. He had neutropenia 1/15/2024.  He says his weight is slowly increasing.  He was having difficulty eating during treatment due to difficulty with pain in his throat.  Otherwise now he is able to eat all food, just does not taste anything.  He has been to the gym 2 times in the past 10 days and is staying active.      Of note, he developed orthostatic hypotension after treatment. He had one episode of syncope requiring evaluation at the emergency department.  He continues to have some issues with dizziness but is able to identify it and sit down.  He underwent iron infusions for his anemia.       Previsit Tests   PET/CT (10/13/2023):  Right vallecula squamous cell carcinoma (1.4 x 1.4 cm, SUV 13.4) with FDG-avid right level IIa and right level IV lymph node metastases. FDG thickening of the mid to distal esophagus, suspicious for a synchronous primary esophageal neoplasm (4.7 cm, SUV 13.2).       PFT (11/15/2023): FEV1 - 2.41L (86%), DLCO - 122%    Upper Endoscopy (11/8/2023): fungating and ulcerating mass in the middle 1/3rd of the esophagus, 30 - 36 cm from the incisors. Biopsied.       11/8/2023: Esophageal biopsy - Non-keratinizing poorly differentiated squamous cell carcinoma, negative for intestinal metaplasia/Alfredo's type mucosa.     Right vallecula lesion biopsy (10/12/2023): Non-keratinizing poorly differentiated squamous cell carcinoma.     EUS 11/28/2023: T2N1 squamous cell carcinoma     PMH  Reviewed, as below     Past Medical History        Past Medical History:   Diagnosis Date    EtOH dependence (H)       Quit drinking 10 years    Heart attack (H)      Hypertension      Nonrheumatic aortic (valve) stenosis      Sweat gland carcinoma              PSH  Reviewed, as below     Past Surgical History         Past Surgical History:   Procedure Laterality Date    CV CORONARY ANGIOGRAM N/A 3/27/2023     Procedure: Coronary Angiogram;  Surgeon: Miki Etienne MD;  Location: [facility]    CV CORONARY ANGIOGRAM N/A 5/9/2023     Procedure: Coronary Angiogram;  Surgeon: Miki Etienne MD;  Location: [facility] CV    CV LEFT HEART CATH N/A 3/27/2023     Procedure: Left Heart Catheterization;  Surgeon: Miki Etienne MD;  Location: [facility] CV    CV LEFT HEART CATH N/A 5/9/2023     Procedure: Left Heart Catheterization;  Surgeon: Miki Etienne MD;  Location: [facility]    CV PCI N/A 3/27/2023     Procedure: Percutaneous Coronary Intervention;  Surgeon: Miki Etienne MD;  Location: [facility]    ESOPHAGOSCOPY, GASTROSCOPY, DUODENOSCOPY (EGD), COMBINED N/A 11/8/2023     Procedure:  ESOPHAGOGASTRODUODENOSCOPY, WITH BIOPSY;  Surgeon: Shahriar Giraldo MD;  Location:  GI    LARYNGOSCOPY WITH BIOPSY(IES) N/A 10/12/2023     Procedure: LARYNGOSCOPY, WITH BIOPSY;  Surgeon: Edi Felix MD;  [facility]    OTHER SURGICAL HISTORY   2015     WIDE EXCISION OF LEFT GLUTEAL MASSTNM: [id number], stage: II hidradenocarcinoma Grade II, 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, sentinel lymph node biopsy negative             ETOH:  Stopped 10 years ago  TOB: ages   22 - 58; Quit 2013 2 packs per day - 72 pack years   Other drugs: No     Physical examination  /71 (BP Location: Right arm, Patient Position: Sitting, Cuff Size: Adult Regular)   Pulse 93   Temp 98.2  F (36.8  C) (Oral)   Resp 16   Wt 69.2 kg (152 lb 9.6 oz)   SpO2 97%   BMI 23.20 kg/m      Physical Exam  Constitutional:       Appearance: Normal appearance. He is normal weight.   Eyes:      Conjunctiva/sclera: Conjunctivae normal.   Cardiovascular:      Rate and Rhythm: Normal rate.   Pulmonary:      Effort: Pulmonary effort is normal.   Abdominal:      General: Abdomen is flat.   Musculoskeletal:         General: Normal range of motion.   Skin:     General: Skin is warm and dry.   Neurological:      Mental Status: He is alert and oriented to person, place, and time.   Psychiatric:         Mood and Affect: Mood normal.         Behavior: Behavior normal.         Thought Content: Thought content normal.         Judgment: Judgment normal.      From a personal perspective, he lives with his wife, who is a nurse. 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 They have two sons and one grandchild.     IMPRESSION (C15.9) Primary esophageal squamous cell carcinoma (H)  (primary encounter diagnosis)     (C10.0) Squamous cell carcinoma of vallecula (H)     This person is a 68 year old male with synchronous esophageal squamous cell carcinoma and vallecular squamous cell carcinoma. He si a good surgical  candidate.     PLAN  I spent 30 min on the date of the encounter in chart review, patient visit, review of tests, documentation and/or discussion with other providers about the issues documented above. I reviewed the plan as follows:     I agree with neoadjuvant chemoradiation as an initial therapy. I will plan on esophagectomy after pending a PET showing improvement.     Procedure planned: First, laparoscopic staging and jejunostomy.     Six weeks later, minimally invasive Feliberto-Elbert esophagectomy.     I discussed with the patient the conduct of the operation of an Feliberto-Elbert transthoracic esophagectomy. The hospital stay would likely be 5-10 days if there are no complications. I explained that approximately 50% of patients have some complication, ranging from minor complications to the very serious ones. The recovery takes up to 8 weeks.  I explained to the patient the risks and benefits of the procedure. The benefit of the procedure is to remove the esophageal cancer. The risks include anastomotic leak, gastric necrosis, pneumonia, DVT and pulmonary embolism, arrhythmia, jejunostomy tube complications including intestinal obstruction, urinary tract infection and death.       Necessary Preop Tests & Appointments: Preoperative assessment clinic, PET scan     Regional Anesthesia Plan: Intraoperative intercostal nerve block     Anticoagulation Plan: Prophylactic Lovenox     I appreciate the opportunity to participate in the care of your patient and will keep you updated.    Sincerely,    Devin Hill MD

## 2024-02-27 NOTE — NURSING NOTE
"Oncology Rooming Note    February 27, 2024 4:15 PM   Lopez Hogue is a 68 year old male who presents for:    Chief Complaint   Patient presents with    Oncology Clinic Visit     Squamous cell carcinoma of vallecula     Initial Vitals: /71 (BP Location: Right arm, Patient Position: Sitting, Cuff Size: Adult Regular)   Pulse 93   Temp 98.2  F (36.8  C) (Oral)   Resp 16   Wt 69.2 kg (152 lb 9.6 oz)   SpO2 97%   BMI 23.20 kg/m   Estimated body mass index is 23.2 kg/m  as calculated from the following:    Height as of 2/11/24: 1.727 m (5' 8\").    Weight as of this encounter: 69.2 kg (152 lb 9.6 oz). Body surface area is 1.82 meters squared.  No Pain (0) Comment: Data Unavailable   No LMP for male patient.  Allergies reviewed: Yes  Medications reviewed: Yes    Medications: Medication refills not needed today.  Pharmacy name entered into [email or web address]:    [id number] - [place], [place] - [address] [place] [address]  AT [facility] [address] [place] [address] PHARMACY [facility] - [place], [place] - [address] [facility]    Frailty Screening:   Is the patient here for a new oncology consult visit in cancer care? 2. No      Clinical concerns: none      Alice Barrientos, EMT  2/27/2024              "

## 2024-03-04 ENCOUNTER — MYC REFILL (OUTPATIENT)
Dept: ONCOLOGY | Facility: CLINIC | Age: 69
End: 2024-03-04
Payer: COMMERCIAL

## 2024-03-04 DIAGNOSIS — C15.9 PRIMARY ESOPHAGEAL SQUAMOUS CELL CARCINOMA (H): ICD-10-CM

## 2024-03-04 DIAGNOSIS — K21.9 GASTROESOPHAGEAL REFLUX DISEASE, UNSPECIFIED WHETHER ESOPHAGITIS PRESENT: ICD-10-CM

## 2024-03-04 DIAGNOSIS — C10.0 SQUAMOUS CELL CARCINOMA OF VALLECULA (H): ICD-10-CM

## 2024-03-04 DIAGNOSIS — C10.0 SQUAMOUS CELL CARCINOMA OF VALLECULA (H): Primary | ICD-10-CM

## 2024-03-05 DIAGNOSIS — C15.9 PRIMARY ESOPHAGEAL SQUAMOUS CELL CARCINOMA (H): Primary | ICD-10-CM

## 2024-03-05 RX ORDER — DIPHENHYDRAMINE HYDROCHLORIDE AND LIDOCAINE HYDROCHLORIDE AND ALUMINUM HYDROXIDE AND MAGNESIUM HYDRO
15 KIT EVERY 4 HOURS
Qty: 237 ML | Refills: 1 | Status: SHIPPED | OUTPATIENT
Start: 2024-03-05 | End: 2024-03-15

## 2024-03-05 RX ORDER — FAMOTIDINE 20 MG/1
20 TABLET, FILM COATED ORAL 2 TIMES DAILY
Qty: 60 TABLET | Refills: 3 | Status: ON HOLD | OUTPATIENT
Start: 2024-03-05 | End: 2024-05-17

## 2024-03-05 RX ORDER — PROCHLORPERAZINE MALEATE 10 MG
10 TABLET ORAL EVERY 6 HOURS PRN
Qty: 30 TABLET | Refills: 2 | Status: SHIPPED | OUTPATIENT
Start: 2024-03-05 | End: 2024-04-17

## 2024-03-05 NOTE — TELEPHONE ENCOUNTER
Medication:famotidine  Last prescribing provider:Joanna Ro  Last clinic visit date: 2/16/2024  Recommendations for requested medication:copied and pasted from last provider visit    Any other pertinent information:routed Mary

## 2024-03-05 NOTE — TELEPHONE ENCOUNTER
Medication:MWW  Last prescribing provider:Joanna MAY  Last clinic visit date: 2/16/2024 Joanna Ro   Recommendations for requested medication:mucositis  Any other pertinent information:routed to last provider Joanna

## 2024-03-05 NOTE — TELEPHONE ENCOUNTER
Medication:Prochlorperazine  Last prescribing provider:Nancy Ro  Last clinic visit date: 2/16/2024 Joanna Ro  Recommendations for requested medication:copied and pasted from last provider visit    Any other pertinent information:Joanna

## 2024-03-06 ENCOUNTER — VIRTUAL VISIT (OUTPATIENT)
Dept: PALLIATIVE CARE | Facility: CLINIC | Age: 69
End: 2024-03-06
Attending: INTERNAL MEDICINE
Payer: COMMERCIAL

## 2024-03-06 VITALS — BODY MASS INDEX: 22.13 KG/M2 | WEIGHT: 146 LBS | HEIGHT: 68 IN

## 2024-03-06 DIAGNOSIS — C15.9 PRIMARY ESOPHAGEAL SQUAMOUS CELL CARCINOMA (H): Primary | ICD-10-CM

## 2024-03-06 DIAGNOSIS — K12.30 MUCOSITIS: ICD-10-CM

## 2024-03-06 DIAGNOSIS — G89.3 NEOPLASM RELATED PAIN: ICD-10-CM

## 2024-03-06 PROCEDURE — 99214 OFFICE O/P EST MOD 30 MIN: CPT | Mod: 95 | Performed by: INTERNAL MEDICINE

## 2024-03-06 RX ORDER — BUPRENORPHINE 5 UG/H
1 PATCH TRANSDERMAL
Qty: 4 PATCH | Refills: 0 | Status: SHIPPED | OUTPATIENT
Start: 2024-03-06 | End: 2024-04-17

## 2024-03-06 RX ORDER — DOXEPIN HYDROCHLORIDE 10 MG/ML
20 SOLUTION ORAL EVERY 4 HOURS
Qty: 118 ML | Refills: 0 | Status: SHIPPED | OUTPATIENT
Start: 2024-03-06 | End: 2024-03-15

## 2024-03-06 ASSESSMENT — PAIN SCALES - GENERAL: PAINLEVEL: NO PAIN (0)

## 2024-03-06 NOTE — NURSING NOTE
Is the patient currently in the state of [place]? YES    Visit mode:VIDEO    If the visit is dropped, the patient can be reconnected by: VIDEO VISIT: Text to cell phone:   Telephone Information:   Mobile [phone]       Will anyone else be joining the visit? NO  (If patient encounters technical issues they should call [phone]    How would you like to obtain your AVS? MyChart    Are changes needed to the allergy or medication list? Pt stated no changes to allergies and flagged medications for removal    Reason for visit: RECHRAFIA Mulligan LPN

## 2024-03-06 NOTE — LETTER
"3/6/2024       RE: Lopez Hogue  [address] [facility] [address]       Dear Colleague,    Thank you for referring your patient, Lopez Hogue, [facility]. Please see a copy of my visit note below.    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   Dec 23- 1/2024 curative-intent chemoradiation with carbo/paclitaxel.   1/2024 Hospitalized twice; neutropenic fevers/neck cellulitis  3/2024 plan for PET and then hopefully esophagectomy     Social - Lives with wife; 2 adult sons in town. 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   Hx tobacco smoking.   Hx of AUD; in recovery x ~11y     Care Planning -       History:  History gathered today from: patient, medical chart    He's been off treatment for over a month.  His pan was going well and he stopped doxepin and MM and Butrans all of a sudden and realized he needed it a few days later.  Has modest mid throat pain    Butrans 10 mcg/hr--stopped and wonders if it actually was working. No withdrawals.  Doxepin--seems like it was working and wants to continue.    Eating ok; some pain, tough with dry things like crackers.  Weight loss ongoing--but feels he's leveled off around 145#.    Plan:  PET scan; if that's good; plan to proceed with staged surgeries (laparoscopic staging and J tube placement, then esophagectomy)    Mood and spirits holding up; feels positive    PE: Ht 1.727 m (5' 8\")   Wt 66.2 kg (146 lb)   BMI 22.20 kg/m     Wt Readings from Last 3 Encounters:   03/06/24 66.2 kg (146 lb)   02/27/24 69.2 kg (152 lb 9.6 oz)   02/16/24 67.8 kg (149 lb 6.4 oz)     Alert NAD  Clear sensorium      Data reviewed:  I reviewed recent labs and imaging, my comments:  Brain MRI [facility]    [facility] database " reviewed: y      Impression & Recommendations:  67 yo with vallecula SCC and esophageal SCC s/p chemoradiation and with a tenative plan for esophagectomy soon.  Globally doing quite well; he continues to have improved but sig mid throat pain and I suspect the Butrans was treating it well which is why he noticed it again 3-4 days after removing patch (it can take that long for pain to rebound off the patch)    Will restart Butrans but at 5 mcg/hr for 2-4 weeks depending on his recovery  D/w him it's normal to have residual pain for 1-3 months after chemoradiation. This will improve with time. Also he has an unresected esophageal tumor.   Ok to continue doxepin mucosal rinse (he just swallows it and tolerates it surprisingly well) and MM  Mood and spirits good    Follow-up 1-2 mo      Over 30 minutes spent on the date of the encounter doing chart review, history and exam, patient education & counseling, documentation and other activities as noted above.    Thank you for involving us in the patient's care.   Nuno Cazares MD / Palliative Medicine / Text me via [employer]  This note may have been composed with voice recognition software and there may be mistranscriptions.        Again, thank you for allowing me to participate in the care of your patient.      Sincerely,    Nuno Cazares MD

## 2024-03-06 NOTE — PROGRESS NOTES
"Palliative Care Outpatient Clinic      Patient ID:  Medical - He has CAD and synchronous JUAN MIGUEL SCC L vallecula + poorly differentiated SCC of the mid esophagus dx around 10/2023.   Dec 23- 1/2024 curative-intent chemoradiation with carbo/paclitaxel.   1/2024 Hospitalized twice; neutropenic fevers/neck cellulitis  3/2024 plan for PET and then hopefully esophagectomy     Social - Lives with wife; 2 adult sons in town. 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   Hx tobacco smoking.   Hx of AUD; in recovery x ~11y     Care Planning -       History:  History gathered today from: patient, medical chart    He's been off treatment for over a month.  His pan was going well and he stopped doxepin and MM and Butrans all of a sudden and realized he needed it a few days later.  Has modest mid throat pain    Butrans 10 mcg/hr--stopped and wonders if it actually was working. No withdrawals.  Doxepin--seems like it was working and wants to continue.    Eating ok; some pain, tough with dry things like crackers.  Weight loss ongoing--but feels he's leveled off around 145#.    Plan:  PET scan; if that's good; plan to proceed with staged surgeries (laparoscopic staging and J tube placement, then esophagectomy)    Mood and spirits holding up; feels positive    PE: Ht 1.727 m (5' 8\")   Wt 66.2 kg (146 lb)   BMI 22.20 kg/m     Wt Readings from Last 3 Encounters:   03/06/24 66.2 kg (146 lb)   02/27/24 69.2 kg (152 lb 9.6 oz)   02/16/24 67.8 kg (149 lb 6.4 oz)     Alert NAD  Clear sensorium      Data reviewed:  I reviewed recent labs and imaging, my comments:  Brain MRI [facility]ANE     database reviewed: y      Impression & Recommendations:  67 yo with vallecula SCC and esophageal SCC s/p chemoradiation and with a tenative plan for esophagectomy soon.  Globally doing quite well; he continues to have improved but sig mid throat pain and I suspect the Butrans was treating it well which is why he " noticed it again 3-4 days after removing patch (it can take that long for pain to rebound off the patch)    Will restart Butrans but at 5 mcg/hr for 2-4 weeks depending on his recovery  D/w him it's normal to have residual pain for 1-3 months after chemoradiation. This will improve with time. Also he has an unresected esophageal tumor.   Ok to continue doxepin mucosal rinse (he just swallows it and tolerates it surprisingly well) and MM  Mood and spirits good    Follow-up 1-2 mo      Over 30 minutes spent on the date of the encounter doing chart review, history and exam, patient education & counseling, documentation and other activities as noted above.    Thank you for involving us in the patient's care.   Nuno Cazares MD / Palliative Medicine / Text me via [employer]  This note may have been composed with voice recognition software and there may be mistranscriptions.    Virtual Visit Details    Type of service:  Video Visit   Video Start Time:  220k  Video End Time:9:56 AM  Originating Location (pt. Location): Home    Distant Location (provider location):  Off-site  Platform used for Video Visit: David

## 2024-03-08 ENCOUNTER — OFFICE VISIT (OUTPATIENT)
Dept: RADIATION ONCOLOGY | Facility: CLINIC | Age: 69
End: 2024-03-08
Attending: RADIOLOGY
Payer: COMMERCIAL

## 2024-03-08 VITALS
SYSTOLIC BLOOD PRESSURE: 124 MMHG | WEIGHT: 145 LBS | DIASTOLIC BLOOD PRESSURE: 78 MMHG | BODY MASS INDEX: 22.05 KG/M2 | HEART RATE: 88 BPM

## 2024-03-08 DIAGNOSIS — C10.0 SQUAMOUS CELL CARCINOMA OF VALLECULA (H): Primary | ICD-10-CM

## 2024-03-08 DIAGNOSIS — C15.4 MALIGNANT NEOPLASM OF MIDDLE THIRD OF ESOPHAGUS (H): ICD-10-CM

## 2024-03-08 PROCEDURE — 99213 OFFICE O/P EST LOW 20 MIN: CPT | Performed by: RADIOLOGY

## 2024-03-08 NOTE — PROGRESS NOTES
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 He has undergone chemoradiation with carboplatin and taxol, starting 2023. He completed radiation 2024. He had neutropenia 1/15/2024. The patient saw medical oncology recently, as well as palliative care.    Overall, the patient states he has recovered well from radiation therapy. His energy levels are at baseline, and his weight has been stable for the last 2 weeks, although he has lost 7 pounds since completing radiation therapy. He has no difficulty swallowing but does have issues with certain types of foods, such as crackers. He is taking a liquid protein/calorie supplement and is able to tolerate soft foods. His taste and smell are reduced but have improved since completing radiation therapy. He experiences occasional dizziness but no falls. He has undergone iron infusions for anemia and is currently taking an iron supplement, along with MiraLAX for constipation. For palliative care, he was started on Butrans 5 mcg/h for throat pain and is continuing with doxepin and Magic Mouthwash. He has a PET/CT scan scheduled for 2024.    PAST MEDICAL/SURGICAL HISTORY:   Past Medical History:   Diagnosis Date    EtOH dependence  (H)     Quit drinking 10 years    Heart attack (H)     Hypertension     Nonrheumatic aortic (valve) stenosis     Sweat gland carcinoma       Past Surgical History:   Procedure Laterality Date    CV CORONARY ANGIOGRAM N/A 3/27/2023    Procedure: Coronary Angiogram;  Surgeon: Miki Etienne MD;  Location: [facility] CV    CV CORONARY ANGIOGRAM N/A 5/9/2023    Procedure: Coronary Angiogram;  Surgeon: Miki Etienne MD;  Location: [facility] CV    CV LEFT HEART CATH N/A 3/27/2023    Procedure: Left Heart Catheterization;  Surgeon: Miki Etienne MD;  Location: [facility] LAB CV    CV LEFT HEART CATH N/A 5/9/2023    Procedure: Left Heart Catheterization;  Surgeon: Miki Etienne MD;  Location: [facility] CV    CV PCI N/A 3/27/2023    Procedure: Percutaneous Coronary Intervention;  Surgeon: Miki Etienne MD;  Location: [facility] CV    ENDOSCOPIC ULTRASOUND UPPER GASTROINTESTINAL TRACT (GI) N/A 11/28/2023    Procedure: Endoscopic ultrasound upper gastrointestinal tract (GI);  Surgeon: Shahriar Giraldo MD;  Location:  GI    ESOPHAGOSCOPY, GASTROSCOPY, DUODENOSCOPY (EGD), COMBINED N/A 11/8/2023    Procedure: ESOPHAGOGASTRODUODENOSCOPY, WITH BIOPSY;  Surgeon: Shahriar Giraldo MD;  Location:  GI    LARYNGOSCOPY WITH BIOPSY(IES) N/A 10/12/2023    Procedure: LARYNGOSCOPY, WITH BIOPSY;  Surgeon: Edi Felix MD;  Location:  OR    OTHER SURGICAL HISTORY  2015    WIDE EXCISION OF LEFT GLUTEAL MASSTNM: [id number], stage: II hidradenocarcinoma Grade II, 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, sentinel lymph node biopsy negative        ALLERGIES:    Allergies   Allergen Reactions    Coconut Flavor Anaphylaxis     Raw coconut       MEDICATIONS:   Current Outpatient Medications   Medication Sig Dispense Refill    acetaminophen (TYLENOL) 500 MG tablet Take 500-1,000 mg by mouth every 8 hours as needed for fever or pain      aspirin (ASA) 81 MG EC tablet Take 1 tablet (81 mg) by mouth daily  Start tomorrow. 30 tablet 3    buprenorphine (BUTRANS) 5 MCG/HR WK patch Place 1 patch onto the skin every 7 days 4 patch 0    chlorhexidine (PERIDEX) 0.12 % solution SWISH AND SPIT 15 ML BY MOUTH TWICE DAILY AFTER BRUSHING. NOTHING BY MOUTH FOR 30 MINUTES AFTERWARDS      clopidogrel (PLAVIX) 75 MG tablet Take 1 tablet (75 mg) by mouth daily 90 tablet 2    cyanocobalamin (VITAMIN B-12) 1000 MCG tablet Take 1 tablet (1,000 mcg) by mouth every evening 30 tablet 0    doxepin (SINEQUAN) 10 MG/ML (HIGH CONC) solution Take 2 mLs (20 mg) by mouth every 4 hours 118 mL 0    famotidine (PEPCID) 20 MG tablet Take 1 tablet (20 mg) by mouth 2 times daily 60 tablet 3    magic mouthwash suspension, diphenhydrAMINE, lidocaine, aluminum-magnesium & simethicone, (FIRST-MOUTHWASH BLM) compounding kit Swish and swallow 15 mLs in mouth every 4 hours Take with doxepin solution. 237 mL 1    nitroGLYcerin (NITROSTAT) 0.4 MG sublingual tablet PLACE 1 TABLET UNDER THE TONGUE EVERY 5 MINUTES FOR CHEST PAIN FOR 3 DOSES. IF SYMPTOMS PERSIST 5 MINUTES AFTER 1ST DOSE CALL 911. 25 tablet 1    prochlorperazine (COMPAZINE) 10 MG tablet Take 1 tablet (10 mg) by mouth every 6 hours as needed for nausea or vomiting 30 tablet 2    rosuvastatin (CRESTOR) 40 MG tablet Take 1 tablet (40 mg) by mouth daily 90 tablet 3    Sennosides (SENNA) 8.8 MG/5ML SYRP Take 10-15 mLs (17.6-26.4 mg) by mouth daily as needed 236 mL 1    sertraline (ZOLOFT) 100 MG tablet Take 100 mg by mouth every morning      sodium fluoride dental gel (PREVIDENT) 1.1 % GEL topical gel       sucralfate (CARAFATE) 1 GM/10ML suspension Take 1 g by mouth 2 times daily         REVIEW OF SYSTEMS: A 12-point review of systems was obtained. Pertinent findings are noted in the HPI and are otherwise unremarkable.     PHYSICAL EXAM:  VITALS: /78   Pulse 88   Wt 65.8 kg (145 lb)   BMI 22.05 kg/m    GEN: Alert, oriented, in no acute distress  HEENT: Normocephalic and atraumatic, EOMI,  anicteric sclerae, small palpable neck LAD in submandibular area and anterior cervical, no oropharyngeal lesions, mild mucositis noted in posterior pharynx  CV: no LE edema, no JVD  RESP: normal respiration on room air, no stridor  SKIN: normal color and turgor  MSK: moving all extremities well  LYMPHATICS: no cervical or supraclavicular LAD  NEURO: CN II-XII grossly intact, no focal neurologic deficit    LABS AND IMAGING: No new imaging    IMPRESSION/RECOMMENDATION:  Fabricio Hogue is a 68M with two synchronous malignancies: squamous cell carcinoma of the vallecula, p16 negative, stage Beckie, and stage II esophageal squamous cell carcinoma. He is status post chemoradiation for both. A PET/CT scan is scheduled for March 12, 2024, to assess the interval response in the esophagus for surgical planning. It was discussed that this PET scan may not show the entirety of response to treatment for his head and neck malignancy as he usually imaging at 12 weeks post completion of treatment. The patient has a follow-up scheduled with palliative care in 1 to 2 months, with ENT on March 11, 2024, and a pending follow-up with thoracic surgery, Dr. Hill, pending the results of the PET/CT for surgical planning. He is doing well, with resolving radiation toxicity and no evidence of disease.     He will return for follow-up in 6 months and was instructed to call our clinic with any questions or concerns.    Tyson Mendoza MD MS PGY2    Physician Attestation  Mr. Hogue was seen and examined by me. I agree with the findings and plan of care as documented in the note. Note above by Dr. Mendoza was reviewed and edited by me and reflects our mutual findings and plan of care.    Akiko Sun MD  Department of Radiation Oncology  [facility]          CC:  Patient Care Team:  Madi Ramos as PCP - General (House Physician)  Jose Maldonado MD as Assigned Surgical Provider  Grace  Shine BRIGGS MD as MD (Hematology)  Lluvia Park MD as MD (Radiation Oncology)  David Hidalgo PA-C as Physician Assistant (Gastroenterology)  Edi Felix MD as MD (Otolaryngology)  Nisha Pride APRN CNP as Nurse Practitioner (Dermatology)  Devin Hill MD as MD (Cardiovascular & Thoracic Surgery)  Shahriar Giraldo MD as MD (Gastroenterology)  Caryn Olvera, VERÓNICA as Specialty Care Coordinator (Hematology & Oncology)  Akiko Sun MD as Assigned Cancer Care Provider  Jason Walters MD as Assigned Heart and Vascular Provider  Yannick Alva MD as MD (Hematology & Oncology)  Nuno Cazares MD as MD (Hospice And Palliative Care)  Nuno Cazares MD as Assigned Palliative Care Provider  David Hidalgo PA-C as Assigned Gastroenterology Provider

## 2024-03-08 NOTE — PROGRESS NOTES
FOLLOW-UP VISIT    Patient Name: Lopez Hogue      : 1955     Age: 68 year old        ______________________________________________________________________________     Chief Complaint   Patient presents with    Cancer     Follow up:[facility]:Head and bilateral necks 7000 cGy and Esophagus 4500 cGy completed 24     /78   Pulse 88   Wt 65.8 kg (145 lb)   BMI 22.05 kg/m         Pain  Current history of pain associated with this visit:   Intensity: 210  Current: aching  Location: Throat/swallowing  Treatment: Butrans    Labs  Other Labs: No    Imaging  CT: 24      Dental:   Most Recent Dental Visit: No  Trays: Frequency 2-3    Speech/Swallowing:   Most Recent evaluation or testing: Next week  Swallowing Restrictions: soft foods    Trismus/Jaw Exercises: Yes    Nutrition:    Weight:   Wt Readings from Last 3 Encounters:   24 65.8 kg (145 lb)   24 66.2 kg (146 lb)   24 69.2 kg (152 lb 9.6 oz)         Oral Symptoms:   Xerostomia:0- None  Dysphagia: 1- Symtomatic, able to eat regulat diet  Mucositis Oral Symptoms: 0-None  Mucositis: 0- None  Esophagitis:0- None    Other Appointments:     MD Name: Dr Felix Appointment Date: 24   MD Name: Appointment Date:   MD Name: Appointment Date:   Other Appointment Notes:     Residual Radiation side effect: Some sore throat, fatigue

## 2024-03-08 NOTE — LETTER
3/8/2024         RE: Lopez Hogue  [address] [facility] [address]        Dear Colleague,    Thank you for referring your patient, Lopez Hogue, to the [facility] RADIATION ONCOLOGY. Please see a copy of my visit note below.    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 He has undergone chemoradiation with carboplatin and taxol, starting 2023. He completed radiation 2024. He had neutropenia 1/15/2024. The patient saw medical oncology recently, as well as palliative care.    Overall, the patient states he has recovered well from radiation therapy. His energy levels are at baseline, and his weight has been stable for the last 2 weeks, although he has lost 7 pounds since completing radiation therapy. He has no difficulty swallowing but does have issues with certain types of foods, such as crackers. He is taking a liquid protein/calorie supplement and is able to tolerate soft foods. His taste and smell are reduced but have improved since completing radiation therapy. He experiences occasional dizziness but no falls. He has undergone iron infusions for anemia and is currently taking an iron supplement, along with MiraLAX for constipation. For  palliative care, he was started on Butrans 5 mcg/h for throat pain and is continuing with doxepin and Magic Mouthwash. He has a PET/CT scan scheduled for March 12, 2024.    PAST MEDICAL/SURGICAL HISTORY:   Past Medical History:   Diagnosis Date    EtOH dependence (H)     Quit drinking 10 years    Heart attack (H)     Hypertension     Nonrheumatic aortic (valve) stenosis     Sweat gland carcinoma       Past Surgical History:   Procedure Laterality Date    CV CORONARY ANGIOGRAM N/A 3/27/2023    Procedure: Coronary Angiogram;  Surgeon: Miki Etienne MD;  Location: [facility] CV    CV CORONARY ANGIOGRAM N/A 5/9/2023    Procedure: Coronary Angiogram;  Surgeon: Miki Etienne MD;  Location: [facility] CV    CV LEFT HEART CATH N/A 3/27/2023    Procedure: Left Heart Catheterization;  Surgeon: Miki Etienne MD;  Location: [facility]    CV LEFT HEART CATH N/A 5/9/2023    Procedure: Left Heart Catheterization;  Surgeon: Miki Etienne MD;  Location: [facility]    CV PCI N/A 3/27/2023    Procedure: Percutaneous Coronary Intervention;  Surgeon: Miki Etienne MD;  Location: [facility] CV    ENDOSCOPIC ULTRASOUND UPPER GASTROINTESTINAL TRACT (GI) N/A 11/28/2023    Procedure: Endoscopic ultrasound upper gastrointestinal tract (GI);  Surgeon: Shahriar Giraldo MD;  Location:  GI    ESOPHAGOSCOPY, GASTROSCOPY, DUODENOSCOPY (EGD), COMBINED N/A 11/8/2023    Procedure: ESOPHAGOGASTRODUODENOSCOPY, WITH BIOPSY;  Surgeon: Shahriar Giraldo MD;  Location:  GI    LARYNGOSCOPY WITH BIOPSY(IES) N/A 10/12/2023    Procedure: LARYNGOSCOPY, WITH BIOPSY;  Surgeon: Edi Felix MD;  Location:  OR    OTHER SURGICAL HISTORY  2015    WIDE EXCISION OF LEFT GLUTEAL MASSTNM: [id number], stage: II hidradenocarcinoma Grade II, 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, sentinel lymph node biopsy negative        ALLERGIES:    Allergies   Allergen Reactions    Coconut Flavor Anaphylaxis     Raw coconut        MEDICATIONS:   Current Outpatient Medications   Medication Sig Dispense Refill    acetaminophen (TYLENOL) 500 MG tablet Take 500-1,000 mg by mouth every 8 hours as needed for fever or pain      aspirin (ASA) 81 MG EC tablet Take 1 tablet (81 mg) by mouth daily Start tomorrow. 30 tablet 3    buprenorphine (BUTRANS) 5 MCG/HR WK patch Place 1 patch onto the skin every 7 days 4 patch 0    chlorhexidine (PERIDEX) 0.12 % solution SWISH AND SPIT 15 ML BY MOUTH TWICE DAILY AFTER BRUSHING. NOTHING BY MOUTH FOR 30 MINUTES AFTERWARDS      clopidogrel (PLAVIX) 75 MG tablet Take 1 tablet (75 mg) by mouth daily 90 tablet 2    cyanocobalamin (VITAMIN B-12) 1000 MCG tablet Take 1 tablet (1,000 mcg) by mouth every evening 30 tablet 0    doxepin (SINEQUAN) 10 MG/ML (HIGH CONC) solution Take 2 mLs (20 mg) by mouth every 4 hours 118 mL 0    famotidine (PEPCID) 20 MG tablet Take 1 tablet (20 mg) by mouth 2 times daily 60 tablet 3    magic mouthwash suspension, diphenhydrAMINE, lidocaine, aluminum-magnesium & simethicone, (FIRST-MOUTHWASH BLM) compounding kit Swish and swallow 15 mLs in mouth every 4 hours Take with doxepin solution. 237 mL 1    nitroGLYcerin (NITROSTAT) 0.4 MG sublingual tablet PLACE 1 TABLET UNDER THE TONGUE EVERY 5 MINUTES FOR CHEST PAIN FOR 3 DOSES. IF SYMPTOMS PERSIST 5 MINUTES AFTER 1ST DOSE CALL 911. 25 tablet 1    prochlorperazine (COMPAZINE) 10 MG tablet Take 1 tablet (10 mg) by mouth every 6 hours as needed for nausea or vomiting 30 tablet 2    rosuvastatin (CRESTOR) 40 MG tablet Take 1 tablet (40 mg) by mouth daily 90 tablet 3    Sennosides (SENNA) 8.8 MG/5ML SYRP Take 10-15 mLs (17.6-26.4 mg) by mouth daily as needed 236 mL 1    sertraline (ZOLOFT) 100 MG tablet Take 100 mg by mouth every morning      sodium fluoride dental gel (PREVIDENT) 1.1 % GEL topical gel       sucralfate (CARAFATE) 1 GM/10ML suspension Take 1 g by mouth 2 times daily         REVIEW OF SYSTEMS: A 12-point review of systems was  obtained. Pertinent findings are noted in the HPI and are otherwise unremarkable.     PHYSICAL EXAM:  VITALS: /78   Pulse 88   Wt 65.8 kg (145 lb)   BMI 22.05 kg/m    GEN: Alert, oriented, in no acute distress  HEENT: Normocephalic and atraumatic, EOMI, anicteric sclerae, small palpable neck LAD in submandibular area and anterior cervical, no oropharyngeal lesions, mild mucositis noted in posterior pharynx  CV: no LE edema, no JVD  RESP: normal respiration on room air, no stridor  SKIN: normal color and turgor  MSK: moving all extremities well  LYMPHATICS: no cervical or supraclavicular LAD  NEURO: CN II-XII grossly intact, no focal neurologic deficit    LABS AND IMAGING: No new imaging    IMPRESSION/RECOMMENDATION:  Fabricio Hogue is a 68M with two synchronous malignancies: squamous cell carcinoma of the vallecula, p16 negative, stage Beckie, and stage II esophageal squamous cell carcinoma. He is status post chemoradiation for both. A PET/CT scan is scheduled for March 12, 2024, to assess the interval response in the esophagus for surgical planning. It was discussed that this PET scan may not show the entirety of response to treatment for his head and neck malignancy as he usually imaging at 12 weeks post completion of treatment. The patient has a follow-up scheduled with palliative care in 1 to 2 months, with ENT on March 11, 2024, and a pending follow-up with thoracic surgery, Dr. Hill, pending the results of the PET/CT for surgical planning. He is doing well, with resolving radiation toxicity and no evidence of disease.     He will return for follow-up in 6 months and was instructed to call our clinic with any questions or concerns.    Tyson Mendoza MD MS PGY2    Physician Attestation  Mr. Hogue was seen and examined by me. I agree with the findings and plan of care as documented in the note. Note above by Dr. Mendoza was reviewed and edited by me and reflects our mutual findings and plan of  care.    Akiko Sun MD  Department of Radiation Oncology  [facility]          CC:  Patient Care Team:  Madi Ramos as PCP - General (House Physician)  Jose Maldonado MD as Assigned Surgical Provider         FOLLOW-UP VISIT    Patient Name: Lopez Hogue      : 1955     Age: 68 year old        ______________________________________________________________________________     Chief Complaint   Patient presents with    Cancer     Follow up:[facility]:Head and bilateral necks 7000 cGy and Esophagus 4500 cGy completed 24     /78   Pulse 88   Wt 65.8 kg (145 lb)   BMI 22.05 kg/m         Pain  Current history of pain associated with this visit:   Intensity: 210  Current: aching  Location: Throat/swallowing  Treatment: Butrans    Labs  Other Labs: No    Imaging  CT: 24      Dental:   Most Recent Dental Visit: No  Trays: Frequency 2-3    Speech/Swallowing:   Most Recent evaluation or testing: Next week  Swallowing Restrictions: soft foods    Trismus/Jaw Exercises: Yes    Nutrition:    Weight:   Wt Readings from Last 3 Encounters:   24 65.8 kg (145 lb)   24 66.2 kg (146 lb)   24 69.2 kg (152 lb 9.6 oz)         Oral Symptoms:   Xerostomia:0- None  Dysphagia: 1- Symtomatic, able to eat regulat diet  Mucositis Oral Symptoms: 0-None  Mucositis: 0- None  Esophagitis:0- None    Other Appointments:     MD Name: Dr Felix Appointment Date: 24   MD Name: Appointment Date:   MD Name: Appointment Date:   Other Appointment Notes:     Residual Radiation side effect: Some sore throat, fatigue       Again, thank you for allowing me to participate in the care of your patient.        Sincerely,        Akiko Sun MD

## 2024-03-10 NOTE — PROGRESS NOTES
Head and Neck Surgery  3/11/24    Diagnosis: Synchronous right vallecula T1N2b p16- SCC right vallecula and mid esophagus SCC    Treatment: Concurrent chemoradiation to HN and esophagus completed 1/26/24. Esophagectomy pending    Interval history: Doing well today. Eating well. No complaints    Physical Examination:  Alert, NAD  Breathing comfortably  No palpable adenopathy  No lesions in oral cavity or oropharynx    Procedure: Fiberoptic laryngoscopy performed. No lesions seen. Airway patent    A/P:  Doing well without signs of disease in the HN  We typically would get 3 month post treatment pet  Dr Hill is getting pet tomorrow in prep for esophagectomy. Would not be unusual to see persistent activity/nodes in the head and neck at this time frame. I will discuss with Dr Liz Hill is planning esophagectomy  I will see him back end of April.     Edi Felix MD      25 minutes spent on the date of the encounter in chart review, patient visit, review of tests, documentation and/or discussion with other providers about the issues documented above asides from time spent doing flexible laryngoscopy.

## 2024-03-11 ENCOUNTER — OFFICE VISIT (OUTPATIENT)
Dept: OTOLARYNGOLOGY | Facility: CLINIC | Age: 69
End: 2024-03-11
Payer: COMMERCIAL

## 2024-03-11 ENCOUNTER — TELEPHONE (OUTPATIENT)
Dept: OTOLARYNGOLOGY | Facility: CLINIC | Age: 69
End: 2024-03-11

## 2024-03-11 ENCOUNTER — THERAPY VISIT (OUTPATIENT)
Dept: SPEECH THERAPY | Facility: CLINIC | Age: 69
End: 2024-03-11
Payer: COMMERCIAL

## 2024-03-11 VITALS
DIASTOLIC BLOOD PRESSURE: 69 MMHG | HEART RATE: 85 BPM | TEMPERATURE: 97.5 F | WEIGHT: 150.8 LBS | SYSTOLIC BLOOD PRESSURE: 125 MMHG | HEIGHT: 68 IN | BODY MASS INDEX: 22.85 KG/M2 | OXYGEN SATURATION: 97 %

## 2024-03-11 DIAGNOSIS — R13.12 OROPHARYNGEAL DYSPHAGIA: Primary | ICD-10-CM

## 2024-03-11 DIAGNOSIS — C10.0 SQUAMOUS CELL CARCINOMA OF VALLECULA (H): ICD-10-CM

## 2024-03-11 DIAGNOSIS — J39.2: Primary | ICD-10-CM

## 2024-03-11 PROCEDURE — 99213 OFFICE O/P EST LOW 20 MIN: CPT | Mod: 25 | Performed by: STUDENT IN AN ORGANIZED HEALTH CARE EDUCATION/TRAINING PROGRAM

## 2024-03-11 PROCEDURE — 31575 DIAGNOSTIC LARYNGOSCOPY: CPT | Performed by: STUDENT IN AN ORGANIZED HEALTH CARE EDUCATION/TRAINING PROGRAM

## 2024-03-11 PROCEDURE — 92526 ORAL FUNCTION THERAPY: CPT | Mod: GN | Performed by: SPEECH-LANGUAGE PATHOLOGIST

## 2024-03-11 ASSESSMENT — PAIN SCALES - GENERAL: PAINLEVEL: NO PAIN (0)

## 2024-03-11 NOTE — LETTER
3/11/2024       RE: Lopez Hogue  [address] [facility] [address]     Dear Colleague,    Thank you for referring your patient, Lopez Hogue, to the [facility] EAR NOSE AND THROAT CLINIC [place] at . Please see a copy of my visit note below.    Head and Neck Surgery  3/11/24    Diagnosis: Synchronous right vallecula T1N2b p16- SCC right vallecula and mid esophagus SCC    Treatment: Concurrent chemoradiation to HN and esophagus completed 1/26/24. Esophagectomy pending    Interval history: Doing well today. Eating well. No complaints    Physical Examination:  Alert, NAD  Breathing comfortably  No palpable adenopathy  No lesions in oral cavity or oropharynx    Procedure: Fiberoptic laryngoscopy performed. No lesions seen. Airway patent    A/P:  Doing well without signs of disease in the HN  We typically would get 3 month post treatment pet  Dr Hill is getting pet tomorrow in prep for esophagectomy. Would not be unusual to see persistent activity/nodes in the head and neck at this time frame. I will discuss with Dr Liz Hill is planning esophagectomy  I will see him back end of April.     Edi Felix MD      25 minutes spent on the date of the encounter in chart review, patient visit, review of tests, documentation and/or discussion with other providers about the issues documented above asides from time spent doing flexible laryngoscopy.

## 2024-03-11 NOTE — PATIENT INSTRUCTIONS
You were seen in the ENT Clinic today by Dr. Felix. If you have any questions or concerns after your appointment, please contact us (see below)    Please return to clinic end of April for follow up with Dr. Felix    How to Contact Us:  Send a [employer] message to your provider. Our team will respond to you via [employer]. Occasionally, we will need to call you to get further information.  For urgent matters (Monday-Friday), call the ENT Clinic: [phone] and speak with a call center team member - they will route your call appropriately.   If you'd like to speak directly with a nurse, please find our contact information below. We do our best to check voicemail frequently throughout the day, and will work to call you back within 1-2 days. For urgent matters, please use the general clinic phone numbers listed above.    Hollie ARAMBULA RN, BSN  RN Care Coordinator, ENT Clinic  [facility]  Direct: [phone]

## 2024-03-12 ENCOUNTER — HOSPITAL ENCOUNTER (OUTPATIENT)
Dept: PET IMAGING | Facility: CLINIC | Age: 69
Discharge: HOME | End: 2024-03-12
Attending: THORACIC SURGERY (CARDIOTHORACIC VASCULAR SURGERY) | Admitting: THORACIC SURGERY (CARDIOTHORACIC VASCULAR SURGERY)
Payer: COMMERCIAL

## 2024-03-12 DIAGNOSIS — C15.9 PRIMARY ESOPHAGEAL SQUAMOUS CELL CARCINOMA (H): ICD-10-CM

## 2024-03-12 PROCEDURE — 250N000011 HC RX IP 250 OP 636: Performed by: THORACIC SURGERY (CARDIOTHORACIC VASCULAR SURGERY)

## 2024-03-12 PROCEDURE — 71260 CT THORAX DX C+: CPT

## 2024-03-12 PROCEDURE — 78813 PET IMAGE FULL BODY: CPT | Mod: 26 | Performed by: RADIOLOGY

## 2024-03-12 PROCEDURE — 343N000001 HC RX 343: Performed by: THORACIC SURGERY (CARDIOTHORACIC VASCULAR SURGERY)

## 2024-03-12 PROCEDURE — A9552 F18 FDG: HCPCS | Performed by: THORACIC SURGERY (CARDIOTHORACIC VASCULAR SURGERY)

## 2024-03-12 PROCEDURE — 78816 PET IMAGE W/CT FULL BODY: CPT | Mod: PS

## 2024-03-12 PROCEDURE — 71260 CT THORAX DX C+: CPT | Mod: 26 | Performed by: RADIOLOGY

## 2024-03-12 PROCEDURE — 74177 CT ABD & PELVIS W/CONTRAST: CPT | Mod: 26 | Performed by: RADIOLOGY

## 2024-03-12 RX ORDER — IOPAMIDOL 755 MG/ML
50-135 INJECTION, SOLUTION INTRAVASCULAR ONCE
Status: COMPLETED | OUTPATIENT
Start: 2024-03-12 | End: 2024-03-12

## 2024-03-12 RX ADMIN — FLUDEOXYGLUCOSE F-18 10.04 MILLICURIE: 500 INJECTION, SOLUTION INTRAVENOUS at 06:57

## 2024-03-12 RX ADMIN — IOPAMIDOL 92 ML: 755 INJECTION, SOLUTION INTRAVENOUS at 07:57

## 2024-03-15 ENCOUNTER — MYC REFILL (OUTPATIENT)
Dept: PALLIATIVE CARE | Facility: CLINIC | Age: 69
End: 2024-03-15
Payer: COMMERCIAL

## 2024-03-15 ENCOUNTER — MYC REFILL (OUTPATIENT)
Dept: ONCOLOGY | Facility: CLINIC | Age: 69
End: 2024-03-15
Payer: COMMERCIAL

## 2024-03-15 DIAGNOSIS — C15.9 PRIMARY ESOPHAGEAL SQUAMOUS CELL CARCINOMA (H): ICD-10-CM

## 2024-03-15 RX ORDER — DIPHENHYDRAMINE HYDROCHLORIDE AND LIDOCAINE HYDROCHLORIDE AND ALUMINUM HYDROXIDE AND MAGNESIUM HYDRO
15 KIT EVERY 4 HOURS
Qty: 944 ML | Refills: 3 | Status: SHIPPED | OUTPATIENT
Start: 2024-03-15 | End: 2024-04-17

## 2024-03-15 RX ORDER — DOXEPIN HYDROCHLORIDE 10 MG/ML
20 SOLUTION ORAL EVERY 4 HOURS
Qty: 118 ML | Refills: 0 | Status: SHIPPED | OUTPATIENT
Start: 2024-03-15 | End: 2024-04-17

## 2024-03-15 NOTE — TELEPHONE ENCOUNTER
Received [email or web address] message from patient requesting refill of doxepin.     Last refill: 3/6/2024  Last office visit: 3/6/2024  Scheduled for follow up 4/10/2024    Will route request to MD for review.     Reviewed [place]  Report.

## 2024-03-26 ENCOUNTER — PATIENT OUTREACH (OUTPATIENT)
Dept: OTOLARYNGOLOGY | Facility: CLINIC | Age: 69
End: 2024-03-26
Payer: COMMERCIAL

## 2024-03-26 ENCOUNTER — ONCOLOGY VISIT (OUTPATIENT)
Dept: SURGERY | Facility: CLINIC | Age: 69
End: 2024-03-26
Attending: THORACIC SURGERY (CARDIOTHORACIC VASCULAR SURGERY)
Payer: COMMERCIAL

## 2024-03-26 ENCOUNTER — TELEPHONE (OUTPATIENT)
Dept: OTOLARYNGOLOGY | Facility: CLINIC | Age: 69
End: 2024-03-26

## 2024-03-26 ENCOUNTER — PREP FOR PROCEDURE (OUTPATIENT)
Dept: SURGERY | Facility: CLINIC | Age: 69
End: 2024-03-26
Payer: COMMERCIAL

## 2024-03-26 ENCOUNTER — TELEPHONE (OUTPATIENT)
Dept: ONCOLOGY | Facility: CLINIC | Age: 69
End: 2024-03-26

## 2024-03-26 VITALS
TEMPERATURE: 98.9 F | RESPIRATION RATE: 16 BRPM | WEIGHT: 159.7 LBS | HEIGHT: 68 IN | BODY MASS INDEX: 24.2 KG/M2 | HEART RATE: 90 BPM | SYSTOLIC BLOOD PRESSURE: 100 MMHG | DIASTOLIC BLOOD PRESSURE: 64 MMHG | OXYGEN SATURATION: 96 %

## 2024-03-26 DIAGNOSIS — C15.9 PRIMARY ESOPHAGEAL SQUAMOUS CELL CARCINOMA (H): Primary | ICD-10-CM

## 2024-03-26 DIAGNOSIS — C10.0 SQUAMOUS CELL CARCINOMA OF VALLECULA (H): ICD-10-CM

## 2024-03-26 DIAGNOSIS — C10.0 SQUAMOUS CELL CARCINOMA OF VALLECULA (H): Primary | ICD-10-CM

## 2024-03-26 PROCEDURE — 99213 OFFICE O/P EST LOW 20 MIN: CPT | Performed by: THORACIC SURGERY (CARDIOTHORACIC VASCULAR SURGERY)

## 2024-03-26 PROCEDURE — 99212 OFFICE O/P EST SF 10 MIN: CPT | Performed by: THORACIC SURGERY (CARDIOTHORACIC VASCULAR SURGERY)

## 2024-03-26 ASSESSMENT — PAIN SCALES - GENERAL: PAINLEVEL: MODERATE PAIN (5)

## 2024-03-26 NOTE — PROGRESS NOTES
Called and left voicemail for patient regarding PET CT. Informed patient we will switch order to CT neck and chest if PET is not covered by insurance. Direct call back number left in voicemail.     Hollie Biswas, RN, BSN  RN Care Coordinator, ENT Clinic

## 2024-03-26 NOTE — TELEPHONE ENCOUNTER
Spoke with patient to schedule procedure with Dr. Hill & Kvng   Procedure was scheduled on 4/1 & 4/19 at [facility] OR  Patient will have H&P with PAC    Informed pt of surgery details: Jtube  Date:    Monday, April 01, 2024  Arrival Time:  11:30 am    Informed pt of surgery details:Esophagectomy  Date:    Monday, April 19, 2024  Arrival Time:  5:30 am    Pt is aware surgery start time may change, and someone will reach out with the new arrival time, if so.    Patient is aware a COVID-19 test is needed before their procedure ONLY IF symptomatic.   (Patient is aware Thoracic is no longer requiring COVID-19 test)       Patient is aware a / is needed day of surgery.   Surgery Letter was sent via M[employer],     Patient has my direct contact information for any further questions.

## 2024-03-26 NOTE — PROGRESS NOTES
THORACIC SURGERY - OFFICE VISIT       Dear Dr. Ramos,     I saw Lopez Hogue at Dr. Felix s request in consultation for the evaluation and treatment of a mid esophageal squamous cell cancer.      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 He initially presented with hemoptysis and was found to have the head and neck cancer. The  esophageal cancer was seen on PET. He has intentionally gained 10 pounds over the past 5 weeks. He has good energy levels and has little to no trouble with swallowing. He mainly has occasional pill dysphagia. He is eating a regular diet. He is very active and goes to the gym several times per week. He had a heart attack earlier this year and has two coronary stents. He has been exercising since then and feels great.     From 2/27/2024:  He has undergone chemoradiation with carboplatin and taxol, starting 12/5/2023. He completed radiation 1/26/2024. He had neutropenia 1/15/2024.  He says his weight is slowly increasing.  He was having difficulty eating during treatment due to difficulty with pain in his throat.  Otherwise now he is able to eat all food, just does not taste anything.  He has been to the gym 2 times in the past 10 days and is staying active.      Of note, he developed orthostatic hypotension after treatment. He had one episode of syncope requiring evaluation at the emergency department.  He continues to have some issues with dizziness but is able to identify it and sit down.  He underwent iron infusions for his anemia.      Today, 3/26/2024, he is doing well.  He has no more dizziness. He is putting on 1-2 pounds per week. He has mild odynophagia but no dysphagia.      Previsit Tests   PET/CT (3/12/2024) :  Almost complete resolution of prior FDG-avid areas, New FDG uptake level 2a node, 8 mm, SUVmax 6.4. Prior  esophageal FDG avid thickening resolved.    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 FDG thickening of the mid to distal esophagus, suspicious for a synchronous primary esophageal neoplasm (4.7 cm, SUV 13.2).        PFT (11/15/2023): FEV1 - 2.41L (86%), DLCO - 122%     Upper Endoscopy (11/8/2023): fungating and ulcerating mass in the middle 1/3rd of the esophagus, 30 - 36 cm from the incisors. Biopsied.        11/8/2023: Esophageal biopsy - Non-keratinizing poorly differentiated squamous cell carcinoma, negative for intestinal metaplasia/Alfredo's type mucosa.      Right vallecula lesion biopsy (10/12/2023): Non-keratinizing poorly differentiated squamous cell carcinoma.      EUS 11/28/2023: T2N1 squamous cell carcinoma     PMH  Reviewed, as below     Past Medical History           Past Medical History:   Diagnosis Date    EtOH dependence (H)       Quit drinking 10 years    Heart attack (H)      Hypertension      Nonrheumatic aortic (valve) stenosis      Sweat gland carcinoma              PSH  Reviewed, as below         Past Surgical History              Past Surgical History:   Procedure Laterality Date    CV CORONARY ANGIOGRAM N/A 3/27/2023     Procedure: Coronary Angiogram;  Surgeon: Miki Etienne MD;  Location: [facility] CV    CV CORONARY ANGIOGRAM N/A 5/9/2023     Procedure: Coronary Angiogram;  Surgeon: Miki Etienne MD;  Location: [facility] CV    CV LEFT HEART CATH N/A 3/27/2023     Procedure: Left Heart Catheterization;  Surgeon: Miki Etienne MD;  Location: [facility] CV    CV LEFT HEART CATH N/A 5/9/2023     Procedure: Left Heart Catheterization;  Surgeon: Miki Etienne MD;  Location: [facility] CV    CV PCI N/A 3/27/2023     Procedure: Percutaneous Coronary Intervention;  Surgeon: Miki Etienne MD;  Location: [facility] CV     "ESOPHAGOSCOPY, GASTROSCOPY, DUODENOSCOPY (EGD), COMBINED N/A 11/8/2023     Procedure: ESOPHAGOGASTRODUODENOSCOPY, WITH BIOPSY;  Surgeon: Shahriar Giraldo MD;  Location:  GI    LARYNGOSCOPY WITH BIOPSY(IES) N/A 10/12/2023     Procedure: LARYNGOSCOPY, WITH BIOPSY;  Surgeon: Edi Felix MD;  Location:  OR    OTHER SURGICAL HISTORY   2015     WIDE EXCISION OF LEFT GLUTEAL MASSTNM: [id number], stage: II hidradenocarcinoma Grade II, 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, sentinel lymph node biopsy negative             ETOH:  Stopped 10 years ago  TOB: ages   22 - 58; Quit 2013 2 packs per day - 72 pack years   Other drugs: No     Physical examination  /64 (BP Location: Right arm, Patient Position: Chair, Cuff Size: Adult Large)   Pulse 90   Temp 98.9  F (37.2  C) (Tympanic)   Resp 16   Ht 1.727 m (5' 7.99\")   Wt 72.4 kg (159 lb 11.2 oz)   SpO2 96%   BMI 24.29 kg/m        Physical Exam  Constitutional:       Appearance: Normal appearance. He is normal weight.   Eyes:      Conjunctiva/sclera: Conjunctivae normal.   Cardiovascular:      Rate and Rhythm: Normal rate.   Pulmonary:      Effort: Pulmonary effort is normal.   Abdominal:      General: Abdomen is flat.   Musculoskeletal:         General: Normal range of motion.   Skin:     General: Skin is warm and dry.   Neurological:      Mental Status: He is alert and oriented to person, place, and time.   Psychiatric:         Mood and Affect: Mood normal.         Behavior: Behavior normal.         Thought Content: Thought content normal.         Judgment: Judgment normal.      From a personal perspective, he lives with his wife, who is a nurse. 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 They have two sons and one grandchild.     IMPRESSION (C15.9) Primary esophageal squamous cell carcinoma (H)  (primary encounter diagnosis)     (C10.0) Squamous cell carcinoma of vallecula (H)     This person is a 68 year old male with " synchronous esophageal squamous cell carcinoma and vallecular squamous cell carcinoma. He is a good surgical candidate.     PLAN  I spent 10 min on the date of the encounter in chart review, patient visit, review of tests, documentation and/or discussion with other providers about the issues documented above. I reviewed the plan as follows:     Procedure planned: First, laparoscopic staging and jejunostomy. (4/1/2024)     Six weeks later, minimally invasive Feliberto-Elbert esophagectomy.     I discussed with the patient the conduct of the operation of an Feliberto-Elbert transthoracic esophagectomy. The hospital stay would likely be 5-10 days if there are no complications. I explained that approximately 50% of patients have some complication, ranging from minor complications to the very serious ones. The recovery takes up to 8 weeks.  I explained to the patient the risks and benefits of the procedure. The benefit of the procedure is to remove the esophageal cancer. The risks include anastomotic leak, gastric necrosis, pneumonia, DVT and pulmonary embolism, arrhythmia, jejunostomy tube complications including intestinal obstruction, urinary tract infection and death.       Necessary Preop Tests & Appointments: Preoperative assessment clinic (today).  He will hold Plavix starting tomorrow morning.     Regional Anesthesia Plan: Intraoperative intercostal nerve block     Anticoagulation Plan: Prophylactic Lovenox     I appreciate the opportunity to participate in the care of your patient and will keep you updated.     Sincerely,    Devin Hill MD

## 2024-03-26 NOTE — NURSING NOTE
"Oncology Rooming Note    March 26, 2024 1:22 PM   Lopez Hogue is a 68 year old male who presents for:    Chief Complaint   Patient presents with    Oncology Clinic Visit     [facility] RETURN - SQUAMOUS CELL CARCINOMA OF VALLECULA      Initial Vitals: /64 (BP Location: Right arm, Patient Position: Chair, Cuff Size: Adult Large)   Pulse 90   Temp 98.9  F (37.2  C) (Tympanic)   Resp 16   Ht 1.727 m (5' 7.99\")   Wt 72.4 kg (159 lb 11.2 oz)   SpO2 96%   BMI 24.29 kg/m   Estimated body mass index is 24.29 kg/m  as calculated from the following:    Height as of this encounter: 1.727 m (5' 7.99\").    Weight as of this encounter: 72.4 kg (159 lb 11.2 oz). Body surface area is 1.86 meters squared.  Moderate Pain (5) Comment: when swallowing   No LMP for male patient.  Allergies reviewed: Yes  Medications reviewed: Yes    Medications: Medication refills not needed today.  Pharmacy name entered into [employer]:    [id number] - [place], [place] - [address]  AT [facility] [address] [place] [address] PHARMACY [facility] - [place], [place] - [address]    Frailty Screening:   Is the patient here for a new oncology consult visit in cancer care? 2. No      Leonard Gonzalez LPN              "

## 2024-03-26 NOTE — LETTER
3/26/2024         RE: Lopez Hogue  [address] [facility] [address]        Dear Colleague,    Thank you for referring your patient, Lopez Hogue, to the [facility]. Please see a copy of my visit note below.    THORACIC SURGERY - OFFICE VISIT       Dear Dr. Ramos,     I saw Lopez Hogue at Dr. Felix s request in consultation for the evaluation and treatment of a mid esophageal squamous cell cancer.      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 He initially presented with hemoptysis and was found to have the head and neck cancer. The  esophageal cancer was seen on PET. He has intentionally gained 10 pounds over the past 5 weeks. He has good energy levels and has little to no trouble with swallowing. He mainly has occasional pill dysphagia. He is eating a regular diet. He is very active and goes to the gym several times per week. He had a heart attack earlier this year and has two coronary stents. He has been exercising since then and feels great.     From 2/27/2024:  He has undergone chemoradiation with carboplatin and taxol, starting 12/5/2023. He completed radiation 1/26/2024. He had neutropenia 1/15/2024.  He says his weight is slowly increasing.  He was having difficulty eating during treatment due to difficulty with pain in his throat.  Otherwise now he is able to eat all food, just does not taste anything.  He has been to the gym 2 times in the past 10 days and is staying active.      Of note, he developed orthostatic hypotension after treatment. He had one episode of syncope requiring evaluation at the emergency department.  He continues to have some issues with dizziness but is able to identify it and sit down.  He underwent iron infusions for his anemia.      Today, 3/26/2024, he is doing well.  He has  no more dizziness. He is putting on 1-2 pounds per week. He has mild odynophagia but no dysphagia.      Previsit Tests   PET/CT (3/12/2024) :  Almost complete resolution of prior FDG-avid areas, New FDG uptake level 2a node, 8 mm, SUVmax 6.4. Prior esophageal FDG avid thickening resolved.    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 FDG thickening of the mid to distal esophagus, suspicious for a synchronous primary esophageal neoplasm (4.7 cm, SUV 13.2).        PFT (11/15/2023): FEV1 - 2.41L (86%), DLCO - 122%     Upper Endoscopy (11/8/2023): fungating and ulcerating mass in the middle 1/3rd of the esophagus, 30 - 36 cm from the incisors. Biopsied.        11/8/2023: Esophageal biopsy - Non-keratinizing poorly differentiated squamous cell carcinoma, negative for intestinal metaplasia/Alfredo's type mucosa.      Right vallecula lesion biopsy (10/12/2023): Non-keratinizing poorly differentiated squamous cell carcinoma.      EUS 11/28/2023: T2N1 squamous cell carcinoma     PMH  Reviewed, as below     Past Medical History           Past Medical History:   Diagnosis Date    EtOH dependence (H)       Quit drinking 10 years    Heart attack (H)      Hypertension      Nonrheumatic aortic (valve) stenosis      Sweat gland carcinoma              PSH  Reviewed, as below         Past Surgical History              Past Surgical History:   Procedure Laterality Date    CV CORONARY ANGIOGRAM N/A 3/27/2023     Procedure: Coronary Angiogram;  Surgeon: Miki Etienne MD;  Location: [facility] CV    CV CORONARY ANGIOGRAM N/A 5/9/2023     Procedure: Coronary Angiogram;  Surgeon: Miki Etienne MD;  Location: [facility] LAB CV    CV LEFT HEART CATH N/A 3/27/2023     Procedure: Left Heart Catheterization;  Surgeon: Miki Etienne MD;  Location: [facility] CATH LAB CV    CV LEFT HEART CATH N/A 5/9/2023      "Procedure: Left Heart Catheterization;  Surgeon: Mkii Etienne MD;  Location: [facility] CV    CV PCI N/A 3/27/2023     Procedure: Percutaneous Coronary Intervention;  Surgeon: Miki Etienne MD;  Location: [facility] CV    ESOPHAGOSCOPY, GASTROSCOPY, DUODENOSCOPY (EGD), COMBINED N/A 11/8/2023     Procedure: ESOPHAGOGASTRODUODENOSCOPY, WITH BIOPSY;  Surgeon: Shahriar Giraldo MD;  Location:  GI    LARYNGOSCOPY WITH BIOPSY(IES) N/A 10/12/2023     Procedure: LARYNGOSCOPY, WITH BIOPSY;  Surgeon: Edi Felix MD;  Location:  OR    OTHER SURGICAL HISTORY   2015     WIDE EXCISION OF LEFT GLUTEAL MASSTNM: [id number], stage: II hidradenocarcinoma Grade II, 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, sentinel lymph node biopsy negative             ETOH:  Stopped 10 years ago  TOB: ages   22 - 58; Quit 2013 2 packs per day - 72 pack years   Other drugs: No     Physical examination  /64 (BP Location: Right arm, Patient Position: Chair, Cuff Size: Adult Large)   Pulse 90   Temp 98.9  F (37.2  C) (Tympanic)   Resp 16   Ht 1.727 m (5' 7.99\")   Wt 72.4 kg (159 lb 11.2 oz)   SpO2 96%   BMI 24.29 kg/m        Physical Exam  Constitutional:       Appearance: Normal appearance. He is normal weight.   Eyes:      Conjunctiva/sclera: Conjunctivae normal.   Cardiovascular:      Rate and Rhythm: Normal rate.   Pulmonary:      Effort: Pulmonary effort is normal.   Abdominal:      General: Abdomen is flat.   Musculoskeletal:         General: Normal range of motion.   Skin:     General: Skin is warm and dry.   Neurological:      Mental Status: He is alert and oriented to person, place, and time.   Psychiatric:         Mood and Affect: Mood normal.         Behavior: Behavior normal.         Thought Content: Thought content normal.         Judgment: Judgment normal.      From a personal perspective, he lives with his wife, who is a nurse. 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 They have two sons and one grandchild.     IMPRESSION (C15.9) Primary esophageal squamous cell carcinoma (H)  (primary encounter diagnosis)     (C10.0) Squamous cell carcinoma of vallecula (H)     This person is a 68 year old male with synchronous esophageal squamous cell carcinoma and vallecular squamous cell carcinoma. He is a good surgical candidate.     PLAN  I spent 10 min on the date of the encounter in chart review, patient visit, review of tests, documentation and/or discussion with other providers about the issues documented above. I reviewed the plan as follows:     Procedure planned: First, laparoscopic staging and jejunostomy. (4/1/2024)     Six weeks later, minimally invasive [place]-Elbert esophagectomy.     I discussed with the patient the conduct of the operation of an [place]-Elbert transthoracic esophagectomy. The hospital stay would likely be 5-10 days if there are no complications. I explained that approximately 50% of patients have some complication, ranging from minor complications to the very serious ones. The recovery takes up to 8 weeks.  I explained to the patient the risks and benefits of the procedure. The benefit of the procedure is to remove the esophageal cancer. The risks include anastomotic leak, gastric necrosis, pneumonia, DVT and pulmonary embolism, arrhythmia, jejunostomy tube complications including intestinal obstruction, urinary tract infection and death.       Necessary Preop Tests & Appointments: Preoperative assessment clinic (today).  He will hold Plavix starting tomorrow morning.     Regional Anesthesia Plan: Intraoperative intercostal nerve block     Anticoagulation Plan: Prophylactic Lovenox     I appreciate the opportunity to participate in the care of your patient and will keep you updated.     Sincerely,    Devin Hill MD

## 2024-03-26 NOTE — TELEPHONE ENCOUNTER
FUTURE VISIT INFORMATION      SURGERY INFORMATION:  Date: 24  Location: uu or  Surgeon:  Kevin Calderon MD   Anesthesia Type:  general  Procedure: Laparoscopic jejunostomy tube insertion     RECORDS REQUESTED FROM:       Primary Care Provider: [facility]    Pertinent Medical History: hypertension, nonrheumatic aortic valve stenosis    Most recent EKG+ Tracin24    Most recent ECHO: 24    Most recent Coronary Angiogram: 23    Most recent PFT's: 11/15/23

## 2024-03-28 ENCOUNTER — TELEPHONE (OUTPATIENT)
Dept: SURGERY | Facility: CLINIC | Age: 69
End: 2024-03-28
Payer: COMMERCIAL

## 2024-03-28 LAB
ABO/RH(D): NORMAL
ANTIBODY SCREEN: NEGATIVE
SPECIMEN EXPIRATION DATE: NORMAL

## 2024-03-28 NOTE — PROGRESS NOTES
Writer sent a message to Dr. Walters, Dr. Calderon, Dr. Hill, and Giuliana Pinon CNP requesting plan for Plavix. Pt has 2 upcomming surgeries-4/1 & 4/19/24.

## 2024-03-29 ENCOUNTER — ANESTHESIA EVENT (OUTPATIENT)
Dept: SURGERY | Facility: CLINIC | Age: 69
DRG: 376 | End: 2024-03-29
Payer: COMMERCIAL

## 2024-03-29 ENCOUNTER — OFFICE VISIT (OUTPATIENT)
Dept: SURGERY | Facility: CLINIC | Age: 69
End: 2024-03-29
Payer: COMMERCIAL

## 2024-03-29 ENCOUNTER — PRE VISIT (OUTPATIENT)
Dept: SURGERY | Facility: CLINIC | Age: 69
End: 2024-03-29

## 2024-03-29 ENCOUNTER — LAB (OUTPATIENT)
Dept: LAB | Facility: CLINIC | Age: 69
End: 2024-03-29
Payer: COMMERCIAL

## 2024-03-29 ENCOUNTER — TELEPHONE (OUTPATIENT)
Dept: CARDIOLOGY | Facility: CLINIC | Age: 69
End: 2024-03-29

## 2024-03-29 VITALS
WEIGHT: 153 LBS | OXYGEN SATURATION: 97 % | DIASTOLIC BLOOD PRESSURE: 74 MMHG | SYSTOLIC BLOOD PRESSURE: 119 MMHG | BODY MASS INDEX: 23.19 KG/M2 | TEMPERATURE: 98.1 F | HEIGHT: 68 IN | HEART RATE: 81 BPM

## 2024-03-29 DIAGNOSIS — Z01.818 PREOP EXAMINATION: ICD-10-CM

## 2024-03-29 DIAGNOSIS — Z01.818 PREOP EXAMINATION: Primary | ICD-10-CM

## 2024-03-29 DIAGNOSIS — C15.4 MALIGNANT NEOPLASM OF MIDDLE THIRD OF ESOPHAGUS (H): ICD-10-CM

## 2024-03-29 DIAGNOSIS — C10.0 SQUAMOUS CELL CARCINOMA OF VALLECULA (H): ICD-10-CM

## 2024-03-29 LAB
ALBUMIN SERPL BCG-MCNC: 3.6 G/DL (ref 3.5–5.2)
ALP SERPL-CCNC: 145 U/L (ref 40–150)
ALT SERPL W P-5'-P-CCNC: 30 U/L (ref 0–70)
ANION GAP SERPL CALCULATED.3IONS-SCNC: 10 MMOL/L (ref 7–15)
AST SERPL W P-5'-P-CCNC: 36 U/L (ref 0–45)
BASOPHILS # BLD AUTO: 0 10E3/UL (ref 0–0.2)
BASOPHILS NFR BLD AUTO: 1 %
BILIRUB SERPL-MCNC: 0.5 MG/DL
BUN SERPL-MCNC: 11.7 MG/DL (ref 8–23)
CALCIUM SERPL-MCNC: 9.4 MG/DL (ref 8.8–10.2)
CHLORIDE SERPL-SCNC: 100 MMOL/L (ref 98–107)
CREAT SERPL-MCNC: 0.66 MG/DL (ref 0.67–1.17)
DEPRECATED HCO3 PLAS-SCNC: 26 MMOL/L (ref 22–29)
EGFRCR SERPLBLD CKD-EPI 2021: >90 ML/MIN/1.73M2
EOSINOPHIL # BLD AUTO: 0 10E3/UL (ref 0–0.7)
EOSINOPHIL NFR BLD AUTO: 1 %
ERYTHROCYTE [DISTWIDTH] IN BLOOD BY AUTOMATED COUNT: 14.1 % (ref 10–15)
GLUCOSE SERPL-MCNC: 88 MG/DL (ref 70–99)
HCT VFR BLD AUTO: 34.8 % (ref 40–53)
HGB BLD-MCNC: 11.2 G/DL (ref 13.3–17.7)
IMM GRANULOCYTES # BLD: 0.1 10E3/UL
IMM GRANULOCYTES NFR BLD: 1 %
LYMPHOCYTES # BLD AUTO: 0.8 10E3/UL (ref 0.8–5.3)
LYMPHOCYTES NFR BLD AUTO: 15 %
MCH RBC QN AUTO: 31.2 PG (ref 26.5–33)
MCHC RBC AUTO-ENTMCNC: 32.2 G/DL (ref 31.5–36.5)
MCV RBC AUTO: 97 FL (ref 78–100)
MONOCYTES # BLD AUTO: 0.7 10E3/UL (ref 0–1.3)
MONOCYTES NFR BLD AUTO: 13 %
NEUTROPHILS # BLD AUTO: 3.7 10E3/UL (ref 1.6–8.3)
NEUTROPHILS NFR BLD AUTO: 69 %
NRBC # BLD AUTO: 0 10E3/UL
NRBC BLD AUTO-RTO: 0 /100
PLATELET # BLD AUTO: 209 10E3/UL (ref 150–450)
POTASSIUM SERPL-SCNC: 4 MMOL/L (ref 3.4–5.3)
PROT SERPL-MCNC: 7.6 G/DL (ref 6.4–8.3)
RBC # BLD AUTO: 3.59 10E6/UL (ref 4.4–5.9)
SODIUM SERPL-SCNC: 136 MMOL/L (ref 135–145)
T4 FREE SERPL-MCNC: 0.83 NG/DL (ref 0.9–1.7)
TSH SERPL DL<=0.005 MIU/L-ACNC: 2.57 UIU/ML (ref 0.3–4.2)
WBC # BLD AUTO: 5.3 10E3/UL (ref 4–11)

## 2024-03-29 PROCEDURE — 99215 OFFICE O/P EST HI 40 MIN: CPT

## 2024-03-29 PROCEDURE — 80053 COMPREHEN METABOLIC PANEL: CPT | Performed by: PATHOLOGY

## 2024-03-29 PROCEDURE — 85025 COMPLETE CBC W/AUTO DIFF WBC: CPT | Performed by: PATHOLOGY

## 2024-03-29 PROCEDURE — 84439 ASSAY OF FREE THYROXINE: CPT | Performed by: PATHOLOGY

## 2024-03-29 PROCEDURE — 84443 ASSAY THYROID STIM HORMONE: CPT | Performed by: PATHOLOGY

## 2024-03-29 PROCEDURE — 36415 COLL VENOUS BLD VENIPUNCTURE: CPT | Performed by: PATHOLOGY

## 2024-03-29 PROCEDURE — 86900 BLOOD TYPING SEROLOGIC ABO: CPT

## 2024-03-29 ASSESSMENT — ENCOUNTER SYMPTOMS
SEIZURES: 0
ORTHOPNEA: 0

## 2024-03-29 ASSESSMENT — PAIN SCALES - GENERAL: PAINLEVEL: NO PAIN (0)

## 2024-03-29 ASSESSMENT — LIFESTYLE VARIABLES: TOBACCO_USE: 1

## 2024-03-29 ASSESSMENT — COPD QUESTIONNAIRES: COPD: 0

## 2024-03-29 NOTE — PATIENT INSTRUCTIONS
Preparing for Your Surgery      Name:  Lopez Hogue   MRN:  [id number]   :  1955   Today's Date:  3/29/2024       Arriving for surgery:  Surgery date:  24  Arrival time:  11.30AM    Please come to:     Please come to:      [facility] [address]  [address] [place]  [address]      The [facility] Patient /Visitor Ramp is located at [address]. Patients and visitors who self-park will receive the reduced hospital parking rate. If the Patient /Visitor Ramp is full, please follow the signs to the  parking located at the main hospital entrance.     parking is available ( 24 hours/ 7 days a week)    Discounted parking pass options are available for patients and visitors. They can be purchased at the [employer] desk at the main hospital entrance.    -    Stop at the security desk and they will direct surgery patients to the 3rd floor Surgery Waiting Room. [phone]     -  If you are in need of directions, wheelchair or escort please stop at the Information/security desk in the lobby.       What can I eat or drink?  -  You may eat and drink normally up to 8 hours prior to arrival time. (Until 3.30AM)  -  You may have clear liquids until 2 hours prior to arrival time. (Until 9.30AM)    Examples of clear liquids:  Water  Clear broth  Juices (apple, white grape, white cranberry  and cider) without pulp  Noncarbonated, powder based beverages  (lemonade and Matthew-Aid)  Sodas (Sprite, 7-Up, ginger ale and seltzer)  Coffee or tea (without milk or cream)  Gatorade    -  No Alcohol or cannabis products for at least 24 hours before surgery.     Which medicines can I take?    Hold Aspirin for 7 days before surgery.   Hold Multivitamins for 7 days before surgery. (Vitamin B12)  Hold Supplements for 7 days before surgery.  Hold Ibuprofen (Advil, Motrin) for 1 day(s) before surgery--unless otherwise directed by  surgeon.  Hold Naproxen (Aleve) for 4 days before surgery.    Hold Plavix until the second procedure on 4/19/24.    -  DO NOT take these medications the day of surgery:  Plavix.    -  PLEASE TAKE these medications the day of surgery:  Morning medications per usual except for medications listed above.    How do I prepare myself?  - Please take 2 showers (one the night prior to surgery and one the morning of surgery) using Scrubcare or Hibiclens soap.    Use this soap only from the neck to your toes.     Leave the soap on your skin for one minute--then rinse thoroughly.      You may use your own shampoo and conditioner. No other hair products.   - Please remove all jewelry and body piercings.  - No lotions, deodorants or fragrance.  - No makeup or fingernail polish.   - Bring your ID and insurance card.    -If you use a CPAP machine, please bring the CPAP machine, tubing, and mask to hospital.    -If you have a Deep Brain Stimulator, Spinal Cord Stimulator, or any Neuro Stimulator device---you must bring the remote control to the hospital.      ALL PATIENTS GOING HOME THE SAME DAY OF SURGERY ARE REQUIRED TO HAVE A RESPONSIBLE ADULT TO DRIVE AND BE IN ATTENDANCE WITH THEM FOR 24 HOURS FOLLOWING SURGERY.    Covid testing policy as of 12/06/2022  Your surgeon will notify and schedule you for a COVID test if one is needed before surgery--please direct any questions or COVID symptoms to your surgeon      Questions or Concerns:    - For any questions regarding the day of surgery or your hospital stay, please contact the Pre Admission Nursing Office at [phone].       - If you have health changes between today and your surgery, please call your surgeon.       - For questions after surgery, please call your surgeons office.           Current Visitor Guidelines    You may have 2 visitors in the pre op area.    Visiting hours: 8 a.m. to 8:30 p.m.    Patients confirmed or suspected to have symptoms of COVID 19 or flu:     No  visitors allowed for adult patients.   Children (under age 18) can have 1 named visitor.     People who are sick or showing symptoms of COVID 19 or flu:    Are not allowed to visit patients--we can only make exceptions in special situations.       Please follow these guidelines for your visit:          Please maintain social distance          Masking is optional--however at times you may be asked to wear a mask for the safety of yourself and others     Clean your hands with alcohol hand . Do this when you arrive at and leave the building and patient room,    And again after you touch your mask or anything in the room.     Go directly to and from the room you are visiting.     Stay in the patient s room during your visit. Limit going to other places in the hospital as much as possible     Leave bags and jackets at home or in the car.     For everyone s health, please don t come and go during your visit. That includes for smoking   during your visit.

## 2024-03-29 NOTE — TELEPHONE ENCOUNTER
----- Message from Marisabel Reynoso sent at 11/11/2020 11:06 AM CST -----  Regarding: Patient called to have her PCP fax over a medical release form to [facility]  Contact: [phone] (daughter Sujata's cell)  Patient called today to have her PCP Dr. Ya fax over a medical release form over to [facility]. The fax number is [phone]. Thank you.      Encounter created for documentation. Msg was responded to yesterday that pt may hold his plavix until both procedures are completed. [place] [place]

## 2024-03-29 NOTE — H&P
Pre-Operative H & P     CC:  Preoperative exam to assess for increased cardiopulmonary risk while undergoing surgery and anesthesia.    Date of Encounter: 3/29/2024  Primary Care Physician:  Madi Ramos     Reason for visit:   Encounter Diagnoses   Name Primary?    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 He initially presented with hemoptysis and was found to have the head and neck cancer. The esophageal cancer was seen on PET. He has undergone chemoradiation with carboplatin and taxol, starting 12/5/2023 and completed radiation 1/26/2024. He most recently consulted with thoracic surgery on 3/26/24 and is now scheduled for the above surgery on 4/1 with plan for later thoracoscopic esophagogastrectomy on 4/19/24 for further management.     *Patient will have an updated H&P with PAC on 4/17/24.     History is obtained from the patient and chart review    Hx of abnormal bleeding or anti-platelet use: Plavix       Past Medical History  Past Medical  History:   Diagnosis Date    EtOH dependence (H)     Quit drinking 10 years    Heart attack (H)     Hypertension     Nonrheumatic aortic (valve) stenosis     Sweat gland carcinoma        Past Surgical History  Past Surgical History:   Procedure Laterality Date    CV CORONARY ANGIOGRAM N/A 3/27/2023    Procedure: Coronary Angiogram;  Surgeon: Miki Etienne MD;  Location: [facility] CV    CV CORONARY ANGIOGRAM N/A 5/9/2023    Procedure: Coronary Angiogram;  Surgeon: Miki Etienne MD;  Location: [facility] CATH LAB CV    CV LEFT HEART CATH N/A 3/27/2023    Procedure: Left Heart Catheterization;  Surgeon: Miki Etienne MD;  Location: [facility] LAB CV    CV LEFT HEART CATH N/A 5/9/2023    Procedure: Left Heart Catheterization;  Surgeon: Miki Etienne MD;  Location: [facility] CV    CV PCI N/A 3/27/2023    Procedure: Percutaneous Coronary Intervention;  Surgeon: Miki Etienne MD;  Location: [facility] CV    ENDOSCOPIC ULTRASOUND UPPER GASTROINTESTINAL TRACT (GI) N/A 11/28/2023    Procedure: Endoscopic ultrasound upper gastrointestinal tract (GI);  Surgeon: Shahriar Giraldo MD;  Location:  GI    ESOPHAGOSCOPY, GASTROSCOPY, DUODENOSCOPY (EGD), COMBINED N/A 11/8/2023    Procedure: ESOPHAGOGASTRODUODENOSCOPY, WITH BIOPSY;  Surgeon: Shahriar Giraldo MD;  Location:  GI    LARYNGOSCOPY WITH BIOPSY(IES) N/A 10/12/2023    Procedure: LARYNGOSCOPY, WITH BIOPSY;  Surgeon: Edi Felix MD;  Location:  OR    OTHER SURGICAL HISTORY  2015    WIDE EXCISION OF LEFT GLUTEAL MASSTNM: [id number], stage: II hidradenocarcinoma Grade II, 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, sentinel lymph node biopsy negative        Prior to Admission Medications  Current Outpatient Medications   Medication Sig Dispense Refill    acetaminophen (TYLENOL) 500 MG tablet Take 500-1,000 mg by mouth every 8 hours as needed for fever or pain      buprenorphine (BUTRANS) 5 MCG/HR WK patch Place 1 patch onto the skin every 7  days (Patient taking differently: Place 1 patch onto the skin every 7 days Friday) 4 patch 0    doxepin (SINEQUAN) 10 MG/ML (HIGH CONC) solution Take 2 mLs (20 mg) by mouth every 4 hours (Patient taking differently: Take 20 mg by mouth every 4 hours as needed) 118 mL 0    famotidine (PEPCID) 20 MG tablet Take 1 tablet (20 mg) by mouth 2 times daily 60 tablet 3    magic mouthwash suspension, diphenhydrAMINE, lidocaine, aluminum-magnesium & simethicone, (FIRST-MOUTHWASH BLM) compounding kit Swish and swallow 15 mLs in mouth every 4 hours Take with doxepin solution. 944 mL 3    nitroGLYcerin (NITROSTAT) 0.4 MG sublingual tablet PLACE 1 TABLET UNDER THE TONGUE EVERY 5 MINUTES FOR CHEST PAIN FOR 3 DOSES. IF SYMPTOMS PERSIST 5 MINUTES AFTER 1ST DOSE CALL 911. (Patient taking differently: 0.4 mg every 5 minutes as needed PLACE 1 TABLET UNDER THE TONGUE EVERY 5 MINUTES FOR CHEST PAIN FOR 3 DOSES. IF SYMPTOMS PERSIST 5 MINUTES AFTER 1ST DOSE CALL 911) 25 tablet 1    prochlorperazine (COMPAZINE) 10 MG tablet Take 1 tablet (10 mg) by mouth every 6 hours as needed for nausea or vomiting 30 tablet 2    rosuvastatin (CRESTOR) 40 MG tablet Take 1 tablet (40 mg) by mouth daily (Patient taking differently: Take 40 mg by mouth every morning) 90 tablet 3    sertraline (ZOLOFT) 100 MG tablet Take 100 mg by mouth every morning      sodium fluoride dental gel (PREVIDENT) 1.1 % GEL topical gel daily      chlorhexidine (PERIDEX) 0.12 % solution SWISH AND SPIT 15 ML BY MOUTH TWICE DAILY AFTER BRUSHING. NOTHING BY MOUTH FOR 30 MINUTES AFTERWARDS      clopidogrel (PLAVIX) 75 MG tablet Take 1 tablet (75 mg) by mouth daily (Patient not taking: Reported on 3/29/2024) 90 tablet 2    cyanocobalamin (VITAMIN B-12) 1000 MCG tablet Take 1 tablet (1,000 mcg) by mouth every evening (Patient not taking: Reported on 3/29/2024) 30 tablet 0       Allergies  Allergies   Allergen Reactions    Coconut Flavor Anaphylaxis     Raw coconut       Social  History  Social History     Socioeconomic History    Marital status:      Spouse name: Not on file    Number of children: 2    Years of education: Not on file    Highest education level: Not on file   Occupational History    Occupation: non-profit manager   Tobacco Use    Smoking status: Former     Packs/day: 2.00     Years: 30.00     Additional pack years: 0.00     Total pack years: 60.00     Types: Cigarettes     Quit date:      Years since quittin.2     Passive exposure: Past    Smokeless tobacco: Never    Tobacco comments:     Quit 10 years ago   Vaping Use    Vaping Use: Never used   Substance and Sexual Activity    Alcohol use: Not Currently     Comment: quit in     Drug use: Never    Sexual activity: Not on file   Other Topics Concern    Not on file   Social History Narrative    Patient works.  Lives with his wife.     Social Determinants of Health     Financial Resource Strain: Not on file   Food Insecurity: Not on file   Transportation Needs: Not on file   Physical Activity: Not on file   Stress: Not on file   Social Connections: Not on file   Interpersonal Safety: Not on file   Housing Stability: Not on file       Family History  Family History   Problem Relation Age of Onset    Dementia Mother     Anesthesia Reaction No family hx of     Thrombocytopenia No family hx of     Cancer No family hx of     Thrombosis No family hx of        Review of Systems  The complete review of systems is negative other than noted in the HPI or here.   Anesthesia Evaluation   Pt has had prior anesthetic.     No history of anesthetic complications       ROS/MED HX  ENT/Pulmonary: Comment:  - Mid-esophageal squamous cell carcinoma synchronous with right vallecula squamous cell carcinoma    (+)     JOSE risk factors,  hypertension,         tobacco use, Past use,                    (-) asthma, COPD and recent URI   Neurologic:  - neg neurologic ROS  (-) no seizures and no CVA   Cardiovascular:     (+)  Dyslipidemia hypertension- -  CAD - past MI - stent-3/2023. 2  Taking blood thinners Pt has received instructions:                       valvular problems/murmurs type: AS     Previous cardiac testing   Echo: Date: 2/1/24 Results:  Interpretation Summary     1. Normal left ventricular size and systolic performance with a visually  estimated ejection fraction of 60-65%.  2. There is moderate aortic stenosis.  3. There is trace aortic insufficiency.  4. Normal right ventricular size and systolic performance.  5. There is mild left atrial enlargement.     When compared to the prior real-time echocardiogram dated 27 March 2023, the  findings are felt to be fairly similar on both examinations.    Stress Test:  Date: 7/2023 Results:  Interpretation Summary  1. The patient achieved good exercise workload without inducible angina.  2. Exercise stress ECG is positive for inducible myocardial ischemia in  inferior and lateral leads. ST segment depression 3 mm at the peak of  exercise.  3. No exercise induced cardiac arrhytmia.    ECG Reviewed:  Date: 2/11/24 Results:  NSR  Cath:  Date: 5/2023 Results:  Conclusion    1.  Previously placed stents in OM left circumflex are widely patent with normal VENTURA-3 flow.  2.  PTCA sites in distal circumflex branch have  healed nicely with less than 30% residual narrowing.  3.  LAD is moderately calcified with diffuse mild luminal irregularity but no severe stenoses, unchanged from prior study.  4. RCA has mild calcification and luminal irregularity but no severe stenoses.  There is a focal 70% narrowing in a small posterolateral branch and diffuse 50 to 70% narrowing in the posterior descending branch.  Both appear unchanged from prior study..   (-) SPENCER and orthopnea/PND   METS/Exercise Tolerance: >4 METS Comment: Reports recently walking 2 miles continuously without any exertional symptoms, completing house chores without any difficulty.          Hematologic: Comments: - Pancytopenia    "  (+)      anemia, history of blood transfusion, no previous transfusion reaction, Known PRBC Anitbodies:No    (-) history of blood clots   Musculoskeletal:  - neg musculoskeletal ROS     GI/Hepatic:  - neg GI/hepatic ROS   (+) GERD, Asymptomatic on medication,                  Renal/Genitourinary:  - neg Renal ROS     Endo:  - neg endo ROS     Psychiatric/Substance Use:     (+) psychiatric history anxiety alcohol abuse (in remission since 2013)      Infectious Disease:  - neg infectious disease ROS     Malignancy:   (+) Malignancy, History of Other and Skin.Skin CA Remission status post Surgery.  Other CA sweat gland carcinoma, left buttock (2015) Remission status post Surgery.    Other:  - neg other ROS          /74 (BP Location: Right arm, Patient Position: Sitting, Cuff Size: Adult Regular)   Pulse 81   Temp 98.1  F (36.7  C) (Oral)   Ht 1.727 m (5' 8\")   Wt 69.4 kg (153 lb)   SpO2 97%   BMI 23.26 kg/m      Physical Exam   Constitutional: Awake, alert, cooperative, no apparent distress, and appears stated age.  Eyes: Pupils equal, round and reactive to light, extra ocular muscles intact, sclera clear, conjunctiva normal.  HENT: Normocephalic, oral pharynx with moist mucus membranes. No goiter appreciated.   Respiratory: Clear to auscultation bilaterally, no crackles or wheezing.  Cardiovascular: Regular rate and rhythm, S1 and S2 with systolic murmur noted.  Carotids +2, no bruits. No edema. Palpable pulses to radial and PT arteries.   GI: Normal bowel sounds, soft, non-distended, non-tender, no masses palpated, no hepatosplenomegaly.   Lymph/Hematologic: No cervical lymphadenopathy and no supraclavicular lymphadenopathy.  Genitourinary:  Deferred.   Skin: Warm and dry.   Musculoskeletal: Full ROM of neck. There is no redness, warmth, or swelling of the joints. Gross motor strength is normal.    Neurologic: Awake, alert, oriented to name, place and time. Cranial nerves II-XII are grossly intact. Gait " is normal.   Neuropsychiatric: Calm, cooperative. Normal affect.     Prior Labs/Diagnostic Studies   All labs and imaging personally reviewed     EKG/ stress test - if available please see in ROS above       Latest Ref Rng & Units 11/15/2023     7:57 AM   PFT   FVC L 4.82    FEV1 L 2.41    FVC% % 133    FEV1% % 86        The patient's records and results personally reviewed by this provider.     Outside records reviewed from: Care Everywhere    LAB/DIAGNOSTIC STUDIES TODAY:  T&S, CBC, BMP    Component      Latest Ref Rng 3/29/2024  2:58 PM   WBC      4.0 - 11.0 10e3/uL 5.3    RBC Count      4.40 - 5.90 10e6/uL 3.59 (L)    Hemoglobin      13.3 - 17.7 g/dL 11.2 (L)    Hematocrit      40.0 - 53.0 % 34.8 (L)    MCV      78 - 100 fL 97    MCH      26.5 - 33.0 pg 31.2    MCHC      31.5 - 36.5 g/dL 32.2    RDW      10.0 - 15.0 % 14.1    Platelet Count      150 - 450 10e3/uL 209    % Neutrophils      % 69    % Lymphocytes      % 15    % Monocytes      % 13    % Eosinophils      % 1    % Basophils      % 1    % Immature Granulocytes      % 1    NRBCs per 100 WBC      <1 /100 0    Absolute Neutrophils      1.6 - 8.3 10e3/uL 3.7    Absolute Lymphocytes      0.8 - 5.3 10e3/uL 0.8    Absolute Monocytes      0.0 - 1.3 10e3/uL 0.7    Absolute Eosinophils      0.0 - 0.7 10e3/uL 0.0    Absolute Basophils      0.0 - 0.2 10e3/uL 0.0    Absolute Immature Granulocytes      <=0.4 10e3/uL 0.1    Absolute NRBCs      10e3/uL 0.0       Legend:  (L) Low    Component      Latest Ref Rng 2/16/2024  12:04 PM   Sodium      135 - 145 mmol/L 140    Potassium      3.4 - 5.3 mmol/L 3.8    Carbon Dioxide (CO2)      22 - 29 mmol/L 28    Anion Gap      7 - 15 mmol/L 6 (L)    Urea Nitrogen      8.0 - 23.0 mg/dL 8.6    Creatinine      0.67 - 1.17 mg/dL 0.79    GFR Estimate      >60 mL/min/1.73m2 >90    Calcium      8.8 - 10.2 mg/dL 9.0    Chloride      98 - 107 mmol/L 106    Glucose      70 - 99 mg/dL 99    Alkaline Phosphatase      40 - 150 U/L 83   "  AST      0 - 45 U/L 19    ALT      0 - 70 U/L 5    Protein Total      6.4 - 8.3 g/dL 6.4    Albumin      3.5 - 5.2 g/dL 3.4 (L)    Bilirubin Total      <=1.2 mg/dL 0.4       Legend:  (L) Low    Assessment    Lopez Hogue is a 68 year old male seen as a PAC referral for risk assessment and optimization for anesthesia.    Plan/Recommendations  Pt will be optimized for the proposed procedure.  See below for details on the assessment, risk, and preoperative recommendations    NEUROLOGY  - No history of TIA, CVA or seizure  - Chronic Pain  On chronic opiates, morphine equivilant = 9 MME/Day   -Post Op delirium risk factors:  High co-morbid index    ENT  - No current airway concerns.  Will need to be reassessed day of surgery.  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 Patient reports dysphagia with certain solids, reports he is slowing regaining weight (1-2 lbs per week). Drinking 1 protein shake daily.   - Evaluation with Dr. Felix in ENT on 3/11/24. A fiberoptic laryngoscopy performed at this time. Per Dr. Felix, \"no lesions seen, airway patent.\"    CARDIAC  - No history of Afib  - Hx of HTN. Not currently on medications for this. BP today 119/74.  - HLD   Continue rosuvastatin on DOS.  - CAD. Hx of NSTEMI and coronary stenting x 2 on 3/21/23. Following with Dr. Walters in cardiology, last on 9/27/23 with plan to follow-up in 1 year. Per this note, \"We discussed the results of recent echo and stress test. He had abnormal stress EKG but normal exercise tolerance and no angina.  I do not think we need to pursue another coronary angiogram.\"  Today, patient denies any symptoms of CP, chest tightness, SOB, LE edema, orthopnea, or PND. He is able to achieve > 4 METS and denies any exertional symptoms.   Per Dr. Hill, \"He will need to hold the Plavix for 5 days in advance of " "each operation.\" This plan was communicated with Dr. Walters on 3/28/24 who replied, \"He can hold Plavix until he is done with all the procedures.\"  Last dose of plavix was on 3/26/24.   - Moderate aortic stenosis (mean gradient 29 mmHg) on echo 2/11/24. Echo 3/2023 also classified aortic stenosis as moderate.     - METS (Metabolic Equivalents) > 4. RCRI 1.   Patient performs 4 or more METS exercise without symptoms            Total Score: 0      RCRI-Low risk: Class 2 0.9% complication rate            Total Score: 1    RCRI: CAD        PULMONARY  JOSE Low Risk            Total Score: 2    JOSE: Over 50 ys old    JOSE: Male      - Denies asthma or inhaler use  - Tobacco History    History   Smoking Status    Former    Packs/day: 2.00    Years: 30.00    Types: Cigarettes    Quit date: 2013   Smokeless Tobacco    Never       GI  - GERD  Controlled on medications: H2 Blocker  PONV Medium Risk  Total Score: 2           1 AN PONV: Patient is not a current smoker    1 AN PONV: Intended Post Op Opioids        /RENAL  - Baseline Creatinine  0.79    ENDOCRINE    - BMI: Estimated body mass index is 23.26 kg/m  as calculated from the following:    Height as of this encounter: 1.727 m (5' 8\").    Weight as of this encounter: 69.4 kg (153 lb).  Healthy Weight (BMI 18.5-24.9)  - No history of Diabetes Mellitus    HEME  VTE High Risk 3%            Total Score: 8    VTE: Greater than 59 yrs old    VTE: Male    VTE: Current cancer      - Platelet disfunction second to Clopidogrel (Plavix)  - Chronic anemia. Hgb today is 11.2. Ferritin on 2/11/24 was 365, patient does not meet criteria for iron infusion.   Recommend perioperative use of blood conservation techniques intraoperatively and close monitoring for postoperative bleeding.  - Hx of blood transfusion. A type and screen has been ordered for this patient for surgery on 4/19/24.    [Addendum 4/1/24: I received the below message from the blood bank. Patient will need a repeat T&S " "within 72 hours of the surgery scheduled on 4/19/24.  \"We received a pre admission extension form for this patient at [facility].  Unfortunately, they have a history of receiving a transfusion within the last 90 days, so we will be unable to extend their Type and Screen.  Their current type and screen is valid until 4/1/24 3431, so we will be able to use on that day but cannot extend it past then. If you have any questions, please contact [facility] at [phone]. \"]    Thanks,   Candis   [facility]     ANJEL  Patient is NOT Frail            Total Score: 1    Frailty: Weight loss 10 lbs or greater        PSYCH  - continue zoloft without interruption.  - alcohol dependence in remission since 2013.     Different anesthesia methods/types have been discussed with the patient, but they are aware that the final plan will be decided by the assigned anesthesia provider on the date of service.    The patient is optimized for their procedure. AVS with information on surgery time/arrival time, meds and NPO status given by nursing staff. No further diagnostic testing indicated.      On the day of service:     Prep time: 20 minutes  Visit time: 13 minutes  Documentation time: 20 minutes  ------------------------------------------  Total time: 53 minutes      YADIRA Solis CNP  Preoperative Assessment Center  [facility]  [facility]  Phone: [phone]  Fax: [phone]    "

## 2024-03-29 NOTE — TELEPHONE ENCOUNTER
----- Message -----  From: Estella Hawkins RN  Sent: 3/28/2024   3:44 PM CDT  To: Carolee BELTRAN RN  Subject: FW: Plavix hold for 4/1 and 4/19                 Gianna Zarco,  Thanks for letting us know about Fabricio and his upcoming procedures. Dr. Walters says that he can hold the Plavix until he is done with all of the procedures. Please let me know if you would like me to reach out to the patient with this information.  Estella SANCHES     ----- Message from Jason Walters MD sent at 3/28/2024  3:14 PM CDT -----  Regarding: RE: Plavix hold for 4/1 and 4/19  He can hold Plavix until he is done with all the procedures  ----- Message -----  From: Estella Hawkins RN  Sent: 3/28/2024   2:41 PM CDT  To: Jason Walters MD  Subject: RE: Plavix hold for 4/1 and 4/19                 Looks like he has had a cancer recurrence.   Plavix hold for first procedure has already started- Jtube placement on 4/1. How do you wish to proceed with the hold for the next procedure?This will be a Esophagectomy on 4/19. I can also advise that they follow their perioperative guidelines as well.   Thanks,  Mal  ----- Message -----  From: Jason Walters MD  Sent: 3/28/2024   1:22 PM CDT  To: Estella Hawkins RN  Subject: FW: Plavix hold for 4/1 and 4/19                   ----- Message -----  From: Carolee Juarez RN  Sent: 3/28/2024  12:40 PM CDT  To: Kevin Calderon MD; #  Subject: Plavix hold for 4/1 and 4/19                       This mutual patient is scheduled for surgery on 4/1/24 and 4/19/24  and will be seen in PAC on 3/29/24 for a preoperative evaluation. (For the 4/1/24 procedure)    With the recent Mad[place] changes, our PAC Pharmacist, who helped coordinate perioperative anticoagulation/antiplatelet plans, has been eliminated.    To ensure this patient's surgery is not canceled or delayed, PAC is notifying all providers who are involved in or may have an opinion on anticoagulation/antiplatelet therapy in  the geneva-op period.    Please respond if you have a recommendation or concern; otherwise, we will follow perioperative anticoagulation guidelines.    The patient is taking the following anticoagulation/antiplatelet medication Plavix.     I know there was mention of stopping Plavix in Dr. Hill's consult letter from 3/26/24 but we wanted to make sure everyone was on the same page.     Thank you for your time.

## 2024-04-01 ENCOUNTER — ANESTHESIA (OUTPATIENT)
Dept: SURGERY | Facility: CLINIC | Age: 69
DRG: 376 | End: 2024-04-01
Payer: COMMERCIAL

## 2024-04-01 ENCOUNTER — HOSPITAL ENCOUNTER (INPATIENT)
Facility: CLINIC | Age: 69
LOS: 1 days | Discharge: HOME | DRG: 376 | End: 2024-04-02
Attending: THORACIC SURGERY (CARDIOTHORACIC VASCULAR SURGERY) | Admitting: THORACIC SURGERY (CARDIOTHORACIC VASCULAR SURGERY)
Payer: COMMERCIAL

## 2024-04-01 DIAGNOSIS — G89.18 ACUTE POST-OPERATIVE PAIN: Primary | ICD-10-CM

## 2024-04-01 DIAGNOSIS — C15.9 PRIMARY ESOPHAGEAL SQUAMOUS CELL CARCINOMA (H): ICD-10-CM

## 2024-04-01 LAB
ABO/RH(D): NORMAL
ANTIBODY SCREEN: NEGATIVE
GLUCOSE BLDC GLUCOMTR-MCNC: 80 MG/DL (ref 70–99)
SPECIMEN EXPIRATION DATE: NORMAL

## 2024-04-01 PROCEDURE — 250N000011 HC RX IP 250 OP 636: Performed by: ANESTHESIOLOGY

## 2024-04-01 PROCEDURE — 250N000013 HC RX MED GY IP 250 OP 250 PS 637

## 2024-04-01 PROCEDURE — 250N000025 HC SEVOFLURANE, PER MIN: Performed by: THORACIC SURGERY (CARDIOTHORACIC VASCULAR SURGERY)

## 2024-04-01 PROCEDURE — 44186 LAP JEJUNOSTOMY: CPT | Performed by: ANESTHESIOLOGY

## 2024-04-01 PROCEDURE — 0DJ08ZZ INSPECTION OF UPPER INTESTINAL TRACT, VIA NATURAL OR ARTIFICIAL OPENING ENDOSCOPIC: ICD-10-PCS | Performed by: THORACIC SURGERY (CARDIOTHORACIC VASCULAR SURGERY)

## 2024-04-01 PROCEDURE — 250N000011 HC RX IP 250 OP 636: Performed by: NURSE ANESTHETIST, CERTIFIED REGISTERED

## 2024-04-01 PROCEDURE — 44186 LAP JEJUNOSTOMY: CPT | Mod: GC | Performed by: THORACIC SURGERY (CARDIOTHORACIC VASCULAR SURGERY)

## 2024-04-01 PROCEDURE — 0DHA4UZ INSERTION OF FEEDING DEVICE INTO JEJUNUM, PERCUTANEOUS ENDOSCOPIC APPROACH: ICD-10-PCS | Performed by: THORACIC SURGERY (CARDIOTHORACIC VASCULAR SURGERY)

## 2024-04-01 PROCEDURE — 44186 LAP JEJUNOSTOMY: CPT | Performed by: NURSE ANESTHETIST, CERTIFIED REGISTERED

## 2024-04-01 PROCEDURE — 86900 BLOOD TYPING SEROLOGIC ABO: CPT | Performed by: THORACIC SURGERY (CARDIOTHORACIC VASCULAR SURGERY)

## 2024-04-01 PROCEDURE — 360N000076 HC SURGERY LEVEL 3, PER MIN: Performed by: THORACIC SURGERY (CARDIOTHORACIC VASCULAR SURGERY)

## 2024-04-01 PROCEDURE — 250N000013 HC RX MED GY IP 250 OP 250 PS 637: Performed by: STUDENT IN AN ORGANIZED HEALTH CARE EDUCATION/TRAINING PROGRAM

## 2024-04-01 PROCEDURE — 258N000003 HC RX IP 258 OP 636: Performed by: NURSE ANESTHETIST, CERTIFIED REGISTERED

## 2024-04-01 PROCEDURE — C1894 INTRO/SHEATH, NON-LASER: HCPCS | Performed by: THORACIC SURGERY (CARDIOTHORACIC VASCULAR SURGERY)

## 2024-04-01 PROCEDURE — 250N000009 HC RX 250: Performed by: NURSE ANESTHETIST, CERTIFIED REGISTERED

## 2024-04-01 PROCEDURE — 999N000141 HC STATISTIC PRE-PROCEDURE NURSING ASSESSMENT: Performed by: THORACIC SURGERY (CARDIOTHORACIC VASCULAR SURGERY)

## 2024-04-01 PROCEDURE — 250N000011 HC RX IP 250 OP 636: Performed by: THORACIC SURGERY (CARDIOTHORACIC VASCULAR SURGERY)

## 2024-04-01 PROCEDURE — 272N000001 HC OR GENERAL SUPPLY STERILE: Performed by: THORACIC SURGERY (CARDIOTHORACIC VASCULAR SURGERY)

## 2024-04-01 PROCEDURE — C1769 GUIDE WIRE: HCPCS | Performed by: THORACIC SURGERY (CARDIOTHORACIC VASCULAR SURGERY)

## 2024-04-01 PROCEDURE — 370N000017 HC ANESTHESIA TECHNICAL FEE, PER MIN: Performed by: THORACIC SURGERY (CARDIOTHORACIC VASCULAR SURGERY)

## 2024-04-01 PROCEDURE — 250N000013 HC RX MED GY IP 250 OP 250 PS 637: Performed by: ANESTHESIOLOGY

## 2024-04-01 PROCEDURE — 120N000002 HC R&B MED SURG/OB UMMC

## 2024-04-01 PROCEDURE — 710N000010 HC RECOVERY PHASE 1, LEVEL 2, PER MIN: Performed by: THORACIC SURGERY (CARDIOTHORACIC VASCULAR SURGERY)

## 2024-04-01 PROCEDURE — 36415 COLL VENOUS BLD VENIPUNCTURE: CPT | Performed by: THORACIC SURGERY (CARDIOTHORACIC VASCULAR SURGERY)

## 2024-04-01 PROCEDURE — 250N000009 HC RX 250: Performed by: THORACIC SURGERY (CARDIOTHORACIC VASCULAR SURGERY)

## 2024-04-01 PROCEDURE — 710N000012 HC RECOVERY PHASE 2, PER MINUTE: Performed by: THORACIC SURGERY (CARDIOTHORACIC VASCULAR SURGERY)

## 2024-04-01 RX ORDER — SODIUM CHLORIDE, SODIUM LACTATE, POTASSIUM CHLORIDE, CALCIUM CHLORIDE 600; 310; 30; 20 MG/100ML; MG/100ML; MG/100ML; MG/100ML
INJECTION, SOLUTION INTRAVENOUS CONTINUOUS
Status: DISCONTINUED | OUTPATIENT
Start: 2024-04-01 | End: 2024-04-01 | Stop reason: HOSPADM

## 2024-04-01 RX ORDER — NALOXONE HYDROCHLORIDE 0.4 MG/ML
0.2 INJECTION, SOLUTION INTRAMUSCULAR; INTRAVENOUS; SUBCUTANEOUS
Status: DISCONTINUED | OUTPATIENT
Start: 2024-04-01 | End: 2024-04-02 | Stop reason: HOSPADM

## 2024-04-01 RX ORDER — ACETAMINOPHEN 500 MG
500-1000 TABLET ORAL EVERY 8 HOURS PRN
Qty: 30 TABLET | Refills: 0 | Status: SHIPPED | OUTPATIENT
Start: 2024-04-01 | End: 2024-04-06

## 2024-04-01 RX ORDER — OXYCODONE HYDROCHLORIDE 5 MG/1
5 TABLET ORAL EVERY 8 HOURS PRN
Qty: 10 TABLET | Refills: 0 | Status: SHIPPED | OUTPATIENT
Start: 2024-04-01 | End: 2024-04-17

## 2024-04-01 RX ORDER — AMOXICILLIN 250 MG
1 CAPSULE ORAL 2 TIMES DAILY PRN
Status: DISCONTINUED | OUTPATIENT
Start: 2024-04-01 | End: 2024-04-02 | Stop reason: HOSPADM

## 2024-04-01 RX ORDER — ENOXAPARIN SODIUM 100 MG/ML
40 INJECTION SUBCUTANEOUS
Status: COMPLETED | OUTPATIENT
Start: 2024-04-01 | End: 2024-04-01

## 2024-04-01 RX ORDER — ONDANSETRON 4 MG/1
4 TABLET, ORALLY DISINTEGRATING ORAL EVERY 6 HOURS PRN
Status: DISCONTINUED | OUTPATIENT
Start: 2024-04-01 | End: 2024-04-02 | Stop reason: HOSPADM

## 2024-04-01 RX ORDER — PROPOFOL 10 MG/ML
INJECTION, EMULSION INTRAVENOUS PRN
Status: DISCONTINUED | OUTPATIENT
Start: 2024-04-01 | End: 2024-04-01

## 2024-04-01 RX ORDER — CEFAZOLIN SODIUM/WATER 2 G/20 ML
2 SYRINGE (ML) INTRAVENOUS
Status: COMPLETED | OUTPATIENT
Start: 2024-04-01 | End: 2024-04-01

## 2024-04-01 RX ORDER — ONDANSETRON 2 MG/ML
4 INJECTION INTRAMUSCULAR; INTRAVENOUS EVERY 6 HOURS PRN
Status: DISCONTINUED | OUTPATIENT
Start: 2024-04-01 | End: 2024-04-02 | Stop reason: HOSPADM

## 2024-04-01 RX ORDER — ONDANSETRON 2 MG/ML
4 INJECTION INTRAMUSCULAR; INTRAVENOUS EVERY 30 MIN PRN
Status: DISCONTINUED | OUTPATIENT
Start: 2024-04-01 | End: 2024-04-01

## 2024-04-01 RX ORDER — BUPIVACAINE HYDROCHLORIDE AND EPINEPHRINE 2.5; 5 MG/ML; UG/ML
INJECTION, SOLUTION INFILTRATION; PERINEURAL PRN
Status: DISCONTINUED | OUTPATIENT
Start: 2024-04-01 | End: 2024-04-01 | Stop reason: HOSPADM

## 2024-04-01 RX ORDER — HYDROMORPHONE HCL IN WATER/PF 6 MG/30 ML
0.2 PATIENT CONTROLLED ANALGESIA SYRINGE INTRAVENOUS EVERY 5 MIN PRN
Status: DISCONTINUED | OUTPATIENT
Start: 2024-04-01 | End: 2024-04-01 | Stop reason: HOSPADM

## 2024-04-01 RX ORDER — POLYETHYLENE GLYCOL 3350 17 G/17G
17 POWDER, FOR SOLUTION ORAL 2 TIMES DAILY PRN
Status: DISCONTINUED | OUTPATIENT
Start: 2024-04-01 | End: 2024-04-02 | Stop reason: HOSPADM

## 2024-04-01 RX ORDER — AMOXICILLIN 250 MG
2 CAPSULE ORAL 2 TIMES DAILY PRN
Status: DISCONTINUED | OUTPATIENT
Start: 2024-04-01 | End: 2024-04-02 | Stop reason: HOSPADM

## 2024-04-01 RX ORDER — NALOXONE HYDROCHLORIDE 0.4 MG/ML
0.4 INJECTION, SOLUTION INTRAMUSCULAR; INTRAVENOUS; SUBCUTANEOUS
Status: DISCONTINUED | OUTPATIENT
Start: 2024-04-01 | End: 2024-04-02 | Stop reason: HOSPADM

## 2024-04-01 RX ORDER — NALOXONE HYDROCHLORIDE 0.4 MG/ML
0.1 INJECTION, SOLUTION INTRAMUSCULAR; INTRAVENOUS; SUBCUTANEOUS
Status: DISCONTINUED | OUTPATIENT
Start: 2024-04-01 | End: 2024-04-01

## 2024-04-01 RX ORDER — HYDRALAZINE HYDROCHLORIDE 20 MG/ML
2.5-5 INJECTION INTRAMUSCULAR; INTRAVENOUS EVERY 10 MIN PRN
Status: DISCONTINUED | OUTPATIENT
Start: 2024-04-01 | End: 2024-04-01 | Stop reason: HOSPADM

## 2024-04-01 RX ORDER — ACETAMINOPHEN 650 MG/1
650 SUPPOSITORY RECTAL EVERY 4 HOURS PRN
Status: DISCONTINUED | OUTPATIENT
Start: 2024-04-01 | End: 2024-04-02 | Stop reason: HOSPADM

## 2024-04-01 RX ORDER — LIDOCAINE 40 MG/G
CREAM TOPICAL
Status: DISCONTINUED | OUTPATIENT
Start: 2024-04-01 | End: 2024-04-02 | Stop reason: HOSPADM

## 2024-04-01 RX ORDER — ACETAMINOPHEN 325 MG/1
650 TABLET ORAL EVERY 4 HOURS PRN
Status: DISCONTINUED | OUTPATIENT
Start: 2024-04-01 | End: 2024-04-02 | Stop reason: HOSPADM

## 2024-04-01 RX ORDER — NALOXONE HYDROCHLORIDE 0.4 MG/ML
0.1 INJECTION, SOLUTION INTRAMUSCULAR; INTRAVENOUS; SUBCUTANEOUS
Status: DISCONTINUED | OUTPATIENT
Start: 2024-04-01 | End: 2024-04-01 | Stop reason: HOSPADM

## 2024-04-01 RX ORDER — ACETAMINOPHEN 325 MG/1
650 TABLET ORAL
Status: DISCONTINUED | OUTPATIENT
Start: 2024-04-01 | End: 2024-04-01

## 2024-04-01 RX ORDER — FENTANYL CITRATE 50 UG/ML
INJECTION, SOLUTION INTRAMUSCULAR; INTRAVENOUS PRN
Status: DISCONTINUED | OUTPATIENT
Start: 2024-04-01 | End: 2024-04-01

## 2024-04-01 RX ORDER — DEXAMETHASONE SODIUM PHOSPHATE 4 MG/ML
INJECTION, SOLUTION INTRA-ARTICULAR; INTRALESIONAL; INTRAMUSCULAR; INTRAVENOUS; SOFT TISSUE PRN
Status: DISCONTINUED | OUTPATIENT
Start: 2024-04-01 | End: 2024-04-01

## 2024-04-01 RX ORDER — HYDROMORPHONE HCL IN WATER/PF 6 MG/30 ML
0.4 PATIENT CONTROLLED ANALGESIA SYRINGE INTRAVENOUS EVERY 5 MIN PRN
Status: DISCONTINUED | OUTPATIENT
Start: 2024-04-01 | End: 2024-04-01 | Stop reason: HOSPADM

## 2024-04-01 RX ORDER — OXYCODONE HYDROCHLORIDE 10 MG/1
10 TABLET ORAL ONCE
Status: DISCONTINUED | OUTPATIENT
Start: 2024-04-01 | End: 2024-04-01

## 2024-04-01 RX ORDER — HYDROMORPHONE HCL IN WATER/PF 6 MG/30 ML
0.4 PATIENT CONTROLLED ANALGESIA SYRINGE INTRAVENOUS ONCE
Status: DISCONTINUED | OUTPATIENT
Start: 2024-04-01 | End: 2024-04-01

## 2024-04-01 RX ORDER — OXYCODONE HYDROCHLORIDE 10 MG/1
10 TABLET ORAL EVERY 4 HOURS PRN
Status: DISCONTINUED | OUTPATIENT
Start: 2024-04-01 | End: 2024-04-02 | Stop reason: HOSPADM

## 2024-04-01 RX ORDER — METHOCARBAMOL 500 MG/1
500 TABLET, FILM COATED ORAL EVERY 6 HOURS PRN
Status: DISCONTINUED | OUTPATIENT
Start: 2024-04-01 | End: 2024-04-02 | Stop reason: HOSPADM

## 2024-04-01 RX ORDER — ONDANSETRON 4 MG/1
4 TABLET, ORALLY DISINTEGRATING ORAL EVERY 30 MIN PRN
Status: DISCONTINUED | OUTPATIENT
Start: 2024-04-01 | End: 2024-04-01

## 2024-04-01 RX ORDER — OXYCODONE HYDROCHLORIDE 5 MG/1
5 TABLET ORAL EVERY 4 HOURS PRN
Status: DISCONTINUED | OUTPATIENT
Start: 2024-04-01 | End: 2024-04-02 | Stop reason: HOSPADM

## 2024-04-01 RX ORDER — METOPROLOL TARTRATE 1 MG/ML
1-2 INJECTION, SOLUTION INTRAVENOUS EVERY 5 MIN PRN
Status: DISCONTINUED | OUTPATIENT
Start: 2024-04-01 | End: 2024-04-01 | Stop reason: HOSPADM

## 2024-04-01 RX ORDER — OXYCODONE HYDROCHLORIDE 5 MG/1
5 TABLET ORAL
Status: DISCONTINUED | OUTPATIENT
Start: 2024-04-01 | End: 2024-04-01

## 2024-04-01 RX ORDER — CEFAZOLIN SODIUM 2 G/100ML
2 INJECTION, SOLUTION INTRAVENOUS
Status: DISCONTINUED | OUTPATIENT
Start: 2024-04-01 | End: 2024-04-01

## 2024-04-01 RX ORDER — CEFAZOLIN SODIUM/WATER 2 G/20 ML
2 SYRINGE (ML) INTRAVENOUS SEE ADMIN INSTRUCTIONS
Status: DISCONTINUED | OUTPATIENT
Start: 2024-04-01 | End: 2024-04-01 | Stop reason: HOSPADM

## 2024-04-01 RX ORDER — HALOPERIDOL 5 MG/ML
1 INJECTION INTRAMUSCULAR
Status: DISCONTINUED | OUTPATIENT
Start: 2024-04-01 | End: 2024-04-01 | Stop reason: HOSPADM

## 2024-04-01 RX ORDER — CALCIUM CARBONATE 500 MG/1
1000 TABLET, CHEWABLE ORAL 4 TIMES DAILY PRN
Status: DISCONTINUED | OUTPATIENT
Start: 2024-04-01 | End: 2024-04-01

## 2024-04-01 RX ORDER — CEFAZOLIN SODIUM 2 G/100ML
2 INJECTION, SOLUTION INTRAVENOUS SEE ADMIN INSTRUCTIONS
Status: DISCONTINUED | OUTPATIENT
Start: 2024-04-01 | End: 2024-04-01

## 2024-04-01 RX ORDER — ENOXAPARIN SODIUM 100 MG/ML
40 INJECTION SUBCUTANEOUS
Status: DISCONTINUED | OUTPATIENT
Start: 2024-04-01 | End: 2024-04-01

## 2024-04-01 RX ORDER — LIDOCAINE HYDROCHLORIDE 40 MG/ML
SOLUTION TOPICAL PRN
Status: DISCONTINUED | OUTPATIENT
Start: 2024-04-01 | End: 2024-04-01

## 2024-04-01 RX ORDER — ACETAMINOPHEN 325 MG/1
975 TABLET ORAL ONCE
Status: COMPLETED | OUTPATIENT
Start: 2024-04-01 | End: 2024-04-01

## 2024-04-01 RX ORDER — OXYCODONE HYDROCHLORIDE 10 MG/1
10 TABLET ORAL
Status: DISCONTINUED | OUTPATIENT
Start: 2024-04-01 | End: 2024-04-01

## 2024-04-01 RX ORDER — LIDOCAINE HYDROCHLORIDE 20 MG/ML
INJECTION, SOLUTION INFILTRATION; PERINEURAL PRN
Status: DISCONTINUED | OUTPATIENT
Start: 2024-04-01 | End: 2024-04-01

## 2024-04-01 RX ORDER — SODIUM CHLORIDE, SODIUM GLUCONATE, SODIUM ACETATE, POTASSIUM CHLORIDE AND MAGNESIUM CHLORIDE 526; 502; 368; 37; 30 MG/100ML; MG/100ML; MG/100ML; MG/100ML; MG/100ML
INJECTION, SOLUTION INTRAVENOUS CONTINUOUS PRN
Status: DISCONTINUED | OUTPATIENT
Start: 2024-04-01 | End: 2024-04-01

## 2024-04-01 RX ORDER — ONDANSETRON 2 MG/ML
INJECTION INTRAMUSCULAR; INTRAVENOUS PRN
Status: DISCONTINUED | OUTPATIENT
Start: 2024-04-01 | End: 2024-04-01

## 2024-04-01 RX ADMIN — SUGAMMADEX 140 MG: 100 INJECTION, SOLUTION INTRAVENOUS at 15:43

## 2024-04-01 RX ADMIN — Medication 2 G: at 13:50

## 2024-04-01 RX ADMIN — FENTANYL CITRATE 50 MCG: 50 INJECTION INTRAMUSCULAR; INTRAVENOUS at 14:49

## 2024-04-01 RX ADMIN — HYDROMORPHONE HYDROCHLORIDE 0.2 MG: 0.2 INJECTION, SOLUTION INTRAMUSCULAR; INTRAVENOUS; SUBCUTANEOUS at 16:56

## 2024-04-01 RX ADMIN — FENTANYL CITRATE 50 MCG: 50 INJECTION INTRAMUSCULAR; INTRAVENOUS at 14:23

## 2024-04-01 RX ADMIN — ENOXAPARIN SODIUM 40 MG: 40 INJECTION SUBCUTANEOUS at 12:00

## 2024-04-01 RX ADMIN — PROPOFOL 200 MG: 10 INJECTION, EMULSION INTRAVENOUS at 13:43

## 2024-04-01 RX ADMIN — ONDANSETRON 4 MG: 2 INJECTION INTRAMUSCULAR; INTRAVENOUS at 15:13

## 2024-04-01 RX ADMIN — PHENYLEPHRINE HYDROCHLORIDE 100 MCG: 10 INJECTION INTRAVENOUS at 13:50

## 2024-04-01 RX ADMIN — HYDROMORPHONE HYDROCHLORIDE 0.4 MG: 0.2 INJECTION, SOLUTION INTRAMUSCULAR; INTRAVENOUS; SUBCUTANEOUS at 16:21

## 2024-04-01 RX ADMIN — HYDROMORPHONE HYDROCHLORIDE 0.4 MG: 0.2 INJECTION, SOLUTION INTRAMUSCULAR; INTRAVENOUS; SUBCUTANEOUS at 16:28

## 2024-04-01 RX ADMIN — Medication 15 MG: at 14:11

## 2024-04-01 RX ADMIN — LIDOCAINE HYDROCHLORIDE 4 ML: 40 SOLUTION TOPICAL at 14:07

## 2024-04-01 RX ADMIN — OXYCODONE HYDROCHLORIDE 5 MG: 5 TABLET ORAL at 20:18

## 2024-04-01 RX ADMIN — Medication 50 MG: at 13:43

## 2024-04-01 RX ADMIN — HYDROMORPHONE HYDROCHLORIDE 0.4 MG: 0.2 INJECTION, SOLUTION INTRAMUSCULAR; INTRAVENOUS; SUBCUTANEOUS at 16:36

## 2024-04-01 RX ADMIN — METHOCARBAMOL 500 MG: 500 TABLET ORAL at 20:18

## 2024-04-01 RX ADMIN — PROPOFOL 30 MG: 10 INJECTION, EMULSION INTRAVENOUS at 13:44

## 2024-04-01 RX ADMIN — Medication 15 MG: at 14:49

## 2024-04-01 RX ADMIN — PHENYLEPHRINE HYDROCHLORIDE 150 MCG: 10 INJECTION INTRAVENOUS at 13:43

## 2024-04-01 RX ADMIN — DEXAMETHASONE SODIUM PHOSPHATE 8 MG: 4 INJECTION, SOLUTION INTRA-ARTICULAR; INTRALESIONAL; INTRAMUSCULAR; INTRAVENOUS; SOFT TISSUE at 13:43

## 2024-04-01 RX ADMIN — HYDROMORPHONE HYDROCHLORIDE 0.2 MG: 0.2 INJECTION, SOLUTION INTRAMUSCULAR; INTRAVENOUS; SUBCUTANEOUS at 16:10

## 2024-04-01 RX ADMIN — ACETAMINOPHEN 975 MG: 325 TABLET, FILM COATED ORAL at 16:10

## 2024-04-01 RX ADMIN — SODIUM CHLORIDE, SODIUM GLUCONATE, SODIUM ACETATE, POTASSIUM CHLORIDE AND MAGNESIUM CHLORIDE: 526; 502; 368; 37; 30 INJECTION, SOLUTION INTRAVENOUS at 15:31

## 2024-04-01 RX ADMIN — SODIUM CHLORIDE, SODIUM GLUCONATE, SODIUM ACETATE, POTASSIUM CHLORIDE AND MAGNESIUM CHLORIDE: 526; 502; 368; 37; 30 INJECTION, SOLUTION INTRAVENOUS at 13:35

## 2024-04-01 RX ADMIN — OXYCODONE HYDROCHLORIDE 10 MG: 10 TABLET ORAL at 17:15

## 2024-04-01 RX ADMIN — HYDROMORPHONE HYDROCHLORIDE 0.4 MG: 0.2 INJECTION, SOLUTION INTRAMUSCULAR; INTRAVENOUS; SUBCUTANEOUS at 16:45

## 2024-04-01 RX ADMIN — LIDOCAINE HYDROCHLORIDE 100 MG: 20 INJECTION, SOLUTION INFILTRATION; PERINEURAL at 13:43

## 2024-04-01 RX ADMIN — PHENYLEPHRINE HYDROCHLORIDE 150 MCG: 10 INJECTION INTRAVENOUS at 14:01

## 2024-04-01 RX ADMIN — FENTANYL CITRATE 100 MCG: 50 INJECTION INTRAMUSCULAR; INTRAVENOUS at 13:43

## 2024-04-01 ASSESSMENT — ACTIVITIES OF DAILY LIVING (ADL)
ADLS_ACUITY_SCORE: 25
ADLS_ACUITY_SCORE: 24
ADLS_ACUITY_SCORE: 20
ADLS_ACUITY_SCORE: 24
ADLS_ACUITY_SCORE: 25
ADLS_ACUITY_SCORE: 25
ADLS_ACUITY_SCORE: 24
ADLS_ACUITY_SCORE: 25
ADLS_ACUITY_SCORE: 24
ADLS_ACUITY_SCORE: 24
ADLS_ACUITY_SCORE: 25
ADLS_ACUITY_SCORE: 24
ADLS_ACUITY_SCORE: 25

## 2024-04-01 ASSESSMENT — LIFESTYLE VARIABLES: TOBACCO_USE: 1

## 2024-04-01 ASSESSMENT — ENCOUNTER SYMPTOMS
SEIZURES: 0
ORTHOPNEA: 0

## 2024-04-01 NOTE — ANESTHESIA PREPROCEDURE EVALUATION
Anesthesia Pre-Procedure Evaluation    Patient: Lopez Hogue   MRN: [id number] : 1955        Procedure : Procedure(s):  LARYNGOSCOPY, WITH BIOPSY          Past Medical History:   Diagnosis Date    EtOH dependence (H)     Quit drinking 10 years    Heart attack (H)     Hypertension     Nonrheumatic aortic (valve) stenosis     Sweat gland carcinoma       Past Surgical History:   Procedure Laterality Date    CV CORONARY ANGIOGRAM N/A 3/27/2023    Procedure: Coronary Angiogram;  Surgeon: Miki Etienne MD;  Location: [facility] [place] CV    CV CORONARY ANGIOGRAM N/A 2023    Procedure: Coronary Angiogram;  Surgeon: Miki Etienne MD;  Location: [facility] CATH LAB CV    CV LEFT HEART CATH N/A 3/27/2023    Procedure: Left Heart Catheterization;  Surgeon: Miki Etienne MD;  Location: [facility] LAB CV    CV LEFT HEART CATH N/A 2023    Procedure: Left Heart Catheterization;  Surgeon: Miki Etienne MD;  Location: [facility] [place] CV    CV PCI N/A 3/27/2023    Procedure: Percutaneous Coronary Intervention;  Surgeon: Miki Etienne MD;  Location: [facility] [place] CV    ENDOSCOPIC ULTRASOUND UPPER GASTROINTESTINAL TRACT (GI) N/A 2023    Procedure: Endoscopic ultrasound upper gastrointestinal tract (GI);  Surgeon: Shahriar Giraldo MD;  Location:  GI    ESOPHAGOSCOPY, GASTROSCOPY, DUODENOSCOPY (EGD), COMBINED N/A 2023    Procedure: ESOPHAGOGASTRODUODENOSCOPY, WITH BIOPSY;  Surgeon: Shahriar Giraldo MD;  Location:  GI    LARYNGOSCOPY WITH BIOPSY(IES) N/A 10/12/2023    Procedure: LARYNGOSCOPY, WITH BIOPSY;  Surgeon: Edi Felix MD;  Location:  OR    OTHER SURGICAL HISTORY      WIDE EXCISION OF LEFT GLUTEAL MASSTNM: [id number], stage: II hidradenocarcinoma Grade II, 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, sentinel lymph node biopsy negative       Allergies   Allergen Reactions    Coconut Flavor Anaphylaxis     Raw coconut      Social History     Tobacco Use    Smoking status:  Former     Packs/day: 2.00     Years: 30.00     Additional pack years: 0.00     Total pack years: 60.00     Types: Cigarettes     Quit date:      Years since quittin.2     Passive exposure: Past    Smokeless tobacco: Never    Tobacco comments:     Quit 10 years ago   Substance Use Topics    Alcohol use: Not Currently     Comment: quit in       Wt Readings from Last 1 Encounters:   24 68.7 kg (151 lb 7.3 oz)        Anesthesia Evaluation   Pt has had prior anesthetic. Type: General and MAC.    No history of anesthetic complications       ROS/MED HX  ENT/Pulmonary: Comment: Vallecular mass    (+)     JOSE risk factors, snores loudly, hypertension,   observed stopped breathing,      tobacco use, Past use,                       Neurologic:    (-) no seizures and no CVA   Cardiovascular:     (+) Dyslipidemia hypertension- -  CAD - past MI - stent-3/2023. 2  Taking blood thinners Pt has received instructions:                       valvular problems/murmurs type: AS     Previous cardiac testing   Echo: Date: 2023 Results:  Interpretation Summary     1. Normal left ventricular size and systolic performance with a visually  estimated ejection fraction of 60-65%.  2. There is moderate aortic stenosis.  3. There is trace aortic insufficiency.  4. Normal right ventricular size and systolic performance.  5. There is mild left atrial enlargement.     When compared to the prior real-time echocardiogram dated 2023, the  findings are felt to be fairly similar on both examinations.    Stress Test:  Date: 2023 Results:  Interpretation Summary  1. The patient achieved good exercise workload without inducible angina.  2. Exercise stress ECG is positive for inducible myocardial ischemia in  inferior and lateral leads. ST segment depression 3 mm at the peak of  exercise.  3. No exercise induced cardiac arrhytmia.    ECG Reviewed:  Date: 2023 Results:  Sinus bradycardia  Cath:  Date: 2023  Results:  Conclusion    1.  Previously placed stents in OM left circumflex are widely patent with normal VENTURA-3 flow.  2.  PTCA sites in distal circumflex branch have  healed nicely with less than 30% residual narrowing.  3.  LAD is moderately calcified with diffuse mild luminal irregularity but no severe stenoses, unchanged from prior study.  4. RCA has mild calcification and luminal irregularity but no severe stenoses.  There is a focal 70% narrowing in a small posterolateral branch and diffuse 50 to 70% narrowing in the posterior descending branch.  Both appear unchanged from prior study..   (-) angina, SPENCER, orthopnea/PND and angina   METS/Exercise Tolerance: >4 METS Comment: Is doing 30-60 minutes of cardio 6 days per week for exercise.    Denies any exertional dyspnea or angina.     Hematologic:     (+)      anemia,          Musculoskeletal:  - neg musculoskeletal ROS     GI/Hepatic: Comment: Esophageal cancer   (-) GERD   Renal/Genitourinary:    (-) renal disease   Endo:  - neg endo ROS     Psychiatric/Substance Use:     (+) psychiatric history anxiety alcohol abuse      Infectious Disease:  - neg infectious disease ROS     Malignancy:   (+) Malignancy, History of Other, Skin and GI.GI CA  Active status post.  Skin CA Remission status post Surgery.  Other CA sweat gland carcinoma, left buttock Remission status post Surgery.    Other:  - neg other ROS          Physical Exam    Airway        Mallampati: II   TM distance: < 3 FB   Neck ROM: limited   Mouth opening: > 3 cm    Respiratory Devices and Support         Dental     Comment: Edentulous upper, missing most lower        Cardiovascular          Rhythm and rate: regular and normal     Pulmonary           breath sounds clear to auscultation           OUTSIDE LABS:  CBC:   Lab Results   Component Value Date    WBC 5.3 03/29/2024    WBC 3.7 (L) 02/21/2024    HGB 11.2 (L) 03/29/2024    HGB 9.2 (L) 02/21/2024    HCT 34.8 (L) 03/29/2024    HCT 27.7 (L) 02/21/2024  "    03/29/2024     (L) 02/21/2024     BMP:   Lab Results   Component Value Date     03/29/2024     02/16/2024    POTASSIUM 4.0 03/29/2024    POTASSIUM 3.8 02/16/2024    CHLORIDE 100 03/29/2024    CHLORIDE 106 02/16/2024    CO2 26 03/29/2024    CO2 28 02/16/2024    BUN 11.7 03/29/2024    BUN 8.6 02/16/2024    CR 0.66 (L) 03/29/2024    CR 0.79 02/16/2024    GLC 80 04/01/2024    GLC 88 03/29/2024     COAGS:   Lab Results   Component Value Date    INR 0.97 11/30/2023     POC: No results found for: \"BGM\", \"HCG\", \"HCGS\"  HEPATIC:   Lab Results   Component Value Date    ALBUMIN 3.6 03/29/2024    PROTTOTAL 7.6 03/29/2024    ALT 30 03/29/2024    AST 36 03/29/2024    ALKPHOS 145 03/29/2024    BILITOTAL 0.5 03/29/2024     OTHER:   Lab Results   Component Value Date    LACT 0.7 01/15/2024    A1C 5.2 03/27/2023    RAFAELA 9.4 03/29/2024    PHOS 3.4 02/11/2024    MAG 2.0 02/11/2024    TSH 2.57 03/29/2024    T4 0.83 (L) 03/29/2024       Anesthesia Plan    ASA Status:  3    NPO Status:  NPO Appropriate    Anesthesia Type: General.     - Airway: ETT   Induction: Intravenous, Propofol.   Maintenance: Balanced.   Techniques and Equipment:     - Airway: Video-Laryngoscope     - Lines/Monitors: BIS     Consents    Anesthesia Plan(s) and associated risks, benefits, and realistic alternatives discussed. Questions answered and patient/representative(s) expressed understanding.     - Discussed:     - Discussed with:  Patient, Spouse            Postoperative Care    Pain management: IV analgesics, Oral pain medications, Multi-modal analgesia.   PONV prophylaxis: Ondansetron (or other 5HT-3), Dexamethasone or Solumedrol     Comments:                SHAYNE ABERNATHY MD  "

## 2024-04-01 NOTE — OR NURSING
Dr. Perez here at bedside. Discussed pt c/o pain 8/10. Generalized in the mid abdomen. 2 mg dilaudid and 975 mg tylenol given. Per Dr. Perez hold on ketamine order.    Par DR. Avelar here at bedside, to give OxyContin 10 mg now and reevaluate in 30 mins.Will cont to monitor.

## 2024-04-01 NOTE — ANESTHESIA PROCEDURE NOTES
Airway       Patient location during procedure: OR       Procedure Start/Stop Times: 4/1/2024 1:45 PM  Staff -        Other Anesthesia Staff: Francine Martinez       Performed By: SANTOS and with CRNAs       Procedure performed by resident/fellow/CRNA in presence of a teaching physician.    Consent for Airway        Urgency: elective  Indications and Patient Condition       Indications for airway management: geneva-procedural       Induction type:intravenous       Mask difficulty assessment: 2 - vent by mask + OA or adjuvant +/- NMBA    Final Airway Details       Final airway type: endotracheal airway       Successful airway: ETT - single  Endotracheal Airway Details        ETT size (mm): 6.0       Cuffed: yes       Cuff volume (mL): 8       Successful intubation technique: direct laryngoscopy and video laryngoscopy       VL Blade Size: MAC 3       Grade View of Cords: 1       Adjucts: stylet       Position: Right       Measured from: gums/teeth       Secured at (cm): 24       Bite block used: None    Post intubation assessment        Placement verified by: capnometry, equal breath sounds and chest rise        Number of attempts at approach: 1       Secured with: tape       Ease of procedure: easy       Dentition: Intact and Unchanged    Medication(s) Administered   Medication Administration Time: 4/1/2024 1:45 PM    Additional Comments       Pt vocal cords pink and inflammed, glottic opening narrow, atraumatic intubation.

## 2024-04-01 NOTE — ANESTHESIA POSTPROCEDURE EVALUATION
Patient: Lopez Hogue    Procedure: Procedure(s):  Laparoscopic Jejunostomy Tube Insertion and Esophagogastroduodenoscopy       Anesthesia Type:  General    Note:  Disposition: Outpatient   Postop Pain Control: Uneventful            Sign Out: Well controlled pain   PONV: No   Neuro/Psych: Uneventful            Sign Out: Acceptable/Baseline neuro status   Airway/Respiratory: Uneventful            Sign Out: Acceptable/Baseline resp. status   CV/Hemodynamics: Uneventful            Sign Out: Acceptable CV status; No obvious hypovolemia; No obvious fluid overload   Other NRE: NONE   DID A NON-ROUTINE EVENT OCCUR? No           Last vitals:  Vitals Value Taken Time   /77 04/01/24 1745   Temp 36.6  C (97.8  F) 04/01/24 1745   Pulse 77 04/01/24 1753   Resp 9 04/01/24 1753   SpO2 93 % 04/01/24 1753   Vitals shown include unfiled device data.    Electronically Signed By: Nereida Avelar MD  April 1, 2024  5:54 PM

## 2024-04-01 NOTE — LETTER
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]  Acute post-operative pain [G89.18]  Admit Date/Time: 2024 11:01 AM  Discharge Date: 24  Payor Source: Payor: MEDICA / Plan: MEDICA CHOICE / Product Type: Indemnity /          Reason for Communication Hand-off Referral: Other care coordination    Discharge Plan: home with family     Concern for non-adherence with plan of care:   Y/N no  Discharge Needs Assessment:  Needs      Flowsheet Row Most Recent Value   Equipment Currently Used at Home none   # of Referrals Placed by CM External Care Coordination            Already enrolled in Tele-monitoring program and name of program:  NA  Follow-up specialty is recommended: Yes    Follow-up plan:    Future Appointments   Date Time Provider Department Center   4/10/2024  9:40 AM Nuno Cazares MD [facility]   2024  9:00 AM [address] APRN CNS [facility]   2024 10:45 AM Kevin Calderon MD [facility]   2024  8:30 AM  [facility] DRAW [facility]   2024 12:30 PM UUPET1 [facility]   2024  3:00 PM Edi Felix MD ENT [facility]   2024  3:30 [facility]   2024  4:30 PM Pat Dewitt APRN CNS [facility]   2024 12:30 PM Nisha Pride APRN CNP [facility]   2024  9:30 AM Akiko Sun MD [facility]       Any outstanding tests or procedures:              Key Recommendations:      Celsa Robbins RN    AVS/Discharge Summary is the source of truth; this is a helpful guide for improved communication of patient story

## 2024-04-01 NOTE — ANESTHESIA CARE TRANSFER NOTE
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    Anesthesia Type:   General     Note:    Oropharynx: oropharynx clear of all foreign objects  Level of Consciousness: awake  Oxygen Supplementation: face mask    Independent Airway: airway patency satisfactory and stable  Dentition: dentition unchanged  Vital Signs Stable: post-procedure vital signs reviewed and stable  Report to RN Given: handoff report given  Patient transferred to: PACU    Handoff Report: Identifed the Patient, Identified the Reponsible Provider, Reviewed the pertinent medical history, Discussed the surgical course, Reviewed Intra-OP anesthesia mangement and issues during anesthesia, Set expectations for post-procedure period and Allowed opportunity for questions and acknowledgement of understanding  Vitals:  Vitals Value Taken Time   /80 04/01/24 1557   Temp     Pulse 89 04/01/24 1557   Resp 16 04/01/24 1557   SpO2 100 % 04/01/24 1557   Vitals shown include unfiled device data.    Electronically Signed By: YADIRA Mckeon CRNA  April 1, 2024  3:58 PM

## 2024-04-01 NOTE — DISCHARGE INSTRUCTIONS
Contacting your Doctor -   To contact a doctor, call Dr. Kevin Calderon @ [phone]--Thoracic surgery clinic  or:  [phone] and ask for the resident on call for Thoracic (answered 24 hours a day)   Emergency Department:  [place]: [phone]  [facility]: [phone] 911 if you are in need of immediate or emergent help

## 2024-04-01 NOTE — OR NURSING
Paged Thoracic team:  Pt to be d/c home, pt and family have questions about peg tube and home care.  Will cont to monitor and wait for clarification.

## 2024-04-01 NOTE — BRIEF OP NOTE
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  Complications: None.  Implants: J tube - Saran Jejunal Feeding Tube, 16Fr    Please page if questions,    Gerald Perez MD, MS  PGY-3, General Surgery

## 2024-04-01 NOTE — OR NURSING
Dr. Garcia called back on clarifying patient PEG tube orders. He stated when talking with Thoracic the plan is for him to now be admitted over night. Patient and family have been made aware. Family given home meds from discharge pharmacy to be taken home with wife (Sidra)..

## 2024-04-01 NOTE — OP NOTE
Preoperative diagnosis: Esophageal cancer    Postoperative diagnosis: The same as preop    Procedure performed:      1. Laparoscopic jejunostomy feeding tube placement    2. Esophagogastroduodenoscopy    Surgeon: Kevin Calderon (present and participated in the entire procedure)    Resident Surgeon: Gerald Perez    Anesthesia: General    EBL: 30    Complications: None    Findings:   Laparoscopy: Negative exploration  Endoscopy: No tumor identified      Procedure    We positioned Lopez Hogue in supine and the abdomen and chest were prepared and draped in the conventional fashion.    We then used a 4 port approach and the first port was placed with open technique and fascial stitches for eventual closure.  Next, we examined the peritoneal cavity and there is no evidence of metastatic disease.    We identified the ligament of Treitz and then identified a spot on the proximal jejunum about 30 cm distal to the ligament of Treitz.  We then used a Mukund-Yury and silk sutures, we first placed a suture through the serosa of the small bowel and then pulled the suture out with a Mukund-Yury and tied it.  We did 3 tacking stitches 1 superior, 1 lateral, and 1 inferior and then we placed a pursestring.  Next we placed a needle through the abdominal wall and through the pursestring into the lumen of the small bowel and insufflated with air to verified intraluminal position.  We advanced the wire and again verified that it was directed distally into the bowel.  After this we dilated with Seldinger technique and placed a 22 Marshallese peel-away sheath and then placed over a wire a 16 Marshallese ROHIT J-tube.  We tied the pursestring and placed 1 more tacking stitch on the medial aspect.  Finally, we placed another tacking stitch about 5 cm distally to prevent torsion.    Then, we ensured hemostasis and we closed with absorbable sutures in the conventional fashion.    At the end of the procedure we performed an  esophagogastroduodenoscopy with the above-mentioned findings.    Lopez Hogue tolerated the procedure well.

## 2024-04-02 VITALS
HEART RATE: 86 BPM | RESPIRATION RATE: 18 BRPM | HEIGHT: 68 IN | TEMPERATURE: 98 F | WEIGHT: 150 LBS | SYSTOLIC BLOOD PRESSURE: 143 MMHG | BODY MASS INDEX: 22.73 KG/M2 | DIASTOLIC BLOOD PRESSURE: 70 MMHG | OXYGEN SATURATION: 97 %

## 2024-04-02 LAB
ANION GAP SERPL CALCULATED.3IONS-SCNC: 10 MMOL/L (ref 7–15)
BUN SERPL-MCNC: 15.6 MG/DL (ref 8–23)
CALCIUM SERPL-MCNC: 9 MG/DL (ref 8.8–10.2)
CHLORIDE SERPL-SCNC: 102 MMOL/L (ref 98–107)
CREAT SERPL-MCNC: 0.71 MG/DL (ref 0.67–1.17)
DEPRECATED HCO3 PLAS-SCNC: 24 MMOL/L (ref 22–29)
EGFRCR SERPLBLD CKD-EPI 2021: >90 ML/MIN/1.73M2
ERYTHROCYTE [DISTWIDTH] IN BLOOD BY AUTOMATED COUNT: 14.2 % (ref 10–15)
GLUCOSE SERPL-MCNC: 123 MG/DL (ref 70–99)
HCT VFR BLD AUTO: 31.7 % (ref 40–53)
HGB BLD-MCNC: 10.4 G/DL (ref 13.3–17.7)
MCH RBC QN AUTO: 31.5 PG (ref 26.5–33)
MCHC RBC AUTO-ENTMCNC: 32.8 G/DL (ref 31.5–36.5)
MCV RBC AUTO: 96 FL (ref 78–100)
PLATELET # BLD AUTO: 213 10E3/UL (ref 150–450)
POTASSIUM SERPL-SCNC: 4.4 MMOL/L (ref 3.4–5.3)
RBC # BLD AUTO: 3.3 10E6/UL (ref 4.4–5.9)
SODIUM SERPL-SCNC: 136 MMOL/L (ref 135–145)
WBC # BLD AUTO: 9.6 10E3/UL (ref 4–11)

## 2024-04-02 PROCEDURE — 250N000013 HC RX MED GY IP 250 OP 250 PS 637

## 2024-04-02 PROCEDURE — 36415 COLL VENOUS BLD VENIPUNCTURE: CPT

## 2024-04-02 PROCEDURE — 80048 BASIC METABOLIC PNL TOTAL CA: CPT

## 2024-04-02 PROCEDURE — 85048 AUTOMATED LEUKOCYTE COUNT: CPT

## 2024-04-02 RX ORDER — AMOXICILLIN 250 MG
1 CAPSULE ORAL 2 TIMES DAILY PRN
Qty: 30 TABLET | Refills: 0 | Status: SHIPPED | OUTPATIENT
Start: 2024-04-02

## 2024-04-02 RX ORDER — OXYCODONE HYDROCHLORIDE 5 MG/1
5 TABLET ORAL EVERY 4 HOURS PRN
Qty: 10 TABLET | Refills: 0 | Status: SHIPPED | OUTPATIENT
Start: 2024-04-02 | End: 2024-04-02

## 2024-04-02 RX ORDER — METHOCARBAMOL 500 MG/1
500 TABLET, FILM COATED ORAL EVERY 6 HOURS PRN
Qty: 30 TABLET | Refills: 0 | Status: ON HOLD | OUTPATIENT
Start: 2024-04-02 | End: 2024-05-17

## 2024-04-02 RX ADMIN — POLYETHYLENE GLYCOL 3350 17 G: 17 POWDER, FOR SOLUTION ORAL at 09:07

## 2024-04-02 RX ADMIN — ACETAMINOPHEN 650 MG: 325 TABLET, FILM COATED ORAL at 07:51

## 2024-04-02 ASSESSMENT — ACTIVITIES OF DAILY LIVING (ADL)
ADLS_ACUITY_SCORE: 24
ADLS_ACUITY_SCORE: 25
ADLS_ACUITY_SCORE: 24
ADLS_ACUITY_SCORE: 24
DEPENDENT_IADLS:: INDEPENDENT
ADLS_ACUITY_SCORE: 24
ADLS_ACUITY_SCORE: 24
ADLS_ACUITY_SCORE: 25
ADLS_ACUITY_SCORE: 24

## 2024-04-02 NOTE — PLAN OF CARE
"/84 (BP Location: Right arm, Cuff Size: Adult Regular)   Pulse 89   Temp 97.6  F (36.4  C) (Oral)   Resp 16   Ht 1.727 m (5' 8\")   Wt 68 kg (150 lb)   SpO2 97%   BMI 22.81 kg/m    Pt's AVSS, maintaining sat's in the upper 90's while on RA, c/o abd pain but decline any interventions. Pt is up independently to bathroom and abd lap site C/D/I. PIV also intact and Sl'd. Pt stated he is not passing gas and no BM over night. Possible discharge today. Keep monitoring pt as ordered and notify MD with any new changes.   Problem: Adult Inpatient Plan of Care  Goal: Plan of Care Review  Description: The Plan of Care Review/Shift note should be completed every shift.  The Outcome Evaluation is a brief statement about your assessment that the patient is improving, declining, or no change.  This information will be displayed automatically on your shift  note.  Outcome: Progressing  Goal: Patient-Specific Goal (Individualized)  Description: You can add care plan individualizations to a care plan. Examples of Individualization might be:  \"Parent requests to be called daily at 9am for status\", \"I have a hard time hearing out of my right ear\", or \"Do not touch me to wake me up as it startles  me\".  Outcome: Progressing  Goal: Absence of Hospital-Acquired Illness or Injury  Outcome: Progressing  Intervention: Identify and Manage Fall Risk  Recent Flowsheet Documentation  Taken 4/2/2024 0209 by María Ramirez RN  Safety Promotion/Fall Prevention:   clutter free environment maintained   increase visualization of patient  Intervention: Prevent Skin Injury  Recent Flowsheet Documentation  Taken 4/2/2024 0209 by María Ramirez RN  Body Position: position changed independently  Goal: Optimal Comfort and Wellbeing  Outcome: Progressing  Goal: Readiness for Transition of Care  Outcome: Progressing     Problem: Pain Acute  Goal: Optimal Pain Control and Function  Outcome: Progressing  Intervention: Prevent or Manage " Pain  Recent Flowsheet Documentation  Taken 4/2/2024 0209 by María Ramirez RN  Medication Review/Management: medications reviewed     Problem: Surgery Nonspecified  Goal: Absence of Bleeding  Outcome: Progressing  Goal: Effective Bowel Elimination  Outcome: Progressing  Goal: Fluid and Electrolyte Balance  Outcome: Progressing  Goal: Blood Glucose Level Within Targeted Range  Outcome: Progressing  Goal: Absence of Infection Signs and Symptoms  Outcome: Progressing  Goal: Anesthesia/Sedation Recovery  Outcome: Progressing  Intervention: Optimize Anesthesia Recovery  Recent Flowsheet Documentation  Taken 4/2/2024 0209 by María Ramirez RN  Safety Promotion/Fall Prevention:   clutter free environment maintained   increase visualization of patient  Goal: Optimal Pain Control and Function  Outcome: Progressing  Goal: Nausea and Vomiting Relief  Outcome: Progressing  Goal: Effective Urinary Elimination  Outcome: Progressing  Goal: Effective Oxygenation and Ventilation  Outcome: Progressing  Intervention: Optimize Oxygenation and Ventilation  Recent Flowsheet Documentation  Taken 4/2/2024 0209 by María Ramirez RN  Head of Bed (HOB) Positioning:   HOB at 15 degrees   HOB at 30-45 degrees   Goal Outcome Evaluation:

## 2024-04-02 NOTE — CONSULTS
Care Management Initial Consult    General Information  Assessment completed with: Patient, patient  Type of CM/SW Visit: Initial Assessment    Primary Care Provider verified and updated as needed: Yes   Readmission within the last 30 days: no previous admission in last 30 days      Reason for Consult: discharge planning  Advance Care Planning: Advance Care Planning Reviewed: no concerns identified        Communication Assessment  Patient's communication style: spoken language (English or Bilingual)    Hearing Difficulty or Deaf: no   Wear Glasses or Blind: yes    Cognitive  Cognitive/Neuro/Behavioral: WDL                      Living Environment:   People in home: spouse  Sidra  Current living Arrangements: house      Able to return to prior arrangements: yes     Family/Social Support:  Care provided by: self  Provides care for: no one  Marital Status:   Wife  Sidra       Description of Support System: Supportive, Involved    Support Assessment: Adequate family and caregiver support    Current Resources:   Patient receiving home care services: No     Community Resources: None  Equipment currently used at home: none  Supplies currently used at home: None    Employment/Financial:  Employment Status: employed full-time        Financial Concerns: none      Does the patient's insurance plan have a 3 day qualifying hospital stay waiver?  Yes     Which insurance plan 3 day waiver is available? Alternative insurance waiver    Will the waiver be used for post-acute placement? Undetermined at this time    Lifestyle & Psychosocial Needs:  Social Determinants of Health     Food Insecurity: Not on file   Depression: Not at risk (3/11/2024)    PHQ-2     PHQ-2 Score: 0   Housing Stability: Not on file   Tobacco Use: Medium Risk (3/29/2024)    Patient History     Smoking Tobacco Use: Former     Smokeless Tobacco Use: Never     Passive Exposure: Past   Financial Resource Strain: Not on file   Alcohol Use: Not on file    Transportation Needs: Not on file   Physical Activity: Not on file   Interpersonal Safety: Not on file   Stress: Not on file   Social Connections: Not on file     Functional Status:  Prior to admission patient needed assistance:   Dependent ADLs:: Independent  Dependent IADLs:: Independent       Mental Health Status:  Mental Health Status: No Current Concerns       Chemical Dependency Status:  Chemical Dependency Status: No Current Concerns           Values/Beliefs:  Spiritual, Cultural Beliefs, Latter day Practices, Values that affect care: yes  Description of Beliefs that Will Affect Care: Christianity          Additional Information:    Met with patient and wife at bedside for initial consult and wife at bedside for initial consult due to high readmission risk score. Introduced self and the role of the care team. Patient confirmed living at the house address on file with wife. He mentioned was independent with all his care, drives and working full time. Patient is currently on leave and wife will provide ride at discharge.  Patient confirmed his current PCP on file and insurances on file.    CC will continue to follow uo.   Celsa Robbins RN, PHN, BSN   Float Nurse Care Coordinator  Covering for Unit [address]  Phone [phone]

## 2024-04-02 NOTE — PROGRESS NOTES
Care Management Discharge Note    Discharge Date: 04/02/2024       Discharge Disposition: Home    Discharge Services:  none, follow up outpatient    Discharge DME: None    Discharge Transportation: family or friend will provide (wife will provide ride)    Private pay costs discussed: Not applicable    Does the patient's insurance plan have a 3 day qualifying hospital stay waiver?  Yes     Which insurance plan 3 day waiver is available? Alternative insurance waiver    Will the waiver be used for post-acute placement? Undetermined at this time    PAS Confirmation Code:  NA  Patient/family educated on Medicare website which has current facility and service quality ratings:  NA    Education Provided on the Discharge Plan: Yes  Persons Notified of Discharge Plans: patient and wife   Patient/Family in Agreement with the Plan: yes    Handoff Referral Completed: Yes    Additional Information:  External coordination sent.  Wife is at bedside to provide ride.    Celsa Robbins RN, PHN, BSN   Float Nurse Care Coordinator  Covering for Unit [address]  Phone [phone]

## 2024-04-02 NOTE — PLAN OF CARE
"/68 (BP Location: Left arm)   Pulse 100   Temp 97.5  F (36.4  C) (Oral)   Resp 14   Ht 1.727 m (5' 8\")   Wt 68 kg (150 lb)   SpO2 90%   BMI 22.81 kg/m      Goal Outcome Evaluation:      Plan of Care Reviewed With: patient, spouse    Overall Patient Progress: improving    Status: Laparoscopic Jejunostomy tube insertion & EGD  A&Ox4, calls appropriately. Denies SOB/cardiac chest pain. 0.5L NC sating >92%. Voiding adequately, no bm this shift. CLD (tolerating), C/O abd pain managed w/ PRN pain medication. L PIV SL. Independent.   Plan: Continue POC     Admitted/transferred from: PACU  2 RN   skin assessment completed by Karin Mejia, RN and Ivania Newby RN.  Skin assessment findin lap incisions, R Jejunostomy tube. PIV. Butran patch on shoulder.   Interventions/actions:  Encourage repositioning, Pillows in use.         "

## 2024-04-02 NOTE — DISCHARGE SUMMARY
NAME: Lopez Hogue   MRN: [id number]   : 1955     DATE OF ADMISSION: 2024     PRE/POSTOPERATIVE DIAGNOSES: Esophageal cancer    PROCEDURES PERFORMED:   Laparoscopic jejunostomy feeding tube placement, Esophagogastroduodenoscopy with Dr. Calderon - 2024    PATHOLOGY RESULTS: None    CULTURE RESULTS: None     INTRAOPERATIVE COMPLICATIONS: None     POSTOPERATIVE MEDICAL ISSUES: None     DRAINS/TUBES PRESENT AT DISCHARGE: J tube    DATE OF DISCHARGE:  2024    HOSPITAL COURSE: Lopez Hogue is a 68 year old male who on 2024 underwent the above-named procedures.  He tolerated the operation well and postoperatively was transferred to the general post-surgical unit.  The remainder of his course was essentially uncomplicated.  Prior to discharge, his pain was controlled well, he was able to perform ADLs and ambulate independently without difficulty, and had full return of bladder function.  On , he was discharged home in stable condition with J-tube in place.    DISCHARGE EXAM:   A&O, NAD  Resp non-labored  Distal extremities warm    Incisions CDI  J site without concern     DISCHARGE INSTRUCTIONS:  Discharge Procedure Orders   When to call - Reasons to Call 911   Order Comments: Call 911 immediately if you experience sudden-onset chest pain, arm weakness/numbness, slurred speech, or shortness of breath     Discharge Instructions - Follow-up Appointment (Weeks)   Order Comments: Patient to follow up with appointment in 1-2 weeks     Brief Discharge Instructions   Order Comments: THORACIC SURGERY DISCHARGE INSTRUCTIONS    DIET: Diet as prior to admission     If your plans upon discharge include prolonged periods of sitting (i.e a lengthy car or plane ride), it is highly beneficial to get up and walk at least once per hour to help prevent swelling and blood clots.    You may get incision wet 2 days after operation. Do not submerge, soak, or scrub incision or swim until seen in follow-up.  "Never submerge your feeding tube site.    Take incentive spirometer home for continued frequent use    Activity as tolerated, no strenous activity until seen in follow-up.    Stay hydrated. Take over the counter fiber (metamucil or benefiber) and stool softeners (Miralax, docusate or senna) if becoming constipated.     Call for fever greater than 101.5, chills, increased size of incision, red skin around incision, vision changes, muscle strength changes, sensation changes, shortness of breath, or other concerns.    No driving while taking narcotic pain medication.    Transition to ibuprofen or tylenol/acetaminophen for pain control. Do not take tylenol/acetaminophen and acetaminophen containing narcotic (e.g., percocet or vicodin) at the same time. If you have known ulcer problems, or kidney trouble (elevated creatinine) do not take the ibuprofen.    In emergencies, call 911    For other Questions or Concerns;   A.) During weekday working hours (Monday through Friday 8am to 4:30pm)   call [phone] and ask to speak to a thoracic surgery nurse (RN or LPN).     B.) At nights (after 4:30pm), on weekends, or if urgent call [phone] and   tell the  \"I would like to page job code [phone], the thoracic surgery   fellow on call, please.\"     Diet Instructions   Order Comments: Progress to your regular diet as you feel able     Tubes   Order Comments: J TUBE INSTRUCTIONS:     RN PLEASE PROVIDE THE PATIENT WITH 2 60ml EN-FIT syringes for flushing his J tube, as well as several 2x2 split gauze for the J tube dressing    Flush your feeding tube with 30cc of water;  1. After medication administration through the feeding tube  2. Before and after tube feeds  3. Every 8 hours while awake if you have not used the tube during that time      -If possible take medications by mouth or as solution via j tube (Do not put crushed pills through the tube if possible)     -Change the gauze around your tube daily or more " often if soiled    -KEEP A FLEXI-TRACK (or other tube retention device) on your tube at all times to prevent incidental removal.     Follow Up ([facility])   Order Comments: 1.) Follow up with primary care physician, Madi Ramos, in 1-2 weeks.  2.) Follow up with Dr. Kevin Calderon in Thoracic Surgery clinic on 4/17/2024, prior to which you will be seen by the Preoperative Assessment Center (PAC). Details below:    4/17/2024  PAC EVAL  8:45 AM  (60 min.)  Candy St APRN CNS  [facility]  Preoperative Assessment  Center [place]    RETURN CCSL  10:30 AM  (30 min.)  Kevin Calderon MD  [facility]  Cancer Clinic      Appointments on [place] and/or [facility] (with [facility] or [facility] provider or service). Call [phone] if you haven't heard regarding these appointments within 7 days of discharge.     Reason for your hospital stay   Order Comments: Surgery     Activity   Order Comments: Your activity upon discharge: activity as tolerated     Order Specific Question Answer Comments   Is discharge order? Yes      Diet   Order Comments: Follow this diet upon discharge:       Clear Liquid Diet, progress slowly to your preoperative diet as tolerated     Order Specific Question Answer Comments   Is discharge order? Yes        DISCHARGE MEDICATIONS:   Current Discharge Medication List        START taking these medications    Details   !! acetaminophen (TYLENOL) 500 MG tablet Take 1-2 tablets (500-1,000 mg) by mouth every 8 hours as needed for mild pain  Qty: 30 tablet, Refills: 0    Associated Diagnoses: Acute post-operative pain      methocarbamol (ROBAXIN) 500 MG tablet Take 1 tablet (500 mg) by mouth every 6 hours as needed for muscle spasms  Qty: 30 tablet, Refills: 0    Associated Diagnoses: Acute post-operative pain      oxyCODONE (ROXICODONE) 5 MG tablet Take 1 tablet (5 mg) by mouth every 8 hours as needed for moderate to severe pain  Qty: 10 tablet, Refills: 0     Associated Diagnoses: Acute post-operative pain      senna-docusate (SENOKOT-S/PERICOLACE) 8.6-50 MG tablet Take 1 tablet by mouth 2 times daily as needed for constipation  Qty: 30 tablet, Refills: 0    Associated Diagnoses: Acute post-operative pain       !! - Potential duplicate medications found. Please discuss with provider.        CONTINUE these medications which have NOT CHANGED    Details   !! acetaminophen (TYLENOL) 500 MG tablet Take 500-1,000 mg by mouth every 8 hours as needed for fever or pain      clopidogrel (PLAVIX) 75 MG tablet Take 1 tablet (75 mg) by mouth daily  Qty: 90 tablet, Refills: 2    Associated Diagnoses: Unstable angina (H)      cyanocobalamin (VITAMIN B-12) 1000 MCG tablet Take 1 tablet (1,000 mcg) by mouth every evening  Qty: 30 tablet, Refills: 0    Associated Diagnoses: Squamous cell carcinoma of vallecula (H)      doxepin (SINEQUAN) 10 MG/ML (HIGH CONC) solution Take 2 mLs (20 mg) by mouth every 4 hours  Qty: 118 mL, Refills: 0    Associated Diagnoses: Primary esophageal squamous cell carcinoma (H)      famotidine (PEPCID) 20 MG tablet Take 1 tablet (20 mg) by mouth 2 times daily  Qty: 60 tablet, Refills: 3    Associated Diagnoses: Gastroesophageal reflux disease, unspecified whether esophagitis present      magic mouthwash suspension, diphenhydrAMINE, lidocaine, aluminum-magnesium & simethicone, (FIRST-MOUTHWASH BLM) compounding kit Swish and swallow 15 mLs in mouth every 4 hours Take with doxepin solution.  Qty: 944 mL, Refills: 3    Associated Diagnoses: Primary esophageal squamous cell carcinoma (H)      nitroGLYcerin (NITROSTAT) 0.4 MG sublingual tablet PLACE 1 TABLET UNDER THE TONGUE EVERY 5 MINUTES FOR CHEST PAIN FOR 3 DOSES. IF SYMPTOMS PERSIST 5 MINUTES AFTER 1ST DOSE CALL 911.  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)      prochlorperazine (COMPAZINE) 10 MG  tablet Take 1 tablet (10 mg) by mouth every 6 hours as needed for nausea or vomiting  Qty: 30 tablet, Refills: 2    Associated Diagnoses: Primary esophageal squamous cell carcinoma (H); Squamous cell carcinoma of vallecula (H)      rosuvastatin (CRESTOR) 40 MG tablet Take 1 tablet (40 mg) by mouth daily  Qty: 90 tablet, Refills: 3    Associated Diagnoses: Unstable angina (H)      sertraline (ZOLOFT) 100 MG tablet Take 100 mg by mouth every morning      sodium fluoride dental gel (PREVIDENT) 1.1 % GEL topical gel daily      buprenorphine (BUTRANS) 5 MCG/HR WK patch Place 1 patch onto the skin every 7 days  Qty: 4 patch, Refills: 0    Associated Diagnoses: Primary esophageal squamous cell carcinoma (H)      chlorhexidine (PERIDEX) 0.12 % solution SWISH AND SPIT 15 ML BY MOUTH TWICE DAILY AFTER BRUSHING. NOTHING BY MOUTH FOR 30 MINUTES AFTERWARDS       !! - Potential duplicate medications found. Please discuss with provider.

## 2024-04-02 NOTE — PROGRESS NOTES
"Patient discharging. Discharge paperwork was reviewed with patient and wife. Patient expressed understanding. A copy was given to patient. Pt discharging home. Wife at bedside and will be transport. Discharge medications available in pharmacy; pt was advised to pickup medications before they leave. All patient belongings packed to be taken with patient.   BP (!) 143/70 (BP Location: Left arm, Patient Position: Supine, Cuff Size: Adult Regular)   Pulse 86   Temp 98  F (36.7  C) (Oral)   Resp 18   Ht 1.727 m (5' 8\")   Wt 68 kg (150 lb)   SpO2 97%   BMI 22.81 kg/m      "

## 2024-04-04 ENCOUNTER — PATIENT OUTREACH (OUTPATIENT)
Dept: SURGERY | Facility: CLINIC | Age: 69
End: 2024-04-04
Payer: COMMERCIAL

## 2024-04-04 ENCOUNTER — PATIENT OUTREACH (OUTPATIENT)
Dept: CARE COORDINATION | Facility: CLINIC | Age: 69
End: 2024-04-04
Payer: COMMERCIAL

## 2024-04-04 NOTE — PROGRESS NOTES
[facility]: [facility]: Post-Discharge Note  SITUATION                                                      Admission:    Admission Date: 04/01/24   Reason for Admission: Laparoscopic jejunostomy feeding tube placement, Esophagogastroduodenoscopy with Dr. Calderon - 4/1/2024  Discharge:   Discharge Date: 04/02/24    BACKGROUND                                                      Per hospital discharge summary and inpatient provider notes:  Lopez Hogue is a 68 year old male who presents for pre-operative H & P in preparation for  Procedure Information         Case: [id number] Date/Time: 04/01/24 1330     Procedure: Laparoscopic jejunostomy tube insertion (Abdomen)     Anesthesia type: General     Diagnosis: Malignant neoplasm of middle third of esophagus (H) [C15.4]     Pre-op diagnosis: Malignant neoplasm of middle third of esophagus (H) [C15.4]     Location: [facility]  / [facility] OR     Providers: Kevin Calderon MD                Patient is being evaluated for comorbid conditions of hyperlipidemia, hypertension, CAD/NSTEMI s/p KAYDEN (3/21/23), moderate aortic valve stenosis, history of smoking, anemia, history of sweat gland carcinoma, alcohol dependence in remission, and anxiety.    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 He initially presented with hemoptysis and was found to have the head and neck cancer. The esophageal cancer was seen on PET. He has undergone chemoradiation with carboplatin and taxol, starting 12/5/2023 and completed radiation 1/26/2024. He most recently consulted with thoracic surgery on 3/26/24 and is now scheduled for the above surgery on 4/1 with plan for later thoracoscopic esophagogastrectomy on 4/19/24 for further management.    ASSESSMENT           Discharge Assessment  How are you doing now that you are home?: Today SW  "spoke to patient who is \" doing much better.\" He has been able to make all his follow up appointments. He has no questions or concerns.  How are your symptoms? (Red Flag symptoms escalate to triage hotline per guidelines): Improved  Do you feel your condition is stable enough to be safe at home until your provider visit?: Yes  Does the patient have their discharge instructions? : Yes  Does the patient have questions regarding their discharge instructions? : No  Were you started on any new medications or were there changes to any of your previous medications? : Yes  Does the patient have all of their medications?: Yes  Do you have questions regarding any of your medications? : No  Do you have all of your needed medical supplies or equipment (DME)?  (i.e. oxygen tank, CPAP, cane, etc.): Yes  Discharge follow-up appointment scheduled within 14 calendar days? : Yes  Discharge Follow Up Appointment Date: 04/10/24  Discharge Follow Up Appointment Scheduled with?: Specialty Care Provider        PLAN                                                      Outpatient Plan:      Patient to follow up with appointment in 1-2 weeks       Future Appointments   Date Time Provider Department Center   4/10/2024  9:40 AM Nuno Cazares MD [facility]   4/17/2024  9:00 AM Candy St APRN [facility]   4/17/2024 10:45 AM Kevin Calderon MD [facility]   5/6/2024 12:30 PM [id number] [place] [address]   5/6/2024  3:00 PM Edi Felix MD [facility]   5/7/2024  3:15 PM  [facility]   5/7/2024  4:00 PM Yannick Alva MD [facility]   5/20/2024  3:30 PM [id number]   5/20/2024  4:30 PM Pat Dewitt APRN [facility]   6/6/2024 12:30 PM Nisha Pride APRN CNP [facility]   9/6/2024  9:30 AM Akiko Sun MD [facility]         For any urgent concerns, please contact our 24 hour nurse triage line: [phone] " ([phone])         ERIC Andino (she/her/hers)  Social Work Clinic Care Coordinator   [facility]  [email or web address]  [phone]

## 2024-04-04 NOTE — PROGRESS NOTES
Spoke with Fabricio to check on how he has been doing since his J-tube placement on 4/1/24. He stated he is doing well, just a bit sore at the J-tube site. This is common and if he needs to take something for his pain, he is able to take Tylenol. He does not have any questions or concerns at this time. Fabricio will contact us if he does have any questions or concerns that come up.

## 2024-04-05 NOTE — PROGRESS NOTES
[facility]                                                                                   OUTPATIENT SPEECH LANGUAGE PATHOLOGY    PLAN OF TREATMENT FOR OUTPATIENT REHABILITATION   Patient's Last Name, First Name, Lopez Shell YOB: 1955   Provider's Name   [facility]   Medical Record No.  [id number]     Onset Date:  12/4/23 Start of Care Date:  12/4/23     Medical Diagnosis:   squamous cell carcinoma of oropharynx      SLP Treatment Diagnosis:   mild oropharyngeal dysphagia Plan of Treatment  Frequency/Duration:  2-4x/month  /   3 months    Certification date from  3/11/24   To     6/8/24       See note for plan of treatment details and functional goals     BEULAH Ybarra                         I CERTIFY THE NEED FOR THESE SERVICES FURNISHED UNDER        THIS PLAN OF TREATMENT AND WHILE UNDER MY CARE     (Physician attestation of this document indicates review and certification of the therapy plan).              Referring Provider:  Dr. Edi Felix    Initial Assessment  See Epic Evaluation-             03/11/24 0920   Appointment Info   Treating Provider Deepali Linares MA, CCC-SLP   Visits Used 8   Medical Diagnosis squamous cell carcinoma of the oropharynx   SLP Tx Diagnosis mild oropharyngeal dysphagia   Quick Adds Certification   Progress Note/Certification   Start Of Care Date 12/04/23   Onset Of Illness/injury Or Date Of Surgery 12/04/23   Therapy Frequency 2-4x/month   Predicted Duration 3 months   Certification date from 03/11/24   Certification date to 06/08/24   Subjective Report   Subjective Report Pt with concurrent SCC valleculae and esophagus who completed definitive chemoXRT on 1/26/24. He presents today in conjunction with ENT clinic visit and is seen per MD request. He is planning to have an esophaectomy soon.   SLP Goals   SLP Goals 1;2   SLP Goal 1   Goal Identifier PO   Goal Description Pt will  tolerate least restricitive diet while adhering to safe swallow precautions and without pulmonary compromise across 6 months time.   Rationale To maximize safety, ease and/or independence of oral intake   Goal Progress Pt reports swallowing is going well. In the last few days he has had steak, roast beef sandwich and a root beat float. He is still avoiding items that are scratchy such as chips and crackers. He denies coughing/throat clearing and feeling of stasis with PO intake. Pt reports improvement, but minimal in dysgeusia. Trained pt on typical trajectory of improvement and the importance of trying bites/sips of a variety of flavors. Trained pt to continue working to push PO intake as he lost a lot of weight at the end of treatment. Reports his weight is going up, but it is slow. Serial sips of thins today reveal no overt clinical s/sx of penetration/aspriation.   Target Date 06/08/24   SLP Goal 2   Goal Identifier Exercise   Goal Description Pt will improve and/or maintain ROM and strength of BOT, jaw and pharynx by completing 10 repetitions of 5/5 exercises 3 times daily with minimal written or verbal cues.   Rationale To maximize safety, ease and/or independence of oral intake   Goal Progress Pt reports he has been trying to complete exercises regularly. Trained pt that it is very important to complete exercises at least 3x/day as he leads up to his esophagectomy, as patients are often NPO after the surgery. NPO with radiated swallow muscles can make the muscles get stiff/weak very quickly. Encouraged him to resume exercises after surgery as soon as he can. Pt reports no questions on the exercises and denies need for additional handout/retraining.   Target Date 06/08/24   Treatment Interventions (SLP)   Treatment Interventions Treatment Swallow/Oral dysfunction   Treatment Swallow/Oral dysfunction   Treatment of Swallowing Dysfunction &/or Oral Function for Feeding Minutes (96405) 15 Minutes   Skilled  Intervention Provided written and verbal information on diet modifications.;Educated patient on swallowing strategies.;Educated patient on risks of aspiration;Demonstrated safe swallow strategies   Patient Response/Progress Pt and family demonstrated and verbalized understanding of all material provided.   Education   Learner/Method Patient   Plan   Plan for next session f/u in conjunction with ENT clinic visit to reassess PO intake/tolerance and home exercise program completion   Total Session Time   Total Treatment Time (sum of timed and untimed services) 15

## 2024-04-06 NOTE — UTILIZATION REVIEW
Admission Status; Secondary Review Determination    No action to be taken. Please contact me on my Email : [email or web address] if you have any questions.    As part of the [place] Utilization review plan, a self-audit is done on Medicare inpatient admission with less than 2 midnights stay. The 2014 IPPS Final Rule allows outpatient billing in the event that a hospital determines that an inpatient admission was not medically necessary under utilization review process.     (x) Outpatient status would be Appropriate- Short Stay- Post discharge review.    RATIONALE FOR DETERMINATION  Lopez Hogue is a 68 year old male who on 4/1/2024 underwent the above-named procedures.  He tolerated the operation well and postoperatively was transferred to the general post-surgical unit.  The remainder of his course was essentially uncomplicated.  Prior to discharge, his pain was controlled well, he was able to perform ADLs and ambulate independently without difficulty, and had full return of bladder function.  On 4/2, he was discharged home in stable condition with J-tube in place.          Patient was admitted and discharge after one night stay. Record was sent by  for a PA review. Based on the  severity of illness, intensity of service provided, expected LOS and risk for adverse outcome make the care appropriate for further outpatient/observation; however, doesn't meet criteria for hospital inpatient admission.       The information on this document is developed by the utilization review team in order for the business office to ensure compliance.  This only denotes the appropriateness of proper admission status and does not reflect the quality of care rendered.       The definitions of Inpatient Status and Observation Status used in making the determination above are those provided in the CMS Coverage Manual, Chapter 1 and Chapter 6, section 70.4.     Please cont me if you want to discuss further about this admission  episode.      Maria Alejandra Russo MD, FACP, HILDA  Medical Director - Utilization management  Staff Hospitalist  [facility]    Pager: [phone]    .

## 2024-04-11 DIAGNOSIS — I20.0 UNSTABLE ANGINA (H): ICD-10-CM

## 2024-04-11 RX ORDER — ROSUVASTATIN CALCIUM 40 MG/1
40 TABLET, COATED ORAL DAILY
Qty: 90 TABLET | Refills: 1 | Status: SHIPPED | OUTPATIENT
Start: 2024-04-11 | End: 2024-07-16

## 2024-04-12 ENCOUNTER — DOCUMENTATION ONLY (OUTPATIENT)
Dept: OTHER | Facility: CLINIC | Age: 69
End: 2024-04-12
Payer: COMMERCIAL

## 2024-04-15 ENCOUNTER — PATIENT OUTREACH (OUTPATIENT)
Dept: SURGERY | Facility: CLINIC | Age: 69
End: 2024-04-15
Payer: COMMERCIAL

## 2024-04-15 ENCOUNTER — ANESTHESIA EVENT (OUTPATIENT)
Dept: SURGERY | Facility: CLINIC | Age: 69
DRG: 326 | End: 2024-04-15
Payer: COMMERCIAL

## 2024-04-15 DIAGNOSIS — C15.4 MALIGNANT NEOPLASM OF MIDDLE THIRD OF ESOPHAGUS (H): Primary | ICD-10-CM

## 2024-04-15 NOTE — PROGRESS NOTES
Called Fabricio about Plavix, he stopped this the end of March about 1 week prior to his J-tube placement. Instructions given by his Cardiologist to stop the Plavix for his surgeries and will resume taking the Plavix after. Fabricio asked what time he needed to arrive at the hospital on Friday (5:30am) and is aware of his upcoming appointments. No further questions at this time.

## 2024-04-16 LAB
ABO/RH(D): NORMAL
ANTIBODY SCREEN: NEGATIVE
SPECIMEN EXPIRATION DATE: NORMAL

## 2024-04-17 ENCOUNTER — LAB (OUTPATIENT)
Dept: LAB | Facility: CLINIC | Age: 69
End: 2024-04-17
Payer: COMMERCIAL

## 2024-04-17 ENCOUNTER — ONCOLOGY VISIT (OUTPATIENT)
Dept: SURGERY | Facility: CLINIC | Age: 69
End: 2024-04-17
Payer: COMMERCIAL

## 2024-04-17 ENCOUNTER — OFFICE VISIT (OUTPATIENT)
Dept: SURGERY | Facility: CLINIC | Age: 69
End: 2024-04-17
Payer: COMMERCIAL

## 2024-04-17 VITALS
HEART RATE: 79 BPM | HEIGHT: 68 IN | TEMPERATURE: 97.8 F | BODY MASS INDEX: 23.42 KG/M2 | WEIGHT: 154.5 LBS | RESPIRATION RATE: 16 BRPM | OXYGEN SATURATION: 96 % | SYSTOLIC BLOOD PRESSURE: 116 MMHG | DIASTOLIC BLOOD PRESSURE: 71 MMHG

## 2024-04-17 DIAGNOSIS — Z01.818 PREOP EXAMINATION: ICD-10-CM

## 2024-04-17 DIAGNOSIS — C15.9 MALIGNANT NEOPLASM OF ESOPHAGUS, UNSPECIFIED LOCATION (H): ICD-10-CM

## 2024-04-17 DIAGNOSIS — C15.4 MALIGNANT NEOPLASM OF MIDDLE THIRD OF ESOPHAGUS (H): Primary | ICD-10-CM

## 2024-04-17 DIAGNOSIS — R35.0 URINARY FREQUENCY: ICD-10-CM

## 2024-04-17 DIAGNOSIS — C10.0 SQUAMOUS CELL CARCINOMA OF VALLECULA (H): ICD-10-CM

## 2024-04-17 DIAGNOSIS — Z01.818 PREOP EXAMINATION: Primary | ICD-10-CM

## 2024-04-17 LAB
ALBUMIN UR-MCNC: NEGATIVE MG/DL
APPEARANCE UR: CLEAR
BILIRUB UR QL STRIP: NEGATIVE
COLOR UR AUTO: ABNORMAL
GLUCOSE UR STRIP-MCNC: NEGATIVE MG/DL
HGB UR QL STRIP: NEGATIVE
KETONES UR STRIP-MCNC: NEGATIVE MG/DL
LEUKOCYTE ESTERASE UR QL STRIP: NEGATIVE
MUCOUS THREADS #/AREA URNS LPF: PRESENT /LPF
NITRATE UR QL: NEGATIVE
PH UR STRIP: 5 [PH] (ref 5–7)
RBC URINE: <1 /HPF
SP GR UR STRIP: 1 (ref 1–1.03)
UROBILINOGEN UR STRIP-MCNC: NORMAL MG/DL
WBC URINE: <1 /HPF

## 2024-04-17 PROCEDURE — 99024 POSTOP FOLLOW-UP VISIT: CPT | Performed by: THORACIC SURGERY (CARDIOTHORACIC VASCULAR SURGERY)

## 2024-04-17 PROCEDURE — 81001 URINALYSIS AUTO W/SCOPE: CPT | Performed by: PATHOLOGY

## 2024-04-17 PROCEDURE — 99215 OFFICE O/P EST HI 40 MIN: CPT | Mod: 24 | Performed by: CLINICAL NURSE SPECIALIST

## 2024-04-17 PROCEDURE — 86900 BLOOD TYPING SEROLOGIC ABO: CPT | Performed by: CLINICAL NURSE SPECIALIST

## 2024-04-17 PROCEDURE — 36415 COLL VENOUS BLD VENIPUNCTURE: CPT | Performed by: PATHOLOGY

## 2024-04-17 PROCEDURE — 99213 OFFICE O/P EST LOW 20 MIN: CPT | Performed by: THORACIC SURGERY (CARDIOTHORACIC VASCULAR SURGERY)

## 2024-04-17 RX ORDER — POLYETHYLENE GLYCOL 3350 17 G/17G
1 POWDER, FOR SOLUTION ORAL DAILY
Status: ON HOLD | COMMUNITY
End: 2024-05-17

## 2024-04-17 ASSESSMENT — PAIN SCALES - GENERAL: PAINLEVEL: MODERATE PAIN (4)

## 2024-04-17 ASSESSMENT — COPD QUESTIONNAIRES: COPD: 0

## 2024-04-17 ASSESSMENT — ENCOUNTER SYMPTOMS
ORTHOPNEA: 0
SEIZURES: 0
DYSRHYTHMIAS: 0

## 2024-04-17 ASSESSMENT — LIFESTYLE VARIABLES: TOBACCO_USE: 1

## 2024-04-17 NOTE — PROGRESS NOTES
THORACIC SURGERY - OFFICE VISIT       Dear Dr. Ramos,     I saw Lopez Hogue in follow-up for the evaluation and treatment of a mid esophageal squamous cell cancer.      HPI  Mr. Arteaga feels about 85% from his best baseline. He has had no issues with his recently placed J-tube. He is here to go over the planned esophagectomy once again and to ensure that he is doing well after his J-tube placement.    Neoadjuvant therapy  Carboplatin/paclitaxel and radiation (completed 1/26/2024): 50Gy (esophageal SCCA), 70Gy (vallecular SCCA)    Procedures performed  Laparoscopic jejunostomy feeding tube placement and EGD (4/1/2024)     Previsit Tests   PET/CT (3/12/2024) :  Almost complete resolution of prior FDG-avid areas, New FDG uptake level 2a node, 8 mm, SUVmax 6.4. Prior esophageal FDG avid thickening resolved.     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 FDG thickening of the mid to distal esophagus, suspicious for a synchronous primary esophageal neoplasm (4.7 cm, SUV 13.2).        PFT (11/15/2023): FEV1 - 2.41L (86%), DLCO - 122%     Upper Endoscopy (11/8/2023): fungating and ulcerating mass in the middle 1/3rd of the esophagus, 30 - 36 cm from the incisors. Biopsied.        11/8/2023: Esophageal biopsy - Non-keratinizing poorly differentiated squamous cell carcinoma, negative for intestinal metaplasia/Alfredo's type mucosa.      Right vallecula lesion biopsy (10/12/2023): Non-keratinizing poorly differentiated squamous cell carcinoma.      EUS 11/28/2023: T2N1 squamous cell carcinoma     PMH  Reviewed, as below     Past Medical History           Past Medical History:   Diagnosis Date    EtOH dependence (H)       Quit drinking 10 years    Heart attack (H)      Hypertension      Nonrheumatic aortic (valve) stenosis      Sweat gland carcinoma              PSH  Reviewed, as below           Past Surgical  History               Past Surgical History:   Procedure Laterality Date    CV CORONARY ANGIOGRAM N/A 3/27/2023     Procedure: Coronary Angiogram;  Surgeon: Miki Etienne MD;  Location: [facility] CV    CV CORONARY ANGIOGRAM N/A 5/9/2023     Procedure: Coronary Angiogram;  Surgeon: Miki Etienne MD;  Location: [facility] LAB CV    CV LEFT HEART CATH N/A 3/27/2023     Procedure: Left Heart Catheterization;  Surgeon: Miki Etienne MD;  Location: [facility] CATH LAB CV    CV LEFT HEART CATH N/A 5/9/2023     Procedure: Left Heart Catheterization;  Surgeon: Miki Etienne MD;  Location: [facility] CV    CV PCI N/A 3/27/2023     Procedure: Percutaneous Coronary Intervention;  Surgeon: Miki Etienne MD;  Location: [facility] CV    ESOPHAGOSCOPY, GASTROSCOPY, DUODENOSCOPY (EGD), COMBINED N/A 11/8/2023     Procedure: ESOPHAGOGASTRODUODENOSCOPY, WITH BIOPSY;  Surgeon: Shahriar Giraldo MD;  Location:  GI    LARYNGOSCOPY WITH BIOPSY(IES) N/A 10/12/2023     Procedure: LARYNGOSCOPY, WITH BIOPSY;  Surgeon: Edi Felix MD;  Location:  OR    OTHER SURGICAL HISTORY   2015     WIDE EXCISION OF LEFT GLUTEAL MASSTNM: [id number], stage: II hidradenocarcinoma Grade II, 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, sentinel lymph node biopsy negative             ETOH:  Stopped 10 years ago  TOB: ages   22 - 58; Quit 2013 2 packs per day - 72 pack years   Other drugs: No     Physical examination  There were no vitals taken for this visit.       J tube in good position     From a personal perspective, he lives with his wife, Sidra, who is a nurse. 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 They have two sons and one grandchild.     IMPRESSION (C15.9) Primary esophageal squamous cell carcinoma (H)  (primary encounter diagnosis)     (C10.0) Squamous cell carcinoma of vallecula (H)     This person is a 68 year old male with synchronous esophageal squamous cell  carcinoma (cT2N1, stage II) and vallecular squamous cell carcinoma (cT1N2b, stage JUAN MIGUEL).   10 weeks S/P concurrent simultaneous radiation  2 weeks S/P laparoscopic jejunostomy feeding tube placement       PLAN  I spent 40 min on the date of the encounter in chart review, patient visit, review of tests, documentation and/or discussion with other providers about the issues documented above. I reviewed the plan as follows:       Procedure planned: Minimally invasive Feliberto Elbert esophagectomy (high intrathoracic anastomosis). I reviewed the indications, risks, and benefits of the procedure with . Lopez Hogue and his wife, Sidra. We discussed the intraoperative risks of bleeding, injury to vital organs, and potential for esophageal discontinuity.  Potential postoperative complications include, but are not limited to, major respiratory events, arrhythmia, bleeding, infection, reoperation, anastomotic leak, conduit necrosis with discontinuity, vocal cord palsy, and death. I explained the anticipated hospital course (8-10 days) and postoperative recovery including pain control, tube feedings, dietary changes, and overall drain management. We discussed the importance of lifetime awareness to limit lifting heavy weights to prevent hiatal herniation as well as the need for aspiration precautions.   Severe constipation with narcotics: We will be cognizant of this and treat constipation aggressively if necessary.    Necessary Preop Tests & Appointments: Preoperative assessment clinic (today).  He will hold Plavix starting tomorrow morning.     Regional Anesthesia Plan: Intraoperative intercostal nerve block     Anticoagulation Plan: Prophylactic Lovenox     I appreciate the opportunity to participate in the care of your patient and will keep you updated.     Sincerely,    Severe constipation with narcotics    Discussed LEFT recurrent nerve 4L

## 2024-04-17 NOTE — NURSING NOTE
"Oncology Rooming Note    April 17, 2024 10:44 AM   Lopez Hogue is a 68 year old male who presents for:    Chief Complaint   Patient presents with    Oncology Clinic Visit       Malignant neoplasm of middle third of esophagus         Initial Vitals: There were no vitals taken for this visit. Estimated body mass index is 23.49 kg/m  as calculated from the following:    Height as of an earlier encounter on 4/17/24: 1.727 m (5' 8\").    Weight as of an earlier encounter on 4/17/24: 70.1 kg (154 lb 8 oz). There is no height or weight on file to calculate BSA.  Data Unavailable Comment: Data Unavailable   No LMP for male patient.  Allergies reviewed: Yes  Medications reviewed: Yes    Medications: Medication refills not needed today.  Pharmacy name entered into [email or web address]:    [id number] - [place], [place] - [address] [place] [address]  AT [facility] [address] [place] [address] PHARMACY [facility] - [place], [place] - [address] [facility]    Frailty Screening:   Is the patient here for a new oncology consult visit in cancer care? 2. No      Clinical concerns: none      Alice Barrientos, EMT  4/17/2024              "

## 2024-04-17 NOTE — PATIENT INSTRUCTIONS
Preparing for Your Surgery      Name:  Lopez Hogue   MRN:  [id number]   :  1955   Today's Date:  2024       Arriving for surgery:  Surgery date:  24  Arrival time:  5.30AM    Please come to:     Please come to:       [facility]    [address] [place]  [address]     The [facility] [place] Patient/Visitor Ramp is at [address]. Patients and visitors who self-park will receive the reduced hospital parking rate. If the Patient /Visitor Ramp is full, please follow the signs to the [facility] car park located at the main hospital entrance.       parking is available (24 hours/ 7 days a week)      Discounted parking pass options are available for patients and visitors. They can be purchased at the [employer] desk at the main hospital entrance.     -    Stop at the security desk and they will direct surgery patients to the Surgery Check in and Family [facility]. [phone]        - If you need directions, a wheelchair or an escort please stop at the Information/security desk in the lobby.     What can I eat or drink?  -  You may eat and drink normally up to 8 hours prior to arrival time. (Until 9.30PM)  -  You may have clear liquids until 2 hours prior to arrival time. (Until 3.30AM)    Examples of clear liquids:  Water  Clear broth  Juices (apple, white grape, white cranberry  and cider) without pulp  Noncarbonated, powder based beverages  (lemonade and Matthew-Aid)  Sodas (Sprite, 7-Up, ginger ale and seltzer)  Coffee or tea (without milk or cream)  Gatorade    -  No Alcohol or cannabis products for at least 24 hours before surgery.     Which medicines can I take?    Hold Aspirin for 7 days before surgery.   Hold Multivitamins for 7 days before surgery.   Hold Supplements for 7 days before surgery.  Hold Ibuprofen (Advil, Motrin) for 1 day(s) before surgery--unless otherwise  directed by surgeon.  Hold Naproxen (Aleve) for 4 days before surgery.    Continue to hold Plavix.    -  DO NOT take these medications the day of surgery:  Plavix, Miralax. Senna.    -  PLEASE TAKE these medications the day of surgery:  Morning medications per usual except for the list mentioned above.    How do I prepare myself?  - Please take 2 showers (one the night prior to surgery and one the morning of surgery) using Scrubcare or Hibiclens soap.    Use this soap only from the neck to your toes.     Leave the soap on your skin for one minute--then rinse thoroughly.      You may use your own shampoo and conditioner. No other hair products.   - Please remove all jewelry and body piercings.  - No lotions, deodorants or fragrance.  - No makeup or fingernail polish.   - Bring your ID and insurance card.    -If you use a CPAP machine, please bring the CPAP machine, tubing, and mask to hospital.    -If you have a Deep Brain Stimulator, Spinal Cord Stimulator, or any Neuro Stimulator device---you must bring the remote control to the hospital.      ALL PATIENTS GOING HOME THE SAME DAY OF SURGERY ARE REQUIRED TO HAVE A RESPONSIBLE ADULT TO DRIVE AND BE IN ATTENDANCE WITH THEM FOR 24 HOURS FOLLOWING SURGERY.    Covid testing policy as of 12/06/2022  Your surgeon will notify and schedule you for a COVID test if one is needed before surgery--please direct any questions or COVID symptoms to your surgeon      Questions or Concerns:    - For any questions regarding the day of surgery or your hospital stay, please contact the Pre Admission Nursing Office at [phone].       - If you have health changes between today and your surgery, please call your surgeon.       - For questions after surgery, please call your surgeons office.           Current Visitor Guidelines    You may have 2 visitors in the pre op area.    Visiting hours: 8 a.m. to 8:30 p.m.    Patients confirmed or suspected to have symptoms of COVID 19 or flu:      No visitors allowed for adult patients.   Children (under age 18) can have 1 named visitor.     People who are sick or showing symptoms of COVID 19 or flu:    Are not allowed to visit patients--we can only make exceptions in special situations.       Please follow these guidelines for your visit:          Please maintain social distance          Masking is optional--however at times you may be asked to wear a mask for the safety of yourself and others     Clean your hands with alcohol hand . Do this when you arrive at and leave the building and patient room,    And again after you touch your mask or anything in the room.     Go directly to and from the room you are visiting.     Stay in the patient s room during your visit. Limit going to other places in the hospital as much as possible     Leave bags and jackets at home or in the car.     For everyone s health, please don t come and go during your visit. That includes for smoking   during your visit.

## 2024-04-17 NOTE — H&P
Pre-Operative H & P     CC:  Preoperative exam to assess for increased cardiopulmonary risk while undergoing surgery and anesthesia.    Date of Encounter: 4/17/2024  Primary Care Physician:  Madi Ramos     Reason for visit:   Encounter Diagnoses   Name Primary?    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 He initially presented with hemoptysis and was found to have the head and neck cancer. The esophageal cancer was seen on PET. He has undergone chemoradiation with carboplatin and taxol, starting 12/5/2023, and completed radiation 1/26/2024. He most recently consulted with thoracic surgery on 3/26/24 and is now scheduled for above.     He is s/p lap assisted insertion of Jejunostomy tube on 4/1/24.     For his cancer of the right vallecula he continues to be followed by Dr. Felix with concurrent chemoradiation as above.    Patient's history is otherwise significant for hyperlipidemia, hypertension, CAD/NSTEMI s/p KAYDEN (3/21/23), moderate aortic valve stenosis, history of smoking, anemia, history of sweat gland carcinoma, alcohol dependence in remission, and anxiety.     For his cardiac history, he is followed by   "[facility], last seen on 9/27/23 for cardiac preop evaluation prior to laryngoscopy and biopsies. Per notes:    \"Plan:  We discussed the results of recent echo and stress test.  He had abnormal stress EKG but normal exercise tolerance and no angina.  I do not think we need to pursue another coronary angiogram.     We discussed secondary prevention of cardiac events with close attention to proper diet exercise and medication compliance.     He may need to undergo the biopsy of laryngeal mass.  He is 6 months past stenting.  Plavix and aspirin can be interrupted for a week before the biopsy and resumed afterward.  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)     Nonrheumatic aortic (valve) stenosis     Sweat gland carcinoma        Past Surgical History  Past Surgical History:   Procedure Laterality Date    CV CORONARY ANGIOGRAM N/A 3/27/2023    Procedure: Coronary Angiogram;  Surgeon: Miki Etienne MD;  Location: [facility]    CV CORONARY ANGIOGRAM N/A 5/9/2023    Procedure: Coronary Angiogram;  Surgeon: Miki Etienne MD;  Location: [facility] CV    CV LEFT HEART CATH N/A 3/27/2023    Procedure: Left Heart Catheterization;  Surgeon: Miki Etienne MD;  Location: [facility] CV    CV LEFT HEART CATH N/A 5/9/2023    Procedure: Left Heart Catheterization;  Surgeon: Miki Etienne MD;  Location: [facility]    CV PCI N/A 3/27/2023    Procedure: Percutaneous Coronary Intervention;  Surgeon: Miki Etienne MD;  Location: [facility] CV    ENDOSCOPIC ULTRASOUND UPPER " GASTROINTESTINAL TRACT (GI) N/A 11/28/2023    Procedure: Endoscopic ultrasound upper gastrointestinal tract (GI);  Surgeon: Shahriar Giraldo MD;  Location: [facility] GI    ESOPHAGOSCOPY, GASTROSCOPY, DUODENOSCOPY (EGD), COMBINED N/A 11/8/2023    Procedure: ESOPHAGOGASTRODUODENOSCOPY, WITH BIOPSY;  Surgeon: Shahriar Giraldo MD;  Location: [facility]    LAPAROSCOPIC ASSISTED INSERTION TUBE JEJUNOSTOMY N/A 4/1/2024    Procedure: Laparoscopic Jejunostomy Tube Insertion and Esophagogastroduodenoscopy;  Surgeon: Kevin Calderon MD;  Location: [facility] OR    LARYNGOSCOPY WITH BIOPSY(IES) N/A 10/12/2023    Procedure: LARYNGOSCOPY, WITH BIOPSY;  Surgeon: Edi Felix MD;  Location: [facility] OR    OTHER SURGICAL HISTORY  2015    WIDE EXCISION OF LEFT GLUTEAL MASSTNM: [id number], stage: II hidradenocarcinoma Grade II, 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, sentinel lymph node biopsy negative        Prior to Admission Medications  Current Outpatient Medications   Medication Sig Dispense Refill    acetaminophen (TYLENOL) 500 MG tablet Take 500-1,000 mg by mouth every 8 hours as needed for fever or pain      famotidine (PEPCID) 20 MG tablet Take 1 tablet (20 mg) by mouth 2 times daily 60 tablet 3    methocarbamol (ROBAXIN) 500 MG tablet Take 1 tablet (500 mg) by mouth every 6 hours as needed for muscle spasms 30 tablet 0    nitroGLYcerin (NITROSTAT) 0.4 MG sublingual tablet PLACE 1 TABLET UNDER THE TONGUE EVERY 5 MINUTES FOR CHEST PAIN FOR 3 DOSES. IF SYMPTOMS PERSIST 5 MINUTES AFTER 1ST DOSE CALL 911. (Patient taking differently: 0.4 mg every 5 minutes as needed PLACE 1 TABLET UNDER THE TONGUE EVERY 5 MINUTES FOR CHEST PAIN FOR 3 DOSES. IF SYMPTOMS PERSIST 5 MINUTES AFTER 1ST DOSE CALL 911) 25 tablet 1    polyethylene glycol (MIRALAX) 17 g packet Take 1 packet by mouth daily      rosuvastatin (CRESTOR) 40 MG tablet TAKE 1 TABLET(40 MG) BY MOUTH DAILY (Patient taking differently: Take 40 mg by mouth every morning) 90 tablet 1    senna-docusate  (SENOKOT-S/PERICOLACE) 8.6-50 MG tablet Take 1 tablet by mouth 2 times daily as needed for constipation 30 tablet 0    sertraline (ZOLOFT) 100 MG tablet Take 100 mg by mouth every morning      sodium fluoride dental gel (PREVIDENT) 1.1 % GEL topical gel daily      clopidogrel (PLAVIX) 75 MG tablet Take 1 tablet (75 mg) by mouth daily (Patient not taking: Reported on 2024) 90 tablet 2    cyanocobalamin (VITAMIN B-12) 1000 MCG tablet Take 1 tablet (1,000 mcg) by mouth every evening (Patient not taking: Reported on 2024) 30 tablet 0       Allergies  Allergies   Allergen Reactions    Coconut Flavor Anaphylaxis     Raw coconut       Social History  Social History     Socioeconomic History    Marital status:      Spouse name: Not on file    Number of children: 2    Years of education: Not on file    Highest education level: Not on file   Occupational History    Occupation: non-profit manager   Tobacco Use    Smoking status: Former     Current packs/day: 0.00     Average packs/day: 2.0 packs/day for 30.0 years (60.0 ttl pk-yrs)     Types: Cigarettes     Start date:      Quit date:      Years since quittin.2     Passive exposure: Past    Smokeless tobacco: Never    Tobacco comments:     Quit 10 years ago   Vaping Use    Vaping status: Never Used   Substance and Sexual Activity    Alcohol use: Not Currently     Comment: quit in     Drug use: Never    Sexual activity: Not on file   Other Topics Concern    Not on file   Social History Narrative    Patient works.  Lives with his wife.     Social Determinants of Health     Financial Resource Strain: High Risk (2021)    Received from [facility], [facility]    Financial Resource Strain     Difficulty of Paying Living Expenses: Not on file     Difficulty of Paying Living Expenses: Not on file   Food Insecurity: Not on file   Transportation Needs: Not on file   Physical  Activity: Not on file   Stress: Not on file   Social Connections: Unknown (4/11/2023)    [facility]    Social Connections     Frequency of Communication with Friends and Family: Not on file   Interpersonal Safety: Not on file   Housing Stability: Not on file       Family History  Family History   Problem Relation Age of Onset    Dementia Mother     Anesthesia Reaction No family hx of     Thrombocytopenia No family hx of     Cancer No family hx of     Thrombosis No family hx of        Review of Systems  The complete review of systems is negative other than noted in the HPI or here.   Anesthesia Evaluation   Pt has had prior anesthetic. Type: General and MAC.    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 2  Taking blood thinners Pt has received instructions: Instructions Given to patient: Plavix on hold.                      valvular problems/murmurs type: AS moderate.    Previous cardiac testing   Echo: Date: 2/1/24 Results:    Stress Test:  Date: 7/2023 Results:    ECG Reviewed:  Date: 2/11/24 Results:  NSR  Cath:  Date: 5/2023 Results:   (-) SPENCER, orthopnea/PND and arrhythmias   METS/Exercise Tolerance: >4 METS Comment: Activity has been somewhat limited after therapies but still able to walk 2 blocks and go up and down stairs without difficulty       Hematologic: Comments: - Pancytopenia     (+)      anemia, history of blood transfusion, no previous transfusion reaction, Known PRBC Anitbodies:No    (-) history of  "blood clots   Musculoskeletal:  - neg musculoskeletal ROS     GI/Hepatic:     (+) GERD, Asymptomatic on medication,                  Renal/Genitourinary:  - neg Renal ROS     Endo:  - neg endo ROS     Psychiatric/Substance Use:     (+) psychiatric history anxiety alcohol abuse (in remission since 2013)      Infectious Disease:  - neg infectious disease ROS     Malignancy: Comment: Right vallecula squamous cell carcinoma s/p chemoradiation  (+) Malignancy, History of Other, Skin and GI.GI CA  Active status post Chemo and Radiation.  Skin CA Remission status post Surgery.  Other CA sweat gland carcinoma, left buttock (2015) Remission status post Surgery.    Other:  - neg other ROS          /71 (BP Location: Right arm, Patient Position: Sitting, Cuff Size: Adult Regular)   Pulse 79   Temp 97.8  F (36.6  C) (Oral)   Resp 16   Ht 1.727 m (5' 8\")   Wt 70.1 kg (154 lb 8 oz)   SpO2 96%   BMI 23.49 kg/m      Physical Exam   Constitutional: Awake, alert, cooperative, no apparent distress, and appears stated age. Accompanied by wife  Eyes: Pupils equal, round and reactive to light, extra ocular muscles intact, sclera clear, conjunctiva normal. Glasses on.  HENT: Normocephalic, oral pharynx with moist mucus membranes, good dentition. No goiter appreciated.   Respiratory: Clear to auscultation bilaterally, no crackles or wheezing. No cough or obvious dyspnea.  Cardiovascular: Regular rate and rhythm, 2/6 systolic murmur noted with radiation into right carotid. Carotids +2, no bruits. No edema. Bilateral calves soft and nontender to palpation. Palpable pulses to radial  DP and PT arteries.   GI: Normal bowel sounds, soft, non-distended, non-tender, no masses palpated, left lower abdomen J tube in placed. Mild tenderness around site. Surgical scars: well healed  Lymph/Hematologic: No cervical lymphadenopathy and no supraclavicular lymphadenopathy.  Genitourinary:  Deferred.   Skin: Warm and dry.  No rashes at " anticipated surgical site.   Musculoskeletal: Full ROM of neck. There is no redness, warmth, or swelling of the joints. Gross motor strength is normal.    Neurologic: Awake, alert, oriented to name, place and time. Cranial nerves II-XII are grossly intact. Gait is normal.   Neuropsychiatric: Calm, cooperative. Normal affect.     Prior Labs/Diagnostic Studies   All labs and imaging personally reviewed   Lab Results   Component Value Date    WBC 9.6 04/02/2024     Lab Results   Component Value Date    RBC 3.30 04/02/2024     Lab Results   Component Value Date    HGB 10.4 04/02/2024     Lab Results   Component Value Date    HCT 31.7 04/02/2024     Lab Results   Component Value Date    MCV 96 04/02/2024     Lab Results   Component Value Date    MCH 31.5 04/02/2024     Lab Results   Component Value Date    MCHC 32.8 04/02/2024     Lab Results   Component Value Date    RDW 14.2 04/02/2024     Lab Results   Component Value Date     04/02/2024     Last Comprehensive Metabolic Panel:  Sodium   Date Value Ref Range Status   04/02/2024 136 135 - 145 mmol/L Final     Comment:     Reference intervals for this test were updated on 09/26/2023 to more accurately reflect our healthy population. There may be differences in the flagging of prior results with similar values performed with this method. Interpretation of those prior results can be made in the context of the updated reference intervals.      Potassium   Date Value Ref Range Status   04/02/2024 4.4 3.4 - 5.3 mmol/L Final     Chloride   Date Value Ref Range Status   04/02/2024 102 98 - 107 mmol/L Final     Carbon Dioxide (CO2)   Date Value Ref Range Status   04/02/2024 24 22 - 29 mmol/L Final     Anion Gap   Date Value Ref Range Status   04/02/2024 10 7 - 15 mmol/L Final     Glucose   Date Value Ref Range Status   04/02/2024 123 (H) 70 - 99 mg/dL Final     GLUCOSE BY METER POCT   Date Value Ref Range Status   04/01/2024 80 70 - 99 mg/dL Final     Urea Nitrogen   Date  Value Ref Range Status   2024 15.6 8.0 - 23.0 mg/dL Final     Creatinine   Date Value Ref Range Status   2024 0.71 0.67 - 1.17 mg/dL Final     GFR Estimate   Date Value Ref Range Status   2024 >90 >60 mL/min/1.73m2 Final     Calcium   Date Value Ref Range Status   2024 9.0 8.8 - 10.2 mg/dL Final     Bilirubin Total   Date Value Ref Range Status   2024 0.5 <=1.2 mg/dL Final     Alkaline Phosphatase   Date Value Ref Range Status   2024 145 40 - 150 U/L Final     Comment:     Reference intervals for this test were updated on 2023 to more accurately reflect our healthy population. There may be differences in the flagging of prior results with similar values performed with this method. Interpretation of those prior results can be made in the context of the updated reference intervals.     ALT   Date Value Ref Range Status   2024 30 0 - 70 U/L Final     Comment:     Reference intervals for this test were updated on 2023 to more accurately reflect our healthy population. There may be differences in the flagging of prior results with similar values performed with this method. Interpretation of those prior results can be made in the context of the updated reference intervals.       AST   Date Value Ref Range Status   2024 36 0 - 45 U/L Final     Comment:     Reference intervals for this test were updated on 2023 to more accurately reflect our healthy population. There may be differences in the flagging of prior results with similar values performed with this method. Interpretation of those prior results can be made in the context of the updated reference intervals.       EK24 Normal sinus rhythm    Echocardiogram 24   Interpretation Summary  Global and regional left ventricular function is normal with an EF of 55-60%.  Global right ventricular function is normal.  Moderate aortic stenosis is present. The peak aortic velocity is 3.8 m/sec,  mean 29  mmHg, valve area 1.1 cm2. DVI = 0.31, stroke volume is normal  Estimated mean right atrial pressure is normal.  No pericardial effusion is present.    7/18/2023 Stress test  Interpretation Summary  1. The patient achieved good exercise workload without inducible angina.  2. Exercise stress ECG is positive for inducible myocardial ischemia in  inferior and lateral leads. ST segment depression 3 mm at the peak of  exercise.  3. No exercise induced cardiac arrhythmia    5/9/2023 Cardiac cath  Conclusion    1.  Previously placed stents in OM left circumflex are widely patent with normal VENTURA-3 flow.  2.  PTCA sites in distal circumflex branch have  healed nicely with less than 30% residual narrowing.  3.  LAD is moderately calcified with diffuse mild luminal irregularity but no severe stenoses, unchanged from prior study.  4. RCA has mild calcification and luminal irregularity but no severe stenoses.  There is a focal 70% narrowing in a small posterolateral branch and diffuse 50 to 70% narrowing in the posterior descending branch.  Both appear unchanged from prior study.    3/12/23 CT CAP/PET                                                            IMPRESSION: In this patient with history of mid esophageal squamous  cell carcinoma and right vallecula squamous cell carcinoma status post  chemoradiation initiated 12/5/2023 and completed radiation 1/26/2024:     1. Resolution of prior FDG avid midesophageal mass.   2. Resolution of prior FDG avid right vallecula lesion.  3. Resolution of right sided FDG-avid enlarged cervical lymph nodes.  4. New FDG avid left level 2 node. 1-3 months follow up CT neck or FNA  can be considered to rule out a new metastatic node.  5. New bilateral posterior lower lobe paravertebral FDG avid opacities  representing postradiation pneumonitis.   6. New FDG uptake associated with left mandibular molar periapical  lucency representing periodontal disease.            Latest Ref Rng & Units  "11/15/2023     7:57 AM   PFT   FVC L 4.82    FEV1 L 2.41    FVC% % 133    FEV1% % 86          The patient's records and results personally reviewed by this provider.     Outside records reviewed from: Care Everywhere    LAB/DIAGNOSTIC STUDIES TODAY:  Updated type and screen due to recent blood transfusion     Assessment    Lopez Hogue is a 68 year old male seen as a PAC referral for risk assessment and optimization for anesthesia.    Plan/Recommendations  Pt will be optimized for the proposed procedure.  See below for details on the assessment, risk, and preoperative recommendations    NEUROLOGY  - No history of TIA, CVA or seizure  - Improved pain, no longer has Butrans or oxycodone  Robaxin prn for tenderness around J tube site.     -Post Op delirium risk factors:  High co-morbid index    ENT  - No current airway concerns.  Will need to be reassessed day of surgery.  Mallampati: I  TM: > 3    Improved swallowing of solids, liquids and meds.   Some soreness with swallowing felt on right side. He attributes to irritation from some type of food.     Upper dentures     Evaluation with Dr. Felix in ENT on 3/11/24. A fiberoptic laryngoscopy performed at this time. Per Dr. Felix, \"no lesions seen, airway patent.\"     Anesthesia records available    CARDIAC  HLD. Will take Crestor on DOS. HYPERTENSION. No meds at this time. CAD/NSTEMI s/p KAYDEN (3/21/23), moderate aortic valve stenosis (peak aortic velocity is 3.8 m/sec, mean 29 mmHg, valve area 1.1 cm2. DVI = 0.31, stroke volume is normal). Followed by Cardiology, last seen as above. Plavix on hold. Denies chest pain, irregular HR, DYSPNEA ON EXERTION. Good activity tolerance continues.  - METS (Metabolic Equivalents)>4    RCRI: 6.6% risk of serious cardiac events    PULMONARY  Denies cough or use of inhaler  No asthma/COPD    Intermediate risk for JOSE with some personal concerns for symptoms. Observed apnea by wife    - Tobacco History    History   Smoking Status " "   Former    Packs/day: 2.00    Years: 30.00    Types: Cigarettes    Quit date: 2013   Smokeless Tobacco    Never       GI: GERD controlled with Pepcid and will take on DOS.  PONV Medium Risk  Total Score: 2           1 AN PONV: Patient is not a current smoker    1 AN PONV: Intended Post Op Opioids        /RENAL  - Baseline Creatinine  0.71  Reports increased urinary frequency/nocturia since last surgery. Denies fever or burning or pain with urination   Will get UA today  UA RESULTS:  Recent Labs   Lab Test 04/17/24  0946   COLOR Light Yellow   APPEARANCE Clear   URINEGLC Negative   URINEBILI Negative   URINEKETONE Negative   SG 1.005   UBLD Negative   URINEPH 5.0   PROTEIN Negative   NITRITE Negative   LEUKEST Negative   RBCU <1   WBCU <1     Culture not indicated    ENDOCRINE    - BMI: Estimated body mass index is 23.49 kg/m  as calculated from the following:    Height as of this encounter: 1.727 m (5' 8\").    Weight as of this encounter: 70.1 kg (154 lb 8 oz).  Healthy Weight (BMI 18.5-24.9)  - No history of Diabetes Mellitus    HEME  VTE High Risk 3%             Total Score: 8    VTE: Greater than 59 yrs old    VTE: Male    VTE: Current cancer      Denies personal or family history of blood clots  History of recent blood transfusion in 2/2024. Updated type and screen today for surgery, antibody screen neg    MSK: Frailty score 0.5      Difficult IV access. US likely needed    Different anesthesia methods/types have been discussed with the patient, but they are aware that the final plan will be decided by the assigned anesthesia provider on the date of service.  Patient was discussed with Dr. Campbell    The patient is optimized for their procedure. AVS with information on surgery time/arrival time, meds and NPO status given by nursing staff. No further diagnostic testing indicated.      On the day of service:     Prep time: 15 minutes  Visit time: 14 minutes  Documentation time: 15 " minutes  ------------------------------------------  Total time: 44 minutes      YADIRA Hart CNS  Preoperative Assessment Center  [facility]  [facility]  Phone: [phone]  Fax: [phone]

## 2024-04-17 NOTE — LETTER
4/17/2024      RE: Lopez Hogue  [address] [facility] [address]       THORACIC SURGERY - OFFICE VISIT       Dear Dr. Ramos,     I saw Lopez Hogue in follow-up for the evaluation and treatment of a mid esophageal squamous cell cancer.      HPI  Mr. Arteaga feels about 85% from his best baseline. He has had no issues with his recently placed J-tube. He is here to go over the planned esophagectomy once again and to ensure that he is doing well after his J-tube placement.    Neoadjuvant therapy  Carboplatin/paclitaxel and radiation (completed 1/26/2024): 50Gy (esophageal SCCA), 70Gy (vallecular SCCA)    Procedures performed  Laparoscopic jejunostomy feeding tube placement and EGD (4/1/2024)     Previsit Tests   PET/CT (3/12/2024) :  Almost complete resolution of prior FDG-avid areas, New FDG uptake level 2a node, 8 mm, SUVmax 6.4. Prior esophageal FDG avid thickening resolved.     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 FDG thickening of the mid to distal esophagus, suspicious for a synchronous primary esophageal neoplasm (4.7 cm, SUV 13.2).        PFT (11/15/2023): FEV1 - 2.41L (86%), DLCO - 122%     Upper Endoscopy (11/8/2023): fungating and ulcerating mass in the middle 1/3rd of the esophagus, 30 - 36 cm from the incisors. Biopsied.        11/8/2023: Esophageal biopsy - Non-keratinizing poorly differentiated squamous cell carcinoma, negative for intestinal metaplasia/Alfredo's type mucosa.      Right vallecula lesion biopsy (10/12/2023): Non-keratinizing poorly differentiated squamous cell carcinoma.      EUS 11/28/2023: T2N1 squamous cell carcinoma     PMH  Reviewed, as below     Past Medical History           Past Medical History:   Diagnosis Date     EtOH dependence (H)       Quit drinking 10 years     Heart attack (H)       Hypertension       Nonrheumatic aortic (valve) stenosis        Sweat gland carcinoma              PSH  Reviewed, as below           Past Surgical History               Past Surgical History:   Procedure Laterality Date     CV CORONARY ANGIOGRAM N/A 3/27/2023     Procedure: Coronary Angiogram;  Surgeon: Miki Etienne MD;  Location: [facility] CV     CV CORONARY ANGIOGRAM N/A 5/9/2023     Procedure: Coronary Angiogram;  Surgeon: Miki Etienne MD;  Location: [facility] LAB CV     CV LEFT HEART CATH N/A 3/27/2023     Procedure: Left Heart Catheterization;  Surgeon: Miki Etienne MD;  Location: [facility] CATH LAB CV     CV LEFT HEART CATH N/A 5/9/2023     Procedure: Left Heart Catheterization;  Surgeon: Miki Etienne MD;  Location: [facility] CV     CV PCI N/A 3/27/2023     Procedure: Percutaneous Coronary Intervention;  Surgeon: Miki Etienne MD;  Location: [facility] CV     ESOPHAGOSCOPY, GASTROSCOPY, DUODENOSCOPY (EGD), COMBINED N/A 11/8/2023     Procedure: ESOPHAGOGASTRODUODENOSCOPY, WITH BIOPSY;  Surgeon: Shahriar Giraldo MD;  Location:  GI     LARYNGOSCOPY WITH BIOPSY(IES) N/A 10/12/2023     Procedure: LARYNGOSCOPY, WITH BIOPSY;  Surgeon: Edi Felix MD;  Location:  OR     OTHER SURGICAL HISTORY   2015     WIDE EXCISION OF LEFT GLUTEAL MASSTNM: [id number], stage: II hidradenocarcinoma Grade II, 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, sentinel lymph node biopsy negative             ETOH:  Stopped 10 years ago  TOB: ages   22 - 58; Quit 2013 2 packs per day - 72 pack years   Other drugs: No     Physical examination  There were no vitals taken for this visit.       J tube in good position     From a personal perspective, he lives with his wife, Sidra, who is a nurse. 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 They have two sons and one grandchild.     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   10 weeks S/P concurrent simultaneous radiation  2 weeks S/P laparoscopic jejunostomy feeding tube placement       PLAN  I spent 40 min on the date of the encounter in chart review, patient visit, review of tests, documentation and/or discussion with other providers about the issues documented above. I reviewed the plan as follows:       Procedure planned: Minimally invasive Cochrane Elbert esophagectomy (high intrathoracic anastomosis). I reviewed the indications, risks, and benefits of the procedure with . Lopez Hogue and his wife, Sidra. We discussed the intraoperative risks of bleeding, injury to vital organs, and potential for esophageal discontinuity.  Potential postoperative complications include, but are not limited to, major respiratory events, arrhythmia, bleeding, infection, reoperation, anastomotic leak, conduit necrosis with discontinuity, vocal cord palsy, and death. I explained the anticipated hospital course (8-10 days) and postoperative recovery including pain control, tube feedings, dietary changes, and overall drain management. We discussed the importance of lifetime awareness to limit lifting heavy weights to prevent hiatal herniation as well as the need for aspiration precautions.   Severe constipation with narcotics: We will be cognizant of this and treat constipation aggressively if necessary.    Necessary Preop Tests & Appointments: Preoperative assessment clinic (today).  He will hold Plavix starting tomorrow morning.     Regional Anesthesia Plan: Intraoperative intercostal nerve block     Anticoagulation Plan: Prophylactic Lovenox     I appreciate the opportunity to participate in the care of your patient and will keep you updated.     Sincerely,    Severe constipation with narcotics    Discussed LEFT  recurrent nerve 4L    Kevin Calderon MD

## 2024-04-18 ENCOUNTER — PREP FOR PROCEDURE (OUTPATIENT)
Dept: SURGERY | Facility: CLINIC | Age: 69
End: 2024-04-18
Payer: COMMERCIAL

## 2024-04-18 RX ORDER — OXYCODONE HYDROCHLORIDE 5 MG/1
5 TABLET ORAL
Status: CANCELLED | OUTPATIENT
Start: 2024-04-18

## 2024-04-18 RX ORDER — ONDANSETRON 4 MG/1
4 TABLET, ORALLY DISINTEGRATING ORAL EVERY 30 MIN PRN
Status: CANCELLED | OUTPATIENT
Start: 2024-04-18

## 2024-04-18 RX ORDER — OXYCODONE HYDROCHLORIDE 10 MG/1
10 TABLET ORAL
Status: CANCELLED | OUTPATIENT
Start: 2024-04-18

## 2024-04-18 RX ORDER — NALOXONE HYDROCHLORIDE 0.4 MG/ML
0.1 INJECTION, SOLUTION INTRAMUSCULAR; INTRAVENOUS; SUBCUTANEOUS
Status: CANCELLED | OUTPATIENT
Start: 2024-04-18

## 2024-04-18 RX ORDER — ONDANSETRON 2 MG/ML
4 INJECTION INTRAMUSCULAR; INTRAVENOUS EVERY 30 MIN PRN
Status: CANCELLED | OUTPATIENT
Start: 2024-04-18

## 2024-04-18 RX ORDER — ENOXAPARIN SODIUM 100 MG/ML
40 INJECTION SUBCUTANEOUS
Status: CANCELLED | OUTPATIENT
Start: 2024-04-18

## 2024-04-18 ASSESSMENT — LIFESTYLE VARIABLES: TOBACCO_USE: 1

## 2024-04-18 ASSESSMENT — COPD QUESTIONNAIRES: COPD: 0

## 2024-04-18 ASSESSMENT — ENCOUNTER SYMPTOMS
SEIZURES: 0
ORTHOPNEA: 0
DYSRHYTHMIAS: 0

## 2024-04-18 NOTE — ANESTHESIA PREPROCEDURE EVALUATION
Anesthesia Pre-Procedure Evaluation    Patient: Lopez Hogue   MRN: [id number] : 1955        Procedure : Procedure(s):  Laparoscopic and right thoracoscopic esophagogastrectomy          Past Medical History:   Diagnosis Date    Anxiety     Aortic stenosis     CAD (coronary artery disease)     ETOH dependence     Quit drinking 10 years    Heart attack (H)     History of blood transfusion     HLD (hyperlipidemia)     Hypertension     Malignant neoplasm of middle third of esophagus (H)     Nonrheumatic aortic (valve) stenosis     Sweat gland carcinoma       Past Surgical History:   Procedure Laterality Date    CV CORONARY ANGIOGRAM N/A 3/27/2023    Procedure: Coronary Angiogram;  Surgeon: Miki Etienne MD;  Location: [facility]    CV CORONARY ANGIOGRAM N/A 2023    Procedure: Coronary Angiogram;  Surgeon: Miki Etienne MD;  Location: [facility] LAB CV    CV LEFT HEART CATH N/A 3/27/2023    Procedure: Left Heart Catheterization;  Surgeon: Miki Etienne MD;  Location: [facility] LAB CV    CV LEFT HEART CATH N/A 2023    Procedure: Left Heart Catheterization;  Surgeon: Miki Etienne MD;  Location: [facility] CV    CV PCI N/A 3/27/2023    Procedure: Percutaneous Coronary Intervention;  Surgeon: Miki Etienne MD;  Location: [facility] CV    ENDOSCOPIC ULTRASOUND UPPER GASTROINTESTINAL TRACT (GI) N/A 2023    Procedure: Endoscopic ultrasound upper gastrointestinal tract (GI);  Surgeon: Shahriar Giraldo MD;  Location:  GI    ESOPHAGOSCOPY, GASTROSCOPY, DUODENOSCOPY (EGD), COMBINED N/A 2023    Procedure: ESOPHAGOGASTRODUODENOSCOPY, WITH BIOPSY;  Surgeon: Shahriar Giraldo MD;  Location: [facility] GI    LAPAROSCOPIC ASSISTED INSERTION TUBE JEJUNOSTOMY N/A 2024    Procedure: Laparoscopic Jejunostomy Tube Insertion and Esophagogastroduodenoscopy;  Surgeon: Kevin Calderon MD;  Location: [facility] OR    LARYNGOSCOPY WITH BIOPSY(IES) N/A  10/12/2023    Procedure: LARYNGOSCOPY, WITH BIOPSY;  Surgeon: Edi Felix MD;  Location: [facility] OR    OTHER SURGICAL HISTORY      WIDE EXCISION OF LEFT GLUTEAL MASSTNM: [id number], stage: II hidradenocarcinoma Grade II, 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, sentinel lymph node biopsy negative       Allergies   Allergen Reactions    Coconut Flavor Anaphylaxis     Raw coconut      Social History     Tobacco Use    Smoking status: Former     Current packs/day: 0.00     Average packs/day: 2.0 packs/day for 30.0 years (60.0 ttl pk-yrs)     Types: Cigarettes     Start date:      Quit date:      Years since quittin.3     Passive exposure: Past    Smokeless tobacco: Never    Tobacco comments:     Quit 10 years ago   Substance Use Topics    Alcohol use: Not Currently     Comment: quit in       Wt Readings from Last 1 Encounters:   24 70.1 kg (154 lb 8 oz)        Anesthesia Evaluation   Pt has had prior anesthetic. Type: General and MAC.    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 2  Taking blood thinners Pt has received instructions: Instructions Given to patient: Plavix on hold.                      valvular problems/murmurs type: AS moderate.    Previous cardiac testing   Echo: Date: 24 Results:    Stress Test:  Date: 2023 Results:    ECG Reviewed:  Date: 24 Results:  NSR  Cath:  Date: 2023 Results:   (-) SPENCER, orthopnea/PND and arrhythmias   METS/Exercise Tolerance: >4 METS Comment: Activity has been somewhat limited after therapies but still able to walk 2 blocks and go up and down stairs  without difficulty      Patient exercising prior to chemo/radiation therapy. Declined activity after chemo/radiation but still can climb stairs without any symptoms       Hematologic: Comments: - Pancytopenia     (+)      anemia, history of blood transfusion, no previous transfusion reaction, Known PRBC Anitbodies:No    (-) history of blood clots   Musculoskeletal:  - neg musculoskeletal ROS     GI/Hepatic:     (+) GERD, Asymptomatic on medication,                  Renal/Genitourinary:  - neg Renal ROS     Endo:  - neg endo ROS     Psychiatric/Substance Use:     (+) psychiatric history anxiety alcohol abuse (in remission since 2013)      Infectious Disease:  - neg infectious disease ROS     Malignancy: Comment: Right vallecula squamous cell carcinoma s/p chemoradiation  (+) Malignancy, History of Other, Skin and GI.GI CA  Active status post Chemo and Radiation.  Skin CA Remission status post Surgery.  Other CA sweat gland carcinoma, left buttock (2015) Remission status post Surgery.    Other:  - neg other ROS          Physical Exam    Airway        Mallampati: II   TM distance: > 3 FB   Neck ROM: full   Mouth opening: > 3 cm    Respiratory Devices and Support         Dental       (+) Modest Abnormalities - crowns, retainers, 1 or 2 missing teeth      Cardiovascular          Rhythm and rate: regular   (+) murmur       Pulmonary           breath sounds clear to auscultation           OUTSIDE LABS:  CBC:   Lab Results   Component Value Date    WBC 9.6 04/02/2024    WBC 5.3 03/29/2024    HGB 10.4 (L) 04/02/2024    HGB 11.2 (L) 03/29/2024    HCT 31.7 (L) 04/02/2024    HCT 34.8 (L) 03/29/2024     04/02/2024     03/29/2024     BMP:   Lab Results   Component Value Date     04/02/2024     03/29/2024    POTASSIUM 4.4 04/02/2024    POTASSIUM 4.0 03/29/2024    CHLORIDE 102 04/02/2024    CHLORIDE 100 03/29/2024    CO2 24 04/02/2024    CO2 26 03/29/2024    BUN 15.6 04/02/2024    BUN 11.7 03/29/2024    CR  "0.71 04/02/2024    CR 0.66 (L) 03/29/2024     (H) 04/02/2024    GLC 80 04/01/2024     COAGS:   Lab Results   Component Value Date    INR 0.97 11/30/2023     POC: No results found for: \"BGM\", \"HCG\", \"HCGS\"  HEPATIC:   Lab Results   Component Value Date    ALBUMIN 3.6 03/29/2024    PROTTOTAL 7.6 03/29/2024    ALT 30 03/29/2024    AST 36 03/29/2024    ALKPHOS 145 03/29/2024    BILITOTAL 0.5 03/29/2024     OTHER:   Lab Results   Component Value Date    LACT 0.7 01/15/2024    A1C 5.2 03/27/2023    RAFAELA 9.0 04/02/2024    PHOS 3.4 02/11/2024    MAG 2.0 02/11/2024    TSH 2.57 03/29/2024    T4 0.83 (L) 03/29/2024       Anesthesia Plan    ASA Status:  3    NPO Status:  NPO Appropriate    Anesthesia Type: General.     - Airway: ETT   Induction: Intravenous, Propofol, RSI.   Maintenance: Balanced.   Techniques and Equipment:     - Airway: Video-Laryngoscope, Double lumen ETT, Fiberoptic Bronchoscope     - Lines/Monitors: 2nd IV, BIS, Arterial Line, Central Line     - Blood: T&S     Consents    Anesthesia Plan(s) and associated risks, benefits, and realistic alternatives discussed. Questions answered and patient/representative(s) expressed understanding.     - Discussed:     - Discussed with:  Patient      - Extended Intubation/Ventilatory Support Discussed: Yes.      - Patient is DNR/DNI Status: No     Use of blood products discussed: Yes.     Postoperative Care    Pain management: IV analgesics, Oral pain medications, Multi-modal analgesia.   PONV prophylaxis: Ondansetron (or other 5HT-3), Dexamethasone or Solumedrol     Comments:               Chaparro Jimenez MD    I have reviewed the pertinent notes and labs in the chart from the past 30 days and (re)examined the patient.  Any updates or changes from those notes are reflected in this note.                  "

## 2024-04-19 ENCOUNTER — APPOINTMENT (OUTPATIENT)
Dept: GENERAL RADIOLOGY | Facility: CLINIC | Age: 69
DRG: 326 | End: 2024-04-19
Attending: STUDENT IN AN ORGANIZED HEALTH CARE EDUCATION/TRAINING PROGRAM
Payer: COMMERCIAL

## 2024-04-19 ENCOUNTER — HOSPITAL ENCOUNTER (INPATIENT)
Facility: CLINIC | Age: 69
LOS: 28 days | Discharge: HOME | DRG: 326 | End: 2024-05-17
Attending: THORACIC SURGERY (CARDIOTHORACIC VASCULAR SURGERY) | Admitting: THORACIC SURGERY (CARDIOTHORACIC VASCULAR SURGERY)
Payer: COMMERCIAL

## 2024-04-19 ENCOUNTER — ANESTHESIA (OUTPATIENT)
Dept: SURGERY | Facility: CLINIC | Age: 69
DRG: 326 | End: 2024-04-19
Payer: COMMERCIAL

## 2024-04-19 DIAGNOSIS — C15.9 MALIGNANT NEOPLASM OF ESOPHAGUS, UNSPECIFIED LOCATION (H): Primary | ICD-10-CM

## 2024-04-19 DIAGNOSIS — K30 DELAYED GASTRIC EMPTYING: ICD-10-CM

## 2024-04-19 DIAGNOSIS — C15.9 PRIMARY ESOPHAGEAL SQUAMOUS CELL CARCINOMA (H): ICD-10-CM

## 2024-04-19 DIAGNOSIS — G89.18 ACUTE POST-OPERATIVE PAIN: ICD-10-CM

## 2024-04-19 LAB
ANION GAP SERPL CALCULATED.3IONS-SCNC: 11 MMOL/L (ref 7–15)
BASE EXCESS BLDA CALC-SCNC: -2.1 MMOL/L (ref -3–3)
BASE EXCESS BLDA CALC-SCNC: -2.2 MMOL/L (ref -3–3)
BASE EXCESS BLDA CALC-SCNC: -3.6 MMOL/L (ref -3–3)
BLD PROD TYP BPU: NORMAL
BLD PROD TYP BPU: NORMAL
BLOOD COMPONENT TYPE: NORMAL
BLOOD COMPONENT TYPE: NORMAL
BUN SERPL-MCNC: 17.7 MG/DL (ref 8–23)
CA-I BLD-MCNC: 4.9 MG/DL (ref 4.4–5.2)
CA-I BLD-MCNC: 4.9 MG/DL (ref 4.4–5.2)
CA-I BLD-MCNC: 5 MG/DL (ref 4.4–5.2)
CALCIUM SERPL-MCNC: 8.9 MG/DL (ref 8.8–10.2)
CHLORIDE SERPL-SCNC: 104 MMOL/L (ref 98–107)
CODING SYSTEM: NORMAL
CODING SYSTEM: NORMAL
CREAT SERPL-MCNC: 0.77 MG/DL (ref 0.67–1.17)
CROSSMATCH: NORMAL
CROSSMATCH: NORMAL
DEPRECATED HCO3 PLAS-SCNC: 23 MMOL/L (ref 22–29)
EGFRCR SERPLBLD CKD-EPI 2021: >90 ML/MIN/1.73M2
ERYTHROCYTE [DISTWIDTH] IN BLOOD BY AUTOMATED COUNT: 14.5 % (ref 10–15)
GLUCOSE BLD-MCNC: 153 MG/DL (ref 70–99)
GLUCOSE BLD-MCNC: 161 MG/DL (ref 70–99)
GLUCOSE BLD-MCNC: 164 MG/DL (ref 70–99)
GLUCOSE BLDC GLUCOMTR-MCNC: 161 MG/DL (ref 70–99)
GLUCOSE BLDC GLUCOMTR-MCNC: 79 MG/DL (ref 70–99)
GLUCOSE SERPL-MCNC: 166 MG/DL (ref 70–99)
HCO3 BLDA-SCNC: 25 MMOL/L (ref 21–28)
HCO3 BLDA-SCNC: 26 MMOL/L (ref 21–28)
HCO3 BLDA-SCNC: 26 MMOL/L (ref 21–28)
HCT VFR BLD AUTO: 35.1 % (ref 40–53)
HGB BLD-MCNC: 11.7 G/DL (ref 13.3–17.7)
HGB BLD-MCNC: 12.4 G/DL (ref 13.3–17.7)
HGB BLD-MCNC: 12.5 G/DL (ref 13.3–17.7)
HGB BLD-MCNC: 12.7 G/DL (ref 13.3–17.7)
LACTATE BLD-SCNC: 0.5 MMOL/L (ref 0.7–2)
LACTATE BLD-SCNC: 0.6 MMOL/L (ref 0.7–2)
LACTATE BLD-SCNC: 0.7 MMOL/L (ref 0.7–2)
MCH RBC QN AUTO: 31.9 PG (ref 26.5–33)
MCHC RBC AUTO-ENTMCNC: 33.3 G/DL (ref 31.5–36.5)
MCV RBC AUTO: 96 FL (ref 78–100)
O2/TOTAL GAS SETTING VFR VENT: 50 %
O2/TOTAL GAS SETTING VFR VENT: 75 %
O2/TOTAL GAS SETTING VFR VENT: 75 %
OXYHGB MFR BLDA: 93 % (ref 92–100)
OXYHGB MFR BLDA: 94 % (ref 92–100)
OXYHGB MFR BLDA: 99 % (ref 92–100)
PCO2 BLDA: 53 MM HG (ref 35–45)
PCO2 BLDA: 57 MM HG (ref 35–45)
PCO2 BLDA: 65 MM HG (ref 35–45)
PH BLDA: 7.21 [PH] (ref 7.35–7.45)
PH BLDA: 7.26 [PH] (ref 7.35–7.45)
PH BLDA: 7.29 [PH] (ref 7.35–7.45)
PLATELET # BLD AUTO: 155 10E3/UL (ref 150–450)
PO2 BLDA: 197 MM HG (ref 80–105)
PO2 BLDA: 83 MM HG (ref 80–105)
PO2 BLDA: 85 MM HG (ref 80–105)
POTASSIUM BLD-SCNC: 4.4 MMOL/L (ref 3.4–5.3)
POTASSIUM BLD-SCNC: 4.5 MMOL/L (ref 3.4–5.3)
POTASSIUM BLD-SCNC: 4.7 MMOL/L (ref 3.4–5.3)
POTASSIUM SERPL-SCNC: 4.8 MMOL/L (ref 3.4–5.3)
RBC # BLD AUTO: 3.67 10E6/UL (ref 4.4–5.9)
SAO2 % BLDA: 100 % (ref 95–96)
SAO2 % BLDA: 94 % (ref 95–96)
SAO2 % BLDA: 95 % (ref 95–96)
SODIUM BLD-SCNC: 139 MMOL/L (ref 135–145)
SODIUM BLD-SCNC: 140 MMOL/L (ref 135–145)
SODIUM BLD-SCNC: 141 MMOL/L (ref 135–145)
SODIUM SERPL-SCNC: 138 MMOL/L (ref 135–145)
UNIT ABO/RH: NORMAL
UNIT ABO/RH: NORMAL
UNIT NUMBER: NORMAL
UNIT NUMBER: NORMAL
UNIT STATUS: NORMAL
UNIT STATUS: NORMAL
UNIT TYPE ISBT: 5100
UNIT TYPE ISBT: 5100
WBC # BLD AUTO: 15.3 10E3/UL (ref 4–11)

## 2024-04-19 PROCEDURE — 999N000141 HC STATISTIC PRE-PROCEDURE NURSING ASSESSMENT: Performed by: THORACIC SURGERY (CARDIOTHORACIC VASCULAR SURGERY)

## 2024-04-19 PROCEDURE — 258N000003 HC RX IP 258 OP 636

## 2024-04-19 PROCEDURE — 258N000003 HC RX IP 258 OP 636: Performed by: STUDENT IN AN ORGANIZED HEALTH CARE EDUCATION/TRAINING PROGRAM

## 2024-04-19 PROCEDURE — 07B74ZX EXCISION OF THORAX LYMPHATIC, PERCUTANEOUS ENDOSCOPIC APPROACH, DIAGNOSTIC: ICD-10-PCS | Performed by: THORACIC SURGERY (CARDIOTHORACIC VASCULAR SURGERY)

## 2024-04-19 PROCEDURE — 32674 THORACOSCOPY LYMPH NODE EXC: CPT | Mod: 58 | Performed by: THORACIC SURGERY (CARDIOTHORACIC VASCULAR SURGERY)

## 2024-04-19 PROCEDURE — 0DB30ZZ EXCISION OF LOWER ESOPHAGUS, OPEN APPROACH: ICD-10-PCS | Performed by: THORACIC SURGERY (CARDIOTHORACIC VASCULAR SURGERY)

## 2024-04-19 PROCEDURE — 200N000002 HC R&B ICU UMMC

## 2024-04-19 PROCEDURE — 250N000009 HC RX 250

## 2024-04-19 PROCEDURE — 250N000025 HC SEVOFLURANE, PER MIN: Performed by: THORACIC SURGERY (CARDIOTHORACIC VASCULAR SURGERY)

## 2024-04-19 PROCEDURE — 31622 DX BRONCHOSCOPE/WASH: CPT | Performed by: ANESTHESIOLOGY

## 2024-04-19 PROCEDURE — 250N000009 HC RX 250: Performed by: ANESTHESIOLOGY

## 2024-04-19 PROCEDURE — 88309 TISSUE EXAM BY PATHOLOGIST: CPT | Mod: TC | Performed by: THORACIC SURGERY (CARDIOTHORACIC VASCULAR SURGERY)

## 2024-04-19 PROCEDURE — 32551 INSERTION OF CHEST TUBE: CPT | Mod: 58 | Performed by: THORACIC SURGERY (CARDIOTHORACIC VASCULAR SURGERY)

## 2024-04-19 PROCEDURE — 43287 ESPHG DSTL 2/3 W/LAPS MOBLJ: CPT | Mod: 58 | Performed by: THORACIC SURGERY (CARDIOTHORACIC VASCULAR SURGERY)

## 2024-04-19 PROCEDURE — 250N000011 HC RX IP 250 OP 636: Performed by: THORACIC SURGERY (CARDIOTHORACIC VASCULAR SURGERY)

## 2024-04-19 PROCEDURE — 32674 THORACOSCOPY LYMPH NODE EXC: CPT | Mod: 80 | Performed by: THORACIC SURGERY (CARDIOTHORACIC VASCULAR SURGERY)

## 2024-04-19 PROCEDURE — 272N000001 HC OR GENERAL SUPPLY STERILE: Performed by: THORACIC SURGERY (CARDIOTHORACIC VASCULAR SURGERY)

## 2024-04-19 PROCEDURE — 99222 1ST HOSP IP/OBS MODERATE 55: CPT | Mod: 24 | Performed by: STUDENT IN AN ORGANIZED HEALTH CARE EDUCATION/TRAINING PROGRAM

## 2024-04-19 PROCEDURE — 0W9B30Z DRAINAGE OF LEFT PLEURAL CAVITY WITH DRAINAGE DEVICE, PERCUTANEOUS APPROACH: ICD-10-PCS | Performed by: THORACIC SURGERY (CARDIOTHORACIC VASCULAR SURGERY)

## 2024-04-19 PROCEDURE — 85014 HEMATOCRIT: CPT | Performed by: STUDENT IN AN ORGANIZED HEALTH CARE EDUCATION/TRAINING PROGRAM

## 2024-04-19 PROCEDURE — 250N000011 HC RX IP 250 OP 636: Mod: JZ | Performed by: THORACIC SURGERY (CARDIOTHORACIC VASCULAR SURGERY)

## 2024-04-19 PROCEDURE — 360N000078 HC SURGERY LEVEL 5, PER MIN: Performed by: THORACIC SURGERY (CARDIOTHORACIC VASCULAR SURGERY)

## 2024-04-19 PROCEDURE — 710N000010 HC RECOVERY PHASE 1, LEVEL 2, PER MIN: Performed by: THORACIC SURGERY (CARDIOTHORACIC VASCULAR SURGERY)

## 2024-04-19 PROCEDURE — 82330 ASSAY OF CALCIUM: CPT

## 2024-04-19 PROCEDURE — 88305 TISSUE EXAM BY PATHOLOGIST: CPT | Mod: 26 | Performed by: PATHOLOGY

## 2024-04-19 PROCEDURE — 370N000017 HC ANESTHESIA TECHNICAL FEE, PER MIN: Performed by: THORACIC SURGERY (CARDIOTHORACIC VASCULAR SURGERY)

## 2024-04-19 PROCEDURE — 43287 ESPHG DSTL 2/3 W/LAPS MOBLJ: CPT | Mod: 80 | Performed by: THORACIC SURGERY (CARDIOTHORACIC VASCULAR SURGERY)

## 2024-04-19 PROCEDURE — 999N000065 XR CHEST PORT 1 VIEW

## 2024-04-19 PROCEDURE — 250N000011 HC RX IP 250 OP 636

## 2024-04-19 PROCEDURE — 71045 X-RAY EXAM CHEST 1 VIEW: CPT | Mod: 26 | Performed by: RADIOLOGY

## 2024-04-19 PROCEDURE — 93010 ELECTROCARDIOGRAM REPORT: CPT | Performed by: INTERNAL MEDICINE

## 2024-04-19 PROCEDURE — 250N000009 HC RX 250: Performed by: THORACIC SURGERY (CARDIOTHORACIC VASCULAR SURGERY)

## 2024-04-19 PROCEDURE — 88331 PATH CONSLTJ SURG 1 BLK 1SPC: CPT | Mod: 26 | Performed by: PATHOLOGY

## 2024-04-19 PROCEDURE — 84132 ASSAY OF SERUM POTASSIUM: CPT | Performed by: STUDENT IN AN ORGANIZED HEALTH CARE EDUCATION/TRAINING PROGRAM

## 2024-04-19 PROCEDURE — 86923 COMPATIBILITY TEST ELECTRIC: CPT

## 2024-04-19 PROCEDURE — 250N000013 HC RX MED GY IP 250 OP 250 PS 637: Performed by: STUDENT IN AN ORGANIZED HEALTH CARE EDUCATION/TRAINING PROGRAM

## 2024-04-19 RX ORDER — GABAPENTIN 100 MG/1
100 CAPSULE ORAL AT BEDTIME
Status: DISCONTINUED | OUTPATIENT
Start: 2024-04-19 | End: 2024-04-20

## 2024-04-19 RX ORDER — NITROGLYCERIN 10 MG/100ML
INJECTION INTRAVENOUS PRN
Status: DISCONTINUED | OUTPATIENT
Start: 2024-04-19 | End: 2024-04-19

## 2024-04-19 RX ORDER — OXYCODONE HYDROCHLORIDE 5 MG/1
5 TABLET ORAL EVERY 4 HOURS PRN
Status: DISCONTINUED | OUTPATIENT
Start: 2024-04-19 | End: 2024-04-20

## 2024-04-19 RX ORDER — SODIUM CHLORIDE, SODIUM GLUCONATE, SODIUM ACETATE, POTASSIUM CHLORIDE AND MAGNESIUM CHLORIDE 526; 502; 368; 37; 30 MG/100ML; MG/100ML; MG/100ML; MG/100ML; MG/100ML
INJECTION, SOLUTION INTRAVENOUS CONTINUOUS PRN
Status: DISCONTINUED | OUTPATIENT
Start: 2024-04-19 | End: 2024-04-19

## 2024-04-19 RX ORDER — OXYCODONE HYDROCHLORIDE 10 MG/1
10 TABLET ORAL EVERY 4 HOURS PRN
Status: DISCONTINUED | OUTPATIENT
Start: 2024-04-19 | End: 2024-04-20

## 2024-04-19 RX ORDER — LIDOCAINE 40 MG/G
CREAM TOPICAL
Status: DISCONTINUED | OUTPATIENT
Start: 2024-04-19 | End: 2024-04-19 | Stop reason: HOSPADM

## 2024-04-19 RX ORDER — FENTANYL CITRATE 50 UG/ML
25 INJECTION, SOLUTION INTRAMUSCULAR; INTRAVENOUS EVERY 5 MIN PRN
Status: DISCONTINUED | OUTPATIENT
Start: 2024-04-19 | End: 2024-04-19 | Stop reason: HOSPADM

## 2024-04-19 RX ORDER — ENOXAPARIN SODIUM 100 MG/ML
40 INJECTION SUBCUTANEOUS EVERY 24 HOURS
Status: DISCONTINUED | OUTPATIENT
Start: 2024-04-20 | End: 2024-04-22

## 2024-04-19 RX ORDER — ALBUTEROL SULFATE 90 UG/1
4 AEROSOL, METERED RESPIRATORY (INHALATION) 4 TIMES DAILY
Status: DISCONTINUED | OUTPATIENT
Start: 2024-04-19 | End: 2024-05-17 | Stop reason: HOSPADM

## 2024-04-19 RX ORDER — SERTRALINE HYDROCHLORIDE 100 MG/1
100 TABLET, FILM COATED ORAL EVERY MORNING
Status: DISCONTINUED | OUTPATIENT
Start: 2024-04-20 | End: 2024-04-30

## 2024-04-19 RX ORDER — ESMOLOL HYDROCHLORIDE 10 MG/ML
INJECTION INTRAVENOUS PRN
Status: DISCONTINUED | OUTPATIENT
Start: 2024-04-19 | End: 2024-04-19

## 2024-04-19 RX ORDER — CEFAZOLIN SODIUM/WATER 2 G/20 ML
2 SYRINGE (ML) INTRAVENOUS EVERY 8 HOURS
Status: DISCONTINUED | OUTPATIENT
Start: 2024-04-19 | End: 2024-04-19

## 2024-04-19 RX ORDER — PHENYLEPHRINE HCL IN 0.9% NACL 50MG/250ML
PLASTIC BAG, INJECTION (ML) INTRAVENOUS CONTINUOUS PRN
Status: DISCONTINUED | OUTPATIENT
Start: 2024-04-19 | End: 2024-04-19

## 2024-04-19 RX ORDER — GUAIFENESIN 200 MG/10ML
200 LIQUID ORAL EVERY 6 HOURS
Status: DISCONTINUED | OUTPATIENT
Start: 2024-04-19 | End: 2024-05-17 | Stop reason: HOSPADM

## 2024-04-19 RX ORDER — POLYETHYLENE GLYCOL 3350 17 G/17G
17 POWDER, FOR SOLUTION ORAL DAILY
Status: DISCONTINUED | OUTPATIENT
Start: 2024-04-20 | End: 2024-04-20

## 2024-04-19 RX ORDER — BISACODYL 10 MG
10 SUPPOSITORY, RECTAL RECTAL DAILY PRN
Status: DISCONTINUED | OUTPATIENT
Start: 2024-04-22 | End: 2024-04-21

## 2024-04-19 RX ORDER — SODIUM CHLORIDE, SODIUM LACTATE, POTASSIUM CHLORIDE, CALCIUM CHLORIDE 600; 310; 30; 20 MG/100ML; MG/100ML; MG/100ML; MG/100ML
INJECTION, SOLUTION INTRAVENOUS CONTINUOUS
Status: DISCONTINUED | OUTPATIENT
Start: 2024-04-19 | End: 2024-04-19

## 2024-04-19 RX ORDER — NALOXONE HYDROCHLORIDE 0.4 MG/ML
0.1 INJECTION, SOLUTION INTRAMUSCULAR; INTRAVENOUS; SUBCUTANEOUS
Status: DISCONTINUED | OUTPATIENT
Start: 2024-04-19 | End: 2024-04-19 | Stop reason: HOSPADM

## 2024-04-19 RX ORDER — ACETAMINOPHEN 325 MG/1
975 TABLET ORAL EVERY 8 HOURS
Qty: 27 TABLET | Refills: 0 | Status: DISCONTINUED | OUTPATIENT
Start: 2024-04-19 | End: 2024-04-20

## 2024-04-19 RX ORDER — HYDROMORPHONE HYDROCHLORIDE 1 MG/ML
0.4 INJECTION, SOLUTION INTRAMUSCULAR; INTRAVENOUS; SUBCUTANEOUS EVERY 5 MIN PRN
Status: DISCONTINUED | OUTPATIENT
Start: 2024-04-19 | End: 2024-04-19 | Stop reason: HOSPADM

## 2024-04-19 RX ORDER — ACETAMINOPHEN 325 MG/1
650 TABLET ORAL EVERY 4 HOURS PRN
Status: DISCONTINUED | OUTPATIENT
Start: 2024-04-22 | End: 2024-04-20

## 2024-04-19 RX ORDER — NALOXONE HYDROCHLORIDE 0.4 MG/ML
0.2 INJECTION, SOLUTION INTRAMUSCULAR; INTRAVENOUS; SUBCUTANEOUS
Status: DISCONTINUED | OUTPATIENT
Start: 2024-04-19 | End: 2024-05-17 | Stop reason: HOSPADM

## 2024-04-19 RX ORDER — CEFAZOLIN SODIUM/WATER 2 G/20 ML
2 SYRINGE (ML) INTRAVENOUS
Status: COMPLETED | OUTPATIENT
Start: 2024-04-19 | End: 2024-04-19

## 2024-04-19 RX ORDER — CEFAZOLIN SODIUM 2 G/100ML
2 INJECTION, SOLUTION INTRAVENOUS EVERY 8 HOURS
Status: COMPLETED | OUTPATIENT
Start: 2024-04-19 | End: 2024-04-20

## 2024-04-19 RX ORDER — NALOXONE HYDROCHLORIDE 0.4 MG/ML
0.4 INJECTION, SOLUTION INTRAMUSCULAR; INTRAVENOUS; SUBCUTANEOUS
Status: DISCONTINUED | OUTPATIENT
Start: 2024-04-19 | End: 2024-05-17 | Stop reason: HOSPADM

## 2024-04-19 RX ORDER — ONDANSETRON 2 MG/ML
INJECTION INTRAMUSCULAR; INTRAVENOUS PRN
Status: DISCONTINUED | OUTPATIENT
Start: 2024-04-19 | End: 2024-04-19

## 2024-04-19 RX ORDER — SODIUM CHLORIDE, SODIUM LACTATE, POTASSIUM CHLORIDE, CALCIUM CHLORIDE 600; 310; 30; 20 MG/100ML; MG/100ML; MG/100ML; MG/100ML
INJECTION, SOLUTION INTRAVENOUS CONTINUOUS
Status: DISCONTINUED | OUTPATIENT
Start: 2024-04-19 | End: 2024-04-22

## 2024-04-19 RX ORDER — BUPIVACAINE HYDROCHLORIDE AND EPINEPHRINE 2.5; 5 MG/ML; UG/ML
INJECTION, SOLUTION INFILTRATION; PERINEURAL PRN
Status: DISCONTINUED | OUTPATIENT
Start: 2024-04-19 | End: 2024-04-19 | Stop reason: HOSPADM

## 2024-04-19 RX ORDER — ONDANSETRON 4 MG/1
4 TABLET, ORALLY DISINTEGRATING ORAL EVERY 6 HOURS PRN
Status: DISCONTINUED | OUTPATIENT
Start: 2024-04-19 | End: 2024-05-17 | Stop reason: HOSPADM

## 2024-04-19 RX ORDER — INDOCYANINE GREEN AND WATER 25 MG
KIT INJECTION PRN
Status: DISCONTINUED | OUTPATIENT
Start: 2024-04-19 | End: 2024-04-19

## 2024-04-19 RX ORDER — KETAMINE HYDROCHLORIDE 10 MG/ML
INJECTION INTRAMUSCULAR; INTRAVENOUS PRN
Status: DISCONTINUED | OUTPATIENT
Start: 2024-04-19 | End: 2024-04-19

## 2024-04-19 RX ORDER — CEFAZOLIN SODIUM/WATER 2 G/20 ML
2 SYRINGE (ML) INTRAVENOUS SEE ADMIN INSTRUCTIONS
Status: DISCONTINUED | OUTPATIENT
Start: 2024-04-19 | End: 2024-04-19 | Stop reason: HOSPADM

## 2024-04-19 RX ORDER — DEXAMETHASONE SODIUM PHOSPHATE 4 MG/ML
INJECTION, SOLUTION INTRA-ARTICULAR; INTRALESIONAL; INTRAMUSCULAR; INTRAVENOUS; SOFT TISSUE PRN
Status: DISCONTINUED | OUTPATIENT
Start: 2024-04-19 | End: 2024-04-19

## 2024-04-19 RX ORDER — HYDROMORPHONE HCL IN WATER/PF 6 MG/30 ML
0.4 PATIENT CONTROLLED ANALGESIA SYRINGE INTRAVENOUS
Status: DISCONTINUED | OUTPATIENT
Start: 2024-04-19 | End: 2024-04-23

## 2024-04-19 RX ORDER — ONDANSETRON 2 MG/ML
4 INJECTION INTRAMUSCULAR; INTRAVENOUS EVERY 30 MIN PRN
Status: DISCONTINUED | OUTPATIENT
Start: 2024-04-19 | End: 2024-04-19 | Stop reason: HOSPADM

## 2024-04-19 RX ORDER — FENTANYL CITRATE 50 UG/ML
INJECTION, SOLUTION INTRAMUSCULAR; INTRAVENOUS PRN
Status: DISCONTINUED | OUTPATIENT
Start: 2024-04-19 | End: 2024-04-19

## 2024-04-19 RX ORDER — PROPOFOL 10 MG/ML
INJECTION, EMULSION INTRAVENOUS PRN
Status: DISCONTINUED | OUTPATIENT
Start: 2024-04-19 | End: 2024-04-19

## 2024-04-19 RX ORDER — ONDANSETRON 2 MG/ML
4 INJECTION INTRAMUSCULAR; INTRAVENOUS EVERY 6 HOURS PRN
Status: DISCONTINUED | OUTPATIENT
Start: 2024-04-19 | End: 2024-05-17 | Stop reason: HOSPADM

## 2024-04-19 RX ORDER — HYDROMORPHONE HCL IN WATER/PF 6 MG/30 ML
0.2 PATIENT CONTROLLED ANALGESIA SYRINGE INTRAVENOUS
Status: DISCONTINUED | OUTPATIENT
Start: 2024-04-19 | End: 2024-04-23

## 2024-04-19 RX ORDER — AMOXICILLIN 250 MG
1 CAPSULE ORAL 2 TIMES DAILY
Status: DISCONTINUED | OUTPATIENT
Start: 2024-04-19 | End: 2024-04-20

## 2024-04-19 RX ORDER — PROCHLORPERAZINE MALEATE 5 MG
5 TABLET ORAL EVERY 6 HOURS PRN
Status: DISCONTINUED | OUTPATIENT
Start: 2024-04-19 | End: 2024-04-20

## 2024-04-19 RX ORDER — METHOCARBAMOL 500 MG/1
500 TABLET, FILM COATED ORAL EVERY 6 HOURS PRN
Status: DISCONTINUED | OUTPATIENT
Start: 2024-04-19 | End: 2024-04-20

## 2024-04-19 RX ORDER — SODIUM CHLORIDE, SODIUM LACTATE, POTASSIUM CHLORIDE, CALCIUM CHLORIDE 600; 310; 30; 20 MG/100ML; MG/100ML; MG/100ML; MG/100ML
INJECTION, SOLUTION INTRAVENOUS CONTINUOUS
Status: DISCONTINUED | OUTPATIENT
Start: 2024-04-19 | End: 2024-04-19 | Stop reason: HOSPADM

## 2024-04-19 RX ORDER — ENOXAPARIN SODIUM 100 MG/ML
40 INJECTION SUBCUTANEOUS
Status: COMPLETED | OUTPATIENT
Start: 2024-04-19 | End: 2024-04-19

## 2024-04-19 RX ORDER — ACETAMINOPHEN 325 MG/1
975 TABLET ORAL ONCE
Status: DISCONTINUED | OUTPATIENT
Start: 2024-04-19 | End: 2024-04-19 | Stop reason: HOSPADM

## 2024-04-19 RX ORDER — LIDOCAINE HYDROCHLORIDE 20 MG/ML
INJECTION, SOLUTION INFILTRATION; PERINEURAL PRN
Status: DISCONTINUED | OUTPATIENT
Start: 2024-04-19 | End: 2024-04-19

## 2024-04-19 RX ORDER — HYDROMORPHONE HYDROCHLORIDE 1 MG/ML
0.2 INJECTION, SOLUTION INTRAMUSCULAR; INTRAVENOUS; SUBCUTANEOUS EVERY 5 MIN PRN
Status: DISCONTINUED | OUTPATIENT
Start: 2024-04-19 | End: 2024-04-19 | Stop reason: HOSPADM

## 2024-04-19 RX ORDER — FENTANYL CITRATE 50 UG/ML
50 INJECTION, SOLUTION INTRAMUSCULAR; INTRAVENOUS EVERY 5 MIN PRN
Status: DISCONTINUED | OUTPATIENT
Start: 2024-04-19 | End: 2024-04-19 | Stop reason: HOSPADM

## 2024-04-19 RX ORDER — ONDANSETRON 4 MG/1
4 TABLET, ORALLY DISINTEGRATING ORAL EVERY 30 MIN PRN
Status: DISCONTINUED | OUTPATIENT
Start: 2024-04-19 | End: 2024-04-19 | Stop reason: HOSPADM

## 2024-04-19 RX ADMIN — FENTANYL CITRATE 50 MCG: 50 INJECTION, SOLUTION INTRAMUSCULAR; INTRAVENOUS at 15:40

## 2024-04-19 RX ADMIN — FENTANYL CITRATE 100 MCG: 50 INJECTION INTRAMUSCULAR; INTRAVENOUS at 07:47

## 2024-04-19 RX ADMIN — SODIUM CHLORIDE 20 MG/HR: 9 INJECTION, SOLUTION INTRAVENOUS at 08:26

## 2024-04-19 RX ADMIN — SODIUM CHLORIDE, SODIUM GLUCONATE, SODIUM ACETATE, POTASSIUM CHLORIDE AND MAGNESIUM CHLORIDE: 526; 502; 368; 37; 30 INJECTION, SOLUTION INTRAVENOUS at 08:21

## 2024-04-19 RX ADMIN — Medication 20 MG: at 09:35

## 2024-04-19 RX ADMIN — SUGAMMADEX 200 MG: 100 INJECTION, SOLUTION INTRAVENOUS at 15:16

## 2024-04-19 RX ADMIN — SODIUM CHLORIDE, POTASSIUM CHLORIDE, SODIUM LACTATE AND CALCIUM CHLORIDE: 600; 310; 30; 20 INJECTION, SOLUTION INTRAVENOUS at 16:26

## 2024-04-19 RX ADMIN — SODIUM CHLORIDE, SODIUM GLUCONATE, SODIUM ACETATE, POTASSIUM CHLORIDE AND MAGNESIUM CHLORIDE: 526; 502; 368; 37; 30 INJECTION, SOLUTION INTRAVENOUS at 08:32

## 2024-04-19 RX ADMIN — PHENYLEPHRINE HYDROCHLORIDE 100 MCG: 10 INJECTION INTRAVENOUS at 08:23

## 2024-04-19 RX ADMIN — Medication 10 MG: at 10:48

## 2024-04-19 RX ADMIN — PROPOFOL 20 MG: 10 INJECTION, EMULSION INTRAVENOUS at 07:48

## 2024-04-19 RX ADMIN — CEFAZOLIN SODIUM 2 G: 2 INJECTION, SOLUTION INTRAVENOUS at 20:47

## 2024-04-19 RX ADMIN — Medication 30 MG: at 08:22

## 2024-04-19 RX ADMIN — PHENYLEPHRINE HYDROCHLORIDE 150 MCG: 10 INJECTION INTRAVENOUS at 11:32

## 2024-04-19 RX ADMIN — PHENYLEPHRINE HYDROCHLORIDE 100 MCG: 10 INJECTION INTRAVENOUS at 11:30

## 2024-04-19 RX ADMIN — FENTANYL CITRATE 50 MCG: 50 INJECTION INTRAMUSCULAR; INTRAVENOUS at 08:50

## 2024-04-19 RX ADMIN — Medication 2 G: at 12:21

## 2024-04-19 RX ADMIN — GUAIFENESIN 200 MG: 100 SOLUTION ORAL at 19:53

## 2024-04-19 RX ADMIN — ALBUTEROL SULFATE 4 PUFF: 90 AEROSOL, METERED RESPIRATORY (INHALATION) at 19:59

## 2024-04-19 RX ADMIN — SODIUM CHLORIDE, POTASSIUM CHLORIDE, SODIUM LACTATE AND CALCIUM CHLORIDE: 600; 310; 30; 20 INJECTION, SOLUTION INTRAVENOUS at 18:27

## 2024-04-19 RX ADMIN — Medication 10 MG: at 11:23

## 2024-04-19 RX ADMIN — PHENYLEPHRINE HYDROCHLORIDE 100 MCG: 10 INJECTION INTRAVENOUS at 07:48

## 2024-04-19 RX ADMIN — DEXAMETHASONE SODIUM PHOSPHATE 8 MG: 4 INJECTION, SOLUTION INTRA-ARTICULAR; INTRALESIONAL; INTRAMUSCULAR; INTRAVENOUS; SOFT TISSUE at 08:21

## 2024-04-19 RX ADMIN — METHOCARBAMOL 500 MG: 500 TABLET ORAL at 18:27

## 2024-04-19 RX ADMIN — ESMOLOL HYDROCHLORIDE 20 MG: 10 INJECTION, SOLUTION INTRAVENOUS at 10:41

## 2024-04-19 RX ADMIN — PROPOFOL 50 MG: 10 INJECTION, EMULSION INTRAVENOUS at 07:53

## 2024-04-19 RX ADMIN — PROPOFOL 20 MG: 10 INJECTION, EMULSION INTRAVENOUS at 15:11

## 2024-04-19 RX ADMIN — NOREPINEPHRINE BITARTRATE 6.4 MCG: 1 INJECTION, SOLUTION, CONCENTRATE INTRAVENOUS at 08:39

## 2024-04-19 RX ADMIN — HYDROMORPHONE HYDROCHLORIDE 0.4 MG: 1 INJECTION, SOLUTION INTRAMUSCULAR; INTRAVENOUS; SUBCUTANEOUS at 16:40

## 2024-04-19 RX ADMIN — ONDANSETRON 4 MG: 2 INJECTION INTRAMUSCULAR; INTRAVENOUS at 14:51

## 2024-04-19 RX ADMIN — NITROGLYCERIN 100 MCG: 10 INJECTION INTRAVENOUS at 10:39

## 2024-04-19 RX ADMIN — Medication 20 MG: at 12:08

## 2024-04-19 RX ADMIN — DOCUSATE SODIUM 50 MG AND SENNOSIDES 8.6 MG 1 TABLET: 8.6; 5 TABLET, FILM COATED ORAL at 19:53

## 2024-04-19 RX ADMIN — NITROGLYCERIN 100 MCG: 10 INJECTION INTRAVENOUS at 10:37

## 2024-04-19 RX ADMIN — PHENYLEPHRINE HYDROCHLORIDE 100 MCG: 10 INJECTION INTRAVENOUS at 09:18

## 2024-04-19 RX ADMIN — Medication 2 G: at 08:21

## 2024-04-19 RX ADMIN — ENOXAPARIN SODIUM 40 MG: 40 INJECTION SUBCUTANEOUS at 07:29

## 2024-04-19 RX ADMIN — PHENYLEPHRINE HYDROCHLORIDE 150 MCG: 10 INJECTION INTRAVENOUS at 08:28

## 2024-04-19 RX ADMIN — HYDROMORPHONE HYDROCHLORIDE 0.4 MG: 1 INJECTION, SOLUTION INTRAMUSCULAR; INTRAVENOUS; SUBCUTANEOUS at 17:12

## 2024-04-19 RX ADMIN — OXYCODONE HYDROCHLORIDE 5 MG: 5 TABLET ORAL at 20:54

## 2024-04-19 RX ADMIN — HYDROMORPHONE HYDROCHLORIDE 0.5 MG: 1 INJECTION, SOLUTION INTRAMUSCULAR; INTRAVENOUS; SUBCUTANEOUS at 14:51

## 2024-04-19 RX ADMIN — INDOCYANINE GREEN AND WATER 12.5 MG: KIT at 10:18

## 2024-04-19 RX ADMIN — PHENYLEPHRINE HYDROCHLORIDE 50 MCG: 10 INJECTION INTRAVENOUS at 13:02

## 2024-04-19 RX ADMIN — Medication 10 MG: at 13:42

## 2024-04-19 RX ADMIN — PHENYLEPHRINE HYDROCHLORIDE 150 MCG: 10 INJECTION INTRAVENOUS at 10:34

## 2024-04-19 RX ADMIN — HYDROMORPHONE HYDROCHLORIDE 0.5 MG: 1 INJECTION, SOLUTION INTRAMUSCULAR; INTRAVENOUS; SUBCUTANEOUS at 11:16

## 2024-04-19 RX ADMIN — HYDROMORPHONE HYDROCHLORIDE 0.2 MG: 1 INJECTION, SOLUTION INTRAMUSCULAR; INTRAVENOUS; SUBCUTANEOUS at 16:13

## 2024-04-19 RX ADMIN — ACETAMINOPHEN 975 MG: 325 TABLET, FILM COATED ORAL at 19:52

## 2024-04-19 RX ADMIN — Medication 0.5 MCG/KG/MIN: at 08:39

## 2024-04-19 RX ADMIN — LIDOCAINE HYDROCHLORIDE 100 MG: 20 INJECTION, SOLUTION INFILTRATION; PERINEURAL at 07:47

## 2024-04-19 RX ADMIN — HYDROMORPHONE HYDROCHLORIDE 0.4 MG: 1 INJECTION, SOLUTION INTRAMUSCULAR; INTRAVENOUS; SUBCUTANEOUS at 16:31

## 2024-04-19 RX ADMIN — PHENYLEPHRINE HYDROCHLORIDE 150 MCG: 10 INJECTION INTRAVENOUS at 08:39

## 2024-04-19 RX ADMIN — FENTANYL CITRATE 50 MCG: 50 INJECTION INTRAMUSCULAR; INTRAVENOUS at 08:47

## 2024-04-19 RX ADMIN — OXYCODONE HYDROCHLORIDE 5 MG: 5 TABLET ORAL at 18:28

## 2024-04-19 RX ADMIN — FENTANYL CITRATE 50 MCG: 50 INJECTION, SOLUTION INTRAMUSCULAR; INTRAVENOUS at 15:54

## 2024-04-19 RX ADMIN — Medication 20 MG: at 08:31

## 2024-04-19 RX ADMIN — GABAPENTIN 100 MG: 100 CAPSULE ORAL at 21:51

## 2024-04-19 RX ADMIN — Medication 50 MG: at 07:48

## 2024-04-19 ASSESSMENT — ACTIVITIES OF DAILY LIVING (ADL)
ADLS_ACUITY_SCORE: 24
ADLS_ACUITY_SCORE: 26
ADLS_ACUITY_SCORE: 24
ADLS_ACUITY_SCORE: 24
ADLS_ACUITY_SCORE: 36
ADLS_ACUITY_SCORE: 24
ADLS_ACUITY_SCORE: 36
ADLS_ACUITY_SCORE: 36
ADLS_ACUITY_SCORE: 24
ADLS_ACUITY_SCORE: 36
ADLS_ACUITY_SCORE: 24
ADLS_ACUITY_SCORE: 36
ADLS_ACUITY_SCORE: 24

## 2024-04-19 NOTE — OP NOTE
Preoperative diagnosis: Synchronous squamous cell carcinoma of the vallecula and esophagus    Postoperative diagnosis: The same as preoperative diagnosis    Procedure:   Minimally invasive [place] Elbert esophagectomy    Anesthesia: General    Surgeon: Kevin Calderon (co-surgeon with Dr. Hill; technically challenging operation that requires 2 staff surgeons despite the presence of a qualified resident)      History    Mr. 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 He received standard chemoradiotherapy (5040Gy) to the esophagus and 7000Gy to the neck. For this reason we decided to perform a high intra-thoracic anastomosis instead of a cervical anastomosis.      Procedure:  I participated in the thoracoscopic portion of the operation and helped Dr. Hill with the dissection of the esophagus and the complete mediastinal removal of fatty tissue and lymph nodes. I set up the anastomosis and stapled the common opening.  See Dr. Hill's report for details.

## 2024-04-19 NOTE — ANESTHESIA CARE TRANSFER NOTE
Patient: Lopez Hogue    Procedure: Procedure(s):  Laparoscopic and right thoracoscopic esophagogastrectomy, right AND left chest tube placement  Bronchoscopy flexible and rigid  Esophagoscopy, gastroscopy, duodenoscopy (EGD), combined       Diagnosis: Malignant neoplasm of middle third of esophagus (H) [C15.4]  Diagnosis Additional Information: No value filed.    Anesthesia Type:   General     Note:    Oropharynx: oropharynx clear of all foreign objects  Level of Consciousness: awake  Oxygen Supplementation: face mask  Level of Supplemental Oxygen (L/min / FiO2): 8  Independent Airway: airway patency satisfactory and stable  Dentition: dentition unchanged  Vital Signs Stable: post-procedure vital signs reviewed and stable  Report to RN Given: handoff report given  Patient transferred to: PACU    Handoff Report: Identifed the Patient, Identified the Reponsible Provider, Reviewed the pertinent medical history, Discussed the surgical course, Reviewed Intra-OP anesthesia mangement and issues during anesthesia, Set expectations for post-procedure period and Allowed opportunity for questions and acknowledgement of understanding    Vitals:  Vitals Value Taken Time   BP 99/62 04/19/24 1530   Temp     Pulse 75 04/19/24 1535   Resp 13 04/19/24 1535   SpO2 100 % 04/19/24 1535   Vitals shown include unfiled device data.    Electronically Signed By: YADIRA Reyna CRNA  April 19, 2024  3:35 PM

## 2024-04-19 NOTE — LETTER
[facility] 7C MED SURG  [address] [facility] [address]  Phone: [phone]    May 17, 2024        Lopez CARDENAS Mykel  [address] [facility] [address]          To whom it may concern:    RE: Lopez Hogue    Patient may return to work 05/28/2024 with the following:  No working or lifting restrictions    Please contact me for questions or concerns [phone]. If you have paperwork that needs to be completed, please fax it is [phone].      Sincerely,      Pat Dewitt APRN-CNS  Thoracic Surgery

## 2024-04-19 NOTE — ANESTHESIA POSTPROCEDURE EVALUATION
Patient: Lopez Hogue    Procedure: Procedure(s):  Laparoscopic and right thoracoscopic esophagogastrectomy, right AND left chest tube placement  Bronchoscopy flexible and rigid  Esophagoscopy, gastroscopy, duodenoscopy (EGD), combined       Anesthesia Type:  General    Note:  Disposition: Admission   Postop Pain Control: Challenging            Challenges/Interventions: Multimodal therapy; Acute Pain   PONV: No   Neuro/Psych: Uneventful            Sign Out: Acceptable/Baseline neuro status   Airway/Respiratory: Uneventful            Sign Out: Acceptable/Baseline resp. status   CV/Hemodynamics: Uneventful            Sign Out: Acceptable CV status; No obvious hypovolemia; No obvious fluid overload   Other NRE: NONE   DID A NON-ROUTINE EVENT OCCUR? No           Last vitals:  Vitals Value Taken Time   /67 04/19/24 1700   Temp 36.4  C (97.5  F) 04/19/24 1530   Pulse 73 04/19/24 1702   Resp 17 04/19/24 1702   SpO2 96 % 04/19/24 1702   Vitals shown include unfiled device data.    Electronically Signed By: Richard Cruz MD  April 19, 2024  5:03 PM

## 2024-04-19 NOTE — LETTER
Transition Communication Hand-off for Care Transitions to Next Level of Care Provider    Name: Lopez Hogue  : 1955  MRN #: [id number]  Primary Care Provider: Madi Ramos     Primary Clinic: [facility] [address]     Reason for Hospitalization:  Malignant neoplasm of middle third of esophagus (H) [C15.4]  Admit Date/Time: 2024  5:18 AM  Discharge Date: 24  Payor Source: Payor: MEDICA / Plan: MEDICA CHOICE / Product Type: Indemnity /   Readmission Assessment Measure (HIRA) Risk Score/category: 44%  Reason for Communication Hand-off Referral: Fragility  Discharge Plan: Home w/ Home Infusion  Concern for non-adherence with plan of care:   Y/N no  Discharge Needs Assessment:  Needs      Flowsheet Row Most Recent Value   Equipment Currently Used at Home none        Already enrolled in Tele-monitoring program and name of program:  no  Follow-up specialty is recommended: Yes    Follow-up plan:    Future Appointments   Date Time Provider Department Center   2024  9:30 AM China Benavides, SLP [facility]   2024  3:30 PM [id number]   2024  4:30 PM Pat Dewitt, APRN CNS [facility]   2024 12:30 PM Nisha Pride, APRN CNP [facility]   2024  9:30 AM Akiko Sun MD [facility]       Any outstanding tests or procedures:        Radiology & Cardiology Orders       Future Labs/Procedures Complete By Expires    XR Chest 2 Views  2024 (Approximate) 2025          Referrals       Future Labs/Procedures    Home Infusion Referral     Comments:    Home Enteral Order:  Novasource Renal (or equivalent coconut/MCT-free formula) via jejunostomy tube at 60 mL/hr x 16 hrs.  Medication - Feeding Tube Flush Frequency: At least 15-30 mL water before and after medication administration and with tube clogging  Free water flush: 60 mL before and after each feeding plus an additional 7 - 60 mL syringe flushes during the  day to meet full hydration needs.    Home Infusion Referral               Key Recommendations:      Edis Cortes RN    AVS/Discharge Summary is the source of truth; this is a helpful guide for improved communication of patient story

## 2024-04-19 NOTE — BRIEF OP NOTE
[facility]    Brief Operative Note    Pre-operative diagnosis: Malignant neoplasm of middle third of esophagus (H) [C15.4]  Post-operative diagnosis Same    Procedure: Laparoscopic and right thoracoscopic esophagogastrectomy, N/A - Chest    Surgeon: Surgeons and Role:     * Devin Hill MD - Primary     * Kevin Calderon MD - Assisting     * Cindy Garcia MD - Assisting  Anesthesia: General   Estimated Blood Loss: 50 mls    Drains: 28 Fr straight pleural tube  Specimens:   ID Type Source Tests Collected by Time Destination   1 : Gastrohepatic Lymph Node Tissue Lymph Node(s) SURGICAL PATHOLOGY EXAM Devin Hill MD 4/19/2024  9:09 AM    2 : Esophagogastrectomy Tissue Esophagus SURGICAL PATHOLOGY EXAM Devin Hill MD 4/19/2024  1:09 PM    3 : Final Gastric Margin Tissue Esophagus SURGICAL PATHOLOGY EXAM Devin Hill MD 4/19/2024  1:25 PM    4 : Final esophageal margin Tissue Esophagus SURGICAL PATHOLOGY EXAM Devin Hill MD 4/19/2024  2:40 PM      Findings:   Gastroesophagectomy with negative gastric margins  Complications: None  Implants: None

## 2024-04-19 NOTE — CONSULTS
SURGICAL ICU ADMISSION NOTE  04/19/2024      PRIMARY TEAM: Thoracic Surgery  PRIMARY PHYSICIAN: Dr. Hill  REASON FOR CRITICAL CARE ADMISSION: Post-operative mangement   ADMITTING PHYSICIAN: Dr. Farooq  Date of Service (when I saw the patient): 04/19/2024    ASSESSMENT:  Lopez Hogue is a 68 year old male who was admitted on 4/19/2024. 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 He was brought to the ICU for post-operative management.     PLAN:    Neurological:  # Hx of alcohol dependency in remission  # History of anxiety  # Acute pain   - Monitor neurological status. Delirium preventions and precautions.   - Resume PTA robaxin once able to use J tube  - Scheduled and PRN pain medications dilauded, oxy, tylenol, robaxin, GABApentin    Pulmonary:   # History of smoking  - Supplemental oxygen to keep saturation above 92 %.  - Incentive spirometer every 15- 30 minutes when awake.  - Chest tubes to water seal    Cardiovascular:    # History of HTN  # History of HLD  # History of CAD/NSTEMI s/p KAYDEN in 3/21/2023  # Hx of moderate aortic valve stenosis  - Monitor hemodynamic status.   - Will resume PTA rosuvostatin on POD1  - Transferred to ICU off pressors    Gastroenterology/Nutrition:  # s/p Esophagectomy for SCC  # Protein calorie deficit malnutrition   #s/p J-tube placement  - NG tube to low continuous suction  - Flush NG tube q4h with 30cc saline  - Tube feeds: hold tonight.  Free water: hold  - No indication for parenteral nutrition at this time    Renal/Fluids/Electrolytes:   # No baseline CKD  # No KIM  - LR@75 for IV fluid hydration  - Will continue to monitor intake and output.  - Will remove reyes on POD1 if adequate UOP    Endocrine:  # At risk for stress hyperglycemia   - No management indication.   - Goal to keep BG< 180 for optimal wound healing     ID:  # Post-operative Leukocytosis   - 24 hrs ancef      Positive cultures:  - none    Heme:     # Acute blood loss anemia   # Anemia of critical illness   - [id number]- Transfuse if hgb <7.0 or signs/symptoms of hypoperfusion. Monitor and trend.   - Holding plavix in the immediate post-operative period, will discuss with primary team when comfortable to resume    Musculoskeletal:   # Weakness and deconditioning of critical illness   - Physical and occupational therapy consult     General Cares/Prophylaxis:    DVT Prophylaxis: Pneumatic Compression Devices  GI Prophylaxis: PPI  Restraints: Restraints for medical healing needed: Not Applicable    Lines/ tubes/ drains:  - 3x PIV  - Arterial line  - J Tube  - Smith  - 2x Chest tubes    Disposition:  - SICU    Patient seen, findings and plan discussed with surgical ICU staff, Dr. Farooq.    Rayshawn Barraza MD  SICU  - - - - - - - - - - - - - - - - - - - - - - - - - - - - - - - - - - - - - - - - - - - - - -   HISTORY PRESENTING ILLNESS:   The patient has been followed by Dr. 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 He presented initially with hemoptysis, and work-up demonstrated this cancer, and further characterized on PET. He has undergone chemoradiation with carboplatin/taxol starting 12/5/2023, and completed radiation 1/26/2024. He had a J-tube insertion on 4/1/2024. He follows ENT for his vellecula SCC.     REVIEW OF SYSTEMS: 10 point ROS neg other than the symptoms noted above in the HPI.    PAST MEDICAL HISTORY:   Past Medical History:   Diagnosis Date    Anxiety     Aortic stenosis     CAD (coronary artery disease)     ETOH dependence     Quit drinking 10 years    Heart attack (H)     History of blood transfusion     HLD (hyperlipidemia)     Hypertension     Malignant neoplasm of middle third of esophagus (H)     Nonrheumatic aortic (valve) stenosis     Sweat gland carcinoma        SURGICAL HISTORY:   Past Surgical History:   Procedure Laterality Date    CV  CORONARY ANGIOGRAM N/A 3/27/2023    Procedure: Coronary Angiogram;  Surgeon: Miki Etienne MD;  Location: [facility] CATH LAB CV    CV CORONARY ANGIOGRAM N/A 5/9/2023    Procedure: Coronary Angiogram;  Surgeon: Miki Etienne MD;  Location: [facility] CATH LAB CV    CV LEFT HEART CATH N/A 3/27/2023    Procedure: Left Heart Catheterization;  Surgeon: Miki Etienne MD;  Location: [facility] CATH LAB CV    CV LEFT HEART CATH N/A 5/9/2023    Procedure: Left Heart Catheterization;  Surgeon: Miki Etienne MD;  Location: [facility] CATH LAB CV    CV PCI N/A 3/27/2023    Procedure: Percutaneous Coronary Intervention;  Surgeon: Miki Etienne MD;  Location: [facility] CATH LAB CV    ENDOSCOPIC ULTRASOUND UPPER GASTROINTESTINAL TRACT (GI) N/A 11/28/2023    Procedure: Endoscopic ultrasound upper gastrointestinal tract (GI);  Surgeon: Shahriar Giraldo MD;  Location: [facility] GI    ESOPHAGOSCOPY, GASTROSCOPY, DUODENOSCOPY (EGD), COMBINED N/A 11/8/2023    Procedure: ESOPHAGOGASTRODUODENOSCOPY, WITH BIOPSY;  Surgeon: Shahriar Giraldo MD;  Location: [facility] GI    LAPAROSCOPIC ASSISTED INSERTION TUBE JEJUNOSTOMY N/A 4/1/2024    Procedure: Laparoscopic Jejunostomy Tube Insertion and Esophagogastroduodenoscopy;  Surgeon: Kevin Calderon MD;  Location: [facility] OR    LARYNGOSCOPY WITH BIOPSY(IES) N/A 10/12/2023    Procedure: LARYNGOSCOPY, WITH BIOPSY;  Surgeon: Edi Felix MD;  Location: [facility] OR    OTHER SURGICAL HISTORY  2015    WIDE EXCISION OF LEFT GLUTEAL MASSTNM: [id number], stage: II hidradenocarcinoma Grade II, 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, sentinel lymph node biopsy negative        SOCIAL HISTORY:   Social History     Socioeconomic History    Marital status:      Spouse name: None    Number of children: 2    Years of education: None    Highest education level: None   Occupational History    Occupation: non-profit manager   Tobacco Use    Smoking status: Former     Current packs/day: 0.00     Average packs/day: 2.0  packs/day for 30.0 years (60.0 ttl pk-yrs)     Types: Cigarettes     Start date:      Quit date:      Years since quittin.3     Passive exposure: Past    Smokeless tobacco: Never    Tobacco comments:     Quit 10 years ago   Vaping Use    Vaping status: Never Used   Substance and Sexual Activity    Alcohol use: Not Currently     Comment: quit in 2013    Drug use: Never   Social History Narrative    Patient works.  Lives with his wife.     Social Determinants of Health      [facility] [employer] [facility]    Financial Resource Strain    Received from [facility] [employer] [facility], [facility] [employer] [facility]    Social Connections     FAMILY HISTORY: No bleeding/clotting disorders nor problems with anesthesia.     ALLERGIES:   Allergies   Allergen Reactions    Coconut Flavor Anaphylaxis     Raw coconut       MEDICATIONS:  Current Facility-Administered Medications   Medication Dose Route Frequency Provider Last Rate Last Admin    acetaminophen (TYLENOL) tablet 975 mg  975 mg Oral Once Chaparro Jimenez MD        botulinum toxin type A (BOTOX) 100 units injection 200 Units  200 Units Intramuscular Once Devin Hill MD        Botulinum Toxin Type A (BOTOX) 200 units injection    PRN Devin Hill MD   200 Units at 24 1100    BUPivacaine 0.25 % - EPINEPHrine 1:200,000 injection    PRN Devin Hill MD   7 mL at 24 1130    ceFAZolin Sodium (ANCEF) injection 2 g  2 g Intravenous See Admin Instructions Devin Hill MD        ceFAZolin Sodium (ANCEF) injection 2 g  2 g Intravenous Q8H Cindy Garcia MD        fentaNYL (PF) (SUBLIMAZE) injection 25 mcg  25 mcg Intravenous Q5 Min PRN Chaparro Jimenez MD        fentaNYL (PF) (SUBLIMAZE) injection 50 mcg  50 mcg Intravenous Q5 Min PRN Chaparro Jimenez MD   50 mcg at 24 1554    HYDROmorphone (PF) (DILAUDID) injection 0.2 mg  0.2 mg  Intravenous Q5 Min PRN Chaparro Jimenez MD   0.2 mg at 04/19/24 1613    HYDROmorphone (PF) (DILAUDID) injection 0.4 mg  0.4 mg Intravenous Q5 Min PRN Chaparro Jimenez MD        lactated ringers infusion   Intravenous Continuous Chaparro Jimenez  mL/hr at 04/19/24 1626 New Bag at 04/19/24 1626    lidocaine (LMX4) cream   Topical Q1H PRN Chaparro Jimenez MD        lidocaine (LMX4) cream   Topical Q1H PRN Devin Hill MD        lidocaine 1 % 0.1-1 mL  0.1-1 mL Other Q1H PRN Chaparro Jimenez MD        lidocaine 1 % 0.1-1 mL  0.1-1 mL Other Q1H PRN Devin Hill MD        naloxone (NARCAN) injection 0.1 mg  0.1 mg Intravenous Q2 Min PRN Chaparro Jimenez MD        ondansetron (ZOFRAN ODT) ODT tab 4 mg  4 mg Oral Q30 Min PRN Chaparro Jimenez MD        Or    ondansetron (ZOFRAN) injection 4 mg  4 mg Intravenous Q30 Min PRN Chaparro Jimenez MD        prochlorperazine (COMPAZINE) injection 5 mg  5 mg Intravenous Q6H PRN Chaparro Jimenez MD        [START ON 4/20/2024] sertraline (ZOLOFT) tablet 100 mg  100 mg Oral Cindy Raymundo MD        sodium chloride (PF) 0.9% PF flush 3 mL  3 mL Intracatheter Q8H Chaparro Jimenez MD        sodium chloride (PF) 0.9% PF flush 3 mL  3 mL Intracatheter q1 min prn Chaparro Jimenez MD        sodium chloride (PF) 0.9% PF flush 3 mL  3 mL Intracatheter Q8H Devin Hill MD        sodium chloride (PF) 0.9% PF flush 3 mL  3 mL Intracatheter q1 min prn Devin Hill MD         Facility-Administered Medications Ordered in Other Encounters   Medication Dose Route Frequency Provider Last Rate Last Admin    iodixanol (VISIPAQUE 320) injection    Once PRN Miki Etienne MD   73 mL at 05/09/23 1212       PHYSICAL EXAMINATION:  Temp:  [97.5  F (36.4  C)-98.3  F (36.8  C)] 97.5  F (36.4  C)  Pulse:  [72] 72  Resp:  [16] 16  BP: (127)/(77) 127/77  SpO2:  [100 %] 100 %  General: alert and conversational adult male in NAD  HEENT: Atraumatic, NG tube in place draining gastric contents.     Neuro: A&Ox3, NAD  Pulm/Resp: Clear breath sounds bilaterally without rhonchi, crackles or wheeze,  breathing non-labored on 3LNC. Bilateral chest tubes draining serosanguinous fluid  CV: RRR on monitor and by palpation  Abdomen: Soft, non-distended, non-tender, no rebound tenderness or guarding, no masses. J-tube in place  :  reyes catheter in place, urine yellow and clear  Incisions/Skin: c/d/I covered with dressings without strikethorugh  MSK/Extremities: no significant peripheral edema, moving all extremities, extremities well perfused    LABS: Reviewed.   Arterial Blood Gases   Recent Labs   Lab 04/19/24  1354 04/19/24  1232 04/19/24  0944   PH 7.26* 7.21* 7.29*   PCO2 57* 65* 53*   PO2 85 83 197*   HCO3 26 26 25     Complete Blood Count   Recent Labs   Lab 04/19/24  1552 04/19/24  1354 04/19/24  1232 04/19/24  0944   WBC 15.3*  --   --   --    HGB 11.7* 12.4* 12.7* 12.5*     --   --   --      Basic Metabolic Panel  Recent Labs   Lab 04/19/24  1552 04/19/24  1354 04/19/24  1232 04/19/24  0944    139 141 140   POTASSIUM 4.8 4.7 4.5 4.4   CHLORIDE 104  --   --   --    CO2 23  --   --   --    BUN 17.7  --   --   --    CR 0.77  --   --   --    * 164* 161* 153*     Liver Function Tests  No lab results found in last 7 days.  Pancreatic Enzymes  No lab results found in last 7 days.  Coagulation Profile  No lab results found in last 7 days.    IMAGING:  Recent Results (from the past 24 hour(s))   XR Chest Port 1 View    Narrative    Portable chest    INDICATION: Post minimally invasive esophagectomy    COMPARISON: Chest CT 3/12/2024    FINDINGS: Heart size normal. Atherosclerotic calcification of the  aortic knob. Enteric tube is with side hole at the mid chest and tip  approximately 8-9 cm above the diaphragmatic hiatus in this  esophagectomy patient. Bilateral thoracostomy tubes. Small right  pneumothorax at the apex. Trace left pneumothorax at the apex.      Impression    IMPRESSION:  Esophagectomy with small bilateral pneumothoraces    JOHN CASTILLO MD         SYSTEM ID:  [id number]

## 2024-04-19 NOTE — ANESTHESIA PROCEDURE NOTES
Arterial Line Procedure Note    Pre-Procedure   Staff -        Anesthesiologist:  Citlaly Gunter MD       Resident/Fellow: Chaparro Jimenez MD       Performed By: resident and with residents       Procedure performed by resident/fellow/CRNA in presence of a teaching physician.         Location: OR       Pre-Anesthestic Checklist: patient identified, IV checked, risks and benefits discussed, informed consent, monitors and equipment checked, pre-op evaluation and at physician/surgeon's request  Timeout:       Correct Patient: Yes        Correct Procedure: Yes        Correct Site: Yes        Correct Position: Yes   Line Placement:   This line was placed Pre Induction starting at 4/19/2024 7:00 AM and ending at 4/19/2024 7:10 AM  Procedure   Procedure: arterial line and elective       Diagnosis: Blood Pressure Monitoring and/or Frequent Lab Draws       Laterality: left       Insertion Site: radial.  Sterile Prep        Standard elements of sterile barrier followed       Skin prep: Chloraprep  Insertion/Injection        Technique: Jaime's test completed, Seldinger Technique and ultrasound guided        1. Ultrasound was used to evaluate the access site.       2. Artery evaluated via ultrasound for patency/adequacy.       3. Using real-time ultrasound the needle/catheter was observed entering the artery/vein.       Catheter Type/Size: 20 G, 12 cm  Narrative        Tegaderm dressing used.       Complications: None apparent,        Arterial waveform: Yes        IBP within 10% of NIBP: Yes   Comments:  Routine arterial line placement without complications.

## 2024-04-19 NOTE — ANESTHESIA PROCEDURE NOTES
Airway       Patient location during procedure: OR       Procedure Start/Stop Times: 4/19/2024 7:50 AM  Staff -        Resident/Fellow: Chaparro Jimenez MD       Performed By: resident  Consent for Airway        Urgency: elective  Indications and Patient Condition       Indications for airway management: geneva-procedural and airway protection       Induction type:intravenous       Mask difficulty assessment: 1 - vent by mask    Final Airway Details       Final airway type: endotracheal airway       Successful airway: ETT - double lumen left  Endotracheal Airway Details        Cuffed: yes       Successful intubation technique: video laryngoscopy       VL Blade Size: Glidescope 3       Grade View of Cords: 1       Adjucts: stylet       Position: Left       Measured from: gums/teeth       Secured at (cm): 31       Bite block used: None       ETT Double lumen (fr): 39    Post intubation assessment        Placement verified by: capnometry, equal breath sounds and chest rise        Number of attempts at approach: 1       Number of other approaches attempted: 0       Secured with: pink tape       Ease of procedure: easy       Dentition: Unchanged and Intact    Medication(s) Administered   Medication Administration Time: 4/19/2024 7:50 AM

## 2024-04-19 NOTE — PROVIDER NOTIFICATION
Pt concerned chest pain cardiac related. Dr. KAREL Cruz notified and at bedside talking with and reassuring pt that pain is surgically related.

## 2024-04-20 ENCOUNTER — APPOINTMENT (OUTPATIENT)
Dept: GENERAL RADIOLOGY | Facility: CLINIC | Age: 69
DRG: 326 | End: 2024-04-20
Attending: STUDENT IN AN ORGANIZED HEALTH CARE EDUCATION/TRAINING PROGRAM
Payer: COMMERCIAL

## 2024-04-20 ENCOUNTER — APPOINTMENT (OUTPATIENT)
Dept: OCCUPATIONAL THERAPY | Facility: CLINIC | Age: 69
DRG: 326 | End: 2024-04-20
Attending: STUDENT IN AN ORGANIZED HEALTH CARE EDUCATION/TRAINING PROGRAM
Payer: COMMERCIAL

## 2024-04-20 LAB
ANION GAP SERPL CALCULATED.3IONS-SCNC: 11 MMOL/L (ref 7–15)
BUN SERPL-MCNC: 18.3 MG/DL (ref 8–23)
CALCIUM SERPL-MCNC: 9.2 MG/DL (ref 8.8–10.2)
CHLORIDE SERPL-SCNC: 102 MMOL/L (ref 98–107)
CREAT SERPL-MCNC: 0.77 MG/DL (ref 0.67–1.17)
CREAT SERPL-MCNC: 0.97 MG/DL (ref 0.67–1.17)
DEPRECATED HCO3 PLAS-SCNC: 23 MMOL/L (ref 22–29)
EGFRCR SERPLBLD CKD-EPI 2021: 85 ML/MIN/1.73M2
EGFRCR SERPLBLD CKD-EPI 2021: >90 ML/MIN/1.73M2
ERYTHROCYTE [DISTWIDTH] IN BLOOD BY AUTOMATED COUNT: 14.4 % (ref 10–15)
GLUCOSE BLDC GLUCOMTR-MCNC: 124 MG/DL (ref 70–99)
GLUCOSE SERPL-MCNC: 138 MG/DL (ref 70–99)
HCT VFR BLD AUTO: 34.1 % (ref 40–53)
HGB BLD-MCNC: 11.4 G/DL (ref 13.3–17.7)
MAGNESIUM SERPL-MCNC: 1.8 MG/DL (ref 1.7–2.3)
MCH RBC QN AUTO: 31.8 PG (ref 26.5–33)
MCHC RBC AUTO-ENTMCNC: 33.4 G/DL (ref 31.5–36.5)
MCV RBC AUTO: 95 FL (ref 78–100)
PHOSPHATE SERPL-MCNC: 3.9 MG/DL (ref 2.5–4.5)
PLATELET # BLD AUTO: 154 10E3/UL (ref 150–450)
POTASSIUM SERPL-SCNC: 4.4 MMOL/L (ref 3.4–5.3)
RBC # BLD AUTO: 3.59 10E6/UL (ref 4.4–5.9)
SODIUM SERPL-SCNC: 136 MMOL/L (ref 135–145)
TROPONIN T SERPL HS-MCNC: 16 NG/L
WBC # BLD AUTO: 14.3 10E3/UL (ref 4–11)

## 2024-04-20 PROCEDURE — 84484 ASSAY OF TROPONIN QUANT: CPT

## 2024-04-20 PROCEDURE — 97535 SELF CARE MNGMENT TRAINING: CPT | Mod: GO

## 2024-04-20 PROCEDURE — 80048 BASIC METABOLIC PNL TOTAL CA: CPT | Performed by: STUDENT IN AN ORGANIZED HEALTH CARE EDUCATION/TRAINING PROGRAM

## 2024-04-20 PROCEDURE — 250N000011 HC RX IP 250 OP 636: Performed by: STUDENT IN AN ORGANIZED HEALTH CARE EDUCATION/TRAINING PROGRAM

## 2024-04-20 PROCEDURE — 250N000011 HC RX IP 250 OP 636

## 2024-04-20 PROCEDURE — 120N000002 HC R&B MED SURG/OB UMMC

## 2024-04-20 PROCEDURE — 250N000013 HC RX MED GY IP 250 OP 250 PS 637: Performed by: STUDENT IN AN ORGANIZED HEALTH CARE EDUCATION/TRAINING PROGRAM

## 2024-04-20 PROCEDURE — 82565 ASSAY OF CREATININE: CPT | Performed by: STUDENT IN AN ORGANIZED HEALTH CARE EDUCATION/TRAINING PROGRAM

## 2024-04-20 PROCEDURE — 83735 ASSAY OF MAGNESIUM: CPT

## 2024-04-20 PROCEDURE — 85027 COMPLETE CBC AUTOMATED: CPT | Performed by: STUDENT IN AN ORGANIZED HEALTH CARE EDUCATION/TRAINING PROGRAM

## 2024-04-20 PROCEDURE — 71045 X-RAY EXAM CHEST 1 VIEW: CPT | Mod: 26 | Performed by: RADIOLOGY

## 2024-04-20 PROCEDURE — 71045 X-RAY EXAM CHEST 1 VIEW: CPT

## 2024-04-20 PROCEDURE — 97165 OT EVAL LOW COMPLEX 30 MIN: CPT | Mod: GO

## 2024-04-20 PROCEDURE — 250N000013 HC RX MED GY IP 250 OP 250 PS 637

## 2024-04-20 PROCEDURE — 93010 ELECTROCARDIOGRAM REPORT: CPT | Mod: GC | Performed by: INTERNAL MEDICINE

## 2024-04-20 PROCEDURE — 36415 COLL VENOUS BLD VENIPUNCTURE: CPT | Performed by: STUDENT IN AN ORGANIZED HEALTH CARE EDUCATION/TRAINING PROGRAM

## 2024-04-20 PROCEDURE — 250N000013 HC RX MED GY IP 250 OP 250 PS 637: Performed by: THORACIC SURGERY (CARDIOTHORACIC VASCULAR SURGERY)

## 2024-04-20 PROCEDURE — 93005 ELECTROCARDIOGRAM TRACING: CPT

## 2024-04-20 PROCEDURE — 250N000011 HC RX IP 250 OP 636: Performed by: THORACIC SURGERY (CARDIOTHORACIC VASCULAR SURGERY)

## 2024-04-20 PROCEDURE — 258N000003 HC RX IP 258 OP 636: Performed by: STUDENT IN AN ORGANIZED HEALTH CARE EDUCATION/TRAINING PROGRAM

## 2024-04-20 PROCEDURE — 97530 THERAPEUTIC ACTIVITIES: CPT | Mod: GO

## 2024-04-20 PROCEDURE — 84100 ASSAY OF PHOSPHORUS: CPT

## 2024-04-20 RX ORDER — METHOCARBAMOL 500 MG/1
500 TABLET, FILM COATED ORAL EVERY 6 HOURS
Status: DISCONTINUED | OUTPATIENT
Start: 2024-04-20 | End: 2024-04-20

## 2024-04-20 RX ORDER — ACETAMINOPHEN 325 MG/1
975 TABLET ORAL EVERY 8 HOURS
Status: COMPLETED | OUTPATIENT
Start: 2024-04-20 | End: 2024-04-22

## 2024-04-20 RX ORDER — KETOROLAC TROMETHAMINE 15 MG/ML
15 INJECTION, SOLUTION INTRAMUSCULAR; INTRAVENOUS ONCE
Status: COMPLETED | OUTPATIENT
Start: 2024-04-20 | End: 2024-04-20

## 2024-04-20 RX ORDER — DEXTROSE MONOHYDRATE 100 MG/ML
INJECTION, SOLUTION INTRAVENOUS CONTINUOUS PRN
Status: DISCONTINUED | OUTPATIENT
Start: 2024-04-20 | End: 2024-05-17 | Stop reason: HOSPADM

## 2024-04-20 RX ORDER — ACETAMINOPHEN 325 MG/1
650 TABLET ORAL EVERY 4 HOURS PRN
Status: DISCONTINUED | OUTPATIENT
Start: 2024-04-22 | End: 2024-04-29

## 2024-04-20 RX ORDER — GABAPENTIN 100 MG/1
100 CAPSULE ORAL AT BEDTIME
Status: DISCONTINUED | OUTPATIENT
Start: 2024-04-20 | End: 2024-05-17 | Stop reason: HOSPADM

## 2024-04-20 RX ORDER — POLYETHYLENE GLYCOL 3350 17 G/17G
17 POWDER, FOR SOLUTION ORAL 2 TIMES DAILY
Status: DISCONTINUED | OUTPATIENT
Start: 2024-04-20 | End: 2024-05-17 | Stop reason: HOSPADM

## 2024-04-20 RX ORDER — SALIVA STIMULANT COMB. NO.3
1 SPRAY, NON-AEROSOL (ML) MUCOUS MEMBRANE 4 TIMES DAILY PRN
Status: DISCONTINUED | OUTPATIENT
Start: 2024-04-20 | End: 2024-05-17 | Stop reason: HOSPADM

## 2024-04-20 RX ORDER — POLYETHYLENE GLYCOL 3350 17 G/17G
17 POWDER, FOR SOLUTION ORAL 2 TIMES DAILY
Status: DISCONTINUED | OUTPATIENT
Start: 2024-04-20 | End: 2024-04-20

## 2024-04-20 RX ORDER — FAMOTIDINE 10 MG
10 TABLET ORAL 2 TIMES DAILY
Status: DISCONTINUED | OUTPATIENT
Start: 2024-04-20 | End: 2024-04-20

## 2024-04-20 RX ORDER — GUAIFENESIN 600 MG/1
15 TABLET, EXTENDED RELEASE ORAL DAILY
Status: DISCONTINUED | OUTPATIENT
Start: 2024-04-20 | End: 2024-05-17 | Stop reason: HOSPADM

## 2024-04-20 RX ORDER — METHOCARBAMOL 500 MG/1
500 TABLET, FILM COATED ORAL EVERY 6 HOURS
Status: DISCONTINUED | OUTPATIENT
Start: 2024-04-20 | End: 2024-05-17 | Stop reason: HOSPADM

## 2024-04-20 RX ORDER — AMOXICILLIN 250 MG
2 CAPSULE ORAL 2 TIMES DAILY
Status: DISCONTINUED | OUTPATIENT
Start: 2024-04-20 | End: 2024-05-17 | Stop reason: HOSPADM

## 2024-04-20 RX ORDER — FAMOTIDINE 20 MG/1
20 TABLET, FILM COATED ORAL 2 TIMES DAILY
Status: DISCONTINUED | OUTPATIENT
Start: 2024-04-20 | End: 2024-04-29

## 2024-04-20 RX ORDER — OXYCODONE HCL 5 MG/5 ML
10 SOLUTION, ORAL ORAL EVERY 4 HOURS PRN
Status: DISCONTINUED | OUTPATIENT
Start: 2024-04-20 | End: 2024-04-26

## 2024-04-20 RX ORDER — AMOXICILLIN 250 MG
2 CAPSULE ORAL 2 TIMES DAILY
Status: DISCONTINUED | OUTPATIENT
Start: 2024-04-20 | End: 2024-04-20

## 2024-04-20 RX ORDER — OXYCODONE HCL 5 MG/5 ML
5 SOLUTION, ORAL ORAL EVERY 4 HOURS PRN
Status: DISCONTINUED | OUTPATIENT
Start: 2024-04-20 | End: 2024-05-17 | Stop reason: HOSPADM

## 2024-04-20 RX ADMIN — OXYCODONE HYDROCHLORIDE 10 MG: 5 SOLUTION ORAL at 20:38

## 2024-04-20 RX ADMIN — METHOCARBAMOL 500 MG: 500 TABLET ORAL at 11:07

## 2024-04-20 RX ADMIN — Medication 15 ML: at 13:01

## 2024-04-20 RX ADMIN — MAGNESIUM HYDROXIDE 30 ML: 400 SUSPENSION ORAL at 20:46

## 2024-04-20 RX ADMIN — SODIUM CHLORIDE, POTASSIUM CHLORIDE, SODIUM LACTATE AND CALCIUM CHLORIDE: 600; 310; 30; 20 INJECTION, SOLUTION INTRAVENOUS at 06:07

## 2024-04-20 RX ADMIN — Medication 1 SPRAY: at 20:58

## 2024-04-20 RX ADMIN — ENOXAPARIN SODIUM 40 MG: 40 INJECTION SUBCUTANEOUS at 11:13

## 2024-04-20 RX ADMIN — GUAIFENESIN 200 MG: 100 SOLUTION ORAL at 13:03

## 2024-04-20 RX ADMIN — GUAIFENESIN 200 MG: 100 SOLUTION ORAL at 02:11

## 2024-04-20 RX ADMIN — DOCUSATE SODIUM 50 MG AND SENNOSIDES 8.6 MG 1 TABLET: 8.6; 5 TABLET, FILM COATED ORAL at 07:50

## 2024-04-20 RX ADMIN — HYDROMORPHONE HYDROCHLORIDE 0.2 MG: 0.2 INJECTION, SOLUTION INTRAMUSCULAR; INTRAVENOUS; SUBCUTANEOUS at 09:02

## 2024-04-20 RX ADMIN — Medication 1 SPRAY: at 16:37

## 2024-04-20 RX ADMIN — ACETAMINOPHEN 975 MG: 325 TABLET, FILM COATED ORAL at 11:21

## 2024-04-20 RX ADMIN — CEFAZOLIN SODIUM 2 G: 2 INJECTION, SOLUTION INTRAVENOUS at 11:22

## 2024-04-20 RX ADMIN — POLYETHYLENE GLYCOL 3350 17 G: 17 POWDER, FOR SOLUTION ORAL at 07:50

## 2024-04-20 RX ADMIN — OXYCODONE HYDROCHLORIDE 10 MG: 10 TABLET ORAL at 04:20

## 2024-04-20 RX ADMIN — OXYCODONE HYDROCHLORIDE 10 MG: 10 TABLET ORAL at 07:37

## 2024-04-20 RX ADMIN — ALBUTEROL SULFATE 4 PUFF: 90 AEROSOL, METERED RESPIRATORY (INHALATION) at 20:46

## 2024-04-20 RX ADMIN — OXYCODONE HYDROCHLORIDE 10 MG: 10 TABLET ORAL at 00:51

## 2024-04-20 RX ADMIN — GUAIFENESIN 200 MG: 100 SOLUTION ORAL at 07:50

## 2024-04-20 RX ADMIN — METHOCARBAMOL 500 MG: 500 TABLET ORAL at 22:12

## 2024-04-20 RX ADMIN — FAMOTIDINE 20 MG: 20 TABLET ORAL at 20:42

## 2024-04-20 RX ADMIN — KETOROLAC TROMETHAMINE 15 MG: 15 INJECTION, SOLUTION INTRAMUSCULAR; INTRAVENOUS at 09:10

## 2024-04-20 RX ADMIN — POLYETHYLENE GLYCOL 3350 17 G: 17 POWDER, FOR SOLUTION ORAL at 20:41

## 2024-04-20 RX ADMIN — GUAIFENESIN 200 MG: 100 SOLUTION ORAL at 20:46

## 2024-04-20 RX ADMIN — OXYCODONE HYDROCHLORIDE 10 MG: 5 SOLUTION ORAL at 11:21

## 2024-04-20 RX ADMIN — SENNOSIDES AND DOCUSATE SODIUM 2 TABLET: 8.6; 5 TABLET ORAL at 20:42

## 2024-04-20 RX ADMIN — SERTRALINE HYDROCHLORIDE 100 MG: 100 TABLET ORAL at 07:50

## 2024-04-20 RX ADMIN — GABAPENTIN 100 MG: 100 CAPSULE ORAL at 22:12

## 2024-04-20 RX ADMIN — METHOCARBAMOL 500 MG: 500 TABLET ORAL at 02:11

## 2024-04-20 RX ADMIN — MAGNESIUM HYDROXIDE 30 ML: 400 SUSPENSION ORAL at 11:08

## 2024-04-20 RX ADMIN — OXYCODONE HYDROCHLORIDE 10 MG: 5 SOLUTION ORAL at 16:26

## 2024-04-20 RX ADMIN — ACETAMINOPHEN 975 MG: 325 TABLET, FILM COATED ORAL at 04:18

## 2024-04-20 RX ADMIN — ALBUTEROL SULFATE 4 PUFF: 90 AEROSOL, METERED RESPIRATORY (INHALATION) at 16:37

## 2024-04-20 RX ADMIN — Medication 1 SPRAY: at 11:15

## 2024-04-20 RX ADMIN — CEFAZOLIN SODIUM 2 G: 2 INJECTION, SOLUTION INTRAVENOUS at 04:19

## 2024-04-20 RX ADMIN — ALBUTEROL SULFATE 4 PUFF: 90 AEROSOL, METERED RESPIRATORY (INHALATION) at 07:44

## 2024-04-20 RX ADMIN — METHOCARBAMOL 500 MG: 500 TABLET ORAL at 16:26

## 2024-04-20 RX ADMIN — ACETAMINOPHEN 975 MG: 325 TABLET, FILM COATED ORAL at 20:42

## 2024-04-20 RX ADMIN — ALBUTEROL SULFATE 4 PUFF: 90 AEROSOL, METERED RESPIRATORY (INHALATION) at 11:13

## 2024-04-20 ASSESSMENT — ACTIVITIES OF DAILY LIVING (ADL)
ADLS_ACUITY_SCORE: 36

## 2024-04-20 NOTE — PROGRESS NOTES
CLINICAL NUTRITION SERVICES - ASSESSMENT NOTE     Nutrition Prescription    RECOMMENDATIONS FOR MDs/PROVIDERS TO ORDER:  -Would consider discussing with patient potential allergen exposure trial to transition to different formula. Recommend trial on Monday     Malnutrition Status:    Severe malnutrition in the context of chronic illness     Recommendations already ordered by Registered Dietitian (RD):  Novasource renal @ 10 mL/hr and advance by 10 mL/hr q 8 hours to goal pending K>/=3, Mg>/=1.5, and Po4>/=2.  Novasource Renal (or equivalent) @ 50 ml/hr (1200 ml) provides 2400 kcal (35 kcal/kg), 109 g pro (1.5 g/kg), 220 g CHO, 860 ml free water, and 0 g fiber daily.   -water flush of 90 mL q 4 hours = 540 mL     Future/Additional Recommendations:  - If difficulty with rate tolerance, can consider decreasing to 40 mL/hr to still meet 100% of estimated nutrition needs   -If pt willing to try alternate formula: TwoCal HN    TwoCal HN @ 50 mL/hr (1200 mL/day) to provide 2400 kcals (25 kcal/kg/day), 101 g PRO (1.5 g/kg/day), 840 mL H2O, 263 g CHO and 6 g Fiber daily.   --To note, no MCT seen on ingredient list.     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 At that time, goal to consume 5 ensure plus per day   Fabricio and wife in room. He reported he was using a protein drink that 2 per day provided 1370 kcal. He could not remember the name but per [employer] search, it is likely ProBulk 1340 which provides 1340 kcal and 50 g protein and 277 g CHO. He  "reported he was not consume ensure at all.   To note, this supplement (if what patient is taking) contains Medium Chain Triglycerides.     Fabricio reported in addition to supplement he was eating up until yesterday when NPO and not have any difficulties. Confirmed J tube was placed on 4/1 and they have been flushing with water 2 times per day.     Allergy: Coconut flavor/Raw coconut. Discussed allergy with patient who reported his reaction occurred about 50 years ago, he has not had one since and has likely has something with coconut in it. Discussed due to severity of allergy reported (anaphylaxis) RD would not change allergy. This  was discussed with team.    --Possibly due to MCT in enteral nutrition products vs other ingredient coconut derived, pt is limited to renal formulas per [employer] at this time.     Fabricio reported he does have history of constipation after surgery. Has not passed gas yet       CURRENT NUTRITION ORDERS  Diet: NPO  Intake/Tolerance: NA   Enteral Access: Jejunostomy tube placed 4/1    LABS  Na 136, K+ 4.4, Mg 1.8, Po4 3.9 (WNL)  Glucose 124 (H)    MEDICATIONS  Reviewed     ANTHROPOMETRICS  Height: 172.7 cm (5' 8\")  Most Recent Weight: 68.4 kg (150 lb 12.7 oz)    IBW: 70 kg  BMI: Normal BMI  Weight History:   04/20/24 68.4 kg (150 lb 12.7 oz)   04/17/24 70.1 kg (154 lb 8 oz)   04/01/24 68 kg (150 lb)   03/26/24 72.4 kg (159 lb 11.2 oz)   03/06/24 66.2 kg (146 lb)   02/11/24 64 kg (141 lb)   01/16/24 71.2 kg (157 lb)   01/10/24 73.6 kg (162 lb 4.8 oz)   01/02/24 75.2 kg (165 lb 11.2 oz)   12/27/23 78.2 kg (172 lb 6.4 oz)   12/26/23 79.4 kg (175 lb)   14% weight loss in 4 months     Dosing Weight: 68.4 kg    ASSESSED NUTRITION NEEDS  Estimated Energy Needs: 1410-1530-1650 kcals/day (25 - 35 kcals/kg)  Justification: Maintenance  Estimated Protein Needs:  grams protein/day (1.2 - 1.5 grams of pro/kg)  Justification: Increased needs  Estimated Fluid Needs: 4382-1184 mL/day (25 - 35 mL/kg) "   Justification: Maintenance    PHYSICAL FINDINGS  See malnutrition section below.    MALNUTRITION  % Intake: Decreased intake does not meet criteria (with supplement, able to gain weight from lowest weight)   % Weight Loss: > 7.5% in 3 months (severe)  Subcutaneous Fat Loss: Facial region:  mild   Muscle Loss: Temporal:  moderate, Thoracic region (clavicle, acromium bone, deltoid, trapezius, pectoral):  severe, and Upper arm (bicep, tricep):  moderate  Fluid Accumulation/Edema: None noted  Malnutrition Diagnosis: Severe malnutrition in the context of chronic illness     NUTRITION DIAGNOSIS  Inadequate oral intake related to esophagectomy as evidenced by plan to initiate TF via jejunostomy       INTERVENTIONS  Implementation  Nutrition Education: RD role in care, formula rational   -Discussed coconut allergy and inability to provide all formula due to MCT ingredient derived from Coconut    Collaboration with other providers- primary team   Enteral Nutrition - Initiate  Feeding tube flush     Goals  Total avg nutritional intake to meet a minimum of 25 kcal/kg and 1.5 g PRO/kg daily (per dosing wt 68.4 kg).     Monitoring/Evaluation  Progress toward goals will be monitored and evaluated per protocol.    Ingrid Henriquez RD, LD  Available on [employer]:   5c clinical Dietitian (Monday-Friday)  Weekend Clinical dietitian (Saturday-Sunday)    Clinical Nutrition will no longer be available via paging system.

## 2024-04-20 NOTE — PLAN OF CARE
VSS. Pain managed with schedule robaxin, tylenol and prn oxycodone q 4 hrs. Up with assist. Strict NPO. NGT to low continuous suction with brown output. Faint bowel sounds. - flatus or bm. Voiding via urinal. Abdominal lap sites x 4 with primapore dressing. 2L via nasal cannula. Continue to encourage pulmonary toileting. R lateral chest lap sites x2 with primapore dressing. Bilateral CT to waterseal. R chest tube has a small known airleak. L chest tube has a small known intermittent leak. TF started J tube at 10 ml/hr with 90 ml q 4 hrs. Increase to 20 ml/hr at 2100. Worked with OT today. Sat in chair for the morning.  Report given to . No further questions. Patient transferred to  at 1525 with his belongings.         Goal Outcome Evaluation:      Plan of Care Reviewed With: patient    Overall Patient Progress: improvingOverall Patient Progress: improving

## 2024-04-20 NOTE — PHARMACY-ADMISSION MEDICATION HISTORY
Pharmacy Intern Admission Medication History    Admission medication history is complete. The information provided in this note is only as accurate as the sources available at the time of the update.    Information Source(s): Patient and CareEverywhere/SureScripts via in-person    Pertinent Information:   Patient was a good medication historian and managed medications on their own pta  Confirmed with pt no longer using butrans patch, doxepin soln, magic mouthwash, oxycodone tabs. Not taking aspirin 81 mg at home  Plavix has been on hold since 3/25/2024    Changes made to PTA medication list:  Added: None  Deleted:   Vitamin B12. Pt stopped >1 month ago of own accord  Changed: None    Allergies reviewed with patient and updates made in EHR: yes    Medication History Completed By: Jama Schmitz 4/20/2024 2:22 PM    Medications Prior to Admission   Medication Sig Dispense Refill Last Dose    acetaminophen (TYLENOL) 500 MG tablet Take 500-1,000 mg by mouth every 8 hours as needed for fever or pain   4/19/2024 at 0200    famotidine (PEPCID) 20 MG tablet Take 1 tablet (20 mg) by mouth 2 times daily 60 tablet 3 4/18/2024 at 0800    nitroGLYcerin (NITROSTAT) 0.4 MG sublingual tablet PLACE 1 TABLET UNDER THE TONGUE EVERY 5 MINUTES FOR CHEST PAIN FOR 3 DOSES. IF SYMPTOMS PERSIST 5 MINUTES AFTER 1ST DOSE CALL 911. (Patient taking differently: 0.4 mg every 5 minutes as needed PLACE 1 TABLET UNDER THE TONGUE EVERY 5 MINUTES FOR CHEST PAIN FOR 3 DOSES. IF SYMPTOMS PERSIST 5 MINUTES AFTER 1ST DOSE CALL 911) 25 tablet 1 More than a month    polyethylene glycol (MIRALAX) 17 g packet Take 1 packet by mouth daily   4/18/2024 at 0800    rosuvastatin (CRESTOR) 40 MG tablet TAKE 1 TABLET(40 MG) BY MOUTH DAILY (Patient taking differently: Take 40 mg by mouth every morning) 90 tablet 1 4/19/2024 at 0400    senna-docusate (SENOKOT-S/PERICOLACE) 8.6-50 MG tablet Take 1 tablet by mouth 2 times daily as needed for constipation 30 tablet 0  4/17/2024    sertraline (ZOLOFT) 100 MG tablet Take 100 mg by mouth every morning   4/19/2024 at 0500    sodium fluoride dental gel (PREVIDENT) 1.1 % GEL topical gel daily   4/18/2024 at 2100    clopidogrel (PLAVIX) 75 MG tablet Take 1 tablet (75 mg) by mouth daily (Patient not taking: Reported on 4/17/2024) 90 tablet 2 3/25/2024    methocarbamol (ROBAXIN) 500 MG tablet Take 1 tablet (500 mg) by mouth every 6 hours as needed for muscle spasms 30 tablet 0 4/15/2024

## 2024-04-20 NOTE — PROGRESS NOTES
"Thoracic Surgery  [facility]  4/20/2024    Subjective:  No acute events overnight. Pain well controlled this AM, minimal IV needs. NPO, no nausea, vomiting. No bowel function yet, reyes removed, trial of void.    Objective  /79   Pulse 82   Temp 97.5  F (36.4  C) (Oral)   Resp 20   Ht 1.727 m (5' 8\")   Wt 68.4 kg (150 lb 12.7 oz)   SpO2 96%   BMI 22.93 kg/m      GEN: No acute distress, resting comfortably  NEURO: Alert and oriented  PULM: NLB on 2L NC, no wheezing, no stridor, no respiratory distress  CV: RRR  GI: soft, nontender, nondistended. J tube in place.   EXT: wwp  INCISIONS: CDI    Labs:  Na: 136 from 138  K: 4.4 from 4.8    WBC: 14.3 from 15.3  HGB: 11.4 from 11.7    Imaging:  AM CXR: slight increase in small bilateral pneumothoraces with bilateral edema    Tubes & Drains:   Left Chest Tube: 104mL plus 14mL since mn  Right Chest Tube: 122mL plus 198mL since mn   Jejunostomy tube    A/P:    68M with SCC of esophagus who is now POD1 s/p laparoscopic and right thoracoscopic gastroesophagectomy on 4/19 with Dr. Hill. Monitored in SICU overnight and did very well. Appropriate for IM level care today.    - Strict NPO, start TF via jejunostomy at 10cc/h today, OK to advance rate by 10cc/hr every 8 hours  - Nutrition consult placed, appreciate expertise  - Biotene spray for dry mouth  - Strict NPO  - Bowel regimen increased, prior hx of opioid-induced constipation  - Continue to hold PTA plavix  - Agree with transfer to IM  - Appreciate SICU team cares    Patient seen on rounds with fellow, Dr. Colvin, who will discuss with staff.    Gerald Garcia MD  General Surgery, PGY-1  [phone]    "

## 2024-04-20 NOTE — PLAN OF CARE
Admitted/transferred from: PACU @ 1743  Reason for admission/transfer: thoracic surgery  2 RN skin assessment: completed by: Rhea JAVIER & Khloe/Claire  Result of skin assessment and interventions/actions: 5x lap sites, 2x CT sites, NG tube, PEJ tube, sacral mepilex in place, no interventions  Height, weight, drug calc weight: Not Done  Patient belongings (see Flowsheet): in room  MDRO education added to care plan: N/A      Pt arrived from PACU @ 1745. Vital signs stable, on 2 L NC. A&Ox4, complaining of pain. NG tube to continuous low suction, no meds through tube. Meds through PEJ tube. Bilateral chest tubes to water seal, air leak noted. SICU resident aware.   ?

## 2024-04-20 NOTE — PROGRESS NOTES
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 Received radiation (5040Gy to esophagus and 7000 Gy to neck) and chemotherapy (carboplatin & taxol). S/p J tube placement 04/1/2024. Now s/p esophagogastrectomy and brought to ICU for post-operative management.        Today's Changes:  - Robaxin changed from prn to scheduled  - 1 x toradol   - EKG  - Start TF @10 ml/hour  - Decrease maintenance fluid to 50 ml/hour  - discontinue arterial line  - LR TKO when taking PO  - Toradol 15mg once  - Robaxin scheduled  - Floor      Plan: Critical Care   Neuro/ pain/ sedation:  # Hx of alcohol dependency in remission  # Hx of anxiety  - Monitor neurological status. Notify provider for any acute changes in exam  - Pain   - Tylenol 975 q8h per J tube   - Robaxin 500 mg q6h per J tube   - Dilaudid 0.2-0.4 q2hr IV PRN   - Oxycodone 5-10 mg q4h per J tube PRN    - Gabapentin 100 mg at night    - Gave one time dose Toradol 15 mg IV for pleuritic chest pain   - Zoloft 100 mg daily for anxiety     Pulmonary:  #H/o smoking  # Bilateral pneumothoraces   # Post-operative bilateral chest tubes   - Supplemental oxygen at 2L to keep saturation about 92%  - Chest tubes to water seal   - R: 268 mL output 4/20 @ 11am   - L: 34 mL output 4/20 @ 11am   - CXR 4/20: mild increase in small bilateral pneumothoraces, increased opacities   - Incentive spirometer q1h when awake for atelectasis prevention  - Albuterol 4 puffs QID  - Robitussin 200 mg per J tube q6h    Cardiovascular:  # Pleuritic chest pain  # History of HTN  # History of HLD  # History  of CAD/NSTEMI s/p KAYDEN in 3/21/2023  # Hx of moderate aortic valve stenosis   - Monitor hemodynamic status.   - Incident of pleuritic chest pain on 4/20 at 0730, initially resolved with 10 mg oxycodone, recurred 1hr later   - CAD ruled out with EKG w/out significant findings and troponin wnl   - Manage pain with PRNs  - OK to resume PTA rosuvastatin today     GI/Nutrition:  # S/p Esophagectomy for SCC  # Protein calorie deficit malnutrition  #S/p J-tube placement 4/1/2024 date  - NG tube to low continuous suction  - Flush NG tube q4h with 30cc saline  - start tube feeds at 10 ml/hr all day  - Starting PTA pepcid 10 mg BID per J tube    Renal/ Fluid Balance:   #No baseline CKD  #No KIM   - Will monitor intake and output  - Reduce LR to 50 ml/hr for IV fluid hydration given starting tube feeds   - ICU electrolyte replacement protocol   - Smith removed today     Endocrine:  #Risk of stress hyperglycemia  - Goal to keep BG < 180 for optimal wound healing   - No management indicated at this time    ID:  #Post-operative Leukocytosis  - Receiving 24 hours of ancef, last dose at noon today   - Daily CBC     Hematology:  #Acute blood loss anemia  #Anemia of critical illness  - Intra-operative blood loss of 50 ml  - Hemoglobin 11.4 - Transfuse if Hgb < 7.0 or signs/sxs of hypoperfusion.   - Daily CBC  - Holding plavix in immediate post-operative period  - Lovenox daily    MSK:   - PT and OT consulted. Appreciate recommendations.       Lines/ tubes/ drains:  Smith Catheter: Not present  Lines: PRESENT      Arterial Line 04/19/24 Radial-Site Assessment: WDL        Prophylaxis:  - DVT Prophylaxis: Enoxaparin (Lovenox) SQ  - PUD Prophylaxis: PTA pepcid    Code Status:  Full code    Disposition:  - [facility] [place]    The patient's care was discussed with the surgical ICU staff, Dr. Farooq    Note co-written by Natalie See, MS4.     Nikolas Sawyer MD  SICU intern resident    Clinically Significant Risk Factors                  # Hypertension:  Noted on problem list            # Financial/Environmental Concerns:                _____________________________________________________________________    Interval History   NAEO. Oxygenating well on 1L NC. Abdominal pain overnight resolved with PRN oxycodone. Alert and oriented, moving all extremities. Some chest pain this AM. EKG ordered, which shows sinus rhythm.     Physical Exam   Vital Signs: Temp: 97.5  F (36.4  C) Temp src: Oral BP: 129/79 Pulse: 82   Resp: 20 SpO2: 96 % O2 Device: Nasal cannula Oxygen Delivery: 2 LPM  Weight: 150 lbs 12.71 oz  General Appearance: Alert and oriented   Respiratory: 2L NC, bilateral chest tubes   Cardiovascular: Regular rate and rhythm on monitor  GI: Soft, appropriately tender to palpation. Non-distended  Skin: Incisions covered with dressings    Data   I reviewed all medications, new labs and imaging results over the last 24 hours.  Arterial Blood Gases   Recent Labs   Lab 04/19/24  1354 04/19/24  1232 04/19/24  0944   PH 7.26* 7.21* 7.29*   PCO2 57* 65* 53*   PO2 85 83 197*   HCO3 26 26 25       Complete Blood Count   Recent Labs   Lab 04/20/24  0322 04/19/24  1552 04/19/24  1354 04/19/24  1232   WBC 14.3* 15.3*  --   --    HGB 11.4* 11.7* 12.4* 12.7*    155  --   --        Basic Metabolic Panel  Recent Labs   Lab 04/20/24  0323 04/20/24  0322 04/19/24  1750 04/19/24  1552 04/19/24  1354 04/19/24  1232   NA  --  136  --  138 139 141   POTASSIUM  --  4.4  --  4.8 4.7 4.5   CHLORIDE  --  102  --  104  --   --    CO2  --  23  --  23  --   --    BUN  --  18.3  --  17.7  --   --    CR  --  0.77  --  0.77  --   --    * 138* 161* 166* 164* 161*       Liver Function Tests  No lab results found in last 7 days.    Pancreatic Enzymes  No lab results found in last 7 days.    Coagulation Profile  No lab results found in last 7 days.    IMAGING:  Recent Results (from the past 24 hour(s))   XR Chest Port 1 View    Narrative    Portable chest    INDICATION: Post minimally  invasive esophagectomy    COMPARISON: Chest CT 3/12/2024    FINDINGS: Heart size normal. Atherosclerotic calcification of the  aortic knob. Enteric tube is with side hole at the mid chest and tip  approximately 8-9 cm above the diaphragmatic hiatus in this  esophagectomy patient. Bilateral thoracostomy tubes. Small right  pneumothorax at the apex. Trace left pneumothorax at the apex.      Impression    IMPRESSION: Esophagectomy with small bilateral pneumothoraces    JOHN CASTILLO MD         SYSTEM ID:  [id number]

## 2024-04-20 NOTE — PLAN OF CARE
Major Shift Events:  Patient alert, oriented, able to put call light on appropriately.   Pain controlled with oxycodone, tylenol, robaxin, and gabapentin. Assists with turns, mobility improving.  HR 70-80s. No evidence of atrial fib.   MAPs > 65.  SBP > 90.  On LR at 75 ml/hr for hydration.   Patient on 2 liters FiO2 nc.  Dry cough, using I.S. to 1250 ml/breath.  Bilateral CTs to water seal.  Right CT with  Intermittent air leak, also leaks around site.   Rt 310 mls out,  Lt  118 mls out.   CXR completed.  NG sutured, to LCS, PEG/J tube clamped, using for po meds.  Bowel stool softeners began due to history of constipation.  Remains NPO with no ice chips or swabs.    Reyes in place with at least 75 ml/hr urine,    Plan: Will pull reyes at 0600.   Increase activity as able.   Nutrition?  For vital signs and complete assessments, please see documentation flowsheets.

## 2024-04-20 NOTE — PLAN OF CARE
Goal Outcome Evaluation:      Plan of Care Reviewed With: patient, spouse    Overall Patient Progress: improvingOverall Patient Progress: improving    Outcome Evaluation: Starting entearl nutrition today

## 2024-04-20 NOTE — PROGRESS NOTES
Admitted/transferred from:     2 RN full skin assessment completed by Chichi Pratt RN and Bakari Mulligan     Skin assessment finding: overall skin is bruised, dry but intact. 4 abdominal lap sites covered w/ Primapore dressing.    Bilateral chest tubes to water seal (R chest tube w/ a small airleak.  And L chest tube w/ a small intermittent leak), J-tube infusing tube feed at 10 mL/hr, NG tube to low continuous suction     Interventions/actions: none     Will continue to monitor.

## 2024-04-20 NOTE — PROGRESS NOTES
04/20/24 1300   Appointment Info   Signing Clinician's Name / Credentials (OT) Leny Reid OTR/L   Living Environment   People in Home spouse   Current Living Arrangements house   Home Accessibility stairs to enter home;stairs within home   Number of Stairs, Main Entrance 5   Stair Railings, Main Entrance railing on left side (ascending)   Number of Stairs, Within Home, Primary greater than 10 stairs   Stair Railings, Within Home, Primary railing on left side (ascending)   Transportation Anticipated family or friend will provide   Living Environment Comments Pt lives in a town house that is split level. Needs to complete stair to get to bathroom and bedroom.   Self-Care   Usual Activity Tolerance excellent   Current Activity Tolerance good   Equipment Currently Used at Home none   Fall history within last six months no   Activity/Exercise/Self-Care Comment Pt reports PLOF as IND with all cares and mobility. Pt very active at baseline.   General Information   Onset of Illness/Injury or Date of Surgery 04/19/24   Referring Physician Cindy Garcia MD   Patient/Family Therapy Goal Statement (OT) Return home   Left Upper Extremity (Weight-bearing Status) full weight-bearing (FWB)   Right Upper Extremity (Weight-bearing Status) full weight-bearing (FWB)   Left Lower Extremity (Weight-bearing Status) full weight-bearing (FWB)   Right Lower Extremity (Weight-bearing Status) full weight-bearing (FWB)   Cognitive Status Examination   Orientation Status orientation to person, place and time   Affect/Mental Status (Cognitive) WNL   Follows Commands WNL   Visual Perception   Visual Impairment/Limitations corrective lenses full-time   Sensory   Sensory Quick Adds sensation intact   Pain Assessment   Patient Currently in Pain Yes, see Vital Sign flowsheet   Posture   Posture not impaired   Range of Motion Comprehensive   General Range of Motion no range of motion deficits identified   Strength Comprehensive (MMT)   General  Manual Muscle Testing (MMT) Assessment no strength deficits identified   Coordination   Upper Extremity Coordination No deficits were identified   Bed Mobility   Bed Mobility supine-sit   Comment (Bed Mobility) min A   Transfers   Transfers sit-stand transfer;bed-chair transfer   Transfer Comments CGA   Balance   Balance Assessment no deficits identified   Activities of Daily Living   BADL Assessment/Intervention toileting;lower body dressing;bathing   Bathing Assessment/Intervention   [place] Level (Bathing) minimum assist (75% patient effort)   Comment, (Bathing) Per clinical judgement   Lower Body Dressing Assessment/Training   Comment, (Lower Body Dressing) Per clinical judgement   [place] Level (Lower Body Dressing) moderate assist (50% patient effort)   Toileting   Comment, (Toileting) Per clinical judgement   [place] Level (Toileting) moderate assist (50% patient effort)   Clinical Impression   Criteria for Skilled Therapeutic Interventions Met (OT) Yes, treatment indicated   OT Diagnosis Decreased ADL function   OT Problem List-Impairments impacting ADL problems related to;activity tolerance impaired;mobility;pain;post-surgical precautions   Assessment of Occupational Performance 1-3 Performance Deficits   Planned Therapy Interventions (OT) ADL retraining;IADL retraining;strengthening;home program guidelines;progressive activity/exercise;transfer training   Clinical Decision Making Complexity (OT) problem focused assessment/low complexity   Risk & Benefits of therapy have been explained evaluation/treatment results reviewed;care plan/treatment goals reviewed;risks/benefits reviewed;current/potential barriers reviewed;participants voiced agreement with care plan;participants included;patient   Clinical Impression Comments Pt below baseline and will benefit from skilled OT throughout hospital stay to promote return to functional baseline and assist with d/c planning.   OT Total Evaluation Time    OT Eval, Low Complexity Minutes (62134) 10   OT Goals   Therapy Frequency (OT) 5 times/week   OT Predicted Duration/Target Date for Goal Attainment 05/10/24   OT Goals Lower Body Bathing;Lower Body Dressing;Toilet Transfer/Toileting;Home Management   OT: Lower Body Dressing Independent   OT: Lower Body Bathing Independent   OT: Toilet Transfer/Toileting Independent   OT: Home Management Independent;with light demand household tasks;ambulatory level   OT Discharge Planning   OT Plan Functional mobility assess for PT, LB dressing, standing ADLs   OT Discharge Recommendation (DC Rec) home with assist   OT Rationale for DC Rec Pt below baseline, currently limited by pain and deconitioning. Anticipate pt progressing well with therapy and being safe for d/c home with family support when medically ready.   OT Brief overview of current status CGA-min A

## 2024-04-20 NOTE — PHARMACY-CONSULT NOTE
Pharmacy Tube Feeding Consult    Medication reviewed for administration by feeding tube and for potential food/drug interactions.    Recommendation: No changes are needed at this time.     Pharmacy will continue to follow as new medications are ordered.    Julissa Chang RPH on 4/20/2024 at 11:32 AM

## 2024-04-21 ENCOUNTER — APPOINTMENT (OUTPATIENT)
Dept: OCCUPATIONAL THERAPY | Facility: CLINIC | Age: 69
DRG: 326 | End: 2024-04-21
Attending: THORACIC SURGERY (CARDIOTHORACIC VASCULAR SURGERY)
Payer: COMMERCIAL

## 2024-04-21 ENCOUNTER — APPOINTMENT (OUTPATIENT)
Dept: GENERAL RADIOLOGY | Facility: CLINIC | Age: 69
DRG: 326 | End: 2024-04-21
Attending: THORACIC SURGERY (CARDIOTHORACIC VASCULAR SURGERY)
Payer: COMMERCIAL

## 2024-04-21 LAB
ANION GAP SERPL CALCULATED.3IONS-SCNC: 7 MMOL/L (ref 7–15)
BUN SERPL-MCNC: 21.4 MG/DL (ref 8–23)
CALCIUM SERPL-MCNC: 9 MG/DL (ref 8.8–10.2)
CHLORIDE SERPL-SCNC: 101 MMOL/L (ref 98–107)
CREAT SERPL-MCNC: 0.83 MG/DL (ref 0.67–1.17)
DEPRECATED HCO3 PLAS-SCNC: 29 MMOL/L (ref 22–29)
EGFRCR SERPLBLD CKD-EPI 2021: >90 ML/MIN/1.73M2
ERYTHROCYTE [DISTWIDTH] IN BLOOD BY AUTOMATED COUNT: 14.6 % (ref 10–15)
GLUCOSE BLDC GLUCOMTR-MCNC: 125 MG/DL (ref 70–99)
GLUCOSE SERPL-MCNC: 125 MG/DL (ref 70–99)
HCT VFR BLD AUTO: 32.8 % (ref 40–53)
HGB BLD-MCNC: 10.7 G/DL (ref 13.3–17.7)
MCH RBC QN AUTO: 32.5 PG (ref 26.5–33)
MCHC RBC AUTO-ENTMCNC: 32.6 G/DL (ref 31.5–36.5)
MCV RBC AUTO: 100 FL (ref 78–100)
PLATELET # BLD AUTO: 133 10E3/UL (ref 150–450)
POTASSIUM SERPL-SCNC: 4.7 MMOL/L (ref 3.4–5.3)
RBC # BLD AUTO: 3.29 10E6/UL (ref 4.4–5.9)
SODIUM SERPL-SCNC: 137 MMOL/L (ref 135–145)
WBC # BLD AUTO: 9.8 10E3/UL (ref 4–11)

## 2024-04-21 PROCEDURE — 120N000003 HC R&B IMCU UMMC

## 2024-04-21 PROCEDURE — 36415 COLL VENOUS BLD VENIPUNCTURE: CPT | Performed by: STUDENT IN AN ORGANIZED HEALTH CARE EDUCATION/TRAINING PROGRAM

## 2024-04-21 PROCEDURE — 250N000013 HC RX MED GY IP 250 OP 250 PS 637: Performed by: THORACIC SURGERY (CARDIOTHORACIC VASCULAR SURGERY)

## 2024-04-21 PROCEDURE — 80048 BASIC METABOLIC PNL TOTAL CA: CPT | Performed by: STUDENT IN AN ORGANIZED HEALTH CARE EDUCATION/TRAINING PROGRAM

## 2024-04-21 PROCEDURE — 97535 SELF CARE MNGMENT TRAINING: CPT | Mod: GO

## 2024-04-21 PROCEDURE — 71045 X-RAY EXAM CHEST 1 VIEW: CPT

## 2024-04-21 PROCEDURE — 97530 THERAPEUTIC ACTIVITIES: CPT | Mod: GO

## 2024-04-21 PROCEDURE — 250N000013 HC RX MED GY IP 250 OP 250 PS 637: Performed by: STUDENT IN AN ORGANIZED HEALTH CARE EDUCATION/TRAINING PROGRAM

## 2024-04-21 PROCEDURE — 258N000003 HC RX IP 258 OP 636

## 2024-04-21 PROCEDURE — 250N000011 HC RX IP 250 OP 636: Performed by: STUDENT IN AN ORGANIZED HEALTH CARE EDUCATION/TRAINING PROGRAM

## 2024-04-21 PROCEDURE — 71045 X-RAY EXAM CHEST 1 VIEW: CPT | Mod: 26 | Performed by: RADIOLOGY

## 2024-04-21 PROCEDURE — 85027 COMPLETE CBC AUTOMATED: CPT | Performed by: STUDENT IN AN ORGANIZED HEALTH CARE EDUCATION/TRAINING PROGRAM

## 2024-04-21 PROCEDURE — 250N000013 HC RX MED GY IP 250 OP 250 PS 637

## 2024-04-21 PROCEDURE — 71045 X-RAY EXAM CHEST 1 VIEW: CPT | Mod: 77

## 2024-04-21 RX ORDER — BISACODYL 10 MG
10 SUPPOSITORY, RECTAL RECTAL ONCE
Status: COMPLETED | OUTPATIENT
Start: 2024-04-21 | End: 2024-04-21

## 2024-04-21 RX ORDER — BISACODYL 10 MG
10 SUPPOSITORY, RECTAL RECTAL DAILY
Status: DISCONTINUED | OUTPATIENT
Start: 2024-04-22 | End: 2024-05-17 | Stop reason: HOSPADM

## 2024-04-21 RX ADMIN — GUAIFENESIN 200 MG: 100 SOLUTION ORAL at 15:38

## 2024-04-21 RX ADMIN — GUAIFENESIN 200 MG: 100 SOLUTION ORAL at 20:44

## 2024-04-21 RX ADMIN — ALBUTEROL SULFATE 4 PUFF: 90 AEROSOL, METERED RESPIRATORY (INHALATION) at 07:52

## 2024-04-21 RX ADMIN — ACETAMINOPHEN 975 MG: 325 TABLET, FILM COATED ORAL at 11:46

## 2024-04-21 RX ADMIN — ALBUTEROL SULFATE 4 PUFF: 90 AEROSOL, METERED RESPIRATORY (INHALATION) at 21:19

## 2024-04-21 RX ADMIN — ALBUTEROL SULFATE 4 PUFF: 90 AEROSOL, METERED RESPIRATORY (INHALATION) at 15:38

## 2024-04-21 RX ADMIN — OXYCODONE HYDROCHLORIDE 10 MG: 5 SOLUTION ORAL at 04:22

## 2024-04-21 RX ADMIN — ENOXAPARIN SODIUM 40 MG: 40 INJECTION SUBCUTANEOUS at 11:46

## 2024-04-21 RX ADMIN — OXYCODONE HYDROCHLORIDE 10 MG: 5 SOLUTION ORAL at 00:26

## 2024-04-21 RX ADMIN — METHOCARBAMOL 500 MG: 500 TABLET ORAL at 22:23

## 2024-04-21 RX ADMIN — SODIUM CHLORIDE, POTASSIUM CHLORIDE, SODIUM LACTATE AND CALCIUM CHLORIDE: 600; 310; 30; 20 INJECTION, SOLUTION INTRAVENOUS at 00:09

## 2024-04-21 RX ADMIN — ACETAMINOPHEN 975 MG: 325 TABLET, FILM COATED ORAL at 03:15

## 2024-04-21 RX ADMIN — POLYETHYLENE GLYCOL 3350 17 G: 17 POWDER, FOR SOLUTION ORAL at 20:39

## 2024-04-21 RX ADMIN — GUAIFENESIN 200 MG: 100 SOLUTION ORAL at 07:52

## 2024-04-21 RX ADMIN — FAMOTIDINE 20 MG: 20 TABLET ORAL at 20:39

## 2024-04-21 RX ADMIN — MAGNESIUM HYDROXIDE 30 ML: 400 SUSPENSION ORAL at 07:52

## 2024-04-21 RX ADMIN — OXYCODONE HYDROCHLORIDE 10 MG: 5 SOLUTION ORAL at 16:36

## 2024-04-21 RX ADMIN — ACETAMINOPHEN 975 MG: 325 TABLET, FILM COATED ORAL at 20:40

## 2024-04-21 RX ADMIN — SENNOSIDES AND DOCUSATE SODIUM 2 TABLET: 8.6; 5 TABLET ORAL at 20:39

## 2024-04-21 RX ADMIN — GUAIFENESIN 200 MG: 100 SOLUTION ORAL at 03:15

## 2024-04-21 RX ADMIN — METHOCARBAMOL 500 MG: 500 TABLET ORAL at 10:10

## 2024-04-21 RX ADMIN — SODIUM CHLORIDE, POTASSIUM CHLORIDE, SODIUM LACTATE AND CALCIUM CHLORIDE: 600; 310; 30; 20 INJECTION, SOLUTION INTRAVENOUS at 18:34

## 2024-04-21 RX ADMIN — METHOCARBAMOL 500 MG: 500 TABLET ORAL at 04:21

## 2024-04-21 RX ADMIN — MAGNESIUM HYDROXIDE 30 ML: 400 SUSPENSION ORAL at 20:39

## 2024-04-21 RX ADMIN — METHOCARBAMOL 500 MG: 500 TABLET ORAL at 15:38

## 2024-04-21 RX ADMIN — SERTRALINE HYDROCHLORIDE 100 MG: 100 TABLET ORAL at 07:50

## 2024-04-21 RX ADMIN — POLYETHYLENE GLYCOL 3350 17 G: 17 POWDER, FOR SOLUTION ORAL at 07:50

## 2024-04-21 RX ADMIN — HYDROMORPHONE HYDROCHLORIDE 0.4 MG: 0.2 INJECTION, SOLUTION INTRAMUSCULAR; INTRAVENOUS; SUBCUTANEOUS at 03:20

## 2024-04-21 RX ADMIN — Medication 15 ML: at 07:50

## 2024-04-21 RX ADMIN — Medication 1 SPRAY: at 07:52

## 2024-04-21 RX ADMIN — SENNOSIDES AND DOCUSATE SODIUM 2 TABLET: 8.6; 5 TABLET ORAL at 07:49

## 2024-04-21 RX ADMIN — GABAPENTIN 100 MG: 100 CAPSULE ORAL at 22:23

## 2024-04-21 RX ADMIN — BISACODYL 10 MG: 10 SUPPOSITORY RECTAL at 10:20

## 2024-04-21 RX ADMIN — OXYCODONE HYDROCHLORIDE 10 MG: 5 SOLUTION ORAL at 12:26

## 2024-04-21 RX ADMIN — OXYCODONE HYDROCHLORIDE 10 MG: 5 SOLUTION ORAL at 08:18

## 2024-04-21 RX ADMIN — ALBUTEROL SULFATE 4 PUFF: 90 AEROSOL, METERED RESPIRATORY (INHALATION) at 12:55

## 2024-04-21 RX ADMIN — FAMOTIDINE 20 MG: 20 TABLET ORAL at 07:50

## 2024-04-21 RX ADMIN — OXYCODONE HYDROCHLORIDE 10 MG: 5 SOLUTION ORAL at 20:37

## 2024-04-21 ASSESSMENT — ACTIVITIES OF DAILY LIVING (ADL)
ADLS_ACUITY_SCORE: 33
ADLS_ACUITY_SCORE: 36
ADLS_ACUITY_SCORE: 33
ADLS_ACUITY_SCORE: 36
ADLS_ACUITY_SCORE: 33
ADLS_ACUITY_SCORE: 33
ADLS_ACUITY_SCORE: 36
ADLS_ACUITY_SCORE: 33
ADLS_ACUITY_SCORE: 36
ADLS_ACUITY_SCORE: 33
ADLS_ACUITY_SCORE: 36
ADLS_ACUITY_SCORE: 36
ADLS_ACUITY_SCORE: 33

## 2024-04-21 NOTE — PROVIDER NOTIFICATION
Surgery resident paged re: increased pain and copious amt serosang drainage from R chest tube site.  Resident at bedside to assess.   Since oxygen needs have not changed and pt is not reporting SOB, no new orders were placed.   Per resident, will defer to primary team this morning.  Dressing changed. Will continue to monitor and notify of any changes.

## 2024-04-21 NOTE — PROGRESS NOTES
"Thoracic Surgery  [facility]  4/21/2024    Subjective:  Transferred out of ICU to [facility] yesterday, did well overall. Started TF, advancing slowly and tolerating. Pain well controlled with minimal IV needs though notes pain around chest tube sites this AM, no bowel function yet, voiding independently.    Objective  /58 (BP Location: Left arm)   Pulse 75   Temp 97.3  F (36.3  C) (Oral)   Resp 18   Ht 1.727 m (5' 8\")   Wt 68.4 kg (150 lb 12.7 oz)   SpO2 94%   BMI 22.93 kg/m      GEN: No acute distress, resting comfortably  NEURO: Alert and oriented  PULM: NLB on 2L NC, no wheezing, no stridor, no respiratory distress  CV: RRR  GI: soft, nontender, nondistended. J tube in place.   EXT: wwp  INCISIONS: CDI    Labs:  Na: 137  K: 4.7    WBC: 9.8 from 14.3  HGB: 10.7 from 11.4    Imaging:  AM CXR: similar R pneumothorax    Tubes & Drains:               Left Chest Tube: 104mL plus 14mL since mn  Right Chest Tube: 122mL plus 198mL since mn  Nasogastric Tube: 100mL output               Jejunostomy tube    A/P:    68M with SCC of esophagus who is now POD2 s/p laparoscopic and right thoracoscopic gastroesophagectomy on 4/19 with Dr. Hill. Monitored in SICU overnight POD0 to 1, did very well and transferred to [facility] on 4/20.      - Strict NPO, advance TF to goal  - Nutrition consult placed, appreciate expertise  - NGT to LCS, gently flush NGT with 30 ml of room temperature water every 4 hrs  - Biotene spray for dry mouth  - Aggressive bowel regimen, prior hx of opioid-induced constipation   - Add suppository today   - Enema on 4/22 if no BM  - Chest tubes to waterseal, thoracic to remove left chest tube later today  - Continue to hold PTA plavix  - Transfer to  if possible    Patient seen on rounds with staff, Dr. Kvng Garcia MD  General Surgery, PGY-1  [phone]    "

## 2024-04-21 NOTE — PROVIDER NOTIFICATION
Thoracic Surgery paged at 0446 AM:  BRONSON Oconnor rm [id number] code status is not updated. can [facility] address that.   thanks miranda [phone]

## 2024-04-21 NOTE — PROVIDER NOTIFICATION
Time of notification: 1346 PM  Provider notified: Donnell Azul   Patient status: pt needs a code status, please update  Orders received: no response      Time of notification: 5:02 PM  Provider notified: Logan Acosta  Patient status: pt has no code status, please update   Orders received: no response

## 2024-04-21 NOTE — PLAN OF CARE
"Goal Outcome Evaluation:      Plan of Care Reviewed With: patient    Overall Patient Progress: improvingOverall Patient Progress: improving     0700 - 1300:   /67 (BP Location: Left arm)   Pulse 87   Temp 98.1  F (36.7  C) (Oral)   Resp 16   Ht 1.727 m (5' 8\")   Wt 70.7 kg (155 lb 12.8 oz)   SpO2 97%   BMI 23.69 kg/m      A&O x 4, AVSS, on cont's pulse ox on 2L NC sating at 97%.  Denies sob & N/V. L chest & abdomen pain managed with oxycodone 10 mg x 2 & scheduled tylenol & robaxin.  NPO, on TF running at 30 ml/hr, next advance at 1300.   Up sba, adequate UOP, no gas pass, no bm yet, gave suppository & scheduled miralax, milk of Mg & senokot.   Pt's wife at bedside & been very supportive & involved in care.   Plan is to transfer to [facility] per thoracic surgery team.   Continue with poc...      "

## 2024-04-21 NOTE — PLAN OF CARE
Pt A&Ox4, VSS on 2L NC. No C/O of N/V/D. C/O of chest tube site pain with drainage of serosanguinous blood leaking from right chest tube. Resident notified and came to bedside. Dressing replaced by RN. Minimal drainage from bilateral chest tubes and set to water seal. 4 abdominal lap sites that are CDI, covered with dressing. Pain managed with PRN Oxy and Dilaudid. NPO, J-tube infusing at 30ml/hr, tolerating well. NG flushed and set to low continuous suction. No flatus or BM. PIV infusing LR @50ml/hr.

## 2024-04-21 NOTE — PROVIDER NOTIFICATION
"Provider Notification     Carolann Bowden (#[id number]) (thoracic surgery resident) paged at [phone]:  \"[id number] L.R.     Hi, pt needs an updated code status.     Thanks, [phone]\"  "

## 2024-04-21 NOTE — PROGRESS NOTES
Transfer  Transferred from:  Via:wheelchair  Reason for transfer: Pt appropriate for 6B-Was initially supposed to come to our floor after ICU  Family: Aware of transfer  Belongings: Received with pt  Chart: Received with pt  Medications: Meds received from old unit with pt  Code Status verified on armband: NO (provider has been paged)  2 RN Skin Assessment Completed By: Cy RN   Med rec completed: yes  Suction/Ambu bag/Flowmeter at bedside: yes    Report received from: Johnny   Pt status: alert and stable     Skin: lap sites X5, CT L removed, R CT, R side foot (by big toe) scab, slight redness under ears with NC, NG snitch under nose redness

## 2024-04-21 NOTE — PLAN OF CARE
Neuro: A&Ox4.   Cardiac: SR. VSS. Sys 120s  Respiratory: Sating 90 on 1L NC  GI/: Adequate urine output. BM PTA-BM given in AM   Diet/appetite: NPO, TF continuous 40ml Q4 90ml FWF.Increase to 50ml at 2220 every day BG  Activity:  SBA. Walked around 6B/6A with walker  Pain: At acceptable level on current regimen. PRN oxy   Skin: No new deficits noted. Lap sites X5, CT L removed, R side foot (by big toe) scab, slight redness under ears with NC, NG snitch under nose redness   LDA's: R CT h20 seal, PIV X2, NG (low cont suction), J tube  GTT: LR 50ml continuous     Plan: Continue with POC. Notify primary team with changes  Continue with advancement of TF to goal rate of 50ml. Last change due @2220.   Encourage mobility with abd discomfort/constipation

## 2024-04-21 NOTE — PLAN OF CARE
"Goal Outcome Evaluation:    /72   Pulse 85   Temp 97.3  F (36.3  C) (Oral)   Resp 20   Ht 1.727 m (5' 8\")   Wt 68.4 kg (150 lb 12.7 oz)   SpO2 93%   BMI 22.93 kg/m        A&O x 4   Afebrile, OVSS on 2 L O2  C/o of chest tube site pain, pain was managed w/ PRN Oxy x 2 and scheduled robaxin, and tylenol   Denies n/v,  increased SOB and chest pain   4 abdominal lap sites are CDI, covered w/ primapore dressing  Bilateral chest tubes to water seal, sites are CDI  NPO, J-tube infusing tube feed at 20 mL/hr, increased at 2100   NG tube to low continuous suction   Voiding adequately using bedside urinal and w/ good UOP   - flatus and BM, senna and miralax given   A x 1 in the room and to the bathroom   PIV infusing TKO at 50 mL/hr    " 2 seconds or less

## 2024-04-22 ENCOUNTER — APPOINTMENT (OUTPATIENT)
Dept: OCCUPATIONAL THERAPY | Facility: CLINIC | Age: 69
DRG: 326 | End: 2024-04-22
Attending: THORACIC SURGERY (CARDIOTHORACIC VASCULAR SURGERY)
Payer: COMMERCIAL

## 2024-04-22 ENCOUNTER — HOME INFUSION (PRE-WILLOW HOME INFUSION) (OUTPATIENT)
Dept: PHARMACY | Facility: CLINIC | Age: 69
End: 2024-04-22

## 2024-04-22 ENCOUNTER — APPOINTMENT (OUTPATIENT)
Dept: PHYSICAL THERAPY | Facility: CLINIC | Age: 69
DRG: 326 | End: 2024-04-22
Attending: STUDENT IN AN ORGANIZED HEALTH CARE EDUCATION/TRAINING PROGRAM
Payer: COMMERCIAL

## 2024-04-22 ENCOUNTER — APPOINTMENT (OUTPATIENT)
Dept: GENERAL RADIOLOGY | Facility: CLINIC | Age: 69
DRG: 326 | End: 2024-04-22
Attending: THORACIC SURGERY (CARDIOTHORACIC VASCULAR SURGERY)
Payer: COMMERCIAL

## 2024-04-22 LAB
ANION GAP SERPL CALCULATED.3IONS-SCNC: 7 MMOL/L (ref 7–15)
ATRIAL RATE - MUSE: 73 BPM
ATRIAL RATE - MUSE: 81 BPM
BUN SERPL-MCNC: 14.5 MG/DL (ref 8–23)
CALCIUM SERPL-MCNC: 8.7 MG/DL (ref 8.8–10.2)
CHLORIDE SERPL-SCNC: 98 MMOL/L (ref 98–107)
CREAT SERPL-MCNC: 0.58 MG/DL (ref 0.67–1.17)
DEPRECATED HCO3 PLAS-SCNC: 30 MMOL/L (ref 22–29)
DIASTOLIC BLOOD PRESSURE - MUSE: NORMAL MMHG
DIASTOLIC BLOOD PRESSURE - MUSE: NORMAL MMHG
EGFRCR SERPLBLD CKD-EPI 2021: >90 ML/MIN/1.73M2
ERYTHROCYTE [DISTWIDTH] IN BLOOD BY AUTOMATED COUNT: 14.5 % (ref 10–15)
ERYTHROCYTE [DISTWIDTH] IN BLOOD BY AUTOMATED COUNT: 14.5 % (ref 10–15)
GLUCOSE SERPL-MCNC: 105 MG/DL (ref 70–99)
HCT VFR BLD AUTO: 28.5 % (ref 40–53)
HCT VFR BLD AUTO: 30.5 % (ref 40–53)
HGB BLD-MCNC: 10 G/DL (ref 13.3–17.7)
HGB BLD-MCNC: 9 G/DL (ref 13.3–17.7)
INTERPRETATION ECG - MUSE: NORMAL
INTERPRETATION ECG - MUSE: NORMAL
MCH RBC QN AUTO: 31.8 PG (ref 26.5–33)
MCH RBC QN AUTO: 32.3 PG (ref 26.5–33)
MCHC RBC AUTO-ENTMCNC: 31.6 G/DL (ref 31.5–36.5)
MCHC RBC AUTO-ENTMCNC: 32.8 G/DL (ref 31.5–36.5)
MCV RBC AUTO: 101 FL (ref 78–100)
MCV RBC AUTO: 98 FL (ref 78–100)
P AXIS - MUSE: 31 DEGREES
P AXIS - MUSE: 63 DEGREES
PLATELET # BLD AUTO: 136 10E3/UL (ref 150–450)
PLATELET # BLD AUTO: 146 10E3/UL (ref 150–450)
POTASSIUM SERPL-SCNC: 3.4 MMOL/L (ref 3.4–5.3)
PR INTERVAL - MUSE: 148 MS
PR INTERVAL - MUSE: 178 MS
QRS DURATION - MUSE: 84 MS
QRS DURATION - MUSE: 90 MS
QT - MUSE: 376 MS
QT - MUSE: 426 MS
QTC - MUSE: 436 MS
QTC - MUSE: 469 MS
R AXIS - MUSE: 40 DEGREES
R AXIS - MUSE: 61 DEGREES
RBC # BLD AUTO: 2.83 10E6/UL (ref 4.4–5.9)
RBC # BLD AUTO: 3.1 10E6/UL (ref 4.4–5.9)
SODIUM SERPL-SCNC: 135 MMOL/L (ref 135–145)
SYSTOLIC BLOOD PRESSURE - MUSE: NORMAL MMHG
SYSTOLIC BLOOD PRESSURE - MUSE: NORMAL MMHG
T AXIS - MUSE: 65 DEGREES
T AXIS - MUSE: 69 DEGREES
VENTRICULAR RATE- MUSE: 73 BPM
VENTRICULAR RATE- MUSE: 81 BPM
WBC # BLD AUTO: 7.2 10E3/UL (ref 4–11)
WBC # BLD AUTO: 8.6 10E3/UL (ref 4–11)

## 2024-04-22 PROCEDURE — 250N000013 HC RX MED GY IP 250 OP 250 PS 637: Performed by: THORACIC SURGERY (CARDIOTHORACIC VASCULAR SURGERY)

## 2024-04-22 PROCEDURE — 250N000013 HC RX MED GY IP 250 OP 250 PS 637: Performed by: STUDENT IN AN ORGANIZED HEALTH CARE EDUCATION/TRAINING PROGRAM

## 2024-04-22 PROCEDURE — 85027 COMPLETE CBC AUTOMATED: CPT | Performed by: STUDENT IN AN ORGANIZED HEALTH CARE EDUCATION/TRAINING PROGRAM

## 2024-04-22 PROCEDURE — 258N000003 HC RX IP 258 OP 636

## 2024-04-22 PROCEDURE — 97116 GAIT TRAINING THERAPY: CPT | Mod: GP | Performed by: PHYSICAL THERAPIST

## 2024-04-22 PROCEDURE — 120N000003 HC R&B IMCU UMMC

## 2024-04-22 PROCEDURE — 97530 THERAPEUTIC ACTIVITIES: CPT | Mod: GO

## 2024-04-22 PROCEDURE — 80048 BASIC METABOLIC PNL TOTAL CA: CPT | Performed by: STUDENT IN AN ORGANIZED HEALTH CARE EDUCATION/TRAINING PROGRAM

## 2024-04-22 PROCEDURE — 85027 COMPLETE CBC AUTOMATED: CPT

## 2024-04-22 PROCEDURE — 250N000013 HC RX MED GY IP 250 OP 250 PS 637

## 2024-04-22 PROCEDURE — 36415 COLL VENOUS BLD VENIPUNCTURE: CPT

## 2024-04-22 PROCEDURE — 71045 X-RAY EXAM CHEST 1 VIEW: CPT | Mod: 26 | Performed by: RADIOLOGY

## 2024-04-22 PROCEDURE — 97530 THERAPEUTIC ACTIVITIES: CPT | Mod: GP | Performed by: PHYSICAL THERAPIST

## 2024-04-22 PROCEDURE — 250N000011 HC RX IP 250 OP 636

## 2024-04-22 PROCEDURE — 71045 X-RAY EXAM CHEST 1 VIEW: CPT

## 2024-04-22 PROCEDURE — 97161 PT EVAL LOW COMPLEX 20 MIN: CPT | Mod: GP | Performed by: PHYSICAL THERAPIST

## 2024-04-22 PROCEDURE — 36415 COLL VENOUS BLD VENIPUNCTURE: CPT | Performed by: STUDENT IN AN ORGANIZED HEALTH CARE EDUCATION/TRAINING PROGRAM

## 2024-04-22 RX ORDER — ENOXAPARIN SODIUM 100 MG/ML
40 INJECTION SUBCUTANEOUS EVERY 24 HOURS
Status: DISCONTINUED | OUTPATIENT
Start: 2024-04-22 | End: 2024-05-17 | Stop reason: HOSPADM

## 2024-04-22 RX ORDER — SODIUM CHLORIDE, SODIUM LACTATE, POTASSIUM CHLORIDE, CALCIUM CHLORIDE 600; 310; 30; 20 MG/100ML; MG/100ML; MG/100ML; MG/100ML
INJECTION, SOLUTION INTRAVENOUS CONTINUOUS
Status: DISCONTINUED | OUTPATIENT
Start: 2024-04-22 | End: 2024-04-23

## 2024-04-22 RX ADMIN — GUAIFENESIN 200 MG: 100 SOLUTION ORAL at 08:31

## 2024-04-22 RX ADMIN — Medication 1 SPRAY: at 11:57

## 2024-04-22 RX ADMIN — SENNOSIDES AND DOCUSATE SODIUM 2 TABLET: 8.6; 5 TABLET ORAL at 08:26

## 2024-04-22 RX ADMIN — POLYETHYLENE GLYCOL 3350 17 G: 17 POWDER, FOR SOLUTION ORAL at 19:53

## 2024-04-22 RX ADMIN — OXYCODONE HYDROCHLORIDE 10 MG: 5 SOLUTION ORAL at 04:39

## 2024-04-22 RX ADMIN — GUAIFENESIN 200 MG: 100 SOLUTION ORAL at 02:19

## 2024-04-22 RX ADMIN — METHOCARBAMOL 500 MG: 500 TABLET ORAL at 04:39

## 2024-04-22 RX ADMIN — MAGNESIUM HYDROXIDE 30 ML: 400 SUSPENSION ORAL at 19:52

## 2024-04-22 RX ADMIN — GUAIFENESIN 200 MG: 100 SOLUTION ORAL at 19:52

## 2024-04-22 RX ADMIN — ACETAMINOPHEN 975 MG: 325 TABLET, FILM COATED ORAL at 04:39

## 2024-04-22 RX ADMIN — FAMOTIDINE 20 MG: 20 TABLET ORAL at 19:52

## 2024-04-22 RX ADMIN — OXYCODONE HYDROCHLORIDE 10 MG: 5 SOLUTION ORAL at 23:11

## 2024-04-22 RX ADMIN — OXYCODONE HYDROCHLORIDE 10 MG: 5 SOLUTION ORAL at 16:28

## 2024-04-22 RX ADMIN — ACETAMINOPHEN 650 MG: 325 TABLET, FILM COATED ORAL at 16:29

## 2024-04-22 RX ADMIN — ALBUTEROL SULFATE 4 PUFF: 90 AEROSOL, METERED RESPIRATORY (INHALATION) at 16:38

## 2024-04-22 RX ADMIN — METHOCARBAMOL 500 MG: 500 TABLET ORAL at 09:44

## 2024-04-22 RX ADMIN — SERTRALINE HYDROCHLORIDE 100 MG: 100 TABLET ORAL at 08:26

## 2024-04-22 RX ADMIN — MAGNESIUM HYDROXIDE 30 ML: 400 SUSPENSION ORAL at 08:26

## 2024-04-22 RX ADMIN — ALBUTEROL SULFATE 4 PUFF: 90 AEROSOL, METERED RESPIRATORY (INHALATION) at 11:57

## 2024-04-22 RX ADMIN — OXYCODONE HYDROCHLORIDE 10 MG: 5 SOLUTION ORAL at 08:27

## 2024-04-22 RX ADMIN — GUAIFENESIN 200 MG: 100 SOLUTION ORAL at 13:56

## 2024-04-22 RX ADMIN — METHOCARBAMOL 500 MG: 500 TABLET ORAL at 16:29

## 2024-04-22 RX ADMIN — BISACODYL 10 MG: 10 SUPPOSITORY RECTAL at 09:44

## 2024-04-22 RX ADMIN — ACETAMINOPHEN 650 MG: 325 TABLET, FILM COATED ORAL at 23:11

## 2024-04-22 RX ADMIN — OXYCODONE HYDROCHLORIDE 10 MG: 5 SOLUTION ORAL at 12:29

## 2024-04-22 RX ADMIN — SIMETHICONE 226 ML: 125 CAPSULE, LIQUID FILLED ORAL at 11:58

## 2024-04-22 RX ADMIN — OXYCODONE HYDROCHLORIDE 10 MG: 5 SOLUTION ORAL at 00:30

## 2024-04-22 RX ADMIN — SODIUM CHLORIDE, POTASSIUM CHLORIDE, SODIUM LACTATE AND CALCIUM CHLORIDE: 600; 310; 30; 20 INJECTION, SOLUTION INTRAVENOUS at 18:03

## 2024-04-22 RX ADMIN — SODIUM CHLORIDE, POTASSIUM CHLORIDE, SODIUM LACTATE AND CALCIUM CHLORIDE: 600; 310; 30; 20 INJECTION, SOLUTION INTRAVENOUS at 13:56

## 2024-04-22 RX ADMIN — Medication 15 ML: at 08:28

## 2024-04-22 RX ADMIN — SENNOSIDES AND DOCUSATE SODIUM 2 TABLET: 8.6; 5 TABLET ORAL at 19:52

## 2024-04-22 RX ADMIN — ACETAMINOPHEN 975 MG: 325 TABLET, FILM COATED ORAL at 12:29

## 2024-04-22 RX ADMIN — ALBUTEROL SULFATE 4 PUFF: 90 AEROSOL, METERED RESPIRATORY (INHALATION) at 09:44

## 2024-04-22 RX ADMIN — FAMOTIDINE 20 MG: 20 TABLET ORAL at 08:26

## 2024-04-22 RX ADMIN — POLYETHYLENE GLYCOL 3350 17 G: 17 POWDER, FOR SOLUTION ORAL at 08:28

## 2024-04-22 RX ADMIN — ALBUTEROL SULFATE 4 PUFF: 90 AEROSOL, METERED RESPIRATORY (INHALATION) at 19:53

## 2024-04-22 RX ADMIN — ENOXAPARIN SODIUM 40 MG: 40 INJECTION SUBCUTANEOUS at 18:02

## 2024-04-22 RX ADMIN — METHOCARBAMOL 500 MG: 500 TABLET ORAL at 23:11

## 2024-04-22 RX ADMIN — GABAPENTIN 100 MG: 100 CAPSULE ORAL at 23:11

## 2024-04-22 ASSESSMENT — ACTIVITIES OF DAILY LIVING (ADL)
ADLS_ACUITY_SCORE: 33
DEPENDENT_IADLS:: INDEPENDENT
ADLS_ACUITY_SCORE: 33

## 2024-04-22 NOTE — PROGRESS NOTES
"CLINICAL NUTRITION SERVICES - BRIEF NOTE     Nutrition Prescription    RECOMMENDATIONS FOR MDs/PROVIDERS TO ORDER:  Please alert RD if desire to cycle feeds in future, when medically appropriate      Recommendations already ordered by Registered Dietitian (RD):  Per discussion with thoracic surgery FRAN 4/22, plan to continue current renal TF formula due to medical necessity given coconut allergy hx.     Continue TFs via J-tube with: Novasource Renal (or equivalent) at 50 ml/hr (1200 mL formula) provides 2400 kcal (35 kcal/kg), 109 g pro (1.5 g/kg), 220 g CHO, 0 g fiber, and 860 mL free water daily (+ FWF provide an additional 540 mL free water daily).      Future/Additional Recommendations:  Continue to monitor nutrition related findings per protocol    For last full RD assessment, see note dated 4/20/24    NEW FINDINGS   - Chart review for patient receiving new enteral feedings on 6B. Due to coconut allergy (with hx anaphylaxis), enteral formula options limited, patient initiated onto a renal TF formula. Patent reports possibly tolerating some coconut-derived MCT oil containing products in the past, however decision was made to proceed with a completely coconut-free enteral formula.     - Per RD note 4/20, \"Would consider discussing with patient potential allergen exposure trial to transition to different formula. Recommend trial on Monday\". Discussed this with thoracic team FRAN.  Decision to keep patient on current TF formula without trial of allergen exposure. Met with pt and wife at bedside to inform them of RD/thoracic decision to stay on current formula which we know is safe. Pt/wife are ok with this. Pt tolerating his TFs without concern; running at goal rate. Pt has still not had a BM post-op. Will monitor GI status/trends.     - Pt had some questions related to what his feedings will look like at home and what education he will receive. No home infusion company assigned yet, so many details TBD but assured " pt that he will receive education on how to administer his TFs, how to use his pump, flushes, etc. Prior to discharge. Also discussed with pt that he will likely transition to a cyclic regimen at some point prior to discharge, though too early to assess what this would look like yet. Pt/wife appreciative of visit and have no further questions at this time.    INTERVENTIONS  Implementation  Collaboration with other providers - Thoracic team [facility]  Enteral Nutrition - Continue as ordered     Monitoring/Evaluation  Will continue to monitor and evaluate per protocol.    Chandler Pugh, MS, RDN, LD, CNSC  Available by RegisterPatient or phone   Vocera: M-F (7:00-3:30)  6B Clinical Dietitian  or 2 Obs Clinical Dietitian  Weekend/Holiday (7:00-3:30) - Weekend Clinical Dietitian   6B RD desk: [phone]       **Clinical Dietitians are no longer available by pager.

## 2024-04-22 NOTE — PLAN OF CARE
Goal Outcome Evaluation:         [id number]  Neuro: A&Ox4.   Cardiac: No tele, HR 80s-90s. Afebrile.    Respiratory: Sating above 93% on 1L NC. Denies SOB.   GI/: Adequate urine output via urinal. No BM this shift. Passing gas as of this morning.   Diet/appetite: NPO, cont TF 50 ml/hr, 90ml FWF Q4. TF at goal rate. Denies nausea.   Activity:  SBA. Repositions independently.   Pain: At acceptable level on current regimen.   Skin: No new deficits noted.  LDA's: R CT to water seal, PIV x3 (SL x2 and LR ggt 50 ml/hr), NG to LCS, J tube- TF,     Plan: Continue with POC. Notify primary team with changes.

## 2024-04-22 NOTE — OP NOTE
Preoperative diagnosis:                          Esophageal cancer  Postoperative diagnosis:                       Esophageal cancer    Procedure:   Laparoscopic gastric mobilization  Right VATS esophageal mobilization, lymph node dissection  Esophagogastrectomy  Left chest tube insertion  Fiberoptic bronchoscopy  Esophagoscopy    Anesthesia: General   Surgeon: Devin Hill   Co-surgeon:  Kevin Calderon  Resident surgeon: Cindy Garcia  EBL: 50 mL    Complications: none immediate  Findings:  Benign esophageal margin     Description of procedure   TThe patient was brought to the room and placed supine upon the table.   After confirming the patient's identity and verifying the consent, appropriate monitoring devices were placed as well as SCD boots. General anesthesia was administered and the patient was intubated with a single-lumen endotracheal tube.  Proper positioning was confirmed by fiberoptic bronchoscopy.  Intravenous Ancef was administered within one hour prior to the incision.  The patient was secured to the table while remaining supine and a foot board was placed. The abdomen was prepped with chlorhexidine and draped in standard surgical fashion.  A stab incision was made in the left upper quadrant and a Veress needle was inserted. Pneumoperitoneum was established to 15 mmHg. A 12 mm port site was made two fingerbreadths to the right of the umbilicus, approximately 2/3 of the distance from the xiphoid to the umbilicus. A 10 mm 30-degree laparoscope was inserted into the peritoneal cavity.  There was no evidence of carcinomatosis.  A 10 mm incision was made a handsbreadth to the left of the umbilicus.  A 5 mm port was placed in the left anterior axillary line in the subcostal region.  A 5 mm port was placed laterally subcostal on the right and a liver retractor was used to retract the liver to expose the hiatus.  Finally, 5 mm ports were placed in the right anterior axillary line in the subcostal region and  in the left lower quadrant. The patient was placed into steep reverse Trendelenburg position. The pars flaccida was dissected up towards the anterior portion of the hiatus.  This was carried along the diaphragm to the left. The right gastroepiploic artery was dissected free.  The short gastric vessles were divided using the LigaSure. The stomach was circumferentially mobilized.  The left gastric vessels were dissected free and divided using a vascular load with the stapler.  The lesser curve vessels using were divided using the LigaSure. The gastric conduit was created using the stapler with green loads.  Care was taken to staple parallel to the right of the short gastrics without twisting the staple line.  ICG was given IV and the distal extent of good perfusion was marked. Dissection was continued along the hiatus, circumferentially freeing the esophagus and carrying this dissection up into the mediastinum.  A left chest was inserted during the course of the operation and secured to the skin.  The hiatus was measured at 4 cm in the AP dimension so a stitch was not placed.  The cavity was then inspected for hemostasis.  The liver retractor was removed under direct vision. The ports were removed under direct vision and the pneumoperitoneum was allowed to escape.  The incisions were irrigated and closed in layers with 3-0 Vicryl and 4-0 Monocryl.  The area was cleaned and dried and benzoin and steri-strips were applied.      The patient was then turned into the left lateral decubitus position. All pressure points were padded.  Stabilizing rolls were placed, the patient was secured to the table and the bed was flexed.  The position of the endotracheal tube was again checked.  The right chest was then prepped with chlorhexidine and draped in standard surgical fashion.  A 10 mm port was created in the 8th intercostal space in line with the anterior superior iliac spine.  A 12 mm port was placed more cephalad and  anterior.  A 5 mm port was placed below the scapular tip and a 12 mm port in line with that in the 9th interspace. The inferior pulmonary ligament was divided and the dissection was carried anterior to the esophagus and along the mediastinal pleura.  This was carried to the level of the azygous.  The azygous vein was dissected free and divided using the stapler with vascular loads.  The mediastinal pleura overlying the posterior aspect of the esophagus was divided.  Circumferential dissection of the esophagus was carried up just past the level of the azygous.  Care was taken to clip all lymphatics and the aortoesophageals along the way.  The level VII lymph node packet was harvested en bloc.  Care was taken to not injure the mainstem bronchi or the rohini.  A hiatal suture was placed.  The thoracic duct was ligated by dissecting out the tissue between the aorta and the azygous, down near the diaphragm. The esophagus was then divided sharply above the level of the azygous vein. The anterior superior incision was enlarged and an Chandler wound retractor was placed.  The specimen was then brought out and passed off for frozen section analysis of the esophageal margin, which was negative. The gastric conduit was lined up to the distal esophagus with tacking sutures.  A gastrotomy was made and a green load of the stapler was used to create the posterior wall of the anastomosis.  The anterior aspect was closed with a staple load as well.  An NG was passed. The cavity was then irrigated copiously.  A leak test was performed.  There was no evidence of anastomotic leak. A 28 Mohawk chest tube was placed going to the apex through the initial camera port site.  The lung was  observed to inflate appropriately and the chest tube was secured to the skin using 0 silk suture. The access incisions was closed in layers.  Each layer was irrigated prior to closure.  The other incisions were closed with 3-0 Vicryl and 4-0 Monocryl.  The  areas were cleaned and dried and benzoin and steri-strips were applied.  The patient was then turned supine and a left chest tube was inserted. He was then extubated without incident.  At the end of the case, the sponge, needle and instrument counts were correct. Dr. Calderon assisted with the chest portion of the case, performing the esophageal mobilization and part of the anastomosis.

## 2024-04-22 NOTE — PROGRESS NOTES
"Thoracic Surgery  [facility]    Subjective:  Tolerating TFs. Pain well controlled.  Passing gas and had small BM.    Objective  BP (!) 147/66 (BP Location: Right arm)   Pulse 71   Temp 97.1  F (36.2  C) (Axillary)   Resp 16   Ht 1.727 m (5' 8\")   Wt 70.3 kg (155 lb)   SpO2 94%   BMI 23.57 kg/m      GEN: No acute distress, resting comfortably  NEURO: Alert and oriented  PULM: NLB   CV: RRR per telemetry  GI: soft, nontender, nondistended. J tube in place.   EXT: wwp  INCISIONS: CDI    Labs and imaging reviewed      A/P:    68M with SCC of esophagus who is now POD2 s/p laparoscopic and right thoracoscopic gastroesophagectomy on 4/19 with Dr. Hill. Monitored in SICU overnight POD0 to 1, did very well and transferred to IMC on 4/20.      - Strict NPO, advance TF to goal  - Nutrition consult placed, appreciate expertise  - NGT to LCS, gently flush NGT with 30 ml of room temperature water every 4 hrs  - Biotene spray for dry mouth  - Aggressive bowel regimen, prior hx of opioid-induced constipation  -Continue right chest tube until esophagram and taking p.o.  - Continue to hold PTA plavix  -Continue IMC status    Seen and discussed with staff    Dusty Mattson PA-C     "

## 2024-04-22 NOTE — CONSULTS
Care Management Initial Consult    General Information  Assessment completed with: Patient  Type of CM/SW Visit: Initial Assessment  Primary Care Provider verified and updated as needed: Yes   Readmission within the last 30 days: no previous admission in last 30 days   Reason for Consult: discharge planning  Advance Care Planning: no concerns identified        Communication Assessment  Patient's communication style: spoken language (English or Bilingual)    Hearing Difficulty or Deaf: no   Wear Glasses or Blind: yes    Cognitive  Cognitive/Neuro/Behavioral: WDL  Level of Consciousness: alert   Arousal Level: opens eyes spontaneously    Orientation: oriented x 4    Mood/Behavior: cooperative, calm    Best Language: 0 - No aphasia    Speech: logical, spontaneous, clear    Living Environment:   People in home: spouse, Sidra  Current living Arrangements: house      Able to return to prior arrangements: yes     Family/Social Support:  Care provided by: self  Provides care for: no one  Marital Status:   Support System: Wife, Children    Description of Support System: Supportive, Involved       Current Resources:   Patient receiving home care services: No  Community Resources: None  Equipment currently used at home: none  Supplies currently used at home: None    Employment/Financial:  Employment Status: employed part-time     Financial Concerns: none      Does the patient's insurance plan have a 3 day qualifying hospital stay waiver?  Yes     Which insurance plan 3 day waiver is available? Alternative insurance waiver    Will the waiver be used for post-acute placement? No    Lifestyle & Psychosocial Needs:  Social Determinants of Health     Food Insecurity: Not on file   Depression: Not at risk (4/17/2024)    PHQ-2     PHQ-2 Score: 0   Housing Stability: Not on file   Tobacco Use: Medium Risk (4/17/2024)    Patient History     Smoking Tobacco Use: Former     Smokeless Tobacco Use: Never     Passive Exposure: Past    Financial Resource Strain: High Risk (12/22/2021)    [facility]    Financial Resource Strain     Difficulty of Paying Living Expenses: Not on file     Difficulty of Paying Living Expenses: Not on file   Alcohol Use: Not on file   Transportation Needs: Not on file   Physical Activity: Not on file   Interpersonal Safety: Not on file   Stress: Not on file   Social Connections: Unknown (4/11/2023)    [facility]    Social Connections     Frequency of Communication with Friends and Family: Not on file   Health Literacy: Not on file     Functional Status:  Prior to admission patient needed assistance:   Dependent ADLs: Independent  Dependent IADLs: Independent     Mental Health Status:  Mental Health Status: No Current Concerns       Chemical Dependency Status:  Chemical Dependency Status: Past Concern    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 Patient lives locally w/his spouse, independently. Patient does not have any current in home services, does not use any adaptive equipment at baseline. Per chart review, therapy is recommending patient discharge to home w/assist. Patient will need enteral feedings at discharge, home infusion discussed w/patient. Referral sent to [facility] at this time to check coverage. Of note, patient is on a renal formula d/t coconut allergy, may need additional documentation for insurance purposes. Patient and spouse voiced no additional needs or concerns at this time.     Discharge resources:    [facility]  Phone: [phone]  Fax: [phone]    Care coordination will continue to follow.     Ruma Zaman, Nurse Coordinator, BSN  Phone: [phone]  Pager: [phone]  Vocera: 6B [facility] [place]

## 2024-04-22 NOTE — PROGRESS NOTES
Therapy: Enteral  Insurance: [employer]   Ded: $1000  Met: $1000    Co-Insurance: 80/20  Max Out of Pocket: $3000  Met: $3000    Misc: Pt's Enteral must be sole source of nutrition. Pt meets this criteria.    In reference to referral from [facility] on pt admitted 04/19/24 to check for Enteral coverage.    Please contact Intake with any questions, [phone] or In Basket pool, [facility] ([phone]).

## 2024-04-22 NOTE — PLAN OF CARE
"Goal Outcome Evaluation:    /57 (BP Location: Right arm, Cuff Size: Adult Regular)   Pulse 76   Temp 98.1  F (36.7  C) (Oral)   Resp 18   Ht 1.727 m (5' 8\")   Wt 70.3 kg (155 lb)   SpO2 97%   BMI 23.57 kg/m      Neuro: A&Ox4.   Cardiac: No tele, HR 80s-90s. Afebrile.          Respiratory: Sating above 93% on 1L NC.Unable to wean oxygen today. Denies SOB.   GI/: Adequate urine output via urinal. Small BM this shift. Enema, suppository, and oral stool softeners given. Passing gas as of this morning.   Diet/appetite: NPO, cont TF 50 ml/hr, 90ml FWF Q4. TF at goal rate. Denies nausea.   Activity:  SBA. Repositions independently.   Pain: At acceptable level on current regimen.   Skin: No new deficits noted.  LDA's: R CT to water seal, PIV x3 LR 50 ml/hr, NG to LCS, J tube-TF     Plan: Continue with POC.     "

## 2024-04-22 NOTE — PROGRESS NOTES
04/22/24 1028   Appointment Info   Signing Clinician's Name / Credentials (PT) Shu Clinton, PT, DPT   Living Environment   People in Home spouse   Current Living Arrangements house   Home Accessibility stairs to enter home;stairs within home   Number of Stairs, Main Entrance 5   Stair Railings, Main Entrance railing on left side (ascending)   Number of Stairs, Within Home, Primary greater than 10 stairs   Stair Railings, Within Home, Primary railing on left side (ascending)   Transportation Anticipated family or friend will provide   Living Environment Comments [facility]   Self-Care   Usual Activity Tolerance excellent   Current Activity Tolerance good   Equipment Currently Used at Home none   Activity/Exercise/Self-Care Comment IND PLOF with cares, mobility; very active at baseline   General Information   Onset of Illness/Injury or Date of Surgery 04/19/24   Referring Physician Cindy Garcia MD   Pertinent History of Current Problem (include personal factors and/or comorbidities that impact the POC) Per MD notes: Pt is a 68M with SCC of esophagus who is now POD2 s/p laparoscopic and right thoracoscopic gastroesophagectomy on 4/19 with Dr. Hill. Monitored in SICU overnight POD0 to 1, did very well and transferred to [facility] on 4/20.   Existing Precautions/Restrictions fall   General Observations activity: ambulate with assist TID   Pain Assessment   Patient Currently in Pain Yes, see Vital Sign flowsheet   Range of Motion (ROM)   ROM Comment no concerns noted during eval, WFL   Strength (Manual Muscle Testing)   Strength Comments slightly reduced, but not significantly below baseline   Bed Mobility   Comment, (Bed Mobility) CGA-SBA supine<>sit   Transfers   Comment, (Transfers) SBA sit<>stand   Gait/Stairs (Locomotion)   Comment, (Gait/Stairs) Ambulated with stable gait,  mildly slow gait speed; no asymmetry noted; reciprocal pattern on stairs; overall SBA w/1 rail support   Balance   Balance  Comments no significant imbalance   Clinical Impression   Criteria for Skilled Therapeutic Intervention Yes, treatment indicated   PT Diagnosis (PT) impaired mobility   Influenced by the following impairments fatigue, pain, reduced activity tolerance, balance, strength, ROM   Functional limitations due to impairments below baseline bed mobility, transfers, gait   Clinical Presentation (PT Evaluation Complexity) evolving   Clinical Presentation Rationale clinical judgement, [facility]   Clinical Decision Making (Complexity) moderate complexity   Planned Therapy Interventions (PT) balance training;bed mobility training;gait training;home exercise program;neuromuscular re-education;patient/family education;postural re-education;ROM (range of motion);stair training;strengthening;stretching;transfer training;risk factor education;home program guidelines;progressive activity/exercise   Risk & Benefits of therapy have been explained evaluation/treatment results reviewed;care plan/treatment goals reviewed;risks/benefits reviewed;current/potential barriers reviewed;participants voiced agreement with care plan;participants included;patient   PT Total Evaluation Time   PT Eval, Low Complexity Minutes (79347) 5   Physical Therapy Goals   PT Frequency 5x/week   PT Predicted Duration/Target Date for Goal Attainment 04/29/24   PT Goals Bed Mobility;Transfers;Gait;Stairs   PT: Bed Mobility Independent;Supine to/from sit;Rolling;Bridging;Within precautions   PT: Transfers Independent;Modified independent;Sit to/from stand;Bed to/from chair;Assistive device;Within precautions   PT: Gait Independent;Modified independent;Within precautions;150 feet   PT: Stairs Modified independent;Greater than 10 stairs   PT Discharge Planning   PT Plan bed mobility, progress gait distance with dynamic balance challenges   PT Discharge Recommendation (DC Rec) home with assist   PT Rationale for DC Rec Showing good progress towards functional goals.  Anticipate pt will be able to continue to progress functionally and discharge home with assist.   PT Brief overview of current status CGA-SBA bed mobility, SBA transfers, gait in hallway   Total Session Time   Total Session Time (sum of timed and untimed services) 5

## 2024-04-23 ENCOUNTER — APPOINTMENT (OUTPATIENT)
Dept: PHYSICAL THERAPY | Facility: CLINIC | Age: 69
DRG: 326 | End: 2024-04-23
Attending: THORACIC SURGERY (CARDIOTHORACIC VASCULAR SURGERY)
Payer: COMMERCIAL

## 2024-04-23 ENCOUNTER — APPOINTMENT (OUTPATIENT)
Dept: OCCUPATIONAL THERAPY | Facility: CLINIC | Age: 69
DRG: 326 | End: 2024-04-23
Attending: THORACIC SURGERY (CARDIOTHORACIC VASCULAR SURGERY)
Payer: COMMERCIAL

## 2024-04-23 ENCOUNTER — APPOINTMENT (OUTPATIENT)
Dept: GENERAL RADIOLOGY | Facility: CLINIC | Age: 69
DRG: 326 | End: 2024-04-23
Attending: THORACIC SURGERY (CARDIOTHORACIC VASCULAR SURGERY)
Payer: COMMERCIAL

## 2024-04-23 LAB
ANION GAP SERPL CALCULATED.3IONS-SCNC: 9 MMOL/L (ref 7–15)
BUN SERPL-MCNC: 13.7 MG/DL (ref 8–23)
CALCIUM SERPL-MCNC: 9.1 MG/DL (ref 8.8–10.2)
CHLORIDE SERPL-SCNC: 97 MMOL/L (ref 98–107)
CREAT SERPL-MCNC: 0.52 MG/DL (ref 0.67–1.17)
DEPRECATED HCO3 PLAS-SCNC: 30 MMOL/L (ref 22–29)
EGFRCR SERPLBLD CKD-EPI 2021: >90 ML/MIN/1.73M2
ERYTHROCYTE [DISTWIDTH] IN BLOOD BY AUTOMATED COUNT: 14.5 % (ref 10–15)
GLUCOSE SERPL-MCNC: 107 MG/DL (ref 70–99)
HCT VFR BLD AUTO: 28.7 % (ref 40–53)
HGB BLD-MCNC: 9.4 G/DL (ref 13.3–17.7)
HOLD SPECIMEN: NORMAL
MAGNESIUM SERPL-MCNC: 2.1 MG/DL (ref 1.7–2.3)
MCH RBC QN AUTO: 32.2 PG (ref 26.5–33)
MCHC RBC AUTO-ENTMCNC: 32.8 G/DL (ref 31.5–36.5)
MCV RBC AUTO: 98 FL (ref 78–100)
PHOSPHATE SERPL-MCNC: 2.7 MG/DL (ref 2.5–4.5)
PLATELET # BLD AUTO: 137 10E3/UL (ref 150–450)
POTASSIUM SERPL-SCNC: 3.2 MMOL/L (ref 3.4–5.3)
POTASSIUM SERPL-SCNC: 3.8 MMOL/L (ref 3.4–5.3)
RBC # BLD AUTO: 2.92 10E6/UL (ref 4.4–5.9)
SODIUM SERPL-SCNC: 136 MMOL/L (ref 135–145)
WBC # BLD AUTO: 6.4 10E3/UL (ref 4–11)

## 2024-04-23 PROCEDURE — 250N000013 HC RX MED GY IP 250 OP 250 PS 637

## 2024-04-23 PROCEDURE — 120N000003 HC R&B IMCU UMMC

## 2024-04-23 PROCEDURE — 250N000013 HC RX MED GY IP 250 OP 250 PS 637: Performed by: PHYSICIAN ASSISTANT

## 2024-04-23 PROCEDURE — 97530 THERAPEUTIC ACTIVITIES: CPT | Mod: GO

## 2024-04-23 PROCEDURE — 85018 HEMOGLOBIN: CPT | Performed by: STUDENT IN AN ORGANIZED HEALTH CARE EDUCATION/TRAINING PROGRAM

## 2024-04-23 PROCEDURE — 83735 ASSAY OF MAGNESIUM: CPT | Performed by: THORACIC SURGERY (CARDIOTHORACIC VASCULAR SURGERY)

## 2024-04-23 PROCEDURE — 250N000013 HC RX MED GY IP 250 OP 250 PS 637: Performed by: STUDENT IN AN ORGANIZED HEALTH CARE EDUCATION/TRAINING PROGRAM

## 2024-04-23 PROCEDURE — 250N000013 HC RX MED GY IP 250 OP 250 PS 637: Performed by: THORACIC SURGERY (CARDIOTHORACIC VASCULAR SURGERY)

## 2024-04-23 PROCEDURE — 84132 ASSAY OF SERUM POTASSIUM: CPT | Performed by: THORACIC SURGERY (CARDIOTHORACIC VASCULAR SURGERY)

## 2024-04-23 PROCEDURE — 97116 GAIT TRAINING THERAPY: CPT | Mod: GP | Performed by: PHYSICAL THERAPIST

## 2024-04-23 PROCEDURE — 97530 THERAPEUTIC ACTIVITIES: CPT | Mod: GP | Performed by: PHYSICAL THERAPIST

## 2024-04-23 PROCEDURE — 84100 ASSAY OF PHOSPHORUS: CPT | Performed by: THORACIC SURGERY (CARDIOTHORACIC VASCULAR SURGERY)

## 2024-04-23 PROCEDURE — 36415 COLL VENOUS BLD VENIPUNCTURE: CPT | Performed by: STUDENT IN AN ORGANIZED HEALTH CARE EDUCATION/TRAINING PROGRAM

## 2024-04-23 PROCEDURE — 71045 X-RAY EXAM CHEST 1 VIEW: CPT

## 2024-04-23 PROCEDURE — 71045 X-RAY EXAM CHEST 1 VIEW: CPT | Mod: 26 | Performed by: RADIOLOGY

## 2024-04-23 PROCEDURE — 36415 COLL VENOUS BLD VENIPUNCTURE: CPT | Performed by: THORACIC SURGERY (CARDIOTHORACIC VASCULAR SURGERY)

## 2024-04-23 PROCEDURE — 250N000011 HC RX IP 250 OP 636

## 2024-04-23 PROCEDURE — 80048 BASIC METABOLIC PNL TOTAL CA: CPT | Performed by: STUDENT IN AN ORGANIZED HEALTH CARE EDUCATION/TRAINING PROGRAM

## 2024-04-23 PROCEDURE — 97535 SELF CARE MNGMENT TRAINING: CPT | Mod: GO

## 2024-04-23 RX ORDER — POTASSIUM CHLORIDE 20MEQ/15ML
40 LIQUID (ML) ORAL ONCE
Status: COMPLETED | OUTPATIENT
Start: 2024-04-23 | End: 2024-04-23

## 2024-04-23 RX ORDER — LACTULOSE 10 G/15ML
20 SOLUTION ORAL ONCE
Status: COMPLETED | OUTPATIENT
Start: 2024-04-23 | End: 2024-04-23

## 2024-04-23 RX ORDER — POTASSIUM CHLORIDE 20MEQ/15ML
40 LIQUID (ML) ORAL ONCE
Status: DISCONTINUED | OUTPATIENT
Start: 2024-04-23 | End: 2024-04-23

## 2024-04-23 RX ORDER — HYDROMORPHONE HCL IN WATER/PF 6 MG/30 ML
0.2 PATIENT CONTROLLED ANALGESIA SYRINGE INTRAVENOUS
Status: DISCONTINUED | OUTPATIENT
Start: 2024-04-23 | End: 2024-04-26

## 2024-04-23 RX ORDER — POTASSIUM CHLORIDE 1.5 G/1.58G
40 POWDER, FOR SOLUTION ORAL ONCE
Status: COMPLETED | OUTPATIENT
Start: 2024-04-23 | End: 2024-04-23

## 2024-04-23 RX ORDER — HYDROMORPHONE HCL IN WATER/PF 6 MG/30 ML
0.4 PATIENT CONTROLLED ANALGESIA SYRINGE INTRAVENOUS
Status: DISCONTINUED | OUTPATIENT
Start: 2024-04-23 | End: 2024-04-26

## 2024-04-23 RX ADMIN — SENNOSIDES AND DOCUSATE SODIUM 2 TABLET: 8.6; 5 TABLET ORAL at 08:27

## 2024-04-23 RX ADMIN — ACETAMINOPHEN 650 MG: 325 TABLET, FILM COATED ORAL at 08:27

## 2024-04-23 RX ADMIN — METHOCARBAMOL 500 MG: 500 TABLET ORAL at 04:11

## 2024-04-23 RX ADMIN — METHOCARBAMOL 500 MG: 500 TABLET ORAL at 16:34

## 2024-04-23 RX ADMIN — ACETAMINOPHEN 650 MG: 325 TABLET, FILM COATED ORAL at 17:25

## 2024-04-23 RX ADMIN — METHOCARBAMOL 500 MG: 500 TABLET ORAL at 10:42

## 2024-04-23 RX ADMIN — FAMOTIDINE 20 MG: 20 TABLET ORAL at 08:27

## 2024-04-23 RX ADMIN — OXYCODONE HYDROCHLORIDE 10 MG: 5 SOLUTION ORAL at 08:27

## 2024-04-23 RX ADMIN — POTASSIUM CHLORIDE ORAL 40 MEQ: 1.5 SOLUTION ORAL at 06:40

## 2024-04-23 RX ADMIN — POLYETHYLENE GLYCOL 3350 17 G: 17 POWDER, FOR SOLUTION ORAL at 19:38

## 2024-04-23 RX ADMIN — GUAIFENESIN 200 MG: 100 SOLUTION ORAL at 19:38

## 2024-04-23 RX ADMIN — OXYCODONE HYDROCHLORIDE 5 MG: 5 SOLUTION ORAL at 13:09

## 2024-04-23 RX ADMIN — FAMOTIDINE 20 MG: 20 TABLET ORAL at 19:38

## 2024-04-23 RX ADMIN — ACETAMINOPHEN 650 MG: 325 TABLET, FILM COATED ORAL at 21:28

## 2024-04-23 RX ADMIN — MAGNESIUM HYDROXIDE 30 ML: 400 SUSPENSION ORAL at 08:27

## 2024-04-23 RX ADMIN — GUAIFENESIN 200 MG: 100 SOLUTION ORAL at 04:10

## 2024-04-23 RX ADMIN — OXYCODONE HYDROCHLORIDE 10 MG: 5 SOLUTION ORAL at 21:28

## 2024-04-23 RX ADMIN — Medication 15 ML: at 08:27

## 2024-04-23 RX ADMIN — BISACODYL 10 MG: 10 SUPPOSITORY RECTAL at 08:27

## 2024-04-23 RX ADMIN — SERTRALINE HYDROCHLORIDE 100 MG: 100 TABLET ORAL at 08:27

## 2024-04-23 RX ADMIN — LACTULOSE 20 G: 20 SOLUTION ORAL at 08:27

## 2024-04-23 RX ADMIN — POLYETHYLENE GLYCOL 3350 17 G: 17 POWDER, FOR SOLUTION ORAL at 08:27

## 2024-04-23 RX ADMIN — ACETAMINOPHEN 650 MG: 325 TABLET, FILM COATED ORAL at 13:09

## 2024-04-23 RX ADMIN — OXYCODONE HYDROCHLORIDE 10 MG: 5 SOLUTION ORAL at 04:10

## 2024-04-23 RX ADMIN — GABAPENTIN 100 MG: 100 CAPSULE ORAL at 21:28

## 2024-04-23 RX ADMIN — ACETAMINOPHEN 650 MG: 325 TABLET, FILM COATED ORAL at 04:10

## 2024-04-23 RX ADMIN — OXYCODONE HYDROCHLORIDE 5 MG: 5 SOLUTION ORAL at 17:25

## 2024-04-23 RX ADMIN — POTASSIUM CHLORIDE 40 MEQ: 1.5 POWDER, FOR SOLUTION ORAL at 13:09

## 2024-04-23 RX ADMIN — ALBUTEROL SULFATE 4 PUFF: 90 AEROSOL, METERED RESPIRATORY (INHALATION) at 19:38

## 2024-04-23 RX ADMIN — SENNOSIDES AND DOCUSATE SODIUM 2 TABLET: 8.6; 5 TABLET ORAL at 19:38

## 2024-04-23 RX ADMIN — ALBUTEROL SULFATE 4 PUFF: 90 AEROSOL, METERED RESPIRATORY (INHALATION) at 16:37

## 2024-04-23 RX ADMIN — ENOXAPARIN SODIUM 40 MG: 40 INJECTION SUBCUTANEOUS at 17:25

## 2024-04-23 RX ADMIN — ALBUTEROL SULFATE 4 PUFF: 90 AEROSOL, METERED RESPIRATORY (INHALATION) at 10:42

## 2024-04-23 RX ADMIN — GUAIFENESIN 200 MG: 100 SOLUTION ORAL at 08:27

## 2024-04-23 ASSESSMENT — ACTIVITIES OF DAILY LIVING (ADL)
ADLS_ACUITY_SCORE: 35
ADLS_ACUITY_SCORE: 35
ADLS_ACUITY_SCORE: 31
ADLS_ACUITY_SCORE: 35
ADLS_ACUITY_SCORE: 31
ADLS_ACUITY_SCORE: 35
ADLS_ACUITY_SCORE: 33
ADLS_ACUITY_SCORE: 35

## 2024-04-23 NOTE — PROGRESS NOTES
"Care Management Follow Up    Length of Stay (days): 4    Expected Discharge Date: 04/29/2024     Concerns to be Addressed: Discharge planning, medical readiness.   Patient plan of care discussed at interdisciplinary rounds: Yes    Anticipated Discharge Disposition: Home  Anticipated Discharge Services:  Home infusion  Anticipated Discharge DME:  None noted.     Patient/family educated on Medicare website which has current facility and service quality ratings:  Yes  Education Provided on the Discharge Plan:  Yes  Patient/Family in Agreement with the Plan:  Yes    Referrals Placed by CM/SW:  Home infusion  Private pay costs discussed: Not applicable    Additional Information:  Per [facility], \"patient has coverage for enteral through their [employer] plan. Pt's [employer] plan reqs enteral to be sole source of nutrition for coverage. Pt meets criteria and also met both their $1000 deductible (80/20 coverage) and $3000 out of pocket. Pt's coverage should be at 100%.\"    Patient is not medically ready for discharge. Patient/spouse will need education for enteral feeds prior to discharge, [facility] is aware.     Writer clarified w/[facility], additional documentation is not needed at this time for coverage of [facility] Renal d/t pt having a coconut allergy.    Care coordination will continue to follow.     Ruma Zaman, Nurse Coordinator, BSN  Phone: [phone]  Pager: [phone]  Vocera: 6B [facility] RNCC     " L/m to let pt know blood work orders were ordered.    Spoke with the patient, conveyed the results listed below. Patient expressed understanding. No questions or concerns at this time.

## 2024-04-23 NOTE — PROVIDER NOTIFICATION
"2000: Paged Dr. Acosta, thoracic resident, \"Sutures from NG appear to be causing pressure injury for patient. Can you please come to bedside to adjust when you get the chance?\"    Provider came to bedside. No changes at this time. Will follow up with day team  "

## 2024-04-23 NOTE — PROGRESS NOTES
"Thoracic Surgery  [facility]    Subjective:  Tolerating TFs. Pain well controlled.  Passing gas and had +BM.    Objective  BP (!) 143/69 (BP Location: Right arm)   Pulse 92   Temp 98.3  F (36.8  C) (Oral)   Resp 16   Ht 1.727 m (5' 8\")   Wt 70.9 kg (156 lb 4.9 oz)   SpO2 94%   BMI 23.77 kg/m      GEN: No acute distress, resting comfortably  NEURO: Alert and oriented  PULM: NLB   CV: RRR per telemetry  GI: soft, nontender, nondistended. J tube in place.   EXT: wwp  INCISIONS: CDI    Labs and imaging reviewed      A/P:  68M with SCC of esophagus who is now POD#4 s/p laparoscopic and right thoracoscopic gastroesophagectomy on 4/19 with Dr. Hill. Monitored in SICU overnight POD0 to 1, did very well and transferred to IMC on 4/20.      - Watch lytes closely  - Strict NPO,TF to goal  - Nutrition consult placed, appreciate expertise  - NGT to LCS, gently flush NGT with 30 ml of room temperature water every 4 hrs  - Biotene spray for dry mouth  - Aggressive bowel regimen, prior hx of opioid-induced constipation  - Continue right chest tube until esophagram and taking p.o.  - Continue to hold PTA plavix  - Continue IMC status    Seen and discussed with staff    Dusty Mattson PA-C     "

## 2024-04-23 NOTE — PLAN OF CARE
Neuro: A&Ox4. Pleasant  Cardiac: HR 60-80s. VSS.   Respiratory: Sating >92% on 1L nc. Attempted to wean pt to RA but SpO2 <92%. Denies SOB  GI/: Adequate urine output via urinal. Small watery BM overnight, hypoactive bowel sounds.  Bowel meds given. Pt c/o constipation  Diet/appetite: NPO. TF via J tube @ 50mL/hr with 90mL FWF Q 4 hours. Meds via J tube.  Activity:  SBA, up to commode. Generalized weakness.  Pain: At acceptable level on current regimen. Abdominal pain and chest tube site pain. Managed with prn oxycodone Q 4 hour and prn tylenol.  Skin: No new deficits noted. CT dressing changed.   LDA's: R CT- water seal. NG to LCS, irrigated w/ 30mL Q 4. 3x PIV- LR @ 10mL/hr. J tube.    Potassium 3.2 this AM. Replaced per protocol.    Plan: Continue with bowel regimen. Notify primary team with changes.

## 2024-04-23 NOTE — PLAN OF CARE
Neuro: A&Ox4.   Cardiac: SR. VSS.   Respiratory: Pt weaned from 1L to RA.  GI/: Adequate urine output. BM X3 (brown, soft to liquid).   Diet/appetite: Pt NPO, TF cont to j-tube, tolerating well.   Activity:  SBA, up to chair and in halls.  Pain: Pt having a lot of abd pain this am. Gave oxy 10mg and tylenol. Also many bowel meds. Abd pain better after BM. Thoracic team changed NG sutures to bridle and caused a lot of discomfort. Pt better after pain meds.   Skin: No new deficits noted.  LDA's: CT to waterseal worsening airleak at shift change. No change in pt status, doing well on RA. PIV x3. NG to LCS and j-tube with TF.     Plan:  Potassium 3.2, replaced. Recheck 3.8 and replaced with 20Meq. Recheck scheduled for am.   Wife at bedside and helpful with patient. Continue with POC. Notify primary team with changes.

## 2024-04-24 ENCOUNTER — APPOINTMENT (OUTPATIENT)
Dept: GENERAL RADIOLOGY | Facility: CLINIC | Age: 69
DRG: 326 | End: 2024-04-24
Attending: THORACIC SURGERY (CARDIOTHORACIC VASCULAR SURGERY)
Payer: COMMERCIAL

## 2024-04-24 LAB
ANION GAP SERPL CALCULATED.3IONS-SCNC: 8 MMOL/L (ref 7–15)
BUN SERPL-MCNC: 12.9 MG/DL (ref 8–23)
CALCIUM SERPL-MCNC: 9 MG/DL (ref 8.8–10.2)
CHLORIDE SERPL-SCNC: 102 MMOL/L (ref 98–107)
CREAT SERPL-MCNC: 0.51 MG/DL (ref 0.67–1.17)
DEPRECATED HCO3 PLAS-SCNC: 28 MMOL/L (ref 22–29)
EGFRCR SERPLBLD CKD-EPI 2021: >90 ML/MIN/1.73M2
ERYTHROCYTE [DISTWIDTH] IN BLOOD BY AUTOMATED COUNT: 14.6 % (ref 10–15)
GLUCOSE SERPL-MCNC: 97 MG/DL (ref 70–99)
HCT VFR BLD AUTO: 28.1 % (ref 40–53)
HGB BLD-MCNC: 9.2 G/DL (ref 13.3–17.7)
MAGNESIUM SERPL-MCNC: 2 MG/DL (ref 1.7–2.3)
MCH RBC QN AUTO: 31.9 PG (ref 26.5–33)
MCHC RBC AUTO-ENTMCNC: 32.7 G/DL (ref 31.5–36.5)
MCV RBC AUTO: 98 FL (ref 78–100)
PHOSPHATE SERPL-MCNC: 3.3 MG/DL (ref 2.5–4.5)
PLATELET # BLD AUTO: 141 10E3/UL (ref 150–450)
POTASSIUM SERPL-SCNC: 3.6 MMOL/L (ref 3.4–5.3)
RBC # BLD AUTO: 2.88 10E6/UL (ref 4.4–5.9)
SODIUM SERPL-SCNC: 138 MMOL/L (ref 135–145)
WBC # BLD AUTO: 5.6 10E3/UL (ref 4–11)

## 2024-04-24 PROCEDURE — 250N000013 HC RX MED GY IP 250 OP 250 PS 637: Performed by: STUDENT IN AN ORGANIZED HEALTH CARE EDUCATION/TRAINING PROGRAM

## 2024-04-24 PROCEDURE — 80048 BASIC METABOLIC PNL TOTAL CA: CPT | Performed by: STUDENT IN AN ORGANIZED HEALTH CARE EDUCATION/TRAINING PROGRAM

## 2024-04-24 PROCEDURE — 84100 ASSAY OF PHOSPHORUS: CPT | Performed by: THORACIC SURGERY (CARDIOTHORACIC VASCULAR SURGERY)

## 2024-04-24 PROCEDURE — 250N000011 HC RX IP 250 OP 636

## 2024-04-24 PROCEDURE — 250N000013 HC RX MED GY IP 250 OP 250 PS 637: Performed by: THORACIC SURGERY (CARDIOTHORACIC VASCULAR SURGERY)

## 2024-04-24 PROCEDURE — 250N000013 HC RX MED GY IP 250 OP 250 PS 637

## 2024-04-24 PROCEDURE — 71045 X-RAY EXAM CHEST 1 VIEW: CPT | Mod: 26 | Performed by: STUDENT IN AN ORGANIZED HEALTH CARE EDUCATION/TRAINING PROGRAM

## 2024-04-24 PROCEDURE — 83735 ASSAY OF MAGNESIUM: CPT | Performed by: THORACIC SURGERY (CARDIOTHORACIC VASCULAR SURGERY)

## 2024-04-24 PROCEDURE — 85027 COMPLETE CBC AUTOMATED: CPT | Performed by: STUDENT IN AN ORGANIZED HEALTH CARE EDUCATION/TRAINING PROGRAM

## 2024-04-24 PROCEDURE — 36415 COLL VENOUS BLD VENIPUNCTURE: CPT | Performed by: STUDENT IN AN ORGANIZED HEALTH CARE EDUCATION/TRAINING PROGRAM

## 2024-04-24 PROCEDURE — 71045 X-RAY EXAM CHEST 1 VIEW: CPT

## 2024-04-24 PROCEDURE — 120N000003 HC R&B IMCU UMMC

## 2024-04-24 RX ORDER — LACTULOSE 10 G/15ML
20 SOLUTION ORAL ONCE
Status: COMPLETED | OUTPATIENT
Start: 2024-04-24 | End: 2024-04-24

## 2024-04-24 RX ORDER — POTASSIUM CHLORIDE 20MEQ/15ML
20 LIQUID (ML) ORAL ONCE
Status: COMPLETED | OUTPATIENT
Start: 2024-04-24 | End: 2024-04-24

## 2024-04-24 RX ADMIN — METHOCARBAMOL 500 MG: 500 TABLET ORAL at 04:35

## 2024-04-24 RX ADMIN — FAMOTIDINE 20 MG: 20 TABLET ORAL at 09:25

## 2024-04-24 RX ADMIN — ACETAMINOPHEN 650 MG: 325 TABLET, FILM COATED ORAL at 05:37

## 2024-04-24 RX ADMIN — LACTULOSE 20 G: 20 SOLUTION ORAL at 09:25

## 2024-04-24 RX ADMIN — SALINE NASAL SPRAY 1 SPRAY: 1.5 SOLUTION NASAL at 16:32

## 2024-04-24 RX ADMIN — POLYETHYLENE GLYCOL 3350 17 G: 17 POWDER, FOR SOLUTION ORAL at 09:25

## 2024-04-24 RX ADMIN — ACETAMINOPHEN 650 MG: 325 TABLET, FILM COATED ORAL at 21:38

## 2024-04-24 RX ADMIN — POTASSIUM CHLORIDE 20 MEQ: 20 SOLUTION ORAL at 09:31

## 2024-04-24 RX ADMIN — METHOCARBAMOL 500 MG: 500 TABLET ORAL at 16:32

## 2024-04-24 RX ADMIN — ENOXAPARIN SODIUM 40 MG: 40 INJECTION SUBCUTANEOUS at 16:32

## 2024-04-24 RX ADMIN — MAGNESIUM HYDROXIDE 30 ML: 400 SUSPENSION ORAL at 09:25

## 2024-04-24 RX ADMIN — METHOCARBAMOL 500 MG: 500 TABLET ORAL at 09:25

## 2024-04-24 RX ADMIN — SALINE NASAL SPRAY 1 SPRAY: 1.5 SOLUTION NASAL at 13:07

## 2024-04-24 RX ADMIN — METHOCARBAMOL 500 MG: 500 TABLET ORAL at 21:14

## 2024-04-24 RX ADMIN — SERTRALINE HYDROCHLORIDE 100 MG: 100 TABLET ORAL at 09:25

## 2024-04-24 RX ADMIN — GUAIFENESIN 200 MG: 100 SOLUTION ORAL at 21:13

## 2024-04-24 RX ADMIN — ALBUTEROL SULFATE 4 PUFF: 90 AEROSOL, METERED RESPIRATORY (INHALATION) at 09:25

## 2024-04-24 RX ADMIN — SENNOSIDES AND DOCUSATE SODIUM 2 TABLET: 8.6; 5 TABLET ORAL at 09:25

## 2024-04-24 RX ADMIN — ALBUTEROL SULFATE 4 PUFF: 90 AEROSOL, METERED RESPIRATORY (INHALATION) at 21:38

## 2024-04-24 RX ADMIN — OXYCODONE HYDROCHLORIDE 10 MG: 5 SOLUTION ORAL at 05:37

## 2024-04-24 RX ADMIN — GUAIFENESIN 200 MG: 100 SOLUTION ORAL at 09:26

## 2024-04-24 RX ADMIN — GABAPENTIN 100 MG: 100 CAPSULE ORAL at 21:14

## 2024-04-24 RX ADMIN — OXYCODONE HYDROCHLORIDE 10 MG: 5 SOLUTION ORAL at 11:43

## 2024-04-24 RX ADMIN — GUAIFENESIN 200 MG: 100 SOLUTION ORAL at 13:07

## 2024-04-24 RX ADMIN — Medication 15 ML: at 09:25

## 2024-04-24 RX ADMIN — SENNOSIDES AND DOCUSATE SODIUM 2 TABLET: 8.6; 5 TABLET ORAL at 21:13

## 2024-04-24 RX ADMIN — ACETAMINOPHEN 650 MG: 325 TABLET, FILM COATED ORAL at 01:28

## 2024-04-24 RX ADMIN — OXYCODONE HYDROCHLORIDE 10 MG: 5 SOLUTION ORAL at 18:43

## 2024-04-24 RX ADMIN — OXYCODONE HYDROCHLORIDE 5 MG: 5 SOLUTION ORAL at 01:28

## 2024-04-24 RX ADMIN — FAMOTIDINE 20 MG: 20 TABLET ORAL at 21:13

## 2024-04-24 RX ADMIN — ACETAMINOPHEN 650 MG: 325 TABLET, FILM COATED ORAL at 14:51

## 2024-04-24 ASSESSMENT — ACTIVITIES OF DAILY LIVING (ADL)
ADLS_ACUITY_SCORE: 31
ADLS_ACUITY_SCORE: 29
ADLS_ACUITY_SCORE: 31
ADLS_ACUITY_SCORE: 29
ADLS_ACUITY_SCORE: 31
ADLS_ACUITY_SCORE: 29
ADLS_ACUITY_SCORE: 31
ADLS_ACUITY_SCORE: 29
ADLS_ACUITY_SCORE: 31
ADLS_ACUITY_SCORE: 31
ADLS_ACUITY_SCORE: 29
ADLS_ACUITY_SCORE: 31

## 2024-04-24 NOTE — PROGRESS NOTES
CLINICAL NUTRITION SERVICES - BRIEF NOTE     Nutrition Prescription    Recommendations already ordered by Registered Dietitian (RD):  Cycle TFs x 16 hours starting this evening  Updated EN regimen: Novasource Renal (or equivalent) at 75 ml/hr (1200 ml) x 16 hours (2944-7633) to provide 2400 kcal (35 kcal/kg), 109 g pro (1.5 g/kg), 220 g CHO, 860 ml free water, and 0 g fiber daily.      Free water flush - keeping 90 mL Q4hrs for now (will likely modify/simplify for pt/family prior to discharge)    Future/Additional Recommendations:  Continue to monitor nutrition related findings per protocol   - If tolerates 16-hour TF, will aim to reduce TF cycle to 12 hours prior to discharge as able. Goal rate with a 12-hour TF cycle would be 100 mL/hr.   - If goes to 12-hour cycle will likely modify free water flushes to 60 mL before/after cycle + additional free water flushes of 60 mL syringe x 7 daily to meet full hydration needs.    For last full RD assessment, see note dated 4/20/24    NEW FINDINGS   - Asked to cycle TFs by thoracic team FRAN.   - Met with pt and wife to discuss this. Will begin with a 16-hour TF cycle today with feeds to run 3037-8780. Will aim to reduce cycle further if able to ~12 hours if able, once tolerance to 16-hour cycle established. Wife/pt ok with this plan. Possible esophagram tomorrow per thoracic FRAN.     INTERVENTIONS  Implementation  Collaboration with other providers - Thoracic FRAN, bedside RN  Enteral Nutrition - Modify rate and schedule    Monitoring/Evaluation  Will continue to monitor and evaluate per protocol.    Chandler Pugh, MS, RDN, LD, CNSC  Available by [employer] or phone   Vocera: M-F (7:00-3:30)  6B Clinical Dietitian  or 2 Obs Clinical Dietitian  Weekend/Holiday (7:00-3:30) - Weekend Clinical Dietitian   6B RD desk: [phone]       **Clinical Dietitians are no longer available by pager.

## 2024-04-24 NOTE — PROGRESS NOTES
"Addendum 5/17/24  Fabricio is discharging today and on cycled enteral therapy.   Previous teaching was done several days ago so visit made to provide additional teaching at bedside.  Met with Fabricio and Sidra at bedside and had Fabricio program the pump, set up and prime the tubing, load pump and bag into the backpack, and initiate the \"feed\".   Reminded them about max 12 hr hang time for formula and to use a new bag q 24 hrs.  I reinforced water flushing recommendations for both patency and to maintain hydration.  Provided information about supplies and supply delivery (to home today 4-5p) and 24/7 availability of \[facility]\"" staff.       Fabricio verbalized understanding of information given.  He demonstrated very good technique with set up,  verbalized good understanding of process and stated he feels comfortable with administering his feeding independently.  Fabricio is ready for discharge from \[facility]\"" perspective.       Addendum  4/26/24  Fabricio is hoping to discharge tomorrow and would like instruction on set up and administration of home enteral feed.    Met with Fabricio and Sidra at bedside and provided instruction in enteral administration using the Infinity pump.   Educated them on  programming the pump, setting up and priming the tubing and loading of pump and bag into the backpack.   Instructed in max 12 hr hang time for formula and to use a new bag q 24 hrs.  Provided information about supplies and supply delivery, storage of formula, administration schedule (will be cycled over 16hrs) andI 24/7 support while on enteral therapy.   Fabricio was taught flushing of tube and med administration by bedside  RNs and stated he feels comfortable managing that.   I reinforced water flushing for both patency and to maintain hydration.  \[facility]\"" will deliver formula and supplies to pt's home tomorrow which will require a 4-5 hr turnaround time once ordes are signed.      Fabricio and Sidra verbalized understanding of information given.  Verbalized good understanding of " process and asked relevant questions Stated they feel comfortable with administering the feeding independently but if they change their mind hospitals can send a nurse out to assist at home.  Also referred them to you tube videos for additional support.  hospitals nurse will reach out to Fabricio or Sidra tomorrow with timing for delivery.    Home Infusion    Fabricio is expected to discharge in a day or two and will be going home on cycled enteral feeds.  He has never done home enteral therapy before nor has his wife so they will need teaching prior to discharge.  Benefits have been checked pt will have 100% coverage through his [employer] policy since he has met his deductible and OOP expenses.    Met with Fabricio and his wife Sidra at bedside to relay benefit information and offer choice of agency for the enteral supplies.  They would like to use hospitals.  Provided them with information about enteral therapy with hospitals services including administration method via the Infinity pump with back pack for mobility, delivery of supplies, storage of formula, f/u by hospitals dietitian,  plan for SNV and 24/7 support of hospitals staff while on enteral therapy.  Informed them that I will revisit closer to discharge to provide teaching and that we can have a nurse out to the home if they still feel they need it once teaching has been done.      Fabricio and Sidra verbalized understanding of all information given and are willing and able to learn and manage home enteral therapy.  Questions answered.  Will continue to follow,  update pt with more details as appropriate and provide education before discharge.    Estelle CHAPA  Nurse Liaison  [email or web address]  [address] [place] [address]  [email or web address]  [phone] M-F I hospitals main: [phone]

## 2024-04-24 NOTE — PROGRESS NOTES
"Thoracic Surgery  [facility]    Subjective:  Tolerating TFs. Pain well controlled.  Passing gas and had +BM.    Objective  /73 (BP Location: Right arm)   Pulse 70   Temp 98.2  F (36.8  C) (Oral)   Resp 18   Ht 1.727 m (5' 8\")   Wt 69.9 kg (154 lb 1.6 oz)   SpO2 93%   BMI 23.43 kg/m      GEN: No acute distress, resting comfortably  NEURO: Alert and oriented  PULM: NLB   CV: RRR per telemetry  GI: soft, nontender, nondistended. J tube in place.   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 Hill. Monitored in SICU overnight POD0 to 1, did very well and transferred to IMC on 4/20.      - Watch lytes closely  - Strict NPO,Cycle TFs today  - Nutrition consult placed, appreciate expertise  - NGT to LCS, gently flush NGT with 30 ml of water every 4 hrs (will likely remove NG on 4/25 prior to esophogram)  - Biotene spray for dry mouth  - Aggressive bowel regimen, prior hx of opioid-induced constipation  - Continue right chest tube until esophagram and taking p.o.  - Continue to hold PTA plavix  - Continue IMC status    Seen and discussed with staff    Dusty Mattson PA-C     "

## 2024-04-24 NOTE — PLAN OF CARE
Neuro: A&Ox4. Pleasant, able to make needs known.  Cardiac: HR 70s-80s. BP within parameters.   Respiratory: Sating >92% on RA. Independently using IS.  GI/: Adequate urine output. No BM this shift.   Diet/appetite: NPO. Tolerating continuous TF 50ml/hr, FWF 90ml q4hr.   Activity:  SBA, ambulated in halls x2.  Pain: At acceptable level on current regimen.   Skin: No new deficits noted.  LDA's:  -NG: LCS  -PIV x2: SL  -J tube: TF  -R CT: to water seal, air leak    Plan: Encourage OOB activity, awaiting esophogram. Continue with POC. Notify primary team with changes.        Goal Outcome Evaluation: progressing

## 2024-04-24 NOTE — PLAN OF CARE
Neuro: A&Ox4.   Cardiac: VSS  Respiratory: Sating 92+ on RA.  GI/: Adequate urine output. BM x0 this shift. Pt still feels constipated, not passing flatulence   Diet/appetite: NPO. Tolerating j-tube feeding.   Activity:  SBA, up to toilet/walk.  Pain: Reported abd pain, related to chest tube, rated between 4-8 out of 10. Managed with prn q4h tylenol & oxy.   Skin: No new deficits noted. Chest tube gauze saturated and changed x1.   LDA's: L/R PIV SL. R chest tube to water seal, air leak with coughing; provider aware per previous RN. J-tube infusing TF. NG on low continuous suction.     Plan: Continue with POC. Notify primary team with changes.

## 2024-04-25 ENCOUNTER — APPOINTMENT (OUTPATIENT)
Dept: GENERAL RADIOLOGY | Facility: CLINIC | Age: 69
DRG: 326 | End: 2024-04-25
Attending: THORACIC SURGERY (CARDIOTHORACIC VASCULAR SURGERY)
Payer: COMMERCIAL

## 2024-04-25 ENCOUNTER — APPOINTMENT (OUTPATIENT)
Dept: PHYSICAL THERAPY | Facility: CLINIC | Age: 69
DRG: 326 | End: 2024-04-25
Attending: THORACIC SURGERY (CARDIOTHORACIC VASCULAR SURGERY)
Payer: COMMERCIAL

## 2024-04-25 ENCOUNTER — APPOINTMENT (OUTPATIENT)
Dept: GENERAL RADIOLOGY | Facility: CLINIC | Age: 69
DRG: 326 | End: 2024-04-25
Attending: PHYSICIAN ASSISTANT
Payer: COMMERCIAL

## 2024-04-25 ENCOUNTER — APPOINTMENT (OUTPATIENT)
Dept: OCCUPATIONAL THERAPY | Facility: CLINIC | Age: 69
DRG: 326 | End: 2024-04-25
Attending: THORACIC SURGERY (CARDIOTHORACIC VASCULAR SURGERY)
Payer: COMMERCIAL

## 2024-04-25 ENCOUNTER — APPOINTMENT (OUTPATIENT)
Dept: SPEECH THERAPY | Facility: CLINIC | Age: 69
DRG: 326 | End: 2024-04-25
Attending: PHYSICIAN ASSISTANT
Payer: COMMERCIAL

## 2024-04-25 DIAGNOSIS — C10.0 SQUAMOUS CELL CARCINOMA OF VALLECULA (H): Primary | ICD-10-CM

## 2024-04-25 LAB
ANION GAP SERPL CALCULATED.3IONS-SCNC: 9 MMOL/L (ref 7–15)
BUN SERPL-MCNC: 17.6 MG/DL (ref 8–23)
CALCIUM SERPL-MCNC: 9.1 MG/DL (ref 8.8–10.2)
CHLORIDE SERPL-SCNC: 101 MMOL/L (ref 98–107)
CREAT SERPL-MCNC: 0.54 MG/DL (ref 0.67–1.17)
DEPRECATED HCO3 PLAS-SCNC: 27 MMOL/L (ref 22–29)
EGFRCR SERPLBLD CKD-EPI 2021: >90 ML/MIN/1.73M2
ERYTHROCYTE [DISTWIDTH] IN BLOOD BY AUTOMATED COUNT: 14.6 % (ref 10–15)
GLUCOSE SERPL-MCNC: 124 MG/DL (ref 70–99)
HCT VFR BLD AUTO: 29.9 % (ref 40–53)
HGB BLD-MCNC: 9.8 G/DL (ref 13.3–17.7)
MAGNESIUM SERPL-MCNC: 2 MG/DL (ref 1.7–2.3)
MCH RBC QN AUTO: 31.8 PG (ref 26.5–33)
MCHC RBC AUTO-ENTMCNC: 32.8 G/DL (ref 31.5–36.5)
MCV RBC AUTO: 97 FL (ref 78–100)
PATH REPORT.COMMENTS IMP SPEC: ABNORMAL
PATH REPORT.COMMENTS IMP SPEC: ABNORMAL
PATH REPORT.COMMENTS IMP SPEC: YES
PATH REPORT.FINAL DX SPEC: ABNORMAL
PATH REPORT.GROSS SPEC: ABNORMAL
PATH REPORT.INTRAOP OBS SPEC DOC: ABNORMAL
PATH REPORT.MICROSCOPIC SPEC OTHER STN: ABNORMAL
PATH REPORT.RELEVANT HX SPEC: ABNORMAL
PATHOLOGY SYNOPTIC REPORT: ABNORMAL
PHOSPHATE SERPL-MCNC: 3.6 MG/DL (ref 2.5–4.5)
PHOTO IMAGE: ABNORMAL
PLATELET # BLD AUTO: 155 10E3/UL (ref 150–450)
POTASSIUM SERPL-SCNC: 3.9 MMOL/L (ref 3.4–5.3)
RBC # BLD AUTO: 3.08 10E6/UL (ref 4.4–5.9)
SODIUM SERPL-SCNC: 137 MMOL/L (ref 135–145)
WBC # BLD AUTO: 6.9 10E3/UL (ref 4–11)

## 2024-04-25 PROCEDURE — 250N000013 HC RX MED GY IP 250 OP 250 PS 637

## 2024-04-25 PROCEDURE — 92611 MOTION FLUOROSCOPY/SWALLOW: CPT | Mod: GN

## 2024-04-25 PROCEDURE — 36415 COLL VENOUS BLD VENIPUNCTURE: CPT | Performed by: STUDENT IN AN ORGANIZED HEALTH CARE EDUCATION/TRAINING PROGRAM

## 2024-04-25 PROCEDURE — 250N000011 HC RX IP 250 OP 636

## 2024-04-25 PROCEDURE — 71045 X-RAY EXAM CHEST 1 VIEW: CPT | Mod: 26 | Performed by: RADIOLOGY

## 2024-04-25 PROCEDURE — 74220 X-RAY XM ESOPHAGUS 1CNTRST: CPT

## 2024-04-25 PROCEDURE — 97530 THERAPEUTIC ACTIVITIES: CPT | Mod: GP | Performed by: PHYSICAL THERAPIST

## 2024-04-25 PROCEDURE — 84100 ASSAY OF PHOSPHORUS: CPT | Performed by: THORACIC SURGERY (CARDIOTHORACIC VASCULAR SURGERY)

## 2024-04-25 PROCEDURE — 250N000013 HC RX MED GY IP 250 OP 250 PS 637: Performed by: THORACIC SURGERY (CARDIOTHORACIC VASCULAR SURGERY)

## 2024-04-25 PROCEDURE — 97116 GAIT TRAINING THERAPY: CPT | Mod: GP | Performed by: PHYSICAL THERAPIST

## 2024-04-25 PROCEDURE — 85027 COMPLETE CBC AUTOMATED: CPT | Performed by: STUDENT IN AN ORGANIZED HEALTH CARE EDUCATION/TRAINING PROGRAM

## 2024-04-25 PROCEDURE — 83735 ASSAY OF MAGNESIUM: CPT | Performed by: THORACIC SURGERY (CARDIOTHORACIC VASCULAR SURGERY)

## 2024-04-25 PROCEDURE — 80048 BASIC METABOLIC PNL TOTAL CA: CPT | Performed by: STUDENT IN AN ORGANIZED HEALTH CARE EDUCATION/TRAINING PROGRAM

## 2024-04-25 PROCEDURE — 250N000013 HC RX MED GY IP 250 OP 250 PS 637: Performed by: STUDENT IN AN ORGANIZED HEALTH CARE EDUCATION/TRAINING PROGRAM

## 2024-04-25 PROCEDURE — 97530 THERAPEUTIC ACTIVITIES: CPT | Mod: GO

## 2024-04-25 PROCEDURE — 120N000003 HC R&B IMCU UMMC

## 2024-04-25 PROCEDURE — 74230 X-RAY XM SWLNG FUNCJ C+: CPT | Mod: 26 | Performed by: STUDENT IN AN ORGANIZED HEALTH CARE EDUCATION/TRAINING PROGRAM

## 2024-04-25 PROCEDURE — 71045 X-RAY EXAM CHEST 1 VIEW: CPT

## 2024-04-25 PROCEDURE — 74230 X-RAY XM SWLNG FUNCJ C+: CPT

## 2024-04-25 PROCEDURE — 97535 SELF CARE MNGMENT TRAINING: CPT | Mod: GO

## 2024-04-25 PROCEDURE — 92526 ORAL FUNCTION THERAPY: CPT | Mod: GN

## 2024-04-25 RX ADMIN — Medication 15 ML: at 08:23

## 2024-04-25 RX ADMIN — ACETAMINOPHEN 650 MG: 325 TABLET, FILM COATED ORAL at 19:51

## 2024-04-25 RX ADMIN — ALBUTEROL SULFATE 4 PUFF: 90 AEROSOL, METERED RESPIRATORY (INHALATION) at 21:34

## 2024-04-25 RX ADMIN — METHOCARBAMOL 500 MG: 500 TABLET ORAL at 15:29

## 2024-04-25 RX ADMIN — SERTRALINE HYDROCHLORIDE 100 MG: 100 TABLET ORAL at 08:23

## 2024-04-25 RX ADMIN — FAMOTIDINE 20 MG: 20 TABLET ORAL at 08:23

## 2024-04-25 RX ADMIN — METHOCARBAMOL 500 MG: 500 TABLET ORAL at 22:27

## 2024-04-25 RX ADMIN — MAGNESIUM HYDROXIDE 30 ML: 400 SUSPENSION ORAL at 19:52

## 2024-04-25 RX ADMIN — GUAIFENESIN 200 MG: 100 SOLUTION ORAL at 08:23

## 2024-04-25 RX ADMIN — OXYCODONE HYDROCHLORIDE 10 MG: 5 SOLUTION ORAL at 22:30

## 2024-04-25 RX ADMIN — OXYCODONE HYDROCHLORIDE 10 MG: 5 SOLUTION ORAL at 05:31

## 2024-04-25 RX ADMIN — OXYCODONE HYDROCHLORIDE 10 MG: 5 SOLUTION ORAL at 18:34

## 2024-04-25 RX ADMIN — ENOXAPARIN SODIUM 40 MG: 40 INJECTION SUBCUTANEOUS at 17:05

## 2024-04-25 RX ADMIN — POLYETHYLENE GLYCOL 3350 17 G: 17 POWDER, FOR SOLUTION ORAL at 19:52

## 2024-04-25 RX ADMIN — GUAIFENESIN 200 MG: 100 SOLUTION ORAL at 13:53

## 2024-04-25 RX ADMIN — METHOCARBAMOL 500 MG: 500 TABLET ORAL at 09:52

## 2024-04-25 RX ADMIN — METHOCARBAMOL 500 MG: 500 TABLET ORAL at 03:33

## 2024-04-25 RX ADMIN — FAMOTIDINE 20 MG: 20 TABLET ORAL at 19:52

## 2024-04-25 RX ADMIN — OXYCODONE HYDROCHLORIDE 10 MG: 5 SOLUTION ORAL at 01:23

## 2024-04-25 RX ADMIN — POLYETHYLENE GLYCOL 3350 17 G: 17 POWDER, FOR SOLUTION ORAL at 08:24

## 2024-04-25 RX ADMIN — ALBUTEROL SULFATE 4 PUFF: 90 AEROSOL, METERED RESPIRATORY (INHALATION) at 15:29

## 2024-04-25 RX ADMIN — SENNOSIDES AND DOCUSATE SODIUM 2 TABLET: 8.6; 5 TABLET ORAL at 08:23

## 2024-04-25 RX ADMIN — OXYCODONE HYDROCHLORIDE 10 MG: 5 SOLUTION ORAL at 13:53

## 2024-04-25 RX ADMIN — ACETAMINOPHEN 650 MG: 325 TABLET, FILM COATED ORAL at 15:29

## 2024-04-25 RX ADMIN — ALBUTEROL SULFATE 4 PUFF: 90 AEROSOL, METERED RESPIRATORY (INHALATION) at 08:24

## 2024-04-25 RX ADMIN — GUAIFENESIN 200 MG: 100 SOLUTION ORAL at 19:52

## 2024-04-25 RX ADMIN — ACETAMINOPHEN 650 MG: 325 TABLET, FILM COATED ORAL at 08:24

## 2024-04-25 RX ADMIN — GUAIFENESIN 200 MG: 100 SOLUTION ORAL at 01:23

## 2024-04-25 RX ADMIN — SENNOSIDES AND DOCUSATE SODIUM 2 TABLET: 8.6; 5 TABLET ORAL at 19:52

## 2024-04-25 RX ADMIN — OXYCODONE HYDROCHLORIDE 10 MG: 5 SOLUTION ORAL at 09:52

## 2024-04-25 RX ADMIN — ACETAMINOPHEN 650 MG: 325 TABLET, FILM COATED ORAL at 04:32

## 2024-04-25 RX ADMIN — GABAPENTIN 100 MG: 100 CAPSULE ORAL at 22:27

## 2024-04-25 ASSESSMENT — ACTIVITIES OF DAILY LIVING (ADL)
ADLS_ACUITY_SCORE: 29

## 2024-04-25 NOTE — PROGRESS NOTES
"Thoracic Surgery  [facility]    Subjective:  Doing well today.  Looking forward to getting the ng tube out.      Objective  /64 (BP Location: Right arm)   Pulse 82   Temp 98.3  F (36.8  C) (Oral)   Resp 16   Ht 1.727 m (5' 8\")   Wt 69 kg (152 lb 3.2 oz)   SpO2 95%   BMI 23.14 kg/m      GEN: No acute distress, resting comfortably  NEURO: Alert and oriented  PULM: NLB   CV: RRR per telemetry  GI: soft, nontender, nondistended. J tube in place.   EXT: wwp  INCISIONS: CDI    Labs and imaging reviewed    A/P:  68M with SCC of esophagus who is now POD#6 s/p laparoscopic and right thoracoscopic gastroesophagectomy on 4/19 with Dr. Hill. Now recovering well.      Additional hospital problems:  Acute blood loss anemia  Hypokalemia     - Video swallow and esophagram today  - Strict NPO,Cycled Tfs for nutriton  - Nutrition consult placed, appreciate expertise  - Biotene spray for dry mouth  - Aggressive bowel regimen, prior hx of opioid-induced constipation  - Continue right chest tube until taking p.o.  - Continue to hold PTA plavix  - Continue IMC status    Seen and discussed with staff    Keith Colvin MD   Cardiothoracic Surgery Fellow    "

## 2024-04-25 NOTE — PLAN OF CARE
Physical Therapy Discharge Summary    Reason for therapy discharge:    All goals and outcomes met, no further needs identified.    Progress towards therapy goal(s). See goals on Care Plan in [facility] electronic health record for goal details.  Goals met    Therapy recommendation(s):    No further therapy is recommended.

## 2024-04-25 NOTE — PROGRESS NOTES
Care Management Follow Up    Length of Stay (days): 6    Expected Discharge Date: 04/26/2024?     Concerns to be Addressed: Discharge planning, medical readiness.   Patient plan of care discussed at interdisciplinary rounds: Yes    Anticipated Discharge Disposition: Home  Anticipated Discharge Services: Home infusion  Anticipated Discharge DME: None noted    Patient/family educated on Medicare website which has current facility and service quality ratings:  NA  Education Provided on the Discharge Plan:  Yes  Patient/Family in Agreement with the Plan:  Yes    Referrals Placed by CM/SW:  Home infusion  Private pay costs discussed: Not applicable    Additional Information:  Per chart review, patient is now on cycled tube feedings, home infusion order placed at this time. Plan for video swallow study and esophagram today. [facility] continues to follow, they will provide teaching prior to discharge. No additional discharge needs noted at this time.     Discharge resources:     [facility] (enteral feedings)  Phone: [phone]  Fax: [phone]    Care coordination will continue to follow.     Ruma Zaman, Nurse Coordinator, BSN  Phone: [phone]  Pager: [phone]  Vocera: 6B [facility] RNCC

## 2024-04-25 NOTE — PROGRESS NOTES
"   04/25/24 9544   Appointment Info   Signing Clinician's Name / Credentials (SLP) China Benavides MA CCC-SLP   General Information   Onset of Illness/Injury or Date of Surgery 04/19/24   Referring Physician Dusty Mattson PA-C   Patient/Family Therapy Goal Statement (SLP) None stated   Pertinent History of Current Problem Pt is a 68M with SCC of esophagus who is now POD#6 s/p laparoscopic and right thoracoscopic gastroesophagectomy on 4/19 with Dr. Hill. He has a hx of SCC of the valleculae and oropharynx, now s/p chemoXRT (completed 1/26/24). VFSS completed today prior to esophagram.   General Observations Alert, eager   Type of Evaluation   Type of Evaluation Swallow Evaluation   Oral Motor   Oral Musculature generally intact   Structural Abnormalities none present   Mucosal Quality adequate   Dentition (Oral Motor)   Comment, Dentition (Oral Motor) not in place for study   Dentition (Oral Motor) dental appliance/dentures   Facial Symmetry (Oral Motor)   Facial Symmetry (Oral Motor) WNL   Lip Function (Oral Motor)   Lip Range of Motion (Oral Motor) WNL   Cough/Swallow/Gag Reflex (Oral Motor)   Comment, Cough/Swallow/Gag Reflex (Oral Motor) adequate   Vocal Quality/Secretion Management (Oral Motor)   Comment, Vocal Quality/Secretion Management (Oral Motor) rough vocal quality that \"comes and goes\" - baseline prior to esophagectomy   General Swallowing Observations   Past History of Dysphagia Pt familiar to this service, had VFSS in Nov 2023 which showed flash penetration of thin liquids, no aspiration. Pt has been working with OP SLPs at [facility] targeting pharyngeal strengthening given XRT to neck. He reports leading up to this admission, he was able to eat/drink anything, including steaks. He denies any concerns with aspiration. As of today, he has been NPO for a week.   Current Diet/Method of Nutritional Intake (General Swallowing Observations, NIS) NPO   Swallowing Evaluation Videofluoroscopic swallow " study (VFSS)   VFSS Evaluation   Views Taken left lateral   Physical Location of Procedure [facility]   VFSS Textures Trialed thin liquids   VFSS Eval: Thin Liquid Texture Trial   Mode of Presentation, Thin Liquid cup;straw;self-fed   Order of Presentation 1-4   Preparatory Phase WFL   Oral Phase, Thin Liquid WFL   Bolus Location When Swallow Triggered pyriforms   Pharyngeal Phase, Thin Liquid   (impaired)   Rosenbek's Penetration Aspiration Scale: Thin Liquid Trial Results 7 - contrast passes glottis, visible subglottic residue remains despite patient's response (aspiration)   Response to Aspiration absent response   Strategies and Compensations chin tuck   Diagnostic Statement Aspiration occurs during the swallow when the pt does not utilize a chin tuck - flash penetration occurs with use of chin tuck. Pt is inconsistently sensate to aspiration. Cued cough does not clear aspirated material from trachea.   Esophageal Phase of Swallow   Patient reports or presents with symptoms of esophageal dysphagia Yes   Esophageal comments s/p esophagectomy   Swallowing Recommendations   Diet Consistency Recommendations thin liquids (level 0)   Supervision Level for Intake patient independent   Postural Recommendations chin tuck  (with every swallow)   Monitoring/Assistance Required (Eating/Swallowing) stop eating activities when fatigue is present   Recommended Feeding/Eating Techniques (Swallow Eval) maintain upright sitting position for eating;provide oral hygiene prior to intake   Medication Administration Recommendations, Swallowing (SLP) defer to MD   Instrumental Assessment Recommendations VFSS (videofluoroscopic swallowing study)  (will benefit from repeat as an OP)   General Therapy Interventions   Planned Therapy Interventions Dysphagia Treatment   Dysphagia treatment Oropharyngeal exercise training;Modified diet education;Instruction of safe swallow strategies;Compensatory strategies for swallowing    Clinical Impression   Criteria for Skilled Therapeutic Interventions Met (SLP Eval) Yes, treatment indicated   SLP Diagnosis Acute on chronic dysphagia in setting of esophagectomy, with hx of XRT to neck   Risks & Benefits of therapy have been explained evaluation/treatment results reviewed;care plan/treatment goals reviewed;risks/benefits reviewed;current/potential barriers reviewed;participants voiced agreement with care plan;participants included;patient   Clinical Impression Comments   Videoswallow study completed per MD order to determine readiness for completion of esophagram. Under fluoroscopy, pt presents with moderate acute on chronic oropharyngeal dysphagia in setting of esophagectomy and hx of XRT to head/neck. Pt assessed with thin water soluble contrast (Omnipaque). Oral phase functional. Swallow trigger delayed to the pyriforms. Once triggered, laryngeal vestibule closure is incomplete and epiglottic inversion is partial. Aspiration occurs during the swallow - pt is inconsistently sensate to aspiration, and cued cough does not clear aspirated material from trachea. Aspiration is eliminated with use of chin tuck.     From SLP perspective, pt OK for thin liquids with use of chin tuck for every swallow. Pt should complete oral care prior to PO intake. Given pharyngeal deficits appreciated on today's study, pt would benefit from a full videoswallow study in the future that assesses PO textures beyond thin liquids.      SLP Total Evaluation Time   Evaluation, videofluoroscopic eval of swallow function Minutes (57649) 12   SLP Discharge Recommendation home with outpatient therapy services   SLP Rationale for DC Rec Acute on chronic dysphagia   Total Session Time   Total Session Time (sum of timed and untimed services) 18

## 2024-04-25 NOTE — RESULT ENCOUNTER NOTE
I have personally reviewed the pathology results which support a diagnosis of complete response to chemoradiation with no evidence of esophageal carcinoma.  Sites involved in this diagnosis include: esophagus, lymph nodes      Devin Hill MD

## 2024-04-25 NOTE — PROGRESS NOTES
CLINICAL NUTRITION SERVICES - BRIEF NOTE     Nutrition Prescription    Recommendations already ordered by Registered Dietitian (RD):  - Reduce TF cycle to 12 hours starting this evening as below:   - Novasource renal (or equivalent coconut/MCT-free formula) at goal rate of 100 ml/hr x 12 hours (1483-0475) via J-tube to provide: 1200 mL formula, 2400 kcal (35 kcal/kg), 109 g pro (1.5 g/kg), 220 g CHO, 0 g fiber, and 860 mL free water daily.     - Adjust free water flush schedule as below:  Flush 60 mL at start and end of TF cycle (120 mL)  Provide additional 7-60 mL syringe free water flushes during day to meet full hydration needs (420 mL)  Total free water with TF formula + flushes = 1400 mL per day  -Please offer for pt to administer day-time flushes for self, for practice.      Future/Additional Recommendations:  Continue to monitor nutrition related findings per protocol    For last full RD assessment, see note dated 4/20/24 + brief RD notes 4/22/24 and 4/24/24    NEW FINDINGS   - Tolerating 16-hour cyclic TFs well. No abdominal discomfort or nausea. Will transition to a 12-hour TF cycle tonight. Adjusting free water flush schedule to simplify regimen for home. Pt would like to practice giving himself flushes and is eager to receive education for administering TFs as well (RNCC working on coordination with [facility]).   - Labs reviewed, acceptable  - BM x4 on 4/23 and x3 on 4/24, continue to monitor frequency/trends (went without BM for several days, GI system still likely adjusting in post-op setting)    INTERVENTIONS  Implementation  Collaboration with other providers - Bedside RN, RNCC   Enteral Nutrition - 12-hour cyclic feeds   Feeding tube flush - adjust as above    Monitoring/Evaluation  Will continue to monitor and evaluate per protocol.    Chandler Pugh, MS, RDN, LD, CNSC  Available by [employer] or phone   Jose Alberto: ALINE (7:00-3:30)  6B Clinical Dietitian  or 2 Obs Clinical Dietitian  Weekend/Holiday  (7:00-3:30) - Weekend Clinical Dietitian   6B RD desk: [phone]       **Clinical Dietitians are no longer available by pager.

## 2024-04-25 NOTE — PLAN OF CARE
Goal Outcome Evaluation:         [id number]  Neuro: A&Ox4.   Cardiac: No tele, HR 80s-90s. Afebrile.          Respiratory: Sating above 94% on RA. Denies SOB.   GI/: Adequate urine output via urinal. BM x1  Diet/appetite: NPO, cyclic TF(4p-8a) 75 ml/hr, 90ml FWF Q4. Denies nausea, and tolerating rate.  Activity:  SBA. Repositions independently.   Pain: At acceptable level on current regimen.   Skin: No new deficits noted.   LDA's: R CT to water seal, R/L PIV x2 SL, NG to LCS, J tube- TF    Plan: Continue with POC. Notify primary team with changes. Esophagram planned for today.

## 2024-04-26 ENCOUNTER — APPOINTMENT (OUTPATIENT)
Dept: SPEECH THERAPY | Facility: CLINIC | Age: 69
DRG: 326 | End: 2024-04-26
Attending: THORACIC SURGERY (CARDIOTHORACIC VASCULAR SURGERY)
Payer: COMMERCIAL

## 2024-04-26 ENCOUNTER — APPOINTMENT (OUTPATIENT)
Dept: GENERAL RADIOLOGY | Facility: CLINIC | Age: 69
DRG: 326 | End: 2024-04-26
Payer: COMMERCIAL

## 2024-04-26 LAB
ANION GAP SERPL CALCULATED.3IONS-SCNC: 9 MMOL/L (ref 7–15)
BUN SERPL-MCNC: 21.2 MG/DL (ref 8–23)
CALCIUM SERPL-MCNC: 9.1 MG/DL (ref 8.8–10.2)
CHLORIDE SERPL-SCNC: 101 MMOL/L (ref 98–107)
CREAT SERPL-MCNC: 0.54 MG/DL (ref 0.67–1.17)
DEPRECATED HCO3 PLAS-SCNC: 27 MMOL/L (ref 22–29)
EGFRCR SERPLBLD CKD-EPI 2021: >90 ML/MIN/1.73M2
ERYTHROCYTE [DISTWIDTH] IN BLOOD BY AUTOMATED COUNT: 14.5 % (ref 10–15)
GLUCOSE SERPL-MCNC: 101 MG/DL (ref 70–99)
HCT VFR BLD AUTO: 30.3 % (ref 40–53)
HGB BLD-MCNC: 10.2 G/DL (ref 13.3–17.7)
MAGNESIUM SERPL-MCNC: 2 MG/DL (ref 1.7–2.3)
MCH RBC QN AUTO: 32.6 PG (ref 26.5–33)
MCHC RBC AUTO-ENTMCNC: 33.7 G/DL (ref 31.5–36.5)
MCV RBC AUTO: 97 FL (ref 78–100)
PHOSPHATE SERPL-MCNC: 4.5 MG/DL (ref 2.5–4.5)
PLATELET # BLD AUTO: 157 10E3/UL (ref 150–450)
POTASSIUM SERPL-SCNC: 4 MMOL/L (ref 3.4–5.3)
RADIOLOGIST FLAGS: ABNORMAL
RBC # BLD AUTO: 3.13 10E6/UL (ref 4.4–5.9)
SODIUM SERPL-SCNC: 137 MMOL/L (ref 135–145)
WBC # BLD AUTO: 6.4 10E3/UL (ref 4–11)

## 2024-04-26 PROCEDURE — 83735 ASSAY OF MAGNESIUM: CPT | Performed by: THORACIC SURGERY (CARDIOTHORACIC VASCULAR SURGERY)

## 2024-04-26 PROCEDURE — 120N000003 HC R&B IMCU UMMC

## 2024-04-26 PROCEDURE — 80048 BASIC METABOLIC PNL TOTAL CA: CPT | Performed by: STUDENT IN AN ORGANIZED HEALTH CARE EDUCATION/TRAINING PROGRAM

## 2024-04-26 PROCEDURE — 92526 ORAL FUNCTION THERAPY: CPT | Mod: GN

## 2024-04-26 PROCEDURE — 250N000013 HC RX MED GY IP 250 OP 250 PS 637: Performed by: THORACIC SURGERY (CARDIOTHORACIC VASCULAR SURGERY)

## 2024-04-26 PROCEDURE — 250N000013 HC RX MED GY IP 250 OP 250 PS 637: Performed by: STUDENT IN AN ORGANIZED HEALTH CARE EDUCATION/TRAINING PROGRAM

## 2024-04-26 PROCEDURE — 71045 X-RAY EXAM CHEST 1 VIEW: CPT | Mod: 26 | Performed by: STUDENT IN AN ORGANIZED HEALTH CARE EDUCATION/TRAINING PROGRAM

## 2024-04-26 PROCEDURE — 250N000013 HC RX MED GY IP 250 OP 250 PS 637

## 2024-04-26 PROCEDURE — 250N000011 HC RX IP 250 OP 636

## 2024-04-26 PROCEDURE — 71045 X-RAY EXAM CHEST 1 VIEW: CPT

## 2024-04-26 PROCEDURE — 36415 COLL VENOUS BLD VENIPUNCTURE: CPT | Performed by: STUDENT IN AN ORGANIZED HEALTH CARE EDUCATION/TRAINING PROGRAM

## 2024-04-26 PROCEDURE — 85027 COMPLETE CBC AUTOMATED: CPT | Performed by: STUDENT IN AN ORGANIZED HEALTH CARE EDUCATION/TRAINING PROGRAM

## 2024-04-26 PROCEDURE — 250N000011 HC RX IP 250 OP 636: Performed by: STUDENT IN AN ORGANIZED HEALTH CARE EDUCATION/TRAINING PROGRAM

## 2024-04-26 PROCEDURE — 84100 ASSAY OF PHOSPHORUS: CPT | Performed by: THORACIC SURGERY (CARDIOTHORACIC VASCULAR SURGERY)

## 2024-04-26 RX ORDER — LACTULOSE 10 G/15ML
20 SOLUTION ORAL DAILY PRN
Status: DISCONTINUED | OUTPATIENT
Start: 2024-04-26 | End: 2024-05-17 | Stop reason: HOSPADM

## 2024-04-26 RX ADMIN — MAGNESIUM HYDROXIDE 30 ML: 400 SUSPENSION ORAL at 20:20

## 2024-04-26 RX ADMIN — ACETAMINOPHEN 650 MG: 325 TABLET, FILM COATED ORAL at 23:39

## 2024-04-26 RX ADMIN — ALBUTEROL SULFATE 4 PUFF: 90 AEROSOL, METERED RESPIRATORY (INHALATION) at 11:30

## 2024-04-26 RX ADMIN — METHOCARBAMOL 500 MG: 500 TABLET ORAL at 22:21

## 2024-04-26 RX ADMIN — Medication 15 ML: at 09:13

## 2024-04-26 RX ADMIN — GUAIFENESIN 200 MG: 100 SOLUTION ORAL at 02:32

## 2024-04-26 RX ADMIN — ACETAMINOPHEN 650 MG: 325 TABLET, FILM COATED ORAL at 13:22

## 2024-04-26 RX ADMIN — SIMETHICONE 226 ML: 125 CAPSULE, LIQUID FILLED ORAL at 16:09

## 2024-04-26 RX ADMIN — OXYCODONE HYDROCHLORIDE 5 MG: 5 SOLUTION ORAL at 23:39

## 2024-04-26 RX ADMIN — OXYCODONE HYDROCHLORIDE 10 MG: 5 SOLUTION ORAL at 02:32

## 2024-04-26 RX ADMIN — GUAIFENESIN 200 MG: 100 SOLUTION ORAL at 13:17

## 2024-04-26 RX ADMIN — METHOCARBAMOL 500 MG: 500 TABLET ORAL at 05:06

## 2024-04-26 RX ADMIN — SERTRALINE HYDROCHLORIDE 100 MG: 100 TABLET ORAL at 09:16

## 2024-04-26 RX ADMIN — MAGNESIUM HYDROXIDE 30 ML: 400 SUSPENSION ORAL at 09:12

## 2024-04-26 RX ADMIN — GUAIFENESIN 200 MG: 100 SOLUTION ORAL at 20:20

## 2024-04-26 RX ADMIN — BISACODYL 10 MG: 10 SUPPOSITORY RECTAL at 11:30

## 2024-04-26 RX ADMIN — ONDANSETRON 4 MG: 2 INJECTION INTRAMUSCULAR; INTRAVENOUS at 06:31

## 2024-04-26 RX ADMIN — ALBUTEROL SULFATE 4 PUFF: 90 AEROSOL, METERED RESPIRATORY (INHALATION) at 20:21

## 2024-04-26 RX ADMIN — GUAIFENESIN 200 MG: 100 SOLUTION ORAL at 09:13

## 2024-04-26 RX ADMIN — SENNOSIDES AND DOCUSATE SODIUM 2 TABLET: 8.6; 5 TABLET ORAL at 20:20

## 2024-04-26 RX ADMIN — FAMOTIDINE 20 MG: 20 TABLET ORAL at 20:21

## 2024-04-26 RX ADMIN — POLYETHYLENE GLYCOL 3350 17 G: 17 POWDER, FOR SOLUTION ORAL at 09:12

## 2024-04-26 RX ADMIN — SENNOSIDES AND DOCUSATE SODIUM 2 TABLET: 8.6; 5 TABLET ORAL at 09:12

## 2024-04-26 RX ADMIN — OXYCODONE HYDROCHLORIDE 5 MG: 5 SOLUTION ORAL at 06:39

## 2024-04-26 RX ADMIN — ENOXAPARIN SODIUM 40 MG: 40 INJECTION SUBCUTANEOUS at 16:09

## 2024-04-26 RX ADMIN — ONDANSETRON 4 MG: 2 INJECTION INTRAMUSCULAR; INTRAVENOUS at 18:25

## 2024-04-26 RX ADMIN — METHOCARBAMOL 500 MG: 500 TABLET ORAL at 09:13

## 2024-04-26 RX ADMIN — GABAPENTIN 100 MG: 100 CAPSULE ORAL at 22:21

## 2024-04-26 RX ADMIN — FAMOTIDINE 20 MG: 20 TABLET ORAL at 09:13

## 2024-04-26 RX ADMIN — METHOCARBAMOL 500 MG: 500 TABLET ORAL at 16:09

## 2024-04-26 RX ADMIN — LACTULOSE 20 G: 20 SOLUTION ORAL at 13:17

## 2024-04-26 RX ADMIN — POLYETHYLENE GLYCOL 3350 17 G: 17 POWDER, FOR SOLUTION ORAL at 20:21

## 2024-04-26 RX ADMIN — ACETAMINOPHEN 650 MG: 325 TABLET, FILM COATED ORAL at 18:25

## 2024-04-26 ASSESSMENT — ACTIVITIES OF DAILY LIVING (ADL)
ADLS_ACUITY_SCORE: 29
ADLS_ACUITY_SCORE: 28
ADLS_ACUITY_SCORE: 29
ADLS_ACUITY_SCORE: 28
ADLS_ACUITY_SCORE: 28
ADLS_ACUITY_SCORE: 29
ADLS_ACUITY_SCORE: 28
ADLS_ACUITY_SCORE: 28
ADLS_ACUITY_SCORE: 29
ADLS_ACUITY_SCORE: 29
ADLS_ACUITY_SCORE: 28
ADLS_ACUITY_SCORE: 29
ADLS_ACUITY_SCORE: 29
ADLS_ACUITY_SCORE: 28
ADLS_ACUITY_SCORE: 29
ADLS_ACUITY_SCORE: 28
ADLS_ACUITY_SCORE: 29
ADLS_ACUITY_SCORE: 28
ADLS_ACUITY_SCORE: 29

## 2024-04-26 NOTE — PROGRESS NOTES
Call received from Thoracic Surgery team to hold tube feeding tonight because his constipation. Small loose bowel movement result from enema. Feeling lower abdomen pressure pain and his rectum not relieved. Patient also continue to have nausea when HOB lower. Can have clear liquid diet if patient wants.

## 2024-04-26 NOTE — PLAN OF CARE
Goal Outcome Evaluation:      Plan of Care Reviewed With: patient, spouse    Overall Patient Progress: no changeOverall Patient Progress: no change    Outcome Evaluation: See most recent RD note 4/26 for assessment/recs

## 2024-04-26 NOTE — PROGRESS NOTES
Care Management Follow Up    Length of Stay (days): 7    Expected Discharge Date: 04/27/2024?     Concerns to be Addressed: Discharge planning, medical readiness.   Patient plan of care discussed at interdisciplinary rounds: Yes    Anticipated Discharge Disposition: Home  Anticipated Discharge Services: Home infusion  Anticipated Discharge DME: None noted    Patient/family educated on Medicare website which has current facility and service quality ratings:  Yes  Education Provided on the Discharge Plan: Yes  Patient/Family in Agreement with the Plan:  Yes    Referrals Placed by CM/SW:  Home infusion  Private pay costs discussed: Not applicable    Additional Information:  Per chart review, patient may be medically ready for discharge in the next couple days, [facility] is aware. Updated home infusion order placed at this time for cycled 16hr regimen. Weekend RNCC to follow up and assist w/discharge planning if patient is medically ready over the weekend.     Discharge resources:     [facility] (enteral feedings)  Phone: [phone]  Fax: [phone]    Care coordination will continue to follow.     Ruma Zaman, Nurse Coordinator, BSN  Phone: [phone]  Pager: [phone]  Vocera: 6B [facility] RNCC

## 2024-04-26 NOTE — PROGRESS NOTES
THORACIC & FOREGUT SURGERY    S:  Emesis overnight followed by increased shortness of breath.      O:  Vitals:    04/26/24 0405 04/26/24 0833 04/26/24 1000 04/26/24 1101   BP:  133/68  132/76   BP Location:  Right arm  Right arm   Pulse:    91   Resp:  18  20   Temp:  97.9  F (36.6  C)  98.2  F (36.8  C)   TempSrc:  Oral  Oral   SpO2: 93%  97%    Weight:       Height:           A&Ox3, NAD  Breathing non-labored on 3L O2 per nasal canula to keep sats in the 90%s.right chest tube to water seal.  RRR  Soft, NDNT emesis x2, tube feedings infusing  Distal extremities warm.   No calf tenderness.    A/P: Lopez Hogue is a 68 year old male POD# 7 s/p laparoscopic and right thoracoscopic gastroesophagectomy.    - Continue to wean narcotics  - Continue bowel regimen  - Pulmonary hygiene  - Appreciate SLP recommendations for po intake/diet advancement  - Clear liquids today with chin tuck  - Lovenox prophylaxis    Pat Dewitt, APRSUMANTH CNS   Thoracic Surgery

## 2024-04-26 NOTE — PLAN OF CARE
Neuro: A&Ox4.   Cardiac: non tele, VSS.   Respiratory: Sating >92% on RA. LS diminished in bases. Right chest tube to water seal, no leak noted this shift, serosanguinous output recorded.  GI/: Adequate urine output. Pt complains of constipation and lower abd pain, laxatives given but no BM yet this shift, not passing gas, MD aware and enema ordered.   Diet/appetite: Clear liquid diet. Coughing intermittently with sips of water, aspiration precautions reinforced. Intermittent nausea with occasional small emesis throughout shift. Cycled TF 4pm - 8am @75cc/hr. FWF 60cc seven times daily. Pt able to flush PEJ tube independently with supervision.  Activity:  Independent/SBA, up to chair and in halls with spouse.  Pain: Lower abd pain, pt attributes pain to constipation and prefers to avoid narcotics at this time.  Skin: No new deficits noted.  LDA's:  Bilateral PIV - SL  Right chest tube - water seal  PEJ tube - cycled TF    Plan: Continue with POC. Monitor tolerance of oral intake and cycled TF this evening. Plan to remove chest tube tomorrow. Notify primary team with changes.

## 2024-04-26 NOTE — PLAN OF CARE
"Goal Outcome Evaluation:         [id number]  Neuro: A&Ox4.   Cardiac: No tele, HR 80s-90s. Afebrile.          Respiratory: Sating above 94% on RA. Denies SOB.   GI/: Adequate urine output via urinal. BM x1  Diet/appetite: NPO, cyclic TF(8p-8a) 100 ml/hr, 60ml FWF x7 during day. Emesis x2, PRN given to manage nausea.   Activity:  SBA. Repositions independently.   Pain: At acceptable level on current regimen.   Skin: No new deficits noted.   LDA's: R CT to water seal, R/L PIV x2 SL, J tube- TF     Plan: Continue with POC. Notify primary team with changes. Possible video swallow study today.            Provider notifications  Pt emesis x1 light brown with resp secretions in color, less than 100ml. Unsure of aspiration, pt \"feels it in his throat\". TFs held, pt coughing. Thx  -provider came to beside, asked to continue to hold TF    FYI pt desat to 79%, now requiring 3L NC to sustain in 90%s. Slight SOB w/ exertion.   -Provider call back, plan to keep pt sitting up and cough up secretions as able.     "

## 2024-04-26 NOTE — PROGRESS NOTES
CLINICAL NUTRITION SERVICES - REASSESSMENT NOTE     Nutrition Prescription    Malnutrition Status:    Severe malnutrition in the context of chronic illness     Recommendations already ordered by Registered Dietitian (RD):  Extend TF cycle back to 16-hours, as previously tolerated   - Novasource renal (or equivalent coconut/MCT-free formula) at goal rate of 75 ml/hr x 16 hours (0391-7213) via J-tube to provide: 1200 mL formula, 2400 kcal (35 kcal/kg), 109 g pro (1.5 g/kg), 220 g CHO, 0 g fiber, and 860 mL free water daily.     Free water flushes:   Flush 60 mL at start and end of TF cycle (120 mL)  Provide additional 7-60 mL syringe free water flushes during day to meet full hydration needs (420 mL)  Total free water with TF formula + flushes = 1400 mL per day  -Please offer for pt to administer day-time flushes for self, for practice.     Esophagectomy diet education completed today with handout provided and explained related to anticipated progression of diet, foods recommended, foods to avoid, strategies to optimize nutrition/prevent discomfort, and understanding that TFs will continue to as primary nutrition source until further instruction by thoracic team at clinic appt.     Future/Additional Recommendations:  Monitor nutrition-related findings and follow pt per protocol     EVALUATION OF THE PROGRESS TOWARD GOALS   Diet: NPO    Nutrition Support:   -Dosing weight: 68.4 kg (actual wt 4/20)   -EN access via J-tube   -Regimen:   4/20-4/24 Novasource Renal (or equivalent) at 50 ml/hr (1200 ml) provides 2400 kcal (35 kcal/kg), 109 g pro (1.5 g/kg), 220 g CHO, 860 ml free water, and 0 g fiber daily.   4/24-4/25: Cycling x 16 hours, rate of 75 mL/hr 3778-5172 (same nutrition)  4/25-4/26 trial of 12 hour cycle but pt with small emesis, backing down to 16 hour cycle as prev pepe      -FWF 60 mL free water flush at start and end of TF cycle + 7-60 mL syringe free water flushes during day to meet full hydration needs  "    EN Intake - Average 7-day TF intake: 728 mL formula, providing 1456 Kcals (21 Kcal/kg), and 66 g pro (1 g/kg). This is meeting 85% of low-end est Kcal needs, and 80% of low-end est protein needs.     NEW FINDINGS   General:  Pt had an esophagram yesterday with no leaks appreciated, per imaging report. Additionally, SLP assessed pt today and approved for start of liquid diet with use of chin tuck. Provided education r/t diet stages/progression post esophagectomy with pt and wife today.     GI:  BM x4 on 4/23 and x3 on 4/24, 0 BMs 4/25 (continue to monitor trends)  Per nursing note at ~1 AM - \"Pt emesis x1 light brown with resp secretions in color, less than 100ml. Unsure of aspiration, pt \"feels it in his throat\". TFs held, pt coughing. Provider came to beside, asked to continue to hold TF\"  Will RD will increase TF cycle back to 16-hours, as previously tolerated. Pt aware of plan. [facility] to provide education on use of feeding pump/TFs today.    Weights:  Weight trends overall stable since admission; however loss of 14% over past 4-months PTA noted.     Labs:  Reviewed, stable      Medications:  Dulcolax daily   Liquid MVI/M   Miralax BID   Senna BID    Skin:  Post-op esophagectomy; WOCN not following pt    MALNUTRITION  % Intake: Decreased intake does not meet criteria  % Weight Loss: > 7.5% in 3 months (severe)  Subcutaneous Fat Loss: Facial region:  mild  Muscle Loss: Temporal:  mild, Facial & jaw region:  mild, Thoracic region (clavicle, acromium bone, deltoid, trapezius, pectoral):  severe, and Upper arm (bicep, tricep):  severe  Fluid Accumulation/Edema: None noted  Malnutrition Diagnosis: Severe malnutrition in the context of chronic illness     Previous Goals   Total avg nutritional intake to meet a minimum of 25 kcal/kg and 1.5 g PRO/kg daily (per dosing wt 68.4 kg).   Evaluation: Not met but progressing, TFs previously had been working up to goal rate over the past week.     Previous Nutrition " Diagnosis  Inadequate oral intake related to esophagectomy as evidenced by plan to initiate TF via jejunostomy     Evaluation: No change    CURRENT NUTRITION DIAGNOSIS  Inadequate oral intake related to NPO s/p esophagectomy as evidenced by enteral feeds via jejunostomy to meet 100% needs.      INTERVENTIONS  Implementation  Collaboration with other providers - Thoracic team FRAN   Enteral Nutrition - Modify back to 16-hour TF cycle as better tolerated   Nutrition education for nutrition relationship to health/disease - Diet ed post esophagectomy completed with handouts     Goals  Total avg nutritional intake to meet a minimum of 30 kcal/kg and 1.5 g PRO/kg daily (per dosing wt 68 kg).    Monitoring/Evaluation  Progress toward goals will be monitored and evaluated per protocol.    Chandler Pugh, MS, RDN, LD, CNSC  Available by [employer] or phone   Vocera: M-F (7:00-3:30)  6B Clinical Dietitian  or 2 Obs Clinical Dietitian  Weekend/Holiday (7:00-3:30) - Weekend Clinical Dietitian   6B RD desk: [phone]       **Clinical Dietitians are no longer available by pager.

## 2024-04-26 NOTE — PLAN OF CARE
"[id number]  /68 (BP Location: Right arm)   Pulse 69   Temp 98  F (36.7  C) (Oral)   Resp 16   Ht 1.727 m (5' 8\")   Wt 69 kg (152 lb 3.2 oz)   SpO2 93%   BMI 23.14 kg/m    Neuro: A/Ox4. Calls appropriately. Pleasant and cooperative.  Cardiac/Tele:  Vital signs stable. SR.  Respiratory: Room air. Tolerating well. CT pleural Slovenian x1, dressing changed today.  GI/: Continent. Urinal at bedside.  Diet/Appetite: NPO. Cyclic TF 2000 - 0800 w/ goal of 100ml/hr w/ 60mL FW, and before and after TF and medication admin. Patient to practice flushing PEJ tube if willing.  Skin: No new deficits noted.   LDAs: R & L PIV SL  Activity: Standby assist.  Pain: Giving oxy and tylenol. Dilaudid available if pain is not well controlled.     P: CT to water seal to remain until patient can tolerate PO intake. Aggressive bowel regimen.       David Urena RN on 4/25/2024 at 7:35 PM        "

## 2024-04-27 ENCOUNTER — APPOINTMENT (OUTPATIENT)
Dept: GENERAL RADIOLOGY | Facility: CLINIC | Age: 69
DRG: 326 | End: 2024-04-27
Attending: STUDENT IN AN ORGANIZED HEALTH CARE EDUCATION/TRAINING PROGRAM
Payer: COMMERCIAL

## 2024-04-27 ENCOUNTER — APPOINTMENT (OUTPATIENT)
Dept: SPEECH THERAPY | Facility: CLINIC | Age: 69
DRG: 326 | End: 2024-04-27
Attending: THORACIC SURGERY (CARDIOTHORACIC VASCULAR SURGERY)
Payer: COMMERCIAL

## 2024-04-27 ENCOUNTER — APPOINTMENT (OUTPATIENT)
Dept: GENERAL RADIOLOGY | Facility: CLINIC | Age: 69
DRG: 326 | End: 2024-04-27
Attending: THORACIC SURGERY (CARDIOTHORACIC VASCULAR SURGERY)
Payer: COMMERCIAL

## 2024-04-27 ENCOUNTER — APPOINTMENT (OUTPATIENT)
Dept: GENERAL RADIOLOGY | Facility: CLINIC | Age: 69
DRG: 326 | End: 2024-04-27
Payer: COMMERCIAL

## 2024-04-27 LAB
ANION GAP SERPL CALCULATED.3IONS-SCNC: 11 MMOL/L (ref 7–15)
BUN SERPL-MCNC: 15.5 MG/DL (ref 8–23)
CALCIUM SERPL-MCNC: 9.8 MG/DL (ref 8.8–10.2)
CHLORIDE SERPL-SCNC: 99 MMOL/L (ref 98–107)
CREAT SERPL-MCNC: 0.65 MG/DL (ref 0.67–1.17)
DEPRECATED HCO3 PLAS-SCNC: 27 MMOL/L (ref 22–29)
EGFRCR SERPLBLD CKD-EPI 2021: >90 ML/MIN/1.73M2
ERYTHROCYTE [DISTWIDTH] IN BLOOD BY AUTOMATED COUNT: 14.5 % (ref 10–15)
GLUCOSE SERPL-MCNC: 120 MG/DL (ref 70–99)
HCT VFR BLD AUTO: 32.3 % (ref 40–53)
HGB BLD-MCNC: 10.3 G/DL (ref 13.3–17.7)
MAGNESIUM SERPL-MCNC: 2.1 MG/DL (ref 1.7–2.3)
MCH RBC QN AUTO: 31.3 PG (ref 26.5–33)
MCHC RBC AUTO-ENTMCNC: 31.9 G/DL (ref 31.5–36.5)
MCV RBC AUTO: 98 FL (ref 78–100)
PHOSPHATE SERPL-MCNC: 3.9 MG/DL (ref 2.5–4.5)
PLATELET # BLD AUTO: 194 10E3/UL (ref 150–450)
POTASSIUM SERPL-SCNC: 4.7 MMOL/L (ref 3.4–5.3)
RBC # BLD AUTO: 3.29 10E6/UL (ref 4.4–5.9)
SODIUM SERPL-SCNC: 137 MMOL/L (ref 135–145)
WBC # BLD AUTO: 8.4 10E3/UL (ref 4–11)

## 2024-04-27 PROCEDURE — 71045 X-RAY EXAM CHEST 1 VIEW: CPT

## 2024-04-27 PROCEDURE — 71045 X-RAY EXAM CHEST 1 VIEW: CPT | Mod: 76

## 2024-04-27 PROCEDURE — 36415 COLL VENOUS BLD VENIPUNCTURE: CPT | Performed by: STUDENT IN AN ORGANIZED HEALTH CARE EDUCATION/TRAINING PROGRAM

## 2024-04-27 PROCEDURE — 85027 COMPLETE CBC AUTOMATED: CPT | Performed by: STUDENT IN AN ORGANIZED HEALTH CARE EDUCATION/TRAINING PROGRAM

## 2024-04-27 PROCEDURE — 250N000013 HC RX MED GY IP 250 OP 250 PS 637: Performed by: STUDENT IN AN ORGANIZED HEALTH CARE EDUCATION/TRAINING PROGRAM

## 2024-04-27 PROCEDURE — 250N000013 HC RX MED GY IP 250 OP 250 PS 637

## 2024-04-27 PROCEDURE — 83735 ASSAY OF MAGNESIUM: CPT | Performed by: THORACIC SURGERY (CARDIOTHORACIC VASCULAR SURGERY)

## 2024-04-27 PROCEDURE — 84100 ASSAY OF PHOSPHORUS: CPT | Performed by: THORACIC SURGERY (CARDIOTHORACIC VASCULAR SURGERY)

## 2024-04-27 PROCEDURE — 71045 X-RAY EXAM CHEST 1 VIEW: CPT | Mod: 26 | Performed by: RADIOLOGY

## 2024-04-27 PROCEDURE — 92526 ORAL FUNCTION THERAPY: CPT | Mod: GN

## 2024-04-27 PROCEDURE — 250N000011 HC RX IP 250 OP 636

## 2024-04-27 PROCEDURE — 71045 X-RAY EXAM CHEST 1 VIEW: CPT | Mod: 77

## 2024-04-27 PROCEDURE — 80048 BASIC METABOLIC PNL TOTAL CA: CPT | Performed by: STUDENT IN AN ORGANIZED HEALTH CARE EDUCATION/TRAINING PROGRAM

## 2024-04-27 PROCEDURE — 120N000003 HC R&B IMCU UMMC

## 2024-04-27 PROCEDURE — 250N000013 HC RX MED GY IP 250 OP 250 PS 637: Performed by: THORACIC SURGERY (CARDIOTHORACIC VASCULAR SURGERY)

## 2024-04-27 RX ORDER — ACETAMINOPHEN 325 MG/1
650 TABLET ORAL EVERY 6 HOURS PRN
Qty: 56 TABLET | Refills: 0 | Status: CANCELLED | OUTPATIENT
Start: 2024-04-27 | End: 2024-05-11

## 2024-04-27 RX ORDER — POLYETHYLENE GLYCOL 3350 17 G/17G
17 POWDER, FOR SOLUTION ORAL DAILY
Qty: 510 G | Refills: 0 | Status: CANCELLED | OUTPATIENT
Start: 2024-04-27 | End: 2024-05-27

## 2024-04-27 RX ORDER — METHOCARBAMOL 500 MG/1
500 TABLET, FILM COATED ORAL 3 TIMES DAILY
Qty: 42 TABLET | Refills: 0 | Status: CANCELLED | OUTPATIENT
Start: 2024-04-27 | End: 2024-05-11

## 2024-04-27 RX ORDER — FAMOTIDINE 20 MG/1
20 TABLET, FILM COATED ORAL 2 TIMES DAILY
Qty: 60 TABLET | Refills: 0 | Status: CANCELLED | OUTPATIENT
Start: 2024-04-27

## 2024-04-27 RX ADMIN — GUAIFENESIN 200 MG: 100 SOLUTION ORAL at 08:22

## 2024-04-27 RX ADMIN — ACETAMINOPHEN 650 MG: 325 TABLET, FILM COATED ORAL at 14:18

## 2024-04-27 RX ADMIN — POLYETHYLENE GLYCOL 3350 17 G: 17 POWDER, FOR SOLUTION ORAL at 08:22

## 2024-04-27 RX ADMIN — METHOCARBAMOL 500 MG: 500 TABLET ORAL at 22:01

## 2024-04-27 RX ADMIN — SENNOSIDES AND DOCUSATE SODIUM 2 TABLET: 8.6; 5 TABLET ORAL at 08:22

## 2024-04-27 RX ADMIN — MAGNESIUM HYDROXIDE 30 ML: 400 SUSPENSION ORAL at 19:48

## 2024-04-27 RX ADMIN — OXYCODONE HYDROCHLORIDE 5 MG: 5 SOLUTION ORAL at 15:57

## 2024-04-27 RX ADMIN — SERTRALINE HYDROCHLORIDE 100 MG: 100 TABLET ORAL at 08:24

## 2024-04-27 RX ADMIN — SODIUM PHOSPHATE 1 ENEMA: 7; 19 ENEMA RECTAL at 08:26

## 2024-04-27 RX ADMIN — GUAIFENESIN 200 MG: 100 SOLUTION ORAL at 14:23

## 2024-04-27 RX ADMIN — SENNOSIDES AND DOCUSATE SODIUM 2 TABLET: 8.6; 5 TABLET ORAL at 19:48

## 2024-04-27 RX ADMIN — FAMOTIDINE 20 MG: 20 TABLET ORAL at 08:22

## 2024-04-27 RX ADMIN — METHOCARBAMOL 500 MG: 500 TABLET ORAL at 04:54

## 2024-04-27 RX ADMIN — GUAIFENESIN 200 MG: 100 SOLUTION ORAL at 19:48

## 2024-04-27 RX ADMIN — ENOXAPARIN SODIUM 40 MG: 40 INJECTION SUBCUTANEOUS at 16:57

## 2024-04-27 RX ADMIN — METHOCARBAMOL 500 MG: 500 TABLET ORAL at 15:57

## 2024-04-27 RX ADMIN — ACETAMINOPHEN 650 MG: 325 TABLET, FILM COATED ORAL at 19:48

## 2024-04-27 RX ADMIN — FAMOTIDINE 20 MG: 20 TABLET ORAL at 19:48

## 2024-04-27 RX ADMIN — METHOCARBAMOL 500 MG: 500 TABLET ORAL at 10:05

## 2024-04-27 RX ADMIN — ACETAMINOPHEN 650 MG: 325 TABLET, FILM COATED ORAL at 04:54

## 2024-04-27 RX ADMIN — GABAPENTIN 100 MG: 100 CAPSULE ORAL at 22:01

## 2024-04-27 RX ADMIN — ALBUTEROL SULFATE 4 PUFF: 90 AEROSOL, METERED RESPIRATORY (INHALATION) at 06:55

## 2024-04-27 RX ADMIN — Medication 15 ML: at 08:22

## 2024-04-27 RX ADMIN — BISACODYL 10 MG: 10 SUPPOSITORY RECTAL at 08:22

## 2024-04-27 RX ADMIN — ALBUTEROL SULFATE 4 PUFF: 90 AEROSOL, METERED RESPIRATORY (INHALATION) at 16:57

## 2024-04-27 RX ADMIN — ALBUTEROL SULFATE 4 PUFF: 90 AEROSOL, METERED RESPIRATORY (INHALATION) at 12:06

## 2024-04-27 RX ADMIN — POLYETHYLENE GLYCOL 3350 17 G: 17 POWDER, FOR SOLUTION ORAL at 19:48

## 2024-04-27 RX ADMIN — LACTULOSE 20 G: 20 SOLUTION ORAL at 12:06

## 2024-04-27 RX ADMIN — MAGNESIUM HYDROXIDE 30 ML: 400 SUSPENSION ORAL at 08:22

## 2024-04-27 ASSESSMENT — ACTIVITIES OF DAILY LIVING (ADL)
ADLS_ACUITY_SCORE: 28

## 2024-04-27 NOTE — PLAN OF CARE
"Goal Outcome Evaluation:    /84 (BP Location: Right arm)   Pulse 89   Temp 98  F (36.7  C) (Oral)   Resp 20   Ht 1.727 m (5' 8\")   Wt 69 kg (152 lb 3.2 oz)   SpO2 93%   BMI 23.14 kg/m      [id number]    POD#7 s/p Lap and right thoracoscopic esophagogastrectomy for esophageal cancer. Lap sites healed and LÓPEZ, chest tube to water seal, no air leak. C/o abdominal pain and distention, thoracic surgery came to see him in person, ordered another enema, but patient preferred in the morning. Vs stable, O2 2LNC applied for intermittent desating. Scheduled Robaxin and prn Tylenol for abdominal pain. Enema, MOM, Miralax, Senna for bowel regimen. Small bm after enema this afternoon. Independent for activity. PIV saline locked. J tube on left lower abdomen. Chest tube to water seal. Enema tomorrow morning. Not passing gas, active bowel sound. Continue bowel regimen. No opoid due to constipation for now per pt preference.       "

## 2024-04-27 NOTE — PROGRESS NOTES
V/S: VSS, afebrile. -130s. HR 70-90s.  Neuro: Pt is A&O x4, no c/o headache & lightheadedness. No c/o numbness & tingling, calls appropriately.  Resp: 3L oxymask. Sats > 92, c/o mild SOB. Lung sounds clear & diminished in bases bilaterally.  Cardiac: No tele orders, HR SR. No c/o chest pain & palpitations.  GI/: No BG orders. Clear liquid diet, tolerating well. No FR. Intermittent TF (6152-7093) through PEG tube @ 75 ml/hr w/ 35 ml flushes q4h, currently paused d/t episode of emesis. Voiding via urinal/commode. Last BM 4/27 (x4 BM, tan/brown & liquid).  Skin: Skin dry & pale w/ scattered bruising on BUE. No new deficits noted.  Pain: C/o moderate abdominal pain, given x1 oxycodone & x1 tylenol w/ scheduled robaxin, pain moderately relieved.   Activity: Independent in room & in hallways.  Electrolytes: No replacements given, recheck in am.  LDAs: R PIV SL & WDL. L PIV SL & WDL    Right chest tube was removed in afternoon, pt tolerated well & site WDL. Pt having increased episodes of desaturation during day, team notified; Episodes decreased w/ switching to oxymask (attempted to wean to NC, pt desat intermittently to 80s). PRN bowel medications given, continue overnight to increase bowel motility.     Plan of care ongoing.  Patient currently resting in bed with call light in reach.

## 2024-04-27 NOTE — PROVIDER NOTIFICATION
"Provider notified via [employer] (Dr. Perez)    \"Pt had episode of emesis, looked like tube feed. Paused the tube feeds for now, do you still want me to continue them?\"    Provider responded, said it was ok to pause tube feeds for now.      "

## 2024-04-27 NOTE — PLAN OF CARE
"/66 (BP Location: Right arm, Cuff Size: Adult Regular)   Pulse 89   Temp 97.9  F (36.6  C) (Oral)   Resp 16   Ht 1.727 m (5' 8\")   Wt 67.6 kg (149 lb 0.5 oz)   SpO2 94%   BMI 22.66 kg/m      Shift events: No acute events overnight. VSS. Stated he slept better than previous nights. Pain controlled with oxycodone x1 and tylenol x2. Requesting enema/laxatives in AM. Ambulated halls x1.    Neuro: A&Ox4; no deficits noted.  CV: BP and HR WDL. No tele orders.  Resp: 2 L NC; attempted to wean off O2 overnight but would desat on RA. Productive cough.  GI: Constipation; abdominal pain. No BM since surgery 8 days ago but had small liquid stool overnight. BS active. Reports minimal flatus. States he still feels very constipated.  : Voids spontaneously; adequate UOP.  Diet/nutrition: Clear liquids. TF held due to abdominal pain and constipation.  Skin: Lap sites LÓPEZ. No new deficits noted.  Activity: Independent.  LDA: PIV x2. Chest tube to WS with small output. PEJ.    Plan: Enema and further bowel regimen today. Continue to encourage frequent ambulation.            "

## 2024-04-27 NOTE — PROGRESS NOTES
"Thoracic Surgery  [facility]  4/27/2024    Subjective:  No acute events overnight, TF were held overnight given lack of BM and distension. Doing well this AM, pain much better controlled.     Objective  /71 (BP Location: Right arm)   Pulse 89   Temp 97.8  F (36.6  C) (Oral)   Resp 20   Ht 1.727 m (5' 8\")   Wt 67.6 kg (149 lb 0.5 oz)   SpO2 97%   BMI 22.66 kg/m        A&Ox3, NAD  Breathing non-labored on 2-3L O2 per nasal canula to keep sats in the 90%s while sleeping (JOSE). Right chest tube removed.   RRR  Soft, NDNT emesis x2, tube feedings infusing  Distal extremities warm.   No calf tenderness.     CXR - Stable apical PTX    A/P:    Lopez Hogue is a 68 year old male POD# 8 s/p laparoscopic and right thoracoscopic gastroesophagectomy.     - Continue to wean narcotics  - Continue bowel regimen, enemize PRN  - Pulmonary hygiene  - Appreciate SLP recommendations for po intake/diet advancement  - Advance tube feeds to goal starting with cycle tube tonight.   - Clear liquids today with chin tuck  - Lovenox prophylaxis  Dispo: Likely Monday       Patient seen on rounds with fellow, Dr. Garcia, who will discuss with staff.    Please page if questions,    Gerald Perez MD, MS  PGY-3, General Surgery    "

## 2024-04-28 ENCOUNTER — APPOINTMENT (OUTPATIENT)
Dept: GENERAL RADIOLOGY | Facility: CLINIC | Age: 69
DRG: 326 | End: 2024-04-28
Attending: THORACIC SURGERY (CARDIOTHORACIC VASCULAR SURGERY)
Payer: COMMERCIAL

## 2024-04-28 ENCOUNTER — ANESTHESIA (OUTPATIENT)
Dept: INTENSIVE CARE | Facility: CLINIC | Age: 69
DRG: 326 | End: 2024-04-28
Payer: COMMERCIAL

## 2024-04-28 ENCOUNTER — APPOINTMENT (OUTPATIENT)
Dept: CT IMAGING | Facility: CLINIC | Age: 69
DRG: 326 | End: 2024-04-28
Attending: PHYSICIAN ASSISTANT
Payer: COMMERCIAL

## 2024-04-28 ENCOUNTER — ANESTHESIA EVENT (OUTPATIENT)
Dept: SURGERY | Facility: CLINIC | Age: 69
DRG: 326 | End: 2024-04-28
Payer: COMMERCIAL

## 2024-04-28 ENCOUNTER — APPOINTMENT (OUTPATIENT)
Dept: GENERAL RADIOLOGY | Facility: CLINIC | Age: 69
DRG: 326 | End: 2024-04-28
Payer: COMMERCIAL

## 2024-04-28 ENCOUNTER — APPOINTMENT (OUTPATIENT)
Dept: GENERAL RADIOLOGY | Facility: CLINIC | Age: 69
DRG: 326 | End: 2024-04-28
Attending: STUDENT IN AN ORGANIZED HEALTH CARE EDUCATION/TRAINING PROGRAM
Payer: COMMERCIAL

## 2024-04-28 ENCOUNTER — APPOINTMENT (OUTPATIENT)
Dept: SPEECH THERAPY | Facility: CLINIC | Age: 69
DRG: 326 | End: 2024-04-28
Attending: THORACIC SURGERY (CARDIOTHORACIC VASCULAR SURGERY)
Payer: COMMERCIAL

## 2024-04-28 ENCOUNTER — ANESTHESIA EVENT (OUTPATIENT)
Dept: INTENSIVE CARE | Facility: CLINIC | Age: 69
DRG: 326 | End: 2024-04-28
Payer: COMMERCIAL

## 2024-04-28 ENCOUNTER — ANESTHESIA (OUTPATIENT)
Dept: SURGERY | Facility: CLINIC | Age: 69
DRG: 326 | End: 2024-04-28
Payer: COMMERCIAL

## 2024-04-28 LAB
ALBUMIN SERPL BCG-MCNC: 3.6 G/DL (ref 3.5–5.2)
ALLEN'S TEST: NO
ALP SERPL-CCNC: 169 U/L (ref 40–150)
ALT SERPL W P-5'-P-CCNC: 17 U/L (ref 0–70)
ANION GAP SERPL CALCULATED.3IONS-SCNC: 11 MMOL/L (ref 7–15)
ANION GAP SERPL CALCULATED.3IONS-SCNC: 13 MMOL/L (ref 7–15)
APTT PPP: 36 SECONDS (ref 22–38)
AST SERPL W P-5'-P-CCNC: 22 U/L (ref 0–45)
BASE EXCESS BLDA CALC-SCNC: 7.3 MMOL/L (ref -3–3)
BILIRUB SERPL-MCNC: 0.8 MG/DL
BUN SERPL-MCNC: 14.5 MG/DL (ref 8–23)
BUN SERPL-MCNC: 14.8 MG/DL (ref 8–23)
CA-I BLD-MCNC: 4.8 MG/DL (ref 4.4–5.2)
CALCIUM SERPL-MCNC: 9.5 MG/DL (ref 8.8–10.2)
CALCIUM SERPL-MCNC: 9.8 MG/DL (ref 8.8–10.2)
CHLORIDE SERPL-SCNC: 98 MMOL/L (ref 98–107)
CHLORIDE SERPL-SCNC: 99 MMOL/L (ref 98–107)
COHGB MFR BLD: 96.6 % (ref 95–96)
CREAT SERPL-MCNC: 0.6 MG/DL (ref 0.67–1.17)
CREAT SERPL-MCNC: 0.61 MG/DL (ref 0.67–1.17)
CRP SERPL-MCNC: 338 MG/L
DEPRECATED HCO3 PLAS-SCNC: 29 MMOL/L (ref 22–29)
DEPRECATED HCO3 PLAS-SCNC: 31 MMOL/L (ref 22–29)
EGFRCR SERPLBLD CKD-EPI 2021: >90 ML/MIN/1.73M2
EGFRCR SERPLBLD CKD-EPI 2021: >90 ML/MIN/1.73M2
ERYTHROCYTE [DISTWIDTH] IN BLOOD BY AUTOMATED COUNT: 14.4 % (ref 10–15)
ERYTHROCYTE [DISTWIDTH] IN BLOOD BY AUTOMATED COUNT: 14.5 % (ref 10–15)
GLUCOSE BLDC GLUCOMTR-MCNC: 102 MG/DL (ref 70–99)
GLUCOSE BLDC GLUCOMTR-MCNC: 119 MG/DL (ref 70–99)
GLUCOSE BLDC GLUCOMTR-MCNC: 128 MG/DL (ref 70–99)
GLUCOSE SERPL-MCNC: 118 MG/DL (ref 70–99)
GLUCOSE SERPL-MCNC: 130 MG/DL (ref 70–99)
HCO3 BLD-SCNC: 34 MMOL/L (ref 21–28)
HCT VFR BLD AUTO: 32.9 % (ref 40–53)
HCT VFR BLD AUTO: 33.4 % (ref 40–53)
HGB BLD-MCNC: 10.8 G/DL (ref 13.3–17.7)
HGB BLD-MCNC: 11 G/DL (ref 13.3–17.7)
INR PPP: 1.34 (ref 0.85–1.15)
LACTATE SERPL-SCNC: 1 MMOL/L (ref 0.7–2)
MAGNESIUM SERPL-MCNC: 2.1 MG/DL (ref 1.7–2.3)
MAGNESIUM SERPL-MCNC: 2.1 MG/DL (ref 1.7–2.3)
MCH RBC QN AUTO: 31.9 PG (ref 26.5–33)
MCH RBC QN AUTO: 32.2 PG (ref 26.5–33)
MCHC RBC AUTO-ENTMCNC: 32.8 G/DL (ref 31.5–36.5)
MCHC RBC AUTO-ENTMCNC: 32.9 G/DL (ref 31.5–36.5)
MCV RBC AUTO: 97 FL (ref 78–100)
MCV RBC AUTO: 98 FL (ref 78–100)
MRSA DNA SPEC QL NAA+PROBE: NEGATIVE
O2/TOTAL GAS SETTING VFR VENT: 70 %
PCO2 BLD: 55 MM HG (ref 35–45)
PEEP: 9 CM H2O
PH BLD: 7.4 [PH] (ref 7.35–7.45)
PHOSPHATE SERPL-MCNC: 4 MG/DL (ref 2.5–4.5)
PHOSPHATE SERPL-MCNC: 4 MG/DL (ref 2.5–4.5)
PLATELET # BLD AUTO: 196 10E3/UL (ref 150–450)
PLATELET # BLD AUTO: 200 10E3/UL (ref 150–450)
PO2 BLD: 84 MM HG (ref 80–105)
POTASSIUM SERPL-SCNC: 3.8 MMOL/L (ref 3.4–5.3)
POTASSIUM SERPL-SCNC: 4.1 MMOL/L (ref 3.4–5.3)
PROT SERPL-MCNC: 7.2 G/DL (ref 6.4–8.3)
RBC # BLD AUTO: 3.35 10E6/UL (ref 4.4–5.9)
RBC # BLD AUTO: 3.45 10E6/UL (ref 4.4–5.9)
SA TARGET DNA: NEGATIVE
SAO2 % BLDA: 95 % (ref 92–100)
SODIUM SERPL-SCNC: 140 MMOL/L (ref 135–145)
SODIUM SERPL-SCNC: 141 MMOL/L (ref 135–145)
WBC # BLD AUTO: 11.2 10E3/UL (ref 4–11)
WBC # BLD AUTO: 13.1 10E3/UL (ref 4–11)

## 2024-04-28 PROCEDURE — 71045 X-RAY EXAM CHEST 1 VIEW: CPT

## 2024-04-28 PROCEDURE — C1769 GUIDE WIRE: HCPCS | Performed by: THORACIC SURGERY (CARDIOTHORACIC VASCULAR SURGERY)

## 2024-04-28 PROCEDURE — 92526 ORAL FUNCTION THERAPY: CPT | Mod: GN

## 2024-04-28 PROCEDURE — 272N000001 HC OR GENERAL SUPPLY STERILE: Performed by: THORACIC SURGERY (CARDIOTHORACIC VASCULAR SURGERY)

## 2024-04-28 PROCEDURE — 0BH17EZ INSERTION OF ENDOTRACHEAL AIRWAY INTO TRACHEA, VIA NATURAL OR ARTIFICIAL OPENING: ICD-10-PCS | Performed by: SURGERY

## 2024-04-28 PROCEDURE — 250N000024 HC ISOFLURANE, PER MIN: Performed by: THORACIC SURGERY (CARDIOTHORACIC VASCULAR SURGERY)

## 2024-04-28 PROCEDURE — 250N000011 HC RX IP 250 OP 636: Performed by: STUDENT IN AN ORGANIZED HEALTH CARE EDUCATION/TRAINING PROGRAM

## 2024-04-28 PROCEDURE — 370N000003 HC ANESTHESIA WARD SERVICE: Performed by: STUDENT IN AN ORGANIZED HEALTH CARE EDUCATION/TRAINING PROGRAM

## 2024-04-28 PROCEDURE — 71045 X-RAY EXAM CHEST 1 VIEW: CPT | Mod: 26 | Performed by: RADIOLOGY

## 2024-04-28 PROCEDURE — 5A1935Z RESPIRATORY VENTILATION, LESS THAN 24 CONSECUTIVE HOURS: ICD-10-PCS | Performed by: SURGERY

## 2024-04-28 PROCEDURE — 999N000215 HC STATISTIC HFNC ADULT NON-CPAP

## 2024-04-28 PROCEDURE — 85027 COMPLETE CBC AUTOMATED: CPT | Performed by: STUDENT IN AN ORGANIZED HEALTH CARE EDUCATION/TRAINING PROGRAM

## 2024-04-28 PROCEDURE — 80048 BASIC METABOLIC PNL TOTAL CA: CPT | Performed by: STUDENT IN AN ORGANIZED HEALTH CARE EDUCATION/TRAINING PROGRAM

## 2024-04-28 PROCEDURE — 360N000076 HC SURGERY LEVEL 3, PER MIN: Performed by: THORACIC SURGERY (CARDIOTHORACIC VASCULAR SURGERY)

## 2024-04-28 PROCEDURE — 83735 ASSAY OF MAGNESIUM: CPT | Performed by: THORACIC SURGERY (CARDIOTHORACIC VASCULAR SURGERY)

## 2024-04-28 PROCEDURE — 272N000063 HC CIRCUIT HUMID FACE/TRACH MSK

## 2024-04-28 PROCEDURE — G1010 CDSM STANSON: HCPCS | Performed by: RADIOLOGY

## 2024-04-28 PROCEDURE — 36415 COLL VENOUS BLD VENIPUNCTURE: CPT

## 2024-04-28 PROCEDURE — 82247 BILIRUBIN TOTAL: CPT

## 2024-04-28 PROCEDURE — 36415 COLL VENOUS BLD VENIPUNCTURE: CPT | Performed by: STUDENT IN AN ORGANIZED HEALTH CARE EDUCATION/TRAINING PROGRAM

## 2024-04-28 PROCEDURE — 71275 CT ANGIOGRAPHY CHEST: CPT | Mod: 26 | Performed by: RADIOLOGY

## 2024-04-28 PROCEDURE — 250N000009 HC RX 250: Performed by: STUDENT IN AN ORGANIZED HEALTH CARE EDUCATION/TRAINING PROGRAM

## 2024-04-28 PROCEDURE — G1010 CDSM STANSON: HCPCS

## 2024-04-28 PROCEDURE — 82330 ASSAY OF CALCIUM: CPT

## 2024-04-28 PROCEDURE — 999N000179 XR SURGERY CARM FLUORO LESS THAN 5 MIN W STILLS: Mod: TC

## 2024-04-28 PROCEDURE — 250N000011 HC RX IP 250 OP 636

## 2024-04-28 PROCEDURE — 0BJ08ZZ INSPECTION OF TRACHEOBRONCHIAL TREE, VIA NATURAL OR ARTIFICIAL OPENING ENDOSCOPIC: ICD-10-PCS | Performed by: SURGERY

## 2024-04-28 PROCEDURE — 99291 CRITICAL CARE FIRST HOUR: CPT | Performed by: PHYSICIAN ASSISTANT

## 2024-04-28 PROCEDURE — 258N000003 HC RX IP 258 OP 636: Performed by: STUDENT IN AN ORGANIZED HEALTH CARE EDUCATION/TRAINING PROGRAM

## 2024-04-28 PROCEDURE — 85610 PROTHROMBIN TIME: CPT

## 2024-04-28 PROCEDURE — 120N000003 HC R&B IMCU UMMC

## 2024-04-28 PROCEDURE — 0DJ08ZZ INSPECTION OF UPPER INTESTINAL TRACT, VIA NATURAL OR ARTIFICIAL OPENING ENDOSCOPIC: ICD-10-PCS | Performed by: THORACIC SURGERY (CARDIOTHORACIC VASCULAR SURGERY)

## 2024-04-28 PROCEDURE — 250N000013 HC RX MED GY IP 250 OP 250 PS 637: Performed by: THORACIC SURGERY (CARDIOTHORACIC VASCULAR SURGERY)

## 2024-04-28 PROCEDURE — 250N000013 HC RX MED GY IP 250 OP 250 PS 637: Performed by: STUDENT IN AN ORGANIZED HEALTH CARE EDUCATION/TRAINING PROGRAM

## 2024-04-28 PROCEDURE — 250N000013 HC RX MED GY IP 250 OP 250 PS 637

## 2024-04-28 PROCEDURE — 94002 VENT MGMT INPAT INIT DAY: CPT

## 2024-04-28 PROCEDURE — 31624 DX BRONCHOSCOPE/LAVAGE: CPT

## 2024-04-28 PROCEDURE — 74018 RADEX ABDOMEN 1 VIEW: CPT | Mod: 26 | Performed by: RADIOLOGY

## 2024-04-28 PROCEDURE — 80053 COMPREHEN METABOLIC PANEL: CPT | Performed by: STUDENT IN AN ORGANIZED HEALTH CARE EDUCATION/TRAINING PROGRAM

## 2024-04-28 PROCEDURE — 87106 FUNGI IDENTIFICATION YEAST: CPT | Performed by: THORACIC SURGERY (CARDIOTHORACIC VASCULAR SURGERY)

## 2024-04-28 PROCEDURE — 272N000054 HC CANNULA HIGH FLOW, ADULT

## 2024-04-28 PROCEDURE — 258N000003 HC RX IP 258 OP 636

## 2024-04-28 PROCEDURE — 71045 X-RAY EXAM CHEST 1 VIEW: CPT | Mod: 76

## 2024-04-28 PROCEDURE — 999N000065 XR CHEST PORT 1 VIEW

## 2024-04-28 PROCEDURE — 93010 ELECTROCARDIOGRAM REPORT: CPT | Mod: XU | Performed by: INTERNAL MEDICINE

## 2024-04-28 PROCEDURE — 250N000011 HC RX IP 250 OP 636: Performed by: PHYSICIAN ASSISTANT

## 2024-04-28 PROCEDURE — 31624 DX BRONCHOSCOPE/LAVAGE: CPT | Performed by: STUDENT IN AN ORGANIZED HEALTH CARE EDUCATION/TRAINING PROGRAM

## 2024-04-28 PROCEDURE — 85027 COMPLETE CBC AUTOMATED: CPT

## 2024-04-28 PROCEDURE — 82805 BLOOD GASES W/O2 SATURATION: CPT

## 2024-04-28 PROCEDURE — 0B978ZZ DRAINAGE OF LEFT MAIN BRONCHUS, VIA NATURAL OR ARTIFICIAL OPENING ENDOSCOPIC: ICD-10-PCS | Performed by: SURGERY

## 2024-04-28 PROCEDURE — 250N000009 HC RX 250

## 2024-04-28 PROCEDURE — 999N000157 HC STATISTIC RCP TIME EA 10 MIN

## 2024-04-28 PROCEDURE — 0DH67UZ INSERTION OF FEEDING DEVICE INTO STOMACH, VIA NATURAL OR ARTIFICIAL OPENING: ICD-10-PCS | Performed by: THORACIC SURGERY (CARDIOTHORACIC VASCULAR SURGERY)

## 2024-04-28 PROCEDURE — 86140 C-REACTIVE PROTEIN: CPT

## 2024-04-28 PROCEDURE — 99140 ANES COMP EMERGENCY COND: CPT | Performed by: STUDENT IN AN ORGANIZED HEALTH CARE EDUCATION/TRAINING PROGRAM

## 2024-04-28 PROCEDURE — 84100 ASSAY OF PHOSPHORUS: CPT | Performed by: THORACIC SURGERY (CARDIOTHORACIC VASCULAR SURGERY)

## 2024-04-28 PROCEDURE — 83735 ASSAY OF MAGNESIUM: CPT

## 2024-04-28 PROCEDURE — 85730 THROMBOPLASTIN TIME PARTIAL: CPT

## 2024-04-28 PROCEDURE — 255N000002 HC RX 255 OP 636: Performed by: THORACIC SURGERY (CARDIOTHORACIC VASCULAR SURGERY)

## 2024-04-28 PROCEDURE — 74018 RADEX ABDOMEN 1 VIEW: CPT

## 2024-04-28 PROCEDURE — 87205 SMEAR GRAM STAIN: CPT | Performed by: THORACIC SURGERY (CARDIOTHORACIC VASCULAR SURGERY)

## 2024-04-28 PROCEDURE — 250N000009 HC RX 250: Performed by: ANESTHESIOLOGY

## 2024-04-28 PROCEDURE — 250N000011 HC RX IP 250 OP 636: Performed by: THORACIC SURGERY (CARDIOTHORACIC VASCULAR SURGERY)

## 2024-04-28 PROCEDURE — 93005 ELECTROCARDIOGRAM TRACING: CPT

## 2024-04-28 PROCEDURE — 87640 STAPH A DNA AMP PROBE: CPT

## 2024-04-28 PROCEDURE — 31624 DX BRONCHOSCOPE/LAVAGE: CPT | Mod: 78 | Performed by: THORACIC SURGERY (CARDIOTHORACIC VASCULAR SURGERY)

## 2024-04-28 PROCEDURE — 87070 CULTURE OTHR SPECIMN AEROBIC: CPT | Performed by: THORACIC SURGERY (CARDIOTHORACIC VASCULAR SURGERY)

## 2024-04-28 PROCEDURE — 99140 ANES COMP EMERGENCY COND: CPT

## 2024-04-28 PROCEDURE — 0B9M8ZX DRAINAGE OF BILATERAL LUNGS, VIA NATURAL OR ARTIFICIAL OPENING ENDOSCOPIC, DIAGNOSTIC: ICD-10-PCS | Performed by: THORACIC SURGERY (CARDIOTHORACIC VASCULAR SURGERY)

## 2024-04-28 PROCEDURE — 84100 ASSAY OF PHOSPHORUS: CPT

## 2024-04-28 PROCEDURE — 71045 X-RAY EXAM CHEST 1 VIEW: CPT | Mod: 77

## 2024-04-28 PROCEDURE — 43241 EGD TUBE/CATH INSERTION: CPT | Mod: 78 | Performed by: THORACIC SURGERY (CARDIOTHORACIC VASCULAR SURGERY)

## 2024-04-28 PROCEDURE — 0B938ZZ DRAINAGE OF RIGHT MAIN BRONCHUS, VIA NATURAL OR ARTIFICIAL OPENING ENDOSCOPIC: ICD-10-PCS | Performed by: SURGERY

## 2024-04-28 PROCEDURE — 36600 WITHDRAWAL OF ARTERIAL BLOOD: CPT

## 2024-04-28 PROCEDURE — 83605 ASSAY OF LACTIC ACID: CPT

## 2024-04-28 PROCEDURE — 370N000017 HC ANESTHESIA TECHNICAL FEE, PER MIN: Performed by: THORACIC SURGERY (CARDIOTHORACIC VASCULAR SURGERY)

## 2024-04-28 RX ORDER — SODIUM CHLORIDE, SODIUM LACTATE, POTASSIUM CHLORIDE, CALCIUM CHLORIDE 600; 310; 30; 20 MG/100ML; MG/100ML; MG/100ML; MG/100ML
INJECTION, SOLUTION INTRAVENOUS CONTINUOUS PRN
Status: DISCONTINUED | OUTPATIENT
Start: 2024-04-28 | End: 2024-04-28

## 2024-04-28 RX ORDER — FUROSEMIDE 10 MG/ML
20 INJECTION INTRAMUSCULAR; INTRAVENOUS ONCE
Status: COMPLETED | OUTPATIENT
Start: 2024-04-28 | End: 2024-04-28

## 2024-04-28 RX ORDER — DEXTROSE MONOHYDRATE, SODIUM CHLORIDE, AND POTASSIUM CHLORIDE 50; 1.49; 4.5 G/1000ML; G/1000ML; G/1000ML
INJECTION, SOLUTION INTRAVENOUS CONTINUOUS
Status: DISCONTINUED | OUTPATIENT
Start: 2024-04-28 | End: 2024-04-30

## 2024-04-28 RX ORDER — ONDANSETRON 2 MG/ML
INJECTION INTRAMUSCULAR; INTRAVENOUS PRN
Status: DISCONTINUED | OUTPATIENT
Start: 2024-04-28 | End: 2024-04-28

## 2024-04-28 RX ORDER — FUROSEMIDE 20 MG
20 TABLET ORAL ONCE
Status: COMPLETED | OUTPATIENT
Start: 2024-04-28 | End: 2024-04-28

## 2024-04-28 RX ORDER — IOPAMIDOL 510 MG/ML
INJECTION, SOLUTION INTRAVASCULAR PRN
Status: DISCONTINUED | OUTPATIENT
Start: 2024-04-28 | End: 2024-04-28 | Stop reason: HOSPADM

## 2024-04-28 RX ORDER — PIPERACILLIN SODIUM, TAZOBACTAM SODIUM 4; .5 G/20ML; G/20ML
4.5 INJECTION, POWDER, LYOPHILIZED, FOR SOLUTION INTRAVENOUS ONCE
Status: DISCONTINUED | OUTPATIENT
Start: 2024-04-28 | End: 2024-04-28

## 2024-04-28 RX ORDER — PROPOFOL 10 MG/ML
INJECTION, EMULSION INTRAVENOUS PRN
Status: DISCONTINUED | OUTPATIENT
Start: 2024-04-28 | End: 2024-04-28

## 2024-04-28 RX ORDER — PIPERACILLIN SODIUM, TAZOBACTAM SODIUM 4; .5 G/20ML; G/20ML
4.5 INJECTION, POWDER, LYOPHILIZED, FOR SOLUTION INTRAVENOUS EVERY 6 HOURS
Status: DISCONTINUED | OUTPATIENT
Start: 2024-04-28 | End: 2024-04-29

## 2024-04-28 RX ORDER — FENTANYL CITRATE 50 UG/ML
INJECTION, SOLUTION INTRAMUSCULAR; INTRAVENOUS PRN
Status: DISCONTINUED | OUTPATIENT
Start: 2024-04-28 | End: 2024-04-28

## 2024-04-28 RX ORDER — CHLORHEXIDINE GLUCONATE ORAL RINSE 1.2 MG/ML
15 SOLUTION DENTAL EVERY 12 HOURS
Status: DISCONTINUED | OUTPATIENT
Start: 2024-04-28 | End: 2024-04-29

## 2024-04-28 RX ORDER — ONDANSETRON 2 MG/ML
4 INJECTION INTRAMUSCULAR; INTRAVENOUS EVERY 6 HOURS PRN
Status: DISCONTINUED | OUTPATIENT
Start: 2024-04-28 | End: 2024-04-28

## 2024-04-28 RX ORDER — ONDANSETRON 2 MG/ML
4 INJECTION INTRAMUSCULAR; INTRAVENOUS EVERY 6 HOURS
Status: DISCONTINUED | OUTPATIENT
Start: 2024-04-28 | End: 2024-04-28

## 2024-04-28 RX ORDER — LORAZEPAM 2 MG/ML
0.5 CONCENTRATE ORAL EVERY 6 HOURS PRN
Status: DISCONTINUED | OUTPATIENT
Start: 2024-04-28 | End: 2024-05-17 | Stop reason: HOSPADM

## 2024-04-28 RX ORDER — NOREPINEPHRINE BITARTRATE 0.06 MG/ML
INJECTION, SOLUTION INTRAVENOUS
Status: DISCONTINUED
Start: 2024-04-28 | End: 2024-04-28 | Stop reason: HOSPADM

## 2024-04-28 RX ORDER — IOPAMIDOL 755 MG/ML
54 INJECTION, SOLUTION INTRAVASCULAR ONCE
Status: COMPLETED | OUTPATIENT
Start: 2024-04-28 | End: 2024-04-28

## 2024-04-28 RX ORDER — PROPOFOL 10 MG/ML
5-75 INJECTION, EMULSION INTRAVENOUS CONTINUOUS
Status: DISCONTINUED | OUTPATIENT
Start: 2024-04-28 | End: 2024-04-29

## 2024-04-28 RX ORDER — LANOLIN ALCOHOL/MO/W.PET/CERES
3 CREAM (GRAM) TOPICAL
Status: DISCONTINUED | OUTPATIENT
Start: 2024-04-28 | End: 2024-04-29

## 2024-04-28 RX ADMIN — GUAIFENESIN 200 MG: 100 SOLUTION ORAL at 14:03

## 2024-04-28 RX ADMIN — POTASSIUM CHLORIDE, DEXTROSE MONOHYDRATE AND SODIUM CHLORIDE: 150; 5; 450 INJECTION, SOLUTION INTRAVENOUS at 09:38

## 2024-04-28 RX ADMIN — PROPOFOL 120 MG: 10 INJECTION, EMULSION INTRAVENOUS at 16:01

## 2024-04-28 RX ADMIN — FUROSEMIDE 20 MG: 10 INJECTION, SOLUTION INTRAMUSCULAR; INTRAVENOUS at 14:38

## 2024-04-28 RX ADMIN — MIDAZOLAM 2 MG: 1 INJECTION INTRAMUSCULAR; INTRAVENOUS at 15:59

## 2024-04-28 RX ADMIN — PIPERACILLIN SODIUM AND TAZOBACTAM SODIUM 4.5 G: 4; .5 INJECTION, POWDER, LYOPHILIZED, FOR SOLUTION INTRAVENOUS at 10:33

## 2024-04-28 RX ADMIN — ONDANSETRON 4 MG: 2 INJECTION INTRAMUSCULAR; INTRAVENOUS at 19:01

## 2024-04-28 RX ADMIN — GUAIFENESIN 200 MG: 100 SOLUTION ORAL at 03:15

## 2024-04-28 RX ADMIN — PROPOFOL 50 MCG/KG/MIN: 10 INJECTION, EMULSION INTRAVENOUS at 17:50

## 2024-04-28 RX ADMIN — METHOCARBAMOL 500 MG: 500 TABLET ORAL at 03:15

## 2024-04-28 RX ADMIN — METHOCARBAMOL 500 MG: 500 TABLET ORAL at 09:15

## 2024-04-28 RX ADMIN — GUAIFENESIN 200 MG: 100 SOLUTION ORAL at 09:19

## 2024-04-28 RX ADMIN — FUROSEMIDE 20 MG: 20 TABLET ORAL at 09:18

## 2024-04-28 RX ADMIN — PHENYLEPHRINE HYDROCHLORIDE 200 MCG: 10 INJECTION INTRAVENOUS at 16:00

## 2024-04-28 RX ADMIN — ALBUTEROL SULFATE 4 PUFF: 90 AEROSOL, METERED RESPIRATORY (INHALATION) at 14:00

## 2024-04-28 RX ADMIN — Medication 50 MG: at 18:31

## 2024-04-28 RX ADMIN — BISACODYL 10 MG: 10 SUPPOSITORY RECTAL at 09:17

## 2024-04-28 RX ADMIN — ONDANSETRON 4 MG: 4 TABLET, ORALLY DISINTEGRATING ORAL at 09:36

## 2024-04-28 RX ADMIN — ROCURONIUM BROMIDE 1 MG: 10 INJECTION, SOLUTION INTRAVENOUS at 16:01

## 2024-04-28 RX ADMIN — Medication 30 MG: at 19:04

## 2024-04-28 RX ADMIN — PROPOFOL 50 MCG/KG/MIN: 10 INJECTION, EMULSION INTRAVENOUS at 22:19

## 2024-04-28 RX ADMIN — Medication 3 MG: at 03:15

## 2024-04-28 RX ADMIN — PROPOFOL 80 MG: 10 INJECTION, EMULSION INTRAVENOUS at 16:04

## 2024-04-28 RX ADMIN — ENOXAPARIN SODIUM 40 MG: 40 INJECTION SUBCUTANEOUS at 20:24

## 2024-04-28 RX ADMIN — SALINE NASAL SPRAY 1 SPRAY: 1.5 SOLUTION NASAL at 11:25

## 2024-04-28 RX ADMIN — FAMOTIDINE 20 MG: 20 TABLET ORAL at 09:18

## 2024-04-28 RX ADMIN — ALBUTEROL SULFATE 4 PUFF: 90 AEROSOL, METERED RESPIRATORY (INHALATION) at 05:19

## 2024-04-28 RX ADMIN — ACETAMINOPHEN 650 MG: 325 TABLET, FILM COATED ORAL at 09:38

## 2024-04-28 RX ADMIN — POTASSIUM CHLORIDE, DEXTROSE MONOHYDRATE AND SODIUM CHLORIDE: 150; 5; 450 INJECTION, SOLUTION INTRAVENOUS at 20:38

## 2024-04-28 RX ADMIN — PHENYLEPHRINE HYDROCHLORIDE 100 MCG: 10 INJECTION INTRAVENOUS at 16:06

## 2024-04-28 RX ADMIN — PIPERACILLIN SODIUM AND TAZOBACTAM SODIUM 4.5 G: 4; .5 INJECTION, POWDER, LYOPHILIZED, FOR SOLUTION INTRAVENOUS at 17:36

## 2024-04-28 RX ADMIN — OXYCODONE HYDROCHLORIDE 5 MG: 5 SOLUTION ORAL at 03:25

## 2024-04-28 RX ADMIN — SUGAMMADEX 200 MG: 100 INJECTION, SOLUTION INTRAVENOUS at 19:15

## 2024-04-28 RX ADMIN — NOREPINEPHRINE BITARTRATE 0.03 MCG/KG/MIN: 1 INJECTION, SOLUTION, CONCENTRATE INTRAVENOUS at 18:38

## 2024-04-28 RX ADMIN — LORAZEPAM 0.5 MG: 2 CONCENTRATE ORAL at 08:55

## 2024-04-28 RX ADMIN — SODIUM CHLORIDE, POTASSIUM CHLORIDE, SODIUM LACTATE AND CALCIUM CHLORIDE: 600; 310; 30; 20 INJECTION, SOLUTION INTRAVENOUS at 18:25

## 2024-04-28 RX ADMIN — IOPAMIDOL 54 ML: 755 INJECTION, SOLUTION INTRAVENOUS at 15:18

## 2024-04-28 RX ADMIN — LORAZEPAM 0.5 MG: 2 CONCENTRATE ORAL at 14:37

## 2024-04-28 RX ADMIN — SERTRALINE HYDROCHLORIDE 100 MG: 100 TABLET ORAL at 09:18

## 2024-04-28 RX ADMIN — MIDAZOLAM 2 MG: 1 INJECTION INTRAMUSCULAR; INTRAVENOUS at 16:52

## 2024-04-28 ASSESSMENT — ACTIVITIES OF DAILY LIVING (ADL)
ADLS_ACUITY_SCORE: 33
ADLS_ACUITY_SCORE: 33
ADLS_ACUITY_SCORE: 28
ADLS_ACUITY_SCORE: 28
ADLS_ACUITY_SCORE: 33
ADLS_ACUITY_SCORE: 28
ADLS_ACUITY_SCORE: 33
ADLS_ACUITY_SCORE: 28
ADLS_ACUITY_SCORE: 29
ADLS_ACUITY_SCORE: 28
ADLS_ACUITY_SCORE: 29
ADLS_ACUITY_SCORE: 29
ADLS_ACUITY_SCORE: 33
ADLS_ACUITY_SCORE: 28

## 2024-04-28 ASSESSMENT — ENCOUNTER SYMPTOMS
SEIZURES: 0
ORTHOPNEA: 0
DYSRHYTHMIAS: 0

## 2024-04-28 ASSESSMENT — COPD QUESTIONNAIRES: COPD: 0

## 2024-04-28 ASSESSMENT — LIFESTYLE VARIABLES: TOBACCO_USE: 1

## 2024-04-28 NOTE — PROGRESS NOTES
Brief Thoracic surgery Progress note  4/28/2024    RRT called on Mr. Hogue - Patient desaturation event, started on HFNC, 90%.     CXR obtained less than 1 hr ago was normal.     Will plan to treat as aspiration PNA, start empiric abx - Zosyn, MRSA swab. Will transition to full NPO, hold TF today. OK to give Ativan. Will reassess in the PM.     Please page if questions,    Gerald Perez MD, MS  PGY-3, General Surgery

## 2024-04-28 NOTE — CODE/RAPID RESPONSE
Rapid Response Team Note    Assessment   In assessment a rapid response was called on Lopez Hogue due to respiratory distress. This presentation is likely due to mucous plugging and cannot rule out acute PE or aspiration pneumonitis.  Patient is postop day 9 status post partial esophagectomy with open pylorus with risk for aspiration.    Plan   -Appreciate excellent RT cares and will initiate percussive therapy for at least 24 hours 4 times daily, continue suctioning  -Wean high flow nasal cannula as able  -Stat CTA chest to assess for PE  -Additional Lasix 20 mg IV x 1 given  -Low threshold to pursue echo if pulmonary workup unrevealing and patient is not improving  -Agree with continue broad-spectrum antibiotics patient is currently on Zosyn and MRSA is negative  -If signs of worsening infection could add atypical coverage  -CXR appears to show a new mild right lower lobe consolidation versus prior, official read is pending  -For patient's insomnia would be reasonable to consider low-dose trazodone or Remeron at bedtime  -  The ENT primary team was  at bedside and assisted with RRT.  -  Disposition: The patient will remain on the current unit. We will continue to monitor this patient closely.  -  Reassessment and plan follow-up will be performed by the primary team    Lopez is critically ill with acute hypoxic respiratory failure. These features are alarming and indicate that the patient is at imminent risk of life-threatening organ failure. Acute deterioration is being managed by the following critical interventions: oxygen by high flow nasal cannula    Total Critical Care time spent by me, excluding procedures, was 35 minutes.  Melisa Quinonez PA-C  [facility] RRT [facility] Job Code Contact #[phone]  [facility] Paging/Directory    Hospital Course   Brief Summary of events leading to rapid response:   RRT was paged for acute hypoxic respiratory failure when patient was ambulating.  Patient was on 2 L NC  yesterday and ambulating and speaking without any issues.  Patient endorses feeling significantly short of breath and has significant 2 word dyspnea.  Patient has been able to cough and has been coughing up phlegm.  During the course of RRT patient was able to cough up several large boluses of mucus that was very thick and purulent.     Patient does not endorse having any fevers, chills, sore throat, new signs of infection.  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  CV: Blowing holosystolic murmur, RRR  Pulmonary: Diminished bases and trace bibasilar crackles, nonlabored at rest on 50L and 85% FiO2 high flow nasal cannula  GI: Soft, nontender, NABS  Extremities: No Marked edema, moves all extremities    Significant Results and Procedures   Lactic Acid:   Recent Labs   Lab Test 24  1354 24  1232 24  0944   LACT 0.7 0.6* 0.5*     CBC:   Recent Labs   Lab Test 24  0533 24  0532 24  0327   WBC 11.2* 8.4 6.4   HGB 10.8* 10.3* 10.2*   HCT 32.9* 32.3* 30.3*    194 157        Sepsis Evaluation   The patient is not known to have an infection.  NO EVIDENCE OF SEPSIS at this time.  Vital sign, physical exam, and lab findings are due to mucous plugging cannot rule out a PE or aspiration pneumonitis.

## 2024-04-28 NOTE — PROCEDURES
SICU BRONCHOSCOPY    Procedure(s):    Bronchoscopy  Therapeutic suctioniong    Indication:  Aspiration leading to desaturation and intubation     Procedure Details:   The bronchoscope was inserted through the mouth via ETT.    Airway Examination:  A complete airway examination was performed from the distal trachea to the subsegmental level in each lobe of both lungs.  Pertinent findings include :  Aspiration pneumonitis with cloudy succus entericus more on the left side, minimal on the right side, no food particles , no intrabronchial masses. .                                                                                                                                                                             Therapeutic suctioning was performed.  Specimens were not sent to the lab.    Any disposable equipment was visually inspected and deemed to be intact immediately post procedure.      Recommendations:   - ETT was around 1.5 cm from Luann, pulled back around 1 cm  - Follow up chest x-ray   - NGT for decompression, will be inserted by Thoracic surgery team under EGD guidance      ==========================================================    Attending of Record:   ELENA JIANG MD    Trainee(fellow) Present: KRISSY DYER MD    Medications:    3 ml 1% lidocaine indo-tracheal   2 mg versed        Time Out:  Performed by verifying the correct patient, correct procedure, and ensuring that all equipment / support staff are present.     The patient's medical record has been reviewed.  The indication for the procedure was reviewed.  The necessary history and physical examination was performed and reviewed.  Verbal consent was obtained.  This procedure was deemed emergent.  The proposed procedure and the patient's identification were verified prior to the procedure by the physician and the nurse, respiratory therapist, resident physician (resident / fellow).    Bronchoscopy Risk Assessment  Guidelines:      A. Patient symptoms to consider when assessing pulmonary TB risk are:    I. Cough greater than 3 weeks; and fever, hemoptysis, pleuritic chest    pain, weight loss greater than 10 lbs, night sweats, fatigue, infiltrates on    upper lobes or superior segments of lower lobes, cavitation on chest    x-ray.   B. Patient risk factors to consider when assessing pulmonary TB risk are:    I. Exposure to known TB case, foreign-born persons (within 5 years of    arrival to [place]), residence in a crowded setting (correctional facility,     long-term care center, etc.), persons with HIV or immunosuppression.    A Tuberculosis risk assessment was performed:   This patient has NO KNOWN RISK of Tuberculosis (proceed with bronchoscopy)    The procedure was performed in a negative airflow room: Yes    The patient was assessed for the adequacy for the procedure and to receive medications. Immediately before administration of medications the patient was re-assessed for adequacy to receive sedatives including the heart rate, respiratory rate, mental status, oxygen saturation, blood pressure and adequacy of pulmonary ventilation. These same parameters were continuously monitored throughout the procedure.      Dr. De La Rosa was present and available during the entire procedure     Perfecto Kim MD  Surgical Critical Care Fellow   Pager ([phone])

## 2024-04-28 NOTE — PLAN OF CARE
"/66 (BP Location: Right arm)   Pulse 90   Temp 98.9  F (37.2  C) (Oral)   Resp 18   Ht 1.727 m (5' 8\")   Wt 67.6 kg (149 lb 0.5 oz)   SpO2 95%   BMI 22.66 kg/m      Shift events: Patient having frequent productive coughing fits overnight with dark brown sputum (see provider note). TF off overnight due to vomiting/intolerance on previous shift. Increasing anxiety and patient unable to sleep at all overnight; melatonin ineffective. Requiring more O2 than previous, now on 4L oxymask with pt stating he feels like he \"can't take deep breaths,\" and \"this has been happening since surgery.\" All bowel meds given again in evening but no BM this shift.     Neuro: A&Ox4; anxious.  CV: HR 90s-100s. BP stable.  Resp: 88-92% on 4L oxymask; see above.  GI: No BM overnight but did have 4 liquid stools yesterday.  : Voids with urinal; adequate UOP.  Diet/nutrition: No TF or nutrition of any kind for second consecutive day.  Skin: Lap sites LÓPEZ. No new deficits noted.  Activity: Independent.  LDA: PIV x2. PEGJ.    Plan: Need plan for nutrition going forward. Plan for better sleep hygiene and management of anxiety.         "

## 2024-04-28 NOTE — PROVIDER NOTIFICATION
2010: Updated general surgery resident that pt is having frequent productive coughing with brown sputum that looks like tube feeding. Resident assessed at bedside and ordered CXR. CXR showed no sign of aspiration. TF to remain off overnight.    0245: Updated general surgery resident that pt is having increased anxiety and is requesting something for sleep. Melatonin ordered.    0700: Updated thoracic resident that pt c/o worsening dyspnea; sats 88-92% on 5 L oxymask.

## 2024-04-28 NOTE — PROGRESS NOTES
Patient transferred to ICU due to intubation needs as patient came back from CTA and was unable to get sats up over 83% on 100% HFNC, pt decompensating due to exhaustion, no sleep in days and anxiety. Ativan started today with some help in decreasing anxiety. Coughing up moderate thick, brown secretions with small emesis, zofran started q 6 hours today. Pt having some pain bilaterally abdomen but leary of narcotics due to constipation. Suppository given today but team wanted to hold off on softeners in tube due to nausea. Zofran given this am. Pt alert and able to make needs known prior to intubation. Vitals stable, afebrile. Report given to Jenniffer Fletcher on 4E.

## 2024-04-28 NOTE — PROGRESS NOTES
"Bronchoscopy Risk Assessment Guidelines      A. Patient symptoms to consider when assessing pulmonary TB risk are:    I. Cough greater than 3 weeks; and fever, hemoptysis, pleuritic chest    pain, weight loss greater than 10 lbs, night sweats, fatigue, infiltrates on    upper lobes or superior segments of lower lobes, cavitation on chest    x-ray.   B. Patient risk factors to consider when assessing pulmonary TB risk are:    I. Exposure to known TB case, foreign-born persons (within 5 years of    arrival to [place]), residence in a crowded setting (correctional facility,     long-term care center, etc.), persons with HIV or immunosuppression.    Patients with symptoms and risk factors should generally be considered \"suspect risk\" and bronchoscopies should be performed in airborne precautions.    This patient has NO KNOWN RISK of Tuberculosis (proceed with bronchoscopy)    Specimens sent: no  Complications: None  Scope used: #9209900 Therapeutic  Attending Physician: SHAVON Suazo RT on 4/28/2024 at 6:51 PM   "

## 2024-04-28 NOTE — PROGRESS NOTES
RRT called due to patient desaturating to 83% with increased work of breathing and barely able to phonate words, increased with washing patient up and sitting up in chair. RT called asked to turn up HFNC he was on 40L at 60% fio2 and turned up to 60L with 90% fio2, and pulmonary toileting and coughing up lots of sputum, thick and brown. Lorazepam given which seems to help some too. CXR done, CTA CT scan ordered. Pt back in bed and continues to pulmonary toilet up secretions. Antibiotics were ordered this am.

## 2024-04-28 NOTE — PROGRESS NOTES
"Surgery Brief Note  LAST PROCEDURE: Laparoscopic and right thoracoscopic esophagogastrectomy, right AND left chest tube placement:   Bronchoscopy flexible and rigid: 9953238  Esophagoscopy, gastroscopy, duodenoscopy (EGD), combined: 47599629/19/2024    Paged by bedside RN \"Pt is very frequently coughing up dark sputum that looks like it may be tube feed. Some blood also noted in sputum. Should TF remain off all night again?\"    Evaluated patient at bedside. Patient denies chest pain, sob, nausea, vomiting, dizziness, and lightheadedness. Patient reports coughing up some sputum that is questionably bloody questionably tube feeds.     RRR to PP  Hypoxic to 84%, resolved when increased O2 from 3LNC to 5LNC.   NAD  Abdomen soft, mildly distended, appropriately tender.     Vital Signs: Most Recent  Ranges (24 hours)   Temp: 98.6  F (37  C)  Pulse: 90  BP: 134/78  Resp: 20  SpO2: 97 % on Nasal cannula Temp  Min: 97.8  F (36.6  C)  Max: 98.6  F (37  C)  Pulse  Min: 90  Max: 91  BP  Min: 118/66  Max: 138/70  Resp  Min: 16  Max: 20  SpO2  Min: 86 %  Max: 99 %     Plan:  Stat chest xray given new sputum after coughing. Grossly normal.     Will hold tube feeds overnight.     Discussed with PGY3, who agrees with current plan. Please page if increasing oxygen requirements, concerning signs or symptoms, or other concerns.     Please page with further questions or concerns  Dusty \"Ced\" Kapil RICO  General Surgery Resident  Please page [facility]  "

## 2024-04-28 NOTE — ANESTHESIA PROCEDURE NOTES
Airway       Patient location during procedure: OR       Procedure Start/Stop Times: 4/28/2024 4:11 PM  Staff -        Anesthesiologist:  Brenda Gilbert MD       Resident/Fellow: Goldberg, Leslie, MD       Performed By: resident  Consent for Airway        Urgency: emergent       Consent: No emergent situation. Verbal consent obtained. Written consent not obtained.       Consent given by: patient       Risks and benefits: risks, benefits and alternatives were discussed       Patient identity confirmed: verbally with patient  Indications and Patient Condition       Indications for airway management: respiratory insufficiency and airway protection       Induction type:RSI       Mask difficulty assessment: 0 - not attempted    Final Airway Details       Final airway type: endotracheal airway       Successful airway: ETT - single  Endotracheal Airway Details        ETT size (mm): 8.0       Cuffed: yes       Cuff volume (mL): 6       Successful intubation technique: video laryngoscopy       VL Blade Size: MAC 4       Grade View of Cords: 1       Adjucts: stylet       Position: Right       Measured from: lips       Secured at (cm): 23       Bite block used: None    Post intubation assessment        Placement verified by: capnometry, equal breath sounds and chest rise        Number of attempts at approach: 1       Number of other approaches attempted: 0       Secured with: commercial tube lopez       Ease of procedure: easy       Dentition: Intact and Unchanged    Medication(s) Administered   Medication Administration Time: 4/28/2024 4:11 PM    Additional Comments       Continuously aspirating bilious secretions; placed Yankauer posterior to larynx to suction fluid during intubation. Discussed with team that patient will remain paralyzed for up to 2 hours and plan for propofol sedation discussed. Propofol gtt in room.

## 2024-04-28 NOTE — H&P
[facility]    ICU History and Physical    Primary Team: Thoracic Surgery  Reason for Critical Care Admission: Cardiopulmonary decompensation  Admitting Physician: Alvin Donohue MD  Date of Admission:  4/19/2024    Assessment: Critical Care   Lopez Hogue is a 68 year old male admitted on 4/19/2024. He is POD9 s/p laparoscopic and right thoracoscopic gastroesophagectomy on 4/19 with Dr. Hill. Monitored in SICU overnight POD 0 to 1, did very well and transferred to [facility] on 4/20. The patient continued to struggle with PO intake and Tfs w/ occasional small emesis episodes and O2 requirements. The patient was made NPO on the morning of 4/28. The patient had a rapid response called at 0900 and zosyn started for concern for aspiration pneumonia, although CXR normal. Another rapid called at ~1400 for respiratory decompensation. At that time it was thought that his presentation is likely due to mucous plugging, but a  PE or aspiration pneumonitis could not be ruled out.  CTPE was negative for PE. The patients had a code blue called on at approximately 1600 (no arrest, no CPR). This was due to acute respiratory decompensation, he was intubated on the floor (150 propofol, 100 rocuronium, 2 versed) where gastric fluid noted in posterior pharynx, and the patient was transferred to the SICU.     Patient proceeding to OR with thoracic surgery for bronch/egd tonight.       Plan: Critical Care   Neuro/ pain/ sedation:  #Postoperative pain control  - Monitor neurological status. Notify provider for any acute changes in exam  - Sedation: Propofol gtt; will change to versed postop likely  - Pain: will change to fentanyl gtt postop    Pulmonary:  #Aspiration   #Acute hypoxic respiratory failure  #Concern for pulmonary artery HTN on CTPE 4/28  - Ventilator settings: Vent Mode: CMV/AC  (Continuous Mandatory Ventilation/ Assist Control)  FiO2 (%): 73 %  Resp Rate (Set): 16  breaths/min  Tidal Volume (Set, mL): 470 mL  PEEP (cm H2O): 9 cmH2O  Resp: 22  - CXR  - Bronch on admission to SICU; thoracic surgery to perform bronch in OR tonight  - Will discuss concern for PAH on CTPE with cardiology, needs echo     Cardiovascular:  #H/o HTN, HLD  #H/o CAD/NSTEMI s/p KAYDEN in 3/2023  #H/o moderate aortic valve stenosis  - Monitor hemodynamic status.  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  #Malnutrition  #J tube placement  #Aspiration  - Diet: NPO for Medical/Clinical Reasons Except for: No Exceptions    - Thoracic surgery to perform EGD tonight, possible decompression tube placement  - Holding tube feeds  - J tube to gravity, reevaluate after OR tonight    Renal/ Fluid Balance:   - Will monitor intake and output  - mIVF @75  - ICU electrolyte replacement protocol  - Chancellor labs pending  - Place reyes    Endocrine:  - Goal BG <180  - sliding scale insulin     ID:  # Concern for aspiration pneumonia  - Zosyn (4/28 - )    Hematology:  - Awaiting labs     MSK:   - PT and OT consulted. Appreciate recommendations.     Lines/ tubes/ drains:  Reyes Catheter: Not present will place  Lines: None   PIVs, J tube    Prophylaxis:  - DVT Prophylaxis: Enoxaparin (Lovenox) SQ  - PUD Prophylaxis: protonix    Code Status: Full Code      Disposition:  - ICU    The patient's care was discussed with the Attending Physician, Dr. Donohue, Chief Resident/Fellow, and SICU Team.      Clinically Significant Risk Factors                  # Hypertension: Noted on problem list         # Severe Malnutrition: based on nutrition assessment    # Financial/Environmental Concerns: none              Hollie Hernandez MD  [facility]  Securely message with [employer] (more info)  Text page via [facility] Paging/Directory     ______________________________________________________________________    Chief Complaint   Acute respiratory  decompensation    Unable to obtain a history from the patient due to critical condition. The patient was intubated    History of Present Illness   Lopez Hogue is a 68 year old male who was diagnosed with a mid-espophgeal squamous cell carcinoma. This was in synchrony with a newly diagnosed vallecula squamous cell carcinoma. He had been being followed by Dr. Hill. His initial presentation was positive for hemoptysis, where he was found to have the head and neck cancer. The esophageal cancer was seen on PET imaging. He has undergone chemoradiation with carboplatin and taxol, starting in 12/5/2023, and completed radiation 1/26/2024. He was seen by thoracic surgery on 3/26/24 and was scheduled for a Laparoscopic and right thoracoscopic esophagogastrectomy. The patient has a medical history significant for HTN, HLD, CAD/NSTEMI s/p KAYDEN (3/21/23). He had moderate aortic valve stenosis, history of smoking, anemia, history of sweat gland carcinoma, alcohol dependence in remission, and anxiety. The patient recently had also underwent a laparoscopic assisted insertion of Jejunostomy tube on 4/1/2024. He was readmitted to the SICU after having cardiopulmonary instability where a code blue was needed to be called on 4/28/2024       Review of Systems    The 10 point Review of Systems is negative other than noted in the HPI or here.     Past Medical History    I have reviewed this patient's medical history and updated it with pertinent information if needed.   Past Medical History:   Diagnosis Date    Anxiety     Aortic stenosis     CAD (coronary artery disease)     ETOH dependence     Quit drinking 10 years    Heart attack (H)     History of blood transfusion     HLD (hyperlipidemia)     Hypertension     Malignant neoplasm of middle third of esophagus (H)     Nonrheumatic aortic (valve) stenosis     Sweat gland carcinoma        Past Surgical History   I have reviewed this patient's surgical history and updated it with  pertinent information if needed.  Past Surgical History:   Procedure Laterality Date    BRONCHOSCOPY FLEXIBLE AND RIGID N/A 4/19/2024    Procedure: Bronchoscopy flexible and rigid;  Surgeon: Devin Hill MD;  Location: [facility] OR    CV CORONARY ANGIOGRAM N/A 3/27/2023    Procedure: Coronary Angiogram;  Surgeon: Miki Etienne MD;  Location: [facility]    CV CORONARY ANGIOGRAM N/A 5/9/2023    Procedure: Coronary Angiogram;  Surgeon: Miki Etienne MD;  Location: [facility]    CV LEFT HEART CATH N/A 3/27/2023    Procedure: Left Heart Catheterization;  Surgeon: Miki Etienne MD;  Location: [facility]    CV LEFT HEART CATH N/A 5/9/2023    Procedure: Left Heart Catheterization;  Surgeon: Miki Etienne MD;  Location: [facility]    CV PCI N/A 3/27/2023    Procedure: Percutaneous Coronary Intervention;  Surgeon: Miki Etienne MD;  Location: [facility]    ENDOSCOPIC ULTRASOUND UPPER GASTROINTESTINAL TRACT (GI) N/A 11/28/2023    Procedure: Endoscopic ultrasound upper gastrointestinal tract (GI);  Surgeon: Shahriar Giraldo MD;  Location: [facility] GI    ESOPHAGOSCOPY, GASTROSCOPY, DUODENOSCOPY (EGD), COMBINED N/A 11/8/2023    Procedure: ESOPHAGOGASTRODUODENOSCOPY, WITH BIOPSY;  Surgeon: Shahriar Giraldo MD;  Location: [facility] GI    ESOPHAGOSCOPY, GASTROSCOPY, DUODENOSCOPY (EGD), COMBINED N/A 4/19/2024    Procedure: Esophagoscopy, gastroscopy, duodenoscopy (EGD), combined;  Surgeon: Devin Hill MD;  Location: [facility] OR    LAPAROSCOPIC ASSISTED INSERTION TUBE JEJUNOSTOMY N/A 4/1/2024    Procedure: Laparoscopic Jejunostomy Tube Insertion and Esophagogastroduodenoscopy;  Surgeon: Kevin Calderon MD;  Location: [facility] OR    LARYNGOSCOPY WITH BIOPSY(IES) N/A 10/12/2023    Procedure: LARYNGOSCOPY, WITH BIOPSY;  Surgeon: Edi Felix MD;  Location: [facility] OR    OTHER SURGICAL HISTORY  2015    WIDE EXCISION OF LEFT GLUTEAL MASSTNM: [id number], stage: II  hidradenocarcinoma Grade II, 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  Social History     Tobacco Use    Smoking status: Former     Current packs/day: 0.00     Average packs/day: 2.0 packs/day for 30.0 years (60.0 ttl pk-yrs)     Types: Cigarettes     Start date:      Quit date:      Years since quittin.3     Passive exposure: Past    Smokeless tobacco: Never    Tobacco comments:     Quit 10 years ago   Vaping Use    Vaping status: Never Used   Substance Use Topics    Alcohol use: Not Currently     Comment: quit in     Drug use: Never       Family History   I have reviewed this patient's family history and updated it with pertinent information if needed.  Family History   Problem Relation Age of Onset    Dementia Mother     Anesthesia Reaction No family hx of     Thrombocytopenia No family hx of     Cancer No family hx of     Thrombosis No family hx of        Prior to Admission Medications   Prior to Admission Medications   Prescriptions Last Dose Informant Patient Reported? Taking?   acetaminophen (TYLENOL) 500 MG tablet 2024 at 0200 Self Yes Yes   Sig: Take 500-1,000 mg by mouth every 8 hours as needed for fever or pain   clopidogrel (PLAVIX) 75 MG tablet 3/25/2024 Self No No   Sig: Take 1 tablet (75 mg) by mouth daily   Patient not taking: Reported on 2024   famotidine (PEPCID) 20 MG tablet 2024 at 0800  No Yes   Sig: Take 1 tablet (20 mg) by mouth 2 times daily   methocarbamol (ROBAXIN) 500 MG tablet 4/15/2024  No No   Sig: Take 1 tablet (500 mg) by mouth every 6 hours as needed for muscle spasms   nitroGLYcerin (NITROSTAT) 0.4  MG sublingual tablet More than a month Self No Yes   Sig: PLACE 1 TABLET UNDER THE TONGUE EVERY 5 MINUTES FOR CHEST PAIN FOR 3 DOSES. IF SYMPTOMS PERSIST 5 MINUTES AFTER 1ST DOSE CALL 911.   Patient taking differently: 0.4 mg every 5 minutes as needed PLACE 1 TABLET UNDER THE TONGUE EVERY 5 MINUTES FOR CHEST PAIN FOR 3 DOSES. IF SYMPTOMS PERSIST 5 MINUTES AFTER 1ST DOSE CALL 911   polyethylene glycol (MIRALAX) 17 g packet 4/18/2024 at 0800  Yes Yes   Sig: Take 1 packet by mouth daily   rosuvastatin (CRESTOR) 40 MG tablet 4/19/2024 at 0400  No Yes   Sig: TAKE 1 TABLET(40 MG) BY MOUTH DAILY   Patient taking differently: Take 40 mg by mouth every morning   senna-docusate (SENOKOT-S/PERICOLACE) 8.6-50 MG tablet 4/17/2024  No Yes   Sig: Take 1 tablet by mouth 2 times daily as needed for constipation   sertraline (ZOLOFT) 100 MG tablet 4/19/2024 at 0500 Self Yes Yes   Sig: Take 100 mg by mouth every morning   sodium fluoride dental gel (PREVIDENT) 1.1 % GEL topical gel 4/18/2024 at 2100 Self Yes Yes   Sig: daily      Facility-Administered Medications: None     Allergies   Allergies   Allergen Reactions    Coconut Flavor Anaphylaxis     Raw coconut       Physical Exam   Vital Signs: Temp: 98.7  F (37.1  C) Temp src: Oral BP: (!) 87/67 Pulse: 104   Resp: 22 SpO2: 96 % O2 Device: Mechanical Ventilator Oxygen Delivery: 40 LPM  Weight: 145 lbs 8.06 oz    General: Sedated and intubated.   HEENT: ET tube in place.  Neuro: Sedated.   CV: normal BP, no tachycardia, appears well perfused  Pulm: mechanical ventilation  Abd: soft, nondistended, J tube in place  Extremities: wwp  Incisions: c/d/i  Psych: sedated    Data   I reviewed all medications, new labs and imaging results over the last 24 hours.  Arterial Blood Gases   No lab results found in last 7 days.    Complete Blood Count   Recent Labs   Lab 04/28/24  0533 04/27/24  0532 04/26/24  0327 04/25/24  0501   WBC 11.2* 8.4 6.4 6.9   HGB 10.8* 10.3* 10.2* 9.8*    194 157  155       Basic Metabolic Panel  Recent Labs   Lab 04/28/24  1640 04/28/24  0533 04/28/24  0334 04/27/24  0532 04/26/24  0327 04/25/24  0501   NA  --  140  --  137 137 137   POTASSIUM  --  4.1  --  4.7 4.0 3.9   CHLORIDE  --  98  --  99 101 101   CO2  --  31*  --  27 27 27   BUN  --  14.8  --  15.5 21.2 17.6   CR  --  0.60*  --  0.65* 0.54* 0.54*   * 130* 102* 120* 101* 124*       Liver Function Tests  No lab results found in last 7 days.    Pancreatic Enzymes  No lab results found in last 7 days.    Coagulation Profile  No lab results found in last 7 days.    IMAGING:  Recent Results (from the past 24 hour(s))   XR Chest Port 1 View    Narrative    EXAM: XR CHEST PORT 1 VIEW 4/27/2024 9:00 PM     HISTORY: s/p esophagectomy and gastrectomy vomiting tube feeds with  new hemoptysis. question of aspiration?       COMPARISON: 4/27/2024     FINDINGS: Stable small right apical pneumothorax. No acute airspace  opacity. Evidence of prior right upper lobe lung resection. Chronic  right posterolateral rib 9 fracture. No pleural effusion. Unchanged  mild right chest wall subcutaneous emphysema.       Impression    IMPRESSION:  No focal airspace opacity to suggest aspiration or pneumonia.    I have personally reviewed the examination and initial interpretation  and I agree with the findings.    SHAYNE SALDAÑA MD         SYSTEM ID:  [id number]   XR Chest Port 1 View    Narrative    Exam: XR CHEST PORT 1 VIEW, 4/28/2024 9:17 AM    Indication: worsening dyspnea, increased O2 requirement    Comparison: 4/27/2024    Findings:   Portable frontal view of the chest. Stable cardiomediastinal  silhouette. Coronary artery stents. Right paramediastinal staple line.  Blunting of the costophrenic angles. Trace biapical pneumothoraces.  Mild hazy and streaky bibasilar opacities similar to prior. Bilateral  interstitial prominence. No new airspace opacity. Decreased  subcutaneous emphysema of the right lateral chest wall. Chronic  right  rib fracture deformity.      Impression    Impression:   1. Probable trace pleural effusions with minimal bibasilar mixed  interstitial and airspace opacities favored to represent  atelectasis/edema.   2. Trace biapical pneumothoraces, waxing and waning in conspicuity  over several prior studies, currently increased on the left.    I have personally reviewed the examination and initial interpretation  and I agree with the findings.    NELSON JOHNSON DO         SYSTEM ID:  [id number]   XR Chest Port 1 View    Narrative    Exam: XR CHEST PORT 1 VIEW, 4/28/2024 2:48 PM    Indication: increased work of breathing    Comparison: X-ray chest 4/20/2024, 0803. Multiple priors.    Findings:   AP portable semiupright radiograph of the chest. Midline trachea.  Stable surgical changes of the chest, suture material seen over the  right hilum. Stable cardiomediastinal silhouette. No new dense  consolidations. Stable basilar hazy and interstitial opacities. No  significant pleural effusion. Continued biapical pneumothoraces,  slight increased conspicuity on the right, stable left. Soft tissue  gas over the right lateral chest is unchanged. Stable right posterior  rib fracture.      Impression    Impression:   1. Overall no significant change from prior radiograph. Continued  waxing and waning small biapical pneumothoraces, slightly increased on  the right in this radiograph.  2. No new dense consolidations, continued basilar interstitial hazy  opacities, consistent with likely mixed edema and atelectasis.    I have personally reviewed the examination and initial interpretation  and I agree with the findings.    SHAYNE SALDAÑA MD         SYSTEM ID:  [id number]   CT Chest Pulmonary Embolism w Contrast    Narrative    EXAMINATION: CTA pulmonary angiogram, 4/28/2024 3:32 PM     COMPARISON: None.    HISTORY: 68-year-old male with hypoxia, rule out PE.    TECHNIQUE: Volumetric helical acquisition of CT images of the chest  from  the lung apices to the kidneys were acquired after the  administration of 80 mL of Isovue-370 IV contrast.  Post-processed  multiplanar and/or MIP reformations were obtained, archived to PACS  and used in interpretation of this study.     FINDINGS:    Contrast bolus is: excellent.  Exam is negative for acute pulmonary  embolism.       The largest right ventricle transaxial diameter is (measured from  endocardium to endocardium): 4.7 cm   The largest left ventricle transaxial diameter is (measured from  endocardium to endocardium): 2.6 cm  RV/LV ratio is: 1.8 (if ratio greater than 1.1 then sign is suspicious  for right heart strain)  Reflux of contrast into the IVC? no    Paradoxical bowing of the interventricular septum to the left? no  Pericardial effusion? not present    : Unremarkable.    Lines and tubes: None.    Airways: Central airways are patent.     Lungs: No suspicious pulmonary nodules or masses. No diffuse bilateral  nodular groundglass opacities with a few associated cavitary lesions  for example in the left upper lobe (series 4, image 49). Left lower  lobe atelectasis. Right upper lobe 7 mm solid pulmonary nodule (series  5, image 33), stable from PET/CT 3/12/2024.    Pleura: No pleural effusions. Small biapical pneumothoraces.    Thyroid: Thyroid appears unremarkable.    Lymph nodes: No enlarged axillary, mediastinal, or hilar lymph nodes  by short axis criteria.     Cardiac: The heart is not enlarged. No pericardial effusion. No  coronary artery calcifications. Aortic valve calcifications. Coronary  artery calcifications.    Vessels: Thoracic aorta and main pulmonary artery are normal in  caliber.    Esophagus: Postoperative changes of gastroesophagectomy.  Surgical  clips in the posterior/mid mediastinum. Small amount of  pneumomediastinum, likely postsurgical.    Upper abdomen: Visualized portions of the upper abdomen are  unremarkable.    Bones/soft tissue: Degenerative changes of the spine.  No acute or  suspicious osseous abnormality. Healed fracture of right posterior  ninth rib. Right chest wall subcutaneous emphysema.        Impression    IMPRESSION:   1. No pulmonary embolism. The RV LV ratio increase suggesting right  heart strain, likely pulmonary artery hypertension in the absence of a  pulmonary embolism.    2. Diffuse bilateral groundglass pulmonary opacities with a few  associated cavitary lesions, likely infectious. Short term follow-up  CT chest to reevaluate given history of esophageal malignancy  recommended.    3. Postoperative changes of gastroesophagectomy including small  biapical pneumothoraces, small amount of pneumomediastinum, and right  chest wall subcutaneous emphysema.    4. Right upper lobe 7 mm pulmonary nodule, similar to prior PET/CT.  Continued attention on follow-up CT as above.    In the event of a positive result for acute pulmonary embolism  resulting in right heart strain, consider calling the   [facility] patient placement ([phone]) for PERT (Pulmonary Embolism  Response Team) Activation?    PERT -- Pulmonary Embolism Response Team (Multidisciplinary team  including cardiology, interventional radiology, critical care,  hematology)    I have personally reviewed the examination and initial interpretation  and I agree with the findings.    SHAYNE SALDAÑA MD         SYSTEM ID:  [id number]

## 2024-04-28 NOTE — ANESTHESIA PREPROCEDURE EVALUATION
Pre-Operative H & P       Reason for visit:   Encounter Diagnosis   Name Primary?    Malignant neoplasm of esophagus, unspecified location (H) Yes       HPI  Lopez Hogue is a 68 year old male who presents for pre-operative H & P in preparation for  Procedure Information       Case: [id number] Date/Time: 04/28/24 1830    Procedures:       Esophagoscopy, gastroscopy, duodenoscopy (EGD), combined (Esophagus)      Bronchoscopy flexible and rigid (Bronchus)    Anesthesia type: General    Diagnosis: Aspiration pneumonia (H) [J69.0]    Pre-op diagnosis: Aspiration pneumonia (H) [J69.0]    [facility]: [facility]    Providers: Jessie Kim MD            Patient is now POD9 s/p laparoscopic and right thoracoscopic gastroesophagectomy on 4/19 with Dr. Hill  for mid-esophageal squamous cell carcinoma. He had undergone chemoradiation with carboplatin and taxol, starting 12/5/2023, and completed radiation 1/26/2024.     Unfortunately earlier today patient was having trouble breathing and ongoing aspiration with saturations in the 80s on HFNC. He was intubated on the floor and transferred to SICU. He is now added on for EGD and bronch.       Patient's history is otherwise significant for hyperlipidemia, hypertension, CAD/NSTEMI s/p KAYDEN (3/21/23), moderate aortic valve stenosis, history of smoking, anemia, history of sweat gland carcinoma, alcohol dependence in remission, and anxiety.      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)     Nonrheumatic aortic (valve) stenosis     Sweat gland carcinoma        Past Surgical History  Past Surgical History:   Procedure Laterality Date    BRONCHOSCOPY FLEXIBLE AND RIGID N/A 4/19/2024     Procedure: Bronchoscopy flexible and rigid;  Surgeon: Devin Hill MD;  [facility]    CV CORONARY ANGIOGRAM N/A 3/27/2023    Procedure: Coronary Angiogram;  Surgeon: Miki Etienne MD;  Location: [facility]    CV CORONARY ANGIOGRAM N/A 5/9/2023    Procedure: Coronary Angiogram;  Surgeon: Miki Etienne MD;  Location: [facility]    CV LEFT HEART CATH N/A 3/27/2023    Procedure: Left Heart Catheterization;  Surgeon: Miki Etienne MD;  Location: [facility]    CV LEFT HEART CATH N/A 5/9/2023    Procedure: Left Heart Catheterization;  Surgeon: Miki Etienne MD;  Location: [facility]    CV PCI N/A 3/27/2023    Procedure: Percutaneous Coronary Intervention;  Surgeon: Miki Etienne MD;  Location: [facility]    ENDOSCOPIC ULTRASOUND UPPER GASTROINTESTINAL TRACT (GI) N/A 11/28/2023    Procedure: Endoscopic ultrasound upper gastrointestinal tract (GI);  Surgeon: Shahriar Giraldo MD;  [facility]    ESOPHAGOSCOPY, GASTROSCOPY, DUODENOSCOPY (EGD), COMBINED N/A 11/8/2023    Procedure: ESOPHAGOGASTRODUODENOSCOPY, WITH BIOPSY;  Surgeon: Shahriar Giraldo MD;  [facility]    ESOPHAGOSCOPY, GASTROSCOPY, DUODENOSCOPY (EGD), COMBINED N/A 4/19/2024    Procedure: Esophagoscopy, gastroscopy, duodenoscopy (EGD), combined;  Surgeon: Devin Hill MD;  [facility]    LAPAROSCOPIC ASSISTED INSERTION TUBE JEJUNOSTOMY N/A 4/1/2024    Procedure: Laparoscopic Jejunostomy Tube Insertion and Esophagogastroduodenoscopy;  Surgeon: Kevin Calderon MD;  [facility]    LARYNGOSCOPY WITH BIOPSY(IES) N/A 10/12/2023    Procedure: LARYNGOSCOPY, WITH BIOPSY;  Surgeon: Edi Felix MD;  [facility]    OTHER SURGICAL HISTORY  2015    WIDE EXCISION OF LEFT GLUTEAL MASSTNM: [id number], stage: II hidradenocarcinoma Grade II, 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, sentinel lymph node biopsy negative     THORACOSCOPIC, LAPAROSCOPIC ESOPHAGECTOMY, COMBINED N/A  2024    Procedure: Laparoscopic and right thoracoscopic esophagogastrectomy, right AND left chest tube placement;  Surgeon: Devin Hill MD;  [facility]       Prior to Admission Medications  No current outpatient medications on file.       Allergies  Allergies   Allergen Reactions    Coconut Flavor Anaphylaxis     Raw coconut       Social History  Social History     Socioeconomic History    Marital status:      Spouse name: Not on file    Number of children: 2    Years of education: Not on file    Highest education level: Not on file   Occupational History    Occupation: non-profit manager   Tobacco Use    Smoking status: Former     Current packs/day: 0.00     Average packs/day: 2.0 packs/day for 30.0 years (60.0 ttl pk-yrs)     Types: Cigarettes     Start date:      Quit date:      Years since quittin.3     Passive exposure: Past    Smokeless tobacco: Never    Tobacco comments:     Quit 10 years ago   Vaping Use    Vaping status: Never Used   Substance and Sexual Activity    Alcohol use: Not Currently     Comment: quit in     Drug use: Never    Sexual activity: Not on file   Other Topics Concern    Not on file   Social History Narrative    Patient works.  Lives with his wife.     Social Determinants of Health     Financial Resource Strain: High Risk (2021)    [facility] [employer] [email or web address]    Financial Resource Strain     Difficulty of Paying Living Expenses: Not on file     Difficulty of Paying Living Expenses: Not on file   Food Insecurity: Not on file   Transportation Needs: Not on file   Physical Activity: Not on file   Stress: Not on file   Social Connections: Unknown (2023)    [facility] [employer] [email or web address]    Social Connections     Frequency of Communication with Friends and Family: Not on file   Interpersonal  Safety: Not on file   Housing Stability: Not on file       Family History  Family History   Problem Relation Age of Onset    Dementia Mother     Anesthesia Reaction No family hx of     Thrombocytopenia No family hx of     Cancer No family hx of     Thrombosis No family hx of        Review of Systems  The complete review of systems is negative other than noted in the HPI or here.   Anesthesia Evaluation   Pt has had prior anesthetic. Type: General and MAC.    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 2  Taking blood thinners Pt has received instructions: Instructions Given to patient: Plavix on hold.                      valvular problems/murmurs type: AS moderate.    Previous cardiac testing   Echo: Date: 2/1/24 Results:    Stress Test:  Date: 7/2023 Results:    ECG Reviewed:  Date: 4/28/24 Results:  Sinus tachycardia   Possible Left atrial enlargement   Nonspecific T wave abnormality   Abnormal ECG   When compared with ECG of 20-APR-2024 09:06,   Nonspecific T wave abnormality now evident in Inferior leads   Nonspecific T wave abnormality now evident in Anterior leads     Cath:  Date: 5/2023 Results:   (-) SPENCER, orthopnea/PND and arrhythmias   METS/Exercise Tolerance: >4 METS Comment: Activity has been somewhat limited after therapies but still able to walk 2 blocks and go up and down stairs without difficulty       Hematologic: Comments: - Pancytopenia     (+)      anemia, history of blood transfusion, no previous transfusion reaction, Known PRBC Anitbodies:No    (-) history of blood clots   Musculoskeletal:   "- neg musculoskeletal ROS     GI/Hepatic:     (+) GERD, Asymptomatic on medication,                  Renal/Genitourinary:  - neg Renal ROS     Endo:  - neg endo ROS     Psychiatric/Substance Use:     (+) psychiatric history anxiety alcohol abuse (in remission since 2013)      Infectious Disease:  - neg infectious disease ROS     Malignancy: Comment: Right vallecula squamous cell carcinoma s/p chemoradiation  (+) Malignancy, History of Other, Skin and GI.GI CA  Active status post Chemo and Radiation.  Skin CA Remission status post Surgery.  Other CA sweat gland carcinoma, left buttock (2015), esophageal ca Remission status post Surgery.    Other:  - neg other ROS          BP (!) 87/67   Pulse 104   Temp 37.1  C (98.7  F) (Oral)   Resp 22   Ht 1.727 m (5' 8\")   Wt 66 kg (145 lb 8.1 oz)   SpO2 96%   BMI 22.12 kg/m      Physical Exam   Constitutional: Awake, alert, cooperative, no apparent distress, and appears stated age. Accompanied by wife  Eyes: Pupils equal, round and reactive to light, extra ocular muscles intact, sclera clear, conjunctiva normal. Glasses on.  HENT: Normocephalic, oral pharynx with moist mucus membranes, good dentition. No goiter appreciated.   Respiratory: Clear to auscultation bilaterally, no crackles or wheezing. No cough or obvious dyspnea.  Cardiovascular: Regular rate and rhythm, 2/6 systolic murmur noted with radiation into right carotid. Carotids +2, no bruits. No edema. Bilateral calves soft and nontender to palpation. Palpable pulses to radial  DP and PT arteries.   GI: Normal bowel sounds, soft, non-distended, non-tender, no masses palpated, left lower abdomen J tube in placed. Mild tenderness around site. Surgical scars: well healed  Lymph/Hematologic: No cervical lymphadenopathy and no supraclavicular lymphadenopathy.  Genitourinary:  Deferred.   Skin: Warm and dry.  No rashes at anticipated surgical site.   Musculoskeletal: Full ROM of neck. There is no redness, warmth, or " swelling of the joints. Gross motor strength is normal.    Neurologic: Awake, alert, oriented to name, place and time. Cranial nerves II-XII are grossly intact. Gait is normal.   Neuropsychiatric: Calm, cooperative. Normal affect.     Prior Labs/Diagnostic Studies   All labs and imaging personally reviewed   Lab Results   Component Value Date    WBC 9.6 04/02/2024     Lab Results   Component Value Date    RBC 3.30 04/02/2024     Lab Results   Component Value Date    HGB 10.4 04/02/2024     Lab Results   Component Value Date    HCT 31.7 04/02/2024     Lab Results   Component Value Date    MCV 96 04/02/2024     Lab Results   Component Value Date    MCH 31.5 04/02/2024     Lab Results   Component Value Date    MCHC 32.8 04/02/2024     Lab Results   Component Value Date    RDW 14.2 04/02/2024     Lab Results   Component Value Date     04/02/2024     Last Comprehensive Metabolic Panel:  Sodium   Date Value Ref Range Status   04/28/2024 141 135 - 145 mmol/L Final     Comment:     Reference intervals for this test were updated on 09/26/2023 to more accurately reflect our healthy population. There may be differences in the flagging of prior results with similar values performed with this method. Interpretation of those prior results can be made in the context of the updated reference intervals.      Potassium   Date Value Ref Range Status   04/28/2024 3.8 3.4 - 5.3 mmol/L Final     Potassium POCT   Date Value Ref Range Status   04/19/2024 4.7 3.4 - 5.3 mmol/L Final     Chloride   Date Value Ref Range Status   04/28/2024 99 98 - 107 mmol/L Final     Carbon Dioxide (CO2)   Date Value Ref Range Status   04/28/2024 29 22 - 29 mmol/L Final     Anion Gap   Date Value Ref Range Status   04/28/2024 13 7 - 15 mmol/L Final     Glucose   Date Value Ref Range Status   04/28/2024 118 (H) 70 - 99 mg/dL Final     GLUCOSE BY METER POCT   Date Value Ref Range Status   04/28/2024 119 (H) 70 - 99 mg/dL Final     Urea Nitrogen   Date  Value Ref Range Status   2024 14.5 8.0 - 23.0 mg/dL Final     Creatinine   Date Value Ref Range Status   2024 0.61 (L) 0.67 - 1.17 mg/dL Final     GFR Estimate   Date Value Ref Range Status   2024 >90 >60 mL/min/1.73m2 Final     Calcium   Date Value Ref Range Status   2024 9.5 8.8 - 10.2 mg/dL Final     Bilirubin Total   Date Value Ref Range Status   2024 0.8 <=1.2 mg/dL Final     Alkaline Phosphatase   Date Value Ref Range Status   2024 169 (H) 40 - 150 U/L Final     Comment:     Reference intervals for this test were updated on 2023 to more accurately reflect our healthy population. There may be differences in the flagging of prior results with similar values performed with this method. Interpretation of those prior results can be made in the context of the updated reference intervals.     ALT   Date Value Ref Range Status   2024 17 0 - 70 U/L Final     Comment:     Reference intervals for this test were updated on 2023 to more accurately reflect our healthy population. There may be differences in the flagging of prior results with similar values performed with this method. Interpretation of those prior results can be made in the context of the updated reference intervals.       AST   Date Value Ref Range Status   2024 22 0 - 45 U/L Final     Comment:     Reference intervals for this test were updated on 2023 to more accurately reflect our healthy population. There may be differences in the flagging of prior results with similar values performed with this method. Interpretation of those prior results can be made in the context of the updated reference intervals.       EK24 Normal sinus rhythm    Echocardiogram 24   Interpretation Summary  Global and regional left ventricular function is normal with an EF of 55-60%.  Global right ventricular function is normal.  Moderate aortic stenosis is present. The peak aortic velocity is 3.8  m/sec,  mean 29 mmHg, valve area 1.1 cm2. DVI = 0.31, stroke volume is normal  Estimated mean right atrial pressure is normal.  No pericardial effusion is present.    7/18/2023 Stress test  Interpretation Summary  1. The patient achieved good exercise workload without inducible angina.  2. Exercise stress ECG is positive for inducible myocardial ischemia in  inferior and lateral leads. ST segment depression 3 mm at the peak of  exercise.  3. No exercise induced cardiac arrhythmia    5/9/2023 Cardiac cath  Conclusion    1.  Previously placed stents in OM left circumflex are widely patent with normal VENTURA-3 flow.  2.  PTCA sites in distal circumflex branch have  healed nicely with less than 30% residual narrowing.  3.  LAD is moderately calcified with diffuse mild luminal irregularity but no severe stenoses, unchanged from prior study.  4. RCA has mild calcification and luminal irregularity but no severe stenoses.  There is a focal 70% narrowing in a small posterolateral branch and diffuse 50 to 70% narrowing in the posterior descending branch.  Both appear unchanged from prior study.    3/12/23 CT CAP/PET                                                            IMPRESSION: In this patient with history of mid esophageal squamous  cell carcinoma and right vallecula squamous cell carcinoma status post  chemoradiation initiated 12/5/2023 and completed radiation 1/26/2024:     1. Resolution of prior FDG avid midesophageal mass.   2. Resolution of prior FDG avid right vallecula lesion.  3. Resolution of right sided FDG-avid enlarged cervical lymph nodes.  4. New FDG avid left level 2 node. 1-3 months follow up CT neck or FNA  can be considered to rule out a new metastatic node.  5. New bilateral posterior lower lobe paravertebral FDG avid opacities  representing postradiation pneumonitis.   6. New FDG uptake associated with left mandibular molar periapical  lucency representing periodontal disease.            Latest Ref  "Rng & Units 11/15/2023     7:57 AM   PFT   FVC L 4.82    FEV1 L 2.41    FVC% % 133    FEV1% % 86          The patient's records and results personally reviewed by this provider.     Outside records reviewed from: Care Everywhere    LAB/DIAGNOSTIC STUDIES TODAY:  Updated type and screen due to recent blood transfusion     Assessment    Lopez Hogue is a 68 year old male seen as a PAC referral for risk assessment and optimization for anesthesia.    Plan/Recommendations  Pt will be optimized for the proposed procedure.  See below for details on the assessment, risk, and preoperative recommendations    NEUROLOGY  - No history of TIA, CVA or seizure  - Improved pain, no longer has Butrans or oxycodone  Robaxin prn for tenderness around J tube site.     -Post Op delirium risk factors:  High co-morbid index    ENT  - No current airway concerns.  Will need to be reassessed day of surgery.  Mallampati: I  TM: > 3    Improved swallowing of solids, liquids and meds.   Some soreness with swallowing felt on right side. He attributes to irritation from some type of food.     Upper dentures     Evaluation with Dr. Felix in ENT on 3/11/24. A fiberoptic laryngoscopy performed at this time. Per Dr. Felix, \"no lesions seen, airway patent.\"     Anesthesia records available    CARDIAC  HLD. Will take Crestor on DOS. HYPERTENSION. No meds at this time. CAD/NSTEMI s/p KAYDEN (3/21/23), moderate aortic valve stenosis (peak aortic velocity is 3.8 m/sec, mean 29 mmHg, valve area 1.1 cm2. DVI = 0.31, stroke volume is normal). Followed by Cardiology, last seen as above. Plavix on hold. Denies chest pain, irregular HR, DYSPNEA ON EXERTION. Good activity tolerance continues.  - METS (Metabolic Equivalents)>4    RCRI: 6.6% risk of serious cardiac events    PULMONARY  Denies cough or use of inhaler  No asthma/COPD    Intermediate risk for JOSE with some personal concerns for symptoms. Observed apnea by wife    - Tobacco History    History " "  Smoking Status    Former    Packs/day: 2.00    Years: 30.00    Types: Cigarettes    Quit date: 2013   Smokeless Tobacco    Never       GI: GERD controlled with Pepcid and will take on DOS.  PONV Medium Risk  Total Score: 2           1 AN PONV: Patient is not a current smoker    1 AN PONV: Intended Post Op Opioids        /RENAL  - Baseline Creatinine  0.71  Reports increased urinary frequency/nocturia since last surgery. Denies fever or burning or pain with urination   Will get UA today  UA RESULTS:  Recent Labs   Lab Test 04/17/24  0946   COLOR Light Yellow   APPEARANCE Clear   URINEGLC Negative   URINEBILI Negative   URINEKETONE Negative   SG 1.005   UBLD Negative   URINEPH 5.0   PROTEIN Negative   NITRITE Negative   LEUKEST Negative   RBCU <1   WBCU <1     Culture not indicated    ENDOCRINE    - BMI: Estimated body mass index is 22.12 kg/m  as calculated from the following:    Height as of this encounter: 1.727 m (5' 8\").    Weight as of this encounter: 66 kg (145 lb 8.1 oz).  Healthy Weight (BMI 18.5-24.9)  - No history of Diabetes Mellitus    HEME  VTE High Risk 3%             Total Score: 8    VTE: Greater than 59 yrs old    VTE: Male    VTE: Current cancer      Denies personal or family history of blood clots  History of recent blood transfusion in 2/2024. Updated type and screen today for surgery, antibody screen neg    MSK: Frailty score 0.5      Difficult IV access. US likely needed    Different anesthesia methods/types have been discussed with the patient, but they are aware that the final plan will be decided by the assigned anesthesia provider on the date of service.  Patient was discussed with Dr. Campbell    The patient is optimized for their procedure. AVS with information on surgery time/arrival time, meds and NPO status given by nursing staff. No further diagnostic testing indicated.      On the day of service:     Prep time: 15 minutes  Visit time: 14 minutes  Documentation time: 15 " minutes  ------------------------------------------  Total time: 44 minutes      YADIRA Hart CNS  Preoperative Assessment Center  [facility]  Phone: [phone]  Fax: [phone]    Physical Exam    Airway   unable to assess      TM distance: > 3 FB      Respiratory Devices and Support    ETT:  FiO2: 40; %     Dental    unable to assess        Cardiovascular          Rhythm and rate: regular and tachycardia   (+) murmur       Pulmonary           (+) rhonchi           Anesthesia Plan    ASA Status:  4, emergent    NPO Status:  ELEVATED Aspiration Risk/Unknown    Anesthesia Type: General.     - Airway: ETT   Induction: Propofol.   Maintenance: TIVA.   Techniques and Equipment:     - Lines/Monitors: 2nd IV     - Drips/Meds: Norepi     Consents    Anesthesia Plan(s) and associated risks, benefits, and realistic alternatives discussed. Questions answered and patient/representative(s) expressed understanding.     - Discussed:     - Discussed with:  Implied consent/emergency       Use of blood products discussed: Yes.     - Discussed with: Implied consent/Emergency.     Postoperative Care    Pain management: IV analgesics.   PONV prophylaxis: Ondansetron (or other 5HT-3), Background Propofol Infusion     Comments:    Other Comments: 67 yo male POD9 s/p /p laparoscopic and right thoracoscopic gastroesophagectomy on 4/19 with Dr. Hill  for mid-esophageal squamous cell carcinoma. Intubated earlier today for airway protection due to chronic aspiration and desaturation to mid 80s on HFNC. Now for EGD and bronch. Intubated and sedated, plan to continue propofol gtt for the operating room and transport back to SICU intubated.

## 2024-04-28 NOTE — PROGRESS NOTES
Dr. Santiago paged Pt oxygen dropped to 83% 40L flow with 60 fio2, after moving and getting in chair, dropped sats, RT called increased oxygen flow high flow to 60L with 90% fio2. Oxygen came up to 96% spo2. Pt states he just has no energy after moving to chair.

## 2024-04-28 NOTE — PROGRESS NOTES
Admitted/transferred from: 6B to 4E around 1630  Reason for admission/transfer:Intubated after code requiring higher level of care  2 RN skin assessment: completed by: Jenniffer JAVIER RN and Sherrill SANCHEZ RN  Result of skin assessment and interventions/actions: Sacral mepi applied   Height, weight, drug calc weight: Done  Patient belongings (see Flowsheet)  MDRO education added to care plan: N/A    Pt left w/ anesthesia to OR for EGD and NGT placement at 1830

## 2024-04-28 NOTE — PROVIDER NOTIFICATION
04/28/24 1400   Call Information   Date of Call 04/28/24   Time of Call 1400   Name of person requesting the team Amy SANCHES   Title of person requesting team RN   RRT Arrival time 1401   Time RRT ended 1440   Reason for call   Type of RRT Adult   Primary reason for call Respiratory   Respiratory Rate greater than 24;Labored breathing;Respiratory pattern change   Was patient transferred from the ED, ICU, or PACU within last 24 hours prior to RRT call? No   SBAR   Situation SOB   Background Espophageal CA, Esophagectomy   Notable History/Conditions Cancer;Cardiac;Hypertension;Recent surgery   Assessment pt up in chair, tachypnic and struggling to breath. cough w/copias, thick, brownish stupum, Afeb, VSS.A+O x4   Interventions CXR;ECG;Meds;Portable monitor;O2 per N/C or mask;Suction   Patient Outcome   Patient Outcome Transferred to  (4E)   RRT Team   Attending/Primary/Covering Physician Thoracic   Date Attending Physician notified 04/28/24   Time Attending Physician notified 1400   Physician(s) Gabriel Santiago MD, Melisa Quinonez PASARIKA   Lead RN Hanane Atkinson   Post RRT Intervention Assessment   Post RRT Assessment Code Blue Called   Date Follow Up Done 04/28/24   Time Follow Up Done 0384

## 2024-04-28 NOTE — PROGRESS NOTES
"Thoracic Surgery  [facility]  4/28/2024    Subjective:  One episode of coughing that became small emesis episode while taking PO fluids. Cxr obtained -> no acute process, but patient remains on 4-5L Om today. Notes worse SPENCER this AM. Difficulty sleeping.     Objective  BP (!) 143/80 (BP Location: Right arm)   Pulse 90   Temp 98.2  F (36.8  C) (Oral)   Resp 16   Ht 1.727 m (5' 8\")   Wt 65.3 kg (143 lb 15.4 oz)   SpO2 92%   BMI 21.89 kg/m        General: Alert, resting comfortably in NAD/NTA. Cooperative  Pulm: NLB on 4-5 L OM, no tachypnea/dyspnea, no wheezing  CV: RRR by RP, non-cyanotic, no significant LE edema.   GI/ABD: Soft, non- distended, non- tender to palpation , no guarding or rebound.  Skin: Warm and well perfused  Incision:  C/D/I, dermabond in place , minimal strike through on dressings , no erythema/cellulitic changes      Labs:    Wbc: Mild leukocytosis 11.2 from 8.4, continue to monitor.   Hgb: Stable    Imaging:  AM CXR: Slight increase in bilateral lower opacification - atelectasis vs. Small effusion, will diurese today.      A/P:    68M with SCC of esophagus who is now POD9 s/p laparoscopic and right thoracoscopic gastroesophagectomy on 4/19 with Dr. Hill. Monitored in SICU overnight POD0 to 1, did very well and transferred to [facility] on 4/20. Continues to struggle with PO intake and Tfs w/ occasional small emesis episodes and O2 requirements. Will follow up CXR this AM. Given association of emesis episode with PO intake, will make NPO for now and continue Tfs.      - Strict NPO, Continue TF  - Lasix 20mg via J tube x1, goal net neg 1L.   - Nutrition consult placed, appreciate expertise  - Aggressive bowel regimen, prior hx of opioid-induced constipation               - Maxed on Bowel reg, continue to enemize PRN.   - Continue to hold PTA plavix, continue Lovenox  - Will discuss PO intake w/ SLP on Monday, likely needs longer therapy interval prior to trailing PO " intake.   - Added PRN Ativain 0.5mg q6hr Prn for anxiety.   - Dispo pending resolution of oxygen needs    Patient seen on rounds with fellow, Dr. Garcia, who will discuss with staff.    Please page if questions,    Gerald Perez MD, MS  PGY-3, General Surgery

## 2024-04-28 NOTE — CODE/RAPID RESPONSE
We attended a code blue activation at around 4pm for this patient in 6240, POD9 s/p laparoscopic and right thoracoscopic gastroesophagectomy on 4/19 with Dr. Hill , when we arrived patient was alert oriented Sao2 80s% on HFNC actively vomiting, code was activated for desaturation shortly after coming back from CT chest but patient never arrested or was non-responsive, code was activated just as a call for help.     Shortly Anesthesia showed up and decision was for intubation for desaturation and to protect airways, done by Anesthesia with VL which showed aspiration with gastric fluid around the larynx which was suctioned with a Yankauer suction and ETT placed successfully, patient was transferred to SICU for Bronchoscopy and continuity of management and care.     Dr. De La Rosa SICU staff  and the primary team Thoracic surgery were informed and agreed with the plan         Perfecto Kim MD  Surgical Critical Care Fellow   Pager ([phone])

## 2024-04-29 ENCOUNTER — APPOINTMENT (OUTPATIENT)
Dept: GENERAL RADIOLOGY | Facility: CLINIC | Age: 69
DRG: 326 | End: 2024-04-29
Attending: THORACIC SURGERY (CARDIOTHORACIC VASCULAR SURGERY)
Payer: COMMERCIAL

## 2024-04-29 ENCOUNTER — APPOINTMENT (OUTPATIENT)
Dept: CARDIOLOGY | Facility: CLINIC | Age: 69
DRG: 326 | End: 2024-04-29
Attending: THORACIC SURGERY (CARDIOTHORACIC VASCULAR SURGERY)
Payer: COMMERCIAL

## 2024-04-29 ENCOUNTER — APPOINTMENT (OUTPATIENT)
Dept: GENERAL RADIOLOGY | Facility: CLINIC | Age: 69
DRG: 326 | End: 2024-04-29
Attending: STUDENT IN AN ORGANIZED HEALTH CARE EDUCATION/TRAINING PROGRAM
Payer: COMMERCIAL

## 2024-04-29 LAB
ANION GAP SERPL CALCULATED.3IONS-SCNC: 10 MMOL/L (ref 7–15)
BASE EXCESS BLDV CALC-SCNC: 9.2 MMOL/L (ref -3–3)
BUN SERPL-MCNC: 15.4 MG/DL (ref 8–23)
CALCIUM SERPL-MCNC: 9.1 MG/DL (ref 8.8–10.2)
CHLORIDE SERPL-SCNC: 101 MMOL/L (ref 98–107)
CREAT SERPL-MCNC: 0.69 MG/DL (ref 0.67–1.17)
DEPRECATED HCO3 PLAS-SCNC: 29 MMOL/L (ref 22–29)
EGFRCR SERPLBLD CKD-EPI 2021: >90 ML/MIN/1.73M2
ERYTHROCYTE [DISTWIDTH] IN BLOOD BY AUTOMATED COUNT: 14.5 % (ref 10–15)
GLUCOSE BLDC GLUCOMTR-MCNC: 127 MG/DL (ref 70–99)
GLUCOSE SERPL-MCNC: 134 MG/DL (ref 70–99)
HCO3 BLDV-SCNC: 34 MMOL/L (ref 21–28)
HCT VFR BLD AUTO: 29 % (ref 40–53)
HGB BLD-MCNC: 9.8 G/DL (ref 13.3–17.7)
LVEF ECHO: NORMAL
MAGNESIUM SERPL-MCNC: 2.2 MG/DL (ref 1.7–2.3)
MCH RBC QN AUTO: 32.9 PG (ref 26.5–33)
MCHC RBC AUTO-ENTMCNC: 33.8 G/DL (ref 31.5–36.5)
MCV RBC AUTO: 97 FL (ref 78–100)
O2/TOTAL GAS SETTING VFR VENT: 50 %
OXYHGB MFR BLDV: 92 % (ref 70–75)
PCO2 BLDV: 48 MM HG (ref 40–50)
PH BLDV: 7.46 [PH] (ref 7.32–7.43)
PHOSPHATE SERPL-MCNC: 3.3 MG/DL (ref 2.5–4.5)
PLATELET # BLD AUTO: 192 10E3/UL (ref 150–450)
PO2 BLDV: 65 MM HG (ref 25–47)
POTASSIUM SERPL-SCNC: 3.6 MMOL/L (ref 3.4–5.3)
RBC # BLD AUTO: 2.98 10E6/UL (ref 4.4–5.9)
SAO2 % BLDV: 94 % (ref 70–75)
SODIUM SERPL-SCNC: 140 MMOL/L (ref 135–145)
WBC # BLD AUTO: 12.8 10E3/UL (ref 4–11)

## 2024-04-29 PROCEDURE — 250N000011 HC RX IP 250 OP 636: Performed by: PHYSICIAN ASSISTANT

## 2024-04-29 PROCEDURE — 250N000011 HC RX IP 250 OP 636

## 2024-04-29 PROCEDURE — 250N000020 HC RX IP 250 OP 636 J0585: Mod: JZ | Performed by: STUDENT IN AN ORGANIZED HEALTH CARE EDUCATION/TRAINING PROGRAM

## 2024-04-29 PROCEDURE — 74018 RADEX ABDOMEN 1 VIEW: CPT | Mod: 26 | Performed by: RADIOLOGY

## 2024-04-29 PROCEDURE — 258N000003 HC RX IP 258 OP 636: Performed by: PHYSICIAN ASSISTANT

## 2024-04-29 PROCEDURE — 999N000065 XR ABDOMEN PORT 1 VIEW

## 2024-04-29 PROCEDURE — C9113 INJ PANTOPRAZOLE SODIUM, VIA: HCPCS

## 2024-04-29 PROCEDURE — 93306 TTE W/DOPPLER COMPLETE: CPT | Mod: 26 | Performed by: INTERNAL MEDICINE

## 2024-04-29 PROCEDURE — 250N000011 HC RX IP 250 OP 636: Performed by: THORACIC SURGERY (CARDIOTHORACIC VASCULAR SURGERY)

## 2024-04-29 PROCEDURE — 85027 COMPLETE CBC AUTOMATED: CPT

## 2024-04-29 PROCEDURE — 82805 BLOOD GASES W/O2 SATURATION: CPT

## 2024-04-29 PROCEDURE — 83735 ASSAY OF MAGNESIUM: CPT | Performed by: THORACIC SURGERY (CARDIOTHORACIC VASCULAR SURGERY)

## 2024-04-29 PROCEDURE — 71045 X-RAY EXAM CHEST 1 VIEW: CPT | Mod: 26 | Performed by: RADIOLOGY

## 2024-04-29 PROCEDURE — 3E0G8GC INTRODUCTION OF OTHER THERAPEUTIC SUBSTANCE INTO UPPER GI, VIA NATURAL OR ARTIFICIAL OPENING ENDOSCOPIC: ICD-10-PCS | Performed by: THORACIC SURGERY (CARDIOTHORACIC VASCULAR SURGERY)

## 2024-04-29 PROCEDURE — 36415 COLL VENOUS BLD VENIPUNCTURE: CPT

## 2024-04-29 PROCEDURE — 999N000157 HC STATISTIC RCP TIME EA 10 MIN

## 2024-04-29 PROCEDURE — 84100 ASSAY OF PHOSPHORUS: CPT | Performed by: THORACIC SURGERY (CARDIOTHORACIC VASCULAR SURGERY)

## 2024-04-29 PROCEDURE — 250N000013 HC RX MED GY IP 250 OP 250 PS 637

## 2024-04-29 PROCEDURE — 94003 VENT MGMT INPAT SUBQ DAY: CPT

## 2024-04-29 PROCEDURE — 250N000013 HC RX MED GY IP 250 OP 250 PS 637: Performed by: STUDENT IN AN ORGANIZED HEALTH CARE EDUCATION/TRAINING PROGRAM

## 2024-04-29 PROCEDURE — 999N000253 HC STATISTIC WEANING TRIALS

## 2024-04-29 PROCEDURE — 74018 RADEX ABDOMEN 1 VIEW: CPT

## 2024-04-29 PROCEDURE — 99291 CRITICAL CARE FIRST HOUR: CPT | Mod: 24 | Performed by: SURGERY

## 2024-04-29 PROCEDURE — 250N000020 HC RX IP 250 OP 636 J0585: Mod: JZ | Performed by: THORACIC SURGERY (CARDIOTHORACIC VASCULAR SURGERY)

## 2024-04-29 PROCEDURE — 43236 UPPR GI SCOPE W/SUBMUC INJ: CPT | Performed by: THORACIC SURGERY (CARDIOTHORACIC VASCULAR SURGERY)

## 2024-04-29 PROCEDURE — 71045 X-RAY EXAM CHEST 1 VIEW: CPT

## 2024-04-29 PROCEDURE — 200N000002 HC R&B ICU UMMC

## 2024-04-29 PROCEDURE — 250N000011 HC RX IP 250 OP 636: Performed by: STUDENT IN AN ORGANIZED HEALTH CARE EDUCATION/TRAINING PROGRAM

## 2024-04-29 PROCEDURE — 250N000013 HC RX MED GY IP 250 OP 250 PS 637: Performed by: PHYSICIAN ASSISTANT

## 2024-04-29 PROCEDURE — 999N000259 HC STATISTIC EXTUBATION

## 2024-04-29 PROCEDURE — 71045 X-RAY EXAM CHEST 1 VIEW: CPT | Mod: 77

## 2024-04-29 PROCEDURE — 93306 TTE W/DOPPLER COMPLETE: CPT

## 2024-04-29 PROCEDURE — 80048 BASIC METABOLIC PNL TOTAL CA: CPT

## 2024-04-29 PROCEDURE — 74018 RADEX ABDOMEN 1 VIEW: CPT | Mod: 26 | Performed by: STUDENT IN AN ORGANIZED HEALTH CARE EDUCATION/TRAINING PROGRAM

## 2024-04-29 PROCEDURE — 258N000003 HC RX IP 258 OP 636

## 2024-04-29 PROCEDURE — 43236 UPPR GI SCOPE W/SUBMUC INJ: CPT | Mod: 78 | Performed by: THORACIC SURGERY (CARDIOTHORACIC VASCULAR SURGERY)

## 2024-04-29 RX ORDER — HYDROXYZINE HYDROCHLORIDE 25 MG/1
25 TABLET, FILM COATED ORAL EVERY 6 HOURS PRN
Status: DISCONTINUED | OUTPATIENT
Start: 2024-04-29 | End: 2024-05-17 | Stop reason: HOSPADM

## 2024-04-29 RX ORDER — LANOLIN ALCOHOL/MO/W.PET/CERES
3 CREAM (GRAM) TOPICAL
Status: DISCONTINUED | OUTPATIENT
Start: 2024-04-29 | End: 2024-04-30

## 2024-04-29 RX ORDER — HYDROXYZINE HYDROCHLORIDE 25 MG/1
50 TABLET, FILM COATED ORAL EVERY 6 HOURS PRN
Status: DISCONTINUED | OUTPATIENT
Start: 2024-04-29 | End: 2024-05-17 | Stop reason: HOSPADM

## 2024-04-29 RX ORDER — LIDOCAINE 4 G/G
2 PATCH TOPICAL EVERY 24 HOURS
Status: DISCONTINUED | OUTPATIENT
Start: 2024-04-29 | End: 2024-05-17 | Stop reason: HOSPADM

## 2024-04-29 RX ORDER — AMPICILLIN AND SULBACTAM 2; 1 G/1; G/1
3 INJECTION, POWDER, FOR SOLUTION INTRAMUSCULAR; INTRAVENOUS EVERY 6 HOURS
Status: DISCONTINUED | OUTPATIENT
Start: 2024-04-29 | End: 2024-05-02

## 2024-04-29 RX ORDER — ACETAMINOPHEN 325 MG/1
975 TABLET ORAL EVERY 8 HOURS
Status: DISCONTINUED | OUTPATIENT
Start: 2024-04-29 | End: 2024-05-17 | Stop reason: HOSPADM

## 2024-04-29 RX ORDER — CHLORHEXIDINE GLUCONATE ORAL RINSE 1.2 MG/ML
15 SOLUTION DENTAL EVERY 12 HOURS
Status: DISCONTINUED | OUTPATIENT
Start: 2024-04-29 | End: 2024-04-29

## 2024-04-29 RX ORDER — POTASSIUM CHLORIDE 7.45 MG/ML
10 INJECTION INTRAVENOUS
Status: COMPLETED | OUTPATIENT
Start: 2024-04-29 | End: 2024-04-29

## 2024-04-29 RX ADMIN — ACETAMINOPHEN 975 MG: 325 TABLET, FILM COATED ORAL at 12:12

## 2024-04-29 RX ADMIN — ACETAMINOPHEN 975 MG: 325 TABLET, FILM COATED ORAL at 19:36

## 2024-04-29 RX ADMIN — GUAIFENESIN 200 MG: 100 SOLUTION ORAL at 14:38

## 2024-04-29 RX ADMIN — PIPERACILLIN SODIUM AND TAZOBACTAM SODIUM 4.5 G: 4; .5 INJECTION, POWDER, LYOPHILIZED, FOR SOLUTION INTRAVENOUS at 06:19

## 2024-04-29 RX ADMIN — SENNOSIDES AND DOCUSATE SODIUM 2 TABLET: 8.6; 5 TABLET ORAL at 10:42

## 2024-04-29 RX ADMIN — AMPICILLIN SODIUM AND SULBACTAM SODIUM 3 G: 2; 1 INJECTION, POWDER, FOR SOLUTION INTRAMUSCULAR; INTRAVENOUS at 23:32

## 2024-04-29 RX ADMIN — POTASSIUM CHLORIDE, DEXTROSE MONOHYDRATE AND SODIUM CHLORIDE: 150; 5; 450 INJECTION, SOLUTION INTRAVENOUS at 09:32

## 2024-04-29 RX ADMIN — SODIUM CHLORIDE, POTASSIUM CHLORIDE, SODIUM LACTATE AND CALCIUM CHLORIDE 250 ML: 600; 310; 30; 20 INJECTION, SOLUTION INTRAVENOUS at 09:39

## 2024-04-29 RX ADMIN — Medication 1 SPRAY: at 20:24

## 2024-04-29 RX ADMIN — MAGNESIUM HYDROXIDE 30 ML: 400 SUSPENSION ORAL at 19:36

## 2024-04-29 RX ADMIN — PROPOFOL 30 MCG/KG/MIN: 10 INJECTION, EMULSION INTRAVENOUS at 03:54

## 2024-04-29 RX ADMIN — METHOCARBAMOL 500 MG: 500 TABLET ORAL at 10:42

## 2024-04-29 RX ADMIN — METHOCARBAMOL 500 MG: 500 TABLET ORAL at 16:19

## 2024-04-29 RX ADMIN — AMPICILLIN SODIUM AND SULBACTAM SODIUM 3 G: 2; 1 INJECTION, POWDER, FOR SOLUTION INTRAMUSCULAR; INTRAVENOUS at 12:12

## 2024-04-29 RX ADMIN — AMPICILLIN SODIUM AND SULBACTAM SODIUM 3 G: 2; 1 INJECTION, POWDER, FOR SOLUTION INTRAMUSCULAR; INTRAVENOUS at 18:21

## 2024-04-29 RX ADMIN — GUAIFENESIN 200 MG: 100 SOLUTION ORAL at 10:42

## 2024-04-29 RX ADMIN — LIDOCAINE 4% 2 PATCH: 40 PATCH TOPICAL at 20:40

## 2024-04-29 RX ADMIN — POLYETHYLENE GLYCOL 3350 17 G: 17 POWDER, FOR SOLUTION ORAL at 19:36

## 2024-04-29 RX ADMIN — Medication 50 MCG/HR: at 00:13

## 2024-04-29 RX ADMIN — Medication 15 ML: at 10:42

## 2024-04-29 RX ADMIN — ONABOTULINUMTOXINA 100 UNITS: 100 INJECTION, POWDER, LYOPHILIZED, FOR SOLUTION INTRADERMAL; INTRAMUSCULAR at 09:45

## 2024-04-29 RX ADMIN — POTASSIUM CHLORIDE, DEXTROSE MONOHYDRATE AND SODIUM CHLORIDE: 150; 5; 450 INJECTION, SOLUTION INTRAVENOUS at 22:17

## 2024-04-29 RX ADMIN — PROPOFOL 25 MCG/KG/MIN: 10 INJECTION, EMULSION INTRAVENOUS at 10:42

## 2024-04-29 RX ADMIN — GABAPENTIN 100 MG: 100 CAPSULE ORAL at 22:03

## 2024-04-29 RX ADMIN — METHOCARBAMOL 500 MG: 500 TABLET ORAL at 22:03

## 2024-04-29 RX ADMIN — OXYCODONE HYDROCHLORIDE 5 MG: 5 SOLUTION ORAL at 12:12

## 2024-04-29 RX ADMIN — PANTOPRAZOLE SODIUM 40 MG: 40 INJECTION, POWDER, FOR SOLUTION INTRAVENOUS at 08:47

## 2024-04-29 RX ADMIN — POTASSIUM CHLORIDE 10 MEQ: 7.46 INJECTION, SOLUTION INTRAVENOUS at 08:54

## 2024-04-29 RX ADMIN — POTASSIUM CHLORIDE 10 MEQ: 7.46 INJECTION, SOLUTION INTRAVENOUS at 10:16

## 2024-04-29 RX ADMIN — GUAIFENESIN 200 MG: 100 SOLUTION ORAL at 19:36

## 2024-04-29 RX ADMIN — MIDAZOLAM 1 MG: 1 INJECTION INTRAMUSCULAR; INTRAVENOUS at 09:22

## 2024-04-29 RX ADMIN — SENNOSIDES AND DOCUSATE SODIUM 2 TABLET: 8.6; 5 TABLET ORAL at 19:36

## 2024-04-29 RX ADMIN — ENOXAPARIN SODIUM 40 MG: 40 INJECTION SUBCUTANEOUS at 19:36

## 2024-04-29 RX ADMIN — PIPERACILLIN SODIUM AND TAZOBACTAM SODIUM 4.5 G: 4; .5 INJECTION, POWDER, LYOPHILIZED, FOR SOLUTION INTRAVENOUS at 00:15

## 2024-04-29 RX ADMIN — CHLORHEXIDINE GLUCONATE 15 ML: 1.2 RINSE ORAL at 12:12

## 2024-04-29 RX ADMIN — MIDAZOLAM 1 MG: 1 INJECTION INTRAMUSCULAR; INTRAVENOUS at 09:31

## 2024-04-29 ASSESSMENT — ACTIVITIES OF DAILY LIVING (ADL)
ADLS_ACUITY_SCORE: 42
ADLS_ACUITY_SCORE: 42
ADLS_ACUITY_SCORE: 40
ADLS_ACUITY_SCORE: 42
ADLS_ACUITY_SCORE: 40
ADLS_ACUITY_SCORE: 44
ADLS_ACUITY_SCORE: 42

## 2024-04-29 NOTE — BRIEF OP NOTE
[facility]    Brief Operative Note    Pre-operative diagnosis: Aspiration post esophagectomy  Post-operative diagnosis Same    Procedure: Esophagoscopy, gastroscopy, duodenoscopy (EGD), combined, N/A - Esophagus  Bronchoscopy flexible and rigid, N/A - Bronchus    Surgeon: Surgeons and Role:     * Jessie Kim MD - Primary     * Cindy Garcia MD  Anesthesia: General   Estimated Blood Loss: None    Drains: 16 Fr NGT  Specimens:   ID Type Source Tests Collected by Time Destination   A : Left Lower Lobe Cultures Washings Lung, Lower Lobe, Left GRAM STAIN, FUNGAL OR YEAST CULTURE ROUTINE, AEROBIC BACTERIAL CULTURE ROUTINE Jessie Kim MD 4/28/2024  7:14 PM      Findings:   Fluid filled dilated conduit, decompressed endoscopically. NGT placed under fluoroscopy guidance. Secured at the nostril - 45 cm. Flexible bronchoscopy performed. Some left lower lobe mucus, suctioned. BAL sent.   Complications: None   Implants: None

## 2024-04-29 NOTE — OR NURSING
Assisted with bedside EGD for botox injection at the pyloris, in the ICU. Pt tolerated the procedure very well with his running propofol and additional versed

## 2024-04-29 NOTE — PROGRESS NOTES
Admitted/transferred from: OR  Reason for admission/transfer: mechanical ventilation, close monitoring  2 RN skin assessment: completed by Carina CARBALLO RN & Christen BROWN RN  Result of skin assessment and interventions/actions: R VATS incision, lap sites x 5, R former CT site, BR coccyx/buttocks/IT- mepi applied  Height, weight, drug calc weight: Done  Patient belongings (see Flowsheet)  MDRO education added to care plan N/A  ?

## 2024-04-29 NOTE — PLAN OF CARE
Major Shift Events:      Neuro: A&Ox4, follows commands  Resp: extubated at 1340, pt tolerated well. Increased secretions with suctioning after extubation. Currently on 3 L Oxymask with sats in > 92%. Lung sounds diminished.  CV: NSR with HR in 80s. Afebrile. Bedside echo performed in AM.   GI/: Smith catheter in place. NG tube 58cm @ nares to LIS. TF 20 mL/hr into Jtube. EGD was performed at bedside this AM. Last BM was 0400 4/29/24. NPO diet.  Skin: old lap sites and CT sites across abdomen and upper flanks. Redness on sacrum with mepilex applied.   IV access: 3x PIVs. D5 1/2 NS + K running at 75 mL/hr.    Plan: Notify team with any changes/updates.    For vital signs and complete assessments, please see documentation flowsheets.

## 2024-04-29 NOTE — PROGRESS NOTES
SURGICAL ICU PROGRESS NOTE  04/29/2024        Date of Service (when I saw the patient): 04/29/2024    ASSESSMENT:  Lopez Hogue is a 68 year old male admitted on 4/19/2024. 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 He is POD10 s/p laparoscopic and right thoracoscopic gastroesophagectomy on 4/19 with Dr. Hill. Monitored in SICU overnight POD 0 to 1, did very well and transferred to [facility] on 4/20. The patient continued to struggle with PO intake and Tfs w/ occasional small emesis episodes and O2 requirements. The patient was made NPO on the morning of 4/28. The patient had a rapid response called at 0900 and zosyn started for concern for aspiration pneumonia, and acute respiratory decompensation resulting in a code blue called at 1600 (no arrest, no CPR; CTPE negative). He was intubated on the floor (150 propofol, 100 rocuronium, 2 versed) where gastric fluid noted in posterior pharynx, and the patient was transferred to the SICU.      Patient proceeded to OR with thoracic surgery for bronch/egd 4/28. It was noted that the conduit was somewhat dilated with a fair amount of brown fluid that was suctioned out. The mucosa appeared healthy. No ischemia or necrosis. The anastomosis was intact. We performed an intra-op esophagram and confirmed no evidence of leak. We advanced a superstiff wire and placed an NGT under fluoro. BAL Gram stain was negative for organisms and only demonstrated PMNs.       CHANGES and MAJOR THINGS TODAY:   EGD for pylorus botox planned today, okay to work towards extubation after  Echo performed in AM      PLAN:    Neurological:  # Acute postoperative pain   - Monitor neurological status. Delirium prevention and precautions.   - Pain: Acetaminophen / Gabapentin / Methocarbamol / Fentanyl  - Sedation: Fentanyl / Propofol    Pulmonary:  #  Aspiration Pneumonitis  # Acute hypoxic respiratory failure  # Concern for Pulmonary Atery HTN on CTPE 4/28  # Diffuse bilateral groundglass pulmonary opacities with associated cavitary lesions, likely infectious, CTPE 4/28  - VENT:   - FiO2 (%): 40 %  - Resp Rate (Set): 16 breaths/min  - Tidal Volume (Set, mL): 500 mL  - PEEP (cm H2O): 7 cmH2O  - Resp: 18  - Ventilatory bundle, HOB elevation for VAP prevention.   - Pressure support and ventilator weaning when meets criteria     - Will discuss concern for PAH on CTPE with cardiology, echo performed today      Cardiovascular:    #H/o HTN, HLD  #H/o CAD/NSTEMI s/p KAYDEN in 3/2023  #H/o moderate aortic valve stenosis  - Monitor hemodynamic status.  - Not currently requiring pressors       GI/Nutrition:    #Esophageal SCC s/p esophagectomy 4/19  #Malnutrition  #J tube placement  #Aspiration  - Diet: NPO for Medical/Clinical Reasons Except for: No Exceptions    - Thoracic surgery to perform EGD tonight, possible decompression tube placement  - Holding tube feeds  - NGT to LIS  - J tube okay for meds and tube feeds  - EGD for pylorus botox 4/29      Fluids/Electrolytes:  - Will monitor intake and output  - mIVF @75; 250mL bolus for low UOP 4/29 AM  - ICU electrolyte replacement protocol      Renal:   #No acute concerns  - Urine output  <0.5ml/kg/hr 4/29AM. Will continue to monitor intake and output.      Endocrine:  # Stress hyperglycemia   - PTA medications: None  - No management indication. Goal to keep BG < 180 for optimal wound healing       Infectious disease:   # Aspiration Pneumonia  - MRSA negative  - Follow BAL cultures    - Antibiotics:  - Piperacillin-Tazobactam -> Unsayn, to end after 5-7 days (Start 4/28)    Positive cultures:  - NGTD      Hematology:    # Anemia of critical illness   - Hemoglobin 9.8 (11). Monitor and trend. Threshold for transfusion if hgb <7.0 or signs/symptoms of hypoperfusion.       Musculoskeletal:   # Weakness and deconditioning of  critical illness   - Physical and occupational therapy consults.    Skin:  # No acute concerns  - dilgent cares to prevent skin breakdown and wound formation.      General Cares/Prophylaxis:    DVT Prophylaxis: Enoxaparin (Lovenox) SQ  GI Prophylaxis: PPI (pantoprazole)  Restraints: Restraints for medical healing needed: YES    Lines/ tubes/ drains:  - ETT (4/28)  - PIV x3  - NG  - J tube  - Reyes (4/28)    Disposition:  - Surgical ICU     Patient seen, findings and plan discussed with surgical ICU staff, Dr. SANCHEZ.    Time spent on this Encounter   Billing:  I spent 57 minutes bedside and on the inpatient unit today managing the critical care of Lopez Hogue in relation to the issues listed in this note.      ANITHA SWARTZ MD    ====================================  INTERVAL HISTORY:   Overnight, OR for EGD    ROS unable to obtain in setting of critical illness and intubation    OBJECTIVE:   1. VITAL SIGNS:   Temp:  [98.1  F (36.7  C)-99.5  F (37.5  C)] 98.4  F (36.9  C)  Pulse:  [] 99  Resp:  [16-29] 28  BP: ()/() 99/61  FiO2 (%):  [50 %-90 %] 50 %  SpO2:  [80 %-100 %] 96 %  Vent Mode: CMV/AC  (Continuous Mandatory Ventilation/ Assist Control)  FiO2 (%): 50 %  Resp Rate (Set): 16 breaths/min  Tidal Volume (Set, mL): 500 mL  PEEP (cm H2O): 7 cmH2O  Resp: 28      2. INTAKE/ OUTPUT:   I/O last 3 completed shifts:  In: 1799.94 [I.V.:1669.94; NG/GT:130]  Out: 1066 [Urine:1020; Emesis/NG output:45; Blood:1]    3. PHYSICAL EXAMINATION:  General: Intubated, alert, following commands in bed  HEENT: Normocephalic, atraumatic  Neuro: Following commands in all 4 extremities briskly and symmetrically; tracking faces  Pulm/Resp: Intubated.   CV: RRR  Abdomen: Soft, non-distended, non-tender, no rebound tenderness or guarding, no masses  : reyes catheter in place, urine yellow and clear  Incisions/Skin: No overt signs of redness/rashes  MSK/Extremities: No peripheral edema, moving all extremities,  peripheral pulses intact, extremities well perfused    4. INVESTIGATIONS:   Arterial Blood Gases   Recent Labs   Lab 04/28/24  1749   PH 7.40   PCO2 55*   PO2 84   HCO3 34*     Complete Blood Count   Recent Labs   Lab 04/29/24  0523 04/28/24  1730 04/28/24  0533 04/27/24  0532   WBC 12.8* 13.1* 11.2* 8.4   HGB 9.8* 11.0* 10.8* 10.3*    200 196 194     Basic Metabolic Panel  Recent Labs   Lab 04/29/24  0523 04/28/24  1931 04/28/24  1730 04/28/24  1640 04/28/24  0533 04/28/24  0334 04/27/24  0532     --  141  --  140  --  137   POTASSIUM 3.6  --  3.8  --  4.1  --  4.7   CHLORIDE 101  --  99  --  98  --  99   CO2 29  --  29  --  31*  --  27   BUN 15.4  --  14.5  --  14.8  --  15.5   CR 0.69  --  0.61*  --  0.60*  --  0.65*   * 128* 118* 119* 130*   < > 120*    < > = values in this interval not displayed.     Liver Function Tests  Recent Labs   Lab 04/28/24  1730   AST 22   ALT 17   ALKPHOS 169*   BILITOTAL 0.8   ALBUMIN 3.6   INR 1.34*     Pancreatic Enzymes  No lab results found in last 7 days.  Coagulation Profile  Recent Labs   Lab 04/28/24  1730   INR 1.34*   PTT 36         5. RADIOLOGY:   Recent Results (from the past 24 hour(s))   XR Chest Port 1 View    Narrative    Exam: XR CHEST PORT 1 VIEW, 4/28/2024 9:17 AM    Indication: worsening dyspnea, increased O2 requirement    Comparison: 4/27/2024    Findings:   Portable frontal view of the chest. Stable cardiomediastinal  silhouette. Coronary artery stents. Right paramediastinal staple line.  Blunting of the costophrenic angles. Trace biapical pneumothoraces.  Mild hazy and streaky bibasilar opacities similar to prior. Bilateral  interstitial prominence. No new airspace opacity. Decreased  subcutaneous emphysema of the right lateral chest wall. Chronic right  rib fracture deformity.      Impression    Impression:   1. Probable trace pleural effusions with minimal bibasilar mixed  interstitial and airspace opacities favored to  represent  atelectasis/edema.   2. Trace biapical pneumothoraces, waxing and waning in conspicuity  over several prior studies, currently increased on the left.    I have personally reviewed the examination and initial interpretation  and I agree with the findings.    NELSON JOHNSON DO         SYSTEM ID:  [id number]   XR Chest Port 1 View    Narrative    Exam: XR CHEST PORT 1 VIEW, 4/28/2024 2:48 PM    Indication: increased work of breathing    Comparison: X-ray chest 4/20/2024, 0803. Multiple priors.    Findings:   AP portable semiupright radiograph of the chest. Midline trachea.  Stable surgical changes of the chest, suture material seen over the  right hilum. Stable cardiomediastinal silhouette. No new dense  consolidations. Stable basilar hazy and interstitial opacities. No  significant pleural effusion. Continued biapical pneumothoraces,  slight increased conspicuity on the right, stable left. Soft tissue  gas over the right lateral chest is unchanged. Stable right posterior  rib fracture.      Impression    Impression:   1. Overall no significant change from prior radiograph. Continued  waxing and waning small biapical pneumothoraces, slightly increased on  the right in this radiograph.  2. No new dense consolidations, continued basilar interstitial hazy  opacities, consistent with likely mixed edema and atelectasis.    I have personally reviewed the examination and initial interpretation  and I agree with the findings.    SHAYNE SALDAÑA MD         SYSTEM ID:  [id number]   CT Chest Pulmonary Embolism w Contrast    Narrative    EXAMINATION: CTA pulmonary angiogram, 4/28/2024 3:32 PM     COMPARISON: None.    HISTORY: 68-year-old male with hypoxia, rule out PE.    TECHNIQUE: Volumetric helical acquisition of CT images of the chest  from the lung apices to the kidneys were acquired after the  administration of 80 mL of Isovue-370 IV contrast.  Post-processed  multiplanar and/or MIP reformations were obtained,  archived to PACS  and used in interpretation of this study.     FINDINGS:    Contrast bolus is: excellent.  Exam is negative for acute pulmonary  embolism.       The largest right ventricle transaxial diameter is (measured from  endocardium to endocardium): 4.7 cm   The largest left ventricle transaxial diameter is (measured from  endocardium to endocardium): 2.6 cm  RV/LV ratio is: 1.8 (if ratio greater than 1.1 then sign is suspicious  for right heart strain)  Reflux of contrast into the IVC? no    Paradoxical bowing of the interventricular septum to the left? no  Pericardial effusion? not present    : Unremarkable.    Lines and tubes: None.    Airways: Central airways are patent.     Lungs: No suspicious pulmonary nodules or masses. No diffuse bilateral  nodular groundglass opacities with a few associated cavitary lesions  for example in the left upper lobe (series 4, image 49). Left lower  lobe atelectasis. Right upper lobe 7 mm solid pulmonary nodule (series  5, image 33), stable from PET/CT 3/12/2024.    Pleura: No pleural effusions. Small biapical pneumothoraces.    Thyroid: Thyroid appears unremarkable.    Lymph nodes: No enlarged axillary, mediastinal, or hilar lymph nodes  by short axis criteria.     Cardiac: The heart is not enlarged. No pericardial effusion. No  coronary artery calcifications. Aortic valve calcifications. Coronary  artery calcifications.    Vessels: Thoracic aorta and main pulmonary artery are normal in  caliber.    Esophagus: Postoperative changes of gastroesophagectomy.  Surgical  clips in the posterior/mid mediastinum. Small amount of  pneumomediastinum, likely postsurgical.    Upper abdomen: Visualized portions of the upper abdomen are  unremarkable.    Bones/soft tissue: Degenerative changes of the spine. No acute or  suspicious osseous abnormality. Healed fracture of right posterior  ninth rib. Right chest wall subcutaneous emphysema.        Impression    IMPRESSION:   1. No  pulmonary embolism. The RV LV ratio increase suggesting right  heart strain, likely pulmonary artery hypertension in the absence of a  pulmonary embolism.    2. Diffuse bilateral groundglass pulmonary opacities with a few  associated cavitary lesions, likely infectious. Short term follow-up  CT chest to reevaluate given history of esophageal malignancy  recommended.    3. Postoperative changes of gastroesophagectomy including small  biapical pneumothoraces, small amount of pneumomediastinum, and right  chest wall subcutaneous emphysema.    4. Right upper lobe 7 mm pulmonary nodule, similar to prior PET/CT.  Continued attention on follow-up CT as above.    In the event of a positive result for acute pulmonary embolism  resulting in right heart strain, consider calling the   [facility] patient placement ([phone]) for PERT (Pulmonary Embolism  Response Team) Activation?    PERT -- Pulmonary Embolism Response Team (Multidisciplinary team  including cardiology, interventional radiology, critical care,  hematology)    I have personally reviewed the examination and initial interpretation  and I agree with the findings.    SHAYNE SALDAÑA MD         SYSTEM ID:  [id number]   XR Abdomen Port 1 View    Narrative    XR ABDOMEN PORT 1 VIEW  4/28/2024 5:41 PM     HISTORY:  aspiration     COMPARISON:  PET CT 3/12/2024.    TECHNIQUE: Supine abdominal radiograph(s).    FINDINGS:   Surgical drain in the pelvis. Contrast within the partially visualized  urinary bladder and renal collecting systems. Contrast within the  right renal collecting system.    Nonobstructive bowel gas pattern. No pneumatosis or portal venous gas.  No abnormal calcifications or evidence of organomegaly.     The visualized bones, and soft tissues are unremarkable.      Impression    IMPRESSION:   Surgical drain in the pelvis. Nonobstructive bowel gas pattern.      I have personally reviewed the examination and initial interpretation  and I agree with the  findings.    SHAYNE SALDAÑA MD         SYSTEM ID:  [id number]   XR Chest Port 1 View    Narrative    Exam: XR CHEST PORT 1 VIEW  4/28/2024 5:42 PM     History:  aspiration       Comparison:  CT PE 4/28/2024.    Findings: Single supine view of the chest.  Endotracheal tube tip  terminates approximately 3.7 cm from the tip of the rohini. Surgical  clips in the mediastinum. Subcutaneous emphysema along the right chest  wall. Trace biapical pneumothoraces.     Trachea is midline. The cardiomediastinal silhouette is within normal  limits. The costophrenic angles are not well-visualized but there is  no large pleural effusion. Left pleural effusion present on same-day  CT not well visualized. Nodular opacities in the lower lung fields  which are greater on the right and are better characterized on same  day chest CT. Left lower lobe atelectasis. The visualized upper  abdomen is unremarkable.      Impression    Impression: 1. Endotracheal tube tip terminates approximately 3.7 cm  from the rohini.  2. Left lower lobe atelectasis, lung field nodular opacities, and  trace biapical pneumothoraces which are best seen on same-day CT  chest.    I have personally reviewed the examination and initial interpretation  and I agree with the findings.    SHAYNE SALDAÑA MD         SYSTEM ID:  [id number]   XR Surgery CLAIR L/T 5 Min Fluoro w Stills    Narrative    This exam was marked as non-reportable because it will not be read by a   radiologist or a [place] non-radiologist provider.         XR Chest Port 1 View    Narrative    Exam: XR CHEST PORT 1 VIEW, 4/28/2024 8:43 PM    Indication: Post EGD, NGT placement    Comparison: X-ray chest 4/20/2024, 1718 hours.    Findings:   AP portable semiupright radiograph of the chest. Endotracheal tube tip  is in the midthoracic trachea. Interval placement of a nasogastric  tube, the tip and sidehole are within the mid to distal esophagus.    Stable cardiomediastinal silhouette, the trachea is  midline. No new  consolidative opacities, slight improvement in aeration of the left  lung base. Continued scattered nodular opacities of the lung bases. No  pleural effusion, unchanged trace biapical pneumothoraces. Visualized  upper abdomen is unremarkable. Trace subcutaneous gas over the right  lateral chest. Healed right posterior ninth rib fracture as seen on  prior studies.      Impression    Impression:   1. Interval placement of nasogastric tube. The tip and sidehole  project over the mid to distal esophagus, not within the stomach.  Recommend advancement.  2. Compared to prior radiograph, slight improvement in left basilar  aeration. Continued scattered nodular opacities better characterized  on same-day CT.    I have personally reviewed the examination and initial interpretation  and I agree with the findings.    SHAYNE SALDAÑA MD         SYSTEM ID:  [id number]       =========================================

## 2024-04-29 NOTE — ADDENDUM NOTE
Addendum  created 04/28/24 1917 by Brenda Gilbert MD    Attestation recorded in Intraprocedure, Intraprocedure Attestations filed

## 2024-04-29 NOTE — OP NOTE
OPERATIVE REPORT  Preop Diagnosis:  Respiratory failure, history of esophagectomy.   Post op diagnosis: Same   Procedure: Flexible bronchoscopy, EGD, intraoperative esophagram, NGT placement under fluoro, supervision and interpretation of intra-op imaging.   Anesthesia: General  Specimen: LLL BAL  for culture.   Complications: None  EBL: Minimal.    Procedure: The patient was taken to the OR and laid supine. General anesthesia was induced. A timeout was called to review the case and patient information. We performed an upped endoscopy and found that the conduit was somewhat dilated with a fair amount of brown fluid that was suctioned out. The  mucosa appeared healthy. No ischemia or necrosis. The anastomosis was intact. We performed an intra-op esophagram and confirmed no evidence of leak. We advanced a superstiff wire and placed an NGT under fluoro.   We then performed a bronchoscopy. The bilateral tracheobronchial trees were inspected closely to the level of the subsegmental bronchi.  Secretions were found to be thin, no evidence of gross aspiration.  Secretions were lavaged and evacuated without difficulty. The samples were sent for BAL. The procedure was completed and the patient tolerated the procedure well and without complications.      Jessie German MD

## 2024-04-29 NOTE — ANESTHESIA CARE TRANSFER NOTE
Patient: Lopez Hogue    Procedure: Procedure(s):  Esophagoscopy, gastroscopy, duodenoscopy (EGD), combined  Bronchoscopy flexible and rigid       Diagnosis: * No pre-op diagnosis entered *  Diagnosis Additional Information: No value filed.    Anesthesia Type:   No value filed.     Note:    Oropharynx: endotracheal tube in place and ventilatory support  Level of Consciousness: iatrogenic sedation      Independent Airway: airway patency not satisfactory and stable  Dentition: dentition unchanged  Vital Signs Stable: post-procedure vital signs reviewed and stable  Report to RN Given: handoff report given  Patient transferred to: ICU    ICU Handoff: Call for PAUSE to initiate/utilize ICU HANDOFF, Identified Patient, Identified Responsible Provider, Reviewed the Pertinent Medical History, Discussed Surgical Course, Reviewed Intra-OP Anesthesia Management and Issues during Anesthesia, Set Expectations for Post Procedure Period and Allowed Opportunity for Questions and Acknowledgement of Understanding  Vitals:  Vitals Value Taken Time   /62 04/28/24 1931   Temp     Pulse 84 04/28/24 1933   Resp     SpO2 100 % 04/28/24 1933   Vitals shown include unfiled device data.    Electronically Signed By: YADIRA Nieto CRNA  April 28, 2024  7:34 PM

## 2024-04-29 NOTE — PROGRESS NOTES
[facility], Procedure Note          Extubation:       Lopez Hogue  MRN# [id number]   April 29, 2024, 1:45 PM         Patient extubated at: April 29, 2024, 1:45 PM   Supplemental Oxygen: Via oxymask at 4 liters per minute   Cough: The cough is good   Secretion Mode: PRN suction by self   Secretion Amount: Moderate amount, moderately thin and red in color   Respiratory Exam:: Breath sounds: equal and clear and good aeration     Location: all lobes and bilaterally   Skin Exam:: Patient color: natural   Patient Status: Currently appears comfortable   Arterial Blood Gasses: pH Arterial (no units)   Date Value   04/28/2024 7.40     pO2 Arterial (mm Hg)   Date Value   04/28/2024 84     pCO2 Arterial (mm Hg)   Date Value   04/28/2024 55 (H)     Bicarbonate Arterial (mmol/L)   Date Value   04/28/2024 34 (H)          Cuff leak present. Suction orally/ETT prior to extubation.     Recorded by Laura Al, RT

## 2024-04-29 NOTE — PROGRESS NOTES
"Thoracic Surgery  [facility]  4/29/2024    Subjective:  Concern for aspiration event on floor yesterday. Rapid called in AM for possible aspiration, CXR was not concerning, started on abx. Subsequent code blue called for respiratory decompensation requiring re-intubation and transfer to SICU for bronchoscopy, which revealed cloudy succus entericus throughout the left side. Then underwent EGD and repeat bronch later in evening with thoracic showing dilated conduit & clear tracheobronchial trees. Stable on vent, no pressors overnight.     Objective  BP 99/61   Pulse 99   Temp 98.4  F (36.9  C) (Axillary)   Resp 28   Ht 1.727 m (5' 8\")   Wt 64.8 kg (142 lb 13.7 oz)   SpO2 96%   BMI 21.72 kg/m      GEN: intubated, sedated  NEURO: sedated  PULM: mechanically ventialted, 50% FiO2, PEEP 7  CV: RRR  GI: soft, nontender, nondistended  EXT: wwp  INCISIONS: CDI    Labs:  Na: 140 from 141  K: 3.6 from 3.8  Cr: 0.69 from 0.61    WBC: 12.8 from 13.1  HGB: 9.8 from 11.0    MRSA (4/28): negative  Respiratory aerobic culture and gram stain (4/28): NGTD, no organisms on stain  Fungal yeast (4/28): in process    Imaging:  AM CXR: no focal opacities or new evidence of aspiration pneumonitis, PNA    Tubes & Drains:   NGT to LIS: 45mL gastric contents    A/P:    68M with SCC of esophagus who is now POD10 s/p laparoscopic and right thoracoscopic gastroesophagectomy on 4/19 with Dr. Hill. Monitored in SICU overnight POD0 to 1, did very well and transferred to [facility] on 4/20. Esophagram on 4/25 was negative for leak. Concern for possible aspiration with clear liquids led to acute respiratory decline on 4/28 requiring re-intubation and admission to the SICU. Remains in SICU with decreasing oxygen requirements on vent, minimal sedation.     - Plan for repeat EGD, botox of pylorus given dilation of conduit seen on prior EGD  - Hope to wean vent & extubate later today  - Daily CXR, aspiration precautions  - " Will ask SLP to evaluate  - Strict NPO, hold TF at this time  - Continue abx  - Continue to hold PTA plavix, continue lovenox  - Appreciate SICU team care    Patient seen on rounds with staff, Dr. Juan Carlos Garcia MD  General Surgery, PGY-1  [phone]

## 2024-04-29 NOTE — PLAN OF CARE
Major Shift Events:   Neuro: sedated on prop & fent, opens eyes to touch, CHANEY, follows simple commands  Cardiac: SR/ST, BP stable, pulses palpable, afebrile  Resp: lung sounds clear/dim, tolerating CMV vent settings   GI:  bowel sounds active, LBM this shift, 2 large loose stools  : reyes intact, adequate amount of cloudy brian urine   Skin: see admission note  LDA: PIV x 3 infusing sedation, IVMF, & abs; NGT to LIS for decompression only, JT clamped (meds okay per SICU)  Labs: reviewed, AM labs pending  Plan: continue POC   For vital signs and complete assessments, please see documentation flowsheets.

## 2024-04-29 NOTE — ANESTHESIA POSTPROCEDURE EVALUATION
Patient: Lopez Hogue    Procedure: Procedure(s):  Esophagoscopy, gastroscopy, duodenoscopy (EGD), combined  Bronchoscopy flexible and rigid       Anesthesia Type:  General    Note:  Disposition: Inpatient; ICU   Postop Pain Control: Uneventful            Sign Out: Well controlled pain   PONV: No   Neuro/Psych: Uneventful            Sign Out: PLANNED postop sedation   Airway/Respiratory: Uneventful            Sign Out: AIRWAY IN SITU/Resp. Support               Airway in situ/Resp. Support: ETT                 Reason: Planned Pre-op   CV/Hemodynamics: Uneventful            Sign Out: Acceptable CV status; No obvious hypovolemia; No obvious fluid overload   Other NRE: NONE   DID A NON-ROUTINE EVENT OCCUR? No           Last vitals:  Vitals Value Taken Time   /62 04/28/24 1931   Temp 36.7  C (98.1  F) 04/28/24 1930   Pulse 91 04/28/24 1939   Resp     SpO2 100 % 04/28/24 1937   Vitals shown include unfiled device data.    Electronically Signed By: SHAYNE ABERNATHY MD  April 28, 2024  7:39 PM

## 2024-04-29 NOTE — OP NOTE
Preoperative diagnosis: Recurrent aspiration after esophagectomy, gastric outlet obstruction     Postoperative diagnosis: The same as preoperative diagnosis    Procedure:   Esophagogastroduodenoscopy  Botox Injection of Pylorus    Anesthesia: Sedation with versed and propofol    Surgeon: Jessie German    Resident surgeon: Keith Colvin MD, Gerald Perez MD    EBL: 0    Complications: None    Procedure: The patient was intubated and sedated in the ICU.  Consent and updated were given to his wife.  After timeout was performed, a bite block was placed.  An adult gastroscope was passed with ease into the esophagus.  The neoesophagus was completed decompressed.  The pylorus was identified to the sided and ended.  The duodenum appeared normal.  The gastroscope was retracted and pylorus identified.  A 5mm needle was passed through the gastroscope and 100u of botox was injected into the four quadrants.  There was no bleeding.  The gastroscope was removed.      Dr. Rangel was present for the entire procedure.     Keith Colvin MD  Cardiothoracic Surgery Fellow

## 2024-04-30 ENCOUNTER — APPOINTMENT (OUTPATIENT)
Dept: OCCUPATIONAL THERAPY | Facility: CLINIC | Age: 69
DRG: 326 | End: 2024-04-30
Attending: THORACIC SURGERY (CARDIOTHORACIC VASCULAR SURGERY)
Payer: COMMERCIAL

## 2024-04-30 ENCOUNTER — APPOINTMENT (OUTPATIENT)
Dept: SPEECH THERAPY | Facility: CLINIC | Age: 69
DRG: 326 | End: 2024-04-30
Attending: THORACIC SURGERY (CARDIOTHORACIC VASCULAR SURGERY)
Payer: COMMERCIAL

## 2024-04-30 ENCOUNTER — APPOINTMENT (OUTPATIENT)
Dept: GENERAL RADIOLOGY | Facility: CLINIC | Age: 69
DRG: 326 | End: 2024-04-30
Attending: THORACIC SURGERY (CARDIOTHORACIC VASCULAR SURGERY)
Payer: COMMERCIAL

## 2024-04-30 LAB
ANION GAP SERPL CALCULATED.3IONS-SCNC: 9 MMOL/L (ref 7–15)
ATRIAL RATE - MUSE: 102 BPM
ATRIAL RATE - MUSE: 104 BPM
BACTERIA SPEC CULT: ABNORMAL
BUN SERPL-MCNC: 12.8 MG/DL (ref 8–23)
CALCIUM SERPL-MCNC: 8.7 MG/DL (ref 8.8–10.2)
CHLORIDE SERPL-SCNC: 103 MMOL/L (ref 98–107)
CREAT SERPL-MCNC: 0.51 MG/DL (ref 0.67–1.17)
DEPRECATED HCO3 PLAS-SCNC: 27 MMOL/L (ref 22–29)
DIASTOLIC BLOOD PRESSURE - MUSE: NORMAL MMHG
DIASTOLIC BLOOD PRESSURE - MUSE: NORMAL MMHG
EGFRCR SERPLBLD CKD-EPI 2021: >90 ML/MIN/1.73M2
ERYTHROCYTE [DISTWIDTH] IN BLOOD BY AUTOMATED COUNT: 14.3 % (ref 10–15)
GLUCOSE SERPL-MCNC: 120 MG/DL (ref 70–99)
GRAM STAIN RESULT: ABNORMAL
GRAM STAIN RESULT: ABNORMAL
HCT VFR BLD AUTO: 28.2 % (ref 40–53)
HGB BLD-MCNC: 9.3 G/DL (ref 13.3–17.7)
INTERPRETATION ECG - MUSE: NORMAL
INTERPRETATION ECG - MUSE: NORMAL
MAGNESIUM SERPL-MCNC: 2 MG/DL (ref 1.7–2.3)
MCH RBC QN AUTO: 31.7 PG (ref 26.5–33)
MCHC RBC AUTO-ENTMCNC: 33 G/DL (ref 31.5–36.5)
MCV RBC AUTO: 96 FL (ref 78–100)
P AXIS - MUSE: 62 DEGREES
P AXIS - MUSE: 70 DEGREES
PHOSPHATE SERPL-MCNC: 2.5 MG/DL (ref 2.5–4.5)
PLATELET # BLD AUTO: 182 10E3/UL (ref 150–450)
POTASSIUM SERPL-SCNC: 3.8 MMOL/L (ref 3.4–5.3)
PR INTERVAL - MUSE: 142 MS
PR INTERVAL - MUSE: 146 MS
QRS DURATION - MUSE: 82 MS
QRS DURATION - MUSE: 88 MS
QT - MUSE: 330 MS
QT - MUSE: 352 MS
QTC - MUSE: 430 MS
QTC - MUSE: 462 MS
R AXIS - MUSE: 44 DEGREES
R AXIS - MUSE: 54 DEGREES
RBC # BLD AUTO: 2.93 10E6/UL (ref 4.4–5.9)
SODIUM SERPL-SCNC: 139 MMOL/L (ref 135–145)
SYSTOLIC BLOOD PRESSURE - MUSE: NORMAL MMHG
SYSTOLIC BLOOD PRESSURE - MUSE: NORMAL MMHG
T AXIS - MUSE: -9 DEGREES
T AXIS - MUSE: 32 DEGREES
VENTRICULAR RATE- MUSE: 102 BPM
VENTRICULAR RATE- MUSE: 104 BPM
WBC # BLD AUTO: 12.3 10E3/UL (ref 4–11)

## 2024-04-30 PROCEDURE — 83735 ASSAY OF MAGNESIUM: CPT | Performed by: THORACIC SURGERY (CARDIOTHORACIC VASCULAR SURGERY)

## 2024-04-30 PROCEDURE — 250N000011 HC RX IP 250 OP 636

## 2024-04-30 PROCEDURE — 250N000011 HC RX IP 250 OP 636: Performed by: PHYSICIAN ASSISTANT

## 2024-04-30 PROCEDURE — 250N000013 HC RX MED GY IP 250 OP 250 PS 637: Performed by: STUDENT IN AN ORGANIZED HEALTH CARE EDUCATION/TRAINING PROGRAM

## 2024-04-30 PROCEDURE — 250N000013 HC RX MED GY IP 250 OP 250 PS 637

## 2024-04-30 PROCEDURE — 92526 ORAL FUNCTION THERAPY: CPT | Mod: GN | Performed by: SPEECH-LANGUAGE PATHOLOGIST

## 2024-04-30 PROCEDURE — 71045 X-RAY EXAM CHEST 1 VIEW: CPT

## 2024-04-30 PROCEDURE — 120N000003 HC R&B IMCU UMMC

## 2024-04-30 PROCEDURE — 84100 ASSAY OF PHOSPHORUS: CPT | Performed by: THORACIC SURGERY (CARDIOTHORACIC VASCULAR SURGERY)

## 2024-04-30 PROCEDURE — 80048 BASIC METABOLIC PNL TOTAL CA: CPT

## 2024-04-30 PROCEDURE — 250N000013 HC RX MED GY IP 250 OP 250 PS 637: Performed by: PHYSICIAN ASSISTANT

## 2024-04-30 PROCEDURE — 97535 SELF CARE MNGMENT TRAINING: CPT | Mod: GO | Performed by: OCCUPATIONAL THERAPIST

## 2024-04-30 PROCEDURE — 36415 COLL VENOUS BLD VENIPUNCTURE: CPT

## 2024-04-30 PROCEDURE — 250N000013 HC RX MED GY IP 250 OP 250 PS 637: Performed by: THORACIC SURGERY (CARDIOTHORACIC VASCULAR SURGERY)

## 2024-04-30 PROCEDURE — 85027 COMPLETE CBC AUTOMATED: CPT

## 2024-04-30 PROCEDURE — 250N000011 HC RX IP 250 OP 636: Performed by: THORACIC SURGERY (CARDIOTHORACIC VASCULAR SURGERY)

## 2024-04-30 PROCEDURE — 71045 X-RAY EXAM CHEST 1 VIEW: CPT | Mod: 26 | Performed by: RADIOLOGY

## 2024-04-30 RX ORDER — MAGNESIUM SULFATE HEPTAHYDRATE 40 MG/ML
2 INJECTION, SOLUTION INTRAVENOUS ONCE
Status: COMPLETED | OUTPATIENT
Start: 2024-04-30 | End: 2024-04-30

## 2024-04-30 RX ORDER — POTASSIUM CHLORIDE 1.5 G/1.58G
20 POWDER, FOR SOLUTION ORAL ONCE
Status: COMPLETED | OUTPATIENT
Start: 2024-04-30 | End: 2024-04-30

## 2024-04-30 RX ORDER — LANOLIN ALCOHOL/MO/W.PET/CERES
3 CREAM (GRAM) TOPICAL EVERY EVENING
Status: DISCONTINUED | OUTPATIENT
Start: 2024-04-30 | End: 2024-05-17 | Stop reason: HOSPADM

## 2024-04-30 RX ORDER — FAMOTIDINE 20 MG/1
20 TABLET, FILM COATED ORAL 2 TIMES DAILY
Status: DISCONTINUED | OUTPATIENT
Start: 2024-04-30 | End: 2024-05-17 | Stop reason: HOSPADM

## 2024-04-30 RX ORDER — FUROSEMIDE 10 MG/ML
20 INJECTION INTRAMUSCULAR; INTRAVENOUS ONCE
Status: COMPLETED | OUTPATIENT
Start: 2024-04-30 | End: 2024-04-30

## 2024-04-30 RX ORDER — SERTRALINE HYDROCHLORIDE 100 MG/1
100 TABLET, FILM COATED ORAL EVERY MORNING
Status: DISCONTINUED | OUTPATIENT
Start: 2024-04-30 | End: 2024-05-17 | Stop reason: HOSPADM

## 2024-04-30 RX ADMIN — GUAIFENESIN 200 MG: 100 SOLUTION ORAL at 14:41

## 2024-04-30 RX ADMIN — ACETAMINOPHEN 975 MG: 325 TABLET, FILM COATED ORAL at 12:18

## 2024-04-30 RX ADMIN — GABAPENTIN 100 MG: 100 CAPSULE ORAL at 21:51

## 2024-04-30 RX ADMIN — METHOCARBAMOL 500 MG: 500 TABLET ORAL at 10:06

## 2024-04-30 RX ADMIN — BISACODYL 10 MG: 10 SUPPOSITORY RECTAL at 08:04

## 2024-04-30 RX ADMIN — GUAIFENESIN 200 MG: 100 SOLUTION ORAL at 08:05

## 2024-04-30 RX ADMIN — GUAIFENESIN 200 MG: 100 SOLUTION ORAL at 01:52

## 2024-04-30 RX ADMIN — METHOCARBAMOL 500 MG: 500 TABLET ORAL at 04:05

## 2024-04-30 RX ADMIN — GUAIFENESIN 200 MG: 100 SOLUTION ORAL at 21:49

## 2024-04-30 RX ADMIN — SENNOSIDES AND DOCUSATE SODIUM 2 TABLET: 8.6; 5 TABLET ORAL at 08:04

## 2024-04-30 RX ADMIN — ALBUTEROL SULFATE 4 PUFF: 90 AEROSOL, METERED RESPIRATORY (INHALATION) at 20:17

## 2024-04-30 RX ADMIN — ACETAMINOPHEN 975 MG: 325 TABLET, FILM COATED ORAL at 20:12

## 2024-04-30 RX ADMIN — MAGNESIUM SULFATE HEPTAHYDRATE 2 G: 2 INJECTION, SOLUTION INTRAVENOUS at 06:35

## 2024-04-30 RX ADMIN — FAMOTIDINE 20 MG: 20 TABLET ORAL at 20:12

## 2024-04-30 RX ADMIN — ENOXAPARIN SODIUM 40 MG: 40 INJECTION SUBCUTANEOUS at 20:13

## 2024-04-30 RX ADMIN — POTASSIUM CHLORIDE 20 MEQ: 1.5 POWDER, FOR SOLUTION ORAL at 05:38

## 2024-04-30 RX ADMIN — Medication 15 ML: at 08:04

## 2024-04-30 RX ADMIN — METHOCARBAMOL 500 MG: 500 TABLET ORAL at 15:35

## 2024-04-30 RX ADMIN — AMPICILLIN SODIUM AND SULBACTAM SODIUM 3 G: 2; 1 INJECTION, POWDER, FOR SOLUTION INTRAMUSCULAR; INTRAVENOUS at 12:19

## 2024-04-30 RX ADMIN — POLYETHYLENE GLYCOL 3350 17 G: 17 POWDER, FOR SOLUTION ORAL at 08:03

## 2024-04-30 RX ADMIN — POTASSIUM & SODIUM PHOSPHATES POWDER PACK 280-160-250 MG 1 PACKET: 280-160-250 PACK at 10:06

## 2024-04-30 RX ADMIN — METHOCARBAMOL 500 MG: 500 TABLET ORAL at 21:51

## 2024-04-30 RX ADMIN — POLYETHYLENE GLYCOL 3350 17 G: 17 POWDER, FOR SOLUTION ORAL at 20:12

## 2024-04-30 RX ADMIN — MAGNESIUM HYDROXIDE 30 ML: 400 SUSPENSION ORAL at 08:04

## 2024-04-30 RX ADMIN — ACETAMINOPHEN 975 MG: 325 TABLET, FILM COATED ORAL at 04:05

## 2024-04-30 RX ADMIN — Medication 3 MG: at 20:13

## 2024-04-30 RX ADMIN — ALBUTEROL SULFATE 4 PUFF: 90 AEROSOL, METERED RESPIRATORY (INHALATION) at 14:40

## 2024-04-30 RX ADMIN — MAGNESIUM HYDROXIDE 30 ML: 400 SUSPENSION ORAL at 20:24

## 2024-04-30 RX ADMIN — FUROSEMIDE 20 MG: 10 INJECTION, SOLUTION INTRAMUSCULAR; INTRAVENOUS at 08:04

## 2024-04-30 RX ADMIN — SENNOSIDES AND DOCUSATE SODIUM 2 TABLET: 8.6; 5 TABLET ORAL at 20:12

## 2024-04-30 RX ADMIN — POTASSIUM & SODIUM PHOSPHATES POWDER PACK 280-160-250 MG 1 PACKET: 280-160-250 PACK at 05:38

## 2024-04-30 RX ADMIN — FAMOTIDINE 20 MG: 20 TABLET ORAL at 10:06

## 2024-04-30 RX ADMIN — AMPICILLIN SODIUM AND SULBACTAM SODIUM 3 G: 2; 1 INJECTION, POWDER, FOR SOLUTION INTRAMUSCULAR; INTRAVENOUS at 20:14

## 2024-04-30 RX ADMIN — AMPICILLIN SODIUM AND SULBACTAM SODIUM 3 G: 2; 1 INJECTION, POWDER, FOR SOLUTION INTRAMUSCULAR; INTRAVENOUS at 05:32

## 2024-04-30 RX ADMIN — SERTRALINE HYDROCHLORIDE 100 MG: 100 TABLET ORAL at 08:04

## 2024-04-30 ASSESSMENT — ACTIVITIES OF DAILY LIVING (ADL)
ADLS_ACUITY_SCORE: 46
ADLS_ACUITY_SCORE: 45
ADLS_ACUITY_SCORE: 46
ADLS_ACUITY_SCORE: 46
ADLS_ACUITY_SCORE: 45
ADLS_ACUITY_SCORE: 46
ADLS_ACUITY_SCORE: 45
ADLS_ACUITY_SCORE: 46
ADLS_ACUITY_SCORE: 40
ADLS_ACUITY_SCORE: 45
ADLS_ACUITY_SCORE: 46
ADLS_ACUITY_SCORE: 40
ADLS_ACUITY_SCORE: 46
ADLS_ACUITY_SCORE: 40
ADLS_ACUITY_SCORE: 45
ADLS_ACUITY_SCORE: 46
ADLS_ACUITY_SCORE: 45

## 2024-04-30 NOTE — PROGRESS NOTES
"Thoracic Surgery  [facility]  4/29/2024    Subjective:  No acute events overnight. Extubated yesterday. Seen this AM with family at bedside.    Objective  /61   Pulse 89   Temp 99  F (37.2  C) (Oral)   Resp 19   Ht 1.727 m (5' 8\")   Wt 67.7 kg (149 lb 4 oz)   SpO2 97%   BMI 22.69 kg/m      GEN: Alert, oriented   PULM: unlabored breathing on 3L via oxymask  CV: RRR  GI: soft, nontender, nondistended  EXT: appear well perfused  INCISIONS: CDI    Recent Labs   Lab 04/30/24  0445 04/29/24  1041 04/29/24  0523 04/28/24  1931 04/28/24  1730   WBC 12.3*  --  12.8*  --  13.1*   HGB 9.3*  --  9.8*  --  11.0*   MCV 96  --  97  --  97     --  192  --  200   INR  --   --   --   --  1.34*     --  140  --  141   POTASSIUM 3.8  --  3.6  --  3.8   CHLORIDE 103  --  101  --  99   CO2 27  --  29  --  29   BUN 12.8  --  15.4  --  14.5   CR 0.51*  --  0.69  --  0.61*   ANIONGAP 9  --  10  --  13   RAFAELA 8.7*  --  9.1  --  9.5   * 127* 134*   < > 118*   ALBUMIN  --   --   --   --  3.6   PROTTOTAL  --   --   --   --  7.2   BILITOTAL  --   --   --   --  0.8   ALKPHOS  --   --   --   --  169*   ALT  --   --   --   --  17   AST  --   --   --   --  22    < > = values in this interval not displayed.     MRSA (4/28): negative  Respiratory aerobic culture and gram stain (4/28): NGTD, no organisms on stain  Fungal yeast (4/28): NGTD    Imaging: reviewed    Tubes & Drains:   NGT to LIS: 80mL gastric contents    A/P:  68M with SCC of esophagus who is s/p laparoscopic and right thoracoscopic gastroesophagectomy on 4/19 with Dr. Hill. Monitored in SICU overnight POD0 to 1, did very well and transferred to [facility] on 4/20. Esophagram on 4/25 was negative for leak. Concern for possible aspiration with clear liquids led to acute respiratory decline on 4/28 requiring re-intubation and admission to the SICU. He returned to OR for EGD and bronchoscopy where a fair amount of fluid was suctioned " from the esophagus, the tissue appeared healthy with no evidence of leak. On 4/29 and repeat EGD with pyloric botox injection was performed, after which he was extubated.     - Multimodal pain control   - Wean O2 as tolerated  - Daily CXR, aspiration precautions  - Will discuss possibility of NG out today  - Strict NPO, TF to advance, SLP to evaluate   - Continue abx  - Continue to hold PTA plavix, continue lovenox  - Appreciate SICU team care  - OK to tranfer to [facility] when able    Patient seen on rounds with staff, Dr. Juan Carlos Perdomo, PA-C  Thoracic and Foregut Surgery

## 2024-04-30 NOTE — PLAN OF CARE
Major Shift Events:  Patient A&Ox4. Able to make needs known, call light appropriate. Lidocaine patches applied to right ribcage area with pain relief. Strength 3/5 in all extremities. Sinus rhythm, afebrile. HR 80s-90s. 5 liters oxymask. Lungs clear/diminished. Thick, creamy oral secretions. Good cough. NG @ 58 to LIS, 50 ml out total this shift. J-tube @ 20 ml/hr with FWF. Smith present, clear brian urine. Small, loose BM x1. D5 1.2 NS w/K @ 75 ml/hr.     Plan: Monitor respiratory status, continue with bowel regimen, notify primary team of any changes. Possible transfer to floor.     For vital signs and complete assessments, please see documentation flowsheets.

## 2024-04-30 NOTE — PROGRESS NOTES
Major Shift Events: AOX4, neuro intact. Up with assist of 1 and walker. Denies pain. NSR, afebrile. LS clear/diminished 5L oxy mask. Strong productive cough. BM X3 loose small amount. Lasix given large UOP, reyes removed per order. TF increased to 30ml per order. Family at bedside. Pt transferred to , report given to VERÓNICA moran  Plan: Continue POC  For vital signs and complete assessments, please see documentation flowsheets.

## 2024-04-30 NOTE — PROGRESS NOTES
SURGICAL ICU PROGRESS NOTE  04/30/2024        Date of Service (when I saw the patient): 04/30/2024    ASSESSMENT:  Lopez Hogue is a 68 year old male admitted on 4/19/2024. 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 He is POD11 s/p laparoscopic and right thoracoscopic gastroesophagectomy on 4/19 with Dr. Hill. Monitored in SICU overnight POD 0 to 1, did very well and transferred to [facility] on 4/20. The patient continued to struggle with PO intake and Tfs w/ occasional small emesis episodes and O2 requirements. The patient was made NPO on the morning of 4/28. The patient had a rapid response called at 0900 and zosyn started for concern for aspiration pneumonia, and acute respiratory decompensation resulting in a code blue called at 1600 (no arrest, no CPR; CTPE negative). He was intubated on the floor (150 propofol, 100 rocuronium, 2 versed) where gastric fluid noted in posterior pharynx, and the patient was transferred to the SICU. Patient proceeded to OR with thoracic surgery for bronch/egd 4/28. It was noted that the conduit was somewhat dilated with a fair amount of brown fluid that was suctioned out. The mucosa appeared healthy. No ischemia or necrosis. The anastomosis was intact and intra-op esophagram confirmed no evidence of leak. An NGT was placed under fluoro. BAL Gram stain was negative for organisms and only demonstrated PMNs.    Patient was successfully extubated on 4/29 and remains clinically stable.       CHANGES and MAJOR THINGS TODAY:   Okay to transfer to [facility]  Advance Tube Feeds 10 q daily to goal of 50  Lasix 20  Stopping mIVF  Scheduled melatonin  Restarted albuterol per Thoracic  Removed Smith      PLAN:    Neurological:  # Depression  # Acute postoperative pain   - Continue PTA Sertraline  - Monitor neurological status. Delirium prevention and  precautions.   - Scheduled melatonin  - Pain: Acetaminophen / Gabapentin / Methocarbamol / Lidocaine patches  - Sedation: Off      Pulmonary:  # Aspiration Pneumonitis  # Acute hypoxic respiratory failure (Resolved)  # Concern for Pulmonary Atery HTN on CTPE 4/28 (resolved)  # Diffuse bilateral groundglass pulmonary opacities with associated cavitary lesions, likely infectious, CTPE 4/28  - 4/29 Echo largely normal without overt signs of pulmonary artery hypertension  - Continuing Albuterol per Thoracic surgery order set  - Lasix 20mg      Cardiovascular:    #H/o HTN, HLD  #H/o CAD/NSTEMI s/p KAYDEN in 3/2023  #H/o moderate aortic valve stenosis  - Monitor hemodynamic status  - Not currently requiring pressors       GI/Nutrition:    #Esophageal SCC s/p esophagectomy 4/19  #Malnutrition  #J tube placement  #Aspiration  - Diet: NPO for Medical/Clinical Reasons Except for: No Exceptions    - EGD for pylorus botox 4/29  - NGT to LIS (80/24, 20/s)  - J tube okay for meds and tube feeds  - Tube feeds at 30mL/hr (advance to goal increase 10 q daily)      Fluids/Electrolytes:  - Will monitor intake and output  - ICU electrolyte replacement protocol  - mIVF @ 75; UOP 0.7mL/kg/hr 4/30 AM (stopping mIVF today)      Renal:   #No acute concerns  - UOP 0.7mL/kg/hr 4/30 AM (Will continue to monitor intake and output)      Endocrine:  # Stress hyperglycemia   - PTA medications: None  - No management indication. Goal to keep BG < 180 for optimal wound healing       Infectious disease:   # Aspiration Pneumonia  - MRSA negative  - Follow BAL cultures    - Antibiotics:  - Unsayn, to end after 5 days (Start 4/28 -> 5/3)    Positive cultures:  - +1 Klebsiella aerogenes / Streptococcus anginosus / Candida albicans from left lower lung swab      Hematology:    # Anemia of critical illness   - Hemoglobin 9.8 (11). Monitor and trend. Threshold for transfusion if hgb <7.0 or signs/symptoms of hypoperfusion.         Musculoskeletal:   # Weakness  and deconditioning of critical illness   - Physical and occupational therapy consults.      Skin:  # No acute concerns  - dilgent cares to prevent skin breakdown and wound formation.        General Cares/Prophylaxis:    DVT Prophylaxis: Enoxaparin (Lovenox) SQ  GI Prophylaxis: PPI (PTA famotidine)  Restraints: Restraints for medical healing needed: NO      Lines/ tubes/ drains:  - PIV x3  - NG  - J tube  - Reyes (4/28) <- removed    Disposition:  - Surgical ICU -> [facility] [place] [facility]    Patient seen, findings and plan discussed with surgical ICU staff, Dr. SANCHEZ.    Time spent on this Encounter   Billing:  I spent 45 minutes bedside and on the inpatient unit today managing the critical care of Lopez Hogue in relation to the issues listed in this note.      ANITHA SWARTZ MD    ====================================  INTERVAL HISTORY:   Extubated. Reporting hip pain, improved with lidocaine. Avoiding opioids. Doing well. No other acute complaints.      OBJECTIVE:   1. VITAL SIGNS:   Temp:  [97.8  F (36.6  C)-99.9  F (37.7  C)] 99.9  F (37.7  C)  Pulse:  [] 90  Resp:  [13-26] 21  BP: ()/(48-71) 98/49  FiO2 (%):  [40 %-50 %] 40 %  SpO2:  [91 %-98 %] 94 %  Vent Mode: (S) CPAP/PS  (Continuous positive airway pressure with Pressure Support)  FiO2 (%): 40 %  Resp Rate (Set): 16 breaths/min  Tidal Volume (Set, mL): 500 mL  PEEP (cm H2O): 7 cmH2O  Pressure Support (cm H2O): 5 cmH2O  Resp: 21      2. INTAKE/ OUTPUT:   I/O last 3 completed shifts:  In: 3260.85 [I.V.:2315.85; NG/GT:625]  Out: 1022 [Urine:922; Emesis/NG output:100]    3. PHYSICAL EXAMINATION:  General: Alert, in no acute distress  HEENT: Normocephalic, atraumatic  Neuro: AxO x4; following commands briskly, full strength in all 4 extremities  Pulm/Resp: Oxymask 5L  CV: Holosystolic murmur  Abdomen: Soft, non-distended, non-tender, no rebound tenderness or guarding, no masses  : reyes catheter in place, urine yellow and clear  Incisions/Skin: No overt signs  of redness/rashes  MSK/Extremities: No peripheral edema, moving all extremities, peripheral pulses intact, extremities well perfused    4. INVESTIGATIONS:   Arterial Blood Gases   Recent Labs   Lab 04/28/24  1749   PH 7.40   PCO2 55*   PO2 84   HCO3 34*     Complete Blood Count   Recent Labs   Lab 04/30/24  0445 04/29/24  0523 04/28/24  1730 04/28/24  0533   WBC 12.3* 12.8* 13.1* 11.2*   HGB 9.3* 9.8* 11.0* 10.8*    192 200 196     Basic Metabolic Panel  Recent Labs   Lab 04/30/24  0445 04/29/24  1041 04/29/24  0523 04/28/24  1931 04/28/24  1730 04/28/24  1640 04/28/24  0533     --  140  --  141  --  140   POTASSIUM 3.8  --  3.6  --  3.8  --  4.1   CHLORIDE 103  --  101  --  99  --  98   CO2 27  --  29  --  29  --  31*   BUN 12.8  --  15.4  --  14.5  --  14.8   CR 0.51*  --  0.69  --  0.61*  --  0.60*   * 127* 134* 128* 118*   < > 130*    < > = values in this interval not displayed.     Liver Function Tests  Recent Labs   Lab 04/28/24  1730   AST 22   ALT 17   ALKPHOS 169*   BILITOTAL 0.8   ALBUMIN 3.6   INR 1.34*     Pancreatic Enzymes  No lab results found in last 7 days.  Coagulation Profile  Recent Labs   Lab 04/28/24  1730   INR 1.34*   PTT 36         5. RADIOLOGY:   Recent Results (from the past 24 hour(s))   XR Chest Port 1 View    Narrative    Portable chest    INDICATION: Post esophagectomy and concern for aspiration.    COMPARISON: 4/28/2024    FINDINGS: Heart size normal. No new opacities in the lungs to indicate  obvious aspiration pneumonitis or other consolidation. Endotracheal  tube again in the mid thoracic trachea. Atherosclerotic calcification  at the aortic knob. The enteric tube side hole is at the mid thorax  level and the tip is several centimeters above the diaphragmatic  hiatus consistent with esophagectomy and gastric pull-through no  ectopic air.      Impression    IMPRESSION: No evidence of interval development of pneumonitis or  ectopic air collection  postesophagectomy. Atherosclerosis.    JOHN CASTILLO MD         SYSTEM ID:  [id number]   Echo Complete   Result Value    LVEF  55-60%    [facility]    [id number]  [id number]  [id number]  [id number]^JHONY ROSALES^KRISSY     [facility],[place]  Echocardiography Laboratory  [address] [place] [address]     Name: RAMIRO ZIMMER  MRN: [id number]  : 1955  Study Date: 2024 08:02 AM  Age: 68 yrs  Gender: Male  Patient Location: formerly [facility]  Reason For Study: Cor Pulmonale  Ordering Physician: KRISSY DYER  Performed By: Teressa Gilmore     BSA: 1.8 m2  Height: 68 in  Weight: 145 lb  BP: 87/67 mmHg  ______________________________________________________________________________  Procedure  Complete Portable Echo Adult.  ______________________________________________________________________________  Interpretation Summary  Global and regional left ventricular function is normal with an EF of 55-60%.  The right ventricle is normal size. Global right ventricular function is  normal.  Mild to moderate calcific aortic stenosis is present (peak velocity 3.0 m/s,  mean gradient 19 mmHg, DEVAN 1.5 cm2, DI 0.4).  No pericardial effusion is present.  Compared to previous study dated 2024, gradient across the aortic valve  is lower. No significant change in biventricular function.  ______________________________________________________________________________  Left Ventricle  Left ventricular wall thickness is normal. Left ventricular size is normal.  Global and regional left ventricular function is normal with an EF of 55-60%.  Left ventricular diastolic function is not assessable.     Right Ventricle  The right ventricle is normal size. Global right ventricular function is  normal.     Atria  The atria cannot be assessed.     Mitral Valve  Mild mitral annular calcification is present. Trace mitral insufficiency is  present.     Aortic Valve  Moderate aortic valve calcification is  present. Mild to moderate aortic  stenosis is present. Peak velocity 3.0 m/s, mean gradient 19 mmHg, DEVAN 1.5  cm2, DI 0.4.     Tricuspid Valve  The tricuspid valve is normal. Trace tricuspid insufficiency is present. The  peak velocity of the tricuspid regurgitant jet is not obtainable. Pulmonary  artery systolic pressure cannot be assessed.     Pulmonic Valve  The valve leaflets are not well visualized. On Doppler interrogation, there is  no significant stenosis or regurgitation.     Vessels  The aorta root is normal. The thoracic aorta is normal. The pulmonary artery  and bifurcation cannot be assessed. IVC diameter and respiratory changes fall  into an intermediate range suggesting an RA pressure of 8 mmHg.     Pericardium  No pericardial effusion is present.     Attestation  I have personally viewed the imaging and agree with the interpretation and  report as documented by the fellow, Rober Marrero, and/or edited by me.  ______________________________________________________________________________  MMode/2D Measurements & Calculations  IVSd: 1.4 cm  LVIDd: 2.6 cm  LVIDs: 2.0 cm  LVPWd: 1.3 cm  FS: 22.5 %  LV mass(C)d: 104.1 grams  LV mass(C)dI: 58.4 grams/m2  LVOT diam: 2.1 cm  LVOT area: 3.6 cm2  RWT: 1.0  TAPSE: 1.6 cm     Doppler Measurements & Calculations  Ao V2 max: 296.4 cm/sec  Ao max P.2 mmHg  Ao V2 mean: 203.4 cm/sec  Ao mean P.6 mmHg  Ao V2 VTI: 58.7 cm  DEVAN(I,D): 1.5 cm2  DEVAN(V,D): 1.4 cm2  LV V1 max P.3 mmHg  LV V1 max: 114.8 cm/sec  LV V1 VTI: 24.9 cm  SV(LVOT): 88.7 ml  SI(LVOT): 49.7 ml/m2  AV Aman Ratio (DI): 0.39  DEVAN Index (cm2/m2): 0.85  RV S Aman: 11.1 cm/sec     ______________________________________________________________________________  Report approved by: Bree Recio 2024 10:36 AM         XR Abdomen Port 1 View    Narrative    EXAMINATION:  XR ABDOMEN PORT 1 VIEW 2024 9:55 AM     COMPARISON: 2024.    HISTORY: S/p egd.  Evaluate ngt  position.    TECHNIQUE: Frontal view of the abdomen.    FINDINGS: Enteric tube tip projects over the lower thorax just above  the level of the hemidiaphragm. Surgical drain tip remains in the mid  pelvis. Nonobstructive bowel gas pattern. No pneumatosis or portal  venous gas.  The lung bases are unremarkable.       Impression    IMPRESSION: Enteric tube tip projects over the lower mediastinum.    I have personally reviewed the examination and initial interpretation  and I agree with the findings.    JOHN CASTILLO MD         SYSTEM ID:  [id number]   XR Abdomen Port 1 View    Narrative    Exam: Abdominal radiograph 4/29/2024 11:17 AM    History: NGT placement s/p EGD.    Comparison: 4/29/2024.    Findings: Frontal supine view(s) of the abdomen. Enteric tube tip  projects over the inferior mediastinum. Postsurgical changes of  presumed esophagectomy with multiple surgical clips seen overlying the  mediastinum. No air filled dilated bowel loop. Mild bibasilar streaky  opacities, representing atelectasis/pulmonary edema.       Impression    Impression: Enteric tube tip projects over the inferior mediastinum.  Postsurgical changes of presumed esophagectomy with multiple surgical  clips seen overlying the mediastinum.    I have personally reviewed the examination and initial interpretation  and I agree with the findings.    MALIK WILLETT MD         SYSTEM ID:  [id number]   XR Chest Port 1 View    Narrative    Exam: XR CHEST PORT 1 VIEW, 4/29/2024 2:58 PM    Indication: Post extubation tube placement verification    Comparison: Same-day radiograph at 07:13, multiple priors    Findings:   Portable AP view of the chest at 45 degrees. Interval removal of the  endotracheal tube. Gastric tube tip courses below the level of the  diaphragm. Cardiomediastinal silhouette is within normal limits.  Aortic arch atherosclerotic calcifications. Triangular retrocardiac  opacity. Increased left greater than right basilar streaky  opacities.  No ectopic air. No pleural effusions. Unchanged elevation of the left  hemidiaphragm. Right posterior rib deformity.      Impression    Impression: Postsurgical changes of gastroesophagectomy with increased  left greater than right bibasilar opacities, which may represent  pulmonary edema and/or atelectasis, versus infection.  1.  Aortic arch atherosclerosis.    I have personally reviewed the examination and initial interpretation  and I agree with the findings.    JOHN CASTILLO MD         SYSTEM ID:  [id number]       =========================================

## 2024-04-30 NOTE — PROGRESS NOTES
"CLINICAL NUTRITION SERVICES - BRIEF NOTE     Reason for RD note: Advance EN via Jtube    New Findings/Chart Review:  Pt transferred to ICU following aspiration event, intubated 4/28, extubated 4/29  NG in place to LIS  EGD with Botox to pylorus yesterday  EN held 4/28, resumed 4/29: Novasource Renal @ 20 ml/hr via Jtube    Labs and meds reviewed.  BM this morning, receiving scheduled bowel regimen    Interventions:  Advance EN slowly to goal: Novasource Renal (or equivalent) at 50 ml/hr (1200 ml) provides 2400 kcal (35 kcal/kg), 109 g pro (1.5 g/kg), 220 g CHO, 860 ml free water, and 0 g fiber daily.    Increase to 30ml/hr, advance by 10ml q day to goal    Future/Additional Recommendations:  Monitor for ability to transition back to 16 hour cycle    Nutrition will continue to follow per protocol.    Alice Loera, RD, LD, CNSC  Available on [employer] - can search by name or 4E Clinical Dietitian  **Clinical Nutrition is no longer available via pager  Weekend/Holiday RD - \"Weekend Clinical Dietitian\" on [employer]    "

## 2024-05-01 ENCOUNTER — APPOINTMENT (OUTPATIENT)
Dept: GENERAL RADIOLOGY | Facility: CLINIC | Age: 69
DRG: 326 | End: 2024-05-01
Attending: THORACIC SURGERY (CARDIOTHORACIC VASCULAR SURGERY)
Payer: COMMERCIAL

## 2024-05-01 ENCOUNTER — APPOINTMENT (OUTPATIENT)
Dept: OCCUPATIONAL THERAPY | Facility: CLINIC | Age: 69
DRG: 326 | End: 2024-05-01
Attending: THORACIC SURGERY (CARDIOTHORACIC VASCULAR SURGERY)
Payer: COMMERCIAL

## 2024-05-01 ENCOUNTER — APPOINTMENT (OUTPATIENT)
Dept: SPEECH THERAPY | Facility: CLINIC | Age: 69
DRG: 326 | End: 2024-05-01
Attending: THORACIC SURGERY (CARDIOTHORACIC VASCULAR SURGERY)
Payer: COMMERCIAL

## 2024-05-01 LAB
ANION GAP SERPL CALCULATED.3IONS-SCNC: 11 MMOL/L (ref 7–15)
BUN SERPL-MCNC: 14.6 MG/DL (ref 8–23)
CALCIUM SERPL-MCNC: 8.7 MG/DL (ref 8.8–10.2)
CHLORIDE SERPL-SCNC: 102 MMOL/L (ref 98–107)
CREAT SERPL-MCNC: 0.56 MG/DL (ref 0.67–1.17)
DEPRECATED HCO3 PLAS-SCNC: 28 MMOL/L (ref 22–29)
EGFRCR SERPLBLD CKD-EPI 2021: >90 ML/MIN/1.73M2
ERYTHROCYTE [DISTWIDTH] IN BLOOD BY AUTOMATED COUNT: 14.1 % (ref 10–15)
GLUCOSE SERPL-MCNC: 114 MG/DL (ref 70–99)
HCT VFR BLD AUTO: 27.5 % (ref 40–53)
HGB BLD-MCNC: 9.1 G/DL (ref 13.3–17.7)
HOLD SPECIMEN: NORMAL
MAGNESIUM SERPL-MCNC: 2.2 MG/DL (ref 1.7–2.3)
MCH RBC QN AUTO: 31.8 PG (ref 26.5–33)
MCHC RBC AUTO-ENTMCNC: 33.1 G/DL (ref 31.5–36.5)
MCV RBC AUTO: 96 FL (ref 78–100)
PHOSPHATE SERPL-MCNC: 3 MG/DL (ref 2.5–4.5)
PLATELET # BLD AUTO: 197 10E3/UL (ref 150–450)
POTASSIUM SERPL-SCNC: 3.3 MMOL/L (ref 3.4–5.3)
POTASSIUM SERPL-SCNC: 4 MMOL/L (ref 3.4–5.3)
RBC # BLD AUTO: 2.86 10E6/UL (ref 4.4–5.9)
SODIUM SERPL-SCNC: 141 MMOL/L (ref 135–145)
WBC # BLD AUTO: 13.5 10E3/UL (ref 4–11)

## 2024-05-01 PROCEDURE — 120N000003 HC R&B IMCU UMMC

## 2024-05-01 PROCEDURE — 84100 ASSAY OF PHOSPHORUS: CPT | Performed by: THORACIC SURGERY (CARDIOTHORACIC VASCULAR SURGERY)

## 2024-05-01 PROCEDURE — 250N000013 HC RX MED GY IP 250 OP 250 PS 637: Performed by: STUDENT IN AN ORGANIZED HEALTH CARE EDUCATION/TRAINING PROGRAM

## 2024-05-01 PROCEDURE — 250N000013 HC RX MED GY IP 250 OP 250 PS 637: Performed by: THORACIC SURGERY (CARDIOTHORACIC VASCULAR SURGERY)

## 2024-05-01 PROCEDURE — 250N000013 HC RX MED GY IP 250 OP 250 PS 637

## 2024-05-01 PROCEDURE — 97535 SELF CARE MNGMENT TRAINING: CPT | Mod: GO | Performed by: OCCUPATIONAL THERAPIST

## 2024-05-01 PROCEDURE — 36415 COLL VENOUS BLD VENIPUNCTURE: CPT | Performed by: THORACIC SURGERY (CARDIOTHORACIC VASCULAR SURGERY)

## 2024-05-01 PROCEDURE — 250N000013 HC RX MED GY IP 250 OP 250 PS 637: Performed by: PHYSICIAN ASSISTANT

## 2024-05-01 PROCEDURE — 36415 COLL VENOUS BLD VENIPUNCTURE: CPT

## 2024-05-01 PROCEDURE — 80048 BASIC METABOLIC PNL TOTAL CA: CPT

## 2024-05-01 PROCEDURE — 84132 ASSAY OF SERUM POTASSIUM: CPT | Performed by: THORACIC SURGERY (CARDIOTHORACIC VASCULAR SURGERY)

## 2024-05-01 PROCEDURE — 71045 X-RAY EXAM CHEST 1 VIEW: CPT

## 2024-05-01 PROCEDURE — 83735 ASSAY OF MAGNESIUM: CPT | Performed by: THORACIC SURGERY (CARDIOTHORACIC VASCULAR SURGERY)

## 2024-05-01 PROCEDURE — 999N000157 HC STATISTIC RCP TIME EA 10 MIN

## 2024-05-01 PROCEDURE — 85027 COMPLETE CBC AUTOMATED: CPT

## 2024-05-01 PROCEDURE — 92526 ORAL FUNCTION THERAPY: CPT | Mod: GN

## 2024-05-01 PROCEDURE — 250N000011 HC RX IP 250 OP 636

## 2024-05-01 PROCEDURE — 71045 X-RAY EXAM CHEST 1 VIEW: CPT | Mod: 26 | Performed by: RADIOLOGY

## 2024-05-01 PROCEDURE — 250N000011 HC RX IP 250 OP 636: Performed by: PHYSICIAN ASSISTANT

## 2024-05-01 RX ORDER — POTASSIUM CHLORIDE 20MEQ/15ML
40 LIQUID (ML) ORAL ONCE
Status: COMPLETED | OUTPATIENT
Start: 2024-05-01 | End: 2024-05-01

## 2024-05-01 RX ADMIN — AMPICILLIN SODIUM AND SULBACTAM SODIUM 3 G: 2; 1 INJECTION, POWDER, FOR SOLUTION INTRAMUSCULAR; INTRAVENOUS at 08:09

## 2024-05-01 RX ADMIN — MAGNESIUM HYDROXIDE 30 ML: 400 SUSPENSION ORAL at 20:40

## 2024-05-01 RX ADMIN — AMPICILLIN SODIUM AND SULBACTAM SODIUM 3 G: 2; 1 INJECTION, POWDER, FOR SOLUTION INTRAMUSCULAR; INTRAVENOUS at 13:49

## 2024-05-01 RX ADMIN — ACETAMINOPHEN 975 MG: 325 TABLET, FILM COATED ORAL at 20:40

## 2024-05-01 RX ADMIN — HYDROXYZINE HYDROCHLORIDE 25 MG: 25 TABLET, FILM COATED ORAL at 20:42

## 2024-05-01 RX ADMIN — GUAIFENESIN 200 MG: 100 SOLUTION ORAL at 02:01

## 2024-05-01 RX ADMIN — GUAIFENESIN 200 MG: 100 SOLUTION ORAL at 08:26

## 2024-05-01 RX ADMIN — AMPICILLIN SODIUM AND SULBACTAM SODIUM 3 G: 2; 1 INJECTION, POWDER, FOR SOLUTION INTRAMUSCULAR; INTRAVENOUS at 02:01

## 2024-05-01 RX ADMIN — GABAPENTIN 100 MG: 100 CAPSULE ORAL at 21:11

## 2024-05-01 RX ADMIN — ALBUTEROL SULFATE 4 PUFF: 90 AEROSOL, METERED RESPIRATORY (INHALATION) at 08:09

## 2024-05-01 RX ADMIN — FAMOTIDINE 20 MG: 20 TABLET ORAL at 20:40

## 2024-05-01 RX ADMIN — ACETAMINOPHEN 975 MG: 325 TABLET, FILM COATED ORAL at 03:42

## 2024-05-01 RX ADMIN — GUAIFENESIN 200 MG: 100 SOLUTION ORAL at 13:53

## 2024-05-01 RX ADMIN — METHOCARBAMOL 500 MG: 500 TABLET ORAL at 17:06

## 2024-05-01 RX ADMIN — Medication 15 ML: at 08:09

## 2024-05-01 RX ADMIN — POTASSIUM CHLORIDE ORAL 40 MEQ: 1.5 SOLUTION ORAL at 08:09

## 2024-05-01 RX ADMIN — HYDROXYZINE HYDROCHLORIDE 25 MG: 25 TABLET, FILM COATED ORAL at 13:25

## 2024-05-01 RX ADMIN — SERTRALINE HYDROCHLORIDE 100 MG: 100 TABLET ORAL at 08:09

## 2024-05-01 RX ADMIN — AMPICILLIN SODIUM AND SULBACTAM SODIUM 3 G: 2; 1 INJECTION, POWDER, FOR SOLUTION INTRAMUSCULAR; INTRAVENOUS at 20:41

## 2024-05-01 RX ADMIN — LIDOCAINE 4% 2 PATCH: 40 PATCH TOPICAL at 21:11

## 2024-05-01 RX ADMIN — POLYETHYLENE GLYCOL 3350 17 G: 17 POWDER, FOR SOLUTION ORAL at 08:09

## 2024-05-01 RX ADMIN — ENOXAPARIN SODIUM 40 MG: 40 INJECTION SUBCUTANEOUS at 20:41

## 2024-05-01 RX ADMIN — FAMOTIDINE 20 MG: 20 TABLET ORAL at 08:09

## 2024-05-01 RX ADMIN — METHOCARBAMOL 500 MG: 500 TABLET ORAL at 21:11

## 2024-05-01 RX ADMIN — Medication 3 MG: at 20:40

## 2024-05-01 RX ADMIN — SENNOSIDES AND DOCUSATE SODIUM 2 TABLET: 8.6; 5 TABLET ORAL at 20:40

## 2024-05-01 RX ADMIN — METHOCARBAMOL 500 MG: 500 TABLET ORAL at 11:00

## 2024-05-01 RX ADMIN — GUAIFENESIN 200 MG: 100 SOLUTION ORAL at 20:40

## 2024-05-01 RX ADMIN — ALBUTEROL SULFATE 4 PUFF: 90 AEROSOL, METERED RESPIRATORY (INHALATION) at 12:15

## 2024-05-01 RX ADMIN — METHOCARBAMOL 500 MG: 500 TABLET ORAL at 03:42

## 2024-05-01 RX ADMIN — POLYETHYLENE GLYCOL 3350 17 G: 17 POWDER, FOR SOLUTION ORAL at 20:41

## 2024-05-01 RX ADMIN — ACETAMINOPHEN 975 MG: 325 TABLET, FILM COATED ORAL at 12:15

## 2024-05-01 ASSESSMENT — ACTIVITIES OF DAILY LIVING (ADL)
ADLS_ACUITY_SCORE: 36
ADLS_ACUITY_SCORE: 34
ADLS_ACUITY_SCORE: 36
ADLS_ACUITY_SCORE: 33
ADLS_ACUITY_SCORE: 36

## 2024-05-01 NOTE — PROGRESS NOTES
"Thoracic Surgery  [facility]    Subjective:  No acute events overnight. Seen this AM with family at bedside.    Objective  /74 (BP Location: Right arm)   Pulse 96   Temp 97.7  F (36.5  C) (Oral)   Resp 18   Ht 1.727 m (5' 8\")   Wt 66.3 kg (146 lb 2.6 oz)   SpO2 95%   BMI 22.22 kg/m      GEN: Alert, oriented   PULM: unlabored breathing on 3L via oxymask  CV: RRR  GI: soft, nontender, nondistended  EXT: appear well perfused  INCISIONS: CDI    Recent Labs   Lab 05/01/24  1008 05/01/24  0415 04/30/24  0445 04/29/24  1041 04/29/24  0523 04/28/24  1931 04/28/24  1730   WBC  --  13.5* 12.3*  --  12.8*  --  13.1*   HGB  --  9.1* 9.3*  --  9.8*  --  11.0*   MCV  --  96 96  --  97  --  97   PLT  --  197 182  --  192  --  200   INR  --   --   --   --   --   --  1.34*   NA  --  141 139  --  140  --  141   POTASSIUM 4.0 3.3* 3.8  --  3.6  --  3.8   CHLORIDE  --  102 103  --  101  --  99   CO2  --  28 27  --  29  --  29   BUN  --  14.6 12.8  --  15.4  --  14.5   CR  --  0.56* 0.51*  --  0.69  --  0.61*   ANIONGAP  --  11 9  --  10  --  13   RAFAELA  --  8.7* 8.7*  --  9.1  --  9.5   GLC  --  114* 120* 127* 134*   < > 118*   ALBUMIN  --   --   --   --   --   --  3.6   PROTTOTAL  --   --   --   --   --   --  7.2   BILITOTAL  --   --   --   --   --   --  0.8   ALKPHOS  --   --   --   --   --   --  169*   ALT  --   --   --   --   --   --  17   AST  --   --   --   --   --   --  22    < > = values in this interval not displayed.     MRSA (4/28): negative  Respiratory aerobic culture and gram stain (4/28): NGTD, no organisms on stain  Fungal yeast (4/28): NGTD    Imaging: reviewed    Tubes & Drains: Feeding jejunostomy    A/P:  68M with SCC of esophagus who is s/p laparoscopic and right thoracoscopic gastroesophagectomy on 4/19 with Dr. Hill. Monitored in SICU overnight POD0 to 1, did very well and transferred to [facility] on 4/20. Esophagram on 4/25 was negative for leak. Concern for possible " aspiration with clear liquids led to acute respiratory decline on 4/28 requiring re-intubation and admission to the SICU. He returned to OR for EGD and bronchoscopy where a fair amount of fluid was suctioned from the esophagus, the tissue appeared healthy with no evidence of leak. On 4/29 and repeat EGD with pyloric botox injection was performed, after which he was extubated.     - Multimodal pain control   - Wean O2 as tolerated  - Daily CXR, aspiration precautions  - Strict NPO for now, TF to advance to goal today  - Continue abx  - Continue to hold PTA plavix, continue lovenox  - Likely dispo in 2-3 days    D/w staff    Dusty Mattson PA-C

## 2024-05-01 NOTE — PROGRESS NOTES
CLINICAL NUTRITION SERVICES - BRIEF NOTE  For last full RD assessment, see note dated 4/26/24    NEW FINDINGS   - Received call from thoracic team FRAN. Plan to increase Lens's TF rate to goal of 50 mL/hr. If tolerates well, will transition pt back to cyclic feeds x 16 hours per evening tomorrow (RD to follow/update orders if becomes POC).     INTERVENTIONS  Implementation  Collaboration with other providers - Thoracic team FRAN, bedside RN   Enteral Nutrition - Increase TF to goal (continuous), likely cycle tomorrow.     Monitoring/Evaluation  Will continue to monitor and evaluate per protocol.    Chandler Pugh, MS, RDN, LD, CNSC  Available by [employer] or phone   Vocera: M-F (7:00-3:30)  6B Clinical Dietitian  or 2 Obs Clinical Dietitian  Weekend/Holiday (7:00-3:30) - Weekend Clinical Dietitian   6B RD desk: [phone]       **Clinical Dietitians are no longer available by pager.

## 2024-05-01 NOTE — PROGRESS NOTES
Pt intubated for resp dx. Following RRT. 8.0 ETT 23 @ lips. Bilateral BS auscultated. Waiting for chest xray for confirmation.

## 2024-05-01 NOTE — PLAN OF CARE
Goal Outcome Evaluation:       Neuro: A&Ox4.   Cardiac: SR. VSS.   Respiratory: Sating 96% on 2L oxymask  GI/: 2 loose BM   Diet/appetite: NPO. Tube feeds currently infusing at goal of 50ml/hr.   Activity:  Stand by assist.   Pain: At acceptable level on current regimen.   Skin: No new deficits noted. Preventative mepilex on coccyx   LDA's: 2 pivs saline locked        Plan: Speech continuing to see. Continue to wean oxygen.

## 2024-05-01 NOTE — PROGRESS NOTES
Neuro: A&Ox4.   Cardiac: SR. VSS.   Respiratory: Sating >95 on Oxymask 5L  GI/: Adequate urine output. BM X2  Diet/appetite: NPO, on TF, at 30ml/hr, increasing by 10ml daily per nutrition  Activity:  Assist of 1, up in room X2  Pain: At acceptable level on current regimen.   Skin: No new deficits noted. Lap sites and old CT sites WDL  LDA's: BL PIVs, PEG tube     Plan: Continue with POC. Notify primary team with changes.

## 2024-05-01 NOTE — PLAN OF CARE
Goal Outcome Evaluation:  Transfer  Transferred from:   Via:wheelchair   Reason for transfer: Pt appropriate for 6B- improved patient condition  Family: Aware of transfer  Belongings: Received with pt  Chart: Received with pt  Medications: Meds received from old unit with pt  Code Status verified on armband: yes  2 RN Skin Assessment Completed By: Anna RN   Med rec completed: no  Suction/Ambu bag/Flowmeter at bedside: yes    Report received from: VERÓNICA Solis   Pt status:       Neuro: A&Ox4.   Cardiac: SR. VSS.   Respiratory: Sating 96% on 5L oxymask  GI/: dtv. Smith removed around 1500. No BM  Diet/appetite: NPO. Tube feeds currently infusing at 30ml/hr. Will advance 5/1  Activity:  Stand by assist.   Pain: At acceptable level on current regimen.   Skin: No new deficits noted. Preventative mepilex on coccyx   LDA's: 3 pivs saline locked     Plan: Continue with POC. Notify primary team with changes.

## 2024-05-02 ENCOUNTER — APPOINTMENT (OUTPATIENT)
Dept: SPEECH THERAPY | Facility: CLINIC | Age: 69
DRG: 326 | End: 2024-05-02
Attending: THORACIC SURGERY (CARDIOTHORACIC VASCULAR SURGERY)
Payer: COMMERCIAL

## 2024-05-02 ENCOUNTER — APPOINTMENT (OUTPATIENT)
Dept: OCCUPATIONAL THERAPY | Facility: CLINIC | Age: 69
DRG: 326 | End: 2024-05-02
Attending: THORACIC SURGERY (CARDIOTHORACIC VASCULAR SURGERY)
Payer: COMMERCIAL

## 2024-05-02 ENCOUNTER — APPOINTMENT (OUTPATIENT)
Dept: GENERAL RADIOLOGY | Facility: CLINIC | Age: 69
DRG: 326 | End: 2024-05-02
Payer: COMMERCIAL

## 2024-05-02 ENCOUNTER — APPOINTMENT (OUTPATIENT)
Dept: GENERAL RADIOLOGY | Facility: CLINIC | Age: 69
DRG: 326 | End: 2024-05-02
Attending: THORACIC SURGERY (CARDIOTHORACIC VASCULAR SURGERY)
Payer: COMMERCIAL

## 2024-05-02 LAB
ANION GAP SERPL CALCULATED.3IONS-SCNC: 10 MMOL/L (ref 7–15)
BUN SERPL-MCNC: 17 MG/DL (ref 8–23)
CALCIUM SERPL-MCNC: 8.7 MG/DL (ref 8.8–10.2)
CHLORIDE SERPL-SCNC: 105 MMOL/L (ref 98–107)
CREAT SERPL-MCNC: 0.45 MG/DL (ref 0.67–1.17)
DEPRECATED HCO3 PLAS-SCNC: 24 MMOL/L (ref 22–29)
EGFRCR SERPLBLD CKD-EPI 2021: >90 ML/MIN/1.73M2
ERYTHROCYTE [DISTWIDTH] IN BLOOD BY AUTOMATED COUNT: 14.3 % (ref 10–15)
GLUCOSE SERPL-MCNC: 118 MG/DL (ref 70–99)
HCT VFR BLD AUTO: 29 % (ref 40–53)
HGB BLD-MCNC: 9.4 G/DL (ref 13.3–17.7)
MAGNESIUM SERPL-MCNC: 2.1 MG/DL (ref 1.7–2.3)
MCH RBC QN AUTO: 31.4 PG (ref 26.5–33)
MCHC RBC AUTO-ENTMCNC: 32.4 G/DL (ref 31.5–36.5)
MCV RBC AUTO: 97 FL (ref 78–100)
PHOSPHATE SERPL-MCNC: 2.6 MG/DL (ref 2.5–4.5)
PLATELET # BLD AUTO: 204 10E3/UL (ref 150–450)
POTASSIUM SERPL-SCNC: 3.6 MMOL/L (ref 3.4–5.3)
RBC # BLD AUTO: 2.99 10E6/UL (ref 4.4–5.9)
SODIUM SERPL-SCNC: 139 MMOL/L (ref 135–145)
WBC # BLD AUTO: 10.8 10E3/UL (ref 4–11)

## 2024-05-02 PROCEDURE — 250N000013 HC RX MED GY IP 250 OP 250 PS 637

## 2024-05-02 PROCEDURE — 120N000003 HC R&B IMCU UMMC

## 2024-05-02 PROCEDURE — 250N000013 HC RX MED GY IP 250 OP 250 PS 637: Performed by: STUDENT IN AN ORGANIZED HEALTH CARE EDUCATION/TRAINING PROGRAM

## 2024-05-02 PROCEDURE — 84100 ASSAY OF PHOSPHORUS: CPT | Performed by: THORACIC SURGERY (CARDIOTHORACIC VASCULAR SURGERY)

## 2024-05-02 PROCEDURE — 999N000248 HC STATISTIC IV INSERT WITH US BY RN

## 2024-05-02 PROCEDURE — 71045 X-RAY EXAM CHEST 1 VIEW: CPT

## 2024-05-02 PROCEDURE — 71045 X-RAY EXAM CHEST 1 VIEW: CPT | Mod: 26 | Performed by: RADIOLOGY

## 2024-05-02 PROCEDURE — 250N000013 HC RX MED GY IP 250 OP 250 PS 637: Performed by: THORACIC SURGERY (CARDIOTHORACIC VASCULAR SURGERY)

## 2024-05-02 PROCEDURE — 250N000011 HC RX IP 250 OP 636

## 2024-05-02 PROCEDURE — 92526 ORAL FUNCTION THERAPY: CPT | Mod: GN

## 2024-05-02 PROCEDURE — 85027 COMPLETE CBC AUTOMATED: CPT

## 2024-05-02 PROCEDURE — 83735 ASSAY OF MAGNESIUM: CPT | Performed by: THORACIC SURGERY (CARDIOTHORACIC VASCULAR SURGERY)

## 2024-05-02 PROCEDURE — 97530 THERAPEUTIC ACTIVITIES: CPT | Mod: GO

## 2024-05-02 PROCEDURE — 250N000011 HC RX IP 250 OP 636: Performed by: STUDENT IN AN ORGANIZED HEALTH CARE EDUCATION/TRAINING PROGRAM

## 2024-05-02 PROCEDURE — 36415 COLL VENOUS BLD VENIPUNCTURE: CPT

## 2024-05-02 PROCEDURE — 71045 X-RAY EXAM CHEST 1 VIEW: CPT | Mod: 77

## 2024-05-02 PROCEDURE — 80048 BASIC METABOLIC PNL TOTAL CA: CPT

## 2024-05-02 PROCEDURE — 250N000011 HC RX IP 250 OP 636: Performed by: PHYSICIAN ASSISTANT

## 2024-05-02 PROCEDURE — 250N000013 HC RX MED GY IP 250 OP 250 PS 637: Performed by: PHYSICIAN ASSISTANT

## 2024-05-02 RX ORDER — ACETAMINOPHEN 325 MG/1
325 TABLET ORAL ONCE
Status: COMPLETED | OUTPATIENT
Start: 2024-05-02 | End: 2024-05-02

## 2024-05-02 RX ORDER — POTASSIUM CHLORIDE 20MEQ/15ML
20 LIQUID (ML) ORAL ONCE
Status: COMPLETED | OUTPATIENT
Start: 2024-05-02 | End: 2024-05-02

## 2024-05-02 RX ORDER — AMOXICILLIN AND CLAVULANATE POTASSIUM 400; 57 MG/5ML; MG/5ML
875 POWDER, FOR SUSPENSION ORAL 2 TIMES DAILY
Qty: 43.76 ML | Refills: 0 | Status: DISCONTINUED | OUTPATIENT
Start: 2024-05-02 | End: 2024-05-02

## 2024-05-02 RX ORDER — LEVOFLOXACIN 250 MG/1
750 TABLET, FILM COATED ORAL EVERY 24 HOURS
Status: DISCONTINUED | OUTPATIENT
Start: 2024-05-02 | End: 2024-05-03

## 2024-05-02 RX ORDER — ROSUVASTATIN CALCIUM 20 MG/1
40 TABLET, COATED ORAL DAILY
Status: DISCONTINUED | OUTPATIENT
Start: 2024-05-02 | End: 2024-05-17 | Stop reason: HOSPADM

## 2024-05-02 RX ADMIN — HYDROXYZINE HYDROCHLORIDE 25 MG: 25 TABLET, FILM COATED ORAL at 20:38

## 2024-05-02 RX ADMIN — LIDOCAINE 4% 2 PATCH: 40 PATCH TOPICAL at 20:38

## 2024-05-02 RX ADMIN — ROSUVASTATIN CALCIUM 40 MG: 20 TABLET, FILM COATED ORAL at 08:28

## 2024-05-02 RX ADMIN — METHOCARBAMOL 500 MG: 500 TABLET ORAL at 09:42

## 2024-05-02 RX ADMIN — METHOCARBAMOL 500 MG: 500 TABLET ORAL at 03:03

## 2024-05-02 RX ADMIN — AMOXICILLIN AND CLAVULANATE POTASSIUM 875 MG: 400; 57 POWDER, FOR SUSPENSION ORAL at 08:51

## 2024-05-02 RX ADMIN — ACETAMINOPHEN 975 MG: 325 TABLET, FILM COATED ORAL at 20:38

## 2024-05-02 RX ADMIN — ALBUTEROL SULFATE 4 PUFF: 90 AEROSOL, METERED RESPIRATORY (INHALATION) at 08:29

## 2024-05-02 RX ADMIN — GABAPENTIN 100 MG: 100 CAPSULE ORAL at 21:34

## 2024-05-02 RX ADMIN — ACETAMINOPHEN 975 MG: 325 TABLET, FILM COATED ORAL at 12:15

## 2024-05-02 RX ADMIN — Medication 15 ML: at 08:28

## 2024-05-02 RX ADMIN — ONDANSETRON 4 MG: 2 INJECTION INTRAMUSCULAR; INTRAVENOUS at 19:11

## 2024-05-02 RX ADMIN — FAMOTIDINE 20 MG: 20 TABLET ORAL at 08:28

## 2024-05-02 RX ADMIN — GUAIFENESIN 200 MG: 100 SOLUTION ORAL at 20:42

## 2024-05-02 RX ADMIN — ACETAMINOPHEN 975 MG: 325 TABLET, FILM COATED ORAL at 03:03

## 2024-05-02 RX ADMIN — HYDROXYZINE HYDROCHLORIDE 25 MG: 25 TABLET, FILM COATED ORAL at 09:55

## 2024-05-02 RX ADMIN — POTASSIUM & SODIUM PHOSPHATES POWDER PACK 280-160-250 MG 1 PACKET: 280-160-250 PACK at 08:28

## 2024-05-02 RX ADMIN — Medication 3 MG: at 20:38

## 2024-05-02 RX ADMIN — HYDROXYZINE HYDROCHLORIDE 25 MG: 25 TABLET, FILM COATED ORAL at 03:13

## 2024-05-02 RX ADMIN — POTASSIUM CHLORIDE 20 MEQ: 1.5 SOLUTION ORAL at 06:12

## 2024-05-02 RX ADMIN — METHOCARBAMOL 500 MG: 500 TABLET ORAL at 21:34

## 2024-05-02 RX ADMIN — SERTRALINE HYDROCHLORIDE 100 MG: 100 TABLET ORAL at 08:28

## 2024-05-02 RX ADMIN — ENOXAPARIN SODIUM 40 MG: 40 INJECTION SUBCUTANEOUS at 20:38

## 2024-05-02 RX ADMIN — GUAIFENESIN 200 MG: 100 SOLUTION ORAL at 08:28

## 2024-05-02 RX ADMIN — OXYCODONE HYDROCHLORIDE 5 MG: 5 SOLUTION ORAL at 15:16

## 2024-05-02 RX ADMIN — FAMOTIDINE 20 MG: 20 TABLET ORAL at 20:38

## 2024-05-02 RX ADMIN — ACETAMINOPHEN 325 MG: 325 TABLET, FILM COATED ORAL at 09:43

## 2024-05-02 RX ADMIN — AMPICILLIN SODIUM AND SULBACTAM SODIUM 3 G: 2; 1 INJECTION, POWDER, FOR SOLUTION INTRAMUSCULAR; INTRAVENOUS at 02:50

## 2024-05-02 RX ADMIN — METHOCARBAMOL 500 MG: 500 TABLET ORAL at 15:16

## 2024-05-02 RX ADMIN — GUAIFENESIN 200 MG: 100 SOLUTION ORAL at 15:16

## 2024-05-02 RX ADMIN — LEVOFLOXACIN 750 MG: 250 TABLET, FILM COATED ORAL at 16:10

## 2024-05-02 RX ADMIN — POTASSIUM & SODIUM PHOSPHATES POWDER PACK 280-160-250 MG 1 PACKET: 280-160-250 PACK at 12:15

## 2024-05-02 RX ADMIN — GUAIFENESIN 200 MG: 100 SOLUTION ORAL at 02:50

## 2024-05-02 ASSESSMENT — ACTIVITIES OF DAILY LIVING (ADL)
ADLS_ACUITY_SCORE: 31
ADLS_ACUITY_SCORE: 33
ADLS_ACUITY_SCORE: 31
ADLS_ACUITY_SCORE: 34
ADLS_ACUITY_SCORE: 31
ADLS_ACUITY_SCORE: 34
ADLS_ACUITY_SCORE: 31
ADLS_ACUITY_SCORE: 31
ADLS_ACUITY_SCORE: 34
ADLS_ACUITY_SCORE: 31
ADLS_ACUITY_SCORE: 31
ADLS_ACUITY_SCORE: 33
ADLS_ACUITY_SCORE: 34
ADLS_ACUITY_SCORE: 33
ADLS_ACUITY_SCORE: 31

## 2024-05-02 NOTE — PROGRESS NOTES
"Thoracic Surgery  [facility]    Subjective:  No acute events overnight. Now on 2L oxy mask this morning. Tube feeding advanced to goal. Seen by speech yesterday with recommendations of ice chips and sips of water with chin tuck to facilitate swallowing exercises.    Objective  /69 (BP Location: Right arm)   Pulse 98   Temp 98.3  F (36.8  C) (Oral)   Resp 18   Ht 1.727 m (5' 8\")   Wt 66.5 kg (146 lb 9.7 oz)   SpO2 95%   BMI 22.29 kg/m      GEN: Alert, oriented   PULM: unlabored breathing on 2L via oxymask  CV: RRR  GI: soft, nontender, nondistended  EXT: appear well perfused  INCISIONS: CDI    Recent Labs   Lab 05/02/24  0428 05/01/24  1008 05/01/24  0415 04/30/24  0445 04/28/24  1931 04/28/24  1730   WBC 10.8  --  13.5* 12.3*   < > 13.1*   HGB 9.4*  --  9.1* 9.3*   < > 11.0*   MCV 97  --  96 96   < > 97     --  197 182   < > 200   INR  --   --   --   --   --  1.34*     --  141 139   < > 141   POTASSIUM 3.6 4.0 3.3* 3.8   < > 3.8   CHLORIDE 105  --  102 103   < > 99   CO2 24  --  28 27   < > 29   BUN 17.0  --  14.6 12.8   < > 14.5   CR 0.45*  --  0.56* 0.51*   < > 0.61*   ANIONGAP 10  --  11 9   < > 13   RAFAELA 8.7*  --  8.7* 8.7*   < > 9.5   *  --  114* 120*   < > 118*   ALBUMIN  --   --   --   --   --  3.6   PROTTOTAL  --   --   --   --   --  7.2   BILITOTAL  --   --   --   --   --  0.8   ALKPHOS  --   --   --   --   --  169*   ALT  --   --   --   --   --  17   AST  --   --   --   --   --  22    < > = values in this interval not displayed.     MRSA (4/28): negative  Respiratory aerobic culture and gram stain (4/28): Positive for Klebsiella aerogenes, Candida albicans, and streptococcus anginosus. No organisms on stain  Fungal yeast (4/28): Positive for yeast    Imaging: reviewed    Tubes & Drains: Feeding jejunostomy    A/P:  68M with SCC of esophagus who is s/p laparoscopic and right thoracoscopic gastroesophagectomy on 4/19 with Dr. Hill. Monitored in " SICU overnight POD0 to 1, did very well and transferred to [facility] on 4/20. Esophagram on 4/25 was negative for leak. Concern for possible aspiration with clear liquids led to acute respiratory decline on 4/28 requiring re-intubation and admission to the SICU. He returned to OR for EGD and bronchoscopy where a fair amount of fluid was suctioned from the esophagus, the tissue appeared healthy with no evidence of leak. On 4/29 and repeat EGD with pyloric botox injection was performed, after which he was extubated.     - Multimodal pain control   - Wean O2 as tolerated  - Daily CXR, aspiration precautions  - Plan for cycled tube feeding starting tonight  - Strict NPO for now, diet changes pending discussion with speech therapy and thoracic staff  - Changed to PO abx  - Continue to hold PTA plavix, continue lovenox  - Goal of discharge tomorrow    Patient seen on rounds with fellow, Dr. Garcia, who will discuss with staff.     Please page if questions,     Sj Acosta MD  PGY-1

## 2024-05-02 NOTE — PLAN OF CARE
Hours of Care: [id number]    Neuro: A&Ox4.   Cardiac: SR. VSS.   Respiratory: Sating >92% on 2L oxymask.  GI/: Adequate urine output. BM X  Diet/appetite: NPO. Tolerating continuous TF running at 50mL/hr w/ 30 FWF Q4H.  Activity:  SBA.  Pain: At acceptable level on current regimen. Scheduled tylenol, lidocaine patches, robaxin provided with relief.  Skin: No new deficits noted. Preventative mepi on coccyx.   LDA's: R & L PIV SL.     Plan: Speech continuing to see patient. Continue to wean O2 as able. Continue to monitor and follow POC. Notify primary team with changes.

## 2024-05-02 NOTE — PROGRESS NOTES
Care Management Follow Up    Length of Stay (days): 13    Expected Discharge Date: 5/3?     Concerns to be Addressed: Discharge planning, medical readiness.   Patient plan of care discussed at interdisciplinary rounds: Yes    Anticipated Discharge Disposition: Home  Anticipated Discharge Services: Home infusion.  Anticipated Discharge DME: None needed.    Patient/family educated on Medicare website which has current facility and service quality ratings:  Yes  Education Provided on the Discharge Plan: Yes  Patient/Family in Agreement with the Plan: Yes    Referrals Placed by CM/SW:  Home infusion  Private pay costs discussed: Not applicable    Additional Information:  Per chart review, primary team is hopeful for patient to discharge tomorrow. [facility] updated. Patient to transition to cycled TF this evening, will update home infusion order once order is confirmed.     Discharge resources:     [facility] (enteral feedings)  Phone: [phone]  Fax: [phone]    Care coordination will continue to follow.     Ruma Zaman, Nurse Coordinator, BSN  Phone: [phone]  Pager: [phone]  Vocera: 6B [facility] RNCC

## 2024-05-02 NOTE — PROGRESS NOTES
CLINICAL NUTRITION SERVICES - REASSESSMENT NOTE     Nutrition Prescription    Malnutrition Status:    Severe malnutrition in the context of acute illness on chronic illness     Recommendations already ordered by Registered Dietitian (RD):  Modify enteral feeds back to 16-hour cycle  - Novasource renal (or equivalent coconut/MCT-free formula) at goal rate of 75 ml/hr x 16 hours ([id number]) via J-tube to provide: 1200 mL formula, 2400 kcal (35 kcal/kg), 109 g pro (1.5 g/kg), 220 g CHO, 0 g fiber, and 860 mL free water daily.      Free water flushes:   Flush 60 mL at start and end of TF cycle (120 mL)  Provide additional 7-60 mL syringe free water flushes during day to meet full hydration needs (420 mL)  Total free water with TF formula + flushes = 1400 mL per day  -Please offer for pt to administer day-time flushes for self, for practice.     Future/Additional Recommendations:  Monitor nutrition-related findings and follow pt per protocol     EVALUATION OF THE PROGRESS TOWARD GOALS   Diet: NPO    Nutrition Support:   -Dosing weight: 68.4 kg    -EN access via J-tube  -TF Regimen hx:   4/20-4/24 Novasource Renal (or equivalent) at 50 ml/hr (1200 ml) provides 2400 kcal (35 kcal/kg), 109 g pro (1.5 g/kg), 220 g CHO, 860 ml free water, and 0 g fiber daily.     4/24 - 4/28: Novasource renal (or equivalent coconut/MCT-free formula) at goal rate of 75 ml/hr x 16 hours ([id number]) via J-tube to provide: 1200 mL formula, 2400 kcal (35 kcal/kg), 109 g pro (1.5 g/kg), 220 g CHO, 0 g fiber, and 860 mL free water daily.     4/25 trial of 12 hour cycle but pt with small emesis, backing down to 16 hour cycle as prev pepe     4/29: Novasource Renal at 20ml/hr   4/30 - 5/2 Novasource Renal (or equivalent) at 50 ml/hr (1200 ml) provides 2400 kcal (35 kcal/kg), 109 g pro (1.5 g/kg), 220 g CHO, 860 ml free water, and 0 g fiber daily.      -FWF: 30 mL Q4hrs (modified while pt in ICU; will increase back to goal for optimal hydration  today)    EN Intake - Average 7-day TF intake: 429 mL formula,  858 Kcals (13 Kcal/kg), and 39 g pro (0.6 g/kg). This is meeting 42% of low-end est Kcal needs, and 57% of low-end est protein needs.     NEW FINDINGS   General:  TFs reached goal rate yesterday. Request from thoracic team yesterday to transition back to cyclic feeds today. Pt and wife feel comfortable with this. Previously tolerated cyclic TFs x16 hours well and had education on this regimen last week. Additionally had diet education post esophagectomy last week. No new questions today and pt/wife appreciative of RD visit.     GI:  1-7 unmeasured BMs per day over past week, per I/O.     Weights:  Down 3.6% body weight over past week; severe loss, interruption to TF with aspiration event last week (TFs were temporarily held when pt presented to ICU).     Labs:  Reviewed     Medications:  Reviewed     Skin:  No new deficits     MALNUTRITION  % Intake: </= 50% for >/= 5 days (severe) -d/t interruption to EN/critical care status, now TFs back to goal   % Weight Loss: > 2% in 1 week (severe)  Subcutaneous Fat Loss: Facial region:  moderate, Upper arm:  moderate, and Thoracic/intercostal:  moderate  Muscle Loss: Temporal:  mild, Facial & jaw region:  mild, Thoracic region (clavicle, acromium bone, deltoid, trapezius, pectoral):  severe, and Upper arm (bicep, tricep):  severe   Fluid Accumulation/Edema: None noted  Malnutrition Diagnosis: Severe malnutrition in the context of acute illness on chronic condition    Previous Goals   Total avg nutritional intake to meet a minimum of 30 kcal/kg and 1.5 g PRO/kg daily (per dosing wt 68 kg).  Evaluation: Not met but TFs back to goal rate    Previous Nutrition Diagnosis  Inadequate oral intake related to NPO s/p esophagectomy as evidenced by enteral feeds via jejunostomy to meet 100% needs.   Evaluation: No change    CURRENT NUTRITION DIAGNOSIS  Inadequate oral intake related to NPO s/p esophagectomy as evidenced by  enteral feeds via jejunostomy to meet 100% needs.     INTERVENTIONS  Implementation  Enteral Nutrition - Transition back to cyclic feeds  this evening  Free water flush: Increased back to prior regimen with pt out of ICU/hydration needs    Goals  Total avg nutritional intake to meet a minimum of 30 kcal/kg and 1.5 g PRO/kg daily (per dosing wt 68 kg).    Monitoring/Evaluation  Progress toward goals will be monitored and evaluated per protocol.    Chandler Pugh, MS, RDN, LD, CNSC  Available by [employer] or phone   Vocrashmi: M-F (7:00-3:30)  6B Clinical Dietitian  or 2 Obs Clinical Dietitian  Weekend/Holiday (7:00-3:30) - Weekend Clinical Dietitian   6B RD desk: [phone]       **Clinical Dietitians are no longer available by pager.

## 2024-05-02 NOTE — PLAN OF CARE
Goal Outcome Evaluation:                      Neuro: A&Ox4.   Cardiac: SR. VSS.   Respiratory: Sating>92% on room air  GI/: 3 loose BM   Diet/appetite: full liquid diet okay as long as he does chin tuck.Only having small sips of water and ice chips currently.  Cyclic tube feeds starting today. 4p-8a at 75ml/hr  Activity:  Stand by assist.   Pain: At acceptable level on current regimen.   Skin: No new deficits noted. Preventative mepilex on coccyx   LDA's: 2 pivs saline locked          Plan: Speech continuing to see. Possible discharge tomorrow and will follow up with ENT on Monday. Continue to wean oxygen.

## 2024-05-02 NOTE — PLAN OF CARE
Goal Outcome Evaluation:      Plan of Care Reviewed With: patient, spouse    Overall Patient Progress: no changeOverall Patient Progress: no change    Outcome Evaluation: See most recent RD note 5/2 for assessment/recs

## 2024-05-03 ENCOUNTER — APPOINTMENT (OUTPATIENT)
Dept: GENERAL RADIOLOGY | Facility: CLINIC | Age: 69
DRG: 326 | End: 2024-05-03
Payer: COMMERCIAL

## 2024-05-03 ENCOUNTER — APPOINTMENT (OUTPATIENT)
Dept: SPEECH THERAPY | Facility: CLINIC | Age: 69
DRG: 326 | End: 2024-05-03
Attending: THORACIC SURGERY (CARDIOTHORACIC VASCULAR SURGERY)
Payer: COMMERCIAL

## 2024-05-03 ENCOUNTER — APPOINTMENT (OUTPATIENT)
Dept: GENERAL RADIOLOGY | Facility: CLINIC | Age: 69
DRG: 326 | End: 2024-05-03
Attending: THORACIC SURGERY (CARDIOTHORACIC VASCULAR SURGERY)
Payer: COMMERCIAL

## 2024-05-03 LAB
ANION GAP SERPL CALCULATED.3IONS-SCNC: 12 MMOL/L (ref 7–15)
BUN SERPL-MCNC: 18.1 MG/DL (ref 8–23)
CALCIUM SERPL-MCNC: 9.4 MG/DL (ref 8.8–10.2)
CHLORIDE SERPL-SCNC: 104 MMOL/L (ref 98–107)
CREAT SERPL-MCNC: 0.54 MG/DL (ref 0.67–1.17)
DEPRECATED HCO3 PLAS-SCNC: 23 MMOL/L (ref 22–29)
EGFRCR SERPLBLD CKD-EPI 2021: >90 ML/MIN/1.73M2
ERYTHROCYTE [DISTWIDTH] IN BLOOD BY AUTOMATED COUNT: 14.3 % (ref 10–15)
GLUCOSE SERPL-MCNC: 100 MG/DL (ref 70–99)
HCT VFR BLD AUTO: 32.4 % (ref 40–53)
HGB BLD-MCNC: 10.4 G/DL (ref 13.3–17.7)
MAGNESIUM SERPL-MCNC: 2 MG/DL (ref 1.7–2.3)
MCH RBC QN AUTO: 31 PG (ref 26.5–33)
MCHC RBC AUTO-ENTMCNC: 32.1 G/DL (ref 31.5–36.5)
MCV RBC AUTO: 97 FL (ref 78–100)
PHOSPHATE SERPL-MCNC: 4.3 MG/DL (ref 2.5–4.5)
PLATELET # BLD AUTO: 220 10E3/UL (ref 150–450)
POTASSIUM SERPL-SCNC: 4.1 MMOL/L (ref 3.4–5.3)
RBC # BLD AUTO: 3.35 10E6/UL (ref 4.4–5.9)
SODIUM SERPL-SCNC: 139 MMOL/L (ref 135–145)
WBC # BLD AUTO: 8.7 10E3/UL (ref 4–11)

## 2024-05-03 PROCEDURE — 74230 X-RAY XM SWLNG FUNCJ C+: CPT

## 2024-05-03 PROCEDURE — 250N000013 HC RX MED GY IP 250 OP 250 PS 637

## 2024-05-03 PROCEDURE — 250N000011 HC RX IP 250 OP 636

## 2024-05-03 PROCEDURE — 250N000013 HC RX MED GY IP 250 OP 250 PS 637: Performed by: STUDENT IN AN ORGANIZED HEALTH CARE EDUCATION/TRAINING PROGRAM

## 2024-05-03 PROCEDURE — 74018 RADEX ABDOMEN 1 VIEW: CPT

## 2024-05-03 PROCEDURE — 74230 X-RAY XM SWLNG FUNCJ C+: CPT | Mod: 26 | Performed by: RADIOLOGY

## 2024-05-03 PROCEDURE — 120N000003 HC R&B IMCU UMMC

## 2024-05-03 PROCEDURE — 92611 MOTION FLUOROSCOPY/SWALLOW: CPT | Mod: GN

## 2024-05-03 PROCEDURE — 84100 ASSAY OF PHOSPHORUS: CPT | Performed by: THORACIC SURGERY (CARDIOTHORACIC VASCULAR SURGERY)

## 2024-05-03 PROCEDURE — 83735 ASSAY OF MAGNESIUM: CPT | Performed by: THORACIC SURGERY (CARDIOTHORACIC VASCULAR SURGERY)

## 2024-05-03 PROCEDURE — 74018 RADEX ABDOMEN 1 VIEW: CPT | Mod: 26 | Performed by: RADIOLOGY

## 2024-05-03 PROCEDURE — 71045 X-RAY EXAM CHEST 1 VIEW: CPT | Mod: 26 | Performed by: RADIOLOGY

## 2024-05-03 PROCEDURE — 92526 ORAL FUNCTION THERAPY: CPT | Mod: GN

## 2024-05-03 PROCEDURE — 80048 BASIC METABOLIC PNL TOTAL CA: CPT

## 2024-05-03 PROCEDURE — 85027 COMPLETE CBC AUTOMATED: CPT

## 2024-05-03 PROCEDURE — 74248 X-RAY SM INT F-THRU STD: CPT | Mod: 26 | Performed by: RADIOLOGY

## 2024-05-03 PROCEDURE — 71045 X-RAY EXAM CHEST 1 VIEW: CPT

## 2024-05-03 PROCEDURE — 250N000011 HC RX IP 250 OP 636: Performed by: THORACIC SURGERY (CARDIOTHORACIC VASCULAR SURGERY)

## 2024-05-03 PROCEDURE — 36415 COLL VENOUS BLD VENIPUNCTURE: CPT

## 2024-05-03 PROCEDURE — 74240 X-RAY XM UPR GI TRC 1CNTRST: CPT

## 2024-05-03 PROCEDURE — 74240 X-RAY XM UPR GI TRC 1CNTRST: CPT | Mod: 26 | Performed by: RADIOLOGY

## 2024-05-03 PROCEDURE — 250N000013 HC RX MED GY IP 250 OP 250 PS 637: Performed by: PHYSICIAN ASSISTANT

## 2024-05-03 RX ORDER — BARIUM SULFATE 400 MG/ML
SUSPENSION ORAL ONCE
Status: COMPLETED | OUTPATIENT
Start: 2024-05-03 | End: 2024-05-03

## 2024-05-03 RX ORDER — LEVOFLOXACIN 250 MG/1
750 TABLET, FILM COATED ORAL EVERY 24 HOURS
Status: COMPLETED | OUTPATIENT
Start: 2024-05-03 | End: 2024-05-03

## 2024-05-03 RX ORDER — METOCLOPRAMIDE HYDROCHLORIDE 5 MG/ML
5 INJECTION INTRAMUSCULAR; INTRAVENOUS 3 TIMES DAILY
Status: DISCONTINUED | OUTPATIENT
Start: 2024-05-03 | End: 2024-05-17 | Stop reason: HOSPADM

## 2024-05-03 RX ORDER — MAGNESIUM SULFATE HEPTAHYDRATE 40 MG/ML
2 INJECTION, SOLUTION INTRAVENOUS ONCE
Status: COMPLETED | OUTPATIENT
Start: 2024-05-03 | End: 2024-05-03

## 2024-05-03 RX ADMIN — LIDOCAINE 4% 2 PATCH: 40 PATCH TOPICAL at 20:14

## 2024-05-03 RX ADMIN — ROSUVASTATIN CALCIUM 40 MG: 20 TABLET, FILM COATED ORAL at 07:55

## 2024-05-03 RX ADMIN — METHOCARBAMOL 500 MG: 500 TABLET ORAL at 09:43

## 2024-05-03 RX ADMIN — GUAIFENESIN 200 MG: 100 SOLUTION ORAL at 19:51

## 2024-05-03 RX ADMIN — FAMOTIDINE 20 MG: 20 TABLET ORAL at 19:52

## 2024-05-03 RX ADMIN — ACETAMINOPHEN 975 MG: 325 TABLET, FILM COATED ORAL at 19:52

## 2024-05-03 RX ADMIN — OXYCODONE HYDROCHLORIDE 5 MG: 5 SOLUTION ORAL at 16:43

## 2024-05-03 RX ADMIN — ACETAMINOPHEN 975 MG: 325 TABLET, FILM COATED ORAL at 04:25

## 2024-05-03 RX ADMIN — Medication 15 ML: at 07:55

## 2024-05-03 RX ADMIN — ENOXAPARIN SODIUM 40 MG: 40 INJECTION SUBCUTANEOUS at 19:52

## 2024-05-03 RX ADMIN — POLYETHYLENE GLYCOL 3350 17 G: 17 POWDER, FOR SOLUTION ORAL at 19:52

## 2024-05-03 RX ADMIN — DEXTROSE AND SODIUM CHLORIDE: 5; 450 INJECTION, SOLUTION INTRAVENOUS at 07:53

## 2024-05-03 RX ADMIN — METHOCARBAMOL 500 MG: 500 TABLET ORAL at 04:25

## 2024-05-03 RX ADMIN — HYDROXYZINE HYDROCHLORIDE 25 MG: 25 TABLET, FILM COATED ORAL at 08:00

## 2024-05-03 RX ADMIN — ACETAMINOPHEN 975 MG: 325 TABLET, FILM COATED ORAL at 11:14

## 2024-05-03 RX ADMIN — GUAIFENESIN 200 MG: 100 SOLUTION ORAL at 07:55

## 2024-05-03 RX ADMIN — ALBUTEROL SULFATE 4 PUFF: 90 AEROSOL, METERED RESPIRATORY (INHALATION) at 19:52

## 2024-05-03 RX ADMIN — GUAIFENESIN 200 MG: 100 SOLUTION ORAL at 13:21

## 2024-05-03 RX ADMIN — METHOCARBAMOL 500 MG: 500 TABLET ORAL at 15:24

## 2024-05-03 RX ADMIN — SENNOSIDES AND DOCUSATE SODIUM 2 TABLET: 8.6; 5 TABLET ORAL at 19:52

## 2024-05-03 RX ADMIN — MAGNESIUM SULFATE HEPTAHYDRATE 2 G: 2 INJECTION, SOLUTION INTRAVENOUS at 07:53

## 2024-05-03 RX ADMIN — LEVOFLOXACIN 750 MG: 250 TABLET, FILM COATED ORAL at 15:24

## 2024-05-03 RX ADMIN — METOCLOPRAMIDE 5 MG: 5 INJECTION, SOLUTION INTRAMUSCULAR; INTRAVENOUS at 19:53

## 2024-05-03 RX ADMIN — Medication 3 MG: at 19:52

## 2024-05-03 RX ADMIN — GUAIFENESIN 200 MG: 100 SOLUTION ORAL at 04:24

## 2024-05-03 RX ADMIN — HYDROXYZINE HYDROCHLORIDE 25 MG: 25 TABLET, FILM COATED ORAL at 22:53

## 2024-05-03 RX ADMIN — SERTRALINE HYDROCHLORIDE 100 MG: 100 TABLET ORAL at 07:55

## 2024-05-03 RX ADMIN — FAMOTIDINE 20 MG: 20 TABLET ORAL at 07:55

## 2024-05-03 RX ADMIN — BARIUM SULFATE 5 ML: 400 SUSPENSION ORAL at 14:06

## 2024-05-03 RX ADMIN — METHOCARBAMOL 500 MG: 500 TABLET ORAL at 22:53

## 2024-05-03 RX ADMIN — METOCLOPRAMIDE 5 MG: 5 INJECTION, SOLUTION INTRAMUSCULAR; INTRAVENOUS at 13:21

## 2024-05-03 RX ADMIN — HYDROXYZINE HYDROCHLORIDE 25 MG: 25 TABLET, FILM COATED ORAL at 16:43

## 2024-05-03 RX ADMIN — GABAPENTIN 100 MG: 100 CAPSULE ORAL at 22:53

## 2024-05-03 ASSESSMENT — ACTIVITIES OF DAILY LIVING (ADL)
ADLS_ACUITY_SCORE: 33
ADLS_ACUITY_SCORE: 35
ADLS_ACUITY_SCORE: 35
ADLS_ACUITY_SCORE: 33
ADLS_ACUITY_SCORE: 34
ADLS_ACUITY_SCORE: 33
ADLS_ACUITY_SCORE: 35
ADLS_ACUITY_SCORE: 33
ADLS_ACUITY_SCORE: 33
ADLS_ACUITY_SCORE: 34
ADLS_ACUITY_SCORE: 33

## 2024-05-03 NOTE — PROGRESS NOTES
"Thoracic Surgery  [facility]    Subjective:  Had emesis episode about 2 hrs after restarting tube feeding. Patient reported he did not know if emesis was from stomach or lungs, but he reported that his emesis looked and tasted like tube feeding. Tube feeding rate was decreased and later stopped when pt kept spitting up thick liquid. Patient was put on 2L NC, but is on room air this morning.    Objective  /66 (BP Location: Left arm, Cuff Size: Adult Regular)   Pulse 91   Temp 98.2  F (36.8  C) (Oral)   Resp 16   Ht 1.727 m (5' 8\")   Wt 65.5 kg (144 lb 6.4 oz)   SpO2 97%   BMI 21.96 kg/m      GEN: Alert, oriented   PULM: unlabored breathing on room air  CV: RRR  GI: soft, nontender, nondistended  EXT: appear well perfused  INCISIONS: CDI    Recent Labs   Lab 05/03/24  0423 05/02/24  0428 05/01/24  1008 05/01/24  0415 04/28/24  1931 04/28/24  1730   WBC 8.7 10.8  --  13.5*   < > 13.1*   HGB 10.4* 9.4*  --  9.1*   < > 11.0*   MCV 97 97  --  96   < > 97    204  --  197   < > 200   INR  --   --   --   --   --  1.34*    139  --  141   < > 141   POTASSIUM 4.1 3.6 4.0 3.3*   < > 3.8   CHLORIDE 104 105  --  102   < > 99   CO2 23 24  --  28   < > 29   BUN 18.1 17.0  --  14.6   < > 14.5   CR 0.54* 0.45*  --  0.56*   < > 0.61*   ANIONGAP 12 10  --  11   < > 13   RAFAELA 9.4 8.7*  --  8.7*   < > 9.5   * 118*  --  114*   < > 118*   ALBUMIN  --   --   --   --   --  3.6   PROTTOTAL  --   --   --   --   --  7.2   BILITOTAL  --   --   --   --   --  0.8   ALKPHOS  --   --   --   --   --  169*   ALT  --   --   --   --   --  17   AST  --   --   --   --   --  22    < > = values in this interval not displayed.     MRSA (4/28): negative  Respiratory aerobic culture and gram stain (4/28): Positive for Klebsiella aerogenes, Candida albicans, and streptococcus anginosus. No organisms on stain  Fungal yeast (4/28): Positive for yeast    Imaging: reviewed    Tubes & Drains: Feeding " jejunostomy    A/P:  68M with SCC of esophagus who is s/p laparoscopic and right thoracoscopic gastroesophagectomy on 4/19 with Dr. iHll. Monitored in SICU overnight POD0 to 1, did very well and [place] [facility] [place] Esophagram on 4/25 was negative for leak. Concern for possible aspiration with clear liquids led to acute respiratory decline on 4/28 requiring re-intubation and admission to the SICU. He returned to OR for EGD and bronchoscopy where a fair amount of fluid was suctioned from the esophagus, the tissue appeared healthy with no evidence of leak. On 4/29 and repeat EGD with pyloric botox injection was performed, after which he was extubated.     - Multimodal pain control   - Daily CXR, aspiration precautions  - Plan for video swallow and esophagram with follow-through today  - Hold tube feeding pending esophogram  - Strict NPO for now  - PO abx ending today  - Continue to hold PTA plavix, continue lovenox  - Goal of discharge within next few days    Patient seen on rounds with fellow, Dr. Garcia, who will discuss with staff.     Please page if questions,     Sj Acosta MD  PGY-1

## 2024-05-03 NOTE — PLAN OF CARE
Hours of Care: [id number]    Neuro: A&Ox4.   Cardiac: SR. VSS.       Respiratory: Sating >92% on 2L NC, weaned to RA.  GI/: Adequate urine output. No BM during shift. Pt had an episode of emesis towards beginning of shift, shortly after cyclic TF's had been started. TF's were held until after CXR was performed. Provider ordered continuous TF to run at rate it had been running at previously (goal 50mL/hr). TF's were restarted, per pt preference wanted to start at a lower rate and slowly advance. TF started at 20mL/hr and advanced up to 35mL/hr over 3 hours. Pt stated that the TF seemed to be coming up from his stomach to his mouth & he's having to swallow it, saying he could taste the TF. Provider notified. TF stopped again. See provider notifications note for details.   Diet/appetite: NPO. TF held until further direction from day team.  Activity:  SBA.  Pain: Generalized lower abdominal pain. Scheduled tylenol, lidocaine patches, robaxin provided with some relief.  Skin: No new deficits noted. Preventative mepi on coccyx.   LDA's: R PIV SL     Plan: Continue to monitor and follow POC. Notify primary team with changes.

## 2024-05-03 NOTE — PROVIDER NOTIFICATION
Time of notification: 12:36 AM  Provider notified: Thoracic 1st Call  Patient status: Patient is stating that right now the TF seems to be coming up from his stomach to his mouth and he's having to swallow it. He said it's weird because he is tasting it in his mouth and has to swallow it back down. Also, he hasn't recently had anything PO. FYI: TF's were being slowly increased, because pt was concerned about them running too fast right away and having another emesis episode. They were running at 35mL/hr.   The swallowing experience is new, he was saying every couple of minutes it would build up in his mouth & he'd have to swallow. It wasn't constant. If the TF is being held, do you want anything running IV?    Orders received: Writer was messaging with provider and the above was the info provided. Provider stated to hold the TF overnight, pt can still be on clears.  ----------------------------------    Time of notification: 1:04 AM  Provider notified: Thoracic 1st Call  Patient status: FYI: Patient is stating he's still having the sensation of needing to swallow, even though the TF is stopped. I have him sitting up tall & assessing.     Orders received: None at this time, this was an FYI notification.    -----------------------  Time of notification: 1:22 AM  Provider notified: Thoracic 1st Call  Patient status: Patient's upper abdomen (right below his ribs) is visibly pulsating, I don't recall noticing that before.     -------------------  Time of notification: 2:32 AM  Provider notified: Thoracic 1st Call  Patient status: Pt is still having the swallowing issue, where fluid is coming up and he's having to swallow. He said it seems to be happening more frequently now too.     Orders received: Writer messaging with the provider: There aren't any concerns for aspiration, pt stated he doesn't feel like anything is going down the wrong tube and his O2 saturation looks great, 1L NC at 95%.   -provider said to keep  things as is for now. Meds ok to give through the PEG.

## 2024-05-03 NOTE — PROGRESS NOTES
Time of notification: 7:23 PM  Provider notified: Thoracic on call   Patient status. I restarted tube feeds. then about 2 hours later, had a emesis. he said it came up out of nowhere, no piror nausea. I'm not sure  if he aspirated but he is on a few liters of oxygen. Had been taking small sips of water and a few ice chips this afternoon.     Orders received: Stat chest X-ray ordered. Tube feeds on hold for now. Sating>90% on 2L oxygen. (Was on 2L previously today). No further coughing or choking. Pt still spitting up though

## 2024-05-03 NOTE — PHARMACY-CONSULT NOTE
Pharmacy Tube Feeding Consult    Medication reviewed for administration by feeding tube and for potential food/drug interactions.    Assessment:  - Levofloxacin (new order) interacts with tube feeds and may result in decreased drug bioavailability and peak concentrations    Recommendation:   - Recommend holding tube feedings for 1 hour before and 1 hour after administration of levofloxacin to help minimize this interaction.    Pharmacy will continue to follow as new medications are ordered.       Rudy Edmonds, PharmD, BCPS  [phone]

## 2024-05-03 NOTE — PROGRESS NOTES
05/03/24 1415   Appointment Info   Signing Clinician's Name / Credentials (SLP) China Benavides MA CCC-SLP   General Information   Onset of Illness/Injury or Date of Surgery 04/19/24   Referring Physician Sj Acosta MD   Patient/Family Therapy Goal Statement (SLP) None stated   Pertinent History of Current Problem   Pt is a 68M with SCC of esophagus who is s/p laparoscopic and right thoracoscopic gastroesophagectomy on 4/19 with Dr. Hill. VFSS and esophagram completed 4/25 - no aspiration with use of chin tuck and negative for leak. Pt experienced acute respiratory decline on 4/28 requiring re-intubation and admission to the SICU, which thoracic attributed to aspiration. He returned to OR for EGD and bronchoscopy where a fair amount of fluid was suctioned from the esophagus, the tissue appeared healthy with no evidence of leak. On 4/29, repeat EGD with pyloric botox injection were performed, after which he was extubated. Note that throughout the pt's stay, pt has struggled with emesis/intolerance of tube feeds. VFSS completed today per MD order. VFSS completed with barium contrast - SLP discussed with Manoj Mattson thoracic PA, who indicated that barium contrast was appropriate to use for study.     General Observations Alert, pleasant   Type of Evaluation   Type of Evaluation Swallow Evaluation   Dentition (Oral Motor)   Comment, Dentition (Oral Motor) not in place for study   Dentition (Oral Motor) dental appliance/dentures   Cough/Swallow/Gag Reflex (Oral Motor)   Comment, Cough/Swallow/Gag Reflex (Oral Motor) adequate cough strength   Vocal Quality/Secretion Management (Oral Motor)   Vocal Quality (Oral Motor) WNL   General Swallowing Observations   Past History of Dysphagia   Pt familiar to this service, had VFSS in Nov 2023 which showed flash penetration of thin liquids, no aspiration. Pt has been working with OP SLPs at [facility] targeting pharyngeal strengthening given XRT to neck. He reports leading up  "to this admission, he was able to eat/drink anything, including steaks. Limited VFSS was completed post esophagectomy on 4/25, which revealed \"moderate acute on chronic oropharyngeal dysphagia... Swallow trigger delayed to the pyriforms. Once triggered, laryngeal vestibule closure is incomplete and epiglottic inversion is partial. Aspiration occurs during the swallow - pt is inconsistently sensate to aspiration, and cued cough does not clear aspirated material from trachea. Aspiration is eliminated with use of chin tuck.\" Pt has been working diligently on dysphagia exercises and has been allowed very limited PO since last VFSS.     Respiratory Support room air   Current Diet/Method of Nutritional Intake (General Swallowing Observations, NIS) NPO;jejunostomy tube   Swallowing Evaluation Videofluoroscopic swallow study (VFSS)   VFSS Evaluation   Radiologist Resident   Views Taken left lateral   Physical Location of Procedure [facility] [address]   VFSS Textures Trialed thin liquids;mildly thick liquids;pureed;solid foods   VFSS Eval: Thin Liquid Texture Trial   Mode of Presentation, Thin Liquid cup;self-fed   Order of Presentation 1, 2, 5, 6   Preparatory Phase WFL   Oral Phase, Thin Liquid WFL   Bolus Location When Swallow Triggered pyriforms   Pharyngeal Phase, Thin Liquid   (impaired)   Rosenbek's Penetration Aspiration Scale: Thin Liquid Trial Results 6 - contrast passes glottis, no subglottic residue remains (aspiration)   Response to Aspiration productive cued cough   Strategies and Compensations chin tuck   Diagnostic Statement chin tuck improves swallowing safety and efficiency - without a chin tuck, pt demonstrates aspiration during the swallow - pt is able to clear aspirated material with use of throat clear   VFSS Eval: Mildly Thick Liquids   Mode of Presentation cup;self-fed   Order of Presentation 3   Preparatory Phase WFL   Oral Phase WFL   Bolus Location When Swallow Triggered posterior angle of " ramus   Pharyngeal Phase   (impaired)   Rosenbek's Penetration Aspiration Scale 2 - contrast enters airway, remains above the vocal cords, no residue remains (penetration)   VFSS Evaluation: Puree Solid Texture Trial   Mode of Presentation, Puree self-fed   Order of Presentation 4   Preparatory Phase WFL   Oral Phase, Puree WFL   Bolus Location When Swallow Triggered posterior angle of ramus   Pharyngeal Phase, Puree   (impaired)   Rosenbek's Penetration Aspiration Scale: Puree Food Trial Results 1 - no aspiration, contrast does not enter airway   Strategies and Compensations liquid wash;double swallow   Diagnostic Statement moderate amount of vallecular residue related to pharyngeal deficits - this clears with liquid wash and cue to complete a double, effortful swallow   VFSS Evaluation: Solid Food Texture Trial   Diagnostic Statement solid trials deferred this date as pt was concerned about taking in too much contrast prior to upper GI follow through - additionally, pt will require clearance from thoracic before advancing solid diet   Esophageal Phase of Swallow   Patient reports or presents with symptoms of esophageal dysphagia Yes   Esophageal comments s/o esophagectomy - see rad report today for details re: emptying   Swallowing Recommendations   Diet Consistency Recommendations full liquid diet;thin liquids (level 0)   Supervision Level for Intake patient independent   Mode of Delivery Recommendations food moistened;slow rate of intake   Postural Recommendations chin tuck   Swallowing Maneuver Recommendations effortful (hard) swallow;alternate food and liquid intake   Recommended Feeding/Eating Techniques (Swallow Eval) maintain upright sitting position for eating;maintain upright posture during/after eating for 30 minutes;provide oral hygiene prior to intake   Medication Administration Recommendations, Swallowing (SLP) as tolerated   Instrumental Assessment Recommendations reassess via non-instrumental  clinical swallow evaluation   General Therapy Interventions   Planned Therapy Interventions Dysphagia Treatment   Dysphagia treatment Oropharyngeal exercise training;Modified diet education;Instruction of safe swallow strategies;Compensatory strategies for swallowing   Clinical Impression   Criteria for Skilled Therapeutic Interventions Met (SLP Martín) Yes, treatment indicated   SLP Diagnosis acute on chronic pharyngeal dysphagia - also note likely esophageal dysphagia in setting of esophagectomy   Risks & Benefits of therapy have been explained evaluation/treatment results reviewed;care plan/treatment goals reviewed;risks/benefits reviewed;current/potential barriers reviewed;participants voiced agreement with care plan;participants included;patient   Clinical Impression Comments   Videoswallow study completed per MD order to objectively assess oropharyngeal swallow mechanism. Under fluoroscopy, pt presents with acute on chronic pharyngeal dysphagia related to his hx of XRT to the head/neck. Pt assessed with thin liquids, mildly-thick liquids, and pudding. Solids deferred as esophagram w/ follow-through scheduled for after VFSS and regardless, pt would not be cleared for solids at this time d/t esophagectomy precautions. Oral phase WFL. Swallow trigger timely with use of chin tuck, but delayed to the pyriforms when pt's head is in neutral position. Once triggered, BOT retraction and pharyngeal stripping wave are reduced, with wide column of contrast at BOT. Epiglottic inversion partial/inconsistent. Laryngeal vestibule closure reduced. Moderate amounts of vallecular residue present with pudding, which clears with liquid wash and effortful swallow. Aspiration occurs during the swallow when pt does not utilize a chin tuck, but he is able to clear aspirated material with a cued throat clear.     From an oropharyngeal/SLP standpoint, pt OK full liquid diet with use of chin tuck for all swallows, although defer to  thoracic MD for diet management given their ongoing concerns with emptying through the pylorus. Oral care should be completed prior to PO. Given his hx of XRT to the head/neck, SLP strongly recommends the pt continue swallowing (dysphagia exercises +/- PO), as radiated swallowing musculature can become stiff/weak very quickly. SLP will continue follow for education and review of dysphagia exercises as they pertain to post-XRT swallowing. Pt scheduled for follow-up with SLP in ENT clinic on 5/6.     SLP Total Evaluation Time   Evaluation, videofluoroscopic eval of swallow function Minutes (79976) 25   Interventions   Interventions Quick Adds Swallowing Dysfunction   Swallowing Dysfunction &/or Oral Function for Feeding   Treatment of Swallowing Dysfunction &/or Oral Function for Feeding Minutes (35179) 5   Treatment Detail/Skilled Intervention VFSS complete. Results/recommendations/images reviewed with pt. Training provided re: chin tuck and effortful swallow as a compensatory strategies. Discussed importance of ongoing completion of dysphagia exercises, and to complete a throat clear/cough during meals. Pt indicated understanding. Pt also understanding that diet recs depend on findings from esophagram follow-through.   SLP Discharge Planning   SLP Discharge Recommendation home with outpatient therapy services  (in ENT clinic)   SLP Rationale for DC Rec Acute on chronic dysphagia   Total Session Time   Total Session Time (sum of timed and untimed services) 30

## 2024-05-03 NOTE — PROGRESS NOTES
CLINICAL NUTRITION SERVICES - Brief NOTE     Nutrition Prescription    RECOMMENDATIONS FOR MDs/PROVIDERS TO ORDER:  Recommend resume TF continuous (goal 50 ml/hr) for 24 hours and if able to tolerate resume previous cycle of TF   Novasource Renal (or equivalent) at 50 ml/hr (1200 ml) provides 2400 kcal (35 kcal/kg), 109 g pro (1.5 g/kg), 220 g CHO, 860 ml free water, and 0 g fiber daily    Future/Additional Recommendations:  -- monitor Speech therapy recommendations for diet consistency  -- tolerance to TF and ability to resume 16 hour cycle:  Novasource renal (or equivalent coconut/MCT-free formula) at goal rate of 75 ml/hr x 16 hours (4143-0521) via J-tube to provide: 1200 mL formula, 2400 kcal (35 kcal/kg), 109 g pro (1.5 g/kg), 220 g CHO, 0 g fiber, and 860 mL free water daily.      EVALUATION OF THE PROGRESS TOWARD GOALS   Diet: NPO  Nutrition Support: Novasource Renal (or equivalent) at 50 ml/hr (1200 ml) provides 2400 kcal (35 kcal/kg), 109 g pro (1.5 g/kg), 220 g CHO, 860 ml free water, and 0 g fiber daily.  - currently on hold - + emesis. Per chart review holding TF pending esophogram. Strict NPO - VFSS schedules     NEW FINDINGS   Labs - reviewed    Meds:   Levofloxacin  Reglan  Multivitamins w/minerals liquid    GI: + emesis (5/2) x 1; multiple stools     INTERVENTIONS  Implementation  Collaboration with other providers - discussed with PharmD - per PharmD today is the last scheduled does of Levofloxacin. (TF should be held one hr before and after dose).     Monitoring/Evaluation  Progress toward goals will be monitored and evaluated per protocol.    Jaqueline Arzola MS/RD/LD/CNSC  Available on Vocera - 6B Clinical Dietitian; 2Obs Clinical Dietitian  M-F (7am-3:30pm)   Weekend/Holiday Dietitian (7am-3:30pm)    ** Clinical Dietitians no longer available on pager

## 2024-05-04 ENCOUNTER — ANESTHESIA EVENT (OUTPATIENT)
Dept: SURGERY | Facility: CLINIC | Age: 69
DRG: 326 | End: 2024-05-04
Payer: COMMERCIAL

## 2024-05-04 ENCOUNTER — APPOINTMENT (OUTPATIENT)
Dept: OCCUPATIONAL THERAPY | Facility: CLINIC | Age: 69
DRG: 326 | End: 2024-05-04
Attending: THORACIC SURGERY (CARDIOTHORACIC VASCULAR SURGERY)
Payer: COMMERCIAL

## 2024-05-04 ENCOUNTER — APPOINTMENT (OUTPATIENT)
Dept: GENERAL RADIOLOGY | Facility: CLINIC | Age: 69
DRG: 326 | End: 2024-05-04
Payer: COMMERCIAL

## 2024-05-04 ENCOUNTER — APPOINTMENT (OUTPATIENT)
Dept: CT IMAGING | Facility: CLINIC | Age: 69
DRG: 326 | End: 2024-05-04
Payer: COMMERCIAL

## 2024-05-04 ENCOUNTER — APPOINTMENT (OUTPATIENT)
Dept: GENERAL RADIOLOGY | Facility: CLINIC | Age: 69
DRG: 326 | End: 2024-05-04
Attending: THORACIC SURGERY (CARDIOTHORACIC VASCULAR SURGERY)
Payer: COMMERCIAL

## 2024-05-04 LAB
ANION GAP SERPL CALCULATED.3IONS-SCNC: 12 MMOL/L (ref 7–15)
BUN SERPL-MCNC: 11.7 MG/DL (ref 8–23)
CALCIUM SERPL-MCNC: 9.1 MG/DL (ref 8.8–10.2)
CHLORIDE SERPL-SCNC: 101 MMOL/L (ref 98–107)
CREAT SERPL-MCNC: 0.55 MG/DL (ref 0.67–1.17)
DEPRECATED HCO3 PLAS-SCNC: 22 MMOL/L (ref 22–29)
EGFRCR SERPLBLD CKD-EPI 2021: >90 ML/MIN/1.73M2
ERYTHROCYTE [DISTWIDTH] IN BLOOD BY AUTOMATED COUNT: 14.1 % (ref 10–15)
GLUCOSE SERPL-MCNC: 108 MG/DL (ref 70–99)
HCT VFR BLD AUTO: 29 % (ref 40–53)
HGB BLD-MCNC: 9.7 G/DL (ref 13.3–17.7)
MAGNESIUM SERPL-MCNC: 1.9 MG/DL (ref 1.7–2.3)
MCH RBC QN AUTO: 31.9 PG (ref 26.5–33)
MCHC RBC AUTO-ENTMCNC: 33.4 G/DL (ref 31.5–36.5)
MCV RBC AUTO: 95 FL (ref 78–100)
PHOSPHATE SERPL-MCNC: 3.8 MG/DL (ref 2.5–4.5)
PLATELET # BLD AUTO: 236 10E3/UL (ref 150–450)
POTASSIUM SERPL-SCNC: 3.9 MMOL/L (ref 3.4–5.3)
RBC # BLD AUTO: 3.04 10E6/UL (ref 4.4–5.9)
SODIUM SERPL-SCNC: 135 MMOL/L (ref 135–145)
WBC # BLD AUTO: 8.2 10E3/UL (ref 4–11)

## 2024-05-04 PROCEDURE — 250N000013 HC RX MED GY IP 250 OP 250 PS 637

## 2024-05-04 PROCEDURE — 71045 X-RAY EXAM CHEST 1 VIEW: CPT | Mod: 26 | Performed by: RADIOLOGY

## 2024-05-04 PROCEDURE — 250N000011 HC RX IP 250 OP 636

## 2024-05-04 PROCEDURE — 71045 X-RAY EXAM CHEST 1 VIEW: CPT | Mod: 77

## 2024-05-04 PROCEDURE — 71250 CT THORAX DX C-: CPT | Mod: 26 | Performed by: RADIOLOGY

## 2024-05-04 PROCEDURE — 250N000011 HC RX IP 250 OP 636: Performed by: THORACIC SURGERY (CARDIOTHORACIC VASCULAR SURGERY)

## 2024-05-04 PROCEDURE — 999N000248 HC STATISTIC IV INSERT WITH US BY RN

## 2024-05-04 PROCEDURE — 83735 ASSAY OF MAGNESIUM: CPT | Performed by: THORACIC SURGERY (CARDIOTHORACIC VASCULAR SURGERY)

## 2024-05-04 PROCEDURE — 80048 BASIC METABOLIC PNL TOTAL CA: CPT

## 2024-05-04 PROCEDURE — 97535 SELF CARE MNGMENT TRAINING: CPT | Mod: GO

## 2024-05-04 PROCEDURE — 71250 CT THORAX DX C-: CPT | Mod: MG

## 2024-05-04 PROCEDURE — G1010 CDSM STANSON: HCPCS | Performed by: RADIOLOGY

## 2024-05-04 PROCEDURE — 74176 CT ABD & PELVIS W/O CONTRAST: CPT | Mod: 26 | Performed by: RADIOLOGY

## 2024-05-04 PROCEDURE — 250N000013 HC RX MED GY IP 250 OP 250 PS 637: Performed by: STUDENT IN AN ORGANIZED HEALTH CARE EDUCATION/TRAINING PROGRAM

## 2024-05-04 PROCEDURE — 120N000003 HC R&B IMCU UMMC

## 2024-05-04 PROCEDURE — 71045 X-RAY EXAM CHEST 1 VIEW: CPT

## 2024-05-04 PROCEDURE — 84100 ASSAY OF PHOSPHORUS: CPT | Performed by: THORACIC SURGERY (CARDIOTHORACIC VASCULAR SURGERY)

## 2024-05-04 PROCEDURE — 85014 HEMATOCRIT: CPT

## 2024-05-04 PROCEDURE — 97530 THERAPEUTIC ACTIVITIES: CPT | Mod: GO

## 2024-05-04 PROCEDURE — 36415 COLL VENOUS BLD VENIPUNCTURE: CPT

## 2024-05-04 PROCEDURE — 250N000013 HC RX MED GY IP 250 OP 250 PS 637: Performed by: PHYSICIAN ASSISTANT

## 2024-05-04 PROCEDURE — 74018 RADEX ABDOMEN 1 VIEW: CPT

## 2024-05-04 PROCEDURE — 74018 RADEX ABDOMEN 1 VIEW: CPT | Mod: 26 | Performed by: RADIOLOGY

## 2024-05-04 RX ORDER — MAGNESIUM SULFATE HEPTAHYDRATE 40 MG/ML
2 INJECTION, SOLUTION INTRAVENOUS ONCE
Status: COMPLETED | OUTPATIENT
Start: 2024-05-04 | End: 2024-05-04

## 2024-05-04 RX ORDER — PIPERACILLIN SODIUM, TAZOBACTAM SODIUM 3; .375 G/15ML; G/15ML
3.38 INJECTION, POWDER, LYOPHILIZED, FOR SOLUTION INTRAVENOUS EVERY 6 HOURS
Status: DISCONTINUED | OUTPATIENT
Start: 2024-05-04 | End: 2024-05-05

## 2024-05-04 RX ORDER — HYDROMORPHONE HCL IN WATER/PF 6 MG/30 ML
.2-.4 PATIENT CONTROLLED ANALGESIA SYRINGE INTRAVENOUS
Status: DISCONTINUED | OUTPATIENT
Start: 2024-05-04 | End: 2024-05-17 | Stop reason: HOSPADM

## 2024-05-04 RX ORDER — FUROSEMIDE 10 MG/ML
20 SOLUTION ORAL
Status: COMPLETED | OUTPATIENT
Start: 2024-05-04 | End: 2024-05-04

## 2024-05-04 RX ORDER — METHOCARBAMOL 100 MG/ML
500 INJECTION, SOLUTION INTRAMUSCULAR; INTRAVENOUS EVERY 8 HOURS
Status: DISCONTINUED | OUTPATIENT
Start: 2024-05-04 | End: 2024-05-04

## 2024-05-04 RX ADMIN — SERTRALINE HYDROCHLORIDE 100 MG: 100 TABLET ORAL at 07:53

## 2024-05-04 RX ADMIN — METOCLOPRAMIDE 5 MG: 5 INJECTION, SOLUTION INTRAMUSCULAR; INTRAVENOUS at 13:19

## 2024-05-04 RX ADMIN — LORAZEPAM 0.5 MG: 2 CONCENTRATE ORAL at 22:57

## 2024-05-04 RX ADMIN — METHOCARBAMOL 500 MG: 500 TABLET ORAL at 10:02

## 2024-05-04 RX ADMIN — POLYETHYLENE GLYCOL 3350 17 G: 17 POWDER, FOR SOLUTION ORAL at 20:06

## 2024-05-04 RX ADMIN — HYDROXYZINE HYDROCHLORIDE 50 MG: 50 TABLET ORAL at 11:35

## 2024-05-04 RX ADMIN — ACETAMINOPHEN 975 MG: 325 TABLET, FILM COATED ORAL at 11:34

## 2024-05-04 RX ADMIN — ACETAMINOPHEN 975 MG: 325 TABLET, FILM COATED ORAL at 20:02

## 2024-05-04 RX ADMIN — ACETAMINOPHEN 975 MG: 325 TABLET, FILM COATED ORAL at 04:33

## 2024-05-04 RX ADMIN — GUAIFENESIN 200 MG: 100 SOLUTION ORAL at 01:35

## 2024-05-04 RX ADMIN — FAMOTIDINE 20 MG: 20 TABLET ORAL at 20:03

## 2024-05-04 RX ADMIN — DEXTROSE AND SODIUM CHLORIDE: 5; 450 INJECTION, SOLUTION INTRAVENOUS at 18:44

## 2024-05-04 RX ADMIN — GUAIFENESIN 200 MG: 100 SOLUTION ORAL at 13:20

## 2024-05-04 RX ADMIN — Medication 15 ML: at 07:53

## 2024-05-04 RX ADMIN — MAGNESIUM SULFATE HEPTAHYDRATE 2 G: 2 INJECTION, SOLUTION INTRAVENOUS at 07:53

## 2024-05-04 RX ADMIN — LORAZEPAM 0.5 MG: 2 CONCENTRATE ORAL at 16:51

## 2024-05-04 RX ADMIN — HYDROXYZINE HYDROCHLORIDE 50 MG: 50 TABLET ORAL at 04:40

## 2024-05-04 RX ADMIN — HYDROXYZINE HYDROCHLORIDE 50 MG: 50 TABLET ORAL at 16:51

## 2024-05-04 RX ADMIN — DEXTROSE AND SODIUM CHLORIDE: 5; 450 INJECTION, SOLUTION INTRAVENOUS at 05:59

## 2024-05-04 RX ADMIN — METOCLOPRAMIDE 5 MG: 5 INJECTION, SOLUTION INTRAMUSCULAR; INTRAVENOUS at 20:06

## 2024-05-04 RX ADMIN — OXYCODONE HYDROCHLORIDE 5 MG: 5 SOLUTION ORAL at 14:56

## 2024-05-04 RX ADMIN — SALINE NASAL SPRAY 1 SPRAY: 1.5 SOLUTION NASAL at 04:42

## 2024-05-04 RX ADMIN — METHOCARBAMOL 500 MG: 500 TABLET ORAL at 22:56

## 2024-05-04 RX ADMIN — Medication 3 MG: at 20:02

## 2024-05-04 RX ADMIN — SENNOSIDES AND DOCUSATE SODIUM 2 TABLET: 8.6; 5 TABLET ORAL at 20:06

## 2024-05-04 RX ADMIN — GUAIFENESIN 200 MG: 100 SOLUTION ORAL at 07:53

## 2024-05-04 RX ADMIN — FUROSEMIDE 20 MG: 10 SOLUTION ORAL at 15:01

## 2024-05-04 RX ADMIN — SALINE NASAL SPRAY 1 SPRAY: 1.5 SOLUTION NASAL at 03:27

## 2024-05-04 RX ADMIN — METOCLOPRAMIDE 5 MG: 5 INJECTION, SOLUTION INTRAMUSCULAR; INTRAVENOUS at 07:53

## 2024-05-04 RX ADMIN — METHOCARBAMOL 500 MG: 500 TABLET ORAL at 04:33

## 2024-05-04 RX ADMIN — ENOXAPARIN SODIUM 40 MG: 40 INJECTION SUBCUTANEOUS at 20:02

## 2024-05-04 RX ADMIN — GUAIFENESIN 200 MG: 100 SOLUTION ORAL at 20:02

## 2024-05-04 RX ADMIN — FUROSEMIDE 20 MG: 10 SOLUTION ORAL at 10:02

## 2024-05-04 RX ADMIN — ALBUTEROL SULFATE 4 PUFF: 90 AEROSOL, METERED RESPIRATORY (INHALATION) at 20:03

## 2024-05-04 RX ADMIN — GABAPENTIN 100 MG: 100 CAPSULE ORAL at 22:57

## 2024-05-04 RX ADMIN — FAMOTIDINE 20 MG: 20 TABLET ORAL at 07:53

## 2024-05-04 RX ADMIN — PIPERACILLIN SODIUM AND TAZOBACTAM SODIUM 3.38 G: 3; .375 INJECTION, POWDER, LYOPHILIZED, FOR SOLUTION INTRAVENOUS at 19:57

## 2024-05-04 RX ADMIN — LORAZEPAM 0.5 MG: 2 CONCENTRATE ORAL at 00:36

## 2024-05-04 RX ADMIN — ROSUVASTATIN CALCIUM 40 MG: 20 TABLET, FILM COATED ORAL at 07:53

## 2024-05-04 RX ADMIN — METHOCARBAMOL 500 MG: 500 TABLET ORAL at 15:02

## 2024-05-04 ASSESSMENT — ACTIVITIES OF DAILY LIVING (ADL)
ADLS_ACUITY_SCORE: 34

## 2024-05-04 ASSESSMENT — LIFESTYLE VARIABLES: TOBACCO_USE: 1

## 2024-05-04 NOTE — PLAN OF CARE
[id number]  Neuro: A&Ox4. Anxious PRN Ativan given x1.  Cardiac: SR. VSS.   Respiratory: Sating >92 on 1L NC. Pt desatted to 85-89 and sustained. Put on NC. Pt had a little nosebleed, PRN saline spray given & humidity added to oxygen. Bleeding resolved.  GI/: Adequate urine output. BM X2  Diet/appetite: NPO. TF are still held. Denies N/V this shift.  Activity:  SBA, up to chair.  Pain: At acceptable level on current regimen. Lower abdominal pain. Scheduled Tylenol/Lidocaine patch helped. Atarax given x2.  Skin: No new deficits noted.  LDA's: 1 LPIV, infusing D5 and 0.45% NaCI @50ml/hr. R PIV removed, IV beeping too much with movement & area was becoming edematous, no pain or redness.    Plan: Continue with POC. Notify primary team with changes.

## 2024-05-04 NOTE — PROGRESS NOTES
"  Thoracic Surgery Progress Note  Surgery [facility]    05/04/2024    Lopez Hogue is a 68 year old male with CC of esophagus who is s/p laparoscopic and right thoracoscopic gastroesophagectomy on 4/19 with Dr. Hill    Procedure(s):  Esophagogastroduodenoscopy, With Botulinum Toxin Injection.    Pt reports increased abdominal pain and SOB. Reported he wanted TF paused. Patient was assessed at bedside with soft abdomen and reassuring vitals. Denies chest pain, or dizziness. Patient Is passing flatus or having bowel movements and Is voiding spontaneously.     /76   Pulse 94   Temp 98.2  F (36.8  C) (Oral)   Resp 18   Ht 1.727 m (5' 8\")   Wt 64.7 kg (142 lb 10.2 oz)   SpO2 95%   BMI 21.69 kg/m      Gen: A&O x4, NAD   Chest: breathing non-labored on nasal cannula 4L   Abdomen: soft, non-tender, non-distended   Incision: clean, dry, intact    Extremities: warm and well perfused   Devices: GJ    CT resulted from this morning  IMPRESSION:   1. Increased diffuse nodular and consolidative opacities with  increasing cavitation, concerning for worsening infectious process.  Recommend follow-up to assess for resolution.  2. Postsurgical changes of esophagogastrectomy. There is fluid-filled  distention of the gastric pull-through, not substantially changed  compared to 4/28/2024 with abrupt caliber change the level of the  diaphragmatic hiatus with complete obstruction. There is a small  amount of contrast present within the distal bowel, which likely is  from a prior oral contrast administration.  3. Small bilateral pleural effusions.    A/P:    Lopez Hogue is a 68 year old male with CC of esophagus who is s/p laparoscopic and right thoracoscopic gastroesophagectomy on 4/19 with Dr. Hill  with worsening pulmonary findings on Ct concerning for aspiration and clinical findings consistent with increased secretion production and increased abdominal pain with TF.     -Strict NPO hold TF starting now. "   -No meds orally, use GJ tube for meds or IV.   -consented for EGD with possible dilation possible botox injection to pylorus for tomorrow morning,  -zosyn started for concern for aspiration pneumonia   Continue plan of care per primary team. Please call with any questions.    Patient discussed with fellow.  Ade Silverman MD

## 2024-05-04 NOTE — PROGRESS NOTES
"Thoracic Surgery  [facility]  5/4/2024    Subjective:  No acute events overnight. Patient's Tfs remained held, as did his NPO status. Awaiting repeat CXR/AXR which demonstrated persistent contrast within his stomach w/o evidence of progression thorough his pylorus    Objective  /75 (BP Location: Right arm)   Pulse 91   Temp 98.7  F (37.1  C) (Oral)   Resp 18   Ht 1.727 m (5' 8\")   Wt 64.7 kg (142 lb 10.2 oz)   SpO2 92%   BMI 21.69 kg/m        GEN: Alert, oriented   PULM: unlabored breathing on room air  CV: RRR  GI: soft, nontender, nondistended  EXT: appear well perfused  INCISIONS: CDI    A/P:    68M with SCC of esophagus who is s/p laparoscopic and right thoracoscopic gastroesophagectomy on 4/19 with Dr. Hill. Monitored in SICU overnight POD0 to 1, did very well and transferred to [facility] on 4/20. Esophagram on 4/25 was negative for leak. Concern for possible aspiration with clear liquids led to acute respiratory decline on 4/28 requiring re-intubation and admission to the SICU. He returned to OR for EGD and bronchoscopy where a fair amount of fluid was suctioned from the esophagus, the tissue appeared healthy with no evidence of leak. On 4/29 and repeat EGD with pyloric botox injection was performed, after which he was extubated.     Esophagram and repeat serial AXR overnight demonstrated retained contrast in the patients stomach. Unclear reason for this, though suspect orientation of the gastric conduit and location of the pylorus. Will obtain CT C/A/P to better assess the gastric conduit today. OK to continue gastric feeds.      - CT C/A/P today to assess gastric anatomy.   - OK for tube feeds, chips   - Multimodal pain control   - Daily CXR, aspiration precautions  - Continue to hold PTA plavix, continue lovenox    Patient seen on rounds with fellow, Dr. Garcia, who will discuss with staff.    Please page if questions,    Gerald Perez MD, MS  PGY-3, General " Surgery

## 2024-05-04 NOTE — PROVIDER NOTIFICATION
"Time of notification: 5:00 PM  Provider notified: Ade Silverman   Patient status: \"TF's have been on since 1pm. Since, pt has become more SOB, coughing frequently and coughing up secretions. I also had weaned his O2 down earlier today, but had to turn back up since TF's restarted. Do you want to stop the TFs?\"    , RR 24, requiring 4L NC currently.     Orders received:   Provider to bedside. TF's stopped and patient NPO. Starting antibiotics and fluids. Consent for EGD completed.   ___________________________________________    Neuro: Patient alert and oriented x4. Intermittently anxious, atarax and ativan given.   Cardiac: NSR 90's-100's, BP's stable, afebrile.   Respiratory: Sating >92% 2-4L. Intermittent SOB and coughing up secretions throughout the day.   GI/: GUOP, lasix given. 1 BM this shift.   Diet/appetite: NPO, TF's off. 1 emesis today. Okay for meds in tube.   Activity: Stand by assist. Steady on feet.   Pain: Complaining of lower abdominal pain. Oxycodone and atarax given. Dilaudid now available.   Skin: No new deficits noted. .   LDA's: 1 PIV - infusing D5 1/2 NS at 75ml/hr. Starting antibiotics.      Plan: Mag replaced. Plans for EGD in OR tomorrow. Will continue with POC.     "

## 2024-05-05 ENCOUNTER — APPOINTMENT (OUTPATIENT)
Dept: GENERAL RADIOLOGY | Facility: CLINIC | Age: 69
DRG: 326 | End: 2024-05-05
Attending: THORACIC SURGERY (CARDIOTHORACIC VASCULAR SURGERY)
Payer: COMMERCIAL

## 2024-05-05 ENCOUNTER — APPOINTMENT (OUTPATIENT)
Dept: GENERAL RADIOLOGY | Facility: CLINIC | Age: 69
DRG: 326 | End: 2024-05-05
Payer: COMMERCIAL

## 2024-05-05 ENCOUNTER — ANESTHESIA (OUTPATIENT)
Dept: SURGERY | Facility: CLINIC | Age: 69
DRG: 326 | End: 2024-05-05
Payer: COMMERCIAL

## 2024-05-05 LAB
ALBUMIN SERPL BCG-MCNC: 3 G/DL (ref 3.5–5.2)
ALLEN'S TEST: YES
ALP SERPL-CCNC: 418 U/L (ref 40–150)
ALT SERPL W P-5'-P-CCNC: 74 U/L (ref 0–70)
ANION GAP SERPL CALCULATED.3IONS-SCNC: 11 MMOL/L (ref 7–15)
AST SERPL W P-5'-P-CCNC: 33 U/L (ref 0–45)
BASE EXCESS BLDA CALC-SCNC: 3 MMOL/L (ref -3–3)
BILIRUB DIRECT SERPL-MCNC: <0.2 MG/DL (ref 0–0.3)
BILIRUB SERPL-MCNC: 0.5 MG/DL
BUN SERPL-MCNC: 9.6 MG/DL (ref 8–23)
CALCIUM SERPL-MCNC: 9.2 MG/DL (ref 8.8–10.2)
CHLORIDE SERPL-SCNC: 101 MMOL/L (ref 98–107)
COHGB MFR BLD: 94.7 % (ref 95–96)
CREAT SERPL-MCNC: 0.61 MG/DL (ref 0.67–1.17)
DEPRECATED HCO3 PLAS-SCNC: 23 MMOL/L (ref 22–29)
EGFRCR SERPLBLD CKD-EPI 2021: >90 ML/MIN/1.73M2
ERYTHROCYTE [DISTWIDTH] IN BLOOD BY AUTOMATED COUNT: 13.9 % (ref 10–15)
GLUCOSE SERPL-MCNC: 126 MG/DL (ref 70–99)
GRAM STAIN RESULT: ABNORMAL
GRAM STAIN RESULT: ABNORMAL
HCO3 BLD-SCNC: 28 MMOL/L (ref 21–28)
HCT VFR BLD AUTO: 30 % (ref 40–53)
HGB BLD-MCNC: 10 G/DL (ref 13.3–17.7)
MAGNESIUM SERPL-MCNC: 1.9 MG/DL (ref 1.7–2.3)
MCH RBC QN AUTO: 31.8 PG (ref 26.5–33)
MCHC RBC AUTO-ENTMCNC: 33.3 G/DL (ref 31.5–36.5)
MCV RBC AUTO: 96 FL (ref 78–100)
O2/TOTAL GAS SETTING VFR VENT: 70 %
PCO2 BLD: 41 MM HG (ref 35–45)
PH BLD: 7.44 [PH] (ref 7.35–7.45)
PHOSPHATE SERPL-MCNC: 3.5 MG/DL (ref 2.5–4.5)
PLATELET # BLD AUTO: 278 10E3/UL (ref 150–450)
PO2 BLD: 72 MM HG (ref 80–105)
POTASSIUM SERPL-SCNC: 3.6 MMOL/L (ref 3.4–5.3)
PROT SERPL-MCNC: 6.8 G/DL (ref 6.4–8.3)
RBC # BLD AUTO: 3.14 10E6/UL (ref 4.4–5.9)
SAO2 % BLDA: 93 % (ref 92–100)
SODIUM SERPL-SCNC: 135 MMOL/L (ref 135–145)
WBC # BLD AUTO: 10.7 10E3/UL (ref 4–11)

## 2024-05-05 PROCEDURE — 250N000013 HC RX MED GY IP 250 OP 250 PS 637

## 2024-05-05 PROCEDURE — 272N000002 HC OR SUPPLY OTHER OPNP: Performed by: THORACIC SURGERY (CARDIOTHORACIC VASCULAR SURGERY)

## 2024-05-05 PROCEDURE — C1769 GUIDE WIRE: HCPCS | Performed by: THORACIC SURGERY (CARDIOTHORACIC VASCULAR SURGERY)

## 2024-05-05 PROCEDURE — 250N000011 HC RX IP 250 OP 636: Performed by: NURSE ANESTHETIST, CERTIFIED REGISTERED

## 2024-05-05 PROCEDURE — 99207 PR NO BILLABLE SERVICE THIS VISIT: CPT | Performed by: NURSE PRACTITIONER

## 2024-05-05 PROCEDURE — 31622 DX BRONCHOSCOPE/WASH: CPT | Mod: 78 | Performed by: THORACIC SURGERY (CARDIOTHORACIC VASCULAR SURGERY)

## 2024-05-05 PROCEDURE — 71045 X-RAY EXAM CHEST 1 VIEW: CPT | Mod: 26 | Performed by: RADIOLOGY

## 2024-05-05 PROCEDURE — 999N000128 HC STATISTIC PERIPHERAL IV START W/O US GUIDANCE

## 2024-05-05 PROCEDURE — 71045 X-RAY EXAM CHEST 1 VIEW: CPT

## 2024-05-05 PROCEDURE — 87102 FUNGUS ISOLATION CULTURE: CPT | Performed by: THORACIC SURGERY (CARDIOTHORACIC VASCULAR SURGERY)

## 2024-05-05 PROCEDURE — 120N000005 HC R&B MS OVERFLOW UMMC

## 2024-05-05 PROCEDURE — 250N000011 HC RX IP 250 OP 636: Performed by: ANESTHESIOLOGY

## 2024-05-05 PROCEDURE — 250N000025 HC SEVOFLURANE, PER MIN: Performed by: THORACIC SURGERY (CARDIOTHORACIC VASCULAR SURGERY)

## 2024-05-05 PROCEDURE — 82248 BILIRUBIN DIRECT: CPT | Performed by: NURSE PRACTITIONER

## 2024-05-05 PROCEDURE — 74360 X-RAY GUIDE GI DILATION: CPT | Mod: 26 | Performed by: THORACIC SURGERY (CARDIOTHORACIC VASCULAR SURGERY)

## 2024-05-05 PROCEDURE — 250N000011 HC RX IP 250 OP 636

## 2024-05-05 PROCEDURE — 258N000003 HC RX IP 258 OP 636: Performed by: NURSE ANESTHETIST, CERTIFIED REGISTERED

## 2024-05-05 PROCEDURE — 43235 EGD DIAGNOSTIC BRUSH WASH: CPT | Performed by: ANESTHESIOLOGY

## 2024-05-05 PROCEDURE — 360N000075 HC SURGERY LEVEL 2, PER MIN: Performed by: THORACIC SURGERY (CARDIOTHORACIC VASCULAR SURGERY)

## 2024-05-05 PROCEDURE — 250N000011 HC RX IP 250 OP 636: Performed by: THORACIC SURGERY (CARDIOTHORACIC VASCULAR SURGERY)

## 2024-05-05 PROCEDURE — 87106 FUNGI IDENTIFICATION YEAST: CPT | Performed by: THORACIC SURGERY (CARDIOTHORACIC VASCULAR SURGERY)

## 2024-05-05 PROCEDURE — 87205 SMEAR GRAM STAIN: CPT | Performed by: THORACIC SURGERY (CARDIOTHORACIC VASCULAR SURGERY)

## 2024-05-05 PROCEDURE — 84100 ASSAY OF PHOSPHORUS: CPT | Performed by: THORACIC SURGERY (CARDIOTHORACIC VASCULAR SURGERY)

## 2024-05-05 PROCEDURE — 43249 ESOPH EGD DILATION <30 MM: CPT | Mod: 78 | Performed by: THORACIC SURGERY (CARDIOTHORACIC VASCULAR SURGERY)

## 2024-05-05 PROCEDURE — 36415 COLL VENOUS BLD VENIPUNCTURE: CPT

## 2024-05-05 PROCEDURE — 272N000001 HC OR GENERAL SUPPLY STERILE: Performed by: THORACIC SURGERY (CARDIOTHORACIC VASCULAR SURGERY)

## 2024-05-05 PROCEDURE — C1726 CATH, BAL DIL, NON-VASCULAR: HCPCS | Performed by: THORACIC SURGERY (CARDIOTHORACIC VASCULAR SURGERY)

## 2024-05-05 PROCEDURE — 250N000009 HC RX 250: Performed by: ANESTHESIOLOGY

## 2024-05-05 PROCEDURE — 370N000017 HC ANESTHESIA TECHNICAL FEE, PER MIN: Performed by: THORACIC SURGERY (CARDIOTHORACIC VASCULAR SURGERY)

## 2024-05-05 PROCEDURE — 999N000179 XR SURGERY CARM FLUORO LESS THAN 5 MIN W STILLS: Mod: TC

## 2024-05-05 PROCEDURE — 99232 SBSQ HOSP IP/OBS MODERATE 35: CPT | Performed by: NURSE PRACTITIONER

## 2024-05-05 PROCEDURE — 0D778DZ DILATION OF STOMACH, PYLORUS WITH INTRALUMINAL DEVICE, VIA NATURAL OR ARTIFICIAL OPENING ENDOSCOPIC: ICD-10-PCS | Performed by: THORACIC SURGERY (CARDIOTHORACIC VASCULAR SURGERY)

## 2024-05-05 PROCEDURE — 83735 ASSAY OF MAGNESIUM: CPT | Performed by: THORACIC SURGERY (CARDIOTHORACIC VASCULAR SURGERY)

## 2024-05-05 PROCEDURE — 0D9670Z DRAINAGE OF STOMACH WITH DRAINAGE DEVICE, VIA NATURAL OR ARTIFICIAL OPENING: ICD-10-PCS | Performed by: THORACIC SURGERY (CARDIOTHORACIC VASCULAR SURGERY)

## 2024-05-05 PROCEDURE — 272N000054 HC CANNULA HIGH FLOW, ADULT

## 2024-05-05 PROCEDURE — 82805 BLOOD GASES W/O2 SATURATION: CPT

## 2024-05-05 PROCEDURE — 80048 BASIC METABOLIC PNL TOTAL CA: CPT

## 2024-05-05 PROCEDURE — 710N000010 HC RECOVERY PHASE 1, LEVEL 2, PER MIN: Performed by: THORACIC SURGERY (CARDIOTHORACIC VASCULAR SURGERY)

## 2024-05-05 PROCEDURE — 80053 COMPREHEN METABOLIC PANEL: CPT

## 2024-05-05 PROCEDURE — 999N000123 HC STATISTIC OXYGEN O2DAILY TECH TIME

## 2024-05-05 PROCEDURE — 999N000215 HC STATISTIC HFNC ADULT NON-CPAP

## 2024-05-05 PROCEDURE — 0B9M8ZZ DRAINAGE OF BILATERAL LUNGS, VIA NATURAL OR ARTIFICIAL OPENING ENDOSCOPIC: ICD-10-PCS | Performed by: THORACIC SURGERY (CARDIOTHORACIC VASCULAR SURGERY)

## 2024-05-05 PROCEDURE — 36600 WITHDRAWAL OF ARTERIAL BLOOD: CPT

## 2024-05-05 PROCEDURE — 250N000009 HC RX 250: Performed by: NURSE ANESTHETIST, CERTIFIED REGISTERED

## 2024-05-05 PROCEDURE — 85027 COMPLETE CBC AUTOMATED: CPT

## 2024-05-05 PROCEDURE — 250N000011 HC RX IP 250 OP 636: Mod: JZ

## 2024-05-05 PROCEDURE — 999N000157 HC STATISTIC RCP TIME EA 10 MIN

## 2024-05-05 RX ORDER — HYDROMORPHONE HCL IN WATER/PF 6 MG/30 ML
0.2 PATIENT CONTROLLED ANALGESIA SYRINGE INTRAVENOUS EVERY 5 MIN PRN
Status: DISCONTINUED | OUTPATIENT
Start: 2024-05-05 | End: 2024-05-05 | Stop reason: HOSPADM

## 2024-05-05 RX ORDER — LABETALOL HYDROCHLORIDE 5 MG/ML
10 INJECTION, SOLUTION INTRAVENOUS
Status: DISCONTINUED | OUTPATIENT
Start: 2024-05-05 | End: 2024-05-05 | Stop reason: HOSPADM

## 2024-05-05 RX ORDER — SODIUM CHLORIDE, SODIUM LACTATE, POTASSIUM CHLORIDE, CALCIUM CHLORIDE 600; 310; 30; 20 MG/100ML; MG/100ML; MG/100ML; MG/100ML
INJECTION, SOLUTION INTRAVENOUS CONTINUOUS PRN
Status: DISCONTINUED | OUTPATIENT
Start: 2024-05-05 | End: 2024-05-05

## 2024-05-05 RX ORDER — NALOXONE HYDROCHLORIDE 0.4 MG/ML
0.1 INJECTION, SOLUTION INTRAMUSCULAR; INTRAVENOUS; SUBCUTANEOUS
Status: DISCONTINUED | OUTPATIENT
Start: 2024-05-05 | End: 2024-05-05 | Stop reason: HOSPADM

## 2024-05-05 RX ORDER — HYDROMORPHONE HCL IN WATER/PF 6 MG/30 ML
0.4 PATIENT CONTROLLED ANALGESIA SYRINGE INTRAVENOUS EVERY 5 MIN PRN
Status: DISCONTINUED | OUTPATIENT
Start: 2024-05-05 | End: 2024-05-05 | Stop reason: HOSPADM

## 2024-05-05 RX ORDER — MAGNESIUM SULFATE HEPTAHYDRATE 40 MG/ML
2 INJECTION, SOLUTION INTRAVENOUS ONCE
Status: COMPLETED | OUTPATIENT
Start: 2024-05-05 | End: 2024-05-05

## 2024-05-05 RX ORDER — VANCOMYCIN HYDROCHLORIDE 1 G/200ML
INJECTION, SOLUTION INTRAVENOUS PRN
Status: DISCONTINUED | OUTPATIENT
Start: 2024-05-05 | End: 2024-05-05

## 2024-05-05 RX ORDER — FENTANYL CITRATE 50 UG/ML
25 INJECTION, SOLUTION INTRAMUSCULAR; INTRAVENOUS EVERY 5 MIN PRN
Status: DISCONTINUED | OUTPATIENT
Start: 2024-05-05 | End: 2024-05-05 | Stop reason: HOSPADM

## 2024-05-05 RX ORDER — PROPOFOL 10 MG/ML
INJECTION, EMULSION INTRAVENOUS CONTINUOUS PRN
Status: DISCONTINUED | OUTPATIENT
Start: 2024-05-05 | End: 2024-05-05

## 2024-05-05 RX ORDER — DEXMEDETOMIDINE HYDROCHLORIDE 4 UG/ML
INJECTION, SOLUTION INTRAVENOUS PRN
Status: DISCONTINUED | OUTPATIENT
Start: 2024-05-05 | End: 2024-05-05

## 2024-05-05 RX ORDER — PROPOFOL 10 MG/ML
INJECTION, EMULSION INTRAVENOUS PRN
Status: DISCONTINUED | OUTPATIENT
Start: 2024-05-05 | End: 2024-05-05

## 2024-05-05 RX ORDER — FENTANYL CITRATE 50 UG/ML
50 INJECTION, SOLUTION INTRAMUSCULAR; INTRAVENOUS EVERY 5 MIN PRN
Status: DISCONTINUED | OUTPATIENT
Start: 2024-05-05 | End: 2024-05-05 | Stop reason: HOSPADM

## 2024-05-05 RX ORDER — DEXAMETHASONE SODIUM PHOSPHATE 4 MG/ML
INJECTION, SOLUTION INTRA-ARTICULAR; INTRALESIONAL; INTRAMUSCULAR; INTRAVENOUS; SOFT TISSUE PRN
Status: DISCONTINUED | OUTPATIENT
Start: 2024-05-05 | End: 2024-05-05

## 2024-05-05 RX ORDER — FLUCONAZOLE 2 MG/ML
INJECTION, SOLUTION INTRAVENOUS PRN
Status: DISCONTINUED | OUTPATIENT
Start: 2024-05-05 | End: 2024-05-05

## 2024-05-05 RX ORDER — ONDANSETRON 2 MG/ML
INJECTION INTRAMUSCULAR; INTRAVENOUS PRN
Status: DISCONTINUED | OUTPATIENT
Start: 2024-05-05 | End: 2024-05-05

## 2024-05-05 RX ORDER — DEXAMETHASONE SODIUM PHOSPHATE 4 MG/ML
4 INJECTION, SOLUTION INTRA-ARTICULAR; INTRALESIONAL; INTRAMUSCULAR; INTRAVENOUS; SOFT TISSUE
Status: DISCONTINUED | OUTPATIENT
Start: 2024-05-05 | End: 2024-05-05 | Stop reason: HOSPADM

## 2024-05-05 RX ORDER — LIDOCAINE 40 MG/G
CREAM TOPICAL
Status: DISCONTINUED | OUTPATIENT
Start: 2024-05-05 | End: 2024-05-05 | Stop reason: HOSPADM

## 2024-05-05 RX ORDER — FENTANYL CITRATE 50 UG/ML
INJECTION, SOLUTION INTRAMUSCULAR; INTRAVENOUS PRN
Status: DISCONTINUED | OUTPATIENT
Start: 2024-05-05 | End: 2024-05-05

## 2024-05-05 RX ORDER — ACETAMINOPHEN 325 MG/1
975 TABLET ORAL ONCE
Status: DISCONTINUED | OUTPATIENT
Start: 2024-05-05 | End: 2024-05-05 | Stop reason: HOSPADM

## 2024-05-05 RX ORDER — ONDANSETRON 4 MG/1
4 TABLET, ORALLY DISINTEGRATING ORAL EVERY 30 MIN PRN
Status: DISCONTINUED | OUTPATIENT
Start: 2024-05-05 | End: 2024-05-05 | Stop reason: HOSPADM

## 2024-05-05 RX ORDER — SODIUM CHLORIDE, SODIUM LACTATE, POTASSIUM CHLORIDE, CALCIUM CHLORIDE 600; 310; 30; 20 MG/100ML; MG/100ML; MG/100ML; MG/100ML
INJECTION, SOLUTION INTRAVENOUS CONTINUOUS
Status: DISCONTINUED | OUTPATIENT
Start: 2024-05-05 | End: 2024-05-05 | Stop reason: HOSPADM

## 2024-05-05 RX ORDER — CEFEPIME HYDROCHLORIDE 2 G/1
2 INJECTION, POWDER, FOR SOLUTION INTRAVENOUS EVERY 8 HOURS
Status: DISCONTINUED | OUTPATIENT
Start: 2024-05-05 | End: 2024-05-05

## 2024-05-05 RX ORDER — LEVOFLOXACIN 5 MG/ML
500 INJECTION, SOLUTION INTRAVENOUS EVERY 24 HOURS
Status: DISCONTINUED | OUTPATIENT
Start: 2024-05-05 | End: 2024-05-16

## 2024-05-05 RX ORDER — ONDANSETRON 2 MG/ML
4 INJECTION INTRAMUSCULAR; INTRAVENOUS EVERY 30 MIN PRN
Status: DISCONTINUED | OUTPATIENT
Start: 2024-05-05 | End: 2024-05-05 | Stop reason: HOSPADM

## 2024-05-05 RX ORDER — LIDOCAINE HYDROCHLORIDE 20 MG/ML
INJECTION, SOLUTION INFILTRATION; PERINEURAL PRN
Status: DISCONTINUED | OUTPATIENT
Start: 2024-05-05 | End: 2024-05-05

## 2024-05-05 RX ORDER — SODIUM CHLORIDE, SODIUM LACTATE, POTASSIUM CHLORIDE, CALCIUM CHLORIDE 600; 310; 30; 20 MG/100ML; MG/100ML; MG/100ML; MG/100ML
INJECTION, SOLUTION INTRAVENOUS CONTINUOUS
Status: DISCONTINUED | OUTPATIENT
Start: 2024-05-05 | End: 2024-05-05

## 2024-05-05 RX ADMIN — LIDOCAINE 4% 2 PATCH: 40 PATCH TOPICAL at 20:14

## 2024-05-05 RX ADMIN — Medication 8 MCG: at 12:34

## 2024-05-05 RX ADMIN — POLYETHYLENE GLYCOL 3350 17 G: 17 POWDER, FOR SOLUTION ORAL at 19:56

## 2024-05-05 RX ADMIN — Medication 3 MG: at 19:53

## 2024-05-05 RX ADMIN — MAGNESIUM SULFATE HEPTAHYDRATE 2 G: 2 INJECTION, SOLUTION INTRAVENOUS at 07:35

## 2024-05-05 RX ADMIN — ROSUVASTATIN CALCIUM 40 MG: 20 TABLET, FILM COATED ORAL at 07:38

## 2024-05-05 RX ADMIN — PROPOFOL 50 MCG/KG/MIN: 10 INJECTION, EMULSION INTRAVENOUS at 13:11

## 2024-05-05 RX ADMIN — GUAIFENESIN 200 MG: 100 SOLUTION ORAL at 01:50

## 2024-05-05 RX ADMIN — SERTRALINE HYDROCHLORIDE 100 MG: 100 TABLET ORAL at 07:38

## 2024-05-05 RX ADMIN — FENTANYL CITRATE 25 MCG: 50 INJECTION INTRAMUSCULAR; INTRAVENOUS at 13:10

## 2024-05-05 RX ADMIN — SALINE NASAL SPRAY 1 SPRAY: 1.5 SOLUTION NASAL at 01:06

## 2024-05-05 RX ADMIN — PHENYLEPHRINE HYDROCHLORIDE 100 MCG: 10 INJECTION INTRAVENOUS at 13:16

## 2024-05-05 RX ADMIN — FENTANYL CITRATE 25 MCG: 50 INJECTION INTRAMUSCULAR; INTRAVENOUS at 12:41

## 2024-05-05 RX ADMIN — ACETAMINOPHEN 975 MG: 325 TABLET, FILM COATED ORAL at 16:08

## 2024-05-05 RX ADMIN — GUAIFENESIN 200 MG: 100 SOLUTION ORAL at 07:38

## 2024-05-05 RX ADMIN — Medication 8 MCG: at 13:33

## 2024-05-05 RX ADMIN — GABAPENTIN 100 MG: 100 CAPSULE ORAL at 22:44

## 2024-05-05 RX ADMIN — METHOCARBAMOL 500 MG: 500 TABLET ORAL at 16:08

## 2024-05-05 RX ADMIN — FAMOTIDINE 20 MG: 20 TABLET ORAL at 07:38

## 2024-05-05 RX ADMIN — DEXTROSE MONOHYDRATE AND SODIUM CHLORIDE: 5; .45 INJECTION, SOLUTION INTRAVENOUS at 10:43

## 2024-05-05 RX ADMIN — SUCCINYLCHOLINE CHLORIDE 100 MG: 20 INJECTION, SOLUTION INTRAMUSCULAR; INTRAVENOUS; PARENTERAL at 12:56

## 2024-05-05 RX ADMIN — METHOCARBAMOL 500 MG: 500 TABLET ORAL at 22:43

## 2024-05-05 RX ADMIN — SUGAMMADEX 100 MG: 100 INJECTION, SOLUTION INTRAVENOUS at 13:57

## 2024-05-05 RX ADMIN — FENTANYL CITRATE 50 MCG: 50 INJECTION INTRAMUSCULAR; INTRAVENOUS at 12:56

## 2024-05-05 RX ADMIN — VANCOMYCIN HYDROCHLORIDE 1 G: 1 INJECTION, SOLUTION INTRAVENOUS at 13:07

## 2024-05-05 RX ADMIN — METHOCARBAMOL 500 MG: 500 TABLET ORAL at 04:53

## 2024-05-05 RX ADMIN — METOCLOPRAMIDE 5 MG: 5 INJECTION, SOLUTION INTRAMUSCULAR; INTRAVENOUS at 07:37

## 2024-05-05 RX ADMIN — LORAZEPAM 0.5 MG: 2 CONCENTRATE ORAL at 12:13

## 2024-05-05 RX ADMIN — DEXAMETHASONE SODIUM PHOSPHATE 4 MG: 4 INJECTION, SOLUTION INTRA-ARTICULAR; INTRALESIONAL; INTRAMUSCULAR; INTRAVENOUS; SOFT TISSUE at 13:16

## 2024-05-05 RX ADMIN — PIPERACILLIN SODIUM AND TAZOBACTAM SODIUM 3.38 G: 3; .375 INJECTION, POWDER, LYOPHILIZED, FOR SOLUTION INTRAVENOUS at 07:33

## 2024-05-05 RX ADMIN — Medication 15 ML: at 07:38

## 2024-05-05 RX ADMIN — ACETAMINOPHEN 975 MG: 325 TABLET, FILM COATED ORAL at 04:53

## 2024-05-05 RX ADMIN — GUAIFENESIN 200 MG: 100 SOLUTION ORAL at 19:53

## 2024-05-05 RX ADMIN — FAMOTIDINE 20 MG: 20 TABLET ORAL at 19:53

## 2024-05-05 RX ADMIN — HYDROMORPHONE HYDROCHLORIDE 0.2 MG: 0.2 INJECTION, SOLUTION INTRAMUSCULAR; INTRAVENOUS; SUBCUTANEOUS at 16:09

## 2024-05-05 RX ADMIN — LEVOFLOXACIN 500 MG: 5 INJECTION, SOLUTION INTRAVENOUS at 11:37

## 2024-05-05 RX ADMIN — FLUCONAZOLE IN SODIUM CHLORIDE 200 MG: 2 INJECTION, SOLUTION INTRAVENOUS at 12:42

## 2024-05-05 RX ADMIN — PIPERACILLIN SODIUM AND TAZOBACTAM SODIUM 3.38 G: 3; .375 INJECTION, POWDER, LYOPHILIZED, FOR SOLUTION INTRAVENOUS at 01:02

## 2024-05-05 RX ADMIN — SODIUM CHLORIDE, POTASSIUM CHLORIDE, SODIUM LACTATE AND CALCIUM CHLORIDE: 600; 310; 30; 20 INJECTION, SOLUTION INTRAVENOUS at 12:25

## 2024-05-05 RX ADMIN — PHENYLEPHRINE HYDROCHLORIDE 100 MCG: 10 INJECTION INTRAVENOUS at 13:07

## 2024-05-05 RX ADMIN — LIDOCAINE HYDROCHLORIDE 60 MG: 20 INJECTION, SOLUTION INFILTRATION; PERINEURAL at 12:56

## 2024-05-05 RX ADMIN — METOCLOPRAMIDE 5 MG: 5 INJECTION, SOLUTION INTRAMUSCULAR; INTRAVENOUS at 19:53

## 2024-05-05 RX ADMIN — Medication 50 MG: at 13:09

## 2024-05-05 RX ADMIN — DEXTROSE MONOHYDRATE AND SODIUM CHLORIDE: 5; .45 INJECTION, SOLUTION INTRAVENOUS at 17:17

## 2024-05-05 RX ADMIN — ENOXAPARIN SODIUM 40 MG: 40 INJECTION SUBCUTANEOUS at 19:56

## 2024-05-05 RX ADMIN — PROPOFOL 80 MG: 10 INJECTION, EMULSION INTRAVENOUS at 12:56

## 2024-05-05 RX ADMIN — SUGAMMADEX 100 MG: 100 INJECTION, SOLUTION INTRAVENOUS at 14:01

## 2024-05-05 RX ADMIN — PHENYLEPHRINE HYDROCHLORIDE 100 MCG: 10 INJECTION INTRAVENOUS at 13:27

## 2024-05-05 RX ADMIN — SENNOSIDES AND DOCUSATE SODIUM 2 TABLET: 8.6; 5 TABLET ORAL at 19:53

## 2024-05-05 RX ADMIN — ONDANSETRON 4 MG: 2 INJECTION INTRAMUSCULAR; INTRAVENOUS at 13:55

## 2024-05-05 RX ADMIN — PROPOFOL 30 MG: 10 INJECTION, EMULSION INTRAVENOUS at 13:00

## 2024-05-05 ASSESSMENT — ACTIVITIES OF DAILY LIVING (ADL)
ADLS_ACUITY_SCORE: 33
ADLS_ACUITY_SCORE: 33
ADLS_ACUITY_SCORE: 37
ADLS_ACUITY_SCORE: 33
ADLS_ACUITY_SCORE: 33
ADLS_ACUITY_SCORE: 34
ADLS_ACUITY_SCORE: 33
ADLS_ACUITY_SCORE: 37
ADLS_ACUITY_SCORE: 33
ADLS_ACUITY_SCORE: 37
ADLS_ACUITY_SCORE: 34
ADLS_ACUITY_SCORE: 33
ADLS_ACUITY_SCORE: 34
ADLS_ACUITY_SCORE: 33
ADLS_ACUITY_SCORE: 37
ADLS_ACUITY_SCORE: 34
ADLS_ACUITY_SCORE: 37

## 2024-05-05 NOTE — PROGRESS NOTES
RRT called on pt due to desaturation and difficulty to arouse.     On arrival pt was on 15L oxymask w/ sats in the low 90's. Provider weaned oxygen to 10L and pt's sats remained in the low 90's, but RR was roughly around 34 and pt visibly had an increased in WOB. Pt was then placed on HFNC 70% 40L. SpO2 still remains in the low 90's.     RT will continue to follow and monitor as appropriate.    Carolee Laws, RT on 5/5/2024 at 9:46 AM

## 2024-05-05 NOTE — ANESTHESIA PREPROCEDURE EVALUATION
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)     Nonrheumatic aortic (valve) stenosis     Sweat gland carcinoma       Past Surgical History:   Procedure Laterality Date    BRONCHOSCOPY FLEXIBLE AND RIGID N/A 2024    Procedure: Bronchoscopy flexible and rigid;  Surgeon: Devin Hill MD;  Location: [facility] OR    BRONCHOSCOPY FLEXIBLE AND RIGID N/A 2024    Procedure: Bronchoscopy flexible and rigid;  Surgeon: Jessie Kim MD;  Location: [facility] OR    CV CORONARY ANGIOGRAM N/A 3/27/2023    Procedure: Coronary Angiogram;  Surgeon: Miki Etienne MD;  Location: [facility]    CV CORONARY ANGIOGRAM N/A 2023    Procedure: Coronary Angiogram;  Surgeon: Miki Etienne MD;  Location: [facility] CV    CV LEFT HEART CATH N/A 3/27/2023    Procedure: Left Heart Catheterization;  Surgeon: Miki Etienne MD;  Location: [facility] CV    CV LEFT HEART CATH N/A 2023    Procedure: Left Heart Catheterization;  Surgeon: Miki Etienne MD;  Location: [facility]    CV PCI N/A 3/27/2023    Procedure: Percutaneous Coronary Intervention;  Surgeon: Miki Etienne MD;  Location: [facility] CV    ENDOSCOPIC ULTRASOUND UPPER GASTROINTESTINAL TRACT (GI) N/A 2023    Procedure: Endoscopic ultrasound upper gastrointestinal tract (GI);  Surgeon: Shahriar Giraldo MD;  Location: [facility]    ESOPHAGOGASTRODUODENOSCOPY, WITH BOTULINUM TOXIN INJECTION N/A 2024    Procedure:  Esophagogastroduodenoscopy, With Botulinum Toxin Injection;  Surgeon: Jessie Kim MD;  Location: [facility]    ESOPHAGOSCOPY, GASTROSCOPY, DUODENOSCOPY (EGD), COMBINED N/A 2023    Procedure: ESOPHAGOGASTRODUODENOSCOPY, WITH BIOPSY;  Surgeon: Shahriar Giraldo MD;  Location: [facility]    ESOPHAGOSCOPY, GASTROSCOPY, DUODENOSCOPY (EGD), COMBINED N/A 2024    Procedure: Esophagoscopy, gastroscopy, duodenoscopy (EGD), combined;  Surgeon: Devin Hill MD;  Location: [facility]    ESOPHAGOSCOPY, GASTROSCOPY, DUODENOSCOPY (EGD), COMBINED N/A 2024    Procedure: Esophagoscopy, gastroscopy, duodenoscopy (EGD), combined;  Surgeon: Jessie Kim MD;  Location: [facility]    LAPAROSCOPIC ASSISTED INSERTION TUBE JEJUNOSTOMY N/A 2024    Procedure: Laparoscopic Jejunostomy Tube Insertion and Esophagogastroduodenoscopy;  Surgeon: Kevin Calderon MD;  Location: [facility]    LARYNGOSCOPY WITH BIOPSY(IES) N/A 10/12/2023    Procedure: LARYNGOSCOPY, WITH BIOPSY;  Surgeon: Edi Felix MD;  Location: [facility]    OTHER SURGICAL HISTORY      WIDE EXCISION OF LEFT GLUTEAL MASSTNM: [id number], stage: II hidradenocarcinoma Grade II, 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, sentinel lymph node biopsy negative     THORACOSCOPIC, LAPAROSCOPIC ESOPHAGECTOMY, COMBINED N/A 2024    Procedure: Laparoscopic and right thoracoscopic esophagogastrectomy, right AND left chest tube placement;  Surgeon: Devin Hill MD;  Location: [facility] OR      Allergies   Allergen Reactions    Coconut Flavor Anaphylaxis     Raw coconut      Social History     Tobacco Use    Smoking status: Former     Current packs/day: 0.00     Average packs/day: 2.0 packs/day for 30.0 years (60.0 ttl pk-yrs)     Types: Cigarettes     Start date:      Quit date:      Years since quittin.3     Passive exposure: Past    Smokeless tobacco: Never    Tobacco comments:     Quit 10 years ago   Substance Use Topics    Alcohol use: Not Currently     Comment: quit in  2013      Wt Readings from Last 1 Encounters:   05/04/24 64.7 kg (142 lb 10.2 oz)        Anesthesia Evaluation            ROS/MED HX  ENT/Pulmonary: Comment:  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  The right ventricle is normal size. Global right ventricular function is  normal.  Mild to moderate calcific aortic stenosis is present (peak velocity 3.0 m/s,  mean gradient 19 mmHg, DEVAN 1.5 cm2, DI 0.4).  No pericardial effusion is present.  Compared to previous study dated 2/11/2024, gradient across the aortic valve  is lower. No significant change in biventricular function.    Stress Test:  Date: Results:    ECG Reviewed:  Date: 4/28/24 Results:  SR  Cath:  Date: Results:      METS/Exercise Tolerance: >4 METS    Hematologic: Comments: Pancytopenia       Musculoskeletal:       GI/Hepatic: Comment: Mid-esophageal squamous cell carcinoma. S/p laparoscopic and right thoracoscopic gastroesophagectomy on 4/19 with Dr. Hill with worsening pulmonary findings on CT concerning for aspiration and clinical findings consistent with increased secretion production and increased abdominal pain with TF.     (+) GERD,                   Renal/Genitourinary:       Endo:       Psychiatric/Substance Use:     (+)   alcohol abuse      Infectious Disease:       Malignancy:   (+) Malignancy, History of GI.GI CA  Active status post.      Other:               OUTSIDE LABS:  CBC:   Lab Results   Component Value Date    WBC 8.2 05/04/2024    WBC 8.7 05/03/2024    HGB 9.7 (L) 05/04/2024    HGB 10.4 (L) 05/03/2024    HCT 29.0 (L)  "05/04/2024    HCT 32.4 (L) 05/03/2024     05/04/2024     05/03/2024     BMP:   Lab Results   Component Value Date     05/04/2024     05/03/2024    POTASSIUM 3.9 05/04/2024    POTASSIUM 4.1 05/03/2024    CHLORIDE 101 05/04/2024    CHLORIDE 104 05/03/2024    CO2 22 05/04/2024    CO2 23 05/03/2024    BUN 11.7 05/04/2024    BUN 18.1 05/03/2024    CR 0.55 (L) 05/04/2024    CR 0.54 (L) 05/03/2024     (H) 05/04/2024     (H) 05/03/2024     COAGS:   Lab Results   Component Value Date    PTT 36 04/28/2024    INR 1.34 (H) 04/28/2024     POC: No results found for: \"BGM\", \"HCG\", \"HCGS\"  HEPATIC:   Lab Results   Component Value Date    ALBUMIN 3.6 04/28/2024    PROTTOTAL 7.2 04/28/2024    ALT 17 04/28/2024    AST 22 04/28/2024    ALKPHOS 169 (H) 04/28/2024    BILITOTAL 0.8 04/28/2024     OTHER:   Lab Results   Component Value Date    PH 7.40 04/28/2024    LACT 1.0 04/28/2024    A1C 5.2 03/27/2023    RAFAELA 9.1 05/04/2024    PHOS 3.8 05/04/2024    MAG 1.9 05/04/2024    TSH 2.57 03/29/2024    T4 0.83 (L) 03/29/2024       Anesthesia Plan    ASA Status:  3    NPO Status:  ELEVATED Aspiration Risk/Unknown    Anesthesia Type: General.     - Airway: ETT   Induction: Propofol, RSI.   Maintenance: TIVA.   Techniques and Equipment:     - Lines/Monitors: 2nd IV     - Drips/Meds: Noresharan Spragues            Postoperative Care    Pain management: IV analgesics.   PONV prophylaxis: Ondansetron (or other 5HT-3), Background Propofol Infusion     Comments:               Javad Mathews MD    I have reviewed the pertinent notes and labs in the chart from the past 30 days and (re)examined the patient.  Any updates or changes from those notes are reflected in this note.             "

## 2024-05-05 NOTE — OR NURSING
Paged Gerald Perez for an op note and transfer order.  Paged Hollie Villegas for a transfer order.  Paged Thoracic Surgery Yu Bowden for a transfer order

## 2024-05-05 NOTE — PROGRESS NOTES
"Thoracic Surgery  [facility]  5/5/2024    Subjective:  No acute events overnight but concern for somnolence this morning with RRT called to assess patient. Patient placed on 15L OM w/ setup for HFNC in the room per RT.     Upon our arrival patient was cogent and communicative with us. O2 was weaned to 10L Oxymask with little effect on saturations.     Objective  /70 (BP Location: Right arm, Cuff Size: Adult Regular)   Pulse 89   Temp 98.3  F (36.8  C) (Oral)   Resp 28   Ht 1.727 m (5' 8\")   Wt 62.6 kg (138 lb 0.1 oz)   SpO2 96%   BMI 20.98 kg/m        General: Alert, resting comfortably in NAD/NTA. Cooperative  Pulm: NLB on RA, no tachypnea/dyspnea, no wheezing  CV: RRR by RP, non-cyanotic, no significant LE edema.   GI/ABD: Soft, non- distended, non- tender to palpation, no guarding or rebound.  Skin: Warm and well perfused    Labs:  Na: 135 from 135  K: 3.6 from 3.9    Wbc: 10.7 from 8.2  Hgb: 10 from 9.7    Imaging:  AM CXR: Stable CXR     A/P:  68M with SCC of esophagus who is s/p laparoscopic and right thoracoscopic gastroesophagectomy on 4/19 with Dr. Hill. Monitored in SICU overnight POD0 to 1, did very well and transferred to [facility] on 4/20. Esophagram on 4/25 was negative for leak. Concern for possible aspiration with clear liquids led to acute respiratory decline on 4/28 requiring re-intubation and admission to the SICU. He returned to OR for EGD and bronchoscopy where a fair amount of fluid was suctioned from the esophagus, the tissue appeared healthy with no evidence of leak. On 4/29 and repeat EGD with pyloric botox injection was performed, after which he was extubated. Cares have continued to progress however aspiration has again become a concern, XR       Given RRT, repeated CXR this AM, also consented for bronchoscopy in addition to the other procedures planned for the OR today. ABG sent out and normal. Will hold TF today and keep NPO given his ongoing " aspiration risks. Also adjusted zosyn -> Cefepime given klebsiella resistance.     - OR today   - Zosyn -> Levaquin (5/5 - present )  - Hold TF/Keep NPO  - Multimodal pain control   - Daily CXR, aspiration precautions  - Continue to hold PTA plavix, continue lovenox     Patient seen on rounds with fellow, Dr. Garcia, who will discuss with staff.    Please page if questions,    Gerald Perez MD, MS  PGY-3, General Surgery

## 2024-05-05 NOTE — ANESTHESIA CARE TRANSFER NOTE
Patient: Lopez Hogue    Procedure: Procedure(s):  Esophagoscopy, gastroscopy, duodenoscopy (EGD), combined-under fluoro & dilation, nasogastric tube placement, bronchoscopy       Diagnosis: Gastric outlet obstruction [K31.1]  Diagnosis Additional Information: No value filed.    Anesthesia Type:   General     Note:    Oropharynx: oropharynx clear of all foreign objects and spontaneously breathing  Level of Consciousness: awake  Oxygen Supplementation: face mask  Level of Supplemental Oxygen (L/min / FiO2): 4  Independent Airway: airway patency satisfactory and stable  Dentition: dentition unchanged  Vital Signs Stable: post-procedure vital signs reviewed and stable  Report to RN Given: handoff report given  Patient transferred to: PACU    Handoff Report: Identifed the Patient, Identified the Reponsible Provider, Reviewed the pertinent medical history, Discussed the surgical course, Reviewed Intra-OP anesthesia mangement and issues during anesthesia, Set expectations for post-procedure period and Allowed opportunity for questions and acknowledgement of understanding      Vitals:  Vitals Value Taken Time   /60    Temp     Pulse 88    Resp 14    SpO2 100        Electronically Signed By: YADIRA Lanier CRNA  May 5, 2024  2:16 PM

## 2024-05-05 NOTE — ANESTHESIA POSTPROCEDURE EVALUATION
Patient: Lopez Hogue    Procedure: Procedure(s):  Esophagoscopy, gastroscopy, duodenoscopy (EGD), combined-under fluoro & dilation, nasogastric tube placement, bronchoscopy       Anesthesia Type:  General    Note:  Disposition: Admission   Postop Pain Control:    PONV: No   Neuro/Psych: Uneventful            Sign Out: Acceptable/Baseline neuro status   Airway/Respiratory: Uneventful            Sign Out: Acceptable/Baseline resp. status   CV/Hemodynamics: Uneventful            Sign Out: Acceptable CV status; No obvious hypovolemia; No obvious fluid overload   Other NRE: NONE   DID A NON-ROUTINE EVENT OCCUR? No           Last vitals:  Vitals Value Taken Time   /66 05/05/24 1515   Temp 36.8  C (98.3  F) 05/05/24 1413   Pulse 88 05/05/24 1526   Resp 23 05/05/24 1526   SpO2 95 % 05/05/24 1526   Vitals shown include unfiled device data.    Electronically Signed By: Gerald Khanna MD  May 5, 2024  3:27 PM

## 2024-05-05 NOTE — ANESTHESIA PROCEDURE NOTES
Airway       Patient location during procedure: OR       Procedure Start/Stop Times: 5/5/2024 12:57 PM  Staff -        CRNA: Pat Parr APRN CRNA       Performed By: SRNAIndications and Patient Condition       Indications for airway management: geneva-procedural       Induction type:RSI       Mask difficulty assessment: 0 - not attempted    Final Airway Details       Final airway type: endotracheal airway       Successful airway: ETT - single  Endotracheal Airway Details        ETT size (mm): 8.5       Cuffed: yes       Successful intubation technique: video laryngoscopy       VL Blade Size: Glidescope 4       Grade View of Cords: 1       Adjucts: stylet       Position: Right       Measured from: lips       Secured at (cm): 24       Bite block used: None    Post intubation assessment        Placement verified by: capnometry, equal breath sounds and chest rise        Number of attempts at approach: 1       Number of other approaches attempted: 0       Secured with: tape       Ease of procedure: easy       Dentition: Intact    Medication(s) Administered   Medication Administration Time: 5/5/2024 12:57 PM

## 2024-05-05 NOTE — OP NOTE
Preoperative diagnosis: Gastric outlet obstruction post esophagectomy, aspiration pneumonia     Postoperative diagnosis: Same as preoperative diagnosis     Procedure performed:     1. Flexible bronchoscopy with bronchial lavage    2. Esophagogastroduodenoscopy with pyloric balloon dilation (21 mm)     3.  Fluoroscopy with interpretation of images     Surgeon: Kevin Calderon) present and participated in the entire procedure)       Anesthesia: General     EBL: 0     Findings: Anastomosis at 22 cm, intact and widely patent. Pylorus was tight and angulated but was easy to dilate and easily allowed passage of the gastroscope after dilation. No evidence of perforation post dilation.  Thick airway secretions, moderate amount, cultures sent.     Description of procedure:     With Lopez Hogue in the semi-Rodriguez position and under general anesthesia we performed an esophagogastroduodenoscopy and then a bronchoscopy with the above-mentioned findings.       We then passed a wire and under fluoroscopic guidance dilated the pylorus with a balloon to 21 mm and left it in place for 3 min. At the end of the procedure we performed another endoscopy to verify that there was no evidence of perforation.  Then we placed a NG tube and secured it with a bridal. We verified with fluoroscopy that it was in good position (~53 cm at the nares).    We performed a bronchoscopy to clear secretions, since he had been aspirating intermittently. We irigated with a total of 50 ml of saline and sent the aspirate for cultures.     Lopez Hogue tolerated the procedure well.

## 2024-05-05 NOTE — CODE/RAPID RESPONSE
Rapid Response Team Note    Assessment   In assessment a rapid response was called on Lopez Hogue due to acute hypoxic respiratory failure, respiratory distress, and acute encephalopathy. This presentation is likely due to aspiration pneumonitis    Plan   -  Recommend excellent handoff with anesthesia team re: escalating O2 needs overnight/this AM (patient has procedure scheduled this morning)    -  CXR  -  HFNC  -  Continue holding tube feeds  -  VBG if patient becomes somnolent again  -  avoid sedating medications as able  -  consider pulm consult versus ID consult re: cavitary pneumonia on CT. Could be related to his cancer but also could be infectious   -  recommend switching antibiotic. He had a positive klebsiella from bronch which was resistant to multiple antibiotics. It could be possible that klebsiella is responsible for cavitary pneumonia. Could switch to something like meropenem that would cover both aspiration and klebsiella.    -  The  surgery  primary team was  present in the room for rapid response.  -  Disposition: The patient will remain on the current unit. We will continue to monitor this patient closely.  -  Reassessment and plan follow-up will be performed by the primary team      YADIRA Becerra CNP  [facility] RRT [facility] Job Code Contact #[phone]  [facility] Paging/Directory    Hospital Course   Brief Summary of events leading to rapid response:   Escalating oxygen needs from room air yesterday  --> 4L NC last night--> 6L NC after getting up to bathroom -->15L oxymask this AM    On my arrival, patient somnolent, difficult to wake, aroused to sternal rub by RN.  After a few minutes, he was more awake and oxygen weaned from 15L to 10L  Still with tachypnea. Doesn't feel short of breath. 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   Constitutional:       Comments: Initially somnolent, then alert   Cardiovascular:      Rate and Rhythm: Normal rate and regular rhythm.      Heart sounds: Murmur heard.      Systolic murmur is present.   Pulmonary:      Effort: Tachypnea present. No accessory muscle usage.      Breath sounds: No stridor or decreased air movement.   Musculoskeletal:      Right lower leg: No edema.      Left lower leg: No edema.   Skin:     General: Skin is warm and dry.   Neurological:      General: No focal deficit present.      Mental Status: He is oriented to person, place, and time.       Significant Results and Procedures   Lactic Acid:   Recent Labs   Lab Test 24  1730 24  1354 24  1232   LACT 1.0 0.7 0.6*     CBC:   Recent Labs   Lab Test 24  0539 24  0425 24  0423   WBC 10.7 8.2 8.7   HGB 10.0* 9.7* 10.4*   HCT 30.0* 29.0* 32.4*    236 220        Sepsis Evaluation   The patient is known to have an infection.  NO EVIDENCE OF SEPSIS at this time.  Vital sign, physical exam, and lab findings are due to aspiration pneumonitis.

## 2024-05-05 NOTE — PLAN OF CARE
Neuro: Patient lethargic but easy to arouse and oriented x4. Intermittently anxious, Ativan given x1, has not wanted Atarax.   Cardiac: NSR 80's-90's, BP's consistently stable, afebrile throughout the shift. Getting hourly vitals until 9pm tonight, then vitals q 4.   Respiratory: Intermittent productive cough present. Rapid response called this morning for O2 requirements increasing from 4-6L to 70%, 40L HFNC this morning. Patient now on 4L NC after procedure in OR.   GI/: GUOP, using the urinal. No BM this shift.   Diet/appetite: NPO. Family does not want TF started until they know for sure that feeding is moving through bowel.   Activity: 1 assist when up out of bed for line management and patient getting anesthesia. Otherwise usually a SBA.   Pain: Lower abdominal pain intermittently. And now nose and throat pain from NG placement. Dilaudid given x1. Scheduled Tylenol and Robaxin given.   Skin: No new deficits noted.    LDA's: 1 R PIV - infusing D5 1/2 NS at 75ml/hr. Antibiotics switched. NG placed.      Plan: Mag replaced. EGD and bronchoscopy completed in OR with Dr. Calderon. Family at bedside and updated. Will continue with plan of care.

## 2024-05-05 NOTE — PLAN OF CARE
[id number]  Neuro: A&Ox4. Intermittently anxious, ativan given 1x.   Cardiac: SR/ST. HR . VSS. Fever of 100.3 at 1933 but quickly resolved. Scheduled Tylenol given, ice packs and abx started.   Respiratory: Sating >92% 3L NC. Increased O2 needs during activities, up to 5L NC. Coughing up secretions.  GI/: Adequate urine output. No BM on this shift.  Diet/appetite: Strict NPO. Meds through J tube. Denies N/V this shift.   Activity:  SBA, up to chair.   Pain: At acceptable level on current regimen. Bilateral lower abdominal pain. Scheduled Tylenol has been effective, no PRNs given.  Skin: No new deficits noted.  LDA's: 1 LPIV, infusing D5 and 0.45% NaCI @75ml/hr.     Plan: Continue with POC. Notify primary team with changes.     EGD with possible dilation possible botox injection to pylorus this morning.

## 2024-05-05 NOTE — PROVIDER NOTIFICATION
"   05/05/24 0920   Call Information   Date of Call 05/05/24   Time of Call 0832   Name of person requesting the team Feli Ferreira RN   Title of person requesting team RN   RRT Arrival time 0834   Time RRT ended 0915   Reason for call   Type of RRT Adult   Primary reason for call Respiratory;Neurological   Respiratory Rate greater than 24;O2sat less than 88% for greater than 5 minutes despite O2;Labored breathing   Neurological Lethargic   Was patient transferred from the ED, ICU, or PACU within last 24 hours prior to RRT call? No   SBAR   Situation Increasing oxygen needs, somulence   Background Per thoracic note: \"68M with SCC of esophagus who is s/p laparoscopic and right thoracoscopic gastroesophagectomy on 4/19 with Dr. Hill. Monitored in SICU overnight POD0 to 1, did very well and transferred to [facility] on 4/20. Esophagram on 4/25 was negative for leak. Concern for possible aspiration with clear liquids led to acute respiratory decline on 4/28 requiring re-intubation and admission to the SICU. He returned to OR for EGD and bronchoscopy where a fair amount of fluid was suctioned from the esophagus, the tissue appeared healthy with no evidence of leak. On 4/29 and repeat EGD with pyloric botox injection was performed, after which he was extubated.\"   Notable History/Conditions Cancer;Hypertension;Recent surgery   Assessment Patient in bed with RR 30s on 15L oxymask with saturations 89-90%. Upon arrival patient was very somulent requiring sternal rub to attend. As the visit progressed the patinent woke to a RASS 0 and was oriented x4. Patient switched to HFNC 40L 70% with decrease in WOB with RR now in mid 20s, saturations >92%.   Interventions CXR;O2 per N/C or mask   Patient Outcome   Patient Outcome Stabilized on unit   RRT Team   Attending/Primary/Covering Physician Thoracic Surgery   Date Attending Physician notified 05/05/24   Time Attending Physician notified 0835  (At bedside)   Physician(s) Nancy Polanco, " CNP   Lead RN Jama Cavanaugh, RN   RN Feli Ferreira, RN   RT Carolee Laws, RRT   Post RRT Intervention Assessment   Post RRT Assessment Stable/Improved   Date Follow Up Done 05/05/24   Time Follow Up Done 1130   Comments Patient remains on HFNC 70% 40L. VSS. Plan for bronchoscopy/EGD later today.

## 2024-05-05 NOTE — CODE/RAPID RESPONSE
Time of notification: 0832 AM  Provider notified: Rapid Response Team, Primary Thoracic Team     Patient status:  At 0750 this morning patient was able to stay awake and have back and forth conversation with RN. At this time was stable on 6L NC.   At 0825 RN went in room and it was difficult to wake patient up. Sternal rub woke patient up but he was unable to stay awake for more than a sentence. At this time patient was requiring 15L oximask to maintain sats of 88-91%. Rapid response called. RRT and primary team to bedside.   At 0925 patient stated he thought he was at the beach.     Orders received: Labs, and chest x-ray ordered and completed.   Patient placed on HFNC. Now on 70-80%, 40L, satting mid 90's.   RT called to get ABG. Will continue to monitor.

## 2024-05-06 ENCOUNTER — APPOINTMENT (OUTPATIENT)
Dept: OCCUPATIONAL THERAPY | Facility: CLINIC | Age: 69
DRG: 326 | End: 2024-05-06
Attending: THORACIC SURGERY (CARDIOTHORACIC VASCULAR SURGERY)
Payer: COMMERCIAL

## 2024-05-06 ENCOUNTER — APPOINTMENT (OUTPATIENT)
Dept: SPEECH THERAPY | Facility: CLINIC | Age: 69
DRG: 326 | End: 2024-05-06
Attending: THORACIC SURGERY (CARDIOTHORACIC VASCULAR SURGERY)
Payer: COMMERCIAL

## 2024-05-06 LAB
ANION GAP SERPL CALCULATED.3IONS-SCNC: 9 MMOL/L (ref 7–15)
BUN SERPL-MCNC: 10.1 MG/DL (ref 8–23)
CALCIUM SERPL-MCNC: 9.2 MG/DL (ref 8.8–10.2)
CHLORIDE SERPL-SCNC: 101 MMOL/L (ref 98–107)
CREAT SERPL-MCNC: 0.57 MG/DL (ref 0.67–1.17)
DEPRECATED HCO3 PLAS-SCNC: 25 MMOL/L (ref 22–29)
EGFRCR SERPLBLD CKD-EPI 2021: >90 ML/MIN/1.73M2
ERYTHROCYTE [DISTWIDTH] IN BLOOD BY AUTOMATED COUNT: 13.7 % (ref 10–15)
GLUCOSE SERPL-MCNC: 129 MG/DL (ref 70–99)
HCT VFR BLD AUTO: 30.3 % (ref 40–53)
HGB BLD-MCNC: 10.1 G/DL (ref 13.3–17.7)
MAGNESIUM SERPL-MCNC: 2.1 MG/DL (ref 1.7–2.3)
MCH RBC QN AUTO: 32.2 PG (ref 26.5–33)
MCHC RBC AUTO-ENTMCNC: 33.3 G/DL (ref 31.5–36.5)
MCV RBC AUTO: 97 FL (ref 78–100)
PHOSPHATE SERPL-MCNC: 3.5 MG/DL (ref 2.5–4.5)
PLATELET # BLD AUTO: 310 10E3/UL (ref 150–450)
POTASSIUM SERPL-SCNC: 4.1 MMOL/L (ref 3.4–5.3)
RBC # BLD AUTO: 3.14 10E6/UL (ref 4.4–5.9)
SODIUM SERPL-SCNC: 135 MMOL/L (ref 135–145)
WBC # BLD AUTO: 10.4 10E3/UL (ref 4–11)

## 2024-05-06 PROCEDURE — 97535 SELF CARE MNGMENT TRAINING: CPT | Mod: GO

## 2024-05-06 PROCEDURE — 120N000002 HC R&B MED SURG/OB UMMC

## 2024-05-06 PROCEDURE — 250N000011 HC RX IP 250 OP 636

## 2024-05-06 PROCEDURE — 250N000013 HC RX MED GY IP 250 OP 250 PS 637

## 2024-05-06 PROCEDURE — 85027 COMPLETE CBC AUTOMATED: CPT

## 2024-05-06 PROCEDURE — 80048 BASIC METABOLIC PNL TOTAL CA: CPT

## 2024-05-06 PROCEDURE — 250N000011 HC RX IP 250 OP 636: Performed by: THORACIC SURGERY (CARDIOTHORACIC VASCULAR SURGERY)

## 2024-05-06 PROCEDURE — 84100 ASSAY OF PHOSPHORUS: CPT | Performed by: THORACIC SURGERY (CARDIOTHORACIC VASCULAR SURGERY)

## 2024-05-06 PROCEDURE — 92526 ORAL FUNCTION THERAPY: CPT | Mod: GN

## 2024-05-06 PROCEDURE — 36415 COLL VENOUS BLD VENIPUNCTURE: CPT

## 2024-05-06 PROCEDURE — 97530 THERAPEUTIC ACTIVITIES: CPT | Mod: GO

## 2024-05-06 PROCEDURE — 250N000011 HC RX IP 250 OP 636: Mod: JZ

## 2024-05-06 PROCEDURE — 83735 ASSAY OF MAGNESIUM: CPT | Performed by: THORACIC SURGERY (CARDIOTHORACIC VASCULAR SURGERY)

## 2024-05-06 RX ORDER — FLUCONAZOLE 2 MG/ML
400 INJECTION, SOLUTION INTRAVENOUS EVERY 24 HOURS
Status: DISCONTINUED | OUTPATIENT
Start: 2024-05-06 | End: 2024-05-13

## 2024-05-06 RX ADMIN — ACETAMINOPHEN 975 MG: 325 TABLET, FILM COATED ORAL at 01:56

## 2024-05-06 RX ADMIN — SENNOSIDES AND DOCUSATE SODIUM 2 TABLET: 8.6; 5 TABLET ORAL at 08:19

## 2024-05-06 RX ADMIN — SERTRALINE HYDROCHLORIDE 100 MG: 100 TABLET ORAL at 08:19

## 2024-05-06 RX ADMIN — HYDROXYZINE HYDROCHLORIDE 25 MG: 25 TABLET, FILM COATED ORAL at 08:36

## 2024-05-06 RX ADMIN — SENNOSIDES AND DOCUSATE SODIUM 2 TABLET: 8.6; 5 TABLET ORAL at 19:37

## 2024-05-06 RX ADMIN — MAGNESIUM HYDROXIDE 30 ML: 400 SUSPENSION ORAL at 08:21

## 2024-05-06 RX ADMIN — POLYETHYLENE GLYCOL 3350 17 G: 17 POWDER, FOR SOLUTION ORAL at 08:19

## 2024-05-06 RX ADMIN — GUAIFENESIN 200 MG: 100 SOLUTION ORAL at 14:12

## 2024-05-06 RX ADMIN — METHOCARBAMOL 500 MG: 500 TABLET ORAL at 21:22

## 2024-05-06 RX ADMIN — METHOCARBAMOL 500 MG: 500 TABLET ORAL at 04:59

## 2024-05-06 RX ADMIN — HYDROMORPHONE HYDROCHLORIDE 0.2 MG: 0.2 INJECTION, SOLUTION INTRAMUSCULAR; INTRAVENOUS; SUBCUTANEOUS at 05:34

## 2024-05-06 RX ADMIN — METOCLOPRAMIDE 5 MG: 5 INJECTION, SOLUTION INTRAMUSCULAR; INTRAVENOUS at 14:12

## 2024-05-06 RX ADMIN — Medication 3 MG: at 19:37

## 2024-05-06 RX ADMIN — ALBUTEROL SULFATE 4 PUFF: 90 AEROSOL, METERED RESPIRATORY (INHALATION) at 08:21

## 2024-05-06 RX ADMIN — METHYLENE BLUE 5 ML: 5 INJECTION INTRAVENOUS at 14:12

## 2024-05-06 RX ADMIN — LORAZEPAM 0.5 MG: 2 CONCENTRATE ORAL at 17:01

## 2024-05-06 RX ADMIN — FLUCONAZOLE IN SODIUM CHLORIDE 400 MG: 2 INJECTION, SOLUTION INTRAVENOUS at 08:20

## 2024-05-06 RX ADMIN — ACETAMINOPHEN 975 MG: 325 TABLET, FILM COATED ORAL at 08:19

## 2024-05-06 RX ADMIN — BISACODYL 10 MG: 10 SUPPOSITORY RECTAL at 08:19

## 2024-05-06 RX ADMIN — METHOCARBAMOL 500 MG: 500 TABLET ORAL at 15:20

## 2024-05-06 RX ADMIN — GABAPENTIN 100 MG: 100 CAPSULE ORAL at 21:21

## 2024-05-06 RX ADMIN — Medication 1 SPRAY: at 19:57

## 2024-05-06 RX ADMIN — FAMOTIDINE 20 MG: 20 TABLET ORAL at 19:37

## 2024-05-06 RX ADMIN — ACETAMINOPHEN 975 MG: 325 TABLET, FILM COATED ORAL at 17:01

## 2024-05-06 RX ADMIN — ENOXAPARIN SODIUM 40 MG: 40 INJECTION SUBCUTANEOUS at 19:46

## 2024-05-06 RX ADMIN — MAGNESIUM HYDROXIDE 30 ML: 400 SUSPENSION ORAL at 19:37

## 2024-05-06 RX ADMIN — POLYETHYLENE GLYCOL 3350 17 G: 17 POWDER, FOR SOLUTION ORAL at 19:40

## 2024-05-06 RX ADMIN — GUAIFENESIN 200 MG: 100 SOLUTION ORAL at 19:37

## 2024-05-06 RX ADMIN — METHOCARBAMOL 500 MG: 500 TABLET ORAL at 10:54

## 2024-05-06 RX ADMIN — LEVOFLOXACIN 500 MG: 5 INJECTION, SOLUTION INTRAVENOUS at 12:25

## 2024-05-06 RX ADMIN — METOCLOPRAMIDE 5 MG: 5 INJECTION, SOLUTION INTRAMUSCULAR; INTRAVENOUS at 08:19

## 2024-05-06 RX ADMIN — GUAIFENESIN 200 MG: 100 SOLUTION ORAL at 01:56

## 2024-05-06 RX ADMIN — FAMOTIDINE 20 MG: 20 TABLET ORAL at 08:20

## 2024-05-06 RX ADMIN — Medication 15 ML: at 08:19

## 2024-05-06 RX ADMIN — ROSUVASTATIN CALCIUM 40 MG: 20 TABLET, FILM COATED ORAL at 08:19

## 2024-05-06 RX ADMIN — METOCLOPRAMIDE 5 MG: 5 INJECTION, SOLUTION INTRAMUSCULAR; INTRAVENOUS at 19:46

## 2024-05-06 RX ADMIN — GUAIFENESIN 200 MG: 100 SOLUTION ORAL at 08:21

## 2024-05-06 RX ADMIN — DEXTROSE MONOHYDRATE AND SODIUM CHLORIDE: 5; .45 INJECTION, SOLUTION INTRAVENOUS at 06:28

## 2024-05-06 RX ADMIN — DEXTROSE MONOHYDRATE AND SODIUM CHLORIDE: 5; .45 INJECTION, SOLUTION INTRAVENOUS at 19:37

## 2024-05-06 ASSESSMENT — ACTIVITIES OF DAILY LIVING (ADL)
ADLS_ACUITY_SCORE: 38
ADLS_ACUITY_SCORE: 37
ADLS_ACUITY_SCORE: 38
ADLS_ACUITY_SCORE: 37
ADLS_ACUITY_SCORE: 38
ADLS_ACUITY_SCORE: 37
ADLS_ACUITY_SCORE: 38
ADLS_ACUITY_SCORE: 38
ADLS_ACUITY_SCORE: 37

## 2024-05-06 NOTE — PROGRESS NOTES
CLINICAL NUTRITION SERVICES - BRIEF NOTE     Nutrition Prescription    RECOMMENDATIONS FOR MDs/PROVIDERS TO ORDER:  If unable to tolerate enteral feedings, may need to consider parenteral nutrition support     Recommendations already ordered by Registered Dietitian (RD):  Restart TFs per thoracic team FRAN request. Plan per thoracic team of blue dye with TFs to ensure pt not aspiration TFs.  - Restart TFs at 20 mL/hr and advance rate by 10 mL Q6hrs as tolerated until reaching goal rate of 50 mL/hr, continuous infusion.   - Do not advance rate if pt nauseated, increased abdominal distention, or emesis.     - Modified TF order for NSA to send cartons of TF formula, given plan for blue dye with TFs today. Will need to adhere to a 4-hour hang time with additives mixed with TFs 5/6.     - Once at goal rate, EN regimen of: Novasource renal (or equivalent coconut/MCT-free formula) at goal rate of 75 ml/hr  via J-tube to provide: 1200 mL formula, 2400 kcal (35 kcal/kg), 109 g pro (1.5 g/kg), 220 g CHO, 0 g fiber, and 860 mL free water daily.     - Modified free water flushes back to 90 mL Q4hrs with TFs back  to continuous infusion (same volume goal for flushes but will utilize feed/flush system while remains inpatient)    Future/Additional Recommendations:  Continue to monitor nutrition related findings per protocol    For last full RD assessment, see note dated 5/2/24 and 5/3/24    NEW FINDINGS   - Chart review for patient admitted to [facility] unit on enteral feedings via J-tube.     - Pt made NPO and TFs held since 5/3 following aspiration event. Pt now has a NG in place for suction/decompression.     - Per discussion with thoracic team FRAN this AM, plan to restart TFs today via J-tube. Thoracic team plans to add Methylene blue dye to TF formula to ensure pt not having TF-related aspiration or reflux.    - Request from thoracic team provider to adv TF to goal by this evening or early tomorrow AM. Will initiate feeds at rate of  20 mL/hr and advance by 10 mL Q6hrs as tolerated, pending GI status (do not advance rate if pt has increased nausea or distention). TFs should be at goal rate by tomorrow AM.    - Of note, pt has lost a significant amount of weight this admission. He is down 6.2 kg (8.9% body weight) over the past 17 days and severe temporal wasting starting to become more prevalent. If unable to tolerate enteral feedings consistently, may need to consider parenteral nutrition support.    INTERVENTIONS  Implementation  Collaboration with other providers - Bedside RN, Thoracic team FRAN, PharmD  Enteral Nutrition - Restart TFs via J-port (NG to suction); blue dye with TFs for diagnostic purposes, per thoracic team.    Monitoring/Evaluation  Will continue to monitor and evaluate per protocol.    Chandler Pugh, MS, RDN, LD, CNSC  Available by [employer] or phone   Vocera: M-F (7:00-3:30)  6B Clinical Dietitian  or 2 Obs Clinical Dietitian  Weekend/Holiday (7:00-3:30) - Weekend Clinical Dietitian   6B RD desk: [phone]       **Clinical Dietitians are no longer available by pager.

## 2024-05-06 NOTE — SUMMARY OF CARE
Patient has 2 cell phones, apple , computer/mouse, computer , glasses, 2 books, a backpack, and a toiletry bag with shaving supplies & hygiene supplies.   Personalized Preventive Plan for Nivia Mak - 3/18/2022  Medicare offers a range of preventive health benefits. Some of the tests and screenings are paid in full while other may be subject to a deductible, co-insurance, and/or copay. Some of these benefits include a comprehensive review of your medical history including lifestyle, illnesses that may run in your family, and various assessments and screenings as appropriate. After reviewing your medical record and screening and assessments performed today your provider may have ordered immunizations, labs, imaging, and/or referrals for you. A list of these orders (if applicable) as well as your Preventive Care list are included within your After Visit Summary for your review. Other Preventive Recommendations:    · A preventive eye exam performed by an eye specialist is recommended every 1-2 years to screen for glaucoma; cataracts, macular degeneration, and other eye disorders. · A preventive dental visit is recommended every 6 months. · Try to get at least 150 minutes of exercise per week or 10,000 steps per day on a pedometer . · Order or download the FREE \"Exercise & Physical Activity: Your Everyday Guide\" from [employer]. Call 2-456.197.7844 or search [employer] online. · You need 6040-6977 mg of calcium and 7603-0523 IU of vitamin D per day. It is possible to meet your calcium requirement with diet alone, but a vitamin D supplement is usually necessary to meet this goal.  · When exposed to the sun, use a sunscreen that protects against both UVA and UVB radiation with an SPF of 30 or greater. Reapply every 2 to 3 hours or after sweating, drying off with a towel, or swimming. · Always wear a seat belt when traveling in a car. Always wear a helmet when riding a bicycle or motorcycle. Heart-Healthy Diet   Sodium, Fat, and Cholesterol Controlled Diet       What Is a Heart Healthy Diet?    A heart-healthy diet is one that limits sodium , certain types of fat , and cholesterol . This type of diet is recommended for:   People with any form of cardiovascular disease (eg, coronary heart disease , peripheral vascular disease , previous heart attack , previous stroke )   People with risk factors for cardiovascular disease, such as high blood pressure , high cholesterol , or diabetes   Anyone who wants to lower their risk of developing cardiovascular disease   Sodium    Sodium is a mineral found in many foods. In general, most people consume much more sodium than they need. Diets high in sodium can increase blood pressure and lead to edema (water retention). On a heart-healthy diet, you should consume no more than 2,300 mg (milligrams) of sodium per dayabout the amount in one teaspoon of table salt. The foods highest in sodium include table salt (about 50% sodium), processed foods, convenience foods, and preserved foods. Cholesterol    Cholesterol is a fat-like, waxy substance in your blood. Our bodies make some cholesterol. It is also found in animal products, with the highest amounts in fatty meat, egg yolks, whole milk, cheese, shellfish, and organ meats. On a heart-healthy diet, you should limit your cholesterol intake to less than 200 mg per day. It is normal and important to have some cholesterol in your bloodstream. But too much cholesterol can cause plaque to build up within your arteries, which can eventually lead to a heart attack or stroke. The two types of cholesterol that are most commonly referred to are:   Low-density lipoprotein (LDL) cholesterol  Also known as bad cholesterol, this is the cholesterol that tends to build up along your arteries. Bad cholesterol levels are increased by eating fats that are saturated or hydrogenated. Optimal level of this cholesterol is less than 100. Over 130 starts to get risky for heart disease.    High-density lipoprotein (HDL) cholesterol  Also known as good cholesterol, this type of cholesterol actually carries cholesterol away from your arteries and may, therefore, help lower your risk of having a heart attack. You want this level to be high (ideally greater than 60). It is a risk to have a level less than 40. You can raise this good cholesterol by eating olive oil, canola oil, avocados, or nuts. Exercise raises this level, too. Fat    Fat is calorie dense and packs a lot of calories into a small amount of food. Even though fats should be limited due to their high calorie content, not all fats are bad. In fact, some fats are quite healthful. Fat can be broken down into four main types. The good-for-you fats are:   Monounsaturated fat  found in oils such as olive and canola, avocados, and nuts and natural nut butters; can decrease cholesterol levels, while keeping levels of HDL cholesterol high   Polyunsaturated fat  found in oils such as safflower, sunflower, soybean, corn, and sesame; can decrease total cholesterol and LDL cholesterol   Omega-3 fatty acids  particularly those found in fatty fish (such as salmon, trout, tuna, mackerel, herring, and sardines); can decrease risk of arrhythmias, decrease triglyceride levels, and slightly lower blood pressure   The fats that you want to limit are:   Saturated fat  found in animal products, many fast foods, and a few vegetables; increases total blood cholesterol, including LDL levels   Animal fats that are saturated include: butter, lard, whole-milk dairy products, meat fat, and poultry skin   Vegetable fats that are saturated include: hydrogenated shortening, palm oil, coconut oil, cocoa butter   Hydrogenated or trans fat  found in margarine and vegetable shortening, most shelf stable snack foods, and fried foods; increases LDL and decreases HDL     It is generally recommended that you limit your total fat for the day to less than 30% of your total calories.  If you follow an 1800-calorie heart healthy diet, for example, this would mean 60 grams of fat or less per day. Saturated fat and trans fat in your diet raises your blood cholesterol the most, much more than dietary cholesterol does. For this reason, on a heart-healthy diet, less than 7% of your calories should come from saturated fat and ideally 0% from trans fat. On an 1800-calorie diet, this translates into less than 14 grams of saturated fat per day, leaving 46 grams of fat to come from mono- and polyunsaturated fats.    Food Choices on a Heart Healthy Diet   Food Category   Foods Recommended   Foods to Avoid   Grains   Breads and rolls without salted tops Most dry and cooked cereals Unsalted crackers and breadsticks Low-sodium or homemade breadcrumbs or stuffing All rice and pastas   Breads, rolls, and crackers with salted tops High-fat baked goods (eg, muffins, donuts, pastries) Quick breads, self-rising flour, and biscuit mixes Regular bread crumbs Instant hot cereals Commercially prepared rice, pasta, or stuffing mixes   Vegetables   Most fresh, frozen, and low-sodium canned vegetables Low-sodium and salt-free vegetable juices Canned vegetables if unsalted or rinsed   Regular canned vegetables and juices, including sauerkraut and pickled vegetables Frozen vegetables with sauces Commercially prepared potato and vegetable mixes   Fruits   Most fresh, frozen, and canned fruits All fruit juices   Fruits processed with salt or sodium   Milk   Nonfat or low-fat (1%) milk Nonfat or low-fat yogurt Cottage cheese, low-fat ricotta, cheeses labeled as low-fat and low-sodium   Whole milk Reduced-fat (2%) milk Malted and chocolate milk Full fat yogurt Most cheeses (unless low-fat and low salt) Buttermilk (no more than 1 cup per week)   Meats and Beans   Lean cuts of fresh or frozen beef, veal, lamb, or pork (look for the word loin) Fresh or frozen poultry without the skin Fresh or frozen fish and some shellfish Egg whites and egg substitutes (Limit whole eggs to three per week) Tofu Nuts or seeds (unsalted, dry-roasted), low-sodium peanut butter Dried peas, beans, and lentils   Any smoked, cured, salted, or canned meat, fish, or poultry (including sue, chipped beef, cold cuts, hot dogs, sausages, sardines, and anchovies) Poultry skins Breaded and/or fried fish or meats Canned peas, beans, and lentils Salted nuts   Fats and Oils   Olive oil and canola oil Low-sodium, low-fat salad dressings and mayonnaise   Butter, margarine, coconut and palm oils, sue fat   Snacks, Sweets, and Condiments   Low-sodium or unsalted versions of broths, soups, soy sauce, and condiments Pepper, herbs, and spices; vinegar, lemon, or lime juice Low-fat frozen desserts (yogurt, sherbet, fruit bars) Sugar, cocoa powder, honey, syrup, jam, and preserves Low-fat, trans-fat free cookies, cakes, and pies Raymond and animal crackers, fig bars, dariel snaps   High-fat desserts Broth, soups, gravies, and sauces, made from instant mixes or other high-sodium ingredients Salted snack foods Canned olives Meat tenderizers, seasoning salt, and most flavored vinegars   Beverages   Low-sodium carbonated beverages Tea and coffee in moderation Soy milk   Commercially softened water   Suggestions   Make whole grains, fruits, and vegetables the base of your diet. Choose heart-healthy fats such as canola, olive, and flaxseed oil, and foods high in heart-healthy fats, such as nuts, seeds, soybeans, tofu, and fish. Eat fish at least twice per week; the fish highest in omega-3 fatty acids and lowest in mercury include salmon, herring, mackerel, sardines, and canned chunk light tuna. If you eat fish less than twice per week or have high triglycerides, talk to your doctor about taking fish oil supplements. Read food labels.    For products low in fat and cholesterol, look for fat free, low-fat, cholesterol free, saturated fat free, and trans fat freeAlso scan the Nutrition Facts Label, which lists saturated fat, trans fat, and cholesterol amounts. For products low in sodium, look for sodium free, very low sodium, low sodium, no added salt, and unsalted   Skip the salt when cooking or at the table; if food needs more flavor, get creative and try out different herbs and spices. Garlic and onion also add substantial flavor to foods. Trim any visible fat off meat and poultry before cooking, and drain the fat off after law. Use cooking methods that require little or no added fat, such as grilling, boiling, baking, poaching, broiling, roasting, steaming, stir-frying, and sauting. Avoid fast food and convenience food. They tend to be high in saturated and trans fat and have a lot of added salt. Talk to a registered dietitian for individualized diet advice. Last Reviewed: March 2011 Faina Melendrez MS, MPH, RD   Updated: 3/29/2011   ·     Ke[facility]       As we get older, changes in balance, gait, strength, vision, hearing, and cognition make even the most youthful senior more prone to accidents. Falls are one of the leading health risks for older people. This increased risk of falling is related to:   Aging process (eg, decreased muscle strength, slowed reflexes)   Higher incidence of chronic health problems (eg, arthritis, diabetes) that may limit mobility, agility or sensory awareness   Side effects of medicine (eg, dizziness, blurred vision)especially medicines like prescription pain medicines and drugs used to treat mental health conditions   Depending on the brittleness of your bones, the consequences of a fall can be serious and long lasting. Home Life   Research by the Association of Aging [facility]) shows that some home accidents among older adults can be prevented by making simple lifestyle changes and basic modifications and repairs to the home environment. Here are some lifestyle changes that experts recommend:   Have your hearing and vision checked regularly.  Be sure to wear prescription glasses that are right for you. Speak to your doctor or pharmacist about the possible side effects of your medicines. A number of medicines can cause dizziness. If you have problems with sleep, talk to your doctor. Limit your intake of alcohol. If necessary, use a cane or walker to help maintain your balance. Wear supportive, rubber-soled shoes, even at home. If you live in a region that gets wintry weather, you may want to put special cleats on your shoes to prevent you from slipping on the snow and ice. Exercise regularly to help maintain muscle tone, agility, and balance. Always hold the banister when going up or down stairs. Also, use  bars when getting in or out of the bath or shower, or using the toilet. To avoid dizziness, get up slowly from a lying down position. Sit up first, dangling your legs for a minute or two before rising to a standing position. Overall Home Safety Check   According to the Consumer [employer] Safety Commision's \"Older Consumer Home Safety Checklist,\" it is important to check for potential hazards in each room. And remember, proper lighting is an essential factor in home safety. If you cannot see clearly, you are more likely to fall. Important questions to ask yourself include:   Are lamp, electric, extension, and telephone cords placed out of the flow of traffic and maintained in good condition? Have frayed cords been replaced? Are all small rugs and runners slip resistant? If not, you can secure them to the floor with a special double-sided carpet tape. Are smoke detectors properly locatedone on every floor of your home and one outside of every sleeping area? Are they in good working order? Are batteries replaced at least once a year? Do you have a well-maintained carbon monoxide detector outside every sleeping are in your home? Does your furniture layout leave plenty of space to maneuver between and around chairs, tables, beds, and sofas?    Are hallways, stairs and passages between rooms well lit? Can you reach a lamp without getting out of bed? Are floor surfaces well maintained? Shag rugs, high-pile carpeting, tile floors, and polished wood floors can be particularly slippery. Stairs should always have handrails and be carpeted or fitted with a non-skid tread. Is your telephone easily reachable. Is the cord safely tucked away? Room by Room   According to the Association of Aging, bathrooms and bertha are the two most potentially hazardous rooms in your home. In the Kitchen    Be sure your stove is in proper working order and always make sure burners and the oven are off before you go out or go to sleep. Keep pots on the back burners, turn handles away from the front of the stove, and keep stove clean and free of grease build-up. Kitchen ventilation systems and range exhausts should be working properly. Keep flammable objects such as towels and pot holders away from the cooking area except when in use. Make sure kitchen curtains are tied back. Move cords and appliances away from the sink and hot surfaces. If extension cords are needed, install wiring guides so they do not hang over the sink, range, or working areas. Look for coffee pots, kettles and toaster ovens with automatic shut-offs. Keep a mop handy in the kitchen so you can wipe up spills instantly. You should also have a small fire extinguisher. Arrange your kitchen with frequently used items on lower shelves to avoid the need to stand on a stepstool to reach them. Make sure countertops are well-lit to avoid injuries while cutting and preparing food. In the Bathroom    Use a non-slip mat or decals in the tub and shower, since wet, soapy tile or porcelain surfaces are extremely slippery. Make sure bathroom rugs are non-skid or tape them firmly to the floor. Bathtubs should have at least one, preferably two, grab bars, firmly attached to structural supports in the wall.  (Do not use built-in soap holders or glass shower doors as grab bars.)    Tub seats fitted with non-slip material on the legs allow you to wash sitting down. For people with limited mobility, bathtub transfer benches allow you to slide safely into the tub. Raised toilet seats and toilet safety rails are helpful for those with knee or hip problems. In the [facility]    Make sure you use a nightlight and that the area around your bed is clear of potential obstacles. Be careful with electric blankets and never go to sleep with a heating pad, which can cause serious burns even if on a low setting. Use fire-resistant mattress covers and pillows, and NEVER smoke in bed. Keep a phone next to the bed that is programmed to dial 911 at the push of a button. If you have a chronic condition, you may want to sign on with an automatic call-in service. Typically the system includes a small pendant that connects directly to an emergency medical voice-response system. You should also make arrangements to stay in contact with someonefriend, neighbor, family memberon a regular schedule. Fire Prevention   According to the [employer]. (Smoke Alarms for Every) [address] [place] [address], senior citizens are one of the two highest risk groups for death and serious injuries due to residential fires. When cooking, wear short-sleeved items, never a bulky long-sleeved robe. The [facility]'s Safety Checklist for Older Consumers emphasizes the importance of checking basements, garages, workshops and storage areas for fire hazards, such as volatile liquids, piles of old rags or clothing and overloaded circuits. Never smoke in bed or when lying down on a couch or recliner chair. Small portable electric or kerosene heaters are responsible for many home fires and should be used cautiously if at all. If you do use one, be sure to keep them away from flammable materials.     In case of fire, make sure you have a pre-established emergency exit plan. Have a professional check your fireplace and other fuel-burning appliances yearly. [employer]   Baby boomers entering the [place] years will continue to see the development of new products to help older adults live safely and independently in spite of age-related changes. Making Life More Livable  , by Johnnie [employer], lists over 1,000 products for \"living well in the mature years,\" such as bathing and mobility aids, household security devices, ergonomically designed knives and peelers, and faucet valves and knobs for temperature control. Medical supply stores and organizations are good sources of information about products that improve your quality of life and insure your safety.      Last Reviewed: November 2009 Antonio John MD   Updated: 3/7/2011     ·

## 2024-05-06 NOTE — PLAN OF CARE
Transfer  Transferred to: 7C  Via:bed  Reason for transfer: Pt no longer appropriate for 6B- improved patient condition  Family: Aware of transfer  Belongings: Packed and sent with pt  Chart: Delivered with pt to next unit  Medications: Meds sent to new unit with pt  Report given to: 7C bedside RN  Pt status: Stable    Hours of Care: [id number]    Neuro: A&Ox4. PRN ativan x1, atarax x1 with albuterol inhaler use.  Cardiac: No tele orders. VSS.   Respiratory: Sating >90% on 4L oxymask. Intermittent cough with thick secretions.  GI/: Adequate urine output via urinal. Loose BM X1.  Diet/appetite: NPO. Con't TF via J tube at 20 mL/hr with 90 mL Q2h FWF. Increase TF rate at 2100 according to orders. Ice chips okay.  Activity: SBA. Walked x3, up in chair.  Pain: At acceptable level on current regimen.   Skin: No new deficits noted.  LDA's: L PIV SL, R PIV infusing D5 1/2 NS at 75 mL/hr. NG to LIS. LLQ J tube.    Plan: Continue with POC. Notify primary team with changes.

## 2024-05-06 NOTE — PLAN OF CARE
[id number]  Neuro: Lethargic but easy to arouse beginning of shift now awake and oriented x4. No complaint of anxiety this shift.  Cardiac: SR, HR 80-90's. VSS. Afebrile.   Respiratory:  Sating >92% 4L oxymask. Intermittently coughing up secretions.   GI/: Adequate urine output via urinal. No BM on this shift. Not passing gas yet, bowel sounds hypoactive. NG to LIS. Meds through J tube. Denies N/V or feeling bloated.  Diet/appetite:  NPO. Family does not want TF started until they know for sure that feeding is moving through bowel.   Activity:  SBA, up for standing scale otherwise stayed in bed. Attempt to get pt up to chair, but declined. Agreed to get up to chair in the AM.  Pain: At acceptable level on current regimen. Joseph lower abdominal pain rating between 3-5 and nose pain of 5-9 from NG placement. Dilaudid given x1.  Skin: No new deficits noted.  LDA's: 1 L PIV -SL, R PIV - infusing D5 1/2 NS at 75ml/hr. NG. PEG tube.     Plan: Continue with POC. Notify primary team with changes.

## 2024-05-06 NOTE — PROGRESS NOTES
"Thoracic Surgery  [facility]  5/6/2024    Subjective:  No acute events overnight. Concern for aspiration over the weekend. Tube feeding currently held. On 4L oxymask. Yesterday underwent flexible bronchoscopy with bronchial lavage, EGD with pyloric balloon dilation (21 cm) and fluoroscopic NG placement.    Objective  /63 (BP Location: Left arm, Cuff Size: Adult Regular)   Pulse 88   Temp 97.2  F (36.2  C) (Axillary)   Resp 20   Ht 1.727 m (5' 8\")   Wt 63.2 kg (139 lb 5.3 oz)   SpO2 96%   BMI 21.19 kg/m        General: Alert, resting comfortably in NAD/NTA. Cooperative  Pulm: NLB on 4L oxy-mask, no tachypnea/dyspnea, no wheezing  CV: non-cyanotic, no significant LE edema.   GI/ABD: Soft, non-distended, non-tender to palpation, no guarding or rebound.  Skin: Warm and well perfused    Labs:  Na: 135 from 135  K: 4.1 from 3.6    Wbc: 10.4 from 10.7  Hgb: 10.1 from 10.0    Imaging:  AM CXR: Stable CXR yesterday    A/P:  68M with SCC of esophagus who is s/p laparoscopic and right thoracoscopic gastroesophagectomy on 4/19 with Dr. Hill. Monitored in SICU overnight POD0 to 1, did very well and transferred to IM on 4/20. Esophagram on 4/25 was negative for leak. Concern for possible aspiration with clear liquids led to acute respiratory decline on 4/28 requiring re-intubation and admission to the SICU. He returned to OR for EGD and bronchoscopy where a fair amount of fluid was suctioned from the esophagus, the tissue appeared healthy with no evidence of leak. On 4/29 and repeat EGD with pyloric botox injection was performed, after which he was extubated. Cares have continued to progress however aspiration again became a concern. Upper GI 5/3 with no passage of contrast below the diaphragmatic hiatus. Underwent flexible bronchoscopy with bronchial lavage, EGD with pyloric balloon dilation (21 cm), and fluoroscopic NG placement on 5/5. TF held 5/5 for concern for aspiration.    - " Levaquin (5/5 - present )  - Restart tube feeding at goal rate.  - Add methylene blue to tube feeding to help determine if patient is actually refluxing, aspirating, and coughing up tube feeding.  - Keep NG tube to LIS  - Continue Reglan  - Multimodal pain control   - Daily CXR, aspiration precautions  - Wean oxygen as tolerated  - Continue to hold PTA plavix, continue lovenox     Patient seen on rounds with fellow, Dr. Garcia, and staff, Dr. Rangel.    Please page if questions,    Sj Acosta MD  PGY-1

## 2024-05-07 ENCOUNTER — APPOINTMENT (OUTPATIENT)
Dept: SPEECH THERAPY | Facility: CLINIC | Age: 69
DRG: 326 | End: 2024-05-07
Attending: THORACIC SURGERY (CARDIOTHORACIC VASCULAR SURGERY)
Payer: COMMERCIAL

## 2024-05-07 ENCOUNTER — APPOINTMENT (OUTPATIENT)
Dept: GENERAL RADIOLOGY | Facility: CLINIC | Age: 69
DRG: 326 | End: 2024-05-07
Attending: THORACIC SURGERY (CARDIOTHORACIC VASCULAR SURGERY)
Payer: COMMERCIAL

## 2024-05-07 ENCOUNTER — APPOINTMENT (OUTPATIENT)
Dept: OCCUPATIONAL THERAPY | Facility: CLINIC | Age: 69
DRG: 326 | End: 2024-05-07
Attending: THORACIC SURGERY (CARDIOTHORACIC VASCULAR SURGERY)
Payer: COMMERCIAL

## 2024-05-07 LAB
ANION GAP SERPL CALCULATED.3IONS-SCNC: 9 MMOL/L (ref 7–15)
BACTERIA BRONCH: ABNORMAL
BUN SERPL-MCNC: 12.4 MG/DL (ref 8–23)
CALCIUM SERPL-MCNC: 8.8 MG/DL (ref 8.8–10.2)
CHLORIDE SERPL-SCNC: 103 MMOL/L (ref 98–107)
CREAT SERPL-MCNC: 0.54 MG/DL (ref 0.67–1.17)
DEPRECATED HCO3 PLAS-SCNC: 25 MMOL/L (ref 22–29)
EGFRCR SERPLBLD CKD-EPI 2021: >90 ML/MIN/1.73M2
ERYTHROCYTE [DISTWIDTH] IN BLOOD BY AUTOMATED COUNT: 13.9 % (ref 10–15)
GLUCOSE SERPL-MCNC: 108 MG/DL (ref 70–99)
HCT VFR BLD AUTO: 29.1 % (ref 40–53)
HGB BLD-MCNC: 9.6 G/DL (ref 13.3–17.7)
MAGNESIUM SERPL-MCNC: 2 MG/DL (ref 1.7–2.3)
MCH RBC QN AUTO: 31.3 PG (ref 26.5–33)
MCHC RBC AUTO-ENTMCNC: 33 G/DL (ref 31.5–36.5)
MCV RBC AUTO: 95 FL (ref 78–100)
PHOSPHATE SERPL-MCNC: 2.8 MG/DL (ref 2.5–4.5)
PLATELET # BLD AUTO: 300 10E3/UL (ref 150–450)
POTASSIUM SERPL-SCNC: 3.7 MMOL/L (ref 3.4–5.3)
RBC # BLD AUTO: 3.07 10E6/UL (ref 4.4–5.9)
SODIUM SERPL-SCNC: 137 MMOL/L (ref 135–145)
WBC # BLD AUTO: 8.5 10E3/UL (ref 4–11)

## 2024-05-07 PROCEDURE — 250N000013 HC RX MED GY IP 250 OP 250 PS 637

## 2024-05-07 PROCEDURE — 120N000002 HC R&B MED SURG/OB UMMC

## 2024-05-07 PROCEDURE — 71045 X-RAY EXAM CHEST 1 VIEW: CPT

## 2024-05-07 PROCEDURE — 71045 X-RAY EXAM CHEST 1 VIEW: CPT | Mod: 26 | Performed by: RADIOLOGY

## 2024-05-07 PROCEDURE — 83735 ASSAY OF MAGNESIUM: CPT | Performed by: THORACIC SURGERY (CARDIOTHORACIC VASCULAR SURGERY)

## 2024-05-07 PROCEDURE — 80048 BASIC METABOLIC PNL TOTAL CA: CPT

## 2024-05-07 PROCEDURE — 250N000011 HC RX IP 250 OP 636

## 2024-05-07 PROCEDURE — 97535 SELF CARE MNGMENT TRAINING: CPT | Mod: GO

## 2024-05-07 PROCEDURE — 97530 THERAPEUTIC ACTIVITIES: CPT | Mod: GO

## 2024-05-07 PROCEDURE — 36415 COLL VENOUS BLD VENIPUNCTURE: CPT

## 2024-05-07 PROCEDURE — 85027 COMPLETE CBC AUTOMATED: CPT

## 2024-05-07 PROCEDURE — 84100 ASSAY OF PHOSPHORUS: CPT | Performed by: THORACIC SURGERY (CARDIOTHORACIC VASCULAR SURGERY)

## 2024-05-07 PROCEDURE — 92526 ORAL FUNCTION THERAPY: CPT | Mod: GN

## 2024-05-07 PROCEDURE — 999N000128 HC STATISTIC PERIPHERAL IV START W/O US GUIDANCE

## 2024-05-07 PROCEDURE — 250N000013 HC RX MED GY IP 250 OP 250 PS 637: Performed by: THORACIC SURGERY (CARDIOTHORACIC VASCULAR SURGERY)

## 2024-05-07 RX ORDER — POTASSIUM CHLORIDE 1.5 G/1.58G
20 POWDER, FOR SOLUTION ORAL ONCE
Status: COMPLETED | OUTPATIENT
Start: 2024-05-07 | End: 2024-05-07

## 2024-05-07 RX ORDER — MAGNESIUM OXIDE 400 MG/1
400 TABLET ORAL EVERY 4 HOURS
Status: COMPLETED | OUTPATIENT
Start: 2024-05-07 | End: 2024-05-07

## 2024-05-07 RX ADMIN — GABAPENTIN 100 MG: 100 CAPSULE ORAL at 21:25

## 2024-05-07 RX ADMIN — POLYETHYLENE GLYCOL 3350 17 G: 17 POWDER, FOR SOLUTION ORAL at 08:42

## 2024-05-07 RX ADMIN — LORAZEPAM 0.5 MG: 2 CONCENTRATE ORAL at 20:44

## 2024-05-07 RX ADMIN — ENOXAPARIN SODIUM 40 MG: 40 INJECTION SUBCUTANEOUS at 20:57

## 2024-05-07 RX ADMIN — SENNOSIDES AND DOCUSATE SODIUM 2 TABLET: 8.6; 5 TABLET ORAL at 20:42

## 2024-05-07 RX ADMIN — METHOCARBAMOL 500 MG: 500 TABLET ORAL at 04:26

## 2024-05-07 RX ADMIN — Medication 15 ML: at 08:43

## 2024-05-07 RX ADMIN — ACETAMINOPHEN 975 MG: 325 TABLET, FILM COATED ORAL at 01:34

## 2024-05-07 RX ADMIN — METHOCARBAMOL 500 MG: 500 TABLET ORAL at 17:12

## 2024-05-07 RX ADMIN — METHOCARBAMOL 500 MG: 500 TABLET ORAL at 10:11

## 2024-05-07 RX ADMIN — FLUCONAZOLE IN SODIUM CHLORIDE 400 MG: 2 INJECTION, SOLUTION INTRAVENOUS at 07:59

## 2024-05-07 RX ADMIN — SERTRALINE HYDROCHLORIDE 100 MG: 100 TABLET ORAL at 08:42

## 2024-05-07 RX ADMIN — LEVOFLOXACIN 500 MG: 5 INJECTION, SOLUTION INTRAVENOUS at 12:46

## 2024-05-07 RX ADMIN — METOCLOPRAMIDE 5 MG: 5 INJECTION, SOLUTION INTRAMUSCULAR; INTRAVENOUS at 21:25

## 2024-05-07 RX ADMIN — GUAIFENESIN 200 MG: 100 SOLUTION ORAL at 01:34

## 2024-05-07 RX ADMIN — FAMOTIDINE 20 MG: 20 TABLET ORAL at 20:42

## 2024-05-07 RX ADMIN — METHOCARBAMOL 500 MG: 500 TABLET ORAL at 21:25

## 2024-05-07 RX ADMIN — METOCLOPRAMIDE 5 MG: 5 INJECTION, SOLUTION INTRAMUSCULAR; INTRAVENOUS at 08:42

## 2024-05-07 RX ADMIN — ACETAMINOPHEN 975 MG: 325 TABLET, FILM COATED ORAL at 10:11

## 2024-05-07 RX ADMIN — METHYLENE BLUE 5 ML: 5 INJECTION INTRAVENOUS at 13:57

## 2024-05-07 RX ADMIN — Medication 3 MG: at 20:42

## 2024-05-07 RX ADMIN — METOCLOPRAMIDE 5 MG: 5 INJECTION, SOLUTION INTRAMUSCULAR; INTRAVENOUS at 14:49

## 2024-05-07 RX ADMIN — GUAIFENESIN 200 MG: 100 SOLUTION ORAL at 14:49

## 2024-05-07 RX ADMIN — ROSUVASTATIN CALCIUM 40 MG: 20 TABLET, FILM COATED ORAL at 08:42

## 2024-05-07 RX ADMIN — GUAIFENESIN 200 MG: 100 SOLUTION ORAL at 20:43

## 2024-05-07 RX ADMIN — MAGNESIUM OXIDE TAB 400 MG (241.3 MG ELEMENTAL MG) 400 MG: 400 (241.3 MG) TAB at 20:42

## 2024-05-07 RX ADMIN — ALBUTEROL SULFATE 4 PUFF: 90 AEROSOL, METERED RESPIRATORY (INHALATION) at 10:12

## 2024-05-07 RX ADMIN — POTASSIUM CHLORIDE 20 MEQ: 1.5 POWDER, FOR SOLUTION ORAL at 17:41

## 2024-05-07 RX ADMIN — FAMOTIDINE 20 MG: 20 TABLET ORAL at 08:43

## 2024-05-07 RX ADMIN — MAGNESIUM OXIDE TAB 400 MG (241.3 MG ELEMENTAL MG) 400 MG: 400 (241.3 MG) TAB at 17:41

## 2024-05-07 RX ADMIN — ACETAMINOPHEN 975 MG: 325 TABLET, FILM COATED ORAL at 17:11

## 2024-05-07 RX ADMIN — GUAIFENESIN 200 MG: 100 SOLUTION ORAL at 08:43

## 2024-05-07 ASSESSMENT — ACTIVITIES OF DAILY LIVING (ADL)
ADLS_ACUITY_SCORE: 39
ADLS_ACUITY_SCORE: 37
ADLS_ACUITY_SCORE: 39
ADLS_ACUITY_SCORE: 37
ADLS_ACUITY_SCORE: 39
ADLS_ACUITY_SCORE: 39
ADLS_ACUITY_SCORE: 37
ADLS_ACUITY_SCORE: 39
ADLS_ACUITY_SCORE: 37
ADLS_ACUITY_SCORE: 39
ADLS_ACUITY_SCORE: 39
ADLS_ACUITY_SCORE: 37
ADLS_ACUITY_SCORE: 37
ADLS_ACUITY_SCORE: 39

## 2024-05-07 NOTE — PLAN OF CARE
Goal Outcome Evaluation:      Plan of Care Reviewed With: patient, child          Outcome Evaluation: Pleasant and alert and oriented.  Up to the commode independently.  Needs assistance with tubes at times.  No blue output from the NGT today, remains at LIS.  Tolerating tube feeds at 50/hour, but off from 4230-4245 while waiting for blue dye and tube feeds.  Loose stools.  Pain managed by scheduled medications.  Oxymask at 4L all day.  Will attempt to wean this afternoom.  Productive cough mid-day that was yellow with blood streaks.  Patient stated it felt good to get that out.  Also uses yaunker. Working with OT this afternoon, may attempt to go outside with OT.

## 2024-05-07 NOTE — PLAN OF CARE
Goal Outcome Evaluation:      Plan of Care Reviewed With: patient, spouse    Overall Patient Progress: improvingOverall Patient Progress: improving    Outcome Evaluation: Pt alert and oriented x4. Reported abdominal discomfort and throat pain from NG tube. NPO with tube feedings via Jtube. Tube feeds increased to 30mL/hr at 2100. Next increase planned for 3am. Monitor NG output for blue contrast. NG to LIS. Voiding into urinal. BSC for BM. Up with assist of 1. Sats >92% 4L/oxiplus mask. Continue to monitor GI and respiratory status.    Transferred from:  6B  Report received from:   Mitchell Bonilla, RN          2 RN skin assessment completed by: Octavio      - Findings (add LDA if needed): blanchable redness to buttocks/sacrum and ears, abdominal lap sites x4, multiple scabs (left and right lateral chest and right foot), Jtube  Care plan (primary problem) and education initiated if not already done: Y  MDRO education done if applicable:n/a  Pt informed about policy regarding no IV pumps off unit:N  Suction set up in room? Y  Flu shot ordered? (October-April only): n/a  Detailed Belongings: see summary of care note

## 2024-05-07 NOTE — PLAN OF CARE
"/70 (BP Location: Right arm)   Pulse 84   Temp 97.7  F (36.5  C) (Oral)   Resp 18   Ht 1.727 m (5' 8\")   Wt 63.2 kg (139 lb 5.3 oz)   SpO2 93%   BMI 21.19 kg/m    Patient slept off and on through the night  Tube feeds turned up to 40 at 3am. Increased to 50 at 6 am per MD verbal order, also MD discontinued the IV fluids.   Tolerating well, suction at bedside.   Up to the commode X4  Lap sites clean, dry, and intact  Continue with current POC  Problem: Adult Inpatient Plan of Care  Goal: Optimal Comfort and Wellbeing  Outcome: Progressing     Problem: Enteral Nutrition  Goal: Absence of Aspiration Signs and Symptoms  Outcome: Progressing     Problem: Enteral Nutrition  Goal: Feeding Tolerance  Outcome: Progressing   Goal Outcome Evaluation:                        "

## 2024-05-07 NOTE — PROGRESS NOTES
"Thoracic Surgery  [facility]  5/6/2024    Subjective:  No acute events overnight. Tolerated restarting tube feeding. About 150-200 ml in NG cannister and no methylene blue seen. On 4L oxymask.    Objective  /74 (BP Location: Right arm)   Pulse 81   Temp 98  F (36.7  C) (Oral)   Resp 18   Ht 1.727 m (5' 8\")   Wt 63.4 kg (139 lb 12.8 oz)   SpO2 94%   BMI 21.26 kg/m        General: Alert, resting comfortably in NAD/NTA. Cooperative. NG tube in place with pinkish brownish output.  Pulm: NLB on 4L oxy-mask, no tachypnea/dyspnea, no wheezing  CV: non-cyanotic, no significant LE edema.   GI/ABD: Soft, non-distended, non-tender to palpation, no guarding or rebound.  Skin: Warm and well perfused    Labs:  Na: 137 from 135  K: 3.7 from 4.1    Wbc: 8.5 from 10.4  Hgb: 9.6 from 10.1    Imaging:  AM CXR: Stable CXR    A/P:  68M with SCC of esophagus who is s/p laparoscopic and right thoracoscopic gastroesophagectomy on 4/19 with Dr. Hill. Monitored in SICU overnight POD0 to 1, did very well and transferred to [facility] on 4/20. Esophagram on 4/25 was negative for leak. Concern for possible aspiration with clear liquids led to acute respiratory decline on 4/28 requiring re-intubation and admission to the SICU. He returned to OR for EGD and bronchoscopy where a fair amount of fluid was suctioned from the esophagus, the tissue appeared healthy with no evidence of leak. On 4/29 and repeat EGD with pyloric botox injection was performed, after which he was extubated. Cares have continued to progress however aspiration again became a concern. Upper GI 5/3 with no passage of contrast below the diaphragmatic hiatus. Underwent flexible bronchoscopy with bronchial lavage, EGD with pyloric balloon dilation (21 cm), and fluoroscopic NG placement on 5/5. TF held 5/5 for concern for aspiration but restarted 5/6 with methylene blue.    - Levaquin (5/5 - present )  - Advanced tube feeding to goal rate.  - " No more methylene blue needed after today.  - Keep NG tube to LIS  - Continue Reglan  - Multimodal pain control   - CXR every other day, aspiration precautions  - Wean oxygen as tolerated  - Continue to hold PTA plavix, continue lovenox     Patient seen on rounds with fellow, Dr. Garcia, who discussed with staff.    Please page if questions,    Sj Acosta MD  PGY-1

## 2024-05-08 ENCOUNTER — APPOINTMENT (OUTPATIENT)
Dept: OCCUPATIONAL THERAPY | Facility: CLINIC | Age: 69
DRG: 326 | End: 2024-05-08
Attending: THORACIC SURGERY (CARDIOTHORACIC VASCULAR SURGERY)
Payer: COMMERCIAL

## 2024-05-08 ENCOUNTER — APPOINTMENT (OUTPATIENT)
Dept: SPEECH THERAPY | Facility: CLINIC | Age: 69
DRG: 326 | End: 2024-05-08
Attending: THORACIC SURGERY (CARDIOTHORACIC VASCULAR SURGERY)
Payer: COMMERCIAL

## 2024-05-08 ENCOUNTER — APPOINTMENT (OUTPATIENT)
Dept: GENERAL RADIOLOGY | Facility: CLINIC | Age: 69
DRG: 326 | End: 2024-05-08
Payer: COMMERCIAL

## 2024-05-08 LAB
ANION GAP SERPL CALCULATED.3IONS-SCNC: 9 MMOL/L (ref 7–15)
BUN SERPL-MCNC: 13.1 MG/DL (ref 8–23)
CALCIUM SERPL-MCNC: 9 MG/DL (ref 8.8–10.2)
CHLORIDE SERPL-SCNC: 104 MMOL/L (ref 98–107)
CREAT SERPL-MCNC: 0.46 MG/DL (ref 0.67–1.17)
DEPRECATED HCO3 PLAS-SCNC: 25 MMOL/L (ref 22–29)
EGFRCR SERPLBLD CKD-EPI 2021: >90 ML/MIN/1.73M2
ERYTHROCYTE [DISTWIDTH] IN BLOOD BY AUTOMATED COUNT: 13.8 % (ref 10–15)
GLUCOSE SERPL-MCNC: 111 MG/DL (ref 70–99)
HCT VFR BLD AUTO: 31.8 % (ref 40–53)
HGB BLD-MCNC: 10.7 G/DL (ref 13.3–17.7)
MAGNESIUM SERPL-MCNC: 2 MG/DL (ref 1.7–2.3)
MCH RBC QN AUTO: 32 PG (ref 26.5–33)
MCHC RBC AUTO-ENTMCNC: 33.6 G/DL (ref 31.5–36.5)
MCV RBC AUTO: 95 FL (ref 78–100)
PHOSPHATE SERPL-MCNC: 2.5 MG/DL (ref 2.5–4.5)
PLATELET # BLD AUTO: 345 10E3/UL (ref 150–450)
POTASSIUM SERPL-SCNC: 4 MMOL/L (ref 3.4–5.3)
RBC # BLD AUTO: 3.34 10E6/UL (ref 4.4–5.9)
SODIUM SERPL-SCNC: 138 MMOL/L (ref 135–145)
WBC # BLD AUTO: 7.7 10E3/UL (ref 4–11)

## 2024-05-08 PROCEDURE — 92526 ORAL FUNCTION THERAPY: CPT | Mod: GN | Performed by: SPEECH-LANGUAGE PATHOLOGIST

## 2024-05-08 PROCEDURE — 250N000011 HC RX IP 250 OP 636

## 2024-05-08 PROCEDURE — 250N000013 HC RX MED GY IP 250 OP 250 PS 637

## 2024-05-08 PROCEDURE — 71045 X-RAY EXAM CHEST 1 VIEW: CPT

## 2024-05-08 PROCEDURE — 80048 BASIC METABOLIC PNL TOTAL CA: CPT

## 2024-05-08 PROCEDURE — 120N000002 HC R&B MED SURG/OB UMMC

## 2024-05-08 PROCEDURE — 250N000011 HC RX IP 250 OP 636: Mod: JZ

## 2024-05-08 PROCEDURE — 250N000009 HC RX 250: Performed by: THORACIC SURGERY (CARDIOTHORACIC VASCULAR SURGERY)

## 2024-05-08 PROCEDURE — 999N000157 HC STATISTIC RCP TIME EA 10 MIN

## 2024-05-08 PROCEDURE — 71045 X-RAY EXAM CHEST 1 VIEW: CPT | Mod: 26 | Performed by: RADIOLOGY

## 2024-05-08 PROCEDURE — 85027 COMPLETE CBC AUTOMATED: CPT

## 2024-05-08 PROCEDURE — 84100 ASSAY OF PHOSPHORUS: CPT | Performed by: THORACIC SURGERY (CARDIOTHORACIC VASCULAR SURGERY)

## 2024-05-08 PROCEDURE — 272N000202 HC AEROBIKA WITH MANOMETER

## 2024-05-08 PROCEDURE — 258N000003 HC RX IP 258 OP 636: Performed by: THORACIC SURGERY (CARDIOTHORACIC VASCULAR SURGERY)

## 2024-05-08 PROCEDURE — 97530 THERAPEUTIC ACTIVITIES: CPT | Mod: GO | Performed by: OCCUPATIONAL THERAPIST

## 2024-05-08 PROCEDURE — 36415 COLL VENOUS BLD VENIPUNCTURE: CPT

## 2024-05-08 PROCEDURE — 83735 ASSAY OF MAGNESIUM: CPT | Performed by: THORACIC SURGERY (CARDIOTHORACIC VASCULAR SURGERY)

## 2024-05-08 PROCEDURE — 250N000011 HC RX IP 250 OP 636: Performed by: THORACIC SURGERY (CARDIOTHORACIC VASCULAR SURGERY)

## 2024-05-08 RX ORDER — MAGNESIUM SULFATE HEPTAHYDRATE 40 MG/ML
2 INJECTION, SOLUTION INTRAVENOUS ONCE
Status: COMPLETED | OUTPATIENT
Start: 2024-05-08 | End: 2024-05-08

## 2024-05-08 RX ADMIN — SERTRALINE HYDROCHLORIDE 100 MG: 100 TABLET ORAL at 08:48

## 2024-05-08 RX ADMIN — MAGNESIUM SULFATE HEPTAHYDRATE 2 G: 2 INJECTION, SOLUTION INTRAVENOUS at 09:01

## 2024-05-08 RX ADMIN — GABAPENTIN 100 MG: 100 CAPSULE ORAL at 22:10

## 2024-05-08 RX ADMIN — FAMOTIDINE 20 MG: 20 TABLET ORAL at 20:03

## 2024-05-08 RX ADMIN — METHOCARBAMOL 500 MG: 500 TABLET ORAL at 04:40

## 2024-05-08 RX ADMIN — GUAIFENESIN 200 MG: 100 SOLUTION ORAL at 20:04

## 2024-05-08 RX ADMIN — METHOCARBAMOL 500 MG: 500 TABLET ORAL at 10:33

## 2024-05-08 RX ADMIN — ACETAMINOPHEN 975 MG: 325 TABLET, FILM COATED ORAL at 06:41

## 2024-05-08 RX ADMIN — LEVOFLOXACIN 500 MG: 5 INJECTION, SOLUTION INTRAVENOUS at 10:34

## 2024-05-08 RX ADMIN — METOCLOPRAMIDE 5 MG: 5 INJECTION, SOLUTION INTRAMUSCULAR; INTRAVENOUS at 20:04

## 2024-05-08 RX ADMIN — METHOCARBAMOL 500 MG: 500 TABLET ORAL at 18:08

## 2024-05-08 RX ADMIN — POLYETHYLENE GLYCOL 3350 17 G: 17 POWDER, FOR SOLUTION ORAL at 08:48

## 2024-05-08 RX ADMIN — ALBUTEROL SULFATE 4 PUFF: 90 AEROSOL, METERED RESPIRATORY (INHALATION) at 20:05

## 2024-05-08 RX ADMIN — METHOCARBAMOL 500 MG: 500 TABLET ORAL at 22:10

## 2024-05-08 RX ADMIN — LORAZEPAM 0.5 MG: 2 CONCENTRATE ORAL at 14:06

## 2024-05-08 RX ADMIN — ALBUTEROL SULFATE 4 PUFF: 90 AEROSOL, METERED RESPIRATORY (INHALATION) at 18:08

## 2024-05-08 RX ADMIN — ROSUVASTATIN CALCIUM 40 MG: 20 TABLET, FILM COATED ORAL at 08:48

## 2024-05-08 RX ADMIN — POLYETHYLENE GLYCOL 3350 17 G: 17 POWDER, FOR SOLUTION ORAL at 20:03

## 2024-05-08 RX ADMIN — FAMOTIDINE 20 MG: 20 TABLET ORAL at 08:49

## 2024-05-08 RX ADMIN — GUAIFENESIN 200 MG: 100 SOLUTION ORAL at 08:48

## 2024-05-08 RX ADMIN — SENNOSIDES AND DOCUSATE SODIUM 2 TABLET: 8.6; 5 TABLET ORAL at 20:03

## 2024-05-08 RX ADMIN — LORAZEPAM 0.5 MG: 2 CONCENTRATE ORAL at 22:10

## 2024-05-08 RX ADMIN — METOCLOPRAMIDE 5 MG: 5 INJECTION, SOLUTION INTRAMUSCULAR; INTRAVENOUS at 08:48

## 2024-05-08 RX ADMIN — POTASSIUM PHOSPHATE, MONOBASIC AND POTASSIUM PHOSPHATE, DIBASIC 9 MMOL: 224; 236 INJECTION, SOLUTION, CONCENTRATE INTRAVENOUS at 12:02

## 2024-05-08 RX ADMIN — Medication 3 MG: at 20:04

## 2024-05-08 RX ADMIN — ENOXAPARIN SODIUM 40 MG: 40 INJECTION SUBCUTANEOUS at 20:05

## 2024-05-08 RX ADMIN — METOCLOPRAMIDE 5 MG: 5 INJECTION, SOLUTION INTRAMUSCULAR; INTRAVENOUS at 14:06

## 2024-05-08 RX ADMIN — Medication 15 ML: at 08:48

## 2024-05-08 RX ADMIN — ACETAMINOPHEN 975 MG: 325 TABLET, FILM COATED ORAL at 14:06

## 2024-05-08 RX ADMIN — GUAIFENESIN 200 MG: 100 SOLUTION ORAL at 14:06

## 2024-05-08 RX ADMIN — ACETAMINOPHEN 975 MG: 325 TABLET, FILM COATED ORAL at 22:10

## 2024-05-08 RX ADMIN — FLUCONAZOLE IN SODIUM CHLORIDE 400 MG: 2 INJECTION, SOLUTION INTRAVENOUS at 08:11

## 2024-05-08 ASSESSMENT — ACTIVITIES OF DAILY LIVING (ADL)
ADLS_ACUITY_SCORE: 39
ADLS_ACUITY_SCORE: 39
ADLS_ACUITY_SCORE: 37
ADLS_ACUITY_SCORE: 39
ADLS_ACUITY_SCORE: 37
ADLS_ACUITY_SCORE: 39
ADLS_ACUITY_SCORE: 37
ADLS_ACUITY_SCORE: 39
ADLS_ACUITY_SCORE: 37
ADLS_ACUITY_SCORE: 39
ADLS_ACUITY_SCORE: 37
ADLS_ACUITY_SCORE: 39
ADLS_ACUITY_SCORE: 39
ADLS_ACUITY_SCORE: 37

## 2024-05-08 NOTE — PLAN OF CARE
"Nursing Care Plan Note:    Assumed care 1500 to 2330    /67 (BP Location: Right arm, Patient Position: Semi-Rodriguez's, Cuff Size: Adult Regular)   Pulse 75   Temp 97.7  F (36.5  C) (Oral)   Resp 20   Ht 1.727 m (5' 8\")   Wt 63.4 kg (139 lb 12.8 oz)   SpO2 96%   BMI 21.26 kg/m      Active/Admitting Problems:  Esophageal cancer   Pt rounded on hourly  Rehan:  alert and oriented   Pain:  ABD 4/10 scheduled tylenol and Robaxin given   GI/:  Denies nausea. Bowel sounds present. Good urine output clear yellow/green urine. Blue dye in TF turns urine greenish. Pt NPO has TF running at 50ml/hr goal rate pt tolerating. Blue dye is added to TF. Pt had loose BM  How Pt Takes Meds:  J tube   Cardiac:  WDL  Respiratory:  denies SOB lung sounds coarse bilaterally diminished lower lobes. Pt weaned from 4L to 2L oxymask. Pt has susiker at bedside for sputum   Skin:  clean dry and intact  Lines:  PIV saline locked. NG to lower intermittent suction, no blue dye noted in output container marked at 200 mls   Labs (Electrolyte protocol/DM):  Electrolyte protocol magnesium and potasium replaced,  phosphorus WDL, Rechecks ordered for tomorrow morning   Activity/mobility:  I to commode calls for help when he needs help with the tubes  Plan:  monitor NG output for blue dye    Call light in reach, Pt able to make needs known, Will continue to monitor and follow plan of care.     Goal Outcome Evaluation:     Plan of Care Reviewed With: Pt     Overall Patient Progress: Progressing      Outcome Evaluation:  monitor NG output for blue dye    "

## 2024-05-08 NOTE — PLAN OF CARE
Goal Outcome Evaluation:      Plan of Care Reviewed With: patient    Overall Patient Progress: improvingOverall Patient Progress: improving    Outcome Evaluation: Weaned to 2L nasal cannula.  Congested cough.  Wife here today. Tolerating tube feeds.  Phosphorus and magnesium replaced via IV replacement with rechecks tomorrow.  Walk in [place] and worked on the stairs with OT this afternoon.  Lorazapam given once for anxiety.  Loose stool this morning. Only bile output out of NGT today.

## 2024-05-08 NOTE — PLAN OF CARE
Goal Outcome Evaluation:    Cares from: 7879-2082    V/S & pain: VSS on Oxymask @3L,  abdomen pain managed w/ tylenol and robaxin  Neuro: A/O x4, calm and cooperative   Respiratory: denies sob, on continuous pulse ox  Skin: no new skin concerns present  GI/: voiding spontaneously, no BM this shift  Nutrition: NPO on TF at 50, denies N/V   Lines/drains: NG to low-intermitten suction  Activity: independent with standby assist because of TF and O2  Labs: on RN managed K, Phos, Mg    Events this shift: No acute events this shift. Pt tolerating TF at goal rate. NG to intermittent suction. Call light w/in reach and able to make needs known    Plan: continue w/ poc

## 2024-05-08 NOTE — PROGRESS NOTES
"Thoracic Surgery  [facility]  5/8/2024    Subjective:  No acute events overnight. Continuing to tolerate tube feeding. Reported coughing up some thick chunks of something yesterday.    Objective  /74 (BP Location: Right arm)   Pulse 86   Temp 97.9  F (36.6  C) (Oral)   Resp 20   Ht 1.727 m (5' 8\")   Wt 63.4 kg (139 lb 12.8 oz)   SpO2 95%   BMI 21.26 kg/m        General: Alert, resting comfortably in NAD. Cooperative. NG tube in place with minimal output.  Pulm: NLB on 3L oxy-mask, no tachypnea/dyspnea, no wheezing  CV: non-cyanotic, no significant LE edema.   GI/ABD: Soft, non-distended, non-tender to palpation, no guarding or rebound.  Skin: Warm and well perfused    Labs:  Na: 138 from 137  K: 4.0 from 3.7    Wbc: 7.7 from 8.5  Hgb: 10.7 from 9.6    Imaging:  AM CXR: Stable CXR    A/P:  68M with SCC of esophagus who is s/p laparoscopic and right thoracoscopic gastroesophagectomy on 4/19 with Dr. Hill. Monitored in SICU overnight POD0 to 1, did very well and transferred [place] [facility] [place]20. Esophagram on 4/25 was negative for leak. Concern for possible aspiration with clear liquids led to acute respiratory decline on 4/28 requiring re-intubation and admission to the SICU. He returned to OR for EGD and bronchoscopy where a fair amount of fluid was suctioned from the esophagus, the tissue appeared healthy with no evidence of leak. On 4/29 and repeat EGD with pyloric botox injection was performed, after which he was extubated. Cares have continued to progress however aspiration again became a concern. Upper GI 5/3 with no passage of contrast below the diaphragmatic hiatus. Underwent flexible bronchoscopy with bronchial lavage, EGD with pyloric balloon dilation (21 cm), and fluoroscopic NG placement on 5/5. TF held 5/5 for concern for aspiration but restarted 5/6 with methylene blue. Currently tolerating TF.    - Levaquin (5/5 - present)  - Continue tube feeding at goal rate..  - " NG tube to LIS  - Continue Reglan  - Multimodal pain control   - Daily CXR, aspiration precautions  - Wean oxygen as tolerated  - Continue to hold PTA plavix, continue lovenox  - May try NG to gravity in a few days when risk of gastroparesis is lower.     Patient seen on rounds with Dr. Calderon    Please page if questions,    Sj Acosta MD  PGY-1

## 2024-05-09 ENCOUNTER — APPOINTMENT (OUTPATIENT)
Dept: SPEECH THERAPY | Facility: CLINIC | Age: 69
DRG: 326 | End: 2024-05-09
Attending: THORACIC SURGERY (CARDIOTHORACIC VASCULAR SURGERY)
Payer: COMMERCIAL

## 2024-05-09 LAB
ANION GAP SERPL CALCULATED.3IONS-SCNC: 9 MMOL/L (ref 7–15)
BUN SERPL-MCNC: 15.5 MG/DL (ref 8–23)
CALCIUM SERPL-MCNC: 9.3 MG/DL (ref 8.8–10.2)
CHLORIDE SERPL-SCNC: 104 MMOL/L (ref 98–107)
CREAT SERPL-MCNC: 0.55 MG/DL (ref 0.67–1.17)
DEPRECATED HCO3 PLAS-SCNC: 25 MMOL/L (ref 22–29)
EGFRCR SERPLBLD CKD-EPI 2021: >90 ML/MIN/1.73M2
ERYTHROCYTE [DISTWIDTH] IN BLOOD BY AUTOMATED COUNT: 13.7 % (ref 10–15)
GLUCOSE SERPL-MCNC: 110 MG/DL (ref 70–99)
HCT VFR BLD AUTO: 32.3 % (ref 40–53)
HGB BLD-MCNC: 11 G/DL (ref 13.3–17.7)
MAGNESIUM SERPL-MCNC: 2.1 MG/DL (ref 1.7–2.3)
MCH RBC QN AUTO: 32.1 PG (ref 26.5–33)
MCHC RBC AUTO-ENTMCNC: 34.1 G/DL (ref 31.5–36.5)
MCV RBC AUTO: 94 FL (ref 78–100)
PHOSPHATE SERPL-MCNC: 3.5 MG/DL (ref 2.5–4.5)
PLATELET # BLD AUTO: 358 10E3/UL (ref 150–450)
POTASSIUM SERPL-SCNC: 3.8 MMOL/L (ref 3.4–5.3)
RBC # BLD AUTO: 3.43 10E6/UL (ref 4.4–5.9)
SODIUM SERPL-SCNC: 138 MMOL/L (ref 135–145)
WBC # BLD AUTO: 7.7 10E3/UL (ref 4–11)

## 2024-05-09 PROCEDURE — 83735 ASSAY OF MAGNESIUM: CPT | Performed by: THORACIC SURGERY (CARDIOTHORACIC VASCULAR SURGERY)

## 2024-05-09 PROCEDURE — 250N000013 HC RX MED GY IP 250 OP 250 PS 637

## 2024-05-09 PROCEDURE — 250N000011 HC RX IP 250 OP 636

## 2024-05-09 PROCEDURE — 250N000013 HC RX MED GY IP 250 OP 250 PS 637: Performed by: THORACIC SURGERY (CARDIOTHORACIC VASCULAR SURGERY)

## 2024-05-09 PROCEDURE — 92526 ORAL FUNCTION THERAPY: CPT | Mod: GN

## 2024-05-09 PROCEDURE — 250N000011 HC RX IP 250 OP 636: Mod: JZ

## 2024-05-09 PROCEDURE — 84100 ASSAY OF PHOSPHORUS: CPT | Performed by: THORACIC SURGERY (CARDIOTHORACIC VASCULAR SURGERY)

## 2024-05-09 PROCEDURE — 80048 BASIC METABOLIC PNL TOTAL CA: CPT

## 2024-05-09 PROCEDURE — 85027 COMPLETE CBC AUTOMATED: CPT

## 2024-05-09 PROCEDURE — 36415 COLL VENOUS BLD VENIPUNCTURE: CPT

## 2024-05-09 PROCEDURE — 120N000002 HC R&B MED SURG/OB UMMC

## 2024-05-09 RX ORDER — POTASSIUM CHLORIDE 20MEQ/15ML
20 LIQUID (ML) ORAL ONCE
Status: COMPLETED | OUTPATIENT
Start: 2024-05-09 | End: 2024-05-09

## 2024-05-09 RX ADMIN — GUAIFENESIN 200 MG: 100 SOLUTION ORAL at 14:07

## 2024-05-09 RX ADMIN — ACETAMINOPHEN 975 MG: 325 TABLET, FILM COATED ORAL at 22:05

## 2024-05-09 RX ADMIN — ROSUVASTATIN CALCIUM 40 MG: 20 TABLET, FILM COATED ORAL at 08:47

## 2024-05-09 RX ADMIN — SENNOSIDES AND DOCUSATE SODIUM 2 TABLET: 8.6; 5 TABLET ORAL at 20:17

## 2024-05-09 RX ADMIN — FLUCONAZOLE IN SODIUM CHLORIDE 400 MG: 2 INJECTION, SOLUTION INTRAVENOUS at 08:34

## 2024-05-09 RX ADMIN — ACETAMINOPHEN 975 MG: 325 TABLET, FILM COATED ORAL at 14:07

## 2024-05-09 RX ADMIN — GUAIFENESIN 200 MG: 100 SOLUTION ORAL at 20:17

## 2024-05-09 RX ADMIN — FAMOTIDINE 20 MG: 20 TABLET ORAL at 08:47

## 2024-05-09 RX ADMIN — GABAPENTIN 100 MG: 100 CAPSULE ORAL at 22:05

## 2024-05-09 RX ADMIN — METHOCARBAMOL 500 MG: 500 TABLET ORAL at 22:05

## 2024-05-09 RX ADMIN — METOCLOPRAMIDE 5 MG: 5 INJECTION, SOLUTION INTRAMUSCULAR; INTRAVENOUS at 08:34

## 2024-05-09 RX ADMIN — METOCLOPRAMIDE 5 MG: 5 INJECTION, SOLUTION INTRAMUSCULAR; INTRAVENOUS at 20:17

## 2024-05-09 RX ADMIN — Medication 15 ML: at 08:47

## 2024-05-09 RX ADMIN — SENNOSIDES AND DOCUSATE SODIUM 2 TABLET: 8.6; 5 TABLET ORAL at 08:48

## 2024-05-09 RX ADMIN — FAMOTIDINE 20 MG: 20 TABLET ORAL at 20:17

## 2024-05-09 RX ADMIN — GUAIFENESIN 200 MG: 100 SOLUTION ORAL at 08:47

## 2024-05-09 RX ADMIN — METHOCARBAMOL 500 MG: 500 TABLET ORAL at 11:12

## 2024-05-09 RX ADMIN — LORAZEPAM 0.5 MG: 2 CONCENTRATE ORAL at 16:51

## 2024-05-09 RX ADMIN — MAGNESIUM HYDROXIDE 30 ML: 400 SUSPENSION ORAL at 08:47

## 2024-05-09 RX ADMIN — POTASSIUM CHLORIDE 20 MEQ: 1.5 SOLUTION ORAL at 08:47

## 2024-05-09 RX ADMIN — METOCLOPRAMIDE 5 MG: 5 INJECTION, SOLUTION INTRAMUSCULAR; INTRAVENOUS at 14:06

## 2024-05-09 RX ADMIN — GUAIFENESIN 200 MG: 100 SOLUTION ORAL at 02:04

## 2024-05-09 RX ADMIN — METHOCARBAMOL 500 MG: 500 TABLET ORAL at 16:37

## 2024-05-09 RX ADMIN — SERTRALINE HYDROCHLORIDE 100 MG: 100 TABLET ORAL at 11:12

## 2024-05-09 RX ADMIN — ALBUTEROL SULFATE 4 PUFF: 90 AEROSOL, METERED RESPIRATORY (INHALATION) at 16:37

## 2024-05-09 RX ADMIN — ENOXAPARIN SODIUM 40 MG: 40 INJECTION SUBCUTANEOUS at 20:17

## 2024-05-09 RX ADMIN — Medication 3 MG: at 20:17

## 2024-05-09 RX ADMIN — ACETAMINOPHEN 975 MG: 325 TABLET, FILM COATED ORAL at 05:53

## 2024-05-09 RX ADMIN — METHOCARBAMOL 500 MG: 500 TABLET ORAL at 04:27

## 2024-05-09 RX ADMIN — LEVOFLOXACIN 500 MG: 5 INJECTION, SOLUTION INTRAVENOUS at 11:12

## 2024-05-09 ASSESSMENT — ACTIVITIES OF DAILY LIVING (ADL)
ADLS_ACUITY_SCORE: 39

## 2024-05-09 NOTE — PROGRESS NOTES
CLINICAL NUTRITION SERVICES - REASSESSMENT NOTE     Nutrition Prescription      Malnutrition Status:    Severe malnutrition in the context of acute condition    Recommendations already ordered by Registered Dietitian (RD):  No changes to TF today     Future/Additional Recommendations:  Novasource renal (or equivalent coconut/MCT-free formula) at goal rate of 75 ml/hr x 16 hours (5443-6123) via J-tube to provide: 1200 mL formula, 2400 kcal (35 kcal/kg), 109 g pro (1.5 g/kg), 220 g CHO, 0 g fiber, and 860 mL free water daily.        EVALUATION OF THE PROGRESS TOWARD GOALS   Diet:   Strict NPO 2/2 silent aspiration.   NGT changed to continuous suction   Per speech evaluation yesterday, patient is ok to swallow full liquids with chin tuck.   Allergies: Coconut       Nutrition Support:  Access: Jejunostomy tube  Dosing wt: 68 kg wt from 24  EN: Novasource renal at 50 ml/hr continuous rate ( due to patient with coconut allergies)    - Novasource renal (or equivalent coconut/MCT-free formula) at 50 ml/hr (1200 ml) provides 2400 kcal (35 kcal/kg), 109 g pro (1.5 g/kg), 220 g CHO, 860 ml free water, and 0 g fiber daily       Intake:    Tolerating TF per chart         NEW FINDINGS   Chart reviewed: patient with SCC of esophagus who is s/p laparoscopic and right thoracoscopic gastroesophagectomy on    - Upper GI 5/3 with no passage of contrast below the diaphragmatic hiatus. Underwent flexible bronchoscopy with bronchial lavage, EGD with pyloric balloon dilation (21 cm), and fluoroscopic NG placement on . TF held  for concern for aspiration but restarted  with methylene blue. Currently tolerating TF.     Wt trend:  Admit wt: 69.4 kg standing wt  Most recent wt: 62 kg (136 lb 11.2 oz) standing wt from 24    GI/stool:  BM x2 on , x2 ()  NG tube with 150 ml out yesterday    Labs noted:  BMP unremarkable  B (H)      MALNUTRITION  % Intake: Decreased intake does not meet criteria, on chronic TF  support   % Weight Loss: > 5% in 1 month (severe)  Subcutaneous Fat Loss: acial region:  moderate, Upper arm:  moderate, and Thoracic/intercostal:  moderate   Muscle Loss: Temporal: mild, Facial & jaw region: mild, Thoracic region (clavicle, acromium bone, deltoid, trapezius, pectoral): severe, and Upper arm (bicep, tricep): severe   Fluid Accumulation/Edema: None noted  Malnutrition Diagnosis: Severe malnutrition in the context of acute condition    Previous Goals   otal avg nutritional intake to meet a minimum of 30 kcal/kg and 1.5 g PRO/kg daily (per dosing wt 68 kg).   Evaluation: unable to assess       Previous Nutrition Diagnosis  Inadequate oral intake related to NPO s/p esophagectomy as evidenced by enteral feeds via jejunostomy to meet 100% needs.   Evaluation: No change      CURRENT NUTRITION DIAGNOSIS  Inadequate oral intake related to NPO s/p esophagectomy as evidenced by enteral feeds via jejunostomy to meet 100% needs.       INTERVENTIONS  Implementation  None today    Goals  otal avg nutritional intake to meet a minimum of 30 kcal/kg and 1.5 g PRO/kg daily (per dosing wt 68 kg).     Monitoring/Evaluation  Progress toward goals will be monitored and evaluated per protocol.      Yuko Waggoner RD/LD  Unit 7C (1695-9874) and (9960-0709),  Monday-Friday: Vocera -> (7C clinical Dietitian),   Weekend/Holiday: Vocera -> (Weekend Clinical Dietitian)

## 2024-05-09 NOTE — PLAN OF CARE
Goal Outcome Evaluation:      Plan of Care Reviewed With: patient, spouse    Overall Patient Progress: no changeOverall Patient Progress: no change    Outcome Evaluation: On 1L nasal cannula today.  Atttempted to remove completely but sat's would dip to the 80's, so back at 1L nasal cannula.  Encouraged continued incentive spirometry and flutter valve.  Crackles heard in bilateral bases.  Murmur also noted for heart sounds.  Tolerating TF.  NGT changed to continuous suction per new order.  Up to commode independently. Would like to walk this afternoon when staff is available.

## 2024-05-09 NOTE — PLAN OF CARE
"/71 (BP Location: Left arm)   Pulse 81   Temp 98.4  F (36.9  C) (Oral)   Resp 16   Ht 1.727 m (5' 8\")   Wt 63.4 kg (139 lb 12.8 oz)   SpO2 92%   BMI 21.26 kg/m     Time: 6196-9125  Reason for admission: Esophageal  cancer.   Activity: Assist of one person.   Pain: Patient received scheduled Tylenol, and robaxin for pain management, and it was effective.   Neuro: alert and oriented.   Cardiac: WDL  Resp: dyspnea on exertion, patient is on 1L of oxygen, lung sounds diminished.   GI/: NPO/TF, NG tube to low intermittent suction, voids without difficulties, bowel sounds present x four quadrants.   Lines: PIV, TKO.   Skin: WDL X  Labs/Imaging: no lab or imaging this shift.   New changes to shift: no change.   Plan: continue with current plan of cares.                         "

## 2024-05-09 NOTE — PROGRESS NOTES
"Thoracic Surgery  [facility]  5/9/2024    Subjective:  No acute events overnight. Continuing to tolerate tube feeding. Reports occasional coughing still. 375 ml out of NG tube yesterday. Oxygen needs decreasing. Per speech evaluation yesterday, patient is ok to swallow full liquids with chin tuck.    Objective  /69 (BP Location: Left arm)   Pulse 85   Temp 97.7  F (36.5  C) (Axillary)   Resp 20   Ht 1.727 m (5' 8\")   Wt 62 kg (136 lb 11.2 oz)   SpO2 93%   BMI 20.79 kg/m        General: Alert, resting comfortably in NAD. Cooperative. NG tube in place with clear brownish output.  Pulm: NLB on 1L NC, no tachypnea/dyspnea, no wheezing  CV: non-cyanotic, no significant LE edema.   GI/ABD: Soft, non-distended, non-tender to palpation, no guarding or rebound.  Skin: Warm and well perfused    Labs:  Na: 138 from 138  K: 3.8 from 4.0    Wbc: 7.7 from 7.7  Hgb: 11.0 from 10.7    Imaging:  AM CXR: Stable CXR yesterday    A/P:  68M with SCC of esophagus who is s/p laparoscopic and right thoracoscopic gastroesophagectomy on 4/19 with Dr. Hill. Monitored in SICU overnight POD0 to 1, did very well and transferred to [facility] on 4/20. Esophagram on 4/25 was negative for leak. Concern for possible aspiration with clear liquids led to acute respiratory decline on 4/28 requiring re-intubation and admission to the SICU. He returned to OR for EGD and bronchoscopy where a fair amount of fluid was suctioned from the esophagus, the tissue appeared healthy with no evidence of leak. On 4/29 and repeat EGD with pyloric botox injection was performed, after which he was extubated. Cares have continued to progress however aspiration again became a concern. Upper GI 5/3 with no passage of contrast below the diaphragmatic hiatus. Underwent flexible bronchoscopy with bronchial lavage, EGD with pyloric balloon dilation (21 cm), and fluoroscopic NG placement on 5/5. TF held 5/5 for concern for aspiration but " restarted 5/6 with methylene blue. Currently tolerating TF.    - Levaquin (5/5 - present)  - Continue tube feeding at goal rate.  - NG tube to LCS  - NPO with ice chips  - Continue speech evaluations  - Continue Reglan  - Multimodal pain control.  - CXR every other day, aspiration precautions  - Wean oxygen as tolerated  - Continue to hold PTA plavix, continue lovenox  - May try NG to gravity in a few days when risk of gastroparesis is lower.     Patient seen on rounds with Fellow, Dr. Garcia, who discussed with Dr. Calderon    Please page if questions,    Sj Acosta MD  PGY-1

## 2024-05-09 NOTE — PLAN OF CARE
Goal Outcome Evaluation:      Plan of Care Reviewed With: patient    Overall Patient Progress: improvingOverall Patient Progress: improving    Outcome Evaluation: Continues on 2L nasal cannula, able to pivot to commode independently.  Tube feed running at goal.  Denies pain this shift    RN assumed cares at 1500, Pt alert and oriented, VS stable this afternoon.  Spouse visiting this afternoon and patient able to nap between cares.  Up to commode with stand by assist for line management.  Medium BM this afternoon.  Strict NPO 2/2 silent aspiration.  Pt denies pain.  Plan for TCU vs home once medically ready.  No acute incidents this shift.  Continue to monitor and notify MD of any changes.

## 2024-05-10 ENCOUNTER — APPOINTMENT (OUTPATIENT)
Dept: OCCUPATIONAL THERAPY | Facility: CLINIC | Age: 69
DRG: 326 | End: 2024-05-10
Attending: THORACIC SURGERY (CARDIOTHORACIC VASCULAR SURGERY)
Payer: COMMERCIAL

## 2024-05-10 ENCOUNTER — APPOINTMENT (OUTPATIENT)
Dept: GENERAL RADIOLOGY | Facility: CLINIC | Age: 69
DRG: 326 | End: 2024-05-10
Payer: COMMERCIAL

## 2024-05-10 LAB
ANION GAP SERPL CALCULATED.3IONS-SCNC: 8 MMOL/L (ref 7–15)
BUN SERPL-MCNC: 16.9 MG/DL (ref 8–23)
CALCIUM SERPL-MCNC: 9.3 MG/DL (ref 8.8–10.2)
CHLORIDE SERPL-SCNC: 103 MMOL/L (ref 98–107)
CREAT SERPL-MCNC: 0.55 MG/DL (ref 0.67–1.17)
DEPRECATED HCO3 PLAS-SCNC: 26 MMOL/L (ref 22–29)
EGFRCR SERPLBLD CKD-EPI 2021: >90 ML/MIN/1.73M2
ERYTHROCYTE [DISTWIDTH] IN BLOOD BY AUTOMATED COUNT: 13.9 % (ref 10–15)
GLUCOSE SERPL-MCNC: 108 MG/DL (ref 70–99)
HCT VFR BLD AUTO: 32.3 % (ref 40–53)
HGB BLD-MCNC: 11.2 G/DL (ref 13.3–17.7)
MAGNESIUM SERPL-MCNC: 2.1 MG/DL (ref 1.7–2.3)
MCH RBC QN AUTO: 32.5 PG (ref 26.5–33)
MCHC RBC AUTO-ENTMCNC: 34.7 G/DL (ref 31.5–36.5)
MCV RBC AUTO: 94 FL (ref 78–100)
PHOSPHATE SERPL-MCNC: 3.6 MG/DL (ref 2.5–4.5)
PLATELET # BLD AUTO: 358 10E3/UL (ref 150–450)
POTASSIUM SERPL-SCNC: 3.9 MMOL/L (ref 3.4–5.3)
RBC # BLD AUTO: 3.45 10E6/UL (ref 4.4–5.9)
SODIUM SERPL-SCNC: 137 MMOL/L (ref 135–145)
WBC # BLD AUTO: 8.1 10E3/UL (ref 4–11)

## 2024-05-10 PROCEDURE — 250N000013 HC RX MED GY IP 250 OP 250 PS 637

## 2024-05-10 PROCEDURE — 85027 COMPLETE CBC AUTOMATED: CPT

## 2024-05-10 PROCEDURE — 250N000011 HC RX IP 250 OP 636

## 2024-05-10 PROCEDURE — 999N000128 HC STATISTIC PERIPHERAL IV START W/O US GUIDANCE

## 2024-05-10 PROCEDURE — 36415 COLL VENOUS BLD VENIPUNCTURE: CPT

## 2024-05-10 PROCEDURE — 71045 X-RAY EXAM CHEST 1 VIEW: CPT

## 2024-05-10 PROCEDURE — 80048 BASIC METABOLIC PNL TOTAL CA: CPT

## 2024-05-10 PROCEDURE — 84100 ASSAY OF PHOSPHORUS: CPT | Performed by: THORACIC SURGERY (CARDIOTHORACIC VASCULAR SURGERY)

## 2024-05-10 PROCEDURE — 120N000002 HC R&B MED SURG/OB UMMC

## 2024-05-10 PROCEDURE — 97530 THERAPEUTIC ACTIVITIES: CPT | Mod: GO

## 2024-05-10 PROCEDURE — 250N000011 HC RX IP 250 OP 636: Mod: JZ

## 2024-05-10 PROCEDURE — 83735 ASSAY OF MAGNESIUM: CPT | Performed by: THORACIC SURGERY (CARDIOTHORACIC VASCULAR SURGERY)

## 2024-05-10 PROCEDURE — 71045 X-RAY EXAM CHEST 1 VIEW: CPT | Mod: 26 | Performed by: RADIOLOGY

## 2024-05-10 PROCEDURE — 97110 THERAPEUTIC EXERCISES: CPT | Mod: GO

## 2024-05-10 RX ADMIN — GUAIFENESIN 200 MG: 100 SOLUTION ORAL at 08:56

## 2024-05-10 RX ADMIN — GUAIFENESIN 200 MG: 100 SOLUTION ORAL at 01:29

## 2024-05-10 RX ADMIN — MAGNESIUM HYDROXIDE 30 ML: 400 SUSPENSION ORAL at 20:11

## 2024-05-10 RX ADMIN — ACETAMINOPHEN 975 MG: 325 TABLET, FILM COATED ORAL at 21:48

## 2024-05-10 RX ADMIN — FAMOTIDINE 20 MG: 20 TABLET ORAL at 08:56

## 2024-05-10 RX ADMIN — ENOXAPARIN SODIUM 40 MG: 40 INJECTION SUBCUTANEOUS at 20:08

## 2024-05-10 RX ADMIN — GUAIFENESIN 200 MG: 100 SOLUTION ORAL at 14:35

## 2024-05-10 RX ADMIN — METOCLOPRAMIDE 5 MG: 5 INJECTION, SOLUTION INTRAMUSCULAR; INTRAVENOUS at 14:35

## 2024-05-10 RX ADMIN — SENNOSIDES AND DOCUSATE SODIUM 2 TABLET: 8.6; 5 TABLET ORAL at 08:56

## 2024-05-10 RX ADMIN — SERTRALINE HYDROCHLORIDE 100 MG: 100 TABLET ORAL at 08:56

## 2024-05-10 RX ADMIN — ALBUTEROL SULFATE 4 PUFF: 90 AEROSOL, METERED RESPIRATORY (INHALATION) at 20:07

## 2024-05-10 RX ADMIN — METHOCARBAMOL 500 MG: 500 TABLET ORAL at 04:14

## 2024-05-10 RX ADMIN — METOCLOPRAMIDE 5 MG: 5 INJECTION, SOLUTION INTRAMUSCULAR; INTRAVENOUS at 20:08

## 2024-05-10 RX ADMIN — ACETAMINOPHEN 975 MG: 325 TABLET, FILM COATED ORAL at 07:09

## 2024-05-10 RX ADMIN — LIDOCAINE 4% 2 PATCH: 40 PATCH TOPICAL at 21:48

## 2024-05-10 RX ADMIN — METOCLOPRAMIDE 5 MG: 5 INJECTION, SOLUTION INTRAMUSCULAR; INTRAVENOUS at 08:55

## 2024-05-10 RX ADMIN — LORAZEPAM 0.5 MG: 2 CONCENTRATE ORAL at 18:57

## 2024-05-10 RX ADMIN — FLUCONAZOLE IN SODIUM CHLORIDE 400 MG: 2 INJECTION, SOLUTION INTRAVENOUS at 07:09

## 2024-05-10 RX ADMIN — SENNOSIDES AND DOCUSATE SODIUM 2 TABLET: 8.6; 5 TABLET ORAL at 20:08

## 2024-05-10 RX ADMIN — ROSUVASTATIN CALCIUM 40 MG: 20 TABLET, FILM COATED ORAL at 08:56

## 2024-05-10 RX ADMIN — LEVOFLOXACIN 500 MG: 5 INJECTION, SOLUTION INTRAVENOUS at 11:49

## 2024-05-10 RX ADMIN — Medication 3 MG: at 20:08

## 2024-05-10 RX ADMIN — POLYETHYLENE GLYCOL 3350 17 G: 17 POWDER, FOR SOLUTION ORAL at 08:56

## 2024-05-10 RX ADMIN — METHOCARBAMOL 500 MG: 500 TABLET ORAL at 09:00

## 2024-05-10 RX ADMIN — GUAIFENESIN 200 MG: 100 SOLUTION ORAL at 20:07

## 2024-05-10 RX ADMIN — FAMOTIDINE 20 MG: 20 TABLET ORAL at 20:08

## 2024-05-10 RX ADMIN — METHOCARBAMOL 500 MG: 500 TABLET ORAL at 16:13

## 2024-05-10 RX ADMIN — ACETAMINOPHEN 975 MG: 325 TABLET, FILM COATED ORAL at 14:35

## 2024-05-10 RX ADMIN — POLYETHYLENE GLYCOL 3350 17 G: 17 POWDER, FOR SOLUTION ORAL at 20:08

## 2024-05-10 RX ADMIN — METHOCARBAMOL 500 MG: 500 TABLET ORAL at 21:48

## 2024-05-10 RX ADMIN — Medication 15 ML: at 08:56

## 2024-05-10 RX ADMIN — GABAPENTIN 100 MG: 100 CAPSULE ORAL at 21:48

## 2024-05-10 RX ADMIN — LORAZEPAM 0.5 MG: 2 CONCENTRATE ORAL at 08:59

## 2024-05-10 ASSESSMENT — ACTIVITIES OF DAILY LIVING (ADL)
ADLS_ACUITY_SCORE: 39

## 2024-05-10 NOTE — PROGRESS NOTES
[id number] Shift  Goal Outcome Evaluation:       Plan of Care Reviewed With: patient     Overall Patient Progress: no change     Outcome Evaluation: A&O x 4.  VSS on 1L NC.  Bumped up to 2L NC while sleeping.  Denies pain.  Remains NPO but ice chips ok.  NG set to low-continuous suction.  TF running through J tube at goal rate of 50mL/hr with water flushes.  Up independently to chair x 1 today.  No acute changes.  Continue to monitor and follow POC.

## 2024-05-10 NOTE — PLAN OF CARE
Goal Outcome Evaluation: [id number]      Plan of Care Reviewed With: patient    Overall Patient Progress: no change    Outcome Evaluation: A&O x 4.  VSS on 1L NC.  Bumped up to 2L NC while sleeping.  Denies pain.  Remains NPO but ice chips ok.  NG set to low-continuous suction.  TF running through J tube at goal rate of 50mL/hr with water flushes.  Up independently to chair x 1 today.  No acute changes.  Continue to monitor and follow POC.

## 2024-05-10 NOTE — CONSULTS
SPIRITUAL HEALTH SERVICES Consult Note  [facility] 7C    Referral Source/Reason for Visit: Routine Consult    Summary and Recommendations -  ~Fabricio is awaiting results on his esophagus with optimism and acceptance. His goals are restorative.      ~Fabricio has healthy and life-giving support, resources, and coping skills involving family, friends, and Jain emanuel.     Plan: Fabricio is coping well and is not currently in need of support from [facility] at this time.  services always remain available. Please place a consult as needs arise.    Rev. PURA Dukes.  Chaplain Resident  Pager [phone]    * [facility] remains available 24/7 for emergent requests/referrals, either by having the switchboard page the on-call  or by entering an ASAP/STAT consult in Epic (this will also page the on-call ). Routine Epic consults receive an initial response within 24 hours.*    Assessment      Patient/Family Understanding of Illness and Goals of Care -   ~Fabricio's stomach was removed to repair his esophagus. Both Fabricio and Sidra (wife) remain optimistic as they wait for results. They display a peaceful acceptance of the lifestyle changes this procedure requires.      Distress and Loss -  ~Fabricio has feelings of minor distress from time-to-time while in the hospital but continues to depend on his robust resources and coping skills to carry him through.       Strengths, Coping, and Resources -   ~Fabricio has strong and life-giving support, resources, and coping skills. He relies on support from his family, emanuel community, and friends while using his coping skills of gratitude, optimism, and emanuel to provide resilience and endurance.       Meaning, Beliefs, and Spirituality -   ~Fabricio is Jain and attends [employer] where Sidra and him are well established. Fabricio leads a recovery group on drug and alcohol addiction, as this is something he has struggled with in his life. Fabricio is generative in helping others  "through the process.    ~Fabricio shared, \"the last ten years of my life I have learned more about gina and God than my whole life.\" Gina is central to his everyday living and is extremely life-giving to his overall well-being.    ~He accepts pain, struggle, and hardship knowing that his anchor is \"God will never leave me nor forsake me.\" Fabricio's gina provides hope and resilience.     "

## 2024-05-10 NOTE — PROGRESS NOTES
"Thoracic Surgery  [facility]  5/10/2024    Subjective:  No acute events overnight. Continuing to tolerate tube feeding. Reports he coughs up some mucous every morning, but otherwise this doesn't happen much. 430 ml out of NG tube yesterday. Tolerating occasional ice chips.    Objective  /64 (BP Location: Right arm)   Pulse 89   Temp 97.7  F (36.5  C) (Oral)   Resp 18   Ht 1.727 m (5' 8\")   Wt 62 kg (136 lb 11.2 oz)   SpO2 95%   BMI 20.79 kg/m        General: Alert, resting comfortably in NAD. Cooperative. NG tube in place with clear brownish output.  Pulm: NLB on 1L NC, no tachypnea/dyspnea, no wheezing  CV: non-cyanotic, no significant LE edema.   GI/ABD: Soft, non-distended, non-tender to palpation, no guarding or rebound.  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 Hill. Monitored in SICU overnight POD0 to 1, did very well and transferred to [facility] on 4/20. Esophagram on 4/25 was negative for leak. Concern for possible aspiration with clear liquids led to acute respiratory decline on 4/28 requiring re-intubation and admission to the SICU. He returned to OR for EGD and bronchoscopy where a fair amount of fluid was suctioned from the esophagus, the tissue appeared healthy with no evidence of leak. On 4/29 and repeat EGD with pyloric botox injection was performed, after which he was extubated. Cares have continued to progress however aspiration again became a concern. Upper GI 5/3 with no passage of contrast below the diaphragmatic hiatus. Underwent flexible bronchoscopy with bronchial lavage, EGD with pyloric balloon dilation (21 cm), and fluoroscopic NG placement on 5/5. TF held 5/5 for concern for aspiration but restarted 5/6 with methylene blue. " Currently tolerating TF.    No major changes today  - Levaquin (5/5 - present)  - Continue tube feeding at goal rate. Cycled for 16 hrs.  - NG tube to LCS  - NPO with ice chips  - Continue speech evaluations  - Continue Reglan  - Multimodal pain control.  - CXR every other day, aspiration precautions  - Wean oxygen as tolerated  - Continue to hold PTA plavix, continue lovenox  - May try NG to gravity in a few days when risk of gastroparesis is lower.     Patient seen on rounds with Fellow, Dr. Garcia, who discussed with Dr. Calderon    Please page if questions,    Sj Acosta MD  PGY-1

## 2024-05-10 NOTE — PLAN OF CARE
Goal Outcome Evaluation:   Plan of care reviewed with: patient    Overall patient progress: no change    Outcome evaluation: Assumed cares 2887-7814. A&Ox4. VSS on 2 L NC. R PIV SL. NG to low continuous suction. TF running @ goal rate of 50 mL/hr. Up independently to bathroom, just needs assistance with cords. No acute events this shift. Rested between cares. Call light within reach. Able to make needs known. Continue with POC

## 2024-05-11 LAB
ANION GAP SERPL CALCULATED.3IONS-SCNC: 11 MMOL/L (ref 7–15)
BUN SERPL-MCNC: 17.3 MG/DL (ref 8–23)
CALCIUM SERPL-MCNC: 9.4 MG/DL (ref 8.8–10.2)
CHLORIDE SERPL-SCNC: 101 MMOL/L (ref 98–107)
CREAT SERPL-MCNC: 0.54 MG/DL (ref 0.67–1.17)
DEPRECATED HCO3 PLAS-SCNC: 25 MMOL/L (ref 22–29)
EGFRCR SERPLBLD CKD-EPI 2021: >90 ML/MIN/1.73M2
ERYTHROCYTE [DISTWIDTH] IN BLOOD BY AUTOMATED COUNT: 13.9 % (ref 10–15)
GLUCOSE SERPL-MCNC: 109 MG/DL (ref 70–99)
HCT VFR BLD AUTO: 33.1 % (ref 40–53)
HGB BLD-MCNC: 11.2 G/DL (ref 13.3–17.7)
MAGNESIUM SERPL-MCNC: 2.1 MG/DL (ref 1.7–2.3)
MCH RBC QN AUTO: 31.7 PG (ref 26.5–33)
MCHC RBC AUTO-ENTMCNC: 33.8 G/DL (ref 31.5–36.5)
MCV RBC AUTO: 94 FL (ref 78–100)
PHOSPHATE SERPL-MCNC: 4.1 MG/DL (ref 2.5–4.5)
PLATELET # BLD AUTO: 340 10E3/UL (ref 150–450)
POTASSIUM SERPL-SCNC: 4.3 MMOL/L (ref 3.4–5.3)
RBC # BLD AUTO: 3.53 10E6/UL (ref 4.4–5.9)
SODIUM SERPL-SCNC: 137 MMOL/L (ref 135–145)
WBC # BLD AUTO: 7.9 10E3/UL (ref 4–11)

## 2024-05-11 PROCEDURE — 250N000013 HC RX MED GY IP 250 OP 250 PS 637

## 2024-05-11 PROCEDURE — 250N000011 HC RX IP 250 OP 636: Mod: JZ

## 2024-05-11 PROCEDURE — 84100 ASSAY OF PHOSPHORUS: CPT | Performed by: THORACIC SURGERY (CARDIOTHORACIC VASCULAR SURGERY)

## 2024-05-11 PROCEDURE — 83735 ASSAY OF MAGNESIUM: CPT | Performed by: THORACIC SURGERY (CARDIOTHORACIC VASCULAR SURGERY)

## 2024-05-11 PROCEDURE — 36415 COLL VENOUS BLD VENIPUNCTURE: CPT

## 2024-05-11 PROCEDURE — 250N000011 HC RX IP 250 OP 636

## 2024-05-11 PROCEDURE — 120N000002 HC R&B MED SURG/OB UMMC

## 2024-05-11 PROCEDURE — 85027 COMPLETE CBC AUTOMATED: CPT

## 2024-05-11 PROCEDURE — 80048 BASIC METABOLIC PNL TOTAL CA: CPT

## 2024-05-11 RX ADMIN — ACETAMINOPHEN 975 MG: 325 TABLET, FILM COATED ORAL at 14:12

## 2024-05-11 RX ADMIN — Medication 3 MG: at 20:37

## 2024-05-11 RX ADMIN — ALBUTEROL SULFATE 4 PUFF: 90 AEROSOL, METERED RESPIRATORY (INHALATION) at 20:42

## 2024-05-11 RX ADMIN — METOCLOPRAMIDE 5 MG: 5 INJECTION, SOLUTION INTRAMUSCULAR; INTRAVENOUS at 14:12

## 2024-05-11 RX ADMIN — ENOXAPARIN SODIUM 40 MG: 40 INJECTION SUBCUTANEOUS at 20:36

## 2024-05-11 RX ADMIN — LORAZEPAM 0.5 MG: 2 CONCENTRATE ORAL at 20:42

## 2024-05-11 RX ADMIN — SENNOSIDES AND DOCUSATE SODIUM 2 TABLET: 8.6; 5 TABLET ORAL at 09:52

## 2024-05-11 RX ADMIN — Medication 15 ML: at 09:52

## 2024-05-11 RX ADMIN — FLUCONAZOLE IN SODIUM CHLORIDE 400 MG: 2 INJECTION, SOLUTION INTRAVENOUS at 09:53

## 2024-05-11 RX ADMIN — ACETAMINOPHEN 975 MG: 325 TABLET, FILM COATED ORAL at 06:30

## 2024-05-11 RX ADMIN — METHOCARBAMOL 500 MG: 500 TABLET ORAL at 04:27

## 2024-05-11 RX ADMIN — METHOCARBAMOL 500 MG: 500 TABLET ORAL at 17:26

## 2024-05-11 RX ADMIN — GUAIFENESIN 200 MG: 100 SOLUTION ORAL at 20:36

## 2024-05-11 RX ADMIN — GUAIFENESIN 200 MG: 100 SOLUTION ORAL at 04:27

## 2024-05-11 RX ADMIN — METOCLOPRAMIDE 5 MG: 5 INJECTION, SOLUTION INTRAMUSCULAR; INTRAVENOUS at 09:52

## 2024-05-11 RX ADMIN — LORAZEPAM 0.5 MG: 2 CONCENTRATE ORAL at 14:12

## 2024-05-11 RX ADMIN — LEVOFLOXACIN 500 MG: 5 INJECTION, SOLUTION INTRAVENOUS at 12:38

## 2024-05-11 RX ADMIN — SENNOSIDES AND DOCUSATE SODIUM 2 TABLET: 8.6; 5 TABLET ORAL at 20:36

## 2024-05-11 RX ADMIN — POLYETHYLENE GLYCOL 3350 17 G: 17 POWDER, FOR SOLUTION ORAL at 09:52

## 2024-05-11 RX ADMIN — POLYETHYLENE GLYCOL 3350 17 G: 17 POWDER, FOR SOLUTION ORAL at 20:36

## 2024-05-11 RX ADMIN — GUAIFENESIN 200 MG: 100 SOLUTION ORAL at 14:12

## 2024-05-11 RX ADMIN — METOCLOPRAMIDE 5 MG: 5 INJECTION, SOLUTION INTRAMUSCULAR; INTRAVENOUS at 20:37

## 2024-05-11 RX ADMIN — FAMOTIDINE 20 MG: 20 TABLET ORAL at 09:52

## 2024-05-11 RX ADMIN — METHOCARBAMOL 500 MG: 500 TABLET ORAL at 09:52

## 2024-05-11 RX ADMIN — SERTRALINE HYDROCHLORIDE 100 MG: 100 TABLET ORAL at 09:52

## 2024-05-11 RX ADMIN — METHOCARBAMOL 500 MG: 500 TABLET ORAL at 22:52

## 2024-05-11 RX ADMIN — GUAIFENESIN 200 MG: 100 SOLUTION ORAL at 09:52

## 2024-05-11 RX ADMIN — ROSUVASTATIN CALCIUM 40 MG: 20 TABLET, FILM COATED ORAL at 09:52

## 2024-05-11 RX ADMIN — GABAPENTIN 100 MG: 100 CAPSULE ORAL at 22:53

## 2024-05-11 RX ADMIN — FAMOTIDINE 20 MG: 20 TABLET ORAL at 20:37

## 2024-05-11 RX ADMIN — ACETAMINOPHEN 975 MG: 325 TABLET, FILM COATED ORAL at 22:53

## 2024-05-11 RX ADMIN — MAGNESIUM HYDROXIDE 30 ML: 400 SUSPENSION ORAL at 09:52

## 2024-05-11 ASSESSMENT — ACTIVITIES OF DAILY LIVING (ADL)
ADLS_ACUITY_SCORE: 39

## 2024-05-11 NOTE — PROGRESS NOTES
"Thoracic Surgery  [facility]  5/11/2024    Subjective:  No acute events overnight. Continuing to tolerate tube feeding. 450 ml out of NG tube yesterday. Tolerating occasional ice chips.    Objective  BP (!) 76/60   Pulse 94   Temp 97.9  F (36.6  C) (Oral)   Resp 20   Ht 1.727 m (5' 8\")   Wt 62 kg (136 lb 11.2 oz)   SpO2 95%   BMI 20.79 kg/m        General: Alert, resting comfortably in NAD. Cooperative. NG tube in place with clear brownish output.  Pulm: NLB on 2L NC, no tachypnea/dyspnea, no wheezing  CV: non-cyanotic, no significant LE edema.   GI/ABD: Soft, non-distended, non-tender to palpation, no guarding or rebound.  Skin: Warm and well perfused    Labs:  Na: 137 from 137  K: 4.3 from 3.9    Wbc: 7.9 from 8.1  Hgb: 11.2 from 11.2    Imaging:  AM CXR: Stable CXR yesterday    A/P:  68M with SCC of esophagus who is s/p laparoscopic and right thoracoscopic gastroesophagectomy on 4/19 with Dr. Hill. Monitored in SICU overnight POD0 to 1, did very well and transferred to [facility] on 4/20. Esophagram on 4/25 was negative for leak. Concern for possible aspiration with clear liquids led to acute respiratory decline on 4/28 requiring re-intubation and admission to the SICU. He returned to OR for EGD and bronchoscopy where a fair amount of fluid was suctioned from the esophagus, the tissue appeared healthy with no evidence of leak. On 4/29 and repeat EGD with pyloric botox injection was performed, after which he was extubated. Cares have continued to progress however aspiration again became a concern. Upper GI 5/3 with no passage of contrast below the diaphragmatic hiatus. Underwent flexible bronchoscopy with bronchial lavage, EGD with pyloric balloon dilation (21 cm), and fluoroscopic NG placement on 5/5. TF held 5/5 for concern for aspiration but restarted 5/6 with methylene blue. Currently tolerating TF.    - Trial NG to gravity today.  - Levaquin (5/5 - present)  - Continue tube " feeding at goal rate. Cycled for 16 hrs.  - NG tube to LCS  - NPO with ice chips  - Continue speech evaluations  - Continue Reglan  - Multimodal pain control.  - CXR every other day, aspiration precautions  - Wean oxygen as tolerated  - Continue to hold PTA plavix, continue lovenox     Patient seen on rounds with Fellow, Dr. Pal, and staff, Dr. Mir    Please page if questions,    Sj Acosta MD  PGY-1

## 2024-05-11 NOTE — PLAN OF CARE
Goal Outcome Evaluation:                     Outcome Evaluation: assumed cares 6426-0823. pt is alert and oriented x 4. 2 Liter NC. sating in mid 90's. R PIV SL. NG tube to low intermittent suction. PEG continuous TF running at 50 ml/hr. SBA x 1. lidocaine patch on left and right lower abdomen. NO BM this shift. administered Tylenol for mild pain. some relief. Cont POC.

## 2024-05-11 NOTE — PLAN OF CARE
Goal Outcome Evaluation:   Plan of care reviewed with: patient     Overall patient progress: no change    Outcome evaluation: Assumed cares 0517-1004. A&Ox4. VSS on 2 L NC. NG to gravity. TF running continuously @ goal rate of 50 mL/hr. On Mg, phos, and K protocols, all therapeutic with AM redraws. Rested between cares. Call light within reach. Able to make needs known. Continue with POC

## 2024-05-11 NOTE — PROGRESS NOTES
STAFF ADDENDUM:  I saw and evaluated Mr. Hogue and agree with the resident s findings and plan of care      Stable  Plan to put NGT to gravity today and observe    Lexi Mir MD

## 2024-05-12 ENCOUNTER — APPOINTMENT (OUTPATIENT)
Dept: GENERAL RADIOLOGY | Facility: CLINIC | Age: 69
DRG: 326 | End: 2024-05-12
Payer: COMMERCIAL

## 2024-05-12 ENCOUNTER — APPOINTMENT (OUTPATIENT)
Dept: SPEECH THERAPY | Facility: CLINIC | Age: 69
DRG: 326 | End: 2024-05-12
Attending: THORACIC SURGERY (CARDIOTHORACIC VASCULAR SURGERY)
Payer: COMMERCIAL

## 2024-05-12 ENCOUNTER — APPOINTMENT (OUTPATIENT)
Dept: GENERAL RADIOLOGY | Facility: CLINIC | Age: 69
DRG: 326 | End: 2024-05-12
Attending: THORACIC SURGERY (CARDIOTHORACIC VASCULAR SURGERY)
Payer: COMMERCIAL

## 2024-05-12 ENCOUNTER — APPOINTMENT (OUTPATIENT)
Dept: GENERAL RADIOLOGY | Facility: CLINIC | Age: 69
DRG: 326 | End: 2024-05-12
Attending: SURGERY
Payer: COMMERCIAL

## 2024-05-12 LAB
ANION GAP SERPL CALCULATED.3IONS-SCNC: 11 MMOL/L (ref 7–15)
BUN SERPL-MCNC: 21.3 MG/DL (ref 8–23)
CALCIUM SERPL-MCNC: 9.7 MG/DL (ref 8.8–10.2)
CHLORIDE SERPL-SCNC: 101 MMOL/L (ref 98–107)
CREAT SERPL-MCNC: 0.67 MG/DL (ref 0.67–1.17)
DEPRECATED HCO3 PLAS-SCNC: 28 MMOL/L (ref 22–29)
EGFRCR SERPLBLD CKD-EPI 2021: >90 ML/MIN/1.73M2
ERYTHROCYTE [DISTWIDTH] IN BLOOD BY AUTOMATED COUNT: 14.3 % (ref 10–15)
GLUCOSE SERPL-MCNC: 95 MG/DL (ref 70–99)
HCT VFR BLD AUTO: 36.1 % (ref 40–53)
HGB BLD-MCNC: 11.7 G/DL (ref 13.3–17.7)
MAGNESIUM SERPL-MCNC: 2.3 MG/DL (ref 1.7–2.3)
MCH RBC QN AUTO: 31 PG (ref 26.5–33)
MCHC RBC AUTO-ENTMCNC: 32.4 G/DL (ref 31.5–36.5)
MCV RBC AUTO: 96 FL (ref 78–100)
PHOSPHATE SERPL-MCNC: 4.2 MG/DL (ref 2.5–4.5)
PLATELET # BLD AUTO: 379 10E3/UL (ref 150–450)
POTASSIUM SERPL-SCNC: 4.2 MMOL/L (ref 3.4–5.3)
RBC # BLD AUTO: 3.78 10E6/UL (ref 4.4–5.9)
SODIUM SERPL-SCNC: 140 MMOL/L (ref 135–145)
WBC # BLD AUTO: 8.8 10E3/UL (ref 4–11)

## 2024-05-12 PROCEDURE — 120N000002 HC R&B MED SURG/OB UMMC

## 2024-05-12 PROCEDURE — 250N000013 HC RX MED GY IP 250 OP 250 PS 637

## 2024-05-12 PROCEDURE — 80048 BASIC METABOLIC PNL TOTAL CA: CPT

## 2024-05-12 PROCEDURE — 250N000011 HC RX IP 250 OP 636

## 2024-05-12 PROCEDURE — 999N000128 HC STATISTIC PERIPHERAL IV START W/O US GUIDANCE

## 2024-05-12 PROCEDURE — 74018 RADEX ABDOMEN 1 VIEW: CPT

## 2024-05-12 PROCEDURE — 85027 COMPLETE CBC AUTOMATED: CPT

## 2024-05-12 PROCEDURE — 36415 COLL VENOUS BLD VENIPUNCTURE: CPT

## 2024-05-12 PROCEDURE — 250N000011 HC RX IP 250 OP 636: Mod: JZ

## 2024-05-12 PROCEDURE — 74018 RADEX ABDOMEN 1 VIEW: CPT | Mod: 26 | Performed by: RADIOLOGY

## 2024-05-12 PROCEDURE — 999N000065 XR ABDOMEN PORT 1 VIEW

## 2024-05-12 PROCEDURE — 84100 ASSAY OF PHOSPHORUS: CPT | Performed by: THORACIC SURGERY (CARDIOTHORACIC VASCULAR SURGERY)

## 2024-05-12 PROCEDURE — 92526 ORAL FUNCTION THERAPY: CPT | Mod: GN

## 2024-05-12 PROCEDURE — 83735 ASSAY OF MAGNESIUM: CPT | Performed by: THORACIC SURGERY (CARDIOTHORACIC VASCULAR SURGERY)

## 2024-05-12 PROCEDURE — 71045 X-RAY EXAM CHEST 1 VIEW: CPT

## 2024-05-12 PROCEDURE — 71045 X-RAY EXAM CHEST 1 VIEW: CPT | Mod: 26 | Performed by: RADIOLOGY

## 2024-05-12 PROCEDURE — 250N000009 HC RX 250

## 2024-05-12 RX ORDER — LIDOCAINE HYDROCHLORIDE 20 MG/ML
JELLY TOPICAL ONCE
Status: COMPLETED | OUTPATIENT
Start: 2024-05-12 | End: 2024-05-12

## 2024-05-12 RX ADMIN — Medication 3 MG: at 20:16

## 2024-05-12 RX ADMIN — LIDOCAINE HYDROCHLORIDE: 20 JELLY TOPICAL at 15:30

## 2024-05-12 RX ADMIN — FAMOTIDINE 20 MG: 20 TABLET ORAL at 08:59

## 2024-05-12 RX ADMIN — MAGNESIUM HYDROXIDE 30 ML: 400 SUSPENSION ORAL at 08:59

## 2024-05-12 RX ADMIN — FAMOTIDINE 20 MG: 20 TABLET ORAL at 20:16

## 2024-05-12 RX ADMIN — ACETAMINOPHEN 975 MG: 325 TABLET, FILM COATED ORAL at 15:30

## 2024-05-12 RX ADMIN — LEVOFLOXACIN 500 MG: 5 INJECTION, SOLUTION INTRAVENOUS at 11:42

## 2024-05-12 RX ADMIN — GUAIFENESIN 200 MG: 100 SOLUTION ORAL at 15:29

## 2024-05-12 RX ADMIN — LORAZEPAM 0.5 MG: 2 CONCENTRATE ORAL at 20:21

## 2024-05-12 RX ADMIN — GUAIFENESIN 200 MG: 100 SOLUTION ORAL at 08:59

## 2024-05-12 RX ADMIN — HYDROMORPHONE HYDROCHLORIDE 0.4 MG: 0.2 INJECTION, SOLUTION INTRAMUSCULAR; INTRAVENOUS; SUBCUTANEOUS at 13:46

## 2024-05-12 RX ADMIN — FLUCONAZOLE IN SODIUM CHLORIDE 400 MG: 2 INJECTION, SOLUTION INTRAVENOUS at 07:42

## 2024-05-12 RX ADMIN — METOCLOPRAMIDE 5 MG: 5 INJECTION, SOLUTION INTRAMUSCULAR; INTRAVENOUS at 08:59

## 2024-05-12 RX ADMIN — POLYETHYLENE GLYCOL 3350 17 G: 17 POWDER, FOR SOLUTION ORAL at 20:16

## 2024-05-12 RX ADMIN — ACETAMINOPHEN 975 MG: 325 TABLET, FILM COATED ORAL at 06:10

## 2024-05-12 RX ADMIN — METHOCARBAMOL 500 MG: 500 TABLET ORAL at 21:57

## 2024-05-12 RX ADMIN — SERTRALINE HYDROCHLORIDE 100 MG: 100 TABLET ORAL at 08:59

## 2024-05-12 RX ADMIN — METHOCARBAMOL 500 MG: 500 TABLET ORAL at 06:10

## 2024-05-12 RX ADMIN — METOCLOPRAMIDE 5 MG: 5 INJECTION, SOLUTION INTRAMUSCULAR; INTRAVENOUS at 20:16

## 2024-05-12 RX ADMIN — ENOXAPARIN SODIUM 40 MG: 40 INJECTION SUBCUTANEOUS at 20:16

## 2024-05-12 RX ADMIN — GUAIFENESIN 200 MG: 100 SOLUTION ORAL at 06:10

## 2024-05-12 RX ADMIN — GUAIFENESIN 200 MG: 100 SOLUTION ORAL at 20:15

## 2024-05-12 RX ADMIN — ALBUTEROL SULFATE 4 PUFF: 90 AEROSOL, METERED RESPIRATORY (INHALATION) at 09:00

## 2024-05-12 RX ADMIN — ACETAMINOPHEN 975 MG: 325 TABLET, FILM COATED ORAL at 22:19

## 2024-05-12 RX ADMIN — METOCLOPRAMIDE 5 MG: 5 INJECTION, SOLUTION INTRAMUSCULAR; INTRAVENOUS at 15:30

## 2024-05-12 RX ADMIN — GABAPENTIN 100 MG: 100 CAPSULE ORAL at 21:57

## 2024-05-12 RX ADMIN — METHOCARBAMOL 500 MG: 500 TABLET ORAL at 09:00

## 2024-05-12 RX ADMIN — SENNOSIDES AND DOCUSATE SODIUM 2 TABLET: 8.6; 5 TABLET ORAL at 08:59

## 2024-05-12 RX ADMIN — SENNOSIDES AND DOCUSATE SODIUM 2 TABLET: 8.6; 5 TABLET ORAL at 20:16

## 2024-05-12 RX ADMIN — ROSUVASTATIN CALCIUM 40 MG: 20 TABLET, FILM COATED ORAL at 08:59

## 2024-05-12 RX ADMIN — Medication 15 ML: at 08:59

## 2024-05-12 RX ADMIN — METHOCARBAMOL 500 MG: 500 TABLET ORAL at 15:30

## 2024-05-12 ASSESSMENT — ACTIVITIES OF DAILY LIVING (ADL)
ADLS_ACUITY_SCORE: 39

## 2024-05-12 NOTE — PLAN OF CARE
Goal Outcome Evaluation:   Plan of care reviewed with: patient    Overall patient progress: no change    Outcome evaluation: Assumed cares 3025-4638. A&Ox4. VSS on 1 L NC. L PIV SL. NG to gravity. NG retracted 3 cm per orders today. Previous bridle on very tight under septum, watch for skin breakdown. New bridle placed and NG now at 49 cm. TF running continuously @ goal rate of 50 mL/hr. On Mg, K, and Phos protocols, all therapeutic with AM redraws. Ambulated in hallway independently today. Call light within reach. Able to make needs known. Continue with POC

## 2024-05-12 NOTE — PLAN OF CARE
Goal Outcome Evaluation:                   Outcome Evaluation: no acute change this shift. assumed cares 6343-8778. pt is alert and oriented x 4. 2 Liter NC. sating in mid 90's. R PIV SL. NG tube to gravity. PEG continuous TF running at 50 ml/hr. SBA x 1. . NO BM this shift. administered Tylenol for mild pain. some relief. Cont POC.

## 2024-05-12 NOTE — PROGRESS NOTES
"Thoracic Surgery  [facility]  5/12/2024    Subjective:  No acute events overnight. Continuing to tolerate tube feeding. NG put to gravity yesterday. About 600 light greenish output in gravity bag this morning. Patient reports only occasional flatus. Having loose stools that do not appear like tube feeds.    Objective  /77 (BP Location: Right arm)   Pulse 86   Temp 97.5  F (36.4  C) (Oral)   Resp 18   Ht 1.727 m (5' 8\")   Wt 62.8 kg (138 lb 7.2 oz)   SpO2 95%   BMI 21.05 kg/m        General: Alert, resting comfortably in NAD. Cooperative. NG tube in place with clear light greenish output in gravity bag.  Pulm: NLB on 2L NC, no tachypnea/dyspnea, no wheezing  CV: non-cyanotic, no significant LE edema.   GI/ABD: Soft, non-distended, non-tender to palpation, no guarding or rebound.  Skin: Warm and well perfused    Labs:  Na: 140 from 137  K: 4.2 from 4.3    Wbc: 8.8 from 7.9  Hgb: 11.7 from 11.2    Imaging:  AM CXR: Pending    A/P:  68M with SCC of esophagus who is s/p laparoscopic and right thoracoscopic gastroesophagectomy on 4/19 with Dr. Hill. Monitored in SICU overnight POD0 to 1, did very well and transferred to IM on 4/20. Esophagram on 4/25 was negative for leak. Concern for possible aspiration with clear liquids led to acute respiratory decline on 4/28 requiring re-intubation and admission to the SICU. He returned to OR for EGD and bronchoscopy where a fair amount of fluid was suctioned from the esophagus, the tissue appeared healthy with no evidence of leak. On 4/29 and repeat EGD with pyloric botox injection was performed, after which he was extubated. Cares have continued to progress however aspiration again became a concern. Upper GI 5/3 with no passage of contrast below the diaphragmatic hiatus. Underwent flexible bronchoscopy with bronchial lavage, EGD with pyloric balloon dilation (21 cm), and fluoroscopic NG placement on 5/5. TF held 5/5 for concern for " aspiration but restarted 5/6 with methylene blue. Currently tolerating TF.    - Follow-up CXR and KUB. If NG in same position as previously will pull back 3 cm.  - Continue NG to gravity.  - Levaquin (5/5 - present)  - Continue tube feeding at goal rate. Cycled for 16 hrs.  - NPO with ice chips  - Continue speech evaluations  - Continue Reglan  - Multimodal pain control.  - CXR every other day, aspiration precautions  - Wean oxygen as tolerated  - Continue to hold PTA plavix, continue lovenox     Patient seen on rounds with Fellow, Dr. Pal, who discussed with staff, Dr. Mir    Please page if questions,    Sj Acosta MD  PGY-1

## 2024-05-13 ENCOUNTER — APPOINTMENT (OUTPATIENT)
Dept: OCCUPATIONAL THERAPY | Facility: CLINIC | Age: 69
DRG: 326 | End: 2024-05-13
Attending: THORACIC SURGERY (CARDIOTHORACIC VASCULAR SURGERY)
Payer: COMMERCIAL

## 2024-05-13 LAB
ANION GAP SERPL CALCULATED.3IONS-SCNC: 11 MMOL/L (ref 7–15)
BUN SERPL-MCNC: 21.1 MG/DL (ref 8–23)
CALCIUM SERPL-MCNC: 9.3 MG/DL (ref 8.8–10.2)
CHLORIDE SERPL-SCNC: 101 MMOL/L (ref 98–107)
CREAT SERPL-MCNC: 0.62 MG/DL (ref 0.67–1.17)
DEPRECATED HCO3 PLAS-SCNC: 25 MMOL/L (ref 22–29)
EGFRCR SERPLBLD CKD-EPI 2021: >90 ML/MIN/1.73M2
ERYTHROCYTE [DISTWIDTH] IN BLOOD BY AUTOMATED COUNT: 14.1 % (ref 10–15)
GLUCOSE SERPL-MCNC: 110 MG/DL (ref 70–99)
HCT VFR BLD AUTO: 34 % (ref 40–53)
HGB BLD-MCNC: 10.9 G/DL (ref 13.3–17.7)
MAGNESIUM SERPL-MCNC: 2.5 MG/DL (ref 1.7–2.3)
MCH RBC QN AUTO: 30.7 PG (ref 26.5–33)
MCHC RBC AUTO-ENTMCNC: 32.1 G/DL (ref 31.5–36.5)
MCV RBC AUTO: 96 FL (ref 78–100)
PHOSPHATE SERPL-MCNC: 3.9 MG/DL (ref 2.5–4.5)
PLATELET # BLD AUTO: 300 10E3/UL (ref 150–450)
POTASSIUM SERPL-SCNC: 4.3 MMOL/L (ref 3.4–5.3)
RBC # BLD AUTO: 3.55 10E6/UL (ref 4.4–5.9)
SODIUM SERPL-SCNC: 137 MMOL/L (ref 135–145)
WBC # BLD AUTO: 7.6 10E3/UL (ref 4–11)

## 2024-05-13 PROCEDURE — 250N000013 HC RX MED GY IP 250 OP 250 PS 637

## 2024-05-13 PROCEDURE — 36415 COLL VENOUS BLD VENIPUNCTURE: CPT

## 2024-05-13 PROCEDURE — 83735 ASSAY OF MAGNESIUM: CPT | Performed by: THORACIC SURGERY (CARDIOTHORACIC VASCULAR SURGERY)

## 2024-05-13 PROCEDURE — 250N000011 HC RX IP 250 OP 636

## 2024-05-13 PROCEDURE — 250N000013 HC RX MED GY IP 250 OP 250 PS 637: Performed by: PHYSICIAN ASSISTANT

## 2024-05-13 PROCEDURE — 250N000011 HC RX IP 250 OP 636: Mod: JZ

## 2024-05-13 PROCEDURE — 80048 BASIC METABOLIC PNL TOTAL CA: CPT

## 2024-05-13 PROCEDURE — 84100 ASSAY OF PHOSPHORUS: CPT | Performed by: THORACIC SURGERY (CARDIOTHORACIC VASCULAR SURGERY)

## 2024-05-13 PROCEDURE — 85027 COMPLETE CBC AUTOMATED: CPT

## 2024-05-13 PROCEDURE — 120N000002 HC R&B MED SURG/OB UMMC

## 2024-05-13 PROCEDURE — 97535 SELF CARE MNGMENT TRAINING: CPT | Mod: GO

## 2024-05-13 PROCEDURE — 999N000248 HC STATISTIC IV INSERT WITH US BY RN

## 2024-05-13 RX ORDER — ERYTHROMYCIN ETHYLSUCCINATE 400 MG/5ML
250 SUSPENSION ORAL
Status: DISCONTINUED | OUTPATIENT
Start: 2024-05-13 | End: 2024-05-17

## 2024-05-13 RX ADMIN — ENOXAPARIN SODIUM 40 MG: 40 INJECTION SUBCUTANEOUS at 19:41

## 2024-05-13 RX ADMIN — GABAPENTIN 100 MG: 100 CAPSULE ORAL at 21:55

## 2024-05-13 RX ADMIN — ROSUVASTATIN CALCIUM 40 MG: 20 TABLET, FILM COATED ORAL at 08:56

## 2024-05-13 RX ADMIN — METOCLOPRAMIDE 5 MG: 5 INJECTION, SOLUTION INTRAMUSCULAR; INTRAVENOUS at 19:41

## 2024-05-13 RX ADMIN — FAMOTIDINE 20 MG: 20 TABLET ORAL at 19:41

## 2024-05-13 RX ADMIN — POLYETHYLENE GLYCOL 3350 17 G: 17 POWDER, FOR SOLUTION ORAL at 08:56

## 2024-05-13 RX ADMIN — METOCLOPRAMIDE 5 MG: 5 INJECTION, SOLUTION INTRAMUSCULAR; INTRAVENOUS at 08:56

## 2024-05-13 RX ADMIN — SERTRALINE HYDROCHLORIDE 100 MG: 100 TABLET ORAL at 08:57

## 2024-05-13 RX ADMIN — ACETAMINOPHEN 975 MG: 325 TABLET, FILM COATED ORAL at 06:28

## 2024-05-13 RX ADMIN — METOCLOPRAMIDE 5 MG: 5 INJECTION, SOLUTION INTRAMUSCULAR; INTRAVENOUS at 15:13

## 2024-05-13 RX ADMIN — GUAIFENESIN 200 MG: 100 SOLUTION ORAL at 08:56

## 2024-05-13 RX ADMIN — SENNOSIDES AND DOCUSATE SODIUM 2 TABLET: 8.6; 5 TABLET ORAL at 19:41

## 2024-05-13 RX ADMIN — Medication 15 ML: at 08:56

## 2024-05-13 RX ADMIN — ACETAMINOPHEN 975 MG: 325 TABLET, FILM COATED ORAL at 15:13

## 2024-05-13 RX ADMIN — FLUCONAZOLE IN SODIUM CHLORIDE 400 MG: 2 INJECTION, SOLUTION INTRAVENOUS at 08:00

## 2024-05-13 RX ADMIN — GUAIFENESIN 200 MG: 100 SOLUTION ORAL at 02:16

## 2024-05-13 RX ADMIN — SENNOSIDES AND DOCUSATE SODIUM 2 TABLET: 8.6; 5 TABLET ORAL at 08:57

## 2024-05-13 RX ADMIN — GUAIFENESIN 200 MG: 100 SOLUTION ORAL at 19:41

## 2024-05-13 RX ADMIN — METHOCARBAMOL 500 MG: 500 TABLET ORAL at 03:21

## 2024-05-13 RX ADMIN — METHOCARBAMOL 500 MG: 500 TABLET ORAL at 21:55

## 2024-05-13 RX ADMIN — LORAZEPAM 0.5 MG: 2 CONCENTRATE ORAL at 19:44

## 2024-05-13 RX ADMIN — LEVOFLOXACIN 500 MG: 5 INJECTION, SOLUTION INTRAVENOUS at 12:38

## 2024-05-13 RX ADMIN — METHOCARBAMOL 500 MG: 500 TABLET ORAL at 16:14

## 2024-05-13 RX ADMIN — ACETAMINOPHEN 975 MG: 325 TABLET, FILM COATED ORAL at 21:55

## 2024-05-13 RX ADMIN — FAMOTIDINE 20 MG: 20 TABLET ORAL at 08:57

## 2024-05-13 RX ADMIN — GUAIFENESIN 200 MG: 100 SOLUTION ORAL at 15:13

## 2024-05-13 RX ADMIN — Medication 3 MG: at 19:42

## 2024-05-13 RX ADMIN — MAGNESIUM HYDROXIDE 30 ML: 400 SUSPENSION ORAL at 09:23

## 2024-05-13 RX ADMIN — ERYTHROMYCIN ETHYLSUCCINATE 250 MG: 400 GRANULE, FOR SUSPENSION ORAL at 17:25

## 2024-05-13 RX ADMIN — METHOCARBAMOL 500 MG: 500 TABLET ORAL at 09:01

## 2024-05-13 RX ADMIN — POLYETHYLENE GLYCOL 3350 17 G: 17 POWDER, FOR SOLUTION ORAL at 19:42

## 2024-05-13 ASSESSMENT — ACTIVITIES OF DAILY LIVING (ADL)
ADLS_ACUITY_SCORE: 38
ADLS_ACUITY_SCORE: 38
ADLS_ACUITY_SCORE: 39
ADLS_ACUITY_SCORE: 39
ADLS_ACUITY_SCORE: 38
ADLS_ACUITY_SCORE: 38
ADLS_ACUITY_SCORE: 39
ADLS_ACUITY_SCORE: 38
ADLS_ACUITY_SCORE: 39
ADLS_ACUITY_SCORE: 39
ADLS_ACUITY_SCORE: 38
ADLS_ACUITY_SCORE: 39

## 2024-05-13 NOTE — PLAN OF CARE
"Goal Outcome Evaluation:         Blood pressure 111/78, pulse 88, temperature 97.9  F (36.6  C), temperature source Oral, resp. rate 18, height 1.727 m (5' 8\"), weight 62.8 kg (138 lb 7.2 oz), SpO2 97%.    AVSS, A/O x4. Pt is on 1 L NC with good saturations. He is Stand by assist to the bedside commode and he also uses his urinal in bed. Left PIV saline locked. NPO, PEG tube with continuous feeding, Novasource Renal at goal rate of 50 ML/hour.and tolerating it well. Had x 1 loose stool mixed with urine on the commode.  NG tube to gravity drain and putting out light green drainage. Pain is under control with scheduled Tylenol, ROBACIN and got Ativan 0.5 mg at bed time. Continue to monitor pt and follow plan of care.      Problem: Adult Inpatient Plan of Care  Goal: Plan of Care Review  Description: The Plan of Care Review/Shift note should be completed every shift.  The Outcome Evaluation is a brief statement about your assessment that the patient is improving, declining, or no change.  This information will be displayed automatically on your shift  note.  Outcome: Progressing  Goal: Patient-Specific Goal (Individualized)  Description: You can add care plan individualizations to a care plan. Examples of Individualization might be:  \"Parent requests to be called daily at 9am for status\", \"I have a hard time hearing out of my right ear\", or \"Do not touch me to wake me up as it startles  me\".  Outcome: Progressing  Goal: Absence of Hospital-Acquired Illness or Injury  Outcome: Progressing  Intervention: Identify and Manage Fall Risk  Recent Flowsheet Documentation  Taken 5/13/2024 0400 by Mary Mendez RN  Safety Promotion/Fall Prevention:   clutter free environment maintained   increase visualization of patient   increased rounding and observation   lighting adjusted   nonskid shoes/slippers when out of bed   room near nurse's station   room organization consistent   safety round/check completed   supervised " activity  Taken 5/13/2024 0000 by Mary Mendez RN  Safety Promotion/Fall Prevention:   clutter free environment maintained   lighting adjusted   nonskid shoes/slippers when out of bed   room near nurse's station   room organization consistent  Taken 5/12/2024 2000 by Mary Mendez RN  Safety Promotion/Fall Prevention:   clutter free environment maintained   lighting adjusted  Intervention: Prevent Skin Injury  Recent Flowsheet Documentation  Taken 5/13/2024 0000 by Mary Mendez RN  Body Position:   position changed independently   weight shifting  Taken 5/12/2024 2000 by Mary Mendez RN  Body Position:   position changed independently   weight shifting  Intervention: Prevent and Manage VTE (Venous Thromboembolism) Risk  Recent Flowsheet Documentation  Taken 5/12/2024 2000 by Mary Mendez RN  VTE Prevention/Management: SCDs (sequential compression devices) off  Intervention: Prevent Infection  Recent Flowsheet Documentation  Taken 5/12/2024 2000 by Mary Mendez RN  Infection Prevention:   hand hygiene promoted   single patient room provided   rest/sleep promoted   personal protective equipment utilized   environmental surveillance performed   equipment surfaces disinfected   visitors restricted/screened  Goal: Optimal Comfort and Wellbeing  Outcome: Progressing  Intervention: Provide Person-Centered Care  Recent Flowsheet Documentation  Taken 5/12/2024 2000 by Mary Mendez RN  Trust Relationship/Rapport:   care explained   questions answered     Problem: Surgery Nonspecified  Goal: Absence of Bleeding  Outcome: Progressing  Goal: Effective Bowel Elimination  Outcome: Progressing  Goal: Fluid and Electrolyte Balance  Outcome: Progressing  Goal: Blood Glucose Level Within Targeted Range  Outcome: Progressing  Goal: Absence of Infection Signs and Symptoms  Outcome: Progressing  Intervention: Prevent or Manage Infection  Recent Flowsheet Documentation  Taken 5/12/2024 2000 by  Mary Mendez RN  Isolation Precautions: protective environment maintained  Goal: Optimal Pain Control and Function  Outcome: Progressing  Goal: Nausea and Vomiting Relief  Outcome: Progressing     Problem: Enteral Nutrition  Goal: Absence of Aspiration Signs and Symptoms  Outcome: Progressing  Goal: Safe, Effective Therapy Delivery  Outcome: Progressing  Goal: Feeding Tolerance  Outcome: Progressing

## 2024-05-13 NOTE — PLAN OF CARE
Goal Outcome Evaluation:      Plan of Care Reviewed With: patient    Overall Patient Progress: no changeOverall Patient Progress: no change    Outcome Evaluation: Patient anxiously waiting for thoracic to re-round this afternoon as they said they would.  He is hoping to get to do ice chips again or get the NGT out.  Team paged, no response yet.    NGT to gravity bag.  Only 54 out this shift. Tolerating tube feeds.  Complains of discomfort from the nasal bridle clamp.  Tolerating pain level with scheduled medication.  Up in room to commode fairly independently. Patient hoping to possibly go outside this afternoon.  Productive cough.  Diminished breath sounds.  Still on 1L nasal cannula.

## 2024-05-13 NOTE — PLAN OF CARE
Occupational Therapy Discharge Summary    Reason for therapy discharge:    All goals and outcomes met, no further needs identified.    Progress towards therapy goal(s). See goals on Care Plan in [facility] electronic health record for goal details.  Goals met    Therapy recommendation(s):    Continue walking in hallways at least 4x/day while inpatient

## 2024-05-13 NOTE — PROGRESS NOTES
STAFF ADDENDUM:  I saw and evaluated Mr. Hogue and agree with the resident s findings and plan of care     Still with high outputs  Plan for strict NPo today, start erythromycin          Lexi Mir MD

## 2024-05-13 NOTE — PROGRESS NOTES
"Thoracic Surgery  [facility]    Subjective:  No acute events overnight.  No reflux or emesis.  Still with loose stools.    Objective  /64 (BP Location: Right arm)   Pulse 88   Temp 98  F (36.7  C) (Oral)   Resp 16   Ht 1.727 m (5' 8\")   Wt 61.1 kg (134 lb 12.8 oz)   SpO2 94%   BMI 20.50 kg/m        General: Alert, resting comfortably in NAD. Cooperative. NG tube in place with clear light greenish output in gravity bag.  Pulm: NLB, no tachypnea/dyspnea, no wheezing  CV: non-cyanotic, no significant LE edema.   GI/ABD: Soft, non-distended, non-tender to palpation, no guarding or rebound.  Skin: Warm and well perfused    A/P:  68M with SCC of esophagus who is s/p laparoscopic and right thoracoscopic gastroesophagectomy on 4/19 with Dr. Hill. Monitored in SICU overnight POD0 to 1, did very well and transferred to [facility] on 4/20. Esophagram on 4/25 was negative for leak. Concern for possible aspiration with clear liquids led to acute respiratory decline on 4/28 requiring re-intubation and admission to the SICU. He returned to OR for EGD and bronchoscopy where a fair amount of fluid was suctioned from the esophagus, the tissue appeared healthy with no evidence of leak. On 4/29 and repeat EGD with pyloric botox injection was performed, after which he was extubated. Cares have continued to progress however aspiration again became a concern. Upper GI 5/3 with no passage of contrast below the diaphragmatic hiatus. Underwent flexible bronchoscopy with bronchial lavage, EGD with pyloric balloon dilation (21 cm), and fluoroscopic NG placement on 5/5. TF held 5/5 for concern for aspiration but restarted 5/6 with methylene blue. Currently tolerating TF.    - Esophogram/upper GI tomorrow  - Continue NG to gravity.  - Levaquin (5/5 - present)  - Continue tube feeding at goal rate. Cycled for 16 hrs.  - NPO with ice chips  - Continue speech evaluations  - Continue Reglan, add erythromycin " today  - Multimodal pain control.  - CXR every other day, aspiration precautions  - Wean oxygen as tolerated  - Continue to hold PTA plavix, continue lovenox     Seen and d/w Dr. Clarke Mattson PA-C

## 2024-05-14 ENCOUNTER — APPOINTMENT (OUTPATIENT)
Dept: GENERAL RADIOLOGY | Facility: CLINIC | Age: 69
DRG: 326 | End: 2024-05-14
Attending: PHYSICIAN ASSISTANT
Payer: COMMERCIAL

## 2024-05-14 ENCOUNTER — APPOINTMENT (OUTPATIENT)
Dept: GENERAL RADIOLOGY | Facility: CLINIC | Age: 69
DRG: 326 | End: 2024-05-14
Payer: COMMERCIAL

## 2024-05-14 LAB
ANION GAP SERPL CALCULATED.3IONS-SCNC: 10 MMOL/L (ref 7–15)
BUN SERPL-MCNC: 21.5 MG/DL (ref 8–23)
CALCIUM SERPL-MCNC: 9.4 MG/DL (ref 8.8–10.2)
CHLORIDE SERPL-SCNC: 101 MMOL/L (ref 98–107)
CREAT SERPL-MCNC: 0.59 MG/DL (ref 0.67–1.17)
DEPRECATED HCO3 PLAS-SCNC: 27 MMOL/L (ref 22–29)
EGFRCR SERPLBLD CKD-EPI 2021: >90 ML/MIN/1.73M2
ERYTHROCYTE [DISTWIDTH] IN BLOOD BY AUTOMATED COUNT: 14.3 % (ref 10–15)
GLUCOSE SERPL-MCNC: 116 MG/DL (ref 70–99)
HCT VFR BLD AUTO: 33.7 % (ref 40–53)
HGB BLD-MCNC: 11.2 G/DL (ref 13.3–17.7)
MAGNESIUM SERPL-MCNC: 2.1 MG/DL (ref 1.7–2.3)
MCH RBC QN AUTO: 31.6 PG (ref 26.5–33)
MCHC RBC AUTO-ENTMCNC: 33.2 G/DL (ref 31.5–36.5)
MCV RBC AUTO: 95 FL (ref 78–100)
PHOSPHATE SERPL-MCNC: 4 MG/DL (ref 2.5–4.5)
PLATELET # BLD AUTO: 271 10E3/UL (ref 150–450)
POTASSIUM SERPL-SCNC: 4.3 MMOL/L (ref 3.4–5.3)
POTASSIUM SERPL-SCNC: 4.3 MMOL/L (ref 3.4–5.3)
RBC # BLD AUTO: 3.54 10E6/UL (ref 4.4–5.9)
SODIUM SERPL-SCNC: 138 MMOL/L (ref 135–145)
WBC # BLD AUTO: 6.7 10E3/UL (ref 4–11)

## 2024-05-14 PROCEDURE — 250N000011 HC RX IP 250 OP 636: Mod: JZ

## 2024-05-14 PROCEDURE — 250N000013 HC RX MED GY IP 250 OP 250 PS 637

## 2024-05-14 PROCEDURE — 250N000011 HC RX IP 250 OP 636

## 2024-05-14 PROCEDURE — 80048 BASIC METABOLIC PNL TOTAL CA: CPT

## 2024-05-14 PROCEDURE — 71045 X-RAY EXAM CHEST 1 VIEW: CPT

## 2024-05-14 PROCEDURE — 120N000002 HC R&B MED SURG/OB UMMC

## 2024-05-14 PROCEDURE — 36415 COLL VENOUS BLD VENIPUNCTURE: CPT

## 2024-05-14 PROCEDURE — 250N000013 HC RX MED GY IP 250 OP 250 PS 637: Performed by: PHYSICIAN ASSISTANT

## 2024-05-14 PROCEDURE — 85027 COMPLETE CBC AUTOMATED: CPT

## 2024-05-14 PROCEDURE — 84100 ASSAY OF PHOSPHORUS: CPT | Performed by: THORACIC SURGERY (CARDIOTHORACIC VASCULAR SURGERY)

## 2024-05-14 PROCEDURE — 83735 ASSAY OF MAGNESIUM: CPT | Performed by: THORACIC SURGERY (CARDIOTHORACIC VASCULAR SURGERY)

## 2024-05-14 PROCEDURE — 71045 X-RAY EXAM CHEST 1 VIEW: CPT | Mod: 26 | Performed by: RADIOLOGY

## 2024-05-14 PROCEDURE — 74220 X-RAY XM ESOPHAGUS 1CNTRST: CPT | Mod: 26 | Performed by: RADIOLOGY

## 2024-05-14 PROCEDURE — 74220 X-RAY XM ESOPHAGUS 1CNTRST: CPT

## 2024-05-14 RX ADMIN — Medication 15 ML: at 08:32

## 2024-05-14 RX ADMIN — ALBUTEROL SULFATE 4 PUFF: 90 AEROSOL, METERED RESPIRATORY (INHALATION) at 16:14

## 2024-05-14 RX ADMIN — ERYTHROMYCIN ETHYLSUCCINATE 250 MG: 400 GRANULE, FOR SUSPENSION ORAL at 16:13

## 2024-05-14 RX ADMIN — LEVOFLOXACIN 500 MG: 5 INJECTION, SOLUTION INTRAVENOUS at 11:14

## 2024-05-14 RX ADMIN — SERTRALINE HYDROCHLORIDE 100 MG: 100 TABLET ORAL at 08:32

## 2024-05-14 RX ADMIN — METOCLOPRAMIDE 5 MG: 5 INJECTION, SOLUTION INTRAMUSCULAR; INTRAVENOUS at 14:53

## 2024-05-14 RX ADMIN — GABAPENTIN 100 MG: 100 CAPSULE ORAL at 21:26

## 2024-05-14 RX ADMIN — GUAIFENESIN 200 MG: 100 SOLUTION ORAL at 21:27

## 2024-05-14 RX ADMIN — LORAZEPAM 0.5 MG: 2 CONCENTRATE ORAL at 08:49

## 2024-05-14 RX ADMIN — SENNOSIDES AND DOCUSATE SODIUM 2 TABLET: 8.6; 5 TABLET ORAL at 21:26

## 2024-05-14 RX ADMIN — ACETAMINOPHEN 975 MG: 325 TABLET, FILM COATED ORAL at 21:26

## 2024-05-14 RX ADMIN — METHOCARBAMOL 500 MG: 500 TABLET ORAL at 04:24

## 2024-05-14 RX ADMIN — GUAIFENESIN 200 MG: 100 SOLUTION ORAL at 01:46

## 2024-05-14 RX ADMIN — METHOCARBAMOL 500 MG: 500 TABLET ORAL at 21:28

## 2024-05-14 RX ADMIN — GUAIFENESIN 200 MG: 100 SOLUTION ORAL at 14:54

## 2024-05-14 RX ADMIN — ROSUVASTATIN CALCIUM 40 MG: 20 TABLET, FILM COATED ORAL at 08:32

## 2024-05-14 RX ADMIN — MAGNESIUM HYDROXIDE 30 ML: 400 SUSPENSION ORAL at 08:32

## 2024-05-14 RX ADMIN — FAMOTIDINE 20 MG: 20 TABLET ORAL at 08:32

## 2024-05-14 RX ADMIN — METOCLOPRAMIDE 5 MG: 5 INJECTION, SOLUTION INTRAMUSCULAR; INTRAVENOUS at 21:27

## 2024-05-14 RX ADMIN — ERYTHROMYCIN ETHYLSUCCINATE 250 MG: 400 GRANULE, FOR SUSPENSION ORAL at 12:32

## 2024-05-14 RX ADMIN — ACETAMINOPHEN 975 MG: 325 TABLET, FILM COATED ORAL at 05:37

## 2024-05-14 RX ADMIN — POLYETHYLENE GLYCOL 3350 17 G: 17 POWDER, FOR SOLUTION ORAL at 21:26

## 2024-05-14 RX ADMIN — ENOXAPARIN SODIUM 40 MG: 40 INJECTION SUBCUTANEOUS at 21:26

## 2024-05-14 RX ADMIN — METHOCARBAMOL 500 MG: 500 TABLET ORAL at 11:13

## 2024-05-14 RX ADMIN — LORAZEPAM 0.5 MG: 2 CONCENTRATE ORAL at 21:26

## 2024-05-14 RX ADMIN — GUAIFENESIN 200 MG: 100 SOLUTION ORAL at 08:32

## 2024-05-14 RX ADMIN — ACETAMINOPHEN 975 MG: 325 TABLET, FILM COATED ORAL at 14:53

## 2024-05-14 RX ADMIN — METOCLOPRAMIDE 5 MG: 5 INJECTION, SOLUTION INTRAMUSCULAR; INTRAVENOUS at 08:26

## 2024-05-14 RX ADMIN — ERYTHROMYCIN ETHYLSUCCINATE 250 MG: 400 GRANULE, FOR SUSPENSION ORAL at 08:26

## 2024-05-14 RX ADMIN — Medication 3 MG: at 21:26

## 2024-05-14 RX ADMIN — METHOCARBAMOL 500 MG: 500 TABLET ORAL at 16:14

## 2024-05-14 RX ADMIN — SENNOSIDES AND DOCUSATE SODIUM 2 TABLET: 8.6; 5 TABLET ORAL at 08:32

## 2024-05-14 RX ADMIN — FAMOTIDINE 20 MG: 20 TABLET ORAL at 21:28

## 2024-05-14 ASSESSMENT — ACTIVITIES OF DAILY LIVING (ADL)
ADLS_ACUITY_SCORE: 36
ADLS_ACUITY_SCORE: 38
ADLS_ACUITY_SCORE: 36
ADLS_ACUITY_SCORE: 38
ADLS_ACUITY_SCORE: 36
ADLS_ACUITY_SCORE: 36
ADLS_ACUITY_SCORE: 38
ADLS_ACUITY_SCORE: 36
ADLS_ACUITY_SCORE: 38
ADLS_ACUITY_SCORE: 36
ADLS_ACUITY_SCORE: 38
ADLS_ACUITY_SCORE: 36

## 2024-05-14 NOTE — PROGRESS NOTES
Care Management Follow Up    Length of Stay (days): 25  Expected Discharge Date: 05/16/2024  Concerns to be Addressed:  discharge planning     Patient plan of care discussed at interdisciplinary rounds: Yes  Anticipated Discharge Disposition:  Home  Anticipated Discharge Services:  Home Infusion   Anticipated Discharge DME:  Enteral supplies  Patient/family educated on Medicare website which has current facility and service quality ratings:  n/a  Education Provided on the Discharge Plan: yes    Patient/Family in Agreement with the Plan:  yes    Referrals Placed by CM/SW:      [facility]  [address] [place] [address]  Phone: [phone]  Fax: [phone]  Dietician: [phone]   Novasource renal (or equivalent coconut/MCT-free formula) at 50 ml/hr     Private pay costs discussed: Not applicable    Additional Information:  Ongoing POC; RNCC provided brief update to [facility] Estelle alfredo, with a tentative discharge plan, pending Upper GI/Esophogram (5/14). RNCC to continue to follow and support safe discharge planning.       Edis Cortes RN BSN  7C RNCC  Phone: [phone]  Pager: [phone]    For Weekend & Holiday on call RN Care Coordinator:  (Tasks: Home care, home infusion, medical equipment, transportation, IMM & AGUDELO forms, etc.)  [place] (0800 - 1630) Saturday and Sunday    Units: 5A, 5B, & 5C: [phone]    Units: 6B, 6C, 6D: [phone]    Units: 7A, 7B, 7C, 7D: [phone]    Units: 6A/ICU : [phone]    [facility] [place][phone][place] Saturday and Sunday    Units: 6 Med/Surg, 8A, 10 ICU, & Pediatric Units: [phone]    Units: 5 Ortho, 5Med/Surg & WB ED: [phone]

## 2024-05-14 NOTE — PROGRESS NOTES
STAFF ADDENDUM:  I saw and evaluated Mr. Hogue and agree with the resident s findings and plan of care      Aram Mir MD

## 2024-05-14 NOTE — PLAN OF CARE
"/65 (BP Location: Right arm)   Pulse 96   Temp 97.8  F (36.6  C) (Oral)   Resp 17   Ht 1.727 m (5' 8\")   Wt 61.1 kg (134 lb 12.8 oz)   SpO2 99%   BMI 20.50 kg/m       Goal Outcome Evaluation:      Plan of Care Reviewed With: patient, spouse    Overall Patient Progress: improvingOverall Patient Progress: improving    Outcome Evaluation: Pt is A/O x4; able to make needs known. VSS stable on 1L O2 NC. No pain or nausea reported this shift, but pt has scheduled tylenol and robaxin. 1x ativan given for anxiety this morning. NGT to gravity earlier this shift; 45 ml out. Esophagram done this afternoon. NGT Orders updated to low intermittent suction. Continuous TF to J-Tube at 50 ml/hr (goal) w/ 90 ml water flushes q4h. 2x small BM this morning per pt. Up to bathroom independently w/ assist his tubes. Wife at bedside, supportive and involved in care.      "

## 2024-05-14 NOTE — PROGRESS NOTES
"Thoracic Surgery  [facility]    Subjective:  No acute events overnight.  No reflux or emesis.  Still with loose stools.  Eager for clinical progression when appropriate.     Objective  /81 (BP Location: Right arm)   Pulse 89   Temp 97.9  F (36.6  C) (Oral)   Resp 17   Ht 1.727 m (5' 8\")   Wt 61.1 kg (134 lb 12.8 oz)   SpO2 94%   BMI 20.50 kg/m        General: Alert, resting comfortably in NAD. Cooperative. NG tube in place with clear light greenish output in gravity bag.  Pulm: NLB, no tachypnea/dyspnea, no wheezing  CV: non-cyanotic, no significant LE edema.   GI/ABD: Soft, non-distended, non-tender to palpation, no guarding or rebound.  Skin: Warm and well perfused    250 mL NG tube output yesterday    A/P:  68M with SCC of esophagus who is s/p laparoscopic and right thoracoscopic gastroesophagectomy on 4/19 with Dr. Hill. Monitored in SICU overnight POD0 to 1, did very well and [place] [facility] [place]. Esophagram on 4/25 was negative for leak. Concern for possible aspiration with clear liquids led to acute respiratory decline on 4/28 requiring re-intubation and admission to the SICU. He returned to OR for EGD and bronchoscopy where a fair amount of fluid was suctioned from the esophagus, the tissue appeared healthy with no evidence of leak. On 4/29 and repeat EGD with pyloric botox injection was performed, after which he was extubated. Cares have continued to progress however aspiration again became a concern. Upper GI 5/3 with no passage of contrast below the diaphragmatic hiatus. Underwent flexible bronchoscopy with bronchial lavage, EGD with pyloric balloon dilation (21 cm), and fluoroscopic NG placement on 5/5. TF held 5/5 for concern for aspiration but restarted 5/6 with methylene blue. Currently tolerating TF.    - Esophogram/upper GI this afternoon via NG tube  - Continue NG to gravity.  - Levaquin (5/5 - present)  - Continue tube feeding at goal rate. Cycled " for 16 hrs.  - NPO with ice chips  - Continue speech evaluations  - Continue Reglan, added erythromycin 5/13  - Multimodal pain control.  - CXR every other day, aspiration precautions  - Wean oxygen as tolerated  - Continue to hold PTA plavix, continue lovenox  - Remainder of plan based on results of upper GI this afternoon.     Seen and d/w Dr. Clarke Mattson, PA-C

## 2024-05-14 NOTE — PLAN OF CARE
Goal Outcome Evaluation:      Plan of Care Reviewed With: patient    Overall Patient Progress: improvingOverall Patient Progress: improving         Pt admitted on 4/19 for esophageal cancer and thoracoscopic gastroesophagostomy. Pt was A&Ox4. Pt complained of mild abdominal pain and received scheduled Robaxin. Pt had a NG to a gravity bag. TF infusing via j-tube at 50 ml/ hr. NPO status. Pt had several incisions on his abdomen and chest from lap sites and old chest tubes. Pt on 1L NC. Pt able to make his needs known. Continue with plan of care.

## 2024-05-14 NOTE — PLAN OF CARE
Cares from: [id number]    V/S & pain: vitally stable, no complain of pain  Neuro: alert, orientedx4  Respiratory:on 1L NC  Skin: Lap sites all over the  abdomen and old chest tube site  GI/: J Tube, provided with urinal  Nutrition: strict npo (no ice chips), with ongoing tube feeding at goal rate of 50 ml/hr  Lines/drains: NG to gravity, LPIV saline locked  Activity: SBA    Events this shift: on aspiration precaution.call light w/in reach and both side rails up. Slept in between care. Rounds done. will continue to monitor.  At 5am, wife came to visit. No acute event this shift.    Plan:  strict npo. CXR

## 2024-05-15 ENCOUNTER — ANESTHESIA (OUTPATIENT)
Dept: SURGERY | Facility: CLINIC | Age: 69
DRG: 326 | End: 2024-05-15
Payer: COMMERCIAL

## 2024-05-15 ENCOUNTER — ANESTHESIA EVENT (OUTPATIENT)
Dept: SURGERY | Facility: CLINIC | Age: 69
DRG: 326 | End: 2024-05-15
Payer: COMMERCIAL

## 2024-05-15 ENCOUNTER — APPOINTMENT (OUTPATIENT)
Dept: GENERAL RADIOLOGY | Facility: CLINIC | Age: 69
DRG: 326 | End: 2024-05-15
Attending: THORACIC SURGERY (CARDIOTHORACIC VASCULAR SURGERY)
Payer: COMMERCIAL

## 2024-05-15 ENCOUNTER — APPOINTMENT (OUTPATIENT)
Dept: GENERAL RADIOLOGY | Facility: CLINIC | Age: 69
DRG: 326 | End: 2024-05-15
Payer: COMMERCIAL

## 2024-05-15 LAB
ANION GAP SERPL CALCULATED.3IONS-SCNC: 12 MMOL/L (ref 7–15)
BUN SERPL-MCNC: 23 MG/DL (ref 8–23)
CALCIUM SERPL-MCNC: 9.6 MG/DL (ref 8.8–10.2)
CHLORIDE SERPL-SCNC: 100 MMOL/L (ref 98–107)
CREAT SERPL-MCNC: 0.64 MG/DL (ref 0.67–1.17)
DEPRECATED HCO3 PLAS-SCNC: 25 MMOL/L (ref 22–29)
EGFRCR SERPLBLD CKD-EPI 2021: >90 ML/MIN/1.73M2
ERYTHROCYTE [DISTWIDTH] IN BLOOD BY AUTOMATED COUNT: 14.5 % (ref 10–15)
GLUCOSE BLDC GLUCOMTR-MCNC: 94 MG/DL (ref 70–99)
GLUCOSE SERPL-MCNC: 114 MG/DL (ref 70–99)
HCT VFR BLD AUTO: 36.7 % (ref 40–53)
HGB BLD-MCNC: 12 G/DL (ref 13.3–17.7)
MAGNESIUM SERPL-MCNC: 2.1 MG/DL (ref 1.7–2.3)
MCH RBC QN AUTO: 31.7 PG (ref 26.5–33)
MCHC RBC AUTO-ENTMCNC: 32.7 G/DL (ref 31.5–36.5)
MCV RBC AUTO: 97 FL (ref 78–100)
PHOSPHATE SERPL-MCNC: 4.1 MG/DL (ref 2.5–4.5)
PLATELET # BLD AUTO: 261 10E3/UL (ref 150–450)
POTASSIUM SERPL-SCNC: 4.2 MMOL/L (ref 3.4–5.3)
RBC # BLD AUTO: 3.78 10E6/UL (ref 4.4–5.9)
SODIUM SERPL-SCNC: 137 MMOL/L (ref 135–145)
WBC # BLD AUTO: 6 10E3/UL (ref 4–11)

## 2024-05-15 PROCEDURE — 36415 COLL VENOUS BLD VENIPUNCTURE: CPT

## 2024-05-15 PROCEDURE — 250N000013 HC RX MED GY IP 250 OP 250 PS 637: Performed by: INTERNAL MEDICINE

## 2024-05-15 PROCEDURE — 250N000011 HC RX IP 250 OP 636: Mod: JZ | Performed by: INTERNAL MEDICINE

## 2024-05-15 PROCEDURE — 250N000009 HC RX 250: Performed by: STUDENT IN AN ORGANIZED HEALTH CARE EDUCATION/TRAINING PROGRAM

## 2024-05-15 PROCEDURE — 99418 PROLNG IP/OBS E/M EA 15 MIN: CPT | Mod: 24 | Performed by: DIETITIAN, REGISTERED

## 2024-05-15 PROCEDURE — C1874 STENT, COATED/COV W/DEL SYS: HCPCS | Performed by: INTERNAL MEDICINE

## 2024-05-15 PROCEDURE — 250N000013 HC RX MED GY IP 250 OP 250 PS 637: Performed by: PHYSICIAN ASSISTANT

## 2024-05-15 PROCEDURE — 71045 X-RAY EXAM CHEST 1 VIEW: CPT | Mod: 26 | Performed by: RADIOLOGY

## 2024-05-15 PROCEDURE — 250N000011 HC RX IP 250 OP 636: Performed by: STUDENT IN AN ORGANIZED HEALTH CARE EDUCATION/TRAINING PROGRAM

## 2024-05-15 PROCEDURE — 83735 ASSAY OF MAGNESIUM: CPT | Performed by: THORACIC SURGERY (CARDIOTHORACIC VASCULAR SURGERY)

## 2024-05-15 PROCEDURE — 250N000011 HC RX IP 250 OP 636

## 2024-05-15 PROCEDURE — 250N000013 HC RX MED GY IP 250 OP 250 PS 637

## 2024-05-15 PROCEDURE — 272N000001 HC OR GENERAL SUPPLY STERILE: Performed by: INTERNAL MEDICINE

## 2024-05-15 PROCEDURE — 0D778DZ DILATION OF STOMACH, PYLORUS WITH INTRALUMINAL DEVICE, VIA NATURAL OR ARTIFICIAL OPENING ENDOSCOPIC: ICD-10-PCS | Performed by: INTERNAL MEDICINE

## 2024-05-15 PROCEDURE — 710N000010 HC RECOVERY PHASE 1, LEVEL 2, PER MIN: Performed by: INTERNAL MEDICINE

## 2024-05-15 PROCEDURE — 43266 EGD ENDOSCOPIC STENT PLACE: CPT | Performed by: STUDENT IN AN ORGANIZED HEALTH CARE EDUCATION/TRAINING PROGRAM

## 2024-05-15 PROCEDURE — 71045 X-RAY EXAM CHEST 1 VIEW: CPT

## 2024-05-15 PROCEDURE — 250N000011 HC RX IP 250 OP 636: Mod: JZ

## 2024-05-15 PROCEDURE — 999N000141 HC STATISTIC PRE-PROCEDURE NURSING ASSESSMENT: Performed by: INTERNAL MEDICINE

## 2024-05-15 PROCEDURE — 999N000179 XR SURGERY CARM FLUORO LESS THAN 5 MIN W STILLS: Mod: TC

## 2024-05-15 PROCEDURE — 370N000017 HC ANESTHESIA TECHNICAL FEE, PER MIN: Performed by: INTERNAL MEDICINE

## 2024-05-15 PROCEDURE — 360N000075 HC SURGERY LEVEL 2, PER MIN: Performed by: INTERNAL MEDICINE

## 2024-05-15 PROCEDURE — 85014 HEMATOCRIT: CPT

## 2024-05-15 PROCEDURE — 80048 BASIC METABOLIC PNL TOTAL CA: CPT

## 2024-05-15 PROCEDURE — 43266 EGD ENDOSCOPIC STENT PLACE: CPT | Performed by: ANESTHESIOLOGY

## 2024-05-15 PROCEDURE — 120N000002 HC R&B MED SURG/OB UMMC

## 2024-05-15 PROCEDURE — 250N000025 HC SEVOFLURANE, PER MIN: Performed by: INTERNAL MEDICINE

## 2024-05-15 PROCEDURE — 99223 1ST HOSP IP/OBS HIGH 75: CPT | Mod: 25 | Performed by: DIETITIAN, REGISTERED

## 2024-05-15 PROCEDURE — 250N000009 HC RX 250: Performed by: INTERNAL MEDICINE

## 2024-05-15 PROCEDURE — 258N000003 HC RX IP 258 OP 636: Performed by: STUDENT IN AN ORGANIZED HEALTH CARE EDUCATION/TRAINING PROGRAM

## 2024-05-15 PROCEDURE — 84100 ASSAY OF PHOSPHORUS: CPT | Performed by: THORACIC SURGERY (CARDIOTHORACIC VASCULAR SURGERY)

## 2024-05-15 DEVICE — STENT AND ELECTROCAUTERY - ENHANCED DELIVERY SYSTEM
Type: IMPLANTABLE DEVICE | Site: ESOPHAGUS | Status: FUNCTIONAL
Brand: AXIOS™

## 2024-05-15 RX ORDER — SODIUM CHLORIDE, SODIUM LACTATE, POTASSIUM CHLORIDE, CALCIUM CHLORIDE 600; 310; 30; 20 MG/100ML; MG/100ML; MG/100ML; MG/100ML
INJECTION, SOLUTION INTRAVENOUS CONTINUOUS
Status: DISCONTINUED | OUTPATIENT
Start: 2024-05-15 | End: 2024-05-15 | Stop reason: HOSPADM

## 2024-05-15 RX ORDER — FLUMAZENIL 0.1 MG/ML
0.2 INJECTION, SOLUTION INTRAVENOUS
Status: ACTIVE | OUTPATIENT
Start: 2024-05-15 | End: 2024-05-16

## 2024-05-15 RX ORDER — FENTANYL CITRATE 50 UG/ML
INJECTION, SOLUTION INTRAMUSCULAR; INTRAVENOUS PRN
Status: DISCONTINUED | OUTPATIENT
Start: 2024-05-15 | End: 2024-05-15

## 2024-05-15 RX ORDER — DEXAMETHASONE SODIUM PHOSPHATE 4 MG/ML
4 INJECTION, SOLUTION INTRA-ARTICULAR; INTRALESIONAL; INTRAMUSCULAR; INTRAVENOUS; SOFT TISSUE
Status: DISCONTINUED | OUTPATIENT
Start: 2024-05-15 | End: 2024-05-15 | Stop reason: HOSPADM

## 2024-05-15 RX ORDER — ONDANSETRON 4 MG/1
4 TABLET, ORALLY DISINTEGRATING ORAL EVERY 30 MIN PRN
Status: CANCELLED | OUTPATIENT
Start: 2024-05-15

## 2024-05-15 RX ORDER — FENTANYL CITRATE 50 UG/ML
50 INJECTION, SOLUTION INTRAMUSCULAR; INTRAVENOUS EVERY 5 MIN PRN
Status: DISCONTINUED | OUTPATIENT
Start: 2024-05-15 | End: 2024-05-15 | Stop reason: HOSPADM

## 2024-05-15 RX ORDER — SODIUM CHLORIDE, SODIUM LACTATE, POTASSIUM CHLORIDE, CALCIUM CHLORIDE 600; 310; 30; 20 MG/100ML; MG/100ML; MG/100ML; MG/100ML
INJECTION, SOLUTION INTRAVENOUS CONTINUOUS PRN
Status: DISCONTINUED | OUTPATIENT
Start: 2024-05-15 | End: 2024-05-15

## 2024-05-15 RX ORDER — LIDOCAINE 40 MG/G
CREAM TOPICAL
Status: DISCONTINUED | OUTPATIENT
Start: 2024-05-15 | End: 2024-05-17 | Stop reason: HOSPADM

## 2024-05-15 RX ORDER — ONDANSETRON 2 MG/ML
4 INJECTION INTRAMUSCULAR; INTRAVENOUS EVERY 30 MIN PRN
Status: CANCELLED | OUTPATIENT
Start: 2024-05-15

## 2024-05-15 RX ORDER — PROPOFOL 10 MG/ML
INJECTION, EMULSION INTRAVENOUS PRN
Status: DISCONTINUED | OUTPATIENT
Start: 2024-05-15 | End: 2024-05-15

## 2024-05-15 RX ORDER — HYDROMORPHONE HCL IN WATER/PF 6 MG/30 ML
0.2 PATIENT CONTROLLED ANALGESIA SYRINGE INTRAVENOUS EVERY 5 MIN PRN
Status: DISCONTINUED | OUTPATIENT
Start: 2024-05-15 | End: 2024-05-15 | Stop reason: HOSPADM

## 2024-05-15 RX ORDER — DEXAMETHASONE SODIUM PHOSPHATE 4 MG/ML
INJECTION, SOLUTION INTRA-ARTICULAR; INTRALESIONAL; INTRAMUSCULAR; INTRAVENOUS; SOFT TISSUE PRN
Status: DISCONTINUED | OUTPATIENT
Start: 2024-05-15 | End: 2024-05-15

## 2024-05-15 RX ORDER — ONDANSETRON 2 MG/ML
4 INJECTION INTRAMUSCULAR; INTRAVENOUS EVERY 30 MIN PRN
Status: DISCONTINUED | OUTPATIENT
Start: 2024-05-15 | End: 2024-05-15 | Stop reason: HOSPADM

## 2024-05-15 RX ORDER — LABETALOL HYDROCHLORIDE 5 MG/ML
10 INJECTION, SOLUTION INTRAVENOUS
Status: DISCONTINUED | OUTPATIENT
Start: 2024-05-15 | End: 2024-05-15 | Stop reason: HOSPADM

## 2024-05-15 RX ORDER — OXYCODONE HYDROCHLORIDE 10 MG/1
10 TABLET ORAL
Status: CANCELLED | OUTPATIENT
Start: 2024-05-15

## 2024-05-15 RX ORDER — ONDANSETRON 2 MG/ML
INJECTION INTRAMUSCULAR; INTRAVENOUS PRN
Status: DISCONTINUED | OUTPATIENT
Start: 2024-05-15 | End: 2024-05-15

## 2024-05-15 RX ORDER — DEXAMETHASONE SODIUM PHOSPHATE 4 MG/ML
4 INJECTION, SOLUTION INTRA-ARTICULAR; INTRALESIONAL; INTRAMUSCULAR; INTRAVENOUS; SOFT TISSUE
Status: CANCELLED | OUTPATIENT
Start: 2024-05-15

## 2024-05-15 RX ORDER — FENTANYL CITRATE 50 UG/ML
25 INJECTION, SOLUTION INTRAMUSCULAR; INTRAVENOUS EVERY 5 MIN PRN
Status: DISCONTINUED | OUTPATIENT
Start: 2024-05-15 | End: 2024-05-15 | Stop reason: HOSPADM

## 2024-05-15 RX ORDER — HYDROMORPHONE HCL IN WATER/PF 6 MG/30 ML
0.4 PATIENT CONTROLLED ANALGESIA SYRINGE INTRAVENOUS EVERY 5 MIN PRN
Status: DISCONTINUED | OUTPATIENT
Start: 2024-05-15 | End: 2024-05-15 | Stop reason: HOSPADM

## 2024-05-15 RX ORDER — NALOXONE HYDROCHLORIDE 0.4 MG/ML
0.1 INJECTION, SOLUTION INTRAMUSCULAR; INTRAVENOUS; SUBCUTANEOUS
Status: CANCELLED | OUTPATIENT
Start: 2024-05-15

## 2024-05-15 RX ORDER — OXYCODONE HYDROCHLORIDE 5 MG/1
5 TABLET ORAL
Status: CANCELLED | OUTPATIENT
Start: 2024-05-15

## 2024-05-15 RX ORDER — LIDOCAINE HYDROCHLORIDE 20 MG/ML
INJECTION, SOLUTION INFILTRATION; PERINEURAL PRN
Status: DISCONTINUED | OUTPATIENT
Start: 2024-05-15 | End: 2024-05-15

## 2024-05-15 RX ORDER — ONDANSETRON 4 MG/1
4 TABLET, ORALLY DISINTEGRATING ORAL EVERY 30 MIN PRN
Status: DISCONTINUED | OUTPATIENT
Start: 2024-05-15 | End: 2024-05-15 | Stop reason: HOSPADM

## 2024-05-15 RX ORDER — NALOXONE HYDROCHLORIDE 0.4 MG/ML
0.1 INJECTION, SOLUTION INTRAMUSCULAR; INTRAVENOUS; SUBCUTANEOUS
Status: DISCONTINUED | OUTPATIENT
Start: 2024-05-15 | End: 2024-05-15 | Stop reason: HOSPADM

## 2024-05-15 RX ADMIN — PHENYLEPHRINE HYDROCHLORIDE 200 MCG: 10 INJECTION INTRAVENOUS at 15:53

## 2024-05-15 RX ADMIN — METHOCARBAMOL 500 MG: 500 TABLET ORAL at 21:08

## 2024-05-15 RX ADMIN — ERYTHROMYCIN ETHYLSUCCINATE 250 MG: 400 GRANULE, FOR SUSPENSION ORAL at 11:50

## 2024-05-15 RX ADMIN — FAMOTIDINE 20 MG: 20 TABLET ORAL at 08:44

## 2024-05-15 RX ADMIN — METOCLOPRAMIDE 5 MG: 5 INJECTION, SOLUTION INTRAMUSCULAR; INTRAVENOUS at 21:19

## 2024-05-15 RX ADMIN — SENNOSIDES AND DOCUSATE SODIUM 2 TABLET: 8.6; 5 TABLET ORAL at 21:09

## 2024-05-15 RX ADMIN — ACETAMINOPHEN 975 MG: 325 TABLET, FILM COATED ORAL at 14:12

## 2024-05-15 RX ADMIN — MAGNESIUM HYDROXIDE 30 ML: 400 SUSPENSION ORAL at 08:44

## 2024-05-15 RX ADMIN — ALBUTEROL SULFATE 4 PUFF: 90 AEROSOL, METERED RESPIRATORY (INHALATION) at 18:44

## 2024-05-15 RX ADMIN — GABAPENTIN 100 MG: 100 CAPSULE ORAL at 21:08

## 2024-05-15 RX ADMIN — METOCLOPRAMIDE 5 MG: 5 INJECTION, SOLUTION INTRAMUSCULAR; INTRAVENOUS at 08:44

## 2024-05-15 RX ADMIN — Medication 15 ML: at 08:45

## 2024-05-15 RX ADMIN — LORAZEPAM 0.5 MG: 2 CONCENTRATE ORAL at 14:37

## 2024-05-15 RX ADMIN — ROSUVASTATIN CALCIUM 40 MG: 20 TABLET, FILM COATED ORAL at 08:44

## 2024-05-15 RX ADMIN — PHENYLEPHRINE HYDROCHLORIDE 100 MCG: 10 INJECTION INTRAVENOUS at 16:00

## 2024-05-15 RX ADMIN — ERYTHROMYCIN ETHYLSUCCINATE 250 MG: 400 GRANULE, FOR SUSPENSION ORAL at 18:44

## 2024-05-15 RX ADMIN — SUCCINYLCHOLINE CHLORIDE 100 MG: 20 INJECTION, SOLUTION INTRAMUSCULAR; INTRAVENOUS; PARENTERAL at 15:50

## 2024-05-15 RX ADMIN — GUAIFENESIN 200 MG: 100 SOLUTION ORAL at 14:12

## 2024-05-15 RX ADMIN — SERTRALINE HYDROCHLORIDE 100 MG: 100 TABLET ORAL at 08:44

## 2024-05-15 RX ADMIN — ACETAMINOPHEN 975 MG: 325 TABLET, FILM COATED ORAL at 21:09

## 2024-05-15 RX ADMIN — LEVOFLOXACIN 500 MG: 5 INJECTION, SOLUTION INTRAVENOUS at 11:50

## 2024-05-15 RX ADMIN — METOCLOPRAMIDE 5 MG: 5 INJECTION, SOLUTION INTRAMUSCULAR; INTRAVENOUS at 14:12

## 2024-05-15 RX ADMIN — LIDOCAINE HYDROCHLORIDE 60 MG: 20 INJECTION, SOLUTION INFILTRATION; PERINEURAL at 15:49

## 2024-05-15 RX ADMIN — FENTANYL CITRATE 100 MCG: 50 INJECTION INTRAMUSCULAR; INTRAVENOUS at 15:49

## 2024-05-15 RX ADMIN — FAMOTIDINE 20 MG: 20 TABLET ORAL at 21:09

## 2024-05-15 RX ADMIN — PHENYLEPHRINE HYDROCHLORIDE 200 MCG: 10 INJECTION INTRAVENOUS at 16:10

## 2024-05-15 RX ADMIN — ERYTHROMYCIN ETHYLSUCCINATE 250 MG: 400 GRANULE, FOR SUSPENSION ORAL at 08:43

## 2024-05-15 RX ADMIN — PROPOFOL 130 MG: 10 INJECTION, EMULSION INTRAVENOUS at 15:49

## 2024-05-15 RX ADMIN — DEXAMETHASONE SODIUM PHOSPHATE 4 MG: 4 INJECTION, SOLUTION INTRA-ARTICULAR; INTRALESIONAL; INTRAMUSCULAR; INTRAVENOUS; SOFT TISSUE at 15:55

## 2024-05-15 RX ADMIN — POLYETHYLENE GLYCOL 3350 17 G: 17 POWDER, FOR SOLUTION ORAL at 21:09

## 2024-05-15 RX ADMIN — ONDANSETRON 4 MG: 2 INJECTION INTRAMUSCULAR; INTRAVENOUS at 15:57

## 2024-05-15 RX ADMIN — Medication 3 MG: at 21:09

## 2024-05-15 RX ADMIN — SODIUM CHLORIDE, POTASSIUM CHLORIDE, SODIUM LACTATE AND CALCIUM CHLORIDE: 600; 310; 30; 20 INJECTION, SOLUTION INTRAVENOUS at 15:40

## 2024-05-15 RX ADMIN — GUAIFENESIN 200 MG: 100 SOLUTION ORAL at 08:44

## 2024-05-15 RX ADMIN — SENNOSIDES AND DOCUSATE SODIUM 2 TABLET: 8.6; 5 TABLET ORAL at 08:44

## 2024-05-15 ASSESSMENT — ACTIVITIES OF DAILY LIVING (ADL)
ADLS_ACUITY_SCORE: 35
ADLS_ACUITY_SCORE: 35
ADLS_ACUITY_SCORE: 36
ADLS_ACUITY_SCORE: 36
ADLS_ACUITY_SCORE: 35
ADLS_ACUITY_SCORE: 36
ADLS_ACUITY_SCORE: 35
ADLS_ACUITY_SCORE: 36
ADLS_ACUITY_SCORE: 35
ADLS_ACUITY_SCORE: 35
ADLS_ACUITY_SCORE: 36

## 2024-05-15 NOTE — ANESTHESIA PREPROCEDURE EVALUATION
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 (H)     Nonrheumatic aortic (valve) stenosis     Sweat gland carcinoma       Past Surgical History:   Procedure Laterality Date    BRONCHOSCOPY FLEXIBLE AND RIGID N/A 2024    Procedure: Bronchoscopy flexible and rigid;  Surgeon: Devin Hill MD;  Location: [facility] OR    BRONCHOSCOPY FLEXIBLE AND RIGID N/A 2024    Procedure: Bronchoscopy flexible and rigid;  Surgeon: Jessie Kim MD;  Location: [facility] OR    CV CORONARY ANGIOGRAM N/A 3/27/2023    Procedure: Coronary Angiogram;  Surgeon: Miki Etienne MD;  Location: [facility]    CV CORONARY ANGIOGRAM N/A 2023    Procedure: Coronary Angiogram;  Surgeon: Miki Etienne MD;  Location: [facility] CV    CV LEFT HEART CATH N/A 3/27/2023    Procedure: Left Heart Catheterization;  Surgeon: Miki Etienne MD;  Location: [facility] CV    CV LEFT HEART CATH N/A 2023    Procedure: Left Heart Catheterization;  Surgeon: Miki Etienne MD;  Location: [facility]    CV PCI N/A 3/27/2023    Procedure: Percutaneous Coronary Intervention;  Surgeon: Miki Etienne MD;  Location: [facility] CV    ENDOSCOPIC ULTRASOUND UPPER GASTROINTESTINAL TRACT (GI) N/A 2023    Procedure: Endoscopic ultrasound upper gastrointestinal tract (GI);  Surgeon: Shahriar Giraldo MD;  Location: [facility] GI    ESOPHAGOGASTRODUODENOSCOPY, WITH BOTULINUM TOXIN INJECTION N/A 2024    Procedure:  Esophagogastroduodenoscopy, With Botulinum Toxin Injection;  Surgeon: Jessie Kim MD;  Location: [facility]    ESOPHAGOSCOPY, GASTROSCOPY, DUODENOSCOPY (EGD), COMBINED N/A 11/8/2023    Procedure: ESOPHAGOGASTRODUODENOSCOPY, WITH BIOPSY;  Surgeon: Shahriar Giraldo MD;  Location: [facility]    ESOPHAGOSCOPY, GASTROSCOPY, DUODENOSCOPY (EGD), COMBINED N/A 4/19/2024    Procedure: Esophagoscopy, gastroscopy, duodenoscopy (EGD), combined;  Surgeon: Devin Hill MD;  Location: [facility]    ESOPHAGOSCOPY, GASTROSCOPY, DUODENOSCOPY (EGD), COMBINED N/A 4/28/2024    Procedure: Esophagoscopy, gastroscopy, duodenoscopy (EGD), combined;  Surgeon: Jessie Kim MD;  Location: [facility]    ESOPHAGOSCOPY, GASTROSCOPY, DUODENOSCOPY (EGD), COMBINED N/A 5/5/2024    Procedure: Esophagoscopy, gastroscopy, duodenoscopy (EGD), combined-under fluoro & dilation, nasogastric tube placement, bronchoscopy;  Surgeon: Kevin Calderon MD;  Location: [facility]    LAPAROSCOPIC ASSISTED INSERTION TUBE JEJUNOSTOMY N/A 4/1/2024    Procedure: Laparoscopic Jejunostomy Tube Insertion and Esophagogastroduodenoscopy;  Surgeon: Kevin Calderon MD;  Location: [facility]    LARYNGOSCOPY WITH BIOPSY(IES) N/A 10/12/2023    Procedure: LARYNGOSCOPY, WITH BIOPSY;  Surgeon: Edi Felix MD;  Location: [facility]    OTHER SURGICAL HISTORY  2015    WIDE EXCISION OF LEFT GLUTEAL MASSTNM: [id number], stage: II hidradenocarcinoma Grade II, 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, sentinel lymph node biopsy negative     THORACOSCOPIC, LAPAROSCOPIC ESOPHAGECTOMY, COMBINED N/A 4/19/2024    Procedure: Laparoscopic and right thoracoscopic esophagogastrectomy, right AND left chest tube placement;  Surgeon: Devin Hill MD;  Location: [facility] OR      Allergies   Allergen Reactions    Coconut Flavor Anaphylaxis     Raw coconut      Social History     Tobacco Use    Smoking status: Former     Current packs/day: 0.00     Average packs/day: 2.0 packs/day for 30.0 years (60.0 ttl  pk-yrs)     Types: Cigarettes     Start date:      Quit date:      Years since quittin.3     Passive exposure: Past    Smokeless tobacco: Never    Tobacco comments:     Quit 10 years ago   Substance Use Topics    Alcohol use: Not Currently     Comment: quit in       Wt Readings from Last 1 Encounters:   24 61.1 kg (134 lb 12.8 oz)        Anesthesia Evaluation   Pt has had prior anesthetic. Type: MAC and General.        ROS/MED HX  ENT/Pulmonary:  - neg pulmonary ROS     Neurologic:  - neg neurologic ROS     Cardiovascular:     (+)  hypertension- -  CAD angina-  - -                                      METS/Exercise Tolerance:     Hematologic:     (+)      anemia,          Musculoskeletal:  - neg musculoskeletal ROS     GI/Hepatic: Comment: Gastric outlet obstruction.  The patient has an NG tube in place.    (+) GERD,                   Renal/Genitourinary:  - neg Renal ROS     Endo:  - neg endo ROS     Psychiatric/Substance Use:  - neg psychiatric ROS     Infectious Disease:  - neg infectious disease ROS     Malignancy:  - neg malignancy ROS     Other:            Physical Exam    Airway  airway exam normal      Mallampati: I   TM distance: > 3 FB   Neck ROM: full   Mouth opening: > 3 cm    Respiratory Devices and Support         Dental       (+) Minor Abnormalities - some fillings, tiny chips      Cardiovascular   cardiovascular exam normal       Rhythm and rate: regular and normal     Pulmonary   pulmonary exam normal        breath sounds clear to auscultation           OUTSIDE LABS:  CBC:   Lab Results   Component Value Date    WBC 6.0 05/15/2024    WBC 6.7 2024    HGB 12.0 (L) 05/15/2024    HGB 11.2 (L) 2024    HCT 36.7 (L) 05/15/2024    HCT 33.7 (L) 2024     05/15/2024     2024     BMP:   Lab Results   Component Value Date     05/15/2024     2024    POTASSIUM 4.2 05/15/2024    POTASSIUM 4.3 2024    POTASSIUM 4.3 2024     "CHLORIDE 100 05/15/2024    CHLORIDE 101 05/14/2024    CO2 25 05/15/2024    CO2 27 05/14/2024    BUN 23.0 05/15/2024    BUN 21.5 05/14/2024    CR 0.64 (L) 05/15/2024    CR 0.59 (L) 05/14/2024    GLC 94 05/15/2024     (H) 05/15/2024     COAGS:   Lab Results   Component Value Date    PTT 36 04/28/2024    INR 1.34 (H) 04/28/2024     POC: No results found for: \"BGM\", \"HCG\", \"HCGS\"  HEPATIC:   Lab Results   Component Value Date    ALBUMIN 3.0 (L) 05/05/2024    PROTTOTAL 6.8 05/05/2024    ALT 74 (H) 05/05/2024    AST 33 05/05/2024    ALKPHOS 418 (H) 05/05/2024    BILITOTAL 0.5 05/05/2024     OTHER:   Lab Results   Component Value Date    PH 7.44 05/05/2024    LACT 1.0 04/28/2024    A1C 5.2 03/27/2023    RAFAELA 9.6 05/15/2024    PHOS 4.1 05/15/2024    MAG 2.1 05/15/2024    TSH 2.57 03/29/2024    T4 0.83 (L) 03/29/2024       Anesthesia Plan    ASA Status:  3    NPO Status:  NPO Appropriate    Anesthesia Type: General.     - Airway: ETT   Induction: Intravenous.   Maintenance: Inhalation.   Techniques and Equipment:     - Lines/Monitors: 2nd IV     Consents    Anesthesia Plan(s) and associated risks, benefits, and realistic alternatives discussed. Questions answered and patient/representative(s) expressed understanding.     - Discussed: Risks, Benefits and Alternatives for BOTH SEDATION and the PROCEDURE were discussed     - Discussed with:  Patient      - Extended Intubation/Ventilatory Support Discussed: Yes.      - Patient is DNR/DNI Status: No     Use of blood products discussed: Yes.     - Discussed with: Patient.     Postoperative Care    Pain management: IV analgesics.   PONV prophylaxis: Ondansetron (or other 5HT-3), Dexamethasone or Solumedrol     Comments:    Other Comments: The material risks, benefits and alternatives were discussed in detail.  The patient agrees to proceed.  The patient has no other complaints at this time.           Dennis Campa MD    I have reviewed the pertinent notes and labs in " the chart from the past 30 days and (re)examined the patient.  Any updates or changes from those notes are reflected in this note.

## 2024-05-15 NOTE — PROGRESS NOTES
CLINICAL NUTRITION SERVICES - REASSESSMENT NOTE     Nutrition Prescription    RECOMMENDATIONS FOR MDs/PROVIDERS TO ORDER:  Diet advancement per GI  TF at continuous rate at this time. No changes     Malnutrition Status:    Severe malnutrition in the context of acute condition     Recommendations already ordered by Registered Dietitian (RD):  - TF is infusing at 50 ml/hr via Jejunostomy tube with Novasource renal @ continious rate. Previously attempt made to initiate cycle TF however patient was not able to tolerate TF advancement. Patient will like to be on continious TF until after his procedure. Will monitor ability to start cycle Tf       Future/Additional Recommendations:  Suggested cycle TFs initiation per following:    Day 1: Start with 20 hour cycle regimen :   -> @ 8:00 pm tonight, increase TFs rate to new goal @ 60 ml/hr and infuse x 20 hours ( from 8:00 pm - to 4:00 pm the next day).    Day 2: If tolerated, begin 18 hour cycle regimen,   -> @ 8:00 pm, increase TF rate to new goal @ 65 ml/hr and infuse x 18 hour ( from 8:00 pm - 2:00 pm)    Day 3: IF tolerated, begin 16 hour cycle regimen:   -> @ 8:00 pm, increase TF rate to final goal @ 75 ml/hr and infuse x 16 hour ( from 8:00 pm -- 12:00 noon )    - Water flushes: 120 ml before and after each cycle regimen + extra free water flushes additional during the day ( ie: 120 ml x 4 times daily )            EVALUATION OF THE PROGRESS TOWARD GOALS   Diet:   Strict NPO due to aspiration concern vs. GOO, Speech  following   NG tube kept to gravity with 150 ml output today    Nutrition Support:   Access: Jejunostomy tube placed by thoracic 4/1/24  Dosing wt: Updated to 61 kg lowest wt this admit from 5/13/24    EN: Has been on continuous TF @ 50 ml/hr with Novasource renal ( this is a coconut / MCT free formula due to allergies).   -> provision: Novasource Renal (or equivalent) @ 50 ml/hr (1200 ml) provides 2400 kcal (39 kcal/kg), 109 g pro (1.8 g/kg), 220 g CHO,  860 ml free water, and 0 g fiber daily.        FWF: 90 ml every 4 hours      Intake:     Visited with patient today. Reports tolerating TF. Per patient, he was not able to tolerate increase TF rate in the past to move forward with cycle regimen. Patient would like to hold on cycle TF at this time until he can talk to his thoracic providers. Patient also notes, plan for EGD with GI tomorrow  and would like to wait on cycle TF regimen until after his procedure.        NEW FINDINGS   Chart reviewed:   - Esophageal cancer (lower third),   - Malnutrition s/p lap Jtube placement (2024 by Thoracic)    - Admitted and s/p laparoscopic and right thoracoscopic gastroesophagectomy on 24 (s/p Feliberto Elbert esophagectomy)  - Postop course c/b aspiration pneumonia and Concern for GOO/pyloric stenosis vs delayed gastric motility  - Upper GI 5/3 with no passage of contrast below the diaphragmatic hiatus.   - Underwent flexible bronchoscopy with bronchial lavage, EGD with pyloric balloon dilation (21 cm), and fluoroscopic NG placement on . TF held  for concern for aspiration but restarted  with methylene blue. Currently tolerating TF.   - GI consider EGD with possible pyloric stent placement      Wt trend:   Admit wt / standing wt: 69.4 kg (153 lb)   Most recent wt: 61.1 kg (134 lb 12.8 oz) driest wt on      Labs noted:   B (H)  CRP: 338 (H) on 24    Stool/GI:  X2 BM on       MALNUTRITION  % Intake: Decreased intake does not meet criteria  % Weight Loss: > 2% in 1 week (severe)  Subcutaneous Fat Loss: Facial region: moderate, Upper arm: moderate, and Thoracic/intercostal: moderate   Muscle Loss: Temporal: mild, Facial & jaw region: mild, Thoracic region (clavicle, acromium bone, deltoid, trapezius, pectoral): severe, and Upper arm (bicep, tricep): severe   Fluid Accumulation/Edema: None noted  Malnutrition Diagnosis: Severe malnutrition in the context of acute condition     Previous Goals  Total  avg nutritional intake to meet a minimum of 30 kcal/kg and 1.5 g PRO/kg daily (per dosing wt 68 kg).      Evaluation: Met with continuous TF    Previous Nutrition Diagnosis  Inadequate oral intake related to NPO s/p esophagectomy as evidenced by enteral feeds via jejunostomy to meet 100% needs.   Evaluation: No change    CURRENT NUTRITION DIAGNOSIS  Inadequate oral intake related to dysphagia / aspiration precautions associated with  s/p esophagectomy as evidenced by reliance on enteral feeds via jejunostomy to meet 100% needs, strict NPO status with NG tube to suction     INTERVENTIONS  Implementation  Enteral Nutrition - discussed cycle regimen with patient. Recs above. No changes today. Continue with continuous rate TF support       Goals  otal avg nutritional intake to meet a minimum of 30 kcal/kg and 1.5 g PRO/kg daily (per dosing wt 68 kg).        Monitoring/Evaluation  Progress toward goals will be monitored and evaluated per protocol.    Yuko Waggoner RD/LD  Unit [facility] ([id number]) and ([id number]),  Monday-Friday: Vocera -> (7C clinical Dietitian),   Weekend/Holiday: Vocera -> (Weekend Clinical Dietitian)

## 2024-05-15 NOTE — ANESTHESIA PROCEDURE NOTES
Airway       Patient location during procedure: OR       Procedure Start/Stop Times: 5/15/2024 3:52 PM  Staff -        CRNA: Dennis Santiago APRN CRNA       Performed By: CRNA  Consent for Airway        Urgency: elective  Indications and Patient Condition       Indications for airway management: geneva-procedural       Induction type:intravenous       Mask difficulty assessment: 1 - vent by mask    Final Airway Details       Final airway type: endotracheal airway       Successful airway: ETT - single  Endotracheal Airway Details        ETT size (mm): 8.0       Cuffed: yes       Cuff volume (mL): 10       Successful intubation technique: direct laryngoscopy       DL Blade Type: MAC 3       Grade View of Cords: 1       Adjucts: stylet       Position: Right       Measured from: lips       Secured at (cm): 24    Post intubation assessment        Placement verified by: capnometry, equal breath sounds and chest rise        Number of attempts at approach: 1       Secured with: tape       Ease of procedure: easy       Dentition: Intact and Unchanged    Medication(s) Administered   Medication Administration Time: 5/15/2024 3:52 PM

## 2024-05-15 NOTE — BRIEF OP NOTE
[facility]    Brief Operative Note    Pre-operative diagnosis: Malignant neoplasm of esophagus, unspecified location (H) [C15.9]  Delayed gastric emptying [K30]  Post-operative diagnosis Same as pre-operative diagnosis    Procedure: ESOPHAGOGASTRODUODENOSCOPY, WITH TRANSENDOSCOPIC STENT INSERTION (PYLORIC STENT PLACEMENT), N/A - Esophagus    Surgeon: Surgeons and Role:     * Livan Monahan MD - Primary     * Nichole Meeks MD - Fellow - Assisting  Anesthesia: General   Estimated Blood Loss: None    Drains: None  Specimens: * No specimens in log *  Findings:   None.  Complications: None.  Implants:   Implant Name Type Inv. Item Serial No.  Lot No. LRB No. Used Action   STENT ESU AXIOS W/DEL SYS [id number] 10.8FR 138CM [id number] - [id number] Stent STENT ESU AXIOS W/DEL SYS [id number] 10.8FR 138CM [id number]  [employer] [id number] N/A 1 Implanted     Findings:  - A healthy esophago-gastric anastomosis and gastric conduit was found.   - Successful placement of a 20mm Axios stent across the pylorus for relief of outlet obstruction  - Normal examined duodenum.      Recommendations:  - [facility].   - Clear liquid diet today then advance as tolerated.   - Repeat EGD in the OR in 3 months for stent exchange.   - The findings and recommendations were discussed with the patient.

## 2024-05-15 NOTE — ANESTHESIA CARE TRANSFER NOTE
Patient: Lopez Hogue    Procedure: Procedure(s):  ESOPHAGOGASTRODUODENOSCOPY, WITH TRANSENDOSCOPIC STENT INSERTION (PYLORIC STENT PLACEMENT)       Diagnosis: Malignant neoplasm of esophagus, unspecified location (H) [C15.9]  Delayed gastric emptying [K30]  Diagnosis Additional Information: No value filed.    Anesthesia Type:   General     Note:    Oropharynx: oropharynx clear of all foreign objects and spontaneously breathing  Level of Consciousness: awake  Oxygen Supplementation: nasal cannula    Independent Airway: airway patency satisfactory and stable  Dentition: dentition unchanged  Vital Signs Stable: post-procedure vital signs reviewed and stable  Report to RN Given: handoff report given  Patient transferred to: PACU    Handoff Report: Identifed the Patient, Identified the Reponsible Provider, Reviewed the pertinent medical history, Discussed the surgical course, Reviewed Intra-OP anesthesia mangement and issues during anesthesia, Set expectations for post-procedure period and Allowed opportunity for questions and acknowledgement of understanding      Vitals:  Vitals Value Taken Time   BP     Temp     Pulse 93 05/15/24 1627   Resp 18 05/15/24 1627   SpO2 99 % 05/15/24 1627   Vitals shown include unfiled device data.    Electronically Signed By: YADIRA Toledo CRNA  May 15, 2024  4:27 PM

## 2024-05-15 NOTE — PLAN OF CARE
Goal Outcome Evaluation: Ongoing, progressing    Plan of Care Reviewed With: patient    Overall Patient Progress: no change    Outcome Evaluation: No significant changes this shift. Pt is alert and oriented and able to make needs known. Ativan administered for anxiety. NG to low intermittant suction. Call light within reach. Continue to monitor.

## 2024-05-15 NOTE — CONSULTS
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 Hill.  Postop course c/b aspiration pneumonia and concern for delayed gastric emptying vs GOO for which [facility] team has been consulted to evaluate and consider EGD with possible pyloric stent placement.    #Esophageal cancer, s/p Feliberto Elbert esophagectomy  #Concern for GOO/conduit dysfunction vs delayed gastric motility  #Pharyngeal and esophageal dysphagia  #Aspiration pneumonia  Admitted since 4/19/24 for above esophagectomy with postop course c/b aspiration pneumonia and the following procedures/studies to work up/evaluate for possible GOO vs delayed emptying:    Procedures & Studies:  4/19/2024: S/p Minimally invasive Feliberto Elbert esophagectomy (Dr. Calderon and Dr. Hill)  4/28/2024: RRT called/Intubated for acute hypoxic resp failure.  S/p EGD and Bronch - Fluid filled dilated conduit, decompressed endoscopically. NGT placed. LLL mucus, suctioned/cultured.  4/29/2024: EGD, botox injection of pylorus.  Extubated.  5/3/2024: Upper GI - No passage of contrast below the intrathoracic stomach/diaphragmatic hiatus.   5/3/2024: VFSS - Aspiration with thin liquid which clears from the airway with  cough. Chin tuck maneuver effective at eliminating aspiration. Consistent laryngeal penetration with thin and mildly thick liquids with chin tuck maneuver. Acute on chronic pharyngeal dysphagia (r/t hx of XRT to the head/neck) and esophageal dysphagia (s/p  esophagectomy)  5/4/2024: CT CAP (non-contrast) - increased diffuse nodular and consolidative opacities with increasing cavitation, gastric pull-through is distended with fluid and layering retained oral contrasabrupt caliber change the level of the diaphragmatic hiatus with complete obstruction   5/5/2024: EGD with pyloric balloon dilation (21 cm)  5/14/2024: Esophagram -  Stasis of contrast at the pylorus with roughly 75% of contrast passed into the small bowel after 10 minutes.    Patient reports odynophagia and dysphagia to liquids (has not been allowed to trial solid diet this adm) with c/o PO sitting in chest with regurgitation/emesis and compromised resp status.  Denies improvement in sx despite above interventions nor prokinetic meds (on both erythromycin and reglan) and question if sustained injury to vagus nerve during procedure contributing to delayed gastric emptying vs conduit dysfunction (stricture at hiatus) causing GOO.  Given recent esophagectomy with botox injection and dilation, would not consider G-POEM at this time and proceed with EGD to evaluate for candidacy of pyloric stenting to improve sx.    #Severe protein-calorie malnutrition  #S/p J-tube placement (by Thoracic 4/1/2024)  NPO/minimal liquid diet the duration of this adm d/t above concerns for aspiration pneumonia and dysphagia.  Reliant on Jtube TF, only started this adm.  PTA supplementing diet with Ensure and PRO supplements.  Weight loss of 14% x 4 mos PTA.  Moderate fat/muscle wasting on exam (see below). RD and SLP following this adm.    RECOMMENDATIONS:  -Plan for EGD with possible pyloric stent placement today (5/15) with Dr. Monahan (add on - timing TBD).  -Hold Jtube TF now.    -Resume per post procedure recs and per RD.  -Place NGT to LIS preoperatively.    -Per discussion with Thoracic, can remove NGT during EGD and no need to replace unless feel indicated.  -Continue NPO status.  -Diet recs pending EGD  "findings/procedure.  -SLP re-evaluation post procedure and prior to diet adv.  -Continue to hold lovenox (last received 5/14 at 21:00).  -Hold erythromycin and reglan (re-eval if approp to resume vs discontinue if proceed with stent placement).  -Analgesia/Antiemetics per primary team.    Discussed with patient/wife and Thoracic team.    Gastroenterology follow up recommendations:   TBD pending post procedure recs.    Thank you for involving us in this patient's care. Please do not hesitate to contact the GI service with any questions or concerns.     Pt seen and care plan discussed with Dr. Monahan and Dr. Giraldo, GI staff physicians.    Overall time spent on the date of this encounter preparing to see the patient (including chart review of available notes, clinical status events, imaging and labs); obtaining and/or reviewing separately obtained history; ordering medications, tests or procedures; communicating with other health care professionals; and documenting the above clinical information in the electronic medical record was 90 minutes.      Karli Redd PA-C, RD, [facility]  Inpatient Gastroenterology Service  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 Liz.  Postop course c/b aspiration pneumonia and concern for delayed gastric emptying vs GOO for which [facility] " team has been consulted to evaluate and consider EGD with possible pyloric stent placement.    Patient seen and examined at 14:00. History is obtained from patient and wife who report that prior to this admission, had Jtube placed 4/1/2024 but had not yet started any TF via. Supplementing diet with Ensure and PRO supplements but had been losing weight (175# on 12/26/23 to 150# on 4/20/24).  Since admitted for esophagectomy, reports odynophagia and dysphagia to liquids (has not been allowed to trial solid diet this adm) with c/o PO sitting in chest with regurgitation/emesis and compromised resp status.  Denies improvement in sx despite above interventions nor prokinetic meds (on both erythromycin and reglan).      Previous Procedures  11/28/2023: Upper EUS for staging with Dr. Giraldo  Impression:   - Esophageal squamous cell carcinoma was staged T2 N1                          (suspicious paraesophageal lymph node) by                          endosonographic criteria.                          - One malignant-appearing lymph node (11 x 8 x 7 mm)                          was visualized in the lower paraesophageal mediastinum                          (level 8L). Biopsy deferred given location adjacent                          myocardium and plan for neoadjuvant chemoradiation - a                          high risk biopsy would not  at this                          time. This may correspond to a structure on the recent                          PET series 10 (image 50) and series 665 (image 264).                          - No masses were seen in the visualized portions of                          the liver.                          - The left adrenal appeared normal.                          - Partially obstructing, malignant esophageal tumor                          (known squamous cell carcinoma based on prior biopsy)                          again seen in the middle third of the esophagus from                           30-35cm.                          - 5 cm hiatal hernia.                          - Duodenal deformity.             Past Medical History:   Reviewed and edited as appropriate  Past Medical History:   Diagnosis Date    Anxiety     Aortic stenosis     CAD (coronary artery disease)     ETOH dependence     Quit drinking 10 years    Heart attack (H)     History of blood transfusion     HLD (hyperlipidemia)     Hypertension     Malignant neoplasm of middle third of esophagus (H)     Nonrheumatic aortic (valve) stenosis     Sweat gland carcinoma             Past Surgical History:   Reviewed and edited as appropriate   Past Surgical History:   Procedure Laterality Date    BRONCHOSCOPY FLEXIBLE AND RIGID N/A 4/19/2024    Procedure: Bronchoscopy flexible and rigid;  Surgeon: Devin Hill MD;  Location: [facility] OR    BRONCHOSCOPY FLEXIBLE AND RIGID N/A 4/28/2024    Procedure: Bronchoscopy flexible and rigid;  Surgeon: Jessie Kim MD;  Location: [facility]    CV CORONARY ANGIOGRAM N/A 3/27/2023    Procedure: Coronary Angiogram;  Surgeon: Miki Etienne MD;  Location: [facility] CV    CV CORONARY ANGIOGRAM N/A 5/9/2023    Procedure: Coronary Angiogram;  Surgeon: Miki Etienne MD;  Location: [facility] CV    CV LEFT HEART CATH N/A 3/27/2023    Procedure: Left Heart Catheterization;  Surgeon: Miki Etienne MD;  Location: [facility] CV    CV LEFT HEART CATH N/A 5/9/2023    Procedure: Left Heart Catheterization;  Surgeon: Miki Etienne MD;  Location: [facility] CV    CV PCI N/A 3/27/2023    Procedure: Percutaneous Coronary Intervention;  Surgeon: Miki Etienne MD;  Location: [facility] CV    ENDOSCOPIC ULTRASOUND UPPER GASTROINTESTINAL TRACT (GI) N/A 11/28/2023    Procedure: Endoscopic ultrasound upper gastrointestinal tract (GI);  Surgeon: Shahriar Giraldo MD;  Location: [facility] GI    ESOPHAGOGASTRODUODENOSCOPY, WITH BOTULINUM TOXIN INJECTION N/A 4/29/2024     Procedure: Esophagogastroduodenoscopy, With Botulinum Toxin Injection;  Surgeon: Jessie Kim MD;  Location: [facility] GI    ESOPHAGOSCOPY, GASTROSCOPY, DUODENOSCOPY (EGD), COMBINED N/A 11/8/2023    Procedure: ESOPHAGOGASTRODUODENOSCOPY, WITH BIOPSY;  Surgeon: Shahriar Giraldo MD;  [facility]: [facility]    ESOPHAGOSCOPY, GASTROSCOPY, DUODENOSCOPY (EGD), COMBINED N/A 4/19/2024    Procedure: Esophagoscopy, gastroscopy, duodenoscopy (EGD), combined;  Surgeon: Devin Hill MD;  [facility]    ESOPHAGOSCOPY, GASTROSCOPY, DUODENOSCOPY (EGD), COMBINED N/A 4/28/2024    Procedure: Esophagoscopy, gastroscopy, duodenoscopy (EGD), combined;  Surgeon: Jessie Kim MD;  [facility]    ESOPHAGOSCOPY, GASTROSCOPY, DUODENOSCOPY (EGD), COMBINED N/A 5/5/2024    Procedure: Esophagoscopy, gastroscopy, duodenoscopy (EGD), combined-under fluoro & dilation, nasogastric tube placement, bronchoscopy;  Surgeon: Kevin Calderon MD;  Location: [facility]    LAPAROSCOPIC ASSISTED INSERTION TUBE JEJUNOSTOMY N/A 4/1/2024    Procedure: Laparoscopic Jejunostomy Tube Insertion and Esophagogastroduodenoscopy;  Surgeon: Kevin Calderon MD;  Location: [facility]    LARYNGOSCOPY WITH BIOPSY(IES) N/A 10/12/2023    Procedure: LARYNGOSCOPY, WITH BIOPSY;  Surgeon: Edi Felix MD;  Location: [facility]    OTHER SURGICAL HISTORY  2015    WIDE EXCISION OF LEFT GLUTEAL MASSTNM: [id number], stage: II hidradenocarcinoma Grade II, 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, sentinel lymph node biopsy negative     THORACOSCOPIC, LAPAROSCOPIC ESOPHAGECTOMY, COMBINED N/A 4/19/2024    Procedure: Laparoscopic and right thoracoscopic esophagogastrectomy, right AND left chest tube placement;  Surgeon: Devin Hill MD;  [facility]              Social History:   The patient lives in [place], [place] with wife (Sidra)  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)            Family History:   Patient's family history is reviewed  today and is non-contributory    Family History   Problem Relation Age of Onset    Dementia Mother     Anesthesia Reaction No family hx of     Thrombocytopenia No family hx of     Cancer No family hx of     Thrombosis No family hx of              Allergies:   Reviewed and edited as appropriate     Allergies   Allergen Reactions    Coconut Flavor Anaphylaxis     Raw coconut            Medications:     Current Facility-Administered Medications   Medication Dose Route Frequency Provider Last Rate Last Admin    [Auto Hold] acetaminophen (TYLENOL) tablet 975 mg  975 mg Per J Tube Q8H Ade Silverman MD   975 mg at 05/15/24 1412    [Auto Hold] albuterol (PROVENTIL HFA/VENTOLIN HFA) inhaler  4 puff Inhalation 4x Daily Elias Quiñones PA-C   4 puff at 05/14/24 1614    [Auto Hold] artificial saliva (BIOTENE MT) solution 1 spray  1 spray Swish & Spit 4x Daily PRN Gerald Garcia MD   1 spray at 05/06/24 1957    [Auto Hold] bisacodyl (DULCOLAX) suppository 10 mg  10 mg Rectal Daily Gerald Garcia MD   10 mg at 05/06/24 0819    dextrose 10% infusion   Intravenous Continuous PRN Gerald Garcia MD        [Auto Hold] enoxaparin ANTICOAGULANT (LOVENOX) injection 40 mg  40 mg Subcutaneous Q24H Gerald Garcia MD   40 mg at 05/14/24 2126    [Auto Hold] erythromycin ethylsuccinate (ERYPED) suspension 250 mg  250 mg Per J Tube TID Dusty Ruiz PA-C   250 mg at 05/15/24 1150    [Auto Hold] famotidine (PEPCID) tablet 20 mg  20 mg Per Feeding Tube BID Ade Silverman MD   20 mg at 05/15/24 0844    [Auto Hold] gabapentin (NEURONTIN) capsule 100 mg  100 mg Per J Tube At Bedtime Ade Silverman MD   100 mg at 05/14/24 2126    [Auto Hold] guaiFENesin (ROBITUSSIN) 20 mg/mL solution 200 mg  200 mg Per J Tube Q6H Ade Silverman MD   200 mg at 05/15/24 1412    [Auto Hold] HYDROmorphone (DILAUDID) injection 0.2-0.4 mg  0.2-0.4 mg Intravenous Q3H PRN Ade Silverman MD   0.4 mg at 05/12/24 1346    [Auto Hold] hydrOXYzine HCl  (ATARAX) tablet 25 mg  25 mg Oral Q6H PRN Ade Silverman MD   25 mg at 05/06/24 0836    Or    [Auto Hold] hydrOXYzine HCl (ATARAX) tablet 50 mg  50 mg Oral Q6H PRN Ade Silverman MD   50 mg at 05/04/24 1651    [Auto Hold] lactulose (CHRONULAC) solution 20 g  20 g Per J Tube Daily PRN Ade Silverman MD   20 g at 04/27/24 1206    [Auto Hold] levofloxacin (LEVAQUIN) infusion 500 mg  500 mg Intravenous Q24H Gerald Perez  mL/hr at 05/15/24 1150 500 mg at 05/15/24 1150    [Auto Hold] Lidocaine (LIDOCARE) 4 % Patch 2 patch  2 patch Transdermal Q24H Delilah Mitchell MD   2 patch at 05/10/24 2148    lidocaine (LMX4) cream   Topical Q1H PRN Livan Monahan MD        lidocaine (LMX4) cream   Topical Q1H PRN Nichole Meeks MD        lidocaine 1 % 0.1-1 mL  0.1-1 mL Other Q1H PRN Livan Monahan MD        lidocaine 1 % 0.1-1 mL  0.1-1 mL Other Q1H PRN Nichole Meeks MD        [Auto Hold] LORazepam (ATIVAN) 2 MG/ML (HIGH CONC) oral solution 0.5 mg  0.5 mg Per Feeding Tube Q6H PRN Ade Silverman MD   0.5 mg at 05/15/24 1437    [Auto Hold] magnesium hydroxide (MILK OF MAGNESIA) suspension 30 mL  30 mL Per J Tube BID Ade Silverman MD   30 mL at 05/15/24 0844    [Auto Hold] melatonin tablet 3 mg  3 mg Per Feeding Tube QPM Ade Silverman MD   3 mg at 05/14/24 2126    [Auto Hold] methocarbamol (ROBAXIN) tablet 500 mg  500 mg Per J Tube Q6H Ade Silverman MD   500 mg at 05/14/24 2128    [Auto Hold] metoclopramide (REGLAN) injection 5 mg  5 mg Intravenous TID Sj Acosta MD   5 mg at 05/15/24 1412    [Auto Hold] multivitamins w/minerals liquid 15 mL  15 mL Per Feeding Tube Daily Ade Silverman MD   15 mL at 05/15/24 0845    [Auto Hold] naloxone (NARCAN) injection 0.2 mg  0.2 mg Intravenous Q2 Min PRN Devin Hill MD        Or    [Auto Hold] naloxone (NARCAN) injection 0.4 mg  0.4 mg Intravenous Q2 Min PRN Devin Hill MD        Or    [Auto Hold] naloxone (NARCAN) injection 0.2 mg  0.2 mg  Intramuscular Q2 Min PRN Devin Hill MD        Or    [Auto Hold] naloxone (NARCAN) injection 0.4 mg  0.4 mg Intramuscular Q2 Min PRN Devin Hill MD        [Auto Hold] ondansetron (ZOFRAN ODT) ODT tab 4 mg  4 mg Oral Q6H PRN Cindy Garcia MD   4 mg at 04/28/24 0936    Or    [Auto Hold] ondansetron (ZOFRAN) injection 4 mg  4 mg Intravenous Q6H PRN Cindy Garcia MD   4 mg at 05/02/24 1911    [Held by provider] oxyCODONE (ROXICODONE) solution 5 mg  5 mg Per J Tube Q4H PRN Keith Colvin MD   5 mg at 05/04/24 1456    [Auto Hold] phenylephrine-mineral oil-petrolatum (PREPARATION H) 0.25-14-74.9 % rectal ointment   Rectal BID PRN Gerald Garcia MD        [Auto Hold] polyethylene glycol (MIRALAX) Packet 17 g  17 g Per J Tube BID Ade Silverman MD   17 g at 05/14/24 2126    [Auto Hold] prochlorperazine (COMPAZINE) injection 5 mg  5 mg Intravenous Q6H PRN Cindy Garcia MD        [Auto Hold] rosuvastatin (CRESTOR) tablet 40 mg  40 mg Oral or Feeding Tube Daily Ade Silverman MD   40 mg at 05/15/24 0844    [Auto Hold] senna-docusate (SENOKOT-S/PERICOLACE) 8.6-50 MG per tablet 2 tablet  2 tablet Per J Tube BID Ade Silverman MD   2 tablet at 05/15/24 0844    [Auto Hold] sertraline (ZOLOFT) tablet 100 mg  100 mg Oral or Feeding Tube QAM Ade Silverman MD   100 mg at 05/15/24 0844    simethicone 133mg/2mL oral suspension    PRN Livan Monahan MD   2 mL at 05/15/24 1524    [Auto Hold] sodium chloride (OCEAN) 0.65 % nasal spray 1 spray  1 spray Both Nostrils Q1H PRN Gerald Garcia MD   1 spray at 05/05/24 0106    sodium chloride (PF) 0.9% PF flush 3 mL  3 mL Intracatheter Q8H Livan Monahan MD        sodium chloride (PF) 0.9% PF flush 3 mL  3 mL Intracatheter q1 min prn Livan Monahan MD        sodium chloride (PF) 0.9% PF flush 3 mL  3 mL Intracatheter Q8H Nichole Meeks MD        sodium chloride (PF) 0.9% PF flush 3 mL  3 mL Intracatheter q1 min prn Nichole Meeks MD          Facility-Administered Medications Ordered in Other Encounters   Medication Dose Route Frequency Provider Last Rate Last Admin    dexAMETHasone (DECADRON) injection   Intravenous PRN Dennis Santiago APRN CRNA   4 mg at 05/15/24 1555    fentaNYL (PF) (SUBLIMAZE) injection   Intravenous PRN Dennis Santiago, APRN CRNA   100 mcg at 05/15/24 1549    lactated ringers infusion   Intravenous Continuous PRN Dennis Santiago, APRN CRNA   New Bag at 05/15/24 1540    lidocaine 2% injection (MDV)   Intravenous PRN Dennis Santiago, APRN CRNA   60 mg at 05/15/24 1549    ondansetron (ZOFRAN) injection   Intravenous PRN Dennis Santiago, APRN CRNA   4 mg at 05/15/24 1557    phenylephrine (JOE-SYNEPHRINE) injection   Intravenous Continuous PRN Dennis Santiago, APRN CRNA   200 mcg at 05/15/24 1553    propofol (DIPRIVAN) injection 10 mg/mL vial   Intravenous PRDennis Khalil APRN CRNA   130 mg at 05/15/24 1549    succinylcholine (ANECTINE) injection   Intravenous PRN Dennis Santiago, APRN CRNA   100 mg at 05/15/24 1550             Review of Systems:     A complete review of systems was performed and is negative except as noted in the HPI           Physical Exam:   Temp: 97.7  F (36.5  C) Temp src: Oral BP: 119/75 Pulse: 87   Resp: 18 SpO2: 93 % O2 Device: None (Room air)    Wt:   Wt Readings from Last 2 Encounters:   05/13/24 61.1 kg (134 lb 12.8 oz)   04/17/24 70.1 kg (154 lb 8 oz)      General: Pleasant, thin-appearing male in NAD.  Answers appropriately.    HEENT: Head is AT/NC. Sclera anicteric. No conjunctival injection.  Oropharynx is clear, moist and w/o exudate or lesions. No thrush. Good dentition.  Managing secretions without issues.  Sump NGT bridled in place.  Lungs: Non-labored breathing on RA.   Heart: Regular rate and rhythm.    Abdomen: Soft, non-tender, non-distended.  No rebound or peritoneal signs  Extremities: WWP, no pedal edema.  MSK: bilateral moderate temporal, clavicular, UE/LE muscle  wasting.  Moderate depression between ribs.  Skin: No jaundice or rash  Neurologic: Grossly non-focal.  CN 2-12 grossly intact.   Psych: mood appropriate to situation           Data:   Labs and imaging below were independently reviewed and interpreted    LAB WORK:    BMP  Recent Labs   Lab 05/15/24  0608 05/14/24  0525 05/13/24  0548 05/12/24 0433    138 137 140   POTASSIUM 4.2 4.3  4.3 4.3 4.2   CHLORIDE 100 101 101 101   RAFAELA 9.6 9.4 9.3 9.7   CO2 25 27 25 28   BUN 23.0 21.5 21.1 21.3   CR 0.64* 0.59* 0.62* 0.67   * 116* 110* 95     CBC  Recent Labs   Lab 05/15/24  0608 05/14/24  0525 05/13/24  0548 05/12/24 0433   WBC 6.0 6.7 7.6 8.8   RBC 3.78* 3.54* 3.55* 3.78*   HGB 12.0* 11.2* 10.9* 11.7*   HCT 36.7* 33.7* 34.0* 36.1*   MCV 97 95 96 96   MCH 31.7 31.6 30.7 31.0   MCHC 32.7 33.2 32.1 32.4   RDW 14.5 14.3 14.1 14.3    271 300 379     INRNo lab results found in last 7 days.  LFTsNo lab results found in last 7 days.   PANCNo lab results found in last 7 days.    IMAGING:  (Personally reviewed)    5/15/24: XR GASTROGRAFIN ESOPHAGRAM  FINDINGS:   Filling of contrast within the stomach pull-up, with stasis at the  pylorus. Roughly 50% of the contrast continued into the small bowel  after 5 minutes. Roughly 70% of the contrast into the small bowel  after 10 minutes. No evidence of reflux. No evidence of leak.                                                                     IMPRESSION: Stasis of contrast at the pylorus with roughly 75% of  contrast passed into the small bowel after 10 minutes.    5/12/2024: XR ABDOMEN PORT 1 VIEW   Impression: Enteric tube retracted 3 to 4 cm since earlier same day  chest radiograph, terminating at the expected location of the  intrathoracic stomach    5/4/2024: CT CHEST ABDOMEN PELVIS W/O CONTRAST   IMPRESSION:   1. Increased diffuse nodular and consolidative opacities with  increasing cavitation, concerning for worsening infectious process.  Recommend  follow-up to assess for resolution.  2. Postsurgical changes of esophagogastrectomy. There is fluid-filled  distention of the gastric pull-through, not substantially changed  compared to 4/28/2024 with abrupt caliber change the level of the  diaphragmatic hiatus with complete obstruction. There is a small  amount of contrast present within the distal bowel, which likely is  from a prior oral contrast administration.  3. Small bilateral pleural effusions.        =======================================================================

## 2024-05-15 NOTE — PLAN OF CARE
"/77 (Cuff Size: Adult Regular)   Pulse 87   Temp 98  F (36.7  C) (Oral)   Resp 14   Ht 1.727 m (5' 8\")   Wt 61.1 kg (134 lb 12.8 oz)   SpO2 93%   BMI 20.50 kg/m      Goal Outcome Evaluation:      Plan of Care Reviewed With: patient    Overall Patient Progress: no changeOverall Patient Progress: no change    Outcome Evaluation: Pt is A/O x4; able to make needs known. NGT to low intermittent suction; JTube for continuous feeding and meds. New NPO order for EGD w/ possible pyloric stent placement today. TF shut off at 1320. 1x tylenol given for 5/10 sore throat. 1x ativan given for anxiety. Pt already picked up to preop for his procedure this afternoon.      "

## 2024-05-15 NOTE — PROGRESS NOTES
"Thoracic Surgery  [facility]    Subjective:  No acute events overnight.  Reports no reflux or emesis.  Still with loose stools.  Eager for clinical progression when appropriate. Understands possible plan pathways of GI intervention vs conservative management.    Objective  /75 (BP Location: Right arm)   Pulse 87   Temp 97.7  F (36.5  C) (Oral)   Resp 18   Ht 1.727 m (5' 8\")   Wt 61.1 kg (134 lb 12.8 oz)   SpO2 93%   BMI 20.50 kg/m        General: Alert, resting comfortably in NAD. Cooperative. NG tube in place with clear light greenish output in gravity bag.  Pulm: NLB, no tachypnea/dyspnea, no wheezing  CV: non-cyanotic, no significant LE edema.   GI/ABD: Soft, non-distended, non-tender to palpation, no guarding or rebound.  Skin: Warm and well perfused    ~150 mL NG tube output yesterday    A/P:  68M with SCC of esophagus who is s/p laparoscopic and right thoracoscopic gastroesophagectomy on 4/19 with Dr. Hill. Monitored in SICU overnight POD0 to 1, did very well and transferred to [facility] on 4/20. Esophagram on 4/25 was negative for leak. Concern for possible aspiration with clear liquids led to acute respiratory decline on 4/28 requiring re-intubation and admission to the SICU. He returned to OR for EGD and bronchoscopy where a fair amount of fluid was suctioned from the esophagus, the tissue appeared healthy with no evidence of leak. On 4/29 and repeat EGD with pyloric botox injection was performed, after which he was extubated. Cares have continued to progress however aspiration again became a concern. Upper GI 5/3 with no passage of contrast below the diaphragmatic hiatus. Underwent flexible bronchoscopy with bronchial lavage, EGD with pyloric balloon dilation (21 cm), and fluoroscopic NG placement on 5/5. TF held 5/5 for concern for aspiration but restarted 5/6 with methylene blue. Currently tolerating TF.    - Possible plan pathways of GI endoscopic intervention vs " conservative management.  Final plan pending discussion between thoracic and GI services  - Continue NG to gravity.  - Levaquin (5/5 - present), can discontinue today  - Continue tube feeding at goal rate. Cycled for 16 hrs.  - NPO with ice chips  - Continue speech evaluations  - Continue Reglan, added erythromycin 5/13  - Multimodal pain control.  - CXR every other day, aspiration precautions  - Wean oxygen as tolerated  - Continue to hold PTA plavix, continue lovenox     Seen and d/w YAZMIN SheffieldC

## 2024-05-15 NOTE — ANESTHESIA POSTPROCEDURE EVALUATION
Patient: Lopez Hogue    Procedure: Procedure(s):  ESOPHAGOGASTRODUODENOSCOPY, WITH TRANSENDOSCOPIC STENT INSERTION (PYLORIC STENT PLACEMENT)       Anesthesia Type:  General    Note:  Disposition: Outpatient   Postop Pain Control: Uneventful            Sign Out: Well controlled pain   PONV: No   Neuro/Psych: Uneventful            Sign Out: Acceptable/Baseline neuro status   Airway/Respiratory: Uneventful            Sign Out: Acceptable/Baseline resp. status   CV/Hemodynamics: Uneventful            Sign Out: Acceptable CV status; No obvious hypovolemia; No obvious fluid overload   Other NRE: NONE   DID A NON-ROUTINE EVENT OCCUR? No    Event details/Postop Comments:  No complications.           Last vitals:  Vitals Value Taken Time   BP     Temp     Pulse     Resp     SpO2         Electronically Signed By: Dennis Campa MD  May 15, 2024  4:24 PM

## 2024-05-15 NOTE — OR NURSING
Dr. Campa and Dr. Monahan notified that patients tube feeds were shut off around 2486-6911. Dr. Campa and Dr. Monahan to discuss procedure.    [Negative] : Psychiatric [Fever] : no fever [Chills] : no chills [Feeling Poorly] : not feeling poorly [Feeling Tired] : not feeling tired [Recent Weight Gain (___ Lbs)] : no recent weight gain [Chest Pain] : no chest pain [Palpitations] : no palpitations [Recent Weight Loss (___ Lbs)] : no recent weight loss [Lower Ext Edema] : no extremity edema [Wheezing] : no wheezing [Shortness Of Breath] : no shortness of breath [Cough] : no cough [SOB on Exertion] : no shortness of breath during exertion [Orthopnea] : no orthopnea [Vomiting] : no vomiting [Constipation] : no constipation [Abdominal Pain] : no abdominal pain [Confused] : no confusion [Dizziness] : no dizziness [Fainting] : no fainting [Diarrhea] : no diarrhea [Limb Weakness] : no limb weakness [Difficulty Walking] : no difficulty walking [FreeTextEntry8] : f

## 2024-05-16 ENCOUNTER — APPOINTMENT (OUTPATIENT)
Dept: GENERAL RADIOLOGY | Facility: CLINIC | Age: 69
DRG: 326 | End: 2024-05-16
Payer: COMMERCIAL

## 2024-05-16 LAB
ANION GAP SERPL CALCULATED.3IONS-SCNC: 11 MMOL/L (ref 7–15)
BUN SERPL-MCNC: 23.5 MG/DL (ref 8–23)
CALCIUM SERPL-MCNC: 9.4 MG/DL (ref 8.8–10.2)
CHLORIDE SERPL-SCNC: 100 MMOL/L (ref 98–107)
CREAT SERPL-MCNC: 0.64 MG/DL (ref 0.67–1.17)
DEPRECATED HCO3 PLAS-SCNC: 26 MMOL/L (ref 22–29)
EGFRCR SERPLBLD CKD-EPI 2021: >90 ML/MIN/1.73M2
ERYTHROCYTE [DISTWIDTH] IN BLOOD BY AUTOMATED COUNT: 14.5 % (ref 10–15)
GLUCOSE SERPL-MCNC: 141 MG/DL (ref 70–99)
HCT VFR BLD AUTO: 40.4 % (ref 40–53)
HGB BLD-MCNC: 12.4 G/DL (ref 13.3–17.7)
MAGNESIUM SERPL-MCNC: 2.1 MG/DL (ref 1.7–2.3)
MCH RBC QN AUTO: 32.1 PG (ref 26.5–33)
MCHC RBC AUTO-ENTMCNC: 30.7 G/DL (ref 31.5–36.5)
MCV RBC AUTO: 105 FL (ref 78–100)
PHOSPHATE SERPL-MCNC: 3.4 MG/DL (ref 2.5–4.5)
PLATELET # BLD AUTO: 261 10E3/UL (ref 150–450)
POTASSIUM SERPL-SCNC: 4.2 MMOL/L (ref 3.4–5.3)
RBC # BLD AUTO: 3.86 10E6/UL (ref 4.4–5.9)
SODIUM SERPL-SCNC: 137 MMOL/L (ref 135–145)
UPPER GI ENDOSCOPY: NORMAL
WBC # BLD AUTO: 7 10E3/UL (ref 4–11)

## 2024-05-16 PROCEDURE — 250N000013 HC RX MED GY IP 250 OP 250 PS 637: Performed by: INTERNAL MEDICINE

## 2024-05-16 PROCEDURE — 250N000011 HC RX IP 250 OP 636: Performed by: INTERNAL MEDICINE

## 2024-05-16 PROCEDURE — 83735 ASSAY OF MAGNESIUM: CPT | Performed by: INTERNAL MEDICINE

## 2024-05-16 PROCEDURE — 71045 X-RAY EXAM CHEST 1 VIEW: CPT

## 2024-05-16 PROCEDURE — 85027 COMPLETE CBC AUTOMATED: CPT | Performed by: INTERNAL MEDICINE

## 2024-05-16 PROCEDURE — 71045 X-RAY EXAM CHEST 1 VIEW: CPT | Mod: 26 | Performed by: STUDENT IN AN ORGANIZED HEALTH CARE EDUCATION/TRAINING PROGRAM

## 2024-05-16 PROCEDURE — 99233 SBSQ HOSP IP/OBS HIGH 50: CPT | Mod: 24 | Performed by: DIETITIAN, REGISTERED

## 2024-05-16 PROCEDURE — 84100 ASSAY OF PHOSPHORUS: CPT | Performed by: INTERNAL MEDICINE

## 2024-05-16 PROCEDURE — 250N000011 HC RX IP 250 OP 636: Mod: JZ | Performed by: INTERNAL MEDICINE

## 2024-05-16 PROCEDURE — 80048 BASIC METABOLIC PNL TOTAL CA: CPT | Performed by: INTERNAL MEDICINE

## 2024-05-16 PROCEDURE — 120N000002 HC R&B MED SURG/OB UMMC

## 2024-05-16 PROCEDURE — 36415 COLL VENOUS BLD VENIPUNCTURE: CPT | Performed by: INTERNAL MEDICINE

## 2024-05-16 RX ADMIN — FAMOTIDINE 20 MG: 20 TABLET ORAL at 20:10

## 2024-05-16 RX ADMIN — ERYTHROMYCIN ETHYLSUCCINATE 250 MG: 400 GRANULE, FOR SUSPENSION ORAL at 13:13

## 2024-05-16 RX ADMIN — LORAZEPAM 0.5 MG: 2 CONCENTRATE ORAL at 20:12

## 2024-05-16 RX ADMIN — ACETAMINOPHEN 975 MG: 325 TABLET, FILM COATED ORAL at 20:10

## 2024-05-16 RX ADMIN — METHOCARBAMOL 500 MG: 500 TABLET ORAL at 05:16

## 2024-05-16 RX ADMIN — GUAIFENESIN 200 MG: 100 SOLUTION ORAL at 20:11

## 2024-05-16 RX ADMIN — Medication 15 ML: at 08:11

## 2024-05-16 RX ADMIN — METOCLOPRAMIDE 5 MG: 5 INJECTION, SOLUTION INTRAMUSCULAR; INTRAVENOUS at 08:23

## 2024-05-16 RX ADMIN — ALBUTEROL SULFATE 4 PUFF: 90 AEROSOL, METERED RESPIRATORY (INHALATION) at 10:47

## 2024-05-16 RX ADMIN — ENOXAPARIN SODIUM 40 MG: 40 INJECTION SUBCUTANEOUS at 20:12

## 2024-05-16 RX ADMIN — METHOCARBAMOL 500 MG: 500 TABLET ORAL at 10:47

## 2024-05-16 RX ADMIN — GUAIFENESIN 200 MG: 100 SOLUTION ORAL at 08:11

## 2024-05-16 RX ADMIN — METHOCARBAMOL 500 MG: 500 TABLET ORAL at 21:41

## 2024-05-16 RX ADMIN — SERTRALINE HYDROCHLORIDE 100 MG: 100 TABLET ORAL at 08:11

## 2024-05-16 RX ADMIN — METOCLOPRAMIDE 5 MG: 5 INJECTION, SOLUTION INTRAMUSCULAR; INTRAVENOUS at 15:18

## 2024-05-16 RX ADMIN — GABAPENTIN 100 MG: 100 CAPSULE ORAL at 21:41

## 2024-05-16 RX ADMIN — Medication 3 MG: at 20:10

## 2024-05-16 RX ADMIN — LEVOFLOXACIN 500 MG: 5 INJECTION, SOLUTION INTRAVENOUS at 10:53

## 2024-05-16 RX ADMIN — METOCLOPRAMIDE 5 MG: 5 INJECTION, SOLUTION INTRAMUSCULAR; INTRAVENOUS at 20:07

## 2024-05-16 RX ADMIN — ERYTHROMYCIN ETHYLSUCCINATE 250 MG: 400 GRANULE, FOR SUSPENSION ORAL at 08:11

## 2024-05-16 RX ADMIN — FAMOTIDINE 20 MG: 20 TABLET ORAL at 08:12

## 2024-05-16 RX ADMIN — ACETAMINOPHEN 975 MG: 325 TABLET, FILM COATED ORAL at 05:16

## 2024-05-16 RX ADMIN — GUAIFENESIN 200 MG: 100 SOLUTION ORAL at 15:15

## 2024-05-16 RX ADMIN — GUAIFENESIN 200 MG: 100 SOLUTION ORAL at 02:28

## 2024-05-16 RX ADMIN — METHOCARBAMOL 500 MG: 500 TABLET ORAL at 15:14

## 2024-05-16 RX ADMIN — ERYTHROMYCIN ETHYLSUCCINATE 250 MG: 400 GRANULE, FOR SUSPENSION ORAL at 18:37

## 2024-05-16 RX ADMIN — ROSUVASTATIN CALCIUM 40 MG: 20 TABLET, FILM COATED ORAL at 08:11

## 2024-05-16 ASSESSMENT — ACTIVITIES OF DAILY LIVING (ADL)
ADLS_ACUITY_SCORE: 34
ADLS_ACUITY_SCORE: 35
ADLS_ACUITY_SCORE: 34
ADLS_ACUITY_SCORE: 35
ADLS_ACUITY_SCORE: 33
ADLS_ACUITY_SCORE: 35
ADLS_ACUITY_SCORE: 35
ADLS_ACUITY_SCORE: 34
ADLS_ACUITY_SCORE: 33
ADLS_ACUITY_SCORE: 34
ADLS_ACUITY_SCORE: 35
ADLS_ACUITY_SCORE: 35
ADLS_ACUITY_SCORE: 33
ADLS_ACUITY_SCORE: 34
ADLS_ACUITY_SCORE: 34
ADLS_ACUITY_SCORE: 35
ADLS_ACUITY_SCORE: 35
ADLS_ACUITY_SCORE: 34
ADLS_ACUITY_SCORE: 35

## 2024-05-16 NOTE — PROGRESS NOTES
Nutrition Brief - contacted by team to begin Cycle TFs today:   See RD note from yesterday for details:    Diet: NPO, NG tube in for suction  EN: has jejunostomy tube, infusing TF at 50 ml/hr continuous rate    Interventions:  Suggested cycle TFs initiation per following via Jejunostomy tube:      Day 1 (5/16): Start with 20 hour cycle regimen :   -> @ 4:00 pm today, Increase TFs rate to new goal @ 60 ml/hr and infuse x 20 hours ( rom 4:00 pm - to noon the next day).     Day 2 (5/17): If tolerated, begin 18 hour cycle regimen,   -> @ 4:00 pm, increase TF rate to new goal @ 65 ml/hr and infuse x 18 hour ( from 4:00 pm - 10:00 am )     Day 3: IF tolerated, begin 16 hour cycle regimen:   -> @ 4:00 pm, increase TF rate to final goal @ 75 ml/hr and infuse x 16 hour ( from 4:00 pm -- 08:00 am )    - Novasource renal ( this is a coconut / MCT free formula due to allergies) @ final goal of 75 ml/hr x 16 hour cycle: (1200 ml), 2400 kcal (39 kcal/kg), 109 g pro (1.8 g/kg), 220 g CHO, 860 ml free water, and 0 g fiber daily.         - Water flushes: 130 ml before and after each cycle regimen + 50 ml/hr while TF is infusing x 16 hour via feed and flush.     TF + FWF -> 860 ml from TFs + 260 ml (before and after cycle flushes) + 800 ml while TF is infusing (50 ml x 16 hour) -> 1920 ml free water flushes received.        Yuko Waggoner RD/LD  Unit 7C (9822-8904) and ([id number])  Monday-Friday: Vocera -> (7C clinical Dietitian),   Weekend/Holiday: Vocera -> (Weekend Clinical Dietitian)

## 2024-05-16 NOTE — PROGRESS NOTES
"Thoracic Surgery  [facility]    Subjective:  No acute events overnight. GI placed pyloric stent yesterday and advanced pt to clear liquids. Patient reported he was feeling well after the stent and was able to tolerate clear liquids. NG removed yesterday with GI.    Objective  /77 (BP Location: Right arm)   Pulse 95   Temp 98.2  F (36.8  C) (Oral)   Resp 22   Ht 1.727 m (5' 8\")   Wt 60.4 kg (133 lb 2.5 oz)   SpO2 92%   BMI 20.25 kg/m        General: Alert, resting comfortably in NAD. Cooperative.  Pulm: NLB, no tachypnea/dyspnea, no wheezing  CV: non-cyanotic, no significant LE edema.   GI/ABD: Soft, non-distended, non-tender to palpation, no guarding or rebound.  Skin: Warm and well perfused    A/P:  68M with SCC of esophagus who is s/p laparoscopic and right thoracoscopic gastroesophagectomy on 4/19 with Dr. Hill. Monitored in SICU overnight POD0 to 1, did very well and transferred to [facility] on 4/20. Esophagram on 4/25 was negative for leak. Concern for possible aspiration with clear liquids led to acute respiratory decline on 4/28 requiring re-intubation and admission to the SICU. He returned to OR for EGD and bronchoscopy where a fair amount of fluid was suctioned from the esophagus, the tissue appeared healthy with no evidence of leak. On 4/29 and repeat EGD with pyloric botox injection was performed, after which he was extubated. Cares have continued to progress however aspiration again became a concern. Upper GI 5/3 with no passage of contrast below the diaphragmatic hiatus. Underwent flexible bronchoscopy with bronchial lavage, EGD with pyloric balloon dilation (21 cm), and fluoroscopic NG placement on 5/5. TF held 5/5 for concern for aspiration but restarted 5/6 with methylene blue. Currently tolerating TF. Pyloric stent placement with GI on 5/16.        - Continue tube feeding at goal rate. Discussed cycling with dietician.  - Advance to full liquid diet  - Continue " speech evaluations  - Continue Reglan, added erythromycin 5/13  - Multimodal pain control.  - CXR every other day, aspiration precautions  - Wean oxygen as tolerated  - Continue to hold PTA plavix, continue lovenox  - Possible discharge tomorrow     Seen with fellow, Dr. Garcia, who discussed with staff.    Sj Acosta MD  PGY1

## 2024-05-16 NOTE — DISCHARGE INSTRUCTIONS
"Gastroenterology Discharge Instructions:  -Recommend follow up for repeat Upper GI (UGI) or Esophagogastroduodenoscopy (EGD) procedure in 3 months to exchange the stent going across your stomach/pylorus.   -Outpatient GI scheduling team  / GI RN Care Coordinator should reach out to you to arrange and schedule the above EGD.  If you do not receive a call to schedule your recommended procedures/clinic follow up, please call [facility] phone: [phone], option 2 for endoscopy procedure scheduling.  -Work with your speech pathologist on exercises to strengthen your swallowing function and recommendations for most appropriate diet (textures and liquids).  -To avoid clogging of your pyloric stent, eat smaller meals more frequently throughout the day and chew your foods well.  Drink fluids after solids to help flush through your gastrointestinal stent.   -If you develop any of the following, please contact your GI RNCC (Khloe Colby) at [phone] or if after hours call [phone] to speak with a triage nurse:  Fevers over 101, +/- chills.  Significant nausea/vomiting causing inability to take in fluids depleting hydration.  Severe pain, ongoing symptoms (pain, nausea) that cannot be controlled with prescribed or over the counter medications.  Signs of dehydration (pale skin, low blood pressure, lightheadedness, dark urine, \"sticky\" skin when pinched, rapid heart rate, little to no urine output).     [facility]  [address] [place] [address]  Phone: [phone]  Fax: [phone]  Dietician: [phone]   Novasource renal, cycled [facility]  Home RN Visit planned  "

## 2024-05-16 NOTE — PLAN OF CARE
Goal Outcome Evaluation:      Plan of Care Reviewed With: patient    Overall Patient Progress: no changeOverall Patient Progress: no change    Outcome Evaluation: S/p EGD with stent placement this afternoon. Pt alert and oriented x4. Denied pain, N/V. Sats 88% RA, >90% 2L/nc. Encouraged cough and deep breathing and use of IS. Had clear liquids for dinner. No evidence of aspiration noted. Tube feeding at goal rate. Continue to monitor respiratory status.

## 2024-05-16 NOTE — PROGRESS NOTES
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 Hill.  Postop course c/b aspiration pneumonia and concern for delayed gastric emptying vs GOO for which AE GI team has been consulted to evaluate and consider EGD with possible pyloric stent placement.     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 Calderon and Dr. Hill)  4/28/2024: RRT called/Intubated for acute hypoxic resp failure.  S/p EGD and Bronch - Fluid filled dilated conduit, decompressed endoscopically. NGT placed. LLL mucus, suctioned/cultured.  4/29/2024: EGD, botox injection of pylorus.  Extubated.  5/3/2024: Upper GI - No passage of contrast below the intrathoracic stomach/diaphragmatic hiatus.   5/3/2024: VFSS - Aspiration with thin liquid which clears from the airway with  cough. Chin tuck maneuver effective at eliminating aspiration. Consistent laryngeal penetration with thin and mildly thick liquids with chin tuck maneuver. Acute on chronic pharyngeal dysphagia (r/t hx of XRT to the head/neck) and esophageal dysphagia (s/p esophagectomy)  5/4/2024: CT CAP (non-contrast) - increased diffuse nodular and consolidative  opacities with increasing cavitation, gastric pull-through is distended with fluid and layering retained oral contrasabrupt caliber change the level of the diaphragmatic hiatus with complete obstruction   5/5/2024: EGD with pyloric balloon dilation (21 cm)  5/14/2024: Esophagram -  Stasis of contrast at the pylorus with roughly 75% of contrast passed into the small bowel after 10 minutes.  5/16/2024: EGD (Dr. Monahan) - healthy esophago-gastric anastomosis and gastric conduit with reported tight/strictured pylorus (possibly from muscle itself vs diaphragmatic compression as traversed hiatus), placed Axios (20 mm) across pylorus.     Patient reports odynophagia and dysphagia to liquids (has not been allowed to trial solid diet this adm) with c/o PO sitting in chest with regurgitation/emesis and compromised resp status.  Denies improvement in sx despite above interventions nor prokinetic meds (on both erythromycin and reglan) and question if sustained injury to vagus nerve during procedure contributing to delayed gastric emptying vs conduit dysfunction (stricture at hiatus) causing GOO.  Given recent esophagectomy with botox injection and dilation, would not consider G-POEM at this time.  Proceeded for repeat EGD on 5/15 and placed Axios stent across a tight pylorus.  Post procedure tolerating CL thus far, pending SLP re-eval for most appropriate diet textures/liquid consistencies to minimize aspiration risk given hx of both pharyngeal and esophageal dysphagia.     #Severe protein-calorie malnutrition  #S/p J-tube placement (by Thoracic 4/1/2024)  NPO/minimal liquid diet the duration of this adm d/t above concerns for aspiration pneumonia and dysphagia.  Reliant on Jtube TF, only started this adm.  PTA supplementing diet with Ensure and PRO supplements.  Weight loss of 14% x 4 mos PTA.  Moderate fat/muscle wasting on exam (see below). RD and SLP following this adm.     RECOMMENDATIONS:  -SLP re-eval swallow and approp  to advance diet.  -Diet progression as follows:  -Liquid diet (CL/FL - thin vs thick liquids pending SLP recs) x1 day.  If tolerates, then  -Advance to mechanical soft diet x1-2 days.  If tolerates, then  -Advance to regular diet.  -Education completed (handout provided) with patient for stent diet: small frequent meals, chew foods well, drink fluids after solids to help flush solids through stent.  -TF via Jtube per RD, appreciate assistance.  -OK to resume AC (lovenox) from GI perspective.  -Discontinue erythromycin and reglan (previously ineffective, accurately evaluate stent intervention).  -Analgesia/Antiemetics per primary team.     Discussed with patient and Thoracic team (JOELLE Borrero).    The patient was discussed and plan agreed upon with GI staff, Dr. Giraldo and Dr. Monahan.    GI Follow up (we will arrange):  -EGD in OR in 3 months to exchange pyloric stent with Dr. Monahan  -GI follow up details and diet instructions entered into discharge navigator/instructions.  [Message sent to GI RNCC on 5/16/2024 with request to arrange above]      Overall time spent on the date of this encounter preparing to see the patient (including chart review of available notes, clinical status events, imaging and labs); obtaining interim history/subjective data; ordering and/or coordinating medications, tests or procedures; ordering medications, tests or procedures; communicating with other health care professionals; and documenting the above clinical information in the electronic medical record was 60 minutes.     Karli Redd PA-C, RD, [facility]  Inpatient Gastroenterology Service  [facility]  Pager: *[phone]  Text Page     _______________________________________________________________      Subjective: Nursing notes and 24hr events reviewed. NAEO.  Tolerated CL diet without overt e/o aspiration per RN notes.  TF restarted at goal via Jtube.      Patient seen and examined at 9:30. Patient reports  "\"feels like a new man!\" After procedure and removal of NGT.  Tolerated CL without c/o dysphagia, N/V.    ROS:   4 pt ROS negative unless noted in subjective.     Objective:  Blood pressure 123/86, pulse 96, temperature 98.1  F (36.7  C), temperature source Oral, resp. rate 20, height 1.727 m (5' 8\"), weight 60.4 kg (133 lb 2.5 oz), SpO2 96%.  General: Pleasant and smiling male in NAD.  Answers appropriately.    HEENT: Head is AT/NC. Sclera anicteric. No conjunctival injection.  Oropharynx is clear, moist and w/o exudate or lesions. No thrush. Good dentition.  Managing secretions without issues.  NGT removed.  Lungs: Non-labored breathing now on RA.   Abdomen: Soft, non-tender, non-distended.  No rebound or peritoneal signs  Extremities: WWP, no pedal edema.  MSK: bilateral moderate temporal, clavicular, UE/LE muscle wasting.  Moderate depression between ribs.  Skin: No jaundice or rash  Neurologic: Grossly non-focal.  CN 2-12 grossly intact.   Psych: mood appropriate to situation    LABS:  BMP  Recent Labs   Lab 05/15/24  1502 05/15/24  0608 05/14/24  0525 05/13/24  0548 05/12/24  0433   NA  --  137 138 137 140   POTASSIUM  --  4.2 4.3  4.3 4.3 4.2   CHLORIDE  --  100 101 101 101   RAFAELA  --  9.6 9.4 9.3 9.7   CO2  --  25 27 25 28   BUN  --  23.0 21.5 21.1 21.3   CR  --  0.64* 0.59* 0.62* 0.67   GLC 94 114* 116* 110* 95     CBC  Recent Labs   Lab 05/15/24  0608 05/14/24  0525 05/13/24  0548 05/12/24  0433   WBC 6.0 6.7 7.6 8.8   RBC 3.78* 3.54* 3.55* 3.78*   HGB 12.0* 11.2* 10.9* 11.7*   HCT 36.7* 33.7* 34.0* 36.1*   MCV 97 95 96 96   MCH 31.7 31.6 30.7 31.0   MCHC 32.7 33.2 32.1 32.4   RDW 14.5 14.3 14.1 14.3    271 300 379     INRNo lab results found in last 7 days.  LFTsNo lab results found in last 7 days.   PANCNo lab results found in last 7 days.      IMAGING:  (Personally reviewed)    5/15/24: XR GASTROGRAFIN ESOPHAGRAM  FINDINGS:   Filling of contrast within the stomach pull-up, with stasis at " the  pylorus. Roughly 50% of the contrast continued into the small bowel  after 5 minutes. Roughly 70% of the contrast into the small bowel  after 10 minutes. No evidence of reflux. No evidence of leak.                                                                     IMPRESSION: Stasis of contrast at the pylorus with roughly 75% of  contrast passed into the small bowel after 10 minutes.     5/12/2024: XR ABDOMEN PORT 1 VIEW   Impression: Enteric tube retracted 3 to 4 cm since earlier same day  chest radiograph, terminating at the expected location of the  intrathoracic stomach     5/4/2024: CT CHEST ABDOMEN PELVIS W/O CONTRAST   IMPRESSION:   1. Increased diffuse nodular and consolidative opacities with  increasing cavitation, concerning for worsening infectious process.  Recommend follow-up to assess for resolution.  2. Postsurgical changes of esophagogastrectomy. There is fluid-filled  distention of the gastric pull-through, not substantially changed  compared to 4/28/2024 with abrupt caliber change the level of the  diaphragmatic hiatus with complete obstruction. There is a small  amount of contrast present within the distal bowel, which likely is  from a prior oral contrast administration.  3. Small bilateral pleural effusions.

## 2024-05-16 NOTE — PROVIDER NOTIFICATION
Provider notified (name):RADHA TIERNEY [ Msg Id [id number] ]  Reason for notification: s/p EGD with stent placement in OR today. Give Lovenox or hold dose?   Recommendation/request given to provider:  Response from provider: Provider returned page via [place] [employer]: Fine to give Lovenox tonight

## 2024-05-16 NOTE — PLAN OF CARE
"Cares from: 1502-1008     V/S & pain: vitally stable, no complain of pain  Neuro: alert, orientedx4  Respiratory:on 2L NC, denies sob  Skin: Lap sites all over the  abdomen and old chest tube site x2, bruises, blanchable redness on sacrum  GI/: J Tube, provided with urinal  Nutrition: clear liquids, with ongoing tube feeding at goal rate of 50 ml/hr  Lines/drains: LPIV saline locked  Activity: SBA     Events this shift: lying in bed on high fowlers position.call light w/in reach and both side rails up. Slept in between care. Rounds done. Linens changed.  Pt. requested for brief. Pt. Stated \" I have fecal incontinence after having that procedure (stent placement?)yesterday\"   will continue to monitor.       Plan:  CXR                 "

## 2024-05-16 NOTE — PLAN OF CARE
Goal Outcome Evaluation:      Plan of Care Reviewed With: patient, spouse    Overall Patient Progress: improvingOverall Patient Progress: improving     Pt Alert and cooperative during shift, VVS on RA, denies pain. Pt seen by thoracic team, diet advanced to full liquid diet and pt tolerating it with good appetite. Tube feed cycle started @4pm @60ml/hr, tf to run for 20 hours at current rate through J tube. Pt tolerating meds through J-tube. LPIV saline locked  between abx. Pt independent,ambulated in hallways. Continent to bowel and bladder, reported diarrhea, refused laxatives this AM.Pt K, Mag, Phos stable this shift, no replacement needed. Plan to discharge in next couple of days following diet and TF advancement.

## 2024-05-17 ENCOUNTER — APPOINTMENT (OUTPATIENT)
Dept: SPEECH THERAPY | Facility: CLINIC | Age: 69
DRG: 326 | End: 2024-05-17
Attending: THORACIC SURGERY (CARDIOTHORACIC VASCULAR SURGERY)
Payer: COMMERCIAL

## 2024-05-17 VITALS
HEIGHT: 68 IN | BODY MASS INDEX: 20.18 KG/M2 | OXYGEN SATURATION: 93 % | HEART RATE: 84 BPM | TEMPERATURE: 98.1 F | WEIGHT: 133.16 LBS | RESPIRATION RATE: 18 BRPM | DIASTOLIC BLOOD PRESSURE: 63 MMHG | SYSTOLIC BLOOD PRESSURE: 106 MMHG

## 2024-05-17 LAB
ANION GAP SERPL CALCULATED.3IONS-SCNC: 11 MMOL/L (ref 7–15)
BUN SERPL-MCNC: 21.8 MG/DL (ref 8–23)
CALCIUM SERPL-MCNC: 9 MG/DL (ref 8.8–10.2)
CHLORIDE SERPL-SCNC: 101 MMOL/L (ref 98–107)
CREAT SERPL-MCNC: 0.61 MG/DL (ref 0.67–1.17)
DEPRECATED HCO3 PLAS-SCNC: 26 MMOL/L (ref 22–29)
EGFRCR SERPLBLD CKD-EPI 2021: >90 ML/MIN/1.73M2
ERYTHROCYTE [DISTWIDTH] IN BLOOD BY AUTOMATED COUNT: 14.6 % (ref 10–15)
GLUCOSE SERPL-MCNC: 110 MG/DL (ref 70–99)
HCT VFR BLD AUTO: 33.3 % (ref 40–53)
HGB BLD-MCNC: 11.2 G/DL (ref 13.3–17.7)
MAGNESIUM SERPL-MCNC: 1.9 MG/DL (ref 1.7–2.3)
MCH RBC QN AUTO: 31.7 PG (ref 26.5–33)
MCHC RBC AUTO-ENTMCNC: 33.6 G/DL (ref 31.5–36.5)
MCV RBC AUTO: 94 FL (ref 78–100)
PHOSPHATE SERPL-MCNC: 3.6 MG/DL (ref 2.5–4.5)
PLATELET # BLD AUTO: 257 10E3/UL (ref 150–450)
POTASSIUM SERPL-SCNC: 3.7 MMOL/L (ref 3.4–5.3)
RBC # BLD AUTO: 3.53 10E6/UL (ref 4.4–5.9)
SODIUM SERPL-SCNC: 138 MMOL/L (ref 135–145)
WBC # BLD AUTO: 7 10E3/UL (ref 4–11)

## 2024-05-17 PROCEDURE — 250N000013 HC RX MED GY IP 250 OP 250 PS 637: Performed by: INTERNAL MEDICINE

## 2024-05-17 PROCEDURE — 80048 BASIC METABOLIC PNL TOTAL CA: CPT | Performed by: INTERNAL MEDICINE

## 2024-05-17 PROCEDURE — 83735 ASSAY OF MAGNESIUM: CPT | Performed by: THORACIC SURGERY (CARDIOTHORACIC VASCULAR SURGERY)

## 2024-05-17 PROCEDURE — 99232 SBSQ HOSP IP/OBS MODERATE 35: CPT | Mod: 24 | Performed by: DIETITIAN, REGISTERED

## 2024-05-17 PROCEDURE — 85027 COMPLETE CBC AUTOMATED: CPT | Performed by: INTERNAL MEDICINE

## 2024-05-17 PROCEDURE — 250N000011 HC RX IP 250 OP 636: Mod: JZ | Performed by: INTERNAL MEDICINE

## 2024-05-17 PROCEDURE — 36415 COLL VENOUS BLD VENIPUNCTURE: CPT | Performed by: INTERNAL MEDICINE

## 2024-05-17 PROCEDURE — 92526 ORAL FUNCTION THERAPY: CPT | Mod: GN

## 2024-05-17 PROCEDURE — 84100 ASSAY OF PHOSPHORUS: CPT | Performed by: THORACIC SURGERY (CARDIOTHORACIC VASCULAR SURGERY)

## 2024-05-17 RX ORDER — GABAPENTIN 250 MG/5ML
100 SOLUTION ORAL 3 TIMES DAILY
Qty: 180 ML | Refills: 1 | Status: SHIPPED | OUTPATIENT
Start: 2024-05-17 | End: 2024-05-17

## 2024-05-17 RX ORDER — ENOXAPARIN SODIUM 100 MG/ML
40 INJECTION SUBCUTANEOUS DAILY
Qty: 13.2 ML | Refills: 0 | Status: SHIPPED | OUTPATIENT
Start: 2024-05-17 | End: 2024-05-17

## 2024-05-17 RX ORDER — GABAPENTIN 250 MG/5ML
100 SOLUTION ORAL 3 TIMES DAILY
Qty: 180 ML | Refills: 1 | Status: SHIPPED | OUTPATIENT
Start: 2024-05-17 | End: 2024-07-23

## 2024-05-17 RX ORDER — ENOXAPARIN SODIUM 100 MG/ML
40 INJECTION SUBCUTANEOUS DAILY
Qty: 8.8 ML | Refills: 0 | Status: SHIPPED | OUTPATIENT
Start: 2024-05-17 | End: 2024-06-08

## 2024-05-17 RX ORDER — METHOCARBAMOL 500 MG/1
500 TABLET, FILM COATED ORAL EVERY 6 HOURS PRN
Qty: 30 TABLET | Refills: 0 | Status: SHIPPED | OUTPATIENT
Start: 2024-05-17 | End: 2024-07-23

## 2024-05-17 RX ORDER — GUAIFENESIN 600 MG/1
15 TABLET, EXTENDED RELEASE ORAL DAILY
Qty: 237 ML | Refills: 1 | Status: SHIPPED | OUTPATIENT
Start: 2024-05-17 | End: 2024-07-23

## 2024-05-17 RX ORDER — GABAPENTIN 100 MG/1
100 CAPSULE ORAL 3 TIMES DAILY
Qty: 90 CAPSULE | Refills: 1 | Status: SHIPPED | OUTPATIENT
Start: 2024-05-17 | End: 2024-05-17

## 2024-05-17 RX ADMIN — Medication 15 ML: at 07:41

## 2024-05-17 RX ADMIN — METHOCARBAMOL 500 MG: 500 TABLET ORAL at 10:05

## 2024-05-17 RX ADMIN — ROSUVASTATIN CALCIUM 40 MG: 20 TABLET, FILM COATED ORAL at 07:41

## 2024-05-17 RX ADMIN — POLYETHYLENE GLYCOL 3350 17 G: 17 POWDER, FOR SOLUTION ORAL at 07:44

## 2024-05-17 RX ADMIN — GUAIFENESIN 200 MG: 100 SOLUTION ORAL at 01:46

## 2024-05-17 RX ADMIN — GUAIFENESIN 200 MG: 100 SOLUTION ORAL at 07:41

## 2024-05-17 RX ADMIN — METOCLOPRAMIDE 5 MG: 5 INJECTION, SOLUTION INTRAMUSCULAR; INTRAVENOUS at 07:52

## 2024-05-17 RX ADMIN — SERTRALINE HYDROCHLORIDE 100 MG: 100 TABLET ORAL at 07:41

## 2024-05-17 RX ADMIN — METHOCARBAMOL 500 MG: 500 TABLET ORAL at 04:12

## 2024-05-17 RX ADMIN — FAMOTIDINE 20 MG: 20 TABLET ORAL at 07:41

## 2024-05-17 RX ADMIN — ACETAMINOPHEN 975 MG: 325 TABLET, FILM COATED ORAL at 05:52

## 2024-05-17 ASSESSMENT — ACTIVITIES OF DAILY LIVING (ADL)
ADLS_ACUITY_SCORE: 32
ADLS_ACUITY_SCORE: 33
ADLS_ACUITY_SCORE: 32
ADLS_ACUITY_SCORE: 33
ADLS_ACUITY_SCORE: 32

## 2024-05-17 NOTE — DISCHARGE SUMMARY
NAME: Lopez Hogue   MRN: [id number]   : 1955     DATE OF ADMISSION: 2024     PRE/POSTOPERATIVE DIAGNOSES: Esophageal cancer    PROCEDURES PERFORMED:  Laparoscopic gastric mobilization  Right VATS esophageal mobilization, lymph node dissection  Esophagogastrectomy  Left chest tube insertion  Fiberoptic bronchoscopy  Esophagoscopy  Esophagogastroduodenoscopy with transendoscopic stent insertion (Pyloric stent placement)    PATHOLOGY RESULTS:   Esophagogastric wall with no evidence of residual carcinoma:   -Complete response in squamous cell carcinoma s/p neoadjuvant chemoradiation   -Also no metastasis to any of fifteen (total with part A) sampled lymph nodes (0 /15)       Stomach:    -No inflammation, intestinal metaplasia or dysplasia   -No H. Pylori like organisms identified on routine staining   -No Alfredo features present at the GE junction    Esophagus: no dysplasia or other background abnormality      CULTURE RESULTS: None     INTRAOPERATIVE COMPLICATIONS: None     POSTOPERATIVE MEDICAL ISSUES: Delayed gastric emptying (managed with stent placement)    DRAINS/TUBES PRESENT AT DISCHARGE: J tube    DATE OF DISCHARGE:  24    HOSPITAL COURSE: Lopez Hogue is a 68 year old male who on 2024  underwent the above-named procedures.  He tolerated the operation well and postoperatively was transferred to the general post-surgical unit.  The remainder of his course was essentially uncomplicated.  Prior to discharge, his pain was controlled well, he was able to perform ADLs and ambulate independently without difficulty, and had full return of bowel and bladder function.  On 24, he was discharged to home in stable condition with jejunostomy tube in place.    DISCHARGE EXAM:   A&O, NAD  Resp non-labored  Distal extremities warm    Incisions clean and dry     DISCHARGE INSTRUCTIONS:  Discharge Procedure Orders   XR Chest 2 Views   Standing Status: Future Standing Exp. Date: 25      Order Specific Question Answer Comments   Priority Routine      Home Infusion Referral   Referral Priority: Routine: Next available opening Referral Type: Consultation   Number of Visits Requested: 1     Home Infusion Referral   Referral Priority: Routine: Next available opening Referral Type: Consultation   Number of Visits Requested: 1     Reason for your hospital stay   Order Comments: Surgery     Activity   Order Comments: Your activity upon discharge: activity as tolerated     Order Specific Question Answer Comments   Is discharge order? Yes      Tubes and drains   Order Comments: You are going home with the following tubes or drains: feeding tube J-Tube.   Follow these instructions to care for your tube    J TUBE INSTRUCTIONS:    IF GAUZE IS NECESSARY AROUND YOUR FEEDING TUBE, CHANGE IT EVERY DAY OR EVERY OTHER DAY.      Flush your feeding tube with 10cc of water;  1. After medication administration through the feeding tube  2. After tube feeds  3. Every 4 hours while awake if you have not used the tube during that time     Discharge Instructions   Order Comments: THORACIC SURGERY DISCHARGE INSTRUCTIONS    DIET: Mechanical soft diet in addition to Novasource tube feedings at 60mL/hour x 16 hours a day.    If your plans upon discharge include prolonged periods of sitting (i.e a lengthy car or plane ride), it is highly recommended to get up and walk at least once per hour to help prevent swelling and blood clots.     You may get incision wet 2 days after operation. Do not submerge, soak, or scrub incision or swim until seen in follow-up or after two weeks.  Do not submerge your feeding tube site.     Activity as tolerated, no strenous activity until seen in follow-up, no lifting greater than 20 pounds for the next 2 weeks.    Stay hydrated. Take over the counter fiber (metamucil or benefiber) and stool softeners (Miralax, docusate or senna).    Call for fever greater than 101.5, chills, increased size of  "incision, red skin around incision, vision changes, muscle strength changes, sensation changes, shortness of breath, or other concerns.      In emergencies, call 911     For other Questions or Concerns;   A.) During weekday working hours (Monday through Friday 8am to 4:30pm)   call [phone] and ask to speak to a thoracic surgery nurse (RN or LPN).     B.) At nights (after 4:30pm), on weekends, or if urgent call [phone] and   tell the  \"I would like to page job code [phone], the thoracic surgery   fellow on call, please.\"     Follow Up ([facility])   Order Comments: 1. Follow up with Madi Ramos in 1-2 weeks.  2. Follow up with Dr. Devin Hill in 2 weeks with a chest x-ray prior  3. Follow up with Dr. Monahan with an EGD in OR in 3 months to exchange pyloric stent (GI will arrange)      Appointments on [place] and/or [facility] (with [facility] or [facility] provider or service). Call [phone] if you haven't heard regarding these appointments within 7 days of discharge.     Discharge Instructions   Order Comments: May return to work without restrictions starting May 28,2024.     Adult Formula Drip Feeding   Order Comments: Novasource renal ( this is a coconut / MCT free formula due to allergies) @ final goal of 60 ml/hr x 16 hour cycle:     Diet   Order Comments: Follow this diet upon discharge: Soft mechanical diet  Adult Formula - Drip Feeding: Novasource Renal (or equivalent);   Access: Jejunostomy; Goal Rate: 60 mL/hr times 16 hours a day     Order Specific Question Answer Comments   Is discharge order? Yes      Diet   Order Comments: Follow this diet upon discharge: Orders Placed This Encounter      Adult Formula Drip Feeding: Specified Time Novasource Renal; Jejunostomy;  @ 60 ml/hr x 16 hours a day      Adult Formula Drip Feeding      Mechanical/Dental Soft Diet      Diet     Order Specific Question Answer Comments   Is discharge order? Yes        DISCHARGE MEDICATIONS:   Current " Discharge Medication List        START taking these medications    Details   enoxaparin ANTICOAGULANT (LOVENOX ANTICOAGULANT) 60 MG/0.6ML syringe Inject 0.4 mLs (40 mg) Subcutaneous daily for 22 days  Qty: 13.2 mL, Refills: 0    Associated Diagnoses: Primary esophageal squamous cell carcinoma (H)      gabapentin (NEURONTIN) 100 MG capsule 1 capsule (100 mg) by Per J Tube route 3 times daily  Qty: 90 capsule, Refills: 1    Associated Diagnoses: Primary esophageal squamous cell carcinoma (H)      multivitamins w/minerals liquid 15 mLs by Per J Tube route daily  Qty: 237 mL, Refills: 1    Associated Diagnoses: Primary esophageal squamous cell carcinoma (H)           CONTINUE these medications which have CHANGED    Details   methocarbamol (ROBAXIN) 500 MG tablet 1 tablet (500 mg) by Per J Tube route every 6 hours as needed for muscle spasms  Qty: 30 tablet, Refills: 0    Associated Diagnoses: Acute post-operative pain           CONTINUE these medications which have NOT CHANGED    Details   acetaminophen (TYLENOL) 500 MG tablet Take 500-1,000 mg by mouth every 8 hours as needed for fever or pain      nitroGLYcerin (NITROSTAT) 0.4 MG sublingual tablet PLACE 1 TABLET UNDER THE TONGUE EVERY 5 MINUTES FOR CHEST PAIN FOR 3 DOSES. IF SYMPTOMS PERSIST 5 MINUTES AFTER 1ST DOSE CALL 911.  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)      rosuvastatin (CRESTOR) 40 MG tablet TAKE 1 TABLET(40 MG) BY MOUTH DAILY  Qty: 90 tablet, Refills: 1    Associated Diagnoses: Unstable angina (H)      senna-docusate (SENOKOT-S/PERICOLACE) 8.6-50 MG tablet Take 1 tablet by mouth 2 times daily as needed for constipation  Qty: 30 tablet, Refills: 0    Associated Diagnoses: Acute post-operative pain      sertraline (ZOLOFT) 100 MG tablet Take 100 mg by mouth every morning      sodium fluoride dental gel (PREVIDENT) 1.1 % GEL topical gel  daily           STOP taking these medications       clopidogrel (PLAVIX) 75 MG tablet Comments:   Reason for Stopping:         famotidine (PEPCID) 20 MG tablet Comments:   Reason for Stopping:         polyethylene glycol (MIRALAX) 17 g packet Comments:   Reason for Stopping:

## 2024-05-17 NOTE — PROGRESS NOTES
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 Hill.  Postop course c/b aspiration pneumonia and concern for delayed gastric emptying vs GOO for which AE GI team has been consulted to evaluate and consider EGD with possible pyloric stent placement.     #Esophageal cancer, s/p Ord Elbert esophagectomy  #Concern for GOO due to pyloric stricture in hiatus  #Pharyngeal and esophageal dysphagia  #Aspiration pneumonia  Admitted since 4/19/24 for above esophagectomy with postop course c/b aspiration pneumonia and the following procedures/studies to work up/evaluate for possible GOO vs delayed emptying:     Procedures & Studies:  4/19/2024: S/p Minimally invasive Ord Elbert esophagectomy (Dr. Calderon and Dr. Hill)  4/28/2024: RRT called/Intubated for acute hypoxic resp failure.  S/p EGD and Bronch - Fluid filled dilated conduit, decompressed endoscopically. NGT placed. LLL mucus, suctioned/cultured.  4/29/2024: EGD, botox injection of pylorus.  Extubated.  5/3/2024: Upper GI - No passage of contrast below the intrathoracic stomach/diaphragmatic hiatus.   5/3/2024: VFSS - Aspiration with thin liquid which clears from the airway with  cough. Chin tuck maneuver effective at eliminating aspiration. Consistent laryngeal penetration with thin and mildly thick liquids with chin tuck maneuver. Acute on chronic pharyngeal dysphagia (r/t hx of XRT to the head/neck) and esophageal dysphagia (s/p esophagectomy)  5/4/2024: CT CAP (non-contrast) - increased diffuse nodular and  consolidative opacities with increasing cavitation, gastric pull-through is distended with fluid and layering retained oral contrasabrupt caliber change the level of the diaphragmatic hiatus with complete obstruction   5/5/2024: EGD with pyloric balloon dilation (21 cm)  5/14/2024: Esophagram -  Stasis of contrast at the pylorus with roughly 75% of contrast passed into the small bowel after 10 minutes.  5/16/2024: EGD (Dr. Monahan) - healthy esophago-gastric anastomosis and gastric conduit with reported stenotic/narrow pylorus (suspect either from vagal injury induced gastroparesis related pylorospasm versus   extrinsic obstruction as the pylorus/duodenum traverses the diaphragmatic hiatus ), placed Axios (20 mm) across pylorus.     Patient reports odynophagia and dysphagia to liquids (has not been allowed to trial solid diet this adm) with c/o PO sitting in chest with regurgitation/emesis and compromised resp status.  Denies improvement in sx despite above interventions nor prokinetic meds (on both erythromycin and reglan) and question if sustained injury to vagus nerve during procedure contributing to delayed gastric emptying vs conduit dysfunction (stricture at hiatus) causing GOO.   Proceeded for repeat EGD on 5/15 and placed Axios stent across a stenotic/narrow pylorus.  Given recent esophagectomy with botox injection and dilation, did not consider G-POEM at this time (could consider at later date if recurrent sx after attempt to dilate with stenting). Post procedure tolerating CL/FL diet with improved sx.     #Severe protein-calorie malnutrition  #S/p J-tube placement (by Thoracic 4/1/2024)  NPO/minimal liquid diet the duration of this adm d/t above concerns for aspiration pneumonia and dysphagia.  Reliant on Jtube TF, only started this adm.  PTA supplementing diet with Ensure and PRO supplements.  Weight loss of 14% x 4 mos PTA.  Moderate fat/muscle wasting on exam (see below). RD and SLP following this adm.      RECOMMENDATIONS:  -Diet progression as follows:  -Advance to mechanical soft diet x1-2 days.  If tolerates, then  -Advance to regular diet.  -Education completed (handout provided) with patient for stent diet: small frequent meals, chew foods well, drink fluids after solids to help flush solids through stent.  -TF via Jtube per RD, appreciate assistance.  -SLP therapy in OP.  -OK to resume AC (lovenox) from GI perspective.  -Discontinued erythromycin and reglan (previously ineffective, accurately evaluate stent intervention).  -Analgesia/Antiemetics per primary team.  -Appropriate for discharge from GI perspective.     Discussed with patient and Thoracic team (JOELLE Borrero).    The patient was discussed and plan agreed upon with GI staff, Dr. Giraldo.    GI Follow up (we will arrange):  -EGD in OR in 3 months to exchange pyloric stent with Dr. Monahan  -GI follow up details and diet instructions entered into discharge navigator/instructions.  [Message sent to GI RNCC on 5/16/2024 with request to arrange above]      The inpatient gastroenterology service will sign off at this time. Thank you for allowing us to participate in the care of this patient. Please do not hesitate to page the GI service with any questions or concerns.      Overall time spent on the date of this encounter preparing to see the patient (including chart review of available notes, clinical status events, imaging and labs); obtaining interim history/subjective data; ordering and/or coordinating medications, tests or procedures; ordering medications, tests or procedures; communicating with other health care professionals; and documenting the above clinical information in the electronic medical record was 45 minutes.     Karli Redd PA-C, RD, [facility]  Inpatient Gastroenterology Service  [facility]  Pager: *[phone]  Text Page     _______________________________________________________________      Subjective: Nursing notes  "and 24hr events reviewed. NAEO.  Tolerated FL diet without overt e/o aspiration and no N/V per RN notes.      Patient seen and examined at 09:30.  Reports tolerated CL/FL diet (broth, ice cream) yesterday without worsening abd pain, N/V.  Ordered eggs this AM but not yet trialed any solids.      ROS:   4 pt ROS negative unless noted in subjective.     Objective:  Blood pressure 106/63, pulse 84, temperature 98.1  F (36.7  C), temperature source Oral, resp. rate 18, height 1.727 m (5' 8\"), weight 60.4 kg (133 lb 2.5 oz), SpO2 93%.    General: Pleasant and smiling male sitting up in bed in NAD.  Answers appropriately.    HEENT: Head is AT/NC. Sclera anicteric. No conjunctival injection.  Oropharynx is clear, moist and w/o exudate or lesions. No thrush. Good dentition.  Managing secretions without issues.  NGT removed.  Lungs: Non-labored breathing now on RA.   Abdomen: Soft, non-tender, non-distended.  No rebound or peritoneal signs. Jtube site clean/without drainage and tubing blue (from previous methylene blue dye).  Extremities: WWP, no pedal edema.  MSK: bilateral moderate temporal, clavicular, UE/LE muscle wasting.  Moderate depression between ribs.  Skin: No jaundice or rash  Neurologic: Grossly non-focal.  CN 2-12 grossly intact.   Psych: mood appropriate to situation    LABS:  BMP  Recent Labs   Lab 05/17/24 0429 05/16/24  0815 05/15/24  1502 05/15/24  0608 05/14/24  0525    137  --  137 138   POTASSIUM 3.7 4.2  --  4.2 4.3  4.3   CHLORIDE 101 100  --  100 101   RAFAELA 9.0 9.4  --  9.6 9.4   CO2 26 26  --  25 27   BUN 21.8 23.5*  --  23.0 21.5   CR 0.61* 0.64*  --  0.64* 0.59*   * 141* 94 114* 116*     CBC  Recent Labs   Lab 05/17/24  0429 05/16/24  0802 05/15/24  0608 05/14/24  0525   WBC 7.0 7.0 6.0 6.7   RBC 3.53* 3.86* 3.78* 3.54*   HGB 11.2* 12.4* 12.0* 11.2*   HCT 33.3* 40.4 36.7* 33.7*   MCV 94 105* 97 95   MCH 31.7 32.1 31.7 31.6   MCHC 33.6 30.7* 32.7 33.2   RDW 14.6 14.5 14.5 14.3   PLT " 257 261 261 271     INRNo lab results found in last 7 days.  LFTsNo lab results found in last 7 days.   PANCNo lab results found in last 7 days.      IMAGING:  (Personally reviewed)    5/15/24: XR GASTROGRAFIN ESOPHAGRAM  FINDINGS:   Filling of contrast within the stomach pull-up, with stasis at the  pylorus. Roughly 50% of the contrast continued into the small bowel  after 5 minutes. Roughly 70% of the contrast into the small bowel  after 10 minutes. No evidence of reflux. No evidence of leak.                                                                     IMPRESSION: Stasis of contrast at the pylorus with roughly 75% of  contrast passed into the small bowel after 10 minutes.     5/12/2024: XR ABDOMEN PORT 1 VIEW   Impression: Enteric tube retracted 3 to 4 cm since earlier same day  chest radiograph, terminating at the expected location of the  intrathoracic stomach     5/4/2024: CT CHEST ABDOMEN PELVIS W/O CONTRAST   IMPRESSION:   1. Increased diffuse nodular and consolidative opacities with  increasing cavitation, concerning for worsening infectious process.  Recommend follow-up to assess for resolution.  2. Postsurgical changes of esophagogastrectomy. There is fluid-filled  distention of the gastric pull-through, not substantially changed  compared to 4/28/2024 with abrupt caliber change the level of the  diaphragmatic hiatus with complete obstruction. There is a small  amount of contrast present within the distal bowel, which likely is  from a prior oral contrast administration.  3. Small bilateral pleural effusions.

## 2024-05-17 NOTE — PLAN OF CARE
Goal Outcome Evaluation:      Plan of Care Reviewed With: patient    Overall Patient Progress: improvingOverall Patient Progress: improving    Outcome Evaluation: Pt A/Ox4, VSS on RA. Up indep to bathroom. Pt voiding in hat, no BM ON. Tolerating FLD and TF at 60mL/hr ON. Pt verbalized possibility of discharging home today, but POC involved inc TF rate tponight to GR of 65mL/hr. TF to continue to run thru noon today. Pt c/o pain 2/10 in abd that resolved ON. PRN ativan given for sleep aid per pt request. Discharge to home anticipated once tolerating TF at GR.

## 2024-05-17 NOTE — PLAN OF CARE
Goal Outcome Evaluation:      Plan of Care Reviewed With: patient, spouse    Overall Patient Progress: improvingOverall Patient Progress: improving     Pt alert and cooperative during shift. VVS, denies pain. Tolerated advanced diet. Family present with discharge. AVS printed and discharge instructions given. Pt discharged to home with home nursing services

## 2024-05-17 NOTE — PLAN OF CARE
"Speech Language Therapy Discharge Summary    Reason for therapy discharge:    Discharged to home with outpatient therapy.    Progress towards therapy goal(s). See goals on Care Plan in [facility] electronic health record for goal details.  Goals partially met.  Barriers to achieving goals:   discharge from facility.    Therapy recommendation(s):    Continued therapy is recommended.  Rationale/Recommendations:  Recommend ongoing SLP services at ENT clinic given acute on chronic dysphagia. From an oropharyngeal/SLP standpoint, pt OK full liquid diet with use of chin tuck for all swallows - thoracic/GI are managing diet, which was officially advanced to mechanical soft on 5/17.     Last VFSS was completed 5/3 which revealed \"acute on chronic pharyngeal dysphagia related to his hx of XRT to the head/neck. Pt assessed with thin liquids, mildly-thick liquids, and pudding. Solids deferred as esophagram w/ follow-through scheduled for after VFSS and regardless, pt would not be cleared for solids at this time d/t esophagectomy precautions. Oral phase WFL. Swallow trigger timely with use of chin tuck, but delayed to the pyriforms when pt's head is in neutral position. Once triggered, BOT retraction and pharyngeal stripping wave are reduced, with wide column of contrast at BOT. Epiglottic inversion partial/inconsistent. Laryngeal vestibule closure reduced. Moderate amounts of vallecular residue present with pudding, which clears with liquid wash and effortful swallow. Aspiration occurs during the swallow when pt does not utilize a chin tuck, but he is able to clear aspirated material with a cued throat clear.\"      "

## 2024-05-17 NOTE — PROGRESS NOTES
Care Management Discharge Note    Discharge Date: 05/17/2024  Discharge Disposition:  Home  Discharge Services:  Home Infusion   Discharge DME:  Enteral supplies   Discharge Transportation: family or friend will provide  Private pay costs discussed: Not applicable  Does the patient's insurance plan have a 3 day qualifying hospital stay waiver?  No  PAS Confirmation Code:  n/a  Patient/family educated on Medicare website which has current facility and service quality ratings:  n/a  Education Provided on the Discharge Plan:  yes  Persons Notified of Discharge Plans: Pt; Attnd MD; Bedside RN; Charge RN  Patient/Family in Agreement with the Plan:  yes    Handoff Referral Completed: Yes    [facility]  [address] [place] [address]  Phone: [phone]  Fax: [phone]  Dietician: [phone]   [facility]  Home RN Visit planned    Additional Information:  Pt medically ready to discharge to home at this time. [facility] liaison notified and reported SNV planned for this afternoon, requesting promptly completed orders. Family available for transportation. RNCC to conclude following upon safe departure from floor and facility.         Edis Cortes RN BSN  7C RNCC  Phone: [phone]  Pager: [phone]    For Weekend & Holiday on call RN Care Coordinator:  (Tasks: Home care, home infusion, medical equipment, transportation, IMM & AGUDELO forms, etc.)  [place] (0800 - 1630) Saturday and Sunday    Units: 5A, 5B, & 5C: [phone]    Units: 6B, 6C, 6D: [phone]    Units: 7A, 7B, 7C, 7D: [phone]    Units: 6A/ICU : [phone]    [facility] [place] ([phone]) Saturday and Sunday    Units: 6 Med/Surg, 8A, 10 ICU, & Pediatric Units: [phone]    Units: 5 Ortho, 5Med/Surg & WB ED: [phone]

## 2024-05-18 ENCOUNTER — PATIENT OUTREACH (OUTPATIENT)
Dept: CARE COORDINATION | Facility: CLINIC | Age: 69
End: 2024-05-18
Payer: COMMERCIAL

## 2024-05-18 NOTE — PROGRESS NOTES
[facility]    Background: Transitional Care Management program identified per system criteria and reviewed by [facility] team for possible outreach.    Assessment: Upon chart review, CCRC Team member will not proceed with patient outreach related to this episode of Transitional Care Management program due to reason below:    Patient declined to answer all post hospital discharge questions with CCRC team member and disconnected call.    Plan: Transitional Care Management episode addressed appropriately per reason noted above.      XANDER Garcia  [facility] Resource [facility]    *Connected Care Resource Team does NOT follow patient ongoing. Referrals are identified based on internal discharge reports and the outreach is to ensure patient has an understanding of their discharge instructions.

## 2024-05-19 ENCOUNTER — APPOINTMENT (OUTPATIENT)
Dept: INTERVENTIONAL RADIOLOGY/VASCULAR | Facility: HOSPITAL | Age: 69
End: 2024-05-19
Attending: RADIOLOGY
Payer: COMMERCIAL

## 2024-05-19 ENCOUNTER — HOSPITAL ENCOUNTER (EMERGENCY)
Facility: HOSPITAL | Age: 69
Discharge: HOME | End: 2024-05-19
Attending: EMERGENCY MEDICINE | Admitting: EMERGENCY MEDICINE
Payer: COMMERCIAL

## 2024-05-19 VITALS
HEIGHT: 68 IN | OXYGEN SATURATION: 96 % | SYSTOLIC BLOOD PRESSURE: 126 MMHG | HEART RATE: 91 BPM | WEIGHT: 137.1 LBS | RESPIRATION RATE: 20 BRPM | BODY MASS INDEX: 20.78 KG/M2 | TEMPERATURE: 97.3 F | DIASTOLIC BLOOD PRESSURE: 71 MMHG

## 2024-05-19 DIAGNOSIS — K94.13 MALFUNCTIONING JEJUNOSTOMY TUBE (H): ICD-10-CM

## 2024-05-19 PROCEDURE — 49451 REPLACE DUOD/JEJ TUBE PERC: CPT

## 2024-05-19 PROCEDURE — C1769 GUIDE WIRE: HCPCS

## 2024-05-19 PROCEDURE — 255N000002 HC RX 255 OP 636: Performed by: RADIOLOGY

## 2024-05-19 PROCEDURE — 272N000116 HC CATH CR1

## 2024-05-19 PROCEDURE — 99285 EMERGENCY DEPT VISIT HI MDM: CPT | Mod: 25

## 2024-05-19 PROCEDURE — 250N000013 HC RX MED GY IP 250 OP 250 PS 637

## 2024-05-19 RX ORDER — LORAZEPAM 0.5 MG/1
0.5 TABLET ORAL ONCE
Status: COMPLETED | OUTPATIENT
Start: 2024-05-19 | End: 2024-05-19

## 2024-05-19 RX ADMIN — IOHEXOL 10 ML: 350 INJECTION, SOLUTION INTRAVENOUS at 13:06

## 2024-05-19 RX ADMIN — LORAZEPAM 0.5 MG: 0.5 TABLET ORAL at 12:15

## 2024-05-19 ASSESSMENT — COLUMBIA-SUICIDE SEVERITY RATING SCALE - C-SSRS
6. HAVE YOU EVER DONE ANYTHING, STARTED TO DO ANYTHING, OR PREPARED TO DO ANYTHING TO END YOUR LIFE?: NO
2. HAVE YOU ACTUALLY HAD ANY THOUGHTS OF KILLING YOURSELF IN THE PAST MONTH?: NO
1. IN THE PAST MONTH, HAVE YOU WISHED YOU WERE DEAD OR WISHED YOU COULD GO TO SLEEP AND NOT WAKE UP?: NO

## 2024-05-19 ASSESSMENT — ACTIVITIES OF DAILY LIVING (ADL)
ADLS_ACUITY_SCORE: 37

## 2024-05-19 NOTE — ED PROVIDER NOTES
"Emergency Department Midlevel Supervisory Note     I had a face to face encounter with this patient seen by the Advanced Practice Provider (FRAN). I personally made/approved the management plan and take responsibility for the patient management. I personally saw patient and performed a substantive portion of the visit including all aspects of the medical decision making.     ED Course:  10:24 AM Fanny Guo PA-C staffed patient with me. I agree with their assessment and plan of management, and I will see the patient.  10:34 AM I met with the patient to introduce myself, gather additional history, perform my initial exam, and discuss the plan.     Brief HPI:     Lopez Hogue is a 68 year old male who presents for evaluation of problem with j-tube.     The patient reports that he was recently discharged from the hospital on 5/17 where he was admitted for 28 days for esophageal cancer. He was discharged with a j-tube in place, which was initially placed 4/1. He states that the tube was flushing normally until this morning when he noticed in was blocked. He attempted to unclog it by using the normal procedures, including push/pull and massaging the tube, but these methods did not work. He then called medical supply who recommended he come to the ED for a possible replacement.      Denies abdominal pain, fever, chills, or any other complaints at this time.    I, Elio Swift, am serving as a scribe to document services personally performed by Triny Miller MD, based on my observations and the provider's statements to me.   I, Triny Miller MD attest that Elio Swift was acting in a scribe capacity, has observed my performance of the services and has documented them in accordance with my direction.    Brief Physical Exam: /76   Pulse 101   Temp 97.3  F (36.3  C) (Temporal)   Resp 20   Ht 1.727 m (5' 8\")   Wt 62.2 kg (137 lb 1.6 oz)   SpO2 93%   BMI 20.85 kg/m    Constitutional:  Alert, in no acute " distress  EYES: Conjunctivae clear  HENT:  Atraumatic  Respiratory:  Respirations even, unlabored, in no acute respiratory distress  Cardiovascular:  Regular rate and rhythm, good peripheral perfusion  GI: Soft, non-distended, non-tender, J tube left midabdomen with pill debris preciptated in instilled pepsi  Musculoskeletal:  Moves all 4 extremities equally, grossly symmetrical strength  Integument: Warm & dry. No appreciable rash, erythema.  Neurologic:  Alert & oriented, speech clear and fluent, no focal deficits noted  Psych: Normal mood and affect       MDM:  Pt with J tube clogged, pepsi instilled, pending serial reassessment, IR to change J tube if clog remains persistent, cannot use clog buster in the ED as that contains coconut and he is allergic to coconut.    Procedures:  I was present for the key portions of procedures documented in FRAN/midlevel note, see midlevel note for further details.    Triny Miller MD  [facility] EMERGENCY DEPARTMENT  [address] [place] [id number] [phone]       Triny Miller MD  05/19/24 1042

## 2024-05-19 NOTE — DISCHARGE INSTRUCTIONS
Follow up with primary care doctor discuss ED visit.  Return to the ED if new symptoms develop or symptoms worsen.

## 2024-05-19 NOTE — ED TRIAGE NOTES
"Patient reports that he has j tube for feedings, \"its plugged\".  Attempted to unclog at home.        "

## 2024-05-19 NOTE — ED PROVIDER NOTES
EMERGENCY DEPARTMENT ENCOUNTER      NAME: Lopez Hogue  AGE: 68 year old male  YOB: 1955  MRN: [id number]  EVALUATION DATE & TIME: No admission date for patient encounter.    PCP: Madi Ramos    ED PROVIDER: Fanny Guo PA-C      Chief Complaint   Patient presents with    J tube plugged     FINAL IMPRESSION:  1. Malfunctioning jejunostomy tube (H)      ED COURSE & MEDICAL DECISION MAKING:    Pertinent Labs & Imaging studies reviewed. (See chart for details)  68 year old male presents to the Emergency Department for evaluation of J-tube.  Per chart review patient was admitted on 4/1/2024 when his J-tube was placed for esophageal cancer.  On 4/19/2024 he was admitted to the hospital for esophageal cancer.  This morning when patient gave himself medications when he noticed that his G-tube was clogged.  Patient reports that he tried to do the unclogging mechanisms that he was taught in the hospital but nothing worked.  Patient called his provider who then told him to come to the emergency room for further evaluation.  Patient denies any pain.  Vital signs reviewed and unremarkable.  Afebrile.  On exam patient's G-tube is in the left lower quadrant with no surrounding erythema, edema, ecchymosis.  No drainage.  All 4 extremities without difficulty. Abdomen is soft and non tender.     Attempted to unclog the drain with flush with Pepsi.  Able to use clog blasting solution as it contains tree nuts and patient is allergic to coconut.  We were unsuccessful in unclogging the J drain. I spoke with IR after we attempted to unclog the J-tube.  IR will place a J-tube.  Patient is resting comfortably.  Patient returned from IR after his J-tube placed.  The patient was resting comfortably.  Patient be discharged home.  Patient will follow-up with his primary care doctor discuss ED visit.  Patient return to the ED if new symptoms develop or symptoms worsen.  Patient agrees with plan.  All  questions answered.      ED COURSE:   9:35 AM I saw the patient.  10:21 AM I staffed the patient with Dr. Paul MD.   1:17 PM Rechecked and updated patient on imaging results. Discussed plan for discharge.          At the conclusion of the encounter I discussed the results of all of the tests and the disposition. The questions were answered. The patient or family acknowledged understanding and was agreeable with the care plan.     0 minutes of critical care time       Medical Decision Making  Obtained supplemental history:Supplemental history obtained?: Family Member/Significant Other  Reviewed external records: External records reviewed?: Inpatient Record: Reviewed admission from 4/19/2024  Care impacted by chronic illness:Hyperlipidemia and Hypertension  Care significantly affected by social determinants of health:N/A  Did you consider but not order tests?: Work up considered but not performed and documented in chart, if applicable  Did you interpret images independently?: Independent interpretation of ECG and images noted in documentation, when applicable.  Consultation discussion with other provider:Did you involve another provider (consultant, MH, pharmacy, etc.)?: I discussed the care with another health care provider, see documentation for details.  Discharge. No recommendations on prescription strength medication(s). See documentation for any additional details.      MEDICATIONS GIVEN IN THE EMERGENCY:  Medications   LORazepam (ATIVAN) tablet 0.5 mg (0.5 mg Oral $Given 5/19/24 1215)   iohexol (OMNIPAQUE) 350 MG/ML injectable solution 50 mL (10 mLs Intravenous $Given 5/19/24 1306)       NEW PRESCRIPTIONS STARTED AT TODAY'S ER VISIT  New Prescriptions    No medications on file          =================================================================    HPI    Patient information was obtained from: Patient    Use of : N/A       Lopez Hogue is a 68 year old male with a pertinent history of  hypertension, coronary artery disease, and hyperlipidemia who presents to this ED via private car for evaluation of problem with j-tube.    Per chart review, the patient was admitted to [facility] from 4/19/2024 to 5/17/2024 for evaluation of esophageal cancer. He underwent a laparoscopic gastric mobilization and esophagoscopy which showed no evidence of residual carcinoma. There was no dysplasia to the esophagus and the stomach had no inflammation or intestinal metaplasia.  He tolerated the operation well and postoperatively was transferred to the general post-surgical unit. The remainder of his course was essentially uncomplicated. Prior to discharge, his pain was controlled well, he was able to perform ADLs and ambulate independently without difficulty, and had full return of bowel and bladder function. On 05/17/24, he was discharged to home in stable condition with jejunostomy tube in place.    The patient reports that he was recently discharged from the hospital on 5/17 where he was admitted for 28 days for esophageal cancer. He was discharged with a j-tube in place, which was initially placed 4/1. He states that the tube was flushing normally until this morning when he noticed in was blocked. He attempted to unclog it by using the normal procedures, including push/pull and massaging the tube, but these methods did not work. He then called medical supply who recommended he come to the ED for a possible replacement.     Denies abdominal pain, fever, chills, or any other complaints at this time.    REVIEW OF SYSTEMS   As per HPI    PAST MEDICAL HISTORY:  Past Medical History:   Diagnosis Date    Anxiety     Aortic stenosis     CAD (coronary artery disease)     ETOH dependence     Quit drinking 10 years    Heart attack (H)     History of blood transfusion     HLD (hyperlipidemia)     Hypertension     Malignant neoplasm of middle third of esophagus (H)     Nonrheumatic aortic  (valve) stenosis     Sweat gland carcinoma        PAST SURGICAL HISTORY:  Past Surgical History:   Procedure Laterality Date    BRONCHOSCOPY FLEXIBLE AND RIGID N/A 4/19/2024    Procedure: Bronchoscopy flexible and rigid;  Surgeon: Devin Hill MD;  Location: [facility] OR    BRONCHOSCOPY FLEXIBLE AND RIGID N/A 4/28/2024    Procedure: Bronchoscopy flexible and rigid;  Surgeon: Jessie Kim MD;  Location: [facility] OR    CV CORONARY ANGIOGRAM N/A 3/27/2023    Procedure: Coronary Angiogram;  Surgeon: Miki Etienne MD;  Location: [facility] CATH [facility] CV    CV CORONARY ANGIOGRAM N/A 5/9/2023    Procedure: Coronary Angiogram;  Surgeon: Miki Etienne MD;  Location: [facility]    CV LEFT HEART CATH N/A 3/27/2023    Procedure: Left Heart Catheterization;  Surgeon: Miki Etienne MD;  Location: [facility]    CV LEFT HEART CATH N/A 5/9/2023    Procedure: Left Heart Catheterization;  Surgeon: Miki Etienne MD;  Location: [facility] CATH LAB CV    CV PCI N/A 3/27/2023    Procedure: Percutaneous Coronary Intervention;  Surgeon: Miki Etienne MD;  Location: [facility]    ENDOSCOPIC ULTRASOUND UPPER GASTROINTESTINAL TRACT (GI) N/A 11/28/2023    Procedure: Endoscopic ultrasound upper gastrointestinal tract (GI);  Surgeon: Shahriar Giraldo MD;  [facility]: [facility]    ESOPHAGOGASTRODUODENOSCOPY, WITH BOTULINUM TOXIN INJECTION N/A 4/29/2024    Procedure: Esophagogastroduodenoscopy, With Botulinum Toxin Injection;  Surgeon: Jessie Kim MD;  [facility]    ESOPHAGOSCOPY, GASTROSCOPY, DUODENOSCOPY (EGD), COMBINED N/A 11/8/2023    Procedure: ESOPHAGOGASTRODUODENOSCOPY, WITH BIOPSY;  Surgeon: Shahriar Giraldo MD;  [facility]    ESOPHAGOSCOPY, GASTROSCOPY, DUODENOSCOPY (EGD), COMBINED N/A 4/19/2024    Procedure: Esophagoscopy, gastroscopy, duodenoscopy (EGD), combined;  Surgeon: Devin Hill MD;  [facility]    ESOPHAGOSCOPY, GASTROSCOPY, DUODENOSCOPY (EGD),  COMBINED N/A 4/28/2024    Procedure: Esophagoscopy, gastroscopy, duodenoscopy (EGD), combined;  Surgeon: Jessie Kim MD;  [facility]    ESOPHAGOSCOPY, GASTROSCOPY, DUODENOSCOPY (EGD), COMBINED N/A 5/5/2024    Procedure: Esophagoscopy, gastroscopy, duodenoscopy (EGD), combined-under fluoro & dilation, nasogastric tube placement, bronchoscopy;  Surgeon: Kevin Calderon MD;  [facility]    IR JEJUNOSTOMY TUBE CHANGE  5/19/2024    LAPAROSCOPIC ASSISTED INSERTION TUBE JEJUNOSTOMY N/A 4/1/2024    Procedure: Laparoscopic Jejunostomy Tube Insertion and Esophagogastroduodenoscopy;  Surgeon: Kevin Calderon MD;  [facility]    LARYNGOSCOPY WITH BIOPSY(IES) N/A 10/12/2023    Procedure: LARYNGOSCOPY, WITH BIOPSY;  Surgeon: Edi Felix MD;  [facility]    OTHER SURGICAL HISTORY  2015    WIDE EXCISION OF LEFT GLUTEAL MASSTNM: [id number], stage: II hidradenocarcinoma Grade II, 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, sentinel lymph node biopsy negative     THORACOSCOPIC, LAPAROSCOPIC ESOPHAGECTOMY, COMBINED N/A 4/19/2024    Procedure: Laparoscopic and right thoracoscopic esophagogastrectomy, right AND left chest tube placement;  Surgeon: Devin Hill MD;  Location: [facility]           CURRENT MEDICATIONS:    acetaminophen (TYLENOL) 500 MG tablet  enoxaparin ANTICOAGULANT (LOVENOX) 40 MG/0.4ML syringe  gabapentin (NEURONTIN) 250 MG/5ML solution  methocarbamol (ROBAXIN) 500 MG tablet  multivitamins w/minerals liquid  nitroGLYcerin (NITROSTAT) 0.4 MG sublingual tablet  rosuvastatin (CRESTOR) 40 MG tablet  senna-docusate (SENOKOT-S/PERICOLACE) 8.6-50 MG tablet  sertraline (ZOLOFT) 100 MG tablet  sodium fluoride dental gel (PREVIDENT) 1.1 % GEL topical gel        ALLERGIES:  Allergies   Allergen Reactions    Coconut Flavor Anaphylaxis     Raw coconut       FAMILY HISTORY:  Family History   Problem Relation Age of Onset    Dementia Mother     Anesthesia Reaction No family hx of     Thrombocytopenia No  "family hx of     Cancer No family hx of     Thrombosis No family hx of        SOCIAL HISTORY:   Social History     Socioeconomic History    Marital status:     Number of children: 2   Occupational History    Occupation: non-profit manager   Tobacco Use    Smoking status: Former     Current packs/day: 0.00     Average packs/day: 2.0 packs/day for 30.0 years (60.0 ttl pk-yrs)     Types: Cigarettes     Start date:      Quit date:      Years since quittin.3     Passive exposure: Past    Smokeless tobacco: Never    Tobacco comments:     Quit 10 years ago   Vaping Use    Vaping status: Never Used   Substance and Sexual Activity    Alcohol use: Not Currently     Comment: quit in     Drug use: Never   Social History Narrative    Patient works.  Lives with his wife.     Social Determinants of Health      Received from [employer]    Financial Resource Strain    Received from [employer]    Social Connections       VITALS:  /71   Pulse 91   Temp 97.3  F (36.3  C) (Temporal)   Resp 20   Ht 1.727 m (5' 8\")   Wt 62.2 kg (137 lb 1.6 oz)   SpO2 96%   BMI 20.85 kg/m      PHYSICAL EXAM    Physical Exam  Vitals and nursing note reviewed.   Constitutional:       Appearance: Normal appearance.   HENT:      Head: Atraumatic.      Right Ear: External ear normal.      Left Ear: External ear normal.      Nose: Nose normal.      Mouth/Throat:      Mouth: Mucous membranes are moist.   Eyes:      Conjunctiva/sclera: Conjunctivae normal.      Pupils: Pupils are equal, round, and reactive to light.   Cardiovascular:      Rate and Rhythm: Normal rate and regular rhythm.      Pulses: Normal pulses.      Heart sounds: Normal heart sounds. No murmur heard.     No friction rub. No gallop.   Pulmonary:      Effort: Pulmonary effort is normal.      Breath " sounds: Normal breath sounds. No wheezing or rales.   Abdominal:      Tenderness: There is no abdominal tenderness. There is no guarding or rebound.   Musculoskeletal:      Cervical back: Normal range of motion.      Comments: J-tube in the left lower quadrant with no surrounding erythema, edema, ecchymosis.  No drainage around the wound.  Normal warmth.  No lacerations or abrasions.   Skin:     General: Skin is dry.      Capillary Refill: Capillary refill takes less than 2 seconds.   Neurological:      Mental Status: He is alert. Mental status is at baseline.   Psychiatric:         Mood and Affect: Mood normal.         Thought Content: Thought content normal.          LAB:  All pertinent labs reviewed and interpreted.  Labs Ordered and Resulted from Time of ED Arrival to Time of ED Departure - No data to display     RADIOLOGY:  Reviewed all pertinent imaging. Please see official radiology report.  IR Jejunostomy Tube Change   Final Result   IMPRESSION:     Successful jejunostomy tube exchange, ready for immediate use.          I, Ivania Goode, am serving as a scribe to document services personally performed by Fanny Guo PA-C, based on my observation and the provider's statements to me. I, Fanny Guo PA-C, attest that Ivania Goode is acting in a scribe capacity, has observed my performance of the services and has documented them in accordance with my direction.    Fanny Guo PA-C  [facility] EMERGENCY DEPARTMENT  [address] [place] [id number]  [phone]     Fanny Guo PA-C  05/19/24 2117

## 2024-05-20 ENCOUNTER — TELEPHONE (OUTPATIENT)
Dept: OTOLARYNGOLOGY | Facility: CLINIC | Age: 69
End: 2024-05-20

## 2024-05-20 ENCOUNTER — PREP FOR PROCEDURE (OUTPATIENT)
Dept: GASTROENTEROLOGY | Facility: CLINIC | Age: 69
End: 2024-05-20

## 2024-05-20 ENCOUNTER — PATIENT OUTREACH (OUTPATIENT)
Dept: GASTROENTEROLOGY | Facility: CLINIC | Age: 69
End: 2024-05-20

## 2024-05-20 DIAGNOSIS — K31.1 PYLORIC STRICTURE: ICD-10-CM

## 2024-05-20 DIAGNOSIS — C15.9 ESOPHAGEAL CANCER (H): Primary | ICD-10-CM

## 2024-05-20 NOTE — TELEPHONE ENCOUNTER
Patient confirmed scheduled appointment:  Date: 7/8  Time: 4:00pm  Visit type: Return ENT   Provider: Dr. Felix  Location: [facility]  Testing/imaging: CT's prior  Additional notes:

## 2024-05-20 NOTE — TELEPHONE ENCOUNTER
Called pt as follow up to recent hospitalization. Pt states he is doing well, had to go to the ED yesterday d/t clogged J tube but that has since been working fine.     Procedure/Imaging/Clinic: EGD with fluoroscopy  Physician: Dr. Monahan   Timing: 3 months (from 5/15 procedure date)  Scope time needed: per provider average   Anesthesia: General   Dx: Esophageal cancer with pyloric stricture and stent placement   Tier: 3   Location: [facility] OR   Procedure description for patient communication: Upper GI (UGI) or Esophagogastroduodenoscopy (EGD) procedure to exchange the stent in your stomach/pylorus.         Comments:   -Possible discharge home 5/17 if tolerating diet s/p stent placement.   -Additionally has Jtube, on TF with [facility] referral placed   -SLP to continue to follow for pharyngeal dysphagia (d/t head/neck radation) and esophageal dysphagia re-evals.     Pt in agreement with August 8th procedure date, [facility] OR. Pt prefers first case of the day.    Explained OR will call 1-2 days prior to procedure date with arrival time, will need a , someone to stay with them for 24 hours and should stay in town for 24 hours (within 45 min of Hospital) post procedure    Patient needs to get pre-op physical completed. If outside  health system will need physical faxed to number [phone]     If you do not get a preop physical, your procedure could be cancelled, patient voiced understanding*    Preop Plan: pt will make appointment with PCP Dr Ramos within 30 days of procedure date.    Does patient have [employer] insurance?:[employer]    Med Review    Blood thinner - plavix, 5 day hold discussed, will begin holding on 8/3/24. Pt will be off of lovenox by this time.   ASA - none  Diabetic - none  Any meds by injection or mouth for weight loss or diabetes-none    Patient Education r/t procedure: mychart    A pre-op nurse will call 1-2 days prior to the procedure.    NPO/Prep:   Adults and Children of all ages may consume solids up to 8  hours prior to arrival time - may consume clear liquids up to 1 hour prior to arrival time.    Discussed holding of tube feeding for 8 hours prior to procedure arrival time.     Verbalized understanding of all instructions. All questions answered.     Procedure order placed, message routed to OR      Bronwyn Colby RN, BSN,   [facility]  Care coordinator

## 2024-05-26 LAB — BACTERIA SPEC CULT: ABNORMAL

## 2024-05-28 ENCOUNTER — ANCILLARY PROCEDURE (OUTPATIENT)
Dept: GENERAL RADIOLOGY | Facility: CLINIC | Age: 69
End: 2024-05-28
Attending: CLINICAL NURSE SPECIALIST
Payer: COMMERCIAL

## 2024-05-28 ENCOUNTER — ONCOLOGY VISIT (OUTPATIENT)
Dept: SURGERY | Facility: CLINIC | Age: 69
End: 2024-05-28
Attending: THORACIC SURGERY (CARDIOTHORACIC VASCULAR SURGERY)
Payer: COMMERCIAL

## 2024-05-28 VITALS
WEIGHT: 142.6 LBS | TEMPERATURE: 97.6 F | DIASTOLIC BLOOD PRESSURE: 74 MMHG | HEART RATE: 89 BPM | OXYGEN SATURATION: 95 % | RESPIRATION RATE: 16 BRPM | BODY MASS INDEX: 21.68 KG/M2 | SYSTOLIC BLOOD PRESSURE: 119 MMHG

## 2024-05-28 DIAGNOSIS — C15.9 MALIGNANT NEOPLASM OF ESOPHAGUS, UNSPECIFIED LOCATION (H): ICD-10-CM

## 2024-05-28 DIAGNOSIS — C15.9 PRIMARY ESOPHAGEAL SQUAMOUS CELL CARCINOMA (H): Primary | ICD-10-CM

## 2024-05-28 PROCEDURE — 71046 X-RAY EXAM CHEST 2 VIEWS: CPT | Mod: GC | Performed by: RADIOLOGY

## 2024-05-28 PROCEDURE — 99024 POSTOP FOLLOW-UP VISIT: CPT | Mod: GC | Performed by: THORACIC SURGERY (CARDIOTHORACIC VASCULAR SURGERY)

## 2024-05-28 PROCEDURE — 99213 OFFICE O/P EST LOW 20 MIN: CPT | Performed by: THORACIC SURGERY (CARDIOTHORACIC VASCULAR SURGERY)

## 2024-05-28 RX ORDER — ACETYLCYSTEINE 200 MG/ML
2 SOLUTION ORAL; RESPIRATORY (INHALATION) EVERY 4 HOURS
Qty: 100 ML | Refills: 3 | Status: SHIPPED | OUTPATIENT
Start: 2024-05-28 | End: 2024-08-13

## 2024-05-28 RX ORDER — SODIUM CHLORIDE FOR INHALATION 3 %
4 VIAL, NEBULIZER (ML) INHALATION
Qty: 100 ML | Refills: 3 | Status: SHIPPED | OUTPATIENT
Start: 2024-05-28 | End: 2024-08-23

## 2024-05-28 ASSESSMENT — PAIN SCALES - GENERAL: PAINLEVEL: NO PAIN (0)

## 2024-05-28 NOTE — PROGRESS NOTES
THORACIC SURGERY FOLLOW UP VISIT    Dear Dr. Ramos,  I saw Mr. Lopez Hogue in follow-up today. The clinical summary follows:     PREOP DIAGNOSIS   Esophageal cancer  PROCEDURE   Laparoscopic gastric mobilization  Right VATS esophageal mobilization, lymph node dissection  Esophagogastrectomy  Left chest tube insertion  Fiberoptic bronchoscopy  Esophagoscopy  Esophagogastroduodenoscopy with transendoscopic stent insertion (Pyloric stent placement)  DATE OF PROCEDURE  04/17/2024    PATHOLOGY RESULTS  Esophagogastric wall with no evidence of residual carcinoma:   -Complete response in squamous cell carcinoma s/p neoadjuvant chemoradiation   -Also no metastasis to any of fifteen (total with part A) sampled lymph nodes (0 /15)       Stomach:    -No inflammation, intestinal metaplasia or dysplasia   -No H. Pylori like organisms identified on routine staining   -No Alfredo features present at the GE junction     Esophagus: no dysplasia or other background abnormality       COMPLICATIONS  Gastric outlet obstruction    INTERVAL STUDIES  Chest-Xray (05/28/2024): Slightly decreased patchy opacities in the left lung. Increased patchy density in the right lower lung.     ETOH No  TOB Former smoker  /74 (BP Location: Right arm, Patient Position: Sitting, Cuff Size: Adult Regular)   Pulse 89   Temp 97.6  F (36.4  C) (Oral)   Resp 16   Wt 64.7 kg (142 lb 9.6 oz)   SpO2 95%   BMI 21.68 kg/m        SUBJECTIVE  He feels very well. He complains of some difficulty clearing secretions, worse in the morning. He has put on 7 pounds since his discharge. His energy levels have increased as well. He is still on tube feeds and receives them for 16 hours daily. He always feels full and is unable to eat much by mouth.     From a personal perspective, he is here alone.     IMPRESSION (C15.9) Primary esophageal squamous cell carcinoma (H)  (primary encounter diagnosis)     68 year-old male who is recovering well after undergoing  an [facility] esophagectomy on 4/17/2024  for a tumor which had  complete response to neoadjuvant chemoradiation.  His postop course was complicated by a conduit outlet obstruction, which was treated with a pyloric stent.    PLAN  I spent 30 min on the date of the encounter in chart review, patient visit, review of tests, documentation and/or discussion with other providers about the issues documented above. I reviewed the plan as follows:  - Saline and mucomyst nebulizer  - Decrease cycled tube feeds duration to 8 hrs daily, same rate. At the same time, he is encouraged to increase his oral intake.   - Follow up by our office in a week to ensure his weight is stable.   All questions were answered and the patient and present family were in agreement with the plan.  I appreciate the opportunity to participate in the care of your patient and will keep you updated.  Sincerely,     Cindy Garcia MD    Physician Attestation   I, Devin Hill MD, saw this patient and agree with the findings and plan of care as documented in the note.      Items personally reviewed/procedural attestation: vitals, labs, and imaging and agree with the interpretation documented in the note.    Devin Hill MD

## 2024-05-28 NOTE — LETTER
5/28/2024         RE: Lopez Hogue  [address] [facility] [address]        Dear Colleague,    Thank you for referring your patient, Lopez Hogue, to the [facility]. Please see a copy of my visit note below.    THORACIC SURGERY FOLLOW UP VISIT    Dear Dr. Ramos,  I saw Mr. Lopez Hogue in follow-up today. The clinical summary follows:     PREOP DIAGNOSIS   Esophageal cancer  PROCEDURE   Laparoscopic gastric mobilization  Right VATS esophageal mobilization, lymph node dissection  Esophagogastrectomy  Left chest tube insertion  Fiberoptic bronchoscopy  Esophagoscopy  Esophagogastroduodenoscopy with transendoscopic stent insertion (Pyloric stent placement)  DATE OF PROCEDURE  04/17/2024    PATHOLOGY RESULTS  Esophagogastric wall with no evidence of residual carcinoma:   -Complete response in squamous cell carcinoma s/p neoadjuvant chemoradiation   -Also no metastasis to any of fifteen (total with part A) sampled lymph nodes (0 /15)       Stomach:    -No inflammation, intestinal metaplasia or dysplasia   -No H. Pylori like organisms identified on routine staining   -No Alfredo features present at the GE junction     Esophagus: no dysplasia or other background abnormality       COMPLICATIONS  Gastric outlet obstruction    INTERVAL STUDIES  Chest-Xray (05/28/2024): Slightly decreased patchy opacities in the left lung. Increased patchy density in the right lower lung.     ETOH No  TOB Former smoker  /74 (BP Location: Right arm, Patient Position: Sitting, Cuff Size: Adult Regular)   Pulse 89   Temp 97.6  F (36.4  C) (Oral)   Resp 16   Wt 64.7 kg (142 lb 9.6 oz)   SpO2 95%   BMI 21.68 kg/m        SUBJECTIVE  He feels very well. He complains of some difficulty clearing secretions, worse in the morning. He has put on 7 pounds since his discharge. His energy levels have increased as well. He is still on tube feeds and receives them for 16 hours daily. He always  feels full and is unable to eat much by mouth.     From a personal perspective, he is here alone.     IMPRESSION (C15.9) Primary esophageal squamous cell carcinoma (H)  (primary encounter diagnosis)     68 year-old male who is recovering well after undergoing an [place] Elbert esophagectomy on 4/17/2024  for a tumor which had  complete response to neoadjuvant chemoradiation.  His postop course was complicated by a conduit outlet obstruction, which was treated with a pyloric stent.    PLAN  I spent 30 min on the date of the encounter in chart review, patient visit, review of tests, documentation and/or discussion with other providers about the issues documented above. I reviewed the plan as follows:  - Saline and mucomyst nebulizer  - Decrease cycled tube feeds duration to 8 hrs daily, same rate. At the same time, he is encouraged to increase his oral intake.   - Follow up by our office in a week to ensure his weight is stable.   All questions were answered and the patient and present family were in agreement with the plan.  I appreciate the opportunity to participate in the care of your patient and will keep you updated.  Sincerely,     Cindy Garcia MD    Physician Attestation  I, Devin Hill MD, saw this patient and agree with the findings and plan of care as documented in the note.      Items personally reviewed/procedural attestation: vitals, labs, and imaging and agree with the interpretation documented in the note.    Devin Hill MD

## 2024-05-28 NOTE — NURSING NOTE
"Oncology Rooming Note    May 28, 2024 4:22 PM   Lopez Hogue is a 68 year old male who presents for:    Chief Complaint   Patient presents with    Oncology Clinic Visit     Malignant neoplasm of esophagus, unspecified location     Initial Vitals: /74 (BP Location: Right arm, Patient Position: Sitting, Cuff Size: Adult Regular)   Pulse 89   Temp 97.6  F (36.4  C) (Oral)   Resp 16   Wt 64.7 kg (142 lb 9.6 oz)   SpO2 95%   BMI 21.68 kg/m   Estimated body mass index is 21.68 kg/m  as calculated from the following:    Height as of 5/19/24: 1.727 m (5' 8\").    Weight as of this encounter: 64.7 kg (142 lb 9.6 oz). Body surface area is 1.76 meters squared.  No Pain (0) Comment: Data Unavailable   No LMP for male patient.  Allergies reviewed: Yes  Medications reviewed: Yes    Medications: Medication refills not needed today.  Pharmacy name entered into [employer]:    [id number] - [place], [place] - [address] [place] [address]  [facility] [address] [place] [address] PHARMACY [facility] - [place], [place] - [address] [facility]    Frailty Screening:   Is the patient here for a new oncology consult visit in cancer care? 2. No      Clinical concerns: None       Mouna Lipscomb CMA            "

## 2024-06-02 LAB — BACTERIA BRONCH: ABNORMAL

## 2024-06-04 ENCOUNTER — PATIENT OUTREACH (OUTPATIENT)
Dept: SURGERY | Facility: CLINIC | Age: 69
End: 2024-06-04
Payer: COMMERCIAL

## 2024-06-04 NOTE — PROGRESS NOTES
I spoke with Fabricio and he is doing well. He was able to  his nebulizer yesterday, there were some technical difficulties with the nebulizer order and now he is needing to  syringes from the pharmacy as one of his nebulizer medications came in a vial. He went down to 3 cartons per day instead of 2.5 because of how his tube feed formula is packaged, it was easier to go down to 3 cartons because he thought going down to 2 cartons was too much. His weight has fluctuated 1 or 2 lbs since decreasing his tube feeds. He typically will eat 5 to 6 times per day, he found out yesterday that when he eats too much at one time he vomits. No issues with liquids or food, no issues with chewing and swallowing.    Fabricio wanted to know the next steps if he should decrease another carton and if/when he should follow up with Dr. Hill. Will call patient back with an update, he did not have any questions or concerns.    Update: called Fabricio to let him know Dr. Hill would like him to go down to 1 carton per day. He asked if he should still be taking Gabapentin 200mg twice daily. He also stated he stopped the lovenox injections on Saturday 6/1/24. He has been active and he is also back to work. Fabricio mentioned he was on a high dose of Gabapentin during chemo and radiation, he was taking 900mg 3 times per day. Writer sent Dr. Hill a message and will call Fabricio back.

## 2024-06-06 ENCOUNTER — PATIENT OUTREACH (OUTPATIENT)
Dept: SURGERY | Facility: CLINIC | Age: 69
End: 2024-06-06

## 2024-06-06 NOTE — PROGRESS NOTES
Spoke with Fabricio to let him know he can take the Gabapentin 200 mg capsule once daily for 1 week and then he can stop it altogether. He does not need to restart the Lovenox injections. Writer will call patient next week to see if his weight has been stable after decreasing his tube feeds to 1 carton per day.

## 2024-06-12 ENCOUNTER — PATIENT OUTREACH (OUTPATIENT)
Dept: SURGERY | Facility: CLINIC | Age: 69
End: 2024-06-12
Payer: COMMERCIAL

## 2024-06-12 NOTE — PROGRESS NOTES
Called Fabricio to check in, he decreased his tube feeds down to 1 carton per day 1 week ago. He also was going to taper off the Gabapentin. Left a voicemail with call back information.    Update: called patient and had to leave a voicemail with call back information.    Patient called and gave me an update. His weight has been stable, down 1lb then gained it back. No issues with eating or swallowing, has been doing 1 carton of tube feed formula per day. He has also tapered off Gabapentin and is feeling good. Fabricio wanted to mention that he has an Esophagogastriduodenoscopy with fluoroscopy and stent placement. Will removing the J-tube have an effect on this? I will call Fabricio with Dr. Hill's recommendations, if he doesn't answer I will send him a MyChart.

## 2024-06-18 ENCOUNTER — PATIENT OUTREACH (OUTPATIENT)
Dept: SURGERY | Facility: CLINIC | Age: 69
End: 2024-06-18
Payer: COMMERCIAL

## 2024-06-18 NOTE — PROGRESS NOTES
Called Fabricio to relay Dr. Hill's recommendation. It would be better if Fabricio had his J-tube removed prior to his procedure with Dr. Monahan in August. Patient should stop tube feeds and writer will call patient in 1 week.

## 2024-07-01 ENCOUNTER — PATIENT OUTREACH (OUTPATIENT)
Dept: SURGERY | Facility: CLINIC | Age: 69
End: 2024-07-01
Payer: COMMERCIAL

## 2024-07-01 NOTE — PROGRESS NOTES
Called Fabricio to check in on how he has been doing with the 1 carton of tube feeds per day. Left a voicemail with call back information.

## 2024-07-08 ENCOUNTER — ANCILLARY PROCEDURE (OUTPATIENT)
Dept: CT IMAGING | Facility: CLINIC | Age: 69
End: 2024-07-08
Attending: STUDENT IN AN ORGANIZED HEALTH CARE EDUCATION/TRAINING PROGRAM
Payer: COMMERCIAL

## 2024-07-08 ENCOUNTER — THERAPY VISIT (OUTPATIENT)
Dept: SPEECH THERAPY | Facility: CLINIC | Age: 69
End: 2024-07-08
Payer: COMMERCIAL

## 2024-07-08 ENCOUNTER — OFFICE VISIT (OUTPATIENT)
Dept: OTOLARYNGOLOGY | Facility: CLINIC | Age: 69
End: 2024-07-08
Payer: COMMERCIAL

## 2024-07-08 VITALS
WEIGHT: 138.6 LBS | SYSTOLIC BLOOD PRESSURE: 111 MMHG | HEART RATE: 80 BPM | DIASTOLIC BLOOD PRESSURE: 67 MMHG | OXYGEN SATURATION: 97 % | BODY MASS INDEX: 21.01 KG/M2 | HEIGHT: 68 IN

## 2024-07-08 DIAGNOSIS — C10.0 SQUAMOUS CELL CARCINOMA OF VALLECULA (H): Primary | ICD-10-CM

## 2024-07-08 DIAGNOSIS — R13.12 OROPHARYNGEAL DYSPHAGIA: ICD-10-CM

## 2024-07-08 DIAGNOSIS — C10.0 SQUAMOUS CELL CARCINOMA OF VALLECULA (H): ICD-10-CM

## 2024-07-08 PROCEDURE — 99213 OFFICE O/P EST LOW 20 MIN: CPT | Mod: 25 | Performed by: STUDENT IN AN ORGANIZED HEALTH CARE EDUCATION/TRAINING PROGRAM

## 2024-07-08 PROCEDURE — 99207 PR NO BILLABLE SERVICE THIS VISIT: CPT | Performed by: SPEECH-LANGUAGE PATHOLOGIST

## 2024-07-08 PROCEDURE — 71260 CT THORAX DX C+: CPT | Mod: GC | Performed by: RADIOLOGY

## 2024-07-08 PROCEDURE — 31575 DIAGNOSTIC LARYNGOSCOPY: CPT | Performed by: STUDENT IN AN ORGANIZED HEALTH CARE EDUCATION/TRAINING PROGRAM

## 2024-07-08 PROCEDURE — 70491 CT SOFT TISSUE NECK W/DYE: CPT | Mod: GC | Performed by: RADIOLOGY

## 2024-07-08 RX ORDER — IOPAMIDOL 755 MG/ML
69 INJECTION, SOLUTION INTRAVASCULAR ONCE
Status: COMPLETED | OUTPATIENT
Start: 2024-07-08 | End: 2024-07-08

## 2024-07-08 RX ADMIN — IOPAMIDOL 69 ML: 755 INJECTION, SOLUTION INTRAVASCULAR at 14:20

## 2024-07-08 ASSESSMENT — PAIN SCALES - GENERAL: PAINLEVEL: NO PAIN (0)

## 2024-07-08 NOTE — PATIENT INSTRUCTIONS
You were seen in the ENT Clinic today by Dr. Felix. If you have any questions or concerns after your appointment, please contact us (see below)    Please return to clinic in 3 months for follow up    How to Contact Us:  Send a [employer] message to your provider. Our team will respond to you via [employer]. Occasionally, we will need to call you to get further information.  For urgent matters (Monday-Friday), call the ENT Clinic: [phone] and speak with a call center team member - they will route your call appropriately.   If you'd like to speak directly with a nurse, please find our contact information below. We do our best to check voicemail frequently throughout the day, and will work to call you back within 1-2 days. For urgent matters, please use the general clinic phone numbers listed above.    Hollie ARAMBULA RN, BSN  RN Care Coordinator, ENT Clinic  [facility]  Direct: [phone]

## 2024-07-08 NOTE — DISCHARGE INSTRUCTIONS

## 2024-07-08 NOTE — LETTER
7/8/2024       RE: Lopez Hogue  [address] [facility] [address]     Dear Colleague,    Thank you for referring your patient, Lopez Hogue, to the [facility] [place] at [facility]. Please see a copy of my visit note below.    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 Esophagectomy     Interval history: Doing well today. Eating well. No complaints     Physical Examination:  Alert, NAD  Breathing comfortably  No palpable adenopathy  No lesions in oral cavity or oropharynx     Procedure: Fiberoptic laryngoscopy performed. No lesions seen. Airway patent     A/P:  Doing well without signs of disease in the HN.    His imaging shows possible right lingual tonsil lesion. This is surprising given a normal exam and scope.    We will discuss the imaging at our [facility] HN tumor board.     We scheduled a 3 month follow up     Edi Felix MD        25 minutes spent on the date of the encounter in chart review, patient visit, review of tests, documentation and/or discussion with other providers about the issues documented above asides from time spent doing flexible laryngoscopy.       Again, thank you for allowing me to participate in the care of your patient.      Sincerely,    Edi Felix MD

## 2024-07-09 ENCOUNTER — ONCOLOGY VISIT (OUTPATIENT)
Dept: ONCOLOGY | Facility: CLINIC | Age: 69
End: 2024-07-09
Attending: STUDENT IN AN ORGANIZED HEALTH CARE EDUCATION/TRAINING PROGRAM
Payer: COMMERCIAL

## 2024-07-09 VITALS
RESPIRATION RATE: 16 BRPM | OXYGEN SATURATION: 99 % | WEIGHT: 138.2 LBS | DIASTOLIC BLOOD PRESSURE: 69 MMHG | TEMPERATURE: 98 F | SYSTOLIC BLOOD PRESSURE: 111 MMHG | HEART RATE: 80 BPM | BODY MASS INDEX: 21.01 KG/M2

## 2024-07-09 DIAGNOSIS — C15.9 PRIMARY ESOPHAGEAL SQUAMOUS CELL CARCINOMA (H): Primary | ICD-10-CM

## 2024-07-09 DIAGNOSIS — C10.0 SQUAMOUS CELL CARCINOMA OF VALLECULA (H): ICD-10-CM

## 2024-07-09 PROCEDURE — 99417 PROLNG OP E/M EACH 15 MIN: CPT | Performed by: STUDENT IN AN ORGANIZED HEALTH CARE EDUCATION/TRAINING PROGRAM

## 2024-07-09 PROCEDURE — G2211 COMPLEX E/M VISIT ADD ON: HCPCS | Performed by: STUDENT IN AN ORGANIZED HEALTH CARE EDUCATION/TRAINING PROGRAM

## 2024-07-09 PROCEDURE — 99213 OFFICE O/P EST LOW 20 MIN: CPT | Performed by: STUDENT IN AN ORGANIZED HEALTH CARE EDUCATION/TRAINING PROGRAM

## 2024-07-09 PROCEDURE — 99215 OFFICE O/P EST HI 40 MIN: CPT | Performed by: STUDENT IN AN ORGANIZED HEALTH CARE EDUCATION/TRAINING PROGRAM

## 2024-07-09 ASSESSMENT — PAIN SCALES - GENERAL: PAINLEVEL: NO PAIN (0)

## 2024-07-09 NOTE — NURSING NOTE
"Oncology Rooming Note    July 9, 2024 3:48 PM   Lopez Hogue is a 68 year old male who presents for:    Chief Complaint   Patient presents with    Oncology Clinic Visit     Squamous cell carcinoma of vallecula        Initial Vitals: There were no vitals taken for this visit. Estimated body mass index is 21.07 kg/m  as calculated from the following:    Height as of 7/8/24: 1.727 m (5' 8\").    Weight as of 7/8/24: 62.9 kg (138 lb 9.6 oz). There is no height or weight on file to calculate BSA.  Data Unavailable Comment: Data Unavailable   No LMP for male patient.  Allergies reviewed: Yes  Medications reviewed: Yes    Medications: Medication refills not needed today.  Pharmacy name entered into [employer]:    [facility][id number] - [place], [place] - [address] [facility]  AT [facility] OF [facility] [address] [place] [address] PHARMACY [facility] - [place], [place] - [address] [facility]    Frailty Screening:   Is the patient here for a new oncology consult visit in cancer care? 2. No      Clinical concerns: would like to go over CT that was resulted yesterday.       Oliver Shah              "

## 2024-07-09 NOTE — LETTER
7/9/2024      Lopez Hogue  [address] [facility] [address]      Dear Colleague,    Thank you for referring your patient, Lopez Hogue, to the [facility] CANCER CLINIC. Please see a copy of my visit note below.    [facility]   Return Patient Visit    Name: Lopez Hogue  MRN: [id number]  Date of visit: Jul 9, 2024    Diagnosis: Oropharyngeal cancer, esophageal SCC  Stage:    Cancer Staging   Primary esophageal squamous cell carcinoma (H)  Staging form: Esophagus - Squamous Cell Carcinoma, AJCC 8th Edition  - Clinical stage from 11/28/2023: Stage II (cT2, cN1, cM0) - Signed by Yannick Alva MD on 7/9/2024  - Pathologic stage from 4/19/2024: Stage I (ypT0, pN0, cM0) - Signed by Yannick Alva MD on 7/9/2024    Squamous cell carcinoma of vallecula (H)  Staging form: Pharynx - Oropharynx, AJCC 8th Edition  - Clinical stage from 10/12/2023: Stage JUAN MIGUEL (cT1, cN2b, cM0, p16-) - Signed by Yannick Alva MD on 11/21/2023    Molecular: p16 negative  Performance status: ECOG 0    Oncology History:     Oropharyngeal squamous cell carcinoma:  -8/18/23 CT neck: Question soft tissue lesion within the right vallecula/ventral epiglottis.   -10/12/23 R vallecula biopsy: Non-keratinizing poorly differentiated squamous cell carcinoma, p16 negative  -10/13/23 PET/CT: Findings suspicious for right vallecula squamous cell carcinoma with right level IIa and right level IV lymph node metastases.. FDG avid thickening of the mid to distal esophagus, suspicious for a synchronous primary esophageal neoplasm. Recommend correlation with endoscopy.    Esophageal squamous cell carcinoma:   -11/8/23 EGD with biopsy of mid esophageal mass: non-keratinizing poorly differentiated squamous cell carcinoma, PD-L1 CPS 15  -11/21/23 consult with Dr. Hill: plan for neoadjuvant chemoradiation followed by laparoscopic staging and jejunostomy, followed by minimally invasive  Feliberto-Elbert esophagectomy.   -11/28/23 staging EUS Partially obstructing and partially circumferential fungating mass in the middle third of the esophagus. Esophageal squamous cell carcinoma was staged T2 N1 (suspicious paraesophageal lymph node)     Treatment course:  -12/5/24-1/26/24 concurrent chemoradiotherapy with weekly carboplatin + paclitaxel (both esophageal and oropharyngeal/bilateral neck, infusion reaction with Taxol w/ dose 1. Tolerated rechallenge   -1/15/24 admission for neutropenia, source felt possibly secondary to cellulitis (vs radiation change), discharged with course of levofloxacin  -1/22/24 admission for recurrent fever, two days after completing levofloxacin. No neutropenia.       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     PMHx  HTN  CAD c/b MI (2023) s/p PCI x2  Prior sweat gland carcinoma s/p resection  No history of autoimmune disease.     SocHx  Lives in [facility]  Actively involved in a program for those in recovery from substance use.   Former heavy smoker (2 ppd) and drinker himself. Quit both in 2013.       Interval History:    Admission 4/19/24-5/17/24 for esophagogastrectomy, LND. Surgery without complication, postoperative course c/b re-intubation for respiratory decompensation secondary to aspiration.     4/19/24 surgical pathology:   Final Diagnosis   A. GASTROHEPATIC LYMPH NODES X2; EXCISION:  Negative for metastasis; see total node count in part B below      B. DISTAL ESOPHAGUS AND PROXIMAL STOMACH; ESOPHAGOGASTRECTOMY:  Esophagogastric wall with no evidence of  residual carcinoma:   -Complete response in squamous cell carcinoma s/p neoadjuvant chemoradiation   -Also no metastasis to any of fifteen (total with part A) sampled lymph nodes (0 /15)       (See also synoptic data)  Stomach:    -No inflammation, intestinal metaplasia or dysplasia   -No H. Pylori like organisms identified on routine staining   -No Alfredo features present at the GE junction  Esophagus: no dysplasia or other background abnormality      C. STOMACH; FINAL MARGIN; EXCISION:  Negative for malignancy     D. ESOPHAGUS; FINAL MARGIN; EXCISION:  Negative for malignancy       7/8/24 CT chest:  1. Improved diffuse opacities throughout the lungs with ongoing  residual nodular groundglass opacity throughout the lungs, likely  representing improving infectious process.  2. Post surgical changes at the esophagogastrectomy with interval  pyloric stent placement. Ongoing distention of the gastric  pull-through.    7/8/24 CT neck:   1. Residual mass centered on the right lingual tonsil.  2. Interval resolution of previously identified cervical lymphadenopathy. No jefe enlargement or new suspicious jefe morphology.  3. Partially visualized postsurgical changes of esophagogastrectomy with gastric pull-through and multinodular pulmonary opacities in the visualized lung apices, better appreciated on same-day CT chest 7/8/2024.  Primary: NI-RADS 3} High suspicion (new or enlarging discrete nodule/mass): biopsy.  Neck: NI-RADS 1}  No abnormal lymph node.     7/8/24 ENT visit with Dr. Felix, no overt mass on exam at that time    Today, energy level continues to improve, not yet back to baseline, maybe 80% there  Weight is stable, but difficulty putting more on  Working full time at the office  Mild fatigue is still improving  Continues to learn what he can tolerate in terms of oral intake  Vomiting intermittently    No neuropathy  No renal toxicity on recent labs      Exam:   /69 (BP Location: Right arm, Patient  Position: Sitting, Cuff Size: Adult Regular)   Pulse 80   Temp 98  F (36.7  C) (Oral)   Resp 16   Wt 62.7 kg (138 lb 3.2 oz)   SpO2 99%   BMI 21.01 kg/m      Gen: Comfortable, NAD  HEENT: Pupils equal, non-icteric  Neck/Lymph: Supple, no palpable lymphadenopathy   resp: Comfortable on room air, clear bilaterally  CV: Systolic murmur present, regular rate  Abd: Soft  Ext: No swelling  Neuro: Normal gait      Labs:   Labs reviewed, no labs today, however 5/17 labs with normal renal function and only mild residual anemia at hemoglobin 11.2 with otherwise normal cell counts    Imaging:   All pertinent imaging studies personally reviewed. Key findings summarized in oncology history above    Pathology:   Reviewed in chart, key findings summarized above      Assessment and Plan:   Lopez Hogue is a 68 year old male with HTN, CAD, prior sweat gland cancer s/p resection, and recently diagnosed head and neck squamous cell carcinoma of the vallecula, with staging revealing a synchronous esophageal squamous cell carcinoma. Presenting today to establish care with medical oncology.       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, EUS staging shows T2 N1 disease  -now completed RT with concurrent carboplatin + paclitaxel  -Underwent esophagectomy 4/19/2024, final pathology reviewed today, this shows a complete pathologic response  -We discussed his pathology findings at length, as well as the role for ongoing surveillance for recurrent esophageal cancer  -We also reviewed his recent imaging of the head and neck which shows a good response of his nose he is, however, with some questionable area of residual enhancement in the right base of tongue of unclear significance      Plan:  --Regarding his esophageal squamous cell carcinoma, given his pathologic complete  response, no role for adjuvant therapy at this time  --Plan for CT neck/chest/AP every 6 months for the first 2 years  --Will arrange for every 3-month exams (alternating FRAN and MD with scan review) with labs  --He will continue follow-up with thoracic surgery    --Regarding his oropharyngeal cancer, will discuss imaging findings with his ENT team, and consider biopsy versus PET/CT or both to workup the concern for residual active disease at the primary site        Yannick Alva MD PhD   of Medicine  Division of Hematology, Oncology and Transplantation    ---  120 minutes were spent on the date of the encounter performing chart review, history and exam, documentation, and further activities as noted above.     The longitudinal plan of care for the diagnosis(es)/condition(s) as documented were addressed during this visit. Due to the added complexity in care, I will continue to support Fabricio in the subsequent management and with ongoing continuity of care.        Again, thank you for allowing me to participate in the care of your patient.        Sincerely,        Yannick Alva MD

## 2024-07-09 NOTE — PROGRESS NOTES
[facility]   Return Patient Visit    Name: Lopez Hogue  MRN: [id number]  Date of visit: Jul 9, 2024    Diagnosis: Oropharyngeal cancer, esophageal SCC  Stage:    Cancer Staging   Primary esophageal squamous cell carcinoma (H)  Staging form: Esophagus - Squamous Cell Carcinoma, AJCC 8th Edition  - Clinical stage from 11/28/2023: Stage II (cT2, cN1, cM0) - Signed by Yannick Alva MD on 7/9/2024  - Pathologic stage from 4/19/2024: Stage I (ypT0, pN0, cM0) - Signed by Yannick Alva MD on 7/9/2024    Squamous cell carcinoma of vallecula (H)  Staging form: Pharynx - Oropharynx, AJCC 8th Edition  - Clinical stage from 10/12/2023: Stage JUAN MIGUEL (cT1, cN2b, cM0, p16-) - Signed by Yannick Alva MD on 11/21/2023    Molecular: p16 negative  Performance status: ECOG 0    Oncology History:     Oropharyngeal squamous cell carcinoma:  -8/18/23 CT neck: Question soft tissue lesion within the right vallecula/ventral epiglottis.   -10/12/23 R vallecula biopsy: Non-keratinizing poorly differentiated squamous cell carcinoma, p16 negative  -10/13/23 PET/CT: Findings suspicious for right vallecula squamous cell carcinoma with right level IIa and right level IV lymph node metastases.. FDG avid thickening of the mid to distal esophagus, suspicious for a synchronous primary esophageal neoplasm. Recommend correlation with endoscopy.    Esophageal squamous cell carcinoma:   -11/8/23 EGD with biopsy of mid esophageal mass: non-keratinizing poorly differentiated squamous cell carcinoma, PD-L1 CPS 15  -11/21/23 consult with Dr. Hill: plan for neoadjuvant chemoradiation followed by laparoscopic staging and jejunostomy, followed by minimally invasive [place]-Elbert esophagectomy.   -11/28/23 staging EUS Partially obstructing and partially circumferential fungating mass in the middle third of the esophagus. Esophageal squamous cell carcinoma was staged T2 N1 (suspicious paraesophageal lymph node)      Treatment course:  -12/5/24-1/26/24 concurrent chemoradiotherapy with weekly carboplatin + paclitaxel (both esophageal and oropharyngeal/bilateral neck, infusion reaction with Taxol w/ dose 1. Tolerated rechallenge   -1/15/24 admission for neutropenia, source felt possibly secondary to cellulitis (vs radiation change), discharged with course of levofloxacin  -1/22/24 admission for recurrent fever, two days after completing levofloxacin. No neutropenia.       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     PMHx  HTN  CAD c/b MI (2023) s/p PCI x2  Prior sweat gland carcinoma s/p resection  No history of autoimmune disease.     SocHx  Lives in [facility]  Actively involved in a program for those in recovery from substance use.   Former heavy smoker (2 ppd) and drinker himself. Quit both in 2013.       Interval History:    Admission 4/19/24-5/17/24 for esophagogastrectomy, LND. Surgery without complication, postoperative course c/b re-intubation for respiratory decompensation secondary to aspiration.     4/19/24 surgical pathology:   Final Diagnosis   A. GASTROHEPATIC LYMPH NODES X2; EXCISION:  Negative for metastasis; see total node count in part B below      B. DISTAL ESOPHAGUS AND PROXIMAL STOMACH; ESOPHAGOGASTRECTOMY:  Esophagogastric wall with no evidence of residual carcinoma:   -Complete response in squamous cell carcinoma s/p neoadjuvant chemoradiation   -Also no metastasis to any of fifteen (total with part A) sampled lymph nodes (0 /15)       (See also synoptic data)  Stomach:    -No inflammation,  intestinal metaplasia or dysplasia   -No H. Pylori like organisms identified on routine staining   -No Alfredo features present at the GE junction  Esophagus: no dysplasia or other background abnormality      C. STOMACH; FINAL MARGIN; EXCISION:  Negative for malignancy     D. ESOPHAGUS; FINAL MARGIN; EXCISION:  Negative for malignancy       7/8/24 CT chest:  1. Improved diffuse opacities throughout the lungs with ongoing  residual nodular groundglass opacity throughout the lungs, likely  representing improving infectious process.  2. Post surgical changes at the esophagogastrectomy with interval  pyloric stent placement. Ongoing distention of the gastric  pull-through.    7/8/24 CT neck:   1. Residual mass centered on the right lingual tonsil.  2. Interval resolution of previously identified cervical lymphadenopathy. No jefe enlargement or new suspicious jefe morphology.  3. Partially visualized postsurgical changes of esophagogastrectomy with gastric pull-through and multinodular pulmonary opacities in the visualized lung apices, better appreciated on same-day CT chest 7/8/2024.  Primary: NI-RADS 3} High suspicion (new or enlarging discrete nodule/mass): biopsy.  Neck: NI-RADS 1}  No abnormal lymph node.     7/8/24 ENT visit with Dr. Felix, no overt mass on exam at that time    Today, energy level continues to improve, not yet back to baseline, maybe 80% there  Weight is stable, but difficulty putting more on  Working full time at the office  Mild fatigue is still improving  Continues to learn what he can tolerate in terms of oral intake  Vomiting intermittently    No neuropathy  No renal toxicity on recent labs      Exam:   /69 (BP Location: Right arm, Patient Position: Sitting, Cuff Size: Adult Regular)   Pulse 80   Temp 98  F (36.7  C) (Oral)   Resp 16   Wt 62.7 kg (138 lb 3.2 oz)   SpO2 99%   BMI 21.01 kg/m      Gen: Comfortable, NAD  HEENT: Pupils equal, non-icteric  Neck/Lymph: Supple, no  palpable lymphadenopathy   resp: Comfortable on room air, clear bilaterally  CV: Systolic murmur present, regular rate  Abd: Soft  Ext: No swelling  Neuro: Normal gait      Labs:   Labs reviewed, no labs today, however 5/17 labs with normal renal function and only mild residual anemia at hemoglobin 11.2 with otherwise normal cell counts    Imaging:   All pertinent imaging studies personally reviewed. Key findings summarized in oncology history above    Pathology:   Reviewed in chart, key findings summarized above      Assessment and Plan:   Lopez Hogue is a 68 year old male with HTN, CAD, prior sweat gland cancer s/p resection, and recently diagnosed head and neck squamous cell carcinoma of the vallecula, with staging revealing a synchronous esophageal squamous cell carcinoma. Presenting today to establish care with medical oncology.       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, EUS staging shows T2 N1 disease  -now completed RT with concurrent carboplatin + paclitaxel  -Underwent esophagectomy 4/19/2024, final pathology reviewed today, this shows a complete pathologic response  -We discussed his pathology findings at length, as well as the role for ongoing surveillance for recurrent esophageal cancer  -We also reviewed his recent imaging of the head and neck which shows a good response of his nose he is, however, with some questionable area of residual enhancement in the right base of tongue of unclear significance      Plan:  --Regarding his esophageal squamous cell carcinoma, given his pathologic complete response, no role for adjuvant therapy at this time  --Plan for CT neck/chest/AP every 6 months for the first 2 years  --Will arrange for every 3-month exams (alternating FRAN and MD with scan review) with labs  --He will continue follow-up with  thoracic surgery    --Regarding his oropharyngeal cancer, will discuss imaging findings with his ENT team, and consider biopsy versus PET/CT or both to workup the concern for residual active disease at the primary site        Yannick Alva MD PhD   of Medicine  Division of Hematology, Oncology and Transplantation    ---  120 minutes were spent on the date of the encounter performing chart review, history and exam, documentation, and further activities as noted above.     The longitudinal plan of care for the diagnosis(es)/condition(s) as documented were addressed during this visit. Due to the added complexity in care, I will continue to support Fabricio in the subsequent management and with ongoing continuity of care.

## 2024-07-10 NOTE — PROGRESS NOTES
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 Esophagectomy     Interval history: Doing well today. Eating well. No complaints     Physical Examination:  Alert, NAD  Breathing comfortably  No palpable adenopathy  No lesions in oral cavity or oropharynx     Procedure: Fiberoptic laryngoscopy performed. No lesions seen. Airway patent     A/P:  Doing well without signs of disease in the HN.    His imaging shows possible right lingual tonsil lesion. This is surprising given a normal exam and scope.    We will discuss the imaging at our [facility] HN tumor board.     We scheduled a 3 month follow up     Edi Felix MD        25 minutes spent on the date of the encounter in chart review, patient visit, review of tests, documentation and/or discussion with other providers about the issues documented above asides from time spent doing flexible laryngoscopy.

## 2024-07-10 NOTE — PROGRESS NOTES
"Lopez Hogue was seen for allied health care provider visit in conjunction with ENT clinic visit. Speaking is functional. Swallowing is reported to be going well. He is having trouble with eating but that's not related to his throat. He feels he gets full and is spending all his time working on getting in nutrition without being able to gain weight and barely maintaining. He may return to partial enteral support to take the demand of oral nutrition as it is \"exhausting\" for him right now. He will resume his pharyngeal strengthening exercises as he has decreased these since his hospitalization. He is aware of the long term impact of the radiation fibrosis on the swallow musculature. All questions answered for pt and his spouse within scope of practice.  Encouraged pt to reach out with any questions or concerns.        Pat Chavira MS, CCC-SLP  Speech-Language Pathology  [facility] [place]  Department of Otolaryngology/D&T - 4th floor  Phone: [phone]  Email: [email or web address]    "

## 2024-07-12 ENCOUNTER — TUMOR CONFERENCE (OUTPATIENT)
Dept: ONCOLOGY | Facility: CLINIC | Age: 69
End: 2024-07-12
Payer: COMMERCIAL

## 2024-07-12 DIAGNOSIS — C15.9 PRIMARY ESOPHAGEAL SQUAMOUS CELL CARCINOMA (H): Primary | ICD-10-CM

## 2024-07-12 NOTE — TUMOR CONFERENCE
Called and spoke to patient regarding tumor board recommendation for PET scan to further evaluate right lingual tonsil lesion seen on CT scan. Patient agreeable and transferred to imaging scheduling line for scheduling at a time that is convenient for him.     Hollie Biswas, RN, BSN  RN Care Coordinator, ENT Clinic

## 2024-07-12 NOTE — TUMOR CONFERENCE
Head & Neck Tumor Conference Note   Status: Established    Staff: Dr. Felix    Tumor Site: oropharynx (vallecula and mid-esophagus)  Tumor Pathology: p16- SCC  Tumor Stage: [id number]   Tumor Treatment:   - Concurrent chemoradiation to HN and esophagus completed 24   - Esophagectomy 2024    Reason for Review: Review imaging, path, and POC    Brief History: This is a 68 year old male with a history of cT1N2b p16- SCC of the vallecula s/p chemoradiation completed 24 and esophagectomy on 2024. Patient was recently seen for follow up in Head and Neck clinic. Physical exam and flexible laryngoscopy was normal however his surveillance CT demonstrated possible right lingual tonsil lesion. We are here today to review imaging and to discuss plan of care.     Pertinent PMH:   Past Medical History:   Diagnosis Date    Anxiety     Aortic stenosis     CAD (coronary artery disease)     ETOH dependence     Quit drinking 10 years    Heart attack (H)     History of blood transfusion     HLD (hyperlipidemia)     Hypertension     Malignant neoplasm of middle third of esophagus (H)     Nonrheumatic aortic (valve) stenosis     Sweat gland carcinoma       Smoking Hx:   Social History     Tobacco Use    Smoking status: Former     Current packs/day: 0.00     Average packs/day: 2.0 packs/day for 30.0 years (60.0 ttl pk-yrs)     Types: Cigarettes     Start date:      Quit date:      Years since quittin.5     Passive exposure: Past    Smokeless tobacco: Never    Tobacco comments:     Quit 10 years ago   Vaping Use    Vaping status: Never Used   Substance Use Topics    Alcohol use: Not Currently     Comment: quit in     Drug use: Never       Imaging:     CT Soft Tissue Neck w Contrast    Narrative  EXAM: CT SOFT TISSUE NECK W CONTRAST  2024 2:32 PM    HISTORY:  SCC vallecula, mid esophageal SCC; Squamous cell carcinoma  of vallecula (H)    COMPARISON:  CT neck 10/13/2023    TECHNIQUE: Following  intravenous administration of nonionic iodinated  contrast medium, thin section helical CT images were obtained from the  skull base down to the level of the aortic arch.  Axial, coronal and  sagittal reformations were performed with 2-3 mm slice thickness  reconstruction. Images were reviewed in soft tissue, lung and bone  windows.    CONTRAST: Isovue 370 [id number]    FINDINGS:  Focal hyperenhancement of the right lingual tonsil (series 3 image 65)  along the anterior margin of the vallecula, measuring 1.7 x 2.3 x 2  cm.  No nasopharyngeal, oropharyngeal abnormality.    Salivary glands are within normal limits.    Markedly decreased size of right level 2A cervical lymph node, now  measuring approximately 0.8 x 0.4 cm (series 4 image 68),  corresponding to the previously FDG avid lymph node on PET/CT  10/13/2023. No definite cervical lymphadenopathy on today's  examination.    Normal thyroid gland.    Patent cervical vasculature; no high grade arterial stenosis. Vascular  calcifications of the cervical and visualized portions of the  intracranial carotid vasculature.    Multilevel degenerative changes of the spine. No acute or suspicious  osseous abnormality. No high-grade spinal canal or neural foraminal  stenosis.    Mild bilateral maxillary sinus mucosal thickening. Clear mastoid air  cells. Multiple mandibular dental caries with absent maxillary teeth.  Lateral left cerebellar encephalomalacia, unchanged.    No suspicious finding in the visualized superior mediastinum/thorax.  Partially visualized postsurgical changes of esophagogastrectomy with  gastric pull-through. There is dilatation and layering material in the  esophagogastric anastomotic site.    Centrilobular emphysematous changes. Multinodular pulmonary scarring  and other opacities in the lung apices, better appreciated on CT chest  7/8/2024.    Impression  IMPRESSION:  1. Residual mass centered on the right lingual tonsil.  2. Interval resolution of  previously identified cervical lymphadenopathy. No jefe enlargement or new suspicious jefe morphology.  3. Partially visualized postsurgical changes of esophagogastrectomy with gastric pull-through and multinodular pulmonary opacities in the visualized lung apices, better appreciated on same-day CT chest 7/8/2024.  Primary: NI-RADS 3} High suspicion (new or enlarging discrete  nodule/mass): biopsy.  Neck: NI-RADS 1}  No abnormal lymph node.      CT Chest w Contrast    Narrative  EXAMINATION: Chest CT  7/8/2024 2:34 PM    CLINICAL HISTORY: SCC vallecula, mid esophageal SCC; Squamous cell  carcinoma of vallecula (H)    COMPARISON: 5/4/2024 CT chest abdomen and pelvis    TECHNIQUE: CT imaging obtained through the chest with contrast. Axial,  coronal, and sagittal reconstructions and axial MIP reformatted images  are reviewed.    FINDINGS:    Devices: None.    Airways: The central tracheobronchial tree is clear.    Lungs: Significantly improved diffuse nodular opacities with residual  nodular groundglass opacities throughout the lungs most pronounced in  the posterior left upper lobe and left lower lobe.    Lower neck and axillae: No enlarged supraclavicular nodes are present.  No actionable nodule is present in the imaged portion of the thyroid  lobes. No axillary lymphadenopathy.    Heart: Normal heart size. No pericardial fluid or thickening.    Mediastinum and bret: Post surgical changes of esophagogastrectomy  with gastric pull-though. Interval pyloric stent placement with  persistent fluid distention of the gastric pull-through. No  mediastinal mass is present. No enlarged lymph nodes are present.    Vessels: Normal caliber of the aorta and main pulmonary artery.  Multivessel coronary artery calcifications.    Upper abdomen: No pathologic process is present in the imaged portion  of the upper abdomen.    Bones: No acute or aggressive osseous abnormality. Several old  right-sided rib fractures.    Soft tissues:  Unremarkable.    Impression  IMPRESSION:  1. Improved diffuse opacities throughout the lungs with ongoing  residual nodular groundglass opacity throughout the lungs, likely  representing improving infectious process.  2. Post surgical changes at the esophagogastrectomy with interval  pyloric stent placement. Ongoing distention of the gastric  pull-through.      Pathology:   No new pathology     Tumor Board Recommendation:   Discussion: This is a 68 year old man with a history of cT1N2b p16- SCC of the vallecula s/p chemoradiation completed 1/26/24 and esophagectomy on 4/17/2024. Surveillance CT neck from 7/8/24 reviewed today. Right lingual tonsil is abnormal in appearance. Recommend further imaging to evaluate extent of possible malignancy.      Plan:   - PET/CT   - consider DL with biopsy pending results     Sheila Crockett MD  Otolaryngology- Head and Neck Surgery, PGY-3    Documentation / Disclaimer Cancer Tumor Board Note: Cancer tumor board recommendations do not override what is determined to be reasonable care and treatment, which is dependent on the circumstances of a patient's case; the patient's medical, social, and personal concerns; and the clinical judgment of the oncologist [physician].

## 2024-07-13 DIAGNOSIS — I20.0 UNSTABLE ANGINA (H): ICD-10-CM

## 2024-07-16 RX ORDER — ROSUVASTATIN CALCIUM 40 MG/1
40 TABLET, COATED ORAL DAILY
Qty: 90 TABLET | Refills: 0 | Status: SHIPPED | OUTPATIENT
Start: 2024-07-16

## 2024-07-17 SDOH — HEALTH STABILITY: PHYSICAL HEALTH: ON AVERAGE, HOW MANY MINUTES DO YOU ENGAGE IN EXERCISE AT THIS LEVEL?: 0 MIN

## 2024-07-17 SDOH — HEALTH STABILITY: PHYSICAL HEALTH: ON AVERAGE, HOW MANY DAYS PER WEEK DO YOU ENGAGE IN MODERATE TO STRENUOUS EXERCISE (LIKE A BRISK WALK)?: 0 DAYS

## 2024-07-17 ASSESSMENT — SOCIAL DETERMINANTS OF HEALTH (SDOH): HOW OFTEN DO YOU GET TOGETHER WITH FRIENDS OR RELATIVES?: ONCE A WEEK

## 2024-07-21 ENCOUNTER — HEALTH MAINTENANCE LETTER (OUTPATIENT)
Age: 69
End: 2024-07-21

## 2024-07-23 ENCOUNTER — OFFICE VISIT (OUTPATIENT)
Dept: FAMILY MEDICINE | Facility: CLINIC | Age: 69
End: 2024-07-23
Payer: COMMERCIAL

## 2024-07-23 VITALS
OXYGEN SATURATION: 96 % | WEIGHT: 140.4 LBS | BODY MASS INDEX: 22.03 KG/M2 | HEIGHT: 67 IN | HEART RATE: 74 BPM | TEMPERATURE: 98.1 F | SYSTOLIC BLOOD PRESSURE: 94 MMHG | RESPIRATION RATE: 24 BRPM | DIASTOLIC BLOOD PRESSURE: 55 MMHG

## 2024-07-23 DIAGNOSIS — K31.1 PYLORIC STRICTURE: ICD-10-CM

## 2024-07-23 DIAGNOSIS — I25.119 CORONARY ARTERY DISEASE INVOLVING NATIVE HEART WITH ANGINA PECTORIS, UNSPECIFIED VESSEL OR LESION TYPE (H): ICD-10-CM

## 2024-07-23 DIAGNOSIS — C15.5 MALIGNANT NEOPLASM OF LOWER THIRD OF ESOPHAGUS (H): ICD-10-CM

## 2024-07-23 DIAGNOSIS — R06.83 SNORING: ICD-10-CM

## 2024-07-23 DIAGNOSIS — F41.9 ANXIETY: ICD-10-CM

## 2024-07-23 DIAGNOSIS — Z95.5 S/P CORONARY ARTERY STENT PLACEMENT: ICD-10-CM

## 2024-07-23 DIAGNOSIS — F10.21 ALCOHOL DEPENDENCE IN REMISSION (H): ICD-10-CM

## 2024-07-23 DIAGNOSIS — I47.29 OTHER VENTRICULAR TACHYCARDIA (H): ICD-10-CM

## 2024-07-23 DIAGNOSIS — I35.0 NONRHEUMATIC AORTIC VALVE STENOSIS: ICD-10-CM

## 2024-07-23 DIAGNOSIS — C10.0 SQUAMOUS CELL CARCINOMA OF VALLECULA (H): ICD-10-CM

## 2024-07-23 DIAGNOSIS — Z01.818 PREOP GENERAL PHYSICAL EXAM: Primary | ICD-10-CM

## 2024-07-23 PROBLEM — D61.818 OTHER PANCYTOPENIA (H): Status: RESOLVED | Noted: 2024-01-23 | Resolved: 2024-07-23

## 2024-07-23 PROBLEM — D61.810 ANTINEOPLASTIC CHEMOTHERAPY INDUCED PANCYTOPENIA (H): Status: RESOLVED | Noted: 2024-02-11 | Resolved: 2024-07-23

## 2024-07-23 PROBLEM — T45.1X5A ANTINEOPLASTIC CHEMOTHERAPY INDUCED PANCYTOPENIA (H): Status: RESOLVED | Noted: 2024-02-11 | Resolved: 2024-07-23

## 2024-07-23 PROCEDURE — 99204 OFFICE O/P NEW MOD 45 MIN: CPT | Performed by: PHYSICIAN ASSISTANT

## 2024-07-23 PROCEDURE — 93005 ELECTROCARDIOGRAM TRACING: CPT | Performed by: PHYSICIAN ASSISTANT

## 2024-07-23 PROCEDURE — 93010 ELECTROCARDIOGRAM REPORT: CPT | Performed by: INTERNAL MEDICINE

## 2024-07-23 PROCEDURE — G2211 COMPLEX E/M VISIT ADD ON: HCPCS | Performed by: PHYSICIAN ASSISTANT

## 2024-07-23 RX ORDER — BUSPIRONE HYDROCHLORIDE 5 MG/1
5 TABLET ORAL 3 TIMES DAILY
Qty: 90 TABLET | Refills: 2 | Status: SHIPPED | OUTPATIENT
Start: 2024-07-23

## 2024-07-23 RX ORDER — SODIUM FLUORIDE 5 MG/G
GEL, DENTIFRICE DENTAL DAILY
Status: CANCELLED | OUTPATIENT
Start: 2024-07-23

## 2024-07-23 NOTE — PROGRESS NOTES
Preoperative Evaluation  [facility]  [address] [facility] [address]  Phone: [phone]  Fax: [phone]  Primary Provider: Maya Chappell PA-C  Pre-op Performing Provider: Maya Chappell PA-C  Jul 23, 2024 7/23/2024   Surgical Information   What procedure is being done? Pyloric stent replacement   Facility or Hospital where procedure/surgery will be performed: [facility]   Who is doing the procedure / surgery? Dr. Monahan   Date of surgery / procedure: 08/08/2024   Time of surgery / procedure: 6:30am   Where do you plan to recover after surgery? at home with family        Fax number for surgical facility: Note does not need to be faxed, will be available electronically in Epic.    Assessment & Plan     The proposed surgical procedure is considered LOW risk.    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  Patient knows NPO and medication recommendation.    S/P coronary artery stent placement  Coronary 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, unspecified vessel or lesion type (H24)  Patient is s/p 2 cardiac stents, stable on Lipitor.  No further anticoagulation.    Anxiety  Chronic issue, patient notes Zoloft not super helpful, will add Buspar 5mg TID. Follow up as needed.    Other ventricular tachycardia (H)  Follows with cardiology.    Alcohol dependence in remission (H)  Patient sober 11 years.    Snoring  Chronic issue, sleep referral provided.        Possible Sleep Apnea: referral to sleep medicine         - No identified additional risk factors other than previously addressed    Preoperative Medication Instructions  Antiplatelet or Anticoagulation Medication Instructions   - Patient is on no antiplatelet or anticoagulation medications.    Additional  Medication Instructions  Take all medications as scheduled with small sip of water in morning, hold all vitamins and supplements morning of procedure.       Recommendation  Approval given to proceed with proposed procedure, without further diagnostic evaluation.    The longitudinal plan of care for the diagnosis(es)/condition(s) as documented were addressed during this visit. Due to the added complexity in care, I will continue to support the patient in the subsequent management and ongoing continuity of care.      Gregor Regalado is a 68 year old, presenting for the following:  Pre-Op Exam          7/23/2024     1:17 PM   Additional Questions   Roomed by MONA Gomez CMA([facility])     HPI related to upcoming procedure: history of esophageal cancer s/p surgery, having some stomach issues, needs EGD and replacement pyloric stent for stricture        7/23/2024   Pre-Op Questionnaire   Have you ever had a heart attack or stroke? (!) YES    Have you ever had surgery on your heart or blood vessels, such as a stent placement, a coronary artery bypass, or surgery on an artery in your head, neck, heart, or legs? (!) YES    Do you have chest pain with activity? No   Do you have a history of heart failure? No   Do you currently have a cold, bronchitis or symptoms of other infection? No   Do you have a cough, shortness of breath, or wheezing? No   Do you or anyone in your family have previous history of blood clots? No   Do you or does anyone in your family have a serious bleeding problem such as prolonged bleeding following surgeries or cuts? No   Have you ever had problems with anemia or been told to take iron pills? (!) YES    Have you had any abnormal blood loss such as black, tarry or bloody stools? No   Have you ever had a blood transfusion? (!) YES   Have you ever had a transfusion reaction? (!) YES - reaction was to the chemotherapy agent   Are you willing to have a blood transfusion if it is medically needed before, during,  or after your surgery? Yes   Have you or any of your relatives ever had problems with anesthesia? No   Do you have sleep apnea, excessive snoring or daytime drowsiness? (!) YES- snores   Do you have a CPAP machine? (!) NO    Do you have any artifical heart valves or other implanted medical devices like a pacemaker, defibrillator, or continuous glucose monitor? No   Do you have artificial joints? No   Are you allergic to latex? No        Health Care Directive  Patient has a Health Care Directive on file      Preoperative Review of    reviewed - no record of controlled substances prescribed.      Status of Chronic Conditions:  See problem list for active medical problems.  Problems all longstanding and stable, except as noted/documented.  See ROS for pertinent symptoms related to these conditions.    Patient Active Problem List    Diagnosis Date Noted    Esophageal cancer (H) 04/19/2024     Priority: Medium    Acute post-operative pain 04/01/2024     Priority: Medium    Syncope and collapse 02/11/2024     Priority: Medium    Antineoplastic chemotherapy induced pancytopenia (H24) 02/11/2024     Priority: Medium    Malignant neoplasm of esophagus, unspecified location (H) 02/11/2024     Priority: Medium    Iron deficiency anemia secondary to inadequate dietary iron intake 02/11/2024     Priority: Medium    Fever, unspecified fever cause 01/23/2024     Priority: Medium    Other pancytopenia (H) 01/23/2024     Priority: Medium    Cellulitis 01/15/2024     Priority: Medium    Primary esophageal squamous cell carcinoma (H) 11/17/2023     Priority: Medium    Squamous cell carcinoma of vallecula (H) 11/06/2023     Priority: Medium    Hemorrhage of oropharynx 08/18/2023     Priority: Medium    Vallecular mass 08/18/2023     Priority: Medium    Acute chest pain 05/08/2023     Priority: Medium    Coronary 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, unspecified vessel or lesion type (H24) 05/08/2023      Priority: Medium    Coronary artery disease involving native heart with unstable angina pectoris (H) 04/10/2023     Priority: Medium    Dyslipidemia, goal LDL below 70 04/10/2023     Priority: Medium    Unstable angina (H)      Priority: Medium    Nonrheumatic aortic valve stenosis 05/24/2019     Priority: Medium     Mild per echo in 2017        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     Priority: Medium    HTN (hypertension) 07/15/2013     Priority: Medium    EtOH dependence (H) 07/15/2013     Priority: Medium      Past Medical History:   Diagnosis Date    Anxiety     Aortic stenosis     CAD (coronary artery disease)     ETOH dependence     Quit drinking 10 years    Heart attack (H)     History of blood transfusion     HLD (hyperlipidemia)     Hypertension     Malignant neoplasm of middle third of esophagus (H)     Nonrheumatic aortic (valve) stenosis     Sweat gland carcinoma      Past Surgical History:   Procedure Laterality Date    BIOPSY  June 29015    Left gluteal and groin lymphnodes    BRONCHOSCOPY FLEXIBLE AND RIGID N/A 04/19/2024    Procedure: Bronchoscopy flexible and rigid;  Surgeon: Devin Hill MD;  Location: [facility] OR    BRONCHOSCOPY FLEXIBLE AND RIGID N/A 04/28/2024    Procedure: Bronchoscopy flexible and rigid;  Surgeon: Jessie Kim MD;  Location: [facility] OR    CARDIAC SURGERY  March 2023    2 stents placed    COLONOSCOPY  2012    CV CORONARY ANGIOGRAM N/A 03/27/2023    Procedure: Coronary Angiogram;  Surgeon: Miki Etienne MD;  Location: [facility] CV    CV CORONARY ANGIOGRAM N/A 05/09/2023    Procedure: Coronary Angiogram;  Surgeon: Miki Etienne MD;  Location: [facility] CV    CV LEFT HEART CATH N/A 03/27/2023    Procedure: Left Heart Catheterization;  Surgeon: Miki Etienne MD;  Location: [facility] CV    CV LEFT HEART CATH N/A 05/09/2023    Procedure: Left Heart Catheterization;  Surgeon: Miki Etienne MD;  Location: [facility] CV    CV PCI N/A  03/27/2023    Procedure: Percutaneous Coronary Intervention;  Surgeon: Miki Etienne MD;  Location: [facility]    ENDOSCOPIC ULTRASOUND UPPER GASTROINTESTINAL TRACT (GI) N/A 11/28/2023    Procedure: Endoscopic ultrasound upper gastrointestinal tract (GI);  Surgeon: Shahriar Giraldo MD;  Location: [facility]    ESOPHAGOGASTRODUODENOSCOPY, WITH BOTULINUM TOXIN INJECTION N/A 04/29/2024    Procedure: Esophagogastroduodenoscopy, With Botulinum Toxin Injection;  Surgeon: Jessie Kim MD;  Location: [facility] GI    ESOPHAGOSCOPY, GASTROSCOPY, DUODENOSCOPY (EGD), COMBINED N/A 11/08/2023    Procedure: ESOPHAGOGASTRODUODENOSCOPY, WITH BIOPSY;  Surgeon: Shahriar Giraldo MD;  Location: [facility]    ESOPHAGOSCOPY, GASTROSCOPY, DUODENOSCOPY (EGD), COMBINED N/A 04/19/2024    Procedure: Esophagoscopy, gastroscopy, duodenoscopy (EGD), combined;  Surgeon: Devin Hill MD;  Location: [facility] OR    ESOPHAGOSCOPY, GASTROSCOPY, DUODENOSCOPY (EGD), COMBINED N/A 04/28/2024    Procedure: Esophagoscopy, gastroscopy, duodenoscopy (EGD), combined;  Surgeon: Jessie Kim MD;  Location: [facility]    ESOPHAGOSCOPY, GASTROSCOPY, DUODENOSCOPY (EGD), COMBINED N/A 05/05/2024    Procedure: Esophagoscopy, gastroscopy, duodenoscopy (EGD), combined-under fluoro & dilation, nasogastric tube placement, bronchoscopy;  Surgeon: Kevin Calderon MD;  Location: [facility] OR    IR JEJUNOSTOMY TUBE CHANGE  05/19/2024    LAPAROSCOPIC ASSISTED INSERTION TUBE JEJUNOSTOMY N/A 04/01/2024    Procedure: Laparoscopic Jejunostomy Tube Insertion and Esophagogastroduodenoscopy;  Surgeon: Kevin Calderon MD;  Location: [facility]    LARYNGOSCOPY WITH BIOPSY(IES) N/A 10/12/2023    Procedure: LARYNGOSCOPY, WITH BIOPSY;  Surgeon: Edi Felix MD;  Location: [facility]    OTHER SURGICAL HISTORY  01/01/2015    WIDE EXCISION OF LEFT GLUTEAL MASSTNM: [id number], stage: II hidradenocarcinoma Grade II, 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, sentinel lymph node biopsy  negative     SOFT TISSUE SURGERY  June 2023    left gluteal and lymphnodes    THORACOSCOPIC, LAPAROSCOPIC ESOPHAGECTOMY, COMBINED N/A 04/19/2024    Procedure: Laparoscopic and right thoracoscopic esophagogastrectomy, right AND left chest tube placement;  Surgeon: Devin Hill MD;  Location: [facility] OR    VASCULAR SURGERY  March 2023    Cath 2 stents     Current Outpatient Medications   Medication Sig Dispense Refill    acetaminophen (TYLENOL) 500 MG tablet Take 500-1,000 mg by mouth every 8 hours as needed for fever or pain      acetylcysteine (MUCOMYST) 20 % neb solution Take 2 mLs by nebulization every 4 hours 100 mL 3    nitroGLYcerin (NITROSTAT) 0.4 MG sublingual tablet PLACE 1 TABLET UNDER THE TONGUE EVERY 5 MINUTES FOR CHEST PAIN FOR 3 DOSES. IF SYMPTOMS PERSIST 5 MINUTES AFTER 1ST DOSE CALL 911. (Patient taking differently: 0.4 mg every 5 minutes as needed PLACE 1 TABLET UNDER THE TONGUE EVERY 5 MINUTES FOR CHEST PAIN FOR 3 DOSES. IF SYMPTOMS PERSIST 5 MINUTES AFTER 1ST DOSE CALL 911) 25 tablet 1    rosuvastatin (CRESTOR) 40 MG tablet Take 1 tablet (40 mg) by mouth daily **Due for follow-up with Dr. Walters** 90 tablet 0    senna-docusate (SENOKOT-S/PERICOLACE) 8.6-50 MG tablet Take 1 tablet by mouth 2 times daily as needed for constipation 30 tablet 0    sertraline (ZOLOFT) 100 MG tablet Take 100 mg by mouth every morning      sodium chloride (NEBUSAL) 3 % neb solution Take 4 mLs by nebulization every 3 hours as needed for wheezing 100 mL 3    sodium fluoride dental gel (PREVIDENT) 1.1 % GEL topical gel daily      gabapentin (NEURONTIN) 250 MG/5ML solution 2 mLs (100 mg) by Per J Tube route 3 times daily 180 mL 1    methocarbamol (ROBAXIN) 500 MG tablet 1 tablet (500 mg) by Per J Tube route every 6 hours as needed for muscle spasms 30 tablet 0    multivitamins w/minerals liquid 15 mLs by Per J Tube route daily 237 mL 1       Allergies   Allergen Reactions    Coconut Flavor Anaphylaxis     Raw coconut     "    Social History     Tobacco Use    Smoking status: Former     Current packs/day: 0.00     Average packs/day: 2.0 packs/day for 41.0 years (82.0 ttl pk-yrs)     Types: Cigarettes     Start date: 1972     Quit date: 2013     Years since quittin.1     Passive exposure: Past    Smokeless tobacco: Never    Tobacco comments:     Quit 10 years ago   Substance Use Topics    Alcohol use: Not Currently     Comment: Stopped 11 years ago     History   Drug Use Unknown             Review of Systems  Constitutional, HEENT, cardiovascular, pulmonary, gi and gu systems are negative, except as otherwise noted.    Does have some trouble swallowing due to vallecula issues and some digestive issues, all of it is related to surgeries he has had. Has occasional heartburn- taking pepcid.    Objective    BP 94/55   Pulse 74   Temp 98.1  F (36.7  C) (Oral)   Resp 24   Ht 1.702 m (5' 7\")   Wt 63.7 kg (140 lb 6.4 oz)   SpO2 96%   BMI 21.99 kg/m     Estimated body mass index is 21.99 kg/m  as calculated from the following:    Height as of this encounter: 1.702 m (5' 7\").    Weight as of this encounter: 63.7 kg (140 lb 6.4 oz).  Physical Exam  GENERAL: alert and no distress  NECK: no adenopathy, no asymmetry, masses, or scars  RESP: lungs clear to auscultation - no rales, rhonchi or wheezes  CV: regular rate and rhythm, normal S1 S2, no S3 or S4, 3/6 systolic murmur, click or rub, no peripheral edema  ABDOMEN: soft, nontender, no hepatosplenomegaly, no masses and bowel sounds normal  MS: no gross musculoskeletal defects noted, no edema    Recent Labs   Lab Test 24  0429 24  0815 24  0802 24  0523 24  1730 23  0710 23  0457   HGB 11.2*  --  12.4*   < > 11.0*   < > 12.5*     --  261   < > 200   < > 161   INR  --   --   --   --  1.34*  --  0.97    137  --    < > 141   < > 137   POTASSIUM 3.7 4.2  --    < > 3.8   < > 4.3   CR 0.61* 0.64*  --    < > 0.61*   < > 0.84    < " > = values in this interval not displayed.        Diagnostics  No labs were ordered during this visit.   EKG: appears normal, NSR, normal axis, normal intervals, no acute ST/T changes c/w ischemia, no LVH by voltage criteria, unchanged from previous tracings    Revised Cardiac Risk Index (RCRI)  The patient has the following serious cardiovascular risks for perioperative complications:   - Coronary Artery Disease (MI, positive stress test, angina, Qs on EKG) = 1 point     RCRI Interpretation: 1 point: Class II (low risk - 0.9% complication rate)         Signed Electronically by: Maya Chappell PA-C  A copy of this evaluation report is provided to the requesting physician.

## 2024-07-24 LAB
ATRIAL RATE - MUSE: 69 BPM
DIASTOLIC BLOOD PRESSURE - MUSE: NORMAL MMHG
INTERPRETATION ECG - MUSE: NORMAL
P AXIS - MUSE: 74 DEGREES
PR INTERVAL - MUSE: 176 MS
QRS DURATION - MUSE: 96 MS
QT - MUSE: 380 MS
QTC - MUSE: 407 MS
R AXIS - MUSE: 55 DEGREES
SYSTOLIC BLOOD PRESSURE - MUSE: NORMAL MMHG
T AXIS - MUSE: 62 DEGREES
VENTRICULAR RATE- MUSE: 69 BPM

## 2024-07-26 ENCOUNTER — HOSPITAL ENCOUNTER (OUTPATIENT)
Dept: PET IMAGING | Facility: CLINIC | Age: 69
Discharge: HOME | End: 2024-07-26
Attending: STUDENT IN AN ORGANIZED HEALTH CARE EDUCATION/TRAINING PROGRAM
Payer: COMMERCIAL

## 2024-07-26 ENCOUNTER — TUMOR CONFERENCE (OUTPATIENT)
Dept: ONCOLOGY | Facility: CLINIC | Age: 69
End: 2024-07-26
Payer: COMMERCIAL

## 2024-07-26 DIAGNOSIS — C15.9 PRIMARY ESOPHAGEAL SQUAMOUS CELL CARCINOMA (H): ICD-10-CM

## 2024-07-26 PROCEDURE — 74177 CT ABD & PELVIS W/CONTRAST: CPT | Mod: 26 | Performed by: RADIOLOGY

## 2024-07-26 PROCEDURE — 71260 CT THORAX DX C+: CPT

## 2024-07-26 PROCEDURE — 70491 CT SOFT TISSUE NECK W/DYE: CPT | Mod: 26 | Performed by: RADIOLOGY

## 2024-07-26 PROCEDURE — A9552 F18 FDG: HCPCS | Performed by: STUDENT IN AN ORGANIZED HEALTH CARE EDUCATION/TRAINING PROGRAM

## 2024-07-26 PROCEDURE — 343N000001 HC RX 343: Performed by: STUDENT IN AN ORGANIZED HEALTH CARE EDUCATION/TRAINING PROGRAM

## 2024-07-26 PROCEDURE — 71260 CT THORAX DX C+: CPT | Mod: 26 | Performed by: RADIOLOGY

## 2024-07-26 PROCEDURE — 70491 CT SOFT TISSUE NECK W/DYE: CPT

## 2024-07-26 PROCEDURE — 78816 PET IMAGE W/CT FULL BODY: CPT | Mod: PS

## 2024-07-26 PROCEDURE — 78813 PET IMAGE FULL BODY: CPT | Mod: 26 | Performed by: RADIOLOGY

## 2024-07-26 PROCEDURE — 250N000011 HC RX IP 250 OP 636: Performed by: STUDENT IN AN ORGANIZED HEALTH CARE EDUCATION/TRAINING PROGRAM

## 2024-07-26 RX ORDER — FLUDEOXYGLUCOSE F 18 200 MCI/ML
10-18 INJECTION, SOLUTION INTRAVENOUS ONCE
Status: COMPLETED | OUTPATIENT
Start: 2024-07-26 | End: 2024-07-26

## 2024-07-26 RX ORDER — IOPAMIDOL 755 MG/ML
10-135 INJECTION, SOLUTION INTRAVASCULAR ONCE
Status: COMPLETED | OUTPATIENT
Start: 2024-07-26 | End: 2024-07-26

## 2024-07-26 RX ADMIN — FLUDEOXYGLUCOSE F 18 10.1 MILLICURIE: 200 INJECTION, SOLUTION INTRAVENOUS at 06:40

## 2024-07-26 RX ADMIN — IOPAMIDOL 85 ML: 755 INJECTION, SOLUTION INTRAVENOUS at 07:29

## 2024-07-29 ENCOUNTER — OFFICE VISIT (OUTPATIENT)
Dept: OTOLARYNGOLOGY | Facility: CLINIC | Age: 69
End: 2024-07-29
Payer: COMMERCIAL

## 2024-07-29 VITALS
WEIGHT: 142 LBS | BODY MASS INDEX: 21.52 KG/M2 | SYSTOLIC BLOOD PRESSURE: 104 MMHG | DIASTOLIC BLOOD PRESSURE: 65 MMHG | TEMPERATURE: 98.2 F | HEART RATE: 75 BPM | OXYGEN SATURATION: 95 % | HEIGHT: 68 IN

## 2024-07-29 DIAGNOSIS — C10.0 SQUAMOUS CELL CARCINOMA OF VALLECULA (H): Primary | ICD-10-CM

## 2024-07-29 PROCEDURE — 31575 DIAGNOSTIC LARYNGOSCOPY: CPT | Performed by: STUDENT IN AN ORGANIZED HEALTH CARE EDUCATION/TRAINING PROGRAM

## 2024-07-29 PROCEDURE — 99213 OFFICE O/P EST LOW 20 MIN: CPT | Mod: 25 | Performed by: STUDENT IN AN ORGANIZED HEALTH CARE EDUCATION/TRAINING PROGRAM

## 2024-07-29 ASSESSMENT — PAIN SCALES - GENERAL: PAINLEVEL: NO PAIN (0)

## 2024-07-29 NOTE — PATIENT INSTRUCTIONS
You were seen in the ENT Clinic today by Dr. Felix. If you have any questions or concerns after your appointment, please contact us (see below)    Please return to clinic on 10/14/24 for follow up with Dr. Felix     How to Contact Us:  Send a [employer] message to your provider. Our team will respond to you via [employer]. Occasionally, we will need to call you to get further information.  For urgent matters (Monday-Friday), call the ENT Clinic: [phone] and speak with a call center team member - they will route your call appropriately.   If you'd like to speak directly with a nurse, please find our contact information below. We do our best to check voicemail frequently throughout the day, and will work to call you back within 1-2 days. For urgent matters, please use the general clinic phone numbers listed above.    Hollie ARAMBULA RN, BSN  RN Care Coordinator, ENT Clinic  [facility]  Direct: [phone]

## 2024-07-29 NOTE — NURSING NOTE
"Chief Complaint   Patient presents with    RECHECK     Follow up     Blood pressure 104/65, pulse 75, temperature 98.2  F (36.8  C), height 1.727 m (5' 8\"), weight 64.4 kg (142 lb), SpO2 95%.  Tanner Kelly LPN    "

## 2024-07-29 NOTE — TUMOR CONFERENCE
Discussed in clinic appointment with Dr. Felix 7/29/24. See visit note.    Hollie Biswas, RN, BSN  RN Care Coordinator, ENT Clinic

## 2024-07-30 NOTE — PROGRESS NOTES
Head and Neck Surgery  7/29/24     Diagnosis: Synchronous right vallecula T1N2b p16- SCC right vallecula and mid esophagus SCC     Treatment: Concurrent chemoradiation to HN and esophagus completed 1/26/24. Esophagectomy     Imaging:   CT neck 7/8/24: prominent right lingual tonsil tissue suspicious for recurrence  PET/CT 7/26/24: ZANE    Interval history: Doing well today. Eating well. No complaints     Physical Examination:  Alert, NAD  Breathing comfortably  No palpable adenopathy  No lesions in oral cavity or oropharynx     Procedure: Fiberoptic laryngoscopy performed. No lesions seen. Airway patent     A/P:  Doing well without signs of disease in the HN.Follow up pet after concerning CT shows no disease. This is consistent with physical exam. We will continue routine follow up. I will see him back in 3 months.     Edi Felix MD        25 minutes spent on the date of the encounter in chart review, patient visit, review of tests, documentation and/or discussion with other providers about the issues documented above asides from time spent doing flexible laryngoscopy.

## 2024-07-31 DIAGNOSIS — C15.9 PRIMARY ESOPHAGEAL SQUAMOUS CELL CARCINOMA (H): Primary | ICD-10-CM

## 2024-08-06 ENCOUNTER — HOSPITAL ENCOUNTER (OUTPATIENT)
Dept: RADIOLOGY | Facility: HOSPITAL | Age: 69
Discharge: HOME | End: 2024-08-06
Attending: CLINICAL NURSE SPECIALIST | Admitting: CLINICAL NURSE SPECIALIST
Payer: COMMERCIAL

## 2024-08-06 DIAGNOSIS — C15.9 PRIMARY ESOPHAGEAL SQUAMOUS CELL CARCINOMA (H): ICD-10-CM

## 2024-08-06 PROCEDURE — 74220 X-RAY XM ESOPHAGUS 1CNTRST: CPT

## 2024-08-07 ENCOUNTER — ANESTHESIA EVENT (OUTPATIENT)
Dept: SURGERY | Facility: CLINIC | Age: 69
End: 2024-08-07
Payer: COMMERCIAL

## 2024-08-08 ENCOUNTER — APPOINTMENT (OUTPATIENT)
Dept: GENERAL RADIOLOGY | Facility: CLINIC | Age: 69
End: 2024-08-08
Attending: INTERNAL MEDICINE
Payer: COMMERCIAL

## 2024-08-08 ENCOUNTER — HOSPITAL ENCOUNTER (OUTPATIENT)
Facility: CLINIC | Age: 69
Discharge: HOME | End: 2024-08-08
Attending: INTERNAL MEDICINE | Admitting: INTERNAL MEDICINE
Payer: COMMERCIAL

## 2024-08-08 ENCOUNTER — ANESTHESIA (OUTPATIENT)
Dept: SURGERY | Facility: CLINIC | Age: 69
End: 2024-08-08
Payer: COMMERCIAL

## 2024-08-08 VITALS
BODY MASS INDEX: 22.08 KG/M2 | HEART RATE: 66 BPM | HEIGHT: 67 IN | TEMPERATURE: 97.5 F | DIASTOLIC BLOOD PRESSURE: 60 MMHG | RESPIRATION RATE: 16 BRPM | WEIGHT: 140.7 LBS | SYSTOLIC BLOOD PRESSURE: 110 MMHG | OXYGEN SATURATION: 99 %

## 2024-08-08 LAB — UPPER GI ENDOSCOPY: NORMAL

## 2024-08-08 PROCEDURE — 272N000001 HC OR GENERAL SUPPLY STERILE: Performed by: INTERNAL MEDICINE

## 2024-08-08 PROCEDURE — 710N000009 HC RECOVERY PHASE 1, LEVEL 1, PER MIN: Performed by: INTERNAL MEDICINE

## 2024-08-08 PROCEDURE — 250N000025 HC SEVOFLURANE, PER MIN: Performed by: INTERNAL MEDICINE

## 2024-08-08 PROCEDURE — 43235 EGD DIAGNOSTIC BRUSH WASH: CPT | Performed by: ANESTHESIOLOGY

## 2024-08-08 PROCEDURE — 250N000009 HC RX 250: Performed by: ANESTHESIOLOGY

## 2024-08-08 PROCEDURE — 360N000082 HC SURGERY LEVEL 2 W/ FLUORO, PER MIN: Performed by: INTERNAL MEDICINE

## 2024-08-08 PROCEDURE — 250N000011 HC RX IP 250 OP 636: Performed by: ANESTHESIOLOGY

## 2024-08-08 PROCEDURE — 43235 EGD DIAGNOSTIC BRUSH WASH: CPT | Performed by: NURSE ANESTHETIST, CERTIFIED REGISTERED

## 2024-08-08 PROCEDURE — C1874 STENT, COATED/COV W/DEL SYS: HCPCS | Performed by: INTERNAL MEDICINE

## 2024-08-08 PROCEDURE — 258N000003 HC RX IP 258 OP 636: Performed by: ANESTHESIOLOGY

## 2024-08-08 PROCEDURE — 999N000179 XR SURGERY CARM FLUORO LESS THAN 5 MIN W STILLS

## 2024-08-08 PROCEDURE — 999N000141 HC STATISTIC PRE-PROCEDURE NURSING ASSESSMENT: Performed by: INTERNAL MEDICINE

## 2024-08-08 PROCEDURE — 370N000017 HC ANESTHESIA TECHNICAL FEE, PER MIN: Performed by: INTERNAL MEDICINE

## 2024-08-08 PROCEDURE — 710N000012 HC RECOVERY PHASE 2, PER MINUTE: Performed by: INTERNAL MEDICINE

## 2024-08-08 DEVICE — STENT AND ELECTROCAUTERY - ENHANCED DELIVERY SYSTEM
Type: IMPLANTABLE DEVICE | Site: ESOPHAGUS | Status: FUNCTIONAL
Brand: AXIOS™

## 2024-08-08 RX ORDER — ONDANSETRON 4 MG/1
4 TABLET, ORALLY DISINTEGRATING ORAL EVERY 30 MIN PRN
Status: DISCONTINUED | OUTPATIENT
Start: 2024-08-08 | End: 2024-08-08 | Stop reason: HOSPADM

## 2024-08-08 RX ORDER — ONDANSETRON 4 MG/1
4 TABLET, ORALLY DISINTEGRATING ORAL EVERY 6 HOURS PRN
Status: DISCONTINUED | OUTPATIENT
Start: 2024-08-08 | End: 2024-08-08 | Stop reason: HOSPADM

## 2024-08-08 RX ORDER — LIDOCAINE HYDROCHLORIDE 20 MG/ML
INJECTION, SOLUTION INFILTRATION; PERINEURAL PRN
Status: DISCONTINUED | OUTPATIENT
Start: 2024-08-08 | End: 2024-08-08

## 2024-08-08 RX ORDER — ONDANSETRON 2 MG/ML
4 INJECTION INTRAMUSCULAR; INTRAVENOUS EVERY 30 MIN PRN
Status: DISCONTINUED | OUTPATIENT
Start: 2024-08-08 | End: 2024-08-08 | Stop reason: HOSPADM

## 2024-08-08 RX ORDER — NALOXONE HYDROCHLORIDE 0.4 MG/ML
0.1 INJECTION, SOLUTION INTRAMUSCULAR; INTRAVENOUS; SUBCUTANEOUS
Status: DISCONTINUED | OUTPATIENT
Start: 2024-08-08 | End: 2024-08-08 | Stop reason: HOSPADM

## 2024-08-08 RX ORDER — FENTANYL CITRATE 50 UG/ML
50 INJECTION, SOLUTION INTRAMUSCULAR; INTRAVENOUS EVERY 5 MIN PRN
Status: DISCONTINUED | OUTPATIENT
Start: 2024-08-08 | End: 2024-08-08 | Stop reason: HOSPADM

## 2024-08-08 RX ORDER — SODIUM CHLORIDE, SODIUM LACTATE, POTASSIUM CHLORIDE, CALCIUM CHLORIDE 600; 310; 30; 20 MG/100ML; MG/100ML; MG/100ML; MG/100ML
INJECTION, SOLUTION INTRAVENOUS CONTINUOUS
Status: DISCONTINUED | OUTPATIENT
Start: 2024-08-08 | End: 2024-08-08 | Stop reason: HOSPADM

## 2024-08-08 RX ORDER — HYDROMORPHONE HCL IN WATER/PF 6 MG/30 ML
0.2 PATIENT CONTROLLED ANALGESIA SYRINGE INTRAVENOUS EVERY 5 MIN PRN
Status: DISCONTINUED | OUTPATIENT
Start: 2024-08-08 | End: 2024-08-08 | Stop reason: HOSPADM

## 2024-08-08 RX ORDER — FLUMAZENIL 0.1 MG/ML
0.2 INJECTION, SOLUTION INTRAVENOUS
Status: DISCONTINUED | OUTPATIENT
Start: 2024-08-08 | End: 2024-08-08 | Stop reason: HOSPADM

## 2024-08-08 RX ORDER — FENTANYL CITRATE 50 UG/ML
25 INJECTION, SOLUTION INTRAMUSCULAR; INTRAVENOUS EVERY 5 MIN PRN
Status: DISCONTINUED | OUTPATIENT
Start: 2024-08-08 | End: 2024-08-08 | Stop reason: HOSPADM

## 2024-08-08 RX ORDER — FAMOTIDINE 20 MG/1
20 TABLET, FILM COATED ORAL 2 TIMES DAILY
COMMUNITY
End: 2024-08-23 | Stop reason: ALTCHOICE

## 2024-08-08 RX ORDER — FENTANYL CITRATE 50 UG/ML
INJECTION, SOLUTION INTRAMUSCULAR; INTRAVENOUS PRN
Status: DISCONTINUED | OUTPATIENT
Start: 2024-08-08 | End: 2024-08-08

## 2024-08-08 RX ORDER — ONDANSETRON 2 MG/ML
4 INJECTION INTRAMUSCULAR; INTRAVENOUS
Status: DISCONTINUED | OUTPATIENT
Start: 2024-08-08 | End: 2024-08-08 | Stop reason: HOSPADM

## 2024-08-08 RX ORDER — PROPOFOL 10 MG/ML
INJECTION, EMULSION INTRAVENOUS PRN
Status: DISCONTINUED | OUTPATIENT
Start: 2024-08-08 | End: 2024-08-08

## 2024-08-08 RX ORDER — ONDANSETRON 2 MG/ML
4 INJECTION INTRAMUSCULAR; INTRAVENOUS EVERY 6 HOURS PRN
Status: DISCONTINUED | OUTPATIENT
Start: 2024-08-08 | End: 2024-08-08 | Stop reason: HOSPADM

## 2024-08-08 RX ORDER — HYDROMORPHONE HCL IN WATER/PF 6 MG/30 ML
0.4 PATIENT CONTROLLED ANALGESIA SYRINGE INTRAVENOUS EVERY 5 MIN PRN
Status: DISCONTINUED | OUTPATIENT
Start: 2024-08-08 | End: 2024-08-08 | Stop reason: HOSPADM

## 2024-08-08 RX ORDER — DEXAMETHASONE SODIUM PHOSPHATE 4 MG/ML
INJECTION, SOLUTION INTRA-ARTICULAR; INTRALESIONAL; INTRAMUSCULAR; INTRAVENOUS; SOFT TISSUE PRN
Status: DISCONTINUED | OUTPATIENT
Start: 2024-08-08 | End: 2024-08-08

## 2024-08-08 RX ORDER — ONDANSETRON 2 MG/ML
INJECTION INTRAMUSCULAR; INTRAVENOUS PRN
Status: DISCONTINUED | OUTPATIENT
Start: 2024-08-08 | End: 2024-08-08

## 2024-08-08 RX ORDER — PROCHLORPERAZINE MALEATE 5 MG
5 TABLET ORAL EVERY 6 HOURS PRN
Status: DISCONTINUED | OUTPATIENT
Start: 2024-08-08 | End: 2024-08-08 | Stop reason: HOSPADM

## 2024-08-08 RX ORDER — SODIUM CHLORIDE, SODIUM LACTATE, POTASSIUM CHLORIDE, CALCIUM CHLORIDE 600; 310; 30; 20 MG/100ML; MG/100ML; MG/100ML; MG/100ML
INJECTION, SOLUTION INTRAVENOUS CONTINUOUS PRN
Status: DISCONTINUED | OUTPATIENT
Start: 2024-08-08 | End: 2024-08-08

## 2024-08-08 RX ORDER — LIDOCAINE 40 MG/G
CREAM TOPICAL
Status: DISCONTINUED | OUTPATIENT
Start: 2024-08-08 | End: 2024-08-08 | Stop reason: HOSPADM

## 2024-08-08 RX ADMIN — LIDOCAINE HYDROCHLORIDE 100 MG: 20 INJECTION, SOLUTION INFILTRATION; PERINEURAL at 07:25

## 2024-08-08 RX ADMIN — SODIUM CHLORIDE, POTASSIUM CHLORIDE, SODIUM LACTATE AND CALCIUM CHLORIDE: 600; 310; 30; 20 INJECTION, SOLUTION INTRAVENOUS at 07:20

## 2024-08-08 RX ADMIN — PROPOFOL 150 MG: 10 INJECTION, EMULSION INTRAVENOUS at 07:25

## 2024-08-08 RX ADMIN — SUCCINYLCHOLINE CHLORIDE 40 MG: 20 INJECTION, SOLUTION INTRAMUSCULAR; INTRAVENOUS; PARENTERAL at 07:25

## 2024-08-08 RX ADMIN — DEXAMETHASONE SODIUM PHOSPHATE 4 MG: 4 INJECTION, SOLUTION INTRA-ARTICULAR; INTRALESIONAL; INTRAMUSCULAR; INTRAVENOUS; SOFT TISSUE at 07:42

## 2024-08-08 RX ADMIN — FENTANYL CITRATE 50 MCG: 50 INJECTION INTRAMUSCULAR; INTRAVENOUS at 07:25

## 2024-08-08 RX ADMIN — ONDANSETRON 4 MG: 2 INJECTION INTRAMUSCULAR; INTRAVENOUS at 07:42

## 2024-08-08 RX ADMIN — PROPOFOL 50 MG: 10 INJECTION, EMULSION INTRAVENOUS at 07:38

## 2024-08-08 RX ADMIN — FENTANYL CITRATE 50 MCG: 50 INJECTION INTRAMUSCULAR; INTRAVENOUS at 07:38

## 2024-08-08 ASSESSMENT — ACTIVITIES OF DAILY LIVING (ADL)
ADLS_ACUITY_SCORE: 37
ADLS_ACUITY_SCORE: 37
ADLS_ACUITY_SCORE: 35
ADLS_ACUITY_SCORE: 37
ADLS_ACUITY_SCORE: 37

## 2024-08-08 ASSESSMENT — LIFESTYLE VARIABLES: TOBACCO_USE: 0

## 2024-08-08 ASSESSMENT — ENCOUNTER SYMPTOMS
SEIZURES: 0
DYSRHYTHMIAS: 0
ORTHOPNEA: 0

## 2024-08-08 NOTE — ANESTHESIA POSTPROCEDURE EVALUATION
Patient: Lopez Hogue    Procedure: Procedure(s):  ESOPHAGOGASTRODUODENOSCOPY with fluoroscopy and stent placement       Anesthesia Type:  General    Note:  Disposition: Outpatient   Postop Pain Control: Uneventful            Sign Out: Well controlled pain   PONV: No   Neuro/Psych: Uneventful            Sign Out: Acceptable/Baseline neuro status   Airway/Respiratory: Uneventful            Sign Out: Acceptable/Baseline resp. status   CV/Hemodynamics: Uneventful            Sign Out: Acceptable CV status; No obvious hypovolemia; No obvious fluid overload   Other NRE: NONE   DID A NON-ROUTINE EVENT OCCUR? No           Last vitals:  Vitals Value Taken Time   /62 08/08/24 0843   Temp 36.4  C (97.5  F) 08/08/24 0830   Pulse 64 08/08/24 0843   Resp 22 08/08/24 0843   SpO2 99 % 08/08/24 0843       Electronically Signed By: Danya Avina MD  August 8, 2024  3:30 PM

## 2024-08-08 NOTE — ANESTHESIA PREPROCEDURE EVALUATION
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)     Nonrheumatic aortic (valve) stenosis     Sweat gland carcinoma       Past Surgical History:   Procedure Laterality Date    BIOPSY      Left gluteal and groin lymphnodes    BRONCHOSCOPY FLEXIBLE AND RIGID N/A 2024    Procedure: Bronchoscopy flexible and rigid;  Surgeon: Devin Hill MD;  Location: [facility]    BRONCHOSCOPY FLEXIBLE AND RIGID N/A 2024    Procedure: Bronchoscopy flexible and rigid;  Surgeon: Jessie Kim MD;  Location: [facility]    CARDIAC SURGERY  2023    2 stents placed    COLONOSCOPY      CV CORONARY ANGIOGRAM N/A 2023    Procedure: Coronary Angiogram;  Surgeon: Miki Etienne MD;  Location: [facility]    CV CORONARY ANGIOGRAM N/A 2023    Procedure: Coronary Angiogram;  Surgeon: Miki Etienne MD;  Location: [facility]    CV LEFT HEART CATH N/A 2023    Procedure: Left Heart Catheterization;  Surgeon: Miki Etienne MD;  Location: [facility]    CV LEFT HEART CATH N/A 2023    Procedure: Left Heart Catheterization;  Surgeon: Miki Etienne MD;  Location: [facility]    CV PCI N/A 2023    Procedure: Percutaneous Coronary Intervention;  Surgeon: Miki Etienne MD;  Location: [facility]    ENDOSCOPIC ULTRASOUND UPPER GASTROINTESTINAL TRACT (GI) N/A 2023    Procedure: Endoscopic ultrasound upper gastrointestinal tract (GI);  Surgeon: Shahriar Giraldo MD;   Location: [facility]    ESOPHAGOGASTRODUODENOSCOPY, WITH BOTULINUM TOXIN INJECTION N/A 04/29/2024    Procedure: Esophagogastroduodenoscopy, With Botulinum Toxin Injection;  Surgeon: Jessie Kim MD;  Location: [facility]    ESOPHAGOSCOPY, GASTROSCOPY, DUODENOSCOPY (EGD), COMBINED N/A 11/08/2023    Procedure: ESOPHAGOGASTRODUODENOSCOPY, WITH BIOPSY;  Surgeon: Shahriar Giraldo MD;  Location: [facility]    ESOPHAGOSCOPY, GASTROSCOPY, DUODENOSCOPY (EGD), COMBINED N/A 04/19/2024    Procedure: Esophagoscopy, gastroscopy, duodenoscopy (EGD), combined;  Surgeon: Devin Hill MD;  Location: [facility]    ESOPHAGOSCOPY, GASTROSCOPY, DUODENOSCOPY (EGD), COMBINED N/A 04/28/2024    Procedure: Esophagoscopy, gastroscopy, duodenoscopy (EGD), combined;  Surgeon: Jessie Kim MD;  Location: [facility]    ESOPHAGOSCOPY, GASTROSCOPY, DUODENOSCOPY (EGD), COMBINED N/A 05/05/2024    Procedure: Esophagoscopy, gastroscopy, duodenoscopy (EGD), combined-under fluoro & dilation, nasogastric tube placement, bronchoscopy;  Surgeon: Kevin Calderon MD;  Location: [facility]    IR JEJUNOSTOMY TUBE CHANGE  05/19/2024    LAPAROSCOPIC ASSISTED INSERTION TUBE JEJUNOSTOMY N/A 04/01/2024    Procedure: Laparoscopic Jejunostomy Tube Insertion and Esophagogastroduodenoscopy;  Surgeon: Kevin Calderon MD;  Location: [facility]    LARYNGOSCOPY WITH BIOPSY(IES) N/A 10/12/2023    Procedure: LARYNGOSCOPY, WITH BIOPSY;  Surgeon: Edi Felix MD;  Location: [facility]    OTHER SURGICAL HISTORY  01/01/2015    WIDE EXCISION OF LEFT GLUTEAL MASSTNM: [id number], stage: II hidradenocarcinoma Grade II, 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, sentinel lymph node biopsy negative     SOFT TISSUE SURGERY  June 2023    left gluteal and lymphnodes    THORACOSCOPIC, LAPAROSCOPIC ESOPHAGECTOMY, COMBINED N/A 04/19/2024    Procedure: Laparoscopic and right thoracoscopic esophagogastrectomy, right AND left chest tube placement;  Surgeon: Devin Hill MD;  Location: [facility]    VASCULAR  SURGERY  2023    Cath 2 stents      Allergies   Allergen Reactions    Coconut Flavor Anaphylaxis     Raw coconut      Social History     Tobacco Use    Smoking status: Former     Current packs/day: 0.00     Average packs/day: 2.0 packs/day for 41.0 years (82.0 ttl pk-yrs)     Types: Cigarettes     Start date: 1972     Quit date: 2013     Years since quittin.1     Passive exposure: Past    Smokeless tobacco: Never    Tobacco comments:     Quit 10 years ago   Substance Use Topics    Alcohol use: Not Currently     Comment: Stopped 11 years ago      Wt Readings from Last 1 Encounters:   24 63.8 kg (140 lb 11.2 oz)        Anesthesia Evaluation   Pt has had prior anesthetic. Type: MAC and General.    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   S/p esophagectomy     (-) tobacco use, asthma and recent URI   Neurologic:    (-) no seizures, no CVA and no TIA   Cardiovascular:     (+)  hypertension- -  CAD angina-  - stent (x2)-                                   (-) SPENCER, orthopnea/PND and arrhythmias   METS/Exercise Tolerance: >4 METS    Hematologic:     (+)      anemia (Hgb 11.2), history of blood transfusion,      (-) history of blood clots   Musculoskeletal:       GI/Hepatic: Comment: Gastric outlet obstruction s/p pyloric stent  J-tube in place. Mostly PO intake with supplemental j-tube feedings. Recently with worsening symptoms of gastric outlet obstruction.     (+) GERD, Symptomatic,               (-) liver disease   Renal/Genitourinary:    (-) renal disease   Endo:    (-) Type II DM and obesity   Psychiatric/Substance Use:       Infectious Disease:    (-) Recent Fever   Malignancy:       Other:            Physical Exam    Airway        Mallampati: I   TM distance: > 3 FB   Neck ROM: full   Mouth opening: > 3 cm    Respiratory Devices and Support      "    Dental     Comment: Upper denture (removed)      (+) Removable bridges or other hardware    B=Bridge, C=Chipped, L=Loose, M=Missing    Cardiovascular          Rhythm and rate: regular and normal     Pulmonary           breath sounds clear to auscultation           OUTSIDE LABS:  CBC:   Lab Results   Component Value Date    WBC 7.0 05/17/2024    WBC 7.0 05/16/2024    HGB 11.2 (L) 05/17/2024    HGB 12.4 (L) 05/16/2024    HCT 33.3 (L) 05/17/2024    HCT 40.4 05/16/2024     05/17/2024     05/16/2024     BMP:   Lab Results   Component Value Date     05/17/2024     05/16/2024    POTASSIUM 3.7 05/17/2024    POTASSIUM 4.2 05/16/2024    CHLORIDE 101 05/17/2024    CHLORIDE 100 05/16/2024    CO2 26 05/17/2024    CO2 26 05/16/2024    BUN 21.8 05/17/2024    BUN 23.5 (H) 05/16/2024    CR 0.61 (L) 05/17/2024    CR 0.64 (L) 05/16/2024     (H) 05/17/2024     (H) 05/16/2024     COAGS:   Lab Results   Component Value Date    PTT 36 04/28/2024    INR 1.34 (H) 04/28/2024     POC: No results found for: \"BGM\", \"HCG\", \"HCGS\"  HEPATIC:   Lab Results   Component Value Date    ALBUMIN 3.0 (L) 05/05/2024    PROTTOTAL 6.8 05/05/2024    ALT 74 (H) 05/05/2024    AST 33 05/05/2024    ALKPHOS 418 (H) 05/05/2024    BILITOTAL 0.5 05/05/2024     OTHER:   Lab Results   Component Value Date    PH 7.44 05/05/2024    LACT 1.0 04/28/2024    A1C 5.2 03/27/2023    RAFAELA 9.0 05/17/2024    PHOS 3.6 05/17/2024    MAG 1.9 05/17/2024    TSH 2.57 03/29/2024    T4 0.83 (L) 03/29/2024       Anesthesia Plan    ASA Status:  3    NPO Status:  NPO Appropriate    Anesthesia Type: General.     - Airway: ETT   Induction: Intravenous, Propofol, RSI.   Maintenance: Inhalation.        Consents    Anesthesia Plan(s) and associated risks, benefits, and realistic alternatives discussed. Questions answered and patient/representative(s) expressed understanding.     - Discussed: Risks, Benefits and Alternatives for the PROCEDURE were " discussed     - Discussed with:  Patient            Postoperative Care    Pain management: Multi-modal analgesia, IV analgesics, Oral pain medications.   PONV prophylaxis: Ondansetron (or other 5HT-3), Dexamethasone or Solumedrol     Comments:               Danya Avina MD    I have reviewed the pertinent notes and labs in the chart from the past 30 days and (re)examined the patient.  Any updates or changes from those notes are reflected in this note.

## 2024-08-08 NOTE — ANESTHESIA CARE TRANSFER NOTE
Patient: Lopez Hogue    Procedure: Procedure(s):  ESOPHAGOGASTRODUODENOSCOPY with fluoroscopy and stent placement       Diagnosis: Esophageal cancer (H) [C15.9]  Pyloric stricture [K31.1]  Diagnosis Additional Information: No value filed.    Anesthesia Type:   General     Note:    Oropharynx: oropharynx clear of all foreign objects and spontaneously breathing  Level of Consciousness: awake  Oxygen Supplementation: face mask  Level of Supplemental Oxygen (L/min / FiO2): 6  Independent Airway: airway patency satisfactory and stable  Dentition: dentition unchanged  Vital Signs Stable: post-procedure vital signs reviewed and stable  Report to RN Given: handoff report given  Patient transferred to: PACU    Handoff Report: Identifed the Patient, Identified the Reponsible Provider, Reviewed the pertinent medical history, Discussed the surgical course, Reviewed Intra-OP anesthesia mangement and issues during anesthesia, Set expectations for post-procedure period and Allowed opportunity for questions and acknowledgement of understanding      Vitals:  Vitals Value Taken Time   /64         Pulse 65    Resp 12    SpO2 100 % 08/08/24 0803       Electronically Signed By: YADIRA Guerra CRNA  August 8, 2024  8:03 AM

## 2024-08-08 NOTE — ANESTHESIA PROCEDURE NOTES
Airway         Procedure Start/Stop Times: 8/8/2024 7:28 AM  Staff -        Anesthesiologist:  Danya Avina MD       CRNA: Gold Bahena APRN CRNA       Other Anesthesia Staff: Maribel Betts       Performed By: SRNAIndications and Patient Condition       Indications for airway management: geneva-procedural       Induction type:RSI       Mask difficulty assessment: 0 - not attempted    Final Airway Details       Final airway type: endotracheal airway       Successful airway: ETT - single  Endotracheal Airway Details        ETT size (mm): 8.0       Cuffed: yes       Successful intubation technique: direct laryngoscopy       DL Blade Type: Urena 2       Grade View of Cords: 1       Adjucts: stylet       Position: Right       Measured from: lips       Secured at (cm): 23       Bite block used: None    Post intubation assessment        Placement verified by: capnometry, equal breath sounds and chest rise        Number of attempts at approach: 1       Number of other approaches attempted: 0       Secured with: tape       Ease of procedure: easy       Dentition: Intact and Unchanged    Medication(s) Administered   Medication Administration Time: 8/8/2024 7:28 AM

## 2024-08-09 ENCOUNTER — PREP FOR PROCEDURE (OUTPATIENT)
Dept: GASTROENTEROLOGY | Facility: CLINIC | Age: 69
End: 2024-08-09
Payer: COMMERCIAL

## 2024-08-09 ENCOUNTER — NURSE TRIAGE (OUTPATIENT)
Dept: NURSING | Facility: CLINIC | Age: 69
End: 2024-08-09
Payer: COMMERCIAL

## 2024-08-09 ENCOUNTER — PATIENT OUTREACH (OUTPATIENT)
Dept: GASTROENTEROLOGY | Facility: CLINIC | Age: 69
End: 2024-08-09
Payer: COMMERCIAL

## 2024-08-09 ENCOUNTER — MYC MEDICAL ADVICE (OUTPATIENT)
Dept: GASTROENTEROLOGY | Facility: CLINIC | Age: 69
End: 2024-08-09
Payer: COMMERCIAL

## 2024-08-09 DIAGNOSIS — K31.1 PYLORIC STENOSIS: Primary | ICD-10-CM

## 2024-08-09 NOTE — TELEPHONE ENCOUNTER
"  Nurse Triage SBAR    Is this a 2nd Level Triage? YES, LICENSED PRACTITIONER REVIEW IS REQUIRED    Situation: Patient can not keep food down.  He ate lunch and then dinner and  now breakfast.  He eats and then about 5-10 minutes later gets nauseated, and then vomits and then vomits again.  Then he has to cough it all up to feel better.    Background:   ESOPHAGOGASTRODUODENOSCOPY with fluoroscopy and stent placement .  Livan Monahan MD   on 08-    Assessment:   Patient still has a feeding tube in place  Patient can not keep food down.    He ate lunch and then dinner and  now breakfast.    He eats and then about 5-10 minutes later gets nauseated, and then vomits and then vomits again.  Then he has to cough it all up to feel better.  Denies abdominal bloating  Voiding with clear urine and denies burning, urgency or frequency.  Juan SOB or throat tightening.  He does have a sore throat from the tube yesterday.  Acid reflux is present    Protocol Recommended Disposition:   Routing to GI for a call back    Recommendation:   Stay hydrated    Routed to provider          Reason for Disposition   Caller has URGENT question and triager unable to answer question    Additional Information   Negative: Bright red, wide-spread, sunburn-like rash   Negative: Fever > 100.4 F (38.0 C)    Answer Assessment - Initial Assessment Questions  1. SYMPTOM: \"What's the main symptom you're concerned about?\" (e.g., pain, fever, vomiting)      Patient can not keep food down.  He ate lunch and then dinner and  now breakfast.  He eats and then about 5-10 minutes later gets nauseated, and then vomits and then vomits again.  Then he has to cough it all up to feel better.  2. ONSET: \"When did   start?\"      yesterday  3. SURGERY: \"What surgery did you have?\"        4. DATE of SURGERY: \"When was the surgery?\"         5. ANESTHESIA: \" What type of anesthesia did you have?\" (e.g., general, spinal, epidural, local)        6. PAIN: \"Is there any " "pain?\" If Yes, ask: \"How bad is it?\"  (Scale 1-10; or mild, moderate, severe)        7. FEVER: \"Do you have a fever?\" If Yes, ask: \"What is your temperature, how was it measured, and when did it start?\"      denies  8. VOMITING: \"Is there any vomiting?\" If Yes, ask: \"How many times?\"      Yes after each meal and then x times to get it all up.  9. BLEEDING: \"Is there any bleeding?\" If Yes, ask: \"How much?\" and \"Where?\"      no  10. OTHER SYMPTOMS: \"Do you have any other symptoms?\" (e.g., drainage from wound, painful urination, constipation)        Patient still has his feeding tube, so he is staying hydrated.    Voiding clear urine, denies burning or urgency or frequency.  Denies abdominal bloating    Protocols used: Post-Op Symptoms and [id number]    "

## 2024-08-09 NOTE — TELEPHONE ENCOUNTER
Pt called triage and writer returned call. Reports that he has been vomiting up any food he has put down. Since last evening, had a sandwich and bagel that came back up. Though he did have a waffle most recently that caused nausea and coughing but he did not vomit that up.     While in recovery, he drank a 20 ounce ice water (which was surprising to him that he could drink that volume in one sitting) and is continuing to drink liquids ok. Prior stent became obstructed and he was not able to drink that much but is tolerating liquids now.     No pain. Is using his feeding tube and writer encouraged him to use it for nutrition at this time and to not eat any more solids until Dr Monahan can review. Pt in agreement with taking only liquids by mouth for now. Message routed to Dr Monahan.    1300  Called pt back with Dr Monahan's recommendations.    Have him stay on liquids and use his J-tube. We should bring him back so I can revise his stent. I might have to try a different stent configuration or just see how he does without a stent.     Please assist in scheduling:     Procedure/Imaging/Clinic: EGD with stent revision   Physician: Hero   Timing: next avail   Scope time needed: 30 min   Anesthesia: Gen   Dx: pyloric stenosis   Tier: 3   Location: [facility]   Header of letter for pt communication: Upper endoscopy with stent revision     Procedure date of 8/15 at [facility] agreed upon.     Pre op exam completed on 7/23/24 with Maya Chappell PA-C     Med Review     Blood thinner - pt no longer taking blood thinners   ASA - none  Diabetic - none  Any meds by injection or mouth for weight loss or diabetes-none     Patient Education r/t procedure: mychart     A pre-op nurse will call 1-2 days prior to the procedure.    Verbalized understanding of all instructions. All questions answered.      Procedure order placed, message routed to OR     Bronwyn Colby, RN, BSN,   [facility]  Care coordinator

## 2024-08-12 PROCEDURE — 99291 CRITICAL CARE FIRST HOUR: CPT | Performed by: EMERGENCY MEDICINE

## 2024-08-12 PROCEDURE — 99291 CRITICAL CARE FIRST HOUR: CPT | Mod: 25 | Performed by: EMERGENCY MEDICINE

## 2024-08-12 ASSESSMENT — COLUMBIA-SUICIDE SEVERITY RATING SCALE - C-SSRS
1. IN THE PAST MONTH, HAVE YOU WISHED YOU WERE DEAD OR WISHED YOU COULD GO TO SLEEP AND NOT WAKE UP?: NO
2. HAVE YOU ACTUALLY HAD ANY THOUGHTS OF KILLING YOURSELF IN THE PAST MONTH?: NO
6. HAVE YOU EVER DONE ANYTHING, STARTED TO DO ANYTHING, OR PREPARED TO DO ANYTHING TO END YOUR LIFE?: NO

## 2024-08-13 ENCOUNTER — APPOINTMENT (OUTPATIENT)
Dept: GENERAL RADIOLOGY | Facility: CLINIC | Age: 69
DRG: 377 | End: 2024-08-13
Attending: EMERGENCY MEDICINE
Payer: COMMERCIAL

## 2024-08-13 ENCOUNTER — HOSPITAL ENCOUNTER (INPATIENT)
Facility: CLINIC | Age: 69
LOS: 1 days | Discharge: HOME | DRG: 377 | End: 2024-08-14
Attending: EMERGENCY MEDICINE | Admitting: STUDENT IN AN ORGANIZED HEALTH CARE EDUCATION/TRAINING PROGRAM
Payer: COMMERCIAL

## 2024-08-13 DIAGNOSIS — K92.0 GASTROINTESTINAL HEMORRHAGE WITH HEMATEMESIS: ICD-10-CM

## 2024-08-13 DIAGNOSIS — Z93.4 JEJUNOSTOMY STATUS (H): ICD-10-CM

## 2024-08-13 DIAGNOSIS — K92.0 HEMATEMESIS WITH NAUSEA: ICD-10-CM

## 2024-08-13 DIAGNOSIS — K92.2 ACUTE GASTROINTESTINAL HEMORRHAGE: Primary | ICD-10-CM

## 2024-08-13 DIAGNOSIS — C15.9 MALIGNANT NEOPLASM OF ESOPHAGUS, UNSPECIFIED LOCATION (H): ICD-10-CM

## 2024-08-13 LAB
ABO/RH(D): NORMAL
ALBUMIN SERPL BCG-MCNC: 4 G/DL (ref 3.5–5.2)
ALBUMIN SERPL BCG-MCNC: 4.3 G/DL (ref 3.5–5.2)
ALP SERPL-CCNC: 123 U/L (ref 40–150)
ALP SERPL-CCNC: 153 U/L (ref 40–150)
ALT SERPL W P-5'-P-CCNC: 68 U/L (ref 0–70)
ALT SERPL W P-5'-P-CCNC: 83 U/L (ref 0–70)
ANION GAP SERPL CALCULATED.3IONS-SCNC: 10 MMOL/L (ref 7–15)
ANION GAP SERPL CALCULATED.3IONS-SCNC: 12 MMOL/L (ref 7–15)
ANTIBODY SCREEN: NEGATIVE
APTT PPP: 30 SECONDS (ref 22–38)
AST SERPL W P-5'-P-CCNC: 52 U/L (ref 0–45)
AST SERPL W P-5'-P-CCNC: 73 U/L (ref 0–45)
BASOPHILS # BLD AUTO: 0 10E3/UL (ref 0–0.2)
BASOPHILS NFR BLD AUTO: 0 %
BILIRUB SERPL-MCNC: 1 MG/DL
BILIRUB SERPL-MCNC: 1.3 MG/DL
BUN SERPL-MCNC: 18.9 MG/DL (ref 8–23)
BUN SERPL-MCNC: 20.8 MG/DL (ref 8–23)
CALCIUM SERPL-MCNC: 9.5 MG/DL (ref 8.8–10.4)
CALCIUM SERPL-MCNC: 9.6 MG/DL (ref 8.8–10.4)
CHLORIDE SERPL-SCNC: 102 MMOL/L (ref 98–107)
CHLORIDE SERPL-SCNC: 99 MMOL/L (ref 98–107)
CREAT SERPL-MCNC: 0.55 MG/DL (ref 0.67–1.17)
CREAT SERPL-MCNC: 0.6 MG/DL (ref 0.67–1.17)
EGFRCR SERPLBLD CKD-EPI 2021: >90 ML/MIN/1.73M2
EGFRCR SERPLBLD CKD-EPI 2021: >90 ML/MIN/1.73M2
EOSINOPHIL # BLD AUTO: 0 10E3/UL (ref 0–0.7)
EOSINOPHIL NFR BLD AUTO: 0 %
ERYTHROCYTE [DISTWIDTH] IN BLOOD BY AUTOMATED COUNT: 14.2 % (ref 10–15)
ERYTHROCYTE [DISTWIDTH] IN BLOOD BY AUTOMATED COUNT: 14.3 % (ref 10–15)
GLUCOSE SERPL-MCNC: 110 MG/DL (ref 70–99)
GLUCOSE SERPL-MCNC: 118 MG/DL (ref 70–99)
HCO3 SERPL-SCNC: 28 MMOL/L (ref 22–29)
HCO3 SERPL-SCNC: 29 MMOL/L (ref 22–29)
HCT VFR BLD AUTO: 37.6 % (ref 40–53)
HCT VFR BLD AUTO: 43.6 % (ref 40–53)
HGB BLD-MCNC: 12.8 G/DL (ref 13.3–17.7)
HGB BLD-MCNC: 14.6 G/DL (ref 13.3–17.7)
IMM GRANULOCYTES # BLD: 0.1 10E3/UL
IMM GRANULOCYTES NFR BLD: 1 %
INR PPP: 0.92 (ref 0.85–1.15)
LIPASE SERPL-CCNC: 43 U/L (ref 13–60)
LYMPHOCYTES # BLD AUTO: 0.6 10E3/UL (ref 0.8–5.3)
LYMPHOCYTES NFR BLD AUTO: 7 %
MCH RBC QN AUTO: 31.3 PG (ref 26.5–33)
MCH RBC QN AUTO: 31.8 PG (ref 26.5–33)
MCHC RBC AUTO-ENTMCNC: 33.5 G/DL (ref 31.5–36.5)
MCHC RBC AUTO-ENTMCNC: 34 G/DL (ref 31.5–36.5)
MCV RBC AUTO: 94 FL (ref 78–100)
MCV RBC AUTO: 94 FL (ref 78–100)
MONOCYTES # BLD AUTO: 0.6 10E3/UL (ref 0–1.3)
MONOCYTES NFR BLD AUTO: 6 %
NEUTROPHILS # BLD AUTO: 7.9 10E3/UL (ref 1.6–8.3)
NEUTROPHILS NFR BLD AUTO: 86 %
NRBC # BLD AUTO: 0 10E3/UL
NRBC BLD AUTO-RTO: 0 /100
PLATELET # BLD AUTO: 149 10E3/UL (ref 150–450)
PLATELET # BLD AUTO: 173 10E3/UL (ref 150–450)
POTASSIUM SERPL-SCNC: 3.6 MMOL/L (ref 3.4–5.3)
POTASSIUM SERPL-SCNC: 3.7 MMOL/L (ref 3.4–5.3)
PROT SERPL-MCNC: 7 G/DL (ref 6.4–8.3)
PROT SERPL-MCNC: 7.6 G/DL (ref 6.4–8.3)
RBC # BLD AUTO: 4.02 10E6/UL (ref 4.4–5.9)
RBC # BLD AUTO: 4.66 10E6/UL (ref 4.4–5.9)
SODIUM SERPL-SCNC: 139 MMOL/L (ref 135–145)
SODIUM SERPL-SCNC: 141 MMOL/L (ref 135–145)
SPECIMEN EXPIRATION DATE: NORMAL
WBC # BLD AUTO: 7.5 10E3/UL (ref 4–11)
WBC # BLD AUTO: 9.2 10E3/UL (ref 4–11)

## 2024-08-13 PROCEDURE — 999N000248 HC STATISTIC IV INSERT WITH US BY RN

## 2024-08-13 PROCEDURE — 83690 ASSAY OF LIPASE: CPT | Performed by: EMERGENCY MEDICINE

## 2024-08-13 PROCEDURE — 250N000009 HC RX 250: Performed by: INTERNAL MEDICINE

## 2024-08-13 PROCEDURE — 250N000009 HC RX 250: Performed by: STUDENT IN AN ORGANIZED HEALTH CARE EDUCATION/TRAINING PROGRAM

## 2024-08-13 PROCEDURE — 250N000013 HC RX MED GY IP 250 OP 250 PS 637: Performed by: STUDENT IN AN ORGANIZED HEALTH CARE EDUCATION/TRAINING PROGRAM

## 2024-08-13 PROCEDURE — 99223 1ST HOSP IP/OBS HIGH 75: CPT | Mod: GC | Performed by: INTERNAL MEDICINE

## 2024-08-13 PROCEDURE — 96375 TX/PRO/DX INJ NEW DRUG ADDON: CPT | Performed by: EMERGENCY MEDICINE

## 2024-08-13 PROCEDURE — 85027 COMPLETE CBC AUTOMATED: CPT | Performed by: STUDENT IN AN ORGANIZED HEALTH CARE EDUCATION/TRAINING PROGRAM

## 2024-08-13 PROCEDURE — 86900 BLOOD TYPING SEROLOGIC ABO: CPT | Performed by: EMERGENCY MEDICINE

## 2024-08-13 PROCEDURE — 80053 COMPREHEN METABOLIC PANEL: CPT | Performed by: EMERGENCY MEDICINE

## 2024-08-13 PROCEDURE — 99223 1ST HOSP IP/OBS HIGH 75: CPT | Mod: GC | Performed by: STUDENT IN AN ORGANIZED HEALTH CARE EDUCATION/TRAINING PROGRAM

## 2024-08-13 PROCEDURE — 120N000002 HC R&B MED SURG/OB UMMC

## 2024-08-13 PROCEDURE — 71046 X-RAY EXAM CHEST 2 VIEWS: CPT | Mod: 26 | Performed by: RADIOLOGY

## 2024-08-13 PROCEDURE — 84155 ASSAY OF PROTEIN SERUM: CPT | Performed by: STUDENT IN AN ORGANIZED HEALTH CARE EDUCATION/TRAINING PROGRAM

## 2024-08-13 PROCEDURE — 85730 THROMBOPLASTIN TIME PARTIAL: CPT | Performed by: EMERGENCY MEDICINE

## 2024-08-13 PROCEDURE — 94640 AIRWAY INHALATION TREATMENT: CPT

## 2024-08-13 PROCEDURE — 36415 COLL VENOUS BLD VENIPUNCTURE: CPT | Performed by: EMERGENCY MEDICINE

## 2024-08-13 PROCEDURE — 85025 COMPLETE CBC W/AUTO DIFF WBC: CPT | Performed by: EMERGENCY MEDICINE

## 2024-08-13 PROCEDURE — 36415 COLL VENOUS BLD VENIPUNCTURE: CPT | Performed by: STUDENT IN AN ORGANIZED HEALTH CARE EDUCATION/TRAINING PROGRAM

## 2024-08-13 PROCEDURE — 258N000003 HC RX IP 258 OP 636: Performed by: EMERGENCY MEDICINE

## 2024-08-13 PROCEDURE — 258N000003 HC RX IP 258 OP 636: Performed by: STUDENT IN AN ORGANIZED HEALTH CARE EDUCATION/TRAINING PROGRAM

## 2024-08-13 PROCEDURE — 96365 THER/PROPH/DIAG IV INF INIT: CPT | Performed by: EMERGENCY MEDICINE

## 2024-08-13 PROCEDURE — 250N000009 HC RX 250: Performed by: EMERGENCY MEDICINE

## 2024-08-13 PROCEDURE — 84450 TRANSFERASE (AST) (SGOT): CPT | Performed by: STUDENT IN AN ORGANIZED HEALTH CARE EDUCATION/TRAINING PROGRAM

## 2024-08-13 PROCEDURE — 85610 PROTHROMBIN TIME: CPT | Performed by: EMERGENCY MEDICINE

## 2024-08-13 PROCEDURE — 999N000157 HC STATISTIC RCP TIME EA 10 MIN

## 2024-08-13 PROCEDURE — 71046 X-RAY EXAM CHEST 2 VIEWS: CPT

## 2024-08-13 PROCEDURE — 94640 AIRWAY INHALATION TREATMENT: CPT | Mod: 76

## 2024-08-13 RX ORDER — NITROGLYCERIN 0.4 MG/1
0.4 TABLET SUBLINGUAL EVERY 5 MIN PRN
COMMUNITY
End: 2024-09-13

## 2024-08-13 RX ORDER — ACETYLCYSTEINE 200 MG/ML
2 SOLUTION ORAL; RESPIRATORY (INHALATION)
Status: DISCONTINUED | OUTPATIENT
Start: 2024-08-13 | End: 2024-08-13

## 2024-08-13 RX ORDER — SODIUM FLUORIDE 5 MG/G
GEL, DENTIFRICE DENTAL DAILY
Status: DISCONTINUED | OUTPATIENT
Start: 2024-08-13 | End: 2024-08-13

## 2024-08-13 RX ORDER — CALCIUM CARBONATE 500 MG/1
1000 TABLET, CHEWABLE ORAL 4 TIMES DAILY PRN
Status: DISCONTINUED | OUTPATIENT
Start: 2024-08-13 | End: 2024-08-14 | Stop reason: HOSPADM

## 2024-08-13 RX ORDER — ACETYLCYSTEINE 200 MG/ML
2 SOLUTION ORAL; RESPIRATORY (INHALATION) EVERY 4 HOURS PRN
COMMUNITY
End: 2024-08-23

## 2024-08-13 RX ORDER — AMOXICILLIN 250 MG
1 CAPSULE ORAL 2 TIMES DAILY PRN
Status: DISCONTINUED | OUTPATIENT
Start: 2024-08-13 | End: 2024-08-14 | Stop reason: HOSPADM

## 2024-08-13 RX ORDER — SODIUM CHLORIDE, SODIUM LACTATE, POTASSIUM CHLORIDE, CALCIUM CHLORIDE 600; 310; 30; 20 MG/100ML; MG/100ML; MG/100ML; MG/100ML
INJECTION, SOLUTION INTRAVENOUS CONTINUOUS
Status: DISCONTINUED | OUTPATIENT
Start: 2024-08-13 | End: 2024-08-14

## 2024-08-13 RX ORDER — AMOXICILLIN 250 MG
2 CAPSULE ORAL 2 TIMES DAILY PRN
Status: DISCONTINUED | OUTPATIENT
Start: 2024-08-13 | End: 2024-08-14 | Stop reason: HOSPADM

## 2024-08-13 RX ORDER — ROSUVASTATIN CALCIUM 40 MG/1
40 TABLET, COATED ORAL DAILY
Status: DISCONTINUED | OUTPATIENT
Start: 2024-08-14 | End: 2024-08-14 | Stop reason: HOSPADM

## 2024-08-13 RX ORDER — ROSUVASTATIN CALCIUM 40 MG/1
40 TABLET, COATED ORAL DAILY
Status: DISCONTINUED | OUTPATIENT
Start: 2024-08-13 | End: 2024-08-13

## 2024-08-13 RX ORDER — DEXTROSE MONOHYDRATE 100 MG/ML
INJECTION, SOLUTION INTRAVENOUS CONTINUOUS PRN
Status: DISCONTINUED | OUTPATIENT
Start: 2024-08-13 | End: 2024-08-14 | Stop reason: HOSPADM

## 2024-08-13 RX ORDER — ACETYLCYSTEINE 200 MG/ML
2 SOLUTION ORAL; RESPIRATORY (INHALATION) EVERY 4 HOURS PRN
Status: DISCONTINUED | OUTPATIENT
Start: 2024-08-13 | End: 2024-08-14 | Stop reason: HOSPADM

## 2024-08-13 RX ORDER — ALBUTEROL SULFATE 0.83 MG/ML
2.5 SOLUTION RESPIRATORY (INHALATION) EVERY 4 HOURS PRN
Status: DISCONTINUED | OUTPATIENT
Start: 2024-08-13 | End: 2024-08-14 | Stop reason: HOSPADM

## 2024-08-13 RX ORDER — ACETAMINOPHEN 500 MG
500-1000 TABLET ORAL EVERY 8 HOURS PRN
Status: DISCONTINUED | OUTPATIENT
Start: 2024-08-13 | End: 2024-08-13

## 2024-08-13 RX ORDER — LIDOCAINE 40 MG/G
CREAM TOPICAL
Status: DISCONTINUED | OUTPATIENT
Start: 2024-08-13 | End: 2024-08-14 | Stop reason: HOSPADM

## 2024-08-13 RX ORDER — SODIUM CHLORIDE FOR INHALATION 3 %
4 VIAL, NEBULIZER (ML) INHALATION
Status: DISCONTINUED | OUTPATIENT
Start: 2024-08-13 | End: 2024-08-14 | Stop reason: HOSPADM

## 2024-08-13 RX ORDER — BUSPIRONE HYDROCHLORIDE 5 MG/1
5 TABLET ORAL 3 TIMES DAILY
Status: DISCONTINUED | OUTPATIENT
Start: 2024-08-13 | End: 2024-08-13

## 2024-08-13 RX ORDER — ALBUTEROL SULFATE 0.83 MG/ML
2.5 SOLUTION RESPIRATORY (INHALATION)
Status: DISCONTINUED | OUTPATIENT
Start: 2024-08-13 | End: 2024-08-13

## 2024-08-13 RX ORDER — ACETAMINOPHEN 325 MG/10.15ML
650-975 LIQUID ORAL EVERY 8 HOURS PRN
Status: DISCONTINUED | OUTPATIENT
Start: 2024-08-13 | End: 2024-08-14 | Stop reason: HOSPADM

## 2024-08-13 RX ORDER — POLYETHYLENE GLYCOL 3350 17 G/17G
17 POWDER, FOR SOLUTION ORAL DAILY PRN
COMMUNITY

## 2024-08-13 RX ORDER — GUAIFENESIN 600 MG/1
15 TABLET, EXTENDED RELEASE ORAL DAILY
Status: DISCONTINUED | OUTPATIENT
Start: 2024-08-13 | End: 2024-08-14 | Stop reason: HOSPADM

## 2024-08-13 RX ORDER — ALBUTEROL SULFATE 0.83 MG/ML
2.5 SOLUTION RESPIRATORY (INHALATION) EVERY 4 HOURS PRN
Status: DISCONTINUED | OUTPATIENT
Start: 2024-08-13 | End: 2024-08-13

## 2024-08-13 RX ORDER — BUSPIRONE HYDROCHLORIDE 5 MG/1
5 TABLET ORAL 3 TIMES DAILY
Status: DISCONTINUED | OUTPATIENT
Start: 2024-08-13 | End: 2024-08-14 | Stop reason: HOSPADM

## 2024-08-13 RX ADMIN — SODIUM CHLORIDE 8 MG/HR: 9 INJECTION, SOLUTION INTRAVENOUS at 12:01

## 2024-08-13 RX ADMIN — ACETYLCYSTEINE 2 ML: 200 SOLUTION ORAL; RESPIRATORY (INHALATION) at 17:14

## 2024-08-13 RX ADMIN — ALBUTEROL SULFATE 2.5 MG: 2.5 SOLUTION RESPIRATORY (INHALATION) at 08:30

## 2024-08-13 RX ADMIN — ACETYLCYSTEINE 2 ML: 200 SOLUTION ORAL; RESPIRATORY (INHALATION) at 13:19

## 2024-08-13 RX ADMIN — ALBUTEROL SULFATE 2.5 MG: 2.5 SOLUTION RESPIRATORY (INHALATION) at 19:54

## 2024-08-13 RX ADMIN — ACETAMINOPHEN 975 MG: 325 SOLUTION ORAL at 15:10

## 2024-08-13 RX ADMIN — Medication 15 ML: at 18:27

## 2024-08-13 RX ADMIN — PANTOPRAZOLE SODIUM 80 MG: 40 INJECTION, POWDER, FOR SOLUTION INTRAVENOUS at 02:10

## 2024-08-13 RX ADMIN — SODIUM CHLORIDE, POTASSIUM CHLORIDE, SODIUM LACTATE AND CALCIUM CHLORIDE: 600; 310; 30; 20 INJECTION, SOLUTION INTRAVENOUS at 11:17

## 2024-08-13 RX ADMIN — SERTRALINE HYDROCHLORIDE 100 MG: 50 TABLET ORAL at 15:45

## 2024-08-13 RX ADMIN — ACETYLCYSTEINE 2 ML: 200 SOLUTION ORAL; RESPIRATORY (INHALATION) at 19:54

## 2024-08-13 RX ADMIN — BUSPIRONE HYDROCHLORIDE 5 MG: 5 TABLET ORAL at 15:00

## 2024-08-13 RX ADMIN — SODIUM CHLORIDE 8 MG/HR: 9 INJECTION, SOLUTION INTRAVENOUS at 03:19

## 2024-08-13 RX ADMIN — ALBUTEROL SULFATE 2.5 MG: 2.5 SOLUTION RESPIRATORY (INHALATION) at 17:13

## 2024-08-13 RX ADMIN — SODIUM CHLORIDE 8 MG/HR: 9 INJECTION, SOLUTION INTRAVENOUS at 22:58

## 2024-08-13 RX ADMIN — ACETYLCYSTEINE 2 ML: 200 SOLUTION ORAL; RESPIRATORY (INHALATION) at 08:30

## 2024-08-13 RX ADMIN — BUSPIRONE HYDROCHLORIDE 5 MG: 5 TABLET ORAL at 19:45

## 2024-08-13 RX ADMIN — SODIUM CHLORIDE, POTASSIUM CHLORIDE, SODIUM LACTATE AND CALCIUM CHLORIDE: 600; 310; 30; 20 INJECTION, SOLUTION INTRAVENOUS at 23:55

## 2024-08-13 RX ADMIN — ROSUVASTATIN CALCIUM 40 MG: 40 TABLET, COATED ORAL at 15:01

## 2024-08-13 RX ADMIN — ALBUTEROL SULFATE 2.5 MG: 2.5 SOLUTION RESPIRATORY (INHALATION) at 13:19

## 2024-08-13 ASSESSMENT — ACTIVITIES OF DAILY LIVING (ADL)
ADLS_ACUITY_SCORE: 37

## 2024-08-13 NOTE — ED NOTES
Report received, assumed care of patient, patient resting in bed, awake and alert, denies pain at present.  Patient aware of plan for endoscopy today and is NPO until procedure. Vital signs stable.

## 2024-08-13 NOTE — H&P
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     #Coffee Ground Emesis  #Hx Esophageal Cancer S/p esophagogastrectomy  4/19  #Pyloric Stent Placement  Coming in , Hb 14.6 , euvolemic on examination. The pyloric stent was revised on 08/08/24 , after which patient was having some epigastric discomfort , nausea , and regurgitation of food .  Dr. Monahan was consulted and he suggested to revise the stent on 08/15/24. Patient has been having dark emesis since yesterday . Initially it was dark brown which turned into bright red.  Likely UGIB, suspect bleeding due to stent. Other differential can be Estella Stuart tear. Other causes less likely since EGD was done on 08/08/24 which showed no anomaly.   - GI pancreaticobiliary consulted   - Trend CBC      HTN , HLD   H/O CAD/ NSTEMI s/p KAYDEN in 03/2023   - Rosuvastatin 40mg   - Does not take any meds for HTN , seems under control.        Diet: NPO for Medical/Clinical Reasons Except for: Meds    DVT Prophylaxis: Pneumatic Compression Devices  Smith Catheter: Not present  Fluids: none given  Lines: None     Cardiac Monitoring: None  Code Status: Full Code      Clinically Significant Risk Factors Present on Admission                  # Hypertension: Noted on problem list             # Financial/Environmental Concerns:                 Disposition Plan      Expected Discharge Date: 08/15/2024                To be formally discussed in the morning       Alec Martin MD  Medicine Service, MILY TEAM   [facility]  Securely message with [employer] (more info)  Text page via [employer]  Paging/Directory   See signed in provider for up to date coverage information  ______________________________________________________________________    Chief Complaint   Coffee ground emesis    History is obtained from the patient    History of Present Illness   Lopez Hogue is a 68 year old male , 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   Patient had been having some gastric fulness , regurgitation of food since a few days after which his pyloric stent was revised on 08/08/24 . The symptoms did not really resolve and as per GI his stent was supposed to be readjusted on Thursday. Patient has been NPO since Saturday after symptoms started and has been having nutrition through his J tube.   Yesterday , the patient started having some emesis, was yellow - brown in color but was not as frequent as it was today. Today , he was vomiting every 20 min . The colour progressed from brown to coffee brown and bright red after that. He threw some 500 ml of emesis in the 1 hour he was in ED. He complained of mild burning pain in his chest while vomiting . No abdominal pain , been passing regular BM , no melena , hematochezia . No Known history of liver disease. No fever , shortness of breath .   Coming in , Hb was 14.6 ,  , Cr 0.6   CXR showed no abnormality .        Past Medical History    Past Medical History:   Diagnosis Date    Anxiety     Aortic stenosis     CAD (coronary artery disease)     ETOH dependence     Quit drinking 10 years    Heart attack (H)     History of blood transfusion     HLD (hyperlipidemia)     Hypertension     Malignant neoplasm of middle third of esophagus (H)     Nonrheumatic aortic (valve) stenosis     Sweat gland carcinoma        Past Surgical History   Past Surgical History:   Procedure Laterality Date    BIOPSY  June 29015    Left gluteal  and groin lymphnodes    BRONCHOSCOPY FLEXIBLE AND RIGID N/A 04/19/2024    Procedure: Bronchoscopy flexible and rigid;  Surgeon: Devin Hill MD;  Location: [facility] OR    BRONCHOSCOPY FLEXIBLE AND RIGID N/A 04/28/2024    Procedure: Bronchoscopy flexible and rigid;  Surgeon: Jessie Kim MD;  Location: [facility] OR    CARDIAC SURGERY  March 2023    2 stents placed    COLONOSCOPY 2012    CV CORONARY ANGIOGRAM N/A 03/27/2023    Procedure: Coronary Angiogram;  Surgeon: Miki Etienne MD;  Location: [facility] CV    CV CORONARY ANGIOGRAM N/A 05/09/2023    Procedure: Coronary Angiogram;  Surgeon: Miki Etienne MD;  Location: [facility] LAB CV    CV LEFT HEART CATH N/A 03/27/2023    Procedure: Left Heart Catheterization;  Surgeon: Miki Etienne MD;  Location: [facility] LAB CV    CV LEFT HEART CATH N/A 05/09/2023    Procedure: Left Heart Catheterization;  Surgeon: Miki Etienne MD;  Location: [facility] CV    CV PCI N/A 03/27/2023    Procedure: Percutaneous Coronary Intervention;  Surgeon: Miki Etienne MD;  Location: [facility] CV    ENDOSCOPIC ULTRASOUND UPPER GASTROINTESTINAL TRACT (GI) N/A 11/28/2023    Procedure: Endoscopic ultrasound upper gastrointestinal tract (GI);  Surgeon: Shahriar Giraldo MD;  Location: [facility] GI    ESOPHAGOGASTRODUODENOSCOPY, WITH BOTULINUM TOXIN INJECTION N/A 04/29/2024    Procedure: Esophagogastroduodenoscopy, With Botulinum Toxin Injection;  Surgeon: Jessie Kim MD;  Location: [facility] GI    ESOPHAGOSCOPY, GASTROSCOPY, DUODENOSCOPY (EGD), COMBINED N/A 11/08/2023    Procedure: ESOPHAGOGASTRODUODENOSCOPY, WITH BIOPSY;  Surgeon: Shahriar Giraldo MD;  Location: [facility]    ESOPHAGOSCOPY, GASTROSCOPY, DUODENOSCOPY (EGD), COMBINED N/A 04/19/2024    Procedure: Esophagoscopy, gastroscopy, duodenoscopy (EGD), combined;  Surgeon: Devin Hill MD;  Location: [facility]    ESOPHAGOSCOPY, GASTROSCOPY, DUODENOSCOPY (EGD), COMBINED N/A 04/28/2024     Procedure: Esophagoscopy, gastroscopy, duodenoscopy (EGD), combined;  Surgeon: Jessie Kim MD;  Location: [facility]    ESOPHAGOSCOPY, GASTROSCOPY, DUODENOSCOPY (EGD), COMBINED N/A 05/05/2024    Procedure: Esophagoscopy, gastroscopy, duodenoscopy (EGD), combined-under fluoro & dilation, nasogastric tube placement, bronchoscopy;  Surgeon: Kevin Calderon MD;  Location: [facility]    ESOPHAGOSCOPY, GASTROSCOPY, DUODENOSCOPY (EGD), COMBINED N/A 8/8/2024    Procedure: ESOPHAGOGASTRODUODENOSCOPY with fluoroscopy and stent placement;  Surgeon: Livan Monahan MD;  Location: [facility] OR    IR JEJUNOSTOMY TUBE CHANGE  05/19/2024    LAPAROSCOPIC ASSISTED INSERTION TUBE JEJUNOSTOMY N/A 04/01/2024    Procedure: Laparoscopic Jejunostomy Tube Insertion and Esophagogastroduodenoscopy;  Surgeon: Kevin Calderon MD;  Location: [facility]    LARYNGOSCOPY WITH BIOPSY(IES) N/A 10/12/2023    Procedure: LARYNGOSCOPY, WITH BIOPSY;  Surgeon: Edi Felix MD;  Location: [facility]    OTHER SURGICAL HISTORY  01/01/2015    WIDE EXCISION OF LEFT GLUTEAL MASSTNM: [id number], stage: II hidradenocarcinoma Grade II, 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 Taking?   acetaminophen (TYLENOL) 500 MG tablet  Self Yes No   Sig: Take 500-1,000 mg by mouth every 8 hours as needed for fever or pain   acetylcysteine (MUCOMYST) 20 % neb solution   No No   Sig: Take 2 mLs by nebulization every 4 hours   busPIRone (BUSPAR) 5 MG tablet   No No   Sig: Take 1 tablet (5 mg) by mouth 3  times daily   famotidine (PEPCID) 20 MG tablet   Yes No   Sig: Take 20 mg by mouth 2 times daily   nitroGLYcerin (NITROSTAT) 0.4 MG sublingual tablet  Self No No   Sig: PLACE 1 TABLET UNDER THE TONGUE EVERY 5 MINUTES FOR CHEST PAIN FOR 3 DOSES. IF SYMPTOMS PERSIST 5 MINUTES AFTER 1ST DOSE CALL 911.   Patient taking differently: 0.4 mg every 5 minutes as needed PLACE 1 TABLET UNDER THE TONGUE EVERY 5 MINUTES FOR CHEST PAIN FOR 3 DOSES. IF SYMPTOMS PERSIST 5 MINUTES AFTER 1ST DOSE CALL 911   rosuvastatin (CRESTOR) 40 MG tablet   No No   Sig: Take 1 tablet (40 mg) by mouth daily **Due for follow-up with Dr. Walters**   senna-docusate (SENOKOT-S/PERICOLACE) 8.6-50 MG tablet   No No   Sig: Take 1 tablet by mouth 2 times daily as needed for constipation   sertraline (ZOLOFT) 100 MG tablet  Self Yes No   Sig: Take 100 mg by mouth every morning   sodium chloride (NEBUSAL) 3 % neb solution   No No   Sig: Take 4 mLs by nebulization every 3 hours as needed for wheezing   sodium fluoride dental gel (PREVIDENT) 1.1 % GEL topical gel  Self Yes No   Sig: daily      Facility-Administered Medications: None        Social History   I have reviewed this patient's social history and updated it with pertinent information if needed.  Social History     Tobacco Use    Smoking status: Former     Current packs/day: 0.00     Average packs/day: 2.0 packs/day for 41.0 years (82.0 ttl pk-yrs)     Types: Cigarettes     Start date: 1972     Quit date: 2013     Years since quittin.1     Passive exposure: Past    Smokeless tobacco: Never    Tobacco comments:     Quit 10 years ago   Vaping Use    Vaping status: Never Used   Substance Use Topics    Alcohol use: Not Currently     Comment: Stopped 11 years ago    Drug use: Not Currently     Types: Cocaine, Marijuana         Family History   I have reviewed this patient's family history and updated it with pertinent information if needed.  Family History   Problem Relation Age of Onset     Dementia Mother     Mental Illness Mother         Dementia    Substance Abuse Son         Alcohol    Substance Abuse Sister         Alcohol    Substance Abuse Brother         Alcohol    Anesthesia Reaction No family hx of     Thrombocytopenia No family hx of     Cancer No family hx of     Thrombosis No family hx of          Allergies   Allergies   Allergen Reactions    Coconut Flavor Anaphylaxis     Raw coconut        Physical Exam   Vital Signs: Temp: 98.4  F (36.9  C) Temp src: Oral BP: 100/57 Pulse: 78   Resp: 16 SpO2: 99 % O2 Device: None (Room air)    Weight: 140 lbs 0 oz    Constitutional: Awake, alert, cooperative, in NAD.  Eyes: PERRL, EOMI, sclera clear, conjunctiva normal.  ENT: Normocephalic, without obvious abnormality, oral pharynx with moist mucus membranes  Respiratory: Non-labored breathing, good air exchange, clear to auscultation bilaterally, no crackles or wheezing.  Cardiovascular: RRR, no murmur noted.  GI: + bowel sounds, soft, non-distended, non-tender, no masses palpated, no hepatosplenomegaly.  Skin: No concerning lesions or rash on exposed areas.  Musculoskeletal: No edema gissell LEs.  Neurologic: Awake, alert & oriented x3.  Cranial nerves II-XII are grossly intact.   Psych: appropriate affect     Medical Decision Making       Please see A&P for additional details of medical decision making.      Data     I have personally reviewed the following data over the past 24 hrs:    9.2  \   14.6   / 173     139 99 20.8 /  110 (H)   3.6 28 0.60 (L) \     ALT: 83 (H) AST: 73 (H) AP: 153 (H) TBILI: 1.0   ALB: 4.3 TOT PROTEIN: 7.6 LIPASE: 43     INR:  0.92 PTT:  30   D-dimer:  N/A Fibrinogen:  N/A       Imaging results reviewed over the past 24 hrs:   Recent Results (from the past 24 hour(s))   XR Chest 2 Views    Narrative    EXAM: XR CHEST 2 VIEWS  LOCATION: [facility]  DATE: 8/13/2024    INDICATION: Hematemesis.  COMPARISON: 5/20/2024.    FINDINGS: The  heart size is normal. The thoracic aorta is calcified and tortuous. The lungs are clear. No pneumothorax or pleural effusion. Multiple surgical clips in the mediastinum. Old right rib fracture. Stent at the GE junction.      Impression    IMPRESSION: No acute abnormality.

## 2024-08-13 NOTE — ED NOTES
Assessment unchanged, patient resting in bed awaiting endoscopy, no complaints at present, vital signs stable.

## 2024-08-13 NOTE — ED PROVIDER NOTES
"ED Provider Note  [facility]      History     Chief Complaint   Patient presents with    Hematemesis     HPI  Lopez Hogue is a 68 year old male who presents with coffee ground emesis. 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 Patient had pyloric stent placed on 8/8/24. The day after placement of the stent patient began to feel obstructed and was advised to stick to fluids and utilize his J tube. J tube is working well. Patient has not had anything to eat orally since Saturday morning. Last evening patient began to have this coffee ground emesis that was intermittent at first and has increased in frequency since. Emesis has progressed from being more dark to now having more bright red blood mixed in. In the 1 hour of being in the ED patient had about 500cc of emesis. Patient unable to swallow any water. Patient is experiencing epigastric pain that is both a pressure and also reflex like pain. When he has emesis he experiences burning. The pain does not radiate anywhere. Has been feeling constipated but the stools he has had are not darkened or black. Used to be a heavier drinker but not for the past 11 years. Denies having ever been told that he has varices on his various EGDs. Denies any fever, chills, lightheadedness, dizziness or other infectious or systemic symptoms.       A medically appropriate review of systems was performed with pertinent positives and negatives noted in the HPI, and all other systems negative.    Physical Exam   BP: 101/62  Pulse: 87  Temp: 98  F (36.7  C)  Resp: 13  Height: 170.2 cm (5' 7\")  Weight: 63.5 kg (140 lb)  SpO2: 98 %  Physical Exam  General: no acute distress. Appears stated age.   HENT: MMM,  Eyes: PERRL, normal sclerae   Cardio: Regular rate. Regular rhythm. Holosystolic murmur. Extremities well perfused  Resp: Normal work of breathing, Normal respiratory rate. Clear to auscultation bilaterally.  Abdomen: " mild pressure in epigastric region. Not worsened with palpation of the area. non-distended, no rebound, no guarding. J tube in place and is clean with no erythema or signs of infection.   Neuro: alert without signs of confusion. CN II-XII grossly intact. Grossly normal strength and sensation in all extremities.   Psych: normal affect, normal behavior    ED Course, Procedures, & Data     ED Course as of 08/13/24 0502   Tue Aug 13, 2024   0156 RBC Count: 4.66     Procedures    Results for orders placed or performed during the hospital encounter of 08/13/24   XR Chest 2 Views     Status: None    Narrative    EXAM: XR CHEST 2 VIEWS  LOCATION: [facility]  DATE: 8/13/2024    INDICATION: Hematemesis.  COMPARISON: 5/20/2024.    FINDINGS: The heart size is normal. The thoracic aorta is calcified and tortuous. The lungs are clear. No pneumothorax or pleural effusion. Multiple surgical clips in the mediastinum. Old right rib fracture. Stent at the GE junction.      Impression    IMPRESSION: No acute abnormality.   INR     Status: Normal   Result Value Ref Range    INR 0.92 0.85 - 1.15   Partial thromboplastin time     Status: Normal   Result Value Ref Range    aPTT 30 22 - 38 Seconds   Comprehensive metabolic panel     Status: Abnormal   Result Value Ref Range    Sodium 139 135 - 145 mmol/L    Potassium 3.6 3.4 - 5.3 mmol/L    Carbon Dioxide (CO2) 28 22 - 29 mmol/L    Anion Gap 12 7 - 15 mmol/L    Urea Nitrogen 20.8 8.0 - 23.0 mg/dL    Creatinine 0.60 (L) 0.67 - 1.17 mg/dL    GFR Estimate >90 >60 mL/min/1.73m2    Calcium 9.6 8.8 - 10.4 mg/dL    Chloride 99 98 - 107 mmol/L    Glucose 110 (H) 70 - 99 mg/dL    Alkaline Phosphatase 153 (H) 40 - 150 U/L    AST 73 (H) 0 - 45 U/L    ALT 83 (H) 0 - 70 U/L    Protein Total 7.6 6.4 - 8.3 g/dL    Albumin 4.3 3.5 - 5.2 g/dL    Bilirubin Total 1.0 <=1.2 mg/dL   Lipase     Status: Normal   Result Value Ref Range    Lipase 43 13 - 60 U/L   CBC  with platelets and differential     Status: Abnormal   Result Value Ref Range    WBC Count 9.2 4.0 - 11.0 10e3/uL    RBC Count 4.66 4.40 - 5.90 10e6/uL    Hemoglobin 14.6 13.3 - 17.7 g/dL    Hematocrit 43.6 40.0 - 53.0 %    MCV 94 78 - 100 fL    MCH 31.3 26.5 - 33.0 pg    MCHC 33.5 31.5 - 36.5 g/dL    RDW 14.3 10.0 - 15.0 %    Platelet Count 173 150 - 450 10e3/uL    % Neutrophils 86 %    % Lymphocytes 7 %    % Monocytes 6 %    % Eosinophils 0 %    % Basophils 0 %    % Immature Granulocytes 1 %    NRBCs per 100 WBC 0 <1 /100    Absolute Neutrophils 7.9 1.6 - 8.3 10e3/uL    Absolute Lymphocytes 0.6 (L) 0.8 - 5.3 10e3/uL    Absolute Monocytes 0.6 0.0 - 1.3 10e3/uL    Absolute Eosinophils 0.0 0.0 - 0.7 10e3/uL    Absolute Basophils 0.0 0.0 - 0.2 10e3/uL    Absolute Immature Granulocytes 0.1 <=0.4 10e3/uL    Absolute NRBCs 0.0 10e3/uL   Adult Type and Screen     Status: None   Result Value Ref Range    ABO/RH(D) O POS     Antibody Screen Negative Negative    SPECIMEN EXPIRATION DATE [id number]    CBC with platelets differential     Status: Abnormal    Narrative    The following orders were created for panel order CBC with platelets differential.  Procedure                               Abnormality         Status                     ---------                               -----------         ------                     CBC with platelets and d...[[id number]]  Abnormal            Final result                 Please view results for these tests on the individual orders.   ABO/Rh type and screen     Status: None    Narrative    The following orders were created for panel order ABO/Rh type and screen.  Procedure                               Abnormality         Status                     ---------                               -----------         ------                     Adult Type and Screen[[id number]]                            Final result                 Please view results for these tests on the individual orders.      Medications   pantoprazole (PROTONIX) 80 mg in sodium chloride 0.9 % 100 mL infusion (8 mg/hr Intravenous $New Bag 8/13/24 0319)   acetaminophen (TYLENOL) tablet 500-1,000 mg (has no administration in time range)   acetylcysteine (MUCOMYST) 20 % nebulizer solution 2 mL (has no administration in time range)   busPIRone (BUSPAR) tablet 5 mg (has no administration in time range)   rosuvastatin (CRESTOR) tablet 40 mg (has no administration in time range)   sodium chloride (NEBUSAL) 3 % neb solution 4 mL (has no administration in time range)   sodium fluoride dental gel (PREVIDENT) 1.1 % topical gel GEL (has no administration in time range)   lidocaine 1 % 0.1-1 mL (has no administration in time range)   lidocaine (LMX4) cream (has no administration in time range)   sodium chloride (PF) 0.9% PF flush 3 mL (has no administration in time range)   sodium chloride (PF) 0.9% PF flush 3 mL (has no administration in time range)   senna-docusate (SENOKOT-S/PERICOLACE) 8.6-50 MG per tablet 1 tablet (has no administration in time range)     Or   senna-docusate (SENOKOT-S/PERICOLACE) 8.6-50 MG per tablet 2 tablet (has no administration in time range)   calcium carbonate (TUMS) chewable tablet 1,000 mg (has no administration in time range)   pantoprazole (PROTONIX) IV push injection 80 mg (80 mg Intravenous $Given 8/13/24 0210)     Labs Ordered and Resulted from Time of ED Arrival to Time of ED Departure   COMPREHENSIVE METABOLIC PANEL - Abnormal       Result Value    Sodium 139      Potassium 3.6      Carbon Dioxide (CO2) 28      Anion Gap 12      Urea Nitrogen 20.8      Creatinine 0.60 (*)     GFR Estimate >90      Calcium 9.6      Chloride 99      Glucose 110 (*)     Alkaline Phosphatase 153 (*)     AST 73 (*)     ALT 83 (*)     Protein Total 7.6      Albumin 4.3      Bilirubin Total 1.0     CBC WITH PLATELETS AND DIFFERENTIAL - Abnormal    WBC Count 9.2      RBC Count 4.66      Hemoglobin 14.6      Hematocrit 43.6      MCV  94      MCH 31.3      MCHC 33.5      RDW 14.3      Platelet Count 173      % Neutrophils 86      % Lymphocytes 7      % Monocytes 6      % Eosinophils 0      % Basophils 0      % Immature Granulocytes 1      NRBCs per 100 WBC 0      Absolute Neutrophils 7.9      Absolute Lymphocytes 0.6 (*)     Absolute Monocytes 0.6      Absolute Eosinophils 0.0      Absolute Basophils 0.0      Absolute Immature Granulocytes 0.1      Absolute NRBCs 0.0     INR - Normal    INR 0.92     PARTIAL THROMBOPLASTIN TIME - Normal    aPTT 30     LIPASE - Normal    Lipase 43     COMPREHENSIVE METABOLIC PANEL   CBC WITH PLATELETS   TYPE AND SCREEN, ADULT    ABO/RH(D) O POS      Antibody Screen Negative      SPECIMEN EXPIRATION DATE [id number]     ABO/RH TYPE AND SCREEN     XR Chest 2 Views   Final Result   IMPRESSION: No acute abnormality.             Critical Care Addendum  My initial assessment, based on my review of nursing observations, review of vital signs, focused history, physical exam, review of cardiac rhythm monitor, discussion with GI, internal medicine, and interpretation of multiple labs, cxr , established a high suspicion that Lopez Hogue has  GI bleed , which requires immediate intervention, and therefore he is critically ill.     After the initial assessment, the care team initiated multiple lab tests, initiated IV fluid administration, and initiated medication therapy with Protonix  to provide stabilization care. Due to the critical nature of this patient, I reassessed nursing observations, vital signs, physical exam, review of cardiac rhythm monitor, mental status, neurologic status, and respiratory status multiple times prior to his disposition.     Time also spent performing documentation, reviewing test results, discussion with consultants, and coordination of care.     Critical care time (excluding teaching time and procedures): 30 minutes.       Assessment & Plan    Lopez Hogue is a 68 year old male who  presents with coffee ground emesis. 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 Patient had pyloric stent placed on 8/8/24.    #Coffee Ground Emesis  #Hx Esophageal Cancer  #Pyloric Stent Placement  Overall picture concerning for upper GI bleed. Difficult to differentiate at this point. As patient had recent pyloric stent placed on 8/8 stent could be causing bleeding. Patient experiencing perceived obstruction prior to emesis and was scheduled per chart review for a revision of the stent for 8/15. Concern that there has been progression of the bleeding to tear or perforation given continued emesis over the past 24 hours and progression of the emesis to have more bright red blood mixed in. Will obtain CXR to evaluate for free air. Less concerning for varices as patient has not been a heavy drinker for 11 years and has had EGDs with no mention of varices. Also less concerning for bleeding ulcer at this time. Patient denies using any NSAIDs.     Plan  -IV Protonix 80 mg bolus  -IV Protonix 8mg/hr after bolus  -Type and Screen, comprehensive labs, CXR, NPO  - Place 2 large bore peripheral Ivs  - Will discuss case with GI fellow    Update: Patient with Hgb of 14.6. No need for transfusion at this time.  Plan for continuous close monitoring CMP with elevated LFTs. CXR with no acute abnormality. Patient to be admitted to IM for further evaluation and workup.       .--    ED Attending Physician Attestation    I Lorin Chavira MD, cared for this patient with the Medical Student. I performed, or re-performed, the physical exam and medical decision-making. I have verified the accuracy of all the medical student findings and documentation above, and have edited as necessary.    Summary of HPI, PE, ED Course   Patient is a 68 year old male evaluated in the emergency department for hematemesis. Exam and ED course notable for history of esophageal cancer, pyloric stenosis, wbc 9.2,  hemoglobin 14.6, no acute electrolyte abnormality, BUN 20.8, Creatinine 0.6, mild elevation in liver function test with alkaline phosphatase 153, AST 73, ALT 83. I personally reviewed and interpreted CXR which demonstrates no focal infiltrate, effusion, pneumothorax, or evidence of free air.  Patient was started on Protonix bolus, drip, IV Zofran, IV fluid.  I discussed patient management with GI team and internal medicine team. After the completion of care in the emergency department, the patient was admitted to inpatient.      Lorin Chavira MD  Emergency Medicine     I have reviewed the nursing notes. I have reviewed the findings, diagnosis, plan and need for follow up with the patient.    New Prescriptions    No medications on file       Final diagnoses:   Hematemesis with nausea   Gastrointestinal hemorrhage with hematemesis     Geraldine Wynne  Medical Student Year 4        Lorin Chavira MD  [facility] EMERGENCY DEPARTMENT  8/12/2024     Lorin Chavira MD  08/13/24 5837

## 2024-08-13 NOTE — MEDICATION SCRIBE - ADMISSION MEDICATION HISTORY
Medication Scribe Admission Medication History    Admission medication history is complete. The information provided in this note is only as accurate as the sources available at the time of the update.    Information Source(s): Patient and CareEverywhere/SureScripts via in-person    Pertinent Information: None    Changes made to PTA medication list:  Added: Miralax  Deleted: None  Changed: Mucomyst 20% neb (Q4H) --> Mucomyst 20% neb (Q4H PRN), Nitroglycerin 0.4 mg (Q5min) --> Nitroglycerin 0.4 mg (Q5min PRN), Prevident gel (every day) --> Prevident gel (HS)    Allergies reviewed with patient and updates made in EHR: yes    Medication History Completed By: Melissa Davison 8/13/2024 4:11 PM    PTA Med List   Medication Sig Last Dose    acetaminophen (TYLENOL) 500 MG tablet Take 500-1,000 mg by mouth every 8 hours as needed for fever or pain Past Week at unknown    acetylcysteine (MUCOMYST) 20 % neb solution Take 2 mLs by nebulization every 4 hours as needed Unknown at unknown    busPIRone (BUSPAR) 5 MG tablet Take 1 tablet (5 mg) by mouth 3 times daily 8/12/2024 at unknown    famotidine (PEPCID) 20 MG tablet Take 20 mg by mouth 2 times daily 8/12/2024 at unknown    nitroGLYcerin (NITROSTAT) 0.4 MG sublingual tablet Place 0.4 mg under the tongue every 5 minutes as needed for chest pain For chest pain place 1 tablet under the tongue every 5 minutes for 3 doses. If symptoms persist 5 minutes after 1st dose call 911. Unknown at unknown    polyethylene glycol (MIRALAX) 17 g packet Take 17 g by mouth daily as needed for constipation Unknown at unknown    rosuvastatin (CRESTOR) 40 MG tablet Take 1 tablet (40 mg) by mouth daily **Due for follow-up with Dr. Walters** 8/12/2024 at unknown    senna-docusate (SENOKOT-S/PERICOLACE) 8.6-50 MG tablet Take 1 tablet by mouth 2 times daily as needed for constipation 8/12/2024 at unknown    sertraline (ZOLOFT) 100 MG tablet Take 100 mg by mouth every morning 8/12/2024 at am    sodium  chloride (NEBUSAL) 3 % neb solution Take 4 mLs by nebulization every 3 hours as needed for wheezing 8/12/2024 at unknown    sodium fluoride dental gel (PREVIDENT) 1.1 % GEL topical gel Apply to affected area at bedtime 8/12/2024 at pm

## 2024-08-13 NOTE — ED TRIAGE NOTES
Pt walked into triage accompanied by his wife with complains of vomiting and epigastric pain.Pt noticed with a cup of coffee ground vomitus.Pt stated that the vomit started last night but its been getting worse.HX: esophageal cancer with esophageal repair in April 2024.Not on blood thinners.   Triage Assessment (Adult)       Row Name 08/12/24 9680          Triage Assessment    Airway WDL WDL        Respiratory WDL    Respiratory WDL WDL        Skin Circulation/Temperature WDL    Skin Circulation/Temperature WDL WDL        Cardiac WDL    Cardiac WDL X;chest pain        Chest Pain Assessment    Chest Pain Location other (see comments)   epigastric.        Cognitive/Neuro/Behavioral WDL    Cognitive/Neuro/Behavioral WDL WDL

## 2024-08-13 NOTE — PROGRESS NOTES
CLINICAL NUTRITION SERVICES - ASSESSMENT NOTE     Nutrition Prescription    RECOMMENDATIONS FOR MDs/PROVIDERS TO ORDER:  Adjust IVF PRN with the initiation of TF's    Malnutrition Status:    Unable to determine due to unable to complete NFPE and history, will follow up    Recommendations already ordered by Registered Dietitian (RD):  EN access: GJ tube   Goal TF: Novasource renal ( this is a coconut / MCT free formula due to allergies) @  goal of 75 ml/hr x 16 hour cycle: (1200 ml(I.e. 16: or per pt preference), 2400 kcal (39 kcal/kg), 109 g pro (1.8 g/kg), 220 g CHO, 860 ml free water, and 0 g fiber daily.    Initiate @ 35ml/hr and increased to goal  after 4hrs if pt tolerating  Do not advance TF rate unless Mg++ > 1.5, K+ is > 3, and phos > 1.9  130 ml before and after each feed. Additional fluids and/or adjustments per MD(pending PO intake/IV fluids). Rec 800 ml additional daily for hydration per home regimen.  Multivitamin/minerals to help ensure micronutrient needs being met with suspected hypermetabolic demands and potential interruptions to TF infusions.    100 mg Thiamine x 5-7 days to prevent lyte depletion d/t at risk for refeeding syndrome    Monitor diet progression/PO intake    Future/Additional Recommendations:  Obtain NFPE/subjective history as able  Monitor nutrition related findings and follow up per protocol     REASON FOR ASSESSMENT  Lopez Hogue is a/an 68 year old male assessed by the dietitian for Provider Order - Registered Dietitian to Assess and Order TF per Medical Nutrition Therapy Protocol    Patient admitted for coffee ground emesis     MEDICAL HISTORY  SCC of Esophagus S/p esophagogastrectomy 4/19 , pyloric stricture S/P pyloric stenting , HTN , HLD , CAD/NSTEMI S/p KAYDEN in 03/23    NUTRITION HISTORY  Unable to meet with pt. [facility] to gather home enteral regimen. EMR reviewed. pyloric stent was revised on 08/08/24 , after which patient was having some epigastric  Medication Refill Request  Patient called today to request refill of medication(s)     Medication requested:   Atorvastatin 80 mg as of today only has 5 tablets left    Famotidine 20 mg    Patient aware refill take 24-48hrs. No call back necessary.       Patient is wondering if she can take [place] with her medical history.  Patient did verify her phone number is [phone].   "discomfort , nausea , and regurgitation of food . dark emesis since yesterday. Has been feeling constipated but the stools he has had are not darkened or black.   Per chart review, from note 8/9: Since last evening, had a sandwich and bagel that came back up. Though he did have a waffle most recently that caused nausea and coughing but he did not vomit that up.     Per RD note from admission 5/16/24:  Novasource renal ( this is a coconut / MCT free formula due to allergies) @ final goal of 75 ml/hr x 16 hour cycle: (1200 ml), 2400 kcal (39 kcal/kg), 109 g pro (1.8 g/kg), 220 g CHO, 860 ml free water, and 0 g fiber daily.          - Water flushes: 130 ml before and after each cycle regimen + 50 ml/hr while TF is infusing x 16 hour via feed and flush.     Per [facility]: Novasource Renal @ 75ml/hr x16hrs. 130 ml flush before and after cycle + additional 800ml daily    CURRENT NUTRITION ORDERS  Diet: Clear Liquid    Intake/Tolerance: No documented intakes to assess.  Allergy: coconut  LABS  Cre .55  AST 52  Total bili 1.3  Glu 118      MEDICATIONS  Crestor  LR @ 75ml/hr  protonix    ANTHROPOMETRICS  Height: 170.2 cm (5' 7\")  Most Recent Weight: 63.5 kg (140 lb)    IBW: 67.3 kg  Body mass index is 21.93 kg/m . BMI Category: Normal BMI  Weight History:  8.2 kg (11%) weight loss over ~ 7 months.  Wt Readings from Last 20 Encounters:   08/12/24 63.5 kg (140 lb)   08/08/24 63.8 kg (140 lb 11.2 oz)   07/29/24 64.4 kg (142 lb)   07/23/24 63.7 kg (140 lb 6.4 oz)   07/09/24 62.7 kg (138 lb 3.2 oz)   07/08/24 62.9 kg (138 lb 9.6 oz)   05/28/24 64.7 kg (142 lb 9.6 oz)   05/19/24 62.2 kg (137 lb 1.6 oz)   05/15/24 60.4 kg (133 lb 2.5 oz)   04/17/24 70.1 kg (154 lb 8 oz)   04/01/24 68 kg (150 lb)   03/29/24 69.4 kg (153 lb)   03/26/24 72.4 kg (159 lb 11.2 oz)   03/11/24 68.4 kg (150 lb 12.8 oz)   03/08/24 65.8 kg (145 lb)   03/06/24 66.2 kg (146 lb)   02/27/24 69.2 kg (152 lb 9.6 oz)   02/16/24 67.8 kg (149 lb 6.4 oz)   02/11/24 64 kg " (141 lb)   01/31/24 71.7 kg (158 lb 1.6 oz)         ASSESSED NUTRITION NEEDS  Dosing Weight: 63.5 kg (Admission weight)   Estimated Energy Needs: 1131-8371 kcals/day (30 - 35 kcals/kg )  Justification: Increased needs  Estimated Protein Needs: 76-95 grams protein/day (1.2 - 1.5 grams of pro/kg)  Justification: Increased needs  Estimated Fluid Needs:  (1 mL/kcal)   Justification: Maintenance    PHYSICAL FINDINGS  See malnutrition section below.      MALNUTRITION  % Intake:  TF dependent  % Weight Loss: Weight loss does not meet criteria for malnutrition   Subcutaneous Fat Loss: Unable to assess   Muscle Loss: Unable to assess  Fluid Accumulation/Edema: None noted  Malnutrition Diagnosis: Unable to determine due to unable to complete NFPE and history, will follow up    NUTRITION DIAGNOSIS  Inadequate oral intake related to SCC of Esophagus S/p esophagogastrectomy 4/19 , pyloric stricture S/P pyloric stenting as evidenced by reliance of TF to help meet nutritional needs      INTERVENTIONS  Implementation  Nutrition Education: will be provided if nutrition education needs arise.    Enteral Nutrition - Initiate  Feeding tube flush     Goals  Total avg nutritional intake to meet a minimum of 30 kcal/kg and 1.2 g PRO/kg daily (per dosing wt 63.5 kg).        Monitoring/Evaluation  Progress toward goals will be monitored and evaluated per protocol.  Linda Ron MS, RD, LD, [facility]    6C (beds 2471-2318) + 7C (beds 7616-6347) + ED   Available in [facility] by name or unit dietitian

## 2024-08-13 NOTE — PLAN OF CARE
Goal Outcome Evaluation:  Neuro: A&Ox4.   Cardiac: SR. Soft BP 90/50's. MD's made aware. Pt asymptomatic   Respiratory: RA.  GI/: Adequate urine output. No BM  Diet/appetite: TF started at 1630 at 35ml/hr. Next increased at 8:30pm to goal rate 75ml/hr  Activity: Up independently   Pain: Tylenol given for throat pain   Skin: No new deficits noted.  LDA's: PIV R arm and J tube     Plan: Continue with POC. Notify primary team with changes.

## 2024-08-14 ENCOUNTER — ANESTHESIA EVENT (OUTPATIENT)
Dept: SURGERY | Facility: CLINIC | Age: 69
End: 2024-08-14
Payer: COMMERCIAL

## 2024-08-14 VITALS
DIASTOLIC BLOOD PRESSURE: 67 MMHG | RESPIRATION RATE: 20 BRPM | SYSTOLIC BLOOD PRESSURE: 111 MMHG | TEMPERATURE: 98.5 F | HEIGHT: 67 IN | OXYGEN SATURATION: 95 % | WEIGHT: 140 LBS | HEART RATE: 94 BPM | BODY MASS INDEX: 21.97 KG/M2

## 2024-08-14 LAB
ERYTHROCYTE [DISTWIDTH] IN BLOOD BY AUTOMATED COUNT: 14.4 % (ref 10–15)
HCT VFR BLD AUTO: 34.2 % (ref 40–53)
HGB BLD-MCNC: 11.3 G/DL (ref 13.3–17.7)
HOLD SPECIMEN: NORMAL
MCH RBC QN AUTO: 31.3 PG (ref 26.5–33)
MCHC RBC AUTO-ENTMCNC: 33 G/DL (ref 31.5–36.5)
MCV RBC AUTO: 95 FL (ref 78–100)
PLATELET # BLD AUTO: 130 10E3/UL (ref 150–450)
RBC # BLD AUTO: 3.61 10E6/UL (ref 4.4–5.9)
WBC # BLD AUTO: 8.9 10E3/UL (ref 4–11)

## 2024-08-14 PROCEDURE — 99239 HOSP IP/OBS DSCHRG MGMT >30: CPT | Performed by: STUDENT IN AN ORGANIZED HEALTH CARE EDUCATION/TRAINING PROGRAM

## 2024-08-14 PROCEDURE — 36415 COLL VENOUS BLD VENIPUNCTURE: CPT | Performed by: INTERNAL MEDICINE

## 2024-08-14 PROCEDURE — 250N000009 HC RX 250: Performed by: STUDENT IN AN ORGANIZED HEALTH CARE EDUCATION/TRAINING PROGRAM

## 2024-08-14 PROCEDURE — 250N000013 HC RX MED GY IP 250 OP 250 PS 637: Performed by: STUDENT IN AN ORGANIZED HEALTH CARE EDUCATION/TRAINING PROGRAM

## 2024-08-14 PROCEDURE — 85027 COMPLETE CBC AUTOMATED: CPT | Performed by: INTERNAL MEDICINE

## 2024-08-14 RX ORDER — PANTOPRAZOLE SODIUM 40 MG/1
40 TABLET, DELAYED RELEASE ORAL 2 TIMES DAILY
Qty: 30 TABLET | Refills: 0 | Status: ON HOLD | OUTPATIENT
Start: 2024-08-14 | End: 2024-08-15

## 2024-08-14 RX ADMIN — BUSPIRONE HYDROCHLORIDE 5 MG: 5 TABLET ORAL at 08:39

## 2024-08-14 RX ADMIN — SERTRALINE HYDROCHLORIDE 100 MG: 50 TABLET ORAL at 08:40

## 2024-08-14 RX ADMIN — THIAMINE HCL TAB 100 MG 100 MG: 100 TAB at 08:40

## 2024-08-14 RX ADMIN — SENNOSIDES AND DOCUSATE SODIUM 2 TABLET: 8.6; 5 TABLET ORAL at 08:52

## 2024-08-14 RX ADMIN — PANTOPRAZOLE SODIUM 40 MG: 40 INJECTION, POWDER, FOR SOLUTION INTRAVENOUS at 08:39

## 2024-08-14 RX ADMIN — Medication 15 ML: at 08:39

## 2024-08-14 RX ADMIN — ROSUVASTATIN CALCIUM 40 MG: 40 TABLET, COATED ORAL at 08:40

## 2024-08-14 ASSESSMENT — ACTIVITIES OF DAILY LIVING (ADL)
ADLS_ACUITY_SCORE: 37

## 2024-08-14 ASSESSMENT — ENCOUNTER SYMPTOMS
SEIZURES: 0
ORTHOPNEA: 0
DYSRHYTHMIAS: 0

## 2024-08-14 ASSESSMENT — LIFESTYLE VARIABLES: TOBACCO_USE: 0

## 2024-08-14 NOTE — PROGRESS NOTES
CLINICAL NUTRITION SERVICES - BRIEF NOTE    Met with pt at bedside. Pt reports good tolerance to TF's overnight. Confirms regimen is the same as what he had been following at home. PTA pt had been working on increasing PO intake of solids with hope of discontinuing tube feeds until pain/nausea started. Per GI note, plan for stent revision on 8/15/24. Pt denies N/V/Abdominal pain or bloating currently. Endorses some constipation. Reports wt has been relatively stable recently. 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     Meds/labs reviewed    MALNUTRITION  % Intake:  TF dependent  % Weight Loss: Weight loss does not meet criteria for malnutrition   Subcutaneous Fat Loss: moderate upper arm   Muscle Loss: Moderate-temple  Fluid Accumulation/Edema: None noted  Malnutrition Diagnosis: Severe malnutrition in the context of acute condition     INTERVENTIONS  Recommendations / Nutrition Prescription  EN access: GJ tube   Goal TF: Novasource renal ( this is a coconut / MCT free formula due to allergies) @  goal of 75 ml/hr x 16 hour cycle: (1200 ml(I.e. 16: or per pt preference), 2400 kcal (39 kcal/kg), 109 g pro (1.8 g/kg), 220 g CHO, 860 ml free water, and 0 g fiber daily.    130 ml before and after each feed. Additional fluids and/or adjustments per MD(pending PO intake/IV fluids). Rec 800 ml additional daily for hydration per home regimen.    Monitor diet progression/PO intake     Future/Additional Recommendations:  Monitor nutrition related findings and follow up per protocol    Implementation  Enteral Nutrition - continue    Linda Ron MS, RD, LD, CNSC    6C (beds 5134-6045) + 7C (beds 4892-7271) + ED   Available in [employer]oc[employer] by name or unit dietitian

## 2024-08-14 NOTE — PLAN OF CARE
Neuro: A&Ox4. Pleasant, calls appropriately  Cardiac: SR. VSS.   Respiratory: Sating > 92% on RA.  GI/: Adequate urine output using urinal. No BM or episodes of emesis overnight.  Diet/appetite: Tolerating CLD diet. Home tube feeds going from [id number] at 75mL hr through J tube  Activity:  Ind in room, steady on feet  Pain: Denies  Skin: No new deficits noted.  LDA's: R PIV w/ protonix at 8mg/hr and LR at 75mL/hr; J tube with tube feeds    Plan: EGD w/ stent revision at [facility] on 8/15; Continue with POC. Notify primary team with changes.

## 2024-08-14 NOTE — PROGRESS NOTES
Discharge date: 08/14/24 ([facility])  Discharge therapies to be provided by [facility]: Enteral  Formula: Novasource Renal  Rate/Dose: 75ml/hr x 1`6 hours 4p-8a  Provider to manage enteral at home: Same provider that pt had prior to this admission.  Estimated length of need: 90 days or more  Education completed: No teaching needed due to pt and family independent with administering TF prior to admission.  Delivery/supplies: Delivery to pt's home. No supplies needed at this time per pt.  Agency: NA  SNV: NA  Notes: Spoke to pt regarding discharging today and went over TF order and informed him that the provider hasn't changed the orders, still 75ml/hr x 16 hours.  Went over water flush of 130ml before and after feedings, and 15-30ml every 4 hours to attempt to keep the tube patent.  Pt verbalized understanding.  Stated he wasn't in need of supplies at this time.  Pt denied having any questions or concerns.     Thank you,  Amy Dewitt LPN  Enteral Nurse Liaison  [facility]  [address] [place] [id number]  [phone]

## 2024-08-14 NOTE — PROGRESS NOTES
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     # Coffee Ground Emesis, likely UGIB - stable  # Esophageal SCC s/p [place] esophagectomy  # GOO s/p LAMs placement across pyloric stenosis   # J tube feeding   Differential includes erosion/trauma 2/2 metallic stent across pylorus, possible mucosal injury along the esophago-gastric anastomosis, or Estella-Hathaway tear. Currently, he is clinically/hemodynamically stable and Hb at baseline.   Stent revision originally done on 8/8/24 but had delayed post-procedure symptoms consisting of abdominal pain, nausea, and symptoms of regurgitation. Instructed to hold PO intake on 8/10 and continue with J tube feeding with plan for stent revision on 8/15/24..     Plan:  - Okay to safely discharge home from GI perspective given stable HDs and Hb at baseline  - EGD with stent revision planned for 8/15/24 with Dr. Monahan at [facility]  - Recommended BID PO PPI  - Keep on CLD. NPO after MN  - Continue J-TFs, stop after 6 PM    Panc/Bili will sign off at this time.    The patient was discussed and plan agreed upon with GI staff, Dr Ferrsi.    Leann Breaux M.D.  Internal Medicine  PGY-2   [facility]      _______________________________________________________________      Subjective: Nursing notes and 24hr events reviewed.      NAEO, tolerating his CLD diet and tube feeds. No abdominal pain, nausea, or vomiting.      ROS:   4 pt ROS negative unless noted in subjective.     Objective:  Blood pressure 123/66, pulse 79, temperature 98.5  F  "(36.9  C), temperature source Oral, resp. rate 20, height 1.702 m (5' 7\"), weight 63.5 kg (140 lb), SpO2 92%.  Gen: no acute distress  HEENT: NCAT. Conjunctiva clear.   CV: RRR, no murmurs  Resp: CTAB work of breathing  Abd: Soft, NT, ND, no guarding or rebound  Msk: no gross deformity  Skin:  No jaundice  Neuro: grossly normal  Mental status/Psych: A&O. Asks/answers questions appropriately       LABS:  BMP  Recent Labs   Lab 08/13/24  0608 08/13/24  0055    139   POTASSIUM 3.7 3.6   CHLORIDE 102 99   RAFAELA 9.5 9.6   CO2 29 28   BUN 18.9 20.8   CR 0.55* 0.60*   * 110*     CBC  Recent Labs   Lab 08/14/24  0655 08/13/24  0608 08/13/24  0055   WBC 8.9 7.5 9.2   RBC 3.61* 4.02* 4.66   HGB 11.3* 12.8* 14.6   HCT 34.2* 37.6* 43.6   MCV 95 94 94   MCH 31.3 31.8 31.3   MCHC 33.0 34.0 33.5   RDW 14.4 14.2 14.3   * 149* 173     INR  Recent Labs   Lab 08/13/24  0055   INR 0.92     LFTs  Recent Labs   Lab 08/13/24  0608 08/13/24  0055   ALKPHOS 123 153*   AST 52* 73*   ALT 68 83*   BILITOTAL 1.3* 1.0   PROTTOTAL 7.0 7.6   ALBUMIN 4.0 4.3      PANC  Recent Labs   Lab 08/13/24  0055   LIPASE 43       GI Procedures this Admission:     None    IMAGING:    CXR 8/13/24  FINDINGS: The heart size is normal. The thoracic aorta is calcified and tortuous. The lungs are clear. No pneumothorax or pleural effusion. Multiple surgical clips in the mediastinum. Old right rib fracture. Stent at the GE junction.     "

## 2024-08-14 NOTE — ANESTHESIA PREPROCEDURE EVALUATION
Anesthesia Pre-Procedure Evaluation    Patient: Lopez Hogue   MRN: [id number] : 1955        Procedure : Procedure(s):  ESOPHAGOGASTRODUODENOSCOPY with stent revision          Past Medical History:   Diagnosis Date     Anxiety      Aortic stenosis      CAD (coronary artery disease)      ETOH dependence     Quit drinking 10 years     Heart attack (H)      History of blood transfusion      HLD (hyperlipidemia)      Hypertension      Malignant neoplasm of middle third of esophagus (H)      Nonrheumatic aortic (valve) stenosis      Sweat gland carcinoma       Past Surgical History:   Procedure Laterality Date     BIOPSY      Left gluteal and groin lymphnodes     BRONCHOSCOPY FLEXIBLE AND RIGID N/A 2024    Procedure: Bronchoscopy flexible and rigid;  Surgeon: Devin Hill MD;  Location: [facility] OR     BRONCHOSCOPY FLEXIBLE AND RIGID N/A 2024    Procedure: Bronchoscopy flexible and rigid;  Surgeon: Jessie Kim MD;  Location: [facility] OR     CARDIAC SURGERY  2023    2 stents placed     COLONOSCOPY       CV CORONARY ANGIOGRAM N/A 2023    Procedure: Coronary Angiogram;  Surgeon: Miki Etienne MD;  Location: [facility]     CV CORONARY ANGIOGRAM N/A 2023    Procedure: Coronary Angiogram;  Surgeon: Miki Etienne MD;  Location: [facility] CV     CV LEFT HEART CATH N/A 2023    Procedure: Left Heart Catheterization;  Surgeon: Miki Etienne MD;  Location: [facility] CV     CV LEFT HEART CATH N/A 2023    Procedure: Left Heart Catheterization;  Surgeon: Miki Etienne MD;  Location: [facility]     CV PCI N/A 2023    Procedure: Percutaneous Coronary Intervention;  Surgeon: Miki Etienne MD;  Location: [facility] CV     ENDOSCOPIC ULTRASOUND UPPER GASTROINTESTINAL TRACT (GI) N/A 2023    Procedure: Endoscopic ultrasound upper gastrointestinal tract (GI);  Surgeon: Shahriar Giraldo MD;   Location: [facility]     ESOPHAGOGASTRODUODENOSCOPY, WITH BOTULINUM TOXIN INJECTION N/A 04/29/2024    Procedure: Esophagogastroduodenoscopy, With Botulinum Toxin Injection;  Surgeon: Jessie Kim MD;  Location: [facility]     ESOPHAGOSCOPY, GASTROSCOPY, DUODENOSCOPY (EGD), COMBINED N/A 11/08/2023    Procedure: ESOPHAGOGASTRODUODENOSCOPY, WITH BIOPSY;  Surgeon: Shahriar Giraldo MD;  Location: [facility]     ESOPHAGOSCOPY, GASTROSCOPY, DUODENOSCOPY (EGD), COMBINED N/A 04/19/2024    Procedure: Esophagoscopy, gastroscopy, duodenoscopy (EGD), combined;  Surgeon: Devin Hill MD;  Location: [facility]     ESOPHAGOSCOPY, GASTROSCOPY, DUODENOSCOPY (EGD), COMBINED N/A 04/28/2024    Procedure: Esophagoscopy, gastroscopy, duodenoscopy (EGD), combined;  Surgeon: Jessie Kim MD;  [facility]: [facility]     ESOPHAGOSCOPY, GASTROSCOPY, DUODENOSCOPY (EGD), COMBINED N/A 05/05/2024    Procedure: Esophagoscopy, gastroscopy, duodenoscopy (EGD), combined-under fluoro & dilation, nasogastric tube placement, bronchoscopy;  Surgeon: Kevin Calderon MD;  Location: [facility]     ESOPHAGOSCOPY, GASTROSCOPY, DUODENOSCOPY (EGD), COMBINED N/A 8/8/2024    Procedure: ESOPHAGOGASTRODUODENOSCOPY with fluoroscopy and stent placement;  Surgeon: Livan Monahan MD;  Location: [facility] OR     IR JEJUNOSTOMY TUBE CHANGE  05/19/2024     LAPAROSCOPIC ASSISTED INSERTION TUBE JEJUNOSTOMY N/A 04/01/2024    Procedure: Laparoscopic Jejunostomy Tube Insertion and Esophagogastroduodenoscopy;  Surgeon: Kevin Calderon MD;  Location: [facility]     LARYNGOSCOPY WITH BIOPSY(IES) N/A 10/12/2023    Procedure: LARYNGOSCOPY, WITH BIOPSY;  Surgeon: Edi Felix MD;  Location: [facility]     OTHER SURGICAL HISTORY  01/01/2015    WIDE EXCISION OF LEFT GLUTEAL MASSTNM: [id number], stage: II hidradenocarcinoma Grade II, 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, sentinel lymph node biopsy negative      SOFT TISSUE SURGERY  June 2023    left gluteal and lymphnodes     THORACOSCOPIC,  LAPAROSCOPIC ESOPHAGECTOMY, COMBINED N/A 2024    Procedure: Laparoscopic and right thoracoscopic esophagogastrectomy, right AND left chest tube placement;  Surgeon: eDvin Hill MD;  Location: [facility]     VASCULAR SURGERY  2023    Cath 2 stents      Allergies   Allergen Reactions     Coconut Flavor Anaphylaxis     Raw coconut      Social History     Tobacco Use     Smoking status: Former     Current packs/day: 0.00     Average packs/day: 2.0 packs/day for 41.0 years (82.0 ttl pk-yrs)     Types: Cigarettes     Start date: 1972     Quit date: 2013     Years since quittin.1     Passive exposure: Past     Smokeless tobacco: Never     Tobacco comments:     Quit 10 years ago   Substance Use Topics     Alcohol use: Not Currently     Comment: Stopped 11 years ago      Wt Readings from Last 1 Encounters:   24 63.5 kg (140 lb)        Anesthesia Evaluation   Pt has had prior anesthetic. Type: MAC and General.    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   S/p esophagectomy  Cough ongoing, believe associated with GERD, bloody secretions, No SOB     (-) tobacco use, asthma and recent URI   Neurologic:    (-) no seizures, no CVA and no TIA   Cardiovascular:     (+)  hypertension- -  CAD angina-  - stent (x2)-3/23.                           valvular problems/murmurs type: AS     Previous cardiac testing   Echo: Date:  Results:  [facility]  Echocardi[facility]  [address] [place] [id number]     Name: RAMIRO ZIMMER  MRN: [id number]  : 1955  Study Date: 2024 08:02 AM  Age: 68 yrs  Gender: Male  Patient Location: [facility]  Reason For Study: Cor Pulmonale  Ordering Physician: KRISSY DYER  Performed By: Teressa Gilmore     BSA: 1.8 m2  Height: 68 in  Weight: 145 lb  BP: 87/67  mmHg  ______________________________________________________________________________  Procedure  Complete Portable Echo Adult.  ______________________________________________________________________________  Interpretation Summary  Global and regional left ventricular function is normal with an EF of 55-60%.  The right ventricle is normal size. Global right ventricular function is  normal.  Mild to moderate calcific aortic stenosis is present (peak velocity 3.0 m/s,  mean gradient 19 mmHg, DEVAN 1.5 cm2, DI 0.4).  No pericardial effusion is present.  Compared to previous study dated 2/11/2024, gradient across the aortic valve  is lower. No significant change in biventricular function.  ______________________________________________________________________________  Left Ventricle  Left ventricular wall thickness is normal. Left ventricular size is normal.  Global and regional left ventricular function is normal with an EF of 55-60%.  Left ventricular diastolic function is not assessable.     Right Ventricle  The right ventricle is normal size. Global right ventricular function is  normal.     Atria  The atria cannot be assessed.     Mitral Valve  Mild mitral annular calcification is present. Trace mitral insufficiency is  present.     Aortic Valve  Moderate aortic valve calcification is present. Mild to moderate aortic  stenosis is present. Peak velocity 3.0 m/s, mean gradient 19 mmHg, DEVAN 1.5  cm2, DI 0.4.     Tricuspid Valve  The tricuspid valve is normal. Trace tricuspid insufficiency is present. The  peak velocity of the tricuspid regurgitant jet is not obtainable. Pulmonary  artery systolic pressure cannot be assessed.     Pulmonic Valve  The valve leaflets are not well visualized. On Doppler interrogation, there is  no significant stenosis or regurgitation.     Vessels  The aorta root is normal. The thoracic aorta is normal. The pulmonary artery  and bifurcation cannot be assessed. IVC diameter and respiratory  changes fall  into an intermediate range suggesting an RA pressure of 8 mmHg.     Pericardium  No pericardial effusion is present.     Attestation  I have personally viewed the imaging and agree with the interpretation and  report as documented by the fellow, Rober Marrero, and/or edited by me.  ______________________________________________________________________________  MMode/2D Measurements & Calculations  IVSd: 1.4 cm  LVIDd: 2.6 cm  LVIDs: 2.0 cm  LVPWd: 1.3 cm  FS: 22.5 %  LV mass(C)d: 104.1 grams  LV mass(C)dI: 58.4 grams/m2  LVOT diam: 2.1 cm  LVOT area: 3.6 cm2  RWT: 1.0  TAPSE: 1.6 cm     Doppler Measurements & Calculations  Ao V2 max: 296.4 cm/sec  Ao max P.2 mmHg  Ao V2 mean: 203.4 cm/sec  Ao mean P.6 mmHg  Ao V2 VTI: 58.7 cm  DEVAN(I,D): 1.5 cm2  DEVAN(V,D): 1.4 cm2  LV V1 max P.3 mmHg  LV V1 max: 114.8 cm/sec  LV V1 VTI: 24.9 cm  SV(LVOT): 88.7 ml  SI(LVOT): 49.7 ml/m2  AV Aman Ratio (DI): 0.39  DEVAN Index (cm2/m2): 0.85  RV S Aman: 11.1 cm/sec     ______________________________________________________________________________  Report approved by: Bree Recio 2024 10:36 AM             Component 3 mo ago        Stress Test:  Date: Results:    ECG Reviewed:  Date: Results:    Cath:  Date: Results:   (-) SPENCER, orthopnea/PND, arrhythmias and wheezes   METS/Exercise Tolerance: >4 METS    Hematologic:     (+)      anemia (Hgb 11.2), history of blood transfusion,      (-) history of blood clots   Musculoskeletal:       GI/Hepatic: Comment: Gastric outlet obstruction s/p pyloric stent  J-tube in place. Mostly PO intake with supplemental j-tube feedings. Recently with worsening symptoms of gastric outlet obstruction.     (+) GERD, Symptomatic,               (-) liver disease   Renal/Genitourinary:    (-) renal disease   Endo:    (-) Type II DM and obesity   Psychiatric/Substance Use:       Infectious Disease:    (-) Recent Fever   Malignancy:   (+) Malignancy, History of GI.GI CA  Remission  "status post Surgery.      Other:            Physical Exam    Airway        Mallampati: I   TM distance: > 3 FB   Neck ROM: full   Mouth opening: > 3 cm    Respiratory Devices and Support         Dental     Comment: Upper denture    (+) Removable bridges or other hardware      Cardiovascular          Rhythm and rate: regular and normal   (+) murmur       Pulmonary   pulmonary exam normal        (-) no decreased breath sounds and no wheezes    OUTSIDE LABS:  CBC:   Lab Results   Component Value Date    WBC 8.9 08/14/2024    WBC 7.5 08/13/2024    HGB 11.3 (L) 08/14/2024    HGB 12.8 (L) 08/13/2024    HCT 34.2 (L) 08/14/2024    HCT 37.6 (L) 08/13/2024     (L) 08/14/2024     (L) 08/13/2024     BMP:   Lab Results   Component Value Date     08/13/2024     08/13/2024    POTASSIUM 3.7 08/13/2024    POTASSIUM 3.6 08/13/2024    CHLORIDE 102 08/13/2024    CHLORIDE 99 08/13/2024    CO2 29 08/13/2024    CO2 28 08/13/2024    BUN 18.9 08/13/2024    BUN 20.8 08/13/2024    CR 0.55 (L) 08/13/2024    CR 0.60 (L) 08/13/2024     (H) 08/13/2024     (H) 08/13/2024     COAGS:   Lab Results   Component Value Date    PTT 30 08/13/2024    INR 0.92 08/13/2024     POC: No results found for: \"BGM\", \"HCG\", \"HCGS\"  HEPATIC:   Lab Results   Component Value Date    ALBUMIN 4.0 08/13/2024    PROTTOTAL 7.0 08/13/2024    ALT 68 08/13/2024    AST 52 (H) 08/13/2024    ALKPHOS 123 08/13/2024    BILITOTAL 1.3 (H) 08/13/2024     OTHER:   Lab Results   Component Value Date    PH 7.44 05/05/2024    LACT 1.0 04/28/2024    A1C 5.2 03/27/2023    RAFAELA 9.5 08/13/2024    PHOS 3.6 05/17/2024    MAG 1.9 05/17/2024    LIPASE 43 08/13/2024    TSH 2.57 03/29/2024    T4 0.83 (L) 03/29/2024       Anesthesia Plan    ASA Status:  3    NPO Status:  NPO Appropriate    Anesthesia Type: General.     - Airway: ETT   Induction: Intravenous.   Maintenance: Balanced.        Consents    Anesthesia Plan(s) and associated risks, benefits, and " realistic alternatives discussed. Questions answered and patient/representative(s) expressed understanding.     - Discussed:     - Discussed with:  Patient      - Extended Intubation/Ventilatory Support Discussed: Yes.           Postoperative Care    Pain management: IV analgesics.   PONV prophylaxis: Ondansetron (or other 5HT-3)     Comments:               Rudy Armstrong MD    I have reviewed the pertinent notes and labs in the chart from the past 30 days and (re)examined the patient.  Any updates or changes from those notes are reflected in this note.

## 2024-08-15 ENCOUNTER — ANESTHESIA (OUTPATIENT)
Dept: SURGERY | Facility: CLINIC | Age: 69
End: 2024-08-15
Payer: COMMERCIAL

## 2024-08-15 ENCOUNTER — HOSPITAL ENCOUNTER (OUTPATIENT)
Facility: CLINIC | Age: 69
Discharge: HOME | End: 2024-08-15
Attending: INTERNAL MEDICINE | Admitting: INTERNAL MEDICINE
Payer: COMMERCIAL

## 2024-08-15 ENCOUNTER — PATIENT OUTREACH (OUTPATIENT)
Dept: CARE COORDINATION | Facility: CLINIC | Age: 69
End: 2024-08-15
Payer: COMMERCIAL

## 2024-08-15 ENCOUNTER — APPOINTMENT (OUTPATIENT)
Dept: GENERAL RADIOLOGY | Facility: CLINIC | Age: 69
End: 2024-08-15
Attending: INTERNAL MEDICINE
Payer: COMMERCIAL

## 2024-08-15 VITALS
HEART RATE: 77 BPM | TEMPERATURE: 98 F | HEIGHT: 67 IN | RESPIRATION RATE: 18 BRPM | WEIGHT: 140.2 LBS | BODY MASS INDEX: 22 KG/M2 | DIASTOLIC BLOOD PRESSURE: 61 MMHG | SYSTOLIC BLOOD PRESSURE: 105 MMHG | OXYGEN SATURATION: 95 %

## 2024-08-15 DIAGNOSIS — K92.0 GASTROINTESTINAL HEMORRHAGE WITH HEMATEMESIS: ICD-10-CM

## 2024-08-15 LAB — UPPER GI ENDOSCOPY: NORMAL

## 2024-08-15 PROCEDURE — 360N000075 HC SURGERY LEVEL 2, PER MIN: Performed by: INTERNAL MEDICINE

## 2024-08-15 PROCEDURE — 258N000003 HC RX IP 258 OP 636: Performed by: ANESTHESIOLOGY

## 2024-08-15 PROCEDURE — C1874 STENT, COATED/COV W/DEL SYS: HCPCS | Performed by: INTERNAL MEDICINE

## 2024-08-15 PROCEDURE — 250N000025 HC SEVOFLURANE, PER MIN: Performed by: INTERNAL MEDICINE

## 2024-08-15 PROCEDURE — 710N000009 HC RECOVERY PHASE 1, LEVEL 1, PER MIN: Performed by: INTERNAL MEDICINE

## 2024-08-15 PROCEDURE — C1769 GUIDE WIRE: HCPCS | Performed by: INTERNAL MEDICINE

## 2024-08-15 PROCEDURE — 370N000017 HC ANESTHESIA TECHNICAL FEE, PER MIN: Performed by: INTERNAL MEDICINE

## 2024-08-15 PROCEDURE — 710N000012 HC RECOVERY PHASE 2, PER MINUTE: Performed by: INTERNAL MEDICINE

## 2024-08-15 PROCEDURE — 272N000001 HC OR GENERAL SUPPLY STERILE: Performed by: INTERNAL MEDICINE

## 2024-08-15 PROCEDURE — 43235 EGD DIAGNOSTIC BRUSH WASH: CPT | Performed by: ANESTHESIOLOGY

## 2024-08-15 PROCEDURE — 999N000179 XR SURGERY CARM FLUORO LESS THAN 5 MIN W STILLS: Mod: TC

## 2024-08-15 PROCEDURE — 250N000011 HC RX IP 250 OP 636: Performed by: ANESTHESIOLOGY

## 2024-08-15 PROCEDURE — 250N000009 HC RX 250: Performed by: ANESTHESIOLOGY

## 2024-08-15 PROCEDURE — 999N000141 HC STATISTIC PRE-PROCEDURE NURSING ASSESSMENT: Performed by: INTERNAL MEDICINE

## 2024-08-15 PROCEDURE — 43235 EGD DIAGNOSTIC BRUSH WASH: CPT

## 2024-08-15 DEVICE — STENT ESOPHAGEAL ENDOMAXX 23X100MM MAXX-2310: Type: IMPLANTABLE DEVICE | Site: ESOPHAGUS | Status: FUNCTIONAL

## 2024-08-15 RX ORDER — FENTANYL CITRATE 50 UG/ML
25 INJECTION, SOLUTION INTRAMUSCULAR; INTRAVENOUS EVERY 5 MIN PRN
Status: DISCONTINUED | OUTPATIENT
Start: 2024-08-15 | End: 2024-08-15 | Stop reason: HOSPADM

## 2024-08-15 RX ORDER — LIDOCAINE HYDROCHLORIDE 20 MG/ML
INJECTION, SOLUTION INFILTRATION; PERINEURAL PRN
Status: DISCONTINUED | OUTPATIENT
Start: 2024-08-15 | End: 2024-08-15

## 2024-08-15 RX ORDER — PROCHLORPERAZINE MALEATE 5 MG
5 TABLET ORAL EVERY 6 HOURS PRN
Status: CANCELLED | OUTPATIENT
Start: 2024-08-15

## 2024-08-15 RX ORDER — SODIUM CHLORIDE, SODIUM LACTATE, POTASSIUM CHLORIDE, CALCIUM CHLORIDE 600; 310; 30; 20 MG/100ML; MG/100ML; MG/100ML; MG/100ML
INJECTION, SOLUTION INTRAVENOUS CONTINUOUS
Status: DISCONTINUED | OUTPATIENT
Start: 2024-08-15 | End: 2024-08-15 | Stop reason: HOSPADM

## 2024-08-15 RX ORDER — ONDANSETRON 2 MG/ML
4 INJECTION INTRAMUSCULAR; INTRAVENOUS EVERY 30 MIN PRN
Status: DISCONTINUED | OUTPATIENT
Start: 2024-08-15 | End: 2024-08-15 | Stop reason: HOSPADM

## 2024-08-15 RX ORDER — NALOXONE HYDROCHLORIDE 0.4 MG/ML
0.1 INJECTION, SOLUTION INTRAMUSCULAR; INTRAVENOUS; SUBCUTANEOUS
Status: DISCONTINUED | OUTPATIENT
Start: 2024-08-15 | End: 2024-08-15 | Stop reason: HOSPADM

## 2024-08-15 RX ORDER — DEXAMETHASONE SODIUM PHOSPHATE 4 MG/ML
4 INJECTION, SOLUTION INTRA-ARTICULAR; INTRALESIONAL; INTRAMUSCULAR; INTRAVENOUS; SOFT TISSUE
Status: DISCONTINUED | OUTPATIENT
Start: 2024-08-15 | End: 2024-08-15 | Stop reason: HOSPADM

## 2024-08-15 RX ORDER — ONDANSETRON 4 MG/1
4 TABLET, ORALLY DISINTEGRATING ORAL EVERY 6 HOURS PRN
Status: CANCELLED | OUTPATIENT
Start: 2024-08-15

## 2024-08-15 RX ORDER — DEXAMETHASONE SODIUM PHOSPHATE 4 MG/ML
INJECTION, SOLUTION INTRA-ARTICULAR; INTRALESIONAL; INTRAMUSCULAR; INTRAVENOUS; SOFT TISSUE PRN
Status: DISCONTINUED | OUTPATIENT
Start: 2024-08-15 | End: 2024-08-15

## 2024-08-15 RX ORDER — ONDANSETRON 4 MG/1
4 TABLET, ORALLY DISINTEGRATING ORAL EVERY 30 MIN PRN
Status: DISCONTINUED | OUTPATIENT
Start: 2024-08-15 | End: 2024-08-15 | Stop reason: HOSPADM

## 2024-08-15 RX ORDER — LIDOCAINE 40 MG/G
CREAM TOPICAL
Status: DISCONTINUED | OUTPATIENT
Start: 2024-08-15 | End: 2024-08-15 | Stop reason: HOSPADM

## 2024-08-15 RX ORDER — HYDROMORPHONE HCL IN WATER/PF 6 MG/30 ML
0.4 PATIENT CONTROLLED ANALGESIA SYRINGE INTRAVENOUS EVERY 5 MIN PRN
Status: DISCONTINUED | OUTPATIENT
Start: 2024-08-15 | End: 2024-08-15 | Stop reason: HOSPADM

## 2024-08-15 RX ORDER — PROPOFOL 10 MG/ML
INJECTION, EMULSION INTRAVENOUS PRN
Status: DISCONTINUED | OUTPATIENT
Start: 2024-08-15 | End: 2024-08-15

## 2024-08-15 RX ORDER — PANTOPRAZOLE SODIUM 40 MG/1
40 TABLET, DELAYED RELEASE ORAL 2 TIMES DAILY
Qty: 180 TABLET | Refills: 4 | Status: SHIPPED | OUTPATIENT
Start: 2024-08-15 | End: 2024-08-26

## 2024-08-15 RX ORDER — ONDANSETRON 2 MG/ML
INJECTION INTRAMUSCULAR; INTRAVENOUS PRN
Status: DISCONTINUED | OUTPATIENT
Start: 2024-08-15 | End: 2024-08-15

## 2024-08-15 RX ORDER — ONDANSETRON 2 MG/ML
4 INJECTION INTRAMUSCULAR; INTRAVENOUS EVERY 6 HOURS PRN
Status: CANCELLED | OUTPATIENT
Start: 2024-08-15

## 2024-08-15 RX ORDER — HYDROMORPHONE HCL IN WATER/PF 6 MG/30 ML
0.2 PATIENT CONTROLLED ANALGESIA SYRINGE INTRAVENOUS EVERY 5 MIN PRN
Status: DISCONTINUED | OUTPATIENT
Start: 2024-08-15 | End: 2024-08-15 | Stop reason: HOSPADM

## 2024-08-15 RX ORDER — ONDANSETRON 2 MG/ML
4 INJECTION INTRAMUSCULAR; INTRAVENOUS
Status: DISCONTINUED | OUTPATIENT
Start: 2024-08-15 | End: 2024-08-15 | Stop reason: HOSPADM

## 2024-08-15 RX ORDER — FENTANYL CITRATE 50 UG/ML
50 INJECTION, SOLUTION INTRAMUSCULAR; INTRAVENOUS EVERY 5 MIN PRN
Status: DISCONTINUED | OUTPATIENT
Start: 2024-08-15 | End: 2024-08-15 | Stop reason: HOSPADM

## 2024-08-15 RX ORDER — FLUMAZENIL 0.1 MG/ML
0.2 INJECTION, SOLUTION INTRAVENOUS
Status: CANCELLED | OUTPATIENT
Start: 2024-08-15 | End: 2024-08-15

## 2024-08-15 RX ADMIN — DEXAMETHASONE SODIUM PHOSPHATE 4 MG: 4 INJECTION, SOLUTION INTRA-ARTICULAR; INTRALESIONAL; INTRAMUSCULAR; INTRAVENOUS; SOFT TISSUE at 10:10

## 2024-08-15 RX ADMIN — SUCCINYLCHOLINE CHLORIDE 100 MG: 20 INJECTION, SOLUTION INTRAMUSCULAR; INTRAVENOUS; PARENTERAL at 10:10

## 2024-08-15 RX ADMIN — SODIUM CHLORIDE, POTASSIUM CHLORIDE, SODIUM LACTATE AND CALCIUM CHLORIDE: 600; 310; 30; 20 INJECTION, SOLUTION INTRAVENOUS at 10:07

## 2024-08-15 RX ADMIN — PHENYLEPHRINE HYDROCHLORIDE 100 MCG: 10 INJECTION INTRAVENOUS at 10:28

## 2024-08-15 RX ADMIN — LIDOCAINE HYDROCHLORIDE 100 MG: 20 INJECTION, SOLUTION INFILTRATION; PERINEURAL at 10:10

## 2024-08-15 RX ADMIN — PROPOFOL 200 MG: 10 INJECTION, EMULSION INTRAVENOUS at 10:10

## 2024-08-15 RX ADMIN — ONDANSETRON 4 MG: 2 INJECTION INTRAMUSCULAR; INTRAVENOUS at 10:10

## 2024-08-15 ASSESSMENT — ACTIVITIES OF DAILY LIVING (ADL)
ADLS_ACUITY_SCORE: 37

## 2024-08-15 NOTE — PROGRESS NOTES
[facility]    Background: Transitional Care Management program identified per system criteria and reviewed by [facility] team for possible outreach.    Assessment: Upon chart review, [facility] Team member will not proceed with patient outreach related to this episode of Transitional Care Management program due to reason below:    Patient has a follow up appointment with an appropriate provider today for hospital discharge    Patient has scheduled surgery today with an MD in Gastroenterology from [facility]. No W outreach call needed at this time.    Plan: Transitional Care Management episode addressed appropriately per reason noted above.      XANDER Ramirez  [phone]  [facility]     *Connected Care Resource Team does NOT follow patient ongoing. Referrals are identified based on internal discharge reports and the outreach is to ensure patient has an understanding of their discharge instructions.

## 2024-08-15 NOTE — ANESTHESIA CARE TRANSFER NOTE
Patient: Lopez Hogue    Procedure: Procedure(s):  ESOPHAGOGASTRODUODENOSCOPY with stent placement       Diagnosis: Pyloric stenosis [K31.1]  Diagnosis Additional Information: No value filed.    Anesthesia Type:   General     Note:    Oropharynx: oropharynx clear of all foreign objects and spontaneously breathing  Level of Consciousness: awake  Oxygen Supplementation: room air    Independent Airway: airway patency satisfactory and stable  Dentition: dentition unchanged  Vital Signs Stable: post-procedure vital signs reviewed and stable  Report to RN Given: handoff report given  Patient transferred to: PACU    Handoff Report: Identifed the Patient, Identified the Reponsible Provider, Reviewed the pertinent medical history, Discussed the surgical course, Reviewed Intra-OP anesthesia mangement and issues during anesthesia, Set expectations for post-procedure period and Allowed opportunity for questions and acknowledgement of understanding      Vitals:  Vitals Value Taken Time   /65    Temp 97.6 f    Pulse 80 08/15/24 1052   Resp 21 08/15/24 1052   SpO2 98 % 08/15/24 1052   Vitals shown include unfiled device data.    Electronically Signed By: YADIRA Burns CRNA  August 15, 2024  10:54 AM

## 2024-08-15 NOTE — ANESTHESIA PROCEDURE NOTES
Airway       Patient location during procedure: OR       Procedure Start/Stop Times: 8/15/2024 10:12 AM  Staff -        CRNA: Edu Mejia APRN CRNA       Performed By: CRNA  Consent for Airway        Urgency: elective  Indications and Patient Condition       Indications for airway management: geneva-procedural       Induction type:intravenous       Mask difficulty assessment: 1 - vent by mask    Final Airway Details       Final airway type: endotracheal airway       Successful airway: ETT - single and Oral  Endotracheal Airway Details        ETT size (mm): 8.0       Cuffed: yes       Successful intubation technique: video laryngoscopy       VL Blade Size: Glidescope 3       Grade View of Cords: 1       Adjucts: stylet       Position: Right       Measured from: gums/teeth       Secured at (cm): 23       Bite block used: None    Post intubation assessment        Placement verified by: capnometry, equal breath sounds and chest rise        Number of attempts at approach: 1       Number of other approaches attempted: 0       Secured with: tape       Ease of procedure: easy       Dentition: Intact and Unchanged    Medication(s) Administered   Medication Administration Time: 8/15/2024 10:12 AM

## 2024-08-15 NOTE — DISCHARGE SUMMARY
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 Monahan tomorrow as previously scheduled.    Appointments on [place] and/or [facility] (with [facility] or [facility]   provider or service). Call [phone] if you haven't heard regarding   these appointments within 7 days of discharge.            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 Reports that after his most recent stent last week he initially felt well and was able to drink normally. When he tried to advance his diet he started feeling like food was getting caught in his chest. Started vomiting Sunday morning which initially showed black specks which progressed to bright red by Monday, had ~12 episodes. Hgb 14.6 on admission > 12.8 the next morning. Of note, pts baseline appears to  be 11-12, so his initial hgb may have reflected hemoconcentration. Did not have any further episodes of vomiting after he was given anti-emetics. Started on PPI drip on arrival. GI was consulted. Discussed inpatient endoscopy vs close monitoring and keeping pts appointment on 8/15 with Dr. Monahan. Ultimately pts symptoms remained improved and decision made to discharge home with planned EGD on 8/15.  - Continue pantoprazole 40mg BID. Will defer to Dr. Monahan the final duration pending findings on EGD tmrw.  - EGD 8/[facility] with Dr. Monahan  - Stop TF at 6pm today, ok for CLD until midnight.     HTN , HLD   H/O CAD/ NSTEMI s/p KAYDEN in 03/2023   - Rosuvastatin 40mg   - Does not take any meds for HTN , seems under control.     Consultations This Hospital Stay   NURSING TO CONSULT FOR VASCULAR ACCESS CARE IP CONSULT  GI PANCREATICOBILIARY ADULT IP CONSULT  NUTRITION SERVICES ADULT IP CONSULT  PHARMACY IP CONSULT  NURSING TO CONSULT FOR VASCULAR ACCESS CARE IP CONSULT    Code Status   Prior    Time Spent on this Encounter   I, Nisha Luis MD, personally saw the patient today and spent greater than 30 minutes discharging this patient.       Nisha Luis MD  [facility] EMERGENCY DEPARTMENT  [address] [address]  Phone: [phone]  ______________________________________________________________________    Physical Exam   Vital Signs: Temp: 98.5  F (36.9  C) Temp src: Oral BP: 111/67 Pulse: 94   Resp: 20 SpO2: 95 % O2 Device: None (Room air)    Weight: 140 lbs 0 oz    General Appearance: NAD. Lying comfortably in bed.  Respiratory: CTAB. No increased WOB.  Cardiovascular: RRR. No m/r/g  GI: Soft. Non-tender. Non-distended.  Skin: No rash.  Other: AOx4. Moving all extremities. Normal affect.       Primary Care Physician   Maya Chappell    Discharge Orders      Reason for your hospital stay    You were hospitalized with vomiting and concern for bleeding. Your symptoms have  improved and your hemoglobin is stable. You should follow up with Dr. Monahan as planned for your procedure tomorrow.    - Start protonix 40mg (1 tablet) twice daily. Dr. Monahan can determine how long you need to be on this after your procedure tomorrow.  - Stop tube feeds at 6pm tonight and no fluids after midnight.  -     Activity    Your activity upon discharge: activity as tolerated     Adult [facility] Follow-up and recommended labs and tests    Follow up with Dr. Monahan tomorrow as previously scheduled.    Appointments on [place] and/or [facility] (with [facility] or [facility] provider or service). Call [phone] if you haven't heard regarding these appointments within 7 days of discharge.     Diet    Follow this diet upon discharge: Orders Placed This Encounter      Adult Formula Drip Feeding: Continuous Novasource Renal; Jejunostomy; Goal Rate: 75; mL/hr; From: 4:00 PM; To: 8:00 AM; Initiate @ 35ml/hr and increased to goal  after 4hrs if pt tolerating; Do not advance tube feeding rate unless K+ is = or > 3.0...      Clear Liquid Diet       Significant Results and Procedures   Most Recent 3 CBC's:  Recent Labs   Lab Test 08/14/24  0655 08/13/24  0608 08/13/24  0055   WBC 8.9 7.5 9.2   HGB 11.3* 12.8* 14.6   MCV 95 94 94   * 149* 173     Most Recent 3 BMP's:  Recent Labs   Lab Test 08/13/24  0608 08/13/24  0055 05/17/24  0429    139 138   POTASSIUM 3.7 3.6 3.7   CHLORIDE 102 99 101   CO2 29 28 26   BUN 18.9 20.8 21.8   CR 0.55* 0.60* 0.61*   ANIONGAP 10 12 11   RAFAELA 9.5 9.6 9.0   * 110* 110*   ,   Results for orders placed or performed during the hospital encounter of 08/13/24   XR Chest 2 Views    Narrative    EXAM: XR CHEST 2 VIEWS  LOCATION: [facility]  DATE: 8/13/2024    INDICATION: Hematemesis.  COMPARISON: 5/20/2024.    FINDINGS: The heart size is normal. The thoracic aorta is calcified and tortuous. The lungs are clear. No pneumothorax or  pleural effusion. Multiple surgical clips in the mediastinum. Old right rib fracture. Stent at the GE junction.      Impression    IMPRESSION: No acute abnormality.       Discharge Medications   Discharge Medication List as of 8/14/2024 12:02 PM        START taking these medications    Details   pantoprazole (PROTONIX) 40 MG EC tablet Take 1 tablet (40 mg) by mouth 2 times daily, Disp-30 tablet, R-0, E-Prescribe           CONTINUE these medications which have NOT CHANGED    Details   acetaminophen (TYLENOL) 500 MG tablet Take 500-1,000 mg by mouth every 8 hours as needed for fever or pain, Historical      acetylcysteine (MUCOMYST) 20 % neb solution Take 2 mLs by nebulization every 4 hours as needed, Historical      busPIRone (BUSPAR) 5 MG tablet Take 1 tablet (5 mg) by mouth 3 times daily, Disp-90 tablet, R-2, E-Prescribe      famotidine (PEPCID) 20 MG tablet Take 20 mg by mouth 2 times daily, Historical      nitroGLYcerin (NITROSTAT) 0.4 MG sublingual tablet Place 0.4 mg under the tongue every 5 minutes as needed for chest pain For chest pain place 1 tablet under the tongue every 5 minutes for 3 doses. If symptoms persist 5 minutes after 1st dose call 911., Historical      polyethylene glycol (MIRALAX) 17 g packet Take 17 g by mouth daily as needed for constipation, Historical      rosuvastatin (CRESTOR) 40 MG tablet Take 1 tablet (40 mg) by mouth daily **Due for follow-up with Dr. aWlters**, Disp-90 tablet, R-0, E-PrescribeZERO refills remain on this prescription. Your patient is requesting advance approval of refills for this medication to PREVENT ANY MISSED DOSE S      senna-docusate (SENOKOT-S/PERICOLACE) 8.6-50 MG tablet Take 1 tablet by mouth 2 times daily as needed for constipation, Disp-30 tablet, R-0, E-Prescribe      sertraline (ZOLOFT) 100 MG tablet Take 100 mg by mouth every morning, Historical      sodium chloride (NEBUSAL) 3 % neb solution Take 4 mLs by nebulization every 3 hours as needed for  wheezing, Disp-100 mL, R-3, E-Prescribe      sodium fluoride dental gel (PREVIDENT) 1.1 % GEL topical gel Apply to affected area at bedtimeHistorical           Allergies   Allergies   Allergen Reactions    Coconut Flavor Anaphylaxis     Raw coconut

## 2024-08-15 NOTE — OR NURSING
"Patient very alert and states he feels \"great\" and requests to get dressed. VSS. No c/o or nausea or pain.  "

## 2024-08-15 NOTE — ANESTHESIA POSTPROCEDURE EVALUATION
Patient: Lopez Hogue    Procedure: Procedure(s):  ESOPHAGOGASTRODUODENOSCOPY with stent placement       Anesthesia Type:  General    Note:  Disposition: Outpatient   Postop Pain Control: Uneventful            Sign Out: Well controlled pain   PONV: No   Neuro/Psych: Uneventful            Sign Out: Acceptable/Baseline neuro status   Airway/Respiratory: Uneventful            Sign Out: Acceptable/Baseline resp. status   CV/Hemodynamics: Uneventful            Sign Out: Acceptable CV status; No obvious hypovolemia; No obvious fluid overload   Other NRE: NONE   DID A NON-ROUTINE EVENT OCCUR? No       Last vitals:  Vitals Value Taken Time   BP 99/59 08/15/24 1115   Temp     Pulse 79 08/15/24 1126   Resp 16 08/15/24 1126   SpO2 90 % 08/15/24 1126   Vitals shown include unfiled device data.    Electronically Signed By: Rudy Armstrong MD  August 15, 2024  4:38 PM

## 2024-08-18 SDOH — HEALTH STABILITY: PHYSICAL HEALTH: ON AVERAGE, HOW MANY DAYS PER WEEK DO YOU ENGAGE IN MODERATE TO STRENUOUS EXERCISE (LIKE A BRISK WALK)?: 0 DAYS

## 2024-08-18 SDOH — HEALTH STABILITY: PHYSICAL HEALTH: ON AVERAGE, HOW MANY MINUTES DO YOU ENGAGE IN EXERCISE AT THIS LEVEL?: 0 MIN

## 2024-08-18 ASSESSMENT — SOCIAL DETERMINANTS OF HEALTH (SDOH): HOW OFTEN DO YOU GET TOGETHER WITH FRIENDS OR RELATIVES?: TWICE A WEEK

## 2024-08-23 ENCOUNTER — OFFICE VISIT (OUTPATIENT)
Dept: FAMILY MEDICINE | Facility: CLINIC | Age: 69
End: 2024-08-23
Payer: COMMERCIAL

## 2024-08-23 VITALS
SYSTOLIC BLOOD PRESSURE: 98 MMHG | HEIGHT: 67 IN | RESPIRATION RATE: 24 BRPM | WEIGHT: 142 LBS | TEMPERATURE: 97.9 F | DIASTOLIC BLOOD PRESSURE: 59 MMHG | HEART RATE: 79 BPM | BODY MASS INDEX: 22.29 KG/M2 | OXYGEN SATURATION: 96 %

## 2024-08-23 DIAGNOSIS — Z12.11 SCREEN FOR COLON CANCER: ICD-10-CM

## 2024-08-23 DIAGNOSIS — F39 EPISODIC MOOD DISORDER (H): ICD-10-CM

## 2024-08-23 DIAGNOSIS — I25.110 CORONARY ARTERY DISEASE INVOLVING NATIVE CORONARY ARTERY OF NATIVE HEART WITH UNSTABLE ANGINA PECTORIS (H): ICD-10-CM

## 2024-08-23 DIAGNOSIS — Z87.891 HISTORY OF TOBACCO ABUSE: ICD-10-CM

## 2024-08-23 DIAGNOSIS — Z00.00 ENCOUNTER FOR MEDICARE ANNUAL WELLNESS EXAM: Primary | ICD-10-CM

## 2024-08-23 DIAGNOSIS — Z12.5 SCREENING FOR PROSTATE CANCER: ICD-10-CM

## 2024-08-23 DIAGNOSIS — I35.0 NONRHEUMATIC AORTIC VALVE STENOSIS: ICD-10-CM

## 2024-08-23 DIAGNOSIS — Z95.5 S/P CORONARY ARTERY STENT PLACEMENT: ICD-10-CM

## 2024-08-23 DIAGNOSIS — C10.0 SQUAMOUS CELL CARCINOMA OF VALLECULA (H): ICD-10-CM

## 2024-08-23 DIAGNOSIS — K31.1 PYLORIC STRICTURE: ICD-10-CM

## 2024-08-23 LAB
ALBUMIN SERPL BCG-MCNC: 3.7 G/DL (ref 3.5–5.2)
ALP SERPL-CCNC: 122 U/L (ref 40–150)
ALT SERPL W P-5'-P-CCNC: 56 U/L (ref 0–70)
ANION GAP SERPL CALCULATED.3IONS-SCNC: 10 MMOL/L (ref 7–15)
AST SERPL W P-5'-P-CCNC: 45 U/L (ref 0–45)
BILIRUB SERPL-MCNC: 0.5 MG/DL
BUN SERPL-MCNC: 14.7 MG/DL (ref 8–23)
CALCIUM SERPL-MCNC: 9.5 MG/DL (ref 8.8–10.4)
CHLORIDE SERPL-SCNC: 104 MMOL/L (ref 98–107)
CHOLEST SERPL-MCNC: 91 MG/DL
CREAT SERPL-MCNC: 0.55 MG/DL (ref 0.67–1.17)
EGFRCR SERPLBLD CKD-EPI 2021: >90 ML/MIN/1.73M2
ERYTHROCYTE [DISTWIDTH] IN BLOOD BY AUTOMATED COUNT: 13.7 % (ref 10–15)
FASTING STATUS PATIENT QL REPORTED: ABNORMAL
FASTING STATUS PATIENT QL REPORTED: NORMAL
GLUCOSE SERPL-MCNC: 92 MG/DL (ref 70–99)
HCO3 SERPL-SCNC: 26 MMOL/L (ref 22–29)
HCT VFR BLD AUTO: 34.4 % (ref 40–53)
HDLC SERPL-MCNC: 47 MG/DL
HGB BLD-MCNC: 11.4 G/DL (ref 13.3–17.7)
LDLC SERPL CALC-MCNC: 35 MG/DL
MCH RBC QN AUTO: 30.6 PG (ref 26.5–33)
MCHC RBC AUTO-ENTMCNC: 33.1 G/DL (ref 31.5–36.5)
MCV RBC AUTO: 93 FL (ref 78–100)
NONHDLC SERPL-MCNC: 44 MG/DL
PLATELET # BLD AUTO: 182 10E3/UL (ref 150–450)
POTASSIUM SERPL-SCNC: 3.9 MMOL/L (ref 3.4–5.3)
PROT SERPL-MCNC: 6.9 G/DL (ref 6.4–8.3)
PSA SERPL DL<=0.01 NG/ML-MCNC: 0.52 NG/ML (ref 0–4.5)
RBC # BLD AUTO: 3.72 10E6/UL (ref 4.4–5.9)
SODIUM SERPL-SCNC: 140 MMOL/L (ref 135–145)
TRIGL SERPL-MCNC: 44 MG/DL
WBC # BLD AUTO: 4.8 10E3/UL (ref 4–11)

## 2024-08-23 PROCEDURE — G0103 PSA SCREENING: HCPCS | Performed by: PHYSICIAN ASSISTANT

## 2024-08-23 PROCEDURE — 36415 COLL VENOUS BLD VENIPUNCTURE: CPT | Performed by: PHYSICIAN ASSISTANT

## 2024-08-23 PROCEDURE — 85027 COMPLETE CBC AUTOMATED: CPT | Performed by: PHYSICIAN ASSISTANT

## 2024-08-23 PROCEDURE — 99214 OFFICE O/P EST MOD 30 MIN: CPT | Mod: 25 | Performed by: PHYSICIAN ASSISTANT

## 2024-08-23 PROCEDURE — 80061 LIPID PANEL: CPT | Performed by: PHYSICIAN ASSISTANT

## 2024-08-23 PROCEDURE — 80053 COMPREHEN METABOLIC PANEL: CPT | Performed by: PHYSICIAN ASSISTANT

## 2024-08-23 PROCEDURE — 99397 PER PM REEVAL EST PAT 65+ YR: CPT | Performed by: PHYSICIAN ASSISTANT

## 2024-08-23 RX ORDER — SERTRALINE HYDROCHLORIDE 100 MG/1
150 TABLET, FILM COATED ORAL EVERY MORNING
Qty: 45 TABLET | Refills: 1 | Status: SHIPPED | OUTPATIENT
Start: 2024-08-23

## 2024-08-23 NOTE — PATIENT INSTRUCTIONS
Patient Education   Preventive Care Advice   This is general advice given by our system to help you stay healthy. However, your care team may have specific advice just for you. Please talk to your care team about your preventive care needs.  Nutrition  Eat 5 or more servings of fruits and vegetables each day.  Try wheat bread, brown rice and whole grain pasta (instead of white bread, rice, and pasta).  Get enough calcium and vitamin D. Check the label on foods and aim for 100% of the RDA (recommended daily allowance).  Lifestyle  Exercise at least 150 minutes each week  (30 minutes a day, 5 days a week).  Do muscle strengthening activities 2 days a week. These help control your weight and prevent disease.  No smoking.  Wear sunscreen to prevent skin cancer.  Have a dental exam and cleaning every 6 months.  Yearly exams  See your health care team every year to talk about:  Any changes in your health.  Any medicines your care team has prescribed.  Preventive care, family planning, and ways to prevent chronic diseases.  Shots (vaccines)   HPV shots (up to age 26), if you've never had them before.  Hepatitis B shots (up to age 59), if you've never had them before.  COVID-19 shot: Get this shot when it's due.  Flu shot: Get a flu shot every year.  Tetanus shot: Get a tetanus shot every 10 years.  Pneumococcal, hepatitis A, and RSV shots: Ask your care team if you need these based on your risk.  Shingles shot (for age 50 and up)  General health tests  Diabetes screening:  Starting at age 35, Get screened for diabetes at least every 3 years.  If you are younger than age 35, ask your care team if you should be screened for diabetes.  Cholesterol test: At age 39, start having a cholesterol test every 5 years, or more often if advised.  Bone density scan (DEXA): At age 50, ask your care team if you should have this scan for osteoporosis (brittle bones).  Hepatitis C: Get tested at least once in your life.  STIs (sexually  transmitted infections)  Before age 24: Ask your care team if you should be screened for STIs.  After age 24: Get screened for STIs if you're at risk. You are at risk for STIs (including HIV) if:  You are sexually active with more than one person.  You don't use condoms every time.  You or a partner was diagnosed with a sexually transmitted infection.  If you are at risk for HIV, ask about PrEP medicine to prevent HIV.  Get tested for HIV at least once in your life, whether you are at risk for HIV or not.  Cancer screening tests  Cervical cancer screening: If you have a cervix, begin getting regular cervical cancer screening tests starting at age 21.  Breast cancer scan (mammogram): If you've ever had breasts, begin having regular mammograms starting at age 40. This is a scan to check for breast cancer.  Colon cancer screening: It is important to start screening for colon cancer at age 45.  Have a colonoscopy test every 10 years (or more often if you're at risk) Or, ask your provider about stool tests like a FIT test every year or Cologuard test every 3 years.  To learn more about your testing options, visit:   .  For help making a decision, visit:   https://bit.ly/fs61952.  Prostate cancer screening test: If you have a prostate, ask your care team if a prostate cancer screening test (PSA) at age 55 is right for you.  Lung cancer screening: If you are a current or former smoker ages 50 to 80, ask your care team if ongoing lung cancer screenings are right for you.  For informational purposes only. Not to replace the advice of your health care provider. Copyright   2023 [facility] [employer]. All rights reserved. Clinically reviewed by the [facility]. [employer] [id number] - REV 01/24.  Hearing Loss: Care Instructions  Overview     Hearing loss is a sudden or slow decrease in how well you hear. It can range from slight to profound. Permanent hearing loss can occur with aging. It also can  happen when you are exposed long-term to loud noise. Examples include listening to loud music, riding motorcycles, or being around other loud machines.  Hearing loss can affect your work and home life. It can make you feel lonely or depressed. You may feel that you have lost your independence. But hearing aids and other devices can help you hear better and feel connected to others.  Follow-up care is a key part of your treatment and safety. Be sure to make and go to all appointments, and call your doctor if you are having problems. It's also a good idea to know your test results and keep a list of the medicines you take.  How can you care for yourself at home?  Avoid loud noises whenever possible. This helps keep your hearing from getting worse.  Always wear hearing protection around loud noises.  Wear a hearing aid as directed.  A professional can help you pick a hearing aid that will work best for you.  You can also get hearing aids over the counter for mild to moderate hearing loss.  Have hearing tests as your doctor suggests. They can show whether your hearing has changed. Your hearing aid may need to be adjusted.  Use other devices as needed. These may include:  Telephone amplifiers and hearing aids that can connect to a television, stereo, radio, or microphone.  Devices that use lights or vibrations. These alert you to the doorbell, a ringing telephone, or a baby monitor.  Television closed-captioning. This shows the words at the bottom of the screen. Most new TVs can do this.  TTY (text telephone). This lets you type messages back and forth on the telephone instead of talking or listening. These devices are also called TDD. When messages are typed on the keyboard, they are sent over the phone line to a receiving TTY. The message is shown on a monitor.  Use text messaging, social media, and email if it is hard for you to communicate by telephone.  Try to learn a listening technique called speechreading. It is  "not lipreading. You pay attention to people's gestures, expressions, posture, and tone of voice. These clues can help you understand what a person is saying. Face the person you are talking to, and have them face you. Make sure the lighting is good. You need to see the other person's face clearly.  Think about counseling if you need help to adjust to your hearing loss.  When should you call for help?  Watch closely for changes in your health, and be sure to contact your doctor if:    You think your hearing is getting worse.     You have new symptoms, such as dizziness or nausea.   Where can you learn more?  Go to https://www.[employer].net/patiented  Enter R798 in the search box to learn more about \"Hearing Loss: Care Instructions.\"  Current as of: September 27, 2023               Content Version: 14.0    9276-2540 [employer].   Care instructions adapted under license by your healthcare professional. If you have questions about a medical condition or this instruction, always ask your healthcare professional. [employer] disclaims any warranty or liability for your use of this information.      Learning About Stress  What is stress?     Stress is your body's response to a hard situation. Your body can have a physical, emotional, or mental response. Stress is a fact of life for most people, and it affects everyone differently. What causes stress for you may not be stressful for someone else.  A lot of things can cause stress. You may feel stress when you go on a job interview, take a test, or run a race. This kind of short-term stress is normal and even useful. It can help you if you need to work hard or react quickly. For example, stress can help you finish an important job on time.  Long-term stress is caused by ongoing stressful situations or events. Examples of long-term stress include long-term health problems, ongoing problems at work, or conflicts in your family. Long-term stress can " harm your health.  How does stress affect your health?  When you are stressed, your body responds as though you are in danger. It makes hormones that speed up your heart, make you breathe faster, and give you a burst of energy. This is called the fight-or-flight stress response. If the stress is over quickly, your body goes back to normal and no harm is done.  But if stress happens too often or lasts too long, it can have bad effects. Long-term stress can make you more likely to get sick, and it can make symptoms of some diseases worse. If you tense up when you are stressed, you may develop neck, shoulder, or low back pain. Stress is linked to high blood pressure and heart disease.  Stress also harms your emotional health. It can make you villanueva, tense, or depressed. Your relationships may suffer, and you may not do well at work or school.  What can you do to manage stress?  You can try these things to help manage stress:   Do something active. Exercise or activity can help reduce stress. Walking is a great way to get started. Even everyday activities such as housecleaning or yard work can help.  Try yoga or rosa chi. These techniques combine exercise and meditation. You may need some training at first to learn them.  Do something you enjoy. For example, listen to music or go to a movie. Practice your hobby or do volunteer work.  Meditate. This can help you relax, because you are not worrying about what happened before or what may happen in the future.  Do guided imagery. Imagine yourself in any setting that helps you feel calm. You can use online videos, books, or a teacher to guide you.  Do breathing exercises. For example:  From a standing position, bend forward from the waist with your knees slightly bent. Let your arms dangle close to the floor.  Breathe in slowly and deeply as you return to a standing position. Roll up slowly and lift your head last.  Hold your breath for just a few seconds in the standing  "position.  Breathe out slowly and bend forward from the waist.  Let your feelings out. Talk, laugh, cry, and express anger when you need to. Talking with supportive friends or family, a counselor, or a emanuel leader about your feelings is a healthy way to relieve stress. Avoid discussing your feelings with people who make you feel worse.  Write. It may help to write about things that are bothering you. This helps you find out how much stress you feel and what is causing it. When you know this, you can find better ways to cope.  What can you do to prevent stress?  You might try some of these things to help prevent stress:  Manage your time. This helps you find time to do the things you want and need to do.  Get enough sleep. Your body recovers from the stresses of the day while you are sleeping.  Get support. Your family, friends, and community can make a difference in how you experience stress.  Limit your news feed. Avoid or limit time on social media or news that may make you feel stressed.  Do something active. Exercise or activity can help reduce stress. Walking is a great way to get started.  Where can you learn more?  Go to https://www.[employer].net/patiented  Enter N032 in the search box to learn more about \"Learning About Stress.\"  Current as of: October 24, 2023               Content Version: 14.0    9468-1105 [place].   Care instructions adapted under license by your healthcare professional. If you have questions about a medical condition or this instruction, always ask your healthcare professional. [employer] disclaims any warranty or liability for your use of this information.      Learning About Sleeping Well  What does sleeping well mean?     Sleeping well means getting enough sleep to feel good and stay healthy. How much sleep is enough varies among people.  The number of hours you sleep and how you feel when you wake up are both important. If you do not feel refreshed, " you probably need more sleep. Another sign of not getting enough sleep is feeling tired during the day.  Experts recommend that adults get at least 7 or more hours of sleep per day. Children and older adults need more sleep.  Why is getting enough sleep important?  Getting enough quality sleep is a basic part of good health. When your sleep suffers, your physical health, mood, and your thoughts can suffer too. You may find yourself feeling more grumpy or stressed. Not getting enough sleep also can lead to serious problems, including injury, accidents, anxiety, and depression.  What might cause poor sleeping?  Many things can cause sleep problems, including:  Changes to your sleep schedule.  Stress. Stress can be caused by fear about a single event, such as giving a speech. Or you may have ongoing stress, such as worry about work or school.  Depression, anxiety, and other mental or emotional conditions.  Changes in your sleep habits or surroundings. This includes changes that happen where you sleep, such as noise, light, or sleeping in a different bed. It also includes changes in your sleep pattern, such as having jet lag or working a late shift.  Health problems, such as pain, breathing problems, and restless legs syndrome.  Lack of regular exercise.  Using alcohol, nicotine, or caffeine before bed.  How can you help yourself?  Here are some tips that may help you sleep more soundly and wake up feeling more refreshed.  Your sleeping area   Use your bedroom only for sleeping and sex. A bit of light reading may help you fall asleep. But if it doesn't, do your reading elsewhere in the house. Try not to use your TV, computer, smartphone, or tablet while you are in bed.  Be sure your bed is big enough to stretch out comfortably, especially if you have a sleep partner.  Keep your bedroom quiet, dark, and cool. Use curtains, blinds, or a sleep mask to block out light. To block out noise, use earplugs, soothing music, or a  "\"white noise\" machine.  Your evening and bedtime routine   Create a relaxing bedtime routine. You might want to take a warm shower or bath, or listen to soothing music.  Go to bed at the same time every night. And get up at the same time every morning, even if you feel tired.  What to avoid   Limit caffeine (coffee, tea, caffeinated sodas) during the day, and don't have any for at least 6 hours before bedtime.  Avoid drinking alcohol before bedtime. Alcohol can cause you to wake up more often during the night.  Try not to smoke or use tobacco, especially in the evening. Nicotine can keep you awake.  Limit naps during the day, especially close to bedtime.  Avoid lying in bed awake for too long. If you can't fall asleep or if you wake up in the middle of the night and can't get back to sleep within about 20 minutes, get out of bed and go to another room until you feel sleepy.  Avoid taking medicine right before bed that may keep you awake or make you feel hyper or energized. Your doctor can tell you if your medicine may do this and if you can take it earlier in the day.  If you can't sleep   Imagine yourself in a peaceful, pleasant scene. Focus on the details and feelings of being in a place that is relaxing.  Get up and do a quiet or boring activity until you feel sleepy.  Avoid drinking any liquids before going to bed to help prevent waking up often to use the bathroom.  Where can you learn more?  Go to https://www.[employer].net/patiented  Enter J942 in the search box to learn more about \"Learning About Sleeping Well.\"  Current as of: July 10, 2023  Content Version: 14.1 2006-2024 [employer].   Care instructions adapted under license by your healthcare professional. If you have questions about a medical condition or this instruction, always ask your healthcare professional. [employer] disclaims any warranty or liability for your use of this information.       "

## 2024-08-23 NOTE — PROGRESS NOTES
Preventive Care Visit  [facility]  Maya Chappell PA-C, Physician Assistant - Medical  Aug 23, 2024      Assessment & Plan     Encounter for Medicare annual wellness exam  Labs updated today.  Vaccines- recommend RSV, COVID and Flu at pharmacy.  Colon Cancer screening- colonoscopy ordered.  Prostate screening- PSA updated today.  - PRIMARY CARE FOLLOW-UP SCHEDULING  - CBC with platelets  - Comprehensive metabolic panel  - Prostate Specific Antigen Screen  - Lipid panel reflex to direct LDL Fasting  - CBC with platelets  - Comprehensive metabolic panel  - Prostate Specific Antigen Screen  - Lipid panel reflex to direct LDL Fasting    Screen for colon cancer  - Colonoscopy Screening  Referral    Screening for prostate cancer  - Prostate Specific Antigen Screen  - Prostate Specific Antigen Screen    Coronary artery disease involving native coronary artery of native heart with unstable angina pectoris (H)  S/P coronary artery stent placement  Chronic issue, s/p stent.  Due for visit with cardiology, continue with statin.  BP well controlled without medication.    Squamous cell carcinoma of vallecula (H)  Following with Oncology.     Pyloric stricture  Recent pyloric stent placed, doing well since return ER visit.    Episodic mood disorder (H24)  Chronic issue, recent strain given health conditions, increase Zoloft to 150mg daily in addition to Buspar.  - sertraline (ZOLOFT) 100 MG tablet  Dispense: 45 tablet; Refill: 1    History of tobacco abuse  Significant smoking history, has had chest CT recently due to esophageal cancer.    Nonrheumatic aortic valve stenosis  Chronic issue, moderate aortic stenosis on  4/2024 ECHO.  Should monitor ECHO every 1-2 years unless symptom burden increases.      Patient has been advised of split billing requirements and indicates understanding: Yes      MED REC REQUIRED  Post Medication Reconciliation Status: discharge medications  reconciled and changed, per note/orders  Counseling  Appropriate preventive services were addressed with this patient via screening, questionnaire, or discussion as appropriate for fall prevention, nutrition, physical activity, Tobacco-use cessation, social engagement, weight loss and cognition.  Checklist reviewing preventive services available has been given to the patient.  Reviewed patient's diet, addressing concerns and/or questions.   The patient was instructed to see the dentist every 6 months.   Discussed possible causes of fatigue. The patient was provided with written information regarding signs of hearing loss.       Risks, benefits and alternatives were discussed with patient. Agreeable to the plan of care.      Gregor Regalado is a 68 year old, presenting for the following:  Physical (Follow up buspirone.  Not helping at all. )        8/23/2024     1:03 PM   Additional Questions   Roomed by MONA Gomez CMA([facility])         Health Care Directive  Patient has a Health Care Directive on file  Advance care planning document is on file but is outdated.  Patient encouraged to update.    HPI    Patient recently in hospital for GI bleeding after his pyloric stent moved  He was discharged to have close follow up with Dr. Monahan  Is a bit tired and fatigued but overall eating and drinking well  Notes his mood is a bit worse and the Buspar is not helping much, wondering about increasing Zoloft        8/18/2024   General Health   How would you rate your overall physical health? (!) FAIR   Feel stress (tense, anxious, or unable to sleep) Very much      (!) STRESS CONCERN      8/18/2024   Nutrition   Diet: Regular (no restrictions)            8/18/2024   Exercise   Days per week of moderate/strenous exercise 0 days   Average minutes spent exercising at this level 0 min      (!) EXERCISE CONCERN      8/18/2024   Social Factors   Frequency of gathering with friends or relatives Twice a week   Worry food won't last until get  money to buy more No   Food not last or not have enough money for food? No   Do you have housing? (Housing is defined as stable permanent housing and does not include staying ouside in a car, in a tent, in an abandoned building, in an overnight shelter, or couch-surfing.) Yes   Are you worried about losing your housing? No   Lack of transportation? No   Unable to get utilities (heat,electricity)? No            8/18/2024   Fall Risk   Fallen 2 or more times in the past year? No   Trouble with walking or balance? No             8/18/2024   Activities of Daily Living- Home Safety   Needs help with the following daily activites None of the above   Safety concerns in the home None of the above            8/18/2024   Dental   Dentist two times every year? (!) NO            8/18/2024   Hearing Screening   Hearing concerns? (!) IT'S HARD TO FOLLOW A CONVERSATION IN A NOISY RESTAURANT OR CROWDED ROOM.            8/18/2024   Driving Risk Screening   Patient/family members have concerns about driving No            8/18/2024   General Alertness/Fatigue Screening   Have you been more tired than usual lately? (!) YES- since recent hospitalization            8/18/2024   Urinary Incontinence Screening   Bothered by leaking urine in past 6 months No            7/17/2024   TB Screening   Were you born outside of the [place]? No            Today's PHQ-2 Score:       8/22/2024     4:59 AM   PHQ-2 ( 1999 [employer])   Q1: Little interest or pleasure in doing things 1   Q2: Feeling down, depressed or hopeless 0   PHQ-2 Score 1   Q1: Little interest or pleasure in doing things Several days   Q2: Feeling down, depressed or hopeless Not at all   PHQ-2 Score 1           8/18/2024   Substance Use   Alcohol more than 3/day or more than 7/wk Not Applicable   Do you have a current opioid prescription? No   How severe/bad is pain from 1 to 10? 0/10 (No Pain)   Do you use any other substances recreationally? No        Social History     Tobacco Use     "Smoking status: Former     Current packs/day: 0.00     Average packs/day: 2.0 packs/day for 41.0 years (82.0 ttl pk-yrs)     Types: Cigarettes     Start date: 1972     Quit date: 2013     Years since quittin.2     Passive exposure: Past    Smokeless tobacco: Never    Tobacco comments:     Quit 10 years ago   Vaping Use    Vaping status: Never Used   Substance Use Topics    Alcohol use: Not Currently     Comment: Stopped 11 years ago    Drug use: Not Currently     Types: Cocaine, Marijuana       Last PSA: No results found for: \"PSA\"  ASCVD Risk   The ASCVD Risk score (Vivek SANZ, et al., 2019) failed to calculate for the following reasons:    The patient has a prior MI or stroke diagnosis          Reviewed and updated as needed this visit by Provider                    Patient Active Problem List   Diagnosis    HTN (hypertension)    Nonrheumatic aortic valve stenosis    Sweat gland carcinoma    Unstable angina (H)    Coronary artery disease involving native heart with unstable angina pectoris (H)    Dyslipidemia, goal LDL below 70    Acute chest pain    Coronary 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, unspecified vessel or lesion type (H24)    Hemorrhage of oropharynx    Vallecular mass    Squamous cell carcinoma of vallecula (H)    Primary esophageal squamous cell carcinoma (H)    Cellulitis    Fever, unspecified fever cause    Syncope and collapse    Malignant neoplasm of esophagus, unspecified location (H)    Iron deficiency anemia secondary to inadequate dietary iron intake    Acute post-operative pain    Esophageal cancer (H)    Other ventricular tachycardia (H)    Alcohol dependence in remission (H)    Gastrointestinal hemorrhage with hematemesis    Hematemesis with nausea    Episodic mood disorder (H24)     Past Surgical History:   Procedure Laterality Date    BIOPSY      Left gluteal and groin lymphnodes    BRONCHOSCOPY FLEXIBLE AND RIGID N/A 2024    " Procedure: Bronchoscopy flexible and rigid;  Surgeon: Devin Hill MD;  [facility]    BRONCHOSCOPY FLEXIBLE AND RIGID N/A 04/28/2024    Procedure: Bronchoscopy flexible and rigid;  Surgeon: Jessie Kim MD;  Location: [facility]    CARDIAC SURGERY  March 2023    2 stents placed    COLONOSCOPY  2012    CV CORONARY ANGIOGRAM N/A 03/27/2023    Procedure: Coronary Angiogram;  Surgeon: Miki Etienne MD;  Location: [place] [facility] CV    CV CORONARY ANGIOGRAM N/A 05/09/2023    Procedure: Coronary Angiogram;  Surgeon: Miki Etienne MD;  Location: [facility] CV    CV LEFT HEART CATH N/A 03/27/2023    Procedure: Left Heart Catheterization;  Surgeon: Miki Etienne MD;  Location: [facility] CV    CV LEFT HEART CATH N/A 05/09/2023    Procedure: Left Heart Catheterization;  Surgeon: Miki Etienne MD;  Location: [place] [facility] CV    CV PCI N/A 03/27/2023    Procedure: Percutaneous Coronary Intervention;  Surgeon: Miki Etienne MD;  Location: [place] [facility] CV    ENDOSCOPIC ULTRASOUND UPPER GASTROINTESTINAL TRACT (GI) N/A 11/28/2023    Procedure: Endoscopic ultrasound upper gastrointestinal tract (GI);  Surgeon: Shahriar Giraldo MD;  [facility]: [facility]    ESOPHAGOGASTRODUODENOSCOPY, WITH BOTULINUM TOXIN INJECTION N/A 04/29/2024    Procedure: Esophagogastroduodenoscopy, With Botulinum Toxin Injection;  Surgeon: Jessie Kim MD;  [facility]: [facility]    ESOPHAGOSCOPY, GASTROSCOPY, DUODENOSCOPY (EGD), COMBINED N/A 11/08/2023    Procedure: ESOPHAGOGASTRODUODENOSCOPY, WITH BIOPSY;  Surgeon: Shahriar Giraldo MD;  [facility]    ESOPHAGOSCOPY, GASTROSCOPY, DUODENOSCOPY (EGD), COMBINED N/A 04/19/2024    Procedure: Esophagoscopy, gastroscopy, duodenoscopy (EGD), combined;  Surgeon: Devin Hill MD;  [facility]    ESOPHAGOSCOPY, GASTROSCOPY, DUODENOSCOPY (EGD), COMBINED N/A 04/28/2024    Procedure: Esophagoscopy, gastroscopy, duodenoscopy (EGD), combined;   Surgeon: Jessie Kim MD;  [facility]: [facility]    ESOPHAGOSCOPY, GASTROSCOPY, DUODENOSCOPY (EGD), COMBINED N/A 2024    Procedure: Esophagoscopy, gastroscopy, duodenoscopy (EGD), combined-under fluoro & dilation, nasogastric tube placement, bronchoscopy;  Surgeon: Kevin Calderon MD;  [facility]: [facility]    ESOPHAGOSCOPY, GASTROSCOPY, DUODENOSCOPY (EGD), COMBINED N/A 2024    Procedure: ESOPHAGOGASTRODUODENOSCOPY with fluoroscopy and stent placement;  Surgeon: Livan Monahan MD;  Location:  OR    ESOPHAGOSCOPY, GASTROSCOPY, DUODENOSCOPY (EGD), COMBINED N/A 8/15/2024    Procedure: ESOPHAGOGASTRODUODENOSCOPY with stent placement;  Surgeon: Livan Monahan MD;  Location:  OR    IR JEJUNOSTOMY TUBE CHANGE  2024    LAPAROSCOPIC ASSISTED INSERTION TUBE JEJUNOSTOMY N/A 2024    Procedure: Laparoscopic Jejunostomy Tube Insertion and Esophagogastroduodenoscopy;  Surgeon: Kevin Calderon MD;  [facility]    LARYNGOSCOPY WITH BIOPSY(IES) N/A 10/12/2023    Procedure: LARYNGOSCOPY, WITH BIOPSY;  Surgeon: Edi Felix MD;  [facility]    OTHER SURGICAL HISTORY  2015    WIDE EXCISION OF LEFT GLUTEAL MASSTNM: [id number], stage: II hidradenocarcinoma Grade II, 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, sentinel lymph node biopsy negative     SOFT TISSUE SURGERY  2023    left gluteal and lymphnodes    THORACOSCOPIC, LAPAROSCOPIC ESOPHAGECTOMY, COMBINED N/A 2024    Procedure: Laparoscopic and right thoracoscopic esophagogastrectomy, right AND left chest tube placement;  Surgeon: Devin Hill MD;  [facility]    VASCULAR SURGERY  2023    Cath 2 stents       Social History     Tobacco Use    Smoking status: Former     Current packs/day: 0.00     Average packs/day: 2.0 packs/day for 41.0 years (82.0 ttl pk-yrs)     Types: Cigarettes     Start date: 1972     Quit date: 2013     Years since quittin.2     Passive exposure: Past    Smokeless tobacco: Never    Tobacco  comments:     Quit 10 years ago   Substance Use Topics    Alcohol use: Not Currently     Comment: Stopped 11 years ago     Family History   Problem Relation Age of Onset    Dementia Mother     Mental Illness Mother         Dementia    Substance Abuse Son         Alcohol    Substance Abuse Sister         Alcohol    Substance Abuse Brother         Alcohol    Anesthesia Reaction No family hx of     Thrombocytopenia No family hx of     Cancer No family hx of     Thrombosis No family hx of          Current Outpatient Medications   Medication Sig Dispense Refill    acetaminophen (TYLENOL) 500 MG tablet Take 500-1,000 mg by mouth every 8 hours as needed for fever or pain      busPIRone (BUSPAR) 5 MG tablet Take 1 tablet (5 mg) by mouth 3 times daily 90 tablet 2    nitroGLYcerin (NITROSTAT) 0.4 MG sublingual tablet Place 0.4 mg under the tongue every 5 minutes as needed for chest pain For chest pain place 1 tablet under the tongue every 5 minutes for 3 doses. If symptoms persist 5 minutes after 1st dose call 911.      pantoprazole (PROTONIX) 40 MG EC tablet Take 1 tablet (40 mg) by mouth 2 times daily for 90 days 180 tablet 4    polyethylene glycol (MIRALAX) 17 g packet Take 17 g by mouth daily as needed for constipation      rosuvastatin (CRESTOR) 40 MG tablet Take 1 tablet (40 mg) by mouth daily **Due for follow-up with Dr. Walters** 90 tablet 0    senna-docusate (SENOKOT-S/PERICOLACE) 8.6-50 MG tablet Take 1 tablet by mouth 2 times daily as needed for constipation 30 tablet 0    sertraline (ZOLOFT) 100 MG tablet Take 1.5 tablets (150 mg) by mouth every morning. 45 tablet 1    sodium fluoride dental gel (PREVIDENT) 1.1 % GEL topical gel Apply to affected area at bedtime       Allergies   Allergen Reactions    Coconut Flavor Anaphylaxis     Raw coconut     Current providers sharing in care for this patient include:  Patient Care Team:  Maya Chappell PA-C as PCP - General (Physician Assistant -  Medical)  Shine Edwards MD as MD (Hematology)  Lluvia Park MD as MD (Radiation Oncology)  David Hidalgo PA-C as Physician Assistant (Gastroenterology)  Edi Felix MD as MD (Otolaryngology)  Nisha Pride APRN CNP as Nurse Practitioner (Dermatology)  Devin Hill MD as MD (Cardiovascular & Thoracic Surgery)  Shahriar Giraldo MD as MD (Gastroenterology)  Caryn Olvera, VERÓNICA as Specialty Care Coordinator (Hematology & Oncology)  Akiko Sun MD as Assigned Cancer Care Provider  Yannick Alva MD as MD (Hematology & Oncology)  Nuno Cazares MD as MD (Hospice And Palliative Care)  Nuno Cazares MD as Assigned Palliative Care Provider  David Hidalgo PA-C as Assigned Gastroenterology Provider  Devin Hill MD as Assigned Heart and Vascular Provider  Edi Felix MD as Assigned Surgical Provider  Livan Monahan MD as MD (Gastroenterology)  Jason Walters MD as MD (Cardiology)    The following health maintenance items are reviewed in Epic and correct as of today:  Health Maintenance   Topic Date Due    ANNUAL REVIEW OF HM ORDERS  Never done    HEPATITIS C SCREENING  Never done    ZOSTER IMMUNIZATION (1 of 2) Never done    COLORECTAL CANCER SCREENING  07/15/2014    MEDICARE ANNUAL WELLNESS VISIT  Never done    COVID-19 Vaccine (5 - 2023-24 season) 01/19/2024    INFLUENZA VACCINE (1) 09/01/2024    LIPID  12/27/2024    LUNG CANCER SCREENING  07/26/2025    FALL RISK ASSESSMENT  08/23/2025    GLUCOSE  08/13/2027    DTAP/TDAP/TD IMMUNIZATION (4 - Td or Tdap) 10/10/2027    ADVANCE CARE PLANNING  04/12/2029    PHQ-2 (once per calendar year)  Completed    Pneumococcal Vaccine: 65+ Years  Completed    RSV VACCINE (Pregnancy & 60+)  Completed    AORTIC ANEURYSM SCREENING (SYSTEM ASSIGNED)  Completed    HPV IMMUNIZATION  Aged Out    MENINGITIS IMMUNIZATION  Aged Out    RSV MONOCLONAL ANTIBODY  Aged Out         Review of Systems  Constitutional, HEENT,  "cardiovascular, pulmonary, gi and gu systems are negative, except as otherwise noted.     Objective    Exam  BP 98/59   Pulse 79   Temp 97.9  F (36.6  C) (Oral)   Resp 24   Ht 1.695 m (5' 6.75\")   Wt 64.4 kg (142 lb)   SpO2 96%   BMI 22.41 kg/m     Estimated body mass index is 22.41 kg/m  as calculated from the following:    Height as of this encounter: 1.695 m (5' 6.75\").    Weight as of this encounter: 64.4 kg (142 lb).    Physical Exam  GENERAL: alert and no distress  EYES: Eyes grossly normal to inspection, PERRL and conjunctivae and sclerae normal  HENT: ear canals and TM's normal, nose and mouth without ulcers or lesions  NECK: no adenopathy, no asymmetry, masses, or scars  RESP: lungs clear to auscultation - no rales, rhonchi or wheezes  CV: regular rate and rhythm, normal S1 S2, no S3 or S4, no murmur, click or rub, no peripheral edema  ABDOMEN: soft, nontender, no hepatosplenomegaly, no masses and bowel sounds normal  MS: no gross musculoskeletal defects noted, no edema  SKIN: no suspicious lesions or rashes  NEURO: Normal strength and tone, mentation intact and speech normal  PSYCH: mentation appears normal, affect normal/bright        8/23/2024   Mini Cog   Clock Draw Score 2 Normal   3 Item Recall 3 objects recalled   Mini Cog Total Score 5                 Signed Electronically by: Maya Chappell PA-C    "

## 2024-08-24 ENCOUNTER — MYC MEDICAL ADVICE (OUTPATIENT)
Dept: FAMILY MEDICINE | Facility: CLINIC | Age: 69
End: 2024-08-24
Payer: COMMERCIAL

## 2024-08-24 DIAGNOSIS — K92.0 GASTROINTESTINAL HEMORRHAGE WITH HEMATEMESIS: ICD-10-CM

## 2024-08-26 RX ORDER — PANTOPRAZOLE SODIUM 40 MG/1
40 TABLET, DELAYED RELEASE ORAL 2 TIMES DAILY
Qty: 180 TABLET | Refills: 4 | Status: SHIPPED | OUTPATIENT
Start: 2024-08-26

## 2024-09-05 ENCOUNTER — ENROLLMENT (OUTPATIENT)
Dept: HOME HEALTH SERVICES | Facility: HOME HEALTH | Age: 69
End: 2024-09-05
Payer: COMMERCIAL

## 2024-09-06 ENCOUNTER — OFFICE VISIT (OUTPATIENT)
Dept: RADIATION ONCOLOGY | Facility: CLINIC | Age: 69
End: 2024-09-06
Attending: RADIOLOGY
Payer: COMMERCIAL

## 2024-09-06 VITALS
WEIGHT: 146.1 LBS | HEART RATE: 83 BPM | OXYGEN SATURATION: 95 % | BODY MASS INDEX: 23.05 KG/M2 | SYSTOLIC BLOOD PRESSURE: 110 MMHG | RESPIRATION RATE: 18 BRPM | DIASTOLIC BLOOD PRESSURE: 71 MMHG

## 2024-09-06 DIAGNOSIS — C10.0 SQUAMOUS CELL CARCINOMA OF VALLECULA (H): Primary | ICD-10-CM

## 2024-09-06 PROCEDURE — 99215 OFFICE O/P EST HI 40 MIN: CPT | Performed by: RADIOLOGY

## 2024-09-06 PROCEDURE — 99214 OFFICE O/P EST MOD 30 MIN: CPT | Performed by: RADIOLOGY

## 2024-09-06 ASSESSMENT — PAIN SCALES - GENERAL: PAINLEVEL: NO PAIN (0)

## 2024-09-06 NOTE — PROGRESS NOTES
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 He has undergone chemoradiation with carboplatin and taxol, starting 2023. He completed radiation 2024.     PET scan on 3/12/2024 showed resolution of the prior FDG avid mid esophageal mass as well as of the eft FDG avid right vallecular lesion.  There was also resolution of right sided FDG avid enlarged cervical lymph nodes.  There was new uptake in an 8 mm left level 2A node (max SUV 6.4) with recommendation for follow-up CT neck or FNA.    On 2024, he underwent laparoscopic jejunostomy feeding tube placement and EGD.  There was no tumor visualized on endoscopic evaluation.    On 2024, he underwent laparoscopic and right thorascopic esophagogastrectomy.  Pathology showed no residual carcinoma and no metastasis to any of 15 sampled lymph nodes.  Postoperatively, he experienced delayed gastric emptying which was managed with a stent placement at the pylorus.    Repeat PET CT scan on 2024 showed postradiation changes and no FDG avid lymphadenopathy.  The previously seen FDG avid left level 2A node has resolved.  There was no evidence of distant metastatic disease.  There were  scattered patchy areas of opacities in the lungs particularly in the right upper lobe and left lung with some of these areas demonstrating FDG activity.  Findings are similar to 7/8/2024 CT scan and likely represent evolving infectious process.    More recently, he underwent revision of the pyloric stent on 8/8/2024 and a few days later experienced gastric fullness, food regurgitation and coffee-ground emesis.  He was admitted to the hospital and underwent repositioning of the stent on 8/15/2024.  Since discharge, he has had an easier time of keeping food down and has not had a recurrence of coffee-ground emesis.    He presents today for 6-month follow-up after completion of chemoradiation to both his esophageal carcinoma and head and neck primary.  Overall, he is doing well.  He is not having any difficulty with hoarseness, difficulty with chewing and swallowing food, otalgia.  He continues to have some baseline xerostomia for which she is drinking more water.  Taste has also not completely returned to baseline.    PAST MEDICAL/SURGICAL HISTORY:   Past Medical History:   Diagnosis Date    Anxiety     Aortic stenosis     CAD (coronary artery disease)     ETOH dependence     Quit drinking 10 years    Heart attack (H)     History of blood transfusion     HLD (hyperlipidemia)     Hypertension     Malignant neoplasm of middle third of esophagus (H)     Nonrheumatic aortic (valve) stenosis     Sweat gland carcinoma       Past Surgical History:   Procedure Laterality Date    BIOPSY  June 29015    Left gluteal and groin lymphnodes    BRONCHOSCOPY FLEXIBLE AND RIGID N/A 04/19/2024    Procedure: Bronchoscopy flexible and rigid;  Surgeon: Devin Hill MD;  [facility]    BRONCHOSCOPY FLEXIBLE AND RIGID N/A 04/28/2024    Procedure: Bronchoscopy flexible and rigid;  Surgeon: Jessie Kim MD;  [facility]    CARDIAC SURGERY  March 2023    2 stents placed    COLONOSCOPY  2012    CV CORONARY ANGIOGRAM N/A  03/27/2023    Procedure: Coronary Angiogram;  Surgeon: Miki Etienne MD;  Location: [facility] LAB CV    CV CORONARY ANGIOGRAM N/A 05/09/2023    Procedure: Coronary Angiogram;  Surgeon: Miki Etienne MD;  Location: [facility] CATH LAB CV    CV LEFT HEART CATH N/A 03/27/2023    Procedure: Left Heart Catheterization;  Surgeon: Miki Etienne MD;  Location: [facility] CATH LAB CV    CV LEFT HEART CATH N/A 05/09/2023    Procedure: Left Heart Catheterization;  Surgeon: Miki Etienne MD;  Location: [facility] CATH LAB CV    CV PCI N/A 03/27/2023    Procedure: Percutaneous Coronary Intervention;  Surgeon: Miki Etienne MD;  Location: [facility] CATH LAB CV    ENDOSCOPIC ULTRASOUND UPPER GASTROINTESTINAL TRACT (GI) N/A 11/28/2023    Procedure: Endoscopic ultrasound upper gastrointestinal tract (GI);  Surgeon: Shahriar Giraldo MD;  [facility] GI    ESOPHAGOGASTRODUODENOSCOPY, WITH BOTULINUM TOXIN INJECTION N/A 04/29/2024    Procedure: Esophagogastroduodenoscopy, With Botulinum Toxin Injection;  Surgeon: Jessie Kim MD;  [facility] GI    ESOPHAGOSCOPY, GASTROSCOPY, DUODENOSCOPY (EGD), COMBINED N/A 11/08/2023    Procedure: ESOPHAGOGASTRODUODENOSCOPY, WITH BIOPSY;  Surgeon: Shahriar Giraldo MD;  [facility] GI    ESOPHAGOSCOPY, GASTROSCOPY, DUODENOSCOPY (EGD), COMBINED N/A 04/19/2024    Procedure: Esophagoscopy, gastroscopy, duodenoscopy (EGD), combined;  Surgeon: Devin Hill MD;  [facility]    ESOPHAGOSCOPY, GASTROSCOPY, DUODENOSCOPY (EGD), COMBINED N/A 04/28/2024    Procedure: Esophagoscopy, gastroscopy, duodenoscopy (EGD), combined;  Surgeon: Jessie Kim MD;  [facility]    ESOPHAGOSCOPY, GASTROSCOPY, DUODENOSCOPY (EGD), COMBINED N/A 05/05/2024    Procedure: Esophagoscopy, gastroscopy, duodenoscopy (EGD), combined-under fluoro & dilation, nasogastric tube placement, bronchoscopy;  Surgeon: Kevin Calderon MD;  [facility]    ESOPHAGOSCOPY,  GASTROSCOPY, DUODENOSCOPY (EGD), COMBINED N/A 8/8/2024    Procedure: ESOPHAGOGASTRODUODENOSCOPY with fluoroscopy and stent placement;  Surgeon: Livan Monahan MD;  Location:  OR    ESOPHAGOSCOPY, GASTROSCOPY, DUODENOSCOPY (EGD), COMBINED N/A 8/15/2024    Procedure: ESOPHAGOGASTRODUODENOSCOPY with stent placement;  Surgeon: Livan Monahan MD;  Location:  OR    IR JEJUNOSTOMY TUBE CHANGE  05/19/2024    LAPAROSCOPIC ASSISTED INSERTION TUBE JEJUNOSTOMY N/A 04/01/2024    Procedure: Laparoscopic Jejunostomy Tube Insertion and Esophagogastroduodenoscopy;  Surgeon: Kevin Calderon MD;  [facility]    LARYNGOSCOPY WITH BIOPSY(IES) N/A 10/12/2023    Procedure: LARYNGOSCOPY, WITH BIOPSY;  Surgeon: Edi Felix MD;  [facility]    OTHER SURGICAL HISTORY  01/01/2015    WIDE EXCISION OF LEFT GLUTEAL MASSTNM: [id number], stage: II hidradenocarcinoma Grade II, 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, sentinel lymph node biopsy negative     SOFT TISSUE SURGERY  June 2023    left gluteal and lymphnodes    THORACOSCOPIC, LAPAROSCOPIC ESOPHAGECTOMY, COMBINED N/A 04/19/2024    Procedure: Laparoscopic and right thoracoscopic esophagogastrectomy, right AND left chest tube placement;  Surgeon: Devin Hill MD;  Location:  OR    VASCULAR SURGERY  March 2023    Cath 2 stents     ALLERGIES:    Allergies   Allergen Reactions    Coconut Flavor Anaphylaxis     Raw coconut     MEDICATIONS:   Current Outpatient Medications   Medication Sig Dispense Refill    acetaminophen (TYLENOL) 500 MG tablet Take 500-1,000 mg by mouth every 8 hours as needed for fever or pain      busPIRone (BUSPAR) 5 MG tablet Take 1 tablet (5 mg) by mouth 3 times daily 90 tablet 2    nitroGLYcerin (NITROSTAT) 0.4 MG sublingual tablet Place 0.4 mg under the tongue every 5 minutes as needed for chest pain For chest pain place 1 tablet under the tongue every 5 minutes for 3 doses. If symptoms persist 5 minutes after 1st dose call 911.      pantoprazole (PROTONIX) 40 MG  EC tablet Take 1 tablet (40 mg) by mouth 2 times daily. 180 tablet 4    polyethylene glycol (MIRALAX) 17 g packet Take 17 g by mouth daily as needed for constipation      rosuvastatin (CRESTOR) 40 MG tablet Take 1 tablet (40 mg) by mouth daily **Due for follow-up with Dr. Walters** 90 tablet 0    senna-docusate (SENOKOT-S/PERICOLACE) 8.6-50 MG tablet Take 1 tablet by mouth 2 times daily as needed for constipation 30 tablet 0    sertraline (ZOLOFT) 100 MG tablet Take 1.5 tablets (150 mg) by mouth every morning. 45 tablet 1    sodium fluoride dental gel (PREVIDENT) 1.1 % GEL topical gel Apply to affected area at bedtime         REVIEW OF SYSTEMS: A 12-point review of systems was obtained. Pertinent findings are noted in the HPI and are otherwise unremarkable.     PHYSICAL EXAM:  VITALS: /71 (BP Location: Right arm)   Pulse 83   Resp 18   Wt 66.3 kg (146 lb 1.6 oz)   SpO2 95%   BMI 23.05 kg/m    GEN: Alert, oriented, in no acute distress, thin appearing  HEENT: Normocephalic and atraumatic, EOMI, anicteric sclerae, no oropharyngeal lesions, no trismus, no thrush   Neck: No palpable lymphadenopathy, no lymphedema   Respiratory: Breathing comfortably on room air, no wheezing   Cardiovascular: Regular rate, extremities warm, well perfused   Abdomen: Nondistended   Extremities: Without cyanosis or edema  Cranial nerves: Cranial nerves II through XII are grossly intact    LABS AND IMAGING: PET CT scan from 7/26/2024 reviewed and I agree with findings as stated.    IMPRESSION/RECOMMENDATION:  Mr. Hogue is a 68-year-old man with squamous cell carcinoma of the vallecula, p16 negative, clinical stage T1 N2b M0 (JUAN MIGUEL) squamous cell carcinoma, poorly differentiated, of the middle third of the esophagus clinical stage T2 N1 M0 (stage II).  He is status post definitive chemoradiation for the head and neck cancer and neoadjuvant chemoradiation for the esophageal cancer.  From a cancer control standpoint he is doing well  with negative PET CT scan recently.  He is having some difficulty with eating food secondary to stenosis.  His most recent repositioning has helped with swallowing although he continues with pain.    We discussed strategies for managing xerostomia.  I reviewed the need for sun protection with sunblock and widebrimmed hats to minimize the risk for development of skin cancer.  Discussed the need for continued fluoride prophylaxis and swallowing exercises.    He has not had a recent thyroid function studies so I have ordered a TSH to be drawn at the next time he has labs drawn for his oncology visit.    We appreciate the opportunity to participate in Mr. Hogue's care.  Please call with questions.      50 minutes spent by me on the date of the encounter doing chart review, history and exam, documentation and further activities per the note.    Akiko Sun MD  Department of Radiation Oncology  [facility]     CC:  Patient Care Team:  Maya Chappell PA-C as PCP - General (Physician Assistant - Medical)  Shine Edwards MD as MD (Hematology)  Lluvia Park MD as MD (Radiation Oncology)  David Hidalgo PA-C as Physician Assistant (Gastroenterology)  Edi Felix MD as MD (Otolaryngology)  Nisha Pride APRN CNP as Nurse Practitioner (Dermatology)  Devin Hill MD as MD (Cardiovascular & Thoracic Surgery)  Shahriar Giraldo MD as MD (Gastroenterology)  Caryn Olvera, RN as Specialty Care Coordinator (Hematology & Oncology)  Akiko Sun MD as Assigned Cancer Care Provider  Yannick Alva MD as MD (Hematology & Oncology)  Nuno Cazares MD as MD (Hospice And Palliative Care)  Nuno Cazares MD as Assigned Palliative Care Provider  David Hidalgo PA-C as Assigned Gastroenterology Provider  Devin Hill MD as Assigned Heart and Vascular Provider  Edi Felix MD as Assigned Surgical Provider  Livan Monahan MD  as MD (Gastroenterology)  Jason Walters MD as MD (Cardiology)  Maya Chappell PA-C as Assigned PCP

## 2024-09-06 NOTE — Clinical Note
"2024      Lopez Hogue  [address] [facility] [address]      Dear Colleague,    Thank you for referring your patient, Lopez Hogue, to the [facility] RADIATION ONCOLOGY. Please see a copy of my visit note below.    Oncology Rooming Note    2024 8:39 AM   Lopez Hogue is a 68 year old male who presents for:    Chief Complaint   Patient presents with    Cancer     Six month follow-up.   SCC of Vallecula:Head and bilateral necks 7000 cGy and Esophagus 4500 cGy completed 24     Initial Vitals: There were no vitals taken for this visit. Estimated body mass index is 22.41 kg/m  as calculated from the following:    Height as of 24: 1.695 m (5' 6.75\").    Weight as of 24: 64.4 kg (142 lb). There is no height or weight on file to calculate BSA.  Data Unavailable Comment: Data Unavailable   No LMP for male patient.  Allergies reviewed: {ALLERGIES:010671}  Medications reviewed: {MEDICATIONS:640204}    Medications: {REFILLS NEEDED:481107}  Pharmacy name entered into [facility]:    [id number] - [place], [place] - [address] [facility]  AT [facility] OF [address] PHARMACY [place], [place] - [address]    Frailty Screening:   Is the patient here for a new oncology consult visit in cancer care? 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 He has undergone chemoradiation with carboplatin and taxol, starting 12/5/2023. He completed radiation 1/26/2024. He had neutropenia 1/15/2024. The patient saw medical oncology recently, as well as palliative care.    Overall, the patient states he has recovered well from radiation therapy. His energy levels are at baseline, and his weight has been stable for the last 2 weeks, although he has lost 7 pounds since completing radiation therapy. He has no difficulty swallowing but does have issues with certain types of foods, such as crackers. He is taking a liquid protein/calorie supplement and is able to tolerate soft foods. His taste and smell are reduced but have improved since completing radiation therapy. He experiences occasional dizziness but no falls. He has undergone iron infusions for anemia and is currently taking an iron supplement, along with MiraLAX for constipation. For palliative care, he was started on Butrans 5 mcg/h for throat pain and is continuing with doxepin and Magic Mouthwash. He has a PET/CT scan scheduled for March 12, 2024.    PAST MEDICAL/SURGICAL HISTORY:   Past Medical History:   Diagnosis Date     Anxiety      Aortic stenosis      CAD (coronary artery disease)      ETOH dependence     Quit drinking 10 years     Heart attack (H)      History of blood transfusion      HLD (hyperlipidemia)      Hypertension      Malignant neoplasm of middle third of esophagus (H)      Nonrheumatic aortic (valve) stenosis      Sweat gland carcinoma       Past Surgical History:   Procedure Laterality Date     BIOPSY  June 29015    Left gluteal and groin lymphnodes     BRONCHOSCOPY FLEXIBLE AND RIGID N/A 04/19/2024     Procedure: Bronchoscopy flexible and rigid;  Surgeon: Devin Hill MD;  [facility]     BRONCHOSCOPY FLEXIBLE AND RIGID N/A 04/28/2024    Procedure: Bronchoscopy flexible and rigid;  Surgeon: Jessie Kim MD;  [facility]     CARDIAC SURGERY  March 2023    2 stents placed     COLONOSCOPY  2012     CV CORONARY ANGIOGRAM N/A 03/27/2023    Procedure: Coronary Angiogram;  Surgeon: Miki Etienne MD;  Location: [facility] CV     CV CORONARY ANGIOGRAM N/A 05/09/2023    Procedure: Coronary Angiogram;  Surgeon: Miki Etienne MD;  Location: [facility] LAB CV     CV LEFT HEART CATH N/A 03/27/2023    Procedure: Left Heart Catheterization;  Surgeon: Miki Etienne MD;  Location: [facility] LAB CV     CV LEFT HEART CATH N/A 05/09/2023    Procedure: Left Heart Catheterization;  Surgeon: Miki Etienne MD;  Location: [facility] CV     CV PCI N/A 03/27/2023    Procedure: Percutaneous Coronary Intervention;  Surgeon: Miki Etienne MD;  Location: [facility] CV     ENDOSCOPIC ULTRASOUND UPPER GASTROINTESTINAL TRACT (GI) N/A 11/28/2023    Procedure: Endoscopic ultrasound upper gastrointestinal tract (GI);  Surgeon: Shahriar Giraldo MD;  [facility] GI     ESOPHAGOGASTRODUODENOSCOPY, WITH BOTULINUM TOXIN INJECTION N/A 04/29/2024    Procedure: Esophagogastroduodenoscopy, With Botulinum Toxin Injection;  Surgeon: Jessie Kim MD;  [facility] GI     ESOPHAGOSCOPY, GASTROSCOPY, DUODENOSCOPY (EGD), COMBINED N/A 11/08/2023    Procedure: ESOPHAGOGASTRODUODENOSCOPY, WITH BIOPSY;  Surgeon: Shahriar Giraldo MD;  [facility]     ESOPHAGOSCOPY, GASTROSCOPY, DUODENOSCOPY (EGD), COMBINED N/A 04/19/2024    Procedure: Esophagoscopy, gastroscopy, duodenoscopy (EGD), combined;  Surgeon: Devin Hill MD;  [facility]     ESOPHAGOSCOPY, GASTROSCOPY, DUODENOSCOPY (EGD), COMBINED N/A 04/28/2024    Procedure: Esophagoscopy, gastroscopy, duodenoscopy (EGD),  combined;  Surgeon: Jessie Kim MD;  [facility]     ESOPHAGOSCOPY, GASTROSCOPY, DUODENOSCOPY (EGD), COMBINED N/A 05/05/2024    Procedure: Esophagoscopy, gastroscopy, duodenoscopy (EGD), combined-under fluoro & dilation, nasogastric tube placement, bronchoscopy;  Surgeon: Kevin Calderon MD;  [facility]     ESOPHAGOSCOPY, GASTROSCOPY, DUODENOSCOPY (EGD), COMBINED N/A 8/8/2024    Procedure: ESOPHAGOGASTRODUODENOSCOPY with fluoroscopy and stent placement;  Surgeon: Livan Monahan MD;  Location:  OR     ESOPHAGOSCOPY, GASTROSCOPY, DUODENOSCOPY (EGD), COMBINED N/A 8/15/2024    Procedure: ESOPHAGOGASTRODUODENOSCOPY with stent placement;  Surgeon: Livan Monahan MD;  Location:  OR     IR JEJUNOSTOMY TUBE CHANGE  05/19/2024     LAPAROSCOPIC ASSISTED INSERTION TUBE JEJUNOSTOMY N/A 04/01/2024    Procedure: Laparoscopic Jejunostomy Tube Insertion and Esophagogastroduodenoscopy;  Surgeon: Kevin Calderon MD;  [facility]     LARYNGOSCOPY WITH BIOPSY(IES) N/A 10/12/2023    Procedure: LARYNGOSCOPY, WITH BIOPSY;  Surgeon: Edi Felix MD;  [facility]     OTHER SURGICAL HISTORY  01/01/2015    WIDE EXCISION OF LEFT GLUTEAL MASSTNM: [id number], stage: II hidradenocarcinoma Grade II, 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, sentinel lymph node biopsy negative      SOFT TISSUE SURGERY  June 2023    left gluteal and lymphnodes     THORACOSCOPIC, LAPAROSCOPIC ESOPHAGECTOMY, COMBINED N/A 04/19/2024    Procedure: Laparoscopic and right thoracoscopic esophagogastrectomy, right AND left chest tube placement;  Surgeon: Devin Hill MD;  [facility]     VASCULAR SURGERY  March 2023    Cath 2 stents       ALLERGIES:    Allergies   Allergen Reactions     Coconut Flavor Anaphylaxis     Raw coconut       MEDICATIONS:   Current Outpatient Medications   Medication Sig Dispense Refill     acetaminophen (TYLENOL) 500 MG tablet Take 500-1,000 mg by mouth every 8 hours as needed for fever or pain        busPIRone (BUSPAR) 5 MG tablet Take 1 tablet (5 mg) by mouth 3 times daily 90 tablet 2     nitroGLYcerin (NITROSTAT) 0.4 MG sublingual tablet Place 0.4 mg under the tongue every 5 minutes as needed for chest pain For chest pain place 1 tablet under the tongue every 5 minutes for 3 doses. If symptoms persist 5 minutes after 1st dose call 911.       pantoprazole (PROTONIX) 40 MG EC tablet Take 1 tablet (40 mg) by mouth 2 times daily. 180 tablet 4     polyethylene glycol (MIRALAX) 17 g packet Take 17 g by mouth daily as needed for constipation       rosuvastatin (CRESTOR) 40 MG tablet Take 1 tablet (40 mg) by mouth daily **Due for follow-up with Dr. Walters** 90 tablet 0     senna-docusate (SENOKOT-S/PERICOLACE) 8.6-50 MG tablet Take 1 tablet by mouth 2 times daily as needed for constipation 30 tablet 0     sertraline (ZOLOFT) 100 MG tablet Take 1.5 tablets (150 mg) by mouth every morning. 45 tablet 1     sodium fluoride dental gel (PREVIDENT) 1.1 % GEL topical gel Apply to affected area at bedtime         REVIEW OF SYSTEMS: A 12-point review of systems was obtained. Pertinent findings are noted in the HPI and are otherwise unremarkable.     PHYSICAL EXAM:  VITALS: There were no vitals taken for this visit.  GEN: Alert, oriented, in no acute distress  HEENT: Normocephalic and atraumatic, EOMI, anicteric sclerae, small palpable neck LAD in submandibular area and anterior cervical, no oropharyngeal lesions, mild mucositis noted in posterior pharynx  CV: no LE edema, no JVD  RESP: normal respiration on room air, no stridor  SKIN: normal color and turgor  MSK: moving all extremities well  LYMPHATICS: no cervical or supraclavicular LAD  NEURO: CN II-XII grossly intact, no focal neurologic deficit    LABS AND IMAGING: No new imaging    IMPRESSION/RECOMMENDATION:  Fabricio Hogue is a 68M with two synchronous malignancies: squamous cell carcinoma of the vallecula, p16 negative, stage Beckie, and stage II esophageal squamous cell  carcinoma. He is status post chemoradiation for both. A PET/CT scan is scheduled for March 12, 2024, to assess the interval response in the esophagus for surgical planning. It was discussed that this PET scan may not show the entirety of response to treatment for his head and neck malignancy as he usually imaging at 12 weeks post completion of treatment. The patient has a follow-up scheduled with palliative care in 1 to 2 months, with ENT on March 11, 2024, and a pending follow-up with thoracic surgery, Dr. Hill, pending the results of the PET/CT for surgical planning. He is doing well, with resolving radiation toxicity and no evidence of disease.     He will return for follow-up in 6 months and was instructed to call our clinic with any questions or concerns.    Tyson Mendoza MD MS PGY2    Physician Attestation  Mr. Hogue was seen and examined by me. I agree with the findings and plan of care as documented in the note. Note above by Dr. Mendoza was reviewed and edited by me and reflects our mutual findings and plan of care.    Akiko Sun MD  Department of Radiation Oncology  [facility]          CC:  Patient Care Team:  Maya Chappell PA-C as PCP - General (Physician Assistant - Medical)  Shine Edwards MD as MD (Hematology)  Lluvia Park MD as MD (Radiation Oncology)  David Hidalgo PA-C as Physician Assistant (Gastroenterology)  Edi Felix MD as MD (Otolaryngology)  Nisha Pride, YADIRA CNP as Nurse Practitioner (Dermatology)  Devin Hill MD as MD (Cardiovascular & Thoracic Surgery)  Shahriar Giraldo MD as MD (Gastroenterology)  Caryn Olvera, VERÓNICA as Specialty Care Coordinator (Hematology & Oncology)  Akiko Sun MD as Assigned Cancer Care Provider  Yannick Alva MD as MD (Hematology & Oncology)  Nuno Cazares MD as MD (Hospice And Palliative Care)  Nuno Cazares MD as Assigned Palliative Care  Provider  David Hidalgo PA-C as Assigned Gastroenterology Provider  Devin Hill MD as Assigned Heart and Vascular Provider  Edi Felix MD as Assigned Surgical Provider  Livan Monahan MD as MD (Gastroenterology)  Jason Walters MD as MD (Cardiology)  Maya Chappell PA-C as Assigned PCP         Again, thank you for allowing me to participate in the care of your patient.        Sincerely,        Akiko Sun MD

## 2024-09-06 NOTE — PROGRESS NOTES
"Oncology Rooming Note    September 6, 2024 8:39 AM   Lopez Hogue is a 68 year old male who presents for:    Chief Complaint   Patient presents with    Cancer     Six month follow-up.   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 Estimated body mass index is 23.05 kg/m  as calculated from the following:    Height as of 8/23/24: 1.695 m (5' 6.75\").    Weight as of this encounter: 66.3 kg (146 lb 1.6 oz). There is no height or weight on file to calculate BSA.  Data Unavailable Comment: Data Unavailable   No LMP for male patient.  Allergies reviewed: Yes  Medications reviewed: Yes    Medications: Medication refills not needed today.  Pharmacy name entered into [employer]:    [id number] - [place], [place] - [address] [place] [address] DR AT [facility] OF [address] [facility] - [place], [place] - [address] [facility]    Frailty Screening:   Is the patient here for a new oncology consult visit in cancer care? 2. No    Clinical concerns:  Pt's only issues are surrounding having a smaller stomach now after surgery and struggling to keep up with an adequate caloric intake.  He is doing everything he should be with consuming about 4 small meals per day and snacks between, but is also needing to administer 1500 calories in his feeding tube.  He has been able to maintain a weight of 140 lbs.      Dr. Sun was notified.      Charo Huerta RN                      "

## 2024-09-09 ENCOUNTER — DOCUMENTATION ONLY (OUTPATIENT)
Dept: ONCOLOGY | Facility: CLINIC | Age: 69
End: 2024-09-09
Payer: COMMERCIAL

## 2024-09-09 NOTE — PROGRESS NOTES
CLINICAL NUTRITION SERVICES- ONCOLOGY DISTRESS SCREENING     Identified Concern and Score From Distress Screenin. How concerned are you about your ability to eat? :  0  2. How concerned are you about unintended weight loss or your current weight? : 8  9. If you want to be contacted by one of our professionals, I can send a message to them right now:   Pt is okay      Date of Distress Screenin/6     Findings: Patient receiving enteral nutrition from [facility] + PO intake. Patient is followed by I RD.     Follow-up Required: None at this time.    Danya Mathews RD, LD  [phone]

## 2024-09-10 ENCOUNTER — MYC MEDICAL ADVICE (OUTPATIENT)
Dept: CARDIOLOGY | Facility: CLINIC | Age: 69
End: 2024-09-10
Payer: COMMERCIAL

## 2024-09-10 DIAGNOSIS — I20.0 UNSTABLE ANGINA (H): ICD-10-CM

## 2024-09-10 DIAGNOSIS — I25.110 CORONARY ARTERY DISEASE INVOLVING NATIVE CORONARY ARTERY OF NATIVE HEART WITH UNSTABLE ANGINA PECTORIS (H): Primary | ICD-10-CM

## 2024-09-10 DIAGNOSIS — I10 PRIMARY HYPERTENSION: ICD-10-CM

## 2024-09-10 DIAGNOSIS — I35.0 NONRHEUMATIC AORTIC VALVE STENOSIS: ICD-10-CM

## 2024-09-10 NOTE — TELEPHONE ENCOUNTER
Called pt to discuss his concerns. Reported that I couldn't see anywhere that he had a previous appt scheduled, but that I would be happy to help him get a new one scheduled. Shared with him that upon review from his appt last year, that Dr. Walters wanted a repeat ECHO this year along with a follow-up. Pt is agreeable to plan. Order placed for ECHO and follow-up. Pt transferred to scheduling to get them scheduled. aj

## 2024-09-13 DIAGNOSIS — I20.0 UNSTABLE ANGINA (H): ICD-10-CM

## 2024-09-13 DIAGNOSIS — I10 PRIMARY HYPERTENSION: ICD-10-CM

## 2024-09-13 DIAGNOSIS — I25.110 CORONARY ARTERY DISEASE INVOLVING NATIVE CORONARY ARTERY OF NATIVE HEART WITH UNSTABLE ANGINA PECTORIS (H): Primary | ICD-10-CM

## 2024-09-13 RX ORDER — NITROGLYCERIN 0.4 MG/1
TABLET SUBLINGUAL
Qty: 25 TABLET | Refills: 0 | Status: SHIPPED | OUTPATIENT
Start: 2024-09-13

## 2024-09-26 NOTE — PROGRESS NOTES
04/22/24: PT'S MEDICA PLAN REQS SOLE SOURCE OF NUTRITION. MARTI ARELLANO PT IS CURRENTLY SOLE SOURCE OF NUTRITION    PT HAS TPA, LINE CARE, AND HYDRATION COVERAGE [facility]

## 2024-10-14 ENCOUNTER — PATIENT OUTREACH (OUTPATIENT)
Dept: CARE COORDINATION | Facility: CLINIC | Age: 69
End: 2024-10-14

## 2024-10-14 ENCOUNTER — OFFICE VISIT (OUTPATIENT)
Dept: OTOLARYNGOLOGY | Facility: CLINIC | Age: 69
End: 2024-10-14
Payer: COMMERCIAL

## 2024-10-14 VITALS
HEIGHT: 67 IN | WEIGHT: 146.3 LBS | BODY MASS INDEX: 22.96 KG/M2 | OXYGEN SATURATION: 96 % | SYSTOLIC BLOOD PRESSURE: 105 MMHG | HEART RATE: 88 BPM | DIASTOLIC BLOOD PRESSURE: 66 MMHG

## 2024-10-14 DIAGNOSIS — C10.0 SQUAMOUS CELL CARCINOMA OF VALLECULA (H): Primary | ICD-10-CM

## 2024-10-14 PROCEDURE — 99213 OFFICE O/P EST LOW 20 MIN: CPT | Mod: 25 | Performed by: STUDENT IN AN ORGANIZED HEALTH CARE EDUCATION/TRAINING PROGRAM

## 2024-10-14 PROCEDURE — 31575 DIAGNOSTIC LARYNGOSCOPY: CPT | Performed by: STUDENT IN AN ORGANIZED HEALTH CARE EDUCATION/TRAINING PROGRAM

## 2024-10-14 ASSESSMENT — PAIN SCALES - GENERAL: PAINLEVEL: NO PAIN (0)

## 2024-10-14 NOTE — PATIENT INSTRUCTIONS
You were seen in the ENT Clinic today by Dr. Felix. If you have any questions or concerns after your appointment, please contact us (see below)    Please return to clinic in 3 months for follow up with Dr. Felix     How to Contact Us:  Send a [employer] message to your provider. Our team will respond to you via [employer]. Occasionally, we will need to call you to get further information.  For urgent matters (Monday-Friday), call the ENT Clinic: [phone] and speak with a call center team member - they will route your call appropriately.   If you'd like to speak directly with a nurse, please find our contact information below. We do our best to check voicemail frequently throughout the day, and will work to call you back within 1-2 days. For urgent matters, please use the general clinic phone numbers listed above.    Hollie ARAMBULA RN, BSN  RN Care Coordinator, ENT Clinic  [facility]  Direct: [phone]

## 2024-10-14 NOTE — PROGRESS NOTES
[facility]   Return Patient Visit    Name: Lopez Hogue  MRN: [id number]  Date of visit: Oct 15, 2024    Diagnosis: Oropharyngeal cancer, esophageal SCC  Stage:    Cancer Staging   Primary esophageal squamous cell carcinoma (H)  Staging form: Esophagus - Squamous Cell Carcinoma, AJCC 8th Edition  - Clinical stage from 11/28/2023: Stage II (cT2, cN1, cM0) - Signed by Yannick Alva MD on 7/9/2024  - Pathologic stage from 4/19/2024: Stage I (ypT0, pN0, cM0) - Signed by Yannick Alva MD on 7/9/2024    Squamous cell carcinoma of vallecula (H)  Staging form: Pharynx - Oropharynx, AJCC 8th Edition  - Clinical stage from 10/12/2023: Stage JUAN MIGUEL (cT1, cN2b, cM0, p16-) - Signed by Yannick Alva MD on 11/21/2023    Molecular: p16 negative  Performance status: ECOG 0    Oncology History:     Oropharyngeal squamous cell carcinoma:  -8/18/23 CT neck: Question soft tissue lesion within the right vallecula/ventral epiglottis.   -10/12/23 R vallecula biopsy: Non-keratinizing poorly differentiated squamous cell carcinoma, p16 negative  -10/13/23 PET/CT: Findings suspicious for right vallecula squamous cell carcinoma with right level IIa and right level IV lymph node metastases.. FDG avid thickening of the mid to distal esophagus, suspicious for a synchronous primary esophageal neoplasm. Recommend correlation with endoscopy.    Esophageal squamous cell carcinoma:   -11/8/23 EGD with biopsy of mid esophageal mass: non-keratinizing poorly differentiated squamous cell carcinoma, PD-L1 CPS 15  -11/21/23 consult with Dr. Hill: plan for neoadjuvant chemoradiation followed by laparoscopic staging and jejunostomy, followed by minimally invasive Feliberto-Elbert esophagectomy.   -11/28/23 staging EUS Partially obstructing and partially circumferential fungating mass in the middle third of the esophagus. Esophageal squamous cell carcinoma was staged T2 N1 (suspicious paraesophageal lymph node)  "    Treatment course:  -12/5/24-1/26/24 concurrent chemoradiotherapy with weekly carboplatin + paclitaxel (both esophageal and oropharyngeal/bilateral neck, infusion reaction with Taxol w/ dose 1. Tolerated rechallenge   -1/15/24 admission for neutropenia, source felt possibly secondary to cellulitis (vs radiation change), discharged with course of levofloxacin  -1/22/24 admission for recurrent fever, two days after completing levofloxacin. No neutropenia.       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     PMHx  HTN  CAD c/b MI (2023) s/p PCI x2  Prior sweat gland carcinoma s/p resection  No history of autoimmune disease.     SocHx  Lives in [facility]  Actively involved in a program for those in recovery from substance use.   Former heavy smoker (2 ppd) and drinker himself. Quit both in 2013.       Interval History:  Fabricio is seen for routine medical oncology follow-up today.  -He has been able to eat most foods without difficulty. Occasionally pills can be difficult to go down but do not get stuck. He eats often and usually pushes himself in order to gain weight. He is still supplementing with 2 cartons of formula via the feeding tube daily.   -He reports persistent fatigue today. It feels like he can't get enough sleep despite sleeping well and often. He continues to work every day, not necessarily part time, but sometimes leaves the office early to take a nap or rest.  -Prior to treatment he described himself as a \"gym rat\" but hasn't been able to return to the gym following treatment and surgery. His " stamina is decreased as well as muscle strength. As above he stays active with work and gets out of the house but hasn't been going for walks or doing any sort of informal exercise program  -Denies pain, new lumps or bumps  -Will sometimes feel nauseated after meals when he overeats but denies vomiting or need for antiemetics  -Denies numbness or tingling in extremities    4/19/24 surgical pathology:   Final Diagnosis   A. GASTROHEPATIC LYMPH NODES X2; EXCISION:  Negative for metastasis; see total node count in part B below      B. DISTAL ESOPHAGUS AND PROXIMAL STOMACH; ESOPHAGOGASTRECTOMY:  Esophagogastric wall with no evidence of residual carcinoma:   -Complete response in squamous cell carcinoma s/p neoadjuvant chemoradiation   -Also no metastasis to any of fifteen (total with part A) sampled lymph nodes (0 /15)       (See also synoptic data)  Stomach:    -No inflammation, intestinal metaplasia or dysplasia   -No H. Pylori like organisms identified on routine staining   -No Alfredo features present at the GE junction  Esophagus: no dysplasia or other background abnormality      C. STOMACH; FINAL MARGIN; EXCISION:  Negative for malignancy     D. ESOPHAGUS; FINAL MARGIN; EXCISION:  Negative for malignancy       7/8/24 CT chest:  1. Improved diffuse opacities throughout the lungs with ongoing  residual nodular groundglass opacity throughout the lungs, likely  representing improving infectious process.  2. Post surgical changes at the esophagogastrectomy with interval  pyloric stent placement. Ongoing distention of the gastric  pull-through.    7/8/24 CT neck:   1. Residual mass centered on the right lingual tonsil.  2. Interval resolution of previously identified cervical lymphadenopathy. No jefe enlargement or new suspicious jefe morphology.  3. Partially visualized postsurgical changes of esophagogastrectomy with gastric pull-through and multinodular pulmonary opacities in the visualized lung apices, better  appreciated on same-day CT chest 7/8/2024.  Primary: NI-RADS 3} High suspicion (new or enlarging discrete nodule/mass): biopsy.  Neck: NI-RADS 1}  No abnormal lymph node.     7/8/24 ENT visit with Dr. Felix, no overt mass on exam at that time      Exam:   /66 (BP Location: Right arm, Patient Position: Sitting, Cuff Size: Adult Regular)   Pulse 64   Temp 98.3  F (36.8  C) (Oral)   Resp 16   Wt 68.5 kg (151 lb)   SpO2 96%   BMI 23.83 kg/m      Gen: Comfortable, NAD  HEENT: Pupils equal, non-icteric. No appreciated adenopathy.  Resp: Comfortable on room air, clear bilaterally  CV: Systolic murmur, regular rate.   Abd: Soft, nontender. J-tube present-site intact without skin breakdown, erythema or drainage.  Ext: No swelling  Neuro: Normal gait      Labs:    Most Recent 3 CBC's:  Recent Labs   Lab Test 10/15/24  0633 08/23/24  1344 08/14/24  0655 08/13/24  0608 08/13/24  0055 04/02/24  0623 03/29/24  1458   WBC 5.8 4.8 8.9   < > 9.2   < > 5.3   HGB 12.2* 11.4* 11.3*   < > 14.6   < > 11.2*   MCV 93 93 95   < > 94   < > 97    182 130*   < > 173   < > 209   ANEUTAUTO 5.1  --   --   --  7.9  --  3.7    < > = values in this interval not displayed.    Most Recent 3 BMP's:  Recent Labs   Lab Test 10/15/24  0633 08/23/24  1344 08/13/24  0608    140 141   POTASSIUM 4.0 3.9 3.7   CHLORIDE 104 104 102   CO2 27 26 29   BUN 16.4 14.7 18.9   CR 0.61* 0.55* 0.55*   ANIONGAP 8 10 10   RAFAELA 9.4 9.5 9.5   * 92 118*   PROTTOTAL 6.9 6.9 7.0   ALBUMIN 4.0 3.7 4.0    Most Recent 2 LFT's:  Recent Labs   Lab Test 10/15/24  0633 08/23/24  1344   AST 63* 45   ALT 62 56   ALKPHOS 147 122   BILITOTAL 0.6 0.5    Most Recent TSH and T4:  Recent Labs   Lab Test 10/15/24  0633   TSH 4.37*   T4 0.65*     Phos/Mag:  Lab Results   Component Value Date    PHOS 3.6 05/17/2024    PHOS 3.4 05/16/2024    PHOS 4.1 05/15/2024    MAG 1.9 05/17/2024    MAG 2.1 05/16/2024    MAG 2.1 05/15/2024      I reviewed the above labs  today.    Imaging:   N/A    Pathology:   N/A    Assessment and Plan:   Lopez Hogue is a 68 year old male with HTN, CAD, prior sweat gland cancer s/p resection, subsequently diagnosed head and neck squamous cell carcinoma of the vallecula, with staging revealing a synchronous esophageal squamous cell carcinoma. 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, EUS staging shows T2 N1 disease  -now completed RT with concurrent carboplatin + paclitaxel  -Underwent esophagectomy 4/19/2024, final pathology showing a complete pathologic response  -CT head/neck 7/9 with questionable area of residual enhancement in the R BOT of unclear significance, follow-up PET 7/26 negative for recurrence  -met with rad onc 9/6, ENT yesterday-laryngoscopy clear without concern for recurrent HN disease    Plan:  --Plan for CT neck/chest/AP every 6 months for the first 2 years-next in January 2025  --Continue every 3-month exams (alternating FRAN and MD with scan review) with labs    # fatigue  -persistent post-treatment  -unable to increase physical activity since completion of chemoRT/surgery  -TFTs with slight TSH elevation but drop in T4, possibly contributing to symptoms  -hx of aortic stenosis, follow-up ECHO scheduled 10/29    Plan:  -cancer rehab referral    # hypothyroidism  -tsh slightly elevated with decreased t4 indicative of RT-induced hypothyroidism  -anticipate progression of thyroid dysfunction    Plan:  -ongoing fatigue likely multifactorial but possibly in part to thyroid dysfunction  -start levothyroxine 25 mcg daily and recheck TFTs in 6 and 12 weeks    Carolee Zapien CNP  ---  45 minutes spent on the date of the encounter doing chart review, review of test results, interpretation of tests, patient visit, and  documentation.    The longitudinal plan of care for the diagnosis(es)/condition(s) as documented were addressed during this visit. Due to the added complexity in care, I will continue to support Fabricio in the subsequent management and with ongoing continuity of care.

## 2024-10-14 NOTE — LETTER
10/14/2024       RE: Lopez Hogue  [address] [facility] [address]     Dear Colleague,    Thank you for referring your patient, Lopez Hogue, to the [facility] [place] [facility]. Please see a copy of my visit note below.    Head and Neck Surgery  10/14/24     Diagnosis: Synchronous right vallecula T1N2b p16- SCC right vallecula and mid esophagus SCC     Treatment: Concurrent chemoradiation to HN and esophagus completed 1/26/24. Esophagectomy 4/19/24     Imaging:   CT neck 7/8/24: prominent right lingual tonsil tissue suspicious for recurrence  PET/CT 7/26/24: ZANE    Interval history: Doing well today. Patient reports taking half of his nutrition through his J tube and being able to consume the rest by mouth. Denies any dysphagia or odynophagia. No restrictions on the type of food he is able to consume. Patient does report some dry mouth and thick secretions in the morning but these symptoms resolve throughout the day.      Physical Examination:  Alert, NAD  Breathing comfortably  No palpable adenopathy  No lesions in oral cavity or oropharynx     Procedure: Fiberoptic laryngoscopy performed. No lesions seen. Airway patent     A/P:  Doing well without signs of disease in the HN. We will continue routine follow up. I will see him back in 3 months. He halready has ct neck and chest scheduled for January by Dr Alva.      Edi Felix MD        25 minutes spent on the date of the encounter in chart review, patient visit, review of tests, documentation and/or discussion with other providers about the issues documented above asides from time spent doing flexible laryngoscopy.      Again, thank you for allowing me to participate in the care of your patient.      Sincerely,    Edi Felix MD

## 2024-10-14 NOTE — PROGRESS NOTES
Head and Neck Surgery  10/14/24     Diagnosis: Synchronous right vallecula T1N2b p16- SCC right vallecula and mid esophagus SCC     Treatment: Concurrent chemoradiation to HN and esophagus completed 1/26/24. Esophagectomy 4/19/24     Imaging:   CT neck 7/8/24: prominent right lingual tonsil tissue suspicious for recurrence  PET/CT 7/26/24: ZANE    Interval history: Doing well today. Patient reports taking half of his nutrition through his J tube and being able to consume the rest by mouth. Denies any dysphagia or odynophagia. No restrictions on the type of food he is able to consume. Patient does report some dry mouth and thick secretions in the morning but these symptoms resolve throughout the day.      Physical Examination:  Alert, NAD  Breathing comfortably  No palpable adenopathy  No lesions in oral cavity or oropharynx     Procedure: Fiberoptic laryngoscopy performed. No lesions seen. Airway patent     A/P:  Doing well without signs of disease in the HN. We will continue routine follow up. I will see him back in 3 months. He haldiamond has ct neck and chest scheduled for January by Dr Alva.      Edi Felix MD        25 minutes spent on the date of the encounter in chart review, patient visit, review of tests, documentation and/or discussion with other providers about the issues documented above asides from time spent doing flexible laryngoscopy.

## 2024-10-15 ENCOUNTER — APPOINTMENT (OUTPATIENT)
Dept: LAB | Facility: CLINIC | Age: 69
End: 2024-10-15
Attending: REGISTERED NURSE
Payer: COMMERCIAL

## 2024-10-15 ENCOUNTER — ONCOLOGY VISIT (OUTPATIENT)
Dept: ONCOLOGY | Facility: CLINIC | Age: 69
End: 2024-10-15
Attending: REGISTERED NURSE
Payer: COMMERCIAL

## 2024-10-15 VITALS
TEMPERATURE: 98.3 F | OXYGEN SATURATION: 96 % | RESPIRATION RATE: 16 BRPM | HEART RATE: 64 BPM | DIASTOLIC BLOOD PRESSURE: 66 MMHG | WEIGHT: 151 LBS | SYSTOLIC BLOOD PRESSURE: 103 MMHG | BODY MASS INDEX: 23.83 KG/M2

## 2024-10-15 DIAGNOSIS — E03.9 HYPOTHYROIDISM, UNSPECIFIED TYPE: ICD-10-CM

## 2024-10-15 DIAGNOSIS — R53.82 CHRONIC FATIGUE: ICD-10-CM

## 2024-10-15 DIAGNOSIS — C15.9 PRIMARY ESOPHAGEAL SQUAMOUS CELL CARCINOMA (H): ICD-10-CM

## 2024-10-15 DIAGNOSIS — C10.0 SQUAMOUS CELL CARCINOMA OF VALLECULA (H): Primary | ICD-10-CM

## 2024-10-15 LAB
ALBUMIN SERPL BCG-MCNC: 4 G/DL (ref 3.5–5.2)
ALP SERPL-CCNC: 147 U/L (ref 40–150)
ALT SERPL W P-5'-P-CCNC: 62 U/L (ref 0–70)
ANION GAP SERPL CALCULATED.3IONS-SCNC: 8 MMOL/L (ref 7–15)
AST SERPL W P-5'-P-CCNC: 63 U/L (ref 0–45)
BASOPHILS # BLD AUTO: 0 10E3/UL (ref 0–0.2)
BASOPHILS NFR BLD AUTO: 0 %
BILIRUB SERPL-MCNC: 0.6 MG/DL
BUN SERPL-MCNC: 16.4 MG/DL (ref 8–23)
CALCIUM SERPL-MCNC: 9.4 MG/DL (ref 8.8–10.4)
CHLORIDE SERPL-SCNC: 104 MMOL/L (ref 98–107)
CREAT SERPL-MCNC: 0.61 MG/DL (ref 0.67–1.17)
EGFRCR SERPLBLD CKD-EPI 2021: >90 ML/MIN/1.73M2
EOSINOPHIL # BLD AUTO: 0 10E3/UL (ref 0–0.7)
EOSINOPHIL NFR BLD AUTO: 0 %
ERYTHROCYTE [DISTWIDTH] IN BLOOD BY AUTOMATED COUNT: 14.5 % (ref 10–15)
GLUCOSE SERPL-MCNC: 137 MG/DL (ref 70–99)
HCO3 SERPL-SCNC: 27 MMOL/L (ref 22–29)
HCT VFR BLD AUTO: 37.4 % (ref 40–53)
HGB BLD-MCNC: 12.2 G/DL (ref 13.3–17.7)
IMM GRANULOCYTES # BLD: 0 10E3/UL
IMM GRANULOCYTES NFR BLD: 1 %
LYMPHOCYTES # BLD AUTO: 0.4 10E3/UL (ref 0.8–5.3)
LYMPHOCYTES NFR BLD AUTO: 7 %
MCH RBC QN AUTO: 30.3 PG (ref 26.5–33)
MCHC RBC AUTO-ENTMCNC: 32.6 G/DL (ref 31.5–36.5)
MCV RBC AUTO: 93 FL (ref 78–100)
MONOCYTES # BLD AUTO: 0.3 10E3/UL (ref 0–1.3)
MONOCYTES NFR BLD AUTO: 5 %
NEUTROPHILS # BLD AUTO: 5.1 10E3/UL (ref 1.6–8.3)
NEUTROPHILS NFR BLD AUTO: 87 %
NRBC # BLD AUTO: 0 10E3/UL
NRBC BLD AUTO-RTO: 0 /100
PLATELET # BLD AUTO: 175 10E3/UL (ref 150–450)
POTASSIUM SERPL-SCNC: 4 MMOL/L (ref 3.4–5.3)
PROT SERPL-MCNC: 6.9 G/DL (ref 6.4–8.3)
RBC # BLD AUTO: 4.02 10E6/UL (ref 4.4–5.9)
SODIUM SERPL-SCNC: 139 MMOL/L (ref 135–145)
T4 FREE SERPL-MCNC: 0.65 NG/DL (ref 0.9–1.7)
TSH SERPL DL<=0.005 MIU/L-ACNC: 4.37 UIU/ML (ref 0.3–4.2)
WBC # BLD AUTO: 5.8 10E3/UL (ref 4–11)

## 2024-10-15 PROCEDURE — G2211 COMPLEX E/M VISIT ADD ON: HCPCS | Performed by: REGISTERED NURSE

## 2024-10-15 PROCEDURE — 84443 ASSAY THYROID STIM HORMONE: CPT | Performed by: REGISTERED NURSE

## 2024-10-15 PROCEDURE — 80053 COMPREHEN METABOLIC PANEL: CPT | Performed by: REGISTERED NURSE

## 2024-10-15 PROCEDURE — 36415 COLL VENOUS BLD VENIPUNCTURE: CPT | Performed by: REGISTERED NURSE

## 2024-10-15 PROCEDURE — 99215 OFFICE O/P EST HI 40 MIN: CPT | Performed by: REGISTERED NURSE

## 2024-10-15 PROCEDURE — 85025 COMPLETE CBC W/AUTO DIFF WBC: CPT | Performed by: REGISTERED NURSE

## 2024-10-15 PROCEDURE — 84439 ASSAY OF FREE THYROXINE: CPT | Performed by: REGISTERED NURSE

## 2024-10-15 PROCEDURE — 99213 OFFICE O/P EST LOW 20 MIN: CPT | Performed by: REGISTERED NURSE

## 2024-10-15 ASSESSMENT — PAIN SCALES - GENERAL: PAINLEVEL: NO PAIN (0)

## 2024-10-15 NOTE — Clinical Note
10/15/2024      Lopez Hogue  [address] [facility] [[email or web address]] [id number]      Dear Colleague,    Thank you for referring your patient, Lopez Hogue, to the [facility]. Please see a copy of my visit note below.    [facility]   Return Patient Visit    Name: Lopez Hogue  MRN: [id number]  Date of visit: Oct 15, 2024    Diagnosis: Oropharyngeal cancer, esophageal SCC  Stage:    Cancer Staging   Primary esophageal squamous cell carcinoma (H)  Staging form: Esophagus - Squamous Cell Carcinoma, AJCC 8th Edition  - Clinical stage from 11/28/2023: Stage II (cT2, cN1, cM0) - Signed by Yannick Alva MD on 7/9/2024  - Pathologic stage from 4/19/2024: Stage I (ypT0, pN0, cM0) - Signed by Yannick Alva MD on 7/9/2024    Squamous cell carcinoma of vallecula (H)  Staging form: Pharynx - Oropharynx, AJCC 8th Edition  - Clinical stage from 10/12/2023: Stage JUAN MIGUEL (cT1, cN2b, cM0, p16-) - Signed by Yannick Alva MD on 11/21/2023    Molecular: p16 negative  Performance status: ECOG 0    Oncology History:     Oropharyngeal squamous cell carcinoma:  -8/18/23 CT neck: Question soft tissue lesion within the right vallecula/ventral epiglottis.   -10/12/23 R vallecula biopsy: Non-keratinizing poorly differentiated squamous cell carcinoma, p16 negative  -10/13/23 PET/CT: Findings suspicious for right vallecula squamous cell carcinoma with right level IIa and right level IV lymph node metastases.. FDG avid thickening of the mid to distal esophagus, suspicious for a synchronous primary esophageal neoplasm. Recommend correlation with endoscopy.    Esophageal squamous cell carcinoma:   -11/8/23 EGD with biopsy of mid esophageal mass: non-keratinizing poorly differentiated squamous cell carcinoma, PD-L1 CPS 15  -11/21/23 consult with Dr. Hill: plan for neoadjuvant chemoradiation followed by laparoscopic staging and jejunostomy, followed by minimally invasive  Feliberto-Elbert esophagectomy.   -11/28/23 staging EUS Partially obstructing and partially circumferential fungating mass in the middle third of the esophagus. Esophageal squamous cell carcinoma was staged T2 N1 (suspicious paraesophageal lymph node)     Treatment course:  -12/5/24-1/26/24 concurrent chemoradiotherapy with weekly carboplatin + paclitaxel (both esophageal and oropharyngeal/bilateral neck, infusion reaction with Taxol w/ dose 1. Tolerated rechallenge   -1/15/24 admission for neutropenia, source felt possibly secondary to cellulitis (vs radiation change), discharged with course of levofloxacin  -1/22/24 admission for recurrent fever, two days after completing levofloxacin. No neutropenia.       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     PMHx  HTN  CAD c/b MI (2023) s/p PCI x2  Prior sweat gland carcinoma s/p resection  No history of autoimmune disease.     SocHx  Lives in [facility]  Actively involved in a program for those in recovery from substance use.   Former heavy smoker (2 ppd) and drinker himself. Quit both in 2013.       Interval History:  Fabricio is seen for routine medical oncology follow-up today.  -He has been able to eat most foods without difficulty. Occasionally pills can be difficult to go down but do not get stuck. He eats often and usually pushes himself in order to gain weight. He is still supplementing with 2 cartons of formula via the feeding tube daily.   -He reports persistent fatigue today. It feels like he can't get enough sleep despite sleeping well and often. He continues  "to work every day, not necessarily part time, but sometimes leaves the office early to take a nap or rest.  -Prior to treatment he described himself as a \"gym rat\" but hasn't been able to return to the gym following treatment and surgery. His stamina is decreased as well as muscle strength. As above he stays active with work and gets out of the house but hasn't been going for walks or doing any sort of informal exercise program  -Denies pain, new lumps or bumps  -Will sometimes feel nauseated after meals when he overeats but denies vomiting or need for antiemetics  -Denies numbness or tingling in extremities    4/19/24 surgical pathology:   Final Diagnosis   A. GASTROHEPATIC LYMPH NODES X2; EXCISION:  Negative for metastasis; see total node count in part B below      B. DISTAL ESOPHAGUS AND PROXIMAL STOMACH; ESOPHAGOGASTRECTOMY:  Esophagogastric wall with no evidence of residual carcinoma:   -Complete response in squamous cell carcinoma s/p neoadjuvant chemoradiation   -Also no metastasis to any of fifteen (total with part A) sampled lymph nodes (0 /15)       (See also synoptic data)  Stomach:    -No inflammation, intestinal metaplasia or dysplasia   -No H. Pylori like organisms identified on routine staining   -No Alfredo features present at the GE junction  Esophagus: no dysplasia or other background abnormality      C. STOMACH; FINAL MARGIN; EXCISION:  Negative for malignancy     D. ESOPHAGUS; FINAL MARGIN; EXCISION:  Negative for malignancy       7/8/24 CT chest:  1. Improved diffuse opacities throughout the lungs with ongoing  residual nodular groundglass opacity throughout the lungs, likely  representing improving infectious process.  2. Post surgical changes at the esophagogastrectomy with interval  pyloric stent placement. Ongoing distention of the gastric  pull-through.    7/8/24 CT neck:   1. Residual mass centered on the right lingual tonsil.  2. Interval resolution of previously identified cervical " lymphadenopathy. No jefe enlargement or new suspicious jefe morphology.  3. Partially visualized postsurgical changes of esophagogastrectomy with gastric pull-through and multinodular pulmonary opacities in the visualized lung apices, better appreciated on same-day CT chest 7/8/2024.  Primary: NI-RADS 3} High suspicion (new or enlarging discrete nodule/mass): biopsy.  Neck: NI-RADS 1}  No abnormal lymph node.     7/8/24 ENT visit with Dr. Felix, no overt mass on exam at that time      Exam:   /66 (BP Location: Right arm, Patient Position: Sitting, Cuff Size: Adult Regular)   Pulse 64   Temp 98.3  F (36.8  C) (Oral)   Resp 16   Wt 68.5 kg (151 lb)   SpO2 96%   BMI 23.83 kg/m      Gen: Comfortable, NAD  HEENT: Pupils equal, non-icteric. No appreciated adenopathy.  Resp: Comfortable on room air, clear bilaterally  CV: Systolic murmur, regular rate.   Abd: Soft, nontender. J-tube present-site intact without skin breakdown, erythema or drainage.  Ext: No swelling  Neuro: Normal gait      Labs:    Most Recent 3 CBC's:  Recent Labs   Lab Test 10/15/24  0633 08/23/24  1344 08/14/24  0655 08/13/24  0608 08/13/24  0055 04/02/24  0623 03/29/24  1458   WBC 5.8 4.8 8.9   < > 9.2   < > 5.3   HGB 12.2* 11.4* 11.3*   < > 14.6   < > 11.2*   MCV 93 93 95   < > 94   < > 97    182 130*   < > 173   < > 209   ANEUTAUTO 5.1  --   --   --  7.9  --  3.7    < > = values in this interval not displayed.    Most Recent 3 BMP's:  Recent Labs   Lab Test 10/15/24  0633 08/23/24  1344 08/13/24  0608    140 141   POTASSIUM 4.0 3.9 3.7   CHLORIDE 104 104 102   CO2 27 26 29   BUN 16.4 14.7 18.9   CR 0.61* 0.55* 0.55*   ANIONGAP 8 10 10   RAFALEA 9.4 9.5 9.5   * 92 118*   PROTTOTAL 6.9 6.9 7.0   ALBUMIN 4.0 3.7 4.0    Most Recent 2 LFT's:  Recent Labs   Lab Test 10/15/24  0633 08/23/24  1344   AST 63* 45   ALT 62 56   ALKPHOS 147 122   BILITOTAL 0.6 0.5    Most Recent TSH and T4:  Recent Labs   Lab Test 10/15/24  0633    TSH 4.37*   T4 0.65*     Phos/Mag:  Lab Results   Component Value Date    PHOS 3.6 05/17/2024    PHOS 3.4 05/16/2024    PHOS 4.1 05/15/2024    MAG 1.9 05/17/2024    MAG 2.1 05/16/2024    MAG 2.1 05/15/2024      I reviewed the above labs today.    Imaging:   N/A    Pathology:   N/A    Assessment and Plan:   Lopez Hogue is a 68 year old male with HTN, CAD, prior sweat gland cancer s/p resection, subsequently diagnosed head and neck squamous cell carcinoma of the vallecula, with staging revealing a synchronous esophageal squamous cell carcinoma. 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, EUS staging shows T2 N1 disease  -now completed RT with concurrent carboplatin + paclitaxel  -Underwent esophagectomy 4/19/2024, final pathology showing a complete pathologic response  -CT head/neck 7/9 with questionable area of residual enhancement in the R BOT of unclear significance, follow-up PET 7/26 negative for recurrence  -met with rad onc 9/6, ENT yesterday-laryngoscopy clear without concern for recurrent HN disease    Plan:  --Plan for CT neck/chest/AP every 6 months for the first 2 years-next in January 2025  --Continue every 3-month exams (alternating FRAN and MD with scan review) with labs    # fatigue  -persistent post-treatment  -unable to increase physical activity since completion of chemoRT/surgery  -TFTs with slight TSH elevation but drop in T4, possibly contributing to symptoms  -hx of aortic stenosis, follow-up ECHO scheduled 10/29    Plan:  -cancer rehab referral    # hypothyroidism  ***    Carolee Zapien CNP  ---  45 minutes spent on the date of the encounter doing chart review, review of test results, interpretation of tests, patient visit, and documentation.    The longitudinal plan of care for the  diagnosis(es)/condition(s) as documented were addressed during this visit. Due to the added complexity in care, I will continue to support Fabricio in the subsequent management and with ongoing continuity of care.          Again, thank you for allowing me to participate in the care of your patient.        Sincerely,        Carolee Zapien CNP

## 2024-10-15 NOTE — NURSING NOTE
"Oncology Rooming Note    October 15, 2024 6:47 AM   Lopez Hogue is a 68 year old male who presents for:    Chief Complaint   Patient presents with    Blood Draw     Labs drawn via  by RN. VS taken.    Oncology Clinic Visit     Primary esophageal squamous cell carcinoma     Initial Vitals: /66 (BP Location: Right arm, Patient Position: Sitting, Cuff Size: Adult Regular)   Pulse 64   Temp 98.3  F (36.8  C) (Oral)   Resp 16   Wt 68.5 kg (151 lb)   SpO2 96%   BMI 23.83 kg/m   Estimated body mass index is 23.83 kg/m  as calculated from the following:    Height as of 10/14/24: 1.695 m (5' 6.75\").    Weight as of this encounter: 68.5 kg (151 lb). Body surface area is 1.8 meters squared.  No Pain (0) Comment: Data Unavailable   No LMP for male patient.  Allergies reviewed: Yes  Medications reviewed: Yes    Medications: Medication refills not needed today.  Pharmacy name entered into VOZ:    [id number] - [place], [place] - [address]  AT [facility] [address] [facility] - [place], [place] - [address]    Frailty Screening:   Is the patient here for a new oncology consult visit in cancer care? 2. No      Clinical concerns: Patent states that his hemoglobin, RBC, and hematocrit are always low and would like to know what the plan is for the future.      Rojelio Garcia LPN            "

## 2024-10-15 NOTE — NURSING NOTE
Chief Complaint   Patient presents with    Blood Draw     Labs drawn via  by RN. VS taken.     Labs collected from venipuncture by RN. Vitals taken. Checked in for appointment(s).     Grace Rios RN

## 2024-10-16 ENCOUNTER — MYC MEDICAL ADVICE (OUTPATIENT)
Dept: ONCOLOGY | Facility: CLINIC | Age: 69
End: 2024-10-16
Payer: COMMERCIAL

## 2024-10-16 DIAGNOSIS — F41.9 ANXIETY: ICD-10-CM

## 2024-10-16 NOTE — TELEPHONE ENCOUNTER
Called patient to follow up on symptoms sent through a [employer] message on 10/16 @ 3762. Left a voicemail message requesting the patient call [phone], option 5, option 2 and speak with any Triage nurse available.     Treating Provider:   Dr. Alva    Date of last office visit: 10/15/24 w/ Carolee Zapien    Recent treatments: Yes:   8/15/24 ESOPHAGOGASTRODUODENOSCOPY with stent placement (Esophagus)

## 2024-10-16 NOTE — TELEPHONE ENCOUNTER
"Oncology Nurse Triage    Situation:   Lopez reporting the following symptoms:  MyCHART symptoms:   \"Lightheaded, began sweating profusely, and felt like I might pass out but didn't\"     Background:   Treating Provider:   dr. Alva    Date of last office visit: Carolee Zapien CNP 10/15/2024.     Recent Treatments:Yes:   8/15/24 ESOPHAGOGASTRODUODENOSCOPY with stent placement (Esophagus)     Assessment:     Onset: Episode lasted about 5 minutes. \"Needed to use restroom, had episode of diarrhea, sat back down at desk and sweating profusely, while sitting was able to drink a cold gatorade and peanut butter crackers and soon after felt fine\"    Fabricio mainly wanted to update Carolee Rupali about the episode, one time occurrence, to double check if related to recent T4 and TSH lab results.     LBM 1x today. No additional diarrhea yet today.     During this triage call, pt denies fever, chest pain, SOB, dizziness, fainting.     Recommendations:     This writer educated on diarrhea home care.   Encouraged hydration, continue gatorade/Peanut Butter/lean proteins.   Okay to also try banana, applesauce, rice, and toast for next 24hours.   Avoid fried, fatty, greasy, and spicy foods  Avoid high fiber foods  Avoid caffeine, alcohol, and milk products  Apply over the counter skin barrier to protect rectum from irritation and breakdown (such as Aquaphor, Diaper rash cream).   Call Triage back if no improvement or worsening of diarrhea in next couple days.        "

## 2024-10-18 RX ORDER — BUSPIRONE HYDROCHLORIDE 5 MG/1
5 TABLET ORAL 3 TIMES DAILY
Qty: 90 TABLET | Refills: 2 | Status: SHIPPED | OUTPATIENT
Start: 2024-10-18

## 2024-10-18 RX ORDER — LEVOTHYROXINE SODIUM 25 UG/1
25 TABLET ORAL DAILY
Qty: 30 TABLET | Refills: 3 | Status: SHIPPED | OUTPATIENT
Start: 2024-10-18

## 2024-10-20 ENCOUNTER — APPOINTMENT (OUTPATIENT)
Dept: CT IMAGING | Facility: CLINIC | Age: 69
End: 2024-10-20
Attending: EMERGENCY MEDICINE
Payer: COMMERCIAL

## 2024-10-20 ENCOUNTER — HOSPITAL ENCOUNTER (EMERGENCY)
Facility: CLINIC | Age: 69
Discharge: HOME | End: 2024-10-20
Attending: EMERGENCY MEDICINE | Admitting: EMERGENCY MEDICINE
Payer: COMMERCIAL

## 2024-10-20 ENCOUNTER — APPOINTMENT (OUTPATIENT)
Dept: GENERAL RADIOLOGY | Facility: CLINIC | Age: 69
End: 2024-10-20
Attending: EMERGENCY MEDICINE
Payer: COMMERCIAL

## 2024-10-20 VITALS
OXYGEN SATURATION: 95 % | HEART RATE: 75 BPM | SYSTOLIC BLOOD PRESSURE: 117 MMHG | RESPIRATION RATE: 16 BRPM | DIASTOLIC BLOOD PRESSURE: 74 MMHG | TEMPERATURE: 98.1 F

## 2024-10-20 DIAGNOSIS — R53.1 WEAKNESS GENERALIZED: ICD-10-CM

## 2024-10-20 LAB
ALBUMIN SERPL BCG-MCNC: 4.1 G/DL (ref 3.5–5.2)
ALBUMIN UR-MCNC: NEGATIVE MG/DL
ALP SERPL-CCNC: 160 U/L (ref 40–150)
ALT SERPL W P-5'-P-CCNC: 65 U/L (ref 0–70)
ANION GAP SERPL CALCULATED.3IONS-SCNC: 13 MMOL/L (ref 7–15)
APPEARANCE UR: CLEAR
AST SERPL W P-5'-P-CCNC: 66 U/L (ref 0–45)
ATRIAL RATE - MUSE: 92 BPM
BASOPHILS # BLD AUTO: 0 10E3/UL (ref 0–0.2)
BASOPHILS NFR BLD AUTO: 0 %
BILIRUB SERPL-MCNC: 0.6 MG/DL
BILIRUB UR QL STRIP: NEGATIVE
BUN SERPL-MCNC: 16.3 MG/DL (ref 8–23)
CALCIUM SERPL-MCNC: 9.4 MG/DL (ref 8.8–10.4)
CHLORIDE SERPL-SCNC: 104 MMOL/L (ref 98–107)
COLOR UR AUTO: YELLOW
CREAT SERPL-MCNC: 0.62 MG/DL (ref 0.67–1.17)
DIASTOLIC BLOOD PRESSURE - MUSE: NORMAL MMHG
EGFRCR SERPLBLD CKD-EPI 2021: >90 ML/MIN/1.73M2
EOSINOPHIL # BLD AUTO: 0 10E3/UL (ref 0–0.7)
EOSINOPHIL NFR BLD AUTO: 0 %
ERYTHROCYTE [DISTWIDTH] IN BLOOD BY AUTOMATED COUNT: 15 % (ref 10–15)
GLUCOSE SERPL-MCNC: 161 MG/DL (ref 70–99)
GLUCOSE UR STRIP-MCNC: NEGATIVE MG/DL
HCO3 SERPL-SCNC: 22 MMOL/L (ref 22–29)
HCT VFR BLD AUTO: 38.6 % (ref 40–53)
HGB BLD-MCNC: 12.7 G/DL (ref 13.3–17.7)
HGB UR QL STRIP: NEGATIVE
HOLD SPECIMEN: NORMAL
IMM GRANULOCYTES # BLD: 0 10E3/UL
IMM GRANULOCYTES NFR BLD: 0 %
INTERPRETATION ECG - MUSE: NORMAL
KETONES UR STRIP-MCNC: NEGATIVE MG/DL
LEUKOCYTE ESTERASE UR QL STRIP: NEGATIVE
LYMPHOCYTES # BLD AUTO: 0.5 10E3/UL (ref 0.8–5.3)
LYMPHOCYTES NFR BLD AUTO: 9 %
MCH RBC QN AUTO: 31.2 PG (ref 26.5–33)
MCHC RBC AUTO-ENTMCNC: 32.9 G/DL (ref 31.5–36.5)
MCV RBC AUTO: 95 FL (ref 78–100)
MONOCYTES # BLD AUTO: 0.3 10E3/UL (ref 0–1.3)
MONOCYTES NFR BLD AUTO: 5 %
NEUTROPHILS # BLD AUTO: 4.6 10E3/UL (ref 1.6–8.3)
NEUTROPHILS NFR BLD AUTO: 86 %
NITRATE UR QL: NEGATIVE
NRBC # BLD AUTO: 0 10E3/UL
NRBC BLD AUTO-RTO: 0 /100
P AXIS - MUSE: 59 DEGREES
PH UR STRIP: 6.5 [PH] (ref 5–7)
PLATELET # BLD AUTO: 154 10E3/UL (ref 150–450)
POTASSIUM SERPL-SCNC: 3.9 MMOL/L (ref 3.4–5.3)
PR INTERVAL - MUSE: 162 MS
PROT SERPL-MCNC: 7.3 G/DL (ref 6.4–8.3)
QRS DURATION - MUSE: 90 MS
QT - MUSE: 354 MS
QTC - MUSE: 437 MS
R AXIS - MUSE: 58 DEGREES
RBC # BLD AUTO: 4.07 10E6/UL (ref 4.4–5.9)
RBC URINE: 1 /HPF
RENAL TUB EPI: <1 /HPF
SODIUM SERPL-SCNC: 139 MMOL/L (ref 135–145)
SP GR UR STRIP: 1.01 (ref 1–1.03)
SYSTOLIC BLOOD PRESSURE - MUSE: NORMAL MMHG
T AXIS - MUSE: 27 DEGREES
TROPONIN T SERPL HS-MCNC: 6 NG/L
TROPONIN T SERPL HS-MCNC: 7 NG/L
UROBILINOGEN UR STRIP-MCNC: NORMAL MG/DL
VENTRICULAR RATE- MUSE: 92 BPM
WBC # BLD AUTO: 5.4 10E3/UL (ref 4–11)
WBC URINE: 1 /HPF

## 2024-10-20 PROCEDURE — 93005 ELECTROCARDIOGRAM TRACING: CPT | Performed by: EMERGENCY MEDICINE

## 2024-10-20 PROCEDURE — 84484 ASSAY OF TROPONIN QUANT: CPT | Performed by: EMERGENCY MEDICINE

## 2024-10-20 PROCEDURE — 99284 EMERGENCY DEPT VISIT MOD MDM: CPT | Performed by: EMERGENCY MEDICINE

## 2024-10-20 PROCEDURE — 80053 COMPREHEN METABOLIC PANEL: CPT | Performed by: EMERGENCY MEDICINE

## 2024-10-20 PROCEDURE — 70450 CT HEAD/BRAIN W/O DYE: CPT | Mod: 26 | Performed by: RADIOLOGY

## 2024-10-20 PROCEDURE — 71046 X-RAY EXAM CHEST 2 VIEWS: CPT

## 2024-10-20 PROCEDURE — 70450 CT HEAD/BRAIN W/O DYE: CPT

## 2024-10-20 PROCEDURE — 93010 ELECTROCARDIOGRAM REPORT: CPT | Performed by: EMERGENCY MEDICINE

## 2024-10-20 PROCEDURE — 71046 X-RAY EXAM CHEST 2 VIEWS: CPT | Mod: 26 | Performed by: RADIOLOGY

## 2024-10-20 PROCEDURE — 99285 EMERGENCY DEPT VISIT HI MDM: CPT | Mod: 25 | Performed by: EMERGENCY MEDICINE

## 2024-10-20 PROCEDURE — 81001 URINALYSIS AUTO W/SCOPE: CPT | Performed by: EMERGENCY MEDICINE

## 2024-10-20 PROCEDURE — 85025 COMPLETE CBC W/AUTO DIFF WBC: CPT | Performed by: EMERGENCY MEDICINE

## 2024-10-20 PROCEDURE — 36415 COLL VENOUS BLD VENIPUNCTURE: CPT | Performed by: EMERGENCY MEDICINE

## 2024-10-20 ASSESSMENT — ACTIVITIES OF DAILY LIVING (ADL)
ADLS_ACUITY_SCORE: 37

## 2024-10-20 ASSESSMENT — COLUMBIA-SUICIDE SEVERITY RATING SCALE - C-SSRS
2. HAVE YOU ACTUALLY HAD ANY THOUGHTS OF KILLING YOURSELF IN THE PAST MONTH?: NO
6. HAVE YOU EVER DONE ANYTHING, STARTED TO DO ANYTHING, OR PREPARED TO DO ANYTHING TO END YOUR LIFE?: NO
1. IN THE PAST MONTH, HAVE YOU WISHED YOU WERE DEAD OR WISHED YOU COULD GO TO SLEEP AND NOT WAKE UP?: NO

## 2024-10-20 NOTE — ED TRIAGE NOTES
PT arrives to ED ambulatory with family stating over the last 4 days patient has had near syncopal episodes while sitting down, without LOC. PT family describes it as a blank stare where he becomes disorientated lasting 4-5 min in duration, with the last event occurring 1600 yesterday.     Hx cardiac stenosis with 2 stents 2023     Triage Assessment (Adult)       Row Name 10/20/24 0813          Triage Assessment    Airway WDL WDL        Respiratory WDL    Respiratory WDL WDL        Skin Circulation/Temperature WDL    Skin Circulation/Temperature WDL WDL        Cardiac WDL    Cardiac WDL X     Cardiac Rhythm ST        Peripheral/Neurovascular WDL    Peripheral Neurovascular WDL WDL        Cognitive/Neuro/Behavioral WDL    Cognitive/Neuro/Behavioral WDL WDL

## 2024-10-20 NOTE — DISCHARGE INSTRUCTIONS
Your CT head is normal.     Your blood work is stable.     Your EKG and troponin is stable     Please follow-up with your primary care doctor for further care.     Return to the ER if any other problems/concerns.

## 2024-10-20 NOTE — ED PROVIDER NOTES
ED Provider Note  [facility]      History     Chief Complaint   Patient presents with    Syncope     The history is provided by the patient and medical records.     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 Patient is accompanied by his wife. He reports that the first episode happened on Wednesday (10/16). He was sitting in his desk chair when he suddenly became lightheaded, profusely sweating, disoriented, and confused. He thought he could be dehydrated so drank some water and was back to normal 1-2 minutes later. The second episode occurred last night while sitting in his recliner. He again became suddenly lightheaded, profusely sweating, disoriented, and confused. His wife was there and states that he was looking at her with a blank stare and was trying to figure out how to work the remote which he thought was his phone. This lasted 5 minutes and he again drank some water and felt back to normal 1-2 minutes later. He remembers all of both events and never lost consciousness. There was never any shaking noted and he had no chest pain, shortness of breath, or palpitations. Both episodes he was sitting and had been doing nothing physical prior. He felt completely normal between the 2 episodes and currently feels fine. He notes that he has had anxiety attacks in the past and this felt somewhat similar.     He is not currently on any chemo or radiation. He did just start 25 mcg levothyroxine 2 days ago after his T4 was low last week. Otherwise no recent medication changes. He has a cough that has been chronic since his esophagectomy which is unchanged. He otherwise denies recent illness. He does eat orally,  "but supplements with 75 ml via his G-tube over 8 hours at night. Additionally, he denies history of diabetes, but states he has always been suspicious that he has diabetes as his A1c is \"not great\" and he is always thirsty and frequently urinating.     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)     Nonrheumatic aortic (valve) stenosis     Sweat gland carcinoma      Past Surgical History:   Procedure Laterality Date    BIOPSY  June 29015    Left gluteal and groin lymphnodes    BRONCHOSCOPY FLEXIBLE AND RIGID N/A 04/19/2024    Procedure: Bronchoscopy flexible and rigid;  Surgeon: Devin Hill MD;  Location: [facility] OR    BRONCHOSCOPY FLEXIBLE AND RIGID N/A 04/28/2024    Procedure: Bronchoscopy flexible and rigid;  Surgeon: Jessie Kim MD;  Location: [facility] OR    CARDIAC SURGERY  March 2023    2 stents placed    COLONOSCOPY  2012    CV CORONARY ANGIOGRAM N/A 03/27/2023    Procedure: Coronary Angiogram;  Surgeon: Miki Etienne MD;  Location: [facility] CV    CV CORONARY ANGIOGRAM N/A 05/09/2023    Procedure: Coronary Angiogram;  Surgeon: Miki Etienne MD;  Location: [facility] CV    CV LEFT HEART CATH N/A 03/27/2023    Procedure: Left Heart Catheterization;  Surgeon: Miki Etienne MD;  Location: [facility] CV    CV LEFT HEART CATH N/A 05/09/2023    Procedure: Left Heart Catheterization;  Surgeon: Miki Etienne MD;  Location: [facility] CV    CV PCI N/A 03/27/2023    Procedure: Percutaneous Coronary Intervention;  Surgeon: Miki Etienne MD;  Location: [facility] CV    ENDOSCOPIC ULTRASOUND UPPER GASTROINTESTINAL TRACT (GI) N/A 11/28/2023    Procedure: Endoscopic ultrasound upper " gastrointestinal tract (GI);  Surgeon: Shahriar Giraldo MD;  Location: [facility] GI    ESOPHAGOGASTRODUODENOSCOPY, WITH BOTULINUM TOXIN INJECTION N/A 04/29/2024    Procedure: Esophagogastroduodenoscopy, With Botulinum Toxin Injection;  Surgeon: Jessie Kim MD;  Location: [facility] GI    ESOPHAGOSCOPY, GASTROSCOPY, DUODENOSCOPY (EGD), COMBINED N/A 11/08/2023    Procedure: ESOPHAGOGASTRODUODENOSCOPY, WITH BIOPSY;  Surgeon: Shahriar Giraldo MD;  Location: [facility]    ESOPHAGOSCOPY, GASTROSCOPY, DUODENOSCOPY (EGD), COMBINED N/A 04/19/2024    Procedure: Esophagoscopy, gastroscopy, duodenoscopy (EGD), combined;  Surgeon: Devin Hill MD;  Location: [facility] OR    ESOPHAGOSCOPY, GASTROSCOPY, DUODENOSCOPY (EGD), COMBINED N/A 04/28/2024    Procedure: Esophagoscopy, gastroscopy, duodenoscopy (EGD), combined;  Surgeon: Jessie Kim MD;  Location: [facility] OR    ESOPHAGOSCOPY, GASTROSCOPY, DUODENOSCOPY (EGD), COMBINED N/A 05/05/2024    Procedure: Esophagoscopy, gastroscopy, duodenoscopy (EGD), combined-under fluoro & dilation, nasogastric tube placement, bronchoscopy;  Surgeon: Kevin Calderon MD;  Location: [facility] OR    ESOPHAGOSCOPY, GASTROSCOPY, DUODENOSCOPY (EGD), COMBINED N/A 8/8/2024    Procedure: ESOPHAGOGASTRODUODENOSCOPY with fluoroscopy and stent placement;  Surgeon: Livan Monahan MD;  Location:  OR    ESOPHAGOSCOPY, GASTROSCOPY, DUODENOSCOPY (EGD), COMBINED N/A 8/15/2024    Procedure: ESOPHAGOGASTRODUODENOSCOPY with stent placement;  Surgeon: Livan Monahan MD;  Location: [facility] OR    IR JEJUNOSTOMY TUBE CHANGE  05/19/2024    LAPAROSCOPIC ASSISTED INSERTION TUBE JEJUNOSTOMY N/A 04/01/2024    Procedure: Laparoscopic Jejunostomy Tube Insertion and Esophagogastroduodenoscopy;  Surgeon: Kevin Calderon MD;  Location: [facility] OR    LARYNGOSCOPY WITH BIOPSY(IES) N/A 10/12/2023    Procedure: LARYNGOSCOPY, WITH BIOPSY;  Surgeon: Edi Felix MD;  Location: [facility] OR    OTHER SURGICAL HISTORY   2015    WIDE EXCISION OF LEFT GLUTEAL MASSTNM: [id number], stage: II hidradenocarcinoma Grade II, 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, sentinel lymph node biopsy negative     SOFT TISSUE SURGERY  2023    left gluteal and lymphnodes    THORACOSCOPIC, LAPAROSCOPIC ESOPHAGECTOMY, COMBINED N/A 2024    Procedure: Laparoscopic and right thoracoscopic esophagogastrectomy, right AND left chest tube placement;  Surgeon: Devin Hill MD;  Location: [facility] OR    VASCULAR SURGERY  2023    Cath 2 stents     acetaminophen (TYLENOL) 500 MG tablet  busPIRone (BUSPAR) 5 MG tablet  levothyroxine (SYNTHROID/LEVOTHROID) 25 MCG tablet  nitroGLYcerin (NITROSTAT) 0.4 MG sublingual tablet  pantoprazole (PROTONIX) 40 MG EC tablet  polyethylene glycol (MIRALAX) 17 g packet  rosuvastatin (CRESTOR) 40 MG tablet  senna-docusate (SENOKOT-S/PERICOLACE) 8.6-50 MG tablet  sertraline (ZOLOFT) 100 MG tablet  sodium fluoride dental gel (PREVIDENT) 1.1 % GEL topical gel      Allergies   Allergen Reactions    Coconut Flavor Anaphylaxis     Raw coconut     Family History  Family History   Problem Relation Age of Onset    Dementia Mother     Mental Illness Mother         Dementia    Substance Abuse Son         Alcohol    Substance Abuse Sister         Alcohol    Substance Abuse Brother         Alcohol    Anesthesia Reaction No family hx of     Thrombocytopenia No family hx of     Cancer No family hx of     Thrombosis No family hx of      Social History   Social History     Tobacco Use    Smoking status: Former     Current packs/day: 0.00     Average packs/day: 2.0 packs/day for 41.0 years (82.0 ttl pk-yrs)     Types: Cigarettes     Start date: 1972     Quit date: 2013     Years since quittin.3     Passive exposure: Past    Smokeless tobacco: Never    Tobacco comments:     Quit 10 years ago   Vaping Use    Vaping status: Never Used   Substance Use Topics    Alcohol use: Not Currently     Comment: Stopped 11 years ago    Drug use: Not Currently      Types: Cocaine, Marijuana      A complete review of systems was performed with pertinent positives and negatives noted in the HPI, and all other systems negative.    Physical Exam   BP: 126/70  Pulse: 107  Temp: 98.1  F (36.7  C)  Resp: 16  SpO2: 97 %  Physical Exam  Constitutional:       Appearance: He is well-developed.   HENT:      Head: Normocephalic and atraumatic.   Eyes:      Pupils: Pupils are equal, round, and reactive to light.   Cardiovascular:      Rate and Rhythm: Normal rate and regular rhythm.      Heart sounds: Normal heart sounds.   Pulmonary:      Effort: Pulmonary effort is normal. No respiratory distress.      Breath sounds: No wheezing.   Abdominal:      General: There is no distension.      Palpations: Abdomen is soft.      Tenderness: There is no abdominal tenderness. There is no rebound.      Comments: Feeding tube in abdomen; soft   Musculoskeletal:      Cervical back: Normal range of motion and neck supple.   Skin:     General: Skin is warm.   Neurological:      Mental Status: He is alert and oriented to person, place, and time.   Psychiatric:         Behavior: Behavior normal.         Thought Content: Thought content normal.           ED Course, Procedures, & Data      Procedures            EKG Interpretation:      Interpreted by Kendra Basurto MD  Time reviewed: 823 am  Symptoms at time of EKG: none   Rhythm: normal sinus   Rate: normal  Axis: normal  Ectopy: none  Conduction: normal  ST Segments/ T Waves: No ST-T wave changes  Q Waves: none  Comparison to prior: No old EKG available    Clinical Impression: normal EKG           Results for orders placed or performed during the hospital encounter of 10/20/24   [place] Draw     Status: None ()    Narrative    The following orders were created for panel order [place] Draw.  Procedure                               Abnormality         Status                     ---------                               -----------         ------                      Extra Blue Top Tube[[id number]]                                                         Extra Red Top Tube[[id number]]                                                          Extra Green Top (Lithium...[[id number]]                                                 Extra Purple Top Tube[[id number]]                                                         Please view results for these tests on the individual orders.   EKG 12-lead, tracing only     Status: None (Preliminary result)   Result Value Ref Range    Systolic Blood Pressure  mmHg    Diastolic Blood Pressure  mmHg    Ventricular Rate 92 BPM    Atrial Rate 92 BPM    OK Interval 162 ms    QRS Duration 90 ms     ms    QTc 437 ms    P Axis 59 degrees    R AXIS 58 degrees    T Axis 27 degrees    Interpretation ECG Sinus rhythm  Normal ECG        Medications - No data to display  Labs Ordered and Resulted from Time of ED Arrival to Time of ED Departure - No data to display  No orders to display          Critical care was not performed.     Medical Decision Making  The patient's presentation was of moderate complexity (an undiagnosed new problem with uncertain prognosis).    The patient's evaluation involved:  review of external note(s) from 3+ sources (see separate area of note for details)  review of 3+ test result(s) ordered prior to this encounter (see separate area of note for details)  ordering and/or review of 3+ test(s) in this encounter (see separate area of note for details)    The patient's management necessitated moderate risk (prescription drug management including medications given in the ED).    Assessment & Plan    Patient is a 68-year-old male with a known history of recent cancer repaired by thoracic surgery who follows closely with his primary care doctor who presented to the ER due to 2 episodes of brief diaphoresis and confusion.  Patient here is well-appearing and has normal neuroexam.  Patient's EKG was normal.  Patient never had  any pain during these episodes.  Possibly can be contributed to low sugars or dehydration as he was able to drink and symptoms improved.  Plan will be to check CT head for further imaging.  Plan of care was discussed with the patient and his significant other.    Patient was found to have a negative CT head.  Chest x-ray is normal.  His labs are all at baseline.  No clear indication of what caused his transient episode of weakness and diaphoresis.  Patient is troponin x 2 and had no chest pain.  Not suspicious for ACS.  I do expect a cardiac arrhythmia as a cause of renal syncope and not just the feeling of weakness.  I did discuss these results with patient.  Plan will be to discharge home with outpatient follow-up.    I have reviewed the nursing notes. I have reviewed the findings, diagnosis, plan and need for follow up with the patient.    New Prescriptions    No medications on file       Final diagnoses:   Weakness generalized   IAlice, am serving as a trained medical scribe to document services personally performed by Kendra Basurto MD, based on the provider's statements to me.     Kendra MENDOZA MD, was physically present and have reviewed and verified the accuracy of this note documented by Alice Aguilar.      Kendra Basurto MD  [facility] EMERGENCY DEPARTMENT  10/20/2024        Kendra Basurto MD  10/20/24 2852

## 2024-10-29 ENCOUNTER — HOSPITAL ENCOUNTER (OUTPATIENT)
Dept: CARDIOLOGY | Facility: HOSPITAL | Age: 69
Discharge: HOME | End: 2024-10-29
Attending: INTERNAL MEDICINE | Admitting: INTERNAL MEDICINE
Payer: COMMERCIAL

## 2024-10-29 DIAGNOSIS — I10 PRIMARY HYPERTENSION: ICD-10-CM

## 2024-10-29 DIAGNOSIS — I35.0 NONRHEUMATIC AORTIC VALVE STENOSIS: ICD-10-CM

## 2024-10-29 DIAGNOSIS — I25.110 CORONARY ARTERY DISEASE INVOLVING NATIVE CORONARY ARTERY OF NATIVE HEART WITH UNSTABLE ANGINA PECTORIS (H): ICD-10-CM

## 2024-10-29 DIAGNOSIS — I20.0 UNSTABLE ANGINA (H): ICD-10-CM

## 2024-10-29 LAB — LVEF ECHO: NORMAL

## 2024-10-29 PROCEDURE — 93306 TTE W/DOPPLER COMPLETE: CPT

## 2024-10-29 PROCEDURE — 93306 TTE W/DOPPLER COMPLETE: CPT | Mod: 26 | Performed by: INTERNAL MEDICINE

## 2024-11-01 ENCOUNTER — OFFICE VISIT (OUTPATIENT)
Dept: CARDIOLOGY | Facility: CLINIC | Age: 69
End: 2024-11-01
Attending: INTERNAL MEDICINE
Payer: COMMERCIAL

## 2024-11-01 VITALS
DIASTOLIC BLOOD PRESSURE: 46 MMHG | SYSTOLIC BLOOD PRESSURE: 94 MMHG | RESPIRATION RATE: 15 BRPM | HEART RATE: 64 BPM | BODY MASS INDEX: 24.17 KG/M2 | WEIGHT: 154 LBS | HEIGHT: 67 IN

## 2024-11-01 DIAGNOSIS — I20.0 UNSTABLE ANGINA (H): ICD-10-CM

## 2024-11-01 DIAGNOSIS — I25.110 CORONARY ARTERY DISEASE INVOLVING NATIVE CORONARY ARTERY OF NATIVE HEART WITH UNSTABLE ANGINA PECTORIS (H): ICD-10-CM

## 2024-11-01 DIAGNOSIS — I35.0 NONRHEUMATIC AORTIC VALVE STENOSIS: ICD-10-CM

## 2024-11-01 DIAGNOSIS — I10 PRIMARY HYPERTENSION: ICD-10-CM

## 2024-11-01 PROCEDURE — G2211 COMPLEX E/M VISIT ADD ON: HCPCS | Performed by: INTERNAL MEDICINE

## 2024-11-01 PROCEDURE — 99214 OFFICE O/P EST MOD 30 MIN: CPT | Performed by: INTERNAL MEDICINE

## 2024-11-01 RX ORDER — CHLORHEXIDINE GLUCONATE ORAL RINSE 1.2 MG/ML
15 SOLUTION DENTAL PRN
COMMUNITY
Start: 2024-09-11

## 2024-11-01 NOTE — PROGRESS NOTES
"    Cardiology Progress Note     Assessment:  Coronary artery disease, status post non-ST segment elevation myocardial infarction due to occlusion of obtuse marginal branch, status post stenting in March 2023,  no angina  Hypercholesterolemia, excellent control  Aortic stenosis, moderate, stable, asymptomatic  Sinus bradycardia, mild, resolved after discontinuation of metoprolol  Cancer of esophagus status post resection, chemo, radiation    Plan:  Continue rosuvastatin long-term.  He should be taking 81 mg of aspirin a day.  Resume regular exercise   Repeat echo to reassess severity of aortic stenosis in 1 year    Follow-up 1 year  The longitudinal plan of care for the diagnosis(es)/condition(s) as documented were addressed during this visit. Due to the added complexity in care, I will continue to support Fabricio in the subsequent management and with ongoing continuity of care.   Subjective:   This is 69 year old male who comes in today for follow-up visit.  He has done well from the cardiac standpoint.  He denies chest pain or shortness of breath.  He has not had heart palpitations syncope.  Last year he was diagnosed with esophageal cancer.  He underwent chemoradiation and radiation.  He later went through esophagectomy.  He is progressing well.  He is regaining weight.  He is cancer free at present time.    Review of Systems:   Negative other than history of present illness    Objective:   BP 94/46 (BP Location: Left arm, Patient Position: Sitting, Cuff Size: Adult Regular)   Pulse 64   Resp 15   Ht 1.695 m (5' 6.75\")   Wt 69.9 kg (154 lb)   BMI 24.30 kg/m    Physical Exam:  GENERAL: no distress  NECK: No JVD  LUNGS: Clear to auscultation.  CARDIAC: regular rhythm, S1 & S2 normal.  No heaves, thrills, gallops 3-6 systolic ejection murmur with radiation to carotids  ABDOMEN: flat, negative hepatosplenomegaly, soft and non-tender.  EXTREMITIES: No evidence of cyanosis, clubbing or edema.    Current Outpatient " Medications   Medication Sig Dispense Refill    acetaminophen (TYLENOL) 500 MG tablet Take 500-1,000 mg by mouth every 8 hours as needed for fever or pain      busPIRone (BUSPAR) 5 MG tablet Take 1 tablet (5 mg) by mouth 3 times daily. 90 tablet 2    chlorhexidine (PERIDEX) 0.12 % solution Take 15 mLs by mouth as needed.      levothyroxine (SYNTHROID/LEVOTHROID) 25 MCG tablet Take 1 tablet (25 mcg) by mouth daily. 30 tablet 3    nitroGLYcerin (NITROSTAT) 0.4 MG sublingual tablet PLACE 1 TABLET UNDER THE TONGUE EVERY 5 MINUTES FOR CHEST PAIN FOR 3 DOSES. IF SYMPTOMS PERSIST 5 MINUTES AFTER 1ST DOSE CALL 911. 25 tablet 0    pantoprazole (PROTONIX) 40 MG EC tablet Take 1 tablet (40 mg) by mouth 2 times daily. 180 tablet 4    polyethylene glycol (MIRALAX) 17 g packet Take 17 g by mouth daily as needed for constipation.      rosuvastatin (CRESTOR) 40 MG tablet Take 1 tablet (40 mg) by mouth daily **Due for follow-up with Dr. Walters** 90 tablet 0    senna-docusate (SENOKOT-S/PERICOLACE) 8.6-50 MG tablet Take 1 tablet by mouth 2 times daily as needed for constipation 30 tablet 0    sertraline (ZOLOFT) 100 MG tablet Take 1.5 tablets (150 mg) by mouth every morning. 45 tablet 1    sodium fluoride dental gel (PREVIDENT) 1.1 % GEL topical gel Apply to affected area at bedtime         Cardiographics:      Echocardiogram: October 2024    The left ventricle is normal in size.  There is mild concentric left ventricular hypertrophy.  Left ventricular function is normal.The ejection fraction is 60-65%.  Normal right ventricle size and systolic function.  Normal left atrial size.  Moderate valvular aortic stenosis is present with a peak velocity of 3.3 m/s,  mean gradient 26 mmHg, DEVAN 1.2 cm2, DI 0.37, SI 55 ml/m2.  Compared to prior study, there is no significant change.     Coronary angio: March 2023  1.  Diffuse multivessel coronary disease with mild to moderate irregularity of the LAD, severe circumflex OM1 disease which is a  large vessel, and severe stenoses leading into the third left posterolateral branch which is very small branch, and diffuse mild irregularity of the right coronary artery and right posterior descending.  2.  Successful PCI of OM1 vessel requiring Cutting Balloon PTCA, shockwave lithotripsy, and drug-eluting stent implant x2  3.  Successful conventional PTCA of small posterolateral 3 branch of left circumflex.     May 2023  1.  Previously placed stents in OM left circumflex are widely patent with normal VENTURA-3 flow.  2.  PTCA sites in distal circumflex branch have  healed nicely with less than 30% residual narrowing.  3.  LAD is moderately calcified with diffuse mild luminal irregularity but no severe stenoses, unchanged from prior study.  4. RCA has mild calcification and luminal irregularity but no severe stenoses.  There is a focal 70% narrowing in a small posterolateral branch and diffuse 50 to 70% narrowing in the posterior descending branch.  Both appear unchanged from prior study        GXT: July 2023  1. The patient achieved good exercise workload without inducible angina.  2. Exercise stress ECG is positive for inducible myocardial ischemia in  inferior and lateral leads. ST segment depression 3 mm at the peak of  exercise.  3. No exercise induced cardiac arrhytmia.   Lab Results    Chemistry/lipid CBC Cardiac Enzymes/BNP/TSH/INR   Recent Labs   Lab Test 08/23/24  1344   CHOL 91   HDL 47   LDL 35   TRIG 44     Recent Labs   Lab Test 08/23/24  1344 12/27/23  0751 05/11/23  0838   LDL 35 31 34     Recent Labs   Lab Test 10/20/24  0828      POTASSIUM 3.9   CHLORIDE 104   CO2 22   *   BUN 16.3   CR 0.62*   GFRESTIMATED >90   RAFAELA 9.4     Recent Labs   Lab Test 10/20/24  0828 10/15/24  0633 08/23/24  1344   CR 0.62* 0.61* 0.55*     Recent Labs   Lab Test 03/27/23  0727   A1C 5.2          Recent Labs   Lab Test 10/20/24  0828   WBC 5.4   HGB 12.7*   HCT 38.6*   MCV 95        Recent Labs   Lab  "Test 10/20/24  0828 10/15/24  0633 08/23/24  1344   HGB 12.7* 12.2* 11.4*    No results for input(s): \"TROPONINI\" in the last 01424 hours.  Recent Labs   Lab Test 08/18/23  0309   NTBNPI 72     Recent Labs   Lab Test 10/15/24  0633   TSH 4.37*     Recent Labs   Lab Test 08/13/24  0055 04/28/24  1730 11/30/23  0457   INR 0.92 1.34* 0.97                     "

## 2024-11-01 NOTE — PATIENT INSTRUCTIONS
Lopez Hogue,    It was a pleasure to see you today at [facility].     My recommendations after this visit include:    Start baby aspirin  Echo and follow up in 1 year    MYESHA Walters MD, FACC, [facility]

## 2024-11-05 NOTE — PROGRESS NOTES
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 Past medical history includes HTN, CAD, MI and prior sweat gland cancer s/p surgical resection. Pt reports that prior to this, he was at the gym daily, doing a lot of cardio (on his own), but sometimes weights. Since his cancer, he's not allowed to lie flat any longer - has to be at 30 deg elevation. And, his stomach is much smaller than before, so he has to have 6 small meals/day and supplement with a feeding tube. Currently he will work for a few hours (into the office most morning), then will come home to nap most days. He naps a lot and sleeps poorly at night - the reflux affects him when lying supine (even elevated). Normal chores - e.g, grocery shopping - is OK/fine. Hasn't done any long walks or been back to the gym. He did have a 2 week vacation recently and was at the beach, able to walk all day long and swim and eat normally.       Presenting condition or subjective complaint: (Patient-Rptd) Want to increase my energy level  Date of onset: 10/15/24 (date of order)    Relevant medical history: (Patient-Rptd) Anemia; Cancer; Chest pain; Foot drop; Heart problems; Overweight; Radiation treatment; Sleep disorder like apnea; Thyroid problems; Vision problems   Dates & types of surgery: (Patient-Rptd) Heart, Esophageal, other cancer    Prior diagnostic imaging/testing results: (Patient-Rptd) MRI; CT scan; X-ray; Video fluoroscopic swallow study     Prior therapy history for the same diagnosis, illness or injury: (Patient-Rptd) No       Prior Level of Function  Transfers: Independent  Ambulation: Independent  ADL: Independent      Living Environment  Social support: (Patient-Rptd) With a significant other or spouse   Type of home: (Patient-Rptd) House; 2-story   Stairs to enter the home: (Patient-Rptd) Yes (Patient-Rptd) 9 Is there a railing: (Patient-Rptd) Yes     Ramp: (Patient-Rptd) No   Stairs inside the home: (Patient-Rptd) Yes (Patient-Rptd) 14 Is there a railing: (Patient-Rptd) Yes     Help at home: (Patient-Rptd) Self Cares (home health aide/personal care attendant, family, etc)  Equipment owned:       Employment: (Patient-Rptd) Yes (Patient-Rptd)   Hobbies/Interests: (Patient-Rptd) Reading Hiking    Patient goals for therapy: (Patient-Rptd) Work out at the gym    Pain assessment: Pain present  Location: left shoulder - RTC (had been doing PT previously) /Ratin/10 at rest, 5/10 with certain movements     Objective      Cognitive Status Examination  Orientation: Oriented to person, place and time   Level of Consciousness: Alert  Follows Commands and Answers Questions: 100% of the time  Personal Safety and Judgement: Intact  Memory: Intact    STRENGTH: B LEs in sitting grossly 5/5    BED MOBILITY:     TRANSFERS: Independent    GAIT:   Level of [place]: Independent  Assistive Device(s): None  Gait Deviations: WNL      BALANCE:     SPECIAL TESTS  Functional Gait Assessment (FGA) TOTAL SCORE: (MAXIMUM SCORE 30): 25    10 Meter Walk Test (Comfortable)     10 Meter Walk Test (Fast)     6 Minute Walk Test (6MWT)   At rest: 97% SpO2, HR 77 bpm, total distance:  1457 ft (444m) RPE 2-3/10, SpO2 96%, SOB 0/10, HR 94 bpm  (Norm for age 527m)        Cole Balance Scale (BBS)     5 Times Sit-to-Stand (5TSTS)       Dynamic Gait Index (DGI)     Timed Up and Go (TUG) - sec    Single Leg Stance Right (sec) 15+ sec   Single Leg Stance Left (sec) 14.9 sec   Modified CTSIB Conditions (sec) Cond 1:   Cond 2:   Cond 4:   Cond 5 :     Romberg  (sec)    Sharpened Romberg (sec) 30 sec each side   30 Second Sit to Stand (reps/height) 13 reps (norm is 12-17), SOB rated 4-5/10, RPE 5/10   Mini-BESTest              SENSATION:     REFLEXES:   COORDINATION: WNL  MUSCLE TONE:         Assessment & Plan   CLINICAL IMPRESSIONS  Medical Diagnosis: Primary esophageal squamous cell carcinoma (H), Squamous cell carcinoma of vallecula (H), Chronic fatigue    Treatment Diagnosis:     Impression/Assessment: Pt is a 69 year-old man with chief complaint fatigue and decreased stamina s/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 Past medical history includes HTN, CAD, MI and prior sweat gland cancer s/p surgical resection. He used to go to the gym daily for up to 60 minutes at a time and hasn't been back now for months. He has to take naps daily d/t fatigue and issues with nutrition (using a G-tube for supplemental feeds). He demonstrates decreased activity tolerance with 6MWT today and slight imbalance with higher level activities on FGA (pt feels that his balance is not what it used to be). He is appropriate for short course of therapy with focus on HEP and returning to the gym.     Clinical Decision Making (Complexity):  Clinical Presentation: Stable/Uncomplicated  Clinical Presentation Rationale: based on medical and personal factors listed in PT evaluation  Clinical Decision Making (Complexity): Low complexity    PLAN OF CARE  Treatment Interventions:  Interventions: Gait Training, Manual Therapy, Neuromuscular Re-education, Therapeutic Activity, Therapeutic Exercise, Self-Care/Home Management    Long Term Goals     PT Goal 1  Goal Identifier: 6MWT  Goal Description: Pt will improve 6MWT from 444m to 527m to approximate age-matched norms  Target Date: 02/03/25  PT Goal 2  Goal Identifier: Exercise  Goal Description: Pt will report ability to go to the gym 3 days/week for 30-60  minutes.  Target Date: 02/03/25      Frequency of Treatment: every 1.5 months  Duration of Treatment: 2-3 visits, up to 90 days           Risks and benefits of evaluation/treatment have been explained.   Patient/Family/caregiver agrees with Plan of Care.     Evaluation Time:     PT Martín Low Complexity Minutes (72975): 26       Signing Clinician: Chente Hurtado PT

## 2024-11-06 ENCOUNTER — THERAPY VISIT (OUTPATIENT)
Dept: PHYSICAL THERAPY | Facility: REHABILITATION | Age: 69
End: 2024-11-06
Attending: REGISTERED NURSE
Payer: COMMERCIAL

## 2024-11-06 DIAGNOSIS — C10.0 SQUAMOUS CELL CARCINOMA OF VALLECULA (H): ICD-10-CM

## 2024-11-06 DIAGNOSIS — C15.9 PRIMARY ESOPHAGEAL SQUAMOUS CELL CARCINOMA (H): ICD-10-CM

## 2024-11-06 DIAGNOSIS — R53.82 CHRONIC FATIGUE: ICD-10-CM

## 2024-11-06 PROCEDURE — 97161 PT EVAL LOW COMPLEX 20 MIN: CPT | Mod: GP | Performed by: PHYSICAL THERAPIST

## 2024-11-06 PROCEDURE — 97110 THERAPEUTIC EXERCISES: CPT | Mod: GP | Performed by: PHYSICAL THERAPIST

## 2024-11-06 PROCEDURE — 97112 NEUROMUSCULAR REEDUCATION: CPT | Mod: GP | Performed by: PHYSICAL THERAPIST

## 2024-11-08 ENCOUNTER — OFFICE VISIT (OUTPATIENT)
Dept: FAMILY MEDICINE | Facility: CLINIC | Age: 69
End: 2024-11-08
Payer: COMMERCIAL

## 2024-11-08 ENCOUNTER — TELEPHONE (OUTPATIENT)
Dept: FAMILY MEDICINE | Facility: CLINIC | Age: 69
End: 2024-11-08

## 2024-11-08 VITALS
SYSTOLIC BLOOD PRESSURE: 89 MMHG | RESPIRATION RATE: 20 BRPM | HEART RATE: 72 BPM | HEIGHT: 67 IN | OXYGEN SATURATION: 98 % | BODY MASS INDEX: 23.79 KG/M2 | DIASTOLIC BLOOD PRESSURE: 52 MMHG | TEMPERATURE: 98.2 F | WEIGHT: 151.6 LBS

## 2024-11-08 DIAGNOSIS — I10 PRIMARY HYPERTENSION: ICD-10-CM

## 2024-11-08 DIAGNOSIS — I20.0 UNSTABLE ANGINA (H): ICD-10-CM

## 2024-11-08 DIAGNOSIS — I25.110 CORONARY ARTERY DISEASE INVOLVING NATIVE CORONARY ARTERY OF NATIVE HEART WITH UNSTABLE ANGINA PECTORIS (H): ICD-10-CM

## 2024-11-08 DIAGNOSIS — M62.81 GENERALIZED MUSCLE WEAKNESS: Primary | ICD-10-CM

## 2024-11-08 DIAGNOSIS — R41.0 CONFUSION: ICD-10-CM

## 2024-11-08 PROCEDURE — 99213 OFFICE O/P EST LOW 20 MIN: CPT | Performed by: PHYSICIAN ASSISTANT

## 2024-11-08 PROCEDURE — 93242 EXT ECG>48HR<7D RECORDING: CPT | Performed by: PHYSICIAN ASSISTANT

## 2024-11-08 PROCEDURE — G2211 COMPLEX E/M VISIT ADD ON: HCPCS | Performed by: PHYSICIAN ASSISTANT

## 2024-11-08 RX ORDER — ASPIRIN 81 MG/1
81 TABLET, CHEWABLE ORAL EVERY MORNING
COMMUNITY
Start: 2024-11-02

## 2024-11-08 NOTE — TELEPHONE ENCOUNTER
Order/Referral Request    Who is requesting: Patient    Orders being requested: Neurological Referral    Reason service is needed/diagnosis: Neurology    When are orders needed by: Today    Has this been discussed with Provider: Yes    Does patient have a preference on a Group/Provider/Facility?   [facility]  FAX: [phone]    [facility]  FAX: [phone]    Please attach referral, patient demographic, and patient contact number [phone]  Does patient have an appointment scheduled?: No    Where to send orders: Fax    Could we send this information to you in [facility] or would you prefer to receive a phone call?:   Patient would prefer a phone call   Okay to leave a detailed message?: Yes at Cell number on file:    Telephone Information:   Mobile [phone]

## 2024-11-08 NOTE — PATIENT INSTRUCTIONS
Comment: Please be aware that coverage of these services is subject to the terms and limitations of your health insurance plan.  Call member services at your health plan with any benefit or coverage questions.   [facility] will call you to coordinate your care as prescribed by your provider. If you don't hear from a representative within 2 business days, please call [phone].

## 2024-11-08 NOTE — PROGRESS NOTES
Lopez Hogue arrived here on 11/8/2024 5:04 PM for 3-7 Days  Zio monitor placement per ordering provider JOELLE jN for the diagnosis Unstable Angina.  Patient s skin was prepped per protocol. JOELLE Nj is the supervising PA.  Zio monitor was placed.  Instructions were reviewed with and given to the patient.  Patient verbalized understanding of wear, troubleshooting and monitor return instructions.

## 2024-11-08 NOTE — PROGRESS NOTES
Assessment & Plan     Generalized muscle weakness  Confusion  Unstable angina (H)  - Adult Neurology  Referral  - ZIO PATCH 3-7 DAYS (additional cost to patient)  - ZIO PATCH 3-7 DAYS APPLICATION  Patient here today to follow up on three episodes of disorientation, balance issues that have occurred over last few weeks that last for about 5 minutes and resolve. ER work up was negative and told to follow up in office. Currently asymptomatic in office. Recommend Zio Patch to rule out arrhythmia and recommend he visit with Neurology to assess for recurrent TIAs. Advised to push fluids as BP is a bit low in office. If recurrent symptoms that persist more than 5 minutes including slurred speech, vision changes, balance issues, muscle weakness follow up in ER or call 911.            MED REC REQUIRED  Post Medication Reconciliation Status: patient was not discharged from an inpatient facility or TCU    Risks, benefits and alternatives were discussed with patient. Agreeable to the plan of care.      Subjective   Fabricio is a 69 year old, presenting for the following health issues:  Recurrent Erosion Follow Up (Has had a 3 episodes of stroke or seizure type activity.  ER has not been able to diagnosis what is happening.  )      11/8/2024    10:55 AM   Additional Questions   Roomed by MONA Gomez CMA([facility])   Accompanied by Spouse Sidra     EMMY     ED/UC Followup:    Facility:  [facility]  Date of visit: 10/20/2024  Reason for visit: presented to the ER due to 2 episodes of brief diaphoresis and confusion.   Current Status: Has had 1 episode since then.  Which was just on Tuesday.     Patient is here today to follow up on three episodes of disorientation, weakness that have occurred in the last 2-3 weeks  After second episode, went to ER and had negative work up and told to follow up here  Has seen cardiology since- they did not think it was his heart  Sitting still, disoriented, sweating head to toe a lot, confused  Trying to  "do something with a phone or a remote  No loss of consciousness  Tried on all three occasions to get up out of chair, hard time with balance on furniture, worse on left side  Wife notes 2 could tell were coming on, the 3rd one couldn't tell  Prior to first 2 episode felt dizziness before other symptoms occurred.  Episodes lasted 5 minutes in total  After episodes felt \"spent\" and very tired        Review of Systems  Constitutional, HEENT, cardiovascular, pulmonary, gi and gu systems are negative, except as otherwise noted.      Objective    BP (!) 89/52   Pulse 72   Temp 98.2  F (36.8  C) (Oral)   Resp 20   Ht 1.702 m (5' 7\")   Wt 68.8 kg (151 lb 9.6 oz)   SpO2 98%   BMI 23.74 kg/m    Body mass index is 23.74 kg/m .  Physical Exam   GENERAL: alert and no distress  EYES: Eyes grossly normal to inspection, PERRL and conjunctivae and sclerae normal  NECK: no adenopathy, no asymmetry, masses, or scars  RESP: lungs clear to auscultation - no rales, rhonchi or wheezes  CV: regular rate and rhythm, normal S1 S2, no S3 or S4, no murmur, click or rub, no peripheral edema  MS: no gross musculoskeletal defects noted, no edema          Signed Electronically by: Maya Chappell PA-C    "

## 2024-11-09 ENCOUNTER — MYC REFILL (OUTPATIENT)
Dept: FAMILY MEDICINE | Facility: CLINIC | Age: 69
End: 2024-11-09
Payer: COMMERCIAL

## 2024-11-09 DIAGNOSIS — F41.9 ANXIETY: ICD-10-CM

## 2024-11-11 ENCOUNTER — OFFICE VISIT (OUTPATIENT)
Dept: NEUROLOGY | Facility: CLINIC | Age: 69
End: 2024-11-11
Attending: PHYSICIAN ASSISTANT
Payer: COMMERCIAL

## 2024-11-11 ENCOUNTER — PREP FOR PROCEDURE (OUTPATIENT)
Dept: GASTROENTEROLOGY | Facility: CLINIC | Age: 69
End: 2024-11-11

## 2024-11-11 VITALS
BODY MASS INDEX: 24.01 KG/M2 | WEIGHT: 153 LBS | DIASTOLIC BLOOD PRESSURE: 65 MMHG | HEART RATE: 76 BPM | SYSTOLIC BLOOD PRESSURE: 103 MMHG | HEIGHT: 67 IN

## 2024-11-11 DIAGNOSIS — M62.81 GENERALIZED MUSCLE WEAKNESS: ICD-10-CM

## 2024-11-11 DIAGNOSIS — C10.0 SQUAMOUS CELL CARCINOMA OF VALLECULA (H): ICD-10-CM

## 2024-11-11 DIAGNOSIS — K31.1 PYLORIC STENOSIS: Primary | ICD-10-CM

## 2024-11-11 DIAGNOSIS — R41.89 SPELL OF ALTERED COGNITION: Primary | ICD-10-CM

## 2024-11-11 PROCEDURE — 99205 OFFICE O/P NEW HI 60 MIN: CPT | Performed by: PSYCHIATRY & NEUROLOGY

## 2024-11-11 PROCEDURE — G2211 COMPLEX E/M VISIT ADD ON: HCPCS | Performed by: PSYCHIATRY & NEUROLOGY

## 2024-11-11 RX ORDER — BUSPIRONE HYDROCHLORIDE 5 MG/1
5 TABLET ORAL 3 TIMES DAILY
Qty: 90 TABLET | Refills: 2 | OUTPATIENT
Start: 2024-11-11

## 2024-11-11 NOTE — PROGRESS NOTES
"In person evaluation    HPI  11/11/2024, in person consultation    69-year-old being evaluated neurologically for:  Generalized weakness    Patient states that he had a couple episodes and was seen in the ED for \"possible TIAs\"  10/16/2024  10/20/2024  10/24/2024    Patient becomes disoriented, was not able to figure out how to use the phone or the remote.  Sweating profusely  Lasts about 6 minutes each time.  Had to use furniture to balance  He will feel tired after these episodes.  Spells were stereotypical although      Currently wearing a heart monitor    Cardiology was concerned that he had some bradycardia  Blood pressure does run a little bit low at 103/65  Has a past history of oropharyngeal squamous cell carcinoma          Presentation history  Patient with syncope presented to hospital 10/20/2024  Patient sitting at a desk and had 2 near syncopal episodes  Lightheaded/profusely sweating disoriented and confused.  Back to normal in 1 to 2 minutes      Patient with generalized weakness evaluated by primary MD [id number]    A.  Syncopal episode x 3 October 20, 2024        Some mild bradycardia evaluated by cardiology.        They stopped the metoprolol        B.  Vascular risk factors        Hypertension/hyperlipidemia/CAD/MI/status post PCI x 2        Past smoker, (quit 2013)        Past alcohol use disorder, sober 2013)    C.  Oropharyngeal carcinoma SCC of the esophagus.  (Diagnosed 2023)       Status post chemoradiation and esophagectomy          Past medical history  Hypertension  Hyperlipidemia  CAD/MI/status post PCI x 2  Non-rheumatic aortic valve stenosis  Squamous cell carcinoma (2023)  Sweat gland carcinoma (2015)  Alcohol use disorder  Elevated PSA  Anxiety    Habits  Past smoker, quit 2013  Alcohol use disorder quit June 2013      Family history  Mother dementia  Son alcohol abuse  Sister alcohol abuse  Brother alcohol abuse      Workup  CT head 10/20/2024 see official report  1.  No CT evidence of " acute intracranial pathology  2.  Mild age-related parenchymal atrophy and leukoaraiosis  Cardiac echo 10/29/2024 see official report, ejection fraction 60-65%, moderate valvular aortic stenosis, left atrium normal size        Laboratory data review                       10/2024  NA/K             139/3.9  BUN/Cr         16.3/0.62  GLU              161  AST               66  WBC/HGB     5.4/12.7  PLTs              154,000  TSH               4.37          Exam    Review of system  Pertinent positive negatives  Feels diffusely weak not focal  Slight balance difficulty  Signs a little bit dizzy    Chronic swallowing difficulty esophageal cancer has feeding tube for nutrition    Sleepy  Does snore    No diplopia no dysarthria no dysphagia    Otherwise review of systems negative    General exam  Blood pressure 103/65, pulse 76  Alert orient x 3  Lungs clear, decreased breath sounds bilaterally  Heart rate regular  Symmetrical pulses  No edema the feet    Neurologic exam  Alert orient x 3  Normal prosody speech  Normal naming  Normal comprehension  Normal repetition  No aphasia  No neglect  Memory recall okay at bedside testing    Cranial nerves II through XII  No ophthalmoplegia  No nystagmus  No visual field cut  Face symmetrical  Tongue twisters good    Upper extremities  No drift  No tremor    Lower extremities  Distal proximal strength good    Gait  Normal  Romberg negative    Assessment/plan    1.  Episodic spell times 3 October 2024       Zoned out glassy eyed x 6 minutes       Disoriented/perplexed       Sweaty no nausea    2.  Some borderline low blood pressure    3.  History of esophageal cancer diagnosed 2023 does have feeding tube but can swallow also      Diagnosis  Transient episodic spells times 3 October 2024    Rule out seizure  Rule out metastatic disease    Plan  EEG  MRI scan with and without contrast    Follow-up after the above  Discussed with patient and wife  Total care time today 60 minutes  The  longitudinal plan of care for the diagnosis(es)/condition(s) as documented were addressed during this visit. Due to the added complexity in care, I will continue to support Fabricio in the subsequent management and with ongoing continuity of care.          As per the visit today  Reviewed ER visit 10/20/2024  Reviewed cardiology note 11/1/2024  Reviewed primary MD note 11/8/2024

## 2024-11-11 NOTE — NURSING NOTE
Chief Complaint   Patient presents with    Generalized Weakness     He has had a couple of episodes on 10/16, 10/20, 10/24. Was seen in ED, they thought possibly TIA's. He is conscious, but becomes disoriented. He was not able to figure out how to use a phone or remote, sweating profusely, last about 6 minutes each. Had to use furniture to balance. He will feel tired at these episodes. He is currently wearing a ziopatch heart monitor.         Katty Beckham LPN on 11/11/2024 at 2:54 PM

## 2024-11-11 NOTE — LETTER
"11/11/2024      Lopez Hogue  [address] [facility] [address]      Dear Colleague,    Thank you for referring your patient, Lopez Hogue, to the [facility] [place]. Please see a copy of my visit note below.    In person evaluation    [facility]  11/11/2024, in person consultation    69-year-old being evaluated neurologically for:  Generalized weakness    Patient states that he had a couple episodes and was seen in the ED for \"possible TIAs\"  10/16/2024  10/20/2024  10/24/2024    Patient becomes disoriented, was not able to figure out how to use the phone or the remote.  Sweating profusely  Lasts about 6 minutes each time.  Had to use furniture to balance  He will feel tired after these episodes.  Spells were stereotypical although      Currently wearing a heart monitor    Cardiology was concerned that he had some bradycardia  Blood pressure does run a little bit low at 103/65  Has a past history of oropharyngeal squamous cell carcinoma          Presentation history  Patient with syncope presented to hospital 10/20/2024  Patient sitting at a desk and had 2 near syncopal episodes  Lightheaded/profusely sweating disoriented and confused.  Back to normal in 1 to 2 minutes      Patient with generalized weakness evaluated by primary MD [id number]    A.  Syncopal episode x 3 October 20, 2024        Some mild bradycardia evaluated by cardiology.        They stopped the metoprolol        B.  Vascular risk factors        Hypertension/hyperlipidemia/CAD/MI/status post PCI x 2        Past smoker, (quit 2013)        Past alcohol use disorder, sober 2013)    C.  Oropharyngeal carcinoma SCC of the esophagus.  (Diagnosed 2023)       Status post chemoradiation and esophagectomy          Past medical history  Hypertension  Hyperlipidemia  CAD/MI/status post PCI x 2  Non-rheumatic aortic valve stenosis  Squamous cell carcinoma (2023)  Sweat gland carcinoma (2015)  Alcohol use disorder  Elevated " PSA  Anxiety    Habits  Past smoker, quit 2013  Alcohol use disorder quit June 2013      Family history  Mother dementia  Son alcohol abuse  Sister alcohol abuse  Brother alcohol abuse      Workup  CT head 10/20/2024 see official report  1.  No CT evidence of acute intracranial pathology  2.  Mild age-related parenchymal atrophy and leukoaraiosis  Cardiac echo 10/29/2024 see official report, ejection fraction 60-65%, moderate valvular aortic stenosis, left atrium normal size        Laboratory data review                       10/2024  NA/K             139/3.9  BUN/Cr         16.3/0.62  GLU              161  AST               66  WBC/HGB     5.4/12.7  PLTs              154,000  TSH               4.37          Exam    Review of system  Pertinent positive negatives  Feels diffusely weak not focal  Slight balance difficulty  Signs a little bit dizzy    Chronic swallowing difficulty esophageal cancer has feeding tube for nutrition    Sleepy  Does snore    No diplopia no dysarthria no dysphagia    Otherwise review of systems negative    General exam  Blood pressure 103/65, pulse 76  Alert orient x 3  Lungs clear, decreased breath sounds bilaterally  Heart rate regular  Symmetrical pulses  No edema the feet    Neurologic exam  Alert orient x 3  Normal prosody speech  Normal naming  Normal comprehension  Normal repetition  No aphasia  No neglect  Memory recall okay at bedside testing    Cranial nerves II through XII  No ophthalmoplegia  No nystagmus  No visual field cut  Face symmetrical  Tongue twisters good    Upper extremities  No drift  No tremor    Lower extremities  Distal proximal strength good    Gait  Normal  Romberg negative    Assessment/plan    1.  Episodic spell times 3 October 2024       Zoned out glassy eyed x 6 minutes       Disoriented/perplexed       Sweaty no nausea    2.  Some borderline low blood pressure    3.  History of esophageal cancer diagnosed 2023 does have feeding tube but can swallow  also      Diagnosis  Transient episodic spells times 3 October 2024    Rule out seizure  Rule out metastatic disease    Plan  EEG  MRI scan with and without contrast    Follow-up after the above  Discussed with patient and wife  Total care time today 60 minutes  The longitudinal plan of care for the diagnosis(es)/condition(s) as documented were addressed during this visit. Due to the added complexity in care, I will continue to support Fabricio in the subsequent management and with ongoing continuity of care.          As per the visit today  Reviewed ER visit 10/20/2024  Reviewed cardiology note 11/1/2024  Reviewed primary MD note 11/8/2024      Again, thank you for allowing me to participate in the care of your patient.        Sincerely,        justin Mitchell MD

## 2024-11-12 ENCOUNTER — TELEPHONE (OUTPATIENT)
Dept: MEDSURG UNIT | Facility: CLINIC | Age: 69
End: 2024-11-12
Payer: COMMERCIAL

## 2024-11-12 RX ORDER — NITROGLYCERIN 0.4 MG/1
TABLET SUBLINGUAL
Qty: 25 TABLET | Refills: 0 | Status: SHIPPED | OUTPATIENT
Start: 2024-11-12

## 2024-11-13 NOTE — TELEPHONE ENCOUNTER
FUTURE VISIT INFORMATION      SURGERY INFORMATION:  Date: 12/5/24  Location:  OR  Surgeon:  Livan Monahan MD   Anesthesia Type:  general  Procedure: ESOPHAGOGASTRODUODENOSCOPY with fluoroscopy and stent removal     RECORDS REQUESTED FROM:       Primary Care Provider: Ar[facility]    Most recent EKG+ Tracing: 10/20/24    Most recent ECHO: 11/1/24    Most recent Cardiac Stress Test: 7/18/23    Most recent Coronary Angiogram: 5/8/23

## 2024-11-15 ENCOUNTER — OFFICE VISIT (OUTPATIENT)
Dept: SURGERY | Facility: CLINIC | Age: 69
End: 2024-11-15
Payer: COMMERCIAL

## 2024-11-15 ENCOUNTER — ALLIED HEALTH/NURSE VISIT (OUTPATIENT)
Dept: FAMILY MEDICINE | Facility: CLINIC | Age: 69
End: 2024-11-15
Payer: COMMERCIAL

## 2024-11-15 ENCOUNTER — PRE VISIT (OUTPATIENT)
Dept: SURGERY | Facility: CLINIC | Age: 69
End: 2024-11-15

## 2024-11-15 ENCOUNTER — ANESTHESIA EVENT (OUTPATIENT)
Dept: SURGERY | Facility: CLINIC | Age: 69
End: 2024-11-15
Payer: COMMERCIAL

## 2024-11-15 VITALS
WEIGHT: 151 LBS | OXYGEN SATURATION: 97 % | SYSTOLIC BLOOD PRESSURE: 107 MMHG | BODY MASS INDEX: 23.7 KG/M2 | TEMPERATURE: 97.9 F | HEIGHT: 67 IN | DIASTOLIC BLOOD PRESSURE: 70 MMHG | HEART RATE: 86 BPM

## 2024-11-15 VITALS — TEMPERATURE: 98.4 F

## 2024-11-15 DIAGNOSIS — K31.1 PYLORIC STENOSIS: ICD-10-CM

## 2024-11-15 DIAGNOSIS — I20.0 UNSTABLE ANGINA (H): ICD-10-CM

## 2024-11-15 DIAGNOSIS — Z23 ENCOUNTER FOR IMMUNIZATION: Primary | ICD-10-CM

## 2024-11-15 DIAGNOSIS — Z01.818 PREOP EXAMINATION: Primary | ICD-10-CM

## 2024-11-15 ASSESSMENT — LIFESTYLE VARIABLES: TOBACCO_USE: 1

## 2024-11-15 ASSESSMENT — PAIN SCALES - GENERAL: PAINLEVEL_OUTOF10: MILD PAIN (2)

## 2024-11-15 NOTE — H&P
Pre-Operative H & P     CC:  Preoperative exam to assess for increased cardiopulmonary risk while undergoing surgery and anesthesia.    Date of Encounter: 11/15/2024  Primary Care Physician:  Maya Chappell     Reason for visit:   Encounter Diagnoses   Name Primary?    Preop examination Yes    Pyloric stenosis        HPI  Lopez Hogue is a 69 year old male who presents for pre-operative H & P in preparation for  Procedure Information       Case: [id number] Date/Time: 12/05/24 0955    Procedure: ESOPHAGOGASTRODUODENOSCOPY with fluoroscopy and stent removal (Esophagus)    Anesthesia type: General    Diagnosis: Pyloric stenosis [K31.1]    Pre-op diagnosis: Pyloric stenosis [K31.1]    Location:  OR [address] OR    Providers: Livan Monahan MD          The patient presents to the PAC in person today in preparation for the above scheduled procedure with comorbid conditions including CAD/MI s/p PCIX2, non rheumatic aortic stenosis, HLD, 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 The patient has been seen by both cardiology and neurology recently for evaluation Episodic spell X3 10/20/24. Described as zoned out/glassy eyed x 6 minutes and disoriented/perplexed  the patient denies an actual LOC.There was evidence of bradycardia  when evaluated by cardiology .  Metoprolol has been stopped. Today the patient     The patient has pyloric stenosis and is managed with stenting. Previous EGD with stenting on 8/15/2024.  The patient has now been scheduled for the procedure as listed above for ongoing management of pyloric stenosis.      History is obtained from the patient and chart review    Hx of abnormal bleeding or anti-platelet use: ASA      Past Medical History  Past Medical History:   Diagnosis Date    Anxiety     Aortic stenosis      CAD (coronary artery disease)     ETOH dependence     Quit drinking 10 years    Heart attack (H)     History of blood transfusion     HLD (hyperlipidemia)     Hypertension     Malignant neoplasm of middle third of esophagus (H)     Nonrheumatic aortic (valve) stenosis     Sweat gland carcinoma        Past Surgical History  Past Surgical History:   Procedure Laterality Date    BIOPSY  June 29015    Left gluteal and groin lymphnodes    BRONCHOSCOPY FLEXIBLE AND RIGID N/A 04/19/2024    Procedure: Bronchoscopy flexible and rigid;  Surgeon: Devin Hill MD;  Location:  OR    BRONCHOSCOPY FLEXIBLE AND RIGID N/A 04/28/2024    Procedure: Bronchoscopy flexible and rigid;  Surgeon: Jessie Kim MD;  Location:  OR    CARDIAC SURGERY  March 2023    2 stents placed    COLONOSCOPY  2012    CV CORONARY ANGIOGRAM N/A 03/27/2023    Procedure: Coronary Angiogram;  Surgeon: Miki Etienne MD;  Location: [facility]    CV CORONARY ANGIOGRAM N/A 05/09/2023    Procedure: Coronary Angiogram;  Surgeon: Miki Etienne MD;  Location: [facility] CV    CV LEFT HEART CATH N/A 03/27/2023    Procedure: Left Heart Catheterization;  Surgeon: Miki Etienne MD;  Location: [facility] CV    CV LEFT HEART CATH N/A 05/09/2023    Procedure: Left Heart Catheterization;  Surgeon: Miki Etienne MD;  Location: [facility]    CV PCI N/A 03/27/2023    Procedure: Percutaneous Coronary Intervention;  Surgeon: Miki Etienne MD;  Location: [facility] CV    ENDOSCOPIC ULTRASOUND UPPER GASTROINTESTINAL TRACT (GI) N/A 11/28/2023    Procedure: Endoscopic ultrasound upper gastrointestinal tract (GI);  Surgeon: Shahriar Giraldo MD;  Location:  GI    ESOPHAGOGASTRODUODENOSCOPY, WITH BOTULINUM TOXIN INJECTION N/A 04/29/2024    Procedure: Esophagogastroduodenoscopy, With Botulinum Toxin Injection;  Surgeon: Jessie Kim MD;  Location:  GI    ESOPHAGOSCOPY, GASTROSCOPY,  DUODENOSCOPY (EGD), COMBINED N/A 11/08/2023    Procedure: ESOPHAGOGASTRODUODENOSCOPY, WITH BIOPSY;  Surgeon: Shahriar Giraldo MD;  Location: [facility] GI    ESOPHAGOSCOPY, GASTROSCOPY, DUODENOSCOPY (EGD), COMBINED N/A 04/19/2024    Procedure: Esophagoscopy, gastroscopy, duodenoscopy (EGD), combined;  Surgeon: Devin Hill MD;  Location: [facility] OR    ESOPHAGOSCOPY, GASTROSCOPY, DUODENOSCOPY (EGD), COMBINED N/A 04/28/2024    Procedure: Esophagoscopy, gastroscopy, duodenoscopy (EGD), combined;  Surgeon: Jessie Kim MD;  Location: [facility] OR    ESOPHAGOSCOPY, GASTROSCOPY, DUODENOSCOPY (EGD), COMBINED N/A 05/05/2024    Procedure: Esophagoscopy, gastroscopy, duodenoscopy (EGD), combined-under fluoro & dilation, nasogastric tube placement, bronchoscopy;  Surgeon: Kevin Calderon MD;  Location: [facility] OR    ESOPHAGOSCOPY, GASTROSCOPY, DUODENOSCOPY (EGD), COMBINED N/A 8/8/2024    Procedure: ESOPHAGOGASTRODUODENOSCOPY with fluoroscopy and stent placement;  Surgeon: Livan Monahan MD;  Location:  OR    ESOPHAGOSCOPY, GASTROSCOPY, DUODENOSCOPY (EGD), COMBINED N/A 8/15/2024    Procedure: ESOPHAGOGASTRODUODENOSCOPY with stent placement;  Surgeon: Livan Monahan MD;  Location:  OR    IR JEJUNOSTOMY TUBE CHANGE  05/19/2024    LAPAROSCOPIC ASSISTED INSERTION TUBE JEJUNOSTOMY N/A 04/01/2024    Procedure: Laparoscopic Jejunostomy Tube Insertion and Esophagogastroduodenoscopy;  Surgeon: Kevin Calderon MD;  Location: [facility] OR    LARYNGOSCOPY WITH BIOPSY(IES) N/A 10/12/2023    Procedure: LARYNGOSCOPY, WITH BIOPSY;  Surgeon: Edi Felix MD;  Location: [facility] OR    OTHER SURGICAL HISTORY  01/01/2015    WIDE EXCISION OF LEFT GLUTEAL MASSTNM: [id number], stage: II hidradenocarcinoma Grade II, 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, sentinel lymph node biopsy negative     SOFT TISSUE SURGERY  June 2023    left gluteal and lymphnodes    THORACOSCOPIC, LAPAROSCOPIC ESOPHAGECTOMY, COMBINED N/A 04/19/2024    Procedure: Laparoscopic and right  thoracoscopic esophagogastrectomy, right AND left chest tube placement;  Surgeon: Devin Hill MD;  Location: [facility] OR    VASCULAR SURGERY  March 2023    Cath 2 stents       Prior to Admission Medications  Current Outpatient Medications   Medication Sig Dispense Refill    acetaminophen (TYLENOL) 500 MG tablet Take 500-1,000 mg by mouth every 8 hours as needed for fever or pain      aspirin (ASA) 81 MG chewable tablet Take 81 mg by mouth every morning.      busPIRone (BUSPAR) 5 MG tablet Take 1 tablet (5 mg) by mouth 3 times daily. 90 tablet 2    levothyroxine (SYNTHROID/LEVOTHROID) 25 MCG tablet Take 1 tablet (25 mcg) by mouth daily. (Patient taking differently: Take 25 mcg by mouth every morning.) 30 tablet 3    nitroGLYcerin (NITROSTAT) 0.4 MG sublingual tablet PLACE 1 TABLET UNDER THE TONGUE EVERY 5 MINUTES FOR CHEST PAIN FOR 3 DOSES. IF SYMPTOMS PERSIST 5 MINUTES AFTER 1ST DOSE CALL 911. 25 tablet 0    Novasource Renal Vanilla Place 1,200 mLs into J tube daily. Infuse via pump  75ml/hr X 16 hrs/day (5 cartons/day)  Water flush: 130ml before and after cycle + 800ml throughout the day 40375 mL 9    pantoprazole (PROTONIX) 40 MG EC tablet Take 1 tablet (40 mg) by mouth 2 times daily. 180 tablet 4    polyethylene glycol (MIRALAX) 17 g packet Take 17 g by mouth daily as needed for constipation.      rosuvastatin (CRESTOR) 40 MG tablet Take 1 tablet (40 mg) by mouth daily **Due for follow-up with Dr. Walters** (Patient taking differently: Take 40 mg by mouth every morning. **Due for follow-up with Dr. Walters**) 90 tablet 0    senna-docusate (SENOKOT-S/PERICOLACE) 8.6-50 MG tablet Take 1 tablet by mouth 2 times daily as needed for constipation 30 tablet 0    sertraline (ZOLOFT) 100 MG tablet Take 1.5 tablets (150 mg) by mouth every morning. 45 tablet 1    chlorhexidine (PERIDEX) 0.12 % solution Take 15 mLs by mouth as needed. (Patient not taking: Reported on 11/11/2024)      sodium fluoride dental gel (PREVIDENT)  1.1 % GEL topical gel Apply to affected area at bedtime (Patient not taking: Reported on 2024)         Allergies  Allergies   Allergen Reactions    Coconut Flavor Anaphylaxis     Raw coconut       Social History  Social History     Socioeconomic History    Marital status:      Spouse name: Not on file    Number of children: 2    Years of education: Not on file    Highest education level: Not on file   Occupational History    Occupation: non-profit manager   Tobacco Use    Smoking status: Former     Current packs/day: 0.00     Average packs/day: 2.0 packs/day for 41.0 years (82.0 ttl pk-yrs)     Types: Cigarettes     Start date: 1972     Quit date: 2013     Years since quittin.4     Passive exposure: Past    Smokeless tobacco: Never    Tobacco comments:     Quit    Vaping Use    Vaping status: Never Used   Substance and Sexual Activity    Alcohol use: Not Currently     Comment: Alhaji     Drug use: Not Currently     Types: Cocaine, Marijuana     Comment: alhaji     Sexual activity: Not Currently   Other Topics Concern    Parent/sibling w/ CABG, MI or angioplasty before 65F 55M? No   Social History Narrative    Patient works.  Lives with his wife.     Social Drivers of Health     Financial Resource Strain: Low Risk  (2024)    Financial Resource Strain     Within the past 12 months, have you or your family members you live with been unable to get utilities (heat, electricity) when it was really needed?: No   Food Insecurity: Low Risk  (2024)    Food Insecurity     Within the past 12 months, did you worry that your food would run out before you got money to buy more?: No     Within the past 12 months, did the food you bought just not last and you didn t have money to get more?: No   Transportation Needs: Low Risk  (2024)    Transportation Needs     Within the past 12 months, has lack of transportation kept you from medical appointments, getting your medicines,  non-medical meetings or appointments, work, or from getting things that you need?: No   Physical Activity: Inactive (8/18/2024)    Exercise Vital Sign     Days of Exercise per Week: 0 days     Minutes of Exercise per Session: 0 min   Stress: Stress Concern Present (8/18/2024)    [employer] [place] [employer] - Occupational Stress Questionnaire     Feeling of Stress : Very much   Social Connections: Unknown (8/18/2024)    Social Connection and Isolation Panel [NHANES]     Frequency of Communication with Friends and Family: Not on file     Frequency of Social Gatherings with Friends and Family: Twice a week     Attends Jain Services: Not on file     Active Member of Clubs or Organizations: Not on file     Attends Club or Organization Meetings: Not on file     Marital Status: Not on file   Interpersonal Safety: Low Risk  (8/23/2024)    Interpersonal Safety     Do you feel physically and emotionally safe where you currently live?: Yes     Within the past 12 months, have you been hit, slapped, kicked or otherwise physically hurt by someone?: No     Within the past 12 months, have you been humiliated or emotionally abused in other ways by your partner or ex-partner?: No   Housing Stability: Low Risk  (8/18/2024)    Housing Stability     Do you have housing? : Yes     Are you worried about losing your housing?: No       Family History  Family History   Problem Relation Age of Onset    Dementia Mother     Mental Illness Mother         Dementia    Substance Abuse Son         Alcohol    Substance Abuse Sister         Alcohol    Substance Abuse Brother         Alcohol    Anesthesia Reaction No family hx of     Thrombocytopenia No family hx of     Cancer No family hx of     Thrombosis No family hx of        Review of Systems  The complete review of systems is negative other than noted in the HPI or here.   Anesthesia Evaluation   Pt has had prior anesthetic.     History of anesthetic complications  -  ".      ROS/MED HX  ENT/Pulmonary:     (+)     JOSE risk factors,           tobacco use (Quit 2013), Past use,                    (-) asthma   Neurologic: Comment: Transient episodic spells X 3 October 2024     (-) no CVA and no TIA   Cardiovascular: Comment: Denies cardiac symptoms including chest pain, SOB, palpitations, syncope, SPENCER, orthopnea, or PND.        (+) Dyslipidemia hypertension (In remission since PCI 2023.)- -  CAD - past MI - stent-3/2023. 2 Drug Eluting Stent. Taking blood thinners          fainting (syncope) (Patient reports he has had some \"spells\" but has not actually had a LOC.).                    Previous cardiac testing   Echo: Date: 10/29/24 Results:  Complete Echo Adult. Adequate quality two-dimensional was performed and  interpreted. Compared to prior study, there is no significant change.  ______________________________________________________________________________  Interpretation Summary     The left ventricle is normal in size.  There is mild concentric left ventricular hypertrophy.  Left ventricular function is normal.The ejection fraction is 60-65%.  Normal right ventricle size and systolic function.  Normal left atrial size.  Moderate valvular aortic stenosis is present with a peak velocity of 3.3 m/s,  mean gradient 26 mmHg, DEVAN 1.2 cm2, DI 0.37, SI 55 ml/m2.  Compared to prior study, there is no significant change.       Stress Test:  Date: Results:    ECG Reviewed:  Date: Results:    Cath:  Date: 5/9/2023 Results:  Conclusion    1.  Previously placed stents in OM left circumflex are widely patent with normal VENTURA-3 flow.  2.  PTCA sites in distal circumflex branch have  healed nicely with less than 30% residual narrowing.  3.  LAD is moderately calcified with diffuse mild luminal irregularity but no severe stenoses, unchanged from prior study.  4. RCA has mild calcification and luminal irregularity but no severe stenoses.  There is a focal 70% narrowing in a small posterolateral " "branch and diffuse 50 to 70% narrowing in the posterior descending branch.  Both appear unchanged from prior study..      METS/Exercise Tolerance: >4 METS Comment: Participating in cancer rehab. Easily can walk two blocks and/or go up a flight of stairs.    Hematologic:     (+)       history of blood transfusion, no previous transfusion reaction, Known PRBC Anitbodies:No    (-) history of blood clots and anemia   Musculoskeletal:  - neg musculoskeletal ROS     GI/Hepatic:     (+) GERD, Asymptomatic on medication,               (-) liver disease   Renal/Genitourinary:    (-) renal disease   Endo:     (+)          thyroid problem, hypothyroidism,        (-) Type I DM, Type II DM, chronic steroid usage and obesity   Psychiatric/Substance Use:     (+) psychiatric history anxiety alcohol abuse (In remission for 10+ years)   (-) chronic opioid use history   Infectious Disease:  - neg infectious disease ROS     Malignancy: Comment: SCC of the esophagus (dx 2023) s/p chemoradiation and esophagectomy  Sweat gland carcinoma (2015),  (+) Malignancy,     Other:  - neg other ROS          /70 (BP Location: Right arm, Patient Position: Sitting, Cuff Size: Adult Regular)   Pulse 86   Temp 97.9  F (36.6  C) (Oral)   Ht 1.702 m (5' 7\")   Wt 68.5 kg (151 lb)   SpO2 97%   BMI 23.65 kg/m      Physical Exam   Constitutional: Awake, alert, cooperative, no apparent distress, and appears stated age.  Eyes: Pupils equal, round and reactive to light, extra ocular muscles intact, sclera clear, conjunctiva normal.  HENT: Normocephalic, oral pharynx with moist mucus membranes, upper full denture. Missing bottom molars. No goiter appreciated.   Respiratory: Clear to auscultation bilaterally, no crackles or wheezing.  Cardiovascular: Regular rate and rhythm, normal S1 and S2, and III/VI systolic murmur heard across the precordium.  Carotids +2, no bruits. No edema. Palpable pulses to radial  DP and PT arteries.   GI: Normal bowel " sounds, soft, non-distended, non-tender, no masses palpated, no hepatosplenomegaly.  Surgical scars: well healed.  Left J- tube in place.   Lymph/Hematologic: No cervical lymphadenopathy and no supraclavicular lymphadenopathy.  Skin: Warm and dry.    Musculoskeletal: Full ROM of neck. There is no redness, warmth, or swelling of the joints. Gross motor strength is normal.    Neurologic: Awake, alert, oriented to name, place and time. Cranial nerves II-XII are grossly intact. Gait is normal.   Neuropsychiatric: Calm, cooperative. Normal affect.     Prior Labs/Diagnostic Studies   All labs and imaging personally reviewed    Latest Reference Range & Units 10/20/24 08:28   Sodium 135 - 145 mmol/L 139   Potassium 3.4 - 5.3 mmol/L 3.9   Chloride 98 - 107 mmol/L 104   Carbon Dioxide (CO2) 22 - 29 mmol/L 22   Urea Nitrogen 8.0 - 23.0 mg/dL 16.3   Creatinine 0.67 - 1.17 mg/dL 0.62 (L)   GFR Estimate >60 mL/min/1.73m2 >90   Calcium 8.8 - 10.4 mg/dL 9.4   Anion Gap 7 - 15 mmol/L 13   Albumin 3.5 - 5.2 g/dL 4.1   Protein Total 6.4 - 8.3 g/dL 7.3   Alkaline Phosphatase 40 - 150 U/L 160 (H)   ALT 0 - 70 U/L 65   AST 0 - 45 U/L 66 (H)   Bilirubin Total <=1.2 mg/dL 0.6   Glucose 70 - 99 mg/dL 161 (H)   Troponin T, High Sensitivity <=22 ng/L 7   WBC 4.0 - 11.0 10e3/uL 5.4   Hemoglobin 13.3 - 17.7 g/dL 12.7 (L)   Hematocrit 40.0 - 53.0 % 38.6 (L)   Platelet Count 150 - 450 10e3/uL 154   RBC Count 4.40 - 5.90 10e6/uL 4.07 (L)   MCV 78 - 100 fL 95   MCH 26.5 - 33.0 pg 31.2   MCHC 31.5 - 36.5 g/dL 32.9   RDW 10.0 - 15.0 % 15.0   % Neutrophils % 86   % Lymphocytes % 9   % Monocytes % 5   % Eosinophils % 0   % Basophils % 0   Absolute Basophils 0.0 - 0.2 10e3/uL 0.0   Absolute Eosinophils 0.0 - 0.7 10e3/uL 0.0   Absolute Immature Granulocytes <=0.4 10e3/uL 0.0   Absolute Lymphocytes 0.8 - 5.3 10e3/uL 0.5 (L)   Absolute Monocytes 0.0 - 1.3 10e3/uL 0.3   % Immature Granulocytes % 0   Absolute Neutrophils 1.6 - 8.3 10e3/uL 4.6   Absolute  NRBCs 10e3/uL 0.0   NRBCs per 100 WBC <1 /100 0   (L): Data is abnormally low  (H): Data is abnormally high  EKG/ stress test - if available please see in ROS above   Echo result w/o MOPS: Interpretation Summary The left ventricle is normal in size.There is mild concentric left ventricular hypertrophy.Left ventricular function is normal.The ejection fraction is 60-65%.Normal right ventricle size and systolic function.Normal left atrial size.Moderate valvular aortic stenosis is present with a peak velocity of 3.3 m/s,mean gradient 26 mmHg, DEVAN 1.2 cm2, DI 0.37, SI 55 ml/m2.Compared to prior study, there is no significant change.           Latest Ref Rng & Units 11/15/2023     7:57 AM   PFT   FVC L 4.82    FEV1 L 2.41    FVC% % 133    FEV1% % 86          The patient's records and results personally reviewed by this provider.     Outside records reviewed from: Care Everywhere    LAB/DIAGNOSTIC STUDIES TODAY:  not indicated   Assessment    Lopez Hogue is a 69 year old male seen as a PAC referral for risk assessment and optimization for anesthesia.    Plan/Recommendations  Pt will be optimized for the proposed procedure.  See below for details on the assessment, risk, and preoperative recommendations    NEUROLOGY  - Transient episodic spells X 3 October 2024. Denies an actual loss of consciousness.   Evaluated by cardiology with evidence of bradycardia.  Metoprolol was stopped post PCI 3/2023. Currently wearing cardiac monitor.  Will be turning in soon.   Seen by neurology on 11/11/24 with ongoing evaluation to rule out seizure and rule out metastatic disease.  Patient to get EEG and MRI scan with and without contrast.Ongoing.      -Post Op delirium risk factors:  High co-morbid index    ENT  - No current airway concerns.  Will need to be reassessed day of surgery.  Mallampati: II  TM: > 3    CARDIAC  Follows with Dr. Walters in cardiology for the following:   - CAD, status post non-ST segment elevation myocardial  "infarction due to occlusion of obtuse marginal branch, status post stenting in March 2023  Stable   Angiogram 2023 with results above  LVEF 60-65%  Medically managed with ASA and statin    - Moderate aortic stenosis  On recent echo, moderate valvular aortic stenosis is present with a peak velocity of 3.3 m/s, mean gradient 26 mmHg, DEVAN 1.2 cm2, DI 0.37, SI 55 ml/m2.  Per record review of Dr. Walters ,\"not yet requiring TAVR\"     - Sinus bradycardia, mild, resolved after discontinuation of metoprolol     - HLD  Continue statin     Patient reports that after his PCI, he has not needed BP medication as he has been normotensive.     - METS (Metabolic Equivalents)  Patient performs 4 or more METS exercise without symptoms             Total Score: 0      - RCRI-Low risk: Class 2 0.9% complication rate             Total Score: 1    RCRI: CAD        PULMONARY  - JOSE Medium Risk             Total Score: 3    JOSE: Over 50 ys old    JOSE: Neck Circum >16 in    JOSE: Male      - Denies asthma or inhaler use  - Tobacco History    History   Smoking Status    Former    Types: Cigarettes   Smokeless Tobacco    Never       GI  - Gastric outlet obstruction in setting of h/o esophageal SCC s/p chemoradiation and Feliberto-Elbert esophagectomy   s/p LAMs placement across pyloric stenosis most recent EGD with stent placement/replacement 8/15/24  Above procedure scheduled for ongoing management    - GERD  Controlled on medications: Proton Pump Inhibitor    PONV Low Risk  Total Score: 1           1 AN PONV: Patient is not a current smoker      - Reports he is scheduled for colonoscopy 12/3/2024 with [facility].     /RENAL  - Baseline Creatinine  see above     ENDOCRINE    - BMI: Estimated body mass index is 23.65 kg/m  as calculated from the following:    Height as of this encounter: 1.702 m (5' 7\").    Weight as of this encounter: 68.5 kg (151 lb).  Healthy Weight (BMI 18.5-24.9)    - Thyroid disorder  Continue home " replacement while hospitalized.    HEME  VTE Low Risk 0.5%             Total Score: 3    VTE: Greater than 59 yrs old    VTE: Male      - Platelet disfunction second to Aspirin (Erick, many others)   ~ hold per protocol    Hemoglobin   Date Value Ref Range Status   10/20/2024 12.7 (L) 13.3 - 17.7 g/dL Final     ~ h/o blood transfusion without reaction       ONCOLOGY  - Oropharyngeal cancer found to have a synchronous squamous cell carcinoma of the esophagus, now s/p concurrent chemoRT for both oropharyngeal and esophageal disease completed 1/26/24 (treated simulatneously with definitive intent) followed by Feliberto-Elbert esophagectomy on 4/19/24.     Stable  Persistent fatigue post treatment>> oncology referred to cancer rehab.   Feeding tube present  Able to eat orally    - Sweat gland carcinoma s/p resection (2015)    MSK  Patient is NOT Frail             Total Score: 0        PSYCH  - h/o alcohol use disorder remission since 2013     - anxiety  Stable on sertraline and buspirone    Different anesthesia methods/types have been discussed with the patient, but they are aware that the final plan will be decided by the assigned anesthesia provider on the date of service.  Patient was discussed with Dr. Campbell    The patient is optimized for their procedure. AVS with information on medications provided by PAC RN today.  nursing staff to provide patient with information on surgery time, arrival time and NPO status. No further diagnostic testing indicated.      On the day of service:     Prep time: 20 minutes  Visit time: 14 minutes  Documentation time: 18 minutes  ------------------------------------------  Total time: 52 minutes      YADIRA Higuera CNP  Preoperative Assessment Center  [facility]  [facility]  Phone: [phone]  Fax: [phone]

## 2024-11-15 NOTE — PATIENT INSTRUCTIONS
Name:  Lopez Hogue   MRN:  [id number]   :  1955   Today's Date:  11/15/2024     GI Lab procedures:    A representative from the GI Lab will contact you regarding arrival date and time.      You were seen today in the PAC Clinic.   (Pre-operative Anesthesia Assessment Center)  [place] [address]   phone [phone]    You had a pre-operative assessment done.    Anesthesia recommendations for medications:    Hold Aspirin for 2 days before procedure.  Hold Multivitamins for 7 days before procedure.   Hold Herbal medications and Supplements for 7 days before procedure.  Hold Ibuprofen for 2 days before procedure.   Hold Naproxen for 2 days before procedure.     No alcohol or cannabis products for 24 hours before your procedure      Please hold the following medications the day of procedure:  Miralax   Senna     Please take these medications the day of procedure:  Buspirone   Levothyroxine  Pantoprazole  Rosuvastatin  Sertraline     For questions or appointments, call:  [facility]: [phone], option 2.  Monday through Friday, 8 a.m. to 4:30 p.m.  (If it is after hours, please reach out to the clinic or provider that scheduled your appointment)

## 2024-11-15 NOTE — PROGRESS NOTES
Prior to immunization administration, verified patients identity using patient s name and date of birth. Please see Immunization Activity for additional information.     Is the patient's temperature normal (100.5 or less)? Yes     Patient MEETS CRITERIA. PROCEED with vaccine administration.        11/15/2024   General Questionnaire    Do you have any questions for your care team about the vaccines you will be receiving today? no                11/15/2024   COVID   Have you had myocarditis or pericarditis (inflammation of or around the heart muscle) after getting a COVID-19 vaccine? No   Have you had a serious reaction to a COVID vaccine or something in a COVID vaccine, like polyethylene glycol (PEG) or polysorbate? No   Have you had multisystem inflammatory syndrome from COVID-19 in the past 90 days? No   Have you received a bone marrow transplant within the previous 3 months? No            Patient MEETS CRITERIA. PROCEED with vaccine administration.            11/15/2024   INFLUENZA   Would you like to receive the flu shot or the nasal flu vaccine today? Flu Shot   Have you had a serious reaction to a flu vaccine or something in a flu vaccine? No   Have you had Guillain-Roodhouse syndrome within 6 weeks of getting a vaccine? No   Have you received a bone marrow transplant within the previous 6 months? No            Patient MEETS CRITERIA. PROCEED with vaccine administration.            11/15/2024   Zoster   Have you had a serious reaction to the shingles vaccine or something in the shingles vaccine? No   Do you have shingles now? No   Are you getting treatment for cancer, organ transplant, or bone marrow transplant? No   Do you have an autoimmune or inflammatory condition? No   Is the patient immunocompromised and wanting to complete the Shingles vaccine series in less than 2 months? No   Have you had Guillain-Roodhouse syndrome within 6 weeks of getting a vaccine? No            Patient MEETS CRITERIA. PROCEED with vaccine  administration.      Patient instructed to remain in clinic for 15 minutes afterwards, and to report any adverse reactions.      Link to Ancillary Visit Immunization Standing Orders SmartSet     Screening performed by Curtis Steiner RN on 11/15/2024 at 1:04 PM.

## 2024-11-18 RX ORDER — ROSUVASTATIN CALCIUM 40 MG/1
40 TABLET, COATED ORAL DAILY
Qty: 90 TABLET | Refills: 2 | Status: SHIPPED | OUTPATIENT
Start: 2024-11-18

## 2024-11-18 NOTE — TELEPHONE ENCOUNTER
Pt saw [facility] on 11/2024 and was advised to continue Rosuvastatin and follow-up in one year. Will approve refill request. aj

## 2024-11-19 DIAGNOSIS — C15.9 PRIMARY ESOPHAGEAL SQUAMOUS CELL CARCINOMA (H): Primary | ICD-10-CM

## 2024-11-20 DIAGNOSIS — C15.9 PRIMARY ESOPHAGEAL SQUAMOUS CELL CARCINOMA (H): ICD-10-CM

## 2024-11-20 NOTE — CONSULTS
Outpatient IR Referral  11/20/24    Referring Provider: Dr. Hill  IR Referral Request: Would like J tube exchanged for GJ tube: J tube placed in 3/2024 : J-tube was replaced 6 months after it was placed, and now having issues with the balloon and the tube not staying in place. Pt still using tube for tube feeds   Recommendations/Plan:  IR does not recommend GJ tube for tube stabilization. IF tube needs exchange IR recommends team who placed J tube manage tube exchanges.    IR recommendations also relayed Epic messaging.    IR referral converted to consult note.    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     Last J tube exchange 5/19/24 [facility] IR     PROCEDURE:    The indwelling jejunostomy tube was unable to be injected with contrast due to occlusion. The tube was removed over wire and an angled catheter was advanced distally into the jejunum. Contrast injection performed to confirm proper positioning within the   efferent small bowel. Over-the-wire exchange was made for a new 16 Fr jejunostomy which was positioned under fluoroscopic guidance with its tip in the jejunal lumen. The retention balloon was inflated with 4 mL of dilute contrast. Post placement contrast   injection was performed.     FINDINGS:  1. Initial evaluation demonstrates complete occlusion of the indwelling jejunostomy tube.  2. After exchange, the new jejunostomy tube is in appropriate position with the lumen emptying into the jejunum.        Pertinent Medications:    Current Outpatient Medications   Medication Sig Dispense Refill     acetaminophen (TYLENOL) 500 MG tablet Take 500-1,000 mg by mouth every 8 hours as needed for fever or pain      aspirin (ASA) 81 MG chewable tablet Take 81 mg by mouth every morning.      busPIRone (BUSPAR) 5 MG tablet Take 1 tablet (5 mg) by mouth 3 times daily. 90 tablet 2    chlorhexidine (PERIDEX) 0.12 % solution Take 15 mLs by mouth as needed. (Patient not taking: Reported on 11/11/2024)      levothyroxine (SYNTHROID/LEVOTHROID) 25 MCG tablet Take 1 tablet (25 mcg) by mouth daily. (Patient taking differently: Take 25 mcg by mouth every morning.) 30 tablet 3    nitroGLYcerin (NITROSTAT) 0.4 MG sublingual tablet PLACE 1 TABLET UNDER THE TONGUE EVERY 5 MINUTES FOR CHEST PAIN FOR 3 DOSES. IF SYMPTOMS PERSIST 5 MINUTES AFTER 1ST DOSE CALL 911. 25 tablet 0    Novasource Renal Vanilla Place 1,200 mLs into J tube daily. Infuse via pump  75ml/hr X 16 hrs/day (5 cartons/day)  Water flush: 130ml before and after cycle + 800ml throughout the day 72707 mL 9    pantoprazole (PROTONIX) 40 MG EC tablet Take 1 tablet (40 mg) by mouth 2 times daily. 180 tablet 4    polyethylene glycol (MIRALAX) 17 g packet Take 17 g by mouth daily as needed for constipation.      rosuvastatin (CRESTOR) 40 MG tablet Take 1 tablet (40 mg) by mouth daily. 90 tablet 2    senna-docusate (SENOKOT-S/PERICOLACE) 8.6-50 MG tablet Take 1 tablet by mouth 2 times daily as needed for constipation 30 tablet 0    sertraline (ZOLOFT) 100 MG tablet Take 1.5 tablets (150 mg) by mouth every morning. 45 tablet 1     No current facility-administered medications for this visit.       Pertinent Imaging Reviewed:   CT 7/26/24           Most Recent Labs:  Lab Results   Component Value Date    WBC 5.4 10/20/2024     Lab Results   Component Value Date    RBC 4.07 10/20/2024     Lab Results   Component Value Date    HGB 12.7 10/20/2024     Lab Results   Component Value Date    HCT 38.6 10/20/2024     Lab Results   Component Value Date     10/20/2024    Last  Comprehensive Metabolic Panel:  Lab Results   Component Value Date     10/20/2024    POTASSIUM 3.9 10/20/2024    CHLORIDE 104 10/20/2024    CO2 22 10/20/2024    ANIONGAP 13 10/20/2024     (H) 10/20/2024    BUN 16.3 10/20/2024    CR 0.62 (L) 10/20/2024    GFRESTIMATED >90 10/20/2024    RAFAELA 9.4 10/20/2024      INR   Date Value Ref Range Status   08/13/2024 0.92 0.85 - 1.15 Final            YADIRA Che CNP  Interventional Radiology   [phone] (IR RN triage)     ADDENDUM: 11/21/24     Referring team entered GJ tube order in error. Requesting IR assume care of J tube. IR has asked for the order to corrected. Last exchange 5/16/24 [facility].     Exchange approved by Dr. Blood from IR.     Order placed for J tube exchange.     Yamile MAY DNP  Interventional Radiology

## 2024-11-21 DIAGNOSIS — C15.9 PRIMARY ESOPHAGEAL SQUAMOUS CELL CARCINOMA (H): Primary | ICD-10-CM

## 2024-11-24 ENCOUNTER — APPOINTMENT (OUTPATIENT)
Dept: CT IMAGING | Facility: CLINIC | Age: 69
End: 2024-11-24
Attending: EMERGENCY MEDICINE
Payer: COMMERCIAL

## 2024-11-24 ENCOUNTER — HOSPITAL ENCOUNTER (EMERGENCY)
Facility: CLINIC | Age: 69
Discharge: HOME | End: 2024-11-24
Attending: EMERGENCY MEDICINE | Admitting: EMERGENCY MEDICINE
Payer: COMMERCIAL

## 2024-11-24 VITALS
OXYGEN SATURATION: 98 % | DIASTOLIC BLOOD PRESSURE: 70 MMHG | WEIGHT: 148 LBS | BODY MASS INDEX: 23.23 KG/M2 | HEART RATE: 88 BPM | RESPIRATION RATE: 16 BRPM | HEIGHT: 67 IN | TEMPERATURE: 97.6 F | SYSTOLIC BLOOD PRESSURE: 121 MMHG

## 2024-11-24 DIAGNOSIS — L03.90 CELLULITIS, UNSPECIFIED CELLULITIS SITE: ICD-10-CM

## 2024-11-24 LAB
ABO/RH(D): NORMAL
ALBUMIN SERPL BCG-MCNC: 3.5 G/DL (ref 3.5–5.2)
ALP SERPL-CCNC: 129 U/L (ref 40–150)
ALT SERPL W P-5'-P-CCNC: 101 U/L (ref 0–70)
ANION GAP SERPL CALCULATED.3IONS-SCNC: 8 MMOL/L (ref 7–15)
ANTIBODY SCREEN: NEGATIVE
APTT PPP: 34 SECONDS (ref 22–38)
AST SERPL W P-5'-P-CCNC: 78 U/L (ref 0–45)
BASOPHILS # BLD AUTO: 0 10E3/UL (ref 0–0.2)
BASOPHILS NFR BLD AUTO: 0 %
BILIRUB SERPL-MCNC: 0.7 MG/DL
BUN SERPL-MCNC: 8.4 MG/DL (ref 8–23)
CALCIUM SERPL-MCNC: 9.3 MG/DL (ref 8.8–10.4)
CHLORIDE SERPL-SCNC: 102 MMOL/L (ref 98–107)
CREAT SERPL-MCNC: 0.63 MG/DL (ref 0.67–1.17)
EGFRCR SERPLBLD CKD-EPI 2021: >90 ML/MIN/1.73M2
EOSINOPHIL # BLD AUTO: 0 10E3/UL (ref 0–0.7)
EOSINOPHIL NFR BLD AUTO: 0 %
ERYTHROCYTE [DISTWIDTH] IN BLOOD BY AUTOMATED COUNT: 13.7 % (ref 10–15)
GLUCOSE SERPL-MCNC: 96 MG/DL (ref 70–99)
HCO3 SERPL-SCNC: 28 MMOL/L (ref 22–29)
HCT VFR BLD AUTO: 34.2 % (ref 40–53)
HGB BLD-MCNC: 11.2 G/DL (ref 13.3–17.7)
IMM GRANULOCYTES # BLD: 0 10E3/UL
IMM GRANULOCYTES NFR BLD: 0 %
LYMPHOCYTES # BLD AUTO: 0.4 10E3/UL (ref 0.8–5.3)
LYMPHOCYTES NFR BLD AUTO: 6 %
MCH RBC QN AUTO: 30.4 PG (ref 26.5–33)
MCHC RBC AUTO-ENTMCNC: 32.7 G/DL (ref 31.5–36.5)
MCV RBC AUTO: 93 FL (ref 78–100)
MONOCYTES # BLD AUTO: 0.3 10E3/UL (ref 0–1.3)
MONOCYTES NFR BLD AUTO: 5 %
NEUTROPHILS # BLD AUTO: 6.4 10E3/UL (ref 1.6–8.3)
NEUTROPHILS NFR BLD AUTO: 89 %
NRBC # BLD AUTO: 0 10E3/UL
NRBC BLD AUTO-RTO: 0 /100
PLATELET # BLD AUTO: 233 10E3/UL (ref 150–450)
POTASSIUM SERPL-SCNC: 4.2 MMOL/L (ref 3.4–5.3)
PROT SERPL-MCNC: 7.2 G/DL (ref 6.4–8.3)
RBC # BLD AUTO: 3.69 10E6/UL (ref 4.4–5.9)
SODIUM SERPL-SCNC: 138 MMOL/L (ref 135–145)
SPECIMEN EXPIRATION DATE: NORMAL
WBC # BLD AUTO: 7.2 10E3/UL (ref 4–11)

## 2024-11-24 PROCEDURE — 80053 COMPREHEN METABOLIC PANEL: CPT | Performed by: EMERGENCY MEDICINE

## 2024-11-24 PROCEDURE — 85730 THROMBOPLASTIN TIME PARTIAL: CPT | Performed by: EMERGENCY MEDICINE

## 2024-11-24 PROCEDURE — 86850 RBC ANTIBODY SCREEN: CPT | Performed by: EMERGENCY MEDICINE

## 2024-11-24 PROCEDURE — 85014 HEMATOCRIT: CPT | Performed by: EMERGENCY MEDICINE

## 2024-11-24 PROCEDURE — 99284 EMERGENCY DEPT VISIT MOD MDM: CPT | Performed by: EMERGENCY MEDICINE

## 2024-11-24 PROCEDURE — 74177 CT ABD & PELVIS W/CONTRAST: CPT

## 2024-11-24 PROCEDURE — 250N000011 HC RX IP 250 OP 636: Performed by: EMERGENCY MEDICINE

## 2024-11-24 PROCEDURE — 74177 CT ABD & PELVIS W/CONTRAST: CPT | Mod: 26 | Performed by: RADIOLOGY

## 2024-11-24 PROCEDURE — 96365 THER/PROPH/DIAG IV INF INIT: CPT | Mod: 59 | Performed by: EMERGENCY MEDICINE

## 2024-11-24 PROCEDURE — 99285 EMERGENCY DEPT VISIT HI MDM: CPT | Mod: 25 | Performed by: EMERGENCY MEDICINE

## 2024-11-24 PROCEDURE — 250N000013 HC RX MED GY IP 250 OP 250 PS 637: Performed by: EMERGENCY MEDICINE

## 2024-11-24 PROCEDURE — 99254 IP/OBS CNSLTJ NEW/EST MOD 60: CPT | Mod: GC | Performed by: INTERNAL MEDICINE

## 2024-11-24 PROCEDURE — 86900 BLOOD TYPING SEROLOGIC ABO: CPT | Performed by: EMERGENCY MEDICINE

## 2024-11-24 PROCEDURE — 36415 COLL VENOUS BLD VENIPUNCTURE: CPT | Performed by: EMERGENCY MEDICINE

## 2024-11-24 PROCEDURE — 85004 AUTOMATED DIFF WBC COUNT: CPT | Performed by: EMERGENCY MEDICINE

## 2024-11-24 RX ORDER — ACETAMINOPHEN 325 MG/1
650 TABLET ORAL EVERY 4 HOURS PRN
Status: DISCONTINUED | OUTPATIENT
Start: 2024-11-24 | End: 2024-11-24 | Stop reason: HOSPADM

## 2024-11-24 RX ORDER — CEPHALEXIN 500 MG/1
500 CAPSULE ORAL 4 TIMES DAILY
Qty: 20 CAPSULE | Refills: 0 | Status: SHIPPED | OUTPATIENT
Start: 2024-11-24 | End: 2024-11-29

## 2024-11-24 RX ORDER — CEFTRIAXONE 1 G/1
1 INJECTION, POWDER, FOR SOLUTION INTRAMUSCULAR; INTRAVENOUS ONCE
Status: COMPLETED | OUTPATIENT
Start: 2024-11-24 | End: 2024-11-24

## 2024-11-24 RX ORDER — IOPAMIDOL 755 MG/ML
91 INJECTION, SOLUTION INTRAVASCULAR ONCE
Status: COMPLETED | OUTPATIENT
Start: 2024-11-24 | End: 2024-11-24

## 2024-11-24 RX ADMIN — ACETAMINOPHEN 650 MG: 325 TABLET, FILM COATED ORAL at 09:37

## 2024-11-24 RX ADMIN — CEFTRIAXONE SODIUM 1 G: 1 INJECTION, POWDER, FOR SOLUTION INTRAMUSCULAR; INTRAVENOUS at 11:20

## 2024-11-24 RX ADMIN — IOPAMIDOL 91 ML: 755 INJECTION, SOLUTION INTRAVENOUS at 09:55

## 2024-11-24 ASSESSMENT — ACTIVITIES OF DAILY LIVING (ADL)
ADLS_ACUITY_SCORE: 0

## 2024-11-24 NOTE — ED NOTES
"     Emergency Department Patient Sign-out       Brief HPI:  This is a 69 year old male signed out to me by Dr. Ochoa .  See initial ED Provider note for details of the presentation.            Significant Events prior to my assuming care: labs rocephin CT-malpositoned gastric stent      Exam:   Patient Vitals for the past 24 hrs:   BP Temp Temp src Pulse Resp SpO2 Height Weight   11/24/24 0714 123/68 97.6  F (36.4  C) Oral 86 18 98 % 1.702 m (5' 7\") 67.1 kg (148 lb)           ED RESULTS:   Results for orders placed or performed during the hospital encounter of 11/24/24 (from the past 24 hours)   CBC with platelets differential     Status: Abnormal    Collection Time: 11/24/24  9:27 AM    Narrative    The following orders were created for panel order CBC with platelets differential.  Procedure                               Abnormality         Status                     ---------                               -----------         ------                     CBC with platelets and d...[[id number]]  Abnormal            Final result                 Please view results for these tests on the individual orders.   ABO/Rh type and screen     Status: None    Collection Time: 11/24/24  9:27 AM    Narrative    The following orders were created for panel order ABO/Rh type and screen.  Procedure                               Abnormality         Status                     ---------                               -----------         ------                     Adult Type and Screen[[id number]]                            Final result                 Please view results for these tests on the individual orders.   Partial thromboplastin time     Status: Normal    Collection Time: 11/24/24  9:27 AM   Result Value Ref Range    aPTT 34 22 - 38 Seconds   Comprehensive metabolic panel     Status: Abnormal    Collection Time: 11/24/24  9:27 AM   Result Value Ref Range    Sodium 138 135 - 145 mmol/L    Potassium 4.2 3.4 - 5.3 mmol/L    Carbon " Dioxide (CO2) 28 22 - 29 mmol/L    Anion Gap 8 7 - 15 mmol/L    Urea Nitrogen 8.4 8.0 - 23.0 mg/dL    Creatinine 0.63 (L) 0.67 - 1.17 mg/dL    GFR Estimate >90 >60 mL/min/1.73m2    Calcium 9.3 8.8 - 10.4 mg/dL    Chloride 102 98 - 107 mmol/L    Glucose 96 70 - 99 mg/dL    Alkaline Phosphatase 129 40 - 150 U/L    AST 78 (H) 0 - 45 U/L     (H) 0 - 70 U/L    Protein Total 7.2 6.4 - 8.3 g/dL    Albumin 3.5 3.5 - 5.2 g/dL    Bilirubin Total 0.7 <=1.2 mg/dL   CBC with platelets and differential     Status: Abnormal    Collection Time: 11/24/24  9:27 AM   Result Value Ref Range    WBC Count 7.2 4.0 - 11.0 10e3/uL    RBC Count 3.69 (L) 4.40 - 5.90 10e6/uL    Hemoglobin 11.2 (L) 13.3 - 17.7 g/dL    Hematocrit 34.2 (L) 40.0 - 53.0 %    MCV 93 78 - 100 fL    MCH 30.4 26.5 - 33.0 pg    MCHC 32.7 31.5 - 36.5 g/dL    RDW 13.7 10.0 - 15.0 %    Platelet Count 233 150 - 450 10e3/uL    % Neutrophils 89 %    % Lymphocytes 6 %    % Monocytes 5 %    % Eosinophils 0 %    % Basophils 0 %    % Immature Granulocytes 0 %    NRBCs per 100 WBC 0 <1 /100    Absolute Neutrophils 6.4 1.6 - 8.3 10e3/uL    Absolute Lymphocytes 0.4 (L) 0.8 - 5.3 10e3/uL    Absolute Monocytes 0.3 0.0 - 1.3 10e3/uL    Absolute Eosinophils 0.0 0.0 - 0.7 10e3/uL    Absolute Basophils 0.0 0.0 - 0.2 10e3/uL    Absolute Immature Granulocytes 0.0 <=0.4 10e3/uL    Absolute NRBCs 0.0 10e3/uL   Adult Type and Screen     Status: None    Collection Time: 11/24/24  9:27 AM   Result Value Ref Range    ABO/RH(D) O POS     Antibody Screen Negative Negative    SPECIMEN EXPIRATION DATE [id number]    CT Abdomen Pelvis w Contrast     Status: None    Collection Time: 11/24/24 10:02 AM    Narrative    EXAM: CT abdomen and pelvis with intravenous contrast. 11/24/2024  10:02 AM    HISTORY: redness, pain around J tube site assessing for infection of  tract   patient had a prior esophagectomy with a gastric pull-up and  stent placed across the pylorus.    TECHNIQUE: Helical  acquisition of image data was performed for the  abdomen and pelvis with intravenous contrast.    COMPARISON: CT chest abdomen pelvis 07/26/2024. PET/CT 7/26/2024.    FINDINGS:    : Unremarkable.    Lower thorax & Mediastinum: No suspicious pulmonary nodules or  masses. Mild atelectasis in the right lung base. No pleural effusions.  No pericardial effusion.     Liver: No intrahepatic biliary dilatation. No focal hepatic mass.    Gallbladder/biliary tree: The common bile duct is not dilated.  Gallbladder appears unremarkable.    Pancreas: The pancreatic duct is not dilated.    Spleen: The spleen is not enlarged.    Adrenal glands: No adrenal nodules.    Kidneys/ureters: No hydronephrosis. No renal calculi.    Bladder/pelvic organs: Unremarkable.    Bowel/mesentery: Diverticulosis without evidence of acute  diverticulitis. Prior esophagectomy with gastric pull-up. Fluid is  seen layering in the stomach.. Previously placed stent across the  pylorus is now transversely oriented, with each and abutting the  stomach mucosa. No dilated loops of large or small bowel. Jejunostomy  tube enters in the left upper quadrant, no significant surrounding  soft tissue swelling or fluid collection.    Peritoneum/retroperitoneum: No extraluminal bowel gas. No free fluid  in the abdomen or pelvis.    Major vessels: No aneurysmal dilatation.    Lymph nodes: No enlarged abdominal or pelvic lymph nodes by short axis  criteria.    Bones: Degenerative changes of the spine. No acute or suspicious  osseous abnormality.    Soft Tissues: Unremarkable      Impression    IMPRESSION:    1. Mild soft tissue swelling along the J-tube insertion site in the  left upper quadrant, which may be within normal limits. No surrounding  fluid collection.    2. Malpositioned of gastric stent, which is lying completely  transverse with the stent ends abutting the distal gastric mucosa.  Does not cross the pylorus. GI consultation recommended.      Findings  discussed with Dr. Gerald Ochoa in the emergency  department by Ramirez Andrade at 10:30 AM.    I have personally reviewed the examination and initial interpretation  and I agree with the findings.    BENNY FOX MD         SYSTEM ID:  [id number]       ED MEDICATIONS:   Medications   acetaminophen (TYLENOL) tablet 650 mg (650 mg Oral $Given 11/24/24 0937)   iopamidol (ISOVUE-370) solution 91 mL (91 mLs Intravenous $Given 11/24/24 0955)   sodium chloride (PF) 0.9% PF flush 73 mL (73 mLs Intravenous $Given 11/24/24 0955)   cefTRIAXone (ROCEPHIN) 1 g vial to attach to  mL bag for ADULTS or NS 50 mL bag for PEDS (0 g Intravenous Stopped 11/24/24 1202)         Impression:    ICD-10-CM    1. Cellulitis, unspecified cellulitis site  L03.90           Plan:    Pending studies include GI consult-keep J tube, start keflex and follow up with GI Dr Monahan on 12/5 as scheduled.        Rashad Mcfadden MD, Rashad Salazar MD  11/24/24 2569

## 2024-11-24 NOTE — CONSULTS
GASTROENTEROLOGY CONSULTATION      Date of Admission:  11/24/2024           Reason for Consultation:   We were asked by Dr. Ochoa of ED to evaluate this patient with esophageal stent malposition and J tube issue.          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  He then developed gastric outlet obstruction postoperatively and required an Axios stent placed across the pylorus.      Gastric outlet obstruction s/p pyloric axios stent and coaxial esophageal stent   Gastroesophagectomy for squamous cell carcinoma   S/p J tube placement    The patient presented to the ED for evaluation of J-tube problems, and CT was done which showed malpositioned gastric stent so we were consulted for evaluation.  On review of the imaging and in speaking with the patient, he is asymptomatic. Plan had been to attempt stent free trial and per patient has a repeat endoscopy scheduled in a little over a week. He would prefer to keep that appointment, which is reasonable.     Recommendations:   - Keep appointment on 12/5 with Dr. Monahan to evaluate stent and J tube  - J tube edema seen on CT is expected  - GI advanced endoscopy will sign off    Thank you for involving us in this patient's care. Please do not hesitate to contact the GI service with any questions or concerns.     Pt care plan discussed with Dr. Herring, GI staff physician.    Mariela Gonzalez MD  Gastroenterology Fellow         History of Present Illness:   Lopez Hogue is a 69 year old male who presented for abdominal pain and worries about G-tube.      Patient reports that he is not having any nausea or vomiting, dysphagia.  He mainly came because of the tube.  He feels like the drainage is a little bit different, and the skin is  "irritable. In the ED and a CT scan was obtained which showed malposition of his esophageal stent.  He denies any chest pain nausea vomiting fevers or chills.    The patient has a follow-up endoscopy scheduled in a couple of weeks with Dr. Monahan.  He prefers to keep this endoscopy procedure rather than having 1 inpatient.  He feels his symptoms are not related to his stents.         Data:   Key relevant labs:   Creatinine 0.63  AST 78  T. bili 0.7 alk phos 129  Hemoglobin 11.2    Key relevant imaging:  \" CT abdomen pelvis  IMPRESSION:  1. Mild soft tissue swelling along the J-tube insertion site in the  left upper quadrant, which may be within normal limits. No surrounding  fluid collection.     2. Malpositioned of gastric stent, which is lying completely  transverse with the stent ends abutting the distal gastric mucosa.  Does not cross the pylorus. GI consultation recommended.\"           Previous Endoscopy:   EGD 8/20/2024 with Dr. Monahan with placement of coaxial fully covered Evolution stent within prior Axios stent in the pylorus         Medications:     Current Facility-Administered Medications   Medication Dose Route Frequency Provider Last Rate Last Admin    acetaminophen (TYLENOL) tablet 650 mg  650 mg Oral Q4H PRN Gerald Ochoa MD   650 mg at 11/24/24 0937     Current Outpatient Medications   Medication Sig Dispense Refill    cephALEXin (KEFLEX) 500 MG capsule Take 1 capsule (500 mg) by mouth 4 times daily for 5 days. 20 capsule 0    acetaminophen (TYLENOL) 500 MG tablet Take 500-1,000 mg by mouth every 8 hours as needed for fever or pain      aspirin (ASA) 81 MG chewable tablet Take 81 mg by mouth every morning.      busPIRone (BUSPAR) 5 MG tablet Take 1 tablet (5 mg) by mouth 3 times daily. 90 tablet 2    chlorhexidine (PERIDEX) 0.12 % solution Take 15 mLs by mouth as needed. (Patient not taking: Reported on 11/11/2024)      levothyroxine (SYNTHROID/LEVOTHROID) 25 MCG tablet Take 1 tablet (25 " "mcg) by mouth daily. (Patient taking differently: Take 25 mcg by mouth every morning.) 30 tablet 3    nitroGLYcerin (NITROSTAT) 0.4 MG sublingual tablet PLACE 1 TABLET UNDER THE TONGUE EVERY 5 MINUTES FOR CHEST PAIN FOR 3 DOSES. IF SYMPTOMS PERSIST 5 MINUTES AFTER 1ST DOSE CALL 911. 25 tablet 0    Novasource Renal Vanilla Place 1,200 mLs into J tube daily. Infuse via pump  75ml/hr X 16 hrs/day (5 cartons/day)  Water flush: 130ml before and after cycle + 800ml throughout the day 23579 mL 9    pantoprazole (PROTONIX) 40 MG EC tablet Take 1 tablet (40 mg) by mouth 2 times daily. 180 tablet 4    polyethylene glycol (MIRALAX) 17 g packet Take 17 g by mouth daily as needed for constipation.      rosuvastatin (CRESTOR) 40 MG tablet Take 1 tablet (40 mg) by mouth daily. 90 tablet 2    senna-docusate (SENOKOT-S/PERICOLACE) 8.6-50 MG tablet Take 1 tablet by mouth 2 times daily as needed for constipation 30 tablet 0    sertraline (ZOLOFT) 100 MG tablet Take 1.5 tablets (150 mg) by mouth every morning. 45 tablet 1             Physical Exam:   /68   Pulse 86   Temp 97.6  F (36.4  C) (Oral)   Resp 18   Ht 1.702 m (5' 7\")   Wt 67.1 kg (148 lb)   SpO2 98%   BMI 23.18 kg/m    Wt:   Wt Readings from Last 2 Encounters:   11/24/24 67.1 kg (148 lb)   11/15/24 68.5 kg (151 lb)      Constitutional: sitting up in bed in NAD  Eyes: anicteric conjunctiva  Ears/nose/mouth/throat: moist mucous membranes  Respiratory: normal work of breathing on ambient air  Abd: soft, nontender, nondistended, no peritoneal signs  Skin: warm, perfused, no jaundice  Neuro: alert and conversant  MSK: moving extremities spontaneously           Past Medical History:     Past Medical History:   Diagnosis Date    Anxiety     Aortic stenosis     CAD (coronary artery disease)     ETOH dependence     Quit drinking 10 years    Heart attack (H)     History of blood transfusion     HLD (hyperlipidemia)     Hypertension     Malignant neoplasm of middle third of " esophagus (H)     Nonrheumatic aortic (valve) stenosis     Sweat gland carcinoma           Past Surgical History:     Past Surgical History:   Procedure Laterality Date    BIOPSY  June 29015    Left gluteal and groin lymphnodes    BRONCHOSCOPY FLEXIBLE AND RIGID N/A 04/19/2024    Procedure: Bronchoscopy flexible and rigid;  Surgeon: Devin Hill MD;  Location: [facility]    BRONCHOSCOPY FLEXIBLE AND RIGID N/A 04/28/2024    Procedure: Bronchoscopy flexible and rigid;  Surgeon: Jessie Kim MD;  Location: [facility] OR    CARDIAC SURGERY  March 2023    2 stents placed    COLONOSCOPY  2012    CV CORONARY ANGIOGRAM N/A 03/27/2023    Procedure: Coronary Angiogram;  Surgeon: Miki Etienne MD;  Location: [facility]    CV CORONARY ANGIOGRAM N/A 05/09/2023    Procedure: Coronary Angiogram;  Surgeon: Miki Etienne MD;  Location: [facility] CV    CV LEFT HEART CATH N/A 03/27/2023    Procedure: Left Heart Catheterization;  Surgeon: Miki Etienne MD;  Location: [facility] CV    CV LEFT HEART CATH N/A 05/09/2023    Procedure: Left Heart Catheterization;  Surgeon: Miki Etienne MD;  Location: [facility]    CV PCI N/A 03/27/2023    Procedure: Percutaneous Coronary Intervention;  Surgeon: Miki Etienne MD;  Location: [facility] CV    ENDOSCOPIC ULTRASOUND UPPER GASTROINTESTINAL TRACT (GI) N/A 11/28/2023    Procedure: Endoscopic ultrasound upper gastrointestinal tract (GI);  Surgeon: Shahriar Giraldo MD;  Location: [facility] GI    ESOPHAGOGASTRODUODENOSCOPY, WITH BOTULINUM TOXIN INJECTION N/A 04/29/2024    Procedure: Esophagogastroduodenoscopy, With Botulinum Toxin Injection;  Surgeon: Jessie Kim MD;  Location: [facility] GI    ESOPHAGOSCOPY, GASTROSCOPY, DUODENOSCOPY (EGD), COMBINED N/A 11/08/2023    Procedure: ESOPHAGOGASTRODUODENOSCOPY, WITH BIOPSY;  Surgeon: Shahriar Giraldo MD;  Location: [facility]    ESOPHAGOSCOPY, GASTROSCOPY, DUODENOSCOPY (EGD), COMBINED N/A  04/19/2024    Procedure: Esophagoscopy, gastroscopy, duodenoscopy (EGD), combined;  Surgeon: Devin Hill MD;  Location: [facility]    ESOPHAGOSCOPY, GASTROSCOPY, DUODENOSCOPY (EGD), COMBINED N/A 04/28/2024    Procedure: Esophagoscopy, gastroscopy, duodenoscopy (EGD), combined;  Surgeon: Jessie Kim MD;  Location: [facility]    ESOPHAGOSCOPY, GASTROSCOPY, DUODENOSCOPY (EGD), COMBINED N/A 05/05/2024    Procedure: Esophagoscopy, gastroscopy, duodenoscopy (EGD), combined-under fluoro & dilation, nasogastric tube placement, bronchoscopy;  Surgeon: Kevin Calderon MD;  Location: [facility]    ESOPHAGOSCOPY, GASTROSCOPY, DUODENOSCOPY (EGD), COMBINED N/A 8/8/2024    Procedure: ESOPHAGOGASTRODUODENOSCOPY with fluoroscopy and stent placement;  Surgeon: Livan Monahan MD;  Location:  OR    ESOPHAGOSCOPY, GASTROSCOPY, DUODENOSCOPY (EGD), COMBINED N/A 8/15/2024    Procedure: ESOPHAGOGASTRODUODENOSCOPY with stent placement;  Surgeon: Livan Monahan MD;  Location:  OR    IR JEJUNOSTOMY TUBE CHANGE  05/19/2024    LAPAROSCOPIC ASSISTED INSERTION TUBE JEJUNOSTOMY N/A 04/01/2024    Procedure: Laparoscopic Jejunostomy Tube Insertion and Esophagogastroduodenoscopy;  Surgeon: Kevin Calderon MD;  Location:  OR    LARYNGOSCOPY WITH BIOPSY(IES) N/A 10/12/2023    Procedure: LARYNGOSCOPY, WITH BIOPSY;  Surgeon: Edi Felix MD;  [facility]    OTHER SURGICAL HISTORY  01/01/2015    WIDE EXCISION OF LEFT GLUTEAL MASSTNM: [id number], stage: II hidradenocarcinoma Grade II, 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, sentinel lymph node biopsy negative     SOFT TISSUE SURGERY  June 2023    left gluteal and lymphnodes    THORACOSCOPIC, LAPAROSCOPIC ESOPHAGECTOMY, COMBINED N/A 04/19/2024    Procedure: Laparoscopic and right thoracoscopic esophagogastrectomy, right AND left chest tube placement;  Surgeon: Devin Hill MD;  Location: [facility] OR    VASCULAR SURGERY  March 2023    Cath 2 stents            Social History:     Social History      Socioeconomic History    Marital status:     Number of children: 2   Occupational History    Occupation: non-profit manager   Tobacco Use    Smoking status: Former     Current packs/day: 0.00     Average packs/day: 2.0 packs/day for 41.0 years (82.0 ttl pk-yrs)     Types: Cigarettes     Start date: 1972     Quit date: 2013     Years since quittin.4     Passive exposure: Past    Smokeless tobacco: Never    Tobacco comments:     Quit    Vaping Use    Vaping status: Never Used   Substance and Sexual Activity    Alcohol use: Not Currently     Comment: Alhaji     Drug use: Not Currently     Types: Cocaine, Marijuana     Comment: alhaji     Sexual activity: Not Currently   Other Topics Concern    Parent/sibling w/ CABG, MI or angioplasty before 65F 55M? No   Social History Narrative    Patient works.  Lives with his wife.     Social Drivers of Health     Financial Resource Strain: Low Risk  (2024)    Financial Resource Strain     Within the past 12 months, have you or your family members you live with been unable to get utilities (heat, electricity) when it was really needed?: No   Food Insecurity: Low Risk  (2024)    Food Insecurity     Within the past 12 months, did you worry that your food would run out before you got money to buy more?: No     Within the past 12 months, did the food you bought just not last and you didn t have money to get more?: No   Transportation Needs: Low Risk  (2024)    Transportation Needs     Within the past 12 months, has lack of transportation kept you from medical appointments, getting your medicines, non-medical meetings or appointments, work, or from getting things that you need?: No   Physical Activity: Inactive (2024)    Exercise Vital Sign     Days of Exercise per Week: 0 days     Minutes of Exercise per Session: 0 min   Stress: Stress Concern Present (2024)    [employer] [place] [employer] - Occupational Stress  Questionnaire     Feeling of Stress : Very much   Social Connections: Unknown (8/18/2024)    Social Connection and Isolation Panel [NHANES]     Frequency of Social Gatherings with Friends and Family: Twice a week   Interpersonal Safety: Low Risk  (8/23/2024)    Interpersonal Safety     Do you feel physically and emotionally safe where you currently live?: Yes     Within the past 12 months, have you been hit, slapped, kicked or otherwise physically hurt by someone?: No     Within the past 12 months, have you been humiliated or emotionally abused in other ways by your partner or ex-partner?: No   Housing Stability: Low Risk  (8/18/2024)    Housing Stability     Do you have housing? : Yes     Are you worried about losing your housing?: No              Family History:   Reviewed and edited as appropriate  Family History   Problem Relation Age of Onset    Dementia Mother     Mental Illness Mother         Dementia    Substance Abuse Son         Alcohol    Substance Abuse Sister         Alcohol    Substance Abuse Brother         Alcohol    Anesthesia Reaction No family hx of     Thrombocytopenia No family hx of     Cancer No family hx of     Thrombosis No family hx of           Allergies:     Allergies   Allergen Reactions    Coconut Flavor Anaphylaxis     Raw coconut          Review of Systems:     A complete 10 point review of systems was performed and is negative except as noted in the HPI

## 2024-11-24 NOTE — DISCHARGE INSTRUCTIONS
Please start keflex and make an appointment to follow up with GI Clinic (phone: [phone]) 12/5/2024 as scheduled even if entirely better.

## 2024-11-24 NOTE — ED TRIAGE NOTES
"Triage Assessment & Note:    /68   Pulse 86   Temp 97.6  F (36.4  C) (Oral)   Resp 18   Ht 1.702 m (5' 7\")   Wt 67.1 kg (148 lb)   SpO2 98%   BMI 23.18 kg/m      Patient presents with: PT reports he thinks his g-tube balloon is malfunctioning and he repots increased leaking over the last month.     Home Treatments/Remedies: None    Febrile / Afebrile? Afebrile     Duration of C/o:  1 month    Myke Laughlin RN  November 24, 2024       Triage Assessment (Adult)       Row Name 11/24/24 0715          Triage Assessment    Airway WDL WDL        Respiratory WDL    Respiratory WDL WDL        Cardiac WDL    Cardiac WDL WDL                     "

## 2024-11-24 NOTE — ED PROVIDER NOTES
ED Provider Note  [facility]      History     Chief Complaint   Patient presents with    Gtube Problem            HPI  Lopez Hogue is a 69 year old male who presents with abdominal pain, concern regarding his J-tube.  The patient reports that prior to 1 month ago the J-tube was functioning well and he did not have much discomfort or drainage.  About a month ago he started noticing that the tube seemed to come in and out of the skin a greater amount than before, leave us that the balloon may have ruptured.  This was continuing over the last month and then for several days he has had more pain.  He notes pain at the site of the J-tube port comes out of the skin as well as increased redness and drainage.  The drainage has changed in character has become more white puslike.  He states that the J-tube was used to supplement his oral intake and increase his weight and he is no longer using it right now.  No fevers, chills.  No other pain.    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)     Nonrheumatic aortic (valve) stenosis     Sweat gland carcinoma      Past Surgical History:   Procedure Laterality Date    BIOPSY  June 29015    Left gluteal and groin lymphnodes    BRONCHOSCOPY FLEXIBLE AND RIGID N/A 04/19/2024    Procedure: Bronchoscopy flexible and rigid;  Surgeon: Devin Hill MD;  Location: [facility] OR    BRONCHOSCOPY FLEXIBLE AND RIGID N/A 04/28/2024    Procedure: Bronchoscopy flexible and rigid;  Surgeon: Jessie Kim MD;  Location: [facility] OR    CARDIAC SURGERY  March 2023    2 stents placed    COLONOSCOPY  2012    CV CORONARY ANGIOGRAM N/A 03/27/2023    Procedure: Coronary Angiogram;  Surgeon: Lowell  Miki OGDEN MD;  Location: [facility] LAB CV    CV CORONARY ANGIOGRAM N/A 05/09/2023    Procedure: Coronary Angiogram;  Surgeon: Miki Etienne MD;  Location: [facility] CATH LAB CV    CV LEFT HEART CATH N/A 03/27/2023    Procedure: Left Heart Catheterization;  Surgeon: Miki Etienne MD;  Location: [facility] CATH LAB CV    CV LEFT HEART CATH N/A 05/09/2023    Procedure: Left Heart Catheterization;  Surgeon: iMki Etienne MD;  Location: [facility] LAB CV    CV PCI N/A 03/27/2023    Procedure: Percutaneous Coronary Intervention;  Surgeon: Miki Etienne MD;  Location: [facility] CV    ENDOSCOPIC ULTRASOUND UPPER GASTROINTESTINAL TRACT (GI) N/A 11/28/2023    Procedure: Endoscopic ultrasound upper gastrointestinal tract (GI);  Surgeon: Shahriar Giraldo MD;  Location: [facility] GI    ESOPHAGOGASTRODUODENOSCOPY, WITH BOTULINUM TOXIN INJECTION N/A 04/29/2024    Procedure: Esophagogastroduodenoscopy, With Botulinum Toxin Injection;  Surgeon: Jessie Kim MD;  Location: [facility] GI    ESOPHAGOSCOPY, GASTROSCOPY, DUODENOSCOPY (EGD), COMBINED N/A 11/08/2023    Procedure: ESOPHAGOGASTRODUODENOSCOPY, WITH BIOPSY;  Surgeon: Shahriar Giraldo MD;  Location: [facility] GI    ESOPHAGOSCOPY, GASTROSCOPY, DUODENOSCOPY (EGD), COMBINED N/A 04/19/2024    Procedure: Esophagoscopy, gastroscopy, duodenoscopy (EGD), combined;  Surgeon: Devin Hill MD;  Location: [facility] OR    ESOPHAGOSCOPY, GASTROSCOPY, DUODENOSCOPY (EGD), COMBINED N/A 04/28/2024    Procedure: Esophagoscopy, gastroscopy, duodenoscopy (EGD), combined;  Surgeon: Jessie Kim MD;  Location: [facility] OR    ESOPHAGOSCOPY, GASTROSCOPY, DUODENOSCOPY (EGD), COMBINED N/A 05/05/2024    Procedure: Esophagoscopy, gastroscopy, duodenoscopy (EGD), combined-under fluoro & dilation, nasogastric tube placement, bronchoscopy;  Surgeon: Kevin Calderon MD;  Location: [facility] OR    ESOPHAGOSCOPY, GASTROSCOPY, DUODENOSCOPY (EGD), COMBINED N/A 8/8/2024    Procedure:  ESOPHAGOGASTRODUODENOSCOPY with fluoroscopy and stent placement;  Surgeon: Livan Monahan MD;  Location:  OR    ESOPHAGOSCOPY, GASTROSCOPY, DUODENOSCOPY (EGD), COMBINED N/A 8/15/2024    Procedure: ESOPHAGOGASTRODUODENOSCOPY with stent placement;  Surgeon: Livan Monahan MD;  Location:  OR    IR JEJUNOSTOMY TUBE CHANGE  05/19/2024    LAPAROSCOPIC ASSISTED INSERTION TUBE JEJUNOSTOMY N/A 04/01/2024    Procedure: Laparoscopic Jejunostomy Tube Insertion and Esophagogastroduodenoscopy;  Surgeon: Kevin Calderon MD;  Location: [facility] OR    LARYNGOSCOPY WITH BIOPSY(IES) N/A 10/12/2023    Procedure: LARYNGOSCOPY, WITH BIOPSY;  Surgeon: Edi Felix MD;  Location: [facility] OR    OTHER SURGICAL HISTORY  01/01/2015    WIDE EXCISION OF LEFT GLUTEAL MASSTNM: [id number], stage: II hidradenocarcinoma Grade II, 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, sentinel lymph node biopsy negative     SOFT TISSUE SURGERY  June 2023    left gluteal and lymphnodes    THORACOSCOPIC, LAPAROSCOPIC ESOPHAGECTOMY, COMBINED N/A 04/19/2024    Procedure: Laparoscopic and right thoracoscopic esophagogastrectomy, right AND left chest tube placement;  Surgeon: Devin Hill MD;  Location: [facility] OR    VASCULAR SURGERY  March 2023    Cath 2 stents     acetaminophen (TYLENOL) 500 MG tablet  aspirin (ASA) 81 MG chewable tablet  busPIRone (BUSPAR) 5 MG tablet  chlorhexidine (PERIDEX) 0.12 % solution  levothyroxine (SYNTHROID/LEVOTHROID) 25 MCG tablet  nitroGLYcerin (NITROSTAT) 0.4 MG sublingual tablet  Novasource Renal Vanilla  pantoprazole (PROTONIX) 40 MG EC tablet  polyethylene glycol (MIRALAX) 17 g packet  rosuvastatin (CRESTOR) 40 MG tablet  senna-docusate (SENOKOT-S/PERICOLACE) 8.6-50 MG tablet  sertraline (ZOLOFT) 100 MG tablet      Allergies   Allergen Reactions    Coconut Flavor Anaphylaxis     Raw coconut     Family History  Family History   Problem Relation Age of Onset    Dementia Mother     Mental Illness Mother         Dementia    Substance Abuse Son          "Alcohol    Substance Abuse Sister         Alcohol    Substance Abuse Brother         Alcohol    Anesthesia Reaction No family hx of     Thrombocytopenia No family hx of     Cancer No family hx of     Thrombosis No family hx of      Social History   Social History     Tobacco Use    Smoking status: Former     Current packs/day: 0.00     Average packs/day: 2.0 packs/day for 41.0 years (82.0 ttl pk-yrs)     Types: Cigarettes     Start date: 1972     Quit date: 2013     Years since quittin.4     Passive exposure: Past    Smokeless tobacco: Never    Tobacco comments:     Quit    Vaping Use    Vaping status: Never Used   Substance Use Topics    Alcohol use: Not Currently     Comment: Sober     Drug use: Not Currently     Types: Cocaine, Marijuana     Comment: sober       A medically appropriate review of systems was performed with pertinent positives and negatives noted in the HPI, and all other systems negative.    Physical Exam   BP: 123/68  Pulse: 86  Temp: 97.6  F (36.4  C)  Resp: 18  Height: 170.2 cm (5' 7\")  Weight: 67.1 kg (148 lb)  SpO2: 98 %  Physical Exam  Constitutional:       General: He is not in acute distress.     Appearance: He is not toxic-appearing.   HENT:      Head: Normocephalic and atraumatic.      Nose: Nose normal.      Mouth/Throat:      Mouth: Mucous membranes are moist.      Pharynx: Oropharynx is clear.   Eyes:      Conjunctiva/sclera: Conjunctivae normal.      Pupils: Pupils are equal, round, and reactive to light.   Cardiovascular:      Rate and Rhythm: Normal rate and regular rhythm.   Pulmonary:      Effort: Pulmonary effort is normal. No respiratory distress.      Breath sounds: No wheezing or rales.   Abdominal:      General: There is no distension.      Palpations: Abdomen is soft.      Tenderness: There is no abdominal tenderness. There is no guarding.      Comments: J-tube present with some blanching erythema, tenderness around the site of the G-tube where " exits the skin, there is some purulent appearing drainage   Musculoskeletal:         General: Normal range of motion.   Skin:     General: Skin is warm and dry.   Neurological:      General: No focal deficit present.      Mental Status: He is alert and oriented to person, place, and time.           ED Course, Procedures, & Data      Procedures            IV Antibiotics given and/or elevated Lactate of 0 and no sepsis note found - Delete this reminder and enter the sepsis note or '.edcms' before signing chart.>>>     Results for orders placed or performed during the hospital encounter of 11/24/24   CT Abdomen Pelvis w Contrast     Status: None (Preliminary result)    Narrative    EXAM: CT abdomen and pelvis with intravenous contrast. 11/24/2024  10:02 AM    HISTORY: redness, pain around J tube site assessing for infection of  tract   patient had a prior esophagectomy with a gastric pull-up and  stent placed across the pylorus.    TECHNIQUE: Helical acquisition of image data was performed for the  abdomen and pelvis with intravenous contrast.    COMPARISON: CT chest abdomen pelvis 07/26/2024. PET/CT 7/26/2024.    FINDINGS:    : Unremarkable.    Lower thorax & Mediastinum: No suspicious pulmonary nodules or  masses. Mild atelectasis in the right lung base. No pleural effusions.  No pericardial effusion.     Liver: No intrahepatic biliary dilatation. No focal hepatic mass.    Gallbladder/biliary tree: The common bile duct is not dilated.  Gallbladder appears unremarkable.    Pancreas: The pancreatic duct is not dilated.    Spleen: The spleen is not enlarged.    Adrenal glands: No adrenal nodules.    Kidneys/ureters: No hydronephrosis. No renal calculi.    Bladder/pelvic organs: Unremarkable.    Bowel/mesentery: Diverticulosis without evidence of acute  diverticulitis. Prior esophagectomy with gastric pull-up. Fluid is  seen layering in the stomach.. Previously placed stent across the  pylorus is now transversely  oriented, with each and abutting the  stomach mucosa. No dilated loops of large or small bowel. Jejunostomy  tube enters in the left upper quadrant, no significant surrounding  soft tissue swelling or fluid collection.    Peritoneum/retroperitoneum: No extraluminal bowel gas. No free fluid  in the abdomen or pelvis.    Major vessels: No aneurysmal dilatation.    Lymph nodes: No enlarged abdominal or pelvic lymph nodes by short axis  criteria.    Bones: Degenerative changes of the spine. No acute or suspicious  osseous abnormality.    Soft Tissues: Unremarkable      Impression    IMPRESSION:    1. Mild soft tissue swelling along the J-tube insertion site in the  left upper quadrant, which may be within normal limits. No surrounding  fluid collection.    2. Malpositioned of gastric stent, which is lying completely  transverse with the stent ends abutting the distal gastric mucosa.  Does not appear to cross the pylorus. GI consultation recommended.      Findings discussed with Dr. Gerald Ochoa in the emergency  department by Ramirez Andrade at 10:30 AM.   Partial thromboplastin time     Status: Normal   Result Value Ref Range    aPTT 34 22 - 38 Seconds   Comprehensive metabolic panel     Status: Abnormal   Result Value Ref Range    Sodium 138 135 - 145 mmol/L    Potassium 4.2 3.4 - 5.3 mmol/L    Carbon Dioxide (CO2) 28 22 - 29 mmol/L    Anion Gap 8 7 - 15 mmol/L    Urea Nitrogen 8.4 8.0 - 23.0 mg/dL    Creatinine 0.63 (L) 0.67 - 1.17 mg/dL    GFR Estimate >90 >60 mL/min/1.73m2    Calcium 9.3 8.8 - 10.4 mg/dL    Chloride 102 98 - 107 mmol/L    Glucose 96 70 - 99 mg/dL    Alkaline Phosphatase 129 40 - 150 U/L    AST 78 (H) 0 - 45 U/L     (H) 0 - 70 U/L    Protein Total 7.2 6.4 - 8.3 g/dL    Albumin 3.5 3.5 - 5.2 g/dL    Bilirubin Total 0.7 <=1.2 mg/dL   CBC with platelets and differential     Status: Abnormal   Result Value Ref Range    WBC Count 7.2 4.0 - 11.0 10e3/uL    RBC Count 3.69 (L) 4.40 - 5.90 10e6/uL     Hemoglobin 11.2 (L) 13.3 - 17.7 g/dL    Hematocrit 34.2 (L) 40.0 - 53.0 %    MCV 93 78 - 100 fL    MCH 30.4 26.5 - 33.0 pg    MCHC 32.7 31.5 - 36.5 g/dL    RDW 13.7 10.0 - 15.0 %    Platelet Count 233 150 - 450 10e3/uL    % Neutrophils 89 %    % Lymphocytes 6 %    % Monocytes 5 %    % Eosinophils 0 %    % Basophils 0 %    % Immature Granulocytes 0 %    NRBCs per 100 WBC 0 <1 /100    Absolute Neutrophils 6.4 1.6 - 8.3 10e3/uL    Absolute Lymphocytes 0.4 (L) 0.8 - 5.3 10e3/uL    Absolute Monocytes 0.3 0.0 - 1.3 10e3/uL    Absolute Eosinophils 0.0 0.0 - 0.7 10e3/uL    Absolute Basophils 0.0 0.0 - 0.2 10e3/uL    Absolute Immature Granulocytes 0.0 <=0.4 10e3/uL    Absolute NRBCs 0.0 10e3/uL   Adult Type and Screen     Status: None   Result Value Ref Range    ABO/RH(D) O POS     Antibody Screen Negative Negative    SPECIMEN EXPIRATION DATE [id number]    CBC with platelets differential     Status: Abnormal    Narrative    The following orders were created for panel order CBC with platelets differential.  Procedure                               Abnormality         Status                     ---------                               -----------         ------                     CBC with platelets and d...[[id number]]  Abnormal            Final result                 Please view results for these tests on the individual orders.   ABO/Rh type and screen     Status: None    Narrative    The following orders were created for panel order ABO/Rh type and screen.  Procedure                               Abnormality         Status                     ---------                               -----------         ------                     Adult Type and Screen[[id number]]                            Final result                 Please view results for these tests on the individual orders.     Medications   acetaminophen (TYLENOL) tablet 650 mg (650 mg Oral $Given 11/24/24 1874)   iopamidol (ISOVUE-370) solution 91 mL (91 mLs  Intravenous $Given 11/24/24 0955)   sodium chloride (PF) 0.9% PF flush 73 mL (73 mLs Intravenous $Given 11/24/24 0955)   cefTRIAXone (ROCEPHIN) 1 g vial to attach to  mL bag for ADULTS or NS 50 mL bag for PEDS (0 g Intravenous Stopped 11/24/24 1202)     Labs Ordered and Resulted from Time of ED Arrival to Time of ED Departure   COMPREHENSIVE METABOLIC PANEL - Abnormal       Result Value    Sodium 138      Potassium 4.2      Carbon Dioxide (CO2) 28      Anion Gap 8      Urea Nitrogen 8.4      Creatinine 0.63 (*)     GFR Estimate >90      Calcium 9.3      Chloride 102      Glucose 96      Alkaline Phosphatase 129      AST 78 (*)      (*)     Protein Total 7.2      Albumin 3.5      Bilirubin Total 0.7     CBC WITH PLATELETS AND DIFFERENTIAL - Abnormal    WBC Count 7.2      RBC Count 3.69 (*)     Hemoglobin 11.2 (*)     Hematocrit 34.2 (*)     MCV 93      MCH 30.4      MCHC 32.7      RDW 13.7      Platelet Count 233      % Neutrophils 89      % Lymphocytes 6      % Monocytes 5      % Eosinophils 0      % Basophils 0      % Immature Granulocytes 0      NRBCs per 100 WBC 0      Absolute Neutrophils 6.4      Absolute Lymphocytes 0.4 (*)     Absolute Monocytes 0.3      Absolute Eosinophils 0.0      Absolute Basophils 0.0      Absolute Immature Granulocytes 0.0      Absolute NRBCs 0.0     PARTIAL THROMBOPLASTIN TIME - Normal    aPTT 34     TYPE AND SCREEN, ADULT    ABO/RH(D) O POS      Antibody Screen Negative      SPECIMEN EXPIRATION DATE [id number]     ABO/RH TYPE AND SCREEN     CT Abdomen Pelvis w Contrast   Preliminary Result   IMPRESSION:      1. Mild soft tissue swelling along the J-tube insertion site in the   left upper quadrant, which may be within normal limits. No surrounding   fluid collection.      2. Malpositioned of gastric stent, which is lying completely   transverse with the stent ends abutting the distal gastric mucosa.   Does not appear to cross the pylorus. GI consultation recommended.          Findings discussed with Dr. Gerald Ochoa in the emergency   department by Ramirez Andrade at 10:30 AM.             Critical care was not performed.     Medical Decision Making  The patient's presentation was of moderate complexity (an undiagnosed new problem with uncertain prognosis).    The patient's evaluation involved:  review of external note(s) from 3+ sources (discharge summary, GI note, thoracic note)  review of 3+ test result(s) ordered prior to this encounter (cbc, bmp, CT)  ordering and/or review of 3+ test(s) in this encounter (see separate area of note for details)  discussion of management or test interpretation with another health professional (see separate area of note for details)    The patient's management necessitated moderate risk (prescription drug management including medications given in the ED) and further care after sign-out to Dr. Mcfadden (see their note for further management).    Assessment & Plan    This is a 69yoM who presented with concern for his J tube. He was overall well appearing and vitals were reassuring. Labs were obtained and showed a normal WBC count and normal electrolytes. A CT was done to assess for possible infection of the tract or fluid collection. This did not show an issue with the J tube but did see signs that his esophageal stent was malrotated. I spoke with GI and asked them to see the patient.    The patient reported wanting his J tube removed. He previously used it for feeds but has not been using it anymore as he has been able to increase his PO intake and keep his weight up.     The patient was given a dose of antibiotics due to the signs of cellulitis around his J tube site.     I have reviewed the nursing notes. I have reviewed the findings, diagnosis, plan and need for follow up with the patient.    New Prescriptions    No medications on file       Final diagnoses:   None       Gerald Ochoa  [facility] EMERGENCY  DEPARTMENT  11/24/2024     Gerald Ochoa MD  11/24/24 6862

## 2024-11-26 ENCOUNTER — HOSPITAL ENCOUNTER (OUTPATIENT)
Facility: CLINIC | Age: 69
Discharge: HOME | End: 2024-11-26
Admitting: RADIOLOGY
Payer: COMMERCIAL

## 2024-11-26 ENCOUNTER — HOSPITAL ENCOUNTER (OUTPATIENT)
Dept: MRI IMAGING | Facility: CLINIC | Age: 69
Discharge: HOME | End: 2024-11-26
Attending: PSYCHIATRY & NEUROLOGY
Payer: COMMERCIAL

## 2024-11-26 DIAGNOSIS — R41.89 SPELL OF ALTERED COGNITION: ICD-10-CM

## 2024-11-26 DIAGNOSIS — C10.0 SQUAMOUS CELL CARCINOMA OF VALLECULA (H): ICD-10-CM

## 2024-11-26 PROCEDURE — 255N000002 HC RX 255 OP 636: Performed by: PSYCHIATRY & NEUROLOGY

## 2024-11-26 PROCEDURE — 70553 MRI BRAIN STEM W/O & W/DYE: CPT

## 2024-11-26 PROCEDURE — A9585 GADOBUTROL INJECTION: HCPCS | Performed by: PSYCHIATRY & NEUROLOGY

## 2024-11-26 RX ORDER — GADOBUTROL 604.72 MG/ML
7 INJECTION INTRAVENOUS ONCE
Status: COMPLETED | OUTPATIENT
Start: 2024-11-26 | End: 2024-11-26

## 2024-11-26 RX ADMIN — GADOBUTROL 7 ML: 604.72 INJECTION INTRAVENOUS at 08:27

## 2024-11-26 ASSESSMENT — ACTIVITIES OF DAILY LIVING (ADL)
ADLS_ACUITY_SCORE: 58

## 2024-12-03 ENCOUNTER — TRANSFERRED RECORDS (OUTPATIENT)
Dept: HEALTH INFORMATION MANAGEMENT | Facility: CLINIC | Age: 69
End: 2024-12-03
Payer: COMMERCIAL

## 2024-12-05 ENCOUNTER — APPOINTMENT (OUTPATIENT)
Dept: GENERAL RADIOLOGY | Facility: CLINIC | Age: 69
End: 2024-12-05
Attending: INTERNAL MEDICINE
Payer: COMMERCIAL

## 2024-12-05 ENCOUNTER — ANESTHESIA (OUTPATIENT)
Dept: SURGERY | Facility: CLINIC | Age: 69
End: 2024-12-05
Payer: COMMERCIAL

## 2024-12-05 ENCOUNTER — HOSPITAL ENCOUNTER (OUTPATIENT)
Facility: CLINIC | Age: 69
Discharge: HOME | End: 2024-12-05
Attending: INTERNAL MEDICINE | Admitting: INTERNAL MEDICINE
Payer: COMMERCIAL

## 2024-12-05 VITALS
TEMPERATURE: 97.8 F | OXYGEN SATURATION: 95 % | HEIGHT: 67 IN | SYSTOLIC BLOOD PRESSURE: 110 MMHG | DIASTOLIC BLOOD PRESSURE: 65 MMHG | BODY MASS INDEX: 22.55 KG/M2 | HEART RATE: 83 BPM | WEIGHT: 143.7 LBS | RESPIRATION RATE: 14 BRPM

## 2024-12-05 LAB — UPPER GI ENDOSCOPY: NORMAL

## 2024-12-05 PROCEDURE — 250N000009 HC RX 250: Performed by: NURSE ANESTHETIST, CERTIFIED REGISTERED

## 2024-12-05 PROCEDURE — 999N000141 HC STATISTIC PRE-PROCEDURE NURSING ASSESSMENT: Performed by: INTERNAL MEDICINE

## 2024-12-05 PROCEDURE — 272N000001 HC OR GENERAL SUPPLY STERILE: Performed by: INTERNAL MEDICINE

## 2024-12-05 PROCEDURE — 710N000009 HC RECOVERY PHASE 1, LEVEL 1, PER MIN: Performed by: INTERNAL MEDICINE

## 2024-12-05 PROCEDURE — 999N000179 XR SURGERY CARM FLUORO LESS THAN 5 MIN W STILLS: Mod: TC

## 2024-12-05 PROCEDURE — 250N000011 HC RX IP 250 OP 636: Performed by: NURSE ANESTHETIST, CERTIFIED REGISTERED

## 2024-12-05 PROCEDURE — 710N000012 HC RECOVERY PHASE 2, PER MINUTE: Performed by: INTERNAL MEDICINE

## 2024-12-05 PROCEDURE — 258N000003 HC RX IP 258 OP 636: Performed by: NURSE ANESTHETIST, CERTIFIED REGISTERED

## 2024-12-05 PROCEDURE — 360N000082 HC SURGERY LEVEL 2 W/ FLUORO, PER MIN: Performed by: INTERNAL MEDICINE

## 2024-12-05 PROCEDURE — 370N000017 HC ANESTHESIA TECHNICAL FEE, PER MIN: Performed by: INTERNAL MEDICINE

## 2024-12-05 PROCEDURE — 250N000025 HC SEVOFLURANE, PER MIN: Performed by: INTERNAL MEDICINE

## 2024-12-05 RX ORDER — OXYCODONE HYDROCHLORIDE 5 MG/1
5 TABLET ORAL
Status: DISCONTINUED | OUTPATIENT
Start: 2024-12-05 | End: 2024-12-05 | Stop reason: HOSPADM

## 2024-12-05 RX ORDER — FLUMAZENIL 0.1 MG/ML
0.2 INJECTION, SOLUTION INTRAVENOUS
Status: DISCONTINUED | OUTPATIENT
Start: 2024-12-05 | End: 2024-12-05 | Stop reason: HOSPADM

## 2024-12-05 RX ORDER — ONDANSETRON 2 MG/ML
INJECTION INTRAMUSCULAR; INTRAVENOUS PRN
Status: DISCONTINUED | OUTPATIENT
Start: 2024-12-05 | End: 2024-12-05

## 2024-12-05 RX ORDER — DEXAMETHASONE SODIUM PHOSPHATE 4 MG/ML
4 INJECTION, SOLUTION INTRA-ARTICULAR; INTRALESIONAL; INTRAMUSCULAR; INTRAVENOUS; SOFT TISSUE
Status: DISCONTINUED | OUTPATIENT
Start: 2024-12-05 | End: 2024-12-05 | Stop reason: HOSPADM

## 2024-12-05 RX ORDER — SODIUM CHLORIDE, SODIUM LACTATE, POTASSIUM CHLORIDE, CALCIUM CHLORIDE 600; 310; 30; 20 MG/100ML; MG/100ML; MG/100ML; MG/100ML
INJECTION, SOLUTION INTRAVENOUS CONTINUOUS PRN
Status: DISCONTINUED | OUTPATIENT
Start: 2024-12-05 | End: 2024-12-05

## 2024-12-05 RX ORDER — NALOXONE HYDROCHLORIDE 0.4 MG/ML
0.4 INJECTION, SOLUTION INTRAMUSCULAR; INTRAVENOUS; SUBCUTANEOUS
Status: DISCONTINUED | OUTPATIENT
Start: 2024-12-05 | End: 2024-12-05 | Stop reason: HOSPADM

## 2024-12-05 RX ORDER — ONDANSETRON 4 MG/1
4 TABLET, ORALLY DISINTEGRATING ORAL EVERY 30 MIN PRN
Status: DISCONTINUED | OUTPATIENT
Start: 2024-12-05 | End: 2024-12-05 | Stop reason: HOSPADM

## 2024-12-05 RX ORDER — ONDANSETRON 4 MG/1
4 TABLET, ORALLY DISINTEGRATING ORAL EVERY 6 HOURS PRN
Status: DISCONTINUED | OUTPATIENT
Start: 2024-12-05 | End: 2024-12-05 | Stop reason: HOSPADM

## 2024-12-05 RX ORDER — SODIUM CHLORIDE, SODIUM LACTATE, POTASSIUM CHLORIDE, CALCIUM CHLORIDE 600; 310; 30; 20 MG/100ML; MG/100ML; MG/100ML; MG/100ML
INJECTION, SOLUTION INTRAVENOUS CONTINUOUS
Status: DISCONTINUED | OUTPATIENT
Start: 2024-12-05 | End: 2024-12-05 | Stop reason: HOSPADM

## 2024-12-05 RX ORDER — ONDANSETRON 2 MG/ML
4 INJECTION INTRAMUSCULAR; INTRAVENOUS EVERY 30 MIN PRN
Status: DISCONTINUED | OUTPATIENT
Start: 2024-12-05 | End: 2024-12-05 | Stop reason: HOSPADM

## 2024-12-05 RX ORDER — PROPOFOL 10 MG/ML
INJECTION, EMULSION INTRAVENOUS PRN
Status: DISCONTINUED | OUTPATIENT
Start: 2024-12-05 | End: 2024-12-05

## 2024-12-05 RX ORDER — HYDROMORPHONE HCL IN WATER/PF 6 MG/30 ML
0.4 PATIENT CONTROLLED ANALGESIA SYRINGE INTRAVENOUS EVERY 5 MIN PRN
Status: DISCONTINUED | OUTPATIENT
Start: 2024-12-05 | End: 2024-12-05 | Stop reason: HOSPADM

## 2024-12-05 RX ORDER — LIDOCAINE HYDROCHLORIDE 20 MG/ML
INJECTION, SOLUTION INFILTRATION; PERINEURAL PRN
Status: DISCONTINUED | OUTPATIENT
Start: 2024-12-05 | End: 2024-12-05

## 2024-12-05 RX ORDER — NALOXONE HYDROCHLORIDE 0.4 MG/ML
0.2 INJECTION, SOLUTION INTRAMUSCULAR; INTRAVENOUS; SUBCUTANEOUS
Status: DISCONTINUED | OUTPATIENT
Start: 2024-12-05 | End: 2024-12-05 | Stop reason: HOSPADM

## 2024-12-05 RX ORDER — LIDOCAINE 40 MG/G
CREAM TOPICAL
Status: DISCONTINUED | OUTPATIENT
Start: 2024-12-05 | End: 2024-12-05 | Stop reason: HOSPADM

## 2024-12-05 RX ORDER — NALOXONE HYDROCHLORIDE 0.4 MG/ML
0.1 INJECTION, SOLUTION INTRAMUSCULAR; INTRAVENOUS; SUBCUTANEOUS
Status: DISCONTINUED | OUTPATIENT
Start: 2024-12-05 | End: 2024-12-05 | Stop reason: HOSPADM

## 2024-12-05 RX ORDER — ONDANSETRON 2 MG/ML
4 INJECTION INTRAMUSCULAR; INTRAVENOUS
Status: DISCONTINUED | OUTPATIENT
Start: 2024-12-05 | End: 2024-12-05 | Stop reason: HOSPADM

## 2024-12-05 RX ORDER — OXYCODONE HYDROCHLORIDE 5 MG/1
10 TABLET ORAL
Status: DISCONTINUED | OUTPATIENT
Start: 2024-12-05 | End: 2024-12-05 | Stop reason: HOSPADM

## 2024-12-05 RX ORDER — DEXAMETHASONE SODIUM PHOSPHATE 4 MG/ML
INJECTION, SOLUTION INTRA-ARTICULAR; INTRALESIONAL; INTRAMUSCULAR; INTRAVENOUS; SOFT TISSUE PRN
Status: DISCONTINUED | OUTPATIENT
Start: 2024-12-05 | End: 2024-12-05

## 2024-12-05 RX ORDER — ONDANSETRON 2 MG/ML
4 INJECTION INTRAMUSCULAR; INTRAVENOUS EVERY 6 HOURS PRN
Status: DISCONTINUED | OUTPATIENT
Start: 2024-12-05 | End: 2024-12-05 | Stop reason: HOSPADM

## 2024-12-05 RX ORDER — PROCHLORPERAZINE MALEATE 5 MG/1
5 TABLET ORAL EVERY 6 HOURS PRN
Status: DISCONTINUED | OUTPATIENT
Start: 2024-12-05 | End: 2024-12-05 | Stop reason: HOSPADM

## 2024-12-05 RX ORDER — FENTANYL CITRATE 0.05 MG/ML
50 INJECTION, SOLUTION INTRAMUSCULAR; INTRAVENOUS EVERY 5 MIN PRN
Status: DISCONTINUED | OUTPATIENT
Start: 2024-12-05 | End: 2024-12-05 | Stop reason: HOSPADM

## 2024-12-05 RX ORDER — FENTANYL CITRATE 0.05 MG/ML
25 INJECTION, SOLUTION INTRAMUSCULAR; INTRAVENOUS EVERY 5 MIN PRN
Status: DISCONTINUED | OUTPATIENT
Start: 2024-12-05 | End: 2024-12-05 | Stop reason: HOSPADM

## 2024-12-05 RX ORDER — HYDROMORPHONE HCL IN WATER/PF 6 MG/30 ML
0.2 PATIENT CONTROLLED ANALGESIA SYRINGE INTRAVENOUS EVERY 5 MIN PRN
Status: DISCONTINUED | OUTPATIENT
Start: 2024-12-05 | End: 2024-12-05 | Stop reason: HOSPADM

## 2024-12-05 RX ADMIN — DEXAMETHASONE SODIUM PHOSPHATE 4 MG: 4 INJECTION, SOLUTION INTRA-ARTICULAR; INTRALESIONAL; INTRAMUSCULAR; INTRAVENOUS; SOFT TISSUE at 09:35

## 2024-12-05 RX ADMIN — ONDANSETRON 4 MG: 2 INJECTION INTRAMUSCULAR; INTRAVENOUS at 09:35

## 2024-12-05 RX ADMIN — PROPOFOL 150 MG: 10 INJECTION, EMULSION INTRAVENOUS at 09:30

## 2024-12-05 RX ADMIN — SODIUM CHLORIDE, POTASSIUM CHLORIDE, SODIUM LACTATE AND CALCIUM CHLORIDE: 600; 310; 30; 20 INJECTION, SOLUTION INTRAVENOUS at 09:25

## 2024-12-05 RX ADMIN — LIDOCAINE HYDROCHLORIDE 60 MG: 20 INJECTION, SOLUTION INFILTRATION; PERINEURAL at 09:30

## 2024-12-05 RX ADMIN — SUCCINYLCHOLINE CHLORIDE 60 MG: 20 INJECTION, SOLUTION INTRAMUSCULAR; INTRAVENOUS; PARENTERAL at 09:30

## 2024-12-05 ASSESSMENT — ACTIVITIES OF DAILY LIVING (ADL)
ADLS_ACUITY_SCORE: 58

## 2024-12-05 ASSESSMENT — LIFESTYLE VARIABLES: TOBACCO_USE: 0

## 2024-12-05 ASSESSMENT — ENCOUNTER SYMPTOMS
DYSRHYTHMIAS: 0
SEIZURES: 0
ORTHOPNEA: 0

## 2024-12-05 NOTE — ANESTHESIA CARE TRANSFER NOTE
Patient: Lopez Hogue    Procedure: Procedure(s):  ESOPHAGOGASTRODUODENOSCOPY with fluoroscopy, stent and jejunostomy tube removal       Diagnosis: Pyloric stenosis [K31.1]  Diagnosis Additional Information: No value filed.    Anesthesia Type:   General     Note:    Oropharynx: spontaneously breathing  Level of Consciousness: awake  Oxygen Supplementation: room air    Independent Airway: airway patency satisfactory and stable  Dentition: dentition unchanged  Vital Signs Stable: post-procedure vital signs reviewed and stable  Report to RN Given: handoff report given  Patient transferred to: PACU  Comments: Neuromuscular blockade not used after succinylcholine for intubation, spontaneous return of TOF 4/4 with sustained tetany, spontaneous respirations, adequate tidal volumes, followed commands to voice, oropharynx suctioned with soft flexible catheter, extubated atraumatically, extubated with suction, airway patent after extubation.  Oxygen via room air at room air to PACU. Oxygen tubing connected to wall O2 in PACU, SpO2, NiBP, and EKG monitors and alarms on and functioning, Guillermo Hugger warmer connected to patient gown, report on patient's clinical status given to PACU RN, RN questions answered.         Handoff Report: Identifed the Patient, Identified the Reponsible Provider, Reviewed the pertinent medical history, Discussed the surgical course, Reviewed Intra-OP anesthesia mangement and issues during anesthesia, Set expectations for post-procedure period and Allowed opportunity for questions and acknowledgement of understanding      Vitals:  Vitals Value Taken Time   BP     Temp     Pulse 72 12/05/24 1003   Resp 61 12/05/24 1003   SpO2 100 % 12/05/24 1003   Vitals shown include unfiled device data.    Electronically Signed By: YADIRA Gold CRNA  December 5, 2024  10:04 AM

## 2024-12-05 NOTE — ANESTHESIA PREPROCEDURE EVALUATION
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)     Nonrheumatic aortic (valve) stenosis     Sweat gland carcinoma       Past Surgical History:   Procedure Laterality Date    BIOPSY      Left gluteal and groin lymphnodes    BRONCHOSCOPY FLEXIBLE AND RIGID N/A 2024    Procedure: Bronchoscopy flexible and rigid;  Surgeon: Devin Hill MD;  Location: [facility]    BRONCHOSCOPY FLEXIBLE AND RIGID N/A 2024    Procedure: Bronchoscopy flexible and rigid;  Surgeon: Jessie Kim MD;  Location: [facility]    CARDIAC SURGERY  2023    2 stents placed    COLONOSCOPY      CV CORONARY ANGIOGRAM N/A 2023    Procedure: Coronary Angiogram;  Surgeon: Miki Etienne MD;  Location: [facility]    CV CORONARY ANGIOGRAM N/A 2023    Procedure: Coronary Angiogram;  Surgeon: Miki Etienne MD;  Location: [facility]    CV LEFT HEART CATH N/A 2023    Procedure: Left Heart Catheterization;  Surgeon: Miki Etienne MD;  Location: [facility]    CV LEFT HEART CATH N/A 2023    Procedure: Left Heart Catheterization;  Surgeon: Miki Etienne MD;  Location: [facility]    CV PCI N/A 2023    Procedure: Percutaneous Coronary Intervention;  Surgeon: Miki Etienne MD;  Location: [facility]    ENDOSCOPIC ULTRASOUND UPPER GASTROINTESTINAL TRACT (GI) N/A 2023    Procedure: Endoscopic ultrasound upper gastrointestinal tract (GI);  Surgeon: Shahriar Giraldo MD;  Location:  [facility]    ESOPHAGOGASTRODUODENOSCOPY, WITH BOTULINUM TOXIN INJECTION N/A 04/29/2024    Procedure: Esophagogastroduodenoscopy, With Botulinum Toxin Injection;  Surgeon: Jessie Kim MD;  Location: [facility]    ESOPHAGOSCOPY, GASTROSCOPY, DUODENOSCOPY (EGD), COMBINED N/A 11/08/2023    Procedure: ESOPHAGOGASTRODUODENOSCOPY, WITH BIOPSY;  Surgeon: Shahriar Giraldo MD;  Location: [facility]    ESOPHAGOSCOPY, GASTROSCOPY, DUODENOSCOPY (EGD), COMBINED N/A 04/19/2024    Procedure: Esophagoscopy, gastroscopy, duodenoscopy (EGD), combined;  Surgeon: Devin Hill MD;  Location: [facility]    ESOPHAGOSCOPY, GASTROSCOPY, DUODENOSCOPY (EGD), COMBINED N/A 04/28/2024    Procedure: Esophagoscopy, gastroscopy, duodenoscopy (EGD), combined;  Surgeon: Jessie Kim MD;  Location: [facility]    ESOPHAGOSCOPY, GASTROSCOPY, DUODENOSCOPY (EGD), COMBINED N/A 05/05/2024    Procedure: Esophagoscopy, gastroscopy, duodenoscopy (EGD), combined-under fluoro & dilation, nasogastric tube placement, bronchoscopy;  Surgeon: Kevin Calderon MD;  Location: [facility]    ESOPHAGOSCOPY, GASTROSCOPY, DUODENOSCOPY (EGD), COMBINED N/A 8/8/2024    Procedure: ESOPHAGOGASTRODUODENOSCOPY with fluoroscopy and stent placement;  Surgeon: Livan Monahan MD;  Location:  OR    ESOPHAGOSCOPY, GASTROSCOPY, DUODENOSCOPY (EGD), COMBINED N/A 8/15/2024    Procedure: ESOPHAGOGASTRODUODENOSCOPY with stent placement;  Surgeon: Livan Monahan MD;  Location: [facility]    IR JEJUNOSTOMY TUBE CHANGE  05/19/2024    LAPAROSCOPIC ASSISTED INSERTION TUBE JEJUNOSTOMY N/A 04/01/2024    Procedure: Laparoscopic Jejunostomy Tube Insertion and Esophagogastroduodenoscopy;  Surgeon: Kevin Calderon MD;  Location: [facility]    LARYNGOSCOPY WITH BIOPSY(IES) N/A 10/12/2023    Procedure: LARYNGOSCOPY, WITH BIOPSY;  Surgeon: Edi Felix MD;  Location:  OR    OTHER SURGICAL HISTORY  01/01/2015    WIDE EXCISION OF LEFT GLUTEAL MASSTNM: [id number], stage: II  hidradenocarcinoma Grade II, 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, sentinel lymph node biopsy negative     SOFT TISSUE SURGERY  2023    left gluteal and lymphnodes    THORACOSCOPIC, LAPAROSCOPIC ESOPHAGECTOMY, COMBINED N/A 2024    Procedure: Laparoscopic and right thoracoscopic esophagogastrectomy, right AND left chest tube placement;  Surgeon: Devin Hill MD;  Location: [facility]    VASCULAR SURGERY  2023    Cath 2 stents      Allergies   Allergen Reactions    Coconut Flavor Anaphylaxis     Raw coconut      Social History     Tobacco Use    Smoking status: Former     Current packs/day: 0.00     Average packs/day: 2.0 packs/day for 41.0 years (82.0 ttl pk-yrs)     Types: Cigarettes     Start date: 1972     Quit date: 2013     Years since quittin.4     Passive exposure: Past    Smokeless tobacco: Never    Tobacco comments:     Quit    Substance Use Topics    Alcohol use: Not Currently     Comment: Sober       Wt Readings from Last 1 Encounters:   24 65.2 kg (143 lb 11.2 oz)        Anesthesia Evaluation   Pt has had prior anesthetic. Type: MAC and General.    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   S/p esophagectomy  Cough ongoing, believe associated with GERD, bloody secretions, No SOB     (-) tobacco use, asthma and recent URI   Neurologic: Comment: Possible TIA vs seizure   (-) no seizures, no CVA and no TIA   Cardiovascular:     (+)  hypertension- -  CAD angina-  - stent (x2)-3/23.                           valvular problems/murmurs type: AS     Previous cardiac testing   Echo: Date:  Results:  [facility]  [facility]  [address] [place] [id number]     Name: RAMIRO ZIMMER  MRN: [id number]  : 1955  Study Date: 2024 08:02 AM  Age: 68 yrs  Gender: Male  Patient  Location: [facility]  Reason For Study: Cor Pulmonale  Ordering Physician: KRISSY DYER  Performed By: Teressa Gilmore     BSA: 1.8 m2  Height: 68 in  Weight: 145 lb  BP: 87/67 mmHg  ______________________________________________________________________________  Procedure  Complete Portable Echo Adult.  ______________________________________________________________________________  Interpretation Summary  Global and regional left ventricular function is normal with an EF of 55-60%.  The right ventricle is normal size. Global right ventricular function is  normal.  Mild to moderate calcific aortic stenosis is present (peak velocity 3.0 m/s,  mean gradient 19 mmHg, DEVAN 1.5 cm2, DI 0.4).  No pericardial effusion is present.  Compared to previous study dated 2/11/2024, gradient across the aortic valve  is lower. No significant change in biventricular function.  ______________________________________________________________________________  Left Ventricle  Left ventricular wall thickness is normal. Left ventricular size is normal.  Global and regional left ventricular function is normal with an EF of 55-60%.  Left ventricular diastolic function is not assessable.     Right Ventricle  The right ventricle is normal size. Global right ventricular function is  normal.     Atria  The atria cannot be assessed.     Mitral Valve  Mild mitral annular calcification is present. Trace mitral insufficiency is  present.     Aortic Valve  Moderate aortic valve calcification is present. Mild to moderate aortic  stenosis is present. Peak velocity 3.0 m/s, mean gradient 19 mmHg, DEVAN 1.5  cm2, DI 0.4.     Tricuspid Valve  The tricuspid valve is normal. Trace tricuspid insufficiency is present. The  peak velocity of the tricuspid regurgitant jet is not obtainable. Pulmonary  artery systolic pressure cannot be assessed.     Pulmonic Valve  The valve leaflets are not well visualized. On Doppler interrogation, there is  no significant  stenosis or regurgitation.     Vessels  The aorta root is normal. The thoracic aorta is normal. The pulmonary artery  and bifurcation cannot be assessed. IVC diameter and respiratory changes fall  into an intermediate range suggesting an RA pressure of 8 mmHg.     Pericardium  No pericardial effusion is present.     Attestation  I have personally viewed the imaging and agree with the interpretation and  report as documented by the fellow, Rober Marrero, and/or edited by me.  ______________________________________________________________________________  MMode/2D Measurements & Calculations  IVSd: 1.4 cm  LVIDd: 2.6 cm  LVIDs: 2.0 cm  LVPWd: 1.3 cm  FS: 22.5 %  LV mass(C)d: 104.1 grams  LV mass(C)dI: 58.4 grams/m2  LVOT diam: 2.1 cm  LVOT area: 3.6 cm2  RWT: 1.0  TAPSE: 1.6 cm     Doppler Measurements & Calculations  Ao V2 max: 296.4 cm/sec  Ao max P.2 mmHg  Ao V2 mean: 203.4 cm/sec  Ao mean P.6 mmHg  Ao V2 VTI: 58.7 cm  DEVAN(I,D): 1.5 cm2  DEVAN(V,D): 1.4 cm2  LV V1 max P.3 mmHg  LV V1 max: 114.8 cm/sec  LV V1 VTI: 24.9 cm  SV(LVOT): 88.7 ml  SI(LVOT): 49.7 ml/m2  AV Aman Ratio (DI): 0.39  DEVAN Index (cm2/m2): 0.85  RV S Aman: 11.1 cm/sec     ______________________________________________________________________________  Report approved by: Bree Recio 2024 10:36 AM             Component 3 mo ago        Stress Test:  Date: Results:    ECG Reviewed:  Date: Results:    Cath:  Date: Results:   (-) SPENCER, orthopnea/PND and arrhythmias   METS/Exercise Tolerance: >4 METS    Hematologic:     (+)      anemia (Hgb 11.2), history of blood transfusion,      (-) history of blood clots   Musculoskeletal:       GI/Hepatic: Comment: Gastric outlet obstruction s/p pyloric stent  J-tube in place. Mostly PO intake with supplemental j-tube feedings. Recently with worsening symptoms of gastric outlet obstruction.     (+) GERD, Symptomatic,               (-) liver disease   Renal/Genitourinary:    (-) renal disease  "  Endo:    (-) Type II DM and obesity   Psychiatric/Substance Use:       Infectious Disease:    (-) Recent Fever   Malignancy:   (+) Malignancy, History of GI.GI CA  Remission status post Surgery.      Other:            Physical Exam    Airway        Mallampati: II   TM distance: > 3 FB   Neck ROM: full   Mouth opening: > 3 cm    Respiratory Devices and Support         Dental       (+) Minor Abnormalities - some fillings, tiny chips      Cardiovascular   cardiovascular exam normal          Pulmonary   pulmonary exam normal                OUTSIDE LABS:  CBC:   Lab Results   Component Value Date    WBC 7.2 11/24/2024    WBC 5.4 10/20/2024    HGB 11.2 (L) 11/24/2024    HGB 12.7 (L) 10/20/2024    HCT 34.2 (L) 11/24/2024    HCT 38.6 (L) 10/20/2024     11/24/2024     10/20/2024     BMP:   Lab Results   Component Value Date     11/24/2024     10/20/2024    POTASSIUM 4.2 11/24/2024    POTASSIUM 3.9 10/20/2024    CHLORIDE 102 11/24/2024    CHLORIDE 104 10/20/2024    CO2 28 11/24/2024    CO2 22 10/20/2024    BUN 8.4 11/24/2024    BUN 16.3 10/20/2024    CR 0.63 (L) 11/24/2024    CR 0.62 (L) 10/20/2024    GLC 96 11/24/2024     (H) 10/20/2024     COAGS:   Lab Results   Component Value Date    PTT 34 11/24/2024    INR 0.92 08/13/2024     POC: No results found for: \"BGM\", \"HCG\", \"HCGS\"  HEPATIC:   Lab Results   Component Value Date    ALBUMIN 3.5 11/24/2024    PROTTOTAL 7.2 11/24/2024     (H) 11/24/2024    AST 78 (H) 11/24/2024    ALKPHOS 129 11/24/2024    BILITOTAL 0.7 11/24/2024     OTHER:   Lab Results   Component Value Date    PH 7.44 05/05/2024    LACT 1.0 04/28/2024    A1C 5.2 03/27/2023    RAFAELA 9.3 11/24/2024    PHOS 3.6 05/17/2024    MAG 1.9 05/17/2024    LIPASE 43 08/13/2024    TSH 4.37 (H) 10/15/2024    T4 0.65 (L) 10/15/2024       Anesthesia Plan    ASA Status:  3    NPO Status:  NPO Appropriate    Anesthesia Type: General.     - Airway: ETT   Induction: Intravenous.   Maintenance: " Balanced.   Techniques and Equipment:     - Airway: Video-Laryngoscope       Consents    Anesthesia Plan(s) and associated risks, benefits, and realistic alternatives discussed. Questions answered and patient/representative(s) expressed understanding.     - Discussed:     - Discussed with:  Patient            Postoperative Care    Pain management: Multi-modal analgesia.   PONV prophylaxis: Ondansetron (or other 5HT-3), Dexamethasone or Solumedrol, Background Propofol Infusion     Comments:               Sp Gordon MD    I have reviewed the pertinent notes and labs in the chart from the past 30 days and (re)examined the patient.  Any updates or changes from those notes are reflected in this note.             # Drug Induced Platelet Defect: home medication list includes an antiplatelet medication   # Hypertension: Noted on problem list               # Financial/Environmental Concerns:

## 2024-12-05 NOTE — ANESTHESIA POSTPROCEDURE EVALUATION
Patient: Lopez Hogue    Procedure: Procedure(s):  ESOPHAGOGASTRODUODENOSCOPY with fluoroscopy, stent and jejunostomy tube removal       Anesthesia Type:  General    Note:  Disposition: Outpatient   Postop Pain Control: Uneventful            Sign Out: Well controlled pain   PONV: No   Neuro/Psych: Uneventful            Sign Out: Acceptable/Baseline neuro status   Airway/Respiratory: Uneventful            Sign Out: Acceptable/Baseline resp. status   CV/Hemodynamics: Uneventful            Sign Out: Acceptable CV status; No obvious hypovolemia; No obvious fluid overload   Other NRE: NONE   DID A NON-ROUTINE EVENT OCCUR? No           Last vitals:  Vitals Value Taken Time   /63 12/05/24 1045   Temp 36.7  C (98.1  F) 12/05/24 1002   Pulse 70 12/05/24 1052   Resp 16 12/05/24 1052   SpO2 93 % 12/05/24 1052   Vitals shown include unfiled device data.    Electronically Signed By: Sp Gordon MD  December 5, 2024  11:04 AM

## 2024-12-05 NOTE — ANESTHESIA PROCEDURE NOTES
Airway       Patient location: [facility] - Operating Room or Procedural Area.       Procedure Start/Stop Times: 12/5/2024 9:33 AM  Staff -        Anesthesiologist:  Sp Gordon MD       CRNA: Soledad Riley APRN CRNA       Performed By: CRNAIndications and Patient Condition       Indications for airway management: geneva-procedural and airway protection       Induction type:intravenous       Mask difficulty assessment: 0 - not attempted    Final Airway Details       Final airway type: endotracheal airway       Successful airway: ETT - single  Endotracheal Airway Details        ETT size (mm): 8.0       Cuffed: yes       Successful intubation technique: video laryngoscopy       VL Blade Size: Hyman 4       Grade View of Cords: 1       Adjucts: stylet       Position: Right       Measured from: gums/teeth       Secured at (cm): 23       Bite block used: None    Post intubation assessment        Placement verified by: capnometry, equal breath sounds and chest rise        Number of attempts at approach: 1       Number of other approaches attempted: 0       Secured with: tape       Ease of procedure: easy       Dentition: Intact and Unchanged    Medication(s) Administered   Medication Administration Time: 12/5/2024 9:33 AM

## 2024-12-06 ENCOUNTER — LAB (OUTPATIENT)
Dept: LAB | Facility: CLINIC | Age: 69
End: 2024-12-06
Payer: COMMERCIAL

## 2024-12-06 DIAGNOSIS — E03.9 HYPOTHYROIDISM, UNSPECIFIED TYPE: ICD-10-CM

## 2024-12-06 LAB — TSH SERPL DL<=0.005 MIU/L-ACNC: 3.43 UIU/ML (ref 0.3–4.2)

## 2024-12-06 PROCEDURE — 36415 COLL VENOUS BLD VENIPUNCTURE: CPT | Performed by: PATHOLOGY

## 2024-12-06 PROCEDURE — 84443 ASSAY THYROID STIM HORMONE: CPT | Performed by: PATHOLOGY

## 2024-12-09 PROBLEM — D12.6 ADENOMATOUS POLYP OF COLON: Status: ACTIVE | Noted: 2024-12-09

## 2024-12-10 ENCOUNTER — PATIENT OUTREACH (OUTPATIENT)
Dept: GASTROENTEROLOGY | Facility: CLINIC | Age: 69
End: 2024-12-10
Payer: COMMERCIAL

## 2024-12-16 NOTE — PROGRESS NOTES
"In person evaluation    HPI  11/11/2024, in person consultation  12/17/2024, in person visit    69-year-old being evaluated neurologically for:  Generalized weakness      Zio patch monitor (11/8/2024 through 11/17/2024),  Symptom trigger correlates with sinus tachycardia  No sustained tachyarrhythmias/no atrial fibrillation (predominant rhythm  bpm) see official report    1.  Nonspecific cerebral white matter changes        Otherwise, normal brain MRI.   2.  No evidence foracute intracranial pathology.   3.  No findings to suggest intracranial metastatic disease.      EEG 12/17/2024, Impression:  Normal low amplitude awake drowsy and brief sleep EEG.    No further episodes  I reviewed the actual MRI scans and results above with patient and his wife  At this time we will just monitor the symptoms  I still wonder if there was a drop in blood pressure and decreased perfusion    If he has further neurologic difficulties he will call and let my nurse know and we would get him in earlier otherwise follow-up as needed    Presentation history:  Patient with syncope presented to hospital 10/20/2024  Patient sitting at a desk and had 2 near syncopal episodes  Lightheaded/profusely sweating disoriented and confused.  Back to normal in 1 to 2 minutes  Patient with generalized weakness evaluated by primary MD 11/8/2024    Patient states that he had a couple episodes and was seen in the ED for \"possible TIAs\"  10/16/2024  10/20/2024  10/24/2024    Patient becomes disoriented, was not able to figure out how to use the phone or the remote.  Sweating profusely  Lasts about 6 minutes each time.  Had to use furniture to balance  He will feel tired after these episodes.  Spells were stereotypical although      Currently wearing a heart monitor  Cardiology was concerned that he had some bradycardia  Blood pressure does run a little bit low at 103/65  Has a past history of oropharyngeal squamous cell carcinoma              A.  " Syncopal episode x 3 October 20, 2024        Some mild bradycardia evaluated by cardiology.        They stopped the metoprolol        B.  Vascular risk factors        Hypertension/hyperlipidemia/CAD/MI/status post PCI x 2        Past smoker, (quit 2013)        Past alcohol use disorder, sober 2013)    C.  Oropharyngeal carcinoma SCC of the esophagus.  (Diagnosed 2023)       Status post chemoradiation and esophagectomy          Past medical history  Hypertension  Hyperlipidemia  CAD/MI/status post PCI x 2  Non-rheumatic aortic valve stenosis  Squamous cell carcinoma (2023)  Sweat gland carcinoma (2015)  Alcohol use disorder  Elevated PSA  Anxiety    Habits  Past smoker, quit 2013  Alcohol use disorder quit June 2013      Family history  Mother dementia  Son alcohol abuse  Sister alcohol abuse  Brother alcohol abuse      Workup  CT head 10/20/2024 see official report  1.  No CT evidence of acute intracranial pathology  2.  Mild age-related parenchymal atrophy and leukoaraiosis  Cardiac echo 10/29/2024 see official report, ejection fraction 60-65%, moderate valvular aortic stenosis, left atrium normal size  Zio patch monitor (11/8/2024 through 11/17/2024),  Symptom trigger correlates with sinus tachycardia  No sustained tachyarrhythmias/no atrial fibrillation (predominant rhythm  bpm) see official report  MRI brain 11/26/2024, with and without contrast, see report  1.  Nonspecific cerebral white matter changes        Otherwise, normal brain MRI.   2.  No evidence foracute intracranial pathology.   3.  No findings to suggest intracranial metastatic disease.  EEG 12/17/2024, Impression:  Normal low amplitude awake drowsy and brief sleep EEG.      Laboratory data review                       10/2024         12/2024  NA/K             139/3.9  BUN/Cr         16.3/0.62  GLU              161  AST               66  WBC/HGB     5.4/12.7  PLTs              154,000  TSH               4.37                  3.43          Exam    Review of system  Pertinent positive negatives  Feels diffusely weak not focal  Slight balance difficulty  Signs a little bit dizzy    Chronic swallowing difficulty esophageal cancer has feeding tube for nutrition    Sleepy  Does snore    No diplopia no dysarthria no dysphagia    Otherwise review of systems negative    General exam  Blood pressure 105/64, pulse 59  Alert orient x 3  Lungs clear, decreased breath sounds bilaterally  Heart rate regular  Symmetrical pulses  No edema the feet    Neurologic exam  Alert orient x 3  Normal prosody speech  Normal naming  Normal comprehension  Normal repetition  No aphasia  No neglect  Memory recall okay at bedside testing    Cranial nerves II through XII  No ophthalmoplegia  No nystagmus  No visual field cut  Face symmetrical  Tongue twisters good    Upper extremities  No drift  No tremor    Lower extremities  Distal proximal strength good    Gait  Normal  Romberg negative    Assessment/plan    1.  Episodic spell times 3 October 2024       Zoned out glassy eyed x 6 minutes       Disoriented/perplexed       Sweaty no nausea    2.  Some borderline low blood pressure    3.  History of esophageal cancer diagnosed 2023 does have feeding tube but can swallow also      Diagnosis  Transient episodic spells times 3 October 2024    Rule out seizure  Rule out metastatic disease    Plan    EEG 12/17/2024, Impression:  Normal low amplitude awake drowsy and brief sleep EEG.    MRI brain 11/26/2024, with and without contrast, see report  1.  Nonspecific cerebral white matter changes        Otherwise, normal brain MRI.   2.  No evidence foracute intracranial pathology.   3.  No findings to suggest intracranial metastatic disease.      No further episodes  I reviewed the actual MRI scans and results above with patient and his wife  At this time we will just monitor the symptoms  I still wonder if there was a drop in blood pressure and decreased perfusion    If he has  further neurologic difficulties he will call and let my nurse know and we would get him in earlier otherwise follow-up as needed    Total care time today 30 minutes reviewed cardiac tests/MRI scan/EEG  Discussed and showed scan to patient and wife        As per the visit today  Reviewed ER visit 10/20/2024  Reviewed cardiology note 11/1/2024  Reviewed primary MD note 11/8/2024

## 2024-12-17 ENCOUNTER — OFFICE VISIT (OUTPATIENT)
Dept: NEUROLOGY | Facility: CLINIC | Age: 69
End: 2024-12-17
Payer: COMMERCIAL

## 2024-12-17 ENCOUNTER — ANCILLARY PROCEDURE (OUTPATIENT)
Dept: NEUROLOGY | Facility: CLINIC | Age: 69
End: 2024-12-17
Attending: PSYCHIATRY & NEUROLOGY
Payer: COMMERCIAL

## 2024-12-17 VITALS
DIASTOLIC BLOOD PRESSURE: 64 MMHG | BODY MASS INDEX: 22.44 KG/M2 | HEIGHT: 67 IN | WEIGHT: 143 LBS | SYSTOLIC BLOOD PRESSURE: 105 MMHG | HEART RATE: 59 BPM

## 2024-12-17 DIAGNOSIS — R41.89 SPELL OF ALTERED COGNITION: Primary | ICD-10-CM

## 2024-12-17 DIAGNOSIS — R41.89 SPELL OF ALTERED COGNITION: ICD-10-CM

## 2024-12-17 DIAGNOSIS — C10.0 SQUAMOUS CELL CARCINOMA OF VALLECULA (H): ICD-10-CM

## 2024-12-17 PROCEDURE — 95819 EEG AWAKE AND ASLEEP: CPT | Performed by: PSYCHIATRY & NEUROLOGY

## 2024-12-17 PROCEDURE — 99214 OFFICE O/P EST MOD 30 MIN: CPT | Performed by: PSYCHIATRY & NEUROLOGY

## 2024-12-17 NOTE — NURSING NOTE
Chief Complaint   Patient presents with    Results     Discuss EEG and MRI results       Katty Beckham LPN on 12/17/2024 at 10:06 AM

## 2024-12-17 NOTE — LETTER
"12/17/2024      Lopez Hogue  [address] [facility] [address]      Dear Colleague,    Thank you for referring your patient, Lopez Hogue, to the [facility] [place]. Please see a copy of my visit note below.    In person evaluation    HPI  11/11/2024, in person consultation  12/17/2024, in person visit    69-year-old being evaluated neurologically for:  Generalized weakness      Zio patch monitor (11/8/2024 through 11/17/2024),  Symptom trigger correlates with sinus tachycardia  No sustained tachyarrhythmias/no atrial fibrillation (predominant rhythm  bpm) see official report    1.  Nonspecific cerebral white matter changes        Otherwise, normal brain MRI.   2.  No evidence foracute intracranial pathology.   3.  No findings to suggest intracranial metastatic disease.      EEG 12/17/2024, Impression:  Normal low amplitude awake drowsy and brief sleep EEG.    No further episodes  I reviewed the actual MRI scans and results above with patient and his wife  At this time we will just monitor the symptoms  I still wonder if there was a drop in blood pressure and decreased perfusion    If he has further neurologic difficulties he will call and let my nurse know and we would get him in earlier otherwise follow-up as needed    Presentation history:  Patient with syncope presented to hospital 10/20/2024  Patient sitting at a desk and had 2 near syncopal episodes  Lightheaded/profusely sweating disoriented and confused.  Back to normal in 1 to 2 minutes  Patient with generalized weakness evaluated by primary MD 11/8/2024    Patient states that he had a couple episodes and was seen in the ED for \"possible TIAs\"  10/16/2024  10/20/2024  10/24/2024    Patient becomes disoriented, was not able to figure out how to use the phone or the remote.  Sweating profusely  Lasts about 6 minutes each time.  Had to use furniture to balance  He will feel tired after these episodes.  Spells were " stereotypical although      Currently wearing a heart monitor  Cardiology was concerned that he had some bradycardia  Blood pressure does run a little bit low at 103/65  Has a past history of oropharyngeal squamous cell carcinoma              A.  Syncopal episode x 3 October 20, 2024        Some mild bradycardia evaluated by cardiology.        They stopped the metoprolol        B.  Vascular risk factors        Hypertension/hyperlipidemia/CAD/MI/status post PCI x 2        Past smoker, (quit 2013)        Past alcohol use disorder, sober 2013)    C.  Oropharyngeal carcinoma SCC of the esophagus.  (Diagnosed 2023)       Status post chemoradiation and esophagectomy          Past medical history  Hypertension  Hyperlipidemia  CAD/MI/status post PCI x 2  Non-rheumatic aortic valve stenosis  Squamous cell carcinoma (2023)  Sweat gland carcinoma (2015)  Alcohol use disorder  Elevated PSA  Anxiety    Habits  Past smoker, quit 2013  Alcohol use disorder quit June 2013      Family history  Mother dementia  Son alcohol abuse  Sister alcohol abuse  Brother alcohol abuse      Workup  CT head 10/20/2024 see official report  1.  No CT evidence of acute intracranial pathology  2.  Mild age-related parenchymal atrophy and leukoaraiosis  Cardiac echo 10/29/2024 see official report, ejection fraction 60-65%, moderate valvular aortic stenosis, left atrium normal size  Zio patch monitor (11/8/2024 through 11/17/2024),  Symptom trigger correlates with sinus tachycardia  No sustained tachyarrhythmias/no atrial fibrillation (predominant rhythm  bpm) see official report  MRI brain 11/26/2024, with and without contrast, see report  1.  Nonspecific cerebral white matter changes        Otherwise, normal brain MRI.   2.  No evidence foracute intracranial pathology.   3.  No findings to suggest intracranial metastatic disease.  EEG 12/17/2024, Impression:  Normal low amplitude awake drowsy and brief sleep EEG.      Laboratory data review                        10/2024         12/2024  NA/K             139/3.9  BUN/Cr         16.3/0.62  GLU              161  AST               66  WBC/HGB     5.4/12.7  PLTs              154,000  TSH               4.37                 3.43          Exam    Review of system  Pertinent positive negatives  Feels diffusely weak not focal  Slight balance difficulty  Signs a little bit dizzy    Chronic swallowing difficulty esophageal cancer has feeding tube for nutrition    Sleepy  Does snore    No diplopia no dysarthria no dysphagia    Otherwise review of systems negative    General exam  Blood pressure 105/64, pulse 59  Alert orient x 3  Lungs clear, decreased breath sounds bilaterally  Heart rate regular  Symmetrical pulses  No edema the feet    Neurologic exam  Alert orient x 3  Normal prosody speech  Normal naming  Normal comprehension  Normal repetition  No aphasia  No neglect  Memory recall okay at bedside testing    Cranial nerves II through XII  No ophthalmoplegia  No nystagmus  No visual field cut  Face symmetrical  Tongue twisters good    Upper extremities  No drift  No tremor    Lower extremities  Distal proximal strength good    Gait  Normal  Romberg negative    Assessment/plan    1.  Episodic spell times 3 October 2024       Zoned out glassy eyed x 6 minutes       Disoriented/perplexed       Sweaty no nausea    2.  Some borderline low blood pressure    3.  History of esophageal cancer diagnosed 2023 does have feeding tube but can swallow also      Diagnosis  Transient episodic spells times 3 October 2024    Rule out seizure  Rule out metastatic disease    Plan    EEG 12/17/2024, Impression:  Normal low amplitude awake drowsy and brief sleep EEG.    MRI brain 11/26/2024, with and without contrast, see report  1.  Nonspecific cerebral white matter changes        Otherwise, normal brain MRI.   2.  No evidence foracute intracranial pathology.   3.  No findings to suggest intracranial metastatic disease.      No further  episodes  I reviewed the actual MRI scans and results above with patient and his wife  At this time we will just monitor the symptoms  I still wonder if there was a drop in blood pressure and decreased perfusion    If he has further neurologic difficulties he will call and let my nurse know and we would get him in earlier otherwise follow-up as needed    Total care time today 30 minutes reviewed cardiac tests/MRI scan/EEG  Discussed and showed scan to patient and wife        As per the visit today  Reviewed ER visit 10/20/2024  Reviewed cardiology note 11/1/2024  Reviewed primary MD note 11/8/2024          Again, thank you for allowing me to participate in the care of your patient.        Sincerely,        justin Mitchell MD

## 2025-01-13 DIAGNOSIS — F41.9 ANXIETY: ICD-10-CM

## 2025-01-13 RX ORDER — BUSPIRONE HYDROCHLORIDE 5 MG/1
5 TABLET ORAL 3 TIMES DAILY
Qty: 90 TABLET | Refills: 2 | Status: SHIPPED | OUTPATIENT
Start: 2025-01-13

## 2025-01-30 ENCOUNTER — MYC MEDICAL ADVICE (OUTPATIENT)
Dept: FAMILY MEDICINE | Facility: CLINIC | Age: 70
End: 2025-01-30

## 2025-01-30 DIAGNOSIS — F39 EPISODIC MOOD DISORDER: ICD-10-CM

## 2025-01-30 RX ORDER — SERTRALINE HYDROCHLORIDE 100 MG/1
150 TABLET, FILM COATED ORAL EVERY MORNING
Qty: 135 TABLET | Refills: 2 | Status: SHIPPED | OUTPATIENT
Start: 2025-01-30

## 2025-02-05 ASSESSMENT — SLEEP AND FATIGUE QUESTIONNAIRES
HOW LIKELY ARE YOU TO NOD OFF OR FALL ASLEEP WHILE SITTING QUIETLY AFTER LUNCH WITHOUT ALCOHOL: SLIGHT CHANCE OF DOZING
HOW LIKELY ARE YOU TO NOD OFF OR FALL ASLEEP IN A CAR, WHILE STOPPED FOR A FEW MINUTES IN TRAFFIC: WOULD NEVER DOZE
HOW LIKELY ARE YOU TO NOD OFF OR FALL ASLEEP WHILE SITTING AND TALKING TO SOMEONE: SLIGHT CHANCE OF DOZING
HOW LIKELY ARE YOU TO NOD OFF OR FALL ASLEEP WHILE WATCHING TV: SLIGHT CHANCE OF DOZING
HOW LIKELY ARE YOU TO NOD OFF OR FALL ASLEEP WHILE LYING DOWN TO REST IN THE AFTERNOON WHEN CIRCUMSTANCES PERMIT: HIGH CHANCE OF DOZING
HOW LIKELY ARE YOU TO NOD OFF OR FALL ASLEEP WHILE SITTING INACTIVE IN A PUBLIC PLACE: SLIGHT CHANCE OF DOZING
HOW LIKELY ARE YOU TO NOD OFF OR FALL ASLEEP WHEN YOU ARE A PASSENGER IN A CAR FOR AN HOUR WITHOUT A BREAK: WOULD NEVER DOZE
HOW LIKELY ARE YOU TO NOD OFF OR FALL ASLEEP WHILE SITTING AND READING: MODERATE CHANCE OF DOZING

## 2025-02-07 ENCOUNTER — HOSPITAL ENCOUNTER (OUTPATIENT)
Dept: CT IMAGING | Facility: HOSPITAL | Age: 70
Discharge: HOME | End: 2025-02-07
Attending: STUDENT IN AN ORGANIZED HEALTH CARE EDUCATION/TRAINING PROGRAM
Payer: COMMERCIAL

## 2025-02-07 DIAGNOSIS — C15.9 PRIMARY ESOPHAGEAL SQUAMOUS CELL CARCINOMA (H): ICD-10-CM

## 2025-02-07 LAB
CREAT BLD-MCNC: 0.8 MG/DL (ref 0.7–1.3)
EGFRCR SERPLBLD CKD-EPI 2021: >60 ML/MIN/1.73M2

## 2025-02-07 PROCEDURE — 82565 ASSAY OF CREATININE: CPT

## 2025-02-07 PROCEDURE — 250N000011 HC RX IP 250 OP 636: Performed by: STUDENT IN AN ORGANIZED HEALTH CARE EDUCATION/TRAINING PROGRAM

## 2025-02-07 PROCEDURE — 250N000009 HC RX 250: Performed by: STUDENT IN AN ORGANIZED HEALTH CARE EDUCATION/TRAINING PROGRAM

## 2025-02-07 PROCEDURE — 70491 CT SOFT TISSUE NECK W/DYE: CPT

## 2025-02-07 PROCEDURE — 71260 CT THORAX DX C+: CPT

## 2025-02-07 RX ORDER — IOPAMIDOL 755 MG/ML
90 INJECTION, SOLUTION INTRAVASCULAR ONCE
Status: COMPLETED | OUTPATIENT
Start: 2025-02-07 | End: 2025-02-07

## 2025-02-07 RX ADMIN — SODIUM CHLORIDE 78 ML: 9 INJECTION, SOLUTION INTRAVENOUS at 08:08

## 2025-02-07 RX ADMIN — IOPAMIDOL 90 ML: 755 INJECTION, SOLUTION INTRAVENOUS at 08:02

## 2025-02-11 ENCOUNTER — OFFICE VISIT (OUTPATIENT)
Dept: SLEEP MEDICINE | Facility: CLINIC | Age: 70
End: 2025-02-11
Attending: PHYSICIAN ASSISTANT
Payer: COMMERCIAL

## 2025-02-11 VITALS
DIASTOLIC BLOOD PRESSURE: 56 MMHG | HEIGHT: 67 IN | WEIGHT: 150 LBS | HEART RATE: 66 BPM | BODY MASS INDEX: 23.54 KG/M2 | RESPIRATION RATE: 16 BRPM | SYSTOLIC BLOOD PRESSURE: 124 MMHG | OXYGEN SATURATION: 99 %

## 2025-02-11 DIAGNOSIS — G47.63 SLEEP RELATED BRUXISM: ICD-10-CM

## 2025-02-11 DIAGNOSIS — R06.83 SNORING: ICD-10-CM

## 2025-02-11 DIAGNOSIS — R06.81 WITNESSED EPISODE OF APNEA: ICD-10-CM

## 2025-02-11 DIAGNOSIS — R53.82 CHRONIC FATIGUE: Primary | ICD-10-CM

## 2025-02-11 DIAGNOSIS — G47.59 OTHER PARASOMNIA: ICD-10-CM

## 2025-02-11 PROCEDURE — 99204 OFFICE O/P NEW MOD 45 MIN: CPT | Performed by: INTERNAL MEDICINE

## 2025-02-11 NOTE — PATIENT INSTRUCTIONS
Patient education: What is a sleep study?     What is a sleep study? -- A sleep study is a test that measures how well you sleep and checks for sleep problems. For some sleep studies, you stay overnight in a sleep lab at a hospital or sleep center.     What happens during a sleep study? -- Before you go to sleep, a technician attaches small, sticky patches called  electrodes  to your head, chest, and legs. He or she will also place a small tube beneath your nose and might wrap 1 or 2 belts around your chest.   Each of these items has wires that connect to monitors. The monitors record your movement, brain activity, breathing, and other body functions while you sleep.  If you have a history of trouble falling asleep, your doctor might prescribe a medicine to help you fall asleep in the lab. If you have never taken the medicine before, your doctor might ask you take it on a night before your sleep study to see how it affects you.   Why might my doctor order a sleep study? -- Your doctor will order a sleep study if he or she thinks you have sleep apnea or a different condition that makes you:   ?Have sudden jerking leg movements while you sleep, called  periodic limb movements.    ?Feel very sleepy during the day and fall asleep all of a sudden, called  narcolepsy.    ?Have trouble falling asleep or staying asleep over a long period of time, called  chronic insomnia.    ?Do odd things while you sleep, such as walking.  How should I prepare for a sleep study? -- On the day of your sleep study, you should:   ?Avoid alcohol   ?Avoid drinking coffee, tea, sodas, and other drinks that have caffeine in the afternoon and evening   ?Take all of your regular medicines     The cost of care estimate line is [phone] . They are able to give the patient an estimate of the charges and also an estimate of their insurance coverage/patient responsibility.   After your sleep study is performed, please call us at [phone] or  [phone]  to schedule for a follow up to review the results of the sleep study.    Please bring one tab of low dose melatonin 3 mg or less to the night of the study.    Melatonin intake is completely voluntary.    You may take own melatonin after arrival to sleep center. Do not drive or operate machinery after intake of melatonin.     Please make every effort you can to sleep on your back with one pillow behind your head.    Please also call your insurance company next week to see if your sleep study is approved and find out your co-pay.    Please do not drive drowsy under any circumstances.  If you find yourself in a situation in which you are driving and feeling sleepy, please pull over right away in a safe place and take a quick nap before getting back on the road.

## 2025-02-11 NOTE — PROGRESS NOTES
Additional 15 minutes on the date of service was spent performing the following:    -Preparing to see the patient  -Obtaining and/or reviewing separately obtained history   -Ordering medications, tests, or procedures   -Documenting clinical information in the electronic or other health record     Assessment and Plan:    In summary Lopez Hogue is a 69 year old year old male here for sleep disturbance.  1.  Chronic fatigue/snoring/witnessed apnea/sleep-related bruxism/other parasomnia   Lopez Hogue has high risk for obstructive sleep apnea based on the history of chronic fatigue, snoring, witnessed apnea and a crowded airway. I educated the patient on the underlying pathophysiology of obstructive sleep apnea. We reviewed the risks associated with sleep apnea, including increased cardiovascular risk and overall death. We talked about treatments briefly. I recommend getting an split-night nocturnal polysomnography. The patient should return to the clinic to discuss results and treatment option in a patient-centered approach.    The patient has given me permission to put in an order for CPAP if his sleep study is positive for JOSE with [facility].    History of present illness:    He is a 69 year old male who comes to the clinic with a chief complaint of chronic fatigue has been going on for more than 5 years.  This has gotten worse progressively in the last 2 years.  The patient's wife has informed him that he has significant pauses in his breathing during sleep followed by loud snoring.  The patient has teeth grinding during sleep.  His wife also mentions that he has episodes of twitching of the upper and lower limbs.  He denies any episodes of tongue biting or loss of bowel or bladder function.     Sleep-Wake Cycle:    TIME IN BED:    1) Work/School Days:    Do you work or go to school? Yes   What time do you usually get into bed? 7:00 pm   About how long does it take you to fall asleep? Various  usually half hour to an hour   How often do you have trouble falling asleep? 4 nights a week   How often do you wake up during the night? Every night sometimes 3 or more times a night   Do you work days/evenings/nights/rotating shifts? Days   What wakes you up at night? Snorting self awake    Use the bathroom    Anxiety    Uncertain   How often do you have trouble falling back to sleep? 4   About how long does it take to fall back to sleep? 20 minutes sometimes more   What do you usually do if you have trouble getting back to sleep? Read   What time do you usually get out of bed to start your day? 4:30 am   Do you use an alarm? Yes   2) Weekends/Non-work Days/All Other Days    What time do you usually get into bed? Same around 7:00 pm   About how long does it take you to fall asleep? Same 30 minutes to an hour   What time do you usually get out of bed to start your day? 4:30   Do you use an alarm? Yes   SLEEP NEED    On average, about how much sleep do you think you get? 7 to 7.5   About how much sleep do you think you need? 8.5 to 9   SLEEP POSITION    Which sleep positions do you prefer? Side    Head Elevated    Recliner   Do you do any of the following activities in bed? Read    Use phone, computer, or tablet   How often do you take a nap on purpose? 4   About how long are your naps? 1 to 2 hours   Do you feel better after naps? Yes   How often do you doze off unintentionally? 2   Have you ever had a driving accident or near-miss due to sleepiness/drowsiness? No     LAURA:  LAURA Total Score: (Patient-Rptd) 23  Total score - Holden: (Patient-Rptd) 9 (2/5/2025  2:40 AM)    Patient told to return to review the results of the sleep study after it has been interpreted.    Patient Active Problem List   Diagnosis    HTN (hypertension)    Nonrheumatic aortic valve stenosis    Sweat gland carcinoma    Unstable angina (H)    Coronary artery disease involving native heart with unstable angina pectoris (H)    Dyslipidemia,  goal LDL below 70    Acute chest pain    Coronary 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, unspecified vessel or lesion type    Hemorrhage of oropharynx    Vallecular mass    Squamous cell carcinoma of vallecula (H)    Primary esophageal squamous cell carcinoma (H)    Cellulitis    Fever, unspecified fever cause    Syncope and collapse    Malignant neoplasm of esophagus, unspecified location (H)    Iron deficiency anemia secondary to inadequate dietary iron intake    Acute post-operative pain    Esophageal cancer (H)    Other ventricular tachycardia (H)    Alcohol dependence in remission (H)    Gastrointestinal hemorrhage with hematemesis    Hematemesis with nausea    Episodic mood disorder    Adenomatous polyp of colon       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)     Nonrheumatic aortic (valve) stenosis     Sweat gland carcinoma         Past Surgical History  Past Surgical History:   Procedure Laterality Date    BIOPSY  June 29015    Left gluteal and groin lymphnodes    BRONCHOSCOPY FLEXIBLE AND RIGID N/A 04/19/2024    Procedure: Bronchoscopy flexible and rigid;  Surgeon: Devin Hill MD;  Location: [facility] OR    BRONCHOSCOPY FLEXIBLE AND RIGID N/A 04/28/2024    Procedure: Bronchoscopy flexible and rigid;  Surgeon: Jessie Kim MD;  Location: [facility] OR    CARDIAC SURGERY  March 2023    2 stents placed    COLONOSCOPY  2012    CV CORONARY ANGIOGRAM N/A 03/27/2023    Procedure: Coronary Angiogram;  Surgeon: Miki Etienne MD;  Location: [facility] CV    CV CORONARY ANGIOGRAM N/A 05/09/2023    Procedure: Coronary Angiogram;  Surgeon: Miki Etienne MD;  Location: [facility] CATH LAB CV    CV LEFT HEART  CATH N/A 03/27/2023    Procedure: Left Heart Catheterization;  Surgeon: Miki Etienne MD;  Location: [facility] LAB CV    CV LEFT HEART CATH N/A 05/09/2023    Procedure: Left Heart Catheterization;  Surgeon: Miki Etienne MD;  Location: [facility] LAB CV    CV PCI N/A 03/27/2023    Procedure: Percutaneous Coronary Intervention;  Surgeon: Miki Etienne MD;  Location: [facility] CV    ENDOSCOPIC ULTRASOUND UPPER GASTROINTESTINAL TRACT (GI) N/A 11/28/2023    Procedure: Endoscopic ultrasound upper gastrointestinal tract (GI);  Surgeon: Shahriar Giraldo MD;  Location: [facility] GI    ESOPHAGOGASTRODUODENOSCOPY, WITH BOTULINUM TOXIN INJECTION N/A 04/29/2024    Procedure: Esophagogastroduodenoscopy, With Botulinum Toxin Injection;  Surgeon: Jessie Kim MD;  Location: [facility] GI    ESOPHAGOSCOPY, GASTROSCOPY, DUODENOSCOPY (EGD), COMBINED N/A 11/08/2023    Procedure: ESOPHAGOGASTRODUODENOSCOPY, WITH BIOPSY;  Surgeon: Shahriar Giraldo MD;  Location: [facility] GI    ESOPHAGOSCOPY, GASTROSCOPY, DUODENOSCOPY (EGD), COMBINED N/A 04/19/2024    Procedure: Esophagoscopy, gastroscopy, duodenoscopy (EGD), combined;  Surgeon: Devin Hill MD;  Location: [facility] OR    ESOPHAGOSCOPY, GASTROSCOPY, DUODENOSCOPY (EGD), COMBINED N/A 04/28/2024    Procedure: Esophagoscopy, gastroscopy, duodenoscopy (EGD), combined;  Surgeon: Jessie Kim MD;  [facility]: [facility]    ESOPHAGOSCOPY, GASTROSCOPY, DUODENOSCOPY (EGD), COMBINED N/A 05/05/2024    Procedure: Esophagoscopy, gastroscopy, duodenoscopy (EGD), combined-under fluoro & dilation, nasogastric tube placement, bronchoscopy;  Surgeon: Kevin Calderon MD;  Location: [facility] OR    ESOPHAGOSCOPY, GASTROSCOPY, DUODENOSCOPY (EGD), COMBINED N/A 8/8/2024    Procedure: ESOPHAGOGASTRODUODENOSCOPY with fluoroscopy and stent placement;  Surgeon: Livan Monahan MD;  Location:  OR    ESOPHAGOSCOPY, GASTROSCOPY, DUODENOSCOPY (EGD), COMBINED N/A 8/15/2024    Procedure:  ESOPHAGOGASTRODUODENOSCOPY with stent placement;  Surgeon: Livan Monahan MD;  Location:  OR    ESOPHAGOSCOPY, GASTROSCOPY, DUODENOSCOPY (EGD), COMBINED N/A 12/5/2024    Procedure: ESOPHAGOGASTRODUODENOSCOPY with fluoroscopy, stent and jejunostomy tube removal;  Surgeon: Livan Monahan MD;  Location:  OR    IR JEJUNOSTOMY TUBE CHANGE  05/19/2024    LAPAROSCOPIC ASSISTED INSERTION TUBE JEJUNOSTOMY N/A 04/01/2024    Procedure: Laparoscopic Jejunostomy Tube Insertion and Esophagogastroduodenoscopy;  Surgeon: Kevin Calderon MD;  [facility]: [facility]    LARYNGOSCOPY WITH BIOPSY(IES) N/A 10/12/2023    Procedure: LARYNGOSCOPY, WITH BIOPSY;  Surgeon: Edi Felix MD;  Location: [facility]    OTHER SURGICAL HISTORY  01/01/2015    WIDE EXCISION OF LEFT GLUTEAL MASSTNM: [id number], stage: II hidradenocarcinoma Grade II, 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, sentinel lymph node biopsy negative     SOFT TISSUE SURGERY  June 2023    left gluteal and lymphnodes    THORACOSCOPIC, LAPAROSCOPIC ESOPHAGECTOMY, COMBINED N/A 04/19/2024    Procedure: Laparoscopic and right thoracoscopic esophagogastrectomy, right AND left chest tube placement;  Surgeon: Devin Hill MD;  Location: [facility] OR    VASCULAR SURGERY  March 2023    Cath 2 stents        Meds  Current Outpatient Medications   Medication Sig Dispense Refill    acetaminophen (TYLENOL) 500 MG tablet Take 500-1,000 mg by mouth every 8 hours as needed for fever or pain      aspirin (ASA) 81 MG chewable tablet Take 81 mg by mouth every morning.      busPIRone (BUSPAR) 5 MG tablet Take 1 tablet (5 mg) by mouth 3 times daily. 90 tablet 2    chlorhexidine (PERIDEX) 0.12 % solution Take 15 mLs by mouth as needed.      levETIRAcetam (KEPPRA) 250 MG tablet Take 1 tablet (250 mg) by mouth at bedtime. 30 tablet 5    levothyroxine (SYNTHROID/LEVOTHROID) 25 MCG tablet Take 1 tablet (25 mcg) by mouth daily. 30 tablet 3    nitroGLYcerin (NITROSTAT) 0.4 MG sublingual tablet PLACE 1 TABLET UNDER THE TONGUE  EVERY 5 MINUTES FOR CHEST PAIN FOR 3 DOSES. IF SYMPTOMS PERSIST 5 MINUTES AFTER 1ST DOSE CALL 911. 25 tablet 0    pantoprazole (PROTONIX) 40 MG EC tablet Take 1 tablet (40 mg) by mouth 2 times daily. 180 tablet 4    polyethylene glycol (MIRALAX) 17 g packet Take 17 g by mouth daily as needed for constipation.      rosuvastatin (CRESTOR) 40 MG tablet Take 1 tablet (40 mg) by mouth daily. 90 tablet 2    senna-docusate (SENOKOT-S/PERICOLACE) 8.6-50 MG tablet Take 1 tablet by mouth 2 times daily as needed for constipation 30 tablet 0    sertraline (ZOLOFT) 100 MG tablet Take 1.5 tablets (150 mg) by mouth every morning. 135 tablet 2        Allergies  Coconut flavoring agent (non-screening)     Social History  Social History     Socioeconomic History    Marital status:      Spouse name: Not on file    Number of children: 2    Years of education: Not on file    Highest education level: Not on file   Occupational History    Occupation: non-profit manager   Tobacco Use    Smoking status: Former     Current packs/day: 0.00     Average packs/day: 2.0 packs/day for 41.0 years (82.0 ttl pk-yrs)     Types: Cigarettes     Start date: 1972     Quit date: 2013     Years since quittin.6     Passive exposure: Past    Smokeless tobacco: Never    Tobacco comments:     Quit    Vaping Use    Vaping status: Never Used   Substance and Sexual Activity    Alcohol use: Not Currently     Comment: Alhaji     Drug use: Not Currently     Types: Cocaine, Marijuana     Comment: alhaji     Sexual activity: Not Currently   Other Topics Concern    Parent/sibling w/ CABG, MI or angioplasty before 65F 55M? No   Social History Narrative    Patient works.  Lives with his wife.     Social Drivers of Health     Financial Resource Strain: Low Risk  (2024)    Financial Resource Strain     Within the past 12 months, have you or your family members you live with been unable to get utilities (heat, electricity) when it was  really needed?: No   Food Insecurity: Low Risk  (8/18/2024)    Food Insecurity     Within the past 12 months, did you worry that your food would run out before you got money to buy more?: No     Within the past 12 months, did the food you bought just not last and you didn t have money to get more?: No   Transportation Needs: Low Risk  (8/18/2024)    Transportation Needs     Within the past 12 months, has lack of transportation kept you from medical appointments, getting your medicines, non-medical meetings or appointments, work, or from getting things that you need?: No   Physical Activity: Inactive (8/18/2024)    Exercise Vital Sign     Days of Exercise per Week: 0 days     Minutes of Exercise per Session: 0 min   Stress: Stress Concern Present (8/18/2024)    [employer] - Occupational Stress Questionnaire     Feeling of Stress : Very much   Social Connections: Unknown (8/18/2024)    Social Connection and Isolation Panel [NHANES]     Frequency of Communication with Friends and Family: Not on file     Frequency of Social Gatherings with Friends and Family: Twice a week     Attends Sikhism Services: Not on file     Active Member of Clubs or Organizations: Not on file     Attends Club or Organization Meetings: Not on file     Marital Status: Not on file   Interpersonal Safety: Low Risk  (12/5/2024)    Interpersonal Safety     Do you feel physically and emotionally safe where you currently live?: Yes     Within the past 12 months, have you been hit, slapped, kicked or otherwise physically hurt by someone?: No     Within the past 12 months, have you been humiliated or emotionally abused in other ways by your partner or ex-partner?: No   Housing Stability: Low Risk  (8/18/2024)    Housing Stability     Do you have housing? : Yes     Are you worried about losing your housing?: No        Family History  Family History   Problem Relation Age of Onset    Dementia Mother     Mental Illness  "Mother         Dementia    Snoring Father     Substance Abuse Sister         Alcohol    Substance Abuse Brother         Alcohol    Substance Abuse Son         Alcohol    Anesthesia Reaction No family hx of     Thrombocytopenia No family hx of     Cancer No family hx of     Thrombosis No family hx of       Review of Systems:  Constitutional: Negative except as noted in HPI.   Eyes: Negative except as noted in HPI.   ENT: Negative except as noted in HPI.   Cardiovascular: Negative except as noted in HPI.   Respiratory: Negative except as noted in HPI.   Gastrointestinal: Negative except as noted in HPI.   Genitourinary: Negative except as noted in HPI.   Musculoskeletal: Negative except as noted in HPI.   Integumentary: Negative except as noted in HPI.   Neurological: Negative except as noted in HPI.   Psychiatric: Negative except as noted in HPI.   Endocrine: Negative except as noted in HPI.   Hematologic/Lymphatic: Negative except as noted in HPI.      Physical Exam:  /56   Pulse 66   Resp 16   Ht 1.702 m (5' 7\")   Wt 68 kg (150 lb)   SpO2 99%   BMI 23.49 kg/m    BMI:Body mass index is 23.49 kg/m .   GEN: NAD  Head: Normocephalic.  EYES: PERRLA, EOMI  ENT: Oropharynx is clear, Fisher class 3+ airway.   Nasal mucosa is moist without erythema  Neck : Thyroid is within normal limits.   CV: Regular rate and rhythm, S1 & S2 positive.  2 out of 6 systolic murmur appreciated.  LUNGS: Bilateral breathsounds heard.   ABDOMEN: Positive bowel sounds in all quadrants, soft, no rebound or guarding  MUSCULOSKELETAL: No leg swelling  SKIN: warm, dry, no rashes  Neurological: Alert, Gait is normal. Strength 5/5 in all extremities.  Psych: normal mood, normal affect     Labs/Studies:     Lab Results   Component Value Date    PH 7.44 05/05/2024    PH 7.40 04/28/2024    PO2 72 (L) 05/05/2024    PO2 84 04/28/2024    PCO2 41 05/05/2024    PCO2 55 (H) 04/28/2024    HCO3 28 05/05/2024    HCO3 34 (H) 04/28/2024    KASSY 3.0 " 05/05/2024    KASSY 7.3 (H) 04/28/2024     Lab Results   Component Value Date    TSH 3.43 12/06/2024    TSH 4.37 (H) 10/15/2024     Lab Results   Component Value Date    GLC 96 11/24/2024     (H) 10/20/2024     Lab Results   Component Value Date    HGB 11.2 (L) 11/24/2024    HGB 12.7 (L) 10/20/2024     Lab Results   Component Value Date    BUN 8.4 11/24/2024    BUN 16.3 10/20/2024    CR 0.8 02/07/2025    CR 0.63 (L) 11/24/2024     Lab Results   Component Value Date    AST 78 (H) 11/24/2024    AST 66 (H) 10/20/2024     (H) 11/24/2024    ALT 65 10/20/2024    ALKPHOS 129 11/24/2024    ALKPHOS 160 (H) 10/20/2024    BILITOTAL 0.7 11/24/2024    BILITOTAL 0.6 10/20/2024       Ferritin   Date Value Ref Range Status   02/11/2024 365 31 - 409 ng/mL Final     Iron   Date Value Ref Range Status   02/11/2024 33 (L) 61 - 157 ug/dL Final     Iron Binding Capacity   Date Value Ref Range Status   02/11/2024 174 (L) 240 - 430 ug/dL Final       Lab Results   Component Value Date    IRONSAT 19 02/11/2024      Recent Results (from the past 744 hours)   CT Soft Tissue Neck w Contrast    Narrative    EXAM: CT SOFT TISSUE NECK W CONTRAST  LOCATION: [facility]  DATE: 2/7/2025    INDICATION:  Primary esophageal squamous cell carcinoma (H)  COMPARISON: 7/26/2024 7/8/2024  CONTRAST: 90ml Isovue 370  TECHNIQUE: Routine CT Soft Tissue Neck with IV contrast. Multiplanar reformats. Dose reduction techniques were used.    FINDINGS:     MUCOSAL SPACES/SOFT TISSUES: Post therapeutic changes in the epiglottis. Mucosal spaces in the neck are grossly stable with post therapeutic findings noted.. Normal vocal cords and infraglottic trachea. Normal parapharyngeal space and subcutaneous soft   tissues. Normal oral cavity,  spaces, and floor of mouth structures.    LYMPH NODES: No pathologic lymph nodes by size or morphology criteria.     SALIVARY GLANDS: Normal parotid and submandibular glands.    THYROID:  Normal.     VESSELS: Vascular structures of the neck are patent.    VISUALIZED INTRACRANIAL/ORBITS/SINUSES: No abnormality of the visualized intracranial compartment or orbits. Visualized paranasal sinuses and mastoid air cells are clear.    OTHER: No destructive osseous lesion. Markedly patulous esophagus. Biapical pleural scarring.      Impression    IMPRESSION:   1.  No pathologic adenopathy. Markedly patulous esophagus.   CT Chest w Contrast    Narrative    EXAM: CT CHEST W CONTRAST  LOCATION: [facility]  DATE: 2/7/2025    INDICATION:  Primary esophageal squamous cell carcinoma (H)  COMPARISON: Multiple most recent CT 7/26/2024.  TECHNIQUE: CT chest with IV contrast. Multiplanar reformats were obtained. Dose reduction techniques were used.    CONTRAST: 90ml Isovue 370    FINDINGS:   LUNGS AND PLEURA: Unchanged biapical scarring. There are new clustered nodules in the right costophrenic angle which measure up to 5 mm (series 9 image 208). Numerous other patchy consolidative and groundglass opacities in both lungs have not   significantly changed. This is most marked in the left posterolateral upper lobe (series 9 image 65). Mild emphysema. Patent central airways. Pleural effusion..    MEDIASTINUM/AXILLAE: Esophagectomy and gastric pull-up. No lymphadenopathy. No aortic aneurysm. New mild bilateral gynecomastias.    CORONARY ARTERY CALCIFICATION: Severe.    UPPER ABDOMEN: Subcentimeter hypodense splenic lesions are more conspicuous but present on older exams favoring benign etiology.    MUSCULOSKELETAL: Normal.      Impression    IMPRESSION:   1.  Esophagectomy and gastric pull-up.  2.  Clustered nodules in the right costophrenic angle measuring up to 5 mm technically indeterminate but pattern favors inflammatory etiology. Short-term follow-up recommended.  3.  Other patchy consolidative and ground glass opacities are unchanged.       Patient was strongly advised in AVS to avoid driving,  operating any heavy machinery or other hazardous situations while drowsy or sleepy. Patient was counseled on the importance of driving while alert, to pull over if drowsy, or nap before getting into the vehicle if sleepy.     Patient verbalized understanding of these issues, agrees with the plan and all questions were answered today. Patient was given an opportuntity to voice any other symptoms or concerns not listed above. Patient did not have any other symptoms or concerns.         Arvin Hurd DO,   Board Certified in Internal Medicine and Sleep Medicine    (Note created with Dragon voice recognition and unintended spelling errors and word substitutions may occur)

## 2025-02-11 NOTE — NURSING NOTE
"Chief Complaint   Patient presents with    Snoring    Sleep Problem     Patient has difficulty falling asleep and staying asleep.     Sleep Apnea       Initial /56   Pulse 66   Resp 16   Ht 1.702 m (5' 7\")   Wt 68 kg (150 lb)   SpO2 99%   BMI 23.49 kg/m   Estimated body mass index is 23.49 kg/m  as calculated from the following:    Height as of this encounter: 1.702 m (5' 7\").    Weight as of this encounter: 68 kg (150 lb).    Medication Reconciliation: complete    Neck circumference: 15.25 inches / 39 centimeters.    DME: N/A    Samreen James CMA on 2/11/2025 at 9:53 AM      "

## 2025-02-12 DIAGNOSIS — E03.9 HYPOTHYROIDISM, UNSPECIFIED TYPE: ICD-10-CM

## 2025-02-13 RX ORDER — LEVOTHYROXINE SODIUM 25 UG/1
25 TABLET ORAL DAILY
Qty: 90 TABLET | Refills: 1 | Status: SHIPPED | OUTPATIENT
Start: 2025-02-13

## 2025-02-13 NOTE — TELEPHONE ENCOUNTER
Levothyroxine Refill   Last prescribing provider: Carolee Zapien     Last clinic visit date: 10/15/24 Carolee Zapien     Recommendations for requested medication (if none, N/A): Copied from chart note   -start levothyroxine 25 mcg daily and recheck TFTs in 6 and 12 weeks     Any other pertinent information (if none, N/A): N/A    Refilled: Y/N, if NO, why?

## 2025-02-17 ENCOUNTER — THERAPY VISIT (OUTPATIENT)
Dept: SPEECH THERAPY | Facility: CLINIC | Age: 70
End: 2025-02-17
Payer: COMMERCIAL

## 2025-02-17 ENCOUNTER — OFFICE VISIT (OUTPATIENT)
Dept: OTOLARYNGOLOGY | Facility: CLINIC | Age: 70
End: 2025-02-17
Payer: COMMERCIAL

## 2025-02-17 VITALS
TEMPERATURE: 98.3 F | HEART RATE: 77 BPM | SYSTOLIC BLOOD PRESSURE: 107 MMHG | WEIGHT: 151 LBS | BODY MASS INDEX: 23.65 KG/M2 | DIASTOLIC BLOOD PRESSURE: 60 MMHG | OXYGEN SATURATION: 98 %

## 2025-02-17 DIAGNOSIS — C10.0 SQUAMOUS CELL CARCINOMA OF VALLECULA (H): Primary | ICD-10-CM

## 2025-02-17 DIAGNOSIS — R13.12 OROPHARYNGEAL DYSPHAGIA: ICD-10-CM

## 2025-02-17 PROCEDURE — 99207 PR NO CHARGE LOS: CPT | Mod: GN | Performed by: SPEECH-LANGUAGE PATHOLOGIST

## 2025-02-17 PROCEDURE — 31575 DIAGNOSTIC LARYNGOSCOPY: CPT | Performed by: STUDENT IN AN ORGANIZED HEALTH CARE EDUCATION/TRAINING PROGRAM

## 2025-02-17 PROCEDURE — 99213 OFFICE O/P EST LOW 20 MIN: CPT | Mod: 25 | Performed by: STUDENT IN AN ORGANIZED HEALTH CARE EDUCATION/TRAINING PROGRAM

## 2025-02-17 ASSESSMENT — PAIN SCALES - GENERAL: PAINLEVEL_OUTOF10: NO PAIN (0)

## 2025-02-17 NOTE — PROGRESS NOTES
Lopez Hogue was seen for allied health care provider visit in conjunction with ENT clinic visit. Speaking is clear. Swallowing is reported to be functional with the exception of pills. About once per day he has trouble where a pill gets stuck. Upon further investigation he is taking most of his pills one at a time but there is one med he takes one and a half pills. It's when he takes these two together that something gets stuck. He will start taking pills one at a time, including the half pill. He may try puree if needed with pills. Formal swallow evaluation not indicated at this time but he would benefit from video swallow study if pill swallowing worsens or he begins having trouble swallowing pills more than he currently does. Pt encouraged to continue to complete pharyngeal strengthening exercises. All questions answered within scope of practice for pt and his SO. Encouraged pt to reach out with any questions or concerns.        Pat Chavira MS, CCC-SLP  Speech-Language Pathology  [facility]  Department of Otolaryngology/D&T - 4th floor  Phone: [phone]  Email: [email or web address]

## 2025-02-17 NOTE — PROGRESS NOTES
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 Esophagectomy 4/19/24     Imaging:   CT neck 7/8/24: prominent right lingual tonsil tissue suspicious for recurrence  PET/CT 7/26/24: ZANE     Interval history: Doing well today. Has noticed some swelling of submandibular glands     Physical Examination:  Alert, NAD  Breathing comfortably  No palpable adenopathy  No lesions in oral cavity or oropharynx     Procedure: Fiberoptic laryngoscopy performed. No lesions seen. Airway patent     A/P:  Doing well without signs of disease in the HN. We will continue routine follow up. I will see him back in 4 months.     Dr Alva will continue to arrange follow up scans     Edi Felix MD        25 minutes spent on the date of the encounter in chart review, patient visit, review of tests, documentation and/or discussion with other providers about the issues documented above asides from time spent doing flexible laryngoscopy

## 2025-02-17 NOTE — PATIENT INSTRUCTIONS
You were seen in the ENT Clinic today by Dr. Felix. If you have any questions or concerns after your appointment, please contact us (see below)    Please return to clinic in 4 months for follow up with Dr. Felix     How to Contact Us:  Send a [employer] message to your provider. Our team will respond to you via [employer] Occasionally, we will need to call you to get further information.  For urgent matters (Monday-Friday), call the ENT Clinic: [phone] and speak with a call center team member - they will route your call appropriately.   If you'd like to speak directly with a nurse, please find our contact information below. We do our best to check voicemail frequently throughout the day, and will work to call you back within 1-2 days. For urgent matters, please use the general clinic phone numbers listed above.    Hollie ARAMBULA RN, BSN  RN Care Coordinator, ENT Clinic  [facility]  Direct: [phone]

## 2025-02-17 NOTE — LETTER
2/17/2025       RE: Lopez Hogue  [address] [facility] [address]     Dear Colleague,    Thank you for referring your patient, Lopez Hogue, to the [facility] [place] at [facility]. Please see a copy of my visit note below.    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 Esophagectomy 4/19/24     Imaging:   CT neck 7/8/24: prominent right lingual tonsil tissue suspicious for recurrence  PET/CT 7/26/24: ZANE     Interval history: Doing well today. Has noticed some swelling of submandibular glands     Physical Examination:  Alert, NAD  Breathing comfortably  No palpable adenopathy  No lesions in oral cavity or oropharynx     Procedure: Fiberoptic laryngoscopy performed. No lesions seen. Airway patent     A/P:  Doing well without signs of disease in the HN. We will continue routine follow up. I will see him back in 4 months.     Dr Alva will continue to arrange follow up scans     Edi Felix MD        25 minutes spent on the date of the encounter in chart review, patient visit, review of tests, documentation and/or discussion with other providers about the issues documented above asides from time spent doing flexible laryngoscopy      Again, thank you for allowing me to participate in the care of your patient.      Sincerely,    Edi Felix MD

## 2025-02-17 NOTE — NURSING NOTE
Chief Complaint   Patient presents with    RECHECK     Follow up     Blood pressure 107/60, pulse 77, temperature 98.3  F (36.8  C), weight 68.5 kg (151 lb), SpO2 98%.  Tanner Kelly LPN

## 2025-02-24 ENCOUNTER — LAB (OUTPATIENT)
Dept: LAB | Facility: CLINIC | Age: 70
End: 2025-02-24
Payer: COMMERCIAL

## 2025-02-24 DIAGNOSIS — R79.89 ELEVATED LIVER FUNCTION TESTS: ICD-10-CM

## 2025-02-24 PROCEDURE — 36415 COLL VENOUS BLD VENIPUNCTURE: CPT

## 2025-02-24 PROCEDURE — 80076 HEPATIC FUNCTION PANEL: CPT

## 2025-02-25 ENCOUNTER — LAB (OUTPATIENT)
Dept: LAB | Facility: CLINIC | Age: 70
End: 2025-02-25
Attending: STUDENT IN AN ORGANIZED HEALTH CARE EDUCATION/TRAINING PROGRAM
Payer: COMMERCIAL

## 2025-02-25 ENCOUNTER — ONCOLOGY VISIT (OUTPATIENT)
Dept: ONCOLOGY | Facility: CLINIC | Age: 70
End: 2025-02-25
Attending: STUDENT IN AN ORGANIZED HEALTH CARE EDUCATION/TRAINING PROGRAM
Payer: COMMERCIAL

## 2025-02-25 VITALS
RESPIRATION RATE: 18 BRPM | BODY MASS INDEX: 23.45 KG/M2 | TEMPERATURE: 97.4 F | DIASTOLIC BLOOD PRESSURE: 59 MMHG | OXYGEN SATURATION: 99 % | WEIGHT: 149.7 LBS | SYSTOLIC BLOOD PRESSURE: 99 MMHG | HEART RATE: 77 BPM

## 2025-02-25 DIAGNOSIS — C10.0 SQUAMOUS CELL CARCINOMA OF VALLECULA (H): ICD-10-CM

## 2025-02-25 DIAGNOSIS — E03.9 HYPOTHYROIDISM, UNSPECIFIED TYPE: ICD-10-CM

## 2025-02-25 DIAGNOSIS — C15.9 PRIMARY ESOPHAGEAL SQUAMOUS CELL CARCINOMA (H): Primary | ICD-10-CM

## 2025-02-25 DIAGNOSIS — I20.0 UNSTABLE ANGINA (H): Primary | ICD-10-CM

## 2025-02-25 LAB
ALBUMIN SERPL BCG-MCNC: 3.9 G/DL (ref 3.5–5.2)
ALBUMIN SERPL BCG-MCNC: 4.2 G/DL (ref 3.5–5.2)
ALP SERPL-CCNC: 148 U/L (ref 40–150)
ALP SERPL-CCNC: 150 U/L (ref 40–150)
ALT SERPL W P-5'-P-CCNC: 56 U/L (ref 0–70)
ALT SERPL W P-5'-P-CCNC: 73 U/L (ref 0–70)
ANION GAP SERPL CALCULATED.3IONS-SCNC: 8 MMOL/L (ref 7–15)
AST SERPL W P-5'-P-CCNC: 108 U/L (ref 0–45)
AST SERPL W P-5'-P-CCNC: 81 U/L (ref 0–45)
BASOPHILS # BLD AUTO: 0 10E3/UL (ref 0–0.2)
BASOPHILS NFR BLD AUTO: 0 %
BILIRUB DIRECT SERPL-MCNC: 0.46 MG/DL (ref 0–0.3)
BILIRUB SERPL-MCNC: 0.7 MG/DL
BILIRUB SERPL-MCNC: 0.9 MG/DL
BUN SERPL-MCNC: 10.7 MG/DL (ref 8–23)
CALCIUM SERPL-MCNC: 9 MG/DL (ref 8.8–10.4)
CHLORIDE SERPL-SCNC: 107 MMOL/L (ref 98–107)
CREAT SERPL-MCNC: 0.74 MG/DL (ref 0.67–1.17)
EGFRCR SERPLBLD CKD-EPI 2021: >90 ML/MIN/1.73M2
EOSINOPHIL # BLD AUTO: 0 10E3/UL (ref 0–0.7)
EOSINOPHIL NFR BLD AUTO: 0 %
ERYTHROCYTE [DISTWIDTH] IN BLOOD BY AUTOMATED COUNT: 14.3 % (ref 10–15)
GLUCOSE SERPL-MCNC: 116 MG/DL (ref 70–99)
HCO3 SERPL-SCNC: 26 MMOL/L (ref 22–29)
HCT VFR BLD AUTO: 37.1 % (ref 40–53)
HGB BLD-MCNC: 12.1 G/DL (ref 13.3–17.7)
IMM GRANULOCYTES # BLD: 0 10E3/UL
IMM GRANULOCYTES NFR BLD: 0 %
LYMPHOCYTES # BLD AUTO: 0.8 10E3/UL (ref 0.8–5.3)
LYMPHOCYTES NFR BLD AUTO: 17 %
MCH RBC QN AUTO: 29.5 PG (ref 26.5–33)
MCHC RBC AUTO-ENTMCNC: 32.6 G/DL (ref 31.5–36.5)
MCV RBC AUTO: 91 FL (ref 78–100)
MONOCYTES # BLD AUTO: 0.4 10E3/UL (ref 0–1.3)
MONOCYTES NFR BLD AUTO: 9 %
NEUTROPHILS # BLD AUTO: 3.4 10E3/UL (ref 1.6–8.3)
NEUTROPHILS NFR BLD AUTO: 73 %
NRBC # BLD AUTO: 0 10E3/UL
NRBC BLD AUTO-RTO: 0 /100
PLATELET # BLD AUTO: 141 10E3/UL (ref 150–450)
POTASSIUM SERPL-SCNC: 4.3 MMOL/L (ref 3.4–5.3)
PROT SERPL-MCNC: 7 G/DL (ref 6.4–8.3)
PROT SERPL-MCNC: 7.6 G/DL (ref 6.4–8.3)
RBC # BLD AUTO: 4.1 10E6/UL (ref 4.4–5.9)
SODIUM SERPL-SCNC: 141 MMOL/L (ref 135–145)
WBC # BLD AUTO: 4.7 10E3/UL (ref 4–11)

## 2025-02-25 PROCEDURE — G0463 HOSPITAL OUTPT CLINIC VISIT: HCPCS | Performed by: STUDENT IN AN ORGANIZED HEALTH CARE EDUCATION/TRAINING PROGRAM

## 2025-02-25 PROCEDURE — G2211 COMPLEX E/M VISIT ADD ON: HCPCS | Performed by: STUDENT IN AN ORGANIZED HEALTH CARE EDUCATION/TRAINING PROGRAM

## 2025-02-25 PROCEDURE — 85004 AUTOMATED DIFF WBC COUNT: CPT

## 2025-02-25 PROCEDURE — 82565 ASSAY OF CREATININE: CPT

## 2025-02-25 PROCEDURE — 36415 COLL VENOUS BLD VENIPUNCTURE: CPT

## 2025-02-25 PROCEDURE — 84155 ASSAY OF PROTEIN SERUM: CPT

## 2025-02-25 PROCEDURE — 99214 OFFICE O/P EST MOD 30 MIN: CPT | Performed by: STUDENT IN AN ORGANIZED HEALTH CARE EDUCATION/TRAINING PROGRAM

## 2025-02-25 RX ORDER — LEVOTHYROXINE SODIUM 25 UG/1
25 TABLET ORAL DAILY
Qty: 90 TABLET | Refills: 1 | Status: SHIPPED | OUTPATIENT
Start: 2025-02-25

## 2025-02-25 ASSESSMENT — PAIN SCALES - GENERAL: PAINLEVEL_OUTOF10: NO PAIN (0)

## 2025-02-25 NOTE — PROGRESS NOTES
[facility]   Return Patient Visit    Name: Lopez Hogue  MRN: [id number]  Date of visit: Feb 25, 2025    Diagnosis: Oropharyngeal cancer, esophageal SCC  Stage:    Cancer Staging   Primary esophageal squamous cell carcinoma (H)  Staging form: Esophagus - Squamous Cell Carcinoma, AJCC 8th Edition  - Clinical stage from 11/28/2023: Stage II (cT2, cN1, cM0) - Signed by Yannick Alva MD on 7/9/2024  - Pathologic stage from 4/19/2024: Stage I (ypT0, pN0, cM0) - Signed by Yannick Alva MD on 7/9/2024    Squamous cell carcinoma of vallecula (H)  Staging form: Pharynx - Oropharynx, AJCC 8th Edition  - Clinical stage from 10/12/2023: Stage JUAN MIGUEL (cT1, cN2b, cM0, p16-) - Signed by Yannick Alva MD on 11/21/2023    Molecular: p16 negative  Performance status: ECOG 0    Oncology History:     Oropharyngeal squamous cell carcinoma:  -8/18/23 CT neck: Question soft tissue lesion within the right vallecula/ventral epiglottis.   -10/12/23 R vallecula biopsy: Non-keratinizing poorly differentiated squamous cell carcinoma, p16 negative  -10/13/23 PET/CT: Findings suspicious for right vallecula squamous cell carcinoma with right level IIa and right level IV lymph node metastases.. FDG avid thickening of the mid to distal esophagus, suspicious for a synchronous primary esophageal neoplasm. Recommend correlation with endoscopy.  --7/8/24 CT neck:  Residual mass centered on the right lingual tonsil. Interval resolution of previously identified cervical lymphadenopathy. Partially visualized postsurgical changes of esophagogastrectomy with gastric pull-through and multinodular pulmonary opacities in the visualized lung apices, better appreciated on same-day CT chest 7/8/2024.  -7/8/24 ENT visit with Dr. Felix, no overt mass on exam     Esophageal squamous cell carcinoma:   -11/8/23 EGD with biopsy of mid esophageal mass: non-keratinizing poorly differentiated squamous cell carcinoma,  PD-L1 CPS 15  -11/21/23 consult with Dr. Hill: plan for neoadjuvant chemoradiation followed by laparoscopic staging and jejunostomy, followed by minimally invasive [place]-Elbert esophagectomy.   -11/28/23 staging EUS Partially obstructing and partially circumferential fungating mass in the middle third of the esophagus. Esophageal squamous cell carcinoma was staged T2 N1 (suspicious paraesophageal lymph node)   -Admission 4/19/24-5/17/24 for esophagogastrectomy, LND. Surgery without complication, postoperative course c/b re-intubation for respiratory decompensation secondary to aspiration. Compete response.     Treatment course:  -12/5/24-1/26/24 concurrent chemoradiotherapy with weekly carboplatin + paclitaxel (both esophageal and oropharyngeal/bilateral neck, infusion reaction with Taxol w/ dose 1. Tolerated rechallenge   -1/15/24 admission for neutropenia, source felt possibly secondary to cellulitis (vs radiation change), discharged with course of levofloxacin  -1/22/24 admission for recurrent fever, two days after completing levofloxacin. No neutropenia.       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     PMHx  HTN  CAD c/b MI (2023) s/p PCI x2  Prior sweat gland carcinoma s/p resection  No history of autoimmune disease.     SocHx  Lives in [facility]  Actively involved in a program for those in recovery from substance use.   Former heavy smoker (2 ppd) and drinker himself. Quit both in 2013.       Interval History:  Last seen by me 7/24/24, esophageal cancer with complete metabolic response, no role  for additional adjuvant therapy for this indication, planned for q6mo surveillance. Imaging with questionable area of persistent abnormality in the base of tongue, planned for PET and continued ENT follow up.     7/26/24 PET/CT:  Whole body PET/CT with dedicated neck PET CT demonstrates   postradiation changes in the neck. No residual or recurrent pharyngeal   mucosal space lesion. Specifically no FDG avid lesion in the tongue   base where there was a questionable finding on the prior CT neck.   Primary: I-RADS 1.   No cervical lymphadenopathy. Neck: NIRADS 1.      Status post chest esophagectomy and gastric pull-up surgery with no   evidence of local recurrence at the surgical site.     No evidence of any distant metastasis.     Scattered patchy areas of opacities in the lungs particularly in the   right upper lobe and left lung with some of these areas demonstrating   FDG activity. Findings are similar to 7/8/24 CT and represent evolving   infectious process. Continued surveillance is recommended.     7/29/24 ENT visit: exam without concern for recurrent disease    8/8/24 EGD: pyloric stent replaced    8/13/24-8/14/24 admission:  hematemesis/coffee ground emesis, symptoms resolved, planned for outpatient EGD    8/15/24 EGD: pyloric stent revised    10/14/24 ENT visit: exam clear    10/20/24 ED visit (syncope): felt possibly related to cardiac etiology (ACS ruled out)    10/29/24 TTE:   The left ventricle is normal in size.   There is mild concentric left ventricular hypertrophy.   Left ventricular function is normal.The ejection fraction is 60-65%.   Normal right ventricle size and systolic function.   Normal left atrial size.   Moderate valvular aortic stenosis is present with a peak velocity of 3.3 m/s,   mean gradient 26 mmHg, DEVAN 1.2 cm2, DI 0.37, SI 55 ml/m2.   Compared to prior study, there is no significant change.     11/24/24 CT AP:   1. Mild soft tissue swelling along the J-tube insertion site in  the  left upper quadrant, which may be within normal limits. No surrounding  fluid collection.  2. Malpositioned of gastric stent, which is lying completely  transverse with the stent ends abutting the distal gastric mucosa.  Does not cross the pylorus. GI consultation recommended.    12/5/24 EGD: pyloric botox therapy    2/7/25 CT neck: No pathologic adenopathy. Markedly patulous esophagus.     2/7/25 CT chest:    1.  Esophagectomy and gastric pull-up.  2.  Clustered nodules in the right costophrenic angle measuring up to 5 mm technically indeterminate but pattern favors inflammatory etiology. Short-term follow-up recommended.  3.  Other patchy consolidative and ground glass opacities are unchanged.    2/17/25 ENT visit: exam without concern for recurrence      Weight stable  Regurgitation issues are persistent   No hematemesis  Has had a couple of episodes c/f possible partial seizure activity, not occurring on levitiracetam  Does have significant anxiety, taking sertraline and buspirone      Exam:   BP 99/59   Pulse 77   Temp 97.4  F (36.3  C) (Oral)   Resp 18   Wt 67.9 kg (149 lb 11.2 oz)   SpO2 99%   BMI 23.45 kg/m      Gen: Comfortable, NAD  HEENT: Pupils equal, non-icteric  Neck/Lymph: Supple, no lymphadenopathy   resp: Comfortable on room air  CV: well perfuse  Ext: No swelling  Neuro: Normal gait      Labs:   Reviewed in chart    Imaging:   All pertinent imaging studies personally reviewed. Key findings summarized in oncology history above    Pathology:   Reviewed in chart, key findings summarized above      Assessment and Plan:   Lopez Hogue is a 68 year old male with HTN, CAD, prior sweat gland cancer s/p resection, and recently diagnosed head and neck squamous cell carcinoma of the vallecula, with staging revealing a synchronous esophageal squamous cell carcinoma. Presenting today to establish care with medical oncology.       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 Recovering well. Primary concerns are related to ongoing reflux and pyloric stenosis, but managing ok and weight stable. Energy level improved.       Plan:  --Regarding his esophageal squamous cell carcinoma, given his pathologic complete response, no role for adjuvant therapy at this time  --Plan for CT neck/chest/AP every 6 months for the first 2 year, then annually through year 5, next due in May  --he will continue ENT follow up for routine exams      # iatrogenic hypothyroidism  -started on levothyroxine  -most recently checked in December and normal    Plan:  --continue 25 mcg daily  --trend TSH      Yannick Alva MD PhD   of Medicine  Division of Hematology, Oncology and Transplantation    ---  30 minutes were spent on the date of the encounter performing chart review, history and exam, documentation, and further activities as noted above.     The longitudinal plan of care for the diagnosis(es)/condition(s) as documented were addressed during this visit. Due to the added complexity in care, I will continue to support Fabricio in the subsequent management and with ongoing continuity of care.

## 2025-02-25 NOTE — LETTER
2/25/2025      Lopez Hogue  [address] [facility] [address]      Dear Colleague,    Thank you for referring your patient, Lopez Hogue, to the [facility]. Please see a copy of my visit note below.    [facility]   Return Patient Visit    Name: Lopez Hogue  MRN: [id number]  Date of visit: Feb 25, 2025    Diagnosis: Oropharyngeal cancer, esophageal SCC  Stage:    Cancer Staging   Primary esophageal squamous cell carcinoma (H)  Staging form: Esophagus - Squamous Cell Carcinoma, AJCC 8th Edition  - Clinical stage from 11/28/2023: Stage II (cT2, cN1, cM0) - Signed by Yannick Alva MD on 7/9/2024  - Pathologic stage from 4/19/2024: Stage I (ypT0, pN0, cM0) - Signed by Yannick Alva MD on 7/9/2024    Squamous cell carcinoma of vallecula (H)  Staging form: Pharynx - Oropharynx, AJCC 8th Edition  - Clinical stage from 10/12/2023: Stage JUAN MIGUEL (cT1, cN2b, cM0, p16-) - Signed by Yannick Alva MD on 11/21/2023    Molecular: p16 negative  Performance status: ECOG 0    Oncology History:     Oropharyngeal squamous cell carcinoma:  -8/18/23 CT neck: Question soft tissue lesion within the right vallecula/ventral epiglottis.   -10/12/23 R vallecula biopsy: Non-keratinizing poorly differentiated squamous cell carcinoma, p16 negative  -10/13/23 PET/CT: Findings suspicious for right vallecula squamous cell carcinoma with right level IIa and right level IV lymph node metastases.. FDG avid thickening of the mid to distal esophagus, suspicious for a synchronous primary esophageal neoplasm. Recommend correlation with endoscopy.  --7/8/24 CT neck:  Residual mass centered on the right lingual tonsil. Interval resolution of previously identified cervical lymphadenopathy. Partially visualized postsurgical changes of esophagogastrectomy with gastric pull-through and multinodular pulmonary opacities in the visualized lung apices, better appreciated on  same-day CT chest 7/8/2024.  -7/8/24 ENT visit with Dr. Felix, no overt mass on exam     Esophageal squamous cell carcinoma:   -11/8/23 EGD with biopsy of mid esophageal mass: non-keratinizing poorly differentiated squamous cell carcinoma, PD-L1 CPS 15  -11/21/23 consult with Dr. Hill: plan for neoadjuvant chemoradiation followed by laparoscopic staging and jejunostomy, followed by minimally invasive [place]-Elbert esophagectomy.   -11/28/23 staging EUS Partially obstructing and partially circumferential fungating mass in the middle third of the esophagus. Esophageal squamous cell carcinoma was staged T2 N1 (suspicious paraesophageal lymph node)   -Admission 4/19/24-5/17/24 for esophagogastrectomy, LND. Surgery without complication, postoperative course c/b re-intubation for respiratory decompensation secondary to aspiration. Compete response.     Treatment course:  -12/5/24-1/26/24 concurrent chemoradiotherapy with weekly carboplatin + paclitaxel (both esophageal and oropharyngeal/bilateral neck, infusion reaction with Taxol w/ dose 1. Tolerated rechallenge   -1/15/24 admission for neutropenia, source felt possibly secondary to cellulitis (vs radiation change), discharged with course of levofloxacin  -1/22/24 admission for recurrent fever, two days after completing levofloxacin. No neutropenia.       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     PMHx  HTN  CAD c/b MI (2023) s/p PCI x2  Prior sweat gland carcinoma s/p resection  No history of autoimmune disease.     SocHx  [facility] in [facility]  Actively  involved in a program for those in recovery from substance use.   Former heavy smoker (2 ppd) and drinker himself. Quit both in 2013.       Interval History:  Last seen by me 7/24/24, esophageal cancer with complete metabolic response, no role for additional adjuvant therapy for this indication, planned for q6mo surveillance. Imaging with questionable area of persistent abnormality in the base of tongue, planned for PET and continued ENT follow up.     7/26/24 PET/CT:  Whole body PET/CT with dedicated neck PET CT demonstrates   postradiation changes in the neck. No residual or recurrent pharyngeal   mucosal space lesion. Specifically no FDG avid lesion in the tongue   base where there was a questionable finding on the prior CT neck.   Primary: I-RADS 1.   No cervical lymphadenopathy. Neck: NIRADS 1.      Status post chest esophagectomy and gastric pull-up surgery with no   evidence of local recurrence at the surgical site.     No evidence of any distant metastasis.     Scattered patchy areas of opacities in the lungs particularly in the   right upper lobe and left lung with some of these areas demonstrating   FDG activity. Findings are similar to 7/8/24 CT and represent evolving   infectious process. Continued surveillance is recommended.     7/29/24 ENT visit: exam without concern for recurrent disease    8/8/24 EGD: pyloric stent replaced    8/13/24-8/14/24 admission:  hematemesis/coffee ground emesis, symptoms resolved, planned for outpatient EGD    8/15/24 EGD: pyloric stent revised    10/14/24 ENT visit: exam clear    10/20/24 ED visit (syncope): felt possibly related to cardiac etiology (ACS ruled out)    10/29/24 TTE:   The left ventricle is normal in size.   There is mild concentric left ventricular hypertrophy.   Left ventricular function is normal.The ejection fraction is 60-65%.   Normal right ventricle size and systolic function.   Normal left atrial size.   Moderate valvular aortic stenosis is present  with a peak velocity of 3.3 m/s,   mean gradient 26 mmHg, DEVAN 1.2 cm2, DI 0.37, SI 55 ml/m2.   Compared to prior study, there is no significant change.     11/24/24 CT AP:   1. Mild soft tissue swelling along the J-tube insertion site in the  left upper quadrant, which may be within normal limits. No surrounding  fluid collection.  2. Malpositioned of gastric stent, which is lying completely  transverse with the stent ends abutting the distal gastric mucosa.  Does not cross the pylorus. GI consultation recommended.    12/5/24 EGD: pyloric botox therapy    2/7/25 CT neck: No pathologic adenopathy. Markedly patulous esophagus.     2/7/25 CT chest:    1.  Esophagectomy and gastric pull-up.  2.  Clustered nodules in the right costophrenic angle measuring up to 5 mm technically indeterminate but pattern favors inflammatory etiology. Short-term follow-up recommended.  3.  Other patchy consolidative and ground glass opacities are unchanged.    2/17/25 ENT visit: exam without concern for recurrence      Weight stable  Regurgitation issues are persistent   No hematemesis  Has had a couple of episodes c/f possible partial seizure activity, not occurring on levitiracetam  Does have significant anxiety, taking sertraline and buspirone      Exam:   BP 99/59   Pulse 77   Temp 97.4  F (36.3  C) (Oral)   Resp 18   Wt 67.9 kg (149 lb 11.2 oz)   SpO2 99%   BMI 23.45 kg/m      Gen: Comfortable, NAD  HEENT: Pupils equal, non-icteric  Neck/Lymph: Supple, no lymphadenopathy   resp: Comfortable on room air  CV: well perfuse  Ext: No swelling  Neuro: Normal gait      Labs:   Reviewed in chart    Imaging:   All pertinent imaging studies personally reviewed. Key findings summarized in oncology history above    Pathology:   Reviewed in chart, key findings summarized above      Assessment and Plan:   Lopez Hogue is a 68 year old male with HTN, CAD, prior sweat gland cancer s/p resection, and recently diagnosed head and neck  squamous cell carcinoma of the vallecula, with staging revealing a synchronous esophageal squamous cell carcinoma. Presenting today to establish care with medical oncology.       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 Recovering well. Primary concerns are related to ongoing reflux and pyloric stenosis, but managing ok and weight stable. Energy level improved.       Plan:  --Regarding his esophageal squamous cell carcinoma, given his pathologic complete response, no role for adjuvant therapy at this time  --Plan for CT neck/chest/AP every 6 months for the first 2 year, then annually through year 5, next due in May  --he will continue ENT follow up for routine exams      # iatrogenic hypothyroidism  -started on levothyroxine  -most recently checked in December and normal    Plan:  --continue 25 mcg daily  --trend TSH      Yannick Alva MD PhD   of Medicine  Division of Hematology, Oncology and Transplantation    ---  30 minutes were spent on the date of the encounter performing chart review, history and exam, documentation, and further activities as noted above.     The longitudinal plan of care for the diagnosis(es)/condition(s) as documented were addressed during this visit. Due to the added complexity in care, I will continue to support Fabricio in the subsequent management and with ongoing continuity of care.      Again, thank you for allowing me to participate in the care of your patient.        Sincerely,        Yannick Alva MD    Electronically signed

## 2025-03-04 ENCOUNTER — OFFICE VISIT (OUTPATIENT)
Dept: FAMILY MEDICINE | Facility: CLINIC | Age: 70
End: 2025-03-04
Payer: COMMERCIAL

## 2025-03-04 VITALS
DIASTOLIC BLOOD PRESSURE: 60 MMHG | WEIGHT: 153.8 LBS | SYSTOLIC BLOOD PRESSURE: 105 MMHG | BODY MASS INDEX: 24.14 KG/M2 | RESPIRATION RATE: 20 BRPM | OXYGEN SATURATION: 98 % | TEMPERATURE: 97.7 F | HEART RATE: 70 BPM | HEIGHT: 67 IN

## 2025-03-04 DIAGNOSIS — R79.89 ELEVATED LIVER FUNCTION TESTS: Primary | ICD-10-CM

## 2025-03-04 PROCEDURE — 3078F DIAST BP <80 MM HG: CPT | Performed by: PHYSICIAN ASSISTANT

## 2025-03-04 PROCEDURE — 99213 OFFICE O/P EST LOW 20 MIN: CPT | Performed by: PHYSICIAN ASSISTANT

## 2025-03-04 PROCEDURE — 3074F SYST BP LT 130 MM HG: CPT | Performed by: PHYSICIAN ASSISTANT

## 2025-03-04 NOTE — PROGRESS NOTES
"  Assessment & Plan     Elevated liver function tests  Patient has intermittently elevated liver function tests without known cause, question if medication related. Plan to recheck Hepatic panel in 1 month. Determine further course of care once labs available.  - Hepatic function panel      Risks, benefits and alternatives were discussed with patient. Agreeable to the plan of care.      Gregor Regalado is a 69 year old, presenting for the following health issues:  Review lab results      3/4/2025     1:10 PM   Additional Questions   Roomed by MONA Gomez CMA([facility])     History of Present Illness       Reason for visit:  Follow up to Hepatic finction test  Symptom onset:  3-4 weeks ago  Symptom intensity:  Mild  Symptom progression:  Staying the same  Had these symptoms before:  No   He is taking medications regularly.      Patient is here today for follow up on elevated liver function tests  Not drinking alcohol  Only new medication is Keppra a few months ago, no dose change to Crestor  No abdominal pain, yellowing of skin, changes in stool  No Ibuprofen use  No recent illness      Review of Systems  Constitutional, HEENT, cardiovascular, pulmonary, gi and gu systems are negative, except as otherwise noted.      Objective    /60   Pulse 70   Temp 97.7  F (36.5  C) (Oral)   Resp 20   Ht 1.702 m (5' 7\")   Wt 69.8 kg (153 lb 12.8 oz)   SpO2 98%   BMI 24.09 kg/m    Body mass index is 24.09 kg/m .  Physical Exam   GENERAL: alert and no distress  NECK: no adenopathy, no asymmetry, masses, or scars  RESP: lungs clear to auscultation - no rales, rhonchi or wheezes  CV: regular rate and rhythm, normal S1 S2, no S3 or S4, no murmur, click or rub, no peripheral edema  MS: no gross musculoskeletal defects noted, no edema          Signed Electronically by: Maya Chappell PA-C    "

## 2025-04-23 ENCOUNTER — OFFICE VISIT (OUTPATIENT)
Dept: RADIATION ONCOLOGY | Facility: CLINIC | Age: 70
End: 2025-04-23
Attending: RADIOLOGY
Payer: COMMERCIAL

## 2025-04-23 VITALS
SYSTOLIC BLOOD PRESSURE: 127 MMHG | HEART RATE: 68 BPM | WEIGHT: 150 LBS | BODY MASS INDEX: 23.49 KG/M2 | DIASTOLIC BLOOD PRESSURE: 70 MMHG

## 2025-04-23 DIAGNOSIS — C10.0 SQUAMOUS CELL CARCINOMA OF VALLECULA (H): Primary | ICD-10-CM

## 2025-04-23 DIAGNOSIS — C15.9 PRIMARY ESOPHAGEAL SQUAMOUS CELL CARCINOMA (H): ICD-10-CM

## 2025-04-23 PROCEDURE — G0463 HOSPITAL OUTPT CLINIC VISIT: HCPCS | Performed by: RADIOLOGY

## 2025-04-23 PROCEDURE — 3074F SYST BP LT 130 MM HG: CPT | Performed by: RADIOLOGY

## 2025-04-23 PROCEDURE — 99215 OFFICE O/P EST HI 40 MIN: CPT | Performed by: RADIOLOGY

## 2025-04-23 PROCEDURE — 3078F DIAST BP <80 MM HG: CPT | Performed by: RADIOLOGY

## 2025-04-23 NOTE — LETTER
"2025      Lopez Hogue  [address] [facility] [address]      Dear Colleague,    Thank you for referring your patient, Lopez Hogue, to the LTAC, located within [facility] RADIATION ONCOLOGY. Please see a copy of my visit note below.    Oncology Rooming Note    2025 10:42 AM   Lopez Hogue is a 69 year old male who presents for:    Chief Complaint   Patient presents with     Oncology Clinic Visit     Follow up:[facility]:Head and bilateral necks 7000 cGy and Esophagus 4500 cGy completed 24     Initial Vitals: There were no vitals taken for this visit. Estimated body mass index is 24.09 kg/m  as calculated from the following:    Height as of 3/4/25: 1.702 m (5' 7\").    Weight as of 3/4/25: 69.8 kg (153 lb 12.8 oz). There is no height or weight on file to calculate BSA.  Data Unavailable Comment: Data Unavailable   No LMP for male patient.  Allergies reviewed: Yes  Medications reviewed: Yes    Medications: Medication refills not needed today.  Pharmacy name entered into [employer]: [facility] #[id number] [address] AT [facility] [address]    Frailty Screening:   Is the patient here for a new oncology consult visit in cancer care? 2. No    PHQ9:  Did this patient require a PHQ9?: No      Clinical concerns:Follow up Dr Sun was notified.      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 He has undergone chemoradiation with carboplatin and taxol, starting 12/5/2023. He completed radiation 1/26/2024.     PET scan on 3/12/2024 showed resolution of the prior FDG avid mid esophageal mass as well as of the left FDG avid right vallecular lesion.  There was also resolution of right sided FDG avid enlarged cervical lymph nodes.  There was new uptake in an 8 mm left level 2A node (max SUV 6.4) with recommendation for follow-up CT neck or FNA.    On 4/1/2024, he underwent laparoscopic jejunostomy feeding tube placement and EGD.  There was no tumor visualized on endoscopic evaluation.    On 4/19/2024, he underwent laparoscopic and right thorascopic esophagogastrectomy.  Pathology showed no residual carcinoma and no metastasis to any of 15 sampled lymph nodes.  Postoperatively, he experienced delayed gastric emptying which was managed with a stent placement at the pylorus.    Repeat PET CT scan on 7/26/2024 showed postradiation changes and no FDG avid lymphadenopathy.  The previously seen FDG avid left level 2A node has resolved.  There was no evidence of distant metastatic disease.  There were scattered patchy areas of opacities in the lungs particularly in the right upper lobe and left lung with some of these areas demonstrating FDG activity.  Findings are similar to 7/8/2024 CT scan and likely represent evolving infectious process.    More recently, he underwent revision of the pyloric stent on 8/8/2024 and a few days later experienced gastric fullness, food regurgitation and coffee-ground emesis.  He was admitted to the  [facility] and underwent repositioning of the stent on 8/15/2024.  Since discharge, he has had an easier time of keeping food down and has not had a recurrence of coffee-ground emesis.    Most recent staging evaluation has included a CT scan of the neck and chest on 2/7/2025.  The neck CT scan showed no evidence of recurrent disease with remark for markedly patulous esophagus and biapical pleural scarring.  Chest CT scan on the same day showed new clustered nodules in the right costophrenic angle measuring up to 5 mm considered technically indeterminant but favoring inflammatory etiology.  Patchy consolidative and groundglass opacities in bilateral lungs had not changed significantly.  There was no suspicious adenopathy.    Since his last visit with us, he has continued routine follow-up with ENT.  There has been no evidence of recurrent disease.      He is able to swallow foods without difficulty.  Most of his taste sensation has returned and he is able to manage his xerostomia with increased fluids.  His main frustration is the need to eat several small meals a day and that he struggles with poor appetite.  He sometimes needs to regurgitate food secondary to difficulty with swallowing related to his esophagectomy.  He is not having any pain.    PAST MEDICAL/SURGICAL HISTORY:   Past Medical History:   Diagnosis Date     Anxiety      Aortic stenosis      CAD (coronary artery disease)      ETOH dependence     Quit drinking 10 years     Heart attack (H)      History of blood transfusion      HLD (hyperlipidemia)      Hypertension      Malignant neoplasm of middle third of esophagus (H)      Nonrheumatic aortic (valve) stenosis      Sweat gland carcinoma       Past Surgical History:   Procedure Laterality Date     BIOPSY  June 29015    Left gluteal and groin lymphnodes     BRONCHOSCOPY FLEXIBLE AND RIGID N/A 04/19/2024    Procedure: Bronchoscopy flexible and rigid;  Surgeon: Devin Hill MD;  Location:  OR      BRONCHOSCOPY FLEXIBLE AND RIGID N/A 04/28/2024    Procedure: Bronchoscopy flexible and rigid;  Surgeon: Jessie Kim MD;  Location: [facility] OR     CARDIAC SURGERY  March 2023    2 stents placed     COLONOSCOPY  2012     CV CORONARY ANGIOGRAM N/A 03/27/2023    Procedure: Coronary Angiogram;  Surgeon: Miki Etienne MD;  Location: [facility] CV     CV CORONARY ANGIOGRAM N/A 05/09/2023    Procedure: Coronary Angiogram;  Surgeon: Miki Etienne MD;  Location: [facility] CATH LAB CV     CV LEFT HEART CATH N/A 03/27/2023    Procedure: Left Heart Catheterization;  Surgeon: Miki Etienne MD;  Location: [facility] CATH LAB CV     CV LEFT HEART CATH N/A 05/09/2023    Procedure: Left Heart Catheterization;  Surgeon: Miki Etienne MD;  Location: [facility] LAB CV     CV PCI N/A 03/27/2023    Procedure: Percutaneous Coronary Intervention;  Surgeon: Miki Etienne MD;  Location: [facility] CATH [facility] CV     ENDOSCOPIC ULTRASOUND UPPER GASTROINTESTINAL TRACT (GI) N/A 11/28/2023    Procedure: Endoscopic ultrasound upper gastrointestinal tract (GI);  Surgeon: Shahriar Giraldo MD;  Location: [facility] GI     ESOPHAGOGASTRODUODENOSCOPY, WITH BOTULINUM TOXIN INJECTION N/A 04/29/2024    Procedure: Esophagogastroduodenoscopy, With Botulinum Toxin Injection;  Surgeon: Jessie Kim MD;  Location: [facility] GI     ESOPHAGOSCOPY, GASTROSCOPY, DUODENOSCOPY (EGD), COMBINED N/A 11/08/2023    Procedure: ESOPHAGOGASTRODUODENOSCOPY, WITH BIOPSY;  Surgeon: Shahriar Giraldo MD;  Location: [facility] GI     ESOPHAGOSCOPY, GASTROSCOPY, DUODENOSCOPY (EGD), COMBINED N/A 04/19/2024    Procedure: Esophagoscopy, gastroscopy, duodenoscopy (EGD), combined;  Surgeon: Devin Hill MD;  Location: [facility] OR     ESOPHAGOSCOPY, GASTROSCOPY, DUODENOSCOPY (EGD), COMBINED N/A 04/28/2024    Procedure: Esophagoscopy, gastroscopy, duodenoscopy (EGD), combined;  Surgeon: Jessie Kim MD;  Location: [facility] OR     ESOPHAGOSCOPY, GASTROSCOPY,  DUODENOSCOPY (EGD), COMBINED N/A 05/05/2024    Procedure: Esophagoscopy, gastroscopy, duodenoscopy (EGD), combined-under fluoro & dilation, nasogastric tube placement, bronchoscopy;  Surgeon: Kevin Calderon MD;  Location: [facility] OR     ESOPHAGOSCOPY, GASTROSCOPY, DUODENOSCOPY (EGD), COMBINED N/A 8/8/2024    Procedure: ESOPHAGOGASTRODUODENOSCOPY with fluoroscopy and stent placement;  Surgeon: Livan Monahan MD;  Location:  OR     ESOPHAGOSCOPY, GASTROSCOPY, DUODENOSCOPY (EGD), COMBINED N/A 8/15/2024    Procedure: ESOPHAGOGASTRODUODENOSCOPY with stent placement;  Surgeon: Livan Monahan MD;  Location:  OR     ESOPHAGOSCOPY, GASTROSCOPY, DUODENOSCOPY (EGD), COMBINED N/A 12/5/2024    Procedure: ESOPHAGOGASTRODUODENOSCOPY with fluoroscopy, stent and jejunostomy tube removal;  Surgeon: Livan Monahan MD;  Location:  OR     IR JEJUNOSTOMY TUBE CHANGE  05/19/2024     LAPAROSCOPIC ASSISTED INSERTION TUBE JEJUNOSTOMY N/A 04/01/2024    Procedure: Laparoscopic Jejunostomy Tube Insertion and Esophagogastroduodenoscopy;  Surgeon: Kevin Calderon MD;  Location: [facility] OR     LARYNGOSCOPY WITH BIOPSY(IES) N/A 10/12/2023    Procedure: LARYNGOSCOPY, WITH BIOPSY;  Surgeon: Edi Felix MD;  Location:  OR     OTHER SURGICAL HISTORY  01/01/2015    WIDE EXCISION OF LEFT GLUTEAL MASSTNM: [id number], stage: II hidradenocarcinoma Grade II, 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, sentinel lymph node biopsy negative      SOFT TISSUE SURGERY  June 2023    left gluteal and lymphnodes     THORACOSCOPIC, LAPAROSCOPIC ESOPHAGECTOMY, COMBINED N/A 04/19/2024    Procedure: Laparoscopic and right thoracoscopic esophagogastrectomy, right AND left chest tube placement;  Surgeon: Devin Hill MD;  Location: [facility] OR     VASCULAR SURGERY  March 2023    Cath 2 stents     ALLERGIES:    Allergies   Allergen Reactions     Coconut Flavoring Agent (Non-Screening) Anaphylaxis     Raw coconut     MEDICATIONS:   Current Outpatient Medications   Medication Sig  Dispense Refill     acetaminophen (TYLENOL) 500 MG tablet Take 500-1,000 mg by mouth every 8 hours as needed for fever or pain       aspirin (ASA) 81 MG chewable tablet Take 81 mg by mouth every morning.       busPIRone (BUSPAR) 5 MG tablet Take 1 tablet (5 mg) by mouth 3 times daily. 90 tablet 2     chlorhexidine (PERIDEX) 0.12 % solution Take 15 mLs by mouth as needed.       levETIRAcetam (KEPPRA) 250 MG tablet Take 1 tablet (250 mg) by mouth at bedtime. 30 tablet 5     levothyroxine (SYNTHROID/LEVOTHROID) 25 MCG tablet Take 1 tablet (25 mcg) by mouth daily. 90 tablet 1     nitroGLYcerin (NITROSTAT) 0.4 MG sublingual tablet PLACE 1 TABLET UNDER THE TONGUE EVERY 5 MINUTES FOR CHEST PAIN FOR 3 DOSES. IF SYMPTOMS PERSIST 5 MINUTES AFTER 1ST DOSE CALL 911. (Patient taking differently: every 5 minutes as needed.) 25 tablet 0     pantoprazole (PROTONIX) 40 MG EC tablet Take 1 tablet (40 mg) by mouth 2 times daily. 180 tablet 4     polyethylene glycol (MIRALAX) 17 g packet Take 17 g by mouth daily as needed for constipation.       rosuvastatin (CRESTOR) 40 MG tablet Take 1 tablet (40 mg) by mouth daily. 90 tablet 2     senna-docusate (SENOKOT-S/PERICOLACE) 8.6-50 MG tablet Take 1 tablet by mouth 2 times daily as needed for constipation 30 tablet 0     sertraline (ZOLOFT) 100 MG tablet Take 1.5 tablets (150 mg) by mouth every morning. 135 tablet 2     REVIEW OF SYSTEMS: A 12-point review of systems was obtained. Pertinent findings are noted in the HPI and are otherwise unremarkable.     PHYSICAL EXAM:  VITALS: /70   Pulse 68   Wt 68 kg (150 lb)   BMI 23.49 kg/m    GEN: Alert, oriented, in no acute distress, thin appearing  HEENT: Normocephalic and atraumatic, EOMI, anicteric sclerae, no oropharyngeal lesions, no trismus, no thrush   Neck: No palpable lymphadenopathy, no submental lymphedema   Respiratory: Breathing comfortably on room air, no wheezing   Cardiovascular: Regular rate, extremities warm, well  perfused   Abdomen: Nondistended   Extremities: Without cyanosis or edema  Cranial nerves: Cranial nerves II through XII are grossly intact    LABS AND IMAGING: Most recent imaging from 2/7/2025 CT scan of the neck and chest actual images were reviewed by me and agree with findings as stated.  Laboratory studies from 2/25/2025 were reviewed.    IMPRESSION/RECOMMENDATION:  Mr. Hogue is a 69-year-old man with squamous cell carcinoma of the vallecula, p16 negative, clinical stage T1 N2b M0 (JUAN MIGUEL) squamous cell carcinoma, poorly differentiated, of the middle third of the esophagus clinical stage T2 N1 M0 (stage II).  He is status post definitive chemoradiation for the head and neck cancer and neoadjuvant chemoradiation for the esophageal cancer.  He subsequently underwent an esophagogastrectomy which showed no evidence of residual disease.  From a cancer control standpoint he is doing well.  He is having some difficulty with eating food secondary to stenosis.  His most recent repositioning has helped with swallowing although he continues with pain.    We discussed continued management of long-term radiation toxicities including xerostomia and that he needs to see a dentist on a regular basis.  We also discussed the need for sun protection with sunblock to minimize the risk of developing cutaneous carcinoma.  He is continuing to do swallowing exercises.    He will continue follow-up with ENT and Medical Oncology.  We do not need to schedule another follow-up visit but he was encouraged to reach out to us with any questions or concerns in the future.    We appreciate the opportunity to participate in Mr. Hogue's care.  Please call with questions.    50 minutes spent by me on the date of the encounter doing chart review, history and exam, documentation and further activities per the note.    Akiko Sun MD  Department of Radiation Oncology  [facility]     CC:  Patient Care  Team:  Maya Chappell PA-C as PCP - General (Physician Assistant - Medical)  Lluvia Park MD as MD (Radiation Oncology)  David Hidalgo PA-C as Physician Assistant (Gastroenterology)  Edi Felix MD as MD (Otolaryngology)  Nisha Pride APRN CNP as Nurse Practitioner (Dermatology)  Devin Hill MD as MD (Cardiovascular & Thoracic Surgery)  Shahriar Giraldo MD as MD (Gastroenterology)  Caryn Olvera, VERÓNICA as Specialty Care Coordinator (Hematology & Oncology)  Akiko Sun MD as Assigned Cancer Care Provider  Yannick Alva MD as MD (Hematology & Oncology)  Nuno Cazares MD as MD (Hospice And Palliative Care)  Nuno Cazares MD as Assigned Palliative Care Provider  David Hidalgo PA-C as Assigned Gastroenterology Provider  Edi Felix MD as Assigned Surgical Provider  Livan Monahan MD as MD (Gastroenterology)  Jason Walters MD as MD (Cardiology)  Maya Chappell PA-C as Assigned PCP  Allan Jamison DO as Resident (Neurology)  Ramirez Mitchell MD as MD (Neurology)  Lucy Fregoso APRN CNP as Nurse Practitioner (Anesthesiology)  Ramirez Mitchell MD as Assigned Neuroscience Provider  Madi Ramos Abutalib as Home Infusion Following Provider (House Physician)  Arvin Hurd DO as Assigned Sleep Provider  Devin Hill MD as Assigned Heart and Vascular Surgical Provider  Jason Walters MD as Assigned Heart and Vascular Provider  Sara Gutierrez LPN as LPN (Thoracic Surgery)     This note was dictated with voice recognition software and then edited. Please excuse any unintentional errors.     Again, thank you for allowing me to participate in the care of your patient.        Sincerely,        Akiko Sun MD    Electronically signed

## 2025-04-23 NOTE — PROGRESS NOTES
"Oncology Rooming Note    April 23, 2025 10:42 AM   Lopez Hogue is a 69 year old male who presents for:    Chief Complaint   Patient presents with    Oncology Clinic Visit     Follow up:SCC of Vallecula:Head and bilateral necks 7000 cGy and Esophagus 4500 cGy completed 01/26/24     Initial Vitals: There were no vitals taken for this visit. Estimated body mass index is 24.09 kg/m  as calculated from the following:    Height as of 3/4/25: 1.702 m (5' 7\").    Weight as of 3/4/25: 69.8 kg (153 lb 12.8 oz). There is no height or weight on file to calculate BSA.  Data Unavailable Comment: Data Unavailable   No LMP for male patient.  Allergies reviewed: Yes  Medications reviewed: Yes    Medications: Medication refills not needed today.  Pharm[facility] into [id number] [address] AT [facility] [address]    Frailty Screening:   Is the patient here for a new oncology consult visit in cancer care? 2. No    PHQ9:  Did this patient require a PHQ9?: No      Clinical concerns:Follow up  Dr Sun was notified.      Kylee Rosenthal, RN      Considerations for radiation treatment   Pregnancy status: Male   Implanted Cardiac Devices: No   Any previous radiation therapy: Yes      "

## 2025-04-29 ENCOUNTER — HOSPITAL ENCOUNTER (OUTPATIENT)
Dept: GENERAL RADIOLOGY | Facility: CLINIC | Age: 70
Discharge: HOME | End: 2025-04-29
Attending: THORACIC SURGERY (CARDIOTHORACIC VASCULAR SURGERY) | Admitting: THORACIC SURGERY (CARDIOTHORACIC VASCULAR SURGERY)
Payer: COMMERCIAL

## 2025-04-29 DIAGNOSIS — Z90.49 H/O ESOPHAGECTOMY: ICD-10-CM

## 2025-04-29 DIAGNOSIS — Z98.890 H/O ESOPHAGECTOMY: ICD-10-CM

## 2025-04-29 PROCEDURE — 74220 X-RAY XM ESOPHAGUS 1CNTRST: CPT

## 2025-05-02 ENCOUNTER — PREP FOR PROCEDURE (OUTPATIENT)
Dept: SURGERY | Facility: CLINIC | Age: 70
End: 2025-05-02
Payer: COMMERCIAL

## 2025-05-02 DIAGNOSIS — Z90.49 HX OF ESOPHAGECTOMY: Primary | ICD-10-CM

## 2025-05-02 DIAGNOSIS — Z98.890 HX OF ESOPHAGECTOMY: Primary | ICD-10-CM

## 2025-05-05 NOTE — PROGRESS NOTES
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 He has undergone chemoradiation with carboplatin and taxol, starting 2023. He completed radiation 2024.     PET scan on 3/12/2024 showed resolution of the prior FDG avid mid esophageal mass as well as of the left FDG avid right vallecular lesion.  There was also resolution of right sided FDG avid enlarged cervical lymph nodes.  There was new uptake in an 8 mm left level 2A node (max SUV 6.4) with recommendation for follow-up CT neck or FNA.    On 2024, he underwent laparoscopic jejunostomy feeding tube placement and EGD.  There was no tumor visualized on endoscopic evaluation.    On 2024, he underwent laparoscopic and right thorascopic esophagogastrectomy.  Pathology showed no residual carcinoma and no metastasis to any of 15 sampled lymph nodes.  Postoperatively, he experienced delayed gastric emptying which was managed with a stent placement at the pylorus.    Repeat PET CT scan on 2024 showed postradiation changes and no FDG avid lymphadenopathy.  The previously seen FDG avid left level 2A node has resolved.  There was no evidence of distant metastatic disease.  There  were scattered patchy areas of opacities in the lungs particularly in the right upper lobe and left lung with some of these areas demonstrating FDG activity.  Findings are similar to 7/8/2024 CT scan and likely represent evolving infectious process.    More recently, he underwent revision of the pyloric stent on 8/8/2024 and a few days later experienced gastric fullness, food regurgitation and coffee-ground emesis.  He was admitted to the hospital and underwent repositioning of the stent on 8/15/2024.  Since discharge, he has had an easier time of keeping food down and has not had a recurrence of coffee-ground emesis.    Most recent staging evaluation has included a CT scan of the neck and chest on 2/7/2025.  The neck CT scan showed no evidence of recurrent disease with remark for markedly patulous esophagus and biapical pleural scarring.  Chest CT scan on the same day showed new clustered nodules in the right costophrenic angle measuring up to 5 mm considered technically indeterminant but favoring inflammatory etiology.  Patchy consolidative and groundglass opacities in bilateral lungs had not changed significantly.  There was no suspicious adenopathy.    Since his last visit with us, he has continued routine follow-up with ENT.  There has been no evidence of recurrent disease.      He is able to swallow foods without difficulty.  Most of his taste sensation has returned and he is able to manage his xerostomia with increased fluids.  His main frustration is the need to eat several small meals a day and that he struggles with poor appetite.  He sometimes needs to regurgitate food secondary to difficulty with swallowing related to his esophagectomy.  He is not having any pain.    PAST MEDICAL/SURGICAL HISTORY:   Past Medical History:   Diagnosis Date    Anxiety     Aortic stenosis     CAD (coronary artery disease)     ETOH dependence     Quit drinking 10 years    Heart attack (H)     History of blood transfusion      HLD (hyperlipidemia)     Hypertension     Malignant neoplasm of middle third of esophagus (H)     Nonrheumatic aortic (valve) stenosis     Sweat gland carcinoma       Past Surgical History:   Procedure Laterality Date    BIOPSY  June 29015    Left gluteal and groin lymphnodes    BRONCHOSCOPY FLEXIBLE AND RIGID N/A 04/19/2024    Procedure: Bronchoscopy flexible and rigid;  Surgeon: Devin Hill MD;  Location:  OR    BRONCHOSCOPY FLEXIBLE AND RIGID N/A 04/28/2024    Procedure: Bronchoscopy flexible and rigid;  Surgeon: Jessie Kim MD;  Location:  OR    CARDIAC SURGERY  March 2023    2 stents placed    COLONOSCOPY  2012    CV CORONARY ANGIOGRAM N/A 03/27/2023    Procedure: Coronary Angiogram;  Surgeon: Miki Etienne MD;  Location: [facility]    CV CORONARY ANGIOGRAM N/A 05/09/2023    Procedure: Coronary Angiogram;  Surgeon: Miki Etienne MD;  Location: [facility]    CV LEFT HEART CATH N/A 03/27/2023    Procedure: Left Heart Catheterization;  Surgeon: Miki Etienne MD;  Location: [facility]    CV LEFT HEART CATH N/A 05/09/2023    Procedure: Left Heart Catheterization;  Surgeon: Miki Etienne MD;  Location: [facility]    CV PCI N/A 03/27/2023    Procedure: Percutaneous Coronary Intervention;  Surgeon: Miki Etienne MD;  Location: [facility]    ENDOSCOPIC ULTRASOUND UPPER GASTROINTESTINAL TRACT (GI) N/A 11/28/2023    Procedure: Endoscopic ultrasound upper gastrointestinal tract (GI);  Surgeon: Shahriar Giraldo MD;  Location:  GI    ESOPHAGOGASTRODUODENOSCOPY, WITH BOTULINUM TOXIN INJECTION N/A 04/29/2024    Procedure: Esophagogastroduodenoscopy, With Botulinum Toxin Injection;  Surgeon: Jessie Kim MD;  Location:  GI    ESOPHAGOSCOPY, GASTROSCOPY, DUODENOSCOPY (EGD), COMBINED N/A 11/08/2023    Procedure: ESOPHAGOGASTRODUODENOSCOPY, WITH BIOPSY;  Surgeon: Shahriar Giraldo MD;  Location:  GI    ESOPHAGOSCOPY,  GASTROSCOPY, DUODENOSCOPY (EGD), COMBINED N/A 04/19/2024    Procedure: Esophagoscopy, gastroscopy, duodenoscopy (EGD), combined;  Surgeon: Devin Hill MD;  Location: [facility] OR    ESOPHAGOSCOPY, GASTROSCOPY, DUODENOSCOPY (EGD), COMBINED N/A 04/28/2024    Procedure: Esophagoscopy, gastroscopy, duodenoscopy (EGD), combined;  Surgeon: Jessie Kim MD;  Location: [facility] OR    ESOPHAGOSCOPY, GASTROSCOPY, DUODENOSCOPY (EGD), COMBINED N/A 05/05/2024    Procedure: Esophagoscopy, gastroscopy, duodenoscopy (EGD), combined-under fluoro & dilation, nasogastric tube placement, bronchoscopy;  Surgeon: Kevin Calderon MD;  Location: [facility] OR    ESOPHAGOSCOPY, GASTROSCOPY, DUODENOSCOPY (EGD), COMBINED N/A 8/8/2024    Procedure: ESOPHAGOGASTRODUODENOSCOPY with fluoroscopy and stent placement;  Surgeon: Livan Monahan MD;  Location:  OR    ESOPHAGOSCOPY, GASTROSCOPY, DUODENOSCOPY (EGD), COMBINED N/A 8/15/2024    Procedure: ESOPHAGOGASTRODUODENOSCOPY with stent placement;  Surgeon: Livan Monahan MD;  Location: [facility] OR    ESOPHAGOSCOPY, GASTROSCOPY, DUODENOSCOPY (EGD), COMBINED N/A 12/5/2024    Procedure: ESOPHAGOGASTRODUODENOSCOPY with fluoroscopy, stent and jejunostomy tube removal;  Surgeon: Livan Monahan MD;  Location:  OR    IR JEJUNOSTOMY TUBE CHANGE  05/19/2024    LAPAROSCOPIC ASSISTED INSERTION TUBE JEJUNOSTOMY N/A 04/01/2024    Procedure: Laparoscopic Jejunostomy Tube Insertion and Esophagogastroduodenoscopy;  Surgeon: Kevin Calderon MD;  Location: [facility] OR    LARYNGOSCOPY WITH BIOPSY(IES) N/A 10/12/2023    Procedure: LARYNGOSCOPY, WITH BIOPSY;  Surgeon: Edi Felix MD;  Location: [facility] OR    OTHER SURGICAL HISTORY  01/01/2015    WIDE EXCISION OF LEFT GLUTEAL MASSTNM: [id number], stage: II hidradenocarcinoma Grade II, 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, sentinel lymph node biopsy negative     SOFT TISSUE SURGERY  June 2023    left gluteal and lymphnodes    THORACOSCOPIC, LAPAROSCOPIC ESOPHAGECTOMY, COMBINED N/A  04/19/2024    Procedure: Laparoscopic and right thoracoscopic esophagogastrectomy, right AND left chest tube placement;  Surgeon: Devin Hill MD;  Location: [facility] OR    VASCULAR SURGERY  March 2023    Cath 2 stents     ALLERGIES:    Allergies   Allergen Reactions    Coconut Flavoring Agent (Non-Screening) Anaphylaxis     Raw coconut     MEDICATIONS:   Current Outpatient Medications   Medication Sig Dispense Refill    acetaminophen (TYLENOL) 500 MG tablet Take 500-1,000 mg by mouth every 8 hours as needed for fever or pain      aspirin (ASA) 81 MG chewable tablet Take 81 mg by mouth every morning.      busPIRone (BUSPAR) 5 MG tablet Take 1 tablet (5 mg) by mouth 3 times daily. 90 tablet 2    chlorhexidine (PERIDEX) 0.12 % solution Take 15 mLs by mouth as needed.      levETIRAcetam (KEPPRA) 250 MG tablet Take 1 tablet (250 mg) by mouth at bedtime. 30 tablet 5    levothyroxine (SYNTHROID/LEVOTHROID) 25 MCG tablet Take 1 tablet (25 mcg) by mouth daily. 90 tablet 1    nitroGLYcerin (NITROSTAT) 0.4 MG sublingual tablet PLACE 1 TABLET UNDER THE TONGUE EVERY 5 MINUTES FOR CHEST PAIN FOR 3 DOSES. IF SYMPTOMS PERSIST 5 MINUTES AFTER 1ST DOSE CALL 911. (Patient taking differently: every 5 minutes as needed.) 25 tablet 0    pantoprazole (PROTONIX) 40 MG EC tablet Take 1 tablet (40 mg) by mouth 2 times daily. 180 tablet 4    polyethylene glycol (MIRALAX) 17 g packet Take 17 g by mouth daily as needed for constipation.      rosuvastatin (CRESTOR) 40 MG tablet Take 1 tablet (40 mg) by mouth daily. 90 tablet 2    senna-docusate (SENOKOT-S/PERICOLACE) 8.6-50 MG tablet Take 1 tablet by mouth 2 times daily as needed for constipation 30 tablet 0    sertraline (ZOLOFT) 100 MG tablet Take 1.5 tablets (150 mg) by mouth every morning. 135 tablet 2     REVIEW OF SYSTEMS: A 12-point review of systems was obtained. Pertinent findings are noted in the HPI and are otherwise unremarkable.     PHYSICAL EXAM:  VITALS: /70   Pulse 68   Wt  68 kg (150 lb)   BMI 23.49 kg/m    GEN: Alert, oriented, in no acute distress, thin appearing  HEENT: Normocephalic and atraumatic, EOMI, anicteric sclerae, no oropharyngeal lesions, no trismus, no thrush   Neck: No palpable lymphadenopathy, no submental lymphedema   Respiratory: Breathing comfortably on room air, no wheezing   Cardiovascular: Regular rate, extremities warm, well perfused   Abdomen: Nondistended   Extremities: Without cyanosis or edema  Cranial nerves: Cranial nerves II through XII are grossly intact    LABS AND IMAGING: Most recent imaging from 2/7/2025 CT scan of the neck and chest actual images were reviewed by me and agree with findings as stated.  Laboratory studies from 2/25/2025 were reviewed.    IMPRESSION/RECOMMENDATION:  Mr. Hogue is a 69-year-old man with squamous cell carcinoma of the vallecula, p16 negative, clinical stage T1 N2b M0 (JUAN MIGUEL) squamous cell carcinoma, poorly differentiated, of the middle third of the esophagus clinical stage T2 N1 M0 (stage II).  He is status post definitive chemoradiation for the head and neck cancer and neoadjuvant chemoradiation for the esophageal cancer.  He subsequently underwent an esophagogastrectomy which showed no evidence of residual disease.  From a cancer control standpoint he is doing well.  He is having some difficulty with eating food secondary to stenosis.  His most recent repositioning has helped with swallowing although he continues with pain.    We discussed continued management of long-term radiation toxicities including xerostomia and that he needs to see a dentist on a regular basis.  We also discussed the need for sun protection with sunblock to minimize the risk of developing cutaneous carcinoma.  He is continuing to do swallowing exercises.    He will continue follow-up with ENT and Medical Oncology.  We do not need to schedule another follow-up visit but he was encouraged to reach out to us with any questions or concerns in the  future.    We appreciate the opportunity to participate in Mr. Hogue's care.  Please call with questions.    50 minutes spent by me on the date of the encounter doing chart review, history and exam, documentation and further activities per the note.    Akiko Sun MD  Department of Radiation Oncology  [facility]     CC:  Patient Care Team:  Maya Chappell PA-C as PCP - General (Physician Assistant - Medical)  Lluvia Park MD as MD (Radiation Oncology)  David Hidalgo PA-C as Physician Assistant (Gastroenterology)  Edi Felix MD as MD (Otolaryngology)  Nisha Pride, APRN CNP as Nurse Practitioner (Dermatology)  Devin Hill MD as MD (Cardiovascular & Thoracic Surgery)  Shahriar Giraldo MD as MD (Gastroenterology)  Caryn Olvera, VERÓNICA as Specialty Care Coordinator (Hematology & Oncology)  Akiko Sun MD as Assigned Cancer Care Provider  Yannick Alva MD as MD (Hematology & Oncology)  Nuno Cazares MD as MD (Hospice And Palliative Care)  Nuno Cazares MD as Assigned Palliative Care Provider  David Hidalgo PA-C as Assigned Gastroenterology Provider  Edi Felix MD as Assigned Surgical Provider  Livan Monahan MD as MD (Gastroenterology)  Jason Walters MD as MD (Cardiology)  Maya Chappell PA-C as Assigned PCP  Allan Jamison DO as Resident (Neurology)  Ramirez Mitchell MD as MD (Neurology)  Lucy Fregoso, APRN CNP as Nurse Practitioner (Anesthesiology)  Ramirez Mitchell MD as Assigned Neuroscience Provider  Madi Ramos Abutalib as Home Infusion Following Provider (House Physician)  Arvin Hurd DO as Assigned Sleep Provider  Devin Hill MD as Assigned Heart and Vascular Surgical Provider  Jason Walters MD as Assigned Heart and Vascular Provider  Sara Gutierrez LPN as LPN (Thoracic Surgery)     This note was dictated with voice recognition software  and then edited. Please excuse any unintentional errors.

## 2025-05-08 ENCOUNTER — TELEPHONE (OUTPATIENT)
Dept: ONCOLOGY | Facility: CLINIC | Age: 70
End: 2025-05-08
Payer: COMMERCIAL

## 2025-05-08 NOTE — TELEPHONE ENCOUNTER
Spoke with patient to schedule procedure with Dr. Hill   Procedure was scheduled on 5/27 at [facility] OR  Patient will have H&P with updated the morning of by surgeon    Informed pt of surgery details:  Date:    Tuesday, May 27, 2025  Arrival Time:  5:30 am    Pt is aware surgery start time may change, and someone will reach out with the new arrival time, if so.    Patient is aware a COVID-19 test is needed before their procedure ONLY IF symptomatic.   (Patient is aware Thoracic is no longer requiring COVID-19 test)       Patient is aware a / is needed day of surgery.       Patient has my direct contact information for any further questions.     moderna vaccine x 3

## 2025-05-26 ENCOUNTER — ANESTHESIA EVENT (OUTPATIENT)
Dept: SURGERY | Facility: CLINIC | Age: 70
End: 2025-05-26
Payer: COMMERCIAL

## 2025-05-26 ASSESSMENT — LIFESTYLE VARIABLES: TOBACCO_USE: 0

## 2025-05-26 ASSESSMENT — ENCOUNTER SYMPTOMS
DYSRHYTHMIAS: 0
ORTHOPNEA: 0

## 2025-05-26 NOTE — ANESTHESIA PREPROCEDURE EVALUATION
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)     Nonrheumatic aortic (valve) stenosis     Sweat gland carcinoma       Past Surgical History:   Procedure Laterality Date    BIOPSY      Left gluteal and groin lymphnodes    BRONCHOSCOPY FLEXIBLE AND RIGID N/A 2024    Procedure: Bronchoscopy flexible and rigid;  Surgeon: Devin Hill MD;  Location: [facility]    BRONCHOSCOPY FLEXIBLE AND RIGID N/A 2024    Procedure: Bronchoscopy flexible and rigid;  Surgeon: Jessie Kim MD;  Location: [facility]    CARDIAC SURGERY  2023    2 stents placed    2012    CV CORONARY ANGIOGRAM N/A 2023    Procedure: Coronary Angiogram;  Surgeon: Miki Etienne MD;  Location: [facility]    CV CORONARY ANGIOGRAM N/A 2023    Procedure: Coronary Angiogram;  Surgeon: Miki Etienne MD;  Location: [facility]    CV LEFT HEART CATH N/A 2023    Procedure: Left Heart Catheterization;  Surgeon: Miki Etienne MD;  Location: [facility]    CV LEFT HEART CATH N/A 2023    Procedure: Left Heart Catheterization;  Surgeon: Miki Etienne MD;  Location: [facility]    CV PCI N/A 2023    Procedure: Percutaneous Coronary Intervention;  Surgeon: Miki Etienne MD;  Location: [facility] CV    ENDOSCOPIC ULTRASOUND UPPER GASTROINTESTINAL TRACT (GI) N/A 2023    Procedure: Endoscopic ultrasound upper gastrointestinal tract (GI);  Surgeon: Shahriar Giraldo MD;  Location:  [facility]    ESOPHAGOGASTRODUODENOSCOPY, WITH BOTULINUM TOXIN INJECTION N/A 04/29/2024    Procedure: Esophagogastroduodenoscopy, With Botulinum Toxin Injection;  Surgeon: Jessie Kim MD;  Location: [facility]    ESOPHAGOSCOPY, GASTROSCOPY, DUODENOSCOPY (EGD), COMBINED N/A 11/08/2023    Procedure: ESOPHAGOGASTRODUODENOSCOPY, WITH BIOPSY;  Surgeon: Shahriar Giraldo MD;  Location: [facility]    ESOPHAGOSCOPY, GASTROSCOPY, DUODENOSCOPY (EGD), COMBINED N/A 04/19/2024    Procedure: Esophagoscopy, gastroscopy, duodenoscopy (EGD), combined;  Surgeon: Devin Hill MD;  Location: [facility]    ESOPHAGOSCOPY, GASTROSCOPY, DUODENOSCOPY (EGD), COMBINED N/A 04/28/2024    Procedure: Esophagoscopy, gastroscopy, duodenoscopy (EGD), combined;  Surgeon: Jessie Kim MD;  Location: [facility]    ESOPHAGOSCOPY, GASTROSCOPY, DUODENOSCOPY (EGD), COMBINED N/A 05/05/2024    Procedure: Esophagoscopy, gastroscopy, duodenoscopy (EGD), combined-under fluoro & dilation, nasogastric tube placement, bronchoscopy;  Surgeon: Kevin Calderon MD;  Location: [facility]    ESOPHAGOSCOPY, GASTROSCOPY, DUODENOSCOPY (EGD), COMBINED N/A 8/8/2024    Procedure: ESOPHAGOGASTRODUODENOSCOPY with fluoroscopy and stent placement;  Surgeon: Livan Monahan MD;  Location:  OR    ESOPHAGOSCOPY, GASTROSCOPY, DUODENOSCOPY (EGD), COMBINED N/A 8/15/2024    Procedure: ESOPHAGOGASTRODUODENOSCOPY with stent placement;  Surgeon: Livan Monahan MD;  Location: [facility]    ESOPHAGOSCOPY, GASTROSCOPY, DUODENOSCOPY (EGD), COMBINED N/A 12/5/2024    Procedure: ESOPHAGOGASTRODUODENOSCOPY with fluoroscopy, stent and jejunostomy tube removal;  Surgeon: Livan Monahan MD;  Location: [facility] OR    IR JEJUNOSTOMY TUBE CHANGE  05/19/2024    LAPAROSCOPIC ASSISTED INSERTION TUBE JEJUNOSTOMY N/A 04/01/2024    Procedure: Laparoscopic Jejunostomy Tube Insertion and Esophagogastroduodenoscopy;  Surgeon: Kevin Calderon MD;  Location: [facility]    LARYNGOSCOPY WITH BIOPSY(IES) N/A  10/12/2023    Procedure: LARYNGOSCOPY, WITH BIOPSY;  Surgeon: Edi Felix MD;  Location: [facility]    OTHER SURGICAL HISTORY  2015    WIDE EXCISION OF LEFT GLUTEAL MASSTNM: [id number], stage: II hidradenocarcinoma Grade II, 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, sentinel lymph node biopsy negative     SOFT TISSUE SURGERY  2023    left gluteal and lymphnodes    THORACOSCOPIC, LAPAROSCOPIC ESOPHAGECTOMY, COMBINED N/A 2024    Procedure: Laparoscopic and right thoracoscopic esophagogastrectomy, right AND left chest tube placement;  Surgeon: Devin Hill MD;  Location: [facility]    VASCULAR SURGERY  2023    Cath 2 stents      Allergies   Allergen Reactions    Coconut Flavoring Agent (Non-Screening) Anaphylaxis     Raw coconut      Social History     Tobacco Use    Smoking status: Former     Current packs/day: 0.00     Average packs/day: 2.0 packs/day for 41.0 years (82.0 ttl pk-yrs)     Types: Cigarettes     Start date: 1972     Quit date: 2013     Years since quittin.9     Passive exposure: Past    Smokeless tobacco: Never    Tobacco comments:     Quit    Substance Use Topics    Alcohol use: Not Currently     Comment: Sober       Wt Readings from Last 1 Encounters:   25 68 kg (150 lb)        Anesthesia Evaluation   Pt has had prior anesthetic. Type: MAC and General.    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   S/p esophagectomy  Cough ongoing, believe associated with GERD, bloody secretions, No SOB     (-) tobacco use, asthma and recent URI   Neurologic:       Cardiovascular:     (+)  hypertension- -  CAD - past MI - stent (x2)-3/23.                           valvular problems/murmurs type: AS     Previous cardiac testing   Echo: Date:  Results:  [facility],[place]  Echocardiography Laboratory  [address] [place] [address]  [place] [address]     Name: RAMIRO ZIMMER  MRN: [id number]  : 1955  Study Date: 2024 08:02 AM  Age: 68 yrs  Gender: Male  Patient Location: [facility]  Reason For Study: Cor Pulmonale  Ordering Physician: KRISSY DYER  Performed By: Teressa Gilmore     BSA: 1.8 m2  Height: 68 in  Weight: 145 lb  BP: 87/67 mmHg  ______________________________________________________________________________  Procedure  Complete Portable Echo Adult.  ______________________________________________________________________________  Interpretation Summary  Global and regional left ventricular function is normal with an EF of 55-60%.  The right ventricle is normal size. Global right ventricular function is  normal.  Mild to moderate calcific aortic stenosis is present (peak velocity 3.0 m/s,  mean gradient 19 mmHg, DEVAN 1.5 cm2, DI 0.4).  No pericardial effusion is present.  Compared to previous study dated 2024, gradient across the aortic valve  is lower. No significant change in biventricular function.  ______________________________________________________________________________  Left Ventricle  Left ventricular wall thickness is normal. Left ventricular size is normal.  Global and regional left ventricular function is normal with an EF of 55-60%.  Left ventricular diastolic function is not assessable.     Right Ventricle  The right ventricle is normal size. Global right ventricular function is  normal.     Atria  The atria cannot be assessed.     Mitral Valve  Mild mitral annular calcification is present. Trace mitral insufficiency is  present.     Aortic Valve  Moderate aortic valve calcification is present. Mild to moderate aortic  stenosis is present. Peak velocity 3.0 m/s, mean gradient 19 mmHg, DEVAN 1.5  cm2, DI 0.4.     Tricuspid Valve  The tricuspid valve is normal. Trace tricuspid insufficiency is present. The  peak velocity of the tricuspid regurgitant jet is not obtainable.  Pulmonary  artery systolic pressure cannot be assessed.     Pulmonic Valve  The valve leaflets are not well visualized. On Doppler interrogation, there is  no significant stenosis or regurgitation.     Vessels  The aorta root is normal. The thoracic aorta is normal. The pulmonary artery  and bifurcation cannot be assessed. IVC diameter and respiratory changes fall  into an intermediate range suggesting an RA pressure of 8 mmHg.     Pericardium  No pericardial effusion is present.     Attestation  I have personally viewed the imaging and agree with the interpretation and  report as documented by the fellow, Rober Marrero, and/or edited by me.  ______________________________________________________________________________  MMode/2D Measurements & Calculations  IVSd: 1.4 cm  LVIDd: 2.6 cm  LVIDs: 2.0 cm  LVPWd: 1.3 cm  FS: 22.5 %  LV mass(C)d: 104.1 grams  LV mass(C)dI: 58.4 grams/m2  LVOT diam: 2.1 cm  LVOT area: 3.6 cm2  RWT: 1.0  TAPSE: 1.6 cm     Doppler Measurements & Calculations  Ao V2 max: 296.4 cm/sec  Ao max P.2 mmHg  Ao V2 mean: 203.4 cm/sec  Ao mean P.6 mmHg  Ao V2 VTI: 58.7 cm  DEVAN(I,D): 1.5 cm2  DEVAN(V,D): 1.4 cm2  LV V1 max P.3 mmHg  LV V1 max: 114.8 cm/sec  LV V1 VTI: 24.9 cm  SV(LVOT): 88.7 ml  SI(LVOT): 49.7 ml/m2  AV Aman Ratio (DI): 0.39  DEVAN Index (cm2/m2): 0.85  RV S Aman: 11.1 cm/sec     ______________________________________________________________________________  Report approved by: Bree Recio 2024 10:36 AM             Component 3 mo ago        Stress Test:  Date: Results:    ECG Reviewed:  Date: Results:    Cath:  Date: Results:   (-) SPENCER, orthopnea/PND and arrhythmias   METS/Exercise Tolerance: >4 METS    Hematologic:     (+)      anemia (Hgb 11.2), history of blood transfusion,      (-) history of blood clots   Musculoskeletal:       GI/Hepatic: Comment: Gastric outlet obstruction s/p pyloric stent  J-tube in place. Mostly PO intake with supplemental j-tube feedings.  "Recently with worsening symptoms of gastric outlet obstruction.     (+) GERD, Symptomatic,               (-) liver disease   Renal/Genitourinary:    (-) renal disease   Endo:    (-) Type II DM and obesity   Psychiatric/Substance Use:       Infectious Disease:       Malignancy:   (+) Malignancy, History of GI.GI CA  Remission status post Surgery and Chemo.      Other:              Physical Exam  Airway  Mallampati: I  TM distance: >3 FB  Neck ROM: full  Mouth opening: >= 4 cm    Cardiovascular - normal exam  Rhythm: regular     Dental   (+) Modest Abnormalities - crowns, retainers, 1 or 2 missing teeth      Pulmonary - normal examBreath sounds clear to auscultation        Neurological - normal exam  He appears awake, alert and oriented x3.    Other Findings       OUTSIDE LABS:  CBC:   Lab Results   Component Value Date    WBC 4.7 02/25/2025    WBC 7.2 11/24/2024    HGB 12.1 (L) 02/25/2025    HGB 11.2 (L) 11/24/2024    HCT 37.1 (L) 02/25/2025    HCT 34.2 (L) 11/24/2024     (L) 02/25/2025     11/24/2024     BMP:   Lab Results   Component Value Date     02/25/2025     11/24/2024    POTASSIUM 4.3 02/25/2025    POTASSIUM 4.2 11/24/2024    CHLORIDE 107 02/25/2025    CHLORIDE 102 11/24/2024    CO2 26 02/25/2025    CO2 28 11/24/2024    BUN 10.7 02/25/2025    BUN 8.4 11/24/2024    CR 0.74 02/25/2025    CR 0.8 02/07/2025     (H) 02/25/2025    GLC 96 11/24/2024     COAGS:   Lab Results   Component Value Date    PTT 34 11/24/2024    INR 0.92 08/13/2024     POC: No results found for: \"BGM\", \"HCG\", \"HCGS\"  HEPATIC:   Lab Results   Component Value Date    ALBUMIN 4.3 04/04/2025    PROTTOTAL 7.7 04/04/2025    ALT 45 04/04/2025    AST 34 04/04/2025    ALKPHOS 129 04/04/2025    BILITOTAL 0.8 04/04/2025     OTHER:   Lab Results   Component Value Date    PH 7.44 05/05/2024    LACT 1.0 04/28/2024    A1C 5.2 03/27/2023    RAFAELA 9.0 02/25/2025    PHOS 3.6 05/17/2024    MAG 1.9 05/17/2024    LIPASE 43 " 08/13/2024    TSH 3.43 12/06/2024    T4 0.65 (L) 10/15/2024       Anesthesia Plan    ASA Status:  3      NPO Status: NPO Appropriate   Anesthesia Type: General.  Airway: oral.  Induction: intravenous, rapid sequence.  Maintenance: Balanced.   Techniques and Equipment:     - Airway:  Planned airway equipment includes video laryngoscope.     - Monitoring Plan: standard ASA monitoring, train of four monitoring, processed EEG monitor     Consents    Anesthesia Plan(s) and associated risks, benefits, and realistic alternatives discussed. Questions answered and patient/representative(s) expressed understanding.     - Discussed: anesthesiologist     - Discussed with:                Postoperative Care    Pain management: non-narcotic analgesics, plan for postoperative opioid use, multimodal analgesia.     Comments:                   Morena Nova MD    I have reviewed the pertinent notes and labs in the chart from the past 30 days and (re)examined the patient.  Any updates or changes from those notes are reflected in this note.    Clinically Significant Risk Factors Present on Admission                   # Hypertension: Noted on problem list               # Financial/Environmental Concerns:

## 2025-05-27 ENCOUNTER — HOSPITAL ENCOUNTER (OUTPATIENT)
Facility: CLINIC | Age: 70
Discharge: HOME | End: 2025-05-27
Attending: THORACIC SURGERY (CARDIOTHORACIC VASCULAR SURGERY) | Admitting: THORACIC SURGERY (CARDIOTHORACIC VASCULAR SURGERY)
Payer: COMMERCIAL

## 2025-05-27 ENCOUNTER — APPOINTMENT (OUTPATIENT)
Dept: GENERAL RADIOLOGY | Facility: CLINIC | Age: 70
End: 2025-05-27
Attending: THORACIC SURGERY (CARDIOTHORACIC VASCULAR SURGERY)
Payer: COMMERCIAL

## 2025-05-27 ENCOUNTER — ANESTHESIA (OUTPATIENT)
Dept: SURGERY | Facility: CLINIC | Age: 70
End: 2025-05-27
Payer: COMMERCIAL

## 2025-05-27 VITALS
HEART RATE: 62 BPM | BODY MASS INDEX: 23.98 KG/M2 | DIASTOLIC BLOOD PRESSURE: 58 MMHG | OXYGEN SATURATION: 96 % | WEIGHT: 152.78 LBS | RESPIRATION RATE: 16 BRPM | HEIGHT: 67 IN | SYSTOLIC BLOOD PRESSURE: 99 MMHG | TEMPERATURE: 97.6 F

## 2025-05-27 DIAGNOSIS — C15.9 MALIGNANT NEOPLASM OF ESOPHAGUS, UNSPECIFIED LOCATION (H): Primary | ICD-10-CM

## 2025-05-27 PROCEDURE — 250N000009 HC RX 250

## 2025-05-27 PROCEDURE — 710N000009 HC RECOVERY PHASE 1, LEVEL 1, PER MIN: Performed by: THORACIC SURGERY (CARDIOTHORACIC VASCULAR SURGERY)

## 2025-05-27 PROCEDURE — 710N000012 HC RECOVERY PHASE 2, PER MINUTE: Performed by: THORACIC SURGERY (CARDIOTHORACIC VASCULAR SURGERY)

## 2025-05-27 PROCEDURE — 272N000001 HC OR GENERAL SUPPLY STERILE: Performed by: THORACIC SURGERY (CARDIOTHORACIC VASCULAR SURGERY)

## 2025-05-27 PROCEDURE — 370N000017 HC ANESTHESIA TECHNICAL FEE, PER MIN: Performed by: THORACIC SURGERY (CARDIOTHORACIC VASCULAR SURGERY)

## 2025-05-27 PROCEDURE — 74360 X-RAY GUIDE GI DILATION: CPT | Mod: 26 | Performed by: THORACIC SURGERY (CARDIOTHORACIC VASCULAR SURGERY)

## 2025-05-27 PROCEDURE — 360N000082 HC SURGERY LEVEL 2 W/ FLUORO, PER MIN: Performed by: THORACIC SURGERY (CARDIOTHORACIC VASCULAR SURGERY)

## 2025-05-27 PROCEDURE — 250N000025 HC SEVOFLURANE, PER MIN: Performed by: THORACIC SURGERY (CARDIOTHORACIC VASCULAR SURGERY)

## 2025-05-27 PROCEDURE — 258N000003 HC RX IP 258 OP 636

## 2025-05-27 PROCEDURE — 999N000179 XR SURGERY CARM FLUORO LESS THAN 5 MIN W STILLS

## 2025-05-27 PROCEDURE — 250N000011 HC RX IP 250 OP 636

## 2025-05-27 PROCEDURE — C1769 GUIDE WIRE: HCPCS | Performed by: THORACIC SURGERY (CARDIOTHORACIC VASCULAR SURGERY)

## 2025-05-27 PROCEDURE — 999N000141 HC STATISTIC PRE-PROCEDURE NURSING ASSESSMENT: Performed by: THORACIC SURGERY (CARDIOTHORACIC VASCULAR SURGERY)

## 2025-05-27 PROCEDURE — 43248 EGD GUIDE WIRE INSERTION: CPT | Mod: GC | Performed by: THORACIC SURGERY (CARDIOTHORACIC VASCULAR SURGERY)

## 2025-05-27 RX ORDER — METHOCARBAMOL 750 MG/1
750 TABLET, FILM COATED ORAL
Status: DISCONTINUED | OUTPATIENT
Start: 2025-05-27 | End: 2025-05-27 | Stop reason: HOSPADM

## 2025-05-27 RX ORDER — DEXAMETHASONE SODIUM PHOSPHATE 4 MG/ML
INJECTION, SOLUTION INTRA-ARTICULAR; INTRALESIONAL; INTRAMUSCULAR; INTRAVENOUS; SOFT TISSUE PRN
Status: DISCONTINUED | OUTPATIENT
Start: 2025-05-27 | End: 2025-05-27

## 2025-05-27 RX ORDER — ONDANSETRON 2 MG/ML
4 INJECTION INTRAMUSCULAR; INTRAVENOUS EVERY 30 MIN PRN
Status: DISCONTINUED | OUTPATIENT
Start: 2025-05-27 | End: 2025-05-27 | Stop reason: HOSPADM

## 2025-05-27 RX ORDER — ONDANSETRON 4 MG/1
4 TABLET, ORALLY DISINTEGRATING ORAL
Status: DISCONTINUED | OUTPATIENT
Start: 2025-05-27 | End: 2025-05-27 | Stop reason: HOSPADM

## 2025-05-27 RX ORDER — ONDANSETRON 4 MG/1
4 TABLET, ORALLY DISINTEGRATING ORAL EVERY 30 MIN PRN
Status: DISCONTINUED | OUTPATIENT
Start: 2025-05-27 | End: 2025-05-27 | Stop reason: HOSPADM

## 2025-05-27 RX ORDER — ONDANSETRON 4 MG/1
4 TABLET, ORALLY DISINTEGRATING ORAL EVERY 8 HOURS PRN
Qty: 4 TABLET | Refills: 0 | Status: SHIPPED | OUTPATIENT
Start: 2025-05-27

## 2025-05-27 RX ORDER — NALOXONE HYDROCHLORIDE 0.4 MG/ML
0.1 INJECTION, SOLUTION INTRAMUSCULAR; INTRAVENOUS; SUBCUTANEOUS
Status: DISCONTINUED | OUTPATIENT
Start: 2025-05-27 | End: 2025-05-27 | Stop reason: HOSPADM

## 2025-05-27 RX ORDER — OXYCODONE HYDROCHLORIDE 5 MG/1
5 TABLET ORAL
Status: DISCONTINUED | OUTPATIENT
Start: 2025-05-27 | End: 2025-05-27 | Stop reason: HOSPADM

## 2025-05-27 RX ORDER — LABETALOL HYDROCHLORIDE 5 MG/ML
10 INJECTION, SOLUTION INTRAVENOUS
Status: DISCONTINUED | OUTPATIENT
Start: 2025-05-27 | End: 2025-05-27 | Stop reason: HOSPADM

## 2025-05-27 RX ORDER — LIDOCAINE HYDROCHLORIDE 20 MG/ML
INJECTION, SOLUTION INFILTRATION; PERINEURAL PRN
Status: DISCONTINUED | OUTPATIENT
Start: 2025-05-27 | End: 2025-05-27

## 2025-05-27 RX ORDER — DEXAMETHASONE SODIUM PHOSPHATE 4 MG/ML
4 INJECTION, SOLUTION INTRA-ARTICULAR; INTRALESIONAL; INTRAMUSCULAR; INTRAVENOUS; SOFT TISSUE
Status: DISCONTINUED | OUTPATIENT
Start: 2025-05-27 | End: 2025-05-27 | Stop reason: HOSPADM

## 2025-05-27 RX ORDER — ONDANSETRON 2 MG/ML
INJECTION INTRAMUSCULAR; INTRAVENOUS PRN
Status: DISCONTINUED | OUTPATIENT
Start: 2025-05-27 | End: 2025-05-27

## 2025-05-27 RX ORDER — HYDROMORPHONE HYDROCHLORIDE 1 MG/ML
0.4 INJECTION, SOLUTION INTRAMUSCULAR; INTRAVENOUS; SUBCUTANEOUS EVERY 5 MIN PRN
Status: DISCONTINUED | OUTPATIENT
Start: 2025-05-27 | End: 2025-05-27 | Stop reason: HOSPADM

## 2025-05-27 RX ORDER — HYDROMORPHONE HYDROCHLORIDE 1 MG/ML
0.2 INJECTION, SOLUTION INTRAMUSCULAR; INTRAVENOUS; SUBCUTANEOUS EVERY 5 MIN PRN
Status: DISCONTINUED | OUTPATIENT
Start: 2025-05-27 | End: 2025-05-27 | Stop reason: HOSPADM

## 2025-05-27 RX ORDER — LIDOCAINE 40 MG/G
CREAM TOPICAL
Status: DISCONTINUED | OUTPATIENT
Start: 2025-05-27 | End: 2025-05-27 | Stop reason: HOSPADM

## 2025-05-27 RX ORDER — ACETAMINOPHEN 325 MG/1
650 TABLET ORAL
Status: DISCONTINUED | OUTPATIENT
Start: 2025-05-27 | End: 2025-05-27 | Stop reason: HOSPADM

## 2025-05-27 RX ORDER — FENTANYL CITRATE 50 UG/ML
INJECTION, SOLUTION INTRAMUSCULAR; INTRAVENOUS PRN
Status: DISCONTINUED | OUTPATIENT
Start: 2025-05-27 | End: 2025-05-27

## 2025-05-27 RX ORDER — SODIUM CHLORIDE, SODIUM LACTATE, POTASSIUM CHLORIDE, CALCIUM CHLORIDE 600; 310; 30; 20 MG/100ML; MG/100ML; MG/100ML; MG/100ML
INJECTION, SOLUTION INTRAVENOUS CONTINUOUS
Status: DISCONTINUED | OUTPATIENT
Start: 2025-05-27 | End: 2025-05-27 | Stop reason: HOSPADM

## 2025-05-27 RX ORDER — FENTANYL CITRATE 50 UG/ML
25 INJECTION, SOLUTION INTRAMUSCULAR; INTRAVENOUS EVERY 5 MIN PRN
Status: DISCONTINUED | OUTPATIENT
Start: 2025-05-27 | End: 2025-05-27 | Stop reason: HOSPADM

## 2025-05-27 RX ORDER — FENTANYL CITRATE 50 UG/ML
50 INJECTION, SOLUTION INTRAMUSCULAR; INTRAVENOUS EVERY 5 MIN PRN
Status: DISCONTINUED | OUTPATIENT
Start: 2025-05-27 | End: 2025-05-27 | Stop reason: HOSPADM

## 2025-05-27 RX ORDER — PROPOFOL 10 MG/ML
INJECTION, EMULSION INTRAVENOUS PRN
Status: DISCONTINUED | OUTPATIENT
Start: 2025-05-27 | End: 2025-05-27

## 2025-05-27 RX ORDER — SODIUM CHLORIDE, SODIUM LACTATE, POTASSIUM CHLORIDE, CALCIUM CHLORIDE 600; 310; 30; 20 MG/100ML; MG/100ML; MG/100ML; MG/100ML
INJECTION, SOLUTION INTRAVENOUS CONTINUOUS PRN
Status: DISCONTINUED | OUTPATIENT
Start: 2025-05-27 | End: 2025-05-27

## 2025-05-27 RX ADMIN — SUCCINYLCHOLINE CHLORIDE 100 MG: 20 INJECTION, SOLUTION INTRAMUSCULAR; INTRAVENOUS; PARENTERAL at 08:04

## 2025-05-27 RX ADMIN — SODIUM CHLORIDE, SODIUM LACTATE, POTASSIUM CHLORIDE, AND CALCIUM CHLORIDE: .6; .31; .03; .02 INJECTION, SOLUTION INTRAVENOUS at 07:55

## 2025-05-27 RX ADMIN — PHENYLEPHRINE HYDROCHLORIDE 100 MCG: 10 INJECTION INTRAVENOUS at 08:10

## 2025-05-27 RX ADMIN — PROPOFOL 150 MG: 10 INJECTION, EMULSION INTRAVENOUS at 08:04

## 2025-05-27 RX ADMIN — DEXAMETHASONE SODIUM PHOSPHATE 4 MG: 4 INJECTION, SOLUTION INTRAMUSCULAR; INTRAVENOUS at 08:08

## 2025-05-27 RX ADMIN — LIDOCAINE HYDROCHLORIDE 100 MG: 20 INJECTION, SOLUTION INFILTRATION; PERINEURAL at 08:04

## 2025-05-27 RX ADMIN — PROPOFOL 30 MG: 10 INJECTION, EMULSION INTRAVENOUS at 08:15

## 2025-05-27 RX ADMIN — PROPOFOL 50 MG: 10 INJECTION, EMULSION INTRAVENOUS at 08:05

## 2025-05-27 RX ADMIN — ONDANSETRON 4 MG: 2 INJECTION INTRAMUSCULAR; INTRAVENOUS at 08:08

## 2025-05-27 RX ADMIN — FENTANYL CITRATE 100 MCG: 50 INJECTION INTRAMUSCULAR; INTRAVENOUS at 08:04

## 2025-05-27 ASSESSMENT — ACTIVITIES OF DAILY LIVING (ADL)
ADLS_ACUITY_SCORE: 52

## 2025-05-27 NOTE — ANESTHESIA CARE TRANSFER NOTE
Patient: Lopez Hogue    Procedure: Procedure(s):  Esophagoscopy, gastroscopy, duodenoscopy (EGD), dilatation and interpretation of fluoroscopic images       Diagnosis: Hx of esophagectomy [Z98.890, Z90.49]  Diagnosis Additional Information: No value filed.    Anesthesia Type:   General     Note:    Oropharynx: oropharynx clear of all foreign objects and spontaneously breathing  Level of Consciousness: awake  Oxygen Supplementation: face mask  Level of Supplemental Oxygen (L/min / FiO2): 4  Independent Airway: airway patency satisfactory and stable  Dentition: dentition unchanged  Vital Signs Stable: post-procedure vital signs reviewed and stable  Report to RN Given: handoff report given  Patient transferred to: PACU    Handoff Report: Identifed the Patient, Identified the Reponsible Provider, Reviewed the pertinent medical history, Discussed the surgical course, Reviewed Intra-OP anesthesia mangement and issues during anesthesia, Set expectations for post-procedure period and Allowed opportunity for questions and acknowledgement of understanding      Vitals:  Vitals Value Taken Time   BP     Temp     Pulse 71 05/27/25 08:43   Resp 14 05/27/25 08:42   SpO2 100 % 05/27/25 08:43   Vitals shown include unfiled device data.    Electronically Signed By: Morena Nova MD  May 27, 2025  8:44 AM

## 2025-05-27 NOTE — OP NOTE
Preoperative diagnosis:                         Dysphagia s/p esophagectomy  Postoperative diagnosis:                       Dysphagia s/p esophagectomy    Procedure:   Esophagogastroduodenoscopy with dilation with Savary dilators  Interpretation of fluoroscopic images    Anesthesia: General   Surgeon: Devin Hill   Resident surgeon: Anna Barrios MD  EBL:  None    Complications: none immediate  Findings:  Mild stricture at anastomosis     Description of procedure     The patient was brought to the room and placed supine upon the table. After confirming the patient's identity and the consent, appropriate monitoring devices were placed as well as SCD boots. General anesthesia was administered and the patient was intubated. A bite block was placed.  The endoscope was passed into the posterior pharynx and into the esophagus without difficulty. The anastomosis was located at 24 cm from the incisors and was intact with mild stricturing noted. The adult endoscope passed easily into the conduit. The conduit was straight to the hiatus and the pylorus was patent, though seemed small. The first and second portions of the duodenum appeared normal. A guidewire was passed into the duodenum and the endoscope was withdrawn. The patient was dilated carefully from with a 60 Sinhala Savary dilator under fluoroscopic guidance. The dilator was withdrawn and the endoscope was passed again into the esophagus which appeared normal. The pylorus was more open. The conduit and the duodenum appeared normal. The air was aspirated and the endoscope was withdrawn.

## 2025-05-27 NOTE — BRIEF OP NOTE
[facility]    Brief Operative Note    Pre-operative diagnosis: Hx of esophagectomy [Z98.890, Z90.49]  Post-operative diagnosis Same as pre-operative diagnosis    Procedure: Esophagoscopy, gastroscopy, duodenoscopy (EGD), dilatation and interpretation of fluoroscopic images, N/A - Esophagus    Surgeon: Surgeons and Role:     * Devin Hill MD - Primary     * Anna Barrios MD - Fellow - Assisting  Anesthesia: General   Estimated Blood Loss: None    Drains: None  Specimens: * No specimens in log *  Findings:   Dilated up to 20 using Savary dilator  Complications: None.  Implants: * No implants in log *

## 2025-05-27 NOTE — ANESTHESIA PROCEDURE NOTES
Airway       Patient location during procedure: OR       Procedure Start/Stop Times: 5/27/2025 8:06 AM  Staff -        Anesthesiologist:  Alice Joseph MD       Resident/Fellow: Morena Nova MD       Performed By: resident  Consent for Airway        Urgency: elective  Indications and Patient Condition       Indications for airway management: geneva-procedural       Induction type:RSI       Mask difficulty assessment: 0 - not attempted    Final Airway Details       Final airway type: endotracheal airway       Successful airway: ETT - single  Endotracheal Airway Details        ETT size (mm): 7.5       Cuffed: yes       Successful intubation technique: direct laryngoscopy       DL Blade Type: MAC 3       Grade View of Cords: 1       Adjucts: stylet       Measured from: lips       Secured at (cm): 24       Bite block used: None    Post intubation assessment        Placement verified by: capnometry, equal breath sounds and chest rise        Number of attempts at approach: 1       Secured with: tape       Ease of procedure: easy       Dentition: Intact and Unchanged    Medication(s) Administered   Medication Administration Time: 5/27/2025 8:06 AM

## 2025-05-27 NOTE — H&P
THORACIC SURGERY H&P NOTE      HPI: A 70 yo M s/p Manito elbert 4/2024 with Dr. Hill for malignancy, s/p neoadjuvant chemo-XRT. Since than had some dysphagia and reflux. S/p stent in the pylorus and dilations. In Dec 2024 he underwent botox inj to the pylorus with Dr. Monahan  States he has no major issues with swallowing but has bothersome reflux.    A/P: Patient is a 69 year old male with reflux and stenosis after Manito Elbert esophagectomy presents today for elective EGD and dilation with Dr. Hill    -Plan for elective EGD with Dilation and all other indicated procedures today     Patient and plan discussed with attending Dr. Hill      PMH:  Past Medical History:   Diagnosis Date    Anxiety     Aortic stenosis     CAD (coronary artery disease)     ETOH dependence     Quit drinking 10 years    Heart attack (H)     History of blood transfusion     HLD (hyperlipidemia)     Hypertension     Malignant neoplasm of middle third of esophagus (H)     Nonrheumatic aortic (valve) stenosis     Sweat gland carcinoma          PSH:  Past Surgical History:   Procedure Laterality Date    BIOPSY  June 29015    Left gluteal and groin lymphnodes    BRONCHOSCOPY FLEXIBLE AND RIGID N/A 04/19/2024    Procedure: Bronchoscopy flexible and rigid;  Surgeon: Devin Hill MD;  Location: [facility]    BRONCHOSCOPY FLEXIBLE AND RIGID N/A 04/28/2024    Procedure: Bronchoscopy flexible and rigid;  Surgeon: Jessie Kim MD;  Location: [facility]    CARDIAC SURGERY  March 2023    2 stents placed    COLONOSCOPY  2012    CV CORONARY ANGIOGRAM N/A 03/27/2023    Procedure: Coronary Angiogram;  Surgeon: Miki Etienne MD;  Location: [facility] CV    CV CORONARY ANGIOGRAM N/A 05/09/2023    Procedure: Coronary Angiogram;  Surgeon: Miki Etienne MD;  Location: [facility] CV    CV LEFT HEART CATH N/A 03/27/2023    Procedure: Left Heart Catheterization;  Surgeon: Miki Etienne MD;  Location: [facility] CV    CV LEFT  HEART CATH N/A 05/09/2023    Procedure: Left Heart Catheterization;  Surgeon: Miki Etienne MD;  Location: [facility] CV    CV PCI N/A 03/27/2023    Procedure: Percutaneous Coronary Intervention;  Surgeon: Miki Etienne MD;  Location: [facility] CV    ENDOSCOPIC ULTRASOUND UPPER GASTROINTESTINAL TRACT (GI) N/A 11/28/2023    Procedure: Endoscopic ultrasound upper gastrointestinal tract (GI);  Surgeon: Shahriar Giraldo MD;  Location: [facility]    ESOPHAGOGASTRODUODENOSCOPY, WITH BOTULINUM TOXIN INJECTION N/A 04/29/2024    Procedure: Esophagogastroduodenoscopy, With Botulinum Toxin Injection;  Surgeon: Jessie Kim MD;  Location: [facility]    ESOPHAGOSCOPY, GASTROSCOPY, DUODENOSCOPY (EGD), COMBINED N/A 11/08/2023    Procedure: ESOPHAGOGASTRODUODENOSCOPY, WITH BIOPSY;  Surgeon: Shahriar Giraldo MD;  [facility]    ESOPHAGOSCOPY, GASTROSCOPY, DUODENOSCOPY (EGD), COMBINED N/A 04/19/2024    Procedure: Esophagoscopy, gastroscopy, duodenoscopy (EGD), combined;  Surgeon: Devin Hill MD;  [facility]    ESOPHAGOSCOPY, GASTROSCOPY, DUODENOSCOPY (EGD), COMBINED N/A 04/28/2024    Procedure: Esophagoscopy, gastroscopy, duodenoscopy (EGD), combined;  Surgeon: Jessie Kim MD;  [facility]    ESOPHAGOSCOPY, GASTROSCOPY, DUODENOSCOPY (EGD), COMBINED N/A 05/05/2024    Procedure: Esophagoscopy, gastroscopy, duodenoscopy (EGD), combined-under fluoro & dilation, nasogastric tube placement, bronchoscopy;  Surgeon: Kevin Calderon MD;  Location: [facility]    ESOPHAGOSCOPY, GASTROSCOPY, DUODENOSCOPY (EGD), COMBINED N/A 8/8/2024    Procedure: ESOPHAGOGASTRODUODENOSCOPY with fluoroscopy and stent placement;  Surgeon: Livan Monahan MD;  Location: [facility]    ESOPHAGOSCOPY, GASTROSCOPY, DUODENOSCOPY (EGD), COMBINED N/A 8/15/2024    Procedure: ESOPHAGOGASTRODUODENOSCOPY with stent placement;  Surgeon: Livan Monahan MD;  Location: [facility]    ESOPHAGOSCOPY, GASTROSCOPY, DUODENOSCOPY  (EGD), COMBINED N/A 12/5/2024    Procedure: ESOPHAGOGASTRODUODENOSCOPY with fluoroscopy, stent and jejunostomy tube removal;  Surgeon: Livan Monahan MD;  Location: [facility] OR    IR JEJUNOSTOMY TUBE CHANGE  05/19/2024    LAPAROSCOPIC ASSISTED INSERTION TUBE JEJUNOSTOMY N/A 04/01/2024    Procedure: Laparoscopic Jejunostomy Tube Insertion and Esophagogastroduodenoscopy;  Surgeon: Kevin Calderon MD;  Location: [facility]    LARYNGOSCOPY WITH BIOPSY(IES) N/A 10/12/2023    Procedure: LARYNGOSCOPY, WITH BIOPSY;  Surgeon: Edi Felix MD;  Location: [facility]    OTHER SURGICAL HISTORY  01/01/2015    WIDE EXCISION OF LEFT GLUTEAL MASSTNM: [id number], stage: II hidradenocarcinoma Grade II, 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, sentinel lymph node biopsy negative     SOFT TISSUE SURGERY  June 2023    left gluteal and lymphnodes    THORACOSCOPIC, LAPAROSCOPIC ESOPHAGECTOMY, COMBINED N/A 04/19/2024    Procedure: Laparoscopic and right thoracoscopic esophagogastrectomy, right AND left chest tube placement;  Surgeon: Devin Hill MD;  Location: [facility]    VASCULAR SURGERY  March 2023    Cath 2 stents         FH:  family history includes Dementia in his mother; Mental Illness in his mother; Snoring in his father; Substance Abuse in his brother, sister, and son.      Allergies:  Allergies   Allergen Reactions    Coconut Flavoring Agent (Non-Screening) Anaphylaxis     Raw coconut       Home Meds:  Medications Prior to Admission   Medication Sig Dispense Refill Last Dose/Taking    acetaminophen (TYLENOL) 500 MG tablet Take 500-1,000 mg by mouth every 8 hours as needed for fever or pain   Past Month    aspirin (ASA) 81 MG chewable tablet Take 81 mg by mouth every morning.   5/27/2025 at  4:00 AM    busPIRone (BUSPAR) 5 MG tablet Take 1 tablet (5 mg) by mouth 3 times daily. (Patient taking differently: Take 10 mg by mouth 3 times daily.) 90 tablet 2 Past Week    chlorhexidine (PERIDEX) 0.12 % solution Take 15 mLs by mouth as needed.   Past Week     levETIRAcetam (KEPPRA) 250 MG tablet Take 1 tablet (250 mg) by mouth at bedtime. 30 tablet 5 5/26/2025 at  9:00 PM    levothyroxine (SYNTHROID/LEVOTHROID) 25 MCG tablet Take 1 tablet (25 mcg) by mouth daily. 90 tablet 1 5/27/2025 at  4:00 AM    nitroGLYcerin (NITROSTAT) 0.4 MG sublingual tablet PLACE 1 TABLET UNDER THE TONGUE EVERY 5 MINUTES FOR CHEST PAIN FOR 3 DOSES. IF SYMPTOMS PERSIST 5 MINUTES AFTER 1ST DOSE CALL 911. (Patient taking differently: every 5 minutes as needed.) 25 tablet 0 More than a month    pantoprazole (PROTONIX) 40 MG EC tablet Take 1 tablet (40 mg) by mouth 2 times daily. 180 tablet 4 5/27/2025 at  4:00 AM    polyethylene glycol (MIRALAX) 17 g packet Take 17 g by mouth daily as needed for constipation.   Past Week    rosuvastatin (CRESTOR) 40 MG tablet Take 1 tablet (40 mg) by mouth daily. 90 tablet 2 5/27/2025 at  4:00 AM    senna-docusate (SENOKOT-S/PERICOLACE) 8.6-50 MG tablet Take 1 tablet by mouth 2 times daily as needed for constipation 30 tablet 0 Past Week    sertraline (ZOLOFT) 100 MG tablet Take 1.5 tablets (150 mg) by mouth every morning. 135 tablet 2 5/26/2025 Morning       ROS: Denies unless specified in HPI    Physical Exam:  Visit Vitals  /63 (BP Location: Left arm)   Pulse 66   Temp 98.7  F (37.1  C) (Oral)   Resp 16       General: Alert, in no acute distress  ENT: Extraocular muscle movement intact, normal conjunctiva  Respiratory: no cyanosis, non labored breathing  Cardiovascular: cap refill < 3 sec, extremities warm, normal rate  Abdomen: non distended, soft and non tender  Extremities: warm to touch      Anna Barrios MD      Yes

## 2025-05-27 NOTE — DISCHARGE INSTRUCTIONS
Contacting your Doctor -   To contact a doctor, call Dr [facility] at [phone]  or:  [phone] and ask for the resident on call for Thoracic Surgery (answered 24 hours a day)   Emergency Department:  [facility]: [phone]  [facility]: [phone] 911 if you are in need of immediate or emergent help

## 2025-05-27 NOTE — ANESTHESIA POSTPROCEDURE EVALUATION
Patient: Lopez Hogue    Procedure: Procedure(s):  Esophagoscopy, gastroscopy, duodenoscopy (EGD), dilatation and interpretation of fluoroscopic images       Anesthesia Type:  General    Note:  Disposition: Outpatient   Postop Pain Control: Uneventful            Sign Out: Well controlled pain   PONV: No   Neuro/Psych: Uneventful            Sign Out: Acceptable/Baseline neuro status   Airway/Respiratory: Uneventful            Sign Out: Acceptable/Baseline resp. status   CV/Hemodynamics: Uneventful            Sign Out: Acceptable CV status; No obvious hypovolemia; No obvious fluid overload   Other NRE: NONE   DID A NON-ROUTINE EVENT OCCUR?            Last vitals:  Vitals Value Taken Time   /63 05/27/25 08:50   Temp     Pulse 67 05/27/25 08:56   Resp 22 05/27/25 08:56   SpO2 95 % 05/27/25 08:56   Vitals shown include unfiled device data.    Electronically Signed By: Javad Mathews MD  May 27, 2025  8:57 AM

## 2025-06-03 ASSESSMENT — SLEEP AND FATIGUE QUESTIONNAIRES
HOW LIKELY ARE YOU TO NOD OFF OR FALL ASLEEP WHILE SITTING AND TALKING TO SOMEONE: SLIGHT CHANCE OF DOZING
HOW LIKELY ARE YOU TO NOD OFF OR FALL ASLEEP WHEN YOU ARE A PASSENGER IN A CAR FOR AN HOUR WITHOUT A BREAK: SLIGHT CHANCE OF DOZING
HOW LIKELY ARE YOU TO NOD OFF OR FALL ASLEEP WHILE SITTING INACTIVE IN A PUBLIC PLACE: SLIGHT CHANCE OF DOZING
HOW LIKELY ARE YOU TO NOD OFF OR FALL ASLEEP WHILE LYING DOWN TO REST IN THE AFTERNOON WHEN CIRCUMSTANCES PERMIT: HIGH CHANCE OF DOZING
HOW LIKELY ARE YOU TO NOD OFF OR FALL ASLEEP IN A CAR, WHILE STOPPED FOR A FEW MINUTES IN TRAFFIC: SLIGHT CHANCE OF DOZING
HOW LIKELY ARE YOU TO NOD OFF OR FALL ASLEEP WHILE SITTING AND READING: MODERATE CHANCE OF DOZING
HOW LIKELY ARE YOU TO NOD OFF OR FALL ASLEEP WHILE WATCHING TV: SLIGHT CHANCE OF DOZING
HOW LIKELY ARE YOU TO NOD OFF OR FALL ASLEEP WHILE SITTING QUIETLY AFTER LUNCH WITHOUT ALCOHOL: MODERATE CHANCE OF DOZING

## 2025-06-08 ENCOUNTER — THERAPY VISIT (OUTPATIENT)
Dept: SLEEP MEDICINE | Facility: CLINIC | Age: 70
End: 2025-06-08
Payer: COMMERCIAL

## 2025-06-08 DIAGNOSIS — G47.63 SLEEP RELATED BRUXISM: ICD-10-CM

## 2025-06-08 DIAGNOSIS — R53.82 CHRONIC FATIGUE: ICD-10-CM

## 2025-06-08 DIAGNOSIS — R06.83 SNORING: ICD-10-CM

## 2025-06-08 DIAGNOSIS — G47.59 OTHER PARASOMNIA: ICD-10-CM

## 2025-06-08 DIAGNOSIS — R06.81 WITNESSED EPISODE OF APNEA: ICD-10-CM

## 2025-06-09 NOTE — PATIENT INSTRUCTIONS
[facility]    1. Your sleep study will be reviewed by a sleep physician.     2. Please follow up in the sleep clinic as scheduled, or, make an appointment with your sleep provider to be seen in 3 months.    3. If you have any questions or problems with your treatment plan, please contact your sleep clinic provider at [phone] to further manage your condition.    4. Please review your attached medication list, and, at your follow-up appointment advise your sleep clinic provider about any changes.    5. Go to [email or web address] for more information about your sleep problems.    SUBHASH Turner  June 9, 2025

## 2025-06-18 LAB — SLPCOMP: NORMAL

## 2025-06-30 ENCOUNTER — OFFICE VISIT (OUTPATIENT)
Dept: OTOLARYNGOLOGY | Facility: CLINIC | Age: 70
End: 2025-06-30
Payer: COMMERCIAL

## 2025-06-30 VITALS — WEIGHT: 148 LBS | HEIGHT: 67 IN | BODY MASS INDEX: 23.23 KG/M2

## 2025-06-30 DIAGNOSIS — C10.0 SQUAMOUS CELL CARCINOMA OF VALLECULA (H): Primary | ICD-10-CM

## 2025-06-30 PROCEDURE — 99213 OFFICE O/P EST LOW 20 MIN: CPT | Mod: 25 | Performed by: STUDENT IN AN ORGANIZED HEALTH CARE EDUCATION/TRAINING PROGRAM

## 2025-06-30 PROCEDURE — 31575 DIAGNOSTIC LARYNGOSCOPY: CPT | Performed by: STUDENT IN AN ORGANIZED HEALTH CARE EDUCATION/TRAINING PROGRAM

## 2025-06-30 PROCEDURE — 1126F AMNT PAIN NOTED NONE PRSNT: CPT | Performed by: STUDENT IN AN ORGANIZED HEALTH CARE EDUCATION/TRAINING PROGRAM

## 2025-06-30 ASSESSMENT — PAIN SCALES - GENERAL: PAINLEVEL_OUTOF10: NO PAIN (0)

## 2025-06-30 NOTE — PROGRESS NOTES
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 Esophagectomy 4/19/24     Imaging:   CT neck 7/8/24: prominent right lingual tonsil tissue suspicious for recurrence  PET/CT 7/26/24: ZANE  CT 7/7/25: pending     Interval history:     Physical Examination:     Procedure:     A/P:     25 minutes spent on the date of the encounter in chart review, patient visit, review of tests, documentation and/or discussion with other providers about the issues documented above aside from time spent doing oral biopsy.

## 2025-06-30 NOTE — LETTER
6/30/2025       RE: Lopez Hogue  [address] [facility] [address]     Dear Colleague,    Thank you for referring your patient, Lopez Hogue, to the [facility] [place] [facility]. Please see a copy of my visit note below.    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 Esophagectomy 4/19/24     Imaging:   CT neck 7/8/24: prominent right lingual tonsil tissue suspicious for recurrence  PET/CT 7/26/24: ZANE     Interval history:  Doing well - reports swelling of submandibular gland is stable. Weight stable. Reports voice is hoarse but stable on PPI. Reports SOB with exertion.      Physical Examination:  Alert, NAD  Breathing comfortably  No palpable adenopathy  No lesions in oral cavity  No suspicious skin lesions    Procedure: Flexible Fiberoptic Laryngoscopy    Patient was verbally consented and anesthetized with afrin/lidocaine spray. No abnormalities noted in nasal cavity or nasopharynx. Supraglottic structures wnl, epiglottic crisp, vallecula clear. Bilateral arytenoids nonedematous, nonerythematous. Bilateral pyriforms visualized without abnormality. TVC bialterally mobile with no lesions.    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 No evidence of recurrent disease.     Plan:  - CT Neck scheduled for 7/7/25  - Follow-up in 4 months    Sangita Wise MS4  [facility]    I agree with the findings, assessment, and plan    Edi Felix MD    25 minutes spent on the date of the encounter in chart review, patient visit, review of tests,  documentation and/or discussion with other providers about the issues documented above asides from time spent doing flexible laryngoscopy.           Again, thank you for allowing me to participate in the care of your patient.      Sincerely,    Edi Felix MD

## 2025-06-30 NOTE — PATIENT INSTRUCTIONS
1. Please follow-up in clinic in 4 months.   2. Please call the ENT clinic with any questions,concerns, new or worsening symptoms.    -Clinic number is [phone]

## 2025-06-30 NOTE — PROGRESS NOTES
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 Esophagectomy 4/19/24     Imaging:   CT neck 7/8/24: prominent right lingual tonsil tissue suspicious for recurrence  PET/CT 7/26/24: ZANE     Interval history:  Doing well - reports swelling of submandibular gland is stable. Weight stable. Reports voice is hoarse but stable on PPI. Reports SOB with exertion.      Physical Examination:  Alert, NAD  Breathing comfortably  No palpable adenopathy  No lesions in oral cavity  No suspicious skin lesions    Procedure: Flexible Fiberoptic Laryngoscopy    Patient was verbally consented and anesthetized with afrin/lidocaine spray. No abnormalities noted in nasal cavity or nasopharynx. Supraglottic structures wnl, epiglottic crisp, vallecula clear. Bilateral arytenoids nonedematous, nonerythematous. Bilateral pyriforms visualized without abnormality. TVC bialterally mobile with no lesions.    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 No evidence of recurrent disease.     Plan:  - CT Neck scheduled for 7/7/25  - Follow-up in 4 months    Sangita Wise MS4  [facility]    I agree with the findings, assessment, and plan    Edi Felix MD    25 minutes spent on the date of the encounter in chart review, patient visit, review of tests, documentation and/or discussion with other providers about the issues documented above asides from time spent doing flexible laryngoscopy.

## 2025-07-07 ENCOUNTER — HOSPITAL ENCOUNTER (OUTPATIENT)
Dept: CT IMAGING | Facility: HOSPITAL | Age: 70
Discharge: HOME | End: 2025-07-07
Attending: STUDENT IN AN ORGANIZED HEALTH CARE EDUCATION/TRAINING PROGRAM | Admitting: STUDENT IN AN ORGANIZED HEALTH CARE EDUCATION/TRAINING PROGRAM
Payer: COMMERCIAL

## 2025-07-07 DIAGNOSIS — C15.9 PRIMARY ESOPHAGEAL SQUAMOUS CELL CARCINOMA (H): ICD-10-CM

## 2025-07-07 PROCEDURE — 70491 CT SOFT TISSUE NECK W/DYE: CPT

## 2025-07-07 PROCEDURE — 250N000011 HC RX IP 250 OP 636: Performed by: STUDENT IN AN ORGANIZED HEALTH CARE EDUCATION/TRAINING PROGRAM

## 2025-07-07 PROCEDURE — 250N000009 HC RX 250: Performed by: STUDENT IN AN ORGANIZED HEALTH CARE EDUCATION/TRAINING PROGRAM

## 2025-07-07 PROCEDURE — 71260 CT THORAX DX C+: CPT

## 2025-07-07 RX ORDER — IOPAMIDOL 755 MG/ML
90 INJECTION, SOLUTION INTRAVASCULAR ONCE
Status: COMPLETED | OUTPATIENT
Start: 2025-07-07 | End: 2025-07-07

## 2025-07-07 RX ADMIN — SODIUM CHLORIDE 100 ML: 9 INJECTION, SOLUTION INTRAVENOUS at 07:51

## 2025-07-07 RX ADMIN — IOPAMIDOL 90 ML: 755 INJECTION, SOLUTION INTRAVENOUS at 07:50

## 2025-07-08 ENCOUNTER — ONCOLOGY VISIT (OUTPATIENT)
Dept: ONCOLOGY | Facility: CLINIC | Age: 70
End: 2025-07-08
Attending: STUDENT IN AN ORGANIZED HEALTH CARE EDUCATION/TRAINING PROGRAM
Payer: COMMERCIAL

## 2025-07-08 VITALS
SYSTOLIC BLOOD PRESSURE: 103 MMHG | OXYGEN SATURATION: 97 % | RESPIRATION RATE: 14 BRPM | WEIGHT: 148.6 LBS | HEART RATE: 69 BPM | TEMPERATURE: 98.1 F | DIASTOLIC BLOOD PRESSURE: 61 MMHG | BODY MASS INDEX: 23.27 KG/M2

## 2025-07-08 DIAGNOSIS — C15.9 PRIMARY ESOPHAGEAL SQUAMOUS CELL CARCINOMA (H): ICD-10-CM

## 2025-07-08 DIAGNOSIS — C10.0 SQUAMOUS CELL CARCINOMA OF VALLECULA (H): Primary | ICD-10-CM

## 2025-07-08 DIAGNOSIS — E03.9 HYPOTHYROIDISM, UNSPECIFIED TYPE: ICD-10-CM

## 2025-07-08 LAB
ALBUMIN SERPL BCG-MCNC: 3.7 G/DL (ref 3.5–5.2)
ALP SERPL-CCNC: 108 U/L (ref 40–150)
ALT SERPL W P-5'-P-CCNC: 17 U/L (ref 0–70)
ANION GAP SERPL CALCULATED.3IONS-SCNC: 8 MMOL/L (ref 7–15)
AST SERPL W P-5'-P-CCNC: 29 U/L (ref 0–45)
BASOPHILS # BLD AUTO: 0 10E3/UL (ref 0–0.2)
BASOPHILS NFR BLD AUTO: 1 %
BILIRUB SERPL-MCNC: 0.7 MG/DL
BUN SERPL-MCNC: 11.4 MG/DL (ref 8–23)
CALCIUM SERPL-MCNC: 9.1 MG/DL (ref 8.8–10.4)
CHLORIDE SERPL-SCNC: 104 MMOL/L (ref 98–107)
CREAT SERPL-MCNC: 0.72 MG/DL (ref 0.67–1.17)
EGFRCR SERPLBLD CKD-EPI 2021: >90 ML/MIN/1.73M2
EOSINOPHIL # BLD AUTO: 0 10E3/UL (ref 0–0.7)
EOSINOPHIL NFR BLD AUTO: 0 %
ERYTHROCYTE [DISTWIDTH] IN BLOOD BY AUTOMATED COUNT: 14.7 % (ref 10–15)
GLUCOSE SERPL-MCNC: 101 MG/DL (ref 70–99)
HCO3 SERPL-SCNC: 26 MMOL/L (ref 22–29)
HCT VFR BLD AUTO: 37.2 % (ref 40–53)
HGB BLD-MCNC: 12.5 G/DL (ref 13.3–17.7)
IMM GRANULOCYTES # BLD: 0 10E3/UL
IMM GRANULOCYTES NFR BLD: 0 %
LYMPHOCYTES # BLD AUTO: 0.7 10E3/UL (ref 0.8–5.3)
LYMPHOCYTES NFR BLD AUTO: 13 %
MCH RBC QN AUTO: 30.7 PG (ref 26.5–33)
MCHC RBC AUTO-ENTMCNC: 33.6 G/DL (ref 31.5–36.5)
MCV RBC AUTO: 91 FL (ref 78–100)
MONOCYTES # BLD AUTO: 0.4 10E3/UL (ref 0–1.3)
MONOCYTES NFR BLD AUTO: 8 %
NEUTROPHILS # BLD AUTO: 3.9 10E3/UL (ref 1.6–8.3)
NEUTROPHILS NFR BLD AUTO: 78 %
NRBC # BLD AUTO: 0 10E3/UL
NRBC BLD AUTO-RTO: 0 /100
PLATELET # BLD AUTO: 154 10E3/UL (ref 150–450)
POTASSIUM SERPL-SCNC: 4 MMOL/L (ref 3.4–5.3)
PROT SERPL-MCNC: 6.7 G/DL (ref 6.4–8.3)
RBC # BLD AUTO: 4.07 10E6/UL (ref 4.4–5.9)
SODIUM SERPL-SCNC: 138 MMOL/L (ref 135–145)
T4 FREE SERPL-MCNC: 0.61 NG/DL (ref 0.9–1.7)
TSH SERPL DL<=0.005 MIU/L-ACNC: 6.73 UIU/ML (ref 0.3–4.2)
WBC # BLD AUTO: 5 10E3/UL (ref 4–11)

## 2025-07-08 PROCEDURE — 99214 OFFICE O/P EST MOD 30 MIN: CPT | Performed by: STUDENT IN AN ORGANIZED HEALTH CARE EDUCATION/TRAINING PROGRAM

## 2025-07-08 PROCEDURE — 36415 COLL VENOUS BLD VENIPUNCTURE: CPT | Performed by: STUDENT IN AN ORGANIZED HEALTH CARE EDUCATION/TRAINING PROGRAM

## 2025-07-08 PROCEDURE — 82040 ASSAY OF SERUM ALBUMIN: CPT | Performed by: STUDENT IN AN ORGANIZED HEALTH CARE EDUCATION/TRAINING PROGRAM

## 2025-07-08 PROCEDURE — 85025 COMPLETE CBC W/AUTO DIFF WBC: CPT | Performed by: STUDENT IN AN ORGANIZED HEALTH CARE EDUCATION/TRAINING PROGRAM

## 2025-07-08 PROCEDURE — G2211 COMPLEX E/M VISIT ADD ON: HCPCS | Performed by: STUDENT IN AN ORGANIZED HEALTH CARE EDUCATION/TRAINING PROGRAM

## 2025-07-08 PROCEDURE — 84439 ASSAY OF FREE THYROXINE: CPT | Performed by: STUDENT IN AN ORGANIZED HEALTH CARE EDUCATION/TRAINING PROGRAM

## 2025-07-08 PROCEDURE — 84443 ASSAY THYROID STIM HORMONE: CPT | Performed by: STUDENT IN AN ORGANIZED HEALTH CARE EDUCATION/TRAINING PROGRAM

## 2025-07-08 PROCEDURE — G0463 HOSPITAL OUTPT CLINIC VISIT: HCPCS | Performed by: STUDENT IN AN ORGANIZED HEALTH CARE EDUCATION/TRAINING PROGRAM

## 2025-07-08 ASSESSMENT — PAIN SCALES - GENERAL: PAINLEVEL_OUTOF10: NO PAIN (0)

## 2025-07-08 NOTE — LETTER
7/8/2025      Lopez Hogue  [address] [facility] [address]      Dear Colleague,    Thank you for referring your patient, Lopez Hogue, to the [facility]. Please see a copy of my visit note below.    [facility]   Return Patient Visit    Name: Lopez Hogue  MRN: [id number]  Date of visit: Jul 8, 2025    Diagnosis: Oropharyngeal cancer, esophageal SCC  Stage:    Cancer Staging   Primary esophageal squamous cell carcinoma (H)  Staging form: Esophagus - Squamous Cell Carcinoma, AJCC 8th Edition  - Clinical stage from 11/28/2023: Stage II (cT2, cN1, cM0) - Signed by Yannick Alva MD on 7/9/2024  - Pathologic stage from 4/19/2024: Stage I (ypT0, pN0, cM0) - Signed by Yannick Alva MD on 7/9/2024    Squamous cell carcinoma of vallecula (H)  Staging form: Pharynx - Oropharynx, AJCC 8th Edition  - Clinical stage from 10/12/2023: Stage JUAN MIGUEL (cT1, cN2b, cM0, p16-) - Signed by Yannick Alva MD on 11/21/2023    Molecular: p16 negative  Performance status: ECOG 0    Oncology History:     Oropharyngeal squamous cell carcinoma:  -8/18/23 CT neck: Question soft tissue lesion within the right vallecula/ventral epiglottis.   -10/12/23 R vallecula biopsy: Non-keratinizing poorly differentiated squamous cell carcinoma, p16 negative  -10/13/23 PET/CT: Findings suspicious for right vallecula squamous cell carcinoma with right level IIa and right level IV lymph node metastases.. FDG avid thickening of the mid to distal esophagus, suspicious for a synchronous primary esophageal neoplasm. Recommend correlation with endoscopy.  --7/8/24 CT neck:  Residual mass centered on the right lingual tonsil. Interval resolution of previously identified cervical lymphadenopathy. Partially visualized postsurgical changes of esophagogastrectomy with gastric pull-through and multinodular pulmonary opacities in the visualized lung apices, better appreciated on  same-day CT chest 7/8/2024.  -7/8/24 ENT visit with Dr. Felix, no overt mass on exam     Esophageal squamous cell carcinoma:   -11/8/23 EGD with biopsy of mid esophageal mass: non-keratinizing poorly differentiated squamous cell carcinoma, PD-L1 CPS 15  -11/21/23 consult with Dr. Hill: plan for neoadjuvant chemoradiation followed by laparoscopic staging and jejunostomy, followed by minimally invasive [place] esophagectomy.   -11/28/23 staging EUS Partially obstructing and partially circumferential fungating mass in the middle third of the esophagus. Esophageal squamous cell carcinoma was staged T2 N1 (suspicious paraesophageal lymph node)   -Admission 4/19/24-5/17/24 for esophagogastrectomy, LND. Surgery without complication, postoperative course c/b re-intubation for respiratory decompensation secondary to aspiration. Compete response.     Subsequent course:  -12/5/24-1/26/24 concurrent chemoradiotherapy with weekly carboplatin + paclitaxel (both esophageal and oropharyngeal/bilateral neck, infusion reaction with Taxol w/ dose 1. Tolerated rechallenge   -1/15/24 admission for neutropenia, source felt possibly secondary to cellulitis (vs radiation change), discharged with course of levofloxacin  -1/22/24 admission for recurrent fever, two days after completing levofloxacin. No neutropenia.   -7/26/24 PET/CT: No residual or recurrent pharyngeal mucosal space lesion. Specifically no FDG avid lesion in the tongue base where there was a questionable finding on the prior CT neck. No cervical lymphadenopathy. Status post chest esophagectomy and gastric pull-up surgery with no evidence of local recurrence at the surgical site. No evidence of any distant metastasis.   -7/29/24 ENT visit: exam without concern for recurrent disease  -8/8/24 EGD: pyloric stent replaced  -8/13/24-8/14/24 admission:  hematemesis/coffee ground emesis, symptoms resolved, planned for outpatient EGD  -8/15/24 EGD: pyloric stent  revised  -10/14/24 ENT visit: exam clear  -10/20/24 ED visit (syncope): felt possibly related to cardiac etiology (ACS ruled out)  -12/5/24 EGD: pyloric botox therapy  -2/7/25 CT neck: No pathologic adenopathy.   -2/7/25 CT chest:  Esophagectomy and gastric pull-up.  Clustered nodules in the right costophrenic angle measuring up to 5 mm technically indeterminate but pattern favors inflammatory etiology. Short-term follow-up recommended.  -2/17/25 ENT visit: exam without concern for recurrence        HPI:   Lopez Hogue is a 68 year old male with a past medical history that includes HTN, CAD, MI (March of 2023 s/p PCI x2, on DAPT), and prior sweat gland cancer s/p surgical resection, now with recently diagnosed oropharyngeal cancer found to have a synchronous squamous cell carcinoma of the esophagus, now s/p concurrent chemoRT for both oropharyngeal and esophageal disease (treated simulatneously with definitive intent) followed by esophagectomy.     PMHx  HTN  CAD c/b MI (2023) s/p PCI x2  Prior sweat gland carcinoma s/p resection  No history of autoimmune disease.     SocHx  Lives in [place] [facility]  Actively involved in a program for those in recovery from substance use.   Former heavy smoker (2 ppd) and drinker himself. Quit both in 2013.       Interval History:    Last seen by me 2/25/25, at that time approximately 1 year from completion of chemoRT for HNSCC and esophageal SCC simultaneously. Planned for continued surveillance for esophageal cancer with q6mo imaging through year 2.     4/23/25 Rad Onc follow up    5/27/25 EGD with Dr. Hill, dilation of pylorus performed    7/7/25 CT neck:   1.  Posttreatment related and submucosal edema with thickening of the epiglottis and aryepiglottic folds.   2.  No cervical lymphadenopathy.  3.  Patulous esophagus with intraluminal debris. Wall thickening, similar to prior.    7/7/25 CT chest/AP:   1.  Stable postoperative changes esophagectomy and gastric  pull-through.  2.  Small cluster of nodules seen previously within the right lower lobe have improved.   3.  Consolidative opacities left upper and lower lobes, and diffuse small, nodular groundglass opacities both lungs have not significantly changed.  4.  No evidence for metastatic disease in the abdomen or pelvis.    Overall doing very well  Swallowing well  Issues of reflux well managed  Did notice improved with pyloric dilation procedure  No longer has any stents in place  Weight has been stable  Keeping pretty active        Exam:   /61   Pulse 69   Temp 98.1  F (36.7  C) (Oral)   Resp 14   Wt 67.4 kg (148 lb 9.6 oz)   SpO2 97%   BMI 23.27 kg/m      Gen: Comfortable, NAD  HEENT: Pupils equal, non-icteric  Neck/Lymph: Supple, small and firm L submandibular nodes but otherwise no lymphadenopathy   resp: Comfortable on room air  CV: well perfuse  Ext: No swelling  Neuro: Normal gait      Labs:   Reviewed in chart    Imaging:   All pertinent imaging studies personally reviewed. Key findings summarized in oncology history above    Pathology:   Reviewed in chart, key findings summarized above      Assessment and Plan:   Lopez Hogue is a 68 year old male with HTN, CAD, prior sweat gland cancer s/p resection, and recently diagnosed head and neck squamous cell carcinoma of the vallecula, with staging revealing a synchronous esophageal squamous cell carcinoma. Presenting today to establish care with medical oncology.       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 Doing well clinically. No radiographic evidence of disease recurrence. Labs show only very mild anemia. BMP and TSH are pending, we wlll follow these as they return.     Plan:  --Regarding his esophageal squamous cell carcinoma, given his pathologic complete response, no role for adjuvant therapy at this time  --Plan for CT neck/chest/AP every 6 months for the first 2 years, then annually through year 5, next due in May  --he will continue ENT follow up for routine exams      # iatrogenic hypothyroidism  -started on levothyroxine  -most recently checked in December and normal    Plan:  --continue 25 mcg daily  --trend TSH      Yannick Alva MD PhD   of Medicine  Division of Hematology, Oncology and Transplantation    ---  30 minutes were spent on the date of the encounter performing chart review, history and exam, documentation, and further activities as noted above.     The longitudinal plan of care for the diagnosis(es)/condition(s) as documented were addressed during this visit. Due to the added complexity in care, I will continue to support Fabricio in the subsequent management and with ongoing continuity of care.    Again, thank you for allowing me to participate in the care of your patient.        Sincerely,        Yannick Alva MD    Electronically signed

## 2025-07-08 NOTE — NURSING NOTE
Chief Complaint   Patient presents with    Blood Draw     Labs drawn via      Labs collected from venipuncture by RN. Vitals taken. Checked in for appointment(s).     Kassandra MA RN PHN BSN  BMT/Oncology Lab

## 2025-07-08 NOTE — NURSING NOTE
"Oncology Rooming Note    July 8, 2025 8:32 AM   Lopez Hogue is a 69 year old male who presents for:    Chief Complaint   Patient presents with    Blood Draw     Labs drawn via     Oncology Clinic Visit     Primary esophageal squamous cell carcinoma     Initial Vitals: /61   Pulse 69   Temp 98.1  F (36.7  C) (Oral)   Resp 14   Wt 67.4 kg (148 lb 9.6 oz)   SpO2 97%   BMI 23.27 kg/m   Estimated body mass index is 23.27 kg/m  as calculated from the following:    Height as of 6/30/25: 1.702 m (5' 7\").    Weight as of this encounter: 67.4 kg (148 lb 9.6 oz). Body surface area is 1.78 meters squared.  No Pain (0) Comment: Data Unavailable   No LMP for male patient.  Allergies reviewed: Yes  Medications reviewed: Yes    Medications: Medication refills not needed today.  Pharmacy name entered into [employer]: [id number] [address]  AT [facility] [address]    Frailty Screening:   Is the patient here for a new oncology consult visit in cancer care? 2. No    PHQ9:  Did this patient require a PHQ9?: No      Clinical concerns: Pt had CT done 7/7. Wants to discuss the results.      Laura Velasquez LPN            "

## 2025-07-08 NOTE — PROGRESS NOTES
[facility]   Return Patient Visit    Name: Lopez Hogue  MRN: [id number]  Date of visit: Jul 8, 2025    Diagnosis: Oropharyngeal cancer, esophageal SCC  Stage:    Cancer Staging   Primary esophageal squamous cell carcinoma (H)  Staging form: Esophagus - Squamous Cell Carcinoma, AJCC 8th Edition  - Clinical stage from 11/28/2023: Stage II (cT2, cN1, cM0) - Signed by Yannick Alva MD on 7/9/2024  - Pathologic stage from 4/19/2024: Stage I (ypT0, pN0, cM0) - Signed by Yannick Alva MD on 7/9/2024    Squamous cell carcinoma of vallecula (H)  Staging form: Pharynx - Oropharynx, AJCC 8th Edition  - Clinical stage from 10/12/2023: Stage JUAN MIGUEL (cT1, cN2b, cM0, p16-) - Signed by Yannick Alva MD on 11/21/2023    Molecular: p16 negative  Performance status: ECOG 0    Oncology History:     Oropharyngeal squamous cell carcinoma:  -8/18/23 CT neck: Question soft tissue lesion within the right vallecula/ventral epiglottis.   -10/12/23 R vallecula biopsy: Non-keratinizing poorly differentiated squamous cell carcinoma, p16 negative  -10/13/23 PET/CT: Findings suspicious for right vallecula squamous cell carcinoma with right level IIa and right level IV lymph node metastases.. FDG avid thickening of the mid to distal esophagus, suspicious for a synchronous primary esophageal neoplasm. Recommend correlation with endoscopy.  --7/8/24 CT neck:  Residual mass centered on the right lingual tonsil. Interval resolution of previously identified cervical lymphadenopathy. Partially visualized postsurgical changes of esophagogastrectomy with gastric pull-through and multinodular pulmonary opacities in the visualized lung apices, better appreciated on same-day CT chest 7/8/2024.  -7/8/24 ENT visit with Dr. Felix, no overt mass on exam     Esophageal squamous cell carcinoma:   -11/8/23 EGD with biopsy of mid esophageal mass: non-keratinizing poorly differentiated squamous cell carcinoma,  PD-L1 CPS 15  -11/21/23 consult with Dr. Hill: plan for neoadjuvant chemoradiation followed by laparoscopic staging and jejunostomy, followed by minimally invasive Feliberto-Elbert esophagectomy.   -11/28/23 staging EUS Partially obstructing and partially circumferential fungating mass in the middle third of the esophagus. Esophageal squamous cell carcinoma was staged T2 N1 (suspicious paraesophageal lymph node)   -Admission 4/19/24-5/17/24 for esophagogastrectomy, LND. Surgery without complication, postoperative course c/b re-intubation for respiratory decompensation secondary to aspiration. Compete response.     Subsequent course:  -12/5/24-1/26/24 concurrent chemoradiotherapy with weekly carboplatin + paclitaxel (both esophageal and oropharyngeal/bilateral neck, infusion reaction with Taxol w/ dose 1. Tolerated rechallenge   -1/15/24 admission for neutropenia, source felt possibly secondary to cellulitis (vs radiation change), discharged with course of levofloxacin  -1/22/24 admission for recurrent fever, two days after completing levofloxacin. No neutropenia.   -7/26/24 PET/CT: No residual or recurrent pharyngeal mucosal space lesion. Specifically no FDG avid lesion in the tongue base where there was a questionable finding on the prior CT neck. No cervical lymphadenopathy. Status post chest esophagectomy and gastric pull-up surgery with no evidence of local recurrence at the surgical site. No evidence of any distant metastasis.   -7/29/24 ENT visit: exam without concern for recurrent disease  -8/8/24 EGD: pyloric stent replaced  -8/13/24-8/14/24 admission:  hematemesis/coffee ground emesis, symptoms resolved, planned for outpatient EGD  -8/15/24 EGD: pyloric stent revised  -10/14/24 ENT visit: exam clear  -10/20/24 ED visit (syncope): felt possibly related to cardiac etiology (ACS ruled out)  -12/5/24 EGD: pyloric botox therapy  -2/7/25 CT neck: No pathologic adenopathy.   -2/7/25 CT chest:  Esophagectomy and  gastric pull-up.  Clustered nodules in the right costophrenic angle measuring up to 5 mm technically indeterminate but pattern favors inflammatory etiology. Short-term follow-up recommended.  -2/17/25 ENT visit: exam without concern for recurrence        HPI:   Lopez Hogue is a 68 year old male with a past medical history that includes HTN, CAD, MI (March of 2023 s/p PCI x2, on DAPT), and prior sweat gland cancer s/p surgical resection, now with recently diagnosed oropharyngeal cancer found to have a synchronous squamous cell carcinoma of the esophagus, now s/p concurrent chemoRT for both oropharyngeal and esophageal disease (treated simulatneously with definitive intent) followed by esophagectomy.     PMHx  HTN  CAD c/b MI (2023) s/p PCI x2  Prior sweat gland carcinoma s/p resection  No history of autoimmune disease.     SocHx  Lives in [facility]  Actively involved in a program for those in recovery from substance use.   Former heavy smoker (2 ppd) and drinker himself. Quit both in 2013.       Interval History:    Last seen by me 2/25/25, at that time approximately 1 year from completion of chemoRT for HNSCC and esophageal SCC simultaneously. Planned for continued surveillance for esophageal cancer with q6mo imaging through year 2.     4/23/25 Rad Onc follow up    5/27/25 EGD with Dr. Hill, dilation of pylorus performed    7/7/25 CT neck:   1.  Posttreatment related and submucosal edema with thickening of the epiglottis and aryepiglottic folds.   2.  No cervical lymphadenopathy.  3.  Patulous esophagus with intraluminal debris. Wall thickening, similar to prior.    7/7/25 CT chest/AP:   1.  Stable postoperative changes esophagectomy and gastric pull-through.  2.  Small cluster of nodules seen previously within the right lower lobe have improved.   3.  Consolidative opacities left upper and lower lobes, and diffuse small, nodular groundglass opacities both lungs have not significantly changed.  4.  No  evidence for metastatic disease in the abdomen or pelvis.    Overall doing very well  Swallowing well  Issues of reflux well managed  Did notice improved with pyloric dilation procedure  No longer has any stents in place  Weight has been stable  Keeping pretty active        Exam:   /61   Pulse 69   Temp 98.1  F (36.7  C) (Oral)   Resp 14   Wt 67.4 kg (148 lb 9.6 oz)   SpO2 97%   BMI 23.27 kg/m      Gen: Comfortable, NAD  HEENT: Pupils equal, non-icteric  Neck/Lymph: Supple, small and firm L submandibular nodes but otherwise no lymphadenopathy   resp: Comfortable on room air  CV: well perfuse  Ext: No swelling  Neuro: Normal gait      Labs:   Reviewed in chart    Imaging:   All pertinent imaging studies personally reviewed. Key findings summarized in oncology history above    Pathology:   Reviewed in chart, key findings summarized above      Assessment and Plan:   Lopez Hogue is a 68 year old male with HTN, CAD, prior sweat gland cancer s/p resection, and recently diagnosed head and neck squamous cell carcinoma of the vallecula, with staging revealing a synchronous esophageal squamous cell carcinoma. Presenting today to establish care with medical oncology.       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 Doing well clinically. No radiographic evidence of disease recurrence. Labs show only very mild anemia. BMP and TSH are pending, we wlll follow these as they return.     Plan:  --Regarding his esophageal squamous cell carcinoma, given his  pathologic complete response, no role for adjuvant therapy at this time  --Plan for CT neck/chest/AP every 6 months for the first 2 years, then annually through year 5, next due in May  --he will continue ENT follow up for routine exams      # iatrogenic hypothyroidism  -started on levothyroxine  -most recently checked in December and normal    Plan:  --continue 25 mcg daily  --trend TSH      Yannick Alva MD PhD   of Medicine  Division of Hematology, Oncology and Transplantation    ---  30 minutes were spent on the date of the encounter performing chart review, history and exam, documentation, and further activities as noted above.     The longitudinal plan of care for the diagnosis(es)/condition(s) as documented were addressed during this visit. Due to the added complexity in care, I will continue to support Fabricio in the subsequent management and with ongoing continuity of care.

## 2025-07-09 DIAGNOSIS — E03.9 HYPOTHYROIDISM, UNSPECIFIED TYPE: ICD-10-CM

## 2025-07-09 RX ORDER — LEVOTHYROXINE SODIUM 50 UG/1
50 TABLET ORAL DAILY
Qty: 30 TABLET | Refills: 0 | Status: SHIPPED | OUTPATIENT
Start: 2025-07-09 | End: 2025-08-08

## 2025-07-22 NOTE — PROGRESS NOTES
Care Management Follow Up    Length of Stay (days): 14    Expected Discharge Date: 5/4?     Concerns to be Addressed: Medical readiness, discharge planning.   Patient plan of care discussed at interdisciplinary rounds: Yes    Anticipated Discharge Disposition:  Home  Anticipated Discharge Services:  Home infusion  Anticipated Discharge DME: None noted.     Patient/family educated on Medicare website which has current facility and service quality ratings: NA  Education Provided on the Discharge Plan: Yes  Patient/Family in Agreement with the Plan: Yes    Referrals Placed by CM/SW: Home infusion  Private pay costs discussed: Not applicable    Additional Information:  Per chart review, patient had an emesis after starting cycled TF last evening, plan for repeat video swallow and esophagram today, unlikely that patient is medically ready for discharge to home today. Writer updated [facility], potential discharge in the next few days. No additional discharge needs noted at this time.     Discharge resources:     [facility] ([employer])  Phone: [phone]  Fax: [phone]     Care coordination will continue to follow.      Ruma Zaman, Nurse Coordinator, BSN  Phone: [phone]  Pager: [phone]  Vocera: 6B C RNCC       Copied from CRM #[id number]. Topic: Medications - New Medication Request  >> Jul 22, 2025  1:48 PM Jessenia wrote:  Who Called: Heydi Ceja    Caller is requesting assistance/information from provider's office.    PT is requesting to speak w nurse     List of preferred pharmacies on file (remove unneeded): [unfilled]  If different, enter pharmacy into here including location and phone number: [facility] [address] [place] [address], [place] [address]        Preferred Method of Contact: Phone Call  Patient's Preferred Phone Number on File: [phone]   Best Call Back Number, if different:  Additional Information: states she is requesting for a mild muscle and mild nerve relaxer.

## 2025-07-24 ENCOUNTER — PATIENT OUTREACH (OUTPATIENT)
Dept: CARE COORDINATION | Facility: CLINIC | Age: 70
End: 2025-07-24
Payer: COMMERCIAL

## 2025-08-04 ENCOUNTER — OFFICE VISIT (OUTPATIENT)
Dept: NEUROLOGY | Facility: CLINIC | Age: 70
End: 2025-08-04
Payer: COMMERCIAL

## 2025-08-04 VITALS
WEIGHT: 145 LBS | HEART RATE: 76 BPM | SYSTOLIC BLOOD PRESSURE: 103 MMHG | BODY MASS INDEX: 22.71 KG/M2 | DIASTOLIC BLOOD PRESSURE: 54 MMHG

## 2025-08-04 DIAGNOSIS — R41.89 SPELL OF ALTERED COGNITION: ICD-10-CM

## 2025-08-04 DIAGNOSIS — R41.89 EPISODE OF ALTERED COGNITION: ICD-10-CM

## 2025-08-04 PROCEDURE — 3078F DIAST BP <80 MM HG: CPT | Performed by: PSYCHIATRY & NEUROLOGY

## 2025-08-04 PROCEDURE — 3074F SYST BP LT 130 MM HG: CPT | Performed by: PSYCHIATRY & NEUROLOGY

## 2025-08-04 PROCEDURE — 99214 OFFICE O/P EST MOD 30 MIN: CPT | Performed by: PSYCHIATRY & NEUROLOGY

## 2025-08-04 PROCEDURE — G2211 COMPLEX E/M VISIT ADD ON: HCPCS | Performed by: PSYCHIATRY & NEUROLOGY

## 2025-08-04 RX ORDER — LEVETIRACETAM 250 MG/1
250 TABLET ORAL AT BEDTIME
Qty: 90 TABLET | Refills: 3 | Status: SHIPPED | OUTPATIENT
Start: 2025-08-04

## 2025-08-05 DIAGNOSIS — E03.9 HYPOTHYROIDISM, UNSPECIFIED TYPE: ICD-10-CM

## 2025-08-06 RX ORDER — LEVOTHYROXINE SODIUM 50 UG/1
50 TABLET ORAL DAILY
Qty: 30 TABLET | Refills: 0 | Status: SHIPPED | OUTPATIENT
Start: 2025-08-06

## 2025-08-07 ENCOUNTER — PATIENT OUTREACH (OUTPATIENT)
Dept: CARE COORDINATION | Facility: CLINIC | Age: 70
End: 2025-08-07
Payer: COMMERCIAL

## 2025-08-10 DIAGNOSIS — E03.9 HYPOTHYROIDISM, UNSPECIFIED TYPE: ICD-10-CM

## 2025-08-14 RX ORDER — LEVOTHYROXINE SODIUM 50 UG/1
50 TABLET ORAL DAILY
Qty: 90 TABLET | Refills: 2 | Status: SHIPPED | OUTPATIENT
Start: 2025-08-14

## 2025-08-19 ENCOUNTER — ONCOLOGY VISIT (OUTPATIENT)
Dept: ONCOLOGY | Facility: CLINIC | Age: 70
End: 2025-08-19
Attending: REGISTERED NURSE
Payer: COMMERCIAL

## 2025-08-19 VITALS
SYSTOLIC BLOOD PRESSURE: 95 MMHG | WEIGHT: 144.3 LBS | DIASTOLIC BLOOD PRESSURE: 56 MMHG | OXYGEN SATURATION: 96 % | BODY MASS INDEX: 22.6 KG/M2 | TEMPERATURE: 98.5 F | RESPIRATION RATE: 18 BRPM | HEART RATE: 69 BPM

## 2025-08-19 DIAGNOSIS — C10.0 SQUAMOUS CELL CARCINOMA OF VALLECULA (H): ICD-10-CM

## 2025-08-19 DIAGNOSIS — E03.9 HYPOTHYROIDISM, UNSPECIFIED TYPE: ICD-10-CM

## 2025-08-19 DIAGNOSIS — C15.9 PRIMARY ESOPHAGEAL SQUAMOUS CELL CARCINOMA (H): Primary | ICD-10-CM

## 2025-08-19 LAB
ALBUMIN SERPL BCG-MCNC: 3.9 G/DL (ref 3.5–5.2)
ALP SERPL-CCNC: 109 U/L (ref 40–150)
ALT SERPL W P-5'-P-CCNC: 20 U/L (ref 0–70)
ANION GAP SERPL CALCULATED.3IONS-SCNC: 12 MMOL/L (ref 7–15)
AST SERPL W P-5'-P-CCNC: 41 U/L (ref 0–45)
BASOPHILS # BLD AUTO: <0.03 10E3/UL (ref 0–0.2)
BASOPHILS NFR BLD AUTO: 0.4 %
BILIRUB SERPL-MCNC: 1.4 MG/DL
BUN SERPL-MCNC: 9.9 MG/DL (ref 8–23)
CALCIUM SERPL-MCNC: 9.3 MG/DL (ref 8.8–10.4)
CHLORIDE SERPL-SCNC: 101 MMOL/L (ref 98–107)
CREAT SERPL-MCNC: 0.79 MG/DL (ref 0.67–1.17)
EGFRCR SERPLBLD CKD-EPI 2021: >90 ML/MIN/1.73M2
EOSINOPHIL # BLD AUTO: <0.03 10E3/UL (ref 0–0.7)
EOSINOPHIL NFR BLD AUTO: 0 %
ERYTHROCYTE [DISTWIDTH] IN BLOOD BY AUTOMATED COUNT: 13.8 % (ref 10–15)
GLUCOSE SERPL-MCNC: 136 MG/DL (ref 70–99)
HCO3 SERPL-SCNC: 24 MMOL/L (ref 22–29)
HCT VFR BLD AUTO: 36.9 % (ref 40–53)
HGB BLD-MCNC: 12.6 G/DL (ref 13.3–17.7)
IMM GRANULOCYTES # BLD: <0.03 10E3/UL
IMM GRANULOCYTES NFR BLD: 0.2 %
LYMPHOCYTES # BLD AUTO: 0.72 10E3/UL (ref 0.8–5.3)
LYMPHOCYTES NFR BLD AUTO: 15.6 %
MCH RBC QN AUTO: 30.8 PG (ref 26.5–33)
MCHC RBC AUTO-ENTMCNC: 34.1 G/DL (ref 31.5–36.5)
MCV RBC AUTO: 90.2 FL (ref 78–100)
MONOCYTES # BLD AUTO: 0.38 10E3/UL (ref 0–1.3)
MONOCYTES NFR BLD AUTO: 8.2 %
NEUTROPHILS # BLD AUTO: 3.48 10E3/UL (ref 1.6–8.3)
NEUTROPHILS NFR BLD AUTO: 75.6 %
NRBC # BLD AUTO: <0.03 10E3/UL
NRBC BLD AUTO-RTO: 0 /100
PLATELET # BLD AUTO: 134 10E3/UL (ref 150–450)
POTASSIUM SERPL-SCNC: 3.7 MMOL/L (ref 3.4–5.3)
PROT SERPL-MCNC: 6.8 G/DL (ref 6.4–8.3)
RBC # BLD AUTO: 4.09 10E6/UL (ref 4.4–5.9)
SODIUM SERPL-SCNC: 137 MMOL/L (ref 135–145)
TSH SERPL DL<=0.005 MIU/L-ACNC: 1.38 UIU/ML (ref 0.3–4.2)
WBC # BLD AUTO: 4.61 10E3/UL (ref 4–11)

## 2025-08-19 PROCEDURE — G0463 HOSPITAL OUTPT CLINIC VISIT: HCPCS | Performed by: REGISTERED NURSE

## 2025-08-19 PROCEDURE — 84443 ASSAY THYROID STIM HORMONE: CPT | Performed by: REGISTERED NURSE

## 2025-08-19 PROCEDURE — 36415 COLL VENOUS BLD VENIPUNCTURE: CPT | Performed by: REGISTERED NURSE

## 2025-08-19 PROCEDURE — G2211 COMPLEX E/M VISIT ADD ON: HCPCS | Performed by: REGISTERED NURSE

## 2025-08-19 PROCEDURE — 80053 COMPREHEN METABOLIC PANEL: CPT | Performed by: REGISTERED NURSE

## 2025-08-19 PROCEDURE — 85025 COMPLETE CBC W/AUTO DIFF WBC: CPT | Performed by: REGISTERED NURSE

## 2025-08-19 PROCEDURE — 99214 OFFICE O/P EST MOD 30 MIN: CPT | Performed by: REGISTERED NURSE

## 2025-08-19 ASSESSMENT — PAIN SCALES - GENERAL: PAINLEVEL_OUTOF10: NO PAIN (0)

## 2025-08-28 ENCOUNTER — MYC REFILL (OUTPATIENT)
Dept: ONCOLOGY | Facility: CLINIC | Age: 70
End: 2025-08-28

## 2025-08-28 DIAGNOSIS — E03.9 HYPOTHYROIDISM, UNSPECIFIED TYPE: ICD-10-CM

## 2025-09-01 RX ORDER — LEVOTHYROXINE SODIUM 50 UG/1
50 TABLET ORAL DAILY
Qty: 90 TABLET | Refills: 2 | Status: SHIPPED | OUTPATIENT
Start: 2025-09-01

## 2025-09-03 ENCOUNTER — PATIENT OUTREACH (OUTPATIENT)
Dept: GASTROENTEROLOGY | Facility: CLINIC | Age: 70
End: 2025-09-03
Payer: COMMERCIAL

## 2025-09-03 DIAGNOSIS — Z12.11 SPECIAL SCREENING FOR MALIGNANT NEOPLASMS, COLON: Primary | ICD-10-CM

## (undated) DEVICE — SU VICRYL 3-0 SH 27" J316H

## (undated) DEVICE — SU MONOCRYL 4-0 PS-2 27" UND Y426H

## (undated) DEVICE — SYR 10ML SLIP TIP W/O NDL 303134

## (undated) DEVICE — DRAPE IOBAN INCISE 23X17" 6650EZ

## (undated) DEVICE — ENDO SEALING CAP SMARTCAP

## (undated) DEVICE — KIT CONNECTOR FOR OLYMPUS ENDOSCOPES DEFENDO 100310

## (undated) DEVICE — WIPES FOLEY CARE SURESTEP PROVON DFC100

## (undated) DEVICE — DRSG DRAIN 2X2" 7087

## (undated) DEVICE — BITE BLOCK BLOX ADJ STRAP 60FR SBT-546-100

## (undated) DEVICE — DEVICE SUTURE PASSER 14GA WECK EFX EFXSP2

## (undated) DEVICE — JELLY LUBRICATING SURGILUBE 2OZ TUBE

## (undated) DEVICE — ESU LIGASURE MARYLAND LAPAROSCOPIC SLR/DVDR 5MMX37CM LF1937

## (undated) DEVICE — SYR ANGIOGRAPHY MULTIUSE KIT ACIST 014612

## (undated) DEVICE — SUCTION MANIFOLD NEPTUNE 2 SYS 4 PORT 0702-020-000

## (undated) DEVICE — SLEEVE TR BAND RADIAL COMPRESSION DEVICE 24CM TRB24-REG

## (undated) DEVICE — CATH BALLOON EMERGE 2.5X30MM H7493918930250

## (undated) DEVICE — ENDO ADPT BRONCH SWIVEL Y A1002

## (undated) DEVICE — SU VICRYL 3-0 SH 27" UND J416H

## (undated) DEVICE — TUBE JEJUNOSTOMY ENFIT 16FR 8200-16

## (undated) DEVICE — NDL 21GA 1.5"

## (undated) DEVICE — Device

## (undated) DEVICE — NDL PERC ENTRY THINWALL 18GA 9.0" G00273

## (undated) DEVICE — NDL INSUFFLATION 13GA 120MM C2201

## (undated) DEVICE — SU SILK 0 SH 30" K834H

## (undated) DEVICE — PREP CHLORAPREP 26ML TINTED HI-LITE ORANGE 930815

## (undated) DEVICE — WIRE GUIDE HI-TRQ  WHISPER MS JTIP 0.014"X190CM 1005357HJ

## (undated) DEVICE — DRSG STERI STRIP 1/2X4" R1547

## (undated) DEVICE — SLEEVE TR BAND RADIAL COMPRESSION DEVICE 29CM XX-RF06L

## (undated) DEVICE — SU SILK 0 TIE 6X30" A306H

## (undated) DEVICE — CATH DIAGNOSTIC RADIAL 5FR TIG 4.0

## (undated) DEVICE — STPL RELOAD REG TISSUE ECHELON 45 X 3.6MM BLUE GST45B

## (undated) DEVICE — GUIDEWIRE AMPLATZ SUPER STIFF 0.035"X260CM M001465260

## (undated) DEVICE — LUBRICANT INST KIT ENDO-LUBE 220-90

## (undated) DEVICE — LINEN TOWEL PACK X6 WHITE 5487

## (undated) DEVICE — GLOVE BIOGEL PI SZ 8.0 40880

## (undated) DEVICE — SYR PISTON URETHRAL 60ML 68000

## (undated) DEVICE — SPONGE RAY-TEC 4X8" 7318

## (undated) DEVICE — GUIDEWIRE INQWIRE 3MM J TIP .035 260CM

## (undated) DEVICE — GLOVE BIOGEL PI MICRO SZ 7.5 48575

## (undated) DEVICE — DRAIN CHEST TUBE 28FR STR 8028

## (undated) DEVICE — KIT ENDO FIRST STEP DISINFECTANT 200ML W/POUCH EP-4

## (undated) DEVICE — LINEN GOWN XLG 5407

## (undated) DEVICE — DRAPE LAP W/ARMBOARD 29410

## (undated) DEVICE — GRASPING DEVICE RAPTOR RAT TOOTH 00711177

## (undated) DEVICE — SOL WATER IRRIG 1000ML BOTTLE 2F7114

## (undated) DEVICE — SPONGE COTTONOID 1/2X3" 80-1407

## (undated) DEVICE — INTR PEELAWAY 20FRX20CM G06445 PLVW-20.0-38

## (undated) DEVICE — ANTIFOG SOLUTION W/FOAM PAD CF-1002

## (undated) DEVICE — DECANTER BAG 2002S

## (undated) DEVICE — SU VICRYL 0 UR-6 27" J603H

## (undated) DEVICE — SNARE CAPIVATOR II POLYPECTOMY 20X240MM M00561240

## (undated) DEVICE — GUIDEWIRE AMPLATZ SUPER STIFF 0.035"X180CM M001465250

## (undated) DEVICE — NDL SCLEROTHERAPY 25GA CARR-LOCK  00711811

## (undated) DEVICE — ENDO SYSTEM WATER BOTTLE & TUBING W/CO2 FILTER 00711549

## (undated) DEVICE — DRSG EYE PAD STERILE 1.63X2.63" NON21600

## (undated) DEVICE — SYR ENTERAL W/ENFIT CONNECTOR PURPLE 60ML 460SE

## (undated) DEVICE — ENDO BITE BLOCK ADULT OMNI-BLOC

## (undated) DEVICE — TUBING SUCTION 10'X3/16" N510

## (undated) DEVICE — APPLICATORS COTTON TIP 6"X2 STERILE LF C15053-006

## (undated) DEVICE — ELECTRODE ADULT PACING MULTI P-211-M1

## (undated) DEVICE — ENDO TUBING CO2 SMARTCAP STERILE DISP 100145CO2EXT

## (undated) DEVICE — CATH LAUNCHER 6FR LA6EBU375

## (undated) DEVICE — CLIP ENDO HEMO-LOC PURPLE LG 544240

## (undated) DEVICE — SPECIMEN TRAP MUCOUS 40ML LUKI C30200A

## (undated) DEVICE — ESU ELEC BLADE 2.75" COATED/INSULATED E1455

## (undated) DEVICE — SU SILK 2-0 SH 30" K833H

## (undated) DEVICE — SYR 30ML SLIP TIP W/O NDL 302833

## (undated) DEVICE — CUSTOM PACK CORONARY SAN5BCRHEA

## (undated) DEVICE — SYR 30ML LL W/O NDL 302832

## (undated) DEVICE — GOWN IMPERVIOUS BREATHABLE SMART XLG 89045

## (undated) DEVICE — CATH BALLOON NC EMERGE 2.75X12MM H7493926712270

## (undated) DEVICE — MANIFOLD KIT ANGIO AUTOMATED 014613

## (undated) DEVICE — ENDO TOOTH GUARD SAC2001

## (undated) DEVICE — GLOVE BIOGEL PI MICRO INDICATOR UNDERGLOVE SZ 8.5 48985

## (undated) DEVICE — CATH TRAY FOLEY SURESTEP 16FR W/URNE MTR STLK LATEX A303316A

## (undated) DEVICE — DRSG TELFA 3X8" 1238

## (undated) DEVICE — WIRE GUIDE AMPLATZ SUPER STIFF 0.035"X260CM M001465261

## (undated) DEVICE — SU SILK 3-0 SH CR 8X18" C013D

## (undated) DEVICE — ADH LIQUID MASTISOL TOPICAL VIAL 2-3ML 0523-48

## (undated) DEVICE — WIRE GUIDE 0.025"X450CM STR VISIGLIDE G-240-2545S

## (undated) DEVICE — STPL ENDO LINEAR CUT ARTICULATING 45MM ATS45

## (undated) DEVICE — ESU GROUND PAD ADULT W/CORD E7507

## (undated) DEVICE — TUBING SUCTION 6"X3/16" N56A

## (undated) DEVICE — BALLOON ELATION 240CML 17-18-19-20-21MM 5.5CM EPCX18

## (undated) DEVICE — SOL NACL 0.9% IRRIG 1000ML BOTTLE 2F7124

## (undated) DEVICE — ENDO TROCAR FIRST ENTRY KII FIOS Z-THRD 12X100MM CTF73

## (undated) DEVICE — DRSG GAUZE 4X4" TRAY 6939

## (undated) DEVICE — BLADE CLIPPER SGL USE 9680

## (undated) DEVICE — DRSG PRIMAPORE 02X3" 7133

## (undated) DEVICE — STPL ENDO ARTICULATING 45MM EC45A

## (undated) DEVICE — TUBING SUCTION MEDI-VAC SOFT 3/16"X20' N520A

## (undated) DEVICE — ENDO CAP AND TUBING STERILE FOR ENDOGATOR  100130

## (undated) DEVICE — CATH CORONARY LITHOTRIPSY SHOCKWAVE 2.5X12MM C2IVL2512

## (undated) DEVICE — WIRE GUIDE 0.035"X260CM NAVIPRO ANG 5625 M00556251

## (undated) DEVICE — ELECTRODE DEFIB CADENCE 22550R

## (undated) DEVICE — DEVICE INFLATION SYR W/ HEMOSTASIS VALVE 12IN EXT IN4904

## (undated) DEVICE — ESU PENCIL W/COATED BLADE E2450H

## (undated) DEVICE — SU VICRYL 3-0 SH 8X18" UND J864D

## (undated) DEVICE — ENDO FCP GRASPING ROTATABLE 7.2MMX2.6MM FG-244NR

## (undated) DEVICE — ENDO VALVE BX EVIS MAJ-210

## (undated) DEVICE — KIT HAND CONTROL ACIST 014644 AR-P54

## (undated) DEVICE — SHTH INTRO 0.021IN ID 6FR DIA

## (undated) DEVICE — PACK ENT ENDOSCOPY UMMC

## (undated) DEVICE — LINEN TOWEL PACK X30 5481

## (undated) DEVICE — LINEN TOWEL PACK X5 5464

## (undated) DEVICE — GLOVE BIOGEL PI MICRO SZ 8.0 48580

## (undated) DEVICE — CATH LAUNCHER 6FR EBU 4.0 LA6EBU40

## (undated) DEVICE — CATH BALLOON EMERGE 2.0X20MM H7493918920200

## (undated) DEVICE — ENDO SCOPE WARMER SEAL  C3101

## (undated) DEVICE — PACK ENDOSCOPY GI CUSTOM UMMC

## (undated) DEVICE — EXCHANGE WIRE .035 260 STAR/JFC/035/260/ M001491681

## (undated) DEVICE — DRAIN PENROSE 3/8X18" LATEX 30416-038

## (undated) DEVICE — STPL ENDO LINEAR CUT ECHELON FLEX GST 45MM LONG PLEE45A

## (undated) DEVICE — SU VICRYL 4-0 PS-2 18" UND J496H

## (undated) DEVICE — ENDO CLIP CARTIRDGE K2 MED/LG 10 1112

## (undated) DEVICE — DRSG KERLIX 4 1/2"X4YDS ROLL 6715

## (undated) DEVICE — MANIFOLD NEPTUNE 4 PORT 700-20

## (undated) DEVICE — SPONGE TONSIL W/STRING MED 23275-680

## (undated) DEVICE — DECANTER VIAL 2006S

## (undated) DEVICE — INFLATION DEVICE BIG 60 ENDO-AN6012

## (undated) DEVICE — WIRE GUIDE 0.035"X450CM JAGWIRE HP STR TIP M00556580

## (undated) DEVICE — CUTTING BALLOON WOLVERINE 2.5X10MM

## (undated) DEVICE — TIES BANDING T50R

## (undated) DEVICE — CATH BALLOON EMERGE 2.5X15MM H7493918915250

## (undated) DEVICE — CATH DIAG RADIAL 5FR TIG 4.5 100CM

## (undated) DEVICE — GLOVE BIOGEL PI MICRO INDICATOR UNDERGLOVE SZ 8.0 48980

## (undated) DEVICE — ENDO VALVE SUCTION BRONCH EVIS MAJ-209

## (undated) RX ORDER — CEFAZOLIN SODIUM/WATER 2 G/20 ML
SYRINGE (ML) INTRAVENOUS
Status: DISPENSED
Start: 2024-04-01

## (undated) RX ORDER — DEXAMETHASONE SODIUM PHOSPHATE 4 MG/ML
INJECTION, SOLUTION INTRA-ARTICULAR; INTRALESIONAL; INTRAMUSCULAR; INTRAVENOUS; SOFT TISSUE
Status: DISPENSED
Start: 2024-04-01

## (undated) RX ORDER — OXYMETAZOLINE HYDROCHLORIDE 0.05 G/100ML
SPRAY NASAL
Status: DISPENSED
Start: 2024-05-05

## (undated) RX ORDER — PROPOFOL 10 MG/ML
INJECTION, EMULSION INTRAVENOUS
Status: DISPENSED
Start: 2025-05-27

## (undated) RX ORDER — DEXAMETHASONE SODIUM PHOSPHATE 4 MG/ML
INJECTION, SOLUTION INTRA-ARTICULAR; INTRALESIONAL; INTRAMUSCULAR; INTRAVENOUS; SOFT TISSUE
Status: DISPENSED
Start: 2023-10-12

## (undated) RX ORDER — HYDROMORPHONE HCL IN WATER/PF 6 MG/30 ML
PATIENT CONTROLLED ANALGESIA SYRINGE INTRAVENOUS
Status: DISPENSED
Start: 2024-04-01

## (undated) RX ORDER — FENTANYL CITRATE-0.9 % NACL/PF 10 MCG/ML
PLASTIC BAG, INJECTION (ML) INTRAVENOUS
Status: DISPENSED
Start: 2025-05-27

## (undated) RX ORDER — FENTANYL CITRATE 50 UG/ML
INJECTION, SOLUTION INTRAMUSCULAR; INTRAVENOUS
Status: DISPENSED
Start: 2024-04-19

## (undated) RX ORDER — FENTANYL CITRATE 50 UG/ML
INJECTION, SOLUTION INTRAMUSCULAR; INTRAVENOUS
Status: DISPENSED
Start: 2024-04-28

## (undated) RX ORDER — FENTANYL CITRATE 50 UG/ML
INJECTION, SOLUTION INTRAMUSCULAR; INTRAVENOUS
Status: DISPENSED
Start: 2024-05-15

## (undated) RX ORDER — OXYCODONE HYDROCHLORIDE 10 MG/1
TABLET ORAL
Status: DISPENSED
Start: 2024-04-01

## (undated) RX ORDER — HYDROMORPHONE HYDROCHLORIDE 1 MG/ML
INJECTION, SOLUTION INTRAMUSCULAR; INTRAVENOUS; SUBCUTANEOUS
Status: DISPENSED
Start: 2024-04-19

## (undated) RX ORDER — FENTANYL CITRATE-0.9 % NACL/PF 10 MCG/ML
PLASTIC BAG, INJECTION (ML) INTRAVENOUS
Status: DISPENSED
Start: 2024-04-19

## (undated) RX ORDER — CLOPIDOGREL 300 MG/1
TABLET, FILM COATED ORAL
Status: DISPENSED
Start: 2023-03-27

## (undated) RX ORDER — FENTANYL CITRATE 50 UG/ML
INJECTION, SOLUTION INTRAMUSCULAR; INTRAVENOUS
Status: DISPENSED
Start: 2023-10-12

## (undated) RX ORDER — DEXAMETHASONE SODIUM PHOSPHATE 4 MG/ML
INJECTION, SOLUTION INTRA-ARTICULAR; INTRALESIONAL; INTRAMUSCULAR; INTRAVENOUS; SOFT TISSUE
Status: DISPENSED
Start: 2024-12-05

## (undated) RX ORDER — ONDANSETRON 2 MG/ML
INJECTION INTRAMUSCULAR; INTRAVENOUS
Status: DISPENSED
Start: 2024-08-08

## (undated) RX ORDER — ACETAMINOPHEN 325 MG/1
TABLET ORAL
Status: DISPENSED
Start: 2024-04-01

## (undated) RX ORDER — HYDROMORPHONE HCL IN WATER/PF 6 MG/30 ML
PATIENT CONTROLLED ANALGESIA SYRINGE INTRAVENOUS
Status: DISPENSED
Start: 2024-04-19

## (undated) RX ORDER — FENTANYL CITRATE 50 UG/ML
INJECTION, SOLUTION INTRAMUSCULAR; INTRAVENOUS
Status: DISPENSED
Start: 2025-05-27

## (undated) RX ORDER — CLOPIDOGREL BISULFATE 75 MG/1
TABLET ORAL
Status: DISPENSED
Start: 2023-05-09

## (undated) RX ORDER — LIDOCAINE HYDROCHLORIDE 10 MG/ML
INJECTION, SOLUTION EPIDURAL; INFILTRATION; INTRACAUDAL; PERINEURAL
Status: DISPENSED
Start: 2024-08-15

## (undated) RX ORDER — FENTANYL CITRATE 50 UG/ML
INJECTION, SOLUTION INTRAMUSCULAR; INTRAVENOUS
Status: DISPENSED
Start: 2024-05-05

## (undated) RX ORDER — FENTANYL CITRATE-0.9 % NACL/PF 10 MCG/ML
PLASTIC BAG, INJECTION (ML) INTRAVENOUS
Status: DISPENSED
Start: 2024-05-05

## (undated) RX ORDER — FENTANYL CITRATE 50 UG/ML
INJECTION, SOLUTION INTRAMUSCULAR; INTRAVENOUS
Status: DISPENSED
Start: 2023-11-08

## (undated) RX ORDER — IOPAMIDOL 755 MG/ML
INJECTION, SOLUTION INTRAVASCULAR
Status: DISPENSED
Start: 2025-05-27

## (undated) RX ORDER — FENTANYL CITRATE-0.9 % NACL/PF 10 MCG/ML
PLASTIC BAG, INJECTION (ML) INTRAVENOUS
Status: DISPENSED
Start: 2023-10-12

## (undated) RX ORDER — DEXAMETHASONE SODIUM PHOSPHATE 4 MG/ML
INJECTION, SOLUTION INTRA-ARTICULAR; INTRALESIONAL; INTRAMUSCULAR; INTRAVENOUS; SOFT TISSUE
Status: DISPENSED
Start: 2024-05-05

## (undated) RX ORDER — FENTANYL CITRATE-0.9 % NACL/PF 10 MCG/ML
PLASTIC BAG, INJECTION (ML) INTRAVENOUS
Status: DISPENSED
Start: 2024-04-01

## (undated) RX ORDER — FENTANYL CITRATE 50 UG/ML
INJECTION, SOLUTION INTRAMUSCULAR; INTRAVENOUS
Status: DISPENSED
Start: 2024-08-08

## (undated) RX ORDER — SODIUM CHLORIDE, SODIUM LACTATE, POTASSIUM CHLORIDE, CALCIUM CHLORIDE 600; 310; 30; 20 MG/100ML; MG/100ML; MG/100ML; MG/100ML
INJECTION, SOLUTION INTRAVENOUS
Status: DISPENSED
Start: 2024-04-19

## (undated) RX ORDER — ONDANSETRON 2 MG/ML
INJECTION INTRAMUSCULAR; INTRAVENOUS
Status: DISPENSED
Start: 2024-04-01

## (undated) RX ORDER — OXYCODONE HYDROCHLORIDE 5 MG/1
TABLET ORAL
Status: DISPENSED
Start: 2024-04-01

## (undated) RX ORDER — IOPAMIDOL 510 MG/ML
INJECTION, SOLUTION INTRAVASCULAR
Status: DISPENSED
Start: 2024-04-28

## (undated) RX ORDER — ONDANSETRON 2 MG/ML
INJECTION INTRAMUSCULAR; INTRAVENOUS
Status: DISPENSED
Start: 2024-08-15

## (undated) RX ORDER — ASPIRIN 325 MG
TABLET ORAL
Status: DISPENSED
Start: 2023-05-09

## (undated) RX ORDER — ENOXAPARIN SODIUM 100 MG/ML
INJECTION SUBCUTANEOUS
Status: DISPENSED
Start: 2024-04-19

## (undated) RX ORDER — FENTANYL CITRATE 50 UG/ML
INJECTION, SOLUTION INTRAMUSCULAR; INTRAVENOUS
Status: DISPENSED
Start: 2023-05-09

## (undated) RX ORDER — PROPOFOL 10 MG/ML
INJECTION, EMULSION INTRAVENOUS
Status: DISPENSED
Start: 2024-08-08

## (undated) RX ORDER — ONDANSETRON 2 MG/ML
INJECTION INTRAMUSCULAR; INTRAVENOUS
Status: DISPENSED
Start: 2024-05-05

## (undated) RX ORDER — DEXAMETHASONE SODIUM PHOSPHATE 4 MG/ML
INJECTION, SOLUTION INTRA-ARTICULAR; INTRALESIONAL; INTRAMUSCULAR; INTRAVENOUS; SOFT TISSUE
Status: DISPENSED
Start: 2024-04-19

## (undated) RX ORDER — ONDANSETRON 2 MG/ML
INJECTION INTRAMUSCULAR; INTRAVENOUS
Status: DISPENSED
Start: 2024-05-15

## (undated) RX ORDER — PROPOFOL 10 MG/ML
INJECTION, EMULSION INTRAVENOUS
Status: DISPENSED
Start: 2024-05-15

## (undated) RX ORDER — ONDANSETRON 2 MG/ML
INJECTION INTRAMUSCULAR; INTRAVENOUS
Status: DISPENSED
Start: 2023-10-12

## (undated) RX ORDER — PROPOFOL 10 MG/ML
INJECTION, EMULSION INTRAVENOUS
Status: DISPENSED
Start: 2024-04-19

## (undated) RX ORDER — INDOCYANINE GREEN AND WATER 25 MG
KIT INJECTION
Status: DISPENSED
Start: 2024-04-19

## (undated) RX ORDER — OXYCODONE HYDROCHLORIDE 5 MG/1
TABLET ORAL
Status: DISPENSED
Start: 2023-10-12

## (undated) RX ORDER — DEXAMETHASONE SODIUM PHOSPHATE 4 MG/ML
INJECTION, SOLUTION INTRA-ARTICULAR; INTRALESIONAL; INTRAMUSCULAR; INTRAVENOUS; SOFT TISSUE
Status: DISPENSED
Start: 2025-05-27

## (undated) RX ORDER — IODIXANOL 320 MG/ML
INJECTION, SOLUTION INTRAVASCULAR
Status: DISPENSED
Start: 2024-05-05

## (undated) RX ORDER — HEPARIN SODIUM,PORCINE 10 UNIT/ML
VIAL (ML) INTRAVENOUS
Status: DISPENSED
Start: 2024-04-19

## (undated) RX ORDER — BUPIVACAINE HYDROCHLORIDE AND EPINEPHRINE 2.5; 5 MG/ML; UG/ML
INJECTION, SOLUTION EPIDURAL; INFILTRATION; INTRACAUDAL; PERINEURAL
Status: DISPENSED
Start: 2024-04-19

## (undated) RX ORDER — PROPOFOL 10 MG/ML
INJECTION, EMULSION INTRAVENOUS
Status: DISPENSED
Start: 2024-08-15

## (undated) RX ORDER — CEFAZOLIN SODIUM/WATER 2 G/20 ML
SYRINGE (ML) INTRAVENOUS
Status: DISPENSED
Start: 2024-04-19

## (undated) RX ORDER — DEXAMETHASONE SODIUM PHOSPHATE 4 MG/ML
INJECTION, SOLUTION INTRA-ARTICULAR; INTRALESIONAL; INTRAMUSCULAR; INTRAVENOUS; SOFT TISSUE
Status: DISPENSED
Start: 2024-08-08

## (undated) RX ORDER — FENTANYL CITRATE 50 UG/ML
INJECTION, SOLUTION INTRAMUSCULAR; INTRAVENOUS
Status: DISPENSED
Start: 2023-03-27

## (undated) RX ORDER — LIDOCAINE HYDROCHLORIDE 20 MG/ML
JELLY TOPICAL
Status: DISPENSED
Start: 2024-05-05

## (undated) RX ORDER — PROPOFOL 10 MG/ML
INJECTION, EMULSION INTRAVENOUS
Status: DISPENSED
Start: 2023-10-12

## (undated) RX ORDER — ENOXAPARIN SODIUM 100 MG/ML
INJECTION SUBCUTANEOUS
Status: DISPENSED
Start: 2024-04-01

## (undated) RX ORDER — DEXAMETHASONE SODIUM PHOSPHATE 4 MG/ML
INJECTION, SOLUTION INTRA-ARTICULAR; INTRALESIONAL; INTRAMUSCULAR; INTRAVENOUS; SOFT TISSUE
Status: DISPENSED
Start: 2024-05-15

## (undated) RX ORDER — PROPOFOL 10 MG/ML
INJECTION, EMULSION INTRAVENOUS
Status: DISPENSED
Start: 2024-04-01

## (undated) RX ORDER — FENTANYL CITRATE 50 UG/ML
INJECTION, SOLUTION INTRAMUSCULAR; INTRAVENOUS
Status: DISPENSED
Start: 2024-04-01

## (undated) RX ORDER — ACETAMINOPHEN 325 MG/1
TABLET ORAL
Status: DISPENSED
Start: 2023-10-12

## (undated) RX ORDER — DIAZEPAM 10 MG
TABLET ORAL
Status: DISPENSED
Start: 2023-03-27

## (undated) RX ORDER — ONDANSETRON 2 MG/ML
INJECTION INTRAMUSCULAR; INTRAVENOUS
Status: DISPENSED
Start: 2025-05-27